# Patient Record
Sex: FEMALE | Race: BLACK OR AFRICAN AMERICAN | NOT HISPANIC OR LATINO | Employment: UNEMPLOYED | URBAN - METROPOLITAN AREA
[De-identification: names, ages, dates, MRNs, and addresses within clinical notes are randomized per-mention and may not be internally consistent; named-entity substitution may affect disease eponyms.]

---

## 2024-01-01 ENCOUNTER — APPOINTMENT (OUTPATIENT)
Dept: CARDIOLOGY | Facility: HOSPITAL | Age: 64
End: 2024-01-01
Payer: COMMERCIAL

## 2024-01-01 ENCOUNTER — ANESTHESIA (OUTPATIENT)
Dept: OPERATING ROOM | Facility: HOSPITAL | Age: 64
End: 2024-01-01
Payer: COMMERCIAL

## 2024-01-01 ENCOUNTER — APPOINTMENT (OUTPATIENT)
Dept: RADIOLOGY | Facility: HOSPITAL | Age: 64
End: 2024-01-01
Payer: COMMERCIAL

## 2024-01-01 ENCOUNTER — APPOINTMENT (OUTPATIENT)
Dept: VASCULAR MEDICINE | Facility: HOSPITAL | Age: 64
End: 2024-01-01
Payer: COMMERCIAL

## 2024-01-01 ENCOUNTER — CLINICAL SUPPORT (OUTPATIENT)
Dept: EMERGENCY MEDICINE | Facility: HOSPITAL | Age: 64
End: 2024-01-01
Payer: COMMERCIAL

## 2024-01-01 ENCOUNTER — ANESTHESIA EVENT (OUTPATIENT)
Dept: OPERATING ROOM | Facility: HOSPITAL | Age: 64
End: 2024-01-01
Payer: COMMERCIAL

## 2024-01-01 ENCOUNTER — DOCUMENTATION (OUTPATIENT)
Dept: INTERNAL MEDICINE | Facility: HOSPITAL | Age: 64
End: 2024-01-01

## 2024-01-01 LAB
ACT BLD: 102 SEC (ref 83–199)
ACT BLD: 179 SEC (ref 83–199)

## 2024-01-01 PROCEDURE — 74018 RADEX ABDOMEN 1 VIEW: CPT

## 2024-01-01 PROCEDURE — 93005 ELECTROCARDIOGRAM TRACING: CPT

## 2024-01-01 PROCEDURE — 85347 COAGULATION TIME ACTIVATED: CPT

## 2024-01-01 PROCEDURE — 71045 X-RAY EXAM CHEST 1 VIEW: CPT

## 2024-01-01 PROCEDURE — 93306 TTE W/DOPPLER COMPLETE: CPT | Performed by: INTERNAL MEDICINE

## 2024-01-01 PROCEDURE — 93925 LOWER EXTREMITY STUDY: CPT | Performed by: INTERNAL MEDICINE

## 2024-01-01 PROCEDURE — 93925 LOWER EXTREMITY STUDY: CPT

## 2024-01-01 PROCEDURE — 93970 EXTREMITY STUDY: CPT

## 2024-01-01 PROCEDURE — 2500000005 HC RX 250 GENERAL PHARMACY W/O HCPCS: Performed by: STUDENT IN AN ORGANIZED HEALTH CARE EDUCATION/TRAINING PROGRAM

## 2024-01-01 PROCEDURE — A27882 PR AMPUTATION LOW LEG,CIRCULAR: Performed by: STUDENT IN AN ORGANIZED HEALTH CARE EDUCATION/TRAINING PROGRAM

## 2024-01-01 PROCEDURE — 93970 EXTREMITY STUDY: CPT | Performed by: INTERNAL MEDICINE

## 2024-01-01 PROCEDURE — 74177 CT ABD & PELVIS W/CONTRAST: CPT

## 2024-01-01 PROCEDURE — 93306 TTE W/DOPPLER COMPLETE: CPT

## 2024-01-01 PROCEDURE — 93971 EXTREMITY STUDY: CPT | Performed by: INTERNAL MEDICINE

## 2024-01-01 PROCEDURE — 2500000004 HC RX 250 GENERAL PHARMACY W/ HCPCS (ALT 636 FOR OP/ED): Performed by: STUDENT IN AN ORGANIZED HEALTH CARE EDUCATION/TRAINING PROGRAM

## 2024-01-01 PROCEDURE — 71046 X-RAY EXAM CHEST 2 VIEWS: CPT

## 2024-01-01 RX ORDER — PHENYLEPHRINE HYDROCHLORIDE 10 MG/ML
INJECTION INTRAVENOUS AS NEEDED
Status: DISCONTINUED | OUTPATIENT
Start: 2024-01-01 | End: 2024-01-01

## 2024-01-01 RX ORDER — FENTANYL CITRATE 50 UG/ML
INJECTION, SOLUTION INTRAMUSCULAR; INTRAVENOUS AS NEEDED
Status: DISCONTINUED | OUTPATIENT
Start: 2024-01-01 | End: 2024-01-01

## 2024-01-01 RX ORDER — PROPOFOL 10 MG/ML
INJECTION, EMULSION INTRAVENOUS AS NEEDED
Status: DISCONTINUED | OUTPATIENT
Start: 2024-01-01 | End: 2024-01-01

## 2024-01-01 RX ORDER — CEFAZOLIN 1 G/1
INJECTION, POWDER, FOR SOLUTION INTRAVENOUS AS NEEDED
Status: DISCONTINUED | OUTPATIENT
Start: 2024-01-01 | End: 2024-01-01

## 2024-01-01 RX ORDER — LIDOCAINE HYDROCHLORIDE 10 MG/ML
INJECTION INFILTRATION; PERINEURAL AS NEEDED
Status: DISCONTINUED | OUTPATIENT
Start: 2024-01-01 | End: 2024-01-01

## 2024-01-01 RX ORDER — ONDANSETRON HYDROCHLORIDE 2 MG/ML
INJECTION, SOLUTION INTRAVENOUS AS NEEDED
Status: DISCONTINUED | OUTPATIENT
Start: 2024-01-01 | End: 2024-01-01

## 2024-01-01 SDOH — HEALTH STABILITY: MENTAL HEALTH: CURRENT SMOKER: 0

## 2024-01-01 ASSESSMENT — PAIN SCALES - GENERAL: PAIN_LEVEL: 3

## 2024-05-05 ENCOUNTER — HOSPITAL ENCOUNTER (INPATIENT)
Facility: HOSPITAL | Age: 64
LOS: 5 days | Discharge: HOME | End: 2024-05-10
Attending: GENERAL PRACTICE | Admitting: INTERNAL MEDICINE
Payer: COMMERCIAL

## 2024-05-05 ENCOUNTER — APPOINTMENT (OUTPATIENT)
Dept: RADIOLOGY | Facility: HOSPITAL | Age: 64
End: 2024-05-05
Payer: COMMERCIAL

## 2024-05-05 DIAGNOSIS — J44.1 COPD EXACERBATION (MULTI): Primary | ICD-10-CM

## 2024-05-05 DIAGNOSIS — G43.809 OTHER MIGRAINE WITHOUT STATUS MIGRAINOSUS, NOT INTRACTABLE: ICD-10-CM

## 2024-05-05 LAB
ALBUMIN SERPL BCP-MCNC: 4.7 G/DL (ref 3.4–5)
ALP SERPL-CCNC: 73 U/L (ref 33–136)
ALT SERPL W P-5'-P-CCNC: 20 U/L (ref 7–45)
ANION GAP BLDV CALCULATED.4IONS-SCNC: 12 MMOL/L (ref 10–25)
ANION GAP BLDV CALCULATED.4IONS-SCNC: 9 MMOL/L (ref 10–25)
ANION GAP SERPL CALC-SCNC: 17 MMOL/L (ref 10–20)
AST SERPL W P-5'-P-CCNC: 14 U/L (ref 9–39)
BASE EXCESS BLDV CALC-SCNC: 1.2 MMOL/L (ref -2–3)
BASE EXCESS BLDV CALC-SCNC: 2.2 MMOL/L (ref -2–3)
BASOPHILS # BLD AUTO: 0.02 X10*3/UL (ref 0–0.1)
BASOPHILS NFR BLD AUTO: 0.1 %
BILIRUB SERPL-MCNC: 0.3 MG/DL (ref 0–1.2)
BNP SERPL-MCNC: 73 PG/ML (ref 0–99)
BODY TEMPERATURE: 37 DEGREES CELSIUS
BODY TEMPERATURE: 37 DEGREES CELSIUS
BUN SERPL-MCNC: 18 MG/DL (ref 6–23)
CA-I BLDV-SCNC: 1.16 MMOL/L (ref 1.1–1.33)
CA-I BLDV-SCNC: 1.26 MMOL/L (ref 1.1–1.33)
CALCIUM SERPL-MCNC: 9.4 MG/DL (ref 8.6–10.3)
CARDIAC TROPONIN I PNL SERPL HS: 7 NG/L (ref 0–13)
CHLORIDE BLDV-SCNC: 97 MMOL/L (ref 98–107)
CHLORIDE BLDV-SCNC: 99 MMOL/L (ref 98–107)
CHLORIDE SERPL-SCNC: 97 MMOL/L (ref 98–107)
CO2 SERPL-SCNC: 27 MMOL/L (ref 21–32)
CREAT SERPL-MCNC: 0.86 MG/DL (ref 0.5–1.05)
EGFRCR SERPLBLD CKD-EPI 2021: 76 ML/MIN/1.73M*2
EOSINOPHIL # BLD AUTO: 0 X10*3/UL (ref 0–0.7)
EOSINOPHIL NFR BLD AUTO: 0 %
ERYTHROCYTE [DISTWIDTH] IN BLOOD BY AUTOMATED COUNT: 19.1 % (ref 11.5–14.5)
GLUCOSE BLDV-MCNC: 149 MG/DL (ref 74–99)
GLUCOSE BLDV-MCNC: 153 MG/DL (ref 74–99)
GLUCOSE SERPL-MCNC: 143 MG/DL (ref 74–99)
HCO3 BLDV-SCNC: 27.8 MMOL/L (ref 22–26)
HCO3 BLDV-SCNC: 29.1 MMOL/L (ref 22–26)
HCT VFR BLD AUTO: 33.9 % (ref 36–46)
HCT VFR BLD EST: 30 % (ref 36–46)
HCT VFR BLD EST: 35 % (ref 36–46)
HGB BLD-MCNC: 10.2 G/DL (ref 12–16)
HGB BLDV-MCNC: 10.1 G/DL (ref 12–16)
HGB BLDV-MCNC: 11.6 G/DL (ref 12–16)
IMM GRANULOCYTES # BLD AUTO: 0.32 X10*3/UL (ref 0–0.7)
IMM GRANULOCYTES NFR BLD AUTO: 2.3 % (ref 0–0.9)
INHALED O2 CONCENTRATION: 21 %
INHALED O2 CONCENTRATION: 21 %
LACTATE BLDV-SCNC: 3.4 MMOL/L (ref 0.4–2)
LACTATE BLDV-SCNC: 3.4 MMOL/L (ref 0.4–2)
LACTATE BLDV-SCNC: 3.6 MMOL/L (ref 0.4–2)
LACTATE BLDV-SCNC: 4 MMOL/L (ref 0.4–2)
LYMPHOCYTES # BLD AUTO: 1.23 X10*3/UL (ref 1.2–4.8)
LYMPHOCYTES NFR BLD AUTO: 9 %
MAGNESIUM SERPL-MCNC: 2.1 MG/DL (ref 1.6–2.4)
MCH RBC QN AUTO: 23.9 PG (ref 26–34)
MCHC RBC AUTO-ENTMCNC: 30.1 G/DL (ref 32–36)
MCV RBC AUTO: 80 FL (ref 80–100)
MONOCYTES # BLD AUTO: 0.6 X10*3/UL (ref 0.1–1)
MONOCYTES NFR BLD AUTO: 4.4 %
NEUTROPHILS # BLD AUTO: 11.55 X10*3/UL (ref 1.2–7.7)
NEUTROPHILS NFR BLD AUTO: 84.2 %
NRBC BLD-RTO: 0.7 /100 WBCS (ref 0–0)
OXYHGB MFR BLDV: 46.5 % (ref 45–75)
OXYHGB MFR BLDV: 76.7 % (ref 45–75)
PCO2 BLDV: 47 MM HG (ref 41–51)
PCO2 BLDV: 62 MM HG (ref 41–51)
PH BLDV: 7.28 PH (ref 7.33–7.43)
PH BLDV: 7.38 PH (ref 7.33–7.43)
PLATELET # BLD AUTO: 596 X10*3/UL (ref 150–450)
PO2 BLDV: 38 MM HG (ref 35–45)
PO2 BLDV: 53 MM HG (ref 35–45)
POTASSIUM BLDV-SCNC: 4 MMOL/L (ref 3.5–5.3)
POTASSIUM BLDV-SCNC: 4.2 MMOL/L (ref 3.5–5.3)
POTASSIUM SERPL-SCNC: 3.7 MMOL/L (ref 3.5–5.3)
PROT SERPL-MCNC: 7.8 G/DL (ref 6.4–8.2)
RBC # BLD AUTO: 4.26 X10*6/UL (ref 4–5.2)
SAO2 % BLDV: 48 % (ref 45–75)
SAO2 % BLDV: 78 % (ref 45–75)
SODIUM BLDV-SCNC: 132 MMOL/L (ref 136–145)
SODIUM BLDV-SCNC: 134 MMOL/L (ref 136–145)
SODIUM SERPL-SCNC: 137 MMOL/L (ref 136–145)
WBC # BLD AUTO: 13.7 X10*3/UL (ref 4.4–11.3)

## 2024-05-05 PROCEDURE — 84132 ASSAY OF SERUM POTASSIUM: CPT | Performed by: GENERAL PRACTICE

## 2024-05-05 PROCEDURE — 96367 TX/PROPH/DG ADDL SEQ IV INF: CPT

## 2024-05-05 PROCEDURE — 82728 ASSAY OF FERRITIN: CPT | Mod: AHULAB | Performed by: INTERNAL MEDICINE

## 2024-05-05 PROCEDURE — 36415 COLL VENOUS BLD VENIPUNCTURE: CPT | Performed by: GENERAL PRACTICE

## 2024-05-05 PROCEDURE — 2500000004 HC RX 250 GENERAL PHARMACY W/ HCPCS (ALT 636 FOR OP/ED): Performed by: GENERAL PRACTICE

## 2024-05-05 PROCEDURE — 2500000001 HC RX 250 WO HCPCS SELF ADMINISTERED DRUGS (ALT 637 FOR MEDICARE OP): Performed by: INTERNAL MEDICINE

## 2024-05-05 PROCEDURE — 84484 ASSAY OF TROPONIN QUANT: CPT | Performed by: GENERAL PRACTICE

## 2024-05-05 PROCEDURE — 1100000001 HC PRIVATE ROOM DAILY

## 2024-05-05 PROCEDURE — 94660 CPAP INITIATION&MGMT: CPT

## 2024-05-05 PROCEDURE — 94640 AIRWAY INHALATION TREATMENT: CPT

## 2024-05-05 PROCEDURE — 85025 COMPLETE CBC W/AUTO DIFF WBC: CPT | Performed by: GENERAL PRACTICE

## 2024-05-05 PROCEDURE — 99285 EMERGENCY DEPT VISIT HI MDM: CPT | Mod: 25

## 2024-05-05 PROCEDURE — 96366 THER/PROPH/DIAG IV INF ADDON: CPT

## 2024-05-05 PROCEDURE — 99222 1ST HOSP IP/OBS MODERATE 55: CPT | Performed by: INTERNAL MEDICINE

## 2024-05-05 PROCEDURE — 2500000002 HC RX 250 W HCPCS SELF ADMINISTERED DRUGS (ALT 637 FOR MEDICARE OP, ALT 636 FOR OP/ED): Performed by: INTERNAL MEDICINE

## 2024-05-05 PROCEDURE — 71045 X-RAY EXAM CHEST 1 VIEW: CPT

## 2024-05-05 PROCEDURE — 2500000004 HC RX 250 GENERAL PHARMACY W/ HCPCS (ALT 636 FOR OP/ED): Performed by: INTERNAL MEDICINE

## 2024-05-05 PROCEDURE — 83540 ASSAY OF IRON: CPT | Performed by: INTERNAL MEDICINE

## 2024-05-05 PROCEDURE — 2500000001 HC RX 250 WO HCPCS SELF ADMINISTERED DRUGS (ALT 637 FOR MEDICARE OP): Performed by: GENERAL PRACTICE

## 2024-05-05 PROCEDURE — 83735 ASSAY OF MAGNESIUM: CPT | Performed by: GENERAL PRACTICE

## 2024-05-05 PROCEDURE — 96365 THER/PROPH/DIAG IV INF INIT: CPT

## 2024-05-05 PROCEDURE — 2500000002 HC RX 250 W HCPCS SELF ADMINISTERED DRUGS (ALT 637 FOR MEDICARE OP, ALT 636 FOR OP/ED): Performed by: GENERAL PRACTICE

## 2024-05-05 PROCEDURE — 71045 X-RAY EXAM CHEST 1 VIEW: CPT | Performed by: RADIOLOGY

## 2024-05-05 PROCEDURE — 83605 ASSAY OF LACTIC ACID: CPT | Performed by: INTERNAL MEDICINE

## 2024-05-05 PROCEDURE — 83605 ASSAY OF LACTIC ACID: CPT | Performed by: GENERAL PRACTICE

## 2024-05-05 PROCEDURE — 99223 1ST HOSP IP/OBS HIGH 75: CPT | Performed by: INTERNAL MEDICINE

## 2024-05-05 PROCEDURE — 99291 CRITICAL CARE FIRST HOUR: CPT | Performed by: GENERAL PRACTICE

## 2024-05-05 PROCEDURE — 96375 TX/PRO/DX INJ NEW DRUG ADDON: CPT

## 2024-05-05 PROCEDURE — 2500000005 HC RX 250 GENERAL PHARMACY W/O HCPCS: Performed by: INTERNAL MEDICINE

## 2024-05-05 PROCEDURE — 83880 ASSAY OF NATRIURETIC PEPTIDE: CPT | Performed by: GENERAL PRACTICE

## 2024-05-05 RX ORDER — ACETAMINOPHEN 650 MG/1
650 SUPPOSITORY RECTAL EVERY 4 HOURS PRN
Status: DISCONTINUED | OUTPATIENT
Start: 2024-05-05 | End: 2024-05-10 | Stop reason: HOSPADM

## 2024-05-05 RX ORDER — IPRATROPIUM BROMIDE AND ALBUTEROL SULFATE 2.5; .5 MG/3ML; MG/3ML
9 SOLUTION RESPIRATORY (INHALATION) ONCE
Status: COMPLETED | OUTPATIENT
Start: 2024-05-05 | End: 2024-05-05

## 2024-05-05 RX ORDER — ONDANSETRON HYDROCHLORIDE 2 MG/ML
4 INJECTION, SOLUTION INTRAVENOUS EVERY 8 HOURS PRN
Status: DISCONTINUED | OUTPATIENT
Start: 2024-05-05 | End: 2024-05-10 | Stop reason: HOSPADM

## 2024-05-05 RX ORDER — IPRATROPIUM BROMIDE AND ALBUTEROL SULFATE 2.5; .5 MG/3ML; MG/3ML
3 SOLUTION RESPIRATORY (INHALATION) EVERY 4 HOURS PRN
Status: DISCONTINUED | OUTPATIENT
Start: 2024-05-05 | End: 2024-05-05 | Stop reason: SDUPTHER

## 2024-05-05 RX ORDER — ACETAMINOPHEN 160 MG/5ML
650 SOLUTION ORAL EVERY 4 HOURS PRN
Status: DISCONTINUED | OUTPATIENT
Start: 2024-05-05 | End: 2024-05-10 | Stop reason: HOSPADM

## 2024-05-05 RX ORDER — MAGNESIUM SULFATE HEPTAHYDRATE 40 MG/ML
2 INJECTION, SOLUTION INTRAVENOUS ONCE
Status: COMPLETED | OUTPATIENT
Start: 2024-05-05 | End: 2024-05-05

## 2024-05-05 RX ORDER — PANTOPRAZOLE SODIUM 40 MG/1
40 TABLET, DELAYED RELEASE ORAL
Status: DISCONTINUED | OUTPATIENT
Start: 2024-05-05 | End: 2024-05-10 | Stop reason: HOSPADM

## 2024-05-05 RX ORDER — LORAZEPAM 2 MG/ML
1 INJECTION INTRAMUSCULAR ONCE
Status: COMPLETED | OUTPATIENT
Start: 2024-05-05 | End: 2024-05-05

## 2024-05-05 RX ORDER — ENOXAPARIN SODIUM 100 MG/ML
40 INJECTION SUBCUTANEOUS EVERY 24 HOURS
Status: DISCONTINUED | OUTPATIENT
Start: 2024-05-05 | End: 2024-05-10 | Stop reason: HOSPADM

## 2024-05-05 RX ORDER — LORAZEPAM 1 MG/1
1 TABLET ORAL ONCE
Status: COMPLETED | OUTPATIENT
Start: 2024-05-05 | End: 2024-05-05

## 2024-05-05 RX ORDER — IPRATROPIUM BROMIDE AND ALBUTEROL SULFATE 2.5; .5 MG/3ML; MG/3ML
3 SOLUTION RESPIRATORY (INHALATION) EVERY 2 HOUR PRN
Status: DISCONTINUED | OUTPATIENT
Start: 2024-05-05 | End: 2024-05-10 | Stop reason: HOSPADM

## 2024-05-05 RX ORDER — ONDANSETRON 4 MG/1
4 TABLET, FILM COATED ORAL EVERY 8 HOURS PRN
Status: DISCONTINUED | OUTPATIENT
Start: 2024-05-05 | End: 2024-05-10 | Stop reason: HOSPADM

## 2024-05-05 RX ORDER — IPRATROPIUM BROMIDE AND ALBUTEROL SULFATE 2.5; .5 MG/3ML; MG/3ML
3 SOLUTION RESPIRATORY (INHALATION) ONCE
Status: COMPLETED | OUTPATIENT
Start: 2024-05-05 | End: 2024-05-05

## 2024-05-05 RX ORDER — SODIUM CHLORIDE 9 MG/ML
100 INJECTION, SOLUTION INTRAVENOUS CONTINUOUS
Status: DISCONTINUED | OUTPATIENT
Start: 2024-05-05 | End: 2024-05-07

## 2024-05-05 RX ORDER — IPRATROPIUM BROMIDE AND ALBUTEROL SULFATE 2.5; .5 MG/3ML; MG/3ML
3 SOLUTION RESPIRATORY (INHALATION)
Status: DISCONTINUED | OUTPATIENT
Start: 2024-05-05 | End: 2024-05-10 | Stop reason: HOSPADM

## 2024-05-05 RX ORDER — ALUMINUM HYDROXIDE, MAGNESIUM HYDROXIDE, AND SIMETHICONE 1200; 120; 1200 MG/30ML; MG/30ML; MG/30ML
30 SUSPENSION ORAL 4 TIMES DAILY PRN
Status: DISCONTINUED | OUTPATIENT
Start: 2024-05-05 | End: 2024-05-10 | Stop reason: HOSPADM

## 2024-05-05 RX ORDER — MAGNESIUM SULFATE HEPTAHYDRATE 40 MG/ML
2 INJECTION, SOLUTION INTRAVENOUS ONCE
Status: DISCONTINUED | OUTPATIENT
Start: 2024-05-05 | End: 2024-05-05

## 2024-05-05 RX ORDER — PANTOPRAZOLE SODIUM 40 MG/10ML
40 INJECTION, POWDER, LYOPHILIZED, FOR SOLUTION INTRAVENOUS
Status: DISCONTINUED | OUTPATIENT
Start: 2024-05-05 | End: 2024-05-10 | Stop reason: HOSPADM

## 2024-05-05 RX ORDER — ACETAMINOPHEN 325 MG/1
650 TABLET ORAL EVERY 4 HOURS PRN
Status: DISCONTINUED | OUTPATIENT
Start: 2024-05-05 | End: 2024-05-10 | Stop reason: HOSPADM

## 2024-05-05 RX ADMIN — LORAZEPAM 1 MG: 1 TABLET ORAL at 06:14

## 2024-05-05 RX ADMIN — Medication 3 L/MIN: at 21:15

## 2024-05-05 RX ADMIN — METHYLPREDNISOLONE SODIUM SUCCINATE 40 MG: 40 INJECTION, POWDER, FOR SOLUTION INTRAMUSCULAR; INTRAVENOUS at 10:19

## 2024-05-05 RX ADMIN — ACETAMINOPHEN 650 MG: 325 TABLET ORAL at 17:58

## 2024-05-05 RX ADMIN — IPRATROPIUM BROMIDE AND ALBUTEROL SULFATE 9 ML: 2.5; .5 SOLUTION RESPIRATORY (INHALATION) at 00:37

## 2024-05-05 RX ADMIN — IPRATROPIUM BROMIDE AND ALBUTEROL SULFATE 3 ML: 2.5; .5 SOLUTION RESPIRATORY (INHALATION) at 20:52

## 2024-05-05 RX ADMIN — ACETAMINOPHEN 650 MG: 325 TABLET ORAL at 11:00

## 2024-05-05 RX ADMIN — IPRATROPIUM BROMIDE AND ALBUTEROL SULFATE 3 ML: 2.5; .5 SOLUTION RESPIRATORY (INHALATION) at 07:41

## 2024-05-05 RX ADMIN — IPRATROPIUM BROMIDE AND ALBUTEROL SULFATE 3 ML: 2.5; .5 SOLUTION RESPIRATORY (INHALATION) at 06:11

## 2024-05-05 RX ADMIN — PANTOPRAZOLE SODIUM 40 MG: 40 TABLET, DELAYED RELEASE ORAL at 07:00

## 2024-05-05 RX ADMIN — LORAZEPAM 1 MG: 2 INJECTION, SOLUTION INTRAMUSCULAR; INTRAVENOUS at 00:17

## 2024-05-05 RX ADMIN — ALUMINUM HYDROXIDE, MAGNESIUM HYDROXIDE, AND DIMETHICONE 30 ML: 200; 20; 200 SUSPENSION ORAL at 05:13

## 2024-05-05 RX ADMIN — ENOXAPARIN SODIUM 40 MG: 40 INJECTION SUBCUTANEOUS at 05:52

## 2024-05-05 RX ADMIN — IPRATROPIUM BROMIDE AND ALBUTEROL SULFATE 3 ML: 2.5; .5 SOLUTION RESPIRATORY (INHALATION) at 13:05

## 2024-05-05 RX ADMIN — LORAZEPAM 1 MG: 1 TABLET ORAL at 12:51

## 2024-05-05 RX ADMIN — MAGNESIUM SULFATE HEPTAHYDRATE 2 G: 40 INJECTION, SOLUTION INTRAVENOUS at 00:35

## 2024-05-05 RX ADMIN — METHYLPREDNISOLONE SODIUM SUCCINATE 40 MG: 40 INJECTION, POWDER, FOR SOLUTION INTRAMUSCULAR; INTRAVENOUS at 17:48

## 2024-05-05 RX ADMIN — METHYLPREDNISOLONE SODIUM SUCCINATE 125 MG: 125 INJECTION, POWDER, FOR SOLUTION INTRAMUSCULAR; INTRAVENOUS at 00:31

## 2024-05-05 RX ADMIN — SODIUM CHLORIDE 100 ML/HR: 9 INJECTION, SOLUTION INTRAVENOUS at 05:52

## 2024-05-05 RX ADMIN — AZITHROMYCIN MONOHYDRATE 500 MG: 500 INJECTION, POWDER, LYOPHILIZED, FOR SOLUTION INTRAVENOUS at 02:05

## 2024-05-05 SDOH — SOCIAL STABILITY: SOCIAL INSECURITY: HAVE YOU HAD ANY THOUGHTS OF HARMING ANYONE ELSE?: NO

## 2024-05-05 SDOH — SOCIAL STABILITY: SOCIAL INSECURITY: DOES ANYONE TRY TO KEEP YOU FROM HAVING/CONTACTING OTHER FRIENDS OR DOING THINGS OUTSIDE YOUR HOME?: NO

## 2024-05-05 SDOH — SOCIAL STABILITY: SOCIAL INSECURITY: HAVE YOU HAD THOUGHTS OF HARMING ANYONE ELSE?: NO

## 2024-05-05 SDOH — SOCIAL STABILITY: SOCIAL INSECURITY: ABUSE: ADULT

## 2024-05-05 SDOH — SOCIAL STABILITY: SOCIAL INSECURITY: DO YOU FEEL ANYONE HAS EXPLOITED OR TAKEN ADVANTAGE OF YOU FINANCIALLY OR OF YOUR PERSONAL PROPERTY?: NO

## 2024-05-05 SDOH — SOCIAL STABILITY: SOCIAL INSECURITY: ARE YOU OR HAVE YOU BEEN THREATENED OR ABUSED PHYSICALLY, EMOTIONALLY, OR SEXUALLY BY ANYONE?: NO

## 2024-05-05 SDOH — SOCIAL STABILITY: SOCIAL INSECURITY: HAS ANYONE EVER THREATENED TO HURT YOUR FAMILY OR YOUR PETS?: NO

## 2024-05-05 SDOH — SOCIAL STABILITY: SOCIAL INSECURITY: ARE THERE ANY APPARENT SIGNS OF INJURIES/BEHAVIORS THAT COULD BE RELATED TO ABUSE/NEGLECT?: NO

## 2024-05-05 SDOH — SOCIAL STABILITY: SOCIAL INSECURITY: DO YOU FEEL UNSAFE GOING BACK TO THE PLACE WHERE YOU ARE LIVING?: NO

## 2024-05-05 ASSESSMENT — COGNITIVE AND FUNCTIONAL STATUS - GENERAL
STANDING UP FROM CHAIR USING ARMS: A LITTLE
WALKING IN HOSPITAL ROOM: A LOT
DRESSING REGULAR LOWER BODY CLOTHING: A LITTLE
MOBILITY SCORE: 19
TOILETING: A LITTLE
MOBILITY SCORE: 16
MOVING TO AND FROM BED TO CHAIR: A LITTLE
STANDING UP FROM CHAIR USING ARMS: A LITTLE
TOILETING: A LITTLE
DRESSING REGULAR UPPER BODY CLOTHING: A LITTLE
DRESSING REGULAR UPPER BODY CLOTHING: A LITTLE
CLIMB 3 TO 5 STEPS WITH RAILING: TOTAL
PATIENT BASELINE BEDBOUND: NO
HELP NEEDED FOR BATHING: A LITTLE
CLIMB 3 TO 5 STEPS WITH RAILING: A LOT
DAILY ACTIVITIY SCORE: 20
DAILY ACTIVITIY SCORE: 20
WALKING IN HOSPITAL ROOM: A LITTLE
HELP NEEDED FOR BATHING: A LITTLE
DRESSING REGULAR LOWER BODY CLOTHING: A LITTLE
MOVING TO AND FROM BED TO CHAIR: A LITTLE
TURNING FROM BACK TO SIDE WHILE IN FLAT BAD: A LITTLE

## 2024-05-05 ASSESSMENT — COLUMBIA-SUICIDE SEVERITY RATING SCALE - C-SSRS
2. HAVE YOU ACTUALLY HAD ANY THOUGHTS OF KILLING YOURSELF?: NO
6. HAVE YOU EVER DONE ANYTHING, STARTED TO DO ANYTHING, OR PREPARED TO DO ANYTHING TO END YOUR LIFE?: NO
1. IN THE PAST MONTH, HAVE YOU WISHED YOU WERE DEAD OR WISHED YOU COULD GO TO SLEEP AND NOT WAKE UP?: NO

## 2024-05-05 ASSESSMENT — ENCOUNTER SYMPTOMS
EYE ITCHING: 0
SORE THROAT: 1
UNEXPECTED WEIGHT CHANGE: 0
EYES NEGATIVE: 1
NECK PAIN: 1
FATIGUE: 1
HEMATURIA: 0
NAUSEA: 1
BACK PAIN: 1
DIARRHEA: 1
SEIZURES: 0
RHINORRHEA: 0
WOUND: 0
GASTROINTESTINAL NEGATIVE: 1
ABDOMINAL PAIN: 1
DYSPHORIC MOOD: 1
CARDIOVASCULAR NEGATIVE: 1
FREQUENCY: 0
LIGHT-HEADEDNESS: 1
ACTIVITY CHANGE: 1
VOMITING: 1
ENDOCRINE NEGATIVE: 1
NEUROLOGICAL NEGATIVE: 1
ARTHRALGIAS: 1
MUSCULOSKELETAL NEGATIVE: 1
DIZZINESS: 1
NERVOUS/ANXIOUS: 1
PALPITATIONS: 1
HEMATOLOGIC/LYMPHATIC NEGATIVE: 1
PSYCHIATRIC NEGATIVE: 1
SINUS PRESSURE: 0
DYSURIA: 0
CHILLS: 0
CHEST TIGHTNESS: 1
WHEEZING: 1
APPETITE CHANGE: 0
ALLERGIC/IMMUNOLOGIC NEGATIVE: 1
CONFUSION: 1
HEADACHES: 1
EYE PAIN: 1
COUGH: 1
SHORTNESS OF BREATH: 1
FEVER: 0
CONSTIPATION: 1
EYE REDNESS: 0
MYALGIAS: 1
SINUS PAIN: 0

## 2024-05-05 ASSESSMENT — PATIENT HEALTH QUESTIONNAIRE - PHQ9
SUM OF ALL RESPONSES TO PHQ9 QUESTIONS 1 & 2: 0
1. LITTLE INTEREST OR PLEASURE IN DOING THINGS: NOT AT ALL
2. FEELING DOWN, DEPRESSED OR HOPELESS: NOT AT ALL

## 2024-05-05 ASSESSMENT — PAIN SCALES - GENERAL
PAINLEVEL_OUTOF10: 0 - NO PAIN
PAINLEVEL_OUTOF10: 7
PAINLEVEL_OUTOF10: 3
PAINLEVEL_OUTOF10: 3

## 2024-05-05 ASSESSMENT — ACTIVITIES OF DAILY LIVING (ADL)
ADEQUATE_TO_COMPLETE_ADL: YES
PATIENT'S MEMORY ADEQUATE TO SAFELY COMPLETE DAILY ACTIVITIES?: YES
DRESSING YOURSELF: NEEDS ASSISTANCE
FEEDING YOURSELF: INDEPENDENT
HEARING - LEFT EAR: FUNCTIONAL
WALKS IN HOME: NEEDS ASSISTANCE
GROOMING: NEEDS ASSISTANCE
HEARING - RIGHT EAR: FUNCTIONAL
BATHING: NEEDS ASSISTANCE
ASSISTIVE_DEVICE: WALKER
JUDGMENT_ADEQUATE_SAFELY_COMPLETE_DAILY_ACTIVITIES: YES
TOILETING: NEEDS ASSISTANCE
LACK_OF_TRANSPORTATION: NO

## 2024-05-05 ASSESSMENT — LIFESTYLE VARIABLES
HOW MANY STANDARD DRINKS CONTAINING ALCOHOL DO YOU HAVE ON A TYPICAL DAY: 1 OR 2
HOW OFTEN DO YOU HAVE 6 OR MORE DRINKS ON ONE OCCASION: WEEKLY
HOW OFTEN DO YOU HAVE A DRINK CONTAINING ALCOHOL: 2-4 TIMES A MONTH
AUDIT-C TOTAL SCORE: 5
SKIP TO QUESTIONS 9-10: 0
AUDIT-C TOTAL SCORE: 5

## 2024-05-05 ASSESSMENT — PAIN DESCRIPTION - LOCATION: LOCATION: HEAD

## 2024-05-05 ASSESSMENT — PAIN - FUNCTIONAL ASSESSMENT: PAIN_FUNCTIONAL_ASSESSMENT: 0-10

## 2024-05-05 NOTE — CONSULTS
Inpatient consult to Pulmonology  Consult performed by: Kera Santoro MD  Consult ordered by: Domenic Hernández DO      Reason For Consult  COPD exacerbation.   History Of Present Illness  Fernando Cuevas is a 63 y.o. female  with HTN, GERD, COPD not on home O2, RLL lung cancer s/p XRT in 9/2022, who p/w increased SOB associated with increased use of her nebulizer treatments without improvement that prompted her to come to the ED. In the ED very anxious and significantly SOB. Received Solu-Medrol, Duo-Neb and Zithromax and admitted to [facility] for further management. Pulmonary is consulted for COPD management.   States she developed a sudden onset, progressive SOB 2 days ago. Used her nebulizer without improvement. SOB both at rest and with activity. Currently SALEH with minimal activity. At baseline SALEH after walking 50 feet or climbing 3 steps. +ve orthopnea, PND and LE edema. SOB associated with HA, CP and wheezing. Also worsening of her chronic cough. Cough wet, but cannot bring up her phlegm. Denies h/o hemoptysis.   Full ROS as below.   For her COPD she is following a pulmonologist at [facility]. Last COPD exacerbation around a year ago.  Past Medical History  HTN, GERD, COPD not on home O2, RLL lung cancer s/p XRT in 9/2022   Surgical History  C section   Social History  active smoker, currently 3 cigarettes/day. 1/3 PPD x 46 years h/o smoking. 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 No known exposures.   Family History  +ve for breast cancer, DM, SLE   No current outpatient medications   Allergies  Patient has no allergy information on record.  Review of Systems   Constitutional:  Positive for fatigue. Negative for appetite change, chills, fever and unexpected weight change.   HENT:  Positive for sore throat. Negative for congestion, postnasal drip, rhinorrhea, sinus pressure and sinus pain.    Eyes:  Positive for pain. Negative for redness, itching and visual disturbance.   Respiratory:  Positive for cough, chest  tightness, shortness of breath and wheezing.    Cardiovascular:  Positive for chest pain, palpitations and leg swelling.   Gastrointestinal:  Positive for abdominal pain, constipation, diarrhea, nausea and vomiting.   Genitourinary:  Negative for dysuria, frequency and hematuria.   Musculoskeletal:  Positive for arthralgias, back pain, myalgias and neck pain.   Skin:  Negative for pallor, rash and wound.   Neurological:  Positive for dizziness, light-headedness and headaches. Negative for seizures and syncope.   Psychiatric/Behavioral:  Positive for confusion and dysphoric mood. The patient is nervous/anxious.    Scheduled medications  [START ON 5/6/2024] azithromycin, 500 mg, intravenous, Daily  enoxaparin, 40 mg, subcutaneous, q24h  ipratropium-albuteroL, 3 mL, nebulization, TID  methylPREDNISolone sodium succinate (PF), 40 mg, intravenous, q8h  pantoprazole, 40 mg, oral, Daily before breakfast   Or  pantoprazole, 40 mg, intravenous, Daily before breakfast    Continuous medications  sodium chloride 0.9%, 100 mL/hr, Last Rate: 100 mL/hr (05/05/24 0552)    PRN medications  PRN medications: acetaminophen **OR** acetaminophen **OR** acetaminophen, alum-mag hydroxide-simeth, ipratropium-albuteroL, ondansetron **OR** ondansetron   Physical Exam  Constitutional:       General: She is not in acute distress.     Appearance: Normal appearance. She is normal weight. She is not ill-appearing or toxic-appearing.   HENT:      Head: Normocephalic and atraumatic.      Nose:      Comments: On 4L via NC     Mouth/Throat:      Mouth: Mucous membranes are moist.      Comments: Mallampati 2-3  Eyes:      General: No scleral icterus.     Extraocular Movements: Extraocular movements intact.      Pupils: Pupils are equal, round, and reactive to light.      Comments: Discolored sclera.    Cardiovascular:      Rate and Rhythm: Regular rhythm. Tachycardia present.      Heart sounds: No murmur heard.     No friction rub. No gallop.  "  Pulmonary:      Effort: Pulmonary effort is normal. No respiratory distress.      Breath sounds: Wheezing (diffuse expiratory) present. No rales.      Comments: Prolonged expiration, fair air entry.   Abdominal:      General: There is no distension.      Palpations: Abdomen is soft.      Tenderness: There is no abdominal tenderness.   Musculoskeletal:         General: No tenderness. Normal range of motion.      Cervical back: Neck supple. No rigidity or tenderness.      Right lower leg: No edema.      Left lower leg: No edema.   Lymphadenopathy:      Cervical: No cervical adenopathy.   Skin:     General: Skin is warm and dry.      Coloration: Skin is not jaundiced.   Neurological:      General: No focal deficit present.      Mental Status: She is alert and oriented to person, place, and time.      Cranial Nerves: No cranial nerve deficit.      Motor: No weakness.   Psychiatric:         Mood and Affect: Mood normal.         Behavior: Behavior normal.     Vital Signs  Blood pressure 138/77, pulse 109, temperature 36.3 °C (97.4 °F), temperature source Temporal, resp. rate 19, height 1.549 m (5' 0.98\"), weight 64.6 kg (142 lb 8 oz), SpO2 99%.  Oxygen Therapy  SpO2: 99 %  Medical Gas Therapy: Supplemental oxygen  O2 Delivery Method: Nasal cannula  FiO2 (%): 28 %    Relevant Results  XR chest 1 view 05/05/2024    Narrative  Interpreted By:  Abhishek Noble,  STUDY:  XR CHEST 1 VIEW;  5/5/2024 2:34 am    INDICATION:  Signs/Symptoms:COPD exacerbation, SOB.    COMPARISON:  Chest x-ray 07/23/2023    ACCESSION NUMBER(S):  [id number]    ORDERING CLINICIAN:  DONOVAN ALBA    FINDINGS:      CARDIOMEDIASTINAL SILHOUETTE:  Cardiomediastinal silhouette is normal in size and configuration.    LUNGS:  No consolidation, pleural effusion or pneumothorax.    ABDOMEN:  No remarkable upper abdominal findings.    BONES:  No acute osseous abnormality.    Impression  No acute cardiopulmonary process.    MACRO:  None    Signed by: Abhishek " Aide 5/5/2024 4:09 AM  Dictation workstation:   [id number]    Results for orders placed or performed during the hospital encounter of 05/05/24 (from the past 24 hour(s))   CBC and Auto Differential   Result Value Ref Range    WBC 13.7 (H) 4.4 - 11.3 x10*3/uL    nRBC 0.7 (H) 0.0 - 0.0 /100 WBCs    RBC 4.26 4.00 - 5.20 x10*6/uL    Hemoglobin 10.2 (L) 12.0 - 16.0 g/dL    Hematocrit 33.9 (L) 36.0 - 46.0 %    MCV 80 80 - 100 fL    MCH 23.9 (L) 26.0 - 34.0 pg    MCHC 30.1 (L) 32.0 - 36.0 g/dL    RDW 19.1 (H) 11.5 - 14.5 %    Platelets 596 (H) 150 - 450 x10*3/uL    Neutrophils % 84.2 40.0 - 80.0 %    Immature Granulocytes %, Automated 2.3 (H) 0.0 - 0.9 %    Lymphocytes % 9.0 13.0 - 44.0 %    Monocytes % 4.4 2.0 - 10.0 %    Eosinophils % 0.0 0.0 - 6.0 %    Basophils % 0.1 0.0 - 2.0 %    Neutrophils Absolute 11.55 (H) 1.20 - 7.70 x10*3/uL    Immature Granulocytes Absolute, Automated 0.32 0.00 - 0.70 x10*3/uL    Lymphocytes Absolute 1.23 1.20 - 4.80 x10*3/uL    Monocytes Absolute 0.60 0.10 - 1.00 x10*3/uL    Eosinophils Absolute 0.00 0.00 - 0.70 x10*3/uL    Basophils Absolute 0.02 0.00 - 0.10 x10*3/uL   Comprehensive metabolic panel   Result Value Ref Range    Glucose 143 (H) 74 - 99 mg/dL    Sodium 137 136 - 145 mmol/L    Potassium 3.7 3.5 - 5.3 mmol/L    Chloride 97 (L) 98 - 107 mmol/L    Bicarbonate 27 21 - 32 mmol/L    Anion Gap 17 10 - 20 mmol/L    Urea Nitrogen 18 6 - 23 mg/dL    Creatinine 0.86 0.50 - 1.05 mg/dL    eGFR 76 >60 mL/min/1.73m*2    Calcium 9.4 8.6 - 10.3 mg/dL    Albumin 4.7 3.4 - 5.0 g/dL    Alkaline Phosphatase 73 33 - 136 U/L    Total Protein 7.8 6.4 - 8.2 g/dL    AST 14 9 - 39 U/L    Bilirubin, Total 0.3 0.0 - 1.2 mg/dL    ALT 20 7 - 45 U/L   Magnesium   Result Value Ref Range    Magnesium 2.10 1.60 - 2.40 mg/dL   Troponin I, High Sensitivity   Result Value Ref Range    Troponin I, High Sensitivity 7 0 - 13 ng/L   B-Type Natriuretic Peptide   Result Value Ref Range    BNP 73 0 - 99 pg/mL   BLOOD  GAS VENOUS FULL PANEL   Result Value Ref Range    POCT pH, Venous 7.28 (L) 7.33 - 7.43 pH    POCT pCO2, Venous 62 (H) 41 - 51 mm Hg    POCT pO2, Venous 38 35 - 45 mm Hg    POCT SO2, Venous 48 45 - 75 %    POCT Oxy Hemoglobin, Venous 46.5 45.0 - 75.0 %    POCT Hematocrit Calculated, Venous 35.0 (L) 36.0 - 46.0 %    POCT Sodium, Venous 134 (L) 136 - 145 mmol/L    POCT Potassium, Venous 4.0 3.5 - 5.3 mmol/L    POCT Chloride, Venous 97 (L) 98 - 107 mmol/L    POCT Ionized Calicum, Venous 1.26 1.10 - 1.33 mmol/L    POCT Glucose, Venous 149 (H) 74 - 99 mg/dL    POCT Lactate, Venous 3.6 (H) 0.4 - 2.0 mmol/L    POCT Base Excess, Venous 1.2 -2.0 - 3.0 mmol/L    POCT HCO3 Calculated, Venous 29.1 (H) 22.0 - 26.0 mmol/L    POCT Hemoglobin, Venous 11.6 (L) 12.0 - 16.0 g/dL    POCT Anion Gap, Venous 12.0 10.0 - 25.0 mmol/L    Patient Temperature 37.0 degrees Celsius    FiO2 21 %   Blood Gas Lactic Acid, Venous   Result Value Ref Range    POCT Lactate, Venous 3.4 (H) 0.4 - 2.0 mmol/L   BLOOD GAS VENOUS FULL PANEL   Result Value Ref Range    POCT pH, Venous 7.38 7.33 - 7.43 pH    POCT pCO2, Venous 47 41 - 51 mm Hg    POCT pO2, Venous 53 (H) 35 - 45 mm Hg    POCT SO2, Venous 78 (H) 45 - 75 %    POCT Oxy Hemoglobin, Venous 76.7 (H) 45.0 - 75.0 %    POCT Hematocrit Calculated, Venous 30.0 (L) 36.0 - 46.0 %    POCT Sodium, Venous 132 (L) 136 - 145 mmol/L    POCT Potassium, Venous 4.2 3.5 - 5.3 mmol/L    POCT Chloride, Venous 99 98 - 107 mmol/L    POCT Ionized Calicum, Venous 1.16 1.10 - 1.33 mmol/L    POCT Glucose, Venous 153 (H) 74 - 99 mg/dL    POCT Lactate, Venous 3.4 (H) 0.4 - 2.0 mmol/L    POCT Base Excess, Venous 2.2 -2.0 - 3.0 mmol/L    POCT HCO3 Calculated, Venous 27.8 (H) 22.0 - 26.0 mmol/L    POCT Hemoglobin, Venous 10.1 (L) 12.0 - 16.0 g/dL    POCT Anion Gap, Venous 9.0 (L) 10.0 - 25.0 mmol/L    Patient Temperature 37.0 degrees Celsius    FiO2 21 %   Blood Gas Lactic Acid, Venous   Result Value Ref Range    POCT Lactate,  Venous 4.0 (HH) 0.4 - 2.0 mmol/L   Assessment/Plan   63 YOF with HTN, GERD, COPD not on home O2, RLL lung cancer s/p XRT in 9/2022, who p/w increased SOB associated with increased use of her nebulizer treatments without improvement that prompted her to come to the ED. In the ED very anxious and significantly SOB. Received Solu-Medrol, Duo-Neb and Zithromax and admitted to [facility] for further management. Pulmonary is consulted for COPD management.       1. COPD: ? GOLD stage, receives her care on [facility]. But significant symptoms  at baseline. Also frequent exacerbations. Overall category D. Previously at home on Spiriva and Albuterol. Also on Azithromycin and Montelukast. Now with acute exacerbation     continue Solu-Medrol 40 mg TID for 48 hours, after that change to Prednisone 40 mg daily, for a total of 7 days     continue DuoNeb     will DC on Triple therapy given group D and also concern for asthma (on Montelukast at home)     would try to obtain report of the last PFT     continue azithromycin     f/u with pulmonary after DC        rehab referral after DC     Resume home medications on DC     2. GERD:      continue Pantoprazole     3. H/o lung cancer:      management as per outpatient oncology     4. Smoking:      smoking cessation counseling      Nicotine patch as needed.      5. Hypoxia: due to COPD exacerbation      continue supplemental O2, wean off as tolerates      home O2 evaluation before DC     DVT prophylaxis with Lovenox     Thank you for the consult.  Pulmonary will f/u while in house.   Kera Santoro MD

## 2024-05-05 NOTE — ED TRIAGE NOTES
Pt came in via EMS for SOB going on all day. Pt was speaking in 3-5 word sentences and was getting albuterol Tx en route. Pt refused CPAP. Pt has a Hx of COPD.

## 2024-05-05 NOTE — SIGNIFICANT EVENT
Patient seen and examined.  COPD exacerbation.  Continue azithromycin, Solu-Medrol and DuoNebs as needed.  Monitor.

## 2024-05-05 NOTE — ED PROVIDER NOTES
HPI   Chief Complaint   Patient presents with    Shortness of Breath       HPI: 63-year-old female with a history of COPD presents for shortness of breath.  She states that she has felt short of breath all day and has been using her home nebulizer with little no relief.  She denies chest pain, fever, chills, sick contacts or recent travel.  She was given 1 nebulizer treatment by EMS.  On presentation she is very anxious and complaining of shortness of breath.      Limitations to history: None  Independent Historians: Patient, EMS  External Records Reviewed: HIE, outpatient notes, inpatient notes  ------------------------------------------------------------------------------------------------------------------------------------------  ROS: a ten point review of systems was performed and was negative except as per HPI.  ------------------------------------------------------------------------------------------------------------------------------------------  PMH / PSH: as per HPI, otherwise reviewed in EMR  MEDS: as per HPI, otherwise reviewed in EMR  ALLERGIES: as per HPI, otherwise reviewed in EMR  SocH:  as per HPI, otherwise reviewed in EMR  FH:  as per HPI, otherwise reviewed in EMR  ------------------------------------------------------------------------------------------------------------------------------------------  Physical Exam:  VS: As documented in the triage note and EMR flowsheet from this visit was reviewed  General: Anxious appearing female in respiratory distress   Eyes:  Extraocular movements grossly intact. No scleral icterus. No discharge  HEENT:  Normocephalic.  Atraumatic  Neck: Moves neck freely. No gross masses  CV: Regular rhythm. No murmurs, rubs or gallops   Resp: Diffuse wheezing bilaterally.  Patient is exhibiting conversational dyspnea and accessory muscle use  GI: Soft, no masses, nontender. No rebound tenderness or guarding  MSK: Symmetric muscle bulk. No deformities. No lower  extremity edema.    Skin: Warm, dry, intact.   Neuro: No focal deficits.  A&O x3.   Psych: Appropriate for situation  ------------------------------------------------------------------------------------------------------------------------------------------  Hospital Course / Medical Decision Making:  Independent Interpretations: CXR  EKG as interpreted by me: Sinus tachycardia 104 bpm with no signs of acute ischemia    MDM: 63-year-old female with a history of COPD presents for shortness of breath.  She is in respiratory distress on arrival and was promptly given multiple nebulizer treatments, magnesium and azithromycin.  She was placed on BiPAP by respiratory therapy.  She reported significant improvement in her shortness of breath with BiPAP.  She spent approximately 2 hours on BiPAP and was weaned onto high flow nasal cannula by respiratory therapy.  She did require an additional nebulizer treatment after being weaned down to high flow nasal cannula.  She was admitted to the medicine service for further management of a COPD exacerbation.    Discussion of Management with Other Providers:   I discussed the patient/results with: Emergency medicine team    Final diagnosis and disposition as below.    Labs Reviewed  CBC WITH AUTO DIFFERENTIAL - Abnormal     WBC                           13.7 (*)               nRBC                          0.7 (*)                RBC                           4.26                   Hemoglobin                    10.2 (*)               Hematocrit                    33.9 (*)               MCV                           80                     MCH                           23.9 (*)               MCHC                          30.1 (*)               RDW                           19.1 (*)               Platelets                     596 (*)                Neutrophils %                 84.2                   Immature Granulocytes %, Automated   2.3 (*)                Lymphocytes %                  9.0                    Monocytes %                   4.4                    Eosinophils %                 0.0                    Basophils %                   0.1                    Neutrophils Absolute          11.55 (*)               Immature Granulocytes Absolute, Au*   0.32                   Lymphocytes Absolute          1.23                   Monocytes Absolute            0.60                   Eosinophils Absolute          0.00                   Basophils Absolute            0.02                COMPREHENSIVE METABOLIC PANEL - Abnormal     Glucose                       143 (*)                Sodium                        137                    Potassium                     3.7                    Chloride                      97 (*)                 Bicarbonate                   27                     Anion Gap                     17                     Urea Nitrogen                 18                     Creatinine                    0.86                   eGFR                          76                     Calcium                       9.4                    Albumin                       4.7                    Alkaline Phosphatase          73                     Total Protein                 7.8                    AST                           14                     Bilirubin, Total              0.3                    ALT                           20                  BLOOD GAS VENOUS FULL PANEL - Abnormal     POCT pH, Venous               7.28 (*)               POCT pCO2, Venous             62 (*)                 POCT pO2, Venous              38                     POCT SO2, Venous              48                     POCT Oxy Hemoglobin, Venous   46.5                   POCT Hematocrit Calculated, Venous   35.0 (*)               POCT Sodium, Venous           134 (*)                POCT Potassium, Venous        4.0                    POCT Chloride, Venous         97 (*)                 POCT Ionized Calicum,  Venous   1.26                   POCT Glucose, Venous          149 (*)                POCT Lactate, Venous          3.6 (*)                POCT Base Excess, Venous      1.2                    POCT HCO3 Calculated, Venous   29.1 (*)               POCT Hemoglobin, Venous       11.6 (*)               POCT Anion Gap, Venous        12.0                   Patient Temperature           37.0                   FiO2                          21                  BLOOD GAS LACTIC ACID, VENOUS - Abnormal     POCT Lactate, Venous          3.4 (*)             BLOOD GAS VENOUS FULL PANEL - Abnormal     POCT pH, Venous               7.38                   POCT pCO2, Venous             47                     POCT pO2, Venous              53 (*)                 POCT SO2, Venous              78 (*)                 POCT Oxy Hemoglobin, Venous   76.7 (*)               POCT Hematocrit Calculated, Venous   30.0 (*)               POCT Sodium, Venous           132 (*)                POCT Potassium, Venous        4.2                    POCT Chloride, Venous         99                     POCT Ionized Calicum, Venous   1.16                   POCT Glucose, Venous          153 (*)                POCT Lactate, Venous          3.4 (*)                POCT Base Excess, Venous      2.2                    POCT HCO3 Calculated, Venous   27.8 (*)               POCT Hemoglobin, Venous       10.1 (*)               POCT Anion Gap, Venous        9.0 (*)                Patient Temperature           37.0                   FiO2                          21                  BLOOD GAS LACTIC ACID, VENOUS - Abnormal     POCT Lactate, Venous          4.0 (*)             CBC - Abnormal     WBC                           17.3 (*)               nRBC                          0.3 (*)                RBC                           4.07                   Hemoglobin                    9.5 (*)                Hematocrit                    33.7 (*)               MCV                            83                     MCH                           23.3 (*)               MCHC                          28.2 (*)               RDW                           19.5 (*)               Platelets                     545 (*)             BASIC METABOLIC PANEL - Abnormal     Glucose                       102 (*)                Sodium                        137                    Potassium                     3.9                    Chloride                      103                    Bicarbonate                   24                     Anion Gap                     14                     Urea Nitrogen                 16                     Creatinine                    0.71                   eGFR                          >90                    Calcium                       8.0 (*)             IRON AND TIBC - Abnormal     Iron                          10 (*)                 UIBC                          >450 (*)               TIBC                                                 % Saturation                                      CBC WITH AUTO DIFFERENTIAL - Abnormal     WBC                           15.3 (*)               nRBC                          0.6 (*)                RBC                           4.50                   Hemoglobin                    10.5 (*)               Hematocrit                    37.0                   MCV                           82                     MCH                           23.3 (*)               MCHC                          28.4 (*)               RDW                           19.5 (*)               Platelets                     550 (*)                Neutrophils %                 90.2                   Immature Granulocytes %, Automated   1.7 (*)                Lymphocytes %                 4.5                    Monocytes %                   3.4                    Eosinophils %                 0.0                    Basophils %                   0.2                     Neutrophils Absolute          13.81 (*)               Immature Granulocytes Absolute, Au*   0.26                   Lymphocytes Absolute          0.69 (*)               Monocytes Absolute            0.52                   Eosinophils Absolute          0.00                   Basophils Absolute            0.03                BASIC METABOLIC PANEL - Abnormal     Glucose                       132 (*)                Sodium                        136                    Potassium                     4.2                    Chloride                      101                    Bicarbonate                   26                     Anion Gap                     13                     Urea Nitrogen                 19                     Creatinine                    0.78                   eGFR                          85                     Calcium                       8.5 (*)             CBC WITH AUTO DIFFERENTIAL - Abnormal     WBC                           19.3 (*)               nRBC                          0.4 (*)                RBC                           4.60                   Hemoglobin                    10.7 (*)               Hematocrit                    35.5 (*)               MCV                           77 (*)                 MCH                           23.3 (*)               MCHC                          30.1 (*)               RDW                           19.0 (*)               Platelets                     440                    Neutrophils %                 90.1                   Immature Granulocytes %, Automated   1.7 (*)                Lymphocytes %                 4.2                    Monocytes %                   3.8                    Eosinophils %                 0.0                    Basophils %                   0.2                    Neutrophils Absolute          17.36 (*)               Immature Granulocytes Absolute, Au*   0.32                   Lymphocytes Absolute          0.81 (*)                Monocytes Absolute            0.74                   Eosinophils Absolute          0.00                   Basophils Absolute            0.04                BASIC METABOLIC PANEL - Abnormal     Glucose                       117 (*)                Sodium                        136                    Potassium                     4.9                    Chloride                      102                    Bicarbonate                   25                     Anion Gap                     14                     Urea Nitrogen                 26 (*)                 Creatinine                    0.80                   eGFR                          83                     Calcium                       8.7                 CBC WITH AUTO DIFFERENTIAL - Abnormal     WBC                           19.4 (*)               nRBC                          0.4 (*)                RBC                           4.14                   Hemoglobin                    10.0 (*)               Hematocrit                    33.5 (*)               MCV                           81                     MCH                           24.2 (*)               MCHC                          29.9 (*)               RDW                           19.0 (*)               Platelets                     363                    Neutrophils %                 84.2                   Immature Granulocytes %, Automated   2.1 (*)                Lymphocytes %                 7.0                    Monocytes %                   6.3                    Eosinophils %                 0.0                    Basophils %                   0.4                    Neutrophils Absolute          16.37 (*)               Immature Granulocytes Absolute, Au*   0.41                   Lymphocytes Absolute          1.37                   Monocytes Absolute            1.22 (*)               Eosinophils Absolute          0.00                   Basophils Absolute            0.07                 BASIC METABOLIC PANEL - Abnormal     Glucose                       102 (*)                Sodium                        134 (*)                Potassium                     4.8                    Chloride                      99                     Bicarbonate                   25                     Anion Gap                     15                     Urea Nitrogen                 21                     Creatinine                    0.62                   eGFR                          >90                    Calcium                       8.0 (*)             CBC WITH AUTO DIFFERENTIAL - Abnormal     WBC                           20.0 (*)               nRBC                          0.5 (*)                RBC                           4.15                   Hemoglobin                    9.7 (*)                Hematocrit                    33.3 (*)               MCV                           80                     MCH                           23.4 (*)               MCHC                          29.1 (*)               RDW                           18.9 (*)               Platelets                     529 (*)                Neutrophils %                 81.5                   Immature Granulocytes %, Automated   2.9 (*)                Lymphocytes %                 8.0                    Monocytes %                   7.4                    Eosinophils %                 0.0                    Basophils %                   0.2                    Neutrophils Absolute          16.31 (*)               Immature Granulocytes Absolute, Au*   0.58                   Lymphocytes Absolute          1.60                   Monocytes Absolute            1.48 (*)               Eosinophils Absolute          0.00                   Basophils Absolute            0.04                MAGNESIUM - Normal     Magnesium                     2.10                TROPONIN I, HIGH SENSITIVITY - Normal     Troponin I, High Sensitivity   7                           Narrative: Less than 99th percentile of normal range cutoff-                  Female and children under 18 years old <14 ng/L; Male <21 ng/L: Negative                  Repeat testing should be performed if clinically indicated.                                     Female and children under 18 years old 14-50 ng/L; Male 21-50 ng/L:                  Consistent with possible cardiac damage and possible increased clinical                   risk. Serial measurements may help to assess extent of myocardial damage.                                     >50 ng/L: Consistent with cardiac damage, increased clinical risk and                  myocardial infarction. Serial measurements may help assess extent of                   myocardial damage.                                      NOTE: Children less than 1 year old may have higher baseline troponin                   levels and results should be interpreted in conjunction with the overall                   clinical context.                                     NOTE: Troponin I testing is performed using a different                   testing methodology at [facility] than at other                   system hospitals. Direct result comparisons should only                   be made within the same method.  B-TYPE NATRIURETIC PEPTIDE - Normal     BNP                           73                         Narrative:    <100 pg/mL - Heart failure unlikely                  100-299 pg/mL - Intermediate probability of acute heart                                  failure exacerbation. Correlate with clinical                                  context and patient history.                    >=300 pg/mL - Heart Failure likely. Correlate with clinical                                  context and patient history.                                    BNP testing is performed using different testing methodology at [facility] than at other system hospitals. Direct  result comparisons should only be made within the same method.                          XR chest 1 view   Final Result    No acute cardiopulmonary process.          MACRO:    None          Signed by: Abhishek Noble 5/5/2024 4:09 AM    Dictation workstation:   [id number]                                 No data recorded                   Patient History   Past Medical History:   Diagnosis Date    Essential (primary) hypertension 04/20/2016    Benign hypertension    Personal history of other diseases of the respiratory system     H/O emphysema    Personal history of other diseases of the respiratory system     History of chronic obstructive lung disease    Personal history of other mental and behavioral disorders     History of depression     Past Surgical History:   Procedure Laterality Date    CT ABDOMEN PELVIS ANGIOGRAM W AND/OR WO IV CONTRAST  3/22/2016    CT ABDOMEN PELVIS ANGIOGRAM W AND/OR WO IV CONTRAST 3/22/2016 [facility] [place] [facility]    CT ANGIO CORONARY ART WITH HEARTFLOW IF SCORE >30%  1/5/2023    CT ANGIO CORONARY ART WITH HEARTFLOW IF SCORE >30% 1/5/2023    MR NECK ANGIO W IV CONTRAST  4/19/2021    MR NECK ANGIO W IV CONTRAST 4/19/2021     No family history on file.  Social History     Tobacco Use    Smoking status: Not on file    Smokeless tobacco: Not on file   Substance Use Topics    Alcohol use: Not on file    Drug use: Not on file       Physical Exam   ED Triage Vitals   Temp Heart Rate Respirations BP   -- 05/05/24 0006 05/05/24 0007 05/05/24 0006    (!) 105 (!) 22 (!) 150/108      Pulse Ox Temp Source Heart Rate Source Patient Position   05/05/24 0006 05/05/24 0006 05/05/24 0006 05/05/24 0006   100 % Tympanic Monitor Sitting      BP Location FiO2 (%)     05/05/24 0006 --     Right arm        Physical Exam    ED Course & MDM   Diagnoses as of 05/10/24 1125   COPD exacerbation (Multi)       Medical Decision Making      Procedure  Critical Care    Performed by: Sheldon Champagne DO  Authorized by: Sheldon PULLIAM  DO Mahi    Critical care provider statement:     Critical care time (minutes):  45    Critical care time was exclusive of:  Separately billable procedures and treating other patients    Critical care was necessary to treat or prevent imminent or life-threatening deterioration of the following conditions:  Respiratory failure    Critical care was time spent personally by me on the following activities:  Development of treatment plan with patient or surrogate, evaluation of patient's response to treatment, examination of patient, ordering and review of laboratory studies, ordering and review of radiographic studies, pulse oximetry and re-evaluation of patient's condition    Care discussed with: admitting provider         Sheldon Champagne DO  05/10/24 1133

## 2024-05-05 NOTE — CARE PLAN
The patient's goals for the shift include Assist patient with easier breathing    The clinical goals for the shift include Maintain oxygen saturation > 92%    Problem: Respiratory  Goal: Minimize anxiety/maximize coping throughout shift  Outcome: Progressing

## 2024-05-05 NOTE — H&P
History Of Present Illness  Fernando Cuevas is a 63 y.o. female with a past medical history of COPD with emphysema, adenocarcinoma of the right lower lobe, status post radiation therapy, GERD, hypertension who presented to [facility] complaining of increased shortness of breath stating that she has been using her home nebulizer with little relief.  She denies any chest pain fever chills or sick contacts.  She was given 1 nebulizer treatment by EMS on the way to the hospital.  Initially she was very anxious and short of breath.     Past Medical History  Past Medical History:   Diagnosis Date    Essential (primary) hypertension 04/20/2016    Benign hypertension    Personal history of other diseases of the respiratory system     H/O emphysema    Personal history of other diseases of the respiratory system     History of chronic obstructive lung disease    Personal history of other mental and behavioral disorders     History of depression        Surgical History  Past Surgical History:   Procedure Laterality Date    CT ABDOMEN PELVIS ANGIOGRAM W AND/OR WO IV CONTRAST  3/22/2016    CT ABDOMEN PELVIS ANGIOGRAM W AND/OR WO IV CONTRAST 3/22/2016 [facility]    CT ANGIO CORONARY ART WITH HEARTFLOW IF SCORE >30%  1/5/2023    CT ANGIO CORONARY ART WITH HEARTFLOW IF SCORE >30% 1/5/2023    MR NECK ANGIO W IV CONTRAST  4/19/2021    MR NECK ANGIO W IV CONTRAST 4/19/2021         Social History  She has no history on file for tobacco use, alcohol use, and drug use.    Family History  No family history on file.     Allergies  Patient has no allergy information on record.    Review of Systems   Constitutional:  Positive for activity change and fatigue.   HENT: Negative.     Eyes: Negative.    Respiratory:  Positive for cough, shortness of breath and wheezing.    Cardiovascular: Negative.    Gastrointestinal: Negative.    Endocrine: Negative.    Genitourinary: Negative.    Musculoskeletal: Negative.    Skin: Negative.  "   Allergic/Immunologic: Negative.    Neurological: Negative.    Hematological: Negative.    Psychiatric/Behavioral: Negative.     All other systems reviewed and are negative.       Physical Exam  Vitals and nursing note reviewed.   Constitutional:       Appearance: Normal appearance. She is ill-appearing.   HENT:      Head: Normocephalic.      Right Ear: External ear normal.      Left Ear: External ear normal.      Nose: Nose normal.      Mouth/Throat:      Mouth: Mucous membranes are dry.      Pharynx: Oropharynx is clear.   Eyes:      Extraocular Movements: Extraocular movements intact.      Conjunctiva/sclera: Conjunctivae normal.      Pupils: Pupils are equal, round, and reactive to light.   Cardiovascular:      Rate and Rhythm: Normal rate and regular rhythm.   Pulmonary:      Effort: Respiratory distress present.      Breath sounds: Wheezing, rhonchi and rales present.   Abdominal:      General: Abdomen is flat. Bowel sounds are normal.      Palpations: Abdomen is soft.   Musculoskeletal:         General: Normal range of motion.   Skin:     General: Skin is warm and dry.   Neurological:      General: No focal deficit present.      Mental Status: She is alert. Mental status is at baseline.   Psychiatric:         Mood and Affect: Mood normal.         Behavior: Behavior normal.          Last Recorded Vitals  Blood pressure 160/89, pulse 100, temperature 36.8 °C (98.2 °F), temperature source Tympanic, resp. rate 20, height 1.549 m (5' 1\"), weight 61.7 kg (136 lb), SpO2 99%.    Relevant Results  Meds:  Scheduled medications  azithromycin, 500 mg, intravenous, Daily  ipratropium-albuteroL, 3 mL, nebulization, TID  methylPREDNISolone sodium succinate (PF), 40 mg, intravenous, q8h      Continuous medications     PRN medications  PRN medications: alum-mag hydroxide-simeth, ipratropium-albuteroL, ipratropium-albuteroL   No current outpatient medications     Labs:  Results for orders placed or performed during the " hospital encounter of 05/05/24 (from the past 24 hour(s))   CBC and Auto Differential   Result Value Ref Range    WBC 13.7 (H) 4.4 - 11.3 x10*3/uL    nRBC 0.7 (H) 0.0 - 0.0 /100 WBCs    RBC 4.26 4.00 - 5.20 x10*6/uL    Hemoglobin 10.2 (L) 12.0 - 16.0 g/dL    Hematocrit 33.9 (L) 36.0 - 46.0 %    MCV 80 80 - 100 fL    MCH 23.9 (L) 26.0 - 34.0 pg    MCHC 30.1 (L) 32.0 - 36.0 g/dL    RDW 19.1 (H) 11.5 - 14.5 %    Platelets 596 (H) 150 - 450 x10*3/uL    Neutrophils % 84.2 40.0 - 80.0 %    Immature Granulocytes %, Automated 2.3 (H) 0.0 - 0.9 %    Lymphocytes % 9.0 13.0 - 44.0 %    Monocytes % 4.4 2.0 - 10.0 %    Eosinophils % 0.0 0.0 - 6.0 %    Basophils % 0.1 0.0 - 2.0 %    Neutrophils Absolute 11.55 (H) 1.20 - 7.70 x10*3/uL    Immature Granulocytes Absolute, Automated 0.32 0.00 - 0.70 x10*3/uL    Lymphocytes Absolute 1.23 1.20 - 4.80 x10*3/uL    Monocytes Absolute 0.60 0.10 - 1.00 x10*3/uL    Eosinophils Absolute 0.00 0.00 - 0.70 x10*3/uL    Basophils Absolute 0.02 0.00 - 0.10 x10*3/uL   Comprehensive metabolic panel   Result Value Ref Range    Glucose 143 (H) 74 - 99 mg/dL    Sodium 137 136 - 145 mmol/L    Potassium 3.7 3.5 - 5.3 mmol/L    Chloride 97 (L) 98 - 107 mmol/L    Bicarbonate 27 21 - 32 mmol/L    Anion Gap 17 10 - 20 mmol/L    Urea Nitrogen 18 6 - 23 mg/dL    Creatinine 0.86 0.50 - 1.05 mg/dL    eGFR 76 >60 mL/min/1.73m*2    Calcium 9.4 8.6 - 10.3 mg/dL    Albumin 4.7 3.4 - 5.0 g/dL    Alkaline Phosphatase 73 33 - 136 U/L    Total Protein 7.8 6.4 - 8.2 g/dL    AST 14 9 - 39 U/L    Bilirubin, Total 0.3 0.0 - 1.2 mg/dL    ALT 20 7 - 45 U/L   Magnesium   Result Value Ref Range    Magnesium 2.10 1.60 - 2.40 mg/dL   Troponin I, High Sensitivity   Result Value Ref Range    Troponin I, High Sensitivity 7 0 - 13 ng/L   B-Type Natriuretic Peptide   Result Value Ref Range    BNP 73 0 - 99 pg/mL   BLOOD GAS VENOUS FULL PANEL   Result Value Ref Range    POCT pH, Venous 7.28 (L) 7.33 - 7.43 pH    POCT pCO2, Venous 62 (H)  41 - 51 mm Hg    POCT pO2, Venous 38 35 - 45 mm Hg    POCT SO2, Venous 48 45 - 75 %    POCT Oxy Hemoglobin, Venous 46.5 45.0 - 75.0 %    POCT Hematocrit Calculated, Venous 35.0 (L) 36.0 - 46.0 %    POCT Sodium, Venous 134 (L) 136 - 145 mmol/L    POCT Potassium, Venous 4.0 3.5 - 5.3 mmol/L    POCT Chloride, Venous 97 (L) 98 - 107 mmol/L    POCT Ionized Calicum, Venous 1.26 1.10 - 1.33 mmol/L    POCT Glucose, Venous 149 (H) 74 - 99 mg/dL    POCT Lactate, Venous 3.6 (H) 0.4 - 2.0 mmol/L    POCT Base Excess, Venous 1.2 -2.0 - 3.0 mmol/L    POCT HCO3 Calculated, Venous 29.1 (H) 22.0 - 26.0 mmol/L    POCT Hemoglobin, Venous 11.6 (L) 12.0 - 16.0 g/dL    POCT Anion Gap, Venous 12.0 10.0 - 25.0 mmol/L    Patient Temperature 37.0 degrees Celsius    FiO2 21 %   Blood Gas Lactic Acid, Venous   Result Value Ref Range    POCT Lactate, Venous 3.4 (H) 0.4 - 2.0 mmol/L   BLOOD GAS VENOUS FULL PANEL   Result Value Ref Range    POCT pH, Venous 7.38 7.33 - 7.43 pH    POCT pCO2, Venous 47 41 - 51 mm Hg    POCT pO2, Venous 53 (H) 35 - 45 mm Hg    POCT SO2, Venous 78 (H) 45 - 75 %    POCT Oxy Hemoglobin, Venous 76.7 (H) 45.0 - 75.0 %    POCT Hematocrit Calculated, Venous 30.0 (L) 36.0 - 46.0 %    POCT Sodium, Venous 132 (L) 136 - 145 mmol/L    POCT Potassium, Venous 4.2 3.5 - 5.3 mmol/L    POCT Chloride, Venous 99 98 - 107 mmol/L    POCT Ionized Calicum, Venous 1.16 1.10 - 1.33 mmol/L    POCT Glucose, Venous 153 (H) 74 - 99 mg/dL    POCT Lactate, Venous 3.4 (H) 0.4 - 2.0 mmol/L    POCT Base Excess, Venous 2.2 -2.0 - 3.0 mmol/L    POCT HCO3 Calculated, Venous 27.8 (H) 22.0 - 26.0 mmol/L    POCT Hemoglobin, Venous 10.1 (L) 12.0 - 16.0 g/dL    POCT Anion Gap, Venous 9.0 (L) 10.0 - 25.0 mmol/L    Patient Temperature 37.0 degrees Celsius    FiO2 21 %      Imaging:  XR chest 1 view    Result Date: 5/5/2024  Interpreted By:  Abhishek Noble, STUDY: XR CHEST 1 VIEW;  5/5/2024 2:34 am   INDICATION: Signs/Symptoms:COPD exacerbation, SOB.    COMPARISON: Chest x-ray 07/23/2023   ACCESSION NUMBER(S): [id number]   ORDERING CLINICIAN: DONOVAN ALBA   FINDINGS:     CARDIOMEDIASTINAL SILHOUETTE: Cardiomediastinal silhouette is normal in size and configuration.   LUNGS: No consolidation, pleural effusion or pneumothorax.   ABDOMEN: No remarkable upper abdominal findings.   BONES: No acute osseous abnormality.       No acute cardiopulmonary process.   MACRO: None   Signed by: Abhishek Noble 5/5/2024 4:09 AM Dictation workstation:   [id number]      Assessment/Plan   Exacerbation of COPD with emphysema  Plan:  DuoNeb aerosols as needed  Solu-Medrol 40 mg IV every 8 hourly  Zithromax 500 mg IV daily  Supplemental oxygen as needed    Adenocarcinoma of the right lower lobe  Plan:  Has had radiation therapy  Patient to follow-up with her primary pulmonologist    GERD  Plan:  Protonix 40 mg orally daily    DVT prophylaxis  Lovenox 40 mg subcutaneously daily  SCDs      I spent 60 minutes in the professional and overall care of this patient.      Domenic Hernández DO

## 2024-05-06 ENCOUNTER — APPOINTMENT (OUTPATIENT)
Dept: CARDIOLOGY | Facility: HOSPITAL | Age: 64
End: 2024-05-06
Payer: COMMERCIAL

## 2024-05-06 LAB
ANION GAP SERPL CALC-SCNC: 14 MMOL/L (ref 10–20)
BUN SERPL-MCNC: 16 MG/DL (ref 6–23)
CALCIUM SERPL-MCNC: 8 MG/DL (ref 8.6–10.3)
CHLORIDE SERPL-SCNC: 103 MMOL/L (ref 98–107)
CO2 SERPL-SCNC: 24 MMOL/L (ref 21–32)
CREAT SERPL-MCNC: 0.71 MG/DL (ref 0.5–1.05)
EGFRCR SERPLBLD CKD-EPI 2021: >90 ML/MIN/1.73M*2
ERYTHROCYTE [DISTWIDTH] IN BLOOD BY AUTOMATED COUNT: 19.5 % (ref 11.5–14.5)
GLUCOSE SERPL-MCNC: 102 MG/DL (ref 74–99)
HCT VFR BLD AUTO: 33.7 % (ref 36–46)
HGB BLD-MCNC: 9.5 G/DL (ref 12–16)
IRON SATN MFR SERPL: ABNORMAL %
IRON SERPL-MCNC: 10 UG/DL (ref 35–150)
MCH RBC QN AUTO: 23.3 PG (ref 26–34)
MCHC RBC AUTO-ENTMCNC: 28.2 G/DL (ref 32–36)
MCV RBC AUTO: 83 FL (ref 80–100)
NRBC BLD-RTO: 0.3 /100 WBCS (ref 0–0)
PLATELET # BLD AUTO: 545 X10*3/UL (ref 150–450)
POTASSIUM SERPL-SCNC: 3.9 MMOL/L (ref 3.5–5.3)
RBC # BLD AUTO: 4.07 X10*6/UL (ref 4–5.2)
SODIUM SERPL-SCNC: 137 MMOL/L (ref 136–145)
TIBC SERPL-MCNC: ABNORMAL UG/DL
UIBC SERPL-MCNC: >450 UG/DL (ref 110–370)
WBC # BLD AUTO: 17.3 X10*3/UL (ref 4.4–11.3)

## 2024-05-06 PROCEDURE — 2500000004 HC RX 250 GENERAL PHARMACY W/ HCPCS (ALT 636 FOR OP/ED): Performed by: INTERNAL MEDICINE

## 2024-05-06 PROCEDURE — 94640 AIRWAY INHALATION TREATMENT: CPT

## 2024-05-06 PROCEDURE — 93005 ELECTROCARDIOGRAM TRACING: CPT

## 2024-05-06 PROCEDURE — 2500000005 HC RX 250 GENERAL PHARMACY W/O HCPCS: Performed by: INTERNAL MEDICINE

## 2024-05-06 PROCEDURE — 80048 BASIC METABOLIC PNL TOTAL CA: CPT | Performed by: INTERNAL MEDICINE

## 2024-05-06 PROCEDURE — 99233 SBSQ HOSP IP/OBS HIGH 50: CPT | Performed by: INTERNAL MEDICINE

## 2024-05-06 PROCEDURE — 2500000001 HC RX 250 WO HCPCS SELF ADMINISTERED DRUGS (ALT 637 FOR MEDICARE OP): Performed by: INTERNAL MEDICINE

## 2024-05-06 PROCEDURE — 85027 COMPLETE CBC AUTOMATED: CPT | Performed by: INTERNAL MEDICINE

## 2024-05-06 PROCEDURE — 1100000001 HC PRIVATE ROOM DAILY

## 2024-05-06 PROCEDURE — 2500000002 HC RX 250 W HCPCS SELF ADMINISTERED DRUGS (ALT 637 FOR MEDICARE OP, ALT 636 FOR OP/ED): Performed by: INTERNAL MEDICINE

## 2024-05-06 PROCEDURE — 36415 COLL VENOUS BLD VENIPUNCTURE: CPT | Performed by: INTERNAL MEDICINE

## 2024-05-06 RX ORDER — FORMOTEROL FUMARATE DIHYDRATE 20 UG/2ML
20 SOLUTION RESPIRATORY (INHALATION)
Status: DISCONTINUED | OUTPATIENT
Start: 2024-05-06 | End: 2024-05-10 | Stop reason: HOSPADM

## 2024-05-06 RX ORDER — KETOROLAC TROMETHAMINE 10 MG/1
10 TABLET, FILM COATED ORAL EVERY 6 HOURS PRN
Status: DISPENSED | OUTPATIENT
Start: 2024-05-06 | End: 2024-05-08

## 2024-05-06 RX ORDER — ALPRAZOLAM 0.25 MG/1
0.25 TABLET ORAL 2 TIMES DAILY PRN
Status: DISCONTINUED | OUTPATIENT
Start: 2024-05-06 | End: 2024-05-10 | Stop reason: HOSPADM

## 2024-05-06 RX ADMIN — IPRATROPIUM BROMIDE AND ALBUTEROL SULFATE 3 ML: 2.5; .5 SOLUTION RESPIRATORY (INHALATION) at 14:20

## 2024-05-06 RX ADMIN — METHYLPREDNISOLONE SODIUM SUCCINATE 40 MG: 40 INJECTION, POWDER, FOR SOLUTION INTRAMUSCULAR; INTRAVENOUS at 17:32

## 2024-05-06 RX ADMIN — FORMOTEROL FUMARATE DIHYDRATE 20 MCG: 20 SOLUTION RESPIRATORY (INHALATION) at 20:28

## 2024-05-06 RX ADMIN — IPRATROPIUM BROMIDE AND ALBUTEROL SULFATE 3 ML: 2.5; .5 SOLUTION RESPIRATORY (INHALATION) at 07:54

## 2024-05-06 RX ADMIN — KETOROLAC TROMETHAMINE 10 MG: 10 TABLET, FILM COATED ORAL at 23:42

## 2024-05-06 RX ADMIN — METHYLPREDNISOLONE SODIUM SUCCINATE 40 MG: 40 INJECTION, POWDER, FOR SOLUTION INTRAMUSCULAR; INTRAVENOUS at 09:09

## 2024-05-06 RX ADMIN — Medication 3 L/MIN: at 08:00

## 2024-05-06 RX ADMIN — ALPRAZOLAM 0.25 MG: 0.25 TABLET ORAL at 13:21

## 2024-05-06 RX ADMIN — AZITHROMYCIN MONOHYDRATE 500 MG: 500 INJECTION, POWDER, LYOPHILIZED, FOR SOLUTION INTRAVENOUS at 09:10

## 2024-05-06 RX ADMIN — ACETAMINOPHEN 650 MG: 325 TABLET ORAL at 17:32

## 2024-05-06 RX ADMIN — ALUMINUM HYDROXIDE, MAGNESIUM HYDROXIDE, AND DIMETHICONE 30 ML: 200; 20; 200 SUSPENSION ORAL at 21:25

## 2024-05-06 RX ADMIN — ENOXAPARIN SODIUM 40 MG: 40 INJECTION SUBCUTANEOUS at 05:55

## 2024-05-06 RX ADMIN — SODIUM CHLORIDE 100 ML/HR: 9 INJECTION, SOLUTION INTRAVENOUS at 03:47

## 2024-05-06 RX ADMIN — METHYLPREDNISOLONE SODIUM SUCCINATE 40 MG: 40 INJECTION, POWDER, FOR SOLUTION INTRAMUSCULAR; INTRAVENOUS at 23:44

## 2024-05-06 RX ADMIN — IPRATROPIUM BROMIDE AND ALBUTEROL SULFATE 3 ML: 2.5; .5 SOLUTION RESPIRATORY (INHALATION) at 10:46

## 2024-05-06 RX ADMIN — IPRATROPIUM BROMIDE AND ALBUTEROL SULFATE 3 ML: 2.5; .5 SOLUTION RESPIRATORY (INHALATION) at 20:28

## 2024-05-06 RX ADMIN — PANTOPRAZOLE SODIUM 40 MG: 40 TABLET, DELAYED RELEASE ORAL at 06:27

## 2024-05-06 RX ADMIN — KETOROLAC TROMETHAMINE 10 MG: 10 TABLET, FILM COATED ORAL at 17:35

## 2024-05-06 RX ADMIN — METHYLPREDNISOLONE SODIUM SUCCINATE 40 MG: 40 INJECTION, POWDER, FOR SOLUTION INTRAMUSCULAR; INTRAVENOUS at 00:57

## 2024-05-06 RX ADMIN — ALPRAZOLAM 0.25 MG: 0.25 TABLET ORAL at 21:25

## 2024-05-06 ASSESSMENT — PAIN SCALES - GENERAL
PAINLEVEL_OUTOF10: 8
PAINLEVEL_OUTOF10: 2

## 2024-05-06 ASSESSMENT — COGNITIVE AND FUNCTIONAL STATUS - GENERAL
DRESSING REGULAR LOWER BODY CLOTHING: A LITTLE
CLIMB 3 TO 5 STEPS WITH RAILING: TOTAL
MOVING TO AND FROM BED TO CHAIR: A LITTLE
MOBILITY SCORE: 16
DRESSING REGULAR UPPER BODY CLOTHING: A LITTLE
WALKING IN HOSPITAL ROOM: A LOT
DAILY ACTIVITIY SCORE: 20
HELP NEEDED FOR BATHING: A LITTLE
STANDING UP FROM CHAIR USING ARMS: A LITTLE
TOILETING: A LITTLE
TURNING FROM BACK TO SIDE WHILE IN FLAT BAD: A LITTLE

## 2024-05-06 ASSESSMENT — ACTIVITIES OF DAILY LIVING (ADL): LACK_OF_TRANSPORTATION: NO

## 2024-05-06 NOTE — PROGRESS NOTES
2024 12;11 PM I met with patient. She said her son lives with her and she has home care from [facility]. She said her daughter's father  and she is requesting information on low cost cremation information. I provided her with a resource list for low cost cremation. Elsy ESCOBAR

## 2024-05-06 NOTE — PROGRESS NOTES
PALLIATIVE CARE SOCIAL WORK NOTE     Notified by [facility] that pt was active with hospice prior to admission. 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 She gets most of her medical care at [facility]. Old charts reviewed, a referral was made to [facility] program (community palliative care) in September/October of 2023. Call placed to R. Review of their records shows that she continues to be active with Navigator.    I met with pt at bedside this morning. She confirmed that she is active with Navigator only. She said that there was some discussion about transitioning from Navigator to full fledged hospice, but that she opted not to do it because she was afraid that she would have to give up her [employer] insurance. She said that she is currently working with her [facility]  to arrange for more help for her at home. When she discussed full fledged hospice services,  she was under the impression that doing so would require her to switch to traditional Medicaid and go on Passport, something she is not interested in doing. She feels strongly about keeping her [employer] insurance and sticking with the Navigator program who sends a nurse out to see her once/month.     [facility] Myrtle updated that she is not on hospice.     PRAVEEN Lamb

## 2024-05-06 NOTE — PROGRESS NOTES
"Fernando Cuevas is a 63 y.o. female on day 1 of admission presenting with COPD exacerbation (Multi).    Subjective   Feels \"not good\".  Admits to shortness of breath and to leg pain, but denies cough.  Complains of a migraine headache and is asking for pain medication.  On 3 to 4 L nasal cannula oxygen.     Objective   Physical Exam  Vitals  Blood pressure 148/74, pulse (!) 118, temperature 35.9 °C (96.7 °F), resp. rate 16, height 1.549 m (5' 0.98\"), weight 64.6 kg (142 lb 8 oz), SpO2 98%.  Intake/Output last 3 Shifts:  I/O last 3 completed shifts:  In: 1613.3 (25 mL/kg) [I.V.:1363.3 (21.1 mL/kg); IV Piggyback:250]  Out: 1051 (16.3 mL/kg) [Urine:1051 (0.5 mL/kg/hr)]  Weight: 64.6 kg     General-awake, alert and in mild respiratory distress.  Lungs-bilateral wheezes without rales or rhonchi.  Heart-tachycardic with a regular rhythm.  Abdomen-positive bowel sounds.    Extremities-no edema.  Skin-no rashes.    Scheduled medications  azithromycin, 500 mg, intravenous, Daily  enoxaparin, 40 mg, subcutaneous, q24h  ipratropium-albuteroL, 3 mL, nebulization, TID  methylPREDNISolone sodium succinate (PF), 40 mg, intravenous, q8h  oxygen, , inhalation, Continuous - Inhalation  pantoprazole, 40 mg, oral, Daily before breakfast   Or  pantoprazole, 40 mg, intravenous, Daily before breakfast      Continuous medications  sodium chloride 0.9%, 100 mL/hr, Last Rate: 100 mL/hr (05/06/24 1048)      PRN medications  PRN medications: acetaminophen **OR** acetaminophen **OR** acetaminophen, alum-mag hydroxide-simeth, ipratropium-albuteroL, ondansetron **OR** ondansetron     Results for orders placed or performed during the hospital encounter of 05/05/24 (from the past 24 hour(s))   CBC   Result Value Ref Range    WBC 17.3 (H) 4.4 - 11.3 x10*3/uL    nRBC 0.3 (H) 0.0 - 0.0 /100 WBCs    RBC 4.07 4.00 - 5.20 x10*6/uL    Hemoglobin 9.5 (L) 12.0 - 16.0 g/dL    Hematocrit 33.7 (L) 36.0 - 46.0 %    MCV 83 80 - 100 fL    MCH 23.3 (L) 26.0 - 34.0 " pg    MCHC 28.2 (L) 32.0 - 36.0 g/dL    RDW 19.5 (H) 11.5 - 14.5 %    Platelets 545 (H) 150 - 450 x10*3/uL   Basic metabolic panel   Result Value Ref Range    Glucose 102 (H) 74 - 99 mg/dL    Sodium 137 136 - 145 mmol/L    Potassium 3.9 3.5 - 5.3 mmol/L    Chloride 103 98 - 107 mmol/L    Bicarbonate 24 21 - 32 mmol/L    Anion Gap 14 10 - 20 mmol/L    Urea Nitrogen 16 6 - 23 mg/dL    Creatinine 0.71 0.50 - 1.05 mg/dL    eGFR >90 >60 mL/min/1.73m*2    Calcium 8.0 (L) 8.6 - 10.3 mg/dL      Assessment:  63-year-old woman with a history of COPD, right lower lobe adenocarcinoma of the lung-status post radiation treatment, GERD, hypertension and depression, admitted with shortness of breath and acute-on-chronic hypoxic and hypercapnic respiratory failure due to a COPD exacerbation who appears to be in mild respiratory distress from persistent bronchospasm.    Recommend:  1.  Continue DuoNeb, Solu-Medrol, Zithromax and Lovenox.  2.  Add formoterol aerosols twice a day.  3.  Wean FiO2 to keep oxygen saturation approximately 92 to 95%; home oxygen evaluation prior to discharge.  4.  Incentive spirometry.  5.  Consider changing her outpatient inhalers to Trelegy Ellipta 1 puff once a day.  6.  Follow-up with her Pulmonary medicine and Oncology physicians at [facility], after discharge.    Reid Dinh MD

## 2024-05-06 NOTE — PROGRESS NOTES
Fernando Cuevas is a 63 y.o. female on day 1 of admission presenting with COPD exacerbation (Multi).      Subjective   Seen and examined, breathing is improving, no new complaints.  No overnight events.  The plan discussed with the patient and RN.       Objective     Last Recorded Vitals  /74   Pulse (!) 118   Temp 35.9 °C (96.7 °F)   Resp 16   Wt 64.6 kg (142 lb 8 oz)   SpO2 98%   Intake/Output last 3 Shifts:    Intake/Output Summary (Last 24 hours) at 5/6/2024 1122  Last data filed at 5/6/2024 1048  Gross per 24 hour   Intake 2001 ml   Output 851 ml   Net 1150 ml       Admission Weight  Weight: 64.4 kg (142 lb) (05/05/24 0006)    Daily Weight  05/05/24 : 64.6 kg (142 lb 8 oz)    Image Results  XR chest 1 view  Narrative: Interpreted By:  Abhishek Noble,   STUDY:  XR CHEST 1 VIEW;  5/5/2024 2:34 am      INDICATION:  Signs/Symptoms:COPD exacerbation, SOB.      COMPARISON:  Chest x-ray 07/23/2023      ACCESSION NUMBER(S):  [id number]      ORDERING CLINICIAN:  DONOVAN ALBA      FINDINGS:          CARDIOMEDIASTINAL SILHOUETTE:  Cardiomediastinal silhouette is normal in size and configuration.      LUNGS:  No consolidation, pleural effusion or pneumothorax.      ABDOMEN:  No remarkable upper abdominal findings.      BONES:  No acute osseous abnormality.      Impression: No acute cardiopulmonary process.      MACRO:  None      Signed by: Abhishek Noble 5/5/2024 4:09 AM  Dictation workstation:   [id number]      Physical Exam  Constitutional:       Appearance: Normal appearance. She is ill-appearing.   HENT:      Head: Normocephalic.      Right Ear: External ear normal.      Left Ear: External ear normal.      Nose: Nose normal.      Mouth/Throat:      Mouth: Mucous membranes are dry.      Pharynx: Oropharynx is clear.   Eyes:      Extraocular Movements: Extraocular movements intact.      Conjunctiva/sclera: Conjunctivae normal.      Pupils: Pupils are equal, round, and reactive to light.   Cardiovascular:       Rate and Rhythm: Normal rate and regular rhythm.   Pulmonary:      Effort: Respiratory distress present.      Breath sounds: Wheezing, rhonchi and rales present.   Abdominal:      General: Abdomen is flat. Bowel sounds are normal.      Palpations: Abdomen is soft.   Musculoskeletal:         General: Normal range of motion.   Skin:     General: Skin is warm and dry.   Neurological:      General: No focal deficit present.      Mental Status: She is alert. Mental status is at baseline.   Psychiatric:         Mood and Affect: Mood normal.         Behavior: Behavior normal.           Relevant Results  Results for orders placed or performed during the hospital encounter of 05/05/24 (from the past 24 hour(s))   CBC   Result Value Ref Range    WBC 17.3 (H) 4.4 - 11.3 x10*3/uL    nRBC 0.3 (H) 0.0 - 0.0 /100 WBCs    RBC 4.07 4.00 - 5.20 x10*6/uL    Hemoglobin 9.5 (L) 12.0 - 16.0 g/dL    Hematocrit 33.7 (L) 36.0 - 46.0 %    MCV 83 80 - 100 fL    MCH 23.3 (L) 26.0 - 34.0 pg    MCHC 28.2 (L) 32.0 - 36.0 g/dL    RDW 19.5 (H) 11.5 - 14.5 %    Platelets 545 (H) 150 - 450 x10*3/uL   Basic metabolic panel   Result Value Ref Range    Glucose 102 (H) 74 - 99 mg/dL    Sodium 137 136 - 145 mmol/L    Potassium 3.9 3.5 - 5.3 mmol/L    Chloride 103 98 - 107 mmol/L    Bicarbonate 24 21 - 32 mmol/L    Anion Gap 14 10 - 20 mmol/L    Urea Nitrogen 16 6 - 23 mg/dL    Creatinine 0.71 0.50 - 1.05 mg/dL    eGFR >90 >60 mL/min/1.73m*2    Calcium 8.0 (L) 8.6 - 10.3 mg/dL        No current facility-administered medications on file prior to encounter.     No current outpatient medications on file prior to encounter.            Assessment/Plan        Principal Problem:    COPD exacerbation (Multi)    Fernando Cuevas is a 63 y.o. female with a past medical history of COPD with emphysema, adenocarcinoma of the right lower lobe, status post radiation therapy, GERD, hypertension who presented to [facility] complaining of increased shortness of  breath.      Exacerbation of COPD with emphysema  Plan:  DuoNeb aerosols as needed  Solu-Medrol 40 mg IV every 8 hourly  Zithromax 500 mg IV daily  Supplemental oxygen as needed  Xanax as needed for anxiety     Adenocarcinoma of the right lower lobe  Plan:  Has had radiation therapy  Patient to follow-up with her primary pulmonologist     GERD  Plan:  Protonix 40 mg orally daily     DVT prophylaxis  Lovenox 40 mg subcutaneously daily  SCDs                 Josesito Viramontes MD

## 2024-05-06 NOTE — PROGRESS NOTES
"Fernando Cuevas is a 63 y.o. female on day 1 of admission presenting with COPD exacerbation (Multi).    Subjective   ***       Objective     Physical Exam    Last Recorded Vitals  Blood pressure 148/74, pulse (!) 118, temperature 35.9 °C (96.7 °F), resp. rate 16, height 1.549 m (5' 0.98\"), weight 64.6 kg (142 lb 8 oz), SpO2 98%.  Intake/Output last 3 Shifts:  I/O last 3 completed shifts:  In: 1613.3 (25 mL/kg) [I.V.:1363.3 (21.1 mL/kg); IV Piggyback:250]  Out: 1051 (16.3 mL/kg) [Urine:1051 (0.5 mL/kg/hr)]  Weight: 64.6 kg     Relevant Results  {If you would like to pull in Medications, type .meds     If you would like to pull in Lab results for the last 24 hours, type .[id number]    If you would like to pull in Imaging results, type .imgrslt :99}    {Link to Stroke Scoring tools - Link :99}                       Assessment/Plan   Principal Problem:    COPD exacerbation (Multi)    ***     {This patient does not have an ACP note on file for this encounter, please fill one out - Advance Care Planning Activity :99}      SHIMON Lamb      "

## 2024-05-06 NOTE — PROGRESS NOTES
Fernando Cuevas is a 63 y.o. female on day 1 of admission presenting with COPD exacerbation (Multi).     05/06/24 1118   Discharge Planning   Living Arrangements Children   Support Systems Children   Assistance Needed walker   Type of Residence Private residence   Number of Stairs to Enter Residence 3   Number of Stairs Within Residence 0   Home or Post Acute Services In home services   Type of Home Care Services Hospice   Patient expects to be discharged to: Home, was active with hospice [facility] prior to admission to hospital   Does the patient need discharge transport arranged? Yes   RoundTrip coordination needed? Yes   Has discharge transport been arranged? No   Financial Resource Strain   How hard is it for you to pay for the very basics like food, housing, medical care, and heating? Not hard   Housing Stability   In the last 12 months, was there a time when you were not able to pay the mortgage or rent on time? N   In the last 12 months, was there a time when you did not have a steady place to sleep or slept in a shelter (including now)? N   Transportation Needs   In the past 12 months, has lack of transportation kept you from medical appointments or from getting medications? no   In the past 12 months, has lack of transportation kept you from meetings, work, or from getting things needed for daily living? No     Spoke with patient to discuss her preferences for care. Discussed how patient manages health at home. Lives home with son Nick. Independent in all ADLS using her walker.  Stated that her walker just broke, and will be working on getting a new one.No home O2, dialysis, or assistive devices. Patient denies any problems getting to appointments or obtaining/affording medications.  No additional resources or needs identified.    Plan: admitted from home for COPD related to emphysema, does not normally have O2, currently on 3L. States she was active with hospice [facility] prior to admission  from hospital. Updated Hospice SW. Pulmonary following and patient receiving IV solumedrol and IV abx.  Status: inpatient  Payor: [employer]  Disposition: Home HWR?  Barrier: SOB, wean o2, home o2 eval, iv abx, iv solumedrol  ADOD:   2-3 days      Myrtle Mejia RN

## 2024-05-06 NOTE — SIGNIFICANT EVENT
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

## 2024-05-07 ENCOUNTER — APPOINTMENT (OUTPATIENT)
Dept: RADIOLOGY | Facility: HOSPITAL | Age: 64
End: 2024-05-07
Payer: COMMERCIAL

## 2024-05-07 LAB
ANION GAP SERPL CALC-SCNC: 13 MMOL/L (ref 10–20)
BASOPHILS # BLD AUTO: 0.03 X10*3/UL (ref 0–0.1)
BASOPHILS NFR BLD AUTO: 0.2 %
BUN SERPL-MCNC: 19 MG/DL (ref 6–23)
CALCIUM SERPL-MCNC: 8.5 MG/DL (ref 8.6–10.3)
CHLORIDE SERPL-SCNC: 101 MMOL/L (ref 98–107)
CO2 SERPL-SCNC: 26 MMOL/L (ref 21–32)
CREAT SERPL-MCNC: 0.78 MG/DL (ref 0.5–1.05)
EGFRCR SERPLBLD CKD-EPI 2021: 85 ML/MIN/1.73M*2
EOSINOPHIL # BLD AUTO: 0 X10*3/UL (ref 0–0.7)
EOSINOPHIL NFR BLD AUTO: 0 %
ERYTHROCYTE [DISTWIDTH] IN BLOOD BY AUTOMATED COUNT: 19.5 % (ref 11.5–14.5)
FERRITIN SERPL-MCNC: 19 NG/ML (ref 8–150)
GLUCOSE SERPL-MCNC: 132 MG/DL (ref 74–99)
HCT VFR BLD AUTO: 37 % (ref 36–46)
HGB BLD-MCNC: 10.5 G/DL (ref 12–16)
IMM GRANULOCYTES # BLD AUTO: 0.26 X10*3/UL (ref 0–0.7)
IMM GRANULOCYTES NFR BLD AUTO: 1.7 % (ref 0–0.9)
LYMPHOCYTES # BLD AUTO: 0.69 X10*3/UL (ref 1.2–4.8)
LYMPHOCYTES NFR BLD AUTO: 4.5 %
MCH RBC QN AUTO: 23.3 PG (ref 26–34)
MCHC RBC AUTO-ENTMCNC: 28.4 G/DL (ref 32–36)
MCV RBC AUTO: 82 FL (ref 80–100)
MONOCYTES # BLD AUTO: 0.52 X10*3/UL (ref 0.1–1)
MONOCYTES NFR BLD AUTO: 3.4 %
NEUTROPHILS # BLD AUTO: 13.81 X10*3/UL (ref 1.2–7.7)
NEUTROPHILS NFR BLD AUTO: 90.2 %
NRBC BLD-RTO: 0.6 /100 WBCS (ref 0–0)
PLATELET # BLD AUTO: 550 X10*3/UL (ref 150–450)
POTASSIUM SERPL-SCNC: 4.2 MMOL/L (ref 3.5–5.3)
PROCALCITONIN SERPL-MCNC: 0.02 NG/ML
RBC # BLD AUTO: 4.5 X10*6/UL (ref 4–5.2)
SODIUM SERPL-SCNC: 136 MMOL/L (ref 136–145)
WBC # BLD AUTO: 15.3 X10*3/UL (ref 4.4–11.3)

## 2024-05-07 PROCEDURE — 94660 CPAP INITIATION&MGMT: CPT

## 2024-05-07 PROCEDURE — 36415 COLL VENOUS BLD VENIPUNCTURE: CPT | Performed by: INTERNAL MEDICINE

## 2024-05-07 PROCEDURE — 2500000002 HC RX 250 W HCPCS SELF ADMINISTERED DRUGS (ALT 637 FOR MEDICARE OP, ALT 636 FOR OP/ED): Performed by: INTERNAL MEDICINE

## 2024-05-07 PROCEDURE — 1100000001 HC PRIVATE ROOM DAILY

## 2024-05-07 PROCEDURE — 99233 SBSQ HOSP IP/OBS HIGH 50: CPT | Performed by: INTERNAL MEDICINE

## 2024-05-07 PROCEDURE — 2500000005 HC RX 250 GENERAL PHARMACY W/O HCPCS: Performed by: INTERNAL MEDICINE

## 2024-05-07 PROCEDURE — 2500000005 HC RX 250 GENERAL PHARMACY W/O HCPCS: Performed by: HOSPITALIST

## 2024-05-07 PROCEDURE — 94640 AIRWAY INHALATION TREATMENT: CPT

## 2024-05-07 PROCEDURE — 84145 PROCALCITONIN (PCT): CPT | Mod: AHULAB | Performed by: INTERNAL MEDICINE

## 2024-05-07 PROCEDURE — 82374 ASSAY BLOOD CARBON DIOXIDE: CPT | Performed by: INTERNAL MEDICINE

## 2024-05-07 PROCEDURE — 85025 COMPLETE CBC W/AUTO DIFF WBC: CPT | Performed by: INTERNAL MEDICINE

## 2024-05-07 PROCEDURE — 2500000001 HC RX 250 WO HCPCS SELF ADMINISTERED DRUGS (ALT 637 FOR MEDICARE OP): Performed by: INTERNAL MEDICINE

## 2024-05-07 PROCEDURE — 2500000004 HC RX 250 GENERAL PHARMACY W/ HCPCS (ALT 636 FOR OP/ED): Performed by: HOSPITALIST

## 2024-05-07 PROCEDURE — 71045 X-RAY EXAM CHEST 1 VIEW: CPT | Performed by: RADIOLOGY

## 2024-05-07 PROCEDURE — 71045 X-RAY EXAM CHEST 1 VIEW: CPT

## 2024-05-07 PROCEDURE — 2500000004 HC RX 250 GENERAL PHARMACY W/ HCPCS (ALT 636 FOR OP/ED): Performed by: INTERNAL MEDICINE

## 2024-05-07 PROCEDURE — 94664 DEMO&/EVAL PT USE INHALER: CPT

## 2024-05-07 RX ORDER — MORPHINE SULFATE 2 MG/ML
1 INJECTION, SOLUTION INTRAMUSCULAR; INTRAVENOUS ONCE
Status: COMPLETED | OUTPATIENT
Start: 2024-05-07 | End: 2024-05-07

## 2024-05-07 RX ORDER — ACETAMINOPHEN AND CODEINE PHOSPHATE 300; 30 MG/1; MG/1
1 TABLET ORAL EVERY 4 HOURS PRN
Status: DISCONTINUED | OUTPATIENT
Start: 2024-05-07 | End: 2024-05-10 | Stop reason: HOSPADM

## 2024-05-07 RX ADMIN — IPRATROPIUM BROMIDE AND ALBUTEROL SULFATE 3 ML: 2.5; .5 SOLUTION RESPIRATORY (INHALATION) at 07:16

## 2024-05-07 RX ADMIN — Medication 50 L/MIN: at 20:00

## 2024-05-07 RX ADMIN — METHYLPREDNISOLONE SODIUM SUCCINATE 40 MG: 40 INJECTION, POWDER, FOR SOLUTION INTRAMUSCULAR; INTRAVENOUS at 23:46

## 2024-05-07 RX ADMIN — AZITHROMYCIN MONOHYDRATE 500 MG: 500 INJECTION, POWDER, LYOPHILIZED, FOR SOLUTION INTRAVENOUS at 09:02

## 2024-05-07 RX ADMIN — IPRATROPIUM BROMIDE AND ALBUTEROL SULFATE 3 ML: 2.5; .5 SOLUTION RESPIRATORY (INHALATION) at 05:49

## 2024-05-07 RX ADMIN — Medication 3 L/MIN: at 14:08

## 2024-05-07 RX ADMIN — FORMOTEROL FUMARATE DIHYDRATE 20 MCG: 20 SOLUTION RESPIRATORY (INHALATION) at 19:32

## 2024-05-07 RX ADMIN — ALPRAZOLAM 0.25 MG: 0.25 TABLET ORAL at 06:08

## 2024-05-07 RX ADMIN — Medication 50 L/MIN: at 08:00

## 2024-05-07 RX ADMIN — IPRATROPIUM BROMIDE AND ALBUTEROL SULFATE 3 ML: 2.5; .5 SOLUTION RESPIRATORY (INHALATION) at 14:08

## 2024-05-07 RX ADMIN — MORPHINE SULFATE 1 MG: 2 INJECTION, SOLUTION INTRAMUSCULAR; INTRAVENOUS at 06:52

## 2024-05-07 RX ADMIN — ACETAMINOPHEN AND CODEINE PHOSPHATE 1 TABLET: 300; 30 TABLET ORAL at 22:23

## 2024-05-07 RX ADMIN — KETOROLAC TROMETHAMINE 10 MG: 10 TABLET, FILM COATED ORAL at 05:57

## 2024-05-07 RX ADMIN — ALPRAZOLAM 0.25 MG: 0.25 TABLET ORAL at 18:31

## 2024-05-07 RX ADMIN — ACETAMINOPHEN AND CODEINE PHOSPHATE 1 TABLET: 300; 30 TABLET ORAL at 18:31

## 2024-05-07 RX ADMIN — Medication 50 L/MIN: at 06:15

## 2024-05-07 RX ADMIN — ACETAMINOPHEN AND CODEINE PHOSPHATE 1 TABLET: 300; 30 TABLET ORAL at 10:11

## 2024-05-07 RX ADMIN — IPRATROPIUM BROMIDE AND ALBUTEROL SULFATE 3 ML: 2.5; .5 SOLUTION RESPIRATORY (INHALATION) at 19:32

## 2024-05-07 RX ADMIN — FORMOTEROL FUMARATE DIHYDRATE 20 MCG: 20 SOLUTION RESPIRATORY (INHALATION) at 07:16

## 2024-05-07 RX ADMIN — ACETAMINOPHEN AND CODEINE PHOSPHATE 1 TABLET: 300; 30 TABLET ORAL at 14:09

## 2024-05-07 RX ADMIN — PANTOPRAZOLE SODIUM 40 MG: 40 TABLET, DELAYED RELEASE ORAL at 05:58

## 2024-05-07 RX ADMIN — SODIUM CHLORIDE 100 ML/HR: 9 INJECTION, SOLUTION INTRAVENOUS at 03:07

## 2024-05-07 RX ADMIN — ENOXAPARIN SODIUM 40 MG: 40 INJECTION SUBCUTANEOUS at 05:58

## 2024-05-07 RX ADMIN — ALUMINUM HYDROXIDE, MAGNESIUM HYDROXIDE, AND DIMETHICONE 30 ML: 200; 20; 200 SUSPENSION ORAL at 13:08

## 2024-05-07 RX ADMIN — METHYLPREDNISOLONE SODIUM SUCCINATE 40 MG: 40 INJECTION, POWDER, FOR SOLUTION INTRAMUSCULAR; INTRAVENOUS at 09:02

## 2024-05-07 RX ADMIN — METHYLPREDNISOLONE SODIUM SUCCINATE 40 MG: 40 INJECTION, POWDER, FOR SOLUTION INTRAMUSCULAR; INTRAVENOUS at 16:23

## 2024-05-07 ASSESSMENT — COGNITIVE AND FUNCTIONAL STATUS - GENERAL
DRESSING REGULAR UPPER BODY CLOTHING: A LITTLE
DAILY ACTIVITIY SCORE: 17
HELP NEEDED FOR BATHING: A LOT
DRESSING REGULAR LOWER BODY CLOTHING: A LITTLE
MOVING TO AND FROM BED TO CHAIR: A LITTLE
TURNING FROM BACK TO SIDE WHILE IN FLAT BAD: A LITTLE
PERSONAL GROOMING: A LITTLE
STANDING UP FROM CHAIR USING ARMS: A LITTLE
CLIMB 3 TO 5 STEPS WITH RAILING: TOTAL
MOBILITY SCORE: 16
TOILETING: A LOT
WALKING IN HOSPITAL ROOM: A LOT

## 2024-05-07 ASSESSMENT — PAIN - FUNCTIONAL ASSESSMENT
PAIN_FUNCTIONAL_ASSESSMENT: 0-10

## 2024-05-07 ASSESSMENT — PAIN SCALES - GENERAL
PAINLEVEL_OUTOF10: 9
PAINLEVEL_OUTOF10: 6
PAINLEVEL_OUTOF10: 0 - NO PAIN
PAINLEVEL_OUTOF10: 2
PAINLEVEL_OUTOF10: 7
PAINLEVEL_OUTOF10: 7
PAINLEVEL_OUTOF10: 5 - MODERATE PAIN
PAINLEVEL_OUTOF10: 0 - NO PAIN
PAINLEVEL_OUTOF10: 9
PAINLEVEL_OUTOF10: 9

## 2024-05-07 ASSESSMENT — PAIN DESCRIPTION - LOCATION
LOCATION: GENERALIZED

## 2024-05-07 NOTE — SIGNIFICANT EVENT
Pt became more dyspneic, increased WOB after the breathing tx, spo2 remained @ 100% on 1L. Pt was wheezy pre and post the breathing tx. Pt complained she can't breatheand that she  can't get enough air. O2 increased to 4L. Pt placed on Airvo 30% 50L. MD made aware.

## 2024-05-07 NOTE — PROGRESS NOTES
Fernando Cuevas is a 63 y.o. female on day 2 of admission presenting with COPD exacerbation (Multi).     05/07/24 1416   Discharge Planning   Patient expects to be discharged to: Home with son/ working on [employer] services     Continues treatment for COPD, pulm following, weaning O2, and remains on IV abx and IV solumedrol. Active with [facility] and does not want to lose her [employer] insurance due to working on getting Passport aide services.     Disposition: return home with son  ADOD: 1-2 days      Myrtle Mejia RN

## 2024-05-07 NOTE — PROGRESS NOTES
"Fernando Cuevas is a 63 y.o. female on day 2 of admission presenting with COPD exacerbation (Multi).    Subjective   The patient had an episode of shortness of breath, wheezing and difficulty breathing during the night.  Oxygen saturation was reported to be 100% on 1 L nasal cannula.  IV fluids were discontinued.  A chest x-ray showed no infiltrates or effusions.  She was changed from nasal cannula oxygen to Airvo with some improvement.  Perforomist was added yesterday.  Presently, the patient states that she feels \"not good\".  She admits to shortness of breath and to a headache, but denies cough.  On Airvo (50 L/min and 32% FiO2) with an oxygen saturation of 100%.     Objective   Physical Exam  Vitals  Blood pressure 151/81, pulse (!) 117, temperature 36.2 °C (97.2 °F), resp. rate 15, height 1.549 m (5' 0.98\"), weight 64.6 kg (142 lb 8 oz), SpO2 100%.  Intake/Output last 3 Shifts:  I/O last 3 completed shifts:  In: 3000 (46.4 mL/kg) [I.V.:3000 (46.4 mL/kg)]  Out: 1450 (22.4 mL/kg) [Urine:1450 (0.6 mL/kg/hr)]  Weight: 64.6 kg     General-awake, alert and in moderate distress from dyspnea and headache.  Lungs-bilateral wheezing without rales or rhonchi.  Heart-tachycardic with a regular rhythm.  Abdomen-positive bowel sounds.  Extremities-no edema.    Scheduled medications  azithromycin, 500 mg, intravenous, Daily  enoxaparin, 40 mg, subcutaneous, q24h  formoterol, 20 mcg, nebulization, BID  ipratropium-albuteroL, 3 mL, nebulization, TID  methylPREDNISolone sodium succinate (PF), 40 mg, intravenous, q8h  oxygen, , inhalation, Continuous - Inhalation  oxygen, , inhalation, Continuous - Inhalation  pantoprazole, 40 mg, oral, Daily before breakfast   Or  pantoprazole, 40 mg, intravenous, Daily before breakfast      Continuous medications     PRN medications  PRN medications: acetaminophen **OR** acetaminophen **OR** acetaminophen, ALPRAZolam, alum-mag hydroxide-simeth, ipratropium-albuteroL, ketorolac, ondansetron **OR** " ondansetron     Results for orders placed or performed during the hospital encounter of 05/05/24 (from the past 24 hour(s))   CBC and Auto Differential   Result Value Ref Range    WBC 15.3 (H) 4.4 - 11.3 x10*3/uL    nRBC 0.6 (H) 0.0 - 0.0 /100 WBCs    RBC 4.50 4.00 - 5.20 x10*6/uL    Hemoglobin 10.5 (L) 12.0 - 16.0 g/dL    Hematocrit 37.0 36.0 - 46.0 %    MCV 82 80 - 100 fL    MCH 23.3 (L) 26.0 - 34.0 pg    MCHC 28.4 (L) 32.0 - 36.0 g/dL    RDW 19.5 (H) 11.5 - 14.5 %    Platelets 550 (H) 150 - 450 x10*3/uL    Neutrophils % 90.2 40.0 - 80.0 %    Immature Granulocytes %, Automated 1.7 (H) 0.0 - 0.9 %    Lymphocytes % 4.5 13.0 - 44.0 %    Monocytes % 3.4 2.0 - 10.0 %    Eosinophils % 0.0 0.0 - 6.0 %    Basophils % 0.2 0.0 - 2.0 %    Neutrophils Absolute 13.81 (H) 1.20 - 7.70 x10*3/uL    Immature Granulocytes Absolute, Automated 0.26 0.00 - 0.70 x10*3/uL    Lymphocytes Absolute 0.69 (L) 1.20 - 4.80 x10*3/uL    Monocytes Absolute 0.52 0.10 - 1.00 x10*3/uL    Eosinophils Absolute 0.00 0.00 - 0.70 x10*3/uL    Basophils Absolute 0.03 0.00 - 0.10 x10*3/uL   Basic metabolic panel   Result Value Ref Range    Glucose 132 (H) 74 - 99 mg/dL    Sodium 136 136 - 145 mmol/L    Potassium 4.2 3.5 - 5.3 mmol/L    Chloride 101 98 - 107 mmol/L    Bicarbonate 26 21 - 32 mmol/L    Anion Gap 13 10 - 20 mmol/L    Urea Nitrogen 19 6 - 23 mg/dL    Creatinine 0.78 0.50 - 1.05 mg/dL    eGFR 85 >60 mL/min/1.73m*2    Calcium 8.5 (L) 8.6 - 10.3 mg/dL      Assessment:  Shortness of breath and acute-on-chronic hypoxic and hypercapnic respiratory failure, due to a COPD exacerbation with persistent bronchospasm.  There is no evidence of pneumonia, pulmonary edema, pleural effusions, recurrent malignancy or pneumothorax on chest x-ray from today.  The patient complains of a severe headache and is requesting Tylenol #3.    Recommend:  1.  Continue DuoNeb, formoterol, Solu-Medrol, Zithromax and Lovenox.  2.  Decrease FiO2 to keep oxygen saturation  approximately 92 to 94%; home oxygen evaluation prior to discharge.  3.  Incentive spirometry.  4.  Begin Tylenol 3 every 4-6 hours as needed for headache pain.  5.  Check procalcitonin level--pending.  6.  Follow-up with her Pulmonary and Oncology physicians at [facility] after discharge.    Reid Dinh MD

## 2024-05-07 NOTE — NURSING NOTE
Patient c/o shortness of breath, noted breathing labored although SPO2 - 100% on 4 L/M via NC, continued to note expiratory wheezes which has been patient's baseline, started to note fine crackles to posterior bases. Secure chat sent to Dr. Nixon to notify her of the above. Stopped IVF's and requested for a chest x-ray. Respiratory therapy at bedside, applied Airvo at 30% to assist in helping patient's breathing. Noted patient's breathing less labored, patient reports breathing starting to improve. Waiting on Dr. Nixon to respond to this nurse. Notified Charge, RN of the above. Will continue to monitor.

## 2024-05-07 NOTE — PROGRESS NOTES
Fernando Cuevas is a 63 y.o. female on day 2 of admission presenting with COPD exacerbation (Multi).      Subjective   Seen and examined, breathing is improving, no new complaints.  No overnight events.  The plan discussed with the patient and RN.       Objective     Last Recorded Vitals  /81   Pulse (!) 117   Temp 36.2 °C (97.2 °F)   Resp 15   Wt 64.6 kg (142 lb 8 oz)   SpO2 100%   Intake/Output last 3 Shifts:    Intake/Output Summary (Last 24 hours) at 5/7/2024 1534  Last data filed at 5/7/2024 0902  Gross per 24 hour   Intake 1499 ml   Output 600 ml   Net 899 ml       Admission Weight  Weight: 64.4 kg (142 lb) (05/05/24 0006)    Daily Weight  05/05/24 : 64.6 kg (142 lb 8 oz)    Image Results  XR chest 1 view  Narrative: Interpreted By:  Stan Pal,   STUDY:  XR CHEST 1 VIEW;  5/7/2024 6:38 am      INDICATION:  Signs/Symptoms:SOB.      COMPARISON:  05/05/2024 07/23/2023      ACCESSION NUMBER(S):  [id number]      ORDERING CLINICIAN:  RICA BARNES      FINDINGS:          No consolidation, pleural effusion, or pneumothorax. Apical  predominant emphysema. Streaky left basilar opacities, likely  subsegmental atelectasis or scarring, similar to 07/23/2023. Lobular  density at the inferolateral left mediastinum corresponding to focal  region of fat and small Bochdalek hernia as seen on the 05/07/2023  chest CT. Heart size is normal. No acute osseous abnormality. Upper  abdomen and superficial soft tissues are unremarkable.      Impression: 1. No consolidation, pleural effusion, or pneumothorax.  2. The spiculated possibly malignant pulmonary nodule described on  the 05/07/2023 examination is not well evaluated radiographically.  Chest CT is recommended.      Signed by: Stan Pal 5/7/2024 6:47 AM  Dictation workstation:   [id number]      Physical Exam  Constitutional:       Appearance: Normal appearance. She is ill-appearing.   HENT:      Head: Normocephalic.      Right Ear: External ear  normal.      Left Ear: External ear normal.      Nose: Nose normal.      Mouth/Throat:      Mouth: Mucous membranes are dry.      Pharynx: Oropharynx is clear.   Eyes:      Extraocular Movements: Extraocular movements intact.      Conjunctiva/sclera: Conjunctivae normal.      Pupils: Pupils are equal, round, and reactive to light.   Cardiovascular:      Rate and Rhythm: Normal rate and regular rhythm.   Pulmonary:      Effort: Respiratory distress present.      Breath sounds: Wheezing, rhonchi and rales present.   Abdominal:      General: Abdomen is flat. Bowel sounds are normal.      Palpations: Abdomen is soft.   Musculoskeletal:         General: Normal range of motion.   Skin:     General: Skin is warm and dry.   Neurological:      General: No focal deficit present.      Mental Status: She is alert. Mental status is at baseline.   Psychiatric:         Mood and Affect: Mood normal.         Behavior: Behavior normal.           Relevant Results  Results for orders placed or performed during the hospital encounter of 05/05/24 (from the past 24 hour(s))   CBC and Auto Differential   Result Value Ref Range    WBC 15.3 (H) 4.4 - 11.3 x10*3/uL    nRBC 0.6 (H) 0.0 - 0.0 /100 WBCs    RBC 4.50 4.00 - 5.20 x10*6/uL    Hemoglobin 10.5 (L) 12.0 - 16.0 g/dL    Hematocrit 37.0 36.0 - 46.0 %    MCV 82 80 - 100 fL    MCH 23.3 (L) 26.0 - 34.0 pg    MCHC 28.4 (L) 32.0 - 36.0 g/dL    RDW 19.5 (H) 11.5 - 14.5 %    Platelets 550 (H) 150 - 450 x10*3/uL    Neutrophils % 90.2 40.0 - 80.0 %    Immature Granulocytes %, Automated 1.7 (H) 0.0 - 0.9 %    Lymphocytes % 4.5 13.0 - 44.0 %    Monocytes % 3.4 2.0 - 10.0 %    Eosinophils % 0.0 0.0 - 6.0 %    Basophils % 0.2 0.0 - 2.0 %    Neutrophils Absolute 13.81 (H) 1.20 - 7.70 x10*3/uL    Immature Granulocytes Absolute, Automated 0.26 0.00 - 0.70 x10*3/uL    Lymphocytes Absolute 0.69 (L) 1.20 - 4.80 x10*3/uL    Monocytes Absolute 0.52 0.10 - 1.00 x10*3/uL    Eosinophils Absolute 0.00 0.00 - 0.70  x10*3/uL    Basophils Absolute 0.03 0.00 - 0.10 x10*3/uL   Basic metabolic panel   Result Value Ref Range    Glucose 132 (H) 74 - 99 mg/dL    Sodium 136 136 - 145 mmol/L    Potassium 4.2 3.5 - 5.3 mmol/L    Chloride 101 98 - 107 mmol/L    Bicarbonate 26 21 - 32 mmol/L    Anion Gap 13 10 - 20 mmol/L    Urea Nitrogen 19 6 - 23 mg/dL    Creatinine 0.78 0.50 - 1.05 mg/dL    eGFR 85 >60 mL/min/1.73m*2    Calcium 8.5 (L) 8.6 - 10.3 mg/dL        No current facility-administered medications on file prior to encounter.     No current outpatient medications on file prior to encounter.            Assessment/Plan        Principal Problem:    COPD exacerbation (Multi)    Fernando Cuevas is a 63 y.o. female with a past medical history of COPD with emphysema, adenocarcinoma of the right lower lobe, status post radiation therapy, GERD, hypertension who presented [facility] complaining of increased shortness of breath.      Exacerbation of COPD with emphysema  Plan:  DuoNeb aerosols as needed  Solu-Medrol 40 mg IV every 8 hourly  Zithromax 500 mg IV daily  Supplemental oxygen as needed  Xanax as needed for anxiety  Procal pending     Adenocarcinoma of the right lower lobe  Plan:  Has had radiation therapy  Patient to follow-up with her primary pulmonologist     GERD  Plan:  Protonix 40 mg orally daily     DVT prophylaxis  Lovenox 40 mg subcutaneously daily  Mert Khoury MD

## 2024-05-08 ENCOUNTER — APPOINTMENT (OUTPATIENT)
Dept: RADIOLOGY | Facility: HOSPITAL | Age: 64
End: 2024-05-08
Payer: COMMERCIAL

## 2024-05-08 LAB
ANION GAP SERPL CALC-SCNC: 14 MMOL/L (ref 10–20)
BASOPHILS # BLD AUTO: 0.04 X10*3/UL (ref 0–0.1)
BASOPHILS NFR BLD AUTO: 0.2 %
BUN SERPL-MCNC: 26 MG/DL (ref 6–23)
CALCIUM SERPL-MCNC: 8.7 MG/DL (ref 8.6–10.3)
CHLORIDE SERPL-SCNC: 102 MMOL/L (ref 98–107)
CO2 SERPL-SCNC: 25 MMOL/L (ref 21–32)
CREAT SERPL-MCNC: 0.8 MG/DL (ref 0.5–1.05)
EGFRCR SERPLBLD CKD-EPI 2021: 83 ML/MIN/1.73M*2
EOSINOPHIL # BLD AUTO: 0 X10*3/UL (ref 0–0.7)
EOSINOPHIL NFR BLD AUTO: 0 %
ERYTHROCYTE [DISTWIDTH] IN BLOOD BY AUTOMATED COUNT: 19 % (ref 11.5–14.5)
GLUCOSE SERPL-MCNC: 117 MG/DL (ref 74–99)
HCT VFR BLD AUTO: 35.5 % (ref 36–46)
HGB BLD-MCNC: 10.7 G/DL (ref 12–16)
IMM GRANULOCYTES # BLD AUTO: 0.32 X10*3/UL (ref 0–0.7)
IMM GRANULOCYTES NFR BLD AUTO: 1.7 % (ref 0–0.9)
LYMPHOCYTES # BLD AUTO: 0.81 X10*3/UL (ref 1.2–4.8)
LYMPHOCYTES NFR BLD AUTO: 4.2 %
MCH RBC QN AUTO: 23.3 PG (ref 26–34)
MCHC RBC AUTO-ENTMCNC: 30.1 G/DL (ref 32–36)
MCV RBC AUTO: 77 FL (ref 80–100)
MONOCYTES # BLD AUTO: 0.74 X10*3/UL (ref 0.1–1)
MONOCYTES NFR BLD AUTO: 3.8 %
NEUTROPHILS # BLD AUTO: 17.36 X10*3/UL (ref 1.2–7.7)
NEUTROPHILS NFR BLD AUTO: 90.1 %
NRBC BLD-RTO: 0.4 /100 WBCS (ref 0–0)
PLATELET # BLD AUTO: 440 X10*3/UL (ref 150–450)
POTASSIUM SERPL-SCNC: 4.9 MMOL/L (ref 3.5–5.3)
RBC # BLD AUTO: 4.6 X10*6/UL (ref 4–5.2)
SODIUM SERPL-SCNC: 136 MMOL/L (ref 136–145)
WBC # BLD AUTO: 19.3 X10*3/UL (ref 4.4–11.3)

## 2024-05-08 PROCEDURE — 2550000001 HC RX 255 CONTRASTS: Performed by: INTERNAL MEDICINE

## 2024-05-08 PROCEDURE — 2500000001 HC RX 250 WO HCPCS SELF ADMINISTERED DRUGS (ALT 637 FOR MEDICARE OP): Performed by: INTERNAL MEDICINE

## 2024-05-08 PROCEDURE — 2500000001 HC RX 250 WO HCPCS SELF ADMINISTERED DRUGS (ALT 637 FOR MEDICARE OP): Performed by: HOSPITALIST

## 2024-05-08 PROCEDURE — 80048 BASIC METABOLIC PNL TOTAL CA: CPT | Performed by: INTERNAL MEDICINE

## 2024-05-08 PROCEDURE — 99233 SBSQ HOSP IP/OBS HIGH 50: CPT | Performed by: INTERNAL MEDICINE

## 2024-05-08 PROCEDURE — 1100000001 HC PRIVATE ROOM DAILY

## 2024-05-08 PROCEDURE — 85025 COMPLETE CBC W/AUTO DIFF WBC: CPT | Performed by: INTERNAL MEDICINE

## 2024-05-08 PROCEDURE — 94664 DEMO&/EVAL PT USE INHALER: CPT

## 2024-05-08 PROCEDURE — 2500000002 HC RX 250 W HCPCS SELF ADMINISTERED DRUGS (ALT 637 FOR MEDICARE OP, ALT 636 FOR OP/ED): Performed by: INTERNAL MEDICINE

## 2024-05-08 PROCEDURE — 2500000005 HC RX 250 GENERAL PHARMACY W/O HCPCS: Performed by: HOSPITALIST

## 2024-05-08 PROCEDURE — 36415 COLL VENOUS BLD VENIPUNCTURE: CPT | Performed by: INTERNAL MEDICINE

## 2024-05-08 PROCEDURE — 2500000004 HC RX 250 GENERAL PHARMACY W/ HCPCS (ALT 636 FOR OP/ED): Performed by: INTERNAL MEDICINE

## 2024-05-08 PROCEDURE — 94640 AIRWAY INHALATION TREATMENT: CPT

## 2024-05-08 PROCEDURE — 71260 CT THORAX DX C+: CPT

## 2024-05-08 PROCEDURE — 71260 CT THORAX DX C+: CPT | Performed by: RADIOLOGY

## 2024-05-08 RX ORDER — LIDOCAINE 560 MG/1
1 PATCH PERCUTANEOUS; TOPICAL; TRANSDERMAL DAILY
Status: DISCONTINUED | OUTPATIENT
Start: 2024-05-09 | End: 2024-05-10 | Stop reason: HOSPADM

## 2024-05-08 RX ORDER — NYSTATIN 100000 [USP'U]/ML
5 SUSPENSION ORAL 4 TIMES DAILY
Status: DISCONTINUED | OUTPATIENT
Start: 2024-05-08 | End: 2024-05-10 | Stop reason: HOSPADM

## 2024-05-08 RX ORDER — NYSTATIN 100000 [USP'U]/ML
4 SUSPENSION ORAL 4 TIMES DAILY
Status: DISCONTINUED | OUTPATIENT
Start: 2024-05-08 | End: 2024-05-08 | Stop reason: SDUPTHER

## 2024-05-08 RX ORDER — AZITHROMYCIN 500 MG/1
500 TABLET, FILM COATED ORAL EVERY 24 HOURS
Status: DISCONTINUED | OUTPATIENT
Start: 2024-05-09 | End: 2024-05-10 | Stop reason: HOSPADM

## 2024-05-08 RX ADMIN — METHYLPREDNISOLONE SODIUM SUCCINATE 40 MG: 40 INJECTION, POWDER, FOR SOLUTION INTRAMUSCULAR; INTRAVENOUS at 08:40

## 2024-05-08 RX ADMIN — NYSTATIN 500000 UNITS: 100000 SUSPENSION ORAL at 20:12

## 2024-05-08 RX ADMIN — AZITHROMYCIN MONOHYDRATE 500 MG: 500 INJECTION, POWDER, LYOPHILIZED, FOR SOLUTION INTRAVENOUS at 09:03

## 2024-05-08 RX ADMIN — IPRATROPIUM BROMIDE AND ALBUTEROL SULFATE 3 ML: 2.5; .5 SOLUTION RESPIRATORY (INHALATION) at 07:23

## 2024-05-08 RX ADMIN — NYSTATIN 500000 UNITS: 100000 SUSPENSION ORAL at 12:37

## 2024-05-08 RX ADMIN — ALUMINUM HYDROXIDE, MAGNESIUM HYDROXIDE, AND DIMETHICONE 30 ML: 200; 20; 200 SUSPENSION ORAL at 11:26

## 2024-05-08 RX ADMIN — NYSTATIN 500000 UNITS: 100000 SUSPENSION ORAL at 06:18

## 2024-05-08 RX ADMIN — ACETAMINOPHEN AND CODEINE PHOSPHATE 1 TABLET: 300; 30 TABLET ORAL at 20:12

## 2024-05-08 RX ADMIN — NYSTATIN 500000 UNITS: 100000 SUSPENSION ORAL at 17:09

## 2024-05-08 RX ADMIN — PANTOPRAZOLE SODIUM 40 MG: 40 TABLET, DELAYED RELEASE ORAL at 06:18

## 2024-05-08 RX ADMIN — FORMOTEROL FUMARATE DIHYDRATE 20 MCG: 20 SOLUTION RESPIRATORY (INHALATION) at 07:23

## 2024-05-08 RX ADMIN — NYSTATIN 500000 UNITS: 100000 SUSPENSION ORAL at 00:24

## 2024-05-08 RX ADMIN — IPRATROPIUM BROMIDE AND ALBUTEROL SULFATE 3 ML: 2.5; .5 SOLUTION RESPIRATORY (INHALATION) at 19:49

## 2024-05-08 RX ADMIN — ALPRAZOLAM 0.25 MG: 0.25 TABLET ORAL at 06:18

## 2024-05-08 RX ADMIN — IPRATROPIUM BROMIDE AND ALBUTEROL SULFATE 3 ML: 2.5; .5 SOLUTION RESPIRATORY (INHALATION) at 14:25

## 2024-05-08 RX ADMIN — ACETAMINOPHEN AND CODEINE PHOSPHATE 1 TABLET: 300; 30 TABLET ORAL at 02:23

## 2024-05-08 RX ADMIN — METHYLPREDNISOLONE SODIUM SUCCINATE 40 MG: 40 INJECTION, POWDER, FOR SOLUTION INTRAMUSCULAR; INTRAVENOUS at 16:43

## 2024-05-08 RX ADMIN — METHYLPREDNISOLONE SODIUM SUCCINATE 40 MG: 40 INJECTION, POWDER, FOR SOLUTION INTRAMUSCULAR; INTRAVENOUS at 23:09

## 2024-05-08 RX ADMIN — ACETAMINOPHEN AND CODEINE PHOSPHATE 1 TABLET: 300; 30 TABLET ORAL at 14:36

## 2024-05-08 RX ADMIN — IPRATROPIUM BROMIDE AND ALBUTEROL SULFATE 3 ML: 2.5; .5 SOLUTION RESPIRATORY (INHALATION) at 02:32

## 2024-05-08 RX ADMIN — ACETAMINOPHEN AND CODEINE PHOSPHATE 1 TABLET: 300; 30 TABLET ORAL at 06:23

## 2024-05-08 RX ADMIN — FORMOTEROL FUMARATE DIHYDRATE 20 MCG: 20 SOLUTION RESPIRATORY (INHALATION) at 19:49

## 2024-05-08 RX ADMIN — Medication 3 L/MIN: at 07:23

## 2024-05-08 RX ADMIN — ENOXAPARIN SODIUM 40 MG: 40 INJECTION SUBCUTANEOUS at 05:08

## 2024-05-08 RX ADMIN — IOHEXOL 75 ML: 350 INJECTION, SOLUTION INTRAVENOUS at 13:33

## 2024-05-08 RX ADMIN — ACETAMINOPHEN AND CODEINE PHOSPHATE 1 TABLET: 300; 30 TABLET ORAL at 10:14

## 2024-05-08 RX ADMIN — ALPRAZOLAM 0.25 MG: 0.25 TABLET ORAL at 14:33

## 2024-05-08 ASSESSMENT — PAIN - FUNCTIONAL ASSESSMENT
PAIN_FUNCTIONAL_ASSESSMENT: 0-10

## 2024-05-08 ASSESSMENT — COGNITIVE AND FUNCTIONAL STATUS - GENERAL
STANDING UP FROM CHAIR USING ARMS: A LITTLE
DRESSING REGULAR UPPER BODY CLOTHING: A LITTLE
HELP NEEDED FOR BATHING: A LOT
TURNING FROM BACK TO SIDE WHILE IN FLAT BAD: A LITTLE
DRESSING REGULAR LOWER BODY CLOTHING: A LITTLE
MOVING TO AND FROM BED TO CHAIR: A LITTLE
WALKING IN HOSPITAL ROOM: A LOT
MOVING TO AND FROM BED TO CHAIR: A LITTLE
CLIMB 3 TO 5 STEPS WITH RAILING: TOTAL
STANDING UP FROM CHAIR USING ARMS: A LITTLE
DRESSING REGULAR LOWER BODY CLOTHING: A LITTLE
PERSONAL GROOMING: A LITTLE
HELP NEEDED FOR BATHING: A LOT
DAILY ACTIVITIY SCORE: 17
DAILY ACTIVITIY SCORE: 17
TOILETING: A LOT
TURNING FROM BACK TO SIDE WHILE IN FLAT BAD: A LITTLE
DRESSING REGULAR UPPER BODY CLOTHING: A LITTLE
CLIMB 3 TO 5 STEPS WITH RAILING: TOTAL
PERSONAL GROOMING: A LITTLE
TOILETING: A LOT
MOBILITY SCORE: 16

## 2024-05-08 ASSESSMENT — PAIN SCALES - GENERAL
PAINLEVEL_OUTOF10: 4
PAINLEVEL_OUTOF10: 7
PAINLEVEL_OUTOF10: 8
PAINLEVEL_OUTOF10: 7
PAINLEVEL_OUTOF10: 9
PAINLEVEL_OUTOF10: 7

## 2024-05-08 ASSESSMENT — PAIN DESCRIPTION - LOCATION: LOCATION: GENERALIZED

## 2024-05-08 NOTE — PROGRESS NOTES
"Fernando Cuevas is a 63 y.o. female on day 3 of admission presenting with COPD exacerbation (Multi).    Subjective   Still feels poorly.  Admits to shortness of breath, wheezing and migraine headache, but denies cough.  States that Tylenol 3 helped her headache somewhat.  She also states that she is scheduled for a follow-up CT scan of the chest next week for lung cancer surveillance.  On 3 L nasal cannula oxygen.     Objective   Physical Exam  Vitals  Blood pressure 157/82, pulse (!) 116, temperature 36.3 °C (97.3 °F), temperature source Temporal, resp. rate 19, height 1.549 m (5' 0.98\"), weight 64.6 kg (142 lb 8 oz), SpO2 100%.  Intake/Output last 3 Shifts:  I/O last 3 completed shifts:  In: 1619 (25 mL/kg) [P.O.:120; I.V.:999 (15.5 mL/kg); IV Piggyback:500]  Out: 700 (10.8 mL/kg) [Urine:700 (0.3 mL/kg/hr)]  Weight: 64.6 kg     General-awake, alert and in mild respiratory distress and discomfort from headache pain.  Lungs-bilateral wheezes without rales or rhonchi.  Heart-tachycardic with a regular rhythm.  Abdomen-positive bowel sounds.  Extremities-no edema.  Skin-no rashes.    Scheduled medications  azithromycin, 500 mg, intravenous, Daily  enoxaparin, 40 mg, subcutaneous, q24h  formoterol, 20 mcg, nebulization, BID  ipratropium-albuteroL, 3 mL, nebulization, TID  methylPREDNISolone sodium succinate (PF), 40 mg, intravenous, q8h  nystatin, 5 mL, Swish & Swallow, 4x daily  oxygen, , inhalation, Continuous - Inhalation  pantoprazole, 40 mg, oral, Daily before breakfast   Or  pantoprazole, 40 mg, intravenous, Daily before breakfast      Continuous medications     PRN medications  PRN medications: acetaminophen **OR** acetaminophen **OR** acetaminophen, acetaminophen-codeine, ALPRAZolam, alum-mag hydroxide-simeth, ipratropium-albuteroL, ketorolac, ondansetron **OR** ondansetron     Results for orders placed or performed during the hospital encounter of 05/05/24 (from the past 24 hour(s))   CBC and Auto Differential "   Result Value Ref Range    WBC 19.3 (H) 4.4 - 11.3 x10*3/uL    nRBC 0.4 (H) 0.0 - 0.0 /100 WBCs    RBC 4.60 4.00 - 5.20 x10*6/uL    Hemoglobin 10.7 (L) 12.0 - 16.0 g/dL    Hematocrit 35.5 (L) 36.0 - 46.0 %    MCV 77 (L) 80 - 100 fL    MCH 23.3 (L) 26.0 - 34.0 pg    MCHC 30.1 (L) 32.0 - 36.0 g/dL    RDW 19.0 (H) 11.5 - 14.5 %    Platelets 440 150 - 450 x10*3/uL    Neutrophils % 90.1 40.0 - 80.0 %    Immature Granulocytes %, Automated 1.7 (H) 0.0 - 0.9 %    Lymphocytes % 4.2 13.0 - 44.0 %    Monocytes % 3.8 2.0 - 10.0 %    Eosinophils % 0.0 0.0 - 6.0 %    Basophils % 0.2 0.0 - 2.0 %    Neutrophils Absolute 17.36 (H) 1.20 - 7.70 x10*3/uL    Immature Granulocytes Absolute, Automated 0.32 0.00 - 0.70 x10*3/uL    Lymphocytes Absolute 0.81 (L) 1.20 - 4.80 x10*3/uL    Monocytes Absolute 0.74 0.10 - 1.00 x10*3/uL    Eosinophils Absolute 0.00 0.00 - 0.70 x10*3/uL    Basophils Absolute 0.04 0.00 - 0.10 x10*3/uL   Basic Metabolic Panel   Result Value Ref Range    Glucose 117 (H) 74 - 99 mg/dL    Sodium 136 136 - 145 mmol/L    Potassium 4.9 3.5 - 5.3 mmol/L    Chloride 102 98 - 107 mmol/L    Bicarbonate 25 21 - 32 mmol/L    Anion Gap 14 10 - 20 mmol/L    Urea Nitrogen 26 (H) 6 - 23 mg/dL    Creatinine 0.80 0.50 - 1.05 mg/dL    eGFR 83 >60 mL/min/1.73m*2    Calcium 8.7 8.6 - 10.3 mg/dL      Assessment:  Patient with persistent shortness of breath and acute-on-chronic hypoxic and hypercapnic respiratory failure, due to a COPD exacerbation.  She has persistent bronchospasm.  There is no evidence of pneumonia, pulmonary edema, pleural effusions or pneumothorax.  Right upper lobe pulmonary nodule and right lower lobe lung cancer.    Recommend:  1.  Continue DuoNeb, formoterol, Solu-Medrol, Zithromax and Lovenox.  2.  Decrease FiO2 to keep oxygen saturation approximately 92 to 95%; home oxygen evaluation prior to discharge.  3.  Incentive spirometry.  4.  CT scan of the chest with contrast.  5.  Follow-up with your Pulmonary and  Oncology physicians at [facility] after discharge.    Reid Dinh MD

## 2024-05-08 NOTE — PROGRESS NOTES
Fernando Cuevas is a 63 y.o. female on day 3 of admission presenting with COPD exacerbation (Multi).      Subjective   Seen and examined, breathing is improving, no new complaints.  No overnight events.       Objective     Last Recorded Vitals  /82 (BP Location: Left arm, Patient Position: Lying)   Pulse (!) 116   Temp 36.3 °C (97.3 °F) (Temporal)   Resp 19   Wt 64.6 kg (142 lb 8 oz)   SpO2 100%   Intake/Output last 3 Shifts:    Intake/Output Summary (Last 24 hours) at 5/8/2024 1306  Last data filed at 5/8/2024 0900  Gross per 24 hour   Intake 360 ml   Output 350 ml   Net 10 ml       Admission Weight  Weight: 64.4 kg (142 lb) (05/05/24 0006)    Daily Weight  05/05/24 : 64.6 kg (142 lb 8 oz)    Image Results  XR chest 1 view  Narrative: Interpreted By:  Stan Pal,   STUDY:  XR CHEST 1 VIEW;  5/7/2024 6:38 am      INDICATION:  Signs/Symptoms:SOB.      COMPARISON:  05/05/2024 07/23/2023      ACCESSION NUMBER(S):  [id number]      ORDERING CLINICIAN:  RICA BARNES      FINDINGS:          No consolidation, pleural effusion, or pneumothorax. Apical  predominant emphysema. Streaky left basilar opacities, likely  subsegmental atelectasis or scarring, similar to 07/23/2023. Lobular  density at the inferolateral left mediastinum corresponding to focal  region of fat and small Bochdalek hernia as seen on the 05/07/2023  chest CT. Heart size is normal. No acute osseous abnormality. Upper  abdomen and superficial soft tissues are unremarkable.      Impression: 1. No consolidation, pleural effusion, or pneumothorax.  2. The spiculated possibly malignant pulmonary nodule described on  the 05/07/2023 examination is not well evaluated radiographically.  Chest CT is recommended.      Signed by: Stan Pal 5/7/2024 6:47 AM  Dictation workstation:   [id number]      Physical Exam  Constitutional:       Appearance: Normal appearance. She is ill-appearing.   HENT:      Head: Normocephalic.      Right Ear:  External ear normal.      Left Ear: External ear normal.      Nose: Nose normal.      Mouth/Throat:      Mouth: Mucous membranes are dry.      Pharynx: Oropharynx is clear.   Eyes:      Extraocular Movements: Extraocular movements intact.      Conjunctiva/sclera: Conjunctivae normal.      Pupils: Pupils are equal, round, and reactive to light.   Cardiovascular:      Rate and Rhythm: Normal rate and regular rhythm.   Pulmonary:      Effort: Respiratory distress present.      Breath sounds: Wheezing, rhonchi and rales present.   Abdominal:      General: Abdomen is flat. Bowel sounds are normal.      Palpations: Abdomen is soft.   Musculoskeletal:         General: Normal range of motion.   Skin:     General: Skin is warm and dry.   Neurological:      General: No focal deficit present.      Mental Status: She is alert. Mental status is at baseline.   Psychiatric:         Mood and Affect: Mood normal.         Behavior: Behavior normal.           Relevant Results  Results for orders placed or performed during the hospital encounter of 05/05/24 (from the past 24 hour(s))   CBC and Auto Differential   Result Value Ref Range    WBC 19.3 (H) 4.4 - 11.3 x10*3/uL    nRBC 0.4 (H) 0.0 - 0.0 /100 WBCs    RBC 4.60 4.00 - 5.20 x10*6/uL    Hemoglobin 10.7 (L) 12.0 - 16.0 g/dL    Hematocrit 35.5 (L) 36.0 - 46.0 %    MCV 77 (L) 80 - 100 fL    MCH 23.3 (L) 26.0 - 34.0 pg    MCHC 30.1 (L) 32.0 - 36.0 g/dL    RDW 19.0 (H) 11.5 - 14.5 %    Platelets 440 150 - 450 x10*3/uL    Neutrophils % 90.1 40.0 - 80.0 %    Immature Granulocytes %, Automated 1.7 (H) 0.0 - 0.9 %    Lymphocytes % 4.2 13.0 - 44.0 %    Monocytes % 3.8 2.0 - 10.0 %    Eosinophils % 0.0 0.0 - 6.0 %    Basophils % 0.2 0.0 - 2.0 %    Neutrophils Absolute 17.36 (H) 1.20 - 7.70 x10*3/uL    Immature Granulocytes Absolute, Automated 0.32 0.00 - 0.70 x10*3/uL    Lymphocytes Absolute 0.81 (L) 1.20 - 4.80 x10*3/uL    Monocytes Absolute 0.74 0.10 - 1.00 x10*3/uL    Eosinophils Absolute  0.00 0.00 - 0.70 x10*3/uL    Basophils Absolute 0.04 0.00 - 0.10 x10*3/uL   Basic Metabolic Panel   Result Value Ref Range    Glucose 117 (H) 74 - 99 mg/dL    Sodium 136 136 - 145 mmol/L    Potassium 4.9 3.5 - 5.3 mmol/L    Chloride 102 98 - 107 mmol/L    Bicarbonate 25 21 - 32 mmol/L    Anion Gap 14 10 - 20 mmol/L    Urea Nitrogen 26 (H) 6 - 23 mg/dL    Creatinine 0.80 0.50 - 1.05 mg/dL    eGFR 83 >60 mL/min/1.73m*2    Calcium 8.7 8.6 - 10.3 mg/dL        No current facility-administered medications on file prior to encounter.     No current outpatient medications on file prior to encounter.            Assessment/Plan        Principal Problem:    COPD exacerbation (Multi)    Fernando Cuevas is a 63 y.o. female with a past medical history of COPD with emphysema, adenocarcinoma of the right lower lobe, status post radiation therapy, GERD, hypertension who presented to [facility] complaining of increased shortness of breath.      Exacerbation of COPD with emphysema  Plan:  DuoNeb aerosols as needed  Solu-Medrol 40 mg IV every 8 hourly  Zithromax 500 mg IV daily  Supplemental oxygen as needed  Xanax as needed for anxiety     Adenocarcinoma of the right lower lobe  Plan:  Has had radiation therapy  Patient to follow-up with her primary pulmonologist  CT chest with contrast ordered     GERD  Plan:  Protonix 40 mg orally daily     DVT prophylaxis  Lovenox 40 mg subcutaneously daily  DALTONs                 Rupa Khoury MD

## 2024-05-08 NOTE — CARE PLAN
The patient's goals for the shift include Assist patient with easier breathing    The clinical goals for the shift include maintain safety

## 2024-05-09 ENCOUNTER — APPOINTMENT (OUTPATIENT)
Dept: RADIOLOGY | Facility: HOSPITAL | Age: 64
End: 2024-05-09
Payer: COMMERCIAL

## 2024-05-09 LAB
ANION GAP SERPL CALC-SCNC: 15 MMOL/L (ref 10–20)
BASOPHILS # BLD AUTO: 0.07 X10*3/UL (ref 0–0.1)
BASOPHILS NFR BLD AUTO: 0.4 %
BUN SERPL-MCNC: 21 MG/DL (ref 6–23)
CALCIUM SERPL-MCNC: 8 MG/DL (ref 8.6–10.3)
CHLORIDE SERPL-SCNC: 99 MMOL/L (ref 98–107)
CO2 SERPL-SCNC: 25 MMOL/L (ref 21–32)
CREAT SERPL-MCNC: 0.62 MG/DL (ref 0.5–1.05)
EGFRCR SERPLBLD CKD-EPI 2021: >90 ML/MIN/1.73M*2
EOSINOPHIL # BLD AUTO: 0 X10*3/UL (ref 0–0.7)
EOSINOPHIL NFR BLD AUTO: 0 %
ERYTHROCYTE [DISTWIDTH] IN BLOOD BY AUTOMATED COUNT: 19 % (ref 11.5–14.5)
GLUCOSE SERPL-MCNC: 102 MG/DL (ref 74–99)
HCT VFR BLD AUTO: 33.5 % (ref 36–46)
HGB BLD-MCNC: 10 G/DL (ref 12–16)
IMM GRANULOCYTES # BLD AUTO: 0.41 X10*3/UL (ref 0–0.7)
IMM GRANULOCYTES NFR BLD AUTO: 2.1 % (ref 0–0.9)
LYMPHOCYTES # BLD AUTO: 1.37 X10*3/UL (ref 1.2–4.8)
LYMPHOCYTES NFR BLD AUTO: 7 %
MCH RBC QN AUTO: 24.2 PG (ref 26–34)
MCHC RBC AUTO-ENTMCNC: 29.9 G/DL (ref 32–36)
MCV RBC AUTO: 81 FL (ref 80–100)
MONOCYTES # BLD AUTO: 1.22 X10*3/UL (ref 0.1–1)
MONOCYTES NFR BLD AUTO: 6.3 %
NEUTROPHILS # BLD AUTO: 16.37 X10*3/UL (ref 1.2–7.7)
NEUTROPHILS NFR BLD AUTO: 84.2 %
NRBC BLD-RTO: 0.4 /100 WBCS (ref 0–0)
PLATELET # BLD AUTO: 363 X10*3/UL (ref 150–450)
POTASSIUM SERPL-SCNC: 4.8 MMOL/L (ref 3.5–5.3)
RBC # BLD AUTO: 4.14 X10*6/UL (ref 4–5.2)
SODIUM SERPL-SCNC: 134 MMOL/L (ref 136–145)
WBC # BLD AUTO: 19.4 X10*3/UL (ref 4.4–11.3)

## 2024-05-09 PROCEDURE — 2500000001 HC RX 250 WO HCPCS SELF ADMINISTERED DRUGS (ALT 637 FOR MEDICARE OP): Performed by: INTERNAL MEDICINE

## 2024-05-09 PROCEDURE — 74018 RADEX ABDOMEN 1 VIEW: CPT

## 2024-05-09 PROCEDURE — 2500000001 HC RX 250 WO HCPCS SELF ADMINISTERED DRUGS (ALT 637 FOR MEDICARE OP): Performed by: PHARMACIST

## 2024-05-09 PROCEDURE — 2500000002 HC RX 250 W HCPCS SELF ADMINISTERED DRUGS (ALT 637 FOR MEDICARE OP, ALT 636 FOR OP/ED): Performed by: INTERNAL MEDICINE

## 2024-05-09 PROCEDURE — 2500000004 HC RX 250 GENERAL PHARMACY W/ HCPCS (ALT 636 FOR OP/ED): Performed by: INTERNAL MEDICINE

## 2024-05-09 PROCEDURE — 94640 AIRWAY INHALATION TREATMENT: CPT

## 2024-05-09 PROCEDURE — 94664 DEMO&/EVAL PT USE INHALER: CPT

## 2024-05-09 PROCEDURE — 1100000001 HC PRIVATE ROOM DAILY

## 2024-05-09 PROCEDURE — 36415 COLL VENOUS BLD VENIPUNCTURE: CPT | Performed by: INTERNAL MEDICINE

## 2024-05-09 PROCEDURE — 94761 N-INVAS EAR/PLS OXIMETRY MLT: CPT

## 2024-05-09 PROCEDURE — 2500000001 HC RX 250 WO HCPCS SELF ADMINISTERED DRUGS (ALT 637 FOR MEDICARE OP): Performed by: HOSPITALIST

## 2024-05-09 PROCEDURE — 2500000005 HC RX 250 GENERAL PHARMACY W/O HCPCS: Performed by: HOSPITALIST

## 2024-05-09 PROCEDURE — 74018 RADEX ABDOMEN 1 VIEW: CPT | Performed by: RADIOLOGY

## 2024-05-09 PROCEDURE — 80048 BASIC METABOLIC PNL TOTAL CA: CPT | Performed by: INTERNAL MEDICINE

## 2024-05-09 PROCEDURE — 99231 SBSQ HOSP IP/OBS SF/LOW 25: CPT | Performed by: INTERNAL MEDICINE

## 2024-05-09 PROCEDURE — 85025 COMPLETE CBC W/AUTO DIFF WBC: CPT | Performed by: INTERNAL MEDICINE

## 2024-05-09 RX ORDER — FLUTICASONE PROPIONATE AND SALMETEROL 100; 50 UG/1; UG/1
1 POWDER RESPIRATORY (INHALATION)
Qty: 60 EACH | Refills: 0 | Status: SHIPPED | OUTPATIENT
Start: 2024-05-09 | End: 2024-06-08

## 2024-05-09 RX ORDER — AZITHROMYCIN 500 MG/1
500 TABLET, FILM COATED ORAL EVERY 24 HOURS
Qty: 2 TABLET | Refills: 0 | Status: SHIPPED | OUTPATIENT
Start: 2024-05-10 | End: 2024-05-14 | Stop reason: HOSPADM

## 2024-05-09 RX ORDER — POLYETHYLENE GLYCOL 3350 17 G/17G
17 POWDER, FOR SOLUTION ORAL DAILY
Status: DISCONTINUED | OUTPATIENT
Start: 2024-05-09 | End: 2024-05-10 | Stop reason: HOSPADM

## 2024-05-09 RX ORDER — PREDNISONE 5 MG/1
5 TABLET ORAL DAILY
Qty: 3 TABLET | Refills: 0 | Status: DISCONTINUED | OUTPATIENT
Start: 2024-05-18 | End: 2024-05-10 | Stop reason: HOSPADM

## 2024-05-09 RX ORDER — PREDNISONE 20 MG/1
20 TABLET ORAL DAILY
Qty: 3 TABLET | Refills: 0 | Status: DISCONTINUED | OUTPATIENT
Start: 2024-05-12 | End: 2024-05-10 | Stop reason: HOSPADM

## 2024-05-09 RX ORDER — PREDNISONE 5 MG/1
TABLET ORAL DAILY
Qty: 39 TABLET | Refills: 0 | Status: SHIPPED | OUTPATIENT
Start: 2024-05-09 | End: 2024-05-21

## 2024-05-09 RX ORDER — IPRATROPIUM BROMIDE AND ALBUTEROL SULFATE 2.5; .5 MG/3ML; MG/3ML
3 SOLUTION RESPIRATORY (INHALATION)
Qty: 270 ML | Refills: 0 | Status: SHIPPED | OUTPATIENT
Start: 2024-05-09 | End: 2024-06-08

## 2024-05-09 RX ORDER — TIOTROPIUM BROMIDE INHALATION SPRAY 3.12 UG/1
2 SPRAY, METERED RESPIRATORY (INHALATION) DAILY
Qty: 8 G | Refills: 11 | Status: SHIPPED | OUTPATIENT
Start: 2024-05-09

## 2024-05-09 RX ORDER — PREDNISONE 10 MG/1
10 TABLET ORAL DAILY
Qty: 3 TABLET | Refills: 0 | Status: DISCONTINUED | OUTPATIENT
Start: 2024-05-15 | End: 2024-05-10 | Stop reason: HOSPADM

## 2024-05-09 RX ADMIN — PANTOPRAZOLE SODIUM 40 MG: 40 TABLET, DELAYED RELEASE ORAL at 06:17

## 2024-05-09 RX ADMIN — NYSTATIN 500000 UNITS: 100000 SUSPENSION ORAL at 06:17

## 2024-05-09 RX ADMIN — FORMOTEROL FUMARATE DIHYDRATE 20 MCG: 20 SOLUTION RESPIRATORY (INHALATION) at 07:00

## 2024-05-09 RX ADMIN — FORMOTEROL FUMARATE DIHYDRATE 20 MCG: 20 SOLUTION RESPIRATORY (INHALATION) at 20:04

## 2024-05-09 RX ADMIN — ALPRAZOLAM 0.25 MG: 0.25 TABLET ORAL at 11:53

## 2024-05-09 RX ADMIN — NYSTATIN 500000 UNITS: 100000 SUSPENSION ORAL at 16:30

## 2024-05-09 RX ADMIN — IPRATROPIUM BROMIDE AND ALBUTEROL SULFATE 3 ML: 2.5; .5 SOLUTION RESPIRATORY (INHALATION) at 20:04

## 2024-05-09 RX ADMIN — ACETAMINOPHEN AND CODEINE PHOSPHATE 1 TABLET: 300; 30 TABLET ORAL at 06:32

## 2024-05-09 RX ADMIN — Medication 2 L/MIN: at 20:00

## 2024-05-09 RX ADMIN — ENOXAPARIN SODIUM 40 MG: 40 INJECTION SUBCUTANEOUS at 05:35

## 2024-05-09 RX ADMIN — NYSTATIN 500000 UNITS: 100000 SUSPENSION ORAL at 21:33

## 2024-05-09 RX ADMIN — ACETAMINOPHEN AND CODEINE PHOSPHATE 1 TABLET: 300; 30 TABLET ORAL at 02:13

## 2024-05-09 RX ADMIN — Medication 1 L/MIN: at 08:00

## 2024-05-09 RX ADMIN — ACETAMINOPHEN AND CODEINE PHOSPHATE 1 TABLET: 300; 30 TABLET ORAL at 17:41

## 2024-05-09 RX ADMIN — METHYLPREDNISOLONE SODIUM SUCCINATE 40 MG: 40 INJECTION, POWDER, FOR SOLUTION INTRAMUSCULAR; INTRAVENOUS at 09:12

## 2024-05-09 RX ADMIN — IPRATROPIUM BROMIDE AND ALBUTEROL SULFATE 3 ML: 2.5; .5 SOLUTION RESPIRATORY (INHALATION) at 12:39

## 2024-05-09 RX ADMIN — NYSTATIN 500000 UNITS: 100000 SUSPENSION ORAL at 12:21

## 2024-05-09 RX ADMIN — ACETAMINOPHEN AND CODEINE PHOSPHATE 1 TABLET: 300; 30 TABLET ORAL at 11:53

## 2024-05-09 RX ADMIN — POLYETHYLENE GLYCOL 3350 17 G: 17 POWDER, FOR SOLUTION ORAL at 16:29

## 2024-05-09 RX ADMIN — LIDOCAINE 4% 1 PATCH: 40 PATCH TOPICAL at 09:20

## 2024-05-09 RX ADMIN — IPRATROPIUM BROMIDE AND ALBUTEROL SULFATE 3 ML: 2.5; .5 SOLUTION RESPIRATORY (INHALATION) at 07:00

## 2024-05-09 RX ADMIN — AZITHROMYCIN DIHYDRATE 500 MG: 500 TABLET ORAL at 09:19

## 2024-05-09 RX ADMIN — PREDNISONE 30 MG: 20 TABLET ORAL at 16:29

## 2024-05-09 ASSESSMENT — COGNITIVE AND FUNCTIONAL STATUS - GENERAL
TOILETING: A LOT
PERSONAL GROOMING: A LITTLE
TURNING FROM BACK TO SIDE WHILE IN FLAT BAD: A LITTLE
MOVING TO AND FROM BED TO CHAIR: A LITTLE
TURNING FROM BACK TO SIDE WHILE IN FLAT BAD: A LITTLE
HELP NEEDED FOR BATHING: A LOT
TOILETING: A LOT
WALKING IN HOSPITAL ROOM: A LOT
CLIMB 3 TO 5 STEPS WITH RAILING: TOTAL
DRESSING REGULAR UPPER BODY CLOTHING: A LITTLE
STANDING UP FROM CHAIR USING ARMS: A LITTLE
DAILY ACTIVITIY SCORE: 17
HELP NEEDED FOR BATHING: A LOT
MOVING TO AND FROM BED TO CHAIR: A LITTLE
PERSONAL GROOMING: A LITTLE
DRESSING REGULAR LOWER BODY CLOTHING: A LITTLE
WALKING IN HOSPITAL ROOM: A LOT
DRESSING REGULAR UPPER BODY CLOTHING: A LITTLE
DRESSING REGULAR LOWER BODY CLOTHING: A LITTLE
STANDING UP FROM CHAIR USING ARMS: A LITTLE
CLIMB 3 TO 5 STEPS WITH RAILING: TOTAL
MOBILITY SCORE: 16

## 2024-05-09 ASSESSMENT — PAIN SCALES - GENERAL
PAINLEVEL_OUTOF10: 10 - WORST POSSIBLE PAIN
PAINLEVEL_OUTOF10: 0 - NO PAIN
PAINLEVEL_OUTOF10: 7
PAINLEVEL_OUTOF10: 10 - WORST POSSIBLE PAIN

## 2024-05-09 ASSESSMENT — PAIN DESCRIPTION - ORIENTATION
ORIENTATION: MID
ORIENTATION: MID

## 2024-05-09 ASSESSMENT — PAIN - FUNCTIONAL ASSESSMENT
PAIN_FUNCTIONAL_ASSESSMENT: 0-10

## 2024-05-09 ASSESSMENT — PAIN DESCRIPTION - LOCATION
LOCATION: BACK
LOCATION: BACK

## 2024-05-09 NOTE — PROGRESS NOTES
"Fernando Cuevas is a 63 y.o. female on day 4 of admission presenting with COPD exacerbation (Multi).    Subjective   Feels \"better\".  Denies shortness of breath, but does admit to a nonproductive cough, to a headache and to bilateral thigh and back pain.  On 1 L nasal cannula oxygen.  CT scan of the chest yesterday showed COPD changes without adenopathy, nodules, masses or infiltrates, but did show mild bibasilar atelectasis.     Objective   Physical Exam  Vitals  Blood pressure 148/77, pulse (!) 113, temperature 36.2 °C (97.2 °F), temperature source Temporal, resp. rate 14, height 1.549 m (5' 0.98\"), weight 64.6 kg (142 lb 8 oz), SpO2 99%.  Intake/Output last 3 Shifts:  I/O last 3 completed shifts:  In: 360 (5.6 mL/kg) [P.O.:360]  Out: 1050 (16.2 mL/kg) [Urine:1050 (0.5 mL/kg/hr)]  Weight: 64.6 kg     General-awake, alert and in no acute distress.  Lungs-clear, without wheezes, rales or rhonchi.  Heart-tachycardic with a regular rhythm.  Abdomen-positive bowel sounds.    Extremities-no edema.  Skin-no rashes.    Scheduled medications  azithromycin, 500 mg, oral, q24h  enoxaparin, 40 mg, subcutaneous, q24h  formoterol, 20 mcg, nebulization, BID  ipratropium-albuteroL, 3 mL, nebulization, TID  lidocaine, 1 patch, transdermal, Daily  methylPREDNISolone sodium succinate (PF), 40 mg, intravenous, q8h  nystatin, 5 mL, Swish & Swallow, 4x daily  oxygen, , inhalation, Continuous - Inhalation  pantoprazole, 40 mg, oral, Daily before breakfast   Or  pantoprazole, 40 mg, intravenous, Daily before breakfast      Continuous medications     PRN medications  PRN medications: acetaminophen **OR** acetaminophen **OR** acetaminophen, acetaminophen-codeine, ALPRAZolam, alum-mag hydroxide-simeth, ipratropium-albuteroL, ondansetron **OR** ondansetron     Results for orders placed or performed during the hospital encounter of 05/05/24 (from the past 24 hour(s))   CBC and Auto Differential   Result Value Ref Range    WBC 19.4 (H) 4.4 - " "11.3 x10*3/uL    nRBC 0.4 (H) 0.0 - 0.0 /100 WBCs    RBC 4.14 4.00 - 5.20 x10*6/uL    Hemoglobin 10.0 (L) 12.0 - 16.0 g/dL    Hematocrit 33.5 (L) 36.0 - 46.0 %    MCV 81 80 - 100 fL    MCH 24.2 (L) 26.0 - 34.0 pg    MCHC 29.9 (L) 32.0 - 36.0 g/dL    RDW 19.0 (H) 11.5 - 14.5 %    Platelets 363 150 - 450 x10*3/uL    Neutrophils % 84.2 40.0 - 80.0 %    Immature Granulocytes %, Automated 2.1 (H) 0.0 - 0.9 %    Lymphocytes % 7.0 13.0 - 44.0 %    Monocytes % 6.3 2.0 - 10.0 %    Eosinophils % 0.0 0.0 - 6.0 %    Basophils % 0.4 0.0 - 2.0 %    Neutrophils Absolute 16.37 (H) 1.20 - 7.70 x10*3/uL    Immature Granulocytes Absolute, Automated 0.41 0.00 - 0.70 x10*3/uL    Lymphocytes Absolute 1.37 1.20 - 4.80 x10*3/uL    Monocytes Absolute 1.22 (H) 0.10 - 1.00 x10*3/uL    Eosinophils Absolute 0.00 0.00 - 0.70 x10*3/uL    Basophils Absolute 0.07 0.00 - 0.10 x10*3/uL   Basic Metabolic Panel   Result Value Ref Range    Glucose 102 (H) 74 - 99 mg/dL    Sodium 134 (L) 136 - 145 mmol/L    Potassium 4.8 3.5 - 5.3 mmol/L    Chloride 99 98 - 107 mmol/L    Bicarbonate 25 21 - 32 mmol/L    Anion Gap 15 10 - 20 mmol/L    Urea Nitrogen 21 6 - 23 mg/dL    Creatinine 0.62 0.50 - 1.05 mg/dL    eGFR >90 >60 mL/min/1.73m*2    Calcium 8.0 (L) 8.6 - 10.3 mg/dL      Assessment:  COPD exacerbation, leading to shortness of breath and acute-on-chronic hypoxic and hypercapnic respiratory failure, now clinically improved.  The patient has mild persistent bronchospasm.  There is no evidence of malignancy or pneumonia by CT scan of the chest from yesterday.    Recommend:  1.  Continue DuoNeb, formoterol, Zithromax and Lovenox.  2.  Change Solu-Medrol to prednisone 30 mg daily to \"off\" over approximately 12 days.  3.  Would discharge on triple therapy, such as Advair plus Spiriva, or Trelegy Ellipta.  4.  Keep oxygen saturation approximately 92 to 95%; home oxygen evaluation prior to discharge.  5.  Follow-up with her outpatient pulmonologist and oncologist " at [facility] after discharge.  6.  Overall, the patient appears to be clinically stable for discharge from a pulmonary standpoint.    Discussed with Dr. Khoury.    Reid Dinh MD

## 2024-05-09 NOTE — DISCHARGE SUMMARY
Discharge Diagnosis  COPD exacerbation (Multi)    Issues Requiring Follow-Up  PCP follow up in 1-2 weeks    Discharge Meds     Your medication list        START taking these medications        Instructions Last Dose Given Next Dose Due   azithromycin 500 mg tablet  Commonly known as: Zithromax  Start taking on: May 10, 2024      Take 1 tablet (500 mg) by mouth once every 24 hours for 2 days.       fluticasone propion-salmeteroL 100-50 mcg/dose diskus inhaler  Commonly known as: Advair Diskus      Inhale 1 puff 2 times a day. Rinse mouth with water after use to reduce aftertaste and incidence of candidiasis. Do not swallow.       ipratropium-albuteroL 0.5-2.5 mg/3 mL nebulizer solution  Commonly known as: Duo-Neb      Take 3 mL by nebulization 3 times a day.       predniSONE 5 mg tablet  Commonly known as: Deltasone  Start taking on: May 9, 2024      Take 6 tablets (30 mg) by mouth once daily for 3 days, THEN 4 tablets (20 mg) once daily for 3 days, THEN 2 tablets (10 mg) once daily for 3 days, THEN 1 tablet (5 mg) once daily for 3 days.       Spiriva Respimat 2.5 mcg/actuation inhaler  Generic drug: tiotropium      Inhale 2 puffs once daily.                 Where to Get Your Medications        These medications were sent to [facility]/pharmacy #[id number] [facility], [address] AT [address] [address]      Phone: [phone]   azithromycin 500 mg tablet  fluticasone propion-salmeteroL 100-50 mcg/dose diskus inhaler  ipratropium-albuteroL 0.5-2.5 mg/3 mL nebulizer solution  predniSONE 5 mg tablet  Spiriva Respimat 2.5 mcg/actuation inhaler         Test Results Pending At Discharge  Pending Labs       No current pending labs.            Hospital Course     Fernando Cuevas is a 63 y.o. female with a past medical history of COPD with emphysema, adenocarcinoma of the right lower lobe, status post radiation therapy, GERD, hypertension who presented to [facility]  complaining of increased shortness of breath.      Exacerbation of COPD with emphysema  Plan:  DuoNeb aerosols as needed  Solu-Medrol 40 mg IV every 8 hourly  Zithromax 500 mg IV daily  Supplemental oxygen as needed  Xanax as needed for anxiety     Adenocarcinoma of the right lower lobe  Plan:  Has had radiation therapy  Patient to follow-up with her primary pulmonologist  CT chest with contrast noted    Patient is doing much better today.  She will be discharged in stable condition.  She will be discharged on oral azithromycin, prednisone taper, Advair and Spiriva.  Advised her to follow with her PCP and pulmonology as outpatient.      Discharge time spent more than 30 minutes.       Pertinent Physical Exam At Time of Discharge  Physical Exam    Constitutional: No acute distress, awake, alert  Head/Neck: Neck supple  Respiratory/Thorax: Lungs are Clear to auscultation  Cardiovascular: Regular, rate and rhythm,  2+ equal pulses of the extremities, normal S 1and S 2  Gastrointestinal: Nondistended, soft, non-tender, no rebound tenderness or guarding  Extremities: No edema. No calf tenderness.  Neurological: Awake and alert. No focal neurological deficits  Psychological: Appropriate mood and behavior    Outpatient Follow-Up  No future appointments.      Rupa Khoury MD

## 2024-05-09 NOTE — SIGNIFICANT EVENT
Home oxygen evaluation complete.  No oxygen required at rest or with exertion. Dr. Khoury notified

## 2024-05-09 NOTE — CARE PLAN
The patient's goals for the shift include Assist patient with easier breathing    The clinical goals for the shift include PatientPatient will be safe throughout shift.        Problem: Respiratory  Goal: Clear secretions with interventions this shift  Outcome: Progressing  Goal: Minimize anxiety/maximize coping throughout shift  Outcome: Progressing  Goal: Minimal/no exertional discomfort or dyspnea this shift  Outcome: Progressing  Goal: No signs of respiratory distress (eg. Use of accessory muscles. Peds grunting)  Outcome: Progressing  Goal: Patent airway maintained this shift  Outcome: Progressing  Goal: Tolerate mechanical ventilation evidenced by VS/agitation level this shift  Outcome: Progressing  Goal: Tolerate pulmonary toileting this shift  Outcome: Progressing  Goal: Verbalize decreased shortness of breath this shift  Outcome: Progressing  Goal: Wean oxygen to maintain O2 saturation per order/standard this shift  Outcome: Progressing  Goal: Increase self care and/or family involvement in next 24 hours  Outcome: Progressing

## 2024-05-10 ENCOUNTER — HOSPITAL ENCOUNTER (INPATIENT)
Facility: HOSPITAL | Age: 64
LOS: 3 days | Discharge: HOME | End: 2024-05-14
Attending: EMERGENCY MEDICINE | Admitting: HOSPITALIST
Payer: COMMERCIAL

## 2024-05-10 ENCOUNTER — APPOINTMENT (OUTPATIENT)
Dept: RADIOLOGY | Facility: HOSPITAL | Age: 64
End: 2024-05-10
Payer: COMMERCIAL

## 2024-05-10 VITALS
HEIGHT: 61 IN | RESPIRATION RATE: 16 BRPM | DIASTOLIC BLOOD PRESSURE: 51 MMHG | BODY MASS INDEX: 26.91 KG/M2 | WEIGHT: 142.5 LBS | TEMPERATURE: 97.6 F | OXYGEN SATURATION: 95 % | SYSTOLIC BLOOD PRESSURE: 147 MMHG | HEART RATE: 115 BPM

## 2024-05-10 DIAGNOSIS — J44.1 COPD EXACERBATION (MULTI): Primary | ICD-10-CM

## 2024-05-10 DIAGNOSIS — I10 PRIMARY HYPERTENSION: ICD-10-CM

## 2024-05-10 LAB
ALBUMIN SERPL BCP-MCNC: 4.2 G/DL (ref 3.4–5)
ALP SERPL-CCNC: 57 U/L (ref 33–136)
ALT SERPL W P-5'-P-CCNC: 72 U/L (ref 7–45)
ANION GAP BLDV CALCULATED.4IONS-SCNC: 10 MMOL/L (ref 10–25)
ANION GAP SERPL CALC-SCNC: 16 MMOL/L (ref 10–20)
AST SERPL W P-5'-P-CCNC: 55 U/L (ref 9–39)
ATRIAL RATE: 116 BPM
BASE EXCESS BLDV CALC-SCNC: 3.5 MMOL/L (ref -2–3)
BASOPHILS # BLD AUTO: 0.04 X10*3/UL (ref 0–0.1)
BASOPHILS # BLD AUTO: 0.09 X10*3/UL (ref 0–0.1)
BASOPHILS NFR BLD AUTO: 0.2 %
BASOPHILS NFR BLD AUTO: 0.4 %
BILIRUB SERPL-MCNC: 0.2 MG/DL (ref 0–1.2)
BODY TEMPERATURE: 37 DEGREES CELSIUS
BUN SERPL-MCNC: 22 MG/DL (ref 6–23)
CA-I BLDV-SCNC: 1.21 MMOL/L (ref 1.1–1.33)
CALCIUM SERPL-MCNC: 8.7 MG/DL (ref 8.6–10.3)
CHLORIDE BLDV-SCNC: 96 MMOL/L (ref 98–107)
CHLORIDE SERPL-SCNC: 95 MMOL/L (ref 98–107)
CO2 SERPL-SCNC: 26 MMOL/L (ref 21–32)
CREAT SERPL-MCNC: 0.83 MG/DL (ref 0.5–1.05)
EGFRCR SERPLBLD CKD-EPI 2021: 79 ML/MIN/1.73M*2
EOSINOPHIL # BLD AUTO: 0 X10*3/UL (ref 0–0.7)
EOSINOPHIL # BLD AUTO: 0 X10*3/UL (ref 0–0.7)
EOSINOPHIL NFR BLD AUTO: 0 %
EOSINOPHIL NFR BLD AUTO: 0 %
ERYTHROCYTE [DISTWIDTH] IN BLOOD BY AUTOMATED COUNT: 18.9 % (ref 11.5–14.5)
ERYTHROCYTE [DISTWIDTH] IN BLOOD BY AUTOMATED COUNT: 19.7 % (ref 11.5–14.5)
GLUCOSE BLDV-MCNC: 113 MG/DL (ref 74–99)
GLUCOSE SERPL-MCNC: 103 MG/DL (ref 74–99)
HCO3 BLDV-SCNC: 29.9 MMOL/L (ref 22–26)
HCT VFR BLD AUTO: 33.3 % (ref 36–46)
HCT VFR BLD AUTO: 38.2 % (ref 36–46)
HCT VFR BLD EST: 34 % (ref 36–46)
HGB BLD-MCNC: 11.1 G/DL (ref 12–16)
HGB BLD-MCNC: 9.7 G/DL (ref 12–16)
HGB BLDV-MCNC: 11.2 G/DL (ref 12–16)
HOLD SPECIMEN: NORMAL
IMM GRANULOCYTES # BLD AUTO: 0.58 X10*3/UL (ref 0–0.7)
IMM GRANULOCYTES # BLD AUTO: 0.62 X10*3/UL (ref 0–0.7)
IMM GRANULOCYTES NFR BLD AUTO: 2.7 % (ref 0–0.9)
IMM GRANULOCYTES NFR BLD AUTO: 2.9 % (ref 0–0.9)
INHALED O2 CONCENTRATION: 40 %
LACTATE BLDV-SCNC: 2.6 MMOL/L (ref 0.4–2)
LACTATE SERPL-SCNC: 2.4 MMOL/L (ref 0.4–2)
LYMPHOCYTES # BLD AUTO: 1.6 X10*3/UL (ref 1.2–4.8)
LYMPHOCYTES # BLD AUTO: 2.22 X10*3/UL (ref 1.2–4.8)
LYMPHOCYTES NFR BLD AUTO: 8 %
LYMPHOCYTES NFR BLD AUTO: 9.7 %
MCH RBC QN AUTO: 23.4 PG (ref 26–34)
MCH RBC QN AUTO: 23.7 PG (ref 26–34)
MCHC RBC AUTO-ENTMCNC: 29.1 G/DL (ref 32–36)
MCHC RBC AUTO-ENTMCNC: 29.1 G/DL (ref 32–36)
MCV RBC AUTO: 80 FL (ref 80–100)
MCV RBC AUTO: 82 FL (ref 80–100)
MONOCYTES # BLD AUTO: 1.47 X10*3/UL (ref 0.1–1)
MONOCYTES # BLD AUTO: 1.48 X10*3/UL (ref 0.1–1)
MONOCYTES NFR BLD AUTO: 6.4 %
MONOCYTES NFR BLD AUTO: 7.4 %
NEUTROPHILS # BLD AUTO: 16.31 X10*3/UL (ref 1.2–7.7)
NEUTROPHILS # BLD AUTO: 18.53 X10*3/UL (ref 1.2–7.7)
NEUTROPHILS NFR BLD AUTO: 80.8 %
NEUTROPHILS NFR BLD AUTO: 81.5 %
NRBC BLD-RTO: 0.5 /100 WBCS (ref 0–0)
NRBC BLD-RTO: 0.6 /100 WBCS (ref 0–0)
OXYHGB MFR BLDV: 88.4 % (ref 45–75)
P AXIS: 78 DEGREES
P OFFSET: 208 MS
P ONSET: 161 MS
PCO2 BLDV: 53 MM HG (ref 41–51)
PH BLDV: 7.36 PH (ref 7.33–7.43)
PLATELET # BLD AUTO: 529 X10*3/UL (ref 150–450)
PLATELET # BLD AUTO: 565 X10*3/UL (ref 150–450)
PO2 BLDV: 67 MM HG (ref 35–45)
POTASSIUM BLDV-SCNC: 4.9 MMOL/L (ref 3.5–5.3)
POTASSIUM SERPL-SCNC: 4.7 MMOL/L (ref 3.5–5.3)
POTASSIUM SERPL-SCNC: 6.8 MMOL/L (ref 3.5–5.3)
PR INTERVAL: 128 MS
PROT SERPL-MCNC: 6.5 G/DL (ref 6.4–8.2)
Q ONSET: 225 MS
QRS COUNT: 19 BEATS
QRS DURATION: 74 MS
QT INTERVAL: 324 MS
QTC CALCULATION(BAZETT): 450 MS
QTC FREDERICIA: 403 MS
R AXIS: 68 DEGREES
RBC # BLD AUTO: 4.15 X10*6/UL (ref 4–5.2)
RBC # BLD AUTO: 4.68 X10*6/UL (ref 4–5.2)
SAO2 % BLDV: 90 % (ref 45–75)
SODIUM BLDV-SCNC: 131 MMOL/L (ref 136–145)
SODIUM SERPL-SCNC: 130 MMOL/L (ref 136–145)
T AXIS: 74 DEGREES
T OFFSET: 387 MS
VENTRICULAR RATE: 116 BPM
WBC # BLD AUTO: 20 X10*3/UL (ref 4.4–11.3)
WBC # BLD AUTO: 22.9 X10*3/UL (ref 4.4–11.3)

## 2024-05-10 PROCEDURE — 71045 X-RAY EXAM CHEST 1 VIEW: CPT

## 2024-05-10 PROCEDURE — 85025 COMPLETE CBC W/AUTO DIFF WBC: CPT | Performed by: EMERGENCY MEDICINE

## 2024-05-10 PROCEDURE — 5A09357 ASSISTANCE WITH RESPIRATORY VENTILATION, LESS THAN 24 CONSECUTIVE HOURS, CONTINUOUS POSITIVE AIRWAY PRESSURE: ICD-10-PCS

## 2024-05-10 PROCEDURE — 84132 ASSAY OF SERUM POTASSIUM: CPT | Performed by: EMERGENCY MEDICINE

## 2024-05-10 PROCEDURE — 2500000004 HC RX 250 GENERAL PHARMACY W/ HCPCS (ALT 636 FOR OP/ED): Performed by: INTERNAL MEDICINE

## 2024-05-10 PROCEDURE — 2500000001 HC RX 250 WO HCPCS SELF ADMINISTERED DRUGS (ALT 637 FOR MEDICARE OP): Performed by: INTERNAL MEDICINE

## 2024-05-10 PROCEDURE — 2500000002 HC RX 250 W HCPCS SELF ADMINISTERED DRUGS (ALT 637 FOR MEDICARE OP, ALT 636 FOR OP/ED): Performed by: INTERNAL MEDICINE

## 2024-05-10 PROCEDURE — 99239 HOSP IP/OBS DSCHRG MGMT >30: CPT | Performed by: INTERNAL MEDICINE

## 2024-05-10 PROCEDURE — 71045 X-RAY EXAM CHEST 1 VIEW: CPT | Mod: FOREIGN READ | Performed by: RADIOLOGY

## 2024-05-10 PROCEDURE — 99285 EMERGENCY DEPT VISIT HI MDM: CPT | Mod: 25

## 2024-05-10 PROCEDURE — 2500000004 HC RX 250 GENERAL PHARMACY W/ HCPCS (ALT 636 FOR OP/ED)

## 2024-05-10 PROCEDURE — 36415 COLL VENOUS BLD VENIPUNCTURE: CPT | Performed by: EMERGENCY MEDICINE

## 2024-05-10 PROCEDURE — 94664 DEMO&/EVAL PT USE INHALER: CPT

## 2024-05-10 PROCEDURE — 2500000002 HC RX 250 W HCPCS SELF ADMINISTERED DRUGS (ALT 637 FOR MEDICARE OP, ALT 636 FOR OP/ED)

## 2024-05-10 PROCEDURE — 2500000001 HC RX 250 WO HCPCS SELF ADMINISTERED DRUGS (ALT 637 FOR MEDICARE OP): Performed by: PHARMACIST

## 2024-05-10 PROCEDURE — 96365 THER/PROPH/DIAG IV INF INIT: CPT

## 2024-05-10 PROCEDURE — 94660 CPAP INITIATION&MGMT: CPT

## 2024-05-10 PROCEDURE — 83605 ASSAY OF LACTIC ACID: CPT | Performed by: EMERGENCY MEDICINE

## 2024-05-10 PROCEDURE — 96375 TX/PRO/DX INJ NEW DRUG ADDON: CPT

## 2024-05-10 PROCEDURE — 85025 COMPLETE CBC W/AUTO DIFF WBC: CPT | Performed by: INTERNAL MEDICINE

## 2024-05-10 PROCEDURE — 84132 ASSAY OF SERUM POTASSIUM: CPT

## 2024-05-10 PROCEDURE — 2500000005 HC RX 250 GENERAL PHARMACY W/O HCPCS: Performed by: HOSPITALIST

## 2024-05-10 PROCEDURE — 36415 COLL VENOUS BLD VENIPUNCTURE: CPT | Performed by: INTERNAL MEDICINE

## 2024-05-10 PROCEDURE — 94640 AIRWAY INHALATION TREATMENT: CPT

## 2024-05-10 PROCEDURE — 2500000001 HC RX 250 WO HCPCS SELF ADMINISTERED DRUGS (ALT 637 FOR MEDICARE OP): Performed by: HOSPITALIST

## 2024-05-10 PROCEDURE — 2500000005 HC RX 250 GENERAL PHARMACY W/O HCPCS

## 2024-05-10 RX ORDER — MIDAZOLAM HYDROCHLORIDE 1 MG/ML
1 INJECTION INTRAMUSCULAR; INTRAVENOUS ONCE
Status: COMPLETED | OUTPATIENT
Start: 2024-05-10 | End: 2024-05-10

## 2024-05-10 RX ORDER — BISACODYL 10 MG/1
10 SUPPOSITORY RECTAL ONCE
Status: DISCONTINUED | OUTPATIENT
Start: 2024-05-10 | End: 2024-05-10 | Stop reason: HOSPADM

## 2024-05-10 RX ORDER — MAGNESIUM SULFATE HEPTAHYDRATE 40 MG/ML
2 INJECTION, SOLUTION INTRAVENOUS ONCE
Status: COMPLETED | OUTPATIENT
Start: 2024-05-10 | End: 2024-05-10

## 2024-05-10 RX ORDER — ACETAMINOPHEN AND CODEINE PHOSPHATE 300; 30 MG/1; MG/1
1 TABLET ORAL EVERY 6 HOURS PRN
Qty: 10 TABLET | Refills: 0 | Status: SHIPPED | OUTPATIENT
Start: 2024-05-10

## 2024-05-10 RX ORDER — MIDAZOLAM HYDROCHLORIDE 1 MG/ML
INJECTION INTRAMUSCULAR; INTRAVENOUS
Status: COMPLETED
Start: 2024-05-10 | End: 2024-05-10

## 2024-05-10 RX ORDER — ALBUTEROL SULFATE 0.83 MG/ML
15 SOLUTION RESPIRATORY (INHALATION) ONCE
Status: COMPLETED | OUTPATIENT
Start: 2024-05-10 | End: 2024-05-10

## 2024-05-10 RX ADMIN — POLYETHYLENE GLYCOL 3350 17 G: 17 POWDER, FOR SOLUTION ORAL at 09:28

## 2024-05-10 RX ADMIN — IPRATROPIUM BROMIDE AND ALBUTEROL SULFATE 3 ML: 2.5; .5 SOLUTION RESPIRATORY (INHALATION) at 14:35

## 2024-05-10 RX ADMIN — ALUMINUM HYDROXIDE, MAGNESIUM HYDROXIDE, AND DIMETHICONE 30 ML: 200; 20; 200 SUSPENSION ORAL at 09:31

## 2024-05-10 RX ADMIN — MIDAZOLAM HYDROCHLORIDE 1 MG: 1 INJECTION, SOLUTION INTRAMUSCULAR; INTRAVENOUS at 22:38

## 2024-05-10 RX ADMIN — PREDNISONE 30 MG: 20 TABLET ORAL at 09:28

## 2024-05-10 RX ADMIN — LIDOCAINE 4% 1 PATCH: 40 PATCH TOPICAL at 09:28

## 2024-05-10 RX ADMIN — FORMOTEROL FUMARATE DIHYDRATE 20 MCG: 20 SOLUTION RESPIRATORY (INHALATION) at 08:32

## 2024-05-10 RX ADMIN — AZITHROMYCIN DIHYDRATE 500 MG: 500 TABLET ORAL at 09:28

## 2024-05-10 RX ADMIN — ACETAMINOPHEN 650 MG: 325 TABLET ORAL at 06:03

## 2024-05-10 RX ADMIN — Medication 60 L/MIN: at 22:51

## 2024-05-10 RX ADMIN — ACETAMINOPHEN 650 MG: 325 TABLET ORAL at 00:01

## 2024-05-10 RX ADMIN — ACETAMINOPHEN AND CODEINE PHOSPHATE 1 TABLET: 300; 30 TABLET ORAL at 15:45

## 2024-05-10 RX ADMIN — ALBUTEROL SULFATE 15 MG: 2.5 SOLUTION RESPIRATORY (INHALATION) at 22:14

## 2024-05-10 RX ADMIN — MAGNESIUM SULFATE HEPTAHYDRATE 2 G: 40 INJECTION, SOLUTION INTRAVENOUS at 22:21

## 2024-05-10 RX ADMIN — IPRATROPIUM BROMIDE AND ALBUTEROL SULFATE 3 ML: 2.5; .5 SOLUTION RESPIRATORY (INHALATION) at 08:31

## 2024-05-10 RX ADMIN — ENOXAPARIN SODIUM 40 MG: 40 INJECTION SUBCUTANEOUS at 06:03

## 2024-05-10 RX ADMIN — METHYLPREDNISOLONE SODIUM SUCCINATE 125 MG: 125 INJECTION, POWDER, FOR SOLUTION INTRAMUSCULAR; INTRAVENOUS at 22:20

## 2024-05-10 RX ADMIN — ALPRAZOLAM 0.25 MG: 0.25 TABLET ORAL at 00:04

## 2024-05-10 RX ADMIN — MIDAZOLAM HYDROCHLORIDE 1 MG: 1 INJECTION INTRAMUSCULAR; INTRAVENOUS at 22:38

## 2024-05-10 RX ADMIN — PANTOPRAZOLE SODIUM 40 MG: 40 TABLET, DELAYED RELEASE ORAL at 06:03

## 2024-05-10 RX ADMIN — NYSTATIN 500000 UNITS: 100000 SUSPENSION ORAL at 06:01

## 2024-05-10 ASSESSMENT — COGNITIVE AND FUNCTIONAL STATUS - GENERAL
HELP NEEDED FOR BATHING: A LITTLE
TOILETING: A LITTLE
STANDING UP FROM CHAIR USING ARMS: A LITTLE
CLIMB 3 TO 5 STEPS WITH RAILING: A LOT
MOBILITY SCORE: 18
DRESSING REGULAR UPPER BODY CLOTHING: A LITTLE
WALKING IN HOSPITAL ROOM: A LOT
DAILY ACTIVITIY SCORE: 21
MOVING TO AND FROM BED TO CHAIR: A LITTLE

## 2024-05-10 ASSESSMENT — PAIN DESCRIPTION - ORIENTATION: ORIENTATION: MID

## 2024-05-10 ASSESSMENT — COLUMBIA-SUICIDE SEVERITY RATING SCALE - C-SSRS
1. IN THE PAST MONTH, HAVE YOU WISHED YOU WERE DEAD OR WISHED YOU COULD GO TO SLEEP AND NOT WAKE UP?: NO
6. HAVE YOU EVER DONE ANYTHING, STARTED TO DO ANYTHING, OR PREPARED TO DO ANYTHING TO END YOUR LIFE?: NO
2. HAVE YOU ACTUALLY HAD ANY THOUGHTS OF KILLING YOURSELF?: NO

## 2024-05-10 ASSESSMENT — PAIN DESCRIPTION - DESCRIPTORS: DESCRIPTORS: ACHING

## 2024-05-10 ASSESSMENT — PAIN SCALES - GENERAL
PAINLEVEL_OUTOF10: 0 - NO PAIN
PAINLEVEL_OUTOF10: 0 - NO PAIN
PAINLEVEL_OUTOF10: 7
PAINLEVEL_OUTOF10: 0 - NO PAIN

## 2024-05-10 ASSESSMENT — PAIN - FUNCTIONAL ASSESSMENT
PAIN_FUNCTIONAL_ASSESSMENT: 0-10

## 2024-05-10 ASSESSMENT — PAIN DESCRIPTION - LOCATION
LOCATION: HEAD
LOCATION: BACK

## 2024-05-10 NOTE — CARE PLAN
The patient's goals for the shift include Pt. will have a safe, restful and uneventful evening    The clinical goals for the shift include Pt. will remain safe and free of injury by end of shift    Over the shift, the patient did not make progress toward the following goals. Barriers to progression include   Problem: Respiratory  Goal: Clear secretions with interventions this shift  Outcome: Progressing  Goal: Minimize anxiety/maximize coping throughout shift  Outcome: Progressing  Goal: Minimal/no exertional discomfort or dyspnea this shift  Outcome: Progressing  Goal: No signs of respiratory distress (eg. Use of accessory muscles. Peds grunting)  Outcome: Progressing  Goal: Patent airway maintained this shift  Outcome: Progressing  Goal: Tolerate mechanical ventilation evidenced by VS/agitation level this shift  Outcome: Progressing  Goal: Tolerate pulmonary toileting this shift  Outcome: Progressing  Goal: Verbalize decreased shortness of breath this shift  Outcome: Progressing  Goal: Wean oxygen to maintain O2 saturation per order/standard this shift  Outcome: Progressing  Goal: Increase self care and/or family involvement in next 24 hours  Outcome: Progressing   . Recommendations to address these barriers include .

## 2024-05-10 NOTE — PROGRESS NOTES
05/10/24 1114   Current Planned Discharge Disposition   Current Planned Discharge Disposition Home     MD to check on patient's abdomen and if swelling has decreased, possible dc today.  DISP: home  ADOD: today or tomorrow  Lizette Blank RN

## 2024-05-10 NOTE — PROGRESS NOTES
"Fernando Cuevas is a 63 y.o. female on day 5 of admission presenting with COPD exacerbation (Multi).    Subjective   Complains about someone having changed her Tylenol 3 order, although the order still appears to be active.  Denies shortness of breath, but does admit to wheezing, and nonproductive cough, headache and some abdominal discomfort.  On 2 L nasal cannula oxygen.  KUB yesterday showed stool retention and gastric gaseous distention, without evidence of bowel obstruction.     Objective   Physical Exam  Vitals  Blood pressure 136/88, pulse 102, temperature 35.6 °C (96 °F), resp. rate 17, height 1.549 m (5' 0.98\"), weight 64.6 kg (142 lb 8 oz), SpO2 100%.  Intake/Output last 3 Shifts:  I/O last 3 completed shifts:  In: - (0 mL/kg)   Out: 1750 (27.1 mL/kg) [Urine:1750 (0.8 mL/kg/hr)]  Weight: 64.6 kg     General-awake, alert and in no acute distress.  Lungs-faint wheezes bilaterally without rales or rhonchi.  Heart-regular rate and rhythm.  Abdomen-somewhat distended and tender to palpation; positive bowel sounds.  Extremities-no edema.  Skin-no rashes.    Scheduled medications  azithromycin, 500 mg, oral, q24h  bisacodyl, 10 mg, rectal, Once  enoxaparin, 40 mg, subcutaneous, q24h  formoterol, 20 mcg, nebulization, BID  ipratropium-albuteroL, 3 mL, nebulization, TID  lidocaine, 1 patch, transdermal, Daily  nystatin, 5 mL, Swish & Swallow, 4x daily  oxygen, , inhalation, Continuous - Inhalation  pantoprazole, 40 mg, oral, Daily before breakfast   Or  pantoprazole, 40 mg, intravenous, Daily before breakfast  polyethylene glycol, 17 g, oral, Daily  predniSONE, 30 mg, oral, Daily   Followed by  [START ON 5/12/2024] predniSONE, 20 mg, oral, Daily   Followed by  [START ON 5/15/2024] predniSONE, 10 mg, oral, Daily   Followed by  [START ON 5/18/2024] predniSONE, 5 mg, oral, Daily      Continuous medications     PRN medications  PRN medications: acetaminophen **OR** acetaminophen **OR** acetaminophen, " acetaminophen-codeine, ALPRAZolam, alum-mag hydroxide-simeth, ipratropium-albuteroL, ondansetron **OR** ondansetron     Results for orders placed or performed during the hospital encounter of 05/05/24 (from the past 24 hour(s))   CBC and Auto Differential   Result Value Ref Range    WBC 20.0 (H) 4.4 - 11.3 x10*3/uL    nRBC 0.5 (H) 0.0 - 0.0 /100 WBCs    RBC 4.15 4.00 - 5.20 x10*6/uL    Hemoglobin 9.7 (L) 12.0 - 16.0 g/dL    Hematocrit 33.3 (L) 36.0 - 46.0 %    MCV 80 80 - 100 fL    MCH 23.4 (L) 26.0 - 34.0 pg    MCHC 29.1 (L) 32.0 - 36.0 g/dL    RDW 18.9 (H) 11.5 - 14.5 %    Platelets 529 (H) 150 - 450 x10*3/uL    Neutrophils % 81.5 40.0 - 80.0 %    Immature Granulocytes %, Automated 2.9 (H) 0.0 - 0.9 %    Lymphocytes % 8.0 13.0 - 44.0 %    Monocytes % 7.4 2.0 - 10.0 %    Eosinophils % 0.0 0.0 - 6.0 %    Basophils % 0.2 0.0 - 2.0 %    Neutrophils Absolute 16.31 (H) 1.20 - 7.70 x10*3/uL    Immature Granulocytes Absolute, Automated 0.58 0.00 - 0.70 x10*3/uL    Lymphocytes Absolute 1.60 1.20 - 4.80 x10*3/uL    Monocytes Absolute 1.48 (H) 0.10 - 1.00 x10*3/uL    Eosinophils Absolute 0.00 0.00 - 0.70 x10*3/uL    Basophils Absolute 0.04 0.00 - 0.10 x10*3/uL   SST TOP   Result Value Ref Range    Extra Tube Hold for add-ons.       Assessment:  Improved COPD exacerbation, in association with shortness of breath and acute-on-chronic hypoxic and hypercapnic respiratory failure.  The patient does have mild persistent bronchospasm.  KUB shows colonic stool without evidence of bowel obstruction.  There is no evidence of pneumonia or malignancy by CT scan of the chest.    Recommend:   Continue DuoNeb, formoterol, prednisone, Zithromax and Lovenox.  Keep oxygen saturation approximately 92 to 95%; the patient does not qualify for home oxygen.  Incentive spirometry.  4.  Follow-up with her outpatient Pulmonologist and Oncologist at [facility] after discharge.    Reid Dinh MD

## 2024-05-10 NOTE — DISCHARGE SUMMARY
Discharge Diagnosis  COPD exacerbation (Multi)    Issues Requiring Follow-Up  PCP follow up in 1-2 weeks    Discharge Meds     Your medication list        START taking these medications        Instructions Last Dose Given Next Dose Due   azithromycin 500 mg tablet  Commonly known as: Zithromax      Take 1 tablet (500 mg) by mouth once every 24 hours for 2 days.       fluticasone propion-salmeteroL 100-50 mcg/dose diskus inhaler  Commonly known as: Advair Diskus      Inhale 1 puff 2 times a day. Rinse mouth with water after use to reduce aftertaste and incidence of candidiasis. Do not swallow.       ipratropium-albuteroL 0.5-2.5 mg/3 mL nebulizer solution  Commonly known as: Duo-Neb      Take 3 mL by nebulization 3 times a day.       predniSONE 5 mg tablet  Commonly known as: Deltasone  Start taking on: May 9, 2024      Take 6 tablets (30 mg) by mouth once daily for 3 days, THEN 4 tablets (20 mg) once daily for 3 days, THEN 2 tablets (10 mg) once daily for 3 days, THEN 1 tablet (5 mg) once daily for 3 days.       Spiriva Respimat 2.5 mcg/actuation inhaler  Generic drug: tiotropium      Inhale 2 puffs once daily.                 Where to Get Your Medications        These medications were sent to [facility]/pharmacy #[id number] [address] AT [place] [id number]      Phone: [phone]   azithromycin 500 mg tablet  fluticasone propion-salmeteroL 100-50 mcg/dose diskus inhaler  ipratropium-albuteroL 0.5-2.5 mg/3 mL nebulizer solution  predniSONE 5 mg tablet  Spiriva Respimat 2.5 mcg/actuation inhaler         Test Results Pending At Discharge  Pending Labs       No current pending labs.            Hospital Course     Fernando Cuevas is a 63 y.o. female with a past medical history of COPD with emphysema, adenocarcinoma of the right lower lobe, status post radiation therapy, GERD, hypertension who presented to [facility] complaining of increased  shortness of breath.      Exacerbation of COPD with emphysema  Plan:  DuoNeb aerosols as needed  Solu-Medrol 40 mg IV every 8 hourly  Zithromax 500 mg IV daily  Supplemental oxygen as needed  Xanax as needed for anxiety     Adenocarcinoma of the right lower lobe  Plan:  Has had radiation therapy  Patient to follow-up with her primary pulmonologist  CT chest with contrast noted    Patient is doing much better today.  She will be discharged in stable condition.  She will be discharged on oral azithromycin, prednisone taper, Advair and Spiriva.  Advised her to follow with her PCP and pulmonology as outpatient.      Discharge time spent more than 30 minutes.       Pertinent Physical Exam At Time of Discharge  Physical Exam    Constitutional: No acute distress, awake, alert  Head/Neck: Neck supple  Respiratory/Thorax: Lungs are Clear to auscultation  Cardiovascular: Regular, rate and rhythm,  2+ equal pulses of the extremities, normal S 1and S 2  Gastrointestinal: Nondistended, soft, non-tender, no rebound tenderness or guarding  Extremities: No edema. No calf tenderness.  Neurological: Awake and alert. No focal neurological deficits  Psychological: Appropriate mood and behavior    Outpatient Follow-Up  No future appointments.      Rupa Khoury MD

## 2024-05-10 NOTE — CARE PLAN
The patient's goals for the shift include Pt. will have a safe, restful and uneventful evening    The clinical goals for the shift include Pt. will remain safe and free of injury by end of shift      Problem: Respiratory  Goal: Clear secretions with interventions this shift  Outcome: Progressing  Goal: Minimize anxiety/maximize coping throughout shift  Outcome: Progressing  Goal: Minimal/no exertional discomfort or dyspnea this shift  Outcome: Progressing  Goal: No signs of respiratory distress (eg. Use of accessory muscles. Peds grunting)  Outcome: Progressing  Goal: Patent airway maintained this shift  Outcome: Progressing  Goal: Tolerate mechanical ventilation evidenced by VS/agitation level this shift  Outcome: Progressing  Goal: Tolerate pulmonary toileting this shift  Outcome: Progressing  Goal: Verbalize decreased shortness of breath this shift  Outcome: Progressing  Goal: Wean oxygen to maintain O2 saturation per order/standard this shift  Outcome: Progressing  Goal: Increase self care and/or family involvement in next 24 hours  Outcome: Progressing

## 2024-05-11 ENCOUNTER — APPOINTMENT (OUTPATIENT)
Dept: CARDIOLOGY | Facility: HOSPITAL | Age: 64
End: 2024-05-11
Payer: COMMERCIAL

## 2024-05-11 ENCOUNTER — APPOINTMENT (OUTPATIENT)
Dept: RADIOLOGY | Facility: HOSPITAL | Age: 64
End: 2024-05-11
Payer: COMMERCIAL

## 2024-05-11 LAB
ANION GAP BLDV CALCULATED.4IONS-SCNC: 8 MMOL/L (ref 10–25)
ANION GAP SERPL CALC-SCNC: 16 MMOL/L (ref 10–20)
APPEARANCE UR: CLEAR
BASE EXCESS BLDV CALC-SCNC: 2.4 MMOL/L (ref -2–3)
BILIRUB UR STRIP.AUTO-MCNC: NEGATIVE MG/DL
BODY TEMPERATURE: 37 DEGREES CELSIUS
BUN SERPL-MCNC: 27 MG/DL (ref 6–23)
CA-I BLDV-SCNC: 1.14 MMOL/L (ref 1.1–1.33)
CALCIUM SERPL-MCNC: 8.2 MG/DL (ref 8.6–10.3)
CHLORIDE BLDV-SCNC: 97 MMOL/L (ref 98–107)
CHLORIDE SERPL-SCNC: 94 MMOL/L (ref 98–107)
CO2 SERPL-SCNC: 26 MMOL/L (ref 21–32)
COLOR UR: NORMAL
CREAT SERPL-MCNC: 0.85 MG/DL (ref 0.5–1.05)
EGFRCR SERPLBLD CKD-EPI 2021: 77 ML/MIN/1.73M*2
ERYTHROCYTE [DISTWIDTH] IN BLOOD BY AUTOMATED COUNT: 19.1 % (ref 11.5–14.5)
GLUCOSE BLDV-MCNC: 111 MG/DL (ref 74–99)
GLUCOSE SERPL-MCNC: 122 MG/DL (ref 74–99)
GLUCOSE UR STRIP.AUTO-MCNC: NORMAL MG/DL
HCO3 BLDV-SCNC: 28.7 MMOL/L (ref 22–26)
HCT VFR BLD AUTO: 34 % (ref 36–46)
HCT VFR BLD EST: 30 % (ref 36–46)
HGB BLD-MCNC: 10.2 G/DL (ref 12–16)
HGB BLDV-MCNC: 10 G/DL (ref 12–16)
INHALED O2 CONCENTRATION: 21 %
KETONES UR STRIP.AUTO-MCNC: NEGATIVE MG/DL
LACTATE BLDV-SCNC: 2.7 MMOL/L (ref 0.4–2)
LACTATE BLDV-SCNC: 2.8 MMOL/L (ref 0.4–2)
LACTATE BLDV-SCNC: 3.7 MMOL/L (ref 0.4–2)
LACTATE SERPL-SCNC: 3.7 MMOL/L (ref 0.4–2)
LEUKOCYTE ESTERASE UR QL STRIP.AUTO: NEGATIVE
MCH RBC QN AUTO: 23.8 PG (ref 26–34)
MCHC RBC AUTO-ENTMCNC: 30 G/DL (ref 32–36)
MCV RBC AUTO: 79 FL (ref 80–100)
MUCOUS THREADS #/AREA URNS AUTO: NORMAL /LPF
NITRITE UR QL STRIP.AUTO: NEGATIVE
NRBC BLD-RTO: 0.4 /100 WBCS (ref 0–0)
OXYHGB MFR BLDV: 79.2 % (ref 45–75)
PCO2 BLDV: 52 MM HG (ref 41–51)
PH BLDV: 7.35 PH (ref 7.33–7.43)
PH UR STRIP.AUTO: 6 [PH]
PLATELET # BLD AUTO: 421 X10*3/UL (ref 150–450)
PO2 BLDV: 55 MM HG (ref 35–45)
POTASSIUM BLDV-SCNC: 4.9 MMOL/L (ref 3.5–5.3)
POTASSIUM SERPL-SCNC: 5.1 MMOL/L (ref 3.5–5.3)
PROT UR STRIP.AUTO-MCNC: NORMAL MG/DL
RBC # BLD AUTO: 4.29 X10*6/UL (ref 4–5.2)
RBC # UR STRIP.AUTO: NEGATIVE /UL
RBC #/AREA URNS AUTO: NORMAL /HPF
SAO2 % BLDV: 81 % (ref 45–75)
SODIUM BLDV-SCNC: 129 MMOL/L (ref 136–145)
SODIUM SERPL-SCNC: 131 MMOL/L (ref 136–145)
SP GR UR STRIP.AUTO: 1.02
SQUAMOUS #/AREA URNS AUTO: NORMAL /HPF
UROBILINOGEN UR STRIP.AUTO-MCNC: NORMAL MG/DL
WBC # BLD AUTO: 24 X10*3/UL (ref 4.4–11.3)
WBC #/AREA URNS AUTO: NORMAL /HPF

## 2024-05-11 PROCEDURE — 2500000001 HC RX 250 WO HCPCS SELF ADMINISTERED DRUGS (ALT 637 FOR MEDICARE OP): Performed by: HOSPITALIST

## 2024-05-11 PROCEDURE — 2500000001 HC RX 250 WO HCPCS SELF ADMINISTERED DRUGS (ALT 637 FOR MEDICARE OP): Performed by: INTERNAL MEDICINE

## 2024-05-11 PROCEDURE — 99223 1ST HOSP IP/OBS HIGH 75: CPT | Performed by: HOSPITALIST

## 2024-05-11 PROCEDURE — 83605 ASSAY OF LACTIC ACID: CPT

## 2024-05-11 PROCEDURE — 2500000004 HC RX 250 GENERAL PHARMACY W/ HCPCS (ALT 636 FOR OP/ED)

## 2024-05-11 PROCEDURE — 2500000002 HC RX 250 W HCPCS SELF ADMINISTERED DRUGS (ALT 637 FOR MEDICARE OP, ALT 636 FOR OP/ED): Performed by: HOSPITALIST

## 2024-05-11 PROCEDURE — 74176 CT ABD & PELVIS W/O CONTRAST: CPT | Performed by: RADIOLOGY

## 2024-05-11 PROCEDURE — 94640 AIRWAY INHALATION TREATMENT: CPT

## 2024-05-11 PROCEDURE — 83605 ASSAY OF LACTIC ACID: CPT | Performed by: INTERNAL MEDICINE

## 2024-05-11 PROCEDURE — 96367 TX/PROPH/DG ADDL SEQ IV INF: CPT

## 2024-05-11 PROCEDURE — 1100000001 HC PRIVATE ROOM DAILY

## 2024-05-11 PROCEDURE — 94660 CPAP INITIATION&MGMT: CPT

## 2024-05-11 PROCEDURE — 36415 COLL VENOUS BLD VENIPUNCTURE: CPT

## 2024-05-11 PROCEDURE — 81001 URINALYSIS AUTO W/SCOPE: CPT | Performed by: EMERGENCY MEDICINE

## 2024-05-11 PROCEDURE — 74176 CT ABD & PELVIS W/O CONTRAST: CPT

## 2024-05-11 PROCEDURE — 2500000004 HC RX 250 GENERAL PHARMACY W/ HCPCS (ALT 636 FOR OP/ED): Performed by: HOSPITALIST

## 2024-05-11 PROCEDURE — 2500000005 HC RX 250 GENERAL PHARMACY W/O HCPCS

## 2024-05-11 PROCEDURE — 2500000004 HC RX 250 GENERAL PHARMACY W/ HCPCS (ALT 636 FOR OP/ED): Performed by: INTERNAL MEDICINE

## 2024-05-11 PROCEDURE — 87040 BLOOD CULTURE FOR BACTERIA: CPT | Mod: AHULAB

## 2024-05-11 PROCEDURE — 84132 ASSAY OF SERUM POTASSIUM: CPT

## 2024-05-11 PROCEDURE — 93005 ELECTROCARDIOGRAM TRACING: CPT

## 2024-05-11 PROCEDURE — 84132 ASSAY OF SERUM POTASSIUM: CPT | Performed by: HOSPITALIST

## 2024-05-11 PROCEDURE — 2500000002 HC RX 250 W HCPCS SELF ADMINISTERED DRUGS (ALT 637 FOR MEDICARE OP, ALT 636 FOR OP/ED): Performed by: PHARMACIST

## 2024-05-11 PROCEDURE — 85027 COMPLETE CBC AUTOMATED: CPT | Performed by: HOSPITALIST

## 2024-05-11 RX ORDER — POLYETHYLENE GLYCOL 3350 17 G/17G
17 POWDER, FOR SOLUTION ORAL DAILY
Status: DISCONTINUED | OUTPATIENT
Start: 2024-05-11 | End: 2024-05-14 | Stop reason: HOSPADM

## 2024-05-11 RX ORDER — IPRATROPIUM BROMIDE AND ALBUTEROL SULFATE 2.5; .5 MG/3ML; MG/3ML
3 SOLUTION RESPIRATORY (INHALATION) EVERY 2 HOUR PRN
Status: DISCONTINUED | OUTPATIENT
Start: 2024-05-11 | End: 2024-05-14 | Stop reason: HOSPADM

## 2024-05-11 RX ORDER — ONDANSETRON HYDROCHLORIDE 2 MG/ML
4 INJECTION, SOLUTION INTRAVENOUS EVERY 8 HOURS PRN
Status: DISCONTINUED | OUTPATIENT
Start: 2024-05-11 | End: 2024-05-14 | Stop reason: HOSPADM

## 2024-05-11 RX ORDER — ONDANSETRON 4 MG/1
4 TABLET, ORALLY DISINTEGRATING ORAL EVERY 8 HOURS PRN
Status: DISCONTINUED | OUTPATIENT
Start: 2024-05-11 | End: 2024-05-14 | Stop reason: HOSPADM

## 2024-05-11 RX ORDER — ACETAMINOPHEN 325 MG/1
650 TABLET ORAL EVERY 4 HOURS PRN
Status: DISCONTINUED | OUTPATIENT
Start: 2024-05-11 | End: 2024-05-11

## 2024-05-11 RX ORDER — ENOXAPARIN SODIUM 100 MG/ML
40 INJECTION SUBCUTANEOUS EVERY 24 HOURS
Status: DISCONTINUED | OUTPATIENT
Start: 2024-05-11 | End: 2024-05-14 | Stop reason: HOSPADM

## 2024-05-11 RX ORDER — IBUPROFEN 600 MG/1
600 TABLET ORAL ONCE
Status: DISCONTINUED | OUTPATIENT
Start: 2024-05-11 | End: 2024-05-14 | Stop reason: HOSPADM

## 2024-05-11 RX ORDER — SENNOSIDES 8.6 MG/1
1 TABLET ORAL 2 TIMES DAILY
Status: DISCONTINUED | OUTPATIENT
Start: 2024-05-11 | End: 2024-05-14 | Stop reason: HOSPADM

## 2024-05-11 RX ORDER — IPRATROPIUM BROMIDE AND ALBUTEROL SULFATE 2.5; .5 MG/3ML; MG/3ML
3 SOLUTION RESPIRATORY (INHALATION)
Status: DISCONTINUED | OUTPATIENT
Start: 2024-05-11 | End: 2024-05-12

## 2024-05-11 RX ORDER — NYSTATIN 100000 [USP'U]/ML
5 SUSPENSION ORAL 4 TIMES DAILY
Status: DISCONTINUED | OUTPATIENT
Start: 2024-05-11 | End: 2024-05-14 | Stop reason: HOSPADM

## 2024-05-11 RX ORDER — DILTIAZEM HYDROCHLORIDE 240 MG/1
240 CAPSULE, EXTENDED RELEASE ORAL DAILY
COMMUNITY

## 2024-05-11 RX ORDER — BUDESONIDE 0.5 MG/2ML
0.5 INHALANT ORAL
Status: DISCONTINUED | OUTPATIENT
Start: 2024-05-11 | End: 2024-05-14 | Stop reason: HOSPADM

## 2024-05-11 RX ORDER — ACETAMINOPHEN 325 MG/1
975 TABLET ORAL ONCE
Status: COMPLETED | OUTPATIENT
Start: 2024-05-11 | End: 2024-05-11

## 2024-05-11 RX ORDER — GUAIFENESIN 100 MG/5ML
200 SOLUTION ORAL EVERY 4 HOURS PRN
Status: DISCONTINUED | OUTPATIENT
Start: 2024-05-11 | End: 2024-05-14 | Stop reason: HOSPADM

## 2024-05-11 RX ORDER — OXYBUTYNIN CHLORIDE 5 MG/1
5 TABLET, EXTENDED RELEASE ORAL DAILY
COMMUNITY

## 2024-05-11 RX ORDER — FORMOTEROL FUMARATE DIHYDRATE 20 UG/2ML
20 SOLUTION RESPIRATORY (INHALATION)
Status: DISCONTINUED | OUTPATIENT
Start: 2024-05-11 | End: 2024-05-14 | Stop reason: HOSPADM

## 2024-05-11 RX ORDER — DOCUSATE SODIUM 100 MG/1
100 CAPSULE, LIQUID FILLED ORAL 2 TIMES DAILY
Status: DISCONTINUED | OUTPATIENT
Start: 2024-05-11 | End: 2024-05-14 | Stop reason: HOSPADM

## 2024-05-11 RX ORDER — BISACODYL 5 MG
10 TABLET, DELAYED RELEASE (ENTERIC COATED) ORAL DAILY PRN
Status: DISCONTINUED | OUTPATIENT
Start: 2024-05-11 | End: 2024-05-14 | Stop reason: HOSPADM

## 2024-05-11 RX ORDER — TALC
3 POWDER (GRAM) TOPICAL NIGHTLY PRN
Status: DISCONTINUED | OUTPATIENT
Start: 2024-05-11 | End: 2024-05-14 | Stop reason: HOSPADM

## 2024-05-11 RX ORDER — AZITHROMYCIN 500 MG/1
500 TABLET, FILM COATED ORAL EVERY 24 HOURS
Status: DISCONTINUED | OUTPATIENT
Start: 2024-05-11 | End: 2024-05-13

## 2024-05-11 RX ORDER — FLUTICASONE FUROATE AND VILANTEROL 100; 25 UG/1; UG/1
1 POWDER RESPIRATORY (INHALATION)
Status: DISCONTINUED | OUTPATIENT
Start: 2024-05-11 | End: 2024-05-11

## 2024-05-11 RX ORDER — DILTIAZEM HYDROCHLORIDE 120 MG/1
240 CAPSULE, COATED, EXTENDED RELEASE ORAL DAILY
Status: DISCONTINUED | OUTPATIENT
Start: 2024-05-11 | End: 2024-05-14 | Stop reason: HOSPADM

## 2024-05-11 RX ORDER — PANTOPRAZOLE SODIUM 40 MG/10ML
40 INJECTION, POWDER, LYOPHILIZED, FOR SOLUTION INTRAVENOUS
Status: DISCONTINUED | OUTPATIENT
Start: 2024-05-11 | End: 2024-05-14 | Stop reason: HOSPADM

## 2024-05-11 RX ORDER — BENZONATATE 100 MG/1
100 CAPSULE ORAL 3 TIMES DAILY PRN
Status: DISCONTINUED | OUTPATIENT
Start: 2024-05-11 | End: 2024-05-14 | Stop reason: HOSPADM

## 2024-05-11 RX ORDER — PANTOPRAZOLE SODIUM 40 MG/1
40 TABLET, DELAYED RELEASE ORAL
Status: DISCONTINUED | OUTPATIENT
Start: 2024-05-11 | End: 2024-05-14 | Stop reason: HOSPADM

## 2024-05-11 RX ADMIN — ACETAMINOPHEN 975 MG: 325 TABLET ORAL at 00:14

## 2024-05-11 RX ADMIN — IPRATROPIUM BROMIDE AND ALBUTEROL SULFATE 3 ML: 2.5; .5 SOLUTION RESPIRATORY (INHALATION) at 23:37

## 2024-05-11 RX ADMIN — Medication 1 L/MIN: at 10:57

## 2024-05-11 RX ADMIN — BUDESONIDE INHALATION 0.5 MG: 0.5 SUSPENSION RESPIRATORY (INHALATION) at 19:42

## 2024-05-11 RX ADMIN — ACETAMINOPHEN 650 MG: 325 TABLET ORAL at 09:08

## 2024-05-11 RX ADMIN — ENOXAPARIN SODIUM 40 MG: 40 INJECTION SUBCUTANEOUS at 09:08

## 2024-05-11 RX ADMIN — AZITHROMYCIN DIHYDRATE 500 MG: 500 TABLET ORAL at 09:08

## 2024-05-11 RX ADMIN — FORMOTEROL FUMARATE DIHYDRATE 20 MCG: 20 SOLUTION RESPIRATORY (INHALATION) at 19:42

## 2024-05-11 RX ADMIN — IPRATROPIUM BROMIDE AND ALBUTEROL SULFATE 3 ML: 2.5; .5 SOLUTION RESPIRATORY (INHALATION) at 04:45

## 2024-05-11 RX ADMIN — DILTIAZEM HYDROCHLORIDE 240 MG: 120 CAPSULE, EXTENDED RELEASE ORAL at 12:21

## 2024-05-11 RX ADMIN — METHYLPREDNISOLONE SODIUM SUCCINATE 40 MG: 40 INJECTION, POWDER, FOR SOLUTION INTRAMUSCULAR; INTRAVENOUS at 15:03

## 2024-05-11 RX ADMIN — ACETAMINOPHEN 650 MG: 325 TABLET ORAL at 15:03

## 2024-05-11 RX ADMIN — IPRATROPIUM BROMIDE AND ALBUTEROL SULFATE 3 ML: 2.5; .5 SOLUTION RESPIRATORY (INHALATION) at 07:22

## 2024-05-11 RX ADMIN — DOCUSATE SODIUM 100 MG: 100 CAPSULE, LIQUID FILLED ORAL at 09:08

## 2024-05-11 RX ADMIN — IPRATROPIUM BROMIDE AND ALBUTEROL SULFATE 3 ML: 2.5; .5 SOLUTION RESPIRATORY (INHALATION) at 10:56

## 2024-05-11 RX ADMIN — PANTOPRAZOLE SODIUM 40 MG: 40 TABLET, DELAYED RELEASE ORAL at 06:16

## 2024-05-11 RX ADMIN — NYSTATIN 500000 UNITS: 100000 SUSPENSION ORAL at 22:04

## 2024-05-11 RX ADMIN — ACETAMINOPHEN 650 MG: 325 TABLET ORAL at 22:09

## 2024-05-11 RX ADMIN — Medication 40 PERCENT: at 00:15

## 2024-05-11 RX ADMIN — METHYLPREDNISOLONE SODIUM SUCCINATE 40 MG: 40 INJECTION, POWDER, FOR SOLUTION INTRAMUSCULAR; INTRAVENOUS at 22:04

## 2024-05-11 RX ADMIN — SODIUM CHLORIDE 1000 ML: 9 INJECTION, SOLUTION INTRAVENOUS at 07:21

## 2024-05-11 RX ADMIN — IPRATROPIUM BROMIDE AND ALBUTEROL SULFATE 3 ML: 2.5; .5 SOLUTION RESPIRATORY (INHALATION) at 19:42

## 2024-05-11 RX ADMIN — METHYLPREDNISOLONE SODIUM SUCCINATE 40 MG: 40 INJECTION, POWDER, FOR SOLUTION INTRAMUSCULAR; INTRAVENOUS at 05:39

## 2024-05-11 RX ADMIN — DOXYCYCLINE 100 MG: 100 INJECTION, POWDER, LYOPHILIZED, FOR SOLUTION INTRAVENOUS at 01:41

## 2024-05-11 SDOH — HEALTH STABILITY: PHYSICAL HEALTH: ON AVERAGE, HOW MANY MINUTES DO YOU ENGAGE IN EXERCISE AT THIS LEVEL?: PATIENT DECLINED

## 2024-05-11 SDOH — SOCIAL STABILITY: SOCIAL INSECURITY: HAVE YOU HAD ANY THOUGHTS OF HARMING ANYONE ELSE?: NO

## 2024-05-11 SDOH — ECONOMIC STABILITY: FOOD INSECURITY: WITHIN THE PAST 12 MONTHS, YOU WORRIED THAT YOUR FOOD WOULD RUN OUT BEFORE YOU GOT MONEY TO BUY MORE.: PATIENT DECLINED

## 2024-05-11 SDOH — SOCIAL STABILITY: SOCIAL INSECURITY: HAS ANYONE EVER THREATENED TO HURT YOUR FAMILY OR YOUR PETS?: NO

## 2024-05-11 SDOH — ECONOMIC STABILITY: TRANSPORTATION INSECURITY
IN THE PAST 12 MONTHS, HAS THE LACK OF TRANSPORTATION KEPT YOU FROM MEDICAL APPOINTMENTS OR FROM GETTING MEDICATIONS?: PATIENT DECLINED

## 2024-05-11 SDOH — SOCIAL STABILITY: SOCIAL INSECURITY: ARE THERE ANY APPARENT SIGNS OF INJURIES/BEHAVIORS THAT COULD BE RELATED TO ABUSE/NEGLECT?: NO

## 2024-05-11 SDOH — ECONOMIC STABILITY: TRANSPORTATION INSECURITY
IN THE PAST 12 MONTHS, HAS LACK OF TRANSPORTATION KEPT YOU FROM MEETINGS, WORK, OR FROM GETTING THINGS NEEDED FOR DAILY LIVING?: PATIENT DECLINED

## 2024-05-11 SDOH — HEALTH STABILITY: MENTAL HEALTH
HOW OFTEN DO YOU NEED TO HAVE SOMEONE HELP YOU WHEN YOU READ INSTRUCTIONS, PAMPHLETS, OR OTHER WRITTEN MATERIAL FROM YOUR DOCTOR OR PHARMACY?: NEVER

## 2024-05-11 SDOH — ECONOMIC STABILITY: HOUSING INSECURITY
IN THE LAST 12 MONTHS, WAS THERE A TIME WHEN YOU DID NOT HAVE A STEADY PLACE TO SLEEP OR SLEPT IN A SHELTER (INCLUDING NOW)?: PATIENT DECLINED

## 2024-05-11 SDOH — ECONOMIC STABILITY: INCOME INSECURITY: IN THE LAST 12 MONTHS, WAS THERE A TIME WHEN YOU WERE NOT ABLE TO PAY THE MORTGAGE OR RENT ON TIME?: PATIENT DECLINED

## 2024-05-11 SDOH — SOCIAL STABILITY: SOCIAL INSECURITY: HAVE YOU HAD THOUGHTS OF HARMING ANYONE ELSE?: YES

## 2024-05-11 SDOH — SOCIAL STABILITY: SOCIAL INSECURITY: ABUSE: ADULT

## 2024-05-11 SDOH — SOCIAL STABILITY: SOCIAL INSECURITY
WITHIN THE LAST YEAR, HAVE YOU BEEN HUMILIATED OR EMOTIONALLY ABUSED IN OTHER WAYS BY YOUR PARTNER OR EX-PARTNER?: PATIENT DECLINED

## 2024-05-11 SDOH — SOCIAL STABILITY: SOCIAL NETWORK: IN A TYPICAL WEEK, HOW MANY TIMES DO YOU TALK ON THE PHONE WITH FAMILY, FRIENDS, OR NEIGHBORS?: PATIENT DECLINED

## 2024-05-11 SDOH — SOCIAL STABILITY: SOCIAL INSECURITY
WITHIN THE LAST YEAR, HAVE TO BEEN RAPED OR FORCED TO HAVE ANY KIND OF SEXUAL ACTIVITY BY YOUR PARTNER OR EX-PARTNER?: PATIENT DECLINED

## 2024-05-11 SDOH — SOCIAL STABILITY: SOCIAL NETWORK: HOW OFTEN DO YOU ATTEND CHURCH OR RELIGIOUS SERVICES?: PATIENT DECLINED

## 2024-05-11 SDOH — SOCIAL STABILITY: SOCIAL INSECURITY: DOES ANYONE TRY TO KEEP YOU FROM HAVING/CONTACTING OTHER FRIENDS OR DOING THINGS OUTSIDE YOUR HOME?: NO

## 2024-05-11 SDOH — SOCIAL STABILITY: SOCIAL INSECURITY
WITHIN THE LAST YEAR, HAVE YOU BEEN KICKED, HIT, SLAPPED, OR OTHERWISE PHYSICALLY HURT BY YOUR PARTNER OR EX-PARTNER?: PATIENT DECLINED

## 2024-05-11 SDOH — SOCIAL STABILITY: SOCIAL INSECURITY: ARE YOU OR HAVE YOU BEEN THREATENED OR ABUSED PHYSICALLY, EMOTIONALLY, OR SEXUALLY BY ANYONE?: NO

## 2024-05-11 SDOH — SOCIAL STABILITY: SOCIAL NETWORK: HOW OFTEN DO YOU GET TOGETHER WITH FRIENDS OR RELATIVES?: PATIENT DECLINED

## 2024-05-11 SDOH — ECONOMIC STABILITY: FOOD INSECURITY: WITHIN THE PAST 12 MONTHS, THE FOOD YOU BOUGHT JUST DIDN'T LAST AND YOU DIDN'T HAVE MONEY TO GET MORE.: PATIENT DECLINED

## 2024-05-11 SDOH — ECONOMIC STABILITY: INCOME INSECURITY
IN THE PAST 12 MONTHS, HAS THE ELECTRIC, GAS, OIL, OR WATER COMPANY THREATENED TO SHUT OFF SERVICE IN YOUR HOME?: PATIENT DECLINED

## 2024-05-11 SDOH — SOCIAL STABILITY: SOCIAL NETWORK: ARE YOU MARRIED, WIDOWED, DIVORCED, SEPARATED, NEVER MARRIED, OR LIVING WITH A PARTNER?: PATIENT DECLINED

## 2024-05-11 SDOH — SOCIAL STABILITY: SOCIAL NETWORK: HOW OFTEN DO YOU ATTENT MEETINGS OF THE CLUB OR ORGANIZATION YOU BELONG TO?: PATIENT DECLINED

## 2024-05-11 SDOH — SOCIAL STABILITY: SOCIAL NETWORK
DO YOU BELONG TO ANY CLUBS OR ORGANIZATIONS SUCH AS CHURCH GROUPS UNIONS, FRATERNAL OR ATHLETIC GROUPS, OR SCHOOL GROUPS?: PATIENT DECLINED

## 2024-05-11 SDOH — SOCIAL STABILITY: SOCIAL INSECURITY: DO YOU FEEL ANYONE HAS EXPLOITED OR TAKEN ADVANTAGE OF YOU FINANCIALLY OR OF YOUR PERSONAL PROPERTY?: NO

## 2024-05-11 SDOH — SOCIAL STABILITY: SOCIAL INSECURITY: DO YOU FEEL UNSAFE GOING BACK TO THE PLACE WHERE YOU ARE LIVING?: NO

## 2024-05-11 SDOH — HEALTH STABILITY: PHYSICAL HEALTH
ON AVERAGE, HOW MANY DAYS PER WEEK DO YOU ENGAGE IN MODERATE TO STRENUOUS EXERCISE (LIKE A BRISK WALK)?: PATIENT DECLINED

## 2024-05-11 SDOH — ECONOMIC STABILITY: INCOME INSECURITY: HOW HARD IS IT FOR YOU TO PAY FOR THE VERY BASICS LIKE FOOD, HOUSING, MEDICAL CARE, AND HEATING?: PATIENT DECLINED

## 2024-05-11 ASSESSMENT — LIFESTYLE VARIABLES
HOW MANY STANDARD DRINKS CONTAINING ALCOHOL DO YOU HAVE ON A TYPICAL DAY: 3 OR 4
SKIP TO QUESTIONS 9-10: 0
HOW OFTEN DO YOU HAVE A DRINK CONTAINING ALCOHOL: 2-3 TIMES A WEEK
SUBSTANCE_ABUSE_PAST_12_MONTHS: NO
PRESCIPTION_ABUSE_PAST_12_MONTHS: NO
HOW OFTEN DO YOU HAVE 6 OR MORE DRINKS ON ONE OCCASION: PATIENT DECLINED
AUDIT-C TOTAL SCORE: -1
AUDIT-C TOTAL SCORE: -1

## 2024-05-11 ASSESSMENT — ENCOUNTER SYMPTOMS
PSYCHIATRIC NEGATIVE: 1
ABDOMINAL DISTENTION: 1
CHEST TIGHTNESS: 1
CARDIOVASCULAR NEGATIVE: 1
NEUROLOGICAL NEGATIVE: 1
FATIGUE: 1
ALLERGIC/IMMUNOLOGIC NEGATIVE: 1
MUSCULOSKELETAL NEGATIVE: 1
VOICE CHANGE: 1
VOMITING: 0
EYES NEGATIVE: 1
APPETITE CHANGE: 1
COUGH: 1
NAUSEA: 1
HEMATOLOGIC/LYMPHATIC NEGATIVE: 1
SHORTNESS OF BREATH: 1

## 2024-05-11 ASSESSMENT — ACTIVITIES OF DAILY LIVING (ADL)
TOILETING: INDEPENDENT
DRESSING YOURSELF: INDEPENDENT
WALKS IN HOME: INDEPENDENT
PATIENT'S MEMORY ADEQUATE TO SAFELY COMPLETE DAILY ACTIVITIES?: YES
HEARING - RIGHT EAR: FUNCTIONAL
ADEQUATE_TO_COMPLETE_ADL: YES
LACK_OF_TRANSPORTATION: NO
HEARING - LEFT EAR: FUNCTIONAL
BATHING: INDEPENDENT
GROOMING: INDEPENDENT
JUDGMENT_ADEQUATE_SAFELY_COMPLETE_DAILY_ACTIVITIES: YES
FEEDING YOURSELF: INDEPENDENT

## 2024-05-11 ASSESSMENT — PATIENT HEALTH QUESTIONNAIRE - PHQ9
SUM OF ALL RESPONSES TO PHQ9 QUESTIONS 1 & 2: 0
2. FEELING DOWN, DEPRESSED OR HOPELESS: NOT AT ALL
1. LITTLE INTEREST OR PLEASURE IN DOING THINGS: NOT AT ALL

## 2024-05-11 ASSESSMENT — PAIN DESCRIPTION - LOCATION
LOCATION: HEAD

## 2024-05-11 ASSESSMENT — PAIN - FUNCTIONAL ASSESSMENT
PAIN_FUNCTIONAL_ASSESSMENT: 0-10

## 2024-05-11 ASSESSMENT — COGNITIVE AND FUNCTIONAL STATUS - GENERAL
MOBILITY SCORE: 24
DAILY ACTIVITIY SCORE: 24

## 2024-05-11 ASSESSMENT — PAIN SCALES - GENERAL
PAINLEVEL_OUTOF10: 8
PAINLEVEL_OUTOF10: 10 - WORST POSSIBLE PAIN
PAINLEVEL_OUTOF10: 10 - WORST POSSIBLE PAIN
PAINLEVEL_OUTOF10: 8

## 2024-05-11 ASSESSMENT — PAIN DESCRIPTION - ORIENTATION: ORIENTATION: POSTERIOR

## 2024-05-11 ASSESSMENT — PAIN DESCRIPTION - PAIN TYPE: TYPE: ACUTE PAIN

## 2024-05-11 ASSESSMENT — PAIN SCALES - PAIN ASSESSMENT IN ADVANCED DEMENTIA (PAINAD): TOTALSCORE: MEDICATION (SEE MAR)

## 2024-05-11 NOTE — ED NOTES
Pt presented to the ED by ems from home due to c/o sob. Pt has a hx of COPD and is a current smoker. Pt had a cigarette and felt sob of breath afters. Pt stated she was recently discharged from the hospital due to sob. Pt is a&ox4 and reports no pain. Pt did not want to answer some questions upon arrival, including her name. Pt appeared in respiratory distress upon arrival. Respiratory and resident at the bedside.      Daryn Crowder RN  05/10/24 0604

## 2024-05-11 NOTE — SIGNIFICANT EVENT
Patient seen and examined.  Admitted for COPD exacerbation.  Patient was discharged yesterday after   She was treated for COPD Exacerbation.  Patient went home and smoke cigarette and was feeling short of breath so came back.  Continue azithromycin, Solu-Medrol and DuoNebs.  Pulmonology consulted.  Monitor.

## 2024-05-11 NOTE — ED TRIAGE NOTES
Fernando Cuevas is a 63 y.o. female with complaint of shortness of breath. Patient recently hospitalized with COPD exacerbation. Patient report smoking at home and becoming short of breath, EMS called and she was placed on a NC @ 6 lpm and given a duoneb in route to the hospital.

## 2024-05-11 NOTE — ED PROVIDER NOTES
History of Present Illness   CC: Shortness of Breath     History provided by: Patient and Family Member  Limitations to History: None    HPI:  Fernando Cuevas is a 63 y.o. female History of COPD presenting to the emergency department with shortness of breath.  Patient was discharged from the hospital earlier today.  Son at bedside provides majority of history.  States he was concerned that she was discharged as she did not look very well and noted to have shortness of breath when she came home.  Also endorsed that patient then smoked several cigarettes which made her work of breathing worse.  She started using her home nebulizer without relief, called EMS who noted that she was hypoxic at home and placed her on 6 L nasal cannula, patient got 1 DuoNeb treatment and route.  Upon arrival, patient does appear to be in respiratory distress, has very poor air movement with significant work of breathing.    External Records Reviewed: Previous discharge summary    Physical Exam   - CONSTITUTION: Acutely ill elderly female who appears older than stated age, unable to speak in full sentences.  Appears to be in acute distress.  Face flushed and uncomfortable  - NEUROLOGIC: Awake, alert and follows commands. EOMI, PERRL.  No other focal deficits appreciated.   - CARDIOVASCULAR: RRR, S1S2, no murmurs, no chest wall tenderness  - RESPIRATORY: No air movement auscultated bilaterally, trace wheezes heard in the upper lobes posteriorly.  Use of accessory muscles with significant tachypnea.  - GI: Abdomen soft, nontender, nondistended.  - EXTREMITIES: Warm and dry.  NV exam intact x 4.  - SKIN: warm and dry  - PSYCHIATRIC: Calm, appropriate    ED Course & Medical Decision Making   ED Course:  ED Course as of 05/11/24 0142   Sat May 11, 2024   0134 EKG performed at 1:10 AM, sinus tachycardia with rate of 110.  No T wave abnormalities, no ST elevation or depression.  Questionable T wave inversion in V2 but when compared to previous EKG  on May 6, EKG is unchanged. [PW]      ED Course User Index  [PW] Bambi ElyDO galo         Diagnoses as of 05/11/24 0142   COPD exacerbation (Multi)       Differential diagnoses considered include but are not limited to: COPD exacerbation, asthma, PE, pneumonia, viral respiratory infection    Social Determinants Limiting Care: None identified    MDM:  63 y.o. female presenting to the emergency department with concern for COPD exacerbation.  Patient is well-known to our department.  Was discharged earlier this morning, went home and smoked which led her to develop a COPD exacerbation.  On initial presentation, patient did appear to be in acute respiratory distress however I do think there was a component of anxiety.  After receiving a milligram of Versed multiple breathing treatments, steroids and trial of BiPAP, patient returned to her baseline and is now comfortable breathing normally on 5 L nasal cannula.  Does have a leukocytosis of 22, and I suspect some of this is reactionary to steroids however when compared to previous blood work this is quite high so we will cover with antibiotics broadly as she has risk factors for infection.  VBG is reassuring, normal pH without severe retention of pCO2, potassium noted to be 6.8 on CMP but repeat potassium level is 4.7.  Does have a mildly elevated lactate and received fluids, repeat VBG shows resolution of lactic acid.  Given her risk factors, likelihood for decompensation at the time of discharge, recommending admission for further management, feel patient would benefit from discussion about goals of care as she is nearing end-stage COPD and would also benefit from discussion about requiring oxygen at home.  Is not open to tobacco cessation at this time.  Patient signed out to overnight attending in stable condition pending admission to hospital.    Disposition   Admission    Procedures   Procedures    Patient seen and discussed with ED attending physician.    Bambi  DO [place]  Emergency Medicine PGY-2         Bambi Graves DO  Resident  05/11/24 0142

## 2024-05-11 NOTE — H&P
History Of Present Illness  Fernando Cuevas is a 63 y.o. female with a PMH of COPD (does not require oxygen), RLL adenocarcinoma, HTN, GERD, presenting with SOB.    Ms Cuevas was just admitted for COPD exacerbation 5/5 - 5/10. On discharge she states she did not feel well. Was feeling weak, she was not out of bed the entire hospitalization. She smoked one cigarette and became SOB, returned to the ED.     She initially was placed on BiPAP, now is requiring 5 LPM oxygen in the ED. She c/o cough that is chronic, hoarseness the past month and abdominal distention and gas. Cannot eat much - feels full after a few bites.     Work up in the ED shows increasing WBC ct of 22.9, Elevated LFTs, lactate 2.4.   CXR unremarkable.     Pt is admitted for COPD exacerbation.          Past Medical History  Past Medical History:   Diagnosis Date    Essential (primary) hypertension 04/20/2016    Benign hypertension    Personal history of other diseases of the respiratory system     H/O emphysema    Personal history of other diseases of the respiratory system     History of chronic obstructive lung disease    Personal history of other mental and behavioral disorders     History of depression       Surgical History  Past Surgical History:   Procedure Laterality Date    CT ABDOMEN PELVIS ANGIOGRAM W AND/OR WO IV CONTRAST  3/22/2016    CT ABDOMEN PELVIS ANGIOGRAM W AND/OR WO IV CONTRAST [place]22/[place] [facility] [facility]    CT ANGIO CORONARY ART WITH HEARTFLOW IF SCORE >30%  1/5/2023    CT ANGIO CORONARY ART WITH HEARTFLOW IF SCORE >30% 1/5/2023    MR NECK ANGIO W IV CONTRAST  4/19/2021    MR NECK ANGIO W IV CONTRAST 4/19/2021        Social History  She reports that she has been smoking cigarettes. She has been exposed to tobacco smoke. She has never used smokeless tobacco. No history on file for alcohol use and drug use.    Family History  No family history on file.     Allergies  Iodinated contrast media    Review of Systems   Constitutional:   Positive for appetite change and fatigue.   HENT:  Positive for voice change.         Hoarseness x 1 month   Eyes: Negative.    Respiratory:  Positive for cough, chest tightness and shortness of breath.         Chronic cough   Cardiovascular: Negative.    Gastrointestinal:  Positive for abdominal distention and nausea. Negative for vomiting.   Genitourinary: Negative.    Musculoskeletal: Negative.    Skin: Negative.    Allergic/Immunologic: Negative.    Neurological: Negative.    Hematological: Negative.    Psychiatric/Behavioral: Negative.          Physical Exam  Vitals reviewed.   Constitutional:       General: She is in acute distress.      Comments: Pt SOB with just conversation. Appears uncomfortable.  Is alert, oriented and able to answer questions.    HENT:      Head: Normocephalic and atraumatic.      Mouth/Throat:      Mouth: Mucous membranes are moist.   Eyes:      Extraocular Movements: Extraocular movements intact.      Conjunctiva/sclera: Conjunctivae normal.      Pupils: Pupils are equal, round, and reactive to light.   Cardiovascular:      Rate and Rhythm: Normal rate and regular rhythm.      Pulses: Normal pulses.      Heart sounds: Normal heart sounds.   Pulmonary:      Effort: Pulmonary effort is normal.      Comments: Airflow dec, very faint expiratory breath sounds. End expiratory wheezes in RUL area only. No crackles.   Abdominal:      General: Bowel sounds are normal. There is distension.      Palpations: Abdomen is soft.   Musculoskeletal:         General: Normal range of motion.      Right lower leg: No edema.      Left lower leg: No edema.   Skin:     General: Skin is warm and dry.   Neurological:      General: No focal deficit present.      Mental Status: She is alert and oriented to person, place, and time.   Psychiatric:         Mood and Affect: Mood normal.         Behavior: Behavior normal.         Thought Content: Thought content normal.         Judgment: Judgment normal.          Last  "Recorded Vitals  Blood pressure (!) 171/98, pulse (!) 106, temperature 36.3 °C (97.4 °F), temperature source Temporal, resp. rate 20, height 1.549 m (5' 0.98\"), weight 64.6 kg (142 lb 8 oz), SpO2 99%.    Relevant Results        Results for orders placed or performed during the hospital encounter of 05/10/24 (from the past 24 hour(s))   CBC with Differential   Result Value Ref Range    WBC 22.9 (H) 4.4 - 11.3 x10*3/uL    nRBC 0.6 (H) 0.0 - 0.0 /100 WBCs    RBC 4.68 4.00 - 5.20 x10*6/uL    Hemoglobin 11.1 (L) 12.0 - 16.0 g/dL    Hematocrit 38.2 36.0 - 46.0 %    MCV 82 80 - 100 fL    MCH 23.7 (L) 26.0 - 34.0 pg    MCHC 29.1 (L) 32.0 - 36.0 g/dL    RDW 19.7 (H) 11.5 - 14.5 %    Platelets 565 (H) 150 - 450 x10*3/uL    Neutrophils % 80.8 40.0 - 80.0 %    Immature Granulocytes %, Automated 2.7 (H) 0.0 - 0.9 %    Lymphocytes % 9.7 13.0 - 44.0 %    Monocytes % 6.4 2.0 - 10.0 %    Eosinophils % 0.0 0.0 - 6.0 %    Basophils % 0.4 0.0 - 2.0 %    Neutrophils Absolute 18.53 (H) 1.20 - 7.70 x10*3/uL    Immature Granulocytes Absolute, Automated 0.62 0.00 - 0.70 x10*3/uL    Lymphocytes Absolute 2.22 1.20 - 4.80 x10*3/uL    Monocytes Absolute 1.47 (H) 0.10 - 1.00 x10*3/uL    Eosinophils Absolute 0.00 0.00 - 0.70 x10*3/uL    Basophils Absolute 0.09 0.00 - 0.10 x10*3/uL   Comprehensive Metabolic Panel   Result Value Ref Range    Glucose 103 (H) 74 - 99 mg/dL    Sodium 130 (L) 136 - 145 mmol/L    Potassium 6.8 (HH) 3.5 - 5.3 mmol/L    Chloride 95 (L) 98 - 107 mmol/L    Bicarbonate 26 21 - 32 mmol/L    Anion Gap 16 10 - 20 mmol/L    Urea Nitrogen 22 6 - 23 mg/dL    Creatinine 0.83 0.50 - 1.05 mg/dL    eGFR 79 >60 mL/min/1.73m*2    Calcium 8.7 8.6 - 10.3 mg/dL    Albumin 4.2 3.4 - 5.0 g/dL    Alkaline Phosphatase 57 33 - 136 U/L    Total Protein 6.5 6.4 - 8.2 g/dL    AST 55 (H) 9 - 39 U/L    Bilirubin, Total 0.2 0.0 - 1.2 mg/dL    ALT 72 (H) 7 - 45 U/L   Blood Gas, Venous Full Panel   Result Value Ref Range    POCT pH, Venous 7.36 7.33 - " 7.43 pH    POCT pCO2, Venous 53 (H) 41 - 51 mm Hg    POCT pO2, Venous 67 (H) 35 - 45 mm Hg    POCT SO2, Venous 90 (H) 45 - 75 %    POCT Oxy Hemoglobin, Venous 88.4 (H) 45.0 - 75.0 %    POCT Hematocrit Calculated, Venous 34.0 (L) 36.0 - 46.0 %    POCT Sodium, Venous 131 (L) 136 - 145 mmol/L    POCT Potassium, Venous 4.9 3.5 - 5.3 mmol/L    POCT Chloride, Venous 96 (L) 98 - 107 mmol/L    POCT Ionized Calicum, Venous 1.21 1.10 - 1.33 mmol/L    POCT Glucose, Venous 113 (H) 74 - 99 mg/dL    POCT Lactate, Venous 2.6 (H) 0.4 - 2.0 mmol/L    POCT Base Excess, Venous 3.5 (H) -2.0 - 3.0 mmol/L    POCT HCO3 Calculated, Venous 29.9 (H) 22.0 - 26.0 mmol/L    POCT Hemoglobin, Venous 11.2 (L) 12.0 - 16.0 g/dL    POCT Anion Gap, Venous 10.0 10.0 - 25.0 mmol/L    Patient Temperature 37.0 degrees Celsius    FiO2 40 %   Lactate   Result Value Ref Range    Lactate 2.4 (H) 0.4 - 2.0 mmol/L   Potassium   Result Value Ref Range    Potassium 4.7 3.5 - 5.3 mmol/L   Urinalysis with Reflex Microscopic   Result Value Ref Range    Color, Urine Light-Yellow Light-Yellow, Yellow, Dark-Yellow    Appearance, Urine Clear Clear    Specific Gravity, Urine 1.025 1.005 - 1.035    pH, Urine 6.0 5.0, 5.5, 6.0, 6.5, 7.0, 7.5, 8.0    Protein, Urine 10 (TRACE) NEGATIVE, 10 (TRACE), 20 (TRACE) mg/dL    Glucose, Urine Normal Normal mg/dL    Blood, Urine NEGATIVE NEGATIVE    Ketones, Urine NEGATIVE NEGATIVE mg/dL    Bilirubin, Urine NEGATIVE NEGATIVE    Urobilinogen, Urine Normal Normal mg/dL    Nitrite, Urine NEGATIVE NEGATIVE    Leukocyte Esterase, Urine NEGATIVE NEGATIVE   BLOOD GAS VENOUS FULL PANEL   Result Value Ref Range    POCT pH, Venous 7.35 7.33 - 7.43 pH    POCT pCO2, Venous 52 (H) 41 - 51 mm Hg    POCT pO2, Venous 55 (H) 35 - 45 mm Hg    POCT SO2, Venous 81 (H) 45 - 75 %    POCT Oxy Hemoglobin, Venous 79.2 (H) 45.0 - 75.0 %    POCT Hematocrit Calculated, Venous 30.0 (L) 36.0 - 46.0 %    POCT Sodium, Venous 129 (L) 136 - 145 mmol/L    POCT  Potassium, Venous 4.9 3.5 - 5.3 mmol/L    POCT Chloride, Venous 97 (L) 98 - 107 mmol/L    POCT Ionized Calicum, Venous 1.14 1.10 - 1.33 mmol/L    POCT Glucose, Venous 111 (H) 74 - 99 mg/dL    POCT Lactate, Venous 2.8 (H) 0.4 - 2.0 mmol/L    POCT Base Excess, Venous 2.4 -2.0 - 3.0 mmol/L    POCT HCO3 Calculated, Venous 28.7 (H) 22.0 - 26.0 mmol/L    POCT Hemoglobin, Venous 10.0 (L) 12.0 - 16.0 g/dL    POCT Anion Gap, Venous 8.0 (L) 10.0 - 25.0 mmol/L    Patient Temperature 37.0 degrees Celsius    FiO2 21 %   Microscopic Only, Urine   Result Value Ref Range    WBC, Urine NONE 1-5, NONE /HPF    RBC, Urine NONE NONE, 1-2, 3-5 /HPF    Squamous Epithelial Cells, Urine 1-9 (SPARSE) Reference range not established. /HPF    Mucus, Urine FEW Reference range not established. /LPF   Blood Gas Lactic Acid, Venous   Result Value Ref Range    POCT Lactate, Venous 2.7 (H) 0.4 - 2.0 mmol/L   Blood Gas Lactic Acid, Venous   Result Value Ref Range    POCT Lactate, Venous 3.7 (H) 0.4 - 2.0 mmol/L   CBC   Result Value Ref Range    WBC 24.0 (H) 4.4 - 11.3 x10*3/uL    nRBC 0.4 (H) 0.0 - 0.0 /100 WBCs    RBC 4.29 4.00 - 5.20 x10*6/uL    Hemoglobin 10.2 (L) 12.0 - 16.0 g/dL    Hematocrit 34.0 (L) 36.0 - 46.0 %    MCV 79 (L) 80 - 100 fL    MCH 23.8 (L) 26.0 - 34.0 pg    MCHC 30.0 (L) 32.0 - 36.0 g/dL    RDW 19.1 (H) 11.5 - 14.5 %    Platelets 421 150 - 450 x10*3/uL   Basic metabolic panel   Result Value Ref Range    Glucose 122 (H) 74 - 99 mg/dL    Sodium 131 (L) 136 - 145 mmol/L    Potassium 5.1 3.5 - 5.3 mmol/L    Chloride 94 (L) 98 - 107 mmol/L    Bicarbonate 26 21 - 32 mmol/L    Anion Gap 16 10 - 20 mmol/L    Urea Nitrogen 27 (H) 6 - 23 mg/dL    Creatinine 0.85 0.50 - 1.05 mg/dL    eGFR 77 >60 mL/min/1.73m*2    Calcium 8.2 (L) 8.6 - 10.3 mg/dL     XR chest 1 view    Result Date: 5/10/2024  STUDY: Chest Radiograph;  5/10/2024 10:32 PM INDICATION: Chronic obstructive pulmonary disease exacerbation and shortness of breath. COMPARISON:  5/7/2024 XR Chest one view ACCESSION NUMBER(S): [id number] ORDERING CLINICIAN: VANITA NUNN TECHNIQUE:  Frontal chest was obtained at 22:32 hours. FINDINGS: CARDIOMEDIASTINAL SILHOUETTE: Cardiomediastinal silhouette is normal in size and configuration.  LUNGS: Lungs are clear.  Nodular density medial left lung base compatible with diaphragmatic hernia. Spiculated lesion on recent CT exam not appreciated on current study.  ABDOMEN: No remarkable upper abdominal findings.  BONES: No acute osseous changes.    1. No acute pulmonary abnormality. 2. Medial left diaphragmatic hernia. Signed by Berhane Brooks MD    Electrocardiogram, 12-lead PRN ACS symptoms    Result Date: 5/10/2024  Sinus tachycardia Possible Left atrial enlargement Borderline ECG No previous ECGs available Confirmed by Jose Lugo ([id number]) on 5/10/2024 11:17:41 AM       Assessment/Plan   Principal Problem:    COPD exacerbation (Multi)  - continue po azithromycin she was discharged on  - scheduled duonebs  - Solumedrol 40mg Q8hr  - Pulmonary consult  - oxygen, currently at 5 LPM    Abdominal distention  - pt states it is getting worse  - no recent CT  - Moderate distention of the stomach as well as prominent diffuse retained stool  - CT A/P to evaluate for etiology  - may be just secondary to large amt of stool  - will be distended at baseline due to chronic steroid use, however, pt states this is the worst it has been  - aggressive laxatives    HTN  - hx of this, however, no medication on med rec for this  RLL adenocarcinoma  - s/p RT         I spent 60 minutes in the professional and overall care of this patient.      Madeleine Nixon MD

## 2024-05-11 NOTE — PROGRESS NOTES
"Fernando Cuevas is a 63 y.o. female on day 0 of admission presenting with COPD exacerbation (Multi).    Subjective   Patient was discharged from [facility] yesterday, but then was readmitted to [facility] for shortness of breath and weakness, after smoking several cigarettes.  Her shortness of breath did not improve with use of her nebulizer.  She was initially placed on BiPAP in the emergency room, but then changed to 5 L nasal cannula oxygen.  She was noted to have an elevated white blood count with a left shift, low sodium and chloride, elevated potassium, AST, ALT and lactate, a venous blood gas showing a pH of 7.36, pCO2 53 and a bicarbonate of 30 and an admission chest x-ray showing a left diaphragmatic hernia, without infiltrates or effusions, and a CT scan of the abdomen showing COPD changes and a left Bochdalek hernia.  Presently, she denies shortness of breath, but does admit to dyspnea on exertion, a nonproductive cough, migraine headache and GE reflux symptoms.     Objective   Physical Exam  Vitals  Blood pressure (!) 175/97, pulse (!) 105, temperature 36.3 °C (97.4 °F), temperature source Temporal, resp. rate 19, height 1.549 m (5' 0.98\"), weight 64.6 kg (142 lb 8 oz), SpO2 100%.  Intake/Output last 3 Shifts:  I/O last 3 completed shifts:  In: 150 (2.3 mL/kg) [I.V.:50 (0.8 mL/kg); IV Piggyback:100]  Out: - (0 mL/kg)   Weight: 64.6 kg     General-awake, alert and in no acute distress, although somewhat anxious.  Presently on 2 L nasal cannula oxygen with an oxygen saturation of 100%.  Lungs-clear, without wheezes, rales or rhonchi.  Heart-tachycardic with a regular rhythm, without S3, S4 or murmurs.  Abdomen-positive bowel sounds.  Extremities-no clubbing or edema.  Skin-no rashes.    Scheduled medications  azithromycin, 500 mg, oral, q24h  dilTIAZem ER, 240 mg, oral, Daily  docusate sodium, 100 mg, oral, BID  enoxaparin, 40 mg, subcutaneous, q24h  fluticasone furoate-vilanteroL, 1 puff, inhalation, " Daily  ipratropium-albuteroL, 3 mL, nebulization, q4h  methylPREDNISolone sodium succinate (PF), 40 mg, intravenous, q8h  pantoprazole, 40 mg, oral, Daily before breakfast   Or  pantoprazole, 40 mg, intravenous, Daily before breakfast  polyethylene glycol, 17 g, oral, Daily  sennosides, 1 tablet, oral, BID      Continuous medications     PRN medications  PRN medications: acetaminophen, acetaminophen, benzonatate, bisacodyl, guaiFENesin, ipratropium-albuteroL, melatonin, ondansetron ODT **OR** ondansetron, oxygen     Results for orders placed or performed during the hospital encounter of 05/10/24 (from the past 24 hour(s))   CBC with Differential   Result Value Ref Range    WBC 22.9 (H) 4.4 - 11.3 x10*3/uL    nRBC 0.6 (H) 0.0 - 0.0 /100 WBCs    RBC 4.68 4.00 - 5.20 x10*6/uL    Hemoglobin 11.1 (L) 12.0 - 16.0 g/dL    Hematocrit 38.2 36.0 - 46.0 %    MCV 82 80 - 100 fL    MCH 23.7 (L) 26.0 - 34.0 pg    MCHC 29.1 (L) 32.0 - 36.0 g/dL    RDW 19.7 (H) 11.5 - 14.5 %    Platelets 565 (H) 150 - 450 x10*3/uL    Neutrophils % 80.8 40.0 - 80.0 %    Immature Granulocytes %, Automated 2.7 (H) 0.0 - 0.9 %    Lymphocytes % 9.7 13.0 - 44.0 %    Monocytes % 6.4 2.0 - 10.0 %    Eosinophils % 0.0 0.0 - 6.0 %    Basophils % 0.4 0.0 - 2.0 %    Neutrophils Absolute 18.53 (H) 1.20 - 7.70 x10*3/uL    Immature Granulocytes Absolute, Automated 0.62 0.00 - 0.70 x10*3/uL    Lymphocytes Absolute 2.22 1.20 - 4.80 x10*3/uL    Monocytes Absolute 1.47 (H) 0.10 - 1.00 x10*3/uL    Eosinophils Absolute 0.00 0.00 - 0.70 x10*3/uL    Basophils Absolute 0.09 0.00 - 0.10 x10*3/uL   Comprehensive Metabolic Panel   Result Value Ref Range    Glucose 103 (H) 74 - 99 mg/dL    Sodium 130 (L) 136 - 145 mmol/L    Potassium 6.8 (HH) 3.5 - 5.3 mmol/L    Chloride 95 (L) 98 - 107 mmol/L    Bicarbonate 26 21 - 32 mmol/L    Anion Gap 16 10 - 20 mmol/L    Urea Nitrogen 22 6 - 23 mg/dL    Creatinine 0.83 0.50 - 1.05 mg/dL    eGFR 79 >60 mL/min/1.73m*2    Calcium 8.7 8.6 -  10.3 mg/dL    Albumin 4.2 3.4 - 5.0 g/dL    Alkaline Phosphatase 57 33 - 136 U/L    Total Protein 6.5 6.4 - 8.2 g/dL    AST 55 (H) 9 - 39 U/L    Bilirubin, Total 0.2 0.0 - 1.2 mg/dL    ALT 72 (H) 7 - 45 U/L   Blood Gas, Venous Full Panel   Result Value Ref Range    POCT pH, Venous 7.36 7.33 - 7.43 pH    POCT pCO2, Venous 53 (H) 41 - 51 mm Hg    POCT pO2, Venous 67 (H) 35 - 45 mm Hg    POCT SO2, Venous 90 (H) 45 - 75 %    POCT Oxy Hemoglobin, Venous 88.4 (H) 45.0 - 75.0 %    POCT Hematocrit Calculated, Venous 34.0 (L) 36.0 - 46.0 %    POCT Sodium, Venous 131 (L) 136 - 145 mmol/L    POCT Potassium, Venous 4.9 3.5 - 5.3 mmol/L    POCT Chloride, Venous 96 (L) 98 - 107 mmol/L    POCT Ionized Calicum, Venous 1.21 1.10 - 1.33 mmol/L    POCT Glucose, Venous 113 (H) 74 - 99 mg/dL    POCT Lactate, Venous 2.6 (H) 0.4 - 2.0 mmol/L    POCT Base Excess, Venous 3.5 (H) -2.0 - 3.0 mmol/L    POCT HCO3 Calculated, Venous 29.9 (H) 22.0 - 26.0 mmol/L    POCT Hemoglobin, Venous 11.2 (L) 12.0 - 16.0 g/dL    POCT Anion Gap, Venous 10.0 10.0 - 25.0 mmol/L    Patient Temperature 37.0 degrees Celsius    FiO2 40 %   Lactate   Result Value Ref Range    Lactate 2.4 (H) 0.4 - 2.0 mmol/L   Potassium   Result Value Ref Range    Potassium 4.7 3.5 - 5.3 mmol/L   Urinalysis with Reflex Microscopic   Result Value Ref Range    Color, Urine Light-Yellow Light-Yellow, Yellow, Dark-Yellow    Appearance, Urine Clear Clear    Specific Gravity, Urine 1.025 1.005 - 1.035    pH, Urine 6.0 5.0, 5.5, 6.0, 6.5, 7.0, 7.5, 8.0    Protein, Urine 10 (TRACE) NEGATIVE, 10 (TRACE), 20 (TRACE) mg/dL    Glucose, Urine Normal Normal mg/dL    Blood, Urine NEGATIVE NEGATIVE    Ketones, Urine NEGATIVE NEGATIVE mg/dL    Bilirubin, Urine NEGATIVE NEGATIVE    Urobilinogen, Urine Normal Normal mg/dL    Nitrite, Urine NEGATIVE NEGATIVE    Leukocyte Esterase, Urine NEGATIVE NEGATIVE   BLOOD GAS VENOUS FULL PANEL   Result Value Ref Range    POCT pH, Venous 7.35 7.33 - 7.43 pH     POCT pCO2, Venous 52 (H) 41 - 51 mm Hg    POCT pO2, Venous 55 (H) 35 - 45 mm Hg    POCT SO2, Venous 81 (H) 45 - 75 %    POCT Oxy Hemoglobin, Venous 79.2 (H) 45.0 - 75.0 %    POCT Hematocrit Calculated, Venous 30.0 (L) 36.0 - 46.0 %    POCT Sodium, Venous 129 (L) 136 - 145 mmol/L    POCT Potassium, Venous 4.9 3.5 - 5.3 mmol/L    POCT Chloride, Venous 97 (L) 98 - 107 mmol/L    POCT Ionized Calicum, Venous 1.14 1.10 - 1.33 mmol/L    POCT Glucose, Venous 111 (H) 74 - 99 mg/dL    POCT Lactate, Venous 2.8 (H) 0.4 - 2.0 mmol/L    POCT Base Excess, Venous 2.4 -2.0 - 3.0 mmol/L    POCT HCO3 Calculated, Venous 28.7 (H) 22.0 - 26.0 mmol/L    POCT Hemoglobin, Venous 10.0 (L) 12.0 - 16.0 g/dL    POCT Anion Gap, Venous 8.0 (L) 10.0 - 25.0 mmol/L    Patient Temperature 37.0 degrees Celsius    FiO2 21 %   Microscopic Only, Urine   Result Value Ref Range    WBC, Urine NONE 1-5, NONE /HPF    RBC, Urine NONE NONE, 1-2, 3-5 /HPF    Squamous Epithelial Cells, Urine 1-9 (SPARSE) Reference range not established. /HPF    Mucus, Urine FEW Reference range not established. /LPF   Blood Gas Lactic Acid, Venous   Result Value Ref Range    POCT Lactate, Venous 2.7 (H) 0.4 - 2.0 mmol/L   Blood Gas Lactic Acid, Venous   Result Value Ref Range    POCT Lactate, Venous 3.7 (H) 0.4 - 2.0 mmol/L   CBC   Result Value Ref Range    WBC 24.0 (H) 4.4 - 11.3 x10*3/uL    nRBC 0.4 (H) 0.0 - 0.0 /100 WBCs    RBC 4.29 4.00 - 5.20 x10*6/uL    Hemoglobin 10.2 (L) 12.0 - 16.0 g/dL    Hematocrit 34.0 (L) 36.0 - 46.0 %    MCV 79 (L) 80 - 100 fL    MCH 23.8 (L) 26.0 - 34.0 pg    MCHC 30.0 (L) 32.0 - 36.0 g/dL    RDW 19.1 (H) 11.5 - 14.5 %    Platelets 421 150 - 450 x10*3/uL   Basic metabolic panel   Result Value Ref Range    Glucose 122 (H) 74 - 99 mg/dL    Sodium 131 (L) 136 - 145 mmol/L    Potassium 5.1 3.5 - 5.3 mmol/L    Chloride 94 (L) 98 - 107 mmol/L    Bicarbonate 26 21 - 32 mmol/L    Anion Gap 16 10 - 20 mmol/L    Urea Nitrogen 27 (H) 6 - 23 mg/dL     Creatinine 0.85 0.50 - 1.05 mg/dL    eGFR 77 >60 mL/min/1.73m*2    Calcium 8.2 (L) 8.6 - 10.3 mg/dL   Lactate   Result Value Ref Range    Lactate 3.7 (H) 0.4 - 2.0 mmol/L      Assessment:  63-year-old woman with a history of COPD, adenocarcinoma of the right lower lobe-status post radiation treatment, GERD, hypertension, depression and chronic hypercapnic respiratory failure, discharged from [facility] yesterday, then readmitted after getting home, due to recurrent shortness of breath and weakness, and found to have acute-on-chronic hypercapnic respiratory failure, in association with an admission chest x-ray showing no infiltrates or effusions, and severe COPD.  There is no bronchospasm or evidence of pneumonia at the present time.  Pulmonary embolism cannot be excluded but is less likely.    Recommend:  1.  Continue DuoNeb, fluticasone-vilanterol, Solu-Medrol, Lovenox and Zithromax.  2.  The patient is requesting Tylenol 3 for her migraine headache.  3.  Keep oxygen saturation approximately 92 to 95%; repeat home oxygen evaluation prior to discharge.  4.  Incentive spirometry.  5.  Follow-up with her outpatient pulmonologist and oncologist at [facility] after discharge.    Reid Dinh MD

## 2024-05-12 LAB
ANION GAP SERPL CALC-SCNC: 15 MMOL/L (ref 10–20)
BUN SERPL-MCNC: 19 MG/DL (ref 6–23)
CALCIUM SERPL-MCNC: 8.6 MG/DL (ref 8.6–10.3)
CHLORIDE SERPL-SCNC: 94 MMOL/L (ref 98–107)
CO2 SERPL-SCNC: 26 MMOL/L (ref 21–32)
CREAT SERPL-MCNC: 0.63 MG/DL (ref 0.5–1.05)
EGFRCR SERPLBLD CKD-EPI 2021: >90 ML/MIN/1.73M*2
ERYTHROCYTE [DISTWIDTH] IN BLOOD BY AUTOMATED COUNT: 19.3 % (ref 11.5–14.5)
GLUCOSE SERPL-MCNC: 129 MG/DL (ref 74–99)
HCT VFR BLD AUTO: 32.4 % (ref 36–46)
HGB BLD-MCNC: 9.6 G/DL (ref 12–16)
LACTATE SERPL-SCNC: 2.8 MMOL/L (ref 0.4–2)
MCH RBC QN AUTO: 24 PG (ref 26–34)
MCHC RBC AUTO-ENTMCNC: 29.6 G/DL (ref 32–36)
MCV RBC AUTO: 81 FL (ref 80–100)
NRBC BLD-RTO: 0.4 /100 WBCS (ref 0–0)
PLATELET # BLD AUTO: 344 X10*3/UL (ref 150–450)
POTASSIUM SERPL-SCNC: 4.9 MMOL/L (ref 3.5–5.3)
RBC # BLD AUTO: 4 X10*6/UL (ref 4–5.2)
SODIUM SERPL-SCNC: 130 MMOL/L (ref 136–145)
WBC # BLD AUTO: 16 X10*3/UL (ref 4.4–11.3)

## 2024-05-12 PROCEDURE — 2500000004 HC RX 250 GENERAL PHARMACY W/ HCPCS (ALT 636 FOR OP/ED): Performed by: HOSPITALIST

## 2024-05-12 PROCEDURE — 99233 SBSQ HOSP IP/OBS HIGH 50: CPT | Performed by: INTERNAL MEDICINE

## 2024-05-12 PROCEDURE — 36415 COLL VENOUS BLD VENIPUNCTURE: CPT | Performed by: INTERNAL MEDICINE

## 2024-05-12 PROCEDURE — 2500000002 HC RX 250 W HCPCS SELF ADMINISTERED DRUGS (ALT 637 FOR MEDICARE OP, ALT 636 FOR OP/ED): Performed by: INTERNAL MEDICINE

## 2024-05-12 PROCEDURE — 2500000002 HC RX 250 W HCPCS SELF ADMINISTERED DRUGS (ALT 637 FOR MEDICARE OP, ALT 636 FOR OP/ED): Performed by: PHARMACIST

## 2024-05-12 PROCEDURE — 2500000002 HC RX 250 W HCPCS SELF ADMINISTERED DRUGS (ALT 637 FOR MEDICARE OP, ALT 636 FOR OP/ED): Performed by: HOSPITALIST

## 2024-05-12 PROCEDURE — 85027 COMPLETE CBC AUTOMATED: CPT | Performed by: INTERNAL MEDICINE

## 2024-05-12 PROCEDURE — 2500000004 HC RX 250 GENERAL PHARMACY W/ HCPCS (ALT 636 FOR OP/ED): Performed by: INTERNAL MEDICINE

## 2024-05-12 PROCEDURE — 2500000001 HC RX 250 WO HCPCS SELF ADMINISTERED DRUGS (ALT 637 FOR MEDICARE OP): Performed by: HOSPITALIST

## 2024-05-12 PROCEDURE — 80048 BASIC METABOLIC PNL TOTAL CA: CPT | Performed by: INTERNAL MEDICINE

## 2024-05-12 PROCEDURE — 1100000001 HC PRIVATE ROOM DAILY

## 2024-05-12 PROCEDURE — 94640 AIRWAY INHALATION TREATMENT: CPT

## 2024-05-12 PROCEDURE — 2500000005 HC RX 250 GENERAL PHARMACY W/O HCPCS: Performed by: INTERNAL MEDICINE

## 2024-05-12 PROCEDURE — 94664 DEMO&/EVAL PT USE INHALER: CPT

## 2024-05-12 PROCEDURE — 2500000001 HC RX 250 WO HCPCS SELF ADMINISTERED DRUGS (ALT 637 FOR MEDICARE OP): Performed by: INTERNAL MEDICINE

## 2024-05-12 PROCEDURE — 83605 ASSAY OF LACTIC ACID: CPT | Performed by: INTERNAL MEDICINE

## 2024-05-12 RX ORDER — ACETAMINOPHEN AND CODEINE PHOSPHATE 300; 30 MG/1; MG/1
1 TABLET ORAL EVERY 6 HOURS PRN
Status: DISCONTINUED | OUTPATIENT
Start: 2024-05-12 | End: 2024-05-14 | Stop reason: HOSPADM

## 2024-05-12 RX ORDER — LIDOCAINE 560 MG/1
1 PATCH PERCUTANEOUS; TOPICAL; TRANSDERMAL DAILY
Status: DISCONTINUED | OUTPATIENT
Start: 2024-05-12 | End: 2024-05-14 | Stop reason: HOSPADM

## 2024-05-12 RX ORDER — IPRATROPIUM BROMIDE AND ALBUTEROL SULFATE 2.5; .5 MG/3ML; MG/3ML
3 SOLUTION RESPIRATORY (INHALATION)
Status: DISCONTINUED | OUTPATIENT
Start: 2024-05-12 | End: 2024-05-14 | Stop reason: HOSPADM

## 2024-05-12 RX ADMIN — IPRATROPIUM BROMIDE AND ALBUTEROL SULFATE 3 ML: 2.5; .5 SOLUTION RESPIRATORY (INHALATION) at 03:20

## 2024-05-12 RX ADMIN — IPRATROPIUM BROMIDE AND ALBUTEROL SULFATE 3 ML: 2.5; .5 SOLUTION RESPIRATORY (INHALATION) at 19:19

## 2024-05-12 RX ADMIN — DILTIAZEM HYDROCHLORIDE 240 MG: 120 CAPSULE, EXTENDED RELEASE ORAL at 10:01

## 2024-05-12 RX ADMIN — METHYLPREDNISOLONE SODIUM SUCCINATE 40 MG: 40 INJECTION, POWDER, FOR SOLUTION INTRAMUSCULAR; INTRAVENOUS at 14:40

## 2024-05-12 RX ADMIN — BUDESONIDE INHALATION 0.5 MG: 0.5 SUSPENSION RESPIRATORY (INHALATION) at 19:19

## 2024-05-12 RX ADMIN — ACETAMINOPHEN AND CODEINE PHOSPHATE 1 TABLET: 300; 30 TABLET ORAL at 21:02

## 2024-05-12 RX ADMIN — NYSTATIN 500000 UNITS: 100000 SUSPENSION ORAL at 21:02

## 2024-05-12 RX ADMIN — METHYLPREDNISOLONE SODIUM SUCCINATE 40 MG: 40 INJECTION, POWDER, FOR SOLUTION INTRAMUSCULAR; INTRAVENOUS at 21:02

## 2024-05-12 RX ADMIN — FORMOTEROL FUMARATE DIHYDRATE 20 MCG: 20 SOLUTION RESPIRATORY (INHALATION) at 07:16

## 2024-05-12 RX ADMIN — FORMOTEROL FUMARATE DIHYDRATE 20 MCG: 20 SOLUTION RESPIRATORY (INHALATION) at 19:19

## 2024-05-12 RX ADMIN — AZITHROMYCIN DIHYDRATE 500 MG: 500 TABLET ORAL at 10:01

## 2024-05-12 RX ADMIN — IPRATROPIUM BROMIDE AND ALBUTEROL SULFATE 3 ML: 2.5; .5 SOLUTION RESPIRATORY (INHALATION) at 11:09

## 2024-05-12 RX ADMIN — DOCUSATE SODIUM 100 MG: 100 CAPSULE, LIQUID FILLED ORAL at 10:00

## 2024-05-12 RX ADMIN — NYSTATIN 500000 UNITS: 100000 SUSPENSION ORAL at 12:51

## 2024-05-12 RX ADMIN — NYSTATIN 500000 UNITS: 100000 SUSPENSION ORAL at 06:13

## 2024-05-12 RX ADMIN — METHYLPREDNISOLONE SODIUM SUCCINATE 40 MG: 40 INJECTION, POWDER, FOR SOLUTION INTRAMUSCULAR; INTRAVENOUS at 06:13

## 2024-05-12 RX ADMIN — PANTOPRAZOLE SODIUM 40 MG: 40 TABLET, DELAYED RELEASE ORAL at 06:13

## 2024-05-12 RX ADMIN — NYSTATIN 500000 UNITS: 100000 SUSPENSION ORAL at 17:15

## 2024-05-12 RX ADMIN — LIDOCAINE 4% 1 PATCH: 40 PATCH TOPICAL at 12:51

## 2024-05-12 RX ADMIN — IPRATROPIUM BROMIDE AND ALBUTEROL SULFATE 3 ML: 2.5; .5 SOLUTION RESPIRATORY (INHALATION) at 07:16

## 2024-05-12 RX ADMIN — POLYETHYLENE GLYCOL 3350 17 G: 17 POWDER, FOR SOLUTION ORAL at 10:01

## 2024-05-12 RX ADMIN — ACETAMINOPHEN AND CODEINE PHOSPHATE 1 TABLET: 300; 30 TABLET ORAL at 10:00

## 2024-05-12 RX ADMIN — BUDESONIDE INHALATION 0.5 MG: 0.5 SUSPENSION RESPIRATORY (INHALATION) at 07:16

## 2024-05-12 RX ADMIN — SODIUM CHLORIDE 500 ML: 9 INJECTION, SOLUTION INTRAVENOUS at 10:02

## 2024-05-12 RX ADMIN — ENOXAPARIN SODIUM 40 MG: 40 INJECTION SUBCUTANEOUS at 10:00

## 2024-05-12 SDOH — ECONOMIC STABILITY: HOUSING INSECURITY: IN THE LAST 12 MONTHS, HOW MANY PLACES HAVE YOU LIVED?: 2

## 2024-05-12 SDOH — SOCIAL STABILITY: SOCIAL NETWORK: IN A TYPICAL WEEK, HOW MANY TIMES DO YOU TALK ON THE PHONE WITH FAMILY, FRIENDS, OR NEIGHBORS?: PATIENT DECLINED

## 2024-05-12 SDOH — SOCIAL STABILITY: SOCIAL NETWORK: HOW OFTEN DO YOU ATTEND CHURCH OR RELIGIOUS SERVICES?: PATIENT DECLINED

## 2024-05-12 SDOH — HEALTH STABILITY: MENTAL HEALTH: HOW OFTEN DO YOU HAVE A DRINK CONTAINING ALCOHOL?: 2-3 TIMES A WEEK

## 2024-05-12 SDOH — HEALTH STABILITY: MENTAL HEALTH: HOW MANY STANDARD DRINKS CONTAINING ALCOHOL DO YOU HAVE ON A TYPICAL DAY?: 3 OR 4

## 2024-05-12 SDOH — HEALTH STABILITY: PHYSICAL HEALTH: ON AVERAGE, HOW MANY MINUTES DO YOU ENGAGE IN EXERCISE AT THIS LEVEL?: PATIENT DECLINED

## 2024-05-12 SDOH — SOCIAL STABILITY: SOCIAL INSECURITY: WITHIN THE LAST YEAR, HAVE YOU BEEN AFRAID OF YOUR PARTNER OR EX-PARTNER?: PATIENT DECLINED

## 2024-05-12 SDOH — SOCIAL STABILITY: SOCIAL NETWORK: HOW OFTEN DO YOU GET TOGETHER WITH FRIENDS OR RELATIVES?: PATIENT DECLINED

## 2024-05-12 SDOH — ECONOMIC STABILITY: FOOD INSECURITY: WITHIN THE PAST 12 MONTHS, THE FOOD YOU BOUGHT JUST DIDN'T LAST AND YOU DIDN'T HAVE MONEY TO GET MORE.: PATIENT DECLINED

## 2024-05-12 SDOH — ECONOMIC STABILITY: INCOME INSECURITY: IN THE LAST 12 MONTHS, WAS THERE A TIME WHEN YOU WERE NOT ABLE TO PAY THE MORTGAGE OR RENT ON TIME?: PATIENT DECLINED

## 2024-05-12 SDOH — HEALTH STABILITY: MENTAL HEALTH: HOW OFTEN DO YOU HAVE 6 OR MORE DRINKS ON ONE OCCASION?: PATIENT DECLINED

## 2024-05-12 SDOH — ECONOMIC STABILITY: INCOME INSECURITY: HOW HARD IS IT FOR YOU TO PAY FOR THE VERY BASICS LIKE FOOD, HOUSING, MEDICAL CARE, AND HEATING?: PATIENT DECLINED

## 2024-05-12 SDOH — SOCIAL STABILITY: SOCIAL NETWORK: ARE YOU MARRIED, WIDOWED, DIVORCED, SEPARATED, NEVER MARRIED, OR LIVING WITH A PARTNER?: PATIENT DECLINED

## 2024-05-12 SDOH — SOCIAL STABILITY: SOCIAL NETWORK: HOW OFTEN DO YOU ATTENT MEETINGS OF THE CLUB OR ORGANIZATION YOU BELONG TO?: PATIENT DECLINED

## 2024-05-12 SDOH — ECONOMIC STABILITY: FOOD INSECURITY: WITHIN THE PAST 12 MONTHS, YOU WORRIED THAT YOUR FOOD WOULD RUN OUT BEFORE YOU GOT MONEY TO BUY MORE.: PATIENT DECLINED

## 2024-05-12 ASSESSMENT — PAIN SCALES - GENERAL
PAINLEVEL_OUTOF10: 7
PAINLEVEL_OUTOF10: 8

## 2024-05-12 ASSESSMENT — LIFESTYLE VARIABLES
AUDIT-C TOTAL SCORE: -1
SKIP TO QUESTIONS 9-10: 0

## 2024-05-12 ASSESSMENT — PAIN DESCRIPTION - LOCATION: LOCATION: HEAD

## 2024-05-12 ASSESSMENT — COGNITIVE AND FUNCTIONAL STATUS - GENERAL
MOBILITY SCORE: 24
MOBILITY SCORE: 24
PATIENT BASELINE BEDBOUND: NO
DAILY ACTIVITIY SCORE: 24
DAILY ACTIVITIY SCORE: 24

## 2024-05-12 ASSESSMENT — PAIN - FUNCTIONAL ASSESSMENT: PAIN_FUNCTIONAL_ASSESSMENT: 0-10

## 2024-05-12 NOTE — PROGRESS NOTES
Fernando Cuevas is a 63 y.o. female on day 1 of admission presenting with COPD exacerbation (Multi).      Subjective   Pt seen and examined.        Objective     Last Recorded Vitals  /84 (BP Location: Right arm, Patient Position: Lying)   Pulse 99   Temp 36.6 °C (97.8 °F) (Oral)   Resp 18   Wt 64.6 kg (142 lb 8 oz)   SpO2 97%   Intake/Output last 3 Shifts:    Intake/Output Summary (Last 24 hours) at 5/12/2024 1048  Last data filed at 5/12/2024 0616  Gross per 24 hour   Intake --   Output 900 ml   Net -900 ml       Admission Weight  Weight: 64.6 kg (142 lb 8 oz) (05/10/24 2148)    Daily Weight  05/10/24 : 64.6 kg (142 lb 8 oz)      Physical Exam  Constitutional: No acute distress, awake, alert  Head/Neck: Neck supple  Respiratory/Thorax: Diminished breath sounds  Cardiovascular: Regular, rate and rhythm,  2+ equal pulses of the extremities, normal S 1and S 2  Gastrointestinal: Nondistended, soft, non-tender, no rebound tenderness or guarding  Extremities: No edema. No calf tenderness.  Neurological: Awake and alert. No focal neurological deficits  Psychological: Appropriate mood and behavior    Relevant Results  Results for orders placed or performed during the hospital encounter of 05/10/24 (from the past 24 hour(s))   Lactate   Result Value Ref Range    Lactate 2.8 (H) 0.4 - 2.0 mmol/L   CBC   Result Value Ref Range    WBC 16.0 (H) 4.4 - 11.3 x10*3/uL    nRBC 0.4 (H) 0.0 - 0.0 /100 WBCs    RBC 4.00 4.00 - 5.20 x10*6/uL    Hemoglobin 9.6 (L) 12.0 - 16.0 g/dL    Hematocrit 32.4 (L) 36.0 - 46.0 %    MCV 81 80 - 100 fL    MCH 24.0 (L) 26.0 - 34.0 pg    MCHC 29.6 (L) 32.0 - 36.0 g/dL    RDW 19.3 (H) 11.5 - 14.5 %    Platelets 344 150 - 450 x10*3/uL   Basic Metabolic Panel   Result Value Ref Range    Glucose 129 (H) 74 - 99 mg/dL    Sodium 130 (L) 136 - 145 mmol/L    Potassium 4.9 3.5 - 5.3 mmol/L    Chloride 94 (L) 98 - 107 mmol/L    Bicarbonate 26 21 - 32 mmol/L    Anion Gap 15 10 - 20 mmol/L    Urea  Nitrogen 19 6 - 23 mg/dL    Creatinine 0.63 0.50 - 1.05 mg/dL    eGFR >90 >60 mL/min/1.73m*2    Calcium 8.6 8.6 - 10.3 mg/dL        CT abdomen pelvis wo IV contrast   Final Result   1. No acute abdominal or pelvic process.   2. Moderate colonic stool volume.   3. Inferior lower abdominal wall skin thickening and subcutaneous fat   stranding (image [id number]). The findings may reflect chronic change,   though focal acute soft tissue infection/inflammation could have a   similar appearance. Please correlate with physical exam.   4. Mild body wall edema.   5. Moderate to severe centrilobular and paraseptal emphysema.        Signed by: Stan Pal 5/11/2024 6:25 AM   Dictation workstation:   [id number]      XR chest 1 view   Final Result   1. No acute pulmonary abnormality.   2. Medial left diaphragmatic hernia.   Signed by Berhane Brooks MD          Scheduled medications  azithromycin, 500 mg, oral, q24h  budesonide, 0.5 mg, nebulization, BID  dilTIAZem ER, 240 mg, oral, Daily  docusate sodium, 100 mg, oral, BID  enoxaparin, 40 mg, subcutaneous, q24h  formoterol, 20 mcg, nebulization, BID  ibuprofen, 600 mg, oral, Once  ipratropium-albuteroL, 3 mL, nebulization, q4h  lidocaine, 1 patch, transdermal, Daily  methylPREDNISolone sodium succinate (PF), 40 mg, intravenous, q8h  nystatin, 5 mL, Swish & Swallow, 4x daily  pantoprazole, 40 mg, oral, Daily before breakfast   Or  pantoprazole, 40 mg, intravenous, Daily before breakfast  polyethylene glycol, 17 g, oral, Daily  sennosides, 1 tablet, oral, BID  sodium chloride, 500 mL, intravenous, Once      Continuous medications     PRN medications  PRN medications: acetaminophen-codeine, benzonatate, bisacodyl, guaiFENesin, ipratropium-albuteroL, melatonin, ondansetron ODT **OR** ondansetron         Assessment/Plan                  Principal Problem:    COPD exacerbation (Multi)    # COPD exac  -continueDuoNeb, budesonide, formoterol, Solu-Medrol, Zithromax and Lovenox.    -pulm following  -incentive spirometry  -monitor    # Elevayed lactic acid  -chronic elevation?  -IV fluids  -check in am    # Adenocarcinoma of the right lower lobe  Has had radiation therapy  Patient to follow-up with her primary pulmonologist    # HTN  -continue home meds    # DVT ppx                Rupa Khoury MD

## 2024-05-12 NOTE — CARE PLAN
The patient's goals for the shift include Pt will get adequate rest    The clinical goals for the shift include Pt will not c/o SOB

## 2024-05-12 NOTE — PROGRESS NOTES
"Fernando Cuevas is a 63 y.o. female on day 1 of admission presenting with COPD exacerbation (Multi).    Subjective   Feels \"okay\".  Admits to mild shortness of breath, to knee and back pain and to a nonproductive cough.  On room air with an oxygen saturation of 100%.     Objective   Physical Exam  Vitals  Blood pressure 137/84, pulse 99, temperature 36.6 °C (97.8 °F), temperature source Oral, resp. rate 18, height 1.549 m (5' 0.98\"), weight 64.6 kg (142 lb 8 oz), SpO2 97%.  Intake/Output last 3 Shifts:  I/O last 3 completed shifts:  In: 150 (2.3 mL/kg) [I.V.:50 (0.8 mL/kg); IV Piggyback:100]  Out: 900 (13.9 mL/kg) [Urine:900 (0.4 mL/kg/hr)]  Weight: 64.6 kg     General-awake, alert and in no acute distress.  Lungs-faint wheezes without rales or rhonchi.  Heart-regular rate and rhythm.  Abdomen-positive bowel sounds.  Extremities-no edema.  Skin-no rashes.    Scheduled medications  azithromycin, 500 mg, oral, q24h  budesonide, 0.5 mg, nebulization, BID  dilTIAZem ER, 240 mg, oral, Daily  docusate sodium, 100 mg, oral, BID  enoxaparin, 40 mg, subcutaneous, q24h  formoterol, 20 mcg, nebulization, BID  ibuprofen, 600 mg, oral, Once  ipratropium-albuteroL, 3 mL, nebulization, q4h  methylPREDNISolone sodium succinate (PF), 40 mg, intravenous, q8h  nystatin, 5 mL, Swish & Swallow, 4x daily  pantoprazole, 40 mg, oral, Daily before breakfast   Or  pantoprazole, 40 mg, intravenous, Daily before breakfast  polyethylene glycol, 17 g, oral, Daily  sennosides, 1 tablet, oral, BID  sodium chloride, 500 mL, intravenous, Once      Continuous medications     PRN medications  PRN medications: acetaminophen-codeine, benzonatate, bisacodyl, guaiFENesin, ipratropium-albuteroL, melatonin, ondansetron ODT **OR** ondansetron     Results for orders placed or performed during the hospital encounter of 05/10/24 (from the past 24 hour(s))   Lactate   Result Value Ref Range    Lactate 3.7 (H) 0.4 - 2.0 mmol/L   Lactate   Result Value Ref Range    " Lactate 2.8 (H) 0.4 - 2.0 mmol/L   CBC   Result Value Ref Range    WBC 16.0 (H) 4.4 - 11.3 x10*3/uL    nRBC 0.4 (H) 0.0 - 0.0 /100 WBCs    RBC 4.00 4.00 - 5.20 x10*6/uL    Hemoglobin 9.6 (L) 12.0 - 16.0 g/dL    Hematocrit 32.4 (L) 36.0 - 46.0 %    MCV 81 80 - 100 fL    MCH 24.0 (L) 26.0 - 34.0 pg    MCHC 29.6 (L) 32.0 - 36.0 g/dL    RDW 19.3 (H) 11.5 - 14.5 %    Platelets 344 150 - 450 x10*3/uL   Basic Metabolic Panel   Result Value Ref Range    Glucose 129 (H) 74 - 99 mg/dL    Sodium 130 (L) 136 - 145 mmol/L    Potassium 4.9 3.5 - 5.3 mmol/L    Chloride 94 (L) 98 - 107 mmol/L    Bicarbonate 26 21 - 32 mmol/L    Anion Gap 15 10 - 20 mmol/L    Urea Nitrogen 19 6 - 23 mg/dL    Creatinine 0.63 0.50 - 1.05 mg/dL    eGFR >90 >60 mL/min/1.73m*2    Calcium 8.6 8.6 - 10.3 mg/dL      Assessment:  63-year-old woman with a history of COPD, adenocarcinoma of the right lower lobe-status post radiation treatment, GERD, hypertension, depression and chronic hypercapnic respiratory failure, readmitted to [facility] for recurrent shortness of breath and weakness, and found to have acute-on-chronic hypercapnic respiratory failure, in association with an admission chest x-ray showing no infiltrates or effusions, but with severe COPD changes.  She has very mild bronchospasm, presently.  There is no evidence of pneumonia.  Pulmonary embolism is unlikely.    Recommend:  1.  Continue DuoNeb, budesonide, formoterol, Solu-Medrol, Zithromax and Lovenox.  2.  Keep oxygen saturation approximately 92 to 95%; repeat home oxygen evaluation prior to discharge.  3.  Incentive spirometry.  4.  Resume home medications and a prednisone taper when discharged.  5.  Follow-up with her outpatient Pulmonologist and Oncologist at [facility] after discharge.    Reid Dinh MD

## 2024-05-12 NOTE — CARE PLAN
The patient's goals for the shift include Pt will get adequate rest    The clinical goals for the shift include Pt will not c/o SOB      Problem: Pain - Adult  Goal: Verbalizes/displays adequate comfort level or baseline comfort level  Outcome: Progressing     Problem: Safety - Adult  Goal: Free from fall injury  Outcome: Progressing     Problem: Fall/Injury  Goal: Not fall by end of shift  Outcome: Progressing  Goal: Be free from injury by end of the shift  Outcome: Progressing  Goal: Verbalize understanding of personal risk factors for fall in the hospital  Outcome: Progressing  Goal: Verbalize understanding of risk factor reduction measures to prevent injury from fall in the home  Outcome: Progressing  Goal: Use assistive devices by end of the shift  Outcome: Progressing  Goal: Pace activities to prevent fatigue by end of the shift  Outcome: Progressing

## 2024-05-13 LAB
ANION GAP SERPL CALC-SCNC: 16 MMOL/L (ref 10–20)
BUN SERPL-MCNC: 22 MG/DL (ref 6–23)
CALCIUM SERPL-MCNC: 9 MG/DL (ref 8.6–10.3)
CHLORIDE SERPL-SCNC: 94 MMOL/L (ref 98–107)
CO2 SERPL-SCNC: 27 MMOL/L (ref 21–32)
CREAT SERPL-MCNC: 0.73 MG/DL (ref 0.5–1.05)
EGFRCR SERPLBLD CKD-EPI 2021: >90 ML/MIN/1.73M*2
ERYTHROCYTE [DISTWIDTH] IN BLOOD BY AUTOMATED COUNT: 19.5 % (ref 11.5–14.5)
GLUCOSE SERPL-MCNC: 121 MG/DL (ref 74–99)
HCT VFR BLD AUTO: 36.3 % (ref 36–46)
HGB BLD-MCNC: 10.8 G/DL (ref 12–16)
LACTATE SERPL-SCNC: 2.7 MMOL/L (ref 0.4–2)
MCH RBC QN AUTO: 24.1 PG (ref 26–34)
MCHC RBC AUTO-ENTMCNC: 29.8 G/DL (ref 32–36)
MCV RBC AUTO: 81 FL (ref 80–100)
NRBC BLD-RTO: 0.4 /100 WBCS (ref 0–0)
PLATELET # BLD AUTO: 468 X10*3/UL (ref 150–450)
POTASSIUM SERPL-SCNC: 4.5 MMOL/L (ref 3.5–5.3)
RBC # BLD AUTO: 4.48 X10*6/UL (ref 4–5.2)
SODIUM SERPL-SCNC: 132 MMOL/L (ref 136–145)
WBC # BLD AUTO: 19.8 X10*3/UL (ref 4.4–11.3)

## 2024-05-13 PROCEDURE — 2500000002 HC RX 250 W HCPCS SELF ADMINISTERED DRUGS (ALT 637 FOR MEDICARE OP, ALT 636 FOR OP/ED): Performed by: INTERNAL MEDICINE

## 2024-05-13 PROCEDURE — 2500000001 HC RX 250 WO HCPCS SELF ADMINISTERED DRUGS (ALT 637 FOR MEDICARE OP): Performed by: HOSPITALIST

## 2024-05-13 PROCEDURE — 94640 AIRWAY INHALATION TREATMENT: CPT

## 2024-05-13 PROCEDURE — 2500000002 HC RX 250 W HCPCS SELF ADMINISTERED DRUGS (ALT 637 FOR MEDICARE OP, ALT 636 FOR OP/ED): Performed by: PHARMACIST

## 2024-05-13 PROCEDURE — 99232 SBSQ HOSP IP/OBS MODERATE 35: CPT | Performed by: CLINICAL NURSE SPECIALIST

## 2024-05-13 PROCEDURE — 99233 SBSQ HOSP IP/OBS HIGH 50: CPT | Performed by: INTERNAL MEDICINE

## 2024-05-13 PROCEDURE — 94668 MNPJ CHEST WALL SBSQ: CPT

## 2024-05-13 PROCEDURE — 97161 PT EVAL LOW COMPLEX 20 MIN: CPT | Mod: GP

## 2024-05-13 PROCEDURE — 2500000001 HC RX 250 WO HCPCS SELF ADMINISTERED DRUGS (ALT 637 FOR MEDICARE OP): Performed by: INTERNAL MEDICINE

## 2024-05-13 PROCEDURE — 83605 ASSAY OF LACTIC ACID: CPT | Performed by: INTERNAL MEDICINE

## 2024-05-13 PROCEDURE — 2500000004 HC RX 250 GENERAL PHARMACY W/ HCPCS (ALT 636 FOR OP/ED): Performed by: HOSPITALIST

## 2024-05-13 PROCEDURE — 80048 BASIC METABOLIC PNL TOTAL CA: CPT | Performed by: INTERNAL MEDICINE

## 2024-05-13 PROCEDURE — 85027 COMPLETE CBC AUTOMATED: CPT | Performed by: INTERNAL MEDICINE

## 2024-05-13 PROCEDURE — 94664 DEMO&/EVAL PT USE INHALER: CPT

## 2024-05-13 PROCEDURE — 1100000001 HC PRIVATE ROOM DAILY

## 2024-05-13 PROCEDURE — 36415 COLL VENOUS BLD VENIPUNCTURE: CPT | Performed by: INTERNAL MEDICINE

## 2024-05-13 PROCEDURE — 2500000005 HC RX 250 GENERAL PHARMACY W/O HCPCS: Performed by: INTERNAL MEDICINE

## 2024-05-13 RX ORDER — LISINOPRIL 10 MG/1
10 TABLET ORAL DAILY
Status: DISCONTINUED | OUTPATIENT
Start: 2024-05-13 | End: 2024-05-14 | Stop reason: HOSPADM

## 2024-05-13 RX ADMIN — NYSTATIN 500000 UNITS: 100000 SUSPENSION ORAL at 06:16

## 2024-05-13 RX ADMIN — ACETAMINOPHEN AND CODEINE PHOSPHATE 1 TABLET: 300; 30 TABLET ORAL at 19:56

## 2024-05-13 RX ADMIN — LISINOPRIL 10 MG: 10 TABLET ORAL at 14:54

## 2024-05-13 RX ADMIN — Medication 3 MG: at 22:14

## 2024-05-13 RX ADMIN — FORMOTEROL FUMARATE DIHYDRATE 20 MCG: 20 SOLUTION RESPIRATORY (INHALATION) at 06:02

## 2024-05-13 RX ADMIN — PANTOPRAZOLE SODIUM 40 MG: 40 TABLET, DELAYED RELEASE ORAL at 06:16

## 2024-05-13 RX ADMIN — GUAIFENESIN 200 MG: 200 SOLUTION ORAL at 19:06

## 2024-05-13 RX ADMIN — POLYETHYLENE GLYCOL 3350 17 G: 17 POWDER, FOR SOLUTION ORAL at 10:29

## 2024-05-13 RX ADMIN — NYSTATIN 500000 UNITS: 100000 SUSPENSION ORAL at 11:56

## 2024-05-13 RX ADMIN — IPRATROPIUM BROMIDE AND ALBUTEROL SULFATE 3 ML: 2.5; .5 SOLUTION RESPIRATORY (INHALATION) at 19:55

## 2024-05-13 RX ADMIN — FORMOTEROL FUMARATE DIHYDRATE 20 MCG: 20 SOLUTION RESPIRATORY (INHALATION) at 19:55

## 2024-05-13 RX ADMIN — DILTIAZEM HYDROCHLORIDE 240 MG: 120 CAPSULE, EXTENDED RELEASE ORAL at 10:31

## 2024-05-13 RX ADMIN — AZITHROMYCIN DIHYDRATE 500 MG: 500 TABLET ORAL at 10:31

## 2024-05-13 RX ADMIN — ENOXAPARIN SODIUM 40 MG: 40 INJECTION SUBCUTANEOUS at 10:32

## 2024-05-13 RX ADMIN — IPRATROPIUM BROMIDE AND ALBUTEROL SULFATE 3 ML: 2.5; .5 SOLUTION RESPIRATORY (INHALATION) at 14:28

## 2024-05-13 RX ADMIN — METHYLPREDNISOLONE SODIUM SUCCINATE 40 MG: 40 INJECTION, POWDER, FOR SOLUTION INTRAMUSCULAR; INTRAVENOUS at 06:00

## 2024-05-13 RX ADMIN — BUDESONIDE INHALATION 0.5 MG: 0.5 SUSPENSION RESPIRATORY (INHALATION) at 06:02

## 2024-05-13 RX ADMIN — BISACODYL 10 MG: 5 TABLET, COATED ORAL at 22:20

## 2024-05-13 RX ADMIN — DOCUSATE SODIUM 100 MG: 100 CAPSULE, LIQUID FILLED ORAL at 22:14

## 2024-05-13 RX ADMIN — IPRATROPIUM BROMIDE AND ALBUTEROL SULFATE 3 ML: 2.5; .5 SOLUTION RESPIRATORY (INHALATION) at 06:02

## 2024-05-13 RX ADMIN — LIDOCAINE 4% 1 PATCH: 40 PATCH TOPICAL at 10:28

## 2024-05-13 RX ADMIN — NYSTATIN 500000 UNITS: 100000 SUSPENSION ORAL at 16:17

## 2024-05-13 RX ADMIN — ACETAMINOPHEN AND CODEINE PHOSPHATE 1 TABLET: 300; 30 TABLET ORAL at 05:55

## 2024-05-13 RX ADMIN — METHYLPREDNISOLONE SODIUM SUCCINATE 40 MG: 40 INJECTION, POWDER, FOR SOLUTION INTRAMUSCULAR; INTRAVENOUS at 14:54

## 2024-05-13 RX ADMIN — ACETAMINOPHEN AND CODEINE PHOSPHATE 1 TABLET: 300; 30 TABLET ORAL at 13:34

## 2024-05-13 RX ADMIN — BUDESONIDE INHALATION 0.5 MG: 0.5 SUSPENSION RESPIRATORY (INHALATION) at 19:55

## 2024-05-13 RX ADMIN — NYSTATIN 500000 UNITS: 100000 SUSPENSION ORAL at 22:15

## 2024-05-13 RX ADMIN — SENNOSIDES 8.6 MG: 8.6 TABLET, FILM COATED ORAL at 22:14

## 2024-05-13 ASSESSMENT — COGNITIVE AND FUNCTIONAL STATUS - GENERAL
CLIMB 3 TO 5 STEPS WITH RAILING: A LITTLE
TURNING FROM BACK TO SIDE WHILE IN FLAT BAD: A LITTLE
MOVING TO AND FROM BED TO CHAIR: A LITTLE
STANDING UP FROM CHAIR USING ARMS: A LITTLE
WALKING IN HOSPITAL ROOM: A LITTLE
MOBILITY SCORE: 19

## 2024-05-13 ASSESSMENT — PAIN - FUNCTIONAL ASSESSMENT
PAIN_FUNCTIONAL_ASSESSMENT: 0-10

## 2024-05-13 ASSESSMENT — PAIN DESCRIPTION - LOCATION
LOCATION: BACK
LOCATION: KNEE
LOCATION: BACK

## 2024-05-13 ASSESSMENT — PAIN SCALES - GENERAL
PAINLEVEL_OUTOF10: 8
PAINLEVEL_OUTOF10: 10 - WORST POSSIBLE PAIN
PAINLEVEL_OUTOF10: 9
PAINLEVEL_OUTOF10: 10 - WORST POSSIBLE PAIN

## 2024-05-13 ASSESSMENT — ACTIVITIES OF DAILY LIVING (ADL)
LACK_OF_TRANSPORTATION: NO
ADL_ASSISTANCE: INDEPENDENT

## 2024-05-13 ASSESSMENT — PAIN SCALES - PAIN ASSESSMENT IN ADVANCED DEMENTIA (PAINAD)
TOTALSCORE: MEDICATION (SEE MAR)
TOTALSCORE: MEDICATION (SEE MAR)

## 2024-05-13 ASSESSMENT — PAIN DESCRIPTION - ORIENTATION
ORIENTATION: RIGHT;LEFT
ORIENTATION: RIGHT;LEFT

## 2024-05-13 ASSESSMENT — PAIN DESCRIPTION - DESCRIPTORS: DESCRIPTORS: ACHING

## 2024-05-13 NOTE — PROGRESS NOTES
Physical Therapy    Physical Therapy Evaluation    Patient Name: Fernando Cuevas  MRN: [id number]  Today's Date: 5/13/2024   Time Calculation  Start Time: 1111  Stop Time: 1124  Time Calculation (min): 13 min    Assessment/Plan   PT Assessment  PT Assessment Results: Decreased strength, Decreased range of motion, Decreased endurance, Impaired balance, Decreased mobility, Pain  Rehab Prognosis: Good  End of Session Communication: Bedside nurse  Assessment Comment: PT Evaluation Completed. The patient presented with decreased balance, endurance and increased pain and fatigue. These impairments are negatively impacting her ability to perform at baseline functional level, in home environment. The patient is at risk of falls & injury. This patient would benefit from skilled therapy intervention to address limitations and progress towards the PT goals.  Anticipate low frequency PT needs at discharge  End of Session Patient Position: Bed, 3 rail up, Alarm on  IP OR SWING BED PT PLAN  Inpatient or Swing Bed: Inpatient  PT Plan  Treatment/Interventions: Bed mobility, Transfer training, Stair training, Gait training, Balance training, Strengthening, Endurance training, Therapeutic exercise, Range of motion, Therapeutic activity, Home exercise program  PT Plan: Skilled PT  PT Frequency: 3 times per week  PT Discharge Recommendations: Low intensity level of continued care  PT Recommended Transfer Status: Assist x1  PT - OK to Discharge: Yes (PT POC Established.)      Subjective   General Visit Information:  General  Reason for Referral: COPD exacerbation  Referred By:   Past Medical History:   Diagnosis Date    Essential (primary) hypertension 04/20/2016    Benign hypertension    Personal history of other diseases of the respiratory system     H/O emphysema    Personal history of other diseases of the respiratory system     History of chronic obstructive lung disease    Personal history of other mental and behavioral disorders      History of depression       Prior to Session Communication: Bedside nurse  Patient Position Received: Bed, 3 rail up, Alarm off, not on at start of session  General Comment: Pt pleasant and agreeable to eval.  Home Living:  Home Living  Type of Home: House  Lives With:  (son)  Home Adaptive Equipment: Walker rolling or standard (BSC)  Home Layout: One level  Home Access: Stairs to enter with rails  Entrance Stairs-Number of Steps: 3  Prior Level of Function:  Prior Function Per Pt/Caregiver Report  Level of [place]: Independent with ADLs and functional transfers, Needs assistance with homemaking  Receives Help From:  (Son)  ADL Assistance: Independent (recently has needed assist but IND at baseline.)  Homemaking Assistance: Needs assistance  Ambulatory Assistance: Independent (using RW)  Prior Function Comments: Drives. Denies any recent falls.  Precautions:  Precautions  Medical Precautions: Fall precautions  Vital Signs:       Objective   Pain:  Pain Assessment  Pain Assessment: 0-10  Pain Score: 10 - Worst possible pain  Pain Location:  (migraine)  Cognition:  Cognition  Overall Cognitive Status: Within Functional Limits  Orientation Level: Oriented X4    General Assessments:  Activity Tolerance  Endurance: Tolerates less than 10 min exercise, no significant change in vital signs  Activity Tolerance Comments: Becomes SOB quickly wiht mobility. Frequent rest breaks given.    Sensation  Light Touch: No apparent deficits    Postural Control  Postural Control: Within Functional Limits    Static Sitting Balance  Static Sitting-Balance Support: Feet supported, Bilateral upper extremity supported  Static Sitting-Level of Assistance: Close supervision  Dynamic Sitting Balance  Dynamic Sitting-Balance Support: Feet supported, Bilateral upper extremity supported  Dynamic Sitting-Comments: Close supervision    Static Standing Balance  Static Standing-Balance Support: Bilateral upper extremity supported  Static  Standing-Level of Assistance: Contact guard  Dynamic Standing Balance  Dynamic Standing-Balance Support: Bilateral upper extremity supported  Dynamic Standing-Comments: Contact guard  Functional Assessments:  Bed Mobility  Bed Mobility: Yes  Bed Mobility 1  Bed Mobility 1: Supine to sitting  Level of Assistance 1: Close supervision    Transfers  Transfer: Yes  Transfer 1  Technique 1: Sit to stand, Stand to sit  Transfer Device 1: Walker  Transfer Level of Assistance 1: Close supervision  Trials/Comments 1: Cues for hand placement. Decreased eccentric control when sitting.    Ambulation/Gait Training  Ambulation/Gait Training Performed: Yes  Ambulation/Gait Training 1  Surface 1: Level tile  Device 1: Rolling walker  Assistance 1: Contact guard  Comments/Distance (ft) 1: 100 ft. Decreased geroniom and step length. Overall demonstrating good stability. Moderate SOB.  Extremity/Trunk Assessments:  RUE   RUE : Within Functional Limits  LUE   LUE: Within Functional Limits  RLE   RLE :  (Strength >3/5 based on observation of functional mobility.ROM WFL.)  LLE   LLE :  (Strength >3/5 based on observation of functional mobility.ROM WFL.)  Outcome Measures:  [facility] Basic Mobility  Turning from your back to your side while in a flat bed without using bedrails: None  Moving from lying on your back to sitting on the side of a flat bed without using bedrails: A little  Moving to and from bed to chair (including a wheelchair): A little  Standing up from a chair using your arms (e.g. wheelchair or bedside chair): A little  To walk in hospital room: A little  Climbing 3-5 steps with railing: A little  Basic Mobility - Total Score: 19    Encounter Problems       Encounter Problems (Active)       Balance       complete all mobility with normal balance while dual tasking, negotiating in a dynamic environment, carrying items, etc., with proactive and reactive static and dynamic standing and sitting tasks, with mod I and RW, >15 minutes.        Start:  05/13/24    Expected End:  05/27/24               Mobility       STG - Patient will ambulate 150 ft mod I with a RW.        Start:  05/13/24    Expected End:  05/27/24            STG - Patient will ascend and descend 3 stairs using unilateral HR and LRAD.        Start:  05/13/24    Expected End:  05/27/24               PT Transfers       STG - Patient will perform bed mobility independently.        Start:  05/13/24    Expected End:  05/27/24            STG - Patient will transfer sit to and from stand mod I with a RW.        Start:  05/13/24    Expected End:  05/27/24               Pain - Adult              Education Documentation  Body Mechanics, taught by Katharina Segal, PT at 5/13/2024 11:44 AM.  Learner: Patient  Readiness: Acceptance  Method: Explanation  Response: Verbalizes Understanding    Mobility Training, taught by Katharina Segal, PT at 5/13/2024 11:44 AM.  Learner: Patient  Readiness: Acceptance  Method: Explanation  Response: Verbalizes Understanding    Education Comments  No comments found.

## 2024-05-13 NOTE — PROGRESS NOTES
"Fernando Cuevas is a 63 y.o. female on day 2 of admission presenting with COPD exacerbation (Multi).    Subjective     Patient seen and examined sitting up in bed eating dinner.  Patient reports that she is feeling better.  Weaned to room air yesterday, pulse ox 96 to 97%.  Reports intermittent shortness of breath at rest with dyspnea on exertion.  Patient reports intermittent headaches (suffers from migraines).  Denies chest pain, fever, chills and nausea.  Patient does report continued issues with increased belching after eating as well as fullness in her abdomen.  Reports continued use of both incentive spirometry and Acapella.       Objective   Physical Exam    Constitutional:   Chronically ill-appearing, NAD, cooperative  HENT: Atraumatic, moist mucous membranes with multiple oral sores on tongue  Eyes: PERRL  Neck: Supple, no JVD  Cardiovascular: S1, S2 stinks, tacky in the 100s, no murmur appreciated  Pulmonary: Fair air entry with clear anterior fields, posterior minimal expiratory wheezes; no rhonchi or crackles noted; mild conversational dyspnea after several minutes  Abdominal: Obese, semi-firm, no grimace with palpation, + BS  Musculoskeletal: Fair active range of motion with fair strength  Extremities:   Trace BLE edema, L>R  Lymphadenopathy: No nuchal LAP  Skin: Warm and dry  Neurological: Alert and oriented x 3, speech clear and appropriate; no focal deficits noted  Psychiatric: Appropriate mood and behavior       Vitals  Blood pressure 160/63, pulse 107, temperature 36.9 °C (98.4 °F), temperature source Oral, resp. rate 19, height 1.549 m (5' 0.98\"), weight 64.6 kg (142 lb 8 oz), SpO2 99%.  Intake/Output last 3 Shifts:  I/O last 3 completed shifts:  In: 1335 (20.7 mL/kg) [P.O.:835; IV Piggyback:500]  Out: 1700 (26.3 mL/kg) [Urine:1700 (0.7 mL/kg/hr)]  Weight: 64.6 kg     Scheduled medications  budesonide, 0.5 mg, nebulization, BID  dilTIAZem ER, 240 mg, oral, Daily  docusate sodium, 100 mg, oral, " BID  enoxaparin, 40 mg, subcutaneous, q24h  formoterol, 20 mcg, nebulization, BID  ibuprofen, 600 mg, oral, Once  ipratropium-albuteroL, 3 mL, nebulization, TID  lidocaine, 1 patch, transdermal, Daily  lisinopril, 10 mg, oral, Daily  methylPREDNISolone sodium succinate (PF), 40 mg, intravenous, q8h  nystatin, 5 mL, Swish & Swallow, 4x daily  pantoprazole, 40 mg, oral, Daily before breakfast   Or  pantoprazole, 40 mg, intravenous, Daily before breakfast  polyethylene glycol, 17 g, oral, Daily  sennosides, 1 tablet, oral, BID      Continuous medications     PRN medications  PRN medications: acetaminophen-codeine, benzonatate, bisacodyl, guaiFENesin, ipratropium-albuteroL, melatonin, ondansetron ODT **OR** ondansetron     Results for orders placed or performed during the hospital encounter of 05/10/24 (from the past 24 hour(s))   CBC   Result Value Ref Range    WBC 19.8 (H) 4.4 - 11.3 x10*3/uL    nRBC 0.4 (H) 0.0 - 0.0 /100 WBCs    RBC 4.48 4.00 - 5.20 x10*6/uL    Hemoglobin 10.8 (L) 12.0 - 16.0 g/dL    Hematocrit 36.3 36.0 - 46.0 %    MCV 81 80 - 100 fL    MCH 24.1 (L) 26.0 - 34.0 pg    MCHC 29.8 (L) 32.0 - 36.0 g/dL    RDW 19.5 (H) 11.5 - 14.5 %    Platelets 468 (H) 150 - 450 x10*3/uL   Basic Metabolic Panel   Result Value Ref Range    Glucose 121 (H) 74 - 99 mg/dL    Sodium 132 (L) 136 - 145 mmol/L    Potassium 4.5 3.5 - 5.3 mmol/L    Chloride 94 (L) 98 - 107 mmol/L    Bicarbonate 27 21 - 32 mmol/L    Anion Gap 16 10 - 20 mmol/L    Urea Nitrogen 22 6 - 23 mg/dL    Creatinine 0.73 0.50 - 1.05 mg/dL    eGFR >90 >60 mL/min/1.73m*2    Calcium 9.0 8.6 - 10.3 mg/dL   Lactate   Result Value Ref Range    Lactate 2.7 (H) 0.4 - 2.0 mmol/L      Assessment:  63-year-old woman with a history of COPD, adenocarcinoma of the right lower lobe-status post radiation treatment, GERD, hypertension, depression and chronic hypercapnic respiratory failure, readmitted to [facility] for recurrent shortness of breath and weakness, and  found to have acute-on-chronic hypercapnic respiratory failure, in association with an admission chest x-ray showing no infiltrates or effusions, but with severe COPD changes.  She has very mild bronchospasm, presently.  There is no evidence of pneumonia.  Pulmonary embolism is unlikely.    5/13: Patient reports ambulating around the unit with significant dyspnea yesterday.  Has not ambulated today.  Patient admits to feeling increasingly anxious, impacting her dyspnea.  Minimal posterior expiratory wheezing today.  Remains stable on room air.     Recommend:  Continue DuoNeb, budesonide, formoterol, Zithromax and Lovenox.  Transition Solu-Medrol to once daily   Keep oxygen saturation approximately 92 to 95%; repeat home oxygen evaluation prior to discharge.  Incentive spirometry.  Resume home medications and a prednisone taper when discharged.  Follow-up with her outpatient Pulmonologist and Oncologist at [facility] after discharge.    Dorene Whittington, LISETH-CNS

## 2024-05-13 NOTE — PROGRESS NOTES
05/13/24 0948   Discharge Planning   Living Arrangements Children  (son)   Assistance Needed walker   Number of Stairs to Enter Residence 2   Home or Post Acute Services In home services   Patient expects to be discharged to: home with Navigator   Does the patient need discharge transport arranged? Yes   RoundTrip coordination needed? Yes   Has discharge transport been arranged? No   Financial Resource Strain   How hard is it for you to pay for the very basics like food, housing, medical care, and heating? Somewhat   Housing Stability   In the last 12 months, was there a time when you were not able to pay the mortgage or rent on time? N   In the last 12 months, was there a time when you did not have a steady place to sleep or slept in a shelter (including now)? N   Transportation Needs   In the past 12 months, has lack of transportation kept you from medical appointments or from getting medications? no   In the past 12 months, has lack of transportation kept you from meetings, work, or from getting things needed for daily living? No   Patient Choice   Patient / Family choosing to utilize agency / facility established prior to hospitalization Yes     Plan: IV Solu  Disposition: home with [facility]  Barrier: pulm following  ADOD: 1- 2 days    Met with pt at bedside. Pt is on room air. Pt stated she lives with her. Pt is active with [facility] and will cont services when dc.

## 2024-05-13 NOTE — CARE PLAN
The patient's goals for the shift include Pt will get adequate rest    The clinical goals for the shift include pt will remain safe throughout the shift    Over the shift, the patient did make progress toward the following goals.

## 2024-05-13 NOTE — PROGRESS NOTES
Fernando Cuevas is a 63 y.o. female on day 2 of admission presenting with COPD exacerbation (Multi).      Subjective   Pt seen and examined.        Objective     Last Recorded Vitals  /70 (BP Location: Right arm, Patient Position: Lying)   Pulse 77   Temp 35.5 °C (95.9 °F) (Temporal)   Resp 18   Wt 64.6 kg (142 lb 8 oz)   SpO2 94%   Intake/Output last 3 Shifts:    Intake/Output Summary (Last 24 hours) at 5/13/2024 1044  Last data filed at 5/13/2024 0900  Gross per 24 hour   Intake 1335 ml   Output 800 ml   Net 535 ml       Admission Weight  Weight: 64.6 kg (142 lb 8 oz) (05/10/24 2148)    Daily Weight  05/10/24 : 64.6 kg (142 lb 8 oz)      Physical Exam  Constitutional: No acute distress, awake, alert  Head/Neck: Neck supple  Respiratory/Thorax: Diminished breath sounds  Cardiovascular: Regular, rate and rhythm,  2+ equal pulses of the extremities, normal S 1and S 2  Gastrointestinal: Nondistended, soft, non-tender, no rebound tenderness or guarding  Extremities: No edema. No calf tenderness.  Neurological: Awake and alert. No focal neurological deficits  Psychological: Appropriate mood and behavior    Relevant Results  Results for orders placed or performed during the hospital encounter of 05/10/24 (from the past 24 hour(s))   CBC   Result Value Ref Range    WBC 19.8 (H) 4.4 - 11.3 x10*3/uL    nRBC 0.4 (H) 0.0 - 0.0 /100 WBCs    RBC 4.48 4.00 - 5.20 x10*6/uL    Hemoglobin 10.8 (L) 12.0 - 16.0 g/dL    Hematocrit 36.3 36.0 - 46.0 %    MCV 81 80 - 100 fL    MCH 24.1 (L) 26.0 - 34.0 pg    MCHC 29.8 (L) 32.0 - 36.0 g/dL    RDW 19.5 (H) 11.5 - 14.5 %    Platelets 468 (H) 150 - 450 x10*3/uL   Basic Metabolic Panel   Result Value Ref Range    Glucose 121 (H) 74 - 99 mg/dL    Sodium 132 (L) 136 - 145 mmol/L    Potassium 4.5 3.5 - 5.3 mmol/L    Chloride 94 (L) 98 - 107 mmol/L    Bicarbonate 27 21 - 32 mmol/L    Anion Gap 16 10 - 20 mmol/L    Urea Nitrogen 22 6 - 23 mg/dL    Creatinine 0.73 0.50 - 1.05 mg/dL    eGFR  >90 >60 mL/min/1.73m*2    Calcium 9.0 8.6 - 10.3 mg/dL   Lactate   Result Value Ref Range    Lactate 2.7 (H) 0.4 - 2.0 mmol/L        CT abdomen pelvis wo IV contrast   Final Result   1. No acute abdominal or pelvic process.   2. Moderate colonic stool volume.   3. Inferior lower abdominal wall skin thickening and subcutaneous fat   stranding (image [id number]). The findings may reflect chronic change,   though focal acute soft tissue infection/inflammation could have a   similar appearance. Please correlate with physical exam.   4. Mild body wall edema.   5. Moderate to severe centrilobular and paraseptal emphysema.        Signed by: Stan Pal 5/11/2024 6:25 AM   Dictation workstation:   [id number]      XR chest 1 view   Final Result   1. No acute pulmonary abnormality.   2. Medial left diaphragmatic hernia.   Signed by Berhane Brooks MD          Scheduled medications  budesonide, 0.5 mg, nebulization, BID  dilTIAZem ER, 240 mg, oral, Daily  docusate sodium, 100 mg, oral, BID  enoxaparin, 40 mg, subcutaneous, q24h  formoterol, 20 mcg, nebulization, BID  ibuprofen, 600 mg, oral, Once  ipratropium-albuteroL, 3 mL, nebulization, TID  lidocaine, 1 patch, transdermal, Daily  methylPREDNISolone sodium succinate (PF), 40 mg, intravenous, q8h  nystatin, 5 mL, Swish & Swallow, 4x daily  pantoprazole, 40 mg, oral, Daily before breakfast   Or  pantoprazole, 40 mg, intravenous, Daily before breakfast  polyethylene glycol, 17 g, oral, Daily  sennosides, 1 tablet, oral, BID      Continuous medications     PRN medications  PRN medications: acetaminophen-codeine, benzonatate, bisacodyl, guaiFENesin, ipratropium-albuteroL, melatonin, ondansetron ODT **OR** ondansetron         Assessment/Plan                  Principal Problem:    COPD exacerbation (Multi)    # COPD exac  -continue DuoNeb, budesonide, formoterol, Solu-Medrol, and Lovenox.   -pulm following  -incentive spirometry  -dc azithromycin  -monitor    # Elevayed  lactic acid  -chronic elevation  -no signs of infection    # Adenocarcinoma of the right lower lobe  Has had radiation therapy  Patient to follow-up with her primary pulmonologist    # HTN  -continue home meds    # DVT ppx                Rupa Khoury MD

## 2024-05-14 VITALS
OXYGEN SATURATION: 100 % | DIASTOLIC BLOOD PRESSURE: 94 MMHG | BODY MASS INDEX: 26.91 KG/M2 | TEMPERATURE: 98.2 F | WEIGHT: 142.5 LBS | HEART RATE: 96 BPM | HEIGHT: 61 IN | SYSTOLIC BLOOD PRESSURE: 145 MMHG | RESPIRATION RATE: 16 BRPM

## 2024-05-14 LAB
ANION GAP SERPL CALC-SCNC: 15 MMOL/L (ref 10–20)
ATRIAL RATE: 110 BPM
BUN SERPL-MCNC: 25 MG/DL (ref 6–23)
CALCIUM SERPL-MCNC: 8.4 MG/DL (ref 8.6–10.3)
CHLORIDE SERPL-SCNC: 97 MMOL/L (ref 98–107)
CO2 SERPL-SCNC: 27 MMOL/L (ref 21–32)
CREAT SERPL-MCNC: 0.81 MG/DL (ref 0.5–1.05)
EGFRCR SERPLBLD CKD-EPI 2021: 82 ML/MIN/1.73M*2
ERYTHROCYTE [DISTWIDTH] IN BLOOD BY AUTOMATED COUNT: 19.6 % (ref 11.5–14.5)
GLUCOSE SERPL-MCNC: 81 MG/DL (ref 74–99)
HCT VFR BLD AUTO: 33 % (ref 36–46)
HGB BLD-MCNC: 9.6 G/DL (ref 12–16)
LACTATE SERPL-SCNC: 3.3 MMOL/L (ref 0.4–2)
MCH RBC QN AUTO: 23.9 PG (ref 26–34)
MCHC RBC AUTO-ENTMCNC: 29.1 G/DL (ref 32–36)
MCV RBC AUTO: 82 FL (ref 80–100)
NRBC BLD-RTO: 0.3 /100 WBCS (ref 0–0)
P AXIS: 82 DEGREES
P OFFSET: 207 MS
P ONSET: 168 MS
PLATELET # BLD AUTO: 515 X10*3/UL (ref 150–450)
POTASSIUM SERPL-SCNC: 4.5 MMOL/L (ref 3.5–5.3)
PR INTERVAL: 116 MS
Q ONSET: 226 MS
QRS COUNT: 18 BEATS
QRS DURATION: 60 MS
QT INTERVAL: 314 MS
QTC CALCULATION(BAZETT): 424 MS
QTC FREDERICIA: 384 MS
R AXIS: 60 DEGREES
RBC # BLD AUTO: 4.01 X10*6/UL (ref 4–5.2)
SODIUM SERPL-SCNC: 134 MMOL/L (ref 136–145)
T AXIS: 79 DEGREES
T OFFSET: 383 MS
VENTRICULAR RATE: 110 BPM
WBC # BLD AUTO: 18.8 X10*3/UL (ref 4.4–11.3)

## 2024-05-14 PROCEDURE — 99239 HOSP IP/OBS DSCHRG MGMT >30: CPT | Performed by: INTERNAL MEDICINE

## 2024-05-14 PROCEDURE — 2500000004 HC RX 250 GENERAL PHARMACY W/ HCPCS (ALT 636 FOR OP/ED): Performed by: HOSPITALIST

## 2024-05-14 PROCEDURE — 36415 COLL VENOUS BLD VENIPUNCTURE: CPT | Performed by: INTERNAL MEDICINE

## 2024-05-14 PROCEDURE — 2500000005 HC RX 250 GENERAL PHARMACY W/O HCPCS: Performed by: INTERNAL MEDICINE

## 2024-05-14 PROCEDURE — 83605 ASSAY OF LACTIC ACID: CPT | Performed by: INTERNAL MEDICINE

## 2024-05-14 PROCEDURE — 2500000002 HC RX 250 W HCPCS SELF ADMINISTERED DRUGS (ALT 637 FOR MEDICARE OP, ALT 636 FOR OP/ED): Performed by: PHARMACIST

## 2024-05-14 PROCEDURE — 94640 AIRWAY INHALATION TREATMENT: CPT

## 2024-05-14 PROCEDURE — 99232 SBSQ HOSP IP/OBS MODERATE 35: CPT | Performed by: CLINICAL NURSE SPECIALIST

## 2024-05-14 PROCEDURE — 2500000004 HC RX 250 GENERAL PHARMACY W/ HCPCS (ALT 636 FOR OP/ED): Performed by: CLINICAL NURSE SPECIALIST

## 2024-05-14 PROCEDURE — 2500000001 HC RX 250 WO HCPCS SELF ADMINISTERED DRUGS (ALT 637 FOR MEDICARE OP): Performed by: HOSPITALIST

## 2024-05-14 PROCEDURE — 2500000001 HC RX 250 WO HCPCS SELF ADMINISTERED DRUGS (ALT 637 FOR MEDICARE OP): Performed by: INTERNAL MEDICINE

## 2024-05-14 PROCEDURE — 2500000002 HC RX 250 W HCPCS SELF ADMINISTERED DRUGS (ALT 637 FOR MEDICARE OP, ALT 636 FOR OP/ED): Performed by: INTERNAL MEDICINE

## 2024-05-14 PROCEDURE — 80048 BASIC METABOLIC PNL TOTAL CA: CPT | Performed by: INTERNAL MEDICINE

## 2024-05-14 PROCEDURE — 85027 COMPLETE CBC AUTOMATED: CPT | Performed by: INTERNAL MEDICINE

## 2024-05-14 RX ORDER — BENZONATATE 100 MG/1
100 CAPSULE ORAL 3 TIMES DAILY PRN
Qty: 20 CAPSULE | Refills: 0 | Status: SHIPPED | OUTPATIENT
Start: 2024-05-14

## 2024-05-14 RX ORDER — LISINOPRIL 10 MG/1
10 TABLET ORAL DAILY
Qty: 30 TABLET | Refills: 0 | Status: SHIPPED | OUTPATIENT
Start: 2024-05-15 | End: 2024-06-14

## 2024-05-14 RX ADMIN — BISACODYL 10 MG: 5 TABLET, COATED ORAL at 16:01

## 2024-05-14 RX ADMIN — DILTIAZEM HYDROCHLORIDE 240 MG: 120 CAPSULE, EXTENDED RELEASE ORAL at 08:45

## 2024-05-14 RX ADMIN — SENNOSIDES 8.6 MG: 8.6 TABLET, FILM COATED ORAL at 08:45

## 2024-05-14 RX ADMIN — GUAIFENESIN 200 MG: 200 SOLUTION ORAL at 16:01

## 2024-05-14 RX ADMIN — FORMOTEROL FUMARATE DIHYDRATE 20 MCG: 20 SOLUTION RESPIRATORY (INHALATION) at 07:41

## 2024-05-14 RX ADMIN — POLYETHYLENE GLYCOL 3350 17 G: 17 POWDER, FOR SOLUTION ORAL at 08:44

## 2024-05-14 RX ADMIN — METHYLPREDNISOLONE SODIUM SUCCINATE 40 MG: 40 INJECTION, POWDER, FOR SOLUTION INTRAMUSCULAR; INTRAVENOUS at 13:49

## 2024-05-14 RX ADMIN — BUDESONIDE INHALATION 0.5 MG: 0.5 SUSPENSION RESPIRATORY (INHALATION) at 07:40

## 2024-05-14 RX ADMIN — DOCUSATE SODIUM 100 MG: 100 CAPSULE, LIQUID FILLED ORAL at 08:45

## 2024-05-14 RX ADMIN — NYSTATIN 500000 UNITS: 100000 SUSPENSION ORAL at 08:45

## 2024-05-14 RX ADMIN — LIDOCAINE 4% 1 PATCH: 40 PATCH TOPICAL at 08:43

## 2024-05-14 RX ADMIN — IPRATROPIUM BROMIDE AND ALBUTEROL SULFATE 3 ML: 2.5; .5 SOLUTION RESPIRATORY (INHALATION) at 07:41

## 2024-05-14 RX ADMIN — LISINOPRIL 10 MG: 10 TABLET ORAL at 08:45

## 2024-05-14 RX ADMIN — IPRATROPIUM BROMIDE AND ALBUTEROL SULFATE 3 ML: 2.5; .5 SOLUTION RESPIRATORY (INHALATION) at 13:46

## 2024-05-14 RX ADMIN — ACETAMINOPHEN AND CODEINE PHOSPHATE 1 TABLET: 300; 30 TABLET ORAL at 14:49

## 2024-05-14 RX ADMIN — PANTOPRAZOLE SODIUM 40 MG: 40 TABLET, DELAYED RELEASE ORAL at 08:45

## 2024-05-14 RX ADMIN — ACETAMINOPHEN AND CODEINE PHOSPHATE 1 TABLET: 300; 30 TABLET ORAL at 08:45

## 2024-05-14 RX ADMIN — NYSTATIN 500000 UNITS: 100000 SUSPENSION ORAL at 13:50

## 2024-05-14 ASSESSMENT — PAIN SCALES - GENERAL
PAINLEVEL_OUTOF10: 7
PAINLEVEL_OUTOF10: 7
PAINLEVEL_OUTOF10: 5 - MODERATE PAIN

## 2024-05-14 ASSESSMENT — COGNITIVE AND FUNCTIONAL STATUS - GENERAL
TOILETING: A LITTLE
MOBILITY SCORE: 22
DRESSING REGULAR LOWER BODY CLOTHING: A LITTLE
CLIMB 3 TO 5 STEPS WITH RAILING: A LITTLE
WALKING IN HOSPITAL ROOM: A LITTLE
DAILY ACTIVITIY SCORE: 24
DAILY ACTIVITIY SCORE: 22

## 2024-05-14 ASSESSMENT — PAIN DESCRIPTION - LOCATION
LOCATION: HEAD
LOCATION: KNEE

## 2024-05-14 ASSESSMENT — PAIN DESCRIPTION - ORIENTATION
ORIENTATION: RIGHT;LEFT
ORIENTATION: RIGHT

## 2024-05-14 ASSESSMENT — PAIN - FUNCTIONAL ASSESSMENT
PAIN_FUNCTIONAL_ASSESSMENT: 0-10

## 2024-05-14 NOTE — NURSING NOTE
1725-Discharge instructions reviewed with patient and family both patient and family verbalizes understanding of all discharge instructions and follow up appointments. Patient informed that meds were sent to local  pharmacy for  awaiting transport for discharge.

## 2024-05-14 NOTE — CARE PLAN
The patient's goals for the shift include rest & pain management    The clinical goals for the shift include pt. safety & pain management    Over the shift, the patient did  make progress toward the following goals.

## 2024-05-14 NOTE — CARE PLAN
The patient's goals for the shift include resting and pain management.    The clinical goals for the shift include pt. safety/free from falls & pain management.    Pt. is making progress toward the following goals.

## 2024-05-14 NOTE — CARE PLAN
Problem: Safety - Adult  Goal: Free from fall injury  Outcome: Progressing   The patient's goals for the shift include Pt will get adequate rest    The clinical goals for the shift include pt safety will be maintained in duration of the shift    Over the shift, the patient did make progress toward the following goals.

## 2024-05-14 NOTE — DISCHARGE SUMMARY
Discharge Diagnosis  COPD exacerbation (Multi)    Issues Requiring Follow-Up  PCP, pulmonologist and oncologist at [facility]    Discharge Meds     Your medication list        START taking these medications        Instructions Last Dose Given Next Dose Due   benzonatate 100 mg capsule  Commonly known as: Tessalon      Take 1 capsule (100 mg) by mouth 3 times a day as needed for cough. Do not crush or chew.       lisinopril 10 mg tablet  Start taking on: May 15, 2024      Take 1 tablet (10 mg) by mouth once daily.              CONTINUE taking these medications        Instructions Last Dose Given Next Dose Due   acetaminophen-codeine 300-30 mg tablet  Commonly known as: Tylenol w/ Codeine #3      Take 1 tablet by mouth every 6 hours if needed for severe pain (7 - 10).       dilTIAZem  mg 24 hr capsule  Commonly known as: Tiazac           fluticasone propion-salmeteroL 100-50 mcg/dose diskus inhaler  Commonly known as: Advair Diskus      Inhale 1 puff 2 times a day. Rinse mouth with water after use to reduce aftertaste and incidence of candidiasis. Do not swallow.       ipratropium-albuteroL 0.5-2.5 mg/3 mL nebulizer solution  Commonly known as: Duo-Neb      Take 3 mL by nebulization 3 times a day.       oxybutynin XL 5 mg 24 hr tablet  Commonly known as: Ditropan-XL           predniSONE 5 mg tablet  Commonly known as: Deltasone  Start taking on: May 9, 2024      Take 6 tablets (30 mg) by mouth once daily for 3 days, THEN 4 tablets (20 mg) once daily for 3 days, THEN 2 tablets (10 mg) once daily for 3 days, THEN 1 tablet (5 mg) once daily for 3 days.       Spiriva Respimat 2.5 mcg/actuation inhaler  Generic drug: tiotropium      Inhale 2 puffs once daily.              STOP taking these medications      azithromycin 500 mg tablet  Commonly known as: Zithromax                  Where to Get Your Medications        These medications were sent to [facility]/pharmacy #[id number], [place] - [address] [facility] AT  [facility]  [id number] [place] [id number]      Phone: [phone]   benzonatate 100 mg capsule  lisinopril 10 mg tablet         Test Results Pending At Discharge  Pending Labs       Order Current Status    Blood Culture Preliminary result    Blood Culture Preliminary result            Hospital Course   Fernando Cuevas is a 63 y.o. female with a PMH of COPD (does not require oxygen), RLL adenocarcinoma, HTN, GERD, presenting with SOB.     Ms Cuevas was just admitted for COPD exacerbation 5/5 - 5/10. On discharge she states she did not feel well. Was feeling weak, she was not out of bed the entire hospitalization. She smoked one cigarette and became SOB, returned to the ED.      She initially was placed on BiPAP, now is requiring 5 LPM oxygen in the ED. She c/o cough that is chronic, hoarseness the past month and abdominal distention and gas. Cannot eat much - feels full after a few bites.      Work up in the ED shows increasing WBC ct of 22.9, Elevated LFTs, lactate 2.4.   CXR unremarkable.      Pt was admitted for COPD exacerbation, and pulmonary consulted.  She was put on DuoNebs nebulizer as needed, bronchodilators corticosteroid, and she gradually improved and was off oxygen on hospital day 3.   Hospital course was notable for elevated BP, and lisinopril 10 mg daily was introduced, helping to maintain her on acceptable blood pressure range.  A prescription of lisinopril 10 mg daily x 30 days was sent to her pharmacy.  Benzonatate 100 mg 3 times daily as needed cough also prescribed.  On hospital day 4 patient was stable for discharge home with follow-up PCP and pulmonologist at [facility].  She will be on a steroid taper for a few days.    Greater than 30 days were dedicated to this discharge that included patient education and coordinating care.    Pertinent Physical Exam At Time of Discharge  Physical Exam  Constitutional: Pleasant elderly female, alert active, cooperative not in acute  distress  Eyes: PERRLA, clear sclera  ENMT: Moist mucosal membranes, no exudate  Head / Neck: Atraumatic, normocephalic, supple neck, JVP not visualized  Lungs: Patent airways, CTABL  Heart: RRR, S1S2, no murmurs appreciated, palpable pulses in all extremities  GI: Soft, NT, ND, bowel sounds present in all quadrants  MSK: Moves all extremities freely, no restriction  of ROM, no joint edema  Extremities: No significant bilateral lower extremities peripheral edema  : No Snell catheter inserted  Breast: Deferred  Neurological: AAO x 3 to person, place and date, facial muscles symmetrical, sensation intact, strength 4/4, no acute focal neurological deficits appreciated  Psychological: Appropriate mood and behavior    Outpatient Follow-Up  No future appointments.      Tonio Galvan DO

## 2024-05-14 NOTE — PROGRESS NOTES
"Fernando Cuevas is a 63 y.o. female on day 3 of admission presenting with COPD exacerbation (Multi).    Subjective     Patient seen and examined sitting up in bed.  Patient remained stable on room air with improved shortness of breath.  Patient up ambulating in the sanchez today reports that she feels ready to go home.  Continuing to use I-S and Acapella with moist cough and minimal sputum.  Patient reports \"I feel and I just cannot get it up\".  Reminded her that she can have guaifenesin as needed.  Denies headache, chest pain, fever, chills and nausea.  Continues to report her abdomen feels \"full and uncomfortable\".  Discussed with patient the importance of continuing to try to completely cease smoking as she reports she is still at 2 to 3 cigarettes a day.  Also discussed continuing follow-up with all of her providers at [facility].         Objective   Physical Exam    Constitutional:   Chronically ill-appearing, NAD, cooperative  HENT: Atraumatic, moist mucous membranes with multiple oral sores on tongue  Eyes: PERRL  Neck: Supple, no JVD  Cardiovascular: S1, S2 stinks, tacky in the 100s, no murmur appreciated  Pulmonary: Fair air entry with clear anterior fields, minimal expiratory wheezes; no rhonchi or crackles noted; minimal conversational dyspnea after several minutes  Abdominal: Obese, semi-firm, no grimace with palpation, + BS  Musculoskeletal: Fair active range of motion with fair strength  Extremities:   Trace BLE edema, L>R  Lymphadenopathy: No nuchal LAP  Skin: Warm and dry  Neurological: Alert and oriented x 3, speech clear and appropriate; no focal deficits noted  Psychiatric: Appropriate mood and behavior       Vitals  Blood pressure (!) 145/94, pulse 96, temperature 36.8 °C (98.2 °F), temperature source Oral, resp. rate 16, height 1.549 m (5' 0.98\"), weight 64.6 kg (142 lb 8 oz), SpO2 100%.  Intake/Output last 3 Shifts:  I/O last 3 completed shifts:  In: 240 (3.7 mL/kg) [P.O.:240]  Out: 850 (13.2 mL/kg) " [Urine:850 (0.4 mL/kg/hr)]  Weight: 64.6 kg     Scheduled medications  budesonide, 0.5 mg, nebulization, BID  dilTIAZem ER, 240 mg, oral, Daily  docusate sodium, 100 mg, oral, BID  enoxaparin, 40 mg, subcutaneous, q24h  formoterol, 20 mcg, nebulization, BID  ibuprofen, 600 mg, oral, Once  ipratropium-albuteroL, 3 mL, nebulization, TID  lidocaine, 1 patch, transdermal, Daily  lisinopril, 10 mg, oral, Daily  methylPREDNISolone sodium succinate (PF), 40 mg, intravenous, q24h  nystatin, 5 mL, Swish & Swallow, 4x daily  pantoprazole, 40 mg, oral, Daily before breakfast   Or  pantoprazole, 40 mg, intravenous, Daily before breakfast  polyethylene glycol, 17 g, oral, Daily  sennosides, 1 tablet, oral, BID      Continuous medications     PRN medications  PRN medications: acetaminophen-codeine, benzonatate, bisacodyl, guaiFENesin, ipratropium-albuteroL, melatonin, ondansetron ODT **OR** ondansetron     Results for orders placed or performed during the hospital encounter of 05/10/24 (from the past 24 hour(s))   Lactate   Result Value Ref Range    Lactate 3.3 (H) 0.4 - 2.0 mmol/L   CBC   Result Value Ref Range    WBC 18.8 (H) 4.4 - 11.3 x10*3/uL    nRBC 0.3 (H) 0.0 - 0.0 /100 WBCs    RBC 4.01 4.00 - 5.20 x10*6/uL    Hemoglobin 9.6 (L) 12.0 - 16.0 g/dL    Hematocrit 33.0 (L) 36.0 - 46.0 %    MCV 82 80 - 100 fL    MCH 23.9 (L) 26.0 - 34.0 pg    MCHC 29.1 (L) 32.0 - 36.0 g/dL    RDW 19.6 (H) 11.5 - 14.5 %    Platelets 515 (H) 150 - 450 x10*3/uL   Basic Metabolic Panel   Result Value Ref Range    Glucose 81 74 - 99 mg/dL    Sodium 134 (L) 136 - 145 mmol/L    Potassium 4.5 3.5 - 5.3 mmol/L    Chloride 97 (L) 98 - 107 mmol/L    Bicarbonate 27 21 - 32 mmol/L    Anion Gap 15 10 - 20 mmol/L    Urea Nitrogen 25 (H) 6 - 23 mg/dL    Creatinine 0.81 0.50 - 1.05 mg/dL    eGFR 82 >60 mL/min/1.73m*2    Calcium 8.4 (L) 8.6 - 10.3 mg/dL      Assessment:  63-year-old woman with a history of COPD, adenocarcinoma of the right lower lobe-status post  radiation treatment, GERD, hypertension, depression and chronic hypercapnic respiratory failure, readmitted to [facility] for recurrent shortness of breath and weakness, and found to have acute-on-chronic hypercapnic respiratory failure, in association with an admission chest x-ray showing no infiltrates or effusions, but with severe COPD changes.  She has very mild bronchospasm, presently.  There is no evidence of pneumonia.  Pulmonary embolism is unlikely.    5/13: Patient reports ambulating around the unit with significant dyspnea yesterday.  Has not ambulated today.  Patient admits to feeling increasingly anxious, impacting her dyspnea.  Minimal posterior expiratory wheezing today.  Remains stable on room air.     5/14: Smoking cessation counseling greater than 5 minutes completed.  Patient reports that she is taking Chantix to help her quit and she is currently down to 2 to 3 cigarettes a day.  We discussed how even 2 or 3 cigarettes a day will negatively affect her health. Additionally we discussed multiple ways to help manage the anxiety which is her primary reason for smoking including music, aromatherapy, breathing exercises and using a fan and to practice her anxiety reducing techniques when she is not feeling anxious or short of breath so she is prepared when those moments arise.    Recommend:  Continue DuoNeb, budesonide, formoterol, Zithromax and Lovenox.  Transition Solu-Medrol to once daily   Keep oxygen saturation approximately 92 to 95%; repeat home oxygen evaluation prior to discharge.  Incentive spirometry.  Resume home medications and a prednisone taper over ~ 1 week when discharged.  Follow-up with her outpatient Pulmonologist and Oncologist at [facility] after discharge.    No barriers to discharge from pulmonary perspective  Dorene Whittington, APRN-CNS

## 2024-05-15 LAB
BACTERIA BLD CULT: NORMAL
BACTERIA BLD CULT: NORMAL

## 2024-06-11 ENCOUNTER — APPOINTMENT (OUTPATIENT)
Dept: CARDIOLOGY | Facility: HOSPITAL | Age: 64
End: 2024-06-11
Payer: COMMERCIAL

## 2024-06-11 ENCOUNTER — APPOINTMENT (OUTPATIENT)
Dept: RADIOLOGY | Facility: HOSPITAL | Age: 64
End: 2024-06-11
Payer: COMMERCIAL

## 2024-06-11 ENCOUNTER — HOSPITAL ENCOUNTER (INPATIENT)
Facility: HOSPITAL | Age: 64
End: 2024-06-11
Attending: EMERGENCY MEDICINE | Admitting: INTERNAL MEDICINE
Payer: COMMERCIAL

## 2024-06-11 DIAGNOSIS — J44.1 COPD EXACERBATION (MULTI): Primary | ICD-10-CM

## 2024-06-11 PROBLEM — D64.9 ANEMIA: Status: ACTIVE | Noted: 2024-06-11

## 2024-06-11 PROBLEM — R00.0 SINUS TACHYCARDIA BY ELECTROCARDIOGRAM: Status: ACTIVE | Noted: 2024-06-11

## 2024-06-11 PROBLEM — R06.02 SHORTNESS OF BREATH: Status: ACTIVE | Noted: 2024-06-11

## 2024-06-11 PROBLEM — R79.89 ELEVATED TROPONIN: Status: ACTIVE | Noted: 2024-06-11

## 2024-06-11 PROBLEM — R79.89 LFTS ABNORMAL: Status: ACTIVE | Noted: 2023-05-20

## 2024-06-11 PROBLEM — E78.2 MIXED HYPERLIPIDEMIA: Status: ACTIVE | Noted: 2017-03-10

## 2024-06-11 PROBLEM — C34.31 MALIGNANT NEOPLASM OF LOWER LOBE OF RIGHT LUNG (MULTI): Status: ACTIVE | Noted: 2022-08-17

## 2024-06-11 PROBLEM — G43.909 MIGRAINE HEADACHE: Status: ACTIVE | Noted: 2024-06-11

## 2024-06-11 PROBLEM — J44.9 COPD (CHRONIC OBSTRUCTIVE PULMONARY DISEASE) (MULTI): Status: ACTIVE | Noted: 2024-06-11

## 2024-06-11 PROBLEM — K20.90 ESOPHAGITIS: Status: ACTIVE | Noted: 2024-06-11

## 2024-06-11 PROBLEM — F14.10 COCAINE ABUSE (MULTI): Status: ACTIVE | Noted: 2024-06-11
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
## 2024-06-11 PROBLEM — C34.90 MALIGNANT NEOPLASM OF LUNG (MULTI): Status: ACTIVE | Noted: 2024-06-11

## 2024-06-11 PROBLEM — S09.90XA HEAD INJURY: Status: ACTIVE | Noted: 2024-06-11

## 2024-06-11 PROBLEM — F41.9 ANXIETY: Status: ACTIVE | Noted: 2024-06-11

## 2024-06-11 PROBLEM — F41.0 PANIC DISORDER: Chronic | Status: ACTIVE | Noted: 2023-05-19

## 2024-06-11 PROBLEM — Z86.59 HISTORY OF DEPRESSION: Status: ACTIVE | Noted: 2024-06-11

## 2024-06-11 PROBLEM — N39.41 URGE INCONTINENCE OF URINE: Status: ACTIVE | Noted: 2022-07-27

## 2024-06-11 PROBLEM — N32.81 OVERACTIVE BLADDER: Status: ACTIVE | Noted: 2022-07-27

## 2024-06-11 PROBLEM — N28.89 RENAL MASS: Status: ACTIVE | Noted: 2024-06-11

## 2024-06-11 PROBLEM — F17.200 NICOTINE DEPENDENCE: Status: ACTIVE | Noted: 2024-06-11

## 2024-06-11 PROBLEM — R51.9 HEADACHE: Status: ACTIVE | Noted: 2024-06-11

## 2024-06-11 PROBLEM — F14.91 HISTORY OF COCAINE USE: Status: ACTIVE | Noted: 2018-05-21

## 2024-06-11 PROBLEM — R06.00 DYSPNEA: Status: ACTIVE | Noted: 2024-06-11

## 2024-06-11 PROBLEM — R91.1 NODULE OF UPPER LOBE OF RIGHT LUNG: Status: ACTIVE | Noted: 2024-06-11

## 2024-06-11 PROBLEM — C34.90 LUNG CANCER (MULTI): Status: ACTIVE | Noted: 2024-06-11

## 2024-06-11 LAB
ABO GROUP (TYPE) IN BLOOD: NORMAL
ALBUMIN SERPL BCP-MCNC: 3.4 G/DL (ref 3.4–5)
ALP SERPL-CCNC: 90 U/L (ref 33–136)
ALT SERPL W P-5'-P-CCNC: 39 U/L (ref 7–45)
AMPHETAMINES UR QL SCN: ABNORMAL
ANION GAP SERPL CALC-SCNC: 13 MMOL/L (ref 10–20)
ANTIBODY SCREEN: NORMAL
APTT PPP: 23 SECONDS (ref 27–38)
AST SERPL W P-5'-P-CCNC: 17 U/L (ref 9–39)
BARBITURATES UR QL SCN: ABNORMAL
BASOPHILS # BLD MANUAL: 0 X10*3/UL (ref 0–0.1)
BASOPHILS NFR BLD MANUAL: 0 %
BENZODIAZ UR QL SCN: ABNORMAL
BILIRUB SERPL-MCNC: 0.2 MG/DL (ref 0–1.2)
BNP SERPL-MCNC: 65 PG/ML (ref 0–99)
BUN SERPL-MCNC: 16 MG/DL (ref 6–23)
BZE UR QL SCN: ABNORMAL
CALCIUM SERPL-MCNC: 8.2 MG/DL (ref 8.6–10.3)
CANNABINOIDS UR QL SCN: ABNORMAL
CARDIAC TROPONIN I PNL SERPL HS: 10 NG/L (ref 0–13)
CARDIAC TROPONIN I PNL SERPL HS: 13 NG/L (ref 0–13)
CARDIAC TROPONIN I PNL SERPL HS: 15 NG/L (ref 0–13)
CHLORIDE SERPL-SCNC: 100 MMOL/L (ref 98–107)
CO2 SERPL-SCNC: 29 MMOL/L (ref 21–32)
CREAT SERPL-MCNC: 0.91 MG/DL (ref 0.5–1.05)
DACRYOCYTES BLD QL SMEAR: ABNORMAL
EGFRCR SERPLBLD CKD-EPI 2021: 71 ML/MIN/1.73M*2
EOSINOPHIL # BLD MANUAL: 0 X10*3/UL (ref 0–0.7)
EOSINOPHIL NFR BLD MANUAL: 0 %
ERYTHROCYTE [DISTWIDTH] IN BLOOD BY AUTOMATED COUNT: 20.3 % (ref 11.5–14.5)
FENTANYL+NORFENTANYL UR QL SCN: ABNORMAL
GLUCOSE SERPL-MCNC: 103 MG/DL (ref 74–99)
HCT VFR BLD AUTO: 29.8 % (ref 36–46)
HGB BLD-MCNC: 8.5 G/DL (ref 12–16)
IMM GRANULOCYTES # BLD AUTO: 0.68 X10*3/UL (ref 0–0.7)
IMM GRANULOCYTES NFR BLD AUTO: 4.2 % (ref 0–0.9)
INR PPP: 1 (ref 0.9–1.1)
LYMPHOCYTES # BLD MANUAL: 1.94 X10*3/UL (ref 1.2–4.8)
LYMPHOCYTES NFR BLD MANUAL: 12 %
MCH RBC QN AUTO: 22.8 PG (ref 26–34)
MCHC RBC AUTO-ENTMCNC: 28.5 G/DL (ref 32–36)
MCV RBC AUTO: 80 FL (ref 80–100)
METHADONE UR QL SCN: ABNORMAL
MONOCYTES # BLD MANUAL: 0.97 X10*3/UL (ref 0.1–1)
MONOCYTES NFR BLD MANUAL: 6 %
MYELOCYTES # BLD MANUAL: 0.16 X10*3/UL
MYELOCYTES NFR BLD MANUAL: 1 %
NEUTS SEG # BLD MANUAL: 13.12 X10*3/UL (ref 1.2–7)
NEUTS SEG NFR BLD MANUAL: 81 %
NRBC BLD-RTO: 4.4 /100 WBCS (ref 0–0)
OPIATES UR QL SCN: ABNORMAL
OXYCODONE+OXYMORPHONE UR QL SCN: ABNORMAL
PCP UR QL SCN: ABNORMAL
PLATELET # BLD AUTO: 591 X10*3/UL (ref 150–450)
POLYCHROMASIA BLD QL SMEAR: ABNORMAL
POTASSIUM SERPL-SCNC: 3.7 MMOL/L (ref 3.5–5.3)
PROT SERPL-MCNC: 6.1 G/DL (ref 6.4–8.2)
PROTHROMBIN TIME: 11.3 SECONDS (ref 9.8–12.8)
RBC # BLD AUTO: 3.73 X10*6/UL (ref 4–5.2)
RBC MORPH BLD: ABNORMAL
RH FACTOR (ANTIGEN D): NORMAL
SARS-COV-2 RNA RESP QL NAA+PROBE: NOT DETECTED
SODIUM SERPL-SCNC: 138 MMOL/L (ref 136–145)
TOTAL CELLS COUNTED BLD: 100
WBC # BLD AUTO: 16.2 X10*3/UL (ref 4.4–11.3)

## 2024-06-11 PROCEDURE — 2500000002 HC RX 250 W HCPCS SELF ADMINISTERED DRUGS (ALT 637 FOR MEDICARE OP, ALT 636 FOR OP/ED): Performed by: PHYSICIAN ASSISTANT

## 2024-06-11 PROCEDURE — 94640 AIRWAY INHALATION TREATMENT: CPT | Mod: 59

## 2024-06-11 PROCEDURE — 85027 COMPLETE CBC AUTOMATED: CPT | Performed by: EMERGENCY MEDICINE

## 2024-06-11 PROCEDURE — 85610 PROTHROMBIN TIME: CPT | Performed by: EMERGENCY MEDICINE

## 2024-06-11 PROCEDURE — 5A09357 ASSISTANCE WITH RESPIRATORY VENTILATION, LESS THAN 24 CONSECUTIVE HOURS, CONTINUOUS POSITIVE AIRWAY PRESSURE: ICD-10-PCS | Performed by: INTERNAL MEDICINE

## 2024-06-11 PROCEDURE — 87086 URINE CULTURE/COLONY COUNT: CPT | Mod: AHULAB | Performed by: PHYSICIAN ASSISTANT

## 2024-06-11 PROCEDURE — 94640 AIRWAY INHALATION TREATMENT: CPT

## 2024-06-11 PROCEDURE — 71045 X-RAY EXAM CHEST 1 VIEW: CPT | Performed by: RADIOLOGY

## 2024-06-11 PROCEDURE — 70450 CT HEAD/BRAIN W/O DYE: CPT

## 2024-06-11 PROCEDURE — 85730 THROMBOPLASTIN TIME PARTIAL: CPT | Performed by: EMERGENCY MEDICINE

## 2024-06-11 PROCEDURE — 93005 ELECTROCARDIOGRAM TRACING: CPT

## 2024-06-11 PROCEDURE — 99285 EMERGENCY DEPT VISIT HI MDM: CPT | Mod: 25

## 2024-06-11 PROCEDURE — 36415 COLL VENOUS BLD VENIPUNCTURE: CPT | Performed by: EMERGENCY MEDICINE

## 2024-06-11 PROCEDURE — 2500000004 HC RX 250 GENERAL PHARMACY W/ HCPCS (ALT 636 FOR OP/ED): Performed by: PHYSICIAN ASSISTANT

## 2024-06-11 PROCEDURE — 2060000001 HC INTERMEDIATE ICU ROOM DAILY

## 2024-06-11 PROCEDURE — 2500000004 HC RX 250 GENERAL PHARMACY W/ HCPCS (ALT 636 FOR OP/ED): Performed by: EMERGENCY MEDICINE

## 2024-06-11 PROCEDURE — 2500000005 HC RX 250 GENERAL PHARMACY W/O HCPCS: Performed by: EMERGENCY MEDICINE

## 2024-06-11 PROCEDURE — 2500000002 HC RX 250 W HCPCS SELF ADMINISTERED DRUGS (ALT 637 FOR MEDICARE OP, ALT 636 FOR OP/ED): Performed by: EMERGENCY MEDICINE

## 2024-06-11 PROCEDURE — 71045 X-RAY EXAM CHEST 1 VIEW: CPT

## 2024-06-11 PROCEDURE — 84484 ASSAY OF TROPONIN QUANT: CPT | Performed by: PHYSICIAN ASSISTANT

## 2024-06-11 PROCEDURE — 81001 URINALYSIS AUTO W/SCOPE: CPT | Performed by: PHYSICIAN ASSISTANT

## 2024-06-11 PROCEDURE — 84075 ASSAY ALKALINE PHOSPHATASE: CPT | Performed by: EMERGENCY MEDICINE

## 2024-06-11 PROCEDURE — 84484 ASSAY OF TROPONIN QUANT: CPT | Performed by: EMERGENCY MEDICINE

## 2024-06-11 PROCEDURE — 83880 ASSAY OF NATRIURETIC PEPTIDE: CPT | Performed by: EMERGENCY MEDICINE

## 2024-06-11 PROCEDURE — 80307 DRUG TEST PRSMV CHEM ANLYZR: CPT | Performed by: PHYSICIAN ASSISTANT

## 2024-06-11 PROCEDURE — 85007 BL SMEAR W/DIFF WBC COUNT: CPT | Performed by: EMERGENCY MEDICINE

## 2024-06-11 PROCEDURE — 86901 BLOOD TYPING SEROLOGIC RH(D): CPT | Performed by: PHYSICIAN ASSISTANT

## 2024-06-11 PROCEDURE — 96374 THER/PROPH/DIAG INJ IV PUSH: CPT

## 2024-06-11 PROCEDURE — 99223 1ST HOSP IP/OBS HIGH 75: CPT | Performed by: PHYSICIAN ASSISTANT

## 2024-06-11 PROCEDURE — 87635 SARS-COV-2 COVID-19 AMP PRB: CPT | Performed by: EMERGENCY MEDICINE

## 2024-06-11 PROCEDURE — 96375 TX/PRO/DX INJ NEW DRUG ADDON: CPT

## 2024-06-11 RX ORDER — NAPROXEN SODIUM 220 MG/1
325 TABLET, FILM COATED ORAL DAILY
Status: DISCONTINUED | OUTPATIENT
Start: 2024-06-12 | End: 2024-06-12

## 2024-06-11 RX ORDER — MORPHINE SULFATE 2 MG/ML
2 INJECTION, SOLUTION INTRAMUSCULAR; INTRAVENOUS EVERY 4 HOURS PRN
Status: DISCONTINUED | OUTPATIENT
Start: 2024-06-11 | End: 2024-06-11

## 2024-06-11 RX ORDER — ACETAMINOPHEN AND CODEINE PHOSPHATE 300; 30 MG/1; MG/1
1 TABLET ORAL EVERY 6 HOURS PRN
Status: CANCELLED | OUTPATIENT
Start: 2024-06-11

## 2024-06-11 RX ORDER — MONTELUKAST SODIUM 10 MG/1
10 TABLET ORAL NIGHTLY
Status: DISCONTINUED | OUTPATIENT
Start: 2024-06-11 | End: 2024-06-19 | Stop reason: HOSPADM

## 2024-06-11 RX ORDER — PREDNISONE 20 MG/1
40 TABLET ORAL DAILY
Status: DISCONTINUED | OUTPATIENT
Start: 2024-06-12 | End: 2024-06-19 | Stop reason: HOSPADM

## 2024-06-11 RX ORDER — LORAZEPAM 2 MG/ML
1 INJECTION INTRAMUSCULAR ONCE
Status: COMPLETED | OUTPATIENT
Start: 2024-06-11 | End: 2024-06-11

## 2024-06-11 RX ORDER — IPRATROPIUM BROMIDE AND ALBUTEROL SULFATE 2.5; .5 MG/3ML; MG/3ML
3 SOLUTION RESPIRATORY (INHALATION)
Status: DISCONTINUED | OUTPATIENT
Start: 2024-06-12 | End: 2024-06-12

## 2024-06-11 RX ORDER — IPRATROPIUM BROMIDE AND ALBUTEROL SULFATE 2.5; .5 MG/3ML; MG/3ML
3 SOLUTION RESPIRATORY (INHALATION) ONCE
Status: COMPLETED | OUTPATIENT
Start: 2024-06-11 | End: 2024-06-11

## 2024-06-11 RX ORDER — ALBUTEROL SULFATE 0.83 MG/ML
2.5 SOLUTION RESPIRATORY (INHALATION) EVERY 20 MIN
Status: COMPLETED | OUTPATIENT
Start: 2024-06-11 | End: 2024-06-11

## 2024-06-11 RX ORDER — LORAZEPAM 1 MG/1
1 TABLET ORAL EVERY 8 HOURS PRN
Status: DISCONTINUED | OUTPATIENT
Start: 2024-06-11 | End: 2024-06-19 | Stop reason: HOSPADM

## 2024-06-11 RX ORDER — BUSPIRONE HYDROCHLORIDE 5 MG/1
5 TABLET ORAL 2 TIMES DAILY
Status: DISCONTINUED | OUTPATIENT
Start: 2024-06-11 | End: 2024-06-19 | Stop reason: HOSPADM

## 2024-06-11 RX ORDER — MORPHINE SULFATE 2 MG/ML
2 INJECTION, SOLUTION INTRAMUSCULAR; INTRAVENOUS ONCE
Status: COMPLETED | OUTPATIENT
Start: 2024-06-11 | End: 2024-06-11

## 2024-06-11 RX ORDER — ALBUTEROL SULFATE 0.83 MG/ML
2.5 SOLUTION RESPIRATORY (INHALATION) EVERY 4 HOURS PRN
Status: DISCONTINUED | OUTPATIENT
Start: 2024-06-11 | End: 2024-06-12

## 2024-06-11 RX ADMIN — Medication 3 L/MIN: at 15:32

## 2024-06-11 RX ADMIN — IPRATROPIUM BROMIDE AND ALBUTEROL SULFATE 3 ML: 2.5; .5 SOLUTION RESPIRATORY (INHALATION) at 22:48

## 2024-06-11 RX ADMIN — ALBUTEROL SULFATE 2.5 MG: 2.5 SOLUTION RESPIRATORY (INHALATION) at 15:11

## 2024-06-11 RX ADMIN — LORAZEPAM 1 MG: 2 INJECTION INTRAMUSCULAR; INTRAVENOUS at 16:57

## 2024-06-11 RX ADMIN — ALBUTEROL SULFATE 2.5 MG: 2.5 SOLUTION RESPIRATORY (INHALATION) at 15:32

## 2024-06-11 RX ADMIN — METHYLPREDNISOLONE SODIUM SUCCINATE 125 MG: 125 INJECTION, POWDER, FOR SOLUTION INTRAMUSCULAR; INTRAVENOUS at 15:40

## 2024-06-11 RX ADMIN — Medication 50 PERCENT: at 20:40

## 2024-06-11 RX ADMIN — MORPHINE SULFATE 2 MG: 2 INJECTION, SOLUTION INTRAMUSCULAR; INTRAVENOUS at 22:38

## 2024-06-11 RX ADMIN — Medication 3 L/MIN: at 15:22

## 2024-06-11 RX ADMIN — ALBUTEROL SULFATE 2.5 MG: 2.5 SOLUTION RESPIRATORY (INHALATION) at 15:22

## 2024-06-11 ASSESSMENT — COLUMBIA-SUICIDE SEVERITY RATING SCALE - C-SSRS
1. IN THE PAST MONTH, HAVE YOU WISHED YOU WERE DEAD OR WISHED YOU COULD GO TO SLEEP AND NOT WAKE UP?: NO
2. HAVE YOU ACTUALLY HAD ANY THOUGHTS OF KILLING YOURSELF?: NO
6. HAVE YOU EVER DONE ANYTHING, STARTED TO DO ANYTHING, OR PREPARED TO DO ANYTHING TO END YOUR LIFE?: NO

## 2024-06-11 ASSESSMENT — PAIN - FUNCTIONAL ASSESSMENT
PAIN_FUNCTIONAL_ASSESSMENT: 0-10
PAIN_FUNCTIONAL_ASSESSMENT: 0-10

## 2024-06-11 ASSESSMENT — PAIN SCALES - GENERAL
PAINLEVEL_OUTOF10: 10 - WORST POSSIBLE PAIN
PAINLEVEL_OUTOF10: 10 - WORST POSSIBLE PAIN

## 2024-06-11 ASSESSMENT — PAIN DESCRIPTION - LOCATION: LOCATION: HEAD

## 2024-06-11 NOTE — ED TRIAGE NOTES
PT is A/Ox4 coming in with shortness of breath. PT has a history of COPD an lung cancer. PT has been coughing and getting short of breath more so  on exertion  since Friday. No other complaints, denies chest pain, back pain, nausea, vomiting. PT also stated she fell this morning and hit her head, no loc, no thinners, is complaining of a headache.

## 2024-06-12 LAB
ANION GAP SERPL CALC-SCNC: 14 MMOL/L (ref 10–20)
APPEARANCE UR: ABNORMAL
BACTERIA #/AREA URNS AUTO: ABNORMAL /HPF
BILIRUB UR STRIP.AUTO-MCNC: NEGATIVE MG/DL
BUN SERPL-MCNC: 19 MG/DL (ref 6–23)
CALCIUM SERPL-MCNC: 8.6 MG/DL (ref 8.6–10.3)
CHLORIDE SERPL-SCNC: 100 MMOL/L (ref 98–107)
CO2 SERPL-SCNC: 26 MMOL/L (ref 21–32)
COLOR UR: YELLOW
CREAT SERPL-MCNC: 0.79 MG/DL (ref 0.5–1.05)
EGFRCR SERPLBLD CKD-EPI 2021: 84 ML/MIN/1.73M*2
ERYTHROCYTE [DISTWIDTH] IN BLOOD BY AUTOMATED COUNT: 20.6 % (ref 11.5–14.5)
GLUCOSE SERPL-MCNC: 168 MG/DL (ref 74–99)
GLUCOSE UR STRIP.AUTO-MCNC: NORMAL MG/DL
HCT VFR BLD AUTO: 29.3 % (ref 36–46)
HGB BLD-MCNC: 8.4 G/DL (ref 12–16)
KETONES UR STRIP.AUTO-MCNC: NEGATIVE MG/DL
LEUKOCYTE ESTERASE UR QL STRIP.AUTO: ABNORMAL
MCH RBC QN AUTO: 23 PG (ref 26–34)
MCHC RBC AUTO-ENTMCNC: 28.7 G/DL (ref 32–36)
MCV RBC AUTO: 80 FL (ref 80–100)
MUCOUS THREADS #/AREA URNS AUTO: ABNORMAL /LPF
NITRITE UR QL STRIP.AUTO: NEGATIVE
NRBC BLD-RTO: 1.3 /100 WBCS (ref 0–0)
PH UR STRIP.AUTO: 5.5 [PH]
PLATELET # BLD AUTO: 572 X10*3/UL (ref 150–450)
POTASSIUM SERPL-SCNC: 4.5 MMOL/L (ref 3.5–5.3)
PROCALCITONIN SERPL-MCNC: 1.63 NG/ML
PROT UR STRIP.AUTO-MCNC: ABNORMAL MG/DL
RBC # BLD AUTO: 3.66 X10*6/UL (ref 4–5.2)
RBC # UR STRIP.AUTO: NEGATIVE /UL
RBC #/AREA URNS AUTO: ABNORMAL /HPF
SODIUM SERPL-SCNC: 135 MMOL/L (ref 136–145)
SP GR UR STRIP.AUTO: 1.03
SQUAMOUS #/AREA URNS AUTO: ABNORMAL /HPF
UROBILINOGEN UR STRIP.AUTO-MCNC: NORMAL MG/DL
WBC # BLD AUTO: 35.4 X10*3/UL (ref 4.4–11.3)
WBC #/AREA URNS AUTO: ABNORMAL /HPF

## 2024-06-12 PROCEDURE — 80048 BASIC METABOLIC PNL TOTAL CA: CPT | Performed by: PHYSICIAN ASSISTANT

## 2024-06-12 PROCEDURE — 2500000004 HC RX 250 GENERAL PHARMACY W/ HCPCS (ALT 636 FOR OP/ED): Performed by: PHYSICIAN ASSISTANT

## 2024-06-12 PROCEDURE — 99233 SBSQ HOSP IP/OBS HIGH 50: CPT | Performed by: INTERNAL MEDICINE

## 2024-06-12 PROCEDURE — 2500000001 HC RX 250 WO HCPCS SELF ADMINISTERED DRUGS (ALT 637 FOR MEDICARE OP): Performed by: PHYSICIAN ASSISTANT

## 2024-06-12 PROCEDURE — 1100000001 HC PRIVATE ROOM DAILY

## 2024-06-12 PROCEDURE — 2500000002 HC RX 250 W HCPCS SELF ADMINISTERED DRUGS (ALT 637 FOR MEDICARE OP, ALT 636 FOR OP/ED)

## 2024-06-12 PROCEDURE — 85027 COMPLETE CBC AUTOMATED: CPT | Performed by: PHYSICIAN ASSISTANT

## 2024-06-12 PROCEDURE — 36415 COLL VENOUS BLD VENIPUNCTURE: CPT | Performed by: PHYSICIAN ASSISTANT

## 2024-06-12 PROCEDURE — 94640 AIRWAY INHALATION TREATMENT: CPT

## 2024-06-12 PROCEDURE — 2500000002 HC RX 250 W HCPCS SELF ADMINISTERED DRUGS (ALT 637 FOR MEDICARE OP, ALT 636 FOR OP/ED): Performed by: EMERGENCY MEDICINE

## 2024-06-12 PROCEDURE — 70450 CT HEAD/BRAIN W/O DYE: CPT | Performed by: STUDENT IN AN ORGANIZED HEALTH CARE EDUCATION/TRAINING PROGRAM

## 2024-06-12 PROCEDURE — 2500000004 HC RX 250 GENERAL PHARMACY W/ HCPCS (ALT 636 FOR OP/ED): Performed by: INTERNAL MEDICINE

## 2024-06-12 PROCEDURE — 84145 PROCALCITONIN (PCT): CPT | Mod: AHULAB | Performed by: INTERNAL MEDICINE

## 2024-06-12 PROCEDURE — 2500000001 HC RX 250 WO HCPCS SELF ADMINISTERED DRUGS (ALT 637 FOR MEDICARE OP): Performed by: INTERNAL MEDICINE

## 2024-06-12 PROCEDURE — 36415 COLL VENOUS BLD VENIPUNCTURE: CPT | Performed by: INTERNAL MEDICINE

## 2024-06-12 RX ORDER — PANTOPRAZOLE SODIUM 40 MG/1
40 TABLET, DELAYED RELEASE ORAL
Status: DISCONTINUED | OUTPATIENT
Start: 2024-06-12 | End: 2024-06-19 | Stop reason: HOSPADM

## 2024-06-12 RX ORDER — LIDOCAINE 50 MG/G
1 PATCH TOPICAL EVERY 24 HOURS
Status: ON HOLD | COMMUNITY
Start: 2024-01-29

## 2024-06-12 RX ORDER — DILTIAZEM HYDROCHLORIDE 120 MG/1
240 CAPSULE, COATED, EXTENDED RELEASE ORAL DAILY
Status: DISCONTINUED | OUTPATIENT
Start: 2024-06-12 | End: 2024-06-19 | Stop reason: HOSPADM

## 2024-06-12 RX ORDER — FLUTICASONE FUROATE AND VILANTEROL 100; 25 UG/1; UG/1
1 POWDER RESPIRATORY (INHALATION)
Status: DISCONTINUED | OUTPATIENT
Start: 2024-06-12 | End: 2024-06-12

## 2024-06-12 RX ORDER — LANOLIN ALCOHOL/MO/W.PET/CERES
100 CREAM (GRAM) TOPICAL DAILY
Status: ON HOLD | COMMUNITY

## 2024-06-12 RX ORDER — ONDANSETRON 4 MG/1
4 TABLET, ORALLY DISINTEGRATING ORAL EVERY 8 HOURS PRN
Status: DISCONTINUED | OUTPATIENT
Start: 2024-06-12 | End: 2024-06-19 | Stop reason: HOSPADM

## 2024-06-12 RX ORDER — GUAIFENESIN 600 MG/1
600 TABLET, EXTENDED RELEASE ORAL
Status: ON HOLD | COMMUNITY
Start: 2024-01-29

## 2024-06-12 RX ORDER — ENOXAPARIN SODIUM 100 MG/ML
40 INJECTION SUBCUTANEOUS EVERY 24 HOURS
Status: DISCONTINUED | OUTPATIENT
Start: 2024-06-12 | End: 2024-06-19 | Stop reason: HOSPADM

## 2024-06-12 RX ORDER — BUSPIRONE HYDROCHLORIDE 5 MG/1
5 TABLET ORAL 2 TIMES DAILY
Status: ON HOLD | COMMUNITY
Start: 2023-10-11

## 2024-06-12 RX ORDER — CLOTRIMAZOLE 1 %
CREAM (GRAM) TOPICAL 2 TIMES DAILY
Status: ON HOLD | COMMUNITY
Start: 2024-05-12

## 2024-06-12 RX ORDER — FLUOCINONIDE 0.5 MG/G
CREAM TOPICAL
Status: ON HOLD | COMMUNITY

## 2024-06-12 RX ORDER — VARENICLINE TARTRATE 1 MG/1
1 TABLET, FILM COATED ORAL 2 TIMES DAILY
Status: ON HOLD | COMMUNITY
Start: 2024-03-10

## 2024-06-12 RX ORDER — CALCIUM CARBONATE 600 MG
1200 TABLET ORAL DAILY
Status: ON HOLD | COMMUNITY
Start: 2022-10-26

## 2024-06-12 RX ORDER — ACETAMINOPHEN 650 MG/1
650 SUPPOSITORY RECTAL EVERY 4 HOURS PRN
Status: DISCONTINUED | OUTPATIENT
Start: 2024-06-12 | End: 2024-06-19 | Stop reason: HOSPADM

## 2024-06-12 RX ORDER — ALBUTEROL SULFATE 90 UG/1
2 AEROSOL, METERED RESPIRATORY (INHALATION) EVERY 4 HOURS PRN
Status: ON HOLD | COMMUNITY
Start: 2024-01-29

## 2024-06-12 RX ORDER — ACETAMINOPHEN 160 MG/5ML
650 SOLUTION ORAL EVERY 4 HOURS PRN
Status: DISCONTINUED | OUTPATIENT
Start: 2024-06-12 | End: 2024-06-19 | Stop reason: HOSPADM

## 2024-06-12 RX ORDER — CEFTRIAXONE 1 G/50ML
1 INJECTION, SOLUTION INTRAVENOUS EVERY 24 HOURS
Status: DISPENSED | OUTPATIENT
Start: 2024-06-12 | End: 2024-06-18

## 2024-06-12 RX ORDER — BENZONATATE 100 MG/1
100 CAPSULE ORAL 3 TIMES DAILY PRN
Status: DISCONTINUED | OUTPATIENT
Start: 2024-06-12 | End: 2024-06-19 | Stop reason: HOSPADM

## 2024-06-12 RX ORDER — AZITHROMYCIN 250 MG/1
250 TABLET, FILM COATED ORAL 3 TIMES WEEKLY
Status: ON HOLD | COMMUNITY
Start: 2024-02-09 | End: 2024-06-24 | Stop reason: SINTOL

## 2024-06-12 RX ORDER — ACETAMINOPHEN 325 MG/1
650 TABLET ORAL EVERY 4 HOURS PRN
Status: DISCONTINUED | OUTPATIENT
Start: 2024-06-12 | End: 2024-06-19 | Stop reason: HOSPADM

## 2024-06-12 RX ORDER — SENNOSIDES 8.6 MG/1
2 TABLET ORAL 2 TIMES DAILY
Status: DISCONTINUED | OUTPATIENT
Start: 2024-06-12 | End: 2024-06-19 | Stop reason: HOSPADM

## 2024-06-12 RX ORDER — MONTELUKAST SODIUM 10 MG/1
10 TABLET ORAL DAILY
Status: ON HOLD | COMMUNITY

## 2024-06-12 RX ORDER — PREDNISONE 10 MG/1
TABLET ORAL
COMMUNITY
Start: 2024-06-07 | End: 2024-06-19 | Stop reason: HOSPADM

## 2024-06-12 RX ORDER — LISINOPRIL 10 MG/1
40 TABLET ORAL DAILY
Status: DISCONTINUED | OUTPATIENT
Start: 2024-06-12 | End: 2024-06-12

## 2024-06-12 RX ORDER — NYSTATIN 100000 [USP'U]/ML
5 SUSPENSION ORAL 3 TIMES DAILY
Status: DISCONTINUED | OUTPATIENT
Start: 2024-06-12 | End: 2024-06-19 | Stop reason: HOSPADM

## 2024-06-12 RX ORDER — PANTOPRAZOLE SODIUM 40 MG/1
40 TABLET, DELAYED RELEASE ORAL 2 TIMES DAILY
Status: ON HOLD | COMMUNITY
Start: 2024-01-29

## 2024-06-12 RX ORDER — LISINOPRIL 10 MG/1
10 TABLET ORAL DAILY
Status: DISCONTINUED | OUTPATIENT
Start: 2024-06-12 | End: 2024-06-19 | Stop reason: HOSPADM

## 2024-06-12 RX ORDER — NITROGLYCERIN 0.4 MG/1
0.4 TABLET SUBLINGUAL
Status: ON HOLD | COMMUNITY

## 2024-06-12 RX ORDER — ONDANSETRON HYDROCHLORIDE 2 MG/ML
4 INJECTION, SOLUTION INTRAVENOUS EVERY 8 HOURS PRN
Status: DISCONTINUED | OUTPATIENT
Start: 2024-06-12 | End: 2024-06-19 | Stop reason: HOSPADM

## 2024-06-12 RX ORDER — AMMONIUM LACTATE 12 G/100G
LOTION TOPICAL 2 TIMES DAILY
Status: ON HOLD | COMMUNITY
Start: 2024-01-17

## 2024-06-12 RX ORDER — IPRATROPIUM BROMIDE AND ALBUTEROL SULFATE 2.5; .5 MG/3ML; MG/3ML
3 SOLUTION RESPIRATORY (INHALATION)
Status: DISCONTINUED | OUTPATIENT
Start: 2024-06-12 | End: 2024-06-19 | Stop reason: HOSPADM

## 2024-06-12 RX ORDER — METRONIDAZOLE 500 MG/1
TABLET ORAL
Status: ON HOLD | COMMUNITY
Start: 2024-06-03

## 2024-06-12 RX ORDER — CALCIUM CARBONATE 200(500)MG
500 TABLET,CHEWABLE ORAL 4 TIMES DAILY PRN
Status: DISCONTINUED | OUTPATIENT
Start: 2024-06-12 | End: 2024-06-19 | Stop reason: HOSPADM

## 2024-06-12 RX ORDER — NORTRIPTYLINE HYDROCHLORIDE 50 MG/1
1 CAPSULE ORAL NIGHTLY
Status: ON HOLD | COMMUNITY
Start: 2024-03-29

## 2024-06-12 RX ORDER — FORMOTEROL FUMARATE DIHYDRATE 20 UG/2ML
20 SOLUTION RESPIRATORY (INHALATION)
Status: DISCONTINUED | OUTPATIENT
Start: 2024-06-12 | End: 2024-06-19 | Stop reason: HOSPADM

## 2024-06-12 RX ORDER — DICLOFENAC SODIUM 10 MG/G
4 GEL TOPICAL 4 TIMES DAILY PRN
Status: ON HOLD | COMMUNITY
Start: 2024-01-03

## 2024-06-12 RX ORDER — BUDESONIDE 0.5 MG/2ML
0.5 INHALANT ORAL
Status: DISCONTINUED | OUTPATIENT
Start: 2024-06-12 | End: 2024-06-19 | Stop reason: HOSPADM

## 2024-06-12 RX ORDER — PANTOPRAZOLE SODIUM 40 MG/10ML
40 INJECTION, POWDER, LYOPHILIZED, FOR SOLUTION INTRAVENOUS
Status: DISCONTINUED | OUTPATIENT
Start: 2024-06-12 | End: 2024-06-19 | Stop reason: HOSPADM

## 2024-06-12 RX ORDER — TALC
3 POWDER (GRAM) TOPICAL NIGHTLY PRN
Status: DISCONTINUED | OUTPATIENT
Start: 2024-06-12 | End: 2024-06-19 | Stop reason: HOSPADM

## 2024-06-12 RX ORDER — ALUMINUM HYDROXIDE, MAGNESIUM HYDROXIDE, AND SIMETHICONE 1200; 120; 1200 MG/30ML; MG/30ML; MG/30ML
SUSPENSION ORAL
Status: ON HOLD | COMMUNITY
Start: 2024-03-26

## 2024-06-12 RX ORDER — NAPROXEN SODIUM 220 MG/1
81 TABLET, FILM COATED ORAL DAILY
Status: ON HOLD | COMMUNITY
Start: 2023-03-22

## 2024-06-12 RX ORDER — ALBUTEROL SULFATE 0.83 MG/ML
2.5 SOLUTION RESPIRATORY (INHALATION) EVERY 2 HOUR PRN
Status: DISCONTINUED | OUTPATIENT
Start: 2024-06-12 | End: 2024-06-13

## 2024-06-12 RX ADMIN — IPRATROPIUM BROMIDE AND ALBUTEROL SULFATE 3 ML: 2.5; .5 SOLUTION RESPIRATORY (INHALATION) at 20:10

## 2024-06-12 RX ADMIN — FORMOTEROL FUMARATE DIHYDRATE 20 MCG: 20 SOLUTION RESPIRATORY (INHALATION) at 09:42

## 2024-06-12 RX ADMIN — BUSPIRONE HYDROCHLORIDE 5 MG: 5 TABLET ORAL at 22:02

## 2024-06-12 RX ADMIN — ASPIRIN 81 MG 325 MG: 81 TABLET ORAL at 08:41

## 2024-06-12 RX ADMIN — BUSPIRONE HYDROCHLORIDE 5 MG: 5 TABLET ORAL at 08:41

## 2024-06-12 RX ADMIN — DILTIAZEM HYDROCHLORIDE 240 MG: 120 CAPSULE, EXTENDED RELEASE ORAL at 08:41

## 2024-06-12 RX ADMIN — LISINOPRIL 10 MG: 10 TABLET ORAL at 08:41

## 2024-06-12 RX ADMIN — LORAZEPAM 1 MG: 1 TABLET ORAL at 23:27

## 2024-06-12 RX ADMIN — FORMOTEROL FUMARATE DIHYDRATE 20 MCG: 20 SOLUTION RESPIRATORY (INHALATION) at 20:11

## 2024-06-12 RX ADMIN — MONTELUKAST 10 MG: 10 TABLET, FILM COATED ORAL at 22:03

## 2024-06-12 RX ADMIN — CALCIUM CARBONATE (ANTACID) CHEW TAB 500 MG 500 MG: 500 CHEW TAB at 15:34

## 2024-06-12 RX ADMIN — NYSTATIN 500000 UNITS: 100000 SUSPENSION ORAL at 22:02

## 2024-06-12 RX ADMIN — BUSPIRONE HYDROCHLORIDE 5 MG: 5 TABLET ORAL at 02:40

## 2024-06-12 RX ADMIN — NYSTATIN 500000 UNITS: 100000 SUSPENSION ORAL at 12:49

## 2024-06-12 RX ADMIN — ACETAMINOPHEN 650 MG: 325 TABLET ORAL at 14:57

## 2024-06-12 RX ADMIN — BUDESONIDE 0.5 MG: 0.5 INHALANT ORAL at 20:11

## 2024-06-12 RX ADMIN — ENOXAPARIN SODIUM 40 MG: 40 INJECTION SUBCUTANEOUS at 08:44

## 2024-06-12 RX ADMIN — IPRATROPIUM BROMIDE AND ALBUTEROL SULFATE 3 ML: 2.5; .5 SOLUTION RESPIRATORY (INHALATION) at 09:41

## 2024-06-12 RX ADMIN — PREDNISONE 40 MG: 20 TABLET ORAL at 08:41

## 2024-06-12 RX ADMIN — NYSTATIN 500000 UNITS: 100000 SUSPENSION ORAL at 15:00

## 2024-06-12 RX ADMIN — LORAZEPAM 1 MG: 1 TABLET ORAL at 15:00

## 2024-06-12 RX ADMIN — ACETAMINOPHEN 650 MG: 325 TABLET ORAL at 08:42

## 2024-06-12 RX ADMIN — MONTELUKAST 10 MG: 10 TABLET, FILM COATED ORAL at 02:40

## 2024-06-12 RX ADMIN — PANTOPRAZOLE SODIUM 40 MG: 40 TABLET, DELAYED RELEASE ORAL at 08:41

## 2024-06-12 RX ADMIN — ONDANSETRON 4 MG: 2 INJECTION INTRAMUSCULAR; INTRAVENOUS at 21:18

## 2024-06-12 RX ADMIN — CEFTRIAXONE SODIUM 1 G: 1 INJECTION, SOLUTION INTRAVENOUS at 09:52

## 2024-06-12 RX ADMIN — BUDESONIDE 0.5 MG: 0.5 INHALANT ORAL at 09:42

## 2024-06-12 RX ADMIN — IPRATROPIUM BROMIDE AND ALBUTEROL SULFATE 3 ML: 2.5; .5 SOLUTION RESPIRATORY (INHALATION) at 12:49

## 2024-06-12 SDOH — SOCIAL STABILITY: SOCIAL INSECURITY: ARE THERE ANY APPARENT SIGNS OF INJURIES/BEHAVIORS THAT COULD BE RELATED TO ABUSE/NEGLECT?: NO

## 2024-06-12 SDOH — SOCIAL STABILITY: SOCIAL INSECURITY: WERE YOU ABLE TO COMPLETE ALL THE BEHAVIORAL HEALTH SCREENINGS?: YES

## 2024-06-12 SDOH — SOCIAL STABILITY: SOCIAL INSECURITY: ARE YOU OR HAVE YOU BEEN THREATENED OR ABUSED PHYSICALLY, EMOTIONALLY, OR SEXUALLY BY ANYONE?: NO

## 2024-06-12 SDOH — SOCIAL STABILITY: SOCIAL INSECURITY: HAVE YOU HAD THOUGHTS OF HARMING ANYONE ELSE?: YES

## 2024-06-12 SDOH — SOCIAL STABILITY: SOCIAL INSECURITY: DOES ANYONE TRY TO KEEP YOU FROM HAVING/CONTACTING OTHER FRIENDS OR DOING THINGS OUTSIDE YOUR HOME?: NO

## 2024-06-12 SDOH — SOCIAL STABILITY: SOCIAL INSECURITY: HAVE YOU HAD ANY THOUGHTS OF HARMING ANYONE ELSE?: NO

## 2024-06-12 SDOH — SOCIAL STABILITY: SOCIAL INSECURITY: HAS ANYONE EVER THREATENED TO HURT YOUR FAMILY OR YOUR PETS?: NO

## 2024-06-12 SDOH — SOCIAL STABILITY: SOCIAL INSECURITY: DO YOU FEEL ANYONE HAS EXPLOITED OR TAKEN ADVANTAGE OF YOU FINANCIALLY OR OF YOUR PERSONAL PROPERTY?: NO

## 2024-06-12 SDOH — SOCIAL STABILITY: SOCIAL INSECURITY: DO YOU FEEL UNSAFE GOING BACK TO THE PLACE WHERE YOU ARE LIVING?: NO

## 2024-06-12 SDOH — SOCIAL STABILITY: SOCIAL INSECURITY: ABUSE: ADULT

## 2024-06-12 ASSESSMENT — COGNITIVE AND FUNCTIONAL STATUS - GENERAL
MOBILITY SCORE: 15
WALKING IN HOSPITAL ROOM: A LOT
TOILETING: A LITTLE
STANDING UP FROM CHAIR USING ARMS: A LITTLE
PERSONAL GROOMING: A LITTLE
PATIENT BASELINE BEDBOUND: NO
STANDING UP FROM CHAIR USING ARMS: A LITTLE
MOVING TO AND FROM BED TO CHAIR: A LITTLE
HELP NEEDED FOR BATHING: A LITTLE
CLIMB 3 TO 5 STEPS WITH RAILING: TOTAL
MOVING TO AND FROM BED TO CHAIR: A LITTLE
TOILETING: A LITTLE
MOVING FROM LYING ON BACK TO SITTING ON SIDE OF FLAT BED WITH BEDRAILS: A LITTLE
DRESSING REGULAR UPPER BODY CLOTHING: A LITTLE
TURNING FROM BACK TO SIDE WHILE IN FLAT BAD: A LITTLE
MOVING FROM LYING ON BACK TO SITTING ON SIDE OF FLAT BED WITH BEDRAILS: A LITTLE
TURNING FROM BACK TO SIDE WHILE IN FLAT BAD: A LITTLE
DRESSING REGULAR LOWER BODY CLOTHING: A LITTLE
DRESSING REGULAR UPPER BODY CLOTHING: A LITTLE
CLIMB 3 TO 5 STEPS WITH RAILING: TOTAL
MOBILITY SCORE: 15
DRESSING REGULAR LOWER BODY CLOTHING: A LITTLE
DAILY ACTIVITIY SCORE: 19
PERSONAL GROOMING: A LITTLE
WALKING IN HOSPITAL ROOM: A LOT
DAILY ACTIVITIY SCORE: 19
HELP NEEDED FOR BATHING: A LITTLE

## 2024-06-12 ASSESSMENT — ACTIVITIES OF DAILY LIVING (ADL)
JUDGMENT_ADEQUATE_SAFELY_COMPLETE_DAILY_ACTIVITIES: YES
HEARING - RIGHT EAR: FUNCTIONAL
ASSISTIVE_DEVICE: WALKER
DRESSING YOURSELF: NEEDS ASSISTANCE
TOILETING: NEEDS ASSISTANCE
FEEDING YOURSELF: INDEPENDENT
ADEQUATE_TO_COMPLETE_ADL: YES
WALKS IN HOME: NEEDS ASSISTANCE
LACK_OF_TRANSPORTATION: NO
PATIENT'S MEMORY ADEQUATE TO SAFELY COMPLETE DAILY ACTIVITIES?: YES
HEARING - LEFT EAR: FUNCTIONAL
BATHING: NEEDS ASSISTANCE
GROOMING: NEEDS ASSISTANCE

## 2024-06-12 ASSESSMENT — PAIN SCALES - GENERAL
PAINLEVEL_OUTOF10: 5 - MODERATE PAIN
PAINLEVEL_OUTOF10: 0 - NO PAIN
PAINLEVEL_OUTOF10: 5 - MODERATE PAIN
PAINLEVEL_OUTOF10: 3
PAINLEVEL_OUTOF10: 6
PAINLEVEL_OUTOF10: 3

## 2024-06-12 ASSESSMENT — LIFESTYLE VARIABLES
AUDIT-C TOTAL SCORE: 0
AUDIT-C TOTAL SCORE: 0
HOW OFTEN DO YOU HAVE 6 OR MORE DRINKS ON ONE OCCASION: NEVER
HOW OFTEN DO YOU HAVE A DRINK CONTAINING ALCOHOL: NEVER
HOW MANY STANDARD DRINKS CONTAINING ALCOHOL DO YOU HAVE ON A TYPICAL DAY: PATIENT DOES NOT DRINK
SKIP TO QUESTIONS 9-10: 1

## 2024-06-12 ASSESSMENT — PAIN DESCRIPTION - ORIENTATION: ORIENTATION: LOWER

## 2024-06-12 ASSESSMENT — PAIN - FUNCTIONAL ASSESSMENT
PAIN_FUNCTIONAL_ASSESSMENT: 0-10

## 2024-06-12 ASSESSMENT — PAIN DESCRIPTION - DESCRIPTORS: DESCRIPTORS: ACHING

## 2024-06-12 ASSESSMENT — PAIN DESCRIPTION - LOCATION: LOCATION: BACK

## 2024-06-12 ASSESSMENT — PAIN SCALES - WONG BAKER: WONGBAKER_NUMERICALRESPONSE: HURTS LITTLE MORE

## 2024-06-12 NOTE — H&P
History Of Present Illness  Fernando Cuevas is a 63 y.o. female presenting with acute COPD exacerbation, shortness of breath, anxiety, anemia, leukocytosis, cocaine abuse.  Patient with a history of COPD, alcohol abuse, history of cocaine use remotely and not for months per the patient but was positive for cocaine today.  History of, depression anxiety, lung cancer, etc.  Presents to the emergency department complaining of shortness of breath that started 4 days ago.  No relief with her nebulizer at home.  Today she has chest pain under the right breast that started after she came into the ED.  She rates her pain a 10 out of 10.  Patient states she vomited twice today earlier.  She also states she fell at home when she lost her balance and fell and hit her head on the railing.  She denies loss of consciousness.  She has daily chronic headaches with migraines and does complain of a current headache.  She denies cough.  Patient was just admitted May 5 through May 10 for COPD exacerbation, she went home smoked a cigarette got short of breath again and came back and was admitted from May 10 through May 14.  Patient was to follow-up with her primary care physician at [facility] and has an appointment for tomorrow.  She has a sleep study set up for June 28.    Past medical history: COPD, hypertension, leukocytosis, alcohol abuse, dyspnea, esophagitis, generalized anxiety disorder, history of cocaine abuse remotely per the patient but was positive for cocaine abuse today, depression, lung cancer, elevated troponins, hyperlipidemia, nicotine dependence, overactive bladder, spontaneous pneumothorax remotely, migraines, panic disorder, daily chronic headaches    Medications: Buspirone 5 mg twice daily, albuterol, Tessalon, lisinopril 40 mg, Tylenol 3, diltiazem, Advair Diskus, DuoNebs, oxybutynin, Spiriva, Mucinex, montelukast, pantoprazole, Chantix    Allergies: Iodinated contrast media    Social history: She smokes 3 cigarettes a  day and drinks alcohol on the weekends    ED course:.  Patient's comprehensive metabolic panel is within normal limits other than a glucose of 103, calcium 8.2, total protein 6.1.  BNP was normal at 65, troponin #1 was 15, #2 was 13, #3 was 10  INR 1.0  CBC showed a white count of 16.2 with a left shift, hemoglobin 8.5 and hematocrit 29.8, platelets are elevated at 591,000.  Patient does have a history of leukocytosis and thrombocytosis, and on 5/14 her white count was 18.8, hemoglobin was 9.6, hematocrit was 33.0, platelets were 515,000  Urine toxicology screen was positive for cocaine although when asked directly during the H&P the patient states she had not used cocaine for months.  Chest x-ray showed no acute process  EKG showed sinus tachycardia with no signs of ischemia  Patient was treated with 3 albuterol treatments, Solu-Medrol IV, BiPAP, Ativan 1 mg IV.  Patient's SpO2 was 100% on BiPAP     Past Medical History:   Diagnosis Date    Essential (primary) hypertension 04/20/2016    Benign hypertension    Personal history of other diseases of the respiratory system     H/O emphysema    Personal history of other diseases of the respiratory system     History of chronic obstructive lung disease    Personal history of other mental and behavioral disorders     History of depression     Past Surgical History:   Procedure Laterality Date    CT ABDOMEN PELVIS ANGIOGRAM W AND/OR WO IV CONTRAST  3/22/2016    CT ABDOMEN PELVIS ANGIOGRAM W AND/OR WO IV CONTRAST 3/22/2016 [facility] [place] EMERGENCY LEGACY    CT ANGIO CORONARY ART WITH HEARTFLOW IF SCORE >30%  1/5/2023    CT ANGIO CORONARY ART WITH HEARTFLOW IF SCORE >30% 1/5/2023    MR NECK ANGIO W IV CONTRAST  4/19/2021    MR NECK ANGIO W IV CONTRAST 4/19/2021     Social History     Tobacco Use    Smoking status: Every Day     Types: Cigarettes     Passive exposure: Current (3 cigarettes a day)    Smokeless tobacco: Never        Family History  No family history on file.      Allergies  Iodinated contrast media    Review of Systems  Patient denies  fever, chills, diarrhea, constipation,  vision changes, rashes, dysuria, paresthesias, vertigo,, cough or cold symptoms, or any other complaints at this time. A complete review of systems was done, and is as stated in the history of present illness, is otherwise negative or not pertinent to the complaint.    Physical Exam  Physical exam: Vital signs and nurses notes were reviewed.  SpO2 100% on BiPAP    General: Moderate distress. Alert and oriented  x 3.     Head: atraumatic and normocephalic    Eyes: Pupils equal round reactive to light, EOMs are intact, conjunctivae is not injected.    Oropharynx: No erythema or exudate noted, no trismus or drooling, buccal mucosa is moist.    Ears:  normal external exam, no swelling or erythema,     Nasal: normal external exam,     Neck: Supple, full range of motion,     Cardiac: Tachycardic rate, regular rhythm.  No murmurs noted.     Pulmonary: Patient had expiratory wheeze bilaterally, no rhonchi or rales.  No accessory muscle use no retraction noted.    Abdomen: Soft,  Nontender. No guarding, rigidity, or distention. Normoactive bowel sounds. No pulsatile masses, no bruits.     Extremities:  Full range of motion.  No edema    Skin: No rash seen. Skin is warm and dry     Neuro: Patient is alert and oriented x3. Speech is clear. There is no asymmetry with facial grimaces, and no tongue deviation. Patient moves all extremities independently. Sensation is intact. No obvious neuro deficits are noted.     Last Recorded Vitals  /85   Pulse 95   Temp 36 °C (96.8 °F)   Resp 18   Wt 64 kg (141 lb)   SpO2 100%     Relevant Results  Scheduled medications  [START ON 6/12/2024] aspirin, 325 mg, oral, Daily  busPIRone, 5 mg, oral, BID  [START ON 6/12/2024] ipratropium-albuteroL, 3 mL, nebulization, q6h  montelukast, 10 mg, oral, Nightly  oxygen, , inhalation, Continuous - Inhalation  [START ON 6/12/2024]  predniSONE, 40 mg, oral, Daily      Continuous medications     PRN medications  PRN medications: albuterol, LORazepam, oxygen    Results for orders placed or performed during the hospital encounter of 06/11/24 (from the past 24 hour(s))   CBC and Auto Differential   Result Value Ref Range    WBC 16.2 (H) 4.4 - 11.3 x10*3/uL    nRBC 4.4 (H) 0.0 - 0.0 /100 WBCs    RBC 3.73 (L) 4.00 - 5.20 x10*6/uL    Hemoglobin 8.5 (L) 12.0 - 16.0 g/dL    Hematocrit 29.8 (L) 36.0 - 46.0 %    MCV 80 80 - 100 fL    MCH 22.8 (L) 26.0 - 34.0 pg    MCHC 28.5 (L) 32.0 - 36.0 g/dL    RDW 20.3 (H) 11.5 - 14.5 %    Platelets 591 (H) 150 - 450 x10*3/uL    Immature Granulocytes %, Automated 4.2 (H) 0.0 - 0.9 %    Immature Granulocytes Absolute, Automated 0.68 0.00 - 0.70 x10*3/uL   Comprehensive metabolic panel   Result Value Ref Range    Glucose 103 (H) 74 - 99 mg/dL    Sodium 138 136 - 145 mmol/L    Potassium 3.7 3.5 - 5.3 mmol/L    Chloride 100 98 - 107 mmol/L    Bicarbonate 29 21 - 32 mmol/L    Anion Gap 13 10 - 20 mmol/L    Urea Nitrogen 16 6 - 23 mg/dL    Creatinine 0.91 0.50 - 1.05 mg/dL    eGFR 71 >60 mL/min/1.73m*2    Calcium 8.2 (L) 8.6 - 10.3 mg/dL    Albumin 3.4 3.4 - 5.0 g/dL    Alkaline Phosphatase 90 33 - 136 U/L    Total Protein 6.1 (L) 6.4 - 8.2 g/dL    AST 17 9 - 39 U/L    Bilirubin, Total 0.2 0.0 - 1.2 mg/dL    ALT 39 7 - 45 U/L   Protime-INR   Result Value Ref Range    Protime 11.3 9.8 - 12.8 seconds    INR 1.0 0.9 - 1.1   aPTT   Result Value Ref Range    aPTT 23 (L) 27 - 38 seconds   B-Type Natriuretic Peptide   Result Value Ref Range    BNP 65 0 - 99 pg/mL   Sars-CoV-2 PCR   Result Value Ref Range    Coronavirus 2019, PCR Not Detected Not Detected   Troponin I, High Sensitivity, Initial   Result Value Ref Range    Troponin I, High Sensitivity 15 (H) 0 - 13 ng/L   Manual Differential   Result Value Ref Range    Neutrophils %, Manual 81.0 40.0 - 80.0 %    Lymphocytes %, Manual 12.0 13.0 - 44.0 %    Monocytes %, Manual 6.0 2.0  - 10.0 %    Eosinophils %, Manual 0.0 0.0 - 6.0 %    Basophils %, Manual 0.0 0.0 - 2.0 %    Myelocytes %, Manual 1.0 0.0 - 0.0 %    Seg Neutrophils Absolute, Manual 13.12 (H) 1.20 - 7.00 x10*3/uL    Lymphocytes Absolute, Manual 1.94 1.20 - 4.80 x10*3/uL    Monocytes Absolute, Manual 0.97 0.10 - 1.00 x10*3/uL    Eosinophils Absolute, Manual 0.00 0.00 - 0.70 x10*3/uL    Basophils Absolute, Manual 0.00 0.00 - 0.10 x10*3/uL    Myelocytes Absolute, Manual 0.16 0.00 - 0.00 x10*3/uL    Total Cells Counted 100     RBC Morphology See Below     Polychromasia Mild     Teardrop Cells Few    Troponin, High Sensitivity, 1 Hour   Result Value Ref Range    Troponin I, High Sensitivity 13 0 - 13 ng/L   Troponin I, High Sensitivity   Result Value Ref Range    Troponin I, High Sensitivity 10 0 - 13 ng/L   Type and screen   Result Value Ref Range    ABO TYPE O     Rh TYPE POS     ANTIBODY SCREEN NEG    Drug Screen, Urine   Result Value Ref Range    Amphetamine Screen, Urine Presumptive Negative Presumptive Negative    Barbiturate Screen, Urine Presumptive Negative Presumptive Negative    Benzodiazepines Screen, Urine Presumptive Negative Presumptive Negative    Cannabinoid Screen, Urine Presumptive Negative Presumptive Negative    Cocaine Metabolite Screen, Urine Presumptive Positive (A) Presumptive Negative    Fentanyl Screen, Urine Presumptive Negative Presumptive Negative    Opiate Screen, Urine Presumptive Negative Presumptive Negative    Oxycodone Screen, Urine Presumptive Negative Presumptive Negative    PCP Screen, Urine Presumptive Negative Presumptive Negative    Methadone Screen, Urine Presumptive Negative Presumptive Negative     XR chest 1 view   Final Result   1.  No active cardiopulmonary process.             Signed by: Zack Sevilla 6/11/2024 3:48 PM   Dictation workstation:   [id number]      CT head wo IV contrast    (Results Pending)          Assessment/Plan   Principal Problem:    Acute exacerbation of chronic  obstructive pulmonary disease (Multi)  Active Problems:    COPD exacerbation (Multi)    Anemia    Anxiety    Chest pain    Shortness of breath    Fall    Head injury    Headache    Cocaine abuse (Multi)      Plan: Admit to stepdown  Wean off BiPAP as tolerated  Oxygen  Telemetry  DuoNeb aerosols and albuterol aerosols  Head CT pending result will need to be reviewed  Urinalysis pending,  Daily CBC and BMP  Prednisone daily, Tessalon Perles, diltiazem, Breo, Spiriva, lisinopril, buspirone ordered  Ativan every 8 hours p.o. as needed  Lovenox prophylactically  Pantoprazole,  Tylenol and Zofran as needed, Senokot  Findings, orders, plan reviewed with Davis Rubalcava PA-C

## 2024-06-12 NOTE — PROGRESS NOTES
Fernando Cuevas is a 63 y.o. female on day 1 of admission presenting with Acute exacerbation of chronic obstructive pulmonary disease (Multi).      Subjective   Patient now on 2 L of nausea, oxygen feeling much better procalcitonin is pending.  White count up to 35,000 added ceftriaxone checking a procalcitonin may just be Demarginalization from steroids       Objective     Last Recorded Vitals  /74   Pulse (!) 109   Temp 36 °C (96.8 °F)   Resp 17   Wt 64 kg (141 lb)   SpO2 98%   Intake/Output last 3 Shifts:  No intake or output data in the 24 hours ending 06/12/24 1356    Admission Weight  Weight: 64 kg (141 lb) (06/11/24 1422)    Daily Weight  06/11/24 : 64 kg (141 lb)    Image Results  Electrocardiogram, 12-lead PRN ACS symptoms  Sinus tachycardia  Right atrial enlargement  Anterior infarct (cited on or before 11-JUN-2024)  Abnormal ECG  When compared with ECG of 11-JUN-2024 17:03, (unconfirmed)  No significant change was found  ECG 12 Lead  Normal sinus rhythm  Right atrial enlargement  Anterior infarct (cited on or before 11-JUN-2024)  Abnormal ECG  When compared with ECG of 11-JUN-2024 14:28, (unconfirmed)  No significant change was found  CT head wo IV contrast  Narrative: Interpreted By:  Sheri Lakhani,   STUDY:  CT HEAD WO IV CONTRAST;  6/12/2024 12:53 am      INDICATION:  Signs/Symptoms:head injury earlier today, headache.      COMPARISON:  CT head dated 07/24/2023.      ACCESSION NUMBER(S):  [id number]      ORDERING CLINICIAN:  SARA RUTLEDGE      TECHNIQUE:  Noncontrast axial CT scan of head was performed. Angled reformats in  brain and bone windows were generated. The images were reviewed in  bone, brain, blood and soft tissue windows.      FINDINGS:  No hyperdense intracranial hemorrhage is identified. There is no mass  effect or midline shift.      Gray-white differentiation is intact, without evidence of CT apparent  transcortical infarct. Subtle attenuation changes are present in  the  periventricular and subcortical white matter of bilateral cerebral  hemispheres, nonspecific findings favored to represent sequela of  microvascular disease.      No ventricular dilatation is present. Basal cisterns are patent. No  extra-axial fluid collections are identified.      Scalp soft tissues do not demonstrate any acute abnormality.  Calvarium is unremarkable in appearance. Mastoid air cells and middle  ear cavities are clear.      Visualized paranasal sinuses are unremarkable in appearance.      Impression: 1. No evidence of hemorrhage, skull fracture, or other acute  intracranial trauma/abnormality.  2. Patchy attenuation changes are present in the periventricular and  subcortical white matter of bilateral cerebral hemispheres,  nonspecific findings favored to represent sequela of microvascular  disease.      MACRO:  None      Signed by: Sheri Lakhani 6/12/2024 1:07 AM  Dictation workstation:   [id number]      Physical Exam  Vitals reviewed.   HENT:      Head: Normocephalic.      Nose: Nose normal.      Mouth/Throat:      Mouth: Mucous membranes are moist.   Cardiovascular:      Rate and Rhythm: Normal rate.   Pulmonary:      Comments: Diminished bs bilaterally  Abdominal:      General: Abdomen is flat. Bowel sounds are normal.      Palpations: Abdomen is soft.   Skin:     General: Skin is warm.      Capillary Refill: Capillary refill takes less than 2 seconds.   Neurological:      General: No focal deficit present.      Mental Status: She is alert.         Relevant Results  aspirin, 325 mg, oral, Daily  budesonide, 0.5 mg, nebulization, BID   And  formoterol, 20 mcg, nebulization, BID  busPIRone, 5 mg, oral, BID  cefTRIAXone, 1 g, intravenous, q24h  dilTIAZem ER, 240 mg, oral, Daily  enoxaparin, 40 mg, subcutaneous, q24h  ipratropium-albuteroL, 3 mL, nebulization, TID  lisinopril, 10 mg, oral, Daily  montelukast, 10 mg, oral, Nightly  nystatin, 5 mL, Swish & Swallow, TID  pantoprazole, 40  mg, oral, Daily before breakfast   Or  pantoprazole, 40 mg, intravenous, Daily before breakfast  predniSONE, 40 mg, oral, Daily  sennosides, 2 tablet, oral, BID      Results for orders placed or performed during the hospital encounter of 06/11/24 (from the past 24 hour(s))   CBC and Auto Differential   Result Value Ref Range    WBC 16.2 (H) 4.4 - 11.3 x10*3/uL    nRBC 4.4 (H) 0.0 - 0.0 /100 WBCs    RBC 3.73 (L) 4.00 - 5.20 x10*6/uL    Hemoglobin 8.5 (L) 12.0 - 16.0 g/dL    Hematocrit 29.8 (L) 36.0 - 46.0 %    MCV 80 80 - 100 fL    MCH 22.8 (L) 26.0 - 34.0 pg    MCHC 28.5 (L) 32.0 - 36.0 g/dL    RDW 20.3 (H) 11.5 - 14.5 %    Platelets 591 (H) 150 - 450 x10*3/uL    Immature Granulocytes %, Automated 4.2 (H) 0.0 - 0.9 %    Immature Granulocytes Absolute, Automated 0.68 0.00 - 0.70 x10*3/uL   Comprehensive metabolic panel   Result Value Ref Range    Glucose 103 (H) 74 - 99 mg/dL    Sodium 138 136 - 145 mmol/L    Potassium 3.7 3.5 - 5.3 mmol/L    Chloride 100 98 - 107 mmol/L    Bicarbonate 29 21 - 32 mmol/L    Anion Gap 13 10 - 20 mmol/L    Urea Nitrogen 16 6 - 23 mg/dL    Creatinine 0.91 0.50 - 1.05 mg/dL    eGFR 71 >60 mL/min/1.73m*2    Calcium 8.2 (L) 8.6 - 10.3 mg/dL    Albumin 3.4 3.4 - 5.0 g/dL    Alkaline Phosphatase 90 33 - 136 U/L    Total Protein 6.1 (L) 6.4 - 8.2 g/dL    AST 17 9 - 39 U/L    Bilirubin, Total 0.2 0.0 - 1.2 mg/dL    ALT 39 7 - 45 U/L   Protime-INR   Result Value Ref Range    Protime 11.3 9.8 - 12.8 seconds    INR 1.0 0.9 - 1.1   aPTT   Result Value Ref Range    aPTT 23 (L) 27 - 38 seconds   B-Type Natriuretic Peptide   Result Value Ref Range    BNP 65 0 - 99 pg/mL   Sars-CoV-2 PCR   Result Value Ref Range    Coronavirus 2019, PCR Not Detected Not Detected   Troponin I, High Sensitivity, Initial   Result Value Ref Range    Troponin I, High Sensitivity 15 (H) 0 - 13 ng/L   Manual Differential   Result Value Ref Range    Neutrophils %, Manual 81.0 40.0 - 80.0 %    Lymphocytes %, Manual 12.0 13.0 -  44.0 %    Monocytes %, Manual 6.0 2.0 - 10.0 %    Eosinophils %, Manual 0.0 0.0 - 6.0 %    Basophils %, Manual 0.0 0.0 - 2.0 %    Myelocytes %, Manual 1.0 0.0 - 0.0 %    Seg Neutrophils Absolute, Manual 13.12 (H) 1.20 - 7.00 x10*3/uL    Lymphocytes Absolute, Manual 1.94 1.20 - 4.80 x10*3/uL    Monocytes Absolute, Manual 0.97 0.10 - 1.00 x10*3/uL    Eosinophils Absolute, Manual 0.00 0.00 - 0.70 x10*3/uL    Basophils Absolute, Manual 0.00 0.00 - 0.10 x10*3/uL    Myelocytes Absolute, Manual 0.16 0.00 - 0.00 x10*3/uL    Total Cells Counted 100     RBC Morphology See Below     Polychromasia Mild     Teardrop Cells Few    Troponin, High Sensitivity, 1 Hour   Result Value Ref Range    Troponin I, High Sensitivity 13 0 - 13 ng/L   Troponin I, High Sensitivity   Result Value Ref Range    Troponin I, High Sensitivity 10 0 - 13 ng/L   Type and screen   Result Value Ref Range    ABO TYPE O     Rh TYPE POS     ANTIBODY SCREEN NEG    Drug Screen, Urine   Result Value Ref Range    Amphetamine Screen, Urine Presumptive Negative Presumptive Negative    Barbiturate Screen, Urine Presumptive Negative Presumptive Negative    Benzodiazepines Screen, Urine Presumptive Negative Presumptive Negative    Cannabinoid Screen, Urine Presumptive Negative Presumptive Negative    Cocaine Metabolite Screen, Urine Presumptive Positive (A) Presumptive Negative    Fentanyl Screen, Urine Presumptive Negative Presumptive Negative    Opiate Screen, Urine Presumptive Negative Presumptive Negative    Oxycodone Screen, Urine Presumptive Negative Presumptive Negative    PCP Screen, Urine Presumptive Negative Presumptive Negative    Methadone Screen, Urine Presumptive Negative Presumptive Negative   Electrocardiogram, 12-lead PRN ACS symptoms   Result Value Ref Range    Ventricular Rate 101 BPM    Atrial Rate 101 BPM    FL Interval 134 ms    QRS Duration 72 ms    QT Interval 340 ms    QTC Calculation(Bazett) 440 ms    P Axis 78 degrees    R Axis 69 degrees     T Axis 65 degrees    QRS Count 16 beats    Q Onset 226 ms    P Onset 159 ms    P Offset 199 ms    T Offset 396 ms    QTC Fredericia 404 ms   Urinalysis with Reflex Culture and Microscopic   Result Value Ref Range    Color, Urine Yellow Light-Yellow, Yellow, Dark-Yellow    Appearance, Urine Turbid (N) Clear    Specific Gravity, Urine 1.027 1.005 - 1.035    pH, Urine 5.5 5.0, 5.5, 6.0, 6.5, 7.0, 7.5, 8.0    Protein, Urine 20 (TRACE) NEGATIVE, 10 (TRACE), 20 (TRACE) mg/dL    Glucose, Urine Normal Normal mg/dL    Blood, Urine NEGATIVE NEGATIVE    Ketones, Urine NEGATIVE NEGATIVE mg/dL    Bilirubin, Urine NEGATIVE NEGATIVE    Urobilinogen, Urine Normal Normal mg/dL    Nitrite, Urine NEGATIVE NEGATIVE    Leukocyte Esterase, Urine 500 Ivonne/µL (A) NEGATIVE   Microscopic Only, Urine   Result Value Ref Range    WBC, Urine 11-20 (A) 1-5, NONE /HPF    RBC, Urine NONE NONE, 1-2, 3-5 /HPF    Squamous Epithelial Cells, Urine 1-9 (SPARSE) Reference range not established. /HPF    Bacteria, Urine 4+ (A) NONE SEEN /HPF    Mucus, Urine FEW Reference range not established. /LPF   ECG 12 Lead   Result Value Ref Range    Ventricular Rate 98 BPM    Atrial Rate 98 BPM    MT Interval 132 ms    QRS Duration 54 ms    QT Interval 340 ms    QTC Calculation(Bazett) 434 ms    P Axis 77 degrees    R Axis 64 degrees    T Axis 65 degrees    QRS Count 16 beats    Q Onset 227 ms    P Onset 161 ms    P Offset 200 ms    T Offset 397 ms    QTC Fredericia 400 ms   CBC   Result Value Ref Range    WBC 35.4 (H) 4.4 - 11.3 x10*3/uL    nRBC 1.3 (H) 0.0 - 0.0 /100 WBCs    RBC 3.66 (L) 4.00 - 5.20 x10*6/uL    Hemoglobin 8.4 (L) 12.0 - 16.0 g/dL    Hematocrit 29.3 (L) 36.0 - 46.0 %    MCV 80 80 - 100 fL    MCH 23.0 (L) 26.0 - 34.0 pg    MCHC 28.7 (L) 32.0 - 36.0 g/dL    RDW 20.6 (H) 11.5 - 14.5 %    Platelets 572 (H) 150 - 450 x10*3/uL   Basic metabolic panel   Result Value Ref Range    Glucose 168 (H) 74 - 99 mg/dL    Sodium 135 (L) 136 - 145 mmol/L    Potassium  4.5 3.5 - 5.3 mmol/L    Chloride 100 98 - 107 mmol/L    Bicarbonate 26 21 - 32 mmol/L    Anion Gap 14 10 - 20 mmol/L    Urea Nitrogen 19 6 - 23 mg/dL    Creatinine 0.79 0.50 - 1.05 mg/dL    eGFR 84 >60 mL/min/1.73m*2    Calcium 8.6 8.6 - 10.3 mg/dL     Imaging:  Ct head:  IMPRESSION:  1. No evidence of hemorrhage, skull fracture, or other acute  intracranial trauma/abnormality.  2. Patchy attenuation changes are present in the periventricular and  subcortical white matter of bilateral cerebral hemispheres,  nonspecific findings favored to represent sequela of microvascular  disease.    Cxr:  IMPRESSION:  1. No evidence of hemorrhage, skull fracture, or other acute  intracranial trauma/abnormality.  2. Patchy attenuation changes are present in the periventricular and  subcortical white matter of bilateral cerebral hemispheres,  nonspecific findings favored to represent sequela of microvascular  disease.           Assessment/Plan      AECOPD with acute hypoxic respiratory failure  -on 2l/nc oxygen  -off bipap    2. Leukocytosis  -? Secondary to steriods  -pending procalcitonin  -empirically on iv steriods            Morena Landon MD

## 2024-06-12 NOTE — SIGNIFICANT EVENT
06/12/24 0018   Patient Evaluation Program   Pulmonary Status 3   Surgical Status 0   Chest X-Ray 0   Respiratory Pattern 0   Mental Status 0   Breath Sounds 4   Cough 0   Level of Activity 1   Oxygen Required for Sp02 Greater Than or Equal to 92% 1   Respiratory Acuity Score 9   Triage Level Triage - 4, Score of 4-10   Frequency TID and Q2 PRN (intermittant wheezing,  TRIAGE - 3&4)

## 2024-06-12 NOTE — ED PROVIDER NOTES
HPI   Chief Complaint   Patient presents with    Shortness of Breath       This is a 63-year-old woman with past medical history of hypertension, COPD, ongoing smoking and is present with complaint of COPD exacerbation x 1 day.  Positive increased work of breathing or short of breath.  No fever reported.  No chills.  No nausea or vomiting.  No chest or abdominal pain.  No dysuria, hematuria or flank pain                        Albert Coma Scale Score: 15                     Patient History   Past Medical History:   Diagnosis Date    Essential (primary) hypertension 04/20/2016    Benign hypertension    Personal history of other diseases of the respiratory system     H/O emphysema    Personal history of other diseases of the respiratory system     History of chronic obstructive lung disease    Personal history of other mental and behavioral disorders     History of depression     Past Surgical History:   Procedure Laterality Date    CT ABDOMEN PELVIS ANGIOGRAM W AND/OR WO IV CONTRAST  3/22/2016    CT ABDOMEN PELVIS ANGIOGRAM W AND/OR WO IV CONTRAST 3/22/2016 [facility]GENCY LEGAC[facility]    CT ANGIO CORONARY ART WITH HEARTFLOW IF SCORE >30%  1/5/2023    CT ANGIO CORONARY ART WITH HEARTFLOW IF SCORE >30% 1/5/2023    MR NECK ANGIO W IV CONTRAST  4/19/2021    MR NECK ANGIO W IV CONTRAST 4/19/2021     No family history on file.  Social History     Tobacco Use    Smoking status: Every Day     Types: Cigarettes     Passive exposure: Current (3 cigarettes a day)    Smokeless tobacco: Never   Substance Use Topics    Alcohol use: Not on file    Drug use: Not on file       Physical Exam   ED Triage Vitals   Temperature Heart Rate Respirations BP   06/11/24 1422 06/11/24 1422 06/11/24 1422 06/11/24 1422   36 °C (96.8 °F) 95 18 116/72      Pulse Ox Temp src Heart Rate Source Patient Position   06/11/24 1422 -- -- --   100 %         BP Location FiO2 (%)     -- 06/11/24 2040      50 %       Physical Exam  Vitals and nursing note reviewed.    Constitutional:       Appearance: She is well-developed and normal weight.   HENT:      Head: Normocephalic and atraumatic.      Mouth/Throat:      Mouth: Mucous membranes are moist.      Pharynx: Oropharynx is clear.   Eyes:      Extraocular Movements: Extraocular movements intact.      Pupils: Pupils are equal, round, and reactive to light.   Cardiovascular:      Rate and Rhythm: Normal rate and regular rhythm.   Pulmonary:      Breath sounds: Wheezing present.      Comments: Positive wheezing bilaterally  Abdominal:      General: Bowel sounds are normal.      Palpations: Abdomen is soft.   Musculoskeletal:         General: Normal range of motion.      Cervical back: Normal range of motion and neck supple.   Skin:     General: Skin is warm and dry.      Capillary Refill: Capillary refill takes less than 2 seconds.   Neurological:      General: No focal deficit present.      Mental Status: She is alert and oriented to person, place, and time.   Psychiatric:         Mood and Affect: Mood normal.         Behavior: Behavior normal.         ED Course & MDM   Diagnoses as of 06/11/24 2127   COPD exacerbation (Multi)       Medical Decision Making  Amount and/or Complexity of Data Reviewed  Labs: ordered. Decision-making details documented in ED Course.  Radiology: ordered. Decision-making details documented in ED Course.  ECG/medicine tests: ordered. Decision-making details documented in ED Course.        Procedure  Procedures     Connor Wilkinson MD  06/11/24 2133

## 2024-06-12 NOTE — PROGRESS NOTES
Transitional Care Coordination Progress Note:  Plan per Medical/Surgical team: treatment of COPD with duoneb, inhalers, IV solumedrol, ativan, morphine, CPAP  Status: Inpatient   Payor source: caresource   Discharge disposition: Home with adult son  [facility] NAVIGATOR program  Working on Passport aide   Potential Barriers: cocaine positive, smoker, ?need oxygen @ home   ADOD: 6/14/2024   ИВАН Cardenas RN, BSN Transitional Care Coordinator ED# [phone]      06/12/24 0723   Discharge Planning   Living Arrangements Children   Support Systems Children   Assistance Needed cocaine abuse recovery resources, smoking cessation education, ?qualifies for home oxygen   Type of Residence Private residence   Number of Stairs to Enter Residence 3   Number of Stairs Within Residence 0   Do you have animals or pets at home? No   Home or Post Acute Services In home services   Type of Home Care Services Hospice   Patient expects to be discharged to: Home with adult son  [facility] NAVIGATOR program   Does the patient need discharge transport arranged? No   Financial Resource Strain   How hard is it for you to pay for the very basics like food, housing, medical care, and heating? Not hard   Housing Stability   In the last 12 months, was there a time when you were not able to pay the mortgage or rent on time? N   In the last 12 months, how many places have you lived? 1   In the last 12 months, was there a time when you did not have a steady place to sleep or slept in a shelter (including now)? N   Transportation Needs   In the past 12 months, has lack of transportation kept you from medical appointments or from getting medications? no   In the past 12 months, has lack of transportation kept you from meetings, work, or from getting things needed for daily living? No

## 2024-06-12 NOTE — PROGRESS NOTES
06/12/24 0723   [facility] Disability Status   Are you deaf or do you have serious difficulty hearing? N   Are you blind or do you have serious difficulty seeing, even when wearing glasses? N   Because of a physical, mental, or emotional condition, do you have serious difficulty concentrating, remembering, or making decisions? (5 years old or older) N   Do you have serious difficulty walking or climbing stairs? Y   Do you have serious difficulty dressing or bathing? N   Because of a physical, mental, or emotional condition, do you have serious difficulty doing errands alone such as visiting the doctor? Y

## 2024-06-12 NOTE — PROGRESS NOTES
Pharmacy Medication History Review    Fernando Cuevas is a 63 y.o. female admitted for Acute exacerbation of chronic obstructive pulmonary disease (Multi). Pharmacy reviewed the patient's [id number] medications and allergies for accuracy.    The list below reflectives the updated PTA list. Please review each medication in order reconciliation for additional clarification and justification.  Prior to Admission Medications   Prescriptions Last Dose Informant   acetaminophen-codeine (Tylenol w/ Codeine #3) 300-30 mg tablet Unknown Self   Sig: Take 1 tablet by mouth every 6 hours if needed for severe pain (7 - 10).   albuterol 90 mcg/actuation inhaler Unknown Self   Sig: Inhale 2 puffs every 4 hours if needed for wheezing or shortness of breath.   alum-mag hydroxide-simeth (Mylanta) 200-200-20 mg/5 mL oral suspension Unknown Self   Sig: TAKE 10 ML BY MOUTH EVERY 6 HOURS AS NEEDED   ammonium lactate (Lac-Hydrin) 12 % lotion Unknown Self   Sig: Apply topically twice a day.   aspirin 81 mg chewable tablet Unknown Self   Sig: Chew 1 tablet (81 mg) once daily.   azithromycin (Zithromax) 250 mg tablet Unknown Self   Sig: Take 1 tablet (250 mg) by mouth 3 times a week. M,W,F   benzonatate (Tessalon) 100 mg capsule Unknown Self   Sig: Take 1 capsule (100 mg) by mouth 3 times a day as needed for cough. Do not crush or chew.   busPIRone (Buspar) 5 mg tablet Unknown Self   Sig: Take 1 tablet (5 mg) by mouth twice a day.   calcium carbonate 600 mg calcium (1,500 mg) tablet Unknown Self   Sig: Take 1,200 mg by mouth once daily.   clotrimazole (Lotrimin) 1 % cream Unknown Self   Sig: Apply topically 2 times a day.   diclofenac sodium (Voltaren) 1 % gel Unknown Self   Sig: Apply 4.5 inches (4 g) topically 4 times a day as needed.   dilTIAZem ER (Tiazac) 240 mg 24 hr capsule Unknown Self   Sig: Take 1 capsule (240 mg) by mouth once daily.   fluocinonide 0.05 % cream Unknown Self   Sig: APPLY THIN LAYER TO AFFECTED AREA TWICE A  DAY AS NEEDED   fluticasone propion-salmeteroL (Advair Diskus) 100-50 mcg/dose diskus inhaler     Sig: Inhale 1 puff 2 times a day. Rinse mouth with water after use to reduce aftertaste and incidence of candidiasis. Do not swallow.   guaiFENesin (Mucinex) 600 mg 12 hr tablet Unknown Self   Sig: Take 1 tablet (600 mg) by mouth.   ipratropium-albuteroL (Duo-Neb) 0.5-2.5 mg/3 mL nebulizer solution     Sig: Take 3 mL by nebulization 3 times a day.   lidocaine (Lidoderm) 5 % patch Unknown Self   Sig: Place 1 patch on the skin once every 24 hours.   lisinopril 10 mg tablet Unknown Self   Sig: Take 1 tablet (10 mg) by mouth once daily.   metroNIDAZOLE (Flagyl) 500 mg tablet Unknown Self   mometasone-formoterol (Dulera 100) 100-5 mcg/actuation inhaler Unknown Self   Sig: Inhale 200 mcg twice a day.   montelukast (Singulair) 10 mg tablet Unknown Self   Sig: Take 1 tablet (10 mg) by mouth once daily.   nitroglycerin (Nitrostat) 0.4 mg SL tablet Unknown Self   Sig: Place 1 tablet (0.4 mg) under the tongue.   nortriptyline (Pamelor) 50 mg capsule Unknown Self   Sig: Take 1 capsule (50 mg) by mouth once daily at bedtime.   oxybutynin XL (Ditropan-XL) 5 mg 24 hr tablet Unknown Self   Sig: Take 1 tablet (5 mg) by mouth once daily. Do not crush, chew, or split.   pantoprazole (ProtoNix) 40 mg EC tablet Unknown Self   Sig: Take 1 tablet (40 mg) by mouth twice a day.   predniSONE (Deltasone) 10 mg tablet Unknown Self   Sig: Take 4 tablets (40mg) daily for 3 days, then take 3 tablets (30mg) daily for 3 days, then take 2 tablets (20mg) daily for 3 days, then take 1 tablet (10mg) daily as directed   thiamine 100 mg tablet Unknown Self   Sig: Take 1 tablet (100 mg) by mouth once daily.   tiotropium (Spiriva Respimat) 2.5 mcg/actuation inhaler Unknown Self   Sig: Inhale 2 puffs once daily.   varenicline (Chantix) 1 mg tablet Unknown Self   Sig: Take 1 tablet (1 mg) by mouth twice a day.      Facility-Administered Medications: None          The list below reflectives the updated allergy list. Please review each documented allergy for additional clarification and justification.  Allergies  Reviewed by Mora Rubalcava PA-C on 6/11/2024        Severity Reactions Comments    Iodinated Contrast Media Medium Hives Hives to faces and neck with itching. Resolved with 50 mg benadryl, 20 mg pepcid & 60 mg prednisone. Hives to faces and neck with itching. Resolved with 50 mg benadryl, 20 mg pepcid & 60 mg prednisone.   Hives to faces and neck with itching. Resolved with 50 mg benadryl, 20 mg pepcid & 60 mg prednisone.            Below are additional concerns with the patient's PTA list.  Spoke to patient, med list from [facility]

## 2024-06-13 ENCOUNTER — APPOINTMENT (OUTPATIENT)
Dept: RADIOLOGY | Facility: HOSPITAL | Age: 64
End: 2024-06-13
Payer: COMMERCIAL

## 2024-06-13 LAB
ANION GAP SERPL CALC-SCNC: 13 MMOL/L (ref 10–20)
BUN SERPL-MCNC: 17 MG/DL (ref 6–23)
CALCIUM SERPL-MCNC: 8.4 MG/DL (ref 8.6–10.3)
CHLORIDE SERPL-SCNC: 102 MMOL/L (ref 98–107)
CO2 SERPL-SCNC: 27 MMOL/L (ref 21–32)
CREAT SERPL-MCNC: 0.62 MG/DL (ref 0.5–1.05)
EGFRCR SERPLBLD CKD-EPI 2021: >90 ML/MIN/1.73M*2
ERYTHROCYTE [DISTWIDTH] IN BLOOD BY AUTOMATED COUNT: 20.4 % (ref 11.5–14.5)
GLUCOSE SERPL-MCNC: 82 MG/DL (ref 74–99)
HCT VFR BLD AUTO: 27.1 % (ref 36–46)
HGB BLD-MCNC: 7.6 G/DL (ref 12–16)
MCH RBC QN AUTO: 22.5 PG (ref 26–34)
MCHC RBC AUTO-ENTMCNC: 28 G/DL (ref 32–36)
MCV RBC AUTO: 80 FL (ref 80–100)
NRBC BLD-RTO: 1.2 /100 WBCS (ref 0–0)
PLATELET # BLD AUTO: 649 X10*3/UL (ref 150–450)
POTASSIUM SERPL-SCNC: 4.3 MMOL/L (ref 3.5–5.3)
RBC # BLD AUTO: 3.38 X10*6/UL (ref 4–5.2)
SODIUM SERPL-SCNC: 138 MMOL/L (ref 136–145)
WBC # BLD AUTO: 26.9 X10*3/UL (ref 4.4–11.3)

## 2024-06-13 PROCEDURE — 2500000002 HC RX 250 W HCPCS SELF ADMINISTERED DRUGS (ALT 637 FOR MEDICARE OP, ALT 636 FOR OP/ED)

## 2024-06-13 PROCEDURE — 85027 COMPLETE CBC AUTOMATED: CPT | Performed by: PHYSICIAN ASSISTANT

## 2024-06-13 PROCEDURE — 2500000004 HC RX 250 GENERAL PHARMACY W/ HCPCS (ALT 636 FOR OP/ED): Performed by: INTERNAL MEDICINE

## 2024-06-13 PROCEDURE — 2500000002 HC RX 250 W HCPCS SELF ADMINISTERED DRUGS (ALT 637 FOR MEDICARE OP, ALT 636 FOR OP/ED): Performed by: INTERNAL MEDICINE

## 2024-06-13 PROCEDURE — 2500000004 HC RX 250 GENERAL PHARMACY W/ HCPCS (ALT 636 FOR OP/ED): Performed by: PHYSICIAN ASSISTANT

## 2024-06-13 PROCEDURE — 71250 CT THORAX DX C-: CPT

## 2024-06-13 PROCEDURE — 1100000001 HC PRIVATE ROOM DAILY

## 2024-06-13 PROCEDURE — 2500000001 HC RX 250 WO HCPCS SELF ADMINISTERED DRUGS (ALT 637 FOR MEDICARE OP): Performed by: INTERNAL MEDICINE

## 2024-06-13 PROCEDURE — 94668 MNPJ CHEST WALL SBSQ: CPT

## 2024-06-13 PROCEDURE — 2500000002 HC RX 250 W HCPCS SELF ADMINISTERED DRUGS (ALT 637 FOR MEDICARE OP, ALT 636 FOR OP/ED): Performed by: EMERGENCY MEDICINE

## 2024-06-13 PROCEDURE — 82374 ASSAY BLOOD CARBON DIOXIDE: CPT | Performed by: PHYSICIAN ASSISTANT

## 2024-06-13 PROCEDURE — 36415 COLL VENOUS BLD VENIPUNCTURE: CPT | Performed by: PHYSICIAN ASSISTANT

## 2024-06-13 PROCEDURE — 2500000001 HC RX 250 WO HCPCS SELF ADMINISTERED DRUGS (ALT 637 FOR MEDICARE OP): Performed by: PHYSICIAN ASSISTANT

## 2024-06-13 PROCEDURE — 36415 COLL VENOUS BLD VENIPUNCTURE: CPT | Performed by: INTERNAL MEDICINE

## 2024-06-13 PROCEDURE — 94667 MNPJ CHEST WALL 1ST: CPT

## 2024-06-13 PROCEDURE — 94640 AIRWAY INHALATION TREATMENT: CPT

## 2024-06-13 PROCEDURE — 99233 SBSQ HOSP IP/OBS HIGH 50: CPT | Performed by: INTERNAL MEDICINE

## 2024-06-13 PROCEDURE — 71250 CT THORAX DX C-: CPT | Performed by: RADIOLOGY

## 2024-06-13 PROCEDURE — 87040 BLOOD CULTURE FOR BACTERIA: CPT | Mod: AHULAB | Performed by: INTERNAL MEDICINE

## 2024-06-13 PROCEDURE — 2500000005 HC RX 250 GENERAL PHARMACY W/O HCPCS: Performed by: INTERNAL MEDICINE

## 2024-06-13 RX ORDER — OXYCODONE HYDROCHLORIDE 5 MG/1
5 TABLET ORAL EVERY 6 HOURS PRN
Status: DISCONTINUED | OUTPATIENT
Start: 2024-06-13 | End: 2024-06-19 | Stop reason: HOSPADM

## 2024-06-13 RX ORDER — NAPROXEN SODIUM 220 MG/1
81 TABLET, FILM COATED ORAL DAILY
Status: DISCONTINUED | OUTPATIENT
Start: 2024-06-13 | End: 2024-06-19 | Stop reason: HOSPADM

## 2024-06-13 RX ORDER — AZITHROMYCIN 500 MG/1
500 TABLET, FILM COATED ORAL DAILY
Status: DISCONTINUED | OUTPATIENT
Start: 2024-06-13 | End: 2024-06-18

## 2024-06-13 RX ORDER — LANOLIN ALCOHOL/MO/W.PET/CERES
100 CREAM (GRAM) TOPICAL DAILY
Status: DISCONTINUED | OUTPATIENT
Start: 2024-06-13 | End: 2024-06-19 | Stop reason: HOSPADM

## 2024-06-13 RX ORDER — NORTRIPTYLINE HYDROCHLORIDE 25 MG/1
50 CAPSULE ORAL NIGHTLY
Status: DISCONTINUED | OUTPATIENT
Start: 2024-06-13 | End: 2024-06-19 | Stop reason: HOSPADM

## 2024-06-13 RX ORDER — AZITHROMYCIN 250 MG/1
250 TABLET, FILM COATED ORAL 3 TIMES WEEKLY
Status: DISCONTINUED | OUTPATIENT
Start: 2024-06-13 | End: 2024-06-13

## 2024-06-13 RX ORDER — IPRATROPIUM BROMIDE AND ALBUTEROL SULFATE 2.5; .5 MG/3ML; MG/3ML
3 SOLUTION RESPIRATORY (INHALATION) EVERY 2 HOUR PRN
Status: DISCONTINUED | OUTPATIENT
Start: 2024-06-13 | End: 2024-06-19 | Stop reason: HOSPADM

## 2024-06-13 RX ORDER — LIDOCAINE 560 MG/1
3 PATCH PERCUTANEOUS; TOPICAL; TRANSDERMAL DAILY
Status: DISCONTINUED | OUTPATIENT
Start: 2024-06-13 | End: 2024-06-19 | Stop reason: HOSPADM

## 2024-06-13 RX ORDER — AZITHROMYCIN 250 MG/1
250 TABLET, FILM COATED ORAL 3 TIMES WEEKLY
Status: DISCONTINUED | OUTPATIENT
Start: 2024-06-14 | End: 2024-06-13

## 2024-06-13 RX ADMIN — IPRATROPIUM BROMIDE AND ALBUTEROL SULFATE 3 ML: 2.5; .5 SOLUTION RESPIRATORY (INHALATION) at 08:58

## 2024-06-13 RX ADMIN — BUDESONIDE 0.5 MG: 0.5 INHALANT ORAL at 08:58

## 2024-06-13 RX ADMIN — Medication 100 MG: at 09:06

## 2024-06-13 RX ADMIN — NORTRIPTYLINE HYDROCHLORIDE 50 MG: 25 CAPSULE ORAL at 21:49

## 2024-06-13 RX ADMIN — DILTIAZEM HYDROCHLORIDE 240 MG: 120 CAPSULE, EXTENDED RELEASE ORAL at 09:06

## 2024-06-13 RX ADMIN — CEFTRIAXONE SODIUM 1 G: 1 INJECTION, SOLUTION INTRAVENOUS at 10:19

## 2024-06-13 RX ADMIN — IPRATROPIUM BROMIDE AND ALBUTEROL SULFATE 3 ML: 2.5; .5 SOLUTION RESPIRATORY (INHALATION) at 19:05

## 2024-06-13 RX ADMIN — NYSTATIN 500000 UNITS: 100000 SUSPENSION ORAL at 21:49

## 2024-06-13 RX ADMIN — AZITHROMYCIN DIHYDRATE 500 MG: 500 TABLET ORAL at 11:56

## 2024-06-13 RX ADMIN — ENOXAPARIN SODIUM 40 MG: 40 INJECTION SUBCUTANEOUS at 09:04

## 2024-06-13 RX ADMIN — SENNOSIDES 17.2 MG: 8.6 TABLET, FILM COATED ORAL at 21:49

## 2024-06-13 RX ADMIN — FORMOTEROL FUMARATE DIHYDRATE 20 MCG: 20 SOLUTION RESPIRATORY (INHALATION) at 19:05

## 2024-06-13 RX ADMIN — ASPIRIN 81 MG CHEWABLE TABLET 81 MG: 81 TABLET CHEWABLE at 09:05

## 2024-06-13 RX ADMIN — LISINOPRIL 10 MG: 10 TABLET ORAL at 09:06

## 2024-06-13 RX ADMIN — BUDESONIDE 0.5 MG: 0.5 INHALANT ORAL at 19:05

## 2024-06-13 RX ADMIN — BUSPIRONE HYDROCHLORIDE 5 MG: 5 TABLET ORAL at 09:06

## 2024-06-13 RX ADMIN — SENNOSIDES 17.2 MG: 8.6 TABLET, FILM COATED ORAL at 09:06

## 2024-06-13 RX ADMIN — IPRATROPIUM BROMIDE AND ALBUTEROL SULFATE 3 ML: 2.5; .5 SOLUTION RESPIRATORY (INHALATION) at 12:30

## 2024-06-13 RX ADMIN — IPRATROPIUM BROMIDE AND ALBUTEROL SULFATE 3 ML: 2.5; .5 SOLUTION RESPIRATORY (INHALATION) at 03:57

## 2024-06-13 RX ADMIN — BUSPIRONE HYDROCHLORIDE 5 MG: 5 TABLET ORAL at 21:50

## 2024-06-13 RX ADMIN — LIDOCAINE 4% 3 PATCH: 40 PATCH TOPICAL at 09:05

## 2024-06-13 RX ADMIN — NYSTATIN 500000 UNITS: 100000 SUSPENSION ORAL at 15:16

## 2024-06-13 RX ADMIN — OXYCODONE HYDROCHLORIDE 5 MG: 5 TABLET ORAL at 22:27

## 2024-06-13 RX ADMIN — ONDANSETRON 4 MG: 2 INJECTION INTRAMUSCULAR; INTRAVENOUS at 15:42

## 2024-06-13 RX ADMIN — PANTOPRAZOLE SODIUM 40 MG: 40 TABLET, DELAYED RELEASE ORAL at 09:06

## 2024-06-13 RX ADMIN — PREDNISONE 40 MG: 20 TABLET ORAL at 09:07

## 2024-06-13 RX ADMIN — FORMOTEROL FUMARATE DIHYDRATE 20 MCG: 20 SOLUTION RESPIRATORY (INHALATION) at 08:58

## 2024-06-13 RX ADMIN — SODIUM CHLORIDE 500 ML: 9 INJECTION, SOLUTION INTRAVENOUS at 05:37

## 2024-06-13 RX ADMIN — OXYCODONE HYDROCHLORIDE 5 MG: 5 TABLET ORAL at 14:32

## 2024-06-13 RX ADMIN — MONTELUKAST 10 MG: 10 TABLET, FILM COATED ORAL at 21:49

## 2024-06-13 RX ADMIN — NYSTATIN 500000 UNITS: 100000 SUSPENSION ORAL at 09:10

## 2024-06-13 ASSESSMENT — PAIN SCALES - GENERAL
PAINLEVEL_OUTOF10: 10 - WORST POSSIBLE PAIN
PAINLEVEL_OUTOF10: 5 - MODERATE PAIN
PAINLEVEL_OUTOF10: 10 - WORST POSSIBLE PAIN
PAINLEVEL_OUTOF10: 10 - WORST POSSIBLE PAIN

## 2024-06-13 ASSESSMENT — PAIN DESCRIPTION - ORIENTATION: ORIENTATION: LOWER

## 2024-06-13 ASSESSMENT — PAIN - FUNCTIONAL ASSESSMENT
PAIN_FUNCTIONAL_ASSESSMENT: 0-10

## 2024-06-13 ASSESSMENT — PAIN DESCRIPTION - LOCATION
LOCATION: HEAD
LOCATION: HEAD

## 2024-06-13 ASSESSMENT — PAIN SCALES - PAIN ASSESSMENT IN ADVANCED DEMENTIA (PAINAD): TOTALSCORE: MEDICATION (SEE MAR)

## 2024-06-13 NOTE — CARE PLAN
The patient's goals for the shift include      The clinical goals for the shift include Pt be able to breathe better and free from injury during shift    Problem: Pain  Goal: Takes deep breaths with improved pain control throughout the shift  Outcome: Progressing  Goal: Turns in bed with improved pain control throughout the shift  Outcome: Progressing  Goal: Walks with improved pain control throughout the shift  Outcome: Progressing  Goal: Performs ADL's with improved pain control throughout shift  Outcome: Progressing  Goal: Participates in PT with improved pain control throughout the shift  Outcome: Progressing  Goal: Free from opioid side effects throughout the shift  Outcome: Progressing  Goal: Free from acute confusion related to pain meds throughout the shift  Outcome: Progressing     Problem: Pain - Adult  Goal: Verbalizes/displays adequate comfort level or baseline comfort level  Outcome: Progressing     Problem: Safety - Adult  Goal: Free from fall injury  Outcome: Progressing     Problem: Discharge Planning  Goal: Discharge to home or other facility with appropriate resources  Outcome: Progressing     Problem: Chronic Conditions and Co-morbidities  Goal: Patient's chronic conditions and co-morbidity symptoms are monitored and maintained or improved  Outcome: Progressing     Problem: Skin  Goal: Decreased wound size/increased tissue granulation at next dressing change  Outcome: Progressing  Goal: Participates in plan/prevention/treatment measures  Outcome: Progressing  Goal: Prevent/manage excess moisture  Outcome: Progressing  Goal: Prevent/minimize sheer/friction injuries  Outcome: Progressing  Goal: Promote/optimize nutrition  Outcome: Progressing  Goal: Promote skin healing  Outcome: Progressing     Problem: Fall/Injury  Goal: Not fall by end of shift  Outcome: Progressing  Goal: Be free from injury by end of the shift  Outcome: Progressing  Goal: Verbalize understanding of personal risk factors for  fall in the hospital  Outcome: Progressing  Goal: Verbalize understanding of risk factor reduction measures to prevent injury from fall in the home  Outcome: Progressing  Goal: Use assistive devices by end of the shift  Outcome: Progressing  Goal: Pace activities to prevent fatigue by end of the shift  Outcome: Progressing

## 2024-06-13 NOTE — PROGRESS NOTES
Fernando Cuevas is a 63 y.o. female on day 2 of admission presenting with Acute exacerbation of chronic obstructive pulmonary disease (Multi).      Subjective   On 2 L procalcitonin was high over 1, white count has improved to 26,000 added azithromycin will get a CT chest to rule out pneumonia, blood cultures have been checked this morning patient requesting Percocet for her migraine which we have added.       Objective     Last Recorded Vitals  /75 (BP Location: Right arm, Patient Position: Lying)   Pulse (!) 114   Temp 36.8 °C (98.2 °F)   Resp 18   Wt 64 kg (141 lb)   SpO2 99%   Intake/Output last 3 Shifts:    Intake/Output Summary (Last 24 hours) at 6/13/2024 1157  Last data filed at 6/13/2024 1100  Gross per 24 hour   Intake --   Output 450 ml   Net -450 ml       Admission Weight  Weight: 64 kg (141 lb) (06/11/24 1422)    Daily Weight  06/11/24 : 64 kg (141 lb)    Image Results  Electrocardiogram, 12-lead PRN ACS symptoms  Sinus tachycardia  Right atrial enlargement  Anterior infarct (cited on or before 11-JUN-2024)  Abnormal ECG  When compared with ECG of 11-JUN-2024 17:03, (unconfirmed)  No significant change was found  ECG 12 Lead  Normal sinus rhythm  Right atrial enlargement  Anterior infarct (cited on or before 11-JUN-2024)  Abnormal ECG  When compared with ECG of 11-JUN-2024 14:28, (unconfirmed)  No significant change was found  CT head wo IV contrast  Narrative: Interpreted By:  Sheri Lakhani,   STUDY:  CT HEAD WO IV CONTRAST;  6/12/2024 12:53 am      INDICATION:  Signs/Symptoms:head injury earlier today, headache.      COMPARISON:  CT head dated 07/24/2023.      ACCESSION NUMBER(S):  [id number]      ORDERING CLINICIAN:  SARA RUTLEDGE      TECHNIQUE:  Noncontrast axial CT scan of head was performed. Angled reformats in  brain and bone windows were generated. The images were reviewed in  bone, brain, blood and soft tissue windows.      FINDINGS:  No hyperdense intracranial hemorrhage  is identified. There is no mass  effect or midline shift.      Gray-white differentiation is intact, without evidence of CT apparent  transcortical infarct. Subtle attenuation changes are present in the  periventricular and subcortical white matter of bilateral cerebral  hemispheres, nonspecific findings favored to represent sequela of  microvascular disease.      No ventricular dilatation is present. Basal cisterns are patent. No  extra-axial fluid collections are identified.      Scalp soft tissues do not demonstrate any acute abnormality.  Calvarium is unremarkable in appearance. Mastoid air cells and middle  ear cavities are clear.      Visualized paranasal sinuses are unremarkable in appearance.      Impression: 1. No evidence of hemorrhage, skull fracture, or other acute  intracranial trauma/abnormality.  2. Patchy attenuation changes are present in the periventricular and  subcortical white matter of bilateral cerebral hemispheres,  nonspecific findings favored to represent sequela of microvascular  disease.      MACRO:  None      Signed by: Sheri Lakhani 6/12/2024 1:07 AM  Dictation workstation:   [id number]      Physical Exam  Vitals reviewed.   HENT:      Head: Normocephalic.      Nose: Nose normal.      Mouth/Throat:      Mouth: Mucous membranes are moist.   Cardiovascular:      Rate and Rhythm: Normal rate.   Pulmonary:      Comments: Diminished bs bilaterally  Abdominal:      General: Abdomen is flat. Bowel sounds are normal.      Palpations: Abdomen is soft.   Skin:     General: Skin is warm.      Capillary Refill: Capillary refill takes less than 2 seconds.   Neurological:      General: No focal deficit present.      Mental Status: She is alert.         Relevant Results  aspirin, 81 mg, oral, Daily  azithromycin, 500 mg, oral, Daily  budesonide, 0.5 mg, nebulization, BID   And  formoterol, 20 mcg, nebulization, BID  busPIRone, 5 mg, oral, BID  cefTRIAXone, 1 g, intravenous, q24h  dilTIAZem  ER, 240 mg, oral, Daily  enoxaparin, 40 mg, subcutaneous, q24h  ipratropium-albuteroL, 3 mL, nebulization, TID  lidocaine, 3 patch, transdermal, Daily  lisinopril, 10 mg, oral, Daily  montelukast, 10 mg, oral, Nightly  nortriptyline, 50 mg, oral, Nightly  nystatin, 5 mL, Swish & Swallow, TID  pantoprazole, 40 mg, oral, Daily before breakfast   Or  pantoprazole, 40 mg, intravenous, Daily before breakfast  predniSONE, 40 mg, oral, Daily  sennosides, 2 tablet, oral, BID  thiamine, 100 mg, oral, Daily      Results for orders placed or performed during the hospital encounter of 06/11/24 (from the past 24 hour(s))   CBC   Result Value Ref Range    WBC 26.9 (H) 4.4 - 11.3 x10*3/uL    nRBC 1.2 (H) 0.0 - 0.0 /100 WBCs    RBC 3.38 (L) 4.00 - 5.20 x10*6/uL    Hemoglobin 7.6 (L) 12.0 - 16.0 g/dL    Hematocrit 27.1 (L) 36.0 - 46.0 %    MCV 80 80 - 100 fL    MCH 22.5 (L) 26.0 - 34.0 pg    MCHC 28.0 (L) 32.0 - 36.0 g/dL    RDW 20.4 (H) 11.5 - 14.5 %    Platelets 649 (H) 150 - 450 x10*3/uL   Basic metabolic panel   Result Value Ref Range    Glucose 82 74 - 99 mg/dL    Sodium 138 136 - 145 mmol/L    Potassium 4.3 3.5 - 5.3 mmol/L    Chloride 102 98 - 107 mmol/L    Bicarbonate 27 21 - 32 mmol/L    Anion Gap 13 10 - 20 mmol/L    Urea Nitrogen 17 6 - 23 mg/dL    Creatinine 0.62 0.50 - 1.05 mg/dL    eGFR >90 >60 mL/min/1.73m*2    Calcium 8.4 (L) 8.6 - 10.3 mg/dL     Imaging:  Ct head:  IMPRESSION:  1. No evidence of hemorrhage, skull fracture, or other acute  intracranial trauma/abnormality.  2. Patchy attenuation changes are present in the periventricular and  subcortical white matter of bilateral cerebral hemispheres,  nonspecific findings favored to represent sequela of microvascular  disease.    Cxr:  IMPRESSION:  1. No evidence of hemorrhage, skull fracture, or other acute  intracranial trauma/abnormality.  2. Patchy attenuation changes are present in the periventricular and  subcortical white matter of bilateral cerebral  hemispheres,  nonspecific findings favored to represent sequela of microvascular  disease.           Assessment/Plan      AECOPD with acute hypoxic respiratory failure  -on 2l/nc oxygen  -on po prednisone  -? Underlying pna check chest ct without contrast    2. Leukocytosis  -procalcitonin elevated  -continue iv ceftriaxone and added azithromycin  -? Underlying PNA    3. Drug use  -cocaine positive in urine toxic    4. Adenocarcinoma of right lung  -being seen by oncology            Morena Landon MD

## 2024-06-13 NOTE — PROGRESS NOTES
06/13/24 1123   Discharge Planning   Patient expects to be discharged to: Home vs SNF     I met with this patient at her bedside to discuss discharge planning, she stated she does live at home with her son, and does use a walker, she currently is on oxygen but does not use oxygen at home, she stated that on discharge she does not feel like she will be ready to go home, she said most of the time she is by herself, she is looking to possibly get into a SNF for a little bit till she can get stronger, will reach out to SW for help with placement.    11:27 printed out a choice list for patient will have SW follow up

## 2024-06-13 NOTE — PROGRESS NOTES
Physical Therapy                 Therapy Communication Note    Patient Name: Fernando Cuevas  MRN: [id number]  Today's Date: 6/13/2024     Discipline: Physical Therapy    Missed Visit Reason: Missed Visit Reason:  (PT order received, chart reviewed.  Pt has a chest CT pending.  Will hold for now and attempt again later pending CT results.)    Missed Time: Attempt

## 2024-06-14 LAB
ANION GAP SERPL CALC-SCNC: 13 MMOL/L (ref 10–20)
BACTERIA UR CULT: ABNORMAL
BUN SERPL-MCNC: 16 MG/DL (ref 6–23)
CALCIUM SERPL-MCNC: 8.2 MG/DL (ref 8.6–10.3)
CHLORIDE SERPL-SCNC: 100 MMOL/L (ref 98–107)
CO2 SERPL-SCNC: 27 MMOL/L (ref 21–32)
CREAT SERPL-MCNC: 0.64 MG/DL (ref 0.5–1.05)
EGFRCR SERPLBLD CKD-EPI 2021: >90 ML/MIN/1.73M*2
ERYTHROCYTE [DISTWIDTH] IN BLOOD BY AUTOMATED COUNT: 20.7 % (ref 11.5–14.5)
GLUCOSE SERPL-MCNC: 125 MG/DL (ref 74–99)
HCT VFR BLD AUTO: 28.2 % (ref 36–46)
HGB BLD-MCNC: 7.9 G/DL (ref 12–16)
MCH RBC QN AUTO: 22.4 PG (ref 26–34)
MCHC RBC AUTO-ENTMCNC: 28 G/DL (ref 32–36)
MCV RBC AUTO: 80 FL (ref 80–100)
NRBC BLD-RTO: 1.2 /100 WBCS (ref 0–0)
PLATELET # BLD AUTO: 726 X10*3/UL (ref 150–450)
POTASSIUM SERPL-SCNC: 4.5 MMOL/L (ref 3.5–5.3)
RBC # BLD AUTO: 3.52 X10*6/UL (ref 4–5.2)
SODIUM SERPL-SCNC: 135 MMOL/L (ref 136–145)
WBC # BLD AUTO: 25.2 X10*3/UL (ref 4.4–11.3)

## 2024-06-14 PROCEDURE — 94640 AIRWAY INHALATION TREATMENT: CPT

## 2024-06-14 PROCEDURE — 80048 BASIC METABOLIC PNL TOTAL CA: CPT | Performed by: INTERNAL MEDICINE

## 2024-06-14 PROCEDURE — 36415 COLL VENOUS BLD VENIPUNCTURE: CPT | Performed by: INTERNAL MEDICINE

## 2024-06-14 PROCEDURE — 2500000005 HC RX 250 GENERAL PHARMACY W/O HCPCS: Performed by: INTERNAL MEDICINE

## 2024-06-14 PROCEDURE — 97165 OT EVAL LOW COMPLEX 30 MIN: CPT | Mod: GO

## 2024-06-14 PROCEDURE — 94668 MNPJ CHEST WALL SBSQ: CPT

## 2024-06-14 PROCEDURE — 1100000001 HC PRIVATE ROOM DAILY

## 2024-06-14 PROCEDURE — 85027 COMPLETE CBC AUTOMATED: CPT | Performed by: INTERNAL MEDICINE

## 2024-06-14 PROCEDURE — 2500000004 HC RX 250 GENERAL PHARMACY W/ HCPCS (ALT 636 FOR OP/ED): Performed by: PHYSICIAN ASSISTANT

## 2024-06-14 PROCEDURE — 2500000001 HC RX 250 WO HCPCS SELF ADMINISTERED DRUGS (ALT 637 FOR MEDICARE OP): Performed by: INTERNAL MEDICINE

## 2024-06-14 PROCEDURE — 2500000002 HC RX 250 W HCPCS SELF ADMINISTERED DRUGS (ALT 637 FOR MEDICARE OP, ALT 636 FOR OP/ED)

## 2024-06-14 PROCEDURE — 99232 SBSQ HOSP IP/OBS MODERATE 35: CPT | Performed by: STUDENT IN AN ORGANIZED HEALTH CARE EDUCATION/TRAINING PROGRAM

## 2024-06-14 PROCEDURE — 97161 PT EVAL LOW COMPLEX 20 MIN: CPT | Mod: GP

## 2024-06-14 PROCEDURE — 2500000001 HC RX 250 WO HCPCS SELF ADMINISTERED DRUGS (ALT 637 FOR MEDICARE OP): Performed by: PHYSICIAN ASSISTANT

## 2024-06-14 PROCEDURE — 2500000002 HC RX 250 W HCPCS SELF ADMINISTERED DRUGS (ALT 637 FOR MEDICARE OP, ALT 636 FOR OP/ED): Performed by: INTERNAL MEDICINE

## 2024-06-14 PROCEDURE — 2500000002 HC RX 250 W HCPCS SELF ADMINISTERED DRUGS (ALT 637 FOR MEDICARE OP, ALT 636 FOR OP/ED): Performed by: EMERGENCY MEDICINE

## 2024-06-14 PROCEDURE — 2500000004 HC RX 250 GENERAL PHARMACY W/ HCPCS (ALT 636 FOR OP/ED): Performed by: INTERNAL MEDICINE

## 2024-06-14 RX ADMIN — BUDESONIDE 0.5 MG: 0.5 INHALANT ORAL at 08:30

## 2024-06-14 RX ADMIN — OXYCODONE HYDROCHLORIDE 5 MG: 5 TABLET ORAL at 20:49

## 2024-06-14 RX ADMIN — FORMOTEROL FUMARATE DIHYDRATE 20 MCG: 20 SOLUTION RESPIRATORY (INHALATION) at 07:30

## 2024-06-14 RX ADMIN — NYSTATIN 500000 UNITS: 100000 SUSPENSION ORAL at 20:55

## 2024-06-14 RX ADMIN — IPRATROPIUM BROMIDE AND ALBUTEROL SULFATE 3 ML: 2.5; .5 SOLUTION RESPIRATORY (INHALATION) at 07:31

## 2024-06-14 RX ADMIN — OXYCODONE HYDROCHLORIDE 5 MG: 5 TABLET ORAL at 05:17

## 2024-06-14 RX ADMIN — MONTELUKAST 10 MG: 10 TABLET, FILM COATED ORAL at 20:49

## 2024-06-14 RX ADMIN — IPRATROPIUM BROMIDE AND ALBUTEROL SULFATE 3 ML: 2.5; .5 SOLUTION RESPIRATORY (INHALATION) at 12:39

## 2024-06-14 RX ADMIN — IPRATROPIUM BROMIDE AND ALBUTEROL SULFATE 3 ML: 2.5; .5 SOLUTION RESPIRATORY (INHALATION) at 19:55

## 2024-06-14 RX ADMIN — OXYCODONE HYDROCHLORIDE 5 MG: 5 TABLET ORAL at 14:08

## 2024-06-14 RX ADMIN — AZITHROMYCIN DIHYDRATE 500 MG: 500 TABLET ORAL at 09:35

## 2024-06-14 RX ADMIN — NYSTATIN 500000 UNITS: 100000 SUSPENSION ORAL at 09:35

## 2024-06-14 RX ADMIN — BUSPIRONE HYDROCHLORIDE 5 MG: 5 TABLET ORAL at 09:35

## 2024-06-14 RX ADMIN — LORAZEPAM 1 MG: 1 TABLET ORAL at 09:40

## 2024-06-14 RX ADMIN — ASPIRIN 81 MG CHEWABLE TABLET 81 MG: 81 TABLET CHEWABLE at 09:35

## 2024-06-14 RX ADMIN — CEFTRIAXONE SODIUM 1 G: 1 INJECTION, SOLUTION INTRAVENOUS at 09:35

## 2024-06-14 RX ADMIN — SENNOSIDES 17.2 MG: 8.6 TABLET, FILM COATED ORAL at 09:35

## 2024-06-14 RX ADMIN — LISINOPRIL 10 MG: 10 TABLET ORAL at 09:35

## 2024-06-14 RX ADMIN — CALCIUM CARBONATE (ANTACID) CHEW TAB 500 MG 500 MG: 500 CHEW TAB at 14:08

## 2024-06-14 RX ADMIN — ACETAMINOPHEN 650 MG: 325 TABLET ORAL at 02:48

## 2024-06-14 RX ADMIN — IPRATROPIUM BROMIDE AND ALBUTEROL SULFATE 3 ML: 2.5; .5 SOLUTION RESPIRATORY (INHALATION) at 02:50

## 2024-06-14 RX ADMIN — PREDNISONE 40 MG: 20 TABLET ORAL at 09:35

## 2024-06-14 RX ADMIN — BUDESONIDE 0.5 MG: 0.5 INHALANT ORAL at 19:55

## 2024-06-14 RX ADMIN — NYSTATIN 500000 UNITS: 100000 SUSPENSION ORAL at 14:03

## 2024-06-14 RX ADMIN — CALCIUM CARBONATE (ANTACID) CHEW TAB 500 MG 500 MG: 500 CHEW TAB at 20:49

## 2024-06-14 RX ADMIN — LIDOCAINE 4% 3 PATCH: 40 PATCH TOPICAL at 09:36

## 2024-06-14 RX ADMIN — BUSPIRONE HYDROCHLORIDE 5 MG: 5 TABLET ORAL at 20:49

## 2024-06-14 RX ADMIN — ENOXAPARIN SODIUM 40 MG: 40 INJECTION SUBCUTANEOUS at 09:36

## 2024-06-14 RX ADMIN — PANTOPRAZOLE SODIUM 40 MG: 40 TABLET, DELAYED RELEASE ORAL at 09:35

## 2024-06-14 RX ADMIN — FORMOTEROL FUMARATE DIHYDRATE 20 MCG: 20 SOLUTION RESPIRATORY (INHALATION) at 19:55

## 2024-06-14 RX ADMIN — Medication 100 MG: at 09:35

## 2024-06-14 RX ADMIN — NORTRIPTYLINE HYDROCHLORIDE 50 MG: 25 CAPSULE ORAL at 20:49

## 2024-06-14 RX ADMIN — DILTIAZEM HYDROCHLORIDE 240 MG: 120 CAPSULE, EXTENDED RELEASE ORAL at 09:35

## 2024-06-14 RX ADMIN — SENNOSIDES 17.2 MG: 8.6 TABLET, FILM COATED ORAL at 20:49

## 2024-06-14 ASSESSMENT — COGNITIVE AND FUNCTIONAL STATUS - GENERAL
EATING MEALS: A LITTLE
TOILETING: A LITTLE
PERSONAL GROOMING: A LITTLE
MOVING FROM LYING ON BACK TO SITTING ON SIDE OF FLAT BED WITH BEDRAILS: A LITTLE
DRESSING REGULAR LOWER BODY CLOTHING: A LITTLE
DAILY ACTIVITIY SCORE: 18
MOVING FROM LYING ON BACK TO SITTING ON SIDE OF FLAT BED WITH BEDRAILS: A LITTLE
MOBILITY SCORE: 16
PERSONAL GROOMING: A LITTLE
HELP NEEDED FOR BATHING: A LITTLE
CLIMB 3 TO 5 STEPS WITH RAILING: TOTAL
DRESSING REGULAR UPPER BODY CLOTHING: A LITTLE
TURNING FROM BACK TO SIDE WHILE IN FLAT BAD: A LITTLE
STANDING UP FROM CHAIR USING ARMS: A LITTLE
TURNING FROM BACK TO SIDE WHILE IN FLAT BAD: A LITTLE
STANDING UP FROM CHAIR USING ARMS: A LITTLE
CLIMB 3 TO 5 STEPS WITH RAILING: TOTAL
MOBILITY SCORE: 15
WALKING IN HOSPITAL ROOM: A LITTLE
HELP NEEDED FOR BATHING: A LITTLE
MOVING TO AND FROM BED TO CHAIR: A LITTLE
MOVING TO AND FROM BED TO CHAIR: A LITTLE
TOILETING: A LITTLE
WALKING IN HOSPITAL ROOM: A LOT
DRESSING REGULAR LOWER BODY CLOTHING: A LITTLE
DAILY ACTIVITIY SCORE: 19
DRESSING REGULAR UPPER BODY CLOTHING: A LITTLE

## 2024-06-14 ASSESSMENT — PAIN SCALES - GENERAL
PAINLEVEL_OUTOF10: 0 - NO PAIN
PAINLEVEL_OUTOF10: 10 - WORST POSSIBLE PAIN
PAINLEVEL_OUTOF10: 0 - NO PAIN
PAINLEVEL_OUTOF10: 10 - WORST POSSIBLE PAIN
PAINLEVEL_OUTOF10: 6
PAINLEVEL_OUTOF10: 8
PAINLEVEL_OUTOF10: 8

## 2024-06-14 ASSESSMENT — PAIN - FUNCTIONAL ASSESSMENT
PAIN_FUNCTIONAL_ASSESSMENT: 0-10

## 2024-06-14 ASSESSMENT — PAIN DESCRIPTION - LOCATION
LOCATION: HEAD

## 2024-06-14 ASSESSMENT — ACTIVITIES OF DAILY LIVING (ADL)
ADL_ASSISTANCE: INDEPENDENT
ADL_ASSISTANCE: INDEPENDENT

## 2024-06-14 ASSESSMENT — PAIN DESCRIPTION - ORIENTATION
ORIENTATION: LOWER
ORIENTATION: LOWER

## 2024-06-14 NOTE — PROGRESS NOTES
Occupational Therapy    Evaluation    Patient Name: Fernando Cuevas  MRN: [id number]  Today's Date: 6/14/2024  Time Calculation  Start Time: 1018  Stop Time: 1036  Time Calculation (min): 18 min        Assessment:  OT Assessment: Pt presents with decrease strength, endurance and balance as well as an increase in SOB and fatigue, impeding ADL performance and functional mobility. Pt would benefit from skilled OT services to address these deficits and facilitate highest level of function.  Barriers to Discharge: Decreased caregiver support (Son not available/around during the day)  Evaluation/Treatment Tolerance: Patient limited by fatigue (SOB)  Medical Staff Made Aware: Yes  End of Session Communication: Bedside nurse  End of Session Patient Position: Up in chair, Alarm on  OT Assessment Results: Decreased ADL status, Decreased upper extremity range of motion, Decreased upper extremity strength, Decreased endurance, Decreased functional mobility, Decreased IADLs  Barriers to Discharge: Decreased caregiver support (Son not available/around during the day)  Evaluation/Treatment Tolerance: Patient limited by fatigue (SOB)  Medical Staff Made Aware: Yes  Strengths: Ability to acquire knowledge, Premorbid level of function, Support of extended family/friends  Barriers to Participation: Comorbidities  Plan:  Treatment Interventions: ADL retraining, Functional transfer training, UE strengthening/ROM, Endurance training, Equipment evaluation/education, Compensatory technique education  OT Frequency: 3 times per week  OT Discharge Recommendations: Moderate intensity level of continued care  OT Recommended Transfer Status: Minimal assist (x1-x2)  OT - OK to Discharge: Yes (Per POC)  Treatment Interventions: ADL retraining, Functional transfer training, UE strengthening/ROM, Endurance training, Equipment evaluation/education, Compensatory technique education    Subjective        General:  General  Reason for Referral: 63 y.o.  female presenting with COPD exacerbation and SOB.  Referred By: Morena Landon MD  Past Medical History Relevant to Rehab: ast medical history: COPD, hypertension, leukocytosis, alcohol abuse, dyspnea, esophagitis, generalized anxiety disorder, history of cocaine abuse remotely per the patient but was positive for cocaine abuse today, depression, lung cancer, elevated troponins, hyperlipidemia, nicotine dependence, overactive bladder, spontaneous pneumothorax remotely, migraines, panic disorder, daily chronic headaches  Family/Caregiver Present: No  Co-Treatment: PT  Co-Treatment Reason: To maximize pt safety, mobility and performance.  Prior to Session Communication: Bedside nurse  Patient Position Received: Bed, 3 rail up  Preferred Learning Style: auditory, kinesthetic, verbal, visual  General Comment: Pt agreeable and cooperative to OT/PT eval. Cleared to participate in eval from RN.  Precautions:  Medical Precautions: Fall precautions, Oxygen therapy device and L/min  Vital Signs:  SpO2: 95 % (95-96)  Pain:  Pain Assessment  Pain Assessment: 0-10  Pain Score: 0 - No pain    Objective   Cognition:  Orientation Level: Oriented X4        Home Living:  Type of Home: House  Lives With:  (Son)  Home Adaptive Equipment: Walker rolling or standard (Rollator)  Home Layout: One level  Home Access: Stairs to enter with rails  Entrance Stairs-Rails: Right  Entrance Stairs-Number of Steps: 3  Bathroom Shower/Tub: Tub/shower unit  Bathroom Toilet: Standard  Bathroom Equipment: None  Prior Function:  Level of [place]: Independent with ADLs and functional transfers, Needs assistance with homemaking  Receives Help From:  (Son)  ADL Assistance: Independent (Pt reports indpeendent in ADLs. Recently has been low in energy and fatigued, impeding ADL performance.)  Homemaking Assistance: Needs assistance (Pt reports son completes iADLs)  Ambulatory Assistance: Independent (using RW)  Prior Function Comments: + drives.      ADL:  LE Dressing Assistance: Stand by  LE Dressing Deficit: Setup  Activity Tolerance:  Endurance: Tolerates less than 10 min exercise, no significant change in vital signs  Bed Mobility/Transfers: Bed Mobility  Bed Mobility: Yes  Bed Mobility 1  Bed Mobility 1: Supine to sitting  Level of Assistance 1: Minimum assistance  Bed Mobility Comments 1: HOB significantly elevated.  Bed Mobility 2  Bed Mobility  2: Scooting  Level of Assistance 2: Contact guard  Bed Mobility Comments 2: Scoot toward EOB and HOB, CGA.    Transfers  Transfer: Yes  Transfer 1  Transfer From 1: Sit to  Transfer to 1: Stand  Technique 1: Sit to stand, Stand to sit  Transfer Device 1: Walker  Transfer Level of Assistance 1: Minimum assistance, +2  Trials/Comments 1: Cues for safe hand placement and body positioning. Tremors noted upon standing.  Transfers 2  Technique 2: Stand pivot  Transfer Device 2: Walker  Transfer Level of Assistance 2: Minimum assistance, +1 to manage equipment  Trials/Comments 2: Cues for hand placement and body positioning for safe transfer.     Sitting Balance:  Static Sitting Balance  Static Sitting-Balance Support: Feet supported, Right upper extremity supported, Left upper extremity supported  Static Sitting-Level of Assistance: Close supervision  Static Sitting-Comment/Number of Minutes: EOB  Dynamic Sitting Balance  Dynamic Sitting-Balance Support: Feet supported, Right upper extremity supported, Left upper extremity supported  Dynamic Sitting-Comments: SBA, EOB  Standing Balance:  Static Standing Balance  Static Standing-Balance Support: Bilateral upper extremity supported  Static Standing-Level of Assistance: Contact guard, Minimum assistance  Dynamic Standing Balance  Dynamic Standing-Balance Support: Bilateral upper extremity supported  Dynamic Standing-Balance: Turning  Dynamic Standing-Comments: Min x1-x2      Vision:Vision - Basic Assessment  Current Vision: No visual deficits  Sensation:  Light Touch: No  apparent deficits  Sensation Comment: Pt denies any numbness/.tingling  Strength:  Strength Comments: Limited shoulder flexion  Perception:  Inattention/Neglect: Appears intact      Hand Function:  Gross Grasp: Functional  Extremities: RUE   RUE : Exceptions to WFL (Greater than or equal to 3/5 distally) and LUE   LUE: Exceptions to WFL (Greater than or equal to 3/5 distally)      Outcome Measures:[facility] Daily Activity  Putting on and taking off regular lower body clothing: A little  Bathing (including washing, rinsing, drying): A little  Putting on and taking off regular upper body clothing: A little  Toileting, which includes using toilet, bedpan or urinal: A little  Taking care of personal grooming such as brushing teeth: A little  Eating Meals: A little  Daily Activity - Total Score: 18        Education Documentation  Body Mechanics, taught by Caitie Saxena OT at 6/14/2024 11:11 AM.  Learner: Patient  Readiness: Acceptance  Method: Explanation, Demonstration  Response: Verbalizes Understanding    ADL Training, taught by Caitie Saxena OT at 6/14/2024 11:11 AM.  Learner: Patient  Readiness: Acceptance  Method: Explanation, Demonstration  Response: Verbalizes Understanding    Education Comments  No comments found.        OP EDUCATION:       Goals:  Encounter Problems       Encounter Problems (Active)       ADLs       Patient will perform UB and LB bathing with stand by assist level of assistance and grab bars, shower chair, and long-handled sponge.       Start:  06/14/24    Expected End:  06/28/24            Patient with complete lower body dressing with supervision level of assistance donning and doffing all LE clothes  with PRN adaptive equipment while edge of bed        Start:  06/14/24    Expected End:  06/28/24            Patient will complete daily grooming tasks with stand by assist level of assistance and PRN adaptive equipment while standing.       Start:  06/14/24    Expected End:  06/28/24             Patient will complete toileting including hygiene clothing management/hygiene with stand by assist level of assistance and raised toilet seat and grab bars.       Start:  06/14/24    Expected End:  06/28/24               BALANCE       Pt will maintain dynamic standing balance during ADL task with supervision level of assistance in order to demonstrate decreased risk of falling and improved postural control.       Start:  06/14/24    Expected End:  06/28/24               MOBILITY       Patient will perform Functional mobility x Household distances/Community Distances with contact guard assist level of assistance and least restrictive device in order to improve safety and functional mobility.       Start:  06/14/24    Expected End:  06/28/24               TRANSFERS       Patient will complete sit to stand transfer with contact guard assist level of assistance and least restrictive device in order to improve safety and prepare for out of bed mobility.       Start:  06/14/24    Expected End:  06/28/24

## 2024-06-14 NOTE — PROGRESS NOTES
Physical Therapy    Physical Therapy Evaluation    Patient Name: Fernando Cuevas  MRN: [id number]  Today's Date: 6/14/2024   Time Calculation  Start Time: 1019  Stop Time: 1036  Time Calculation (min): 17 min    Assessment/Plan   PT Assessment  PT Assessment Results: Decreased strength, Decreased endurance, Impaired balance, Decreased mobility  Rehab Prognosis: Good  Barriers to Discharge: Decreased endurance  Evaluation/Treatment Tolerance: Patient limited by fatigue  Medical Staff Made Aware: Yes  Strengths: Ability to acquire knowledge, Access to adaptive/assistive products  Barriers to Participation: Support and attitude of living partners (Pt reports her son is not always home to assist pt.)  End of Session Communication: Bedside nurse  Assessment Comment: Pt presents today with decreased BLE strength, balance, and endurance. Pt is currently, below the reported PLOF requiring min assist with transfers and ambulation. Pt would benefit from continued PT to address the above factors and reduce fall risk  End of Session Patient Position: Up in chair, Alarm on  IP OR SWING BED PT PLAN  Inpatient or Swing Bed: Inpatient  PT Plan  Treatment/Interventions: Bed mobility, Transfer training, Gait training, Balance training, Strengthening, Endurance training, Therapeutic exercise, Therapeutic activity, Home exercise program, Postural re-education  PT Plan: Ongoing PT  PT Frequency: 3 times per week  PT Discharge Recommendations: Moderate intensity level of continued care  Equipment Recommended upon Discharge:  (Pt owns a FWW)  PT Recommended Transfer Status: Assist x2, Assistive device  PT - OK to Discharge: Yes (Per PT POC)    Subjective   General Visit Information:  General  Reason for Referral: 62 y/o F presenting with acute COPD exacerbation  Referred By: RADHA Landon  Past Medical History Relevant to Rehab:   Past Medical History:   Diagnosis Date    Essential (primary) hypertension 04/20/2016    Benign hypertension     Personal history of other diseases of the respiratory system     H/O emphysema    Personal history of other diseases of the respiratory system     History of chronic obstructive lung disease    Personal history of other mental and behavioral disorders     History of depression     Family/Caregiver Present: No  Co-Treatment: OT  Co-Treatment Reason: Co-evaluation with OT to maximize pt safety, transfer ability, and participation.  Prior to Session Communication: Bedside nurse  Patient Position Received: Bed, 3 rail up, Alarm off, not on at start of session  Preferred Learning Style: auditory, kinesthetic, visual  General Comment: Pt supine in bed upon PT arrival. Cleared to participate with bedside nurse, and agreeable to co-evaluation with PT/OT.  Home Living:  Home Living  Type of Home: House  Lives With: Adult children (Son. Pt reports son is not always home to assist)  Home Adaptive Equipment:  (Rollator)  Home Layout: One level  Home Access: Stairs to enter with rails  Entrance Stairs-Rails: Right  Entrance Stairs-Number of Steps: 3  Bathroom Shower/Tub: Tub/shower unit  Bathroom Toilet: Standard  Bathroom Equipment: None  Prior Level of Function:  Prior Function Per Pt/Caregiver Report  Level of [place]: Independent with ADLs and functional transfers, Needs assistance with homemaking  Receives Help From: Family  ADL Assistance: Independent  Homemaking Assistance:  (Son completes IADLs)  Ambulatory Assistance: Independent (Mod IND with rollator)  Prior Function Comments: +Driving  Precautions:  Precautions  Medical Precautions: Fall precautions, Oxygen therapy device and L/min  Vital Signs:  Vital Signs  SpO2:  ((5-96% during activity and rest)    Objective   Pain:  Pain Assessment  Pain Assessment: 0-10  Pain Score: 0 - No pain  Cognition:  Cognition  Orientation Level: Oriented X4    General Assessments:  Activity Tolerance  Endurance: Tolerates 10 - 20 min exercise with multiple rests    Sensation  Light  Touch: No apparent deficits    Coordination  Movements are Fluid and Coordinated: Yes    Postural Control  Posture Comment: Forward head with rounded shoulders in sitting    Static Sitting Balance  Static Sitting-Balance Support: Bilateral upper extremity supported, Feet supported  Static Sitting-Level of Assistance: Close supervision  Dynamic Sitting Balance  Dynamic Sitting-Balance Support: Bilateral upper extremity supported, Feet supported  Dynamic Sitting-Comments: During bed mobility    Static Standing Balance  Static Standing-Balance Support: Bilateral upper extremity supported  Static Standing-Level of Assistance: Minimum assistance  Static Standing-Comment/Number of Minutes: With FWW support  Dynamic Standing Balance  Dynamic Standing-Balance Support: Bilateral upper extremity supported  Dynamic Standing-Comments: Min assist with FWW support  Functional Assessments:  Bed Mobility  Bed Mobility: Yes  Bed Mobility 1  Bed Mobility 1: Supine to sitting  Level of Assistance 1: Minimum assistance  Bed Mobility Comments 1: HOB significantly elevated. Pt used UE grab on bed rail to assist trunk to the EOB. Pt able to progress BLE off the EOB with increased time  Bed Mobility 2  Bed Mobility  2: Scooting  Level of Assistance 2: Contact guard  Bed Mobility Comments 2: Pt able to shift weight and progress hips closer to the EOB for foot placement on the floor and increased balance.    Transfers  Transfer: Yes  Transfer 1  Transfer From 1: Bed to  Transfer to 1: Stand  Technique 1: Sit to stand  Transfer Device 1: Walker  Transfer Level of Assistance 1: Minimum assistance, +2, Minimal verbal cues  Trials/Comments 1: x2 Trials. VC for foot placement on floor. VC for BUE push from bed instead of pulling from the walker. Pt denies dizziness in standing but reports shortness of breath. SpO2 monitored.  Transfers 2  Transfer From 2: Stand to  Transfer to 2: Bed  Technique 2: Stand to sit  Transfer Device 2: Walker  Transfer  Level of Assistance 2: Minimum assistance, +2, Minimal verbal cues  Trials/Comments 2: VC for UE reach for bed when sitting. Pt unable to release walker for UE reach for bed/improved eccentric control. Assist provided for control on descent to the bed.  Transfers 3  Transfer From 3: Stand to  Transfer to 3: Chair with arms  Technique 3: Stand to sit  Transfer Device 3: Walker  Transfer Level of Assistance 3: Minimum assistance, Minimal verbal cues  Trials/Comments 3: VC and assist provided for BLE positioning against chair before attempting to sit. VC for BUE reach for armrests of the chair when sitting. Pt unable to release walker for UE reach. Assist provided with eccentric control on descent to the chair.    Ambulation/Gait Training  Ambulation/Gait Training Performed: Yes  Ambulation/Gait Training 1  Surface 1: Level tile  Device 1: Rolling walker  Assistance 1: Minimum assistance  Comments/Distance (ft) 1: About 5 steps from bed to chair. Decrased geronimo and step length. Pt flexed at hips and trunk with gaze directed at the floor. Pt reports SOB after ambulation trial. TC to posterior right hip to maintain standing posture/balance.    Stairs  Stairs: No  Extremity/Trunk Assessments:  RLE   RLE : Exceptions to WFL  Strength RLE  R Hip Flexion: 3-/5  R Knee Extension: 3+/5  R Ankle Dorsiflexion: 3+/5  R Ankle Plantar Flexion: 3+/5  LLE   LLE : Exceptions to WFL  Strength LLE  L Hip Flexion: 3+/5  L Knee Extension: 3+/5  L Ankle Dorsiflexion: 3+/5  L Ankle Plantar Flexion: 3+/5 (Shaking noted during MMT)  Outcome Measures:  [facility] Basic Mobility  Turning from your back to your side while in a flat bed without using bedrails: A little  Moving from lying on your back to sitting on the side of a flat bed without using bedrails: A little  Moving to and from bed to chair (including a wheelchair): A little  Standing up from a chair using your arms (e.g. wheelchair or bedside chair): A little  To walk in hospital room: A  little  Climbing 3-5 steps with railing: Total  Basic Mobility - Total Score: 16    Encounter Problems       Encounter Problems (Active)       Balance       Goal 1 (Progressing)       Start:  06/14/24    Expected End:  06/28/24       Pt performs all sitting balance with supervision and standing balance with CGA using LRAD            Mobility       STG - Patient will ambulate (Progressing)       Start:  06/14/24    Expected End:  06/28/24       25 ft with CGA using LRAD            PT Problem       PT Goal 1 (Not Progressing)       Start:  06/14/24    Expected End:  06/28/24       Pt demonstrates IND in performing 2 sets of 12 reps of prescribed BLE HEP            PT Transfers       STG - Patient to transfer to and from sit to supine (Progressing)       Start:  06/14/24    Expected End:  06/28/24       With CGA and HOB lowered         STG - Patient will transfer sit to and from stand (Progressing)       Start:  06/14/24    Expected End:  06/28/24       With CGA using LRAD            Education Documentation  Body Mechanics, taught by Jalen Frazier PT at 6/14/2024  1:33 PM.  Learner: Patient  Readiness: Acceptance  Method: Explanation, Demonstration  Response: Needs Reinforcement    Mobility Training, taught by Jalen Frazier PT at 6/14/2024  1:33 PM.  Learner: Patient  Readiness: Acceptance  Method: Explanation, Demonstration  Response: Needs Reinforcement    Body Mechanics, taught by Jalen Frazier PT at 6/14/2024  1:33 PM.  Learner: Patient  Readiness: Acceptance  Method: Explanation, Demonstration  Response: Demonstrated Understanding    Mobility Training, taught by Jalen Frazier PT at 6/14/2024  1:33 PM.  Learner: Patient  Readiness: Acceptance  Method: Explanation, Demonstration  Response: Demonstrated Understanding    Education Comments  No comments found.

## 2024-06-14 NOTE — PROGRESS NOTES
06/14/24 1053   Discharge Planning   Patient expects to be discharged to: SNF reviewing     Per notes patient when med ready would like to go to a SNF , she did give choices 1. [facility], [facility]. [facility] and 3. [facility] patient on IV ceftraixone and IV Azithromycin I will continue to monitor for discharge planning.

## 2024-06-14 NOTE — PROGRESS NOTES
Fernando Cuevas is a 63 y.o. female on day 3 of admission presenting with Acute exacerbation of chronic obstructive pulmonary disease (Multi).      Subjective   Patient seen and examined at the bedside this morning. No acute overnight events. No other questions or concerns.         Objective     Last Recorded Vitals  /63 (BP Location: Left arm, Patient Position: Lying)   Pulse 110   Temp 36.7 °C (98.1 °F) (Oral)   Resp 18   Wt 64 kg (141 lb)   SpO2 99%   Intake/Output last 3 Shifts:    Intake/Output Summary (Last 24 hours) at 6/14/2024 0916  Last data filed at 6/14/2024 0250  Gross per 24 hour   Intake 120 ml   Output 850 ml   Net -730 ml       Admission Weight  Weight: 64 kg (141 lb) (06/11/24 1422)    Daily Weight  06/11/24 : 64 kg (141 lb)    Image Results  CT chest wo IV contrast  Narrative: Interpreted By:  Gabe Gage,   STUDY:  CT CHEST WO IV CONTRAST;  6/13/2024 2:51 pm      INDICATION:  62 y/o   F with  Signs/Symptoms:pna.      LIMITATIONS:  Imaging of the mediastinum and karen is limited without the use of IV  contrast..      ACCESSION NUMBER(S):  [id number]      ORDERING CLINICIAN:  MARCOS REECE      TECHNIQUE:  Noncontrast Thin-section images were obtained  from the thoracic  inlet down through the diaphragm. Sagittal and coronal reconstruction  images were generated. Mediastinal, lung, bone, and liver windows  were reviewed.      COMPARISON:  Most recent prior is from 05/08/2024.      FINDINGS:  CHEST WALL/BASE OF THE NECK:  The chest wall was grossly intact.  No thyromegaly or thyroid mass.      LUNGS/ PLEURA/ AND TRACHEA:  Stable linear scar across the posteromedial right lung base. There  are mild patchy infiltrative densities in the mid and lower right  upper lobe and in the perihilar/infrahilar right middle lobe, not  present previously. No infiltrative density in the left lung. There  are mild-to-moderate emphysematous changes in the lungs. No focal  masslike density in either lung.  No mass or pneumonia in either lung.  No pleural effusion. No pneumothorax.  The trachea was grossly intact.      MEDIASTINUM/ALESSANDRO:  Small stable fat containing posteromedial left diaphragmatic hernia.  No suspicious mediastinal, hilar, or axillary mass or adenopathy in  this unenhanced exam. No cardiomegaly.  No pericardial effusion.  No thoracic aortic aneurysm.      BONES:  No destructive lytic or blastic bone lesion.      UPPER ABDOMEN:  The imaged upper abdomen was grossly intact.      Impression: Underlying emphysema as described.      Stable linear scar cross the posteromedial right lung base.      Mild new pneumonia in the right upper lobe and right middle lobe as  described.      MACRO:  None      Signed by: Gabe Gage 6/13/2024 3:24 PM  Dictation workstation:   [id number]      Physical Exam  Constitutional:       General: She is not in acute distress.  HENT:      Head: Normocephalic and atraumatic.      Mouth/Throat:      Mouth: Mucous membranes are moist.      Pharynx: Oropharynx is clear.   Eyes:      Conjunctiva/sclera: Conjunctivae normal.      Pupils: Pupils are equal, round, and reactive to light.   Cardiovascular:      Rate and Rhythm: Normal rate and regular rhythm.   Pulmonary:      Effort: Pulmonary effort is normal. No respiratory distress.   Abdominal:      General: There is no distension.      Palpations: Abdomen is soft.   Skin:     General: Skin is warm and dry.   Neurological:      Mental Status: She is alert and oriented to person, place, and time. Mental status is at baseline.   Psychiatric:         Mood and Affect: Mood normal.         Behavior: Behavior normal.         Relevant Results               Assessment/Plan      Principal Problem:    Acute exacerbation of chronic obstructive pulmonary disease (Multi)  Active Problems:    COPD exacerbation (Multi)    Anemia    Anxiety    Chest pain    Shortness of breath    Fall    Head injury    Headache    Cocaine abuse (Multi)    AECOPD  with acute hypoxic respiratory failure:  -on 2l/nc oxygen  -on po prednisone  -Will attempt 02 wean     Right Upper/Middle Lobe Pneumonia:   Leukocytosis, improving:   -procalcitonin elevated  -CT chest with new PNA 6/13/24  -continue iv ceftriaxone and added azithromycin     Drug Abuse:  -cocaine positive in urine toxic     Adenocarcinoma of right lung:  -being seen by oncology      Discharge: 24-48 hours, pending O2 wean and transition from IV abx to po    Gavin Houser MD

## 2024-06-14 NOTE — PROGRESS NOTES
06/14/24 1335   Discharge Planning   Patient expects to be discharged to: SNF     Met with patient at bedside. Patient was provided with list yesterday and identified 1. [facility] 2. [facility] 3. [facility]. Referrals sent. SW will follow for responses.    1637: confirmed that patient is open to being at smoke free facility. [facility] is F[facility].

## 2024-06-14 NOTE — CONSULTS
Nutrition Assessment Note    Reason for Assessment  Reason for Assessment: Admission nursing screening    Pt admitted for:  COPD exacerbation (Multi) [J44.1]    MST triggered for stage 1 on left labia. Not an indicator for nutritional risk.  Chart reviewed.  No weight loss in weight history.  Good intake.    No nutritional concerns at this time. Full nutritional assessment unwarranted at this time. Please re consult nutrition services should concerns present.     Past Medical History:   Diagnosis Date    Essential (primary) hypertension 04/20/2016    Benign hypertension    Personal history of other diseases of the respiratory system     H/O emphysema    Personal history of other diseases of the respiratory system     History of chronic obstructive lung disease    Personal history of other mental and behavioral disorders     History of depression     Results for orders placed or performed during the hospital encounter of 06/11/24 (from the past 24 hour(s))   Basic metabolic panel   Result Value Ref Range    Glucose 125 (H) 74 - 99 mg/dL    Sodium 135 (L) 136 - 145 mmol/L    Potassium 4.5 3.5 - 5.3 mmol/L    Chloride 100 98 - 107 mmol/L    Bicarbonate 27 21 - 32 mmol/L    Anion Gap 13 10 - 20 mmol/L    Urea Nitrogen 16 6 - 23 mg/dL    Creatinine 0.64 0.50 - 1.05 mg/dL    eGFR >90 >60 mL/min/1.73m*2    Calcium 8.2 (L) 8.6 - 10.3 mg/dL   CBC   Result Value Ref Range    WBC 25.2 (H) 4.4 - 11.3 x10*3/uL    nRBC 1.2 (H) 0.0 - 0.0 /100 WBCs    RBC 3.52 (L) 4.00 - 5.20 x10*6/uL    Hemoglobin 7.9 (L) 12.0 - 16.0 g/dL    Hematocrit 28.2 (L) 36.0 - 46.0 %    MCV 80 80 - 100 fL    MCH 22.4 (L) 26.0 - 34.0 pg    MCHC 28.0 (L) 32.0 - 36.0 g/dL    RDW 20.7 (H) 11.5 - 14.5 %    Platelets 726 (H) 150 - 450 x10*3/uL     Scheduled medications  aspirin, 81 mg, oral, Daily  azithromycin, 500 mg, oral, Daily  budesonide, 0.5 mg, nebulization, BID   And  formoterol, 20 mcg, nebulization, BID  busPIRone, 5 mg, oral, BID  cefTRIAXone, 1 g,  "intravenous, q24h  dilTIAZem ER, 240 mg, oral, Daily  enoxaparin, 40 mg, subcutaneous, q24h  ipratropium-albuteroL, 3 mL, nebulization, TID  lidocaine, 3 patch, transdermal, Daily  lisinopril, 10 mg, oral, Daily  montelukast, 10 mg, oral, Nightly  nortriptyline, 50 mg, oral, Nightly  nystatin, 5 mL, Swish & Swallow, TID  pantoprazole, 40 mg, oral, Daily before breakfast   Or  pantoprazole, 40 mg, intravenous, Daily before breakfast  predniSONE, 40 mg, oral, Daily  sennosides, 2 tablet, oral, BID  thiamine, 100 mg, oral, Daily      Continuous medications     PRN medications  PRN medications: acetaminophen **OR** acetaminophen **OR** acetaminophen, benzonatate, calcium carbonate, ipratropium-albuteroL, LORazepam, melatonin, ondansetron ODT **OR** ondansetron, oxyCODONE  Dietary Orders (From admission, onward)       Start     Ordered    06/12/24 0324  Adult diet Cardiac; 70 gm fat; 2 - 3 grams Sodium  Diet effective now        Question Answer Comment   Diet type Cardiac    Fat restriction: 70 gm fat    Sodium restriction: 2 - 3 grams Sodium        06/12/24 0323                  History:  Food and Nutrient History  Energy Intake: Good > 75 %    Anthropometrics:  Height: 157.5 cm (5' 2\")  Weight: 64 kg (141 lb)  BMI (Calculated): 25.78    Wt Readings from Last 3 Encounters:   06/11/24 64 kg (141 lb)   05/10/24 64.6 kg (142 lb 8 oz)   05/05/24 64.6 kg (142 lb 8 oz)     Weight Change  Weight History / % Weight Change: 61.2kg 1/9/24, 59.9kg 6/21/23  Significant Weight Loss: No    Nutrition Focused Physical Findings:  Edema  Edema: none    Physical Findings (Nutrition Deficiency/Toxicity)  Skin: Positive (stage 1 on left labia)          Follow Up  Time Spent (min): 45 minutes  Last Date of Nutrition Visit: 06/14/24  Nutrition Follow-Up Needed?: Dietitian to reassess per policy  Follow up Comment: [facility]  "

## 2024-06-14 NOTE — CARE PLAN
The patient's goals for the shift include      The clinical goals for the shift include pt will maintain adequte oxygenation this shift    Problem: Pain  Goal: Takes deep breaths with improved pain control throughout the shift  Outcome: Progressing  Goal: Turns in bed with improved pain control throughout the shift  Outcome: Progressing  Goal: Walks with improved pain control throughout the shift  Outcome: Progressing  Goal: Performs ADL's with improved pain control throughout shift  Outcome: Progressing  Goal: Participates in PT with improved pain control throughout the shift  Outcome: Progressing  Goal: Free from opioid side effects throughout the shift  Outcome: Progressing  Goal: Free from acute confusion related to pain meds throughout the shift  Outcome: Progressing     Problem: Pain - Adult  Goal: Verbalizes/displays adequate comfort level or baseline comfort level  Outcome: Progressing     Problem: Safety - Adult  Goal: Free from fall injury  Outcome: Progressing     Problem: Discharge Planning  Goal: Discharge to home or other facility with appropriate resources  Outcome: Progressing     Problem: Chronic Conditions and Co-morbidities  Goal: Patient's chronic conditions and co-morbidity symptoms are monitored and maintained or improved  Outcome: Progressing     Problem: Skin  Goal: Decreased wound size/increased tissue granulation at next dressing change  Outcome: Progressing  Goal: Participates in plan/prevention/treatment measures  Outcome: Progressing  Goal: Prevent/manage excess moisture  Outcome: Progressing  Goal: Prevent/minimize sheer/friction injuries  Outcome: Progressing  Goal: Promote/optimize nutrition  Outcome: Progressing  Goal: Promote skin healing  Outcome: Progressing     Problem: Fall/Injury  Goal: Not fall by end of shift  Outcome: Progressing  Goal: Be free from injury by end of the shift  Outcome: Progressing  Goal: Verbalize understanding of personal risk factors for fall in the  hospital  Outcome: Progressing  Goal: Verbalize understanding of risk factor reduction measures to prevent injury from fall in the home  Outcome: Progressing  Goal: Use assistive devices by end of the shift  Outcome: Progressing  Goal: Pace activities to prevent fatigue by end of the shift  Outcome: Progressing

## 2024-06-15 LAB
ANION GAP SERPL CALC-SCNC: 11 MMOL/L (ref 10–20)
ATRIAL RATE: 101 BPM
ATRIAL RATE: 98 BPM
BUN SERPL-MCNC: 16 MG/DL (ref 6–23)
CALCIUM SERPL-MCNC: 8.1 MG/DL (ref 8.6–10.3)
CHLORIDE SERPL-SCNC: 99 MMOL/L (ref 98–107)
CO2 SERPL-SCNC: 28 MMOL/L (ref 21–32)
CREAT SERPL-MCNC: 0.6 MG/DL (ref 0.5–1.05)
EGFRCR SERPLBLD CKD-EPI 2021: >90 ML/MIN/1.73M*2
ERYTHROCYTE [DISTWIDTH] IN BLOOD BY AUTOMATED COUNT: 20.1 % (ref 11.5–14.5)
GLUCOSE SERPL-MCNC: 98 MG/DL (ref 74–99)
HCT VFR BLD AUTO: 25.2 % (ref 36–46)
HGB BLD-MCNC: 7.4 G/DL (ref 12–16)
HOLD SPECIMEN: NORMAL
MCH RBC QN AUTO: 23.1 PG (ref 26–34)
MCHC RBC AUTO-ENTMCNC: 29.4 G/DL (ref 32–36)
MCV RBC AUTO: 79 FL (ref 80–100)
NRBC BLD-RTO: 1.6 /100 WBCS (ref 0–0)
P AXIS: 77 DEGREES
P AXIS: 78 DEGREES
P OFFSET: 199 MS
P OFFSET: 200 MS
P ONSET: 159 MS
P ONSET: 161 MS
PLATELET # BLD AUTO: 770 X10*3/UL (ref 150–450)
POTASSIUM SERPL-SCNC: 4 MMOL/L (ref 3.5–5.3)
PR INTERVAL: 132 MS
PR INTERVAL: 134 MS
Q ONSET: 226 MS
Q ONSET: 227 MS
QRS COUNT: 16 BEATS
QRS COUNT: 16 BEATS
QRS DURATION: 54 MS
QRS DURATION: 72 MS
QT INTERVAL: 340 MS
QT INTERVAL: 340 MS
QTC CALCULATION(BAZETT): 434 MS
QTC CALCULATION(BAZETT): 440 MS
QTC FREDERICIA: 400 MS
QTC FREDERICIA: 404 MS
R AXIS: 64 DEGREES
R AXIS: 69 DEGREES
RBC # BLD AUTO: 3.21 X10*6/UL (ref 4–5.2)
SODIUM SERPL-SCNC: 134 MMOL/L (ref 136–145)
T AXIS: 65 DEGREES
T AXIS: 65 DEGREES
T OFFSET: 396 MS
T OFFSET: 397 MS
VENTRICULAR RATE: 101 BPM
VENTRICULAR RATE: 98 BPM
WBC # BLD AUTO: 26.3 X10*3/UL (ref 4.4–11.3)

## 2024-06-15 PROCEDURE — 2500000002 HC RX 250 W HCPCS SELF ADMINISTERED DRUGS (ALT 637 FOR MEDICARE OP, ALT 636 FOR OP/ED): Performed by: INTERNAL MEDICINE

## 2024-06-15 PROCEDURE — 2500000004 HC RX 250 GENERAL PHARMACY W/ HCPCS (ALT 636 FOR OP/ED): Performed by: INTERNAL MEDICINE

## 2024-06-15 PROCEDURE — 2500000002 HC RX 250 W HCPCS SELF ADMINISTERED DRUGS (ALT 637 FOR MEDICARE OP, ALT 636 FOR OP/ED)

## 2024-06-15 PROCEDURE — 2500000005 HC RX 250 GENERAL PHARMACY W/O HCPCS: Performed by: PHYSICIAN ASSISTANT

## 2024-06-15 PROCEDURE — 94668 MNPJ CHEST WALL SBSQ: CPT

## 2024-06-15 PROCEDURE — 1100000001 HC PRIVATE ROOM DAILY

## 2024-06-15 PROCEDURE — 82374 ASSAY BLOOD CARBON DIOXIDE: CPT | Performed by: INTERNAL MEDICINE

## 2024-06-15 PROCEDURE — 2500000002 HC RX 250 W HCPCS SELF ADMINISTERED DRUGS (ALT 637 FOR MEDICARE OP, ALT 636 FOR OP/ED): Performed by: EMERGENCY MEDICINE

## 2024-06-15 PROCEDURE — 2500000005 HC RX 250 GENERAL PHARMACY W/O HCPCS: Performed by: INTERNAL MEDICINE

## 2024-06-15 PROCEDURE — 36415 COLL VENOUS BLD VENIPUNCTURE: CPT | Performed by: INTERNAL MEDICINE

## 2024-06-15 PROCEDURE — 99232 SBSQ HOSP IP/OBS MODERATE 35: CPT | Performed by: STUDENT IN AN ORGANIZED HEALTH CARE EDUCATION/TRAINING PROGRAM

## 2024-06-15 PROCEDURE — 85027 COMPLETE CBC AUTOMATED: CPT | Performed by: INTERNAL MEDICINE

## 2024-06-15 PROCEDURE — 2500000001 HC RX 250 WO HCPCS SELF ADMINISTERED DRUGS (ALT 637 FOR MEDICARE OP): Performed by: INTERNAL MEDICINE

## 2024-06-15 PROCEDURE — 94640 AIRWAY INHALATION TREATMENT: CPT

## 2024-06-15 PROCEDURE — 2500000004 HC RX 250 GENERAL PHARMACY W/ HCPCS (ALT 636 FOR OP/ED): Performed by: PHYSICIAN ASSISTANT

## 2024-06-15 PROCEDURE — 2500000001 HC RX 250 WO HCPCS SELF ADMINISTERED DRUGS (ALT 637 FOR MEDICARE OP): Performed by: PHYSICIAN ASSISTANT

## 2024-06-15 RX ADMIN — LISINOPRIL 10 MG: 10 TABLET ORAL at 09:00

## 2024-06-15 RX ADMIN — Medication 100 MG: at 09:41

## 2024-06-15 RX ADMIN — IPRATROPIUM BROMIDE AND ALBUTEROL SULFATE 3 ML: 2.5; .5 SOLUTION RESPIRATORY (INHALATION) at 20:48

## 2024-06-15 RX ADMIN — NYSTATIN 500000 UNITS: 100000 SUSPENSION ORAL at 09:41

## 2024-06-15 RX ADMIN — SENNOSIDES 17.2 MG: 8.6 TABLET, FILM COATED ORAL at 21:12

## 2024-06-15 RX ADMIN — IPRATROPIUM BROMIDE AND ALBUTEROL SULFATE 3 ML: 2.5; .5 SOLUTION RESPIRATORY (INHALATION) at 07:27

## 2024-06-15 RX ADMIN — PREDNISONE 40 MG: 20 TABLET ORAL at 09:41

## 2024-06-15 RX ADMIN — FORMOTEROL FUMARATE DIHYDRATE 20 MCG: 20 SOLUTION RESPIRATORY (INHALATION) at 07:27

## 2024-06-15 RX ADMIN — ENOXAPARIN SODIUM 40 MG: 40 INJECTION SUBCUTANEOUS at 09:42

## 2024-06-15 RX ADMIN — DILTIAZEM HYDROCHLORIDE 240 MG: 120 CAPSULE, EXTENDED RELEASE ORAL at 09:42

## 2024-06-15 RX ADMIN — BUDESONIDE 0.5 MG: 0.5 INHALANT ORAL at 07:27

## 2024-06-15 RX ADMIN — OXYCODONE HYDROCHLORIDE 5 MG: 5 TABLET ORAL at 02:57

## 2024-06-15 RX ADMIN — ASPIRIN 81 MG CHEWABLE TABLET 81 MG: 81 TABLET CHEWABLE at 09:41

## 2024-06-15 RX ADMIN — AZITHROMYCIN DIHYDRATE 500 MG: 500 TABLET ORAL at 09:41

## 2024-06-15 RX ADMIN — OXYCODONE HYDROCHLORIDE 5 MG: 5 TABLET ORAL at 23:06

## 2024-06-15 RX ADMIN — OXYCODONE HYDROCHLORIDE 5 MG: 5 TABLET ORAL at 10:14

## 2024-06-15 RX ADMIN — OXYCODONE HYDROCHLORIDE 5 MG: 5 TABLET ORAL at 15:44

## 2024-06-15 RX ADMIN — NORTRIPTYLINE HYDROCHLORIDE 50 MG: 25 CAPSULE ORAL at 21:12

## 2024-06-15 RX ADMIN — CALCIUM CARBONATE (ANTACID) CHEW TAB 500 MG 500 MG: 500 CHEW TAB at 21:12

## 2024-06-15 RX ADMIN — MONTELUKAST 10 MG: 10 TABLET, FILM COATED ORAL at 21:12

## 2024-06-15 RX ADMIN — NYSTATIN 500000 UNITS: 100000 SUSPENSION ORAL at 15:44

## 2024-06-15 RX ADMIN — NYSTATIN 500000 UNITS: 100000 SUSPENSION ORAL at 21:12

## 2024-06-15 RX ADMIN — PANTOPRAZOLE SODIUM 40 MG: 40 TABLET, DELAYED RELEASE ORAL at 09:41

## 2024-06-15 RX ADMIN — BUDESONIDE 0.5 MG: 0.5 INHALANT ORAL at 20:48

## 2024-06-15 RX ADMIN — BUSPIRONE HYDROCHLORIDE 5 MG: 5 TABLET ORAL at 09:41

## 2024-06-15 RX ADMIN — IPRATROPIUM BROMIDE AND ALBUTEROL SULFATE 3 ML: 2.5; .5 SOLUTION RESPIRATORY (INHALATION) at 02:21

## 2024-06-15 RX ADMIN — CALCIUM CARBONATE (ANTACID) CHEW TAB 500 MG 500 MG: 500 CHEW TAB at 02:11

## 2024-06-15 RX ADMIN — LIDOCAINE 4% 3 PATCH: 40 PATCH TOPICAL at 09:42

## 2024-06-15 RX ADMIN — SENNOSIDES 17.2 MG: 8.6 TABLET, FILM COATED ORAL at 09:41

## 2024-06-15 RX ADMIN — IPRATROPIUM BROMIDE AND ALBUTEROL SULFATE 3 ML: 2.5; .5 SOLUTION RESPIRATORY (INHALATION) at 14:39

## 2024-06-15 RX ADMIN — CEFTRIAXONE SODIUM 1 G: 1 INJECTION, SOLUTION INTRAVENOUS at 09:42

## 2024-06-15 RX ADMIN — ONDANSETRON 4 MG: 4 TABLET, ORALLY DISINTEGRATING ORAL at 13:15

## 2024-06-15 RX ADMIN — FORMOTEROL FUMARATE DIHYDRATE 20 MCG: 20 SOLUTION RESPIRATORY (INHALATION) at 20:48

## 2024-06-15 RX ADMIN — BUSPIRONE HYDROCHLORIDE 5 MG: 5 TABLET ORAL at 21:12

## 2024-06-15 ASSESSMENT — COGNITIVE AND FUNCTIONAL STATUS - GENERAL
STANDING UP FROM CHAIR USING ARMS: A LITTLE
TOILETING: A LITTLE
MOVING TO AND FROM BED TO CHAIR: A LITTLE
CLIMB 3 TO 5 STEPS WITH RAILING: A LITTLE
STANDING UP FROM CHAIR USING ARMS: A LITTLE
DAILY ACTIVITIY SCORE: 22
CLIMB 3 TO 5 STEPS WITH RAILING: A LITTLE
DRESSING REGULAR LOWER BODY CLOTHING: A LITTLE
TOILETING: A LITTLE
MOBILITY SCORE: 20
MOVING TO AND FROM BED TO CHAIR: A LITTLE
WALKING IN HOSPITAL ROOM: A LITTLE
DRESSING REGULAR LOWER BODY CLOTHING: A LITTLE
WALKING IN HOSPITAL ROOM: A LITTLE
MOBILITY SCORE: 20
DAILY ACTIVITIY SCORE: 22

## 2024-06-15 ASSESSMENT — PAIN - FUNCTIONAL ASSESSMENT
PAIN_FUNCTIONAL_ASSESSMENT: 0-10

## 2024-06-15 ASSESSMENT — PAIN SCALES - GENERAL
PAINLEVEL_OUTOF10: 10 - WORST POSSIBLE PAIN
PAINLEVEL_OUTOF10: 9
PAINLEVEL_OUTOF10: 5 - MODERATE PAIN
PAINLEVEL_OUTOF10: 8
PAINLEVEL_OUTOF10: 4
PAINLEVEL_OUTOF10: 7
PAINLEVEL_OUTOF10: 2

## 2024-06-15 ASSESSMENT — PAIN DESCRIPTION - LOCATION
LOCATION: BACK
LOCATION: LEG
LOCATION: HEAD

## 2024-06-15 ASSESSMENT — PAIN SCALES - PAIN ASSESSMENT IN ADVANCED DEMENTIA (PAINAD): TOTALSCORE: MEDICATION (SEE MAR)

## 2024-06-15 NOTE — CARE PLAN
The patient's goals for the shift include      The clinical goals for the shift include remain safe and free from resp.distress

## 2024-06-15 NOTE — CARE PLAN
The patient's goals for the shift include      The clinical goals for the shift include pt will remain free of falls this shift    Over the shift, the patient did not make progress toward the following goals. Barriers to progression include . Recommendations to address these barriers include .

## 2024-06-15 NOTE — PROGRESS NOTES
06/15/24 0826   Discharge Planning   Assistance Needed per chart review IM-Discharge: 24-48 hours, pending O2 wean and transition from IV abx to po   Home or Post Acute Services Post acute facilities (Rehab/SNF/etc)   Type of Post Acute Facility Services Skilled nursing   Patient expects to be discharged to: [facility] ;FOC -requesting on site visit for Monday-TCC requested if liaison can visit sooner, awaitng reply     Updated by [facility] liaison onsite complete, willing to accept, will need updated therapy notes/requested

## 2024-06-15 NOTE — PROGRESS NOTES
Fernando Cuevas is a 63 y.o. female on day 4 of admission presenting with Acute exacerbation of chronic obstructive pulmonary disease (Multi).      Subjective   Patient seen and examined at the bedside this morning. No acute overnight events. No other questions or concerns.         Objective     Last Recorded Vitals  /70 (BP Location: Left arm, Patient Position: Lying)   Pulse 100   Temp 36.1 °C (97 °F)   Resp 20   Wt 64 kg (141 lb)   SpO2 98%   Intake/Output last 3 Shifts:    Intake/Output Summary (Last 24 hours) at 6/15/2024 0716  Last data filed at 6/15/2024 0611  Gross per 24 hour   Intake 420 ml   Output 1300 ml   Net -880 ml       Admission Weight  Weight: 64 kg (141 lb) (06/11/24 1422)    Daily Weight  06/11/24 : 64 kg (141 lb)    Image Results  CT chest wo IV contrast  Narrative: Interpreted By:  Gabe Gage,   STUDY:  CT CHEST WO IV CONTRAST;  6/13/2024 2:51 pm      INDICATION:  62 y/o   F with  Signs/Symptoms:pna.      LIMITATIONS:  Imaging of the mediastinum and karen is limited without the use of IV  contrast..      ACCESSION NUMBER(S):  [id number]      ORDERING CLINICIAN:  MARCOS REECE      TECHNIQUE:  Noncontrast Thin-section images were obtained  from the thoracic  inlet down through the diaphragm. Sagittal and coronal reconstruction  images were generated. Mediastinal, lung, bone, and liver windows  were reviewed.      COMPARISON:  Most recent prior is from 05/08/2024.      FINDINGS:  CHEST WALL/BASE OF THE NECK:  The chest wall was grossly intact.  No thyromegaly or thyroid mass.      LUNGS/ PLEURA/ AND TRACHEA:  Stable linear scar across the posteromedial right lung base. There  are mild patchy infiltrative densities in the mid and lower right  upper lobe and in the perihilar/infrahilar right middle lobe, not  present previously. No infiltrative density in the left lung. There  are mild-to-moderate emphysematous changes in the lungs. No focal  masslike density in either lung. No mass  or pneumonia in either lung.  No pleural effusion. No pneumothorax.  The trachea was grossly intact.      MEDIASTINUM/ALESSANRDO:  Small stable fat containing posteromedial left diaphragmatic hernia.  No suspicious mediastinal, hilar, or axillary mass or adenopathy in  this unenhanced exam. No cardiomegaly.  No pericardial effusion.  No thoracic aortic aneurysm.      BONES:  No destructive lytic or blastic bone lesion.      UPPER ABDOMEN:  The imaged upper abdomen was grossly intact.      Impression: Underlying emphysema as described.      Stable linear scar cross the posteromedial right lung base.      Mild new pneumonia in the right upper lobe and right middle lobe as  described.      MACRO:  None      Signed by: Gabe Gage 6/13/2024 3:24 PM  Dictation workstation:   [id number]      Physical Exam  Constitutional:       General: She is not in acute distress.  HENT:      Head: Normocephalic and atraumatic.      Mouth/Throat:      Mouth: Mucous membranes are moist.      Pharynx: Oropharynx is clear.   Eyes:      Conjunctiva/sclera: Conjunctivae normal.      Pupils: Pupils are equal, round, and reactive to light.   Cardiovascular:      Rate and Rhythm: Normal rate and regular rhythm.   Pulmonary:      Effort: Pulmonary effort is normal. No respiratory distress.   Abdominal:      General: There is no distension.      Palpations: Abdomen is soft.   Skin:     General: Skin is warm and dry.   Neurological:      Mental Status: She is alert and oriented to person, place, and time. Mental status is at baseline.   Psychiatric:         Mood and Affect: Mood normal.         Behavior: Behavior normal.         Relevant Results             No current facility-administered medications on file prior to encounter.     Current Outpatient Medications on File Prior to Encounter   Medication Sig Dispense Refill    albuterol 90 mcg/actuation inhaler Inhale 2 puffs every 4 hours if needed for wheezing or shortness of breath.      alum-mag  hydroxide-simeth (Mylanta) 200-200-20 mg/5 mL oral suspension TAKE 10 ML BY MOUTH EVERY 6 HOURS AS NEEDED      ammonium lactate (Lac-Hydrin) 12 % lotion Apply topically twice a day.      aspirin 81 mg chewable tablet Chew 1 tablet (81 mg) once daily.      azithromycin (Zithromax) 250 mg tablet Take 1 tablet (250 mg) by mouth 3 times a week. M,W,F      busPIRone (Buspar) 5 mg tablet Take 1 tablet (5 mg) by mouth twice a day.      calcium carbonate 600 mg calcium (1,500 mg) tablet Take 1,200 mg by mouth once daily.      clotrimazole (Lotrimin) 1 % cream Apply topically 2 times a day.      diclofenac sodium (Voltaren) 1 % gel Apply 4.5 inches (4 g) topically 4 times a day as needed.      guaiFENesin (Mucinex) 600 mg 12 hr tablet Take 1 tablet (600 mg) by mouth.      lidocaine (Lidoderm) 5 % patch Place 1 patch on the skin once every 24 hours.      metroNIDAZOLE (Flagyl) 500 mg tablet       mometasone-formoterol (Dulera 100) 100-5 mcg/actuation inhaler Inhale 200 mcg twice a day.      nortriptyline (Pamelor) 50 mg capsule Take 1 capsule (50 mg) by mouth once daily at bedtime.      pantoprazole (ProtoNix) 40 mg EC tablet Take 1 tablet (40 mg) by mouth twice a day.      predniSONE (Deltasone) 10 mg tablet Take 4 tablets (40mg) daily for 3 days, then take 3 tablets (30mg) daily for 3 days, then take 2 tablets (20mg) daily for 3 days, then take 1 tablet (10mg) daily as directed      varenicline (Chantix) 1 mg tablet Take 1 tablet (1 mg) by mouth twice a day.      acetaminophen-codeine (Tylenol w/ Codeine #3) 300-30 mg tablet Take 1 tablet by mouth every 6 hours if needed for severe pain (7 - 10). 10 tablet 0    benzonatate (Tessalon) 100 mg capsule Take 1 capsule (100 mg) by mouth 3 times a day as needed for cough. Do not crush or chew. 20 capsule 0    dilTIAZem ER (Tiazac) 240 mg 24 hr capsule Take 1 capsule (240 mg) by mouth once daily.      fluocinonide 0.05 % cream APPLY THIN LAYER TO AFFECTED AREA TWICE A DAY AS NEEDED       fluticasone propion-salmeteroL (Advair Diskus) 100-50 mcg/dose diskus inhaler Inhale 1 puff 2 times a day. Rinse mouth with water after use to reduce aftertaste and incidence of candidiasis. Do not swallow. 60 each 0    ipratropium-albuteroL (Duo-Neb) 0.5-2.5 mg/3 mL nebulizer solution Take 3 mL by nebulization 3 times a day. 270 mL 0    lisinopril 10 mg tablet Take 1 tablet (10 mg) by mouth once daily. 30 tablet 0    montelukast (Singulair) 10 mg tablet Take 1 tablet (10 mg) by mouth once daily.      nitroglycerin (Nitrostat) 0.4 mg SL tablet Place 1 tablet (0.4 mg) under the tongue.      oxybutynin XL (Ditropan-XL) 5 mg 24 hr tablet Take 1 tablet (5 mg) by mouth once daily. Do not crush, chew, or split.      thiamine 100 mg tablet Take 1 tablet (100 mg) by mouth once daily.      tiotropium (Spiriva Respimat) 2.5 mcg/actuation inhaler Inhale 2 puffs once daily. 8 g 11       Assessment/Plan      Principal Problem:    Acute exacerbation of chronic obstructive pulmonary disease (Multi)  Active Problems:    COPD exacerbation (Multi)    Anemia    Anxiety    Chest pain    Shortness of breath    Fall    Head injury    Headache    Cocaine abuse (Multi)    AECOPD:  Acute hypoxic respiratory failure, resolved:  -weaned to room air on 6/14/24  -on po prednisone     Right Upper/Middle Lobe Pneumonia:   Leukocytosis, improving:   -procalcitonin elevated  -CT chest with new PNA 6/13/24  -continue iv ceftriaxone and added azithromycin     Drug Abuse:  -cocaine positive in urine toxic     Adenocarcinoma of right lung:  -being seen by oncology      Discharge: Pending SNF approval  DVT ppx: Lovenox 40 mg subq    Gavin Houser MD

## 2024-06-16 VITALS
SYSTOLIC BLOOD PRESSURE: 110 MMHG | TEMPERATURE: 97.2 F | DIASTOLIC BLOOD PRESSURE: 72 MMHG | BODY MASS INDEX: 25.95 KG/M2 | HEIGHT: 62 IN | HEART RATE: 108 BPM | RESPIRATION RATE: 18 BRPM | WEIGHT: 141 LBS | OXYGEN SATURATION: 97 %

## 2024-06-16 LAB
ANION GAP SERPL CALC-SCNC: 11 MMOL/L (ref 10–20)
BACTERIA BLD CULT: NORMAL
BUN SERPL-MCNC: 17 MG/DL (ref 6–23)
CALCIUM SERPL-MCNC: 8.2 MG/DL (ref 8.6–10.3)
CHLORIDE SERPL-SCNC: 98 MMOL/L (ref 98–107)
CO2 SERPL-SCNC: 29 MMOL/L (ref 21–32)
CREAT SERPL-MCNC: 0.62 MG/DL (ref 0.5–1.05)
CRP SERPL-MCNC: 0.57 MG/DL
EGFRCR SERPLBLD CKD-EPI 2021: >90 ML/MIN/1.73M*2
ERYTHROCYTE [DISTWIDTH] IN BLOOD BY AUTOMATED COUNT: 20.4 % (ref 11.5–14.5)
ERYTHROCYTE [SEDIMENTATION RATE] IN BLOOD BY WESTERGREN METHOD: 35 MM/H (ref 0–30)
FOLATE SERPL-MCNC: 10 NG/ML
GLUCOSE SERPL-MCNC: 77 MG/DL (ref 74–99)
HCT VFR BLD AUTO: 25.4 % (ref 36–46)
HGB BLD-MCNC: 7.5 G/DL (ref 12–16)
IRON SATN MFR SERPL: 3 % (ref 25–45)
IRON SERPL-MCNC: 11 UG/DL (ref 35–150)
MCH RBC QN AUTO: 23.1 PG (ref 26–34)
MCHC RBC AUTO-ENTMCNC: 29.5 G/DL (ref 32–36)
MCV RBC AUTO: 78 FL (ref 80–100)
NRBC BLD-RTO: 4.7 /100 WBCS (ref 0–0)
PLATELET # BLD AUTO: 806 X10*3/UL (ref 150–450)
POTASSIUM SERPL-SCNC: 4.3 MMOL/L (ref 3.5–5.3)
RBC # BLD AUTO: 3.25 X10*6/UL (ref 4–5.2)
SODIUM SERPL-SCNC: 134 MMOL/L (ref 136–145)
TIBC SERPL-MCNC: 428 UG/DL (ref 240–445)
UIBC SERPL-MCNC: 417 UG/DL (ref 110–370)
VIT B12 SERPL-MCNC: 1680 PG/ML (ref 211–911)
WBC # BLD AUTO: 24.7 X10*3/UL (ref 4.4–11.3)

## 2024-06-16 PROCEDURE — 82746 ASSAY OF FOLIC ACID SERUM: CPT | Mod: AHULAB | Performed by: INTERNAL MEDICINE

## 2024-06-16 PROCEDURE — 82607 VITAMIN B-12: CPT | Mod: AHULAB | Performed by: INTERNAL MEDICINE

## 2024-06-16 PROCEDURE — 85027 COMPLETE CBC AUTOMATED: CPT | Performed by: INTERNAL MEDICINE

## 2024-06-16 PROCEDURE — 2500000002 HC RX 250 W HCPCS SELF ADMINISTERED DRUGS (ALT 637 FOR MEDICARE OP, ALT 636 FOR OP/ED): Performed by: EMERGENCY MEDICINE

## 2024-06-16 PROCEDURE — 86140 C-REACTIVE PROTEIN: CPT | Performed by: INTERNAL MEDICINE

## 2024-06-16 PROCEDURE — 2500000005 HC RX 250 GENERAL PHARMACY W/O HCPCS: Performed by: PHYSICIAN ASSISTANT

## 2024-06-16 PROCEDURE — 36415 COLL VENOUS BLD VENIPUNCTURE: CPT | Performed by: INTERNAL MEDICINE

## 2024-06-16 PROCEDURE — 2500000001 HC RX 250 WO HCPCS SELF ADMINISTERED DRUGS (ALT 637 FOR MEDICARE OP): Performed by: PHYSICIAN ASSISTANT

## 2024-06-16 PROCEDURE — 2500000004 HC RX 250 GENERAL PHARMACY W/ HCPCS (ALT 636 FOR OP/ED): Performed by: INTERNAL MEDICINE

## 2024-06-16 PROCEDURE — 97530 THERAPEUTIC ACTIVITIES: CPT | Mod: GP,CQ

## 2024-06-16 PROCEDURE — 83540 ASSAY OF IRON: CPT | Performed by: INTERNAL MEDICINE

## 2024-06-16 PROCEDURE — 2500000004 HC RX 250 GENERAL PHARMACY W/ HCPCS (ALT 636 FOR OP/ED): Performed by: PHYSICIAN ASSISTANT

## 2024-06-16 PROCEDURE — 85652 RBC SED RATE AUTOMATED: CPT | Performed by: INTERNAL MEDICINE

## 2024-06-16 PROCEDURE — 80048 BASIC METABOLIC PNL TOTAL CA: CPT | Performed by: INTERNAL MEDICINE

## 2024-06-16 PROCEDURE — 2500000001 HC RX 250 WO HCPCS SELF ADMINISTERED DRUGS (ALT 637 FOR MEDICARE OP): Performed by: INTERNAL MEDICINE

## 2024-06-16 PROCEDURE — 99223 1ST HOSP IP/OBS HIGH 75: CPT | Performed by: PSYCHIATRY & NEUROLOGY

## 2024-06-16 PROCEDURE — 94640 AIRWAY INHALATION TREATMENT: CPT

## 2024-06-16 PROCEDURE — 97116 GAIT TRAINING THERAPY: CPT | Mod: GP,CQ

## 2024-06-16 PROCEDURE — 1100000001 HC PRIVATE ROOM DAILY

## 2024-06-16 PROCEDURE — 2500000002 HC RX 250 W HCPCS SELF ADMINISTERED DRUGS (ALT 637 FOR MEDICARE OP, ALT 636 FOR OP/ED)

## 2024-06-16 RX ADMIN — CALCIUM CARBONATE (ANTACID) CHEW TAB 500 MG 500 MG: 500 CHEW TAB at 14:15

## 2024-06-16 RX ADMIN — ONDANSETRON 4 MG: 4 TABLET, ORALLY DISINTEGRATING ORAL at 19:26

## 2024-06-16 RX ADMIN — PANTOPRAZOLE SODIUM 40 MG: 40 TABLET, DELAYED RELEASE ORAL at 08:30

## 2024-06-16 RX ADMIN — MONTELUKAST 10 MG: 10 TABLET, FILM COATED ORAL at 20:50

## 2024-06-16 RX ADMIN — FORMOTEROL FUMARATE DIHYDRATE 20 MCG: 20 SOLUTION RESPIRATORY (INHALATION) at 08:45

## 2024-06-16 RX ADMIN — AZITHROMYCIN DIHYDRATE 500 MG: 500 TABLET ORAL at 08:30

## 2024-06-16 RX ADMIN — OXYCODONE HYDROCHLORIDE 5 MG: 5 TABLET ORAL at 19:26

## 2024-06-16 RX ADMIN — Medication 100 MG: at 08:30

## 2024-06-16 RX ADMIN — SENNOSIDES 17.2 MG: 8.6 TABLET, FILM COATED ORAL at 08:30

## 2024-06-16 RX ADMIN — LORAZEPAM 1 MG: 1 TABLET ORAL at 08:30

## 2024-06-16 RX ADMIN — NORTRIPTYLINE HYDROCHLORIDE 50 MG: 25 CAPSULE ORAL at 20:50

## 2024-06-16 RX ADMIN — SENNOSIDES 17.2 MG: 8.6 TABLET, FILM COATED ORAL at 20:50

## 2024-06-16 RX ADMIN — ONDANSETRON 4 MG: 4 TABLET, ORALLY DISINTEGRATING ORAL at 09:11

## 2024-06-16 RX ADMIN — ASPIRIN 81 MG CHEWABLE TABLET 81 MG: 81 TABLET CHEWABLE at 08:31

## 2024-06-16 RX ADMIN — LISINOPRIL 10 MG: 10 TABLET ORAL at 08:37

## 2024-06-16 RX ADMIN — NYSTATIN 500000 UNITS: 100000 SUSPENSION ORAL at 16:11

## 2024-06-16 RX ADMIN — PREDNISONE 40 MG: 20 TABLET ORAL at 08:30

## 2024-06-16 RX ADMIN — FORMOTEROL FUMARATE DIHYDRATE 20 MCG: 20 SOLUTION RESPIRATORY (INHALATION) at 19:07

## 2024-06-16 RX ADMIN — NYSTATIN 500000 UNITS: 100000 SUSPENSION ORAL at 20:50

## 2024-06-16 RX ADMIN — BUSPIRONE HYDROCHLORIDE 5 MG: 5 TABLET ORAL at 20:50

## 2024-06-16 RX ADMIN — OXYCODONE HYDROCHLORIDE 5 MG: 5 TABLET ORAL at 11:37

## 2024-06-16 RX ADMIN — NYSTATIN 500000 UNITS: 100000 SUSPENSION ORAL at 08:31

## 2024-06-16 RX ADMIN — CEFTRIAXONE SODIUM 1 G: 1 INJECTION, SOLUTION INTRAVENOUS at 09:09

## 2024-06-16 RX ADMIN — BUSPIRONE HYDROCHLORIDE 5 MG: 5 TABLET ORAL at 08:42

## 2024-06-16 RX ADMIN — IPRATROPIUM BROMIDE AND ALBUTEROL SULFATE 3 ML: 2.5; .5 SOLUTION RESPIRATORY (INHALATION) at 08:45

## 2024-06-16 RX ADMIN — OXYCODONE HYDROCHLORIDE 5 MG: 5 TABLET ORAL at 05:21

## 2024-06-16 RX ADMIN — BUDESONIDE 0.5 MG: 0.5 INHALANT ORAL at 08:45

## 2024-06-16 RX ADMIN — ENOXAPARIN SODIUM 40 MG: 40 INJECTION SUBCUTANEOUS at 08:31

## 2024-06-16 RX ADMIN — DILTIAZEM HYDROCHLORIDE 240 MG: 120 CAPSULE, EXTENDED RELEASE ORAL at 08:31

## 2024-06-16 RX ADMIN — BUDESONIDE 0.5 MG: 0.5 INHALANT ORAL at 19:07

## 2024-06-16 RX ADMIN — IPRATROPIUM BROMIDE AND ALBUTEROL SULFATE 3 ML: 2.5; .5 SOLUTION RESPIRATORY (INHALATION) at 19:07

## 2024-06-16 ASSESSMENT — PAIN SCALES - GENERAL
PAINLEVEL_OUTOF10: 10 - WORST POSSIBLE PAIN
PAINLEVEL_OUTOF10: 5 - MODERATE PAIN
PAINLEVEL_OUTOF10: 8
PAINLEVEL_OUTOF10: 5 - MODERATE PAIN
PAINLEVEL_OUTOF10: 3
PAINLEVEL_OUTOF10: 3
PAINLEVEL_OUTOF10: 10 - WORST POSSIBLE PAIN

## 2024-06-16 ASSESSMENT — COGNITIVE AND FUNCTIONAL STATUS - GENERAL
DRESSING REGULAR LOWER BODY CLOTHING: A LITTLE
WALKING IN HOSPITAL ROOM: A LITTLE
STANDING UP FROM CHAIR USING ARMS: A LITTLE
CLIMB 3 TO 5 STEPS WITH RAILING: A LITTLE
TOILETING: A LITTLE
WALKING IN HOSPITAL ROOM: A LOT
CLIMB 3 TO 5 STEPS WITH RAILING: TOTAL
STANDING UP FROM CHAIR USING ARMS: A LITTLE
TOILETING: A LITTLE
MOVING FROM LYING ON BACK TO SITTING ON SIDE OF FLAT BED WITH BEDRAILS: A LITTLE
TURNING FROM BACK TO SIDE WHILE IN FLAT BAD: A LITTLE
MOBILITY SCORE: 19
MOVING TO AND FROM BED TO CHAIR: A LITTLE
CLIMB 3 TO 5 STEPS WITH RAILING: A LOT
MOVING TO AND FROM BED TO CHAIR: A LITTLE
MOBILITY SCORE: 20
STANDING UP FROM CHAIR USING ARMS: A LITTLE
DAILY ACTIVITIY SCORE: 22
DAILY ACTIVITIY SCORE: 22
MOVING TO AND FROM BED TO CHAIR: A LITTLE
MOBILITY SCORE: 15
DRESSING REGULAR LOWER BODY CLOTHING: A LITTLE
WALKING IN HOSPITAL ROOM: A LITTLE

## 2024-06-16 ASSESSMENT — PAIN - FUNCTIONAL ASSESSMENT
PAIN_FUNCTIONAL_ASSESSMENT: 0-10

## 2024-06-16 ASSESSMENT — PAIN SCALES - PAIN ASSESSMENT IN ADVANCED DEMENTIA (PAINAD)
TOTALSCORE: MEDICATION (SEE MAR);REPOSITIONED
TOTALSCORE: MEDICATION (SEE MAR)

## 2024-06-16 ASSESSMENT — PAIN DESCRIPTION - LOCATION
LOCATION: HEAD
LOCATION: ABDOMEN

## 2024-06-16 ASSESSMENT — PAIN DESCRIPTION - ORIENTATION: ORIENTATION: LOWER

## 2024-06-16 NOTE — CARE PLAN
The patient's goals for the shift include      The clinical goals for the shift include patient will remain safe and free from falls/injury this shift    Problem: Pain  Goal: Takes deep breaths with improved pain control throughout the shift  Outcome: Progressing     Problem: Pain  Goal: Turns in bed with improved pain control throughout the shift  Outcome: Progressing     Problem: Pain  Goal: Performs ADL's with improved pain control throughout shift  Outcome: Progressing    Over the shift, the patient did make progress toward the following goals.

## 2024-06-16 NOTE — PROGRESS NOTES
Occupational Therapy                 Therapy Communication Note    Patient Name: Fernando Cuevas  MRN: [id number]  Today's Date: 6/16/2024     Discipline: Occupational Therapy    Missed Visit Reason: Missed Visit Reason: Patient refused (Pt not feeling well, reporting an upset stomach. She said she just got back in bed and would like to rest.)    Missed Time: Attempt    Comment:

## 2024-06-16 NOTE — CARE PLAN
The patient's goals for the shift include      The clinical goals for the shift include Pt to remain safe    Over the shift, the patient did make progress toward the following goals.   Problem: Pain  Goal: Participates in PT with improved pain control throughout the shift  Outcome: Progressing     Problem: Safety - Adult  Goal: Free from fall injury  Outcome: Progressing     Problem: Chronic Conditions and Co-morbidities  Goal: Patient's chronic conditions and co-morbidity symptoms are monitored and maintained or improved  Outcome: Progressing     Problem: Skin  Goal: Prevent/minimize sheer/friction injuries  Outcome: Progressing

## 2024-06-16 NOTE — CONSULTS
INITIAL NEUROLOGY CONSULT NOTE    IMPRESSION:  Focal right jaw pain.  While she didn't fall on that side, I cannot rule out trauma to the jaw.  This presentation is not consistent with her prior history of migraine, or with the presence of trigeminal neuralgia.  She has separate cervical strain.    RECOMMENDATIONS:  Would image the jaw with xray or CT to rule out obvious fracture.  Would benefit from anti-inflammatory management of this pain.  Muscle relaxant would be helpful for the cervical discomfort.  No neurological intervention required at this time.  I am happy to see her again as needed or as requested.    Ananth Fitzgerald Jr., M.D., FAAN       History Of Present Illness  Fernando Cuevas is a 63 y.o. female presenting with headache.  History comes from the patient and from EMR review.    The patient was admitted on 6/11 for dyspnea diagnosed as an acute COPD exacerbation.    The headaches are shooting pain of the right jaw, radiating to the ear and sometimes to the neck, and which started yesterday, and which are worse with opening or closing the jaw to speak or chew.  She has a previous history of migraine with bifrontal pounding and tightness of the neck, with photophobia, phonophobia, but no nausea, and emesis.  The current headache is different.  She fell and struck the left side of her face a few days ago.  She has separate cervical tightness primarily on the right side.  She denies other focal neurological symptoms including dysarthria, dysphagia, diplopia, focal weakness, focal sensory change, ataxia, vertigo, or bowel/bladder incontinence, among others.        Past Medical History  Past Medical History:   Diagnosis Date    Essential (primary) hypertension 04/20/2016    Benign hypertension    Personal history of other diseases of the respiratory system     H/O emphysema    Personal history of other diseases of the respiratory system     History of chronic obstructive lung disease    Personal  history of other mental and behavioral disorders     History of depression     Surgical History  Past Surgical History:   Procedure Laterality Date    CT ABDOMEN PELVIS ANGIOGRAM W AND/OR WO IV CONTRAST  3/22/2016    CT ABDOMEN PELVIS ANGIOGRAM W AND/OR WO IV CONTRAST 3/22/2016 [facility]    CT ANGIO CORONARY ART WITH HEARTFLOW IF SCORE >30%  1/5/2023    CT ANGIO CORONARY ART WITH HEARTFLOW IF SCORE >30% 1/5/2023    MR NECK ANGIO W IV CONTRAST  4/19/2021    MR NECK ANGIO W IV CONTRAST 4/19/2021     Social History  Social History     Tobacco Use    Smoking status: Every Day     Types: Cigarettes     Passive exposure: Current (3 cigarettes a day)    Smokeless tobacco: Never       Scheduled medications  aspirin, 81 mg, oral, Daily  azithromycin, 500 mg, oral, Daily  budesonide, 0.5 mg, nebulization, BID   And  formoterol, 20 mcg, nebulization, BID  busPIRone, 5 mg, oral, BID  cefTRIAXone, 1 g, intravenous, q24h  dilTIAZem ER, 240 mg, oral, Daily  enoxaparin, 40 mg, subcutaneous, q24h  ipratropium-albuteroL, 3 mL, nebulization, TID  lidocaine, 3 patch, transdermal, Daily  lisinopril, 10 mg, oral, Daily  montelukast, 10 mg, oral, Nightly  nortriptyline, 50 mg, oral, Nightly  nystatin, 5 mL, Swish & Swallow, TID  pantoprazole, 40 mg, oral, Daily before breakfast   Or  pantoprazole, 40 mg, intravenous, Daily before breakfast  predniSONE, 40 mg, oral, Daily  sennosides, 2 tablet, oral, BID  thiamine, 100 mg, oral, Daily      Continuous medications     PRN medications  PRN medications: acetaminophen **OR** acetaminophen **OR** acetaminophen, benzonatate, calcium carbonate, ipratropium-albuteroL, LORazepam, melatonin, ondansetron ODT **OR** ondansetron, oxyCODONE      No family history on file.  Allergies  Iodinated contrast media  Medications Prior to Admission   Medication Sig Dispense Refill Last Dose    albuterol 90 mcg/actuation inhaler Inhale 2 puffs every 4 hours if needed for wheezing or shortness of breath.    Unknown    alum-mag hydroxide-simeth (Mylanta) 200-200-20 mg/5 mL oral suspension TAKE 10 ML BY MOUTH EVERY 6 HOURS AS NEEDED   Unknown    ammonium lactate (Lac-Hydrin) 12 % lotion Apply topically twice a day.   Unknown    aspirin 81 mg chewable tablet Chew 1 tablet (81 mg) once daily.   Unknown    azithromycin (Zithromax) 250 mg tablet Take 1 tablet (250 mg) by mouth 3 times a week. M,W,F   Unknown    busPIRone (Buspar) 5 mg tablet Take 1 tablet (5 mg) by mouth twice a day.   Unknown    calcium carbonate 600 mg calcium (1,500 mg) tablet Take 1,200 mg by mouth once daily.   Unknown    clotrimazole (Lotrimin) 1 % cream Apply topically 2 times a day.   Unknown    diclofenac sodium (Voltaren) 1 % gel Apply 4.5 inches (4 g) topically 4 times a day as needed.   Unknown    guaiFENesin (Mucinex) 600 mg 12 hr tablet Take 1 tablet (600 mg) by mouth.   Unknown    lidocaine (Lidoderm) 5 % patch Place 1 patch on the skin once every 24 hours.   Unknown    metroNIDAZOLE (Flagyl) 500 mg tablet    Unknown    mometasone-formoterol (Dulera 100) 100-5 mcg/actuation inhaler Inhale 200 mcg twice a day.   Unknown    nortriptyline (Pamelor) 50 mg capsule Take 1 capsule (50 mg) by mouth once daily at bedtime.   Unknown    pantoprazole (ProtoNix) 40 mg EC tablet Take 1 tablet (40 mg) by mouth twice a day.   Unknown    predniSONE (Deltasone) 10 mg tablet Take 4 tablets (40mg) daily for 3 days, then take 3 tablets (30mg) daily for 3 days, then take 2 tablets (20mg) daily for 3 days, then take 1 tablet (10mg) daily as directed   Unknown    varenicline (Chantix) 1 mg tablet Take 1 tablet (1 mg) by mouth twice a day.   Unknown    acetaminophen-codeine (Tylenol w/ Codeine #3) 300-30 mg tablet Take 1 tablet by mouth every 6 hours if needed for severe pain (7 - 10). 10 tablet 0 Unknown    benzonatate (Tessalon) 100 mg capsule Take 1 capsule (100 mg) by mouth 3 times a day as needed for cough. Do not crush or chew. 20 capsule 0 Unknown     dilTIAZem ER (Tiazac) 240 mg 24 hr capsule Take 1 capsule (240 mg) by mouth once daily.   Unknown    fluocinonide 0.05 % cream APPLY THIN LAYER TO AFFECTED AREA TWICE A DAY AS NEEDED   Unknown    fluticasone propion-salmeteroL (Advair Diskus) 100-50 mcg/dose diskus inhaler Inhale 1 puff 2 times a day. Rinse mouth with water after use to reduce aftertaste and incidence of candidiasis. Do not swallow. 60 each 0     ipratropium-albuteroL (Duo-Neb) 0.5-2.5 mg/3 mL nebulizer solution Take 3 mL by nebulization 3 times a day. 270 mL 0     lisinopril 10 mg tablet Take 1 tablet (10 mg) by mouth once daily. 30 tablet 0 Unknown    montelukast (Singulair) 10 mg tablet Take 1 tablet (10 mg) by mouth once daily.   Unknown    nitroglycerin (Nitrostat) 0.4 mg SL tablet Place 1 tablet (0.4 mg) under the tongue.   Unknown    oxybutynin XL (Ditropan-XL) 5 mg 24 hr tablet Take 1 tablet (5 mg) by mouth once daily. Do not crush, chew, or split.   Unknown    thiamine 100 mg tablet Take 1 tablet (100 mg) by mouth once daily.   Unknown    tiotropium (Spiriva Respimat) 2.5 mcg/actuation inhaler Inhale 2 puffs once daily. 8 g 11 Unknown       Scheduled medications  aspirin, 81 mg, oral, Daily  azithromycin, 500 mg, oral, Daily  budesonide, 0.5 mg, nebulization, BID   And  formoterol, 20 mcg, nebulization, BID  busPIRone, 5 mg, oral, BID  cefTRIAXone, 1 g, intravenous, q24h  dilTIAZem ER, 240 mg, oral, Daily  enoxaparin, 40 mg, subcutaneous, q24h  ipratropium-albuteroL, 3 mL, nebulization, TID  lidocaine, 3 patch, transdermal, Daily  lisinopril, 10 mg, oral, Daily  montelukast, 10 mg, oral, Nightly  nortriptyline, 50 mg, oral, Nightly  nystatin, 5 mL, Swish & Swallow, TID  pantoprazole, 40 mg, oral, Daily before breakfast   Or  pantoprazole, 40 mg, intravenous, Daily before breakfast  predniSONE, 40 mg, oral, Daily  sennosides, 2 tablet, oral, BID  thiamine, 100 mg, oral, Daily      Continuous medications     PRN medications  PRN medications:  "acetaminophen **OR** acetaminophen **OR** acetaminophen, benzonatate, calcium carbonate, ipratropium-albuteroL, LORazepam, melatonin, ondansetron ODT **OR** ondansetron, oxyCODONE    Review of Systems   All other systems reviewed and are negative.      Last Recorded Vitals  Blood pressure 112/68, pulse 103, temperature 36.4 °C (97.5 °F), temperature source Oral, resp. rate 18, height 1.575 m (5' 2\"), weight 64 kg (141 lb), SpO2 100%.    CONSTITUTIONAL:  No acute distress    HEENT:  Focal tenderness of the right TMJ joint and of the right mandible.    SPINE:  Right cervical paraspinal muscle tightness.    CARDIOVASCULAR:  Normal pulses in the distal legs, no edema of either arm or either leg.  No carotid bruits.    MENTAL STATUS:  Awake, alert, fully oriented to self, place, and time, with present short-term memory, good awareness of recent events, normal attention span, concentration, and fund of knowledge.    SPEECH AND LANGUAGE:  Can name and repeat, follows all commands, has no dysarthria    FUNDOSCOPIC:  No papilledema    CRANIAL NERVES:  II-Vision present, visual fields full to confrontational testing    III/IV/VI--EOMs are present in all directions.  Pupils are symmetrically reactive in dim light.  No ptosis.    V--Normal facial sensation.    VII--No facial asymmetry.    VIII--Hearing present to voice bilaterally.    IX/X--Symmetric soft palate rise.    XI--Normal trapezius power bilaterally.    XII--Tongue protrudes without deviation.    MOTOR:  Normal power, tone, and bulk in both arms and both legs.    SENSORY:  Normal pin sensation in both arms and both legs without distal-proximal gradient, asymmetry, or spinal sensory level.    COORDINATION:  Normal finger-to-nose and heel-to-shin testing in both arms and both legs.    REFLEXES are normal and symmetric at the biceps, triceps, brachioradialis, patella, and ankle.  The plantar responses are flexor.    GAIT deferred    Relevant Results  Results for orders " placed or performed during the hospital encounter of 06/11/24 (from the past 24 hour(s))   Basic metabolic panel   Result Value Ref Range    Glucose 77 74 - 99 mg/dL    Sodium 134 (L) 136 - 145 mmol/L    Potassium 4.3 3.5 - 5.3 mmol/L    Chloride 98 98 - 107 mmol/L    Bicarbonate 29 21 - 32 mmol/L    Anion Gap 11 10 - 20 mmol/L    Urea Nitrogen 17 6 - 23 mg/dL    Creatinine 0.62 0.50 - 1.05 mg/dL    eGFR >90 >60 mL/min/1.73m*2    Calcium 8.2 (L) 8.6 - 10.3 mg/dL   CBC   Result Value Ref Range    WBC 24.7 (H) 4.4 - 11.3 x10*3/uL    nRBC 4.7 (H) 0.0 - 0.0 /100 WBCs    RBC 3.25 (L) 4.00 - 5.20 x10*6/uL    Hemoglobin 7.5 (L) 12.0 - 16.0 g/dL    Hematocrit 25.4 (L) 36.0 - 46.0 %    MCV 78 (L) 80 - 100 fL    MCH 23.1 (L) 26.0 - 34.0 pg    MCHC 29.5 (L) 32.0 - 36.0 g/dL    RDW 20.4 (H) 11.5 - 14.5 %    Platelets 806 (H) 150 - 450 x10*3/uL   Iron and TIBC   Result Value Ref Range    Iron 11 (L) 35 - 150 ug/dL    UIBC 417 (H) 110 - 370 ug/dL    TIBC 428 240 - 445 ug/dL    % Saturation 3 (L) 25 - 45 %   Sedimentation rate, automated   Result Value Ref Range    Sedimentation Rate 35 (H) 0 - 30 mm/h   C-Reactive Protein   Result Value Ref Range    C-Reactive Protein 0.57 <1.00 mg/dL         I have personally reviewed the following imaging results:  CT head wo IV contrast    Result Date: 6/12/2024  Interpreted By:  Sheri Lakhani, STUDY: CT HEAD WO IV CONTRAST;  6/12/2024 12:53 am   INDICATION: Signs/Symptoms:head injury earlier today, headache.   COMPARISON: CT head dated 07/24/2023.   ACCESSION NUMBER(S): [id number]   ORDERING CLINICIAN: SARA RUTLEDGE   TECHNIQUE: Noncontrast axial CT scan of head was performed. Angled reformats in brain and bone windows were generated. The images were reviewed in bone, brain, blood and soft tissue windows.   FINDINGS: No hyperdense intracranial hemorrhage is identified. There is no mass effect or midline shift.   Gray-white differentiation is intact, without evidence of CT apparent  transcortical infarct. Subtle attenuation changes are present in the periventricular and subcortical white matter of bilateral cerebral hemispheres, nonspecific findings favored to represent sequela of microvascular disease.   No ventricular dilatation is present. Basal cisterns are patent. No extra-axial fluid collections are identified.   Scalp soft tissues do not demonstrate any acute abnormality. Calvarium is unremarkable in appearance. Mastoid air cells and middle ear cavities are clear.   Visualized paranasal sinuses are unremarkable in appearance.       1. No evidence of hemorrhage, skull fracture, or other acute intracranial trauma/abnormality. 2. Patchy attenuation changes are present in the periventricular and subcortical white matter of bilateral cerebral hemispheres, nonspecific findings favored to represent sequela of microvascular disease.   MACRO: None   Signed by: Sheri Lakhani 6/12/2024 1:07 AM Dictation workstation:   [id number]             Ananth Fitzgerald Jr., M.D., [facility]

## 2024-06-16 NOTE — PROGRESS NOTES
Physical Therapy    Physical Therapy Treatment    Patient Name: Fernando Cuevas  MRN: [id number]  Today's Date: 6/16/2024  Time Calculation  Start Time: 1105  Stop Time: 1132  Time Calculation (min): 27 min    Assessment/Plan   PT Assessment  Rehab Prognosis: Good  Barriers to Discharge: Decreased endurance  End of Session Communication: Bedside nurse  Assessment Comment: Pt fatigues very easily and requires sitting rest breaks between ambulation distances. SPO2 desaturation noted, requiring max VC for PLB and rest breaks  End of Session Patient Position: Up in chair, Alarm on  PT Plan  Inpatient/Swing Bed or Outpatient: Inpatient  PT Plan  Treatment/Interventions: Bed mobility, Transfer training, Gait training  PT Plan: Ongoing PT  PT Frequency: 3 times per week  PT Discharge Recommendations: Moderate intensity level of continued care  Equipment Recommended upon Discharge:  (Pt owns a FWW)  PT Recommended Transfer Status: Assist x1  PT - OK to Discharge: Yes (per POC)      General Visit Information:   PT  Visit  PT Received On: 06/16/24  General  Reason for Referral: 62 y/o F presenting with acute COPD exacerbation  Referred By: RADHA Landon  Past Medical History Relevant to Rehab:   Past Medical History:   Diagnosis Date    Essential (primary) hypertension 04/20/2016    Benign hypertension    Personal history of other diseases of the respiratory system     H/O emphysema    Personal history of other diseases of the respiratory system     History of chronic obstructive lung disease    Personal history of other mental and behavioral disorders     History of depression       Prior to Session Communication: Bedside nurse  Patient Position Received: Bed, 3 rail up, Alarm off, not on at start of session  General Comment: Pt supine in bed, NC not in place. SPO2 upon arrival 84%. Pt able to saturate to 97% with donned NC. Pt reports dizziness upon sitting at EOB, abolished after placing NC and increasing SPO2  saturation    Subjective   Precautions:  Precautions  Medical Precautions: Fall precautions, Oxygen therapy device and L/min  Vital Signs:  Vital Signs  Heart Rate: (!) 115  Heart Rate Source: Monitor  SpO2: (!) 84 % (on RA, increase to 97% with 2L NC)  BP: 114/87  BP Location: Right arm  BP Method: Automatic  Patient Position: Sitting    Objective   Pain:  Pain Assessment  Pain Assessment: 0-10  Pain Score: 8 (RN notified)  Pain Type: Acute pain  Pain Location: Head  Cognition:  Cognition  Orientation Level: Oriented X4  Coordination:     Postural Control:  Static Sitting Balance  Static Sitting-Balance Support: Bilateral upper extremity supported, Feet supported  Static Sitting-Level of Assistance: Distant supervision  Static Standing Balance  Static Standing-Balance Support: Bilateral upper extremity supported  Static Standing-Level of Assistance: Contact guard  Static Standing-Comment/Number of Minutes: Mod VC for upright posture  Dynamic Standing Balance  Dynamic Standing-Balance Support: Bilateral upper extremity supported  Dynamic Standing-Balance: Lateral lean, Forward lean, Reaching across midline, Reaching for objects  Dynamic Standing-Comments: Contact guard for safety, no overt LOB noted  Extremity/Trunk Assessments:    Activity Tolerance:     Treatments:        Bed Mobility  Bed Mobility: Yes  Bed Mobility 1  Bed Mobility 1: Supine to sitting  Level of Assistance 1: Close supervision  Bed Mobility Comments 1: HOB elevated. Pt able to use bed rail to elevate trunk    Ambulation/Gait Training  Ambulation/Gait Training Performed: Yes  Ambulation/Gait Training 1  Surface 1: Level tile  Device 1: Rolling walker  Assistance 1: Minimum assistance (WW management required)  Quality of Gait 1: Narrow base of support, Diminished heel strike, Decreased step length, Shuffling gait  Comments/Distance (ft) 1: 10 x 2 (Small, shuffling steps. Pt fatigues very easily and requires seated rest break in between ambulation  distances. Pt reports dizziness at end of distance, vitals assessed, pt desats to 86%. Max VC for PLB, pt able to saturate to 97% and abolish dizziness.)  Transfers  Transfer: Yes  Transfer 1  Technique 1: Sit to stand, Stand to sit  Transfer Device 1: Walker  Transfer Level of Assistance 1: Minimum assistance  Trials/Comments 1: Ricci for trunk elevation. Poor eccentric control noted stand>sit. Max VC for correct hand placement for safety and control    Outcome Measures:  [facility] Basic Mobility  Turning from your back to your side while in a flat bed without using bedrails: A little  Moving from lying on your back to sitting on the side of a flat bed without using bedrails: A little  Moving to and from bed to chair (including a wheelchair): A little  Standing up from a chair using your arms (e.g. wheelchair or bedside chair): A little  To walk in hospital room: A lot  Climbing 3-5 steps with railing: Total  Basic Mobility - Total Score: 15    Education Documentation  Body Mechanics, taught by Brittany Loaiza PTA at 6/16/2024 12:14 PM.  Learner: Patient  Readiness: Acceptance  Method: Explanation  Response: Verbalizes Understanding    Mobility Training, taught by Brittany Loaiza PTA at 6/16/2024 12:14 PM.  Learner: Patient  Readiness: Acceptance  Method: Explanation  Response: Verbalizes Understanding    Education Comments  No comments found.        OP EDUCATION:       Encounter Problems       Encounter Problems (Active)       Balance       Goal 1 (Progressing)       Start:  06/14/24    Expected End:  06/28/24       Pt performs all sitting balance with supervision and standing balance with CGA using LRAD            Mobility       STG - Patient will ambulate (Not Progressing)       Start:  06/14/24    Expected End:  06/28/24       25 ft with CGA using LRAD            PT Problem       PT Goal 1 (Not Progressing)       Start:  06/14/24    Expected End:  06/28/24       Pt demonstrates IND in performing 2 sets of 12 reps of  prescribed BLE HEP            PT Transfers       STG - Patient to transfer to and from sit to supine (Progressing)       Start:  06/14/24    Expected End:  06/28/24       With CGA and HOB lowered         STG - Patient will transfer sit to and from stand (Not Progressing)       Start:  06/14/24    Expected End:  06/28/24       With CGA using LRAD            Pain - Adult

## 2024-06-17 ENCOUNTER — APPOINTMENT (OUTPATIENT)
Dept: RADIOLOGY | Facility: HOSPITAL | Age: 64
End: 2024-06-17
Payer: COMMERCIAL

## 2024-06-17 LAB
ANION GAP SERPL CALC-SCNC: 13 MMOL/L (ref 10–20)
BACTERIA BLD CULT: NORMAL
BASOPHILS # BLD MANUAL: 0 X10*3/UL (ref 0–0.1)
BASOPHILS NFR BLD MANUAL: 0 %
BITE CELLS BLD QL SMEAR: PRESENT
BUN SERPL-MCNC: 19 MG/DL (ref 6–23)
CALCIUM SERPL-MCNC: 8.7 MG/DL (ref 8.6–10.3)
CHLORIDE SERPL-SCNC: 95 MMOL/L (ref 98–107)
CO2 SERPL-SCNC: 29 MMOL/L (ref 21–32)
CREAT SERPL-MCNC: 0.77 MG/DL (ref 0.5–1.05)
DACRYOCYTES BLD QL SMEAR: ABNORMAL
EGFRCR SERPLBLD CKD-EPI 2021: 87 ML/MIN/1.73M*2
EOSINOPHIL # BLD MANUAL: 0 X10*3/UL (ref 0–0.7)
EOSINOPHIL NFR BLD MANUAL: 0 %
ERYTHROCYTE [DISTWIDTH] IN BLOOD BY AUTOMATED COUNT: 20.3 % (ref 11.5–14.5)
GLUCOSE SERPL-MCNC: 102 MG/DL (ref 74–99)
HCT VFR BLD AUTO: 26.3 % (ref 36–46)
HGB BLD-MCNC: 7.6 G/DL (ref 12–16)
IMM GRANULOCYTES # BLD AUTO: 1.54 X10*3/UL (ref 0–0.7)
IMM GRANULOCYTES NFR BLD AUTO: 7.1 % (ref 0–0.9)
LYMPHOCYTES # BLD MANUAL: 1.52 X10*3/UL (ref 1.2–4.8)
LYMPHOCYTES NFR BLD MANUAL: 7 %
MCH RBC QN AUTO: 22.3 PG (ref 26–34)
MCHC RBC AUTO-ENTMCNC: 28.9 G/DL (ref 32–36)
MCV RBC AUTO: 77 FL (ref 80–100)
METAMYELOCYTES # BLD MANUAL: 0.65 X10*3/UL
METAMYELOCYTES NFR BLD MANUAL: 3 %
MONOCYTES # BLD MANUAL: 0 X10*3/UL (ref 0.1–1)
MONOCYTES NFR BLD MANUAL: 0 %
NEUTROPHILS # BLD MANUAL: 18.88 X10*3/UL (ref 1.2–7.7)
NEUTS BAND # BLD MANUAL: 0.43 X10*3/UL (ref 0–0.7)
NEUTS BAND NFR BLD MANUAL: 2 %
NEUTS SEG # BLD MANUAL: 18.45 X10*3/UL (ref 1.2–7)
NEUTS SEG NFR BLD MANUAL: 85 %
NRBC BLD-RTO: 5.9 /100 WBCS (ref 0–0)
PLATELET # BLD AUTO: 799 X10*3/UL (ref 150–450)
POLYCHROMASIA BLD QL SMEAR: ABNORMAL
POTASSIUM SERPL-SCNC: 4.5 MMOL/L (ref 3.5–5.3)
RBC # BLD AUTO: 3.41 X10*6/UL (ref 4–5.2)
RBC MORPH BLD: ABNORMAL
SCHISTOCYTES BLD QL SMEAR: ABNORMAL
SODIUM SERPL-SCNC: 132 MMOL/L (ref 136–145)
TARGETS BLD QL SMEAR: ABNORMAL
TOTAL CELLS COUNTED BLD: 100
VARIANT LYMPHS # BLD MANUAL: 0.65 X10*3/UL (ref 0–0.5)
VARIANT LYMPHS NFR BLD: 3 %
WBC # BLD AUTO: 21.7 X10*3/UL (ref 4.4–11.3)

## 2024-06-17 PROCEDURE — 2500000001 HC RX 250 WO HCPCS SELF ADMINISTERED DRUGS (ALT 637 FOR MEDICARE OP): Performed by: INTERNAL MEDICINE

## 2024-06-17 PROCEDURE — 97116 GAIT TRAINING THERAPY: CPT | Mod: GP,CQ

## 2024-06-17 PROCEDURE — 94668 MNPJ CHEST WALL SBSQ: CPT

## 2024-06-17 PROCEDURE — 1100000001 HC PRIVATE ROOM DAILY

## 2024-06-17 PROCEDURE — 2500000005 HC RX 250 GENERAL PHARMACY W/O HCPCS: Performed by: PHYSICIAN ASSISTANT

## 2024-06-17 PROCEDURE — 36415 COLL VENOUS BLD VENIPUNCTURE: CPT | Performed by: INTERNAL MEDICINE

## 2024-06-17 PROCEDURE — 2500000002 HC RX 250 W HCPCS SELF ADMINISTERED DRUGS (ALT 637 FOR MEDICARE OP, ALT 636 FOR OP/ED): Performed by: EMERGENCY MEDICINE

## 2024-06-17 PROCEDURE — 97530 THERAPEUTIC ACTIVITIES: CPT | Mod: GO,CO

## 2024-06-17 PROCEDURE — 97110 THERAPEUTIC EXERCISES: CPT | Mod: GO,CO

## 2024-06-17 PROCEDURE — 2500000001 HC RX 250 WO HCPCS SELF ADMINISTERED DRUGS (ALT 637 FOR MEDICARE OP): Performed by: PHYSICIAN ASSISTANT

## 2024-06-17 PROCEDURE — 94640 AIRWAY INHALATION TREATMENT: CPT

## 2024-06-17 PROCEDURE — 2500000002 HC RX 250 W HCPCS SELF ADMINISTERED DRUGS (ALT 637 FOR MEDICARE OP, ALT 636 FOR OP/ED)

## 2024-06-17 PROCEDURE — 2500000004 HC RX 250 GENERAL PHARMACY W/ HCPCS (ALT 636 FOR OP/ED): Performed by: INTERNAL MEDICINE

## 2024-06-17 PROCEDURE — 85027 COMPLETE CBC AUTOMATED: CPT | Performed by: INTERNAL MEDICINE

## 2024-06-17 PROCEDURE — 82374 ASSAY BLOOD CARBON DIOXIDE: CPT | Performed by: INTERNAL MEDICINE

## 2024-06-17 PROCEDURE — 85007 BL SMEAR W/DIFF WBC COUNT: CPT | Performed by: INTERNAL MEDICINE

## 2024-06-17 PROCEDURE — 97530 THERAPEUTIC ACTIVITIES: CPT | Mod: GP,CQ

## 2024-06-17 PROCEDURE — 2500000004 HC RX 250 GENERAL PHARMACY W/ HCPCS (ALT 636 FOR OP/ED): Performed by: PHYSICIAN ASSISTANT

## 2024-06-17 PROCEDURE — 2500000002 HC RX 250 W HCPCS SELF ADMINISTERED DRUGS (ALT 637 FOR MEDICARE OP, ALT 636 FOR OP/ED): Performed by: INTERNAL MEDICINE

## 2024-06-17 PROCEDURE — 70486 CT MAXILLOFACIAL W/O DYE: CPT

## 2024-06-17 PROCEDURE — 99233 SBSQ HOSP IP/OBS HIGH 50: CPT | Performed by: INTERNAL MEDICINE

## 2024-06-17 RX ORDER — CYCLOBENZAPRINE HCL 5 MG
5 TABLET ORAL 3 TIMES DAILY
Status: DISCONTINUED | OUTPATIENT
Start: 2024-06-17 | End: 2024-06-19 | Stop reason: HOSPADM

## 2024-06-17 RX ORDER — KETOROLAC TROMETHAMINE 10 MG/1
10 TABLET, FILM COATED ORAL EVERY 6 HOURS PRN
Status: DISPENSED | OUTPATIENT
Start: 2024-06-17 | End: 2024-06-19

## 2024-06-17 RX ORDER — ALUMINUM HYDROXIDE, MAGNESIUM HYDROXIDE, AND SIMETHICONE 1200; 120; 1200 MG/30ML; MG/30ML; MG/30ML
20 SUSPENSION ORAL 2 TIMES DAILY
Status: DISCONTINUED | OUTPATIENT
Start: 2024-06-17 | End: 2024-06-19 | Stop reason: HOSPADM

## 2024-06-17 ASSESSMENT — COGNITIVE AND FUNCTIONAL STATUS - GENERAL
MOVING TO AND FROM BED TO CHAIR: A LITTLE
TOILETING: A LOT
TURNING FROM BACK TO SIDE WHILE IN FLAT BAD: A LITTLE
MOBILITY SCORE: 15
WALKING IN HOSPITAL ROOM: A LOT
HELP NEEDED FOR BATHING: A LITTLE
DRESSING REGULAR UPPER BODY CLOTHING: A LITTLE
CLIMB 3 TO 5 STEPS WITH RAILING: TOTAL
MOVING FROM LYING ON BACK TO SITTING ON SIDE OF FLAT BED WITH BEDRAILS: A LITTLE
STANDING UP FROM CHAIR USING ARMS: A LITTLE
DRESSING REGULAR LOWER BODY CLOTHING: A LITTLE
DAILY ACTIVITIY SCORE: 19

## 2024-06-17 ASSESSMENT — PAIN SCALES - GENERAL
PAINLEVEL_OUTOF10: 10 - WORST POSSIBLE PAIN
PAINLEVEL_OUTOF10: 5 - MODERATE PAIN

## 2024-06-17 ASSESSMENT — PAIN DESCRIPTION - LOCATION
LOCATION: GENERALIZED
LOCATION: BACK
LOCATION: HEAD

## 2024-06-17 ASSESSMENT — PAIN DESCRIPTION - ORIENTATION: ORIENTATION: RIGHT

## 2024-06-17 ASSESSMENT — PAIN - FUNCTIONAL ASSESSMENT
PAIN_FUNCTIONAL_ASSESSMENT: 0-10

## 2024-06-17 NOTE — PROGRESS NOTES
06/17/24 0930   Discharge Planning   Patient expects to be discharged to: [facility]     Once patient is med ready plan is to discharge to [facility], PT is recommending MOD score of 15 reached out to OT still need eval to begin auth, I will continue to monitor for discharge planning.    11:21 OT recommending MOD score of 19, sent to facility through [facility], I did ask them to start auth, I will continue to monitor for discharge planning.

## 2024-06-17 NOTE — PROGRESS NOTES
Occupational Therapy    Occupational Therapy Treatment    Name: Fernando Cuevas  MRN: [id number]  : 1960  Date: 24  Time Calculation  Start Time: 1040  Stop Time: 1103  Time Calculation (min): 23 min    Assessment:  Medical Staff Made Aware: Yes  End of Session Communication: Bedside nurse  End of Session Patient Position: Bed, 3 rail up, Alarm on  Plan:  Treatment Interventions: ADL retraining, Functional transfer training, UE strengthening/ROM, Endurance training, Equipment evaluation/education, Compensatory technique education  OT Frequency: 3 times per week  OT Discharge Recommendations: Moderate intensity level of continued care  OT Recommended Transfer Status: Assist of 1  OT - OK to Discharge: Yes (per POC)    Subjective   Previous Visit Info:  OT Last Visit  OT Received On: 24  General:  General  Reason for Referral: 62 y/o F presenting with acute COPD exacerbation  Prior to Session Communication: Bedside nurse  Patient Position Received: Bed, 3 rail up, Alarm on  Preferred Learning Style: visual, verbal, kinesthetic  General Comment: Pt cooperative and compliant with session, reports dizziness. Deconditions easily. Continuous VC provided pursed lip breathing techniques throughout session d/t SOB.  Precautions:  Medical Precautions: Fall precautions  Vitals:  Vital Signs  Heart Rate: (!) 117 (HR ranged from 117-125 BPM)  Heart Rate Source: Monitor  SpO2: 96 %  Pain Assessment:  Pain Assessment  Pain Assessment: 0-10  Pain Score: 10 - Worst possible pain  Pain Type: Acute pain  Pain Location: Face  Pain Orientation: Right     Objective   Cognition:  Orientation Level: Oriented X4  Activities of Daily Living: Grooming  Grooming Comments: pt declined    UE Bathing  UE Bathing Comments: pt declined    UE Dressing  UE Dressing Comments: pt declined    LE Dressing  LE Dressing: Yes  Sock Level of Assistance: Close supervision, Setup  LE Dressing Where Assessed: Edge of bed  LE Dressing Comments:  pt donned/doffed socks       Bed Mobility/Transfers: Bed Mobility  Bed Mobility: Yes  Bed Mobility 1  Bed Mobility 1: Supine to sitting, Sitting to supine  Level of Assistance 1: Close supervision  Bed Mobility Comments 1: HOB elevated with increased time and effort to complete    Transfers  Transfer: Yes  Transfer 1  Transfer From 1: Bed to  Transfer to 1: Stand  Technique 1: Sit to stand, Stand to sit  Transfer Device 1: Walker  Transfer Level of Assistance 1: Close supervision  Trials/Comments 1: Pt completed 6 sit to stands from EOB>FWW at close S to increase overall muscle strength for increased independence with transfers and functioanl activity tolerance. (VC provided for hand placement)    Therapy/Activity: Therapeutic Exercise  Therapeutic Exercise Performed: Yes  Therapeutic Exercise Activity 1: Pt completed AROM HEP 10x1x6 exercises to increase ROM and muscle strength for increased independence with ADLs/IADLs. (Pt required frequent rest breaks secondary to fatigue and SOB.)      Outcome Measures:  [facility] Daily Activity  Putting on and taking off regular lower body clothing: A little  Bathing (including washing, rinsing, drying): A little  Putting on and taking off regular upper body clothing: A little  Toileting, which includes using toilet, bedpan or urinal: A lot  Taking care of personal grooming such as brushing teeth: None  Eating Meals: None  Daily Activity - Total Score: 19        Education Documentation  Body Mechanics, taught by SIN Alexis at 6/17/2024 11:09 AM.  Learner: Patient  Readiness: Acceptance  Method: Demonstration, Explanation  Response: Verbalizes Understanding, Needs Reinforcement, Demonstrated Understanding    ADL Training, taught by SIN Alexis at 6/17/2024 11:09 AM.  Learner: Patient  Readiness: Acceptance  Method: Demonstration, Explanation  Response: Verbalizes Understanding, Needs Reinforcement, Demonstrated Understanding    Education Comments  No comments  found.      Goals:  Encounter Problems       Encounter Problems (Active)       ADLs       Patient will perform UB and LB bathing with stand by assist level of assistance and grab bars, shower chair, and long-handled sponge. (Not Progressing)       Start:  06/14/24    Expected End:  06/28/24            Patient with complete lower body dressing with supervision level of assistance donning and doffing all LE clothes  with PRN adaptive equipment while edge of bed  (Progressing)       Start:  06/14/24    Expected End:  06/28/24            Patient will complete daily grooming tasks with stand by assist level of assistance and PRN adaptive equipment while standing. (Not Progressing)       Start:  06/14/24    Expected End:  06/28/24            Patient will complete toileting including hygiene clothing management/hygiene with stand by assist level of assistance and raised toilet seat and grab bars. (Not Progressing)       Start:  06/14/24    Expected End:  06/28/24               BALANCE       Pt will maintain dynamic standing balance during ADL task with supervision level of assistance in order to demonstrate decreased risk of falling and improved postural control. (Not Progressing)       Start:  06/14/24    Expected End:  06/28/24               MOBILITY       Patient will perform Functional mobility x Household distances/Community Distances with contact guard assist level of assistance and least restrictive device in order to improve safety and functional mobility. (Not Progressing)       Start:  06/14/24    Expected End:  06/28/24               TRANSFERS       Patient will complete sit to stand transfer with contact guard assist level of assistance and least restrictive device in order to improve safety and prepare for out of bed mobility. (Progressing)       Start:  06/14/24    Expected End:  06/28/24

## 2024-06-17 NOTE — PROGRESS NOTES
Fernando Cuevas is a 63 y.o. female on day 6 of admission presenting with Acute exacerbation of chronic obstructive pulmonary disease (Multi).      Subjective   Seen and examined, headache is slightly better, no new complaints.  No overnight events.  The plan discussed with the patient and RN.       Objective     Last Recorded Vitals  /60 (BP Location: Right arm, Patient Position: Lying)   Pulse 94   Temp 36.7 °C (98.1 °F) (Axillary)   Resp 18   Wt 64 kg (141 lb)   SpO2 94%   Intake/Output last 3 Shifts:  No intake or output data in the 24 hours ending 06/17/24 1033      Admission Weight  Weight: 64 kg (141 lb) (06/11/24 1422)    Daily Weight  06/11/24 : 64 kg (141 lb)    Image Results  CT maxillofacial bones wo IV contrast  Narrative: Interpreted By:  Gabe Gage,   STUDY:  CT FACIAL BONES WO IV CONTRAST;  6/17/2024 9:09 am      INDICATION:  Signs/Symptoms:Rt jaw pain.      COMPARISON:  None.      ACCESSION NUMBER(S):  [id number]      ORDERING CLINICIAN:  ANGELICA QUACH      TECHNIQUE:  Thin section axial images were obtained through the facial bones.  Coronal and sagittal reconstruction images were generated. Also, true  3-D rotational views were generated on an outside work station. Soft  tissue and bone windows were reviewed.      FINDINGS:  PARANASAL SINUSES:  The paranasal sinuses were clear.  There was no deviation of the nasal septum.  The infundibulum of each ostiomeatal complex was patent.  No fluid level in the paranasal sinuses.      FACIAL BONES:  Orbital rims and orbital contents were intact.  The mandible was intact.  There was no destructive lytic or blastic bone lesion.  There was no facial bone fracture.      NECK AND SKULL BASE:  No suspicious cervical adenopathy.  There was no mass posteriorly in the region of the nasopharynx.  Mastoid air cells were clear.      BRAIN:  Mild prominence of the imaged ventricles and sulci. No acute  intracranial bleed or midline shift in the imaged  brain.  Skullbase  arterial calcifications in the carotid siphons  .      Impression: Mild volume loss in the imaged brain. Mild bilateral carotid siphon  skull base arterial calcifications.      Remainder of the exam was negative.      MACRO:  None      Signed by: Gabe Gage 6/17/2024 9:37 AM  Dictation workstation:   [id number]      Physical Exam  Constitutional:       General: She is not in acute distress.  HENT:      Head: Normocephalic and atraumatic.      Mouth/Throat:      Mouth: Mucous membranes are moist.      Pharynx: Oropharynx is clear.   Eyes:      Conjunctiva/sclera: Conjunctivae normal.      Pupils: Pupils are equal, round, and reactive to light.   Cardiovascular:      Rate and Rhythm: Normal rate and regular rhythm.   Pulmonary:      Effort: Pulmonary effort is normal. No respiratory distress.   Abdominal:      General: There is no distension.      Palpations: Abdomen is soft.   Skin:     General: Skin is warm and dry.   Neurological:      Mental Status: She is alert and oriented to person, place, and time. Mental status is at baseline.   Psychiatric:         Mood and Affect: Mood normal.         Behavior: Behavior normal.         Relevant Results         No current facility-administered medications on file prior to encounter.     Current Outpatient Medications on File Prior to Encounter   Medication Sig Dispense Refill    albuterol 90 mcg/actuation inhaler Inhale 2 puffs every 4 hours if needed for wheezing or shortness of breath.      alum-mag hydroxide-simeth (Mylanta) 200-200-20 mg/5 mL oral suspension TAKE 10 ML BY MOUTH EVERY 6 HOURS AS NEEDED      ammonium lactate (Lac-Hydrin) 12 % lotion Apply topically twice a day.      aspirin 81 mg chewable tablet Chew 1 tablet (81 mg) once daily.      azithromycin (Zithromax) 250 mg tablet Take 1 tablet (250 mg) by mouth 3 times a week. M,W,F      busPIRone (Buspar) 5 mg tablet Take 1 tablet (5 mg) by mouth twice a day.      calcium carbonate 600 mg  calcium (1,500 mg) tablet Take 1,200 mg by mouth once daily.      clotrimazole (Lotrimin) 1 % cream Apply topically 2 times a day.      diclofenac sodium (Voltaren) 1 % gel Apply 4.5 inches (4 g) topically 4 times a day as needed.      guaiFENesin (Mucinex) 600 mg 12 hr tablet Take 1 tablet (600 mg) by mouth.      lidocaine (Lidoderm) 5 % patch Place 1 patch on the skin once every 24 hours.      metroNIDAZOLE (Flagyl) 500 mg tablet       mometasone-formoterol (Dulera 100) 100-5 mcg/actuation inhaler Inhale 200 mcg twice a day.      nortriptyline (Pamelor) 50 mg capsule Take 1 capsule (50 mg) by mouth once daily at bedtime.      pantoprazole (ProtoNix) 40 mg EC tablet Take 1 tablet (40 mg) by mouth twice a day.      predniSONE (Deltasone) 10 mg tablet Take 4 tablets (40mg) daily for 3 days, then take 3 tablets (30mg) daily for 3 days, then take 2 tablets (20mg) daily for 3 days, then take 1 tablet (10mg) daily as directed      varenicline (Chantix) 1 mg tablet Take 1 tablet (1 mg) by mouth twice a day.      acetaminophen-codeine (Tylenol w/ Codeine #3) 300-30 mg tablet Take 1 tablet by mouth every 6 hours if needed for severe pain (7 - 10). 10 tablet 0    benzonatate (Tessalon) 100 mg capsule Take 1 capsule (100 mg) by mouth 3 times a day as needed for cough. Do not crush or chew. 20 capsule 0    dilTIAZem ER (Tiazac) 240 mg 24 hr capsule Take 1 capsule (240 mg) by mouth once daily.      fluocinonide 0.05 % cream APPLY THIN LAYER TO AFFECTED AREA TWICE A DAY AS NEEDED      fluticasone propion-salmeteroL (Advair Diskus) 100-50 mcg/dose diskus inhaler Inhale 1 puff 2 times a day. Rinse mouth with water after use to reduce aftertaste and incidence of candidiasis. Do not swallow. 60 each 0    ipratropium-albuteroL (Duo-Neb) 0.5-2.5 mg/3 mL nebulizer solution Take 3 mL by nebulization 3 times a day. 270 mL 0    lisinopril 10 mg tablet Take 1 tablet (10 mg) by mouth once daily. 30 tablet 0    montelukast (Singulair) 10 mg  tablet Take 1 tablet (10 mg) by mouth once daily.      nitroglycerin (Nitrostat) 0.4 mg SL tablet Place 1 tablet (0.4 mg) under the tongue.      oxybutynin XL (Ditropan-XL) 5 mg 24 hr tablet Take 1 tablet (5 mg) by mouth once daily. Do not crush, chew, or split.      thiamine 100 mg tablet Take 1 tablet (100 mg) by mouth once daily.      tiotropium (Spiriva Respimat) 2.5 mcg/actuation inhaler Inhale 2 puffs once daily. 8 g 11       Assessment/Plan      Principal Problem:    Acute exacerbation of chronic obstructive pulmonary disease (Multi)  Active Problems:    COPD exacerbation (Multi)    Anemia    Anxiety    Chest pain    Shortness of breath    Fall    Head injury    Headache    Cocaine abuse (Multi)    AECOPD:  Acute hypoxic respiratory failure, resolved:  -weaned to room air on 6/14/24  -on po prednisone     Right Upper/Middle Lobe Pneumonia:   Leukocytosis, improving:   -procalcitonin elevated  -CT chest with new PNA 6/13/24  -continue iv ceftriaxone and added azithromycin     Drug Abuse:  -cocaine positive in urine toxic     Adenocarcinoma of right lung:  -being seen by oncology    Anemia  -Workup ordered with    -HA  Neurology consulted, CT face ordered, patient started on ketorolac p.o. as needed      Discharge: Pending SNF approval and neurology eval  DVT ppx: Lovenox 40 mg subq    Josesito Viramontes MD

## 2024-06-17 NOTE — CARE PLAN
Problem: Pain  Goal: Takes deep breaths with improved pain control throughout the shift  Outcome: Progressing     Problem: Pain  Goal: Turns in bed with improved pain control throughout the shift  Outcome: Progressing   The patient's goals for the shift include      The clinical goals for the shift include patient will report decrease in pain overnight    Over the shift, the patient did make progress toward the following goals.

## 2024-06-17 NOTE — PROGRESS NOTES
Physical Therapy    Physical Therapy Treatment    Patient Name: Fernando Cuevas  MRN: [id number]  Today's Date: 6/17/2024  Time Calculation  Start Time: 1436  Stop Time: 1500  Time Calculation (min): 24 min    Assessment/Plan   PT Assessment  Rehab Prognosis: Good  Barriers to Discharge: Decreased endurance  End of Session Communication: Bedside nurse, PCT/NA/CTA, Care Coordinator  Assessment Comment: Pt fatigues very easily and requires sitting rest breaks between ambulation distances. SPO2 desaturation noted, requiring max VC for PLB and rest breaks  End of Session Patient Position: Up in chair, Alarm on  PT Plan  Inpatient/Swing Bed or Outpatient: Inpatient  PT Plan  Treatment/Interventions: Bed mobility, Transfer training, Gait training  PT Plan: Ongoing PT  PT Frequency: 3 times per week  PT Discharge Recommendations: Moderate intensity level of continued care  Equipment Recommended upon Discharge:  (Pt owns a FWW)  PT Recommended Transfer Status: Assist x1  PT - OK to Discharge: Yes (per POC)      General Visit Information:   PT  Visit  PT Received On: 06/17/24  General  Reason for Referral: 62 y/o F presenting with acute COPD exacerbation  Referred By: RADHA Landon  Past Medical History Relevant to Rehab:   Past Medical History:   Diagnosis Date    Essential (primary) hypertension 04/20/2016    Benign hypertension    Personal history of other diseases of the respiratory system     H/O emphysema    Personal history of other diseases of the respiratory system     History of chronic obstructive lung disease    Personal history of other mental and behavioral disorders     History of depression       Prior to Session Communication: Bedside nurse  Patient Position Received: Bed, 3 rail up, Alarm off, not on at start of session  General Comment: Pt severely deconditioned, pt requires seated rest breaks in between all ambulation distances. Max VC for PLB required. Pt reports nausea after ambulation, RN notified    Subjective    Precautions:  Precautions  Medical Precautions: Fall precautions  Vital Signs:  Vital Signs  Heart Rate: (!) 124 (with ambulation)  Heart Rate Source: Monitor  SpO2: (!) 89 % (89-96% throughout tx session)  Patient Position: Standing    Objective   Pain:  Pain Assessment  Pain Assessment: 0-10  Pain Score: 10 - Worst possible pain  Pain Type: Acute pain  Pain Location: Head  Cognition:     Coordination:     Postural Control:  Static Sitting Balance  Static Sitting-Balance Support: Bilateral upper extremity supported, Feet supported  Static Sitting-Level of Assistance: Distant supervision  Static Standing Balance  Static Standing-Balance Support: Bilateral upper extremity supported  Static Standing-Level of Assistance: Close supervision      Bed Mobility  Bed Mobility: Yes  Bed Mobility 1  Bed Mobility 1: Supine to sitting  Level of Assistance 1: Close supervision  Bed Mobility Comments 1: HOB elevated. Pt able to use bed rail for trunk elevation    Ambulation/Gait Training  Ambulation/Gait Training Performed: Yes  Ambulation/Gait Training 1  Surface 1: Level tile  Device 1: Rolling walker  Assistance 1: Minimum assistance  Quality of Gait 1: Narrow base of support, Diminished heel strike, Decreased step length, Shuffling gait  Comments/Distance (ft) 1: 15 x 4 (WW management required. Pt fatigues easily and requires standing rest breaks during ambulation. Max VC for PLB, max fatigue noted with ambulation and trembulous 2/2 weakness and fatigue.)  Transfers  Transfer: Yes  Transfer 1  Technique 1: Sit to stand, Stand to sit  Transfer Device 1: Walker  Transfer Level of Assistance 1: Close supervision  Trials/Comments 1: x3 trials. Effortful for pt to perform, no physical assist required    Outcome Measures:  [facility] Basic Mobility  Turning from your back to your side while in a flat bed without using bedrails: A little  Moving from lying on your back to sitting on the side of a flat bed without using bedrails: A  little  Moving to and from bed to chair (including a wheelchair): A little  Standing up from a chair using your arms (e.g. wheelchair or bedside chair): A little  To walk in hospital room: A little  Climbing 3-5 steps with railing: Total  Basic Mobility - Total Score: 16    Education Documentation  Body Mechanics, taught by Brittany Loaiza PTA at 6/17/2024  3:38 PM.  Learner: Patient  Readiness: Acceptance  Method: Explanation  Response: Verbalizes Understanding    Mobility Training, taught by Brittany Loaiza PTA at 6/17/2024  3:38 PM.  Learner: Patient  Readiness: Acceptance  Method: Explanation  Response: Verbalizes Understanding    Education Comments  No comments found.        OP EDUCATION:       Encounter Problems       Encounter Problems (Active)       Balance       Goal 1 (Not Progressing)       Start:  06/14/24    Expected End:  06/28/24       Pt performs all sitting balance with supervision and standing balance with CGA using LRAD            Mobility       STG - Patient will ambulate (Not Progressing)       Start:  06/14/24    Expected End:  06/28/24       25 ft with CGA using LRAD            PT Problem       PT Goal 1 (Not Progressing)       Start:  06/14/24    Expected End:  06/28/24       Pt demonstrates IND in performing 2 sets of 12 reps of prescribed BLE HEP            PT Transfers       STG - Patient to transfer to and from sit to supine (Not Progressing)       Start:  06/14/24    Expected End:  06/28/24       With CGA and HOB lowered         STG - Patient will transfer sit to and from stand (Not Progressing)       Start:  06/14/24    Expected End:  06/28/24       With CGA using LRAD            Pain - Adult

## 2024-06-18 ENCOUNTER — APPOINTMENT (OUTPATIENT)
Dept: RADIOLOGY | Facility: HOSPITAL | Age: 64
End: 2024-06-18
Payer: COMMERCIAL

## 2024-06-18 LAB
ANION GAP SERPL CALC-SCNC: 16 MMOL/L (ref 10–20)
BASOPHILS # BLD MANUAL: 0 X10*3/UL (ref 0–0.1)
BASOPHILS NFR BLD MANUAL: 0 %
BITE CELLS BLD QL SMEAR: PRESENT
BUN SERPL-MCNC: 21 MG/DL (ref 6–23)
CALCIUM SERPL-MCNC: 8.9 MG/DL (ref 8.6–10.3)
CHLORIDE SERPL-SCNC: 94 MMOL/L (ref 98–107)
CO2 SERPL-SCNC: 26 MMOL/L (ref 21–32)
CREAT SERPL-MCNC: 1.17 MG/DL (ref 0.5–1.05)
DACRYOCYTES BLD QL SMEAR: ABNORMAL
EGFRCR SERPLBLD CKD-EPI 2021: 53 ML/MIN/1.73M*2
EOSINOPHIL # BLD MANUAL: 0 X10*3/UL (ref 0–0.7)
EOSINOPHIL NFR BLD MANUAL: 0 %
ERYTHROCYTE [DISTWIDTH] IN BLOOD BY AUTOMATED COUNT: 20.9 % (ref 11.5–14.5)
GLUCOSE SERPL-MCNC: 98 MG/DL (ref 74–99)
HCT VFR BLD AUTO: 28.7 % (ref 36–46)
HGB BLD-MCNC: 7.8 G/DL (ref 12–16)
HYPOCHROMIA BLD QL SMEAR: ABNORMAL
IMM GRANULOCYTES # BLD AUTO: 0.76 X10*3/UL (ref 0–0.7)
IMM GRANULOCYTES NFR BLD AUTO: 3.4 % (ref 0–0.9)
LYMPHOCYTES # BLD MANUAL: 1.77 X10*3/UL (ref 1.2–4.8)
LYMPHOCYTES NFR BLD MANUAL: 8 %
MCH RBC QN AUTO: 22.7 PG (ref 26–34)
MCHC RBC AUTO-ENTMCNC: 27.2 G/DL (ref 32–36)
MCV RBC AUTO: 83 FL (ref 80–100)
METAMYELOCYTES # BLD MANUAL: 0.22 X10*3/UL
METAMYELOCYTES NFR BLD MANUAL: 1 %
MONOCYTES # BLD MANUAL: 0.22 X10*3/UL (ref 0.1–1)
MONOCYTES NFR BLD MANUAL: 1 %
MYELOCYTES # BLD MANUAL: 0.66 X10*3/UL
MYELOCYTES NFR BLD MANUAL: 3 %
NEUTS SEG # BLD MANUAL: 19.23 X10*3/UL (ref 1.2–7)
NEUTS SEG NFR BLD MANUAL: 87 %
NRBC BLD-RTO: 3.7 /100 WBCS (ref 0–0)
PLATELET # BLD AUTO: 655 X10*3/UL (ref 150–450)
POLYCHROMASIA BLD QL SMEAR: ABNORMAL
POTASSIUM SERPL-SCNC: 4.6 MMOL/L (ref 3.5–5.3)
RBC # BLD AUTO: 3.44 X10*6/UL (ref 4–5.2)
RBC MORPH BLD: ABNORMAL
SCHISTOCYTES BLD QL SMEAR: ABNORMAL
SODIUM SERPL-SCNC: 131 MMOL/L (ref 136–145)
TARGETS BLD QL SMEAR: ABNORMAL
TOTAL CELLS COUNTED BLD: 100
WBC # BLD AUTO: 22.1 X10*3/UL (ref 4.4–11.3)

## 2024-06-18 PROCEDURE — 71045 X-RAY EXAM CHEST 1 VIEW: CPT | Performed by: RADIOLOGY

## 2024-06-18 PROCEDURE — 2500000001 HC RX 250 WO HCPCS SELF ADMINISTERED DRUGS (ALT 637 FOR MEDICARE OP): Performed by: PHYSICIAN ASSISTANT

## 2024-06-18 PROCEDURE — 99233 SBSQ HOSP IP/OBS HIGH 50: CPT | Performed by: INTERNAL MEDICINE

## 2024-06-18 PROCEDURE — 36415 COLL VENOUS BLD VENIPUNCTURE: CPT | Performed by: INTERNAL MEDICINE

## 2024-06-18 PROCEDURE — 85027 COMPLETE CBC AUTOMATED: CPT | Performed by: INTERNAL MEDICINE

## 2024-06-18 PROCEDURE — 80048 BASIC METABOLIC PNL TOTAL CA: CPT | Performed by: INTERNAL MEDICINE

## 2024-06-18 PROCEDURE — 2500000001 HC RX 250 WO HCPCS SELF ADMINISTERED DRUGS (ALT 637 FOR MEDICARE OP): Performed by: INTERNAL MEDICINE

## 2024-06-18 PROCEDURE — 2500000002 HC RX 250 W HCPCS SELF ADMINISTERED DRUGS (ALT 637 FOR MEDICARE OP, ALT 636 FOR OP/ED)

## 2024-06-18 PROCEDURE — 2500000002 HC RX 250 W HCPCS SELF ADMINISTERED DRUGS (ALT 637 FOR MEDICARE OP, ALT 636 FOR OP/ED): Performed by: INTERNAL MEDICINE

## 2024-06-18 PROCEDURE — 1100000001 HC PRIVATE ROOM DAILY

## 2024-06-18 PROCEDURE — 2500000004 HC RX 250 GENERAL PHARMACY W/ HCPCS (ALT 636 FOR OP/ED): Performed by: PHYSICIAN ASSISTANT

## 2024-06-18 PROCEDURE — 2500000002 HC RX 250 W HCPCS SELF ADMINISTERED DRUGS (ALT 637 FOR MEDICARE OP, ALT 636 FOR OP/ED): Performed by: EMERGENCY MEDICINE

## 2024-06-18 PROCEDURE — 71045 X-RAY EXAM CHEST 1 VIEW: CPT

## 2024-06-18 PROCEDURE — 85007 BL SMEAR W/DIFF WBC COUNT: CPT | Performed by: INTERNAL MEDICINE

## 2024-06-18 PROCEDURE — 94640 AIRWAY INHALATION TREATMENT: CPT

## 2024-06-18 PROCEDURE — 2500000005 HC RX 250 GENERAL PHARMACY W/O HCPCS: Performed by: PHYSICIAN ASSISTANT

## 2024-06-18 PROCEDURE — 84145 PROCALCITONIN (PCT): CPT | Mod: AHULAB | Performed by: INTERNAL MEDICINE

## 2024-06-18 RX ORDER — LIDOCAINE 560 MG/1
1 PATCH PERCUTANEOUS; TOPICAL; TRANSDERMAL DAILY
Status: DISCONTINUED | OUTPATIENT
Start: 2024-06-18 | End: 2024-06-19 | Stop reason: HOSPADM

## 2024-06-18 RX ORDER — AZITHROMYCIN 250 MG/1
250 TABLET, FILM COATED ORAL 3 TIMES WEEKLY
Status: DISCONTINUED | OUTPATIENT
Start: 2024-06-19 | End: 2024-06-19 | Stop reason: HOSPADM

## 2024-06-18 ASSESSMENT — PAIN SCALES - GENERAL
PAINLEVEL_OUTOF10: 10 - WORST POSSIBLE PAIN
PAINLEVEL_OUTOF10: 8
PAINLEVEL_OUTOF10: 7
PAINLEVEL_OUTOF10: 10 - WORST POSSIBLE PAIN
PAINLEVEL_OUTOF10: 4

## 2024-06-18 ASSESSMENT — COGNITIVE AND FUNCTIONAL STATUS - GENERAL
WALKING IN HOSPITAL ROOM: A LITTLE
MOBILITY SCORE: 19
MOVING TO AND FROM BED TO CHAIR: A LITTLE
CLIMB 3 TO 5 STEPS WITH RAILING: A LOT
DAILY ACTIVITIY SCORE: 24
STANDING UP FROM CHAIR USING ARMS: A LITTLE

## 2024-06-18 ASSESSMENT — PAIN DESCRIPTION - LOCATION
LOCATION: OTHER (COMMENT)
LOCATION: HEAD
LOCATION: BACK

## 2024-06-18 ASSESSMENT — PAIN - FUNCTIONAL ASSESSMENT
PAIN_FUNCTIONAL_ASSESSMENT: 0-10
PAIN_FUNCTIONAL_ASSESSMENT: 0-10

## 2024-06-18 NOTE — CARE PLAN
Problem: Pain  Goal: Takes deep breaths with improved pain control throughout the shift  Outcome: Progressing  Goal: Turns in bed with improved pain control throughout the shift  Outcome: Progressing  Goal: Walks with improved pain control throughout the shift  Outcome: Progressing  Goal: Performs ADL's with improved pain control throughout shift  Outcome: Progressing  Goal: Participates in PT with improved pain control throughout the shift  Outcome: Progressing  Goal: Free from opioid side effects throughout the shift  Outcome: Progressing  Goal: Free from acute confusion related to pain meds throughout the shift  Outcome: Progressing     Problem: Pain - Adult  Goal: Verbalizes/displays adequate comfort level or baseline comfort level  Outcome: Progressing     Problem: Safety - Adult  Goal: Free from fall injury  Outcome: Progressing     Problem: Discharge Planning  Goal: Discharge to home or other facility with appropriate resources  Outcome: Progressing     Problem: Chronic Conditions and Co-morbidities  Goal: Patient's chronic conditions and co-morbidity symptoms are monitored and maintained or improved  Outcome: Progressing     Problem: Skin  Goal: Decreased wound size/increased tissue granulation at next dressing change  Outcome: Progressing  Flowsheets (Taken 6/17/2024 2000)  Decreased wound size/increased tissue granulation at next dressing change: Protective dressings over bony prominences  Goal: Participates in plan/prevention/treatment measures  Outcome: Progressing  Flowsheets (Taken 6/17/2024 2000)  Participates in plan/prevention/treatment measures: Elevate heels  Goal: Prevent/manage excess moisture  Outcome: Progressing  Flowsheets (Taken 6/17/2024 2000)  Prevent/manage excess moisture: Moisturize dry skin  Goal: Prevent/minimize sheer/friction injuries  Outcome: Progressing  Flowsheets (Taken 6/17/2024 2000)  Prevent/minimize sheer/friction injuries: Increase activity/out of bed for meals  Goal:  Promote/optimize nutrition  Outcome: Progressing  Flowsheets (Taken 6/17/2024 2000)  Promote/optimize nutrition: Consume > 50% meals/supplements  Goal: Promote skin healing  Outcome: Progressing     Problem: Fall/Injury  Goal: Not fall by end of shift  Outcome: Progressing  Goal: Be free from injury by end of the shift  Outcome: Progressing  Goal: Verbalize understanding of personal risk factors for fall in the hospital  Outcome: Progressing  Goal: Verbalize understanding of risk factor reduction measures to prevent injury from fall in the home  Outcome: Progressing  Goal: Use assistive devices by end of the shift  Outcome: Progressing  Goal: Pace activities to prevent fatigue by end of the shift  Outcome: Progressing   The patient's goals for the shift include  pain management     The clinical goals for the shift include pt will report decrease pain through out the shift

## 2024-06-18 NOTE — CARE PLAN
The patient's goals for the shift include      The clinical goals for the shift include pt to remain safe free from injury

## 2024-06-18 NOTE — PROGRESS NOTES
Fernando Cuevas is a 63 y.o. female on day 7 of admission presenting with Acute exacerbation of chronic obstructive pulmonary disease (Multi).      Subjective   Seen and examined, headache is slightly better, no new complaints.  No overnight events.  The plan discussed with the patient and RN.       Objective     Last Recorded Vitals  /72 (BP Location: Right arm, Patient Position: Sitting)   Pulse (!) 112   Temp 36.7 °C (98 °F) (Oral)   Resp 16   Wt 64 kg (141 lb)   SpO2 94%   Intake/Output last 3 Shifts:    Intake/Output Summary (Last 24 hours) at 6/18/2024 1007  Last data filed at 6/18/2024 0900  Gross per 24 hour   Intake 120 ml   Output --   Net 120 ml         Admission Weight  Weight: 64 kg (141 lb) (06/11/24 1422)    Daily Weight  06/11/24 : 64 kg (141 lb)    Image Results  CT maxillofacial bones wo IV contrast  Narrative: Interpreted By:  Gabe Gage,   STUDY:  CT FACIAL BONES WO IV CONTRAST;  6/17/2024 9:09 am      INDICATION:  Signs/Symptoms:Rt jaw pain.      COMPARISON:  None.      ACCESSION NUMBER(S):  [id number]      ORDERING CLINICIAN:  ANGELICA QUACH      TECHNIQUE:  Thin section axial images were obtained through the facial bones.  Coronal and sagittal reconstruction images were generated. Also, true  3-D rotational views were generated on an outside work station. Soft  tissue and bone windows were reviewed.      FINDINGS:  PARANASAL SINUSES:  The paranasal sinuses were clear.  There was no deviation of the nasal septum.  The infundibulum of each ostiomeatal complex was patent.  No fluid level in the paranasal sinuses.      FACIAL BONES:  Orbital rims and orbital contents were intact.  The mandible was intact.  There was no destructive lytic or blastic bone lesion.  There was no facial bone fracture.      NECK AND SKULL BASE:  No suspicious cervical adenopathy.  There was no mass posteriorly in the region of the nasopharynx.  Mastoid air cells were clear.      BRAIN:  Mild prominence  of the imaged ventricles and sulci. No acute  intracranial bleed or midline shift in the imaged brain.  Skullbase  arterial calcifications in the carotid siphons  .      Impression: Mild volume loss in the imaged brain. Mild bilateral carotid siphon  skull base arterial calcifications.      Remainder of the exam was negative.      MACRO:  None      Signed by: Gabe Gage 6/17/2024 9:37 AM  Dictation workstation:   [id number]      Physical Exam  Constitutional:       General: She is not in acute distress.  HENT:      Head: Normocephalic and atraumatic.      Mouth/Throat:      Mouth: Mucous membranes are moist.      Pharynx: Oropharynx is clear.   Eyes:      Conjunctiva/sclera: Conjunctivae normal.      Pupils: Pupils are equal, round, and reactive to light.   Cardiovascular:      Rate and Rhythm: Normal rate and regular rhythm.   Pulmonary:      Effort: Pulmonary effort is normal. No respiratory distress.   Abdominal:      General: There is no distension.      Palpations: Abdomen is soft.   Skin:     General: Skin is warm and dry.   Neurological:      Mental Status: She is alert and oriented to person, place, and time. Mental status is at baseline.   Psychiatric:         Mood and Affect: Mood normal.         Behavior: Behavior normal.         Relevant Results         No current facility-administered medications on file prior to encounter.     Current Outpatient Medications on File Prior to Encounter   Medication Sig Dispense Refill    albuterol 90 mcg/actuation inhaler Inhale 2 puffs every 4 hours if needed for wheezing or shortness of breath.      alum-mag hydroxide-simeth (Mylanta) 200-200-20 mg/5 mL oral suspension TAKE 10 ML BY MOUTH EVERY 6 HOURS AS NEEDED      ammonium lactate (Lac-Hydrin) 12 % lotion Apply topically twice a day.      aspirin 81 mg chewable tablet Chew 1 tablet (81 mg) once daily.      azithromycin (Zithromax) 250 mg tablet Take 1 tablet (250 mg) by mouth 3 times a week. M,W,F      busPIRone  (Buspar) 5 mg tablet Take 1 tablet (5 mg) by mouth twice a day.      calcium carbonate 600 mg calcium (1,500 mg) tablet Take 1,200 mg by mouth once daily.      clotrimazole (Lotrimin) 1 % cream Apply topically 2 times a day.      diclofenac sodium (Voltaren) 1 % gel Apply 4.5 inches (4 g) topically 4 times a day as needed.      guaiFENesin (Mucinex) 600 mg 12 hr tablet Take 1 tablet (600 mg) by mouth.      lidocaine (Lidoderm) 5 % patch Place 1 patch on the skin once every 24 hours.      metroNIDAZOLE (Flagyl) 500 mg tablet       mometasone-formoterol (Dulera 100) 100-5 mcg/actuation inhaler Inhale 200 mcg twice a day.      nortriptyline (Pamelor) 50 mg capsule Take 1 capsule (50 mg) by mouth once daily at bedtime.      pantoprazole (ProtoNix) 40 mg EC tablet Take 1 tablet (40 mg) by mouth twice a day.      predniSONE (Deltasone) 10 mg tablet Take 4 tablets (40mg) daily for 3 days, then take 3 tablets (30mg) daily for 3 days, then take 2 tablets (20mg) daily for 3 days, then take 1 tablet (10mg) daily as directed      varenicline (Chantix) 1 mg tablet Take 1 tablet (1 mg) by mouth twice a day.      acetaminophen-codeine (Tylenol w/ Codeine #3) 300-30 mg tablet Take 1 tablet by mouth every 6 hours if needed for severe pain (7 - 10). 10 tablet 0    benzonatate (Tessalon) 100 mg capsule Take 1 capsule (100 mg) by mouth 3 times a day as needed for cough. Do not crush or chew. 20 capsule 0    dilTIAZem ER (Tiazac) 240 mg 24 hr capsule Take 1 capsule (240 mg) by mouth once daily.      fluocinonide 0.05 % cream APPLY THIN LAYER TO AFFECTED AREA TWICE A DAY AS NEEDED      fluticasone propion-salmeteroL (Advair Diskus) 100-50 mcg/dose diskus inhaler Inhale 1 puff 2 times a day. Rinse mouth with water after use to reduce aftertaste and incidence of candidiasis. Do not swallow. 60 each 0    ipratropium-albuteroL (Duo-Neb) 0.5-2.5 mg/3 mL nebulizer solution Take 3 mL by nebulization 3 times a day. 270 mL 0    lisinopril 10 mg  tablet Take 1 tablet (10 mg) by mouth once daily. 30 tablet 0    montelukast (Singulair) 10 mg tablet Take 1 tablet (10 mg) by mouth once daily.      nitroglycerin (Nitrostat) 0.4 mg SL tablet Place 1 tablet (0.4 mg) under the tongue.      oxybutynin XL (Ditropan-XL) 5 mg 24 hr tablet Take 1 tablet (5 mg) by mouth once daily. Do not crush, chew, or split.      thiamine 100 mg tablet Take 1 tablet (100 mg) by mouth once daily.      tiotropium (Spiriva Respimat) 2.5 mcg/actuation inhaler Inhale 2 puffs once daily. 8 g 11       Assessment/Plan      Principal Problem:    Acute exacerbation of chronic obstructive pulmonary disease (Multi)  Active Problems:    COPD exacerbation (Multi)    Anemia    Anxiety    Chest pain    Shortness of breath    Fall    Head injury    Headache    Cocaine abuse (Multi)    AECOPD:  Acute hypoxic respiratory failure, resolved:  -weaned to room air on 6/14/24  -on po prednisone     Right Upper/Middle Lobe Pneumonia:   Leukocytosis, improving:   -procalcitonin elevated  -CT chest with new PNA 6/13/24  -Completed a course iv ceftriaxone and added azithromycin  -Given persistent cough and increased sputum production, repeat chest x-ray and procalcitonin ordered     Drug Abuse:  -cocaine positive in urine toxic     Adenocarcinoma of right lung:  -being seen by oncology    Anemia  -Workup ordered with    -HA  Neurology consulted, CT face ordered, patient started on ketorolac p.o. as needed and Flexeril.        Discharge:   -Repeat chest x-ray ordered  -Pending [facility] approval     DVT ppx: Lovenox 40 mg subq    Josesito Viramontes MD

## 2024-06-19 VITALS
TEMPERATURE: 97.3 F | OXYGEN SATURATION: 100 % | RESPIRATION RATE: 18 BRPM | HEART RATE: 111 BPM | SYSTOLIC BLOOD PRESSURE: 118 MMHG | HEIGHT: 62 IN | BODY MASS INDEX: 25.95 KG/M2 | WEIGHT: 141 LBS | DIASTOLIC BLOOD PRESSURE: 77 MMHG

## 2024-06-19 LAB
ANION GAP SERPL CALC-SCNC: 15 MMOL/L (ref 10–20)
BASOPHILS # BLD AUTO: 0.04 X10*3/UL (ref 0–0.1)
BASOPHILS NFR BLD AUTO: 0.2 %
BUN SERPL-MCNC: 21 MG/DL (ref 6–23)
CALCIUM SERPL-MCNC: 8.2 MG/DL (ref 8.6–10.3)
CHLORIDE SERPL-SCNC: 93 MMOL/L (ref 98–107)
CO2 SERPL-SCNC: 26 MMOL/L (ref 21–32)
CREAT SERPL-MCNC: 0.92 MG/DL (ref 0.5–1.05)
DACRYOCYTES BLD QL SMEAR: NORMAL
EGFRCR SERPLBLD CKD-EPI 2021: 70 ML/MIN/1.73M*2
EOSINOPHIL # BLD AUTO: 0 X10*3/UL (ref 0–0.7)
EOSINOPHIL NFR BLD AUTO: 0 %
ERYTHROCYTE [DISTWIDTH] IN BLOOD BY AUTOMATED COUNT: 20.8 % (ref 11.5–14.5)
GLUCOSE SERPL-MCNC: 86 MG/DL (ref 74–99)
HCT VFR BLD AUTO: 27.1 % (ref 36–46)
HGB BLD-MCNC: 7.4 G/DL (ref 12–16)
HYPOCHROMIA BLD QL SMEAR: NORMAL
IMM GRANULOCYTES # BLD AUTO: 0.4 X10*3/UL (ref 0–0.7)
IMM GRANULOCYTES NFR BLD AUTO: 2.3 % (ref 0–0.9)
LYMPHOCYTES # BLD AUTO: 1.43 X10*3/UL (ref 1.2–4.8)
LYMPHOCYTES NFR BLD AUTO: 8.1 %
MCH RBC QN AUTO: 22.5 PG (ref 26–34)
MCHC RBC AUTO-ENTMCNC: 27.3 G/DL (ref 32–36)
MCV RBC AUTO: 82 FL (ref 80–100)
MONOCYTES # BLD AUTO: 1.38 X10*3/UL (ref 0.1–1)
MONOCYTES NFR BLD AUTO: 7.8 %
NEUTROPHILS # BLD AUTO: 14.49 X10*3/UL (ref 1.2–7.7)
NEUTROPHILS NFR BLD AUTO: 81.6 %
NRBC BLD-RTO: 2.2 /100 WBCS (ref 0–0)
OVALOCYTES BLD QL SMEAR: NORMAL
PLATELET # BLD AUTO: 683 X10*3/UL (ref 150–450)
POLYCHROMASIA BLD QL SMEAR: NORMAL
POTASSIUM SERPL-SCNC: 4.8 MMOL/L (ref 3.5–5.3)
PROCALCITONIN SERPL-MCNC: 0.24 NG/ML
RBC # BLD AUTO: 3.29 X10*6/UL (ref 4–5.2)
RBC MORPH BLD: NORMAL
SCHISTOCYTES BLD QL SMEAR: NORMAL
SODIUM SERPL-SCNC: 129 MMOL/L (ref 136–145)
TARGETS BLD QL SMEAR: NORMAL
WBC # BLD AUTO: 17.7 X10*3/UL (ref 4.4–11.3)

## 2024-06-19 PROCEDURE — 2500000001 HC RX 250 WO HCPCS SELF ADMINISTERED DRUGS (ALT 637 FOR MEDICARE OP): Performed by: PHYSICIAN ASSISTANT

## 2024-06-19 PROCEDURE — 94668 MNPJ CHEST WALL SBSQ: CPT

## 2024-06-19 PROCEDURE — 2500000001 HC RX 250 WO HCPCS SELF ADMINISTERED DRUGS (ALT 637 FOR MEDICARE OP): Performed by: INTERNAL MEDICINE

## 2024-06-19 PROCEDURE — 36415 COLL VENOUS BLD VENIPUNCTURE: CPT | Performed by: INTERNAL MEDICINE

## 2024-06-19 PROCEDURE — 94640 AIRWAY INHALATION TREATMENT: CPT

## 2024-06-19 PROCEDURE — 85025 COMPLETE CBC W/AUTO DIFF WBC: CPT | Performed by: INTERNAL MEDICINE

## 2024-06-19 PROCEDURE — 2500000002 HC RX 250 W HCPCS SELF ADMINISTERED DRUGS (ALT 637 FOR MEDICARE OP, ALT 636 FOR OP/ED): Performed by: EMERGENCY MEDICINE

## 2024-06-19 PROCEDURE — 2500000004 HC RX 250 GENERAL PHARMACY W/ HCPCS (ALT 636 FOR OP/ED): Performed by: PHYSICIAN ASSISTANT

## 2024-06-19 PROCEDURE — 2500000005 HC RX 250 GENERAL PHARMACY W/O HCPCS: Performed by: INTERNAL MEDICINE

## 2024-06-19 PROCEDURE — 80048 BASIC METABOLIC PNL TOTAL CA: CPT | Performed by: INTERNAL MEDICINE

## 2024-06-19 PROCEDURE — 2500000002 HC RX 250 W HCPCS SELF ADMINISTERED DRUGS (ALT 637 FOR MEDICARE OP, ALT 636 FOR OP/ED): Performed by: INTERNAL MEDICINE

## 2024-06-19 PROCEDURE — 99239 HOSP IP/OBS DSCHRG MGMT >30: CPT | Performed by: INTERNAL MEDICINE

## 2024-06-19 PROCEDURE — 2500000002 HC RX 250 W HCPCS SELF ADMINISTERED DRUGS (ALT 637 FOR MEDICARE OP, ALT 636 FOR OP/ED)

## 2024-06-19 RX ORDER — PREDNISONE 20 MG/1
TABLET ORAL DAILY
Qty: 15 TABLET | Refills: 0 | Status: ON HOLD | OUTPATIENT
Start: 2024-06-20 | End: 2024-07-02

## 2024-06-19 ASSESSMENT — COGNITIVE AND FUNCTIONAL STATUS - GENERAL
DAILY ACTIVITIY SCORE: 24
MOVING TO AND FROM BED TO CHAIR: A LITTLE
MOBILITY SCORE: 19
WALKING IN HOSPITAL ROOM: A LITTLE
CLIMB 3 TO 5 STEPS WITH RAILING: A LOT
STANDING UP FROM CHAIR USING ARMS: A LITTLE

## 2024-06-19 ASSESSMENT — PAIN SCALES - GENERAL
PAINLEVEL_OUTOF10: 7
PAINLEVEL_OUTOF10: 3
PAINLEVEL_OUTOF10: 8

## 2024-06-19 ASSESSMENT — PAIN DESCRIPTION - LOCATION
LOCATION: BACK
LOCATION: HEAD

## 2024-06-19 NOTE — NURSING NOTE
1215: Per [facility] no report number provided by facility. Main facility number called. Spent about 10 minutes with phone ringing. No answer. Will call back.    1249: [facility] at bedside. Per [facility] no report number provided by facility. Main facility number called. Spent 8 minutes with phone ringing. No answer.

## 2024-06-19 NOTE — PROGRESS NOTES
"   06/19/24 0834   Discharge Planning   Patient expects to be discharged to: [facility]     Auth received 6/18 per notes, facility stated they can accept today if patient is med ready, I will continue to monitor for discharge planning.    09:38 patient is med ready for discharge, discharging to [facility], working on transport and report #, bedside nurse advised of information.    09:42 I met with this patient at her bedside to provide discharge planning education and to update her on the discharge information, she stated she is looking forward to \"getting the help that she needs\" she also stated she was very pleased with her overall care that she received at [facility], she stated that everyone was very helpful and nice, the patient stated she will call her son and let him know that she is being dc today.  "

## 2024-06-19 NOTE — DISCHARGE SUMMARY
Discharge Diagnosis  Acute exacerbation of COPD secondary to right upper and middle lobe pneumonia  Leukocytosis, improving  Adenocarcinoma of the right lung    Issues Requiring Follow-Up  pcp    Discharge Meds     Your medication list        CHANGE how you take these medications        Instructions Last Dose Given Next Dose Due   predniSONE 20 mg tablet  Commonly known as: Deltasone  Start taking on: June 20, 2024  What changed:   medication strength  See the new instructions.      Take 2 tablets (40 mg) by mouth once daily for 3 days, THEN 1.5 tablets (30 mg) once daily for 3 days, THEN 1 tablet (20 mg) once daily for 3 days, THEN 0.5 tablets (10 mg) once daily for 3 days.              CONTINUE taking these medications        Instructions Last Dose Given Next Dose Due   acetaminophen-codeine 300-30 mg tablet  Commonly known as: Tylenol w/ Codeine #3      Take 1 tablet by mouth every 6 hours if needed for severe pain (7 - 10).       albuterol 90 mcg/actuation inhaler           alum-mag hydroxide-simeth 200-200-20 mg/5 mL oral suspension  Commonly known as: Mylanta           ammonium lactate 12 % lotion  Commonly known as: Lac-Hydrin           aspirin 81 mg chewable tablet           azithromycin 250 mg tablet  Commonly known as: Zithromax           benzonatate 100 mg capsule  Commonly known as: Tessalon      Take 1 capsule (100 mg) by mouth 3 times a day as needed for cough. Do not crush or chew.       busPIRone 5 mg tablet  Commonly known as: Buspar           calcium carbonate 600 mg calcium (1,500 mg) tablet           clotrimazole 1 % cream  Commonly known as: Lotrimin           diclofenac sodium 1 % gel  Commonly known as: Voltaren           dilTIAZem  mg 24 hr capsule  Commonly known as: Tiazac           fluocinonide 0.05 % cream  Commonly known as: Lidex           fluticasone propion-salmeteroL 100-50 mcg/dose diskus inhaler  Commonly known as: Advair Diskus      Inhale 1 puff 2 times a day. Rinse mouth  with water after use to reduce aftertaste and incidence of candidiasis. Do not swallow.       guaiFENesin 600 mg 12 hr tablet  Commonly known as: Mucinex           ipratropium-albuteroL 0.5-2.5 mg/3 mL nebulizer solution  Commonly known as: Duo-Neb      Take 3 mL by nebulization 3 times a day.       lidocaine 5 % patch  Commonly known as: Lidoderm           lisinopril 10 mg tablet      Take 1 tablet (10 mg) by mouth once daily.       metroNIDAZOLE 500 mg tablet  Commonly known as: Flagyl           mometasone-formoterol 100-5 mcg/actuation inhaler  Commonly known as: Dulera 100           montelukast 10 mg tablet  Commonly known as: Singulair           nitroglycerin 0.4 mg SL tablet  Commonly known as: Nitrostat           nortriptyline 50 mg capsule  Commonly known as: Pamelor           oxybutynin XL 5 mg 24 hr tablet  Commonly known as: Ditropan-XL           pantoprazole 40 mg EC tablet  Commonly known as: ProtoNix           Spiriva Respimat 2.5 mcg/actuation inhaler  Generic drug: tiotropium      Inhale 2 puffs once daily.       thiamine 100 mg tablet  Commonly known as: Vitamin B-1           varenicline 1 mg tablet  Commonly known as: Chantix                     Where to Get Your Medications        These medications were sent to [facility]/pharmacy #[id number] [address] AT [place] [address]      Phone: [phone]   predniSONE 20 mg tablet         Test Results Pending At Discharge  Pending Labs       No current pending labs.            Hospital Course   Patient is a very pleasant 63-year-old female presented to the hospital with complaints of shortness of breath.  Patient was found to have right upper and middle lobe pneumonia she was treated with IV antibiotics completed a total 5-day course.  She has been on IV steroids then was switched to oral prednisone.  Patient is being discharged on a prednisone taper.  She is overall doing much better.  Has  been weaned down to room air at the time of discharge in regards to patient's lung adenocarcinoma can follow-up with oncology as an outpatient.    Time spent more than 30 minutes on discharge      Pertinent Physical Exam At Time of Discharge  Physical Exam  Vitals reviewed.   Constitutional:       Appearance: Normal appearance.   HENT:      Head: Normocephalic.      Right Ear: Tympanic membrane normal.      Left Ear: Tympanic membrane normal.      Nose: Nose normal.      Mouth/Throat:      Mouth: Mucous membranes are dry.   Cardiovascular:      Rate and Rhythm: Normal rate and regular rhythm.   Pulmonary:      Effort: Pulmonary effort is normal.   Abdominal:      General: Abdomen is flat. Bowel sounds are normal.      Palpations: Abdomen is soft.   Skin:     General: Skin is warm.      Capillary Refill: Capillary refill takes less than 2 seconds.   Neurological:      General: No focal deficit present.      Mental Status: She is alert.         Outpatient Follow-Up  No future appointments.      Morena Landon MD

## 2024-06-19 NOTE — PROGRESS NOTES
Occupational Therapy                 Therapy Communication Note    Patient Name: Fernando Cuevas  MRN: [id number]  Today's Date: 6/19/2024     Discipline: Occupational Therapy    Missed Visit Reason: Missed Visit Reason: Other (Comment) (Attempted OT tx, per [facility], pt to d/c shortly with no acute OT needs.)    Missed Time: Attempt

## 2024-06-20 ENCOUNTER — APPOINTMENT (OUTPATIENT)
Dept: CARDIOLOGY | Facility: HOSPITAL | Age: 64
End: 2024-06-20
Payer: COMMERCIAL

## 2024-06-20 ENCOUNTER — APPOINTMENT (OUTPATIENT)
Dept: RADIOLOGY | Facility: HOSPITAL | Age: 64
End: 2024-06-20
Payer: COMMERCIAL

## 2024-06-20 ENCOUNTER — HOSPITAL ENCOUNTER (INPATIENT)
Facility: HOSPITAL | Age: 64
End: 2024-06-20
Attending: GENERAL PRACTICE | Admitting: HOSPITALIST
Payer: COMMERCIAL

## 2024-06-20 DIAGNOSIS — M79.89 OTHER SPECIFIED SOFT TISSUE DISORDERS: ICD-10-CM

## 2024-06-20 DIAGNOSIS — R19.8 PERFORATED ABDOMINAL VISCUS: ICD-10-CM

## 2024-06-20 DIAGNOSIS — I82.629 DEEP VEIN THROMBOSIS (DVT) OF UPPER EXTREMITY, UNSPECIFIED CHRONICITY, UNSPECIFIED LATERALITY, UNSPECIFIED VEIN (MULTI): ICD-10-CM

## 2024-06-20 DIAGNOSIS — M79.89 ARM SWELLING: ICD-10-CM

## 2024-06-20 DIAGNOSIS — R19.8 PERFORATED VISCUS: Primary | ICD-10-CM

## 2024-06-20 LAB
ABO GROUP (TYPE) IN BLOOD: NORMAL
ALBUMIN SERPL BCP-MCNC: 3.1 G/DL (ref 3.4–5)
ALP SERPL-CCNC: 70 U/L (ref 33–136)
ALT SERPL W P-5'-P-CCNC: 24 U/L (ref 7–45)
ANION GAP SERPL CALC-SCNC: 13 MMOL/L (ref 10–20)
ANTIBODY SCREEN: NORMAL
AST SERPL W P-5'-P-CCNC: 14 U/L (ref 9–39)
BASOPHILS # BLD MANUAL: 0 X10*3/UL (ref 0–0.1)
BASOPHILS NFR BLD MANUAL: 0 %
BILIRUB SERPL-MCNC: 0.2 MG/DL (ref 0–1.2)
BUN SERPL-MCNC: 13 MG/DL (ref 6–23)
CALCIUM SERPL-MCNC: 8.3 MG/DL (ref 8.6–10.3)
CHLORIDE SERPL-SCNC: 96 MMOL/L (ref 98–107)
CO2 SERPL-SCNC: 30 MMOL/L (ref 21–32)
CREAT SERPL-MCNC: 0.69 MG/DL (ref 0.5–1.05)
DACRYOCYTES BLD QL SMEAR: ABNORMAL
EGFRCR SERPLBLD CKD-EPI 2021: >90 ML/MIN/1.73M*2
EOSINOPHIL # BLD MANUAL: 0 X10*3/UL (ref 0–0.7)
EOSINOPHIL NFR BLD MANUAL: 0 %
ERYTHROCYTE [DISTWIDTH] IN BLOOD BY AUTOMATED COUNT: 20.3 % (ref 11.5–14.5)
GLUCOSE SERPL-MCNC: 99 MG/DL (ref 74–99)
HCT VFR BLD AUTO: 24.5 % (ref 36–46)
HGB BLD-MCNC: 6.9 G/DL (ref 12–16)
HOLD SPECIMEN: NORMAL
HYPOCHROMIA BLD QL SMEAR: ABNORMAL
IMM GRANULOCYTES # BLD AUTO: 0.82 X10*3/UL (ref 0–0.7)
IMM GRANULOCYTES NFR BLD AUTO: 4.3 % (ref 0–0.9)
INR PPP: 1 (ref 0.9–1.1)
LACTATE SERPL-SCNC: 2.4 MMOL/L (ref 0.4–2)
LIPASE SERPL-CCNC: 37 U/L (ref 9–82)
LYMPHOCYTES # BLD MANUAL: 0.57 X10*3/UL (ref 1.2–4.8)
LYMPHOCYTES NFR BLD MANUAL: 3 %
MAGNESIUM SERPL-MCNC: 1.9 MG/DL (ref 1.6–2.4)
MCH RBC QN AUTO: 22 PG (ref 26–34)
MCHC RBC AUTO-ENTMCNC: 28.2 G/DL (ref 32–36)
MCV RBC AUTO: 78 FL (ref 80–100)
METAMYELOCYTES # BLD MANUAL: 0.19 X10*3/UL
METAMYELOCYTES NFR BLD MANUAL: 1 %
MONOCYTES # BLD MANUAL: 0 X10*3/UL (ref 0.1–1)
MONOCYTES NFR BLD MANUAL: 0 %
MYELOCYTES # BLD MANUAL: 0.19 X10*3/UL
MYELOCYTES NFR BLD MANUAL: 1 %
NEUTROPHILS # BLD MANUAL: 17.86 X10*3/UL (ref 1.2–7.7)
NEUTS BAND # BLD MANUAL: 0.57 X10*3/UL (ref 0–0.7)
NEUTS BAND NFR BLD MANUAL: 3 %
NEUTS SEG # BLD MANUAL: 17.29 X10*3/UL (ref 1.2–7)
NEUTS SEG NFR BLD MANUAL: 91 %
NRBC BLD-RTO: 1.5 /100 WBCS (ref 0–0)
OVALOCYTES BLD QL SMEAR: ABNORMAL
PLATELET # BLD AUTO: 824 X10*3/UL (ref 150–450)
POLYCHROMASIA BLD QL SMEAR: ABNORMAL
POTASSIUM SERPL-SCNC: 4.9 MMOL/L (ref 3.5–5.3)
PROT SERPL-MCNC: 5.2 G/DL (ref 6.4–8.2)
PROTHROMBIN TIME: 11.8 SECONDS (ref 9.8–12.8)
RBC # BLD AUTO: 3.13 X10*6/UL (ref 4–5.2)
RBC MORPH BLD: ABNORMAL
RH FACTOR (ANTIGEN D): NORMAL
SODIUM SERPL-SCNC: 134 MMOL/L (ref 136–145)
TOTAL CELLS COUNTED BLD: 100
VARIANT LYMPHS # BLD MANUAL: 0.19 X10*3/UL (ref 0–0.5)
VARIANT LYMPHS NFR BLD: 1 %
WBC # BLD AUTO: 19 X10*3/UL (ref 4.4–11.3)

## 2024-06-20 PROCEDURE — 96375 TX/PRO/DX INJ NEW DRUG ADDON: CPT

## 2024-06-20 PROCEDURE — 82077 ASSAY SPEC XCP UR&BREATH IA: CPT | Performed by: HOSPITALIST

## 2024-06-20 PROCEDURE — 85610 PROTHROMBIN TIME: CPT | Performed by: GENERAL PRACTICE

## 2024-06-20 PROCEDURE — 83735 ASSAY OF MAGNESIUM: CPT | Performed by: GENERAL PRACTICE

## 2024-06-20 PROCEDURE — 96365 THER/PROPH/DIAG IV INF INIT: CPT

## 2024-06-20 PROCEDURE — 86920 COMPATIBILITY TEST SPIN: CPT

## 2024-06-20 PROCEDURE — 36415 COLL VENOUS BLD VENIPUNCTURE: CPT | Performed by: GENERAL PRACTICE

## 2024-06-20 PROCEDURE — 96376 TX/PRO/DX INJ SAME DRUG ADON: CPT

## 2024-06-20 PROCEDURE — 99221 1ST HOSP IP/OBS SF/LOW 40: CPT | Performed by: SURGERY

## 2024-06-20 PROCEDURE — 99291 CRITICAL CARE FIRST HOUR: CPT | Performed by: GENERAL PRACTICE

## 2024-06-20 PROCEDURE — 99285 EMERGENCY DEPT VISIT HI MDM: CPT

## 2024-06-20 PROCEDURE — 2500000004 HC RX 250 GENERAL PHARMACY W/ HCPCS (ALT 636 FOR OP/ED): Performed by: GENERAL PRACTICE

## 2024-06-20 PROCEDURE — 83690 ASSAY OF LIPASE: CPT | Performed by: GENERAL PRACTICE

## 2024-06-20 PROCEDURE — 74176 CT ABD & PELVIS W/O CONTRAST: CPT

## 2024-06-20 PROCEDURE — 80053 COMPREHEN METABOLIC PANEL: CPT | Performed by: GENERAL PRACTICE

## 2024-06-20 PROCEDURE — 86901 BLOOD TYPING SEROLOGIC RH(D): CPT | Performed by: GENERAL PRACTICE

## 2024-06-20 PROCEDURE — 93005 ELECTROCARDIOGRAM TRACING: CPT

## 2024-06-20 PROCEDURE — 83605 ASSAY OF LACTIC ACID: CPT | Performed by: GENERAL PRACTICE

## 2024-06-20 PROCEDURE — 85007 BL SMEAR W/DIFF WBC COUNT: CPT | Performed by: GENERAL PRACTICE

## 2024-06-20 PROCEDURE — 86850 RBC ANTIBODY SCREEN: CPT | Performed by: GENERAL PRACTICE

## 2024-06-20 PROCEDURE — 85027 COMPLETE CBC AUTOMATED: CPT | Performed by: GENERAL PRACTICE

## 2024-06-20 PROCEDURE — 2500000004 HC RX 250 GENERAL PHARMACY W/ HCPCS (ALT 636 FOR OP/ED)

## 2024-06-20 RX ORDER — HYDROMORPHONE HYDROCHLORIDE 1 MG/ML
1 INJECTION, SOLUTION INTRAMUSCULAR; INTRAVENOUS; SUBCUTANEOUS ONCE
Status: COMPLETED | OUTPATIENT
Start: 2024-06-20 | End: 2024-06-20

## 2024-06-20 RX ORDER — ONDANSETRON HYDROCHLORIDE 2 MG/ML
4 INJECTION, SOLUTION INTRAVENOUS ONCE
Status: COMPLETED | OUTPATIENT
Start: 2024-06-20 | End: 2024-06-20

## 2024-06-20 ASSESSMENT — COLUMBIA-SUICIDE SEVERITY RATING SCALE - C-SSRS
6. HAVE YOU EVER DONE ANYTHING, STARTED TO DO ANYTHING, OR PREPARED TO DO ANYTHING TO END YOUR LIFE?: NO
2. HAVE YOU ACTUALLY HAD ANY THOUGHTS OF KILLING YOURSELF?: NO
1. IN THE PAST MONTH, HAVE YOU WISHED YOU WERE DEAD OR WISHED YOU COULD GO TO SLEEP AND NOT WAKE UP?: NO

## 2024-06-20 ASSESSMENT — PAIN SCALES - GENERAL
PAINLEVEL_OUTOF10: 10 - WORST POSSIBLE PAIN
PAINLEVEL_OUTOF10: 0 - NO PAIN
PAINLEVEL_OUTOF10: 10 - WORST POSSIBLE PAIN

## 2024-06-20 ASSESSMENT — PAIN DESCRIPTION - LOCATION
LOCATION: ABDOMEN
LOCATION: ABDOMEN

## 2024-06-20 ASSESSMENT — PAIN DESCRIPTION - ORIENTATION: ORIENTATION: RIGHT;LOWER;MID

## 2024-06-20 ASSESSMENT — PAIN - FUNCTIONAL ASSESSMENT
PAIN_FUNCTIONAL_ASSESSMENT: 0-10
PAIN_FUNCTIONAL_ASSESSMENT: 0-10

## 2024-06-21 ENCOUNTER — ANESTHESIA (OUTPATIENT)
Dept: OPERATING ROOM | Facility: HOSPITAL | Age: 64
End: 2024-06-21
Payer: COMMERCIAL

## 2024-06-21 ENCOUNTER — ANESTHESIA EVENT (OUTPATIENT)
Dept: OPERATING ROOM | Facility: HOSPITAL | Age: 64
End: 2024-06-21
Payer: COMMERCIAL

## 2024-06-21 ENCOUNTER — APPOINTMENT (OUTPATIENT)
Dept: RADIOLOGY | Facility: HOSPITAL | Age: 64
End: 2024-06-21
Payer: COMMERCIAL

## 2024-06-21 PROBLEM — D75.839 THROMBOCYTOSIS: Status: ACTIVE | Noted: 2024-06-21

## 2024-06-21 LAB
ALBUMIN SERPL BCP-MCNC: 2.9 G/DL (ref 3.4–5)
ALP SERPL-CCNC: 65 U/L (ref 33–136)
ALT SERPL W P-5'-P-CCNC: 23 U/L (ref 7–45)
ANION GAP SERPL CALC-SCNC: 11 MMOL/L (ref 10–20)
ANION GAP SERPL CALC-SCNC: 13 MMOL/L (ref 10–20)
AST SERPL W P-5'-P-CCNC: 14 U/L (ref 9–39)
ATRIAL RATE: 108 BPM
BASO STIPL BLD QL SMEAR: PRESENT
BASOPHILS # BLD AUTO: 0.09 X10*3/UL (ref 0–0.1)
BASOPHILS # BLD MANUAL: 0 X10*3/UL (ref 0–0.1)
BASOPHILS NFR BLD AUTO: 0.2 %
BASOPHILS NFR BLD MANUAL: 0 %
BILIRUB SERPL-MCNC: 0.4 MG/DL (ref 0–1.2)
BUN SERPL-MCNC: 11 MG/DL (ref 6–23)
BUN SERPL-MCNC: 11 MG/DL (ref 6–23)
CALCIUM SERPL-MCNC: 8.5 MG/DL (ref 8.6–10.3)
CALCIUM SERPL-MCNC: 9 MG/DL (ref 8.6–10.3)
CHLORIDE SERPL-SCNC: 92 MMOL/L (ref 98–107)
CHLORIDE SERPL-SCNC: 94 MMOL/L (ref 98–107)
CO2 SERPL-SCNC: 27 MMOL/L (ref 21–32)
CO2 SERPL-SCNC: 29 MMOL/L (ref 21–32)
CREAT SERPL-MCNC: 0.73 MG/DL (ref 0.5–1.05)
CREAT SERPL-MCNC: 0.82 MG/DL (ref 0.5–1.05)
EGFRCR SERPLBLD CKD-EPI 2021: 80 ML/MIN/1.73M*2
EGFRCR SERPLBLD CKD-EPI 2021: >90 ML/MIN/1.73M*2
EOSINOPHIL # BLD AUTO: 0 X10*3/UL (ref 0–0.7)
EOSINOPHIL # BLD MANUAL: 0 X10*3/UL (ref 0–0.7)
EOSINOPHIL NFR BLD AUTO: 0 %
EOSINOPHIL NFR BLD MANUAL: 0 %
ERYTHROCYTE [DISTWIDTH] IN BLOOD BY AUTOMATED COUNT: 19.1 % (ref 11.5–14.5)
ERYTHROCYTE [DISTWIDTH] IN BLOOD BY AUTOMATED COUNT: 20.1 % (ref 11.5–14.5)
ETHANOL SERPL-MCNC: <10 MG/DL
GLUCOSE SERPL-MCNC: 111 MG/DL (ref 74–99)
GLUCOSE SERPL-MCNC: 129 MG/DL (ref 74–99)
HCT VFR BLD AUTO: 28.7 % (ref 36–46)
HCT VFR BLD AUTO: 29.4 % (ref 36–46)
HGB BLD-MCNC: 8.5 G/DL (ref 12–16)
HGB BLD-MCNC: 8.8 G/DL (ref 12–16)
HYPOCHROMIA BLD QL SMEAR: ABNORMAL
IMM GRANULOCYTES # BLD AUTO: 0.6 X10*3/UL (ref 0–0.7)
IMM GRANULOCYTES # BLD AUTO: 0.65 X10*3/UL (ref 0–0.7)
IMM GRANULOCYTES NFR BLD AUTO: 1.6 % (ref 0–0.9)
IMM GRANULOCYTES NFR BLD AUTO: 1.6 % (ref 0–0.9)
LACTATE SERPL-SCNC: 2.2 MMOL/L (ref 0.4–2)
LYMPHOCYTES # BLD AUTO: 1.23 X10*3/UL (ref 1.2–4.8)
LYMPHOCYTES # BLD MANUAL: 1.21 X10*3/UL (ref 1.2–4.8)
LYMPHOCYTES NFR BLD AUTO: 3.2 %
LYMPHOCYTES NFR BLD MANUAL: 3 %
MAGNESIUM SERPL-MCNC: 1.8 MG/DL (ref 1.6–2.4)
MCH RBC QN AUTO: 23.7 PG (ref 26–34)
MCH RBC QN AUTO: 23.7 PG (ref 26–34)
MCHC RBC AUTO-ENTMCNC: 29.6 G/DL (ref 32–36)
MCHC RBC AUTO-ENTMCNC: 29.9 G/DL (ref 32–36)
MCV RBC AUTO: 79 FL (ref 80–100)
MCV RBC AUTO: 80 FL (ref 80–100)
MONOCYTES # BLD AUTO: 1.33 X10*3/UL (ref 0.1–1)
MONOCYTES # BLD MANUAL: 1.21 X10*3/UL (ref 0.1–1)
MONOCYTES NFR BLD AUTO: 3.5 %
MONOCYTES NFR BLD MANUAL: 3 %
MYELOCYTES # BLD MANUAL: 0.4 X10*3/UL
MYELOCYTES NFR BLD MANUAL: 1 %
NEUTROPHILS # BLD AUTO: 35.05 X10*3/UL (ref 1.2–7.7)
NEUTROPHILS NFR BLD AUTO: 91.5 %
NEUTS SEG # BLD MANUAL: 37.48 X10*3/UL (ref 1.2–7)
NEUTS SEG NFR BLD MANUAL: 93 %
NRBC BLD-RTO: 0.4 /100 WBCS (ref 0–0)
NRBC BLD-RTO: 0.5 /100 WBCS (ref 0–0)
OVALOCYTES BLD QL SMEAR: ABNORMAL
P AXIS: 84 DEGREES
P OFFSET: 201 MS
P ONSET: 161 MS
PLATELET # BLD AUTO: 728 X10*3/UL (ref 150–450)
PLATELET # BLD AUTO: 730 X10*3/UL (ref 150–450)
POLYCHROMASIA BLD QL SMEAR: ABNORMAL
POTASSIUM SERPL-SCNC: 5.1 MMOL/L (ref 3.5–5.3)
POTASSIUM SERPL-SCNC: 5.3 MMOL/L (ref 3.5–5.3)
PR INTERVAL: 132 MS
PROT SERPL-MCNC: 5.4 G/DL (ref 6.4–8.2)
Q ONSET: 227 MS
QRS COUNT: 18 BEATS
QRS DURATION: 62 MS
QT INTERVAL: 310 MS
QTC CALCULATION(BAZETT): 415 MS
QTC FREDERICIA: 377 MS
R AXIS: 75 DEGREES
RBC # BLD AUTO: 3.59 X10*6/UL (ref 4–5.2)
RBC # BLD AUTO: 3.71 X10*6/UL (ref 4–5.2)
RBC MORPH BLD: ABNORMAL
SODIUM SERPL-SCNC: 125 MMOL/L (ref 136–145)
SODIUM SERPL-SCNC: 131 MMOL/L (ref 136–145)
SPHEROCYTES BLD QL SMEAR: ABNORMAL
T AXIS: 73 DEGREES
T OFFSET: 382 MS
TARGETS BLD QL SMEAR: ABNORMAL
TOTAL CELLS COUNTED BLD: 100
VENTRICULAR RATE: 108 BPM
WBC # BLD AUTO: 38.3 X10*3/UL (ref 4.4–11.3)
WBC # BLD AUTO: 40.3 X10*3/UL (ref 4.4–11.3)

## 2024-06-21 PROCEDURE — 2500000004 HC RX 250 GENERAL PHARMACY W/ HCPCS (ALT 636 FOR OP/ED): Performed by: ANESTHESIOLOGIST ASSISTANT

## 2024-06-21 PROCEDURE — P9040 RBC LEUKOREDUCED IRRADIATED: HCPCS

## 2024-06-21 PROCEDURE — 88307 TISSUE EXAM BY PATHOLOGIST: CPT | Performed by: STUDENT IN AN ORGANIZED HEALTH CARE EDUCATION/TRAINING PROGRAM

## 2024-06-21 PROCEDURE — 85027 COMPLETE CBC AUTOMATED: CPT | Performed by: HOSPITALIST

## 2024-06-21 PROCEDURE — 94640 AIRWAY INHALATION TREATMENT: CPT

## 2024-06-21 PROCEDURE — 2500000001 HC RX 250 WO HCPCS SELF ADMINISTERED DRUGS (ALT 637 FOR MEDICARE OP): Performed by: INTERNAL MEDICINE

## 2024-06-21 PROCEDURE — 87102 FUNGUS ISOLATION CULTURE: CPT | Mod: AHULAB

## 2024-06-21 PROCEDURE — 2500000004 HC RX 250 GENERAL PHARMACY W/ HCPCS (ALT 636 FOR OP/ED)

## 2024-06-21 PROCEDURE — 3700000001 HC GENERAL ANESTHESIA TIME - INITIAL BASE CHARGE: Performed by: SURGERY

## 2024-06-21 PROCEDURE — 87075 CULTR BACTERIA EXCEPT BLOOD: CPT | Mod: AHULAB

## 2024-06-21 PROCEDURE — 82374 ASSAY BLOOD CARBON DIOXIDE: CPT | Performed by: INTERNAL MEDICINE

## 2024-06-21 PROCEDURE — 80053 COMPREHEN METABOLIC PANEL: CPT | Performed by: HOSPITALIST

## 2024-06-21 PROCEDURE — 83735 ASSAY OF MAGNESIUM: CPT | Performed by: HOSPITALIST

## 2024-06-21 PROCEDURE — 2500000004 HC RX 250 GENERAL PHARMACY W/ HCPCS (ALT 636 FOR OP/ED): Performed by: ANESTHESIOLOGY

## 2024-06-21 PROCEDURE — 2720000007 HC OR 272 NO HCPCS: Performed by: SURGERY

## 2024-06-21 PROCEDURE — 74018 RADEX ABDOMEN 1 VIEW: CPT

## 2024-06-21 PROCEDURE — 2500000004 HC RX 250 GENERAL PHARMACY W/ HCPCS (ALT 636 FOR OP/ED): Performed by: PHARMACIST

## 2024-06-21 PROCEDURE — 3600000008 HC OR TIME - EACH INCREMENTAL 1 MINUTE - PROCEDURE LEVEL THREE: Performed by: SURGERY

## 2024-06-21 PROCEDURE — 0D9670Z DRAINAGE OF STOMACH WITH DRAINAGE DEVICE, VIA NATURAL OR ARTIFICIAL OPENING: ICD-10-PCS | Performed by: INTERNAL MEDICINE

## 2024-06-21 PROCEDURE — 2500000004 HC RX 250 GENERAL PHARMACY W/ HCPCS (ALT 636 FOR OP/ED): Performed by: INTERNAL MEDICINE

## 2024-06-21 PROCEDURE — 7100000001 HC RECOVERY ROOM TIME - INITIAL BASE CHARGE: Performed by: SURGERY

## 2024-06-21 PROCEDURE — 1200000002 HC GENERAL ROOM WITH TELEMETRY DAILY

## 2024-06-21 PROCEDURE — 36430 TRANSFUSION BLD/BLD COMPNT: CPT

## 2024-06-21 PROCEDURE — 2500000004 HC RX 250 GENERAL PHARMACY W/ HCPCS (ALT 636 FOR OP/ED): Performed by: HOSPITALIST

## 2024-06-21 PROCEDURE — 99223 1ST HOSP IP/OBS HIGH 75: CPT | Performed by: INTERNAL MEDICINE

## 2024-06-21 PROCEDURE — 2500000004 HC RX 250 GENERAL PHARMACY W/ HCPCS (ALT 636 FOR OP/ED): Performed by: SURGERY

## 2024-06-21 PROCEDURE — A4217 STERILE WATER/SALINE, 500 ML: HCPCS | Performed by: SURGERY

## 2024-06-21 PROCEDURE — 88307 TISSUE EXAM BY PATHOLOGIST: CPT | Mod: TC,AHULAB

## 2024-06-21 PROCEDURE — 0DB80ZZ EXCISION OF SMALL INTESTINE, OPEN APPROACH: ICD-10-PCS | Performed by: SURGERY

## 2024-06-21 PROCEDURE — 2500000005 HC RX 250 GENERAL PHARMACY W/O HCPCS: Performed by: ANESTHESIOLOGIST ASSISTANT

## 2024-06-21 PROCEDURE — 36415 COLL VENOUS BLD VENIPUNCTURE: CPT | Performed by: GENERAL PRACTICE

## 2024-06-21 PROCEDURE — 44120 REMOVAL OF SMALL INTESTINE: CPT | Performed by: SURGERY

## 2024-06-21 PROCEDURE — 3600000003 HC OR TIME - INITIAL BASE CHARGE - PROCEDURE LEVEL THREE: Performed by: SURGERY

## 2024-06-21 PROCEDURE — 36415 COLL VENOUS BLD VENIPUNCTURE: CPT | Performed by: INTERNAL MEDICINE

## 2024-06-21 PROCEDURE — 3700000002 HC GENERAL ANESTHESIA TIME - EACH INCREMENTAL 1 MINUTE: Performed by: SURGERY

## 2024-06-21 PROCEDURE — 2500000002 HC RX 250 W HCPCS SELF ADMINISTERED DRUGS (ALT 637 FOR MEDICARE OP, ALT 636 FOR OP/ED): Performed by: INTERNAL MEDICINE

## 2024-06-21 PROCEDURE — 85007 BL SMEAR W/DIFF WBC COUNT: CPT | Performed by: HOSPITALIST

## 2024-06-21 PROCEDURE — C9113 INJ PANTOPRAZOLE SODIUM, VIA: HCPCS | Performed by: HOSPITALIST

## 2024-06-21 PROCEDURE — 83605 ASSAY OF LACTIC ACID: CPT | Performed by: GENERAL PRACTICE

## 2024-06-21 PROCEDURE — 87116 MYCOBACTERIA CULTURE: CPT | Mod: AHULAB

## 2024-06-21 PROCEDURE — 7100000002 HC RECOVERY ROOM TIME - EACH INCREMENTAL 1 MINUTE: Performed by: SURGERY

## 2024-06-21 PROCEDURE — 85025 COMPLETE CBC W/AUTO DIFF WBC: CPT | Performed by: INTERNAL MEDICINE

## 2024-06-21 PROCEDURE — 74018 RADEX ABDOMEN 1 VIEW: CPT | Performed by: STUDENT IN AN ORGANIZED HEALTH CARE EDUCATION/TRAINING PROGRAM

## 2024-06-21 PROCEDURE — 87070 CULTURE OTHR SPECIMN AEROBIC: CPT | Mod: AHULAB

## 2024-06-21 PROCEDURE — 87015 SPECIMEN INFECT AGNT CONCNTJ: CPT | Mod: AHULAB

## 2024-06-21 RX ORDER — ACETAMINOPHEN 325 MG/1
650 TABLET ORAL EVERY 4 HOURS PRN
Status: DISCONTINUED | OUTPATIENT
Start: 2024-06-21 | End: 2024-06-21 | Stop reason: HOSPADM

## 2024-06-21 RX ORDER — LIDOCAINE HYDROCHLORIDE 20 MG/ML
INJECTION, SOLUTION INFILTRATION; PERINEURAL AS NEEDED
Status: DISCONTINUED | OUTPATIENT
Start: 2024-06-21 | End: 2024-06-21

## 2024-06-21 RX ORDER — PREDNISONE 20 MG/1
40 TABLET ORAL DAILY
Status: COMPLETED | OUTPATIENT
Start: 2024-06-21 | End: 2024-06-22

## 2024-06-21 RX ORDER — CALCIUM CARBONATE 200(500)MG
1500 TABLET,CHEWABLE ORAL DAILY
Status: DISCONTINUED | OUTPATIENT
Start: 2024-06-21 | End: 2024-07-16 | Stop reason: HOSPADM

## 2024-06-21 RX ORDER — IPRATROPIUM BROMIDE AND ALBUTEROL SULFATE 2.5; .5 MG/3ML; MG/3ML
3 SOLUTION RESPIRATORY (INHALATION) EVERY 6 HOURS PRN
Status: DISCONTINUED | OUTPATIENT
Start: 2024-06-21 | End: 2024-06-21

## 2024-06-21 RX ORDER — LIDOCAINE HYDROCHLORIDE 10 MG/ML
0.1 INJECTION, SOLUTION EPIDURAL; INFILTRATION; INTRACAUDAL; PERINEURAL ONCE
Status: DISCONTINUED | OUTPATIENT
Start: 2024-06-21 | End: 2024-06-21 | Stop reason: HOSPADM

## 2024-06-21 RX ORDER — MORPHINE SULFATE 2 MG/ML
2 INJECTION, SOLUTION INTRAMUSCULAR; INTRAVENOUS EVERY 4 HOURS PRN
Status: DISCONTINUED | OUTPATIENT
Start: 2024-06-21 | End: 2024-07-09

## 2024-06-21 RX ORDER — NAPROXEN SODIUM 220 MG/1
81 TABLET, FILM COATED ORAL DAILY
Status: DISCONTINUED | OUTPATIENT
Start: 2024-06-21 | End: 2024-07-16 | Stop reason: HOSPADM

## 2024-06-21 RX ORDER — ALBUTEROL SULFATE 0.83 MG/ML
2.5 SOLUTION RESPIRATORY (INHALATION) ONCE AS NEEDED
Status: DISCONTINUED | OUTPATIENT
Start: 2024-06-21 | End: 2024-06-21 | Stop reason: HOSPADM

## 2024-06-21 RX ORDER — GUAIFENESIN 600 MG/1
600 TABLET, EXTENDED RELEASE ORAL 2 TIMES DAILY PRN
Status: DISCONTINUED | OUTPATIENT
Start: 2024-06-21 | End: 2024-07-16 | Stop reason: HOSPADM

## 2024-06-21 RX ORDER — ONDANSETRON HYDROCHLORIDE 2 MG/ML
4 INJECTION, SOLUTION INTRAVENOUS EVERY 8 HOURS PRN
Status: DISCONTINUED | OUTPATIENT
Start: 2024-06-21 | End: 2024-06-21 | Stop reason: SDUPTHER

## 2024-06-21 RX ORDER — SODIUM CHLORIDE, SODIUM LACTATE, POTASSIUM CHLORIDE, CALCIUM CHLORIDE 600; 310; 30; 20 MG/100ML; MG/100ML; MG/100ML; MG/100ML
100 INJECTION, SOLUTION INTRAVENOUS CONTINUOUS
Status: DISCONTINUED | OUTPATIENT
Start: 2024-06-21 | End: 2024-06-21 | Stop reason: HOSPADM

## 2024-06-21 RX ORDER — OXYBUTYNIN CHLORIDE 5 MG/1
5 TABLET ORAL DAILY
Status: DISCONTINUED | OUTPATIENT
Start: 2024-06-21 | End: 2024-07-16 | Stop reason: HOSPADM

## 2024-06-21 RX ORDER — LISINOPRIL 10 MG/1
10 TABLET ORAL DAILY
Status: DISCONTINUED | OUTPATIENT
Start: 2024-06-21 | End: 2024-07-16 | Stop reason: HOSPADM

## 2024-06-21 RX ORDER — SUCCINYLCHOLINE CHLORIDE 100 MG/5ML
SYRINGE (ML) INTRAVENOUS AS NEEDED
Status: DISCONTINUED | OUTPATIENT
Start: 2024-06-21 | End: 2024-06-21

## 2024-06-21 RX ORDER — FORMOTEROL FUMARATE DIHYDRATE 20 UG/2ML
20 SOLUTION RESPIRATORY (INHALATION)
Status: DISCONTINUED | OUTPATIENT
Start: 2024-06-21 | End: 2024-07-16 | Stop reason: HOSPADM

## 2024-06-21 RX ORDER — DROPERIDOL 2.5 MG/ML
0.62 INJECTION, SOLUTION INTRAMUSCULAR; INTRAVENOUS ONCE AS NEEDED
Status: DISCONTINUED | OUTPATIENT
Start: 2024-06-21 | End: 2024-06-21 | Stop reason: HOSPADM

## 2024-06-21 RX ORDER — ONDANSETRON HYDROCHLORIDE 2 MG/ML
4 INJECTION, SOLUTION INTRAVENOUS EVERY 6 HOURS PRN
Status: DISCONTINUED | OUTPATIENT
Start: 2024-06-21 | End: 2024-07-16 | Stop reason: HOSPADM

## 2024-06-21 RX ORDER — DILTIAZEM HYDROCHLORIDE 120 MG/1
240 CAPSULE, COATED, EXTENDED RELEASE ORAL DAILY
Status: DISCONTINUED | OUTPATIENT
Start: 2024-06-21 | End: 2024-07-16 | Stop reason: HOSPADM

## 2024-06-21 RX ORDER — PANTOPRAZOLE SODIUM 40 MG/10ML
40 INJECTION, POWDER, LYOPHILIZED, FOR SOLUTION INTRAVENOUS
Status: DISCONTINUED | OUTPATIENT
Start: 2024-06-21 | End: 2024-07-16 | Stop reason: HOSPADM

## 2024-06-21 RX ORDER — MONTELUKAST SODIUM 10 MG/1
10 TABLET ORAL DAILY
Status: DISCONTINUED | OUTPATIENT
Start: 2024-06-21 | End: 2024-07-16 | Stop reason: HOSPADM

## 2024-06-21 RX ORDER — ONDANSETRON HYDROCHLORIDE 2 MG/ML
4 INJECTION, SOLUTION INTRAVENOUS ONCE AS NEEDED
Status: DISCONTINUED | OUTPATIENT
Start: 2024-06-21 | End: 2024-06-21 | Stop reason: HOSPADM

## 2024-06-21 RX ORDER — AZITHROMYCIN 500 MG/1
250 TABLET, FILM COATED ORAL 3 TIMES WEEKLY
Status: DISCONTINUED | OUTPATIENT
Start: 2024-06-21 | End: 2024-06-24

## 2024-06-21 RX ORDER — LANOLIN ALCOHOL/MO/W.PET/CERES
100 CREAM (GRAM) TOPICAL DAILY
Status: DISCONTINUED | OUTPATIENT
Start: 2024-06-21 | End: 2024-07-16 | Stop reason: HOSPADM

## 2024-06-21 RX ORDER — FENTANYL CITRATE 50 UG/ML
INJECTION, SOLUTION INTRAMUSCULAR; INTRAVENOUS AS NEEDED
Status: DISCONTINUED | OUTPATIENT
Start: 2024-06-21 | End: 2024-06-21

## 2024-06-21 RX ORDER — NORTRIPTYLINE HYDROCHLORIDE 10 MG/1
50 CAPSULE ORAL NIGHTLY
Status: DISCONTINUED | OUTPATIENT
Start: 2024-06-21 | End: 2024-07-16 | Stop reason: HOSPADM

## 2024-06-21 RX ORDER — HYDROMORPHONE HYDROCHLORIDE 0.2 MG/ML
0.2 INJECTION INTRAMUSCULAR; INTRAVENOUS; SUBCUTANEOUS EVERY 4 HOURS PRN
Status: DISCONTINUED | OUTPATIENT
Start: 2024-06-21 | End: 2024-07-09

## 2024-06-21 RX ORDER — IPRATROPIUM BROMIDE AND ALBUTEROL SULFATE 2.5; .5 MG/3ML; MG/3ML
3 SOLUTION RESPIRATORY (INHALATION) EVERY 2 HOUR PRN
Status: DISCONTINUED | OUTPATIENT
Start: 2024-06-21 | End: 2024-07-16 | Stop reason: HOSPADM

## 2024-06-21 RX ORDER — ALBUTEROL SULFATE 0.83 MG/ML
2.5 SOLUTION RESPIRATORY (INHALATION) EVERY 6 HOURS PRN
Status: DISCONTINUED | OUTPATIENT
Start: 2024-06-21 | End: 2024-06-21

## 2024-06-21 RX ORDER — BUSPIRONE HYDROCHLORIDE 5 MG/1
5 TABLET ORAL 2 TIMES DAILY
Status: DISCONTINUED | OUTPATIENT
Start: 2024-06-21 | End: 2024-07-16 | Stop reason: HOSPADM

## 2024-06-21 RX ORDER — PREDNISONE 20 MG/1
20 TABLET ORAL DAILY
Status: COMPLETED | OUTPATIENT
Start: 2024-06-26 | End: 2024-06-28

## 2024-06-21 RX ORDER — SODIUM CHLORIDE 0.9 G/100ML
IRRIGANT IRRIGATION AS NEEDED
Status: DISCONTINUED | OUTPATIENT
Start: 2024-06-21 | End: 2024-06-21 | Stop reason: HOSPADM

## 2024-06-21 RX ORDER — SODIUM CHLORIDE, SODIUM LACTATE, POTASSIUM CHLORIDE, CALCIUM CHLORIDE 600; 310; 30; 20 MG/100ML; MG/100ML; MG/100ML; MG/100ML
INJECTION, SOLUTION INTRAVENOUS CONTINUOUS PRN
Status: DISCONTINUED | OUTPATIENT
Start: 2024-06-21 | End: 2024-06-21

## 2024-06-21 RX ORDER — BUDESONIDE 0.25 MG/2ML
0.25 INHALANT ORAL
Status: DISCONTINUED | OUTPATIENT
Start: 2024-06-21 | End: 2024-07-16 | Stop reason: HOSPADM

## 2024-06-21 RX ORDER — IPRATROPIUM BROMIDE 0.5 MG/2.5ML
500 SOLUTION RESPIRATORY (INHALATION) ONCE
Status: DISCONTINUED | OUTPATIENT
Start: 2024-06-21 | End: 2024-06-21 | Stop reason: HOSPADM

## 2024-06-21 RX ORDER — MIDAZOLAM HYDROCHLORIDE 1 MG/ML
INJECTION, SOLUTION INTRAMUSCULAR; INTRAVENOUS AS NEEDED
Status: DISCONTINUED | OUTPATIENT
Start: 2024-06-21 | End: 2024-06-21

## 2024-06-21 RX ORDER — PREDNISONE 10 MG/1
10 TABLET ORAL DAILY
Status: COMPLETED | OUTPATIENT
Start: 2024-06-29 | End: 2024-07-01

## 2024-06-21 RX ORDER — MORPHINE SULFATE 2 MG/ML
1 INJECTION, SOLUTION INTRAMUSCULAR; INTRAVENOUS EVERY 4 HOURS PRN
Status: DISCONTINUED | OUTPATIENT
Start: 2024-06-21 | End: 2024-07-09

## 2024-06-21 RX ORDER — PROPOFOL 10 MG/ML
INJECTION, EMULSION INTRAVENOUS AS NEEDED
Status: DISCONTINUED | OUTPATIENT
Start: 2024-06-21 | End: 2024-06-21

## 2024-06-21 RX ORDER — VARENICLINE TARTRATE 0.5 MG/1
1 TABLET, FILM COATED ORAL 2 TIMES DAILY
Status: DISCONTINUED | OUTPATIENT
Start: 2024-06-21 | End: 2024-07-16 | Stop reason: HOSPADM

## 2024-06-21 RX ORDER — IPRATROPIUM BROMIDE AND ALBUTEROL SULFATE 2.5; .5 MG/3ML; MG/3ML
3 SOLUTION RESPIRATORY (INHALATION)
Status: DISCONTINUED | OUTPATIENT
Start: 2024-06-21 | End: 2024-07-16 | Stop reason: HOSPADM

## 2024-06-21 RX ORDER — PHENYLEPHRINE HCL IN 0.9% NACL 0.4MG/10ML
SYRINGE (ML) INTRAVENOUS AS NEEDED
Status: DISCONTINUED | OUTPATIENT
Start: 2024-06-21 | End: 2024-06-21

## 2024-06-21 RX ORDER — BENZONATATE 100 MG/1
100 CAPSULE ORAL 3 TIMES DAILY PRN
Status: DISCONTINUED | OUTPATIENT
Start: 2024-06-21 | End: 2024-07-16 | Stop reason: HOSPADM

## 2024-06-21 RX ORDER — BUPIVACAINE HYDROCHLORIDE 5 MG/ML
INJECTION, SOLUTION EPIDURAL; INTRACAUDAL AS NEEDED
Status: DISCONTINUED | OUTPATIENT
Start: 2024-06-21 | End: 2024-06-21 | Stop reason: HOSPADM

## 2024-06-21 RX ORDER — IPRATROPIUM BROMIDE 0.5 MG/2.5ML
0.5 SOLUTION RESPIRATORY (INHALATION)
Status: DISCONTINUED | OUTPATIENT
Start: 2024-06-21 | End: 2024-06-21

## 2024-06-21 RX ORDER — SODIUM CHLORIDE, SODIUM LACTATE, POTASSIUM CHLORIDE, CALCIUM CHLORIDE 600; 310; 30; 20 MG/100ML; MG/100ML; MG/100ML; MG/100ML
125 INJECTION, SOLUTION INTRAVENOUS CONTINUOUS
Status: DISCONTINUED | OUTPATIENT
Start: 2024-06-21 | End: 2024-06-24

## 2024-06-21 RX ORDER — ROCURONIUM BROMIDE 10 MG/ML
INJECTION, SOLUTION INTRAVENOUS AS NEEDED
Status: DISCONTINUED | OUTPATIENT
Start: 2024-06-21 | End: 2024-06-21

## 2024-06-21 RX ORDER — NITROGLYCERIN 0.4 MG/1
0.4 TABLET SUBLINGUAL EVERY 5 MIN PRN
Status: DISCONTINUED | OUTPATIENT
Start: 2024-06-21 | End: 2024-07-16 | Stop reason: HOSPADM

## 2024-06-21 RX ORDER — OXYCODONE HYDROCHLORIDE 5 MG/1
5 TABLET ORAL EVERY 4 HOURS PRN
Status: DISCONTINUED | OUTPATIENT
Start: 2024-06-21 | End: 2024-06-21 | Stop reason: HOSPADM

## 2024-06-21 RX ORDER — HYDROMORPHONE HYDROCHLORIDE 1 MG/ML
1 INJECTION, SOLUTION INTRAMUSCULAR; INTRAVENOUS; SUBCUTANEOUS ONCE
Status: COMPLETED | OUTPATIENT
Start: 2024-06-21 | End: 2024-06-21

## 2024-06-21 SDOH — HEALTH STABILITY: MENTAL HEALTH: HOW OFTEN DO YOU HAVE 6 OR MORE DRINKS ON ONE OCCASION?: PATIENT UNABLE TO ANSWER

## 2024-06-21 SDOH — SOCIAL STABILITY: SOCIAL INSECURITY: HAVE YOU HAD THOUGHTS OF HARMING ANYONE ELSE?: NO

## 2024-06-21 SDOH — ECONOMIC STABILITY: TRANSPORTATION INSECURITY
IN THE PAST 12 MONTHS, HAS LACK OF TRANSPORTATION KEPT YOU FROM MEETINGS, WORK, OR FROM GETTING THINGS NEEDED FOR DAILY LIVING?: NO

## 2024-06-21 SDOH — ECONOMIC STABILITY: FOOD INSECURITY: WITHIN THE PAST 12 MONTHS, YOU WORRIED THAT YOUR FOOD WOULD RUN OUT BEFORE YOU GOT MONEY TO BUY MORE.: NEVER TRUE

## 2024-06-21 SDOH — ECONOMIC STABILITY: HOUSING INSECURITY: IN THE LAST 12 MONTHS, HOW MANY PLACES HAVE YOU LIVED?: 1

## 2024-06-21 SDOH — HEALTH STABILITY: MENTAL HEALTH: HOW OFTEN DO YOU HAVE A DRINK CONTAINING ALCOHOL?: NEVER

## 2024-06-21 SDOH — ECONOMIC STABILITY: INCOME INSECURITY: IN THE LAST 12 MONTHS, WAS THERE A TIME WHEN YOU WERE NOT ABLE TO PAY THE MORTGAGE OR RENT ON TIME?: NO

## 2024-06-21 SDOH — SOCIAL STABILITY: SOCIAL INSECURITY: ARE THERE ANY APPARENT SIGNS OF INJURIES/BEHAVIORS THAT COULD BE RELATED TO ABUSE/NEGLECT?: NO

## 2024-06-21 SDOH — ECONOMIC STABILITY: FOOD INSECURITY: WITHIN THE PAST 12 MONTHS, THE FOOD YOU BOUGHT JUST DIDN'T LAST AND YOU DIDN'T HAVE MONEY TO GET MORE.: NEVER TRUE

## 2024-06-21 SDOH — HEALTH STABILITY: MENTAL HEALTH: HOW OFTEN DO YOU HAVE 6 OR MORE DRINKS ON ONE OCCASION?: NEVER

## 2024-06-21 SDOH — SOCIAL STABILITY: SOCIAL INSECURITY: DO YOU FEEL UNSAFE GOING BACK TO THE PLACE WHERE YOU ARE LIVING?: NO

## 2024-06-21 SDOH — HEALTH STABILITY: MENTAL HEALTH: HOW OFTEN DO YOU HAVE A DRINK CONTAINING ALCOHOL?: PATIENT UNABLE TO ANSWER

## 2024-06-21 SDOH — ECONOMIC STABILITY: INCOME INSECURITY: HOW HARD IS IT FOR YOU TO PAY FOR THE VERY BASICS LIKE FOOD, HOUSING, MEDICAL CARE, AND HEATING?: NOT HARD AT ALL

## 2024-06-21 SDOH — ECONOMIC STABILITY: HOUSING INSECURITY
IN THE LAST 12 MONTHS, WAS THERE A TIME WHEN YOU DID NOT HAVE A STEADY PLACE TO SLEEP OR SLEPT IN A SHELTER (INCLUDING NOW)?: NO

## 2024-06-21 SDOH — ECONOMIC STABILITY: TRANSPORTATION INSECURITY
IN THE PAST 12 MONTHS, HAS THE LACK OF TRANSPORTATION KEPT YOU FROM MEDICAL APPOINTMENTS OR FROM GETTING MEDICATIONS?: NO

## 2024-06-21 SDOH — ECONOMIC STABILITY: INCOME INSECURITY: IN THE PAST 12 MONTHS, HAS THE ELECTRIC, GAS, OIL, OR WATER COMPANY THREATENED TO SHUT OFF SERVICE IN YOUR HOME?: NO

## 2024-06-21 SDOH — HEALTH STABILITY: MENTAL HEALTH: HOW MANY STANDARD DRINKS CONTAINING ALCOHOL DO YOU HAVE ON A TYPICAL DAY?: PATIENT DOES NOT DRINK

## 2024-06-21 SDOH — SOCIAL STABILITY: SOCIAL INSECURITY: ARE YOU OR HAVE YOU BEEN THREATENED OR ABUSED PHYSICALLY, EMOTIONALLY, OR SEXUALLY BY ANYONE?: NO

## 2024-06-21 SDOH — SOCIAL STABILITY: SOCIAL INSECURITY: DO YOU FEEL ANYONE HAS EXPLOITED OR TAKEN ADVANTAGE OF YOU FINANCIALLY OR OF YOUR PERSONAL PROPERTY?: NO

## 2024-06-21 SDOH — SOCIAL STABILITY: SOCIAL INSECURITY: HAVE YOU HAD ANY THOUGHTS OF HARMING ANYONE ELSE?: NO

## 2024-06-21 SDOH — SOCIAL STABILITY: SOCIAL INSECURITY: ABUSE: ADULT

## 2024-06-21 SDOH — SOCIAL STABILITY: SOCIAL INSECURITY: DOES ANYONE TRY TO KEEP YOU FROM HAVING/CONTACTING OTHER FRIENDS OR DOING THINGS OUTSIDE YOUR HOME?: NO

## 2024-06-21 SDOH — HEALTH STABILITY: MENTAL HEALTH: HOW MANY STANDARD DRINKS CONTAINING ALCOHOL DO YOU HAVE ON A TYPICAL DAY?: PATIENT UNABLE TO ANSWER

## 2024-06-21 SDOH — SOCIAL STABILITY: SOCIAL INSECURITY: HAS ANYONE EVER THREATENED TO HURT YOUR FAMILY OR YOUR PETS?: NO

## 2024-06-21 ASSESSMENT — COGNITIVE AND FUNCTIONAL STATUS - GENERAL
MOBILITY SCORE: 18
DRESSING REGULAR LOWER BODY CLOTHING: A LITTLE
MOVING TO AND FROM BED TO CHAIR: A LITTLE
HELP NEEDED FOR BATHING: A LITTLE
CLIMB 3 TO 5 STEPS WITH RAILING: A LITTLE
HELP NEEDED FOR BATHING: A LITTLE
PERSONAL GROOMING: A LITTLE
CLIMB 3 TO 5 STEPS WITH RAILING: A LITTLE
TURNING FROM BACK TO SIDE WHILE IN FLAT BAD: A LITTLE
DAILY ACTIVITIY SCORE: 20
WALKING IN HOSPITAL ROOM: A LITTLE
STANDING UP FROM CHAIR USING ARMS: A LITTLE
MOBILITY SCORE: 18
TURNING FROM BACK TO SIDE WHILE IN FLAT BAD: A LITTLE
EATING MEALS: A LITTLE
PATIENT BASELINE BEDBOUND: NO
HELP NEEDED FOR BATHING: A LITTLE
DAILY ACTIVITIY SCORE: 18
DAILY ACTIVITIY SCORE: 20
WALKING IN HOSPITAL ROOM: A LITTLE
TURNING FROM BACK TO SIDE WHILE IN FLAT BAD: A LITTLE
HELP NEEDED FOR BATHING: A LITTLE
STANDING UP FROM CHAIR USING ARMS: A LITTLE
CLIMB 3 TO 5 STEPS WITH RAILING: A LITTLE
TURNING FROM BACK TO SIDE WHILE IN FLAT BAD: A LITTLE
DRESSING REGULAR UPPER BODY CLOTHING: A LITTLE
DRESSING REGULAR UPPER BODY CLOTHING: A LITTLE
WALKING IN HOSPITAL ROOM: A LITTLE
STANDING UP FROM CHAIR USING ARMS: A LITTLE
MOVING FROM LYING ON BACK TO SITTING ON SIDE OF FLAT BED WITH BEDRAILS: A LITTLE
TOILETING: A LITTLE
DRESSING REGULAR UPPER BODY CLOTHING: A LITTLE
DRESSING REGULAR LOWER BODY CLOTHING: A LITTLE
MOVING TO AND FROM BED TO CHAIR: A LITTLE
CLIMB 3 TO 5 STEPS WITH RAILING: A LITTLE
DRESSING REGULAR LOWER BODY CLOTHING: A LITTLE
TOILETING: A LITTLE
MOVING FROM LYING ON BACK TO SITTING ON SIDE OF FLAT BED WITH BEDRAILS: A LITTLE
MOBILITY SCORE: 18
DRESSING REGULAR UPPER BODY CLOTHING: A LITTLE
DRESSING REGULAR LOWER BODY CLOTHING: A LITTLE
MOVING FROM LYING ON BACK TO SITTING ON SIDE OF FLAT BED WITH BEDRAILS: A LITTLE
MOVING FROM LYING ON BACK TO SITTING ON SIDE OF FLAT BED WITH BEDRAILS: A LITTLE
WALKING IN HOSPITAL ROOM: A LITTLE
DAILY ACTIVITIY SCORE: 20
MOBILITY SCORE: 18
STANDING UP FROM CHAIR USING ARMS: A LITTLE
TOILETING: A LITTLE
MOVING TO AND FROM BED TO CHAIR: A LITTLE
MOVING TO AND FROM BED TO CHAIR: A LITTLE
TOILETING: A LITTLE

## 2024-06-21 ASSESSMENT — ACTIVITIES OF DAILY LIVING (ADL)
GROOMING: INDEPENDENT
HEARING - RIGHT EAR: FUNCTIONAL
JUDGMENT_ADEQUATE_SAFELY_COMPLETE_DAILY_ACTIVITIES: YES
PATIENT'S MEMORY ADEQUATE TO SAFELY COMPLETE DAILY ACTIVITIES?: YES
DRESSING YOURSELF: INDEPENDENT
LACK_OF_TRANSPORTATION: NO
BATHING: INDEPENDENT
HEARING - LEFT EAR: FUNCTIONAL
ADEQUATE_TO_COMPLETE_ADL: YES
WALKS IN HOME: INDEPENDENT
FEEDING YOURSELF: INDEPENDENT
TOILETING: INDEPENDENT

## 2024-06-21 ASSESSMENT — PAIN DESCRIPTION - LOCATION
LOCATION: ABDOMEN

## 2024-06-21 ASSESSMENT — PAIN SCALES - GENERAL
PAINLEVEL_OUTOF10: 10 - WORST POSSIBLE PAIN
PAINLEVEL_OUTOF10: 10 - WORST POSSIBLE PAIN
PAINLEVEL_OUTOF10: 7
PAINLEVEL_OUTOF10: 10 - WORST POSSIBLE PAIN
PAINLEVEL_OUTOF10: 1
PAINLEVEL_OUTOF10: 7
PAINLEVEL_OUTOF10: 5 - MODERATE PAIN
PAINLEVEL_OUTOF10: 0 - NO PAIN
PAINLEVEL_OUTOF10: 7
PAINLEVEL_OUTOF10: 10 - WORST POSSIBLE PAIN
PAINLEVEL_OUTOF10: 3
PAINLEVEL_OUTOF10: 10 - WORST POSSIBLE PAIN

## 2024-06-21 ASSESSMENT — LIFESTYLE VARIABLES
SKIP TO QUESTIONS 9-10: 0
SKIP TO QUESTIONS 9-10: 1
AUDIT-C TOTAL SCORE: -1
AUDIT-C TOTAL SCORE: 0

## 2024-06-21 ASSESSMENT — PAIN - FUNCTIONAL ASSESSMENT
PAIN_FUNCTIONAL_ASSESSMENT: 0-10
PAIN_FUNCTIONAL_ASSESSMENT: WONG-BAKER FACES
PAIN_FUNCTIONAL_ASSESSMENT: 0-10
PAIN_FUNCTIONAL_ASSESSMENT: 0-10

## 2024-06-21 ASSESSMENT — PAIN SCALES - WONG BAKER: WONGBAKER_NUMERICALRESPONSE: HURTS WHOLE LOT

## 2024-06-21 ASSESSMENT — PAIN DESCRIPTION - ORIENTATION
ORIENTATION: MID

## 2024-06-21 NOTE — PROGRESS NOTES
06/21/24 1356   Current Planned Discharge Disposition   Current Planned Discharge Disposition SNF     Sw met with  pt to  discuss SNF  Pt does not  want to  return to  [facility]  Pt adryand  [address] at [facility] asked to send referrals.    Fatuma Anthony, MSW, LSW

## 2024-06-21 NOTE — PROGRESS NOTES
"Fernando Cuevas is a 63 y.o. female on day 0 of admission presenting with Perforated viscus.    Assessment/Plan   Status post small bowel resection and washout procedure yesterday.  Can have some liquids.  Intermittent NG clamping.  Continue IV fluids, up in chair.  DC Snell    Subjective   Did not get much sleep last night.  Pain control adequate.       Objective     Physical Exam  NAD  A&Ox3  Non icteric  CTA  RR  Abdomen soft mildly distended.  Appropriately tender.  Dressing is clean.  Extremities warm, well perfused     Last Recorded Vitals  Blood pressure 151/85, pulse 110, temperature 36.6 °C (97.8 °F), resp. rate 19, height 1.575 m (5' 2.01\"), weight 64 kg (141 lb 1.5 oz), SpO2 100%.  Intake/Output last 3 Shifts:  I/O last 3 completed shifts:  In: 700 (10.9 mL/kg) [I.V.:700 (10.9 mL/kg)]  Out: 605 (9.5 mL/kg) [Urine:600 (0.3 mL/kg/hr); Blood:5]  Weight: 64 kg     Relevant Results    Scheduled medications  pantoprazole, 40 mg, intravenous, Daily before breakfast  piperacillin-tazobactam, 3.375 g, intravenous, q6h      Continuous medications  lactated Ringer's, 100 mL/hr, Last Rate: 100 mL/hr (06/21/24 0515)      PRN medications  PRN medications: HYDROmorphone, HYDROmorphone, morphine, morphine, ondansetron    Results for orders placed or performed during the hospital encounter of 06/20/24 (from the past 24 hour(s))   ECG 12 lead   Result Value Ref Range    Ventricular Rate 108 BPM    Atrial Rate 108 BPM    OR Interval 132 ms    QRS Duration 62 ms    QT Interval 310 ms    QTC Calculation(Bazett) 415 ms    P Axis 84 degrees    R Axis 75 degrees    T Axis 73 degrees    QRS Count 18 beats    Q Onset 227 ms    P Onset 161 ms    P Offset 201 ms    T Offset 382 ms    QTC Fredericia 377 ms   CBC and Auto Differential   Result Value Ref Range    WBC 19.0 (H) 4.4 - 11.3 x10*3/uL    nRBC 1.5 (H) 0.0 - 0.0 /100 WBCs    RBC 3.13 (L) 4.00 - 5.20 x10*6/uL    Hemoglobin 6.9 (L) 12.0 - 16.0 g/dL    Hematocrit 24.5 (L) 36.0 - " 46.0 %    MCV 78 (L) 80 - 100 fL    MCH 22.0 (L) 26.0 - 34.0 pg    MCHC 28.2 (L) 32.0 - 36.0 g/dL    RDW 20.3 (H) 11.5 - 14.5 %    Platelets 824 (H) 150 - 450 x10*3/uL    Immature Granulocytes %, Automated 4.3 (H) 0.0 - 0.9 %    Immature Granulocytes Absolute, Automated 0.82 (H) 0.00 - 0.70 x10*3/uL   Comprehensive metabolic panel   Result Value Ref Range    Glucose 99 74 - 99 mg/dL    Sodium 134 (L) 136 - 145 mmol/L    Potassium 4.9 3.5 - 5.3 mmol/L    Chloride 96 (L) 98 - 107 mmol/L    Bicarbonate 30 21 - 32 mmol/L    Anion Gap 13 10 - 20 mmol/L    Urea Nitrogen 13 6 - 23 mg/dL    Creatinine 0.69 0.50 - 1.05 mg/dL    eGFR >90 >60 mL/min/1.73m*2    Calcium 8.3 (L) 8.6 - 10.3 mg/dL    Albumin 3.1 (L) 3.4 - 5.0 g/dL    Alkaline Phosphatase 70 33 - 136 U/L    Total Protein 5.2 (L) 6.4 - 8.2 g/dL    AST 14 9 - 39 U/L    Bilirubin, Total 0.2 0.0 - 1.2 mg/dL    ALT 24 7 - 45 U/L   Magnesium   Result Value Ref Range    Magnesium 1.90 1.60 - 2.40 mg/dL   Lipase   Result Value Ref Range    Lipase 37 9 - 82 U/L   Protime-INR   Result Value Ref Range    Protime 11.8 9.8 - 12.8 seconds    INR 1.0 0.9 - 1.1   Type and Screen   Result Value Ref Range    ABO TYPE O     Rh TYPE POS     ANTIBODY SCREEN NEG    Manual Differential   Result Value Ref Range    Neutrophils %, Manual 91.0 40.0 - 80.0 %    Bands %, Manual 3.0 0.0 - 5.0 %    Lymphocytes %, Manual 3.0 13.0 - 44.0 %    Monocytes %, Manual 0.0 2.0 - 10.0 %    Eosinophils %, Manual 0.0 0.0 - 6.0 %    Basophils %, Manual 0.0 0.0 - 2.0 %    Atypical Lymphocytes %, Manual 1.0 0.0 - 2.0 %    Metamyelocytes %, Manual 1.0 0.0 - 0.0 %    Myelocytes %, Manual 1.0 0.0 - 0.0 %    Seg Neutrophils Absolute, Manual 17.29 (H) 1.20 - 7.00 x10*3/uL    Bands Absolute, Manual 0.57 0.00 - 0.70 x10*3/uL    Lymphocytes Absolute, Manual 0.57 (L) 1.20 - 4.80 x10*3/uL    Monocytes Absolute, Manual 0.00 (L) 0.10 - 1.00 x10*3/uL    Eosinophils Absolute, Manual 0.00 0.00 - 0.70 x10*3/uL    Basophils  Absolute, Manual 0.00 0.00 - 0.10 x10*3/uL    Atypical Lymphs Absolute, Manual 0.19 0.00 - 0.50 x10*3/uL    Metamyelocytes Absolute, Manual 0.19 0.00 - 0.00 x10*3/uL    Myelocytes Absolute, Manual 0.19 0.00 - 0.00 x10*3/uL    Total Cells Counted 100     Neutrophils Absolute, Manual 17.86 (H) 1.20 - 7.70 x10*3/uL    RBC Morphology See Below     Polychromasia Mild     Hypochromia Mild     Ovalocytes Few     Teardrop Cells Few    Ethanol   Result Value Ref Range    Alcohol <10 <=10 mg/dL   Lactate   Result Value Ref Range    Lactate 2.4 (H) 0.4 - 2.0 mmol/L   Gray Top   Result Value Ref Range    Extra Tube Hold for add-ons.    Prepare RBC: 1 Units   Result Value Ref Range    PRODUCT CODE X6114H12     Unit Number [id number]     Unit ABO O     Unit RH NEG     XM INTEP COMP     Dispense Status IS     Blood Expiration Date June 21, 2024 23:59 EDT     PRODUCT BLOOD TYPE 9500     UNIT VOLUME 350    Lactate   Result Value Ref Range    Lactate 2.2 (H) 0.4 - 2.0 mmol/L   Comprehensive metabolic panel   Result Value Ref Range    Glucose 129 (H) 74 - 99 mg/dL    Sodium 125 (L) 136 - 145 mmol/L    Potassium 5.1 3.5 - 5.3 mmol/L    Chloride 92 (L) 98 - 107 mmol/L    Bicarbonate 27 21 - 32 mmol/L    Anion Gap 11 10 - 20 mmol/L    Urea Nitrogen 11 6 - 23 mg/dL    Creatinine 0.73 0.50 - 1.05 mg/dL    eGFR >90 >60 mL/min/1.73m*2    Calcium 8.5 (L) 8.6 - 10.3 mg/dL    Albumin 2.9 (L) 3.4 - 5.0 g/dL    Alkaline Phosphatase 65 33 - 136 U/L    Total Protein 5.4 (L) 6.4 - 8.2 g/dL    AST 14 9 - 39 U/L    Bilirubin, Total 0.4 0.0 - 1.2 mg/dL    ALT 23 7 - 45 U/L   Magnesium   Result Value Ref Range    Magnesium 1.80 1.60 - 2.40 mg/dL   CBC and Auto Differential   Result Value Ref Range    WBC 40.3 (H) 4.4 - 11.3 x10*3/uL    nRBC 0.4 (H) 0.0 - 0.0 /100 WBCs    RBC 3.59 (L) 4.00 - 5.20 x10*6/uL    Hemoglobin 8.5 (L) 12.0 - 16.0 g/dL    Hematocrit 28.7 (L) 36.0 - 46.0 %    MCV 80 80 - 100 fL    MCH 23.7 (L) 26.0 - 34.0 pg    MCHC 29.6 (L)  32.0 - 36.0 g/dL    RDW 20.1 (H) 11.5 - 14.5 %    Platelets 728 (H) 150 - 450 x10*3/uL    Immature Granulocytes %, Automated 1.6 (H) 0.0 - 0.9 %    Immature Granulocytes Absolute, Automated 0.65 0.00 - 0.70 x10*3/uL   Manual Differential   Result Value Ref Range    Neutrophils %, Manual 93.0 40.0 - 80.0 %    Lymphocytes %, Manual 3.0 13.0 - 44.0 %    Monocytes %, Manual 3.0 2.0 - 10.0 %    Eosinophils %, Manual 0.0 0.0 - 6.0 %    Basophils %, Manual 0.0 0.0 - 2.0 %    Myelocytes %, Manual 1.0 0.0 - 0.0 %    Seg Neutrophils Absolute, Manual 37.48 (H) 1.20 - 7.00 x10*3/uL    Lymphocytes Absolute, Manual 1.21 1.20 - 4.80 x10*3/uL    Monocytes Absolute, Manual 1.21 (H) 0.10 - 1.00 x10*3/uL    Eosinophils Absolute, Manual 0.00 0.00 - 0.70 x10*3/uL    Basophils Absolute, Manual 0.00 0.00 - 0.10 x10*3/uL    Myelocytes Absolute, Manual 0.40 0.00 - 0.00 x10*3/uL    Total Cells Counted 100     RBC Morphology See Below     Polychromasia Mild     Hypochromia Mild     Spherocytes Few     Target Cells Few     Ovalocytes Few     Basophilic Stippling Present            I spent 25 minutes in the professional and overall care of this patient.      Reid Bingham MD

## 2024-06-21 NOTE — ANESTHESIA PROCEDURE NOTES
Airway  Date/Time: 6/21/2024 1:04 AM  Urgency: elective    Airway not difficult    Staffing  Performed: KIRAN   Authorized by: Tenzin Nelson MD    Performed by: KIRAN Evans  Patient location during procedure: OR    Indications and Patient Condition  Indications for airway management: anesthesia  Spontaneous Ventilation: absent  Sedation level: deep  Preoxygenated: yes  Patient position: sniffing  Mask difficulty assessment: 1 - vent by mask    Final Airway Details  Final airway type: endotracheal airway      Successful airway: ETT  Cuffed: yes   Successful intubation technique: direct laryngoscopy  Endotracheal tube insertion site: oral  Blade: Juan J  Blade size: #3  ETT size (mm): 7.0  Cormack-Lehane Classification: grade I - full view of glottis  Placement verified by: chest auscultation and capnometry   Measured from: lips  ETT to lips (cm): 21

## 2024-06-21 NOTE — POST-PROCEDURE NOTE
Ms. Cuevas is a 63 year old female who is POD #0 from a exploration laparotomy and partial small bowel resection (Intraoperative Findings: Segment of perforated small bowel). She does endorse abdominal discomfort post-operatively. She endorses nausea without vomiting. She does have an NG and easton in place at the time of my assessment.     PE:  Constitutional: Calm and cooperative, NAD.  Eyes: PERRL, clear sclera.  ENMT: Moist mucous membranes.  Head/Neck: Neck supple.  Cardiovascular: Tachycardic,   Respiratory/Thorax: Unlabored breathing.   Gastrointestinal: Abdomen slightly distended, soft, appropriately tender to palpation, no peritoneal signs. Lower midline island dressing in place with a mild serosanguinous shadowing on dressing. NG in place without output in canister.  Genitourinary: Easton catheter in place draining yellow urine.   Musculoskeletal: ROM intact.  Skin: Warm and dry.    Radiology and labs reviewed. VSS, afebrile.    Plan:   - Diet: NPO  - Continue current pain regimen   - PRN antiemetic   - DVT Proph: SCDs/ ambulate. Holding chemoppx due to low Hgb. Consider transfusion if Hgb < 7 this morning.   - NG to LIWS. Monitor and record output.  - Monitor and record easton catheter output.   - Monitor VS every 4 hours   - Labs ordered for AM   - IS every hour while awake   - Encourage ambulation / OOB as tolerated   - Monitor surgical site for drainage, erythema, excessive bruising   - Continue supportive care  - F/u with Dr. Liang outpatient in 10-14 days once d/c from hospital     Dispo: Pain and nausea control as needed. OOB when able. Monitor and record NG and easton outputs.

## 2024-06-21 NOTE — CONSULTS
Reason For Consult  Perforated viscus    History Of Present Illness  Fernando Cuevas is a 63 y.o. female presenting with an acute onset of diffuse abdominal pain today. Of note, she was recently admitted to [facility] from 6/11-20 due to pneumonia. She was treated with IV antibiotics and completed a total 5-day course. She was discharged with a prednisone taper. She does have a history of COPD, states she does not wear oxygen at baseline, and was discharged on room air. She also states she has a history of lung cancer (she states she received 1 dose of chemotherapy last year and has not received any further doses).    She is unsure of what time her abdominal pain started. She describes it as diffuse. She states she did feel distended when the pain started, but does not feel distended anymore. She endorses nausea without vomiting. Her last bowel movement was yesterday, which she states was normal for her. Her only past abdominal surgical history is 2 c-sections. She does not take any blood thinners (she was on Lovenox as DVT ppx while admitted). In the ED, she is afebrile and tachycardic to 111 BPM. Blood pressure is stable. WBC 19, H/H 6.9/24.5, lactate 2.4. CT A/P demonstrated localizing free air in the anterior mid abdomen compatible with perforated viscus. Site of perforation potentially emanates from a small bowel loop in the mid abdomen. Due to this, general surgery was consulted.      Past Medical History  She has a past medical history of Essential (primary) hypertension (04/20/2016), Personal history of other diseases of the respiratory system, Personal history of other diseases of the respiratory system, and Personal history of other mental and behavioral disorders.    Surgical History  She has a past surgical history that includes CT angio abdomen pelvis w and or wo IV IV contrast (3/22/2016); MR angio neck w IV contrast (4/19/2021); and CT angio coronary art with heartflow if score >30% (1/5/2023).     Social  "History  She reports that she has been smoking cigarettes. She has been exposed to tobacco smoke. She has never used smokeless tobacco. No history on file for alcohol use and drug use.    Family History  No family history on file.     Allergies  Iodinated contrast media    Review of Systems  ABD: + pain, nausea. Denies vomiting, diarrhea or constipation.      Physical Exam  Constitutional: Awake/alert/oriented x3, appears uncomfortable.   Skin: Warm and dry.  Eyes: EOMI, clear sclera.  ENMT: Mucus membranes moist, no apparent injury.  Head/Neck: Neck supple, no apparent injury.  Respiratory: Unlabored breathing via NC.   Cardiovascular: Tachycardic at 111 BPM.   GI: Moderately distended, mildly firm, severely diffusely tender to palpation. Peritonitic.    Musculoskeletal: ROM intact.  Extremities: PRATT.  Neurological: Alert and oriented x3.  Psychological: Tearful.      Last Recorded Vitals  Blood pressure 143/68, pulse (!) 103, temperature 36.7 °C (98.1 °F), temperature source Oral, resp. rate 20, height 1.575 m (5' 2\"), weight 64 kg (141 lb 1.5 oz), SpO2 97%.    Relevant Results  Results for orders placed or performed during the hospital encounter of 06/20/24 (from the past 24 hour(s))   CBC and Auto Differential   Result Value Ref Range    WBC 19.0 (H) 4.4 - 11.3 x10*3/uL    nRBC 1.5 (H) 0.0 - 0.0 /100 WBCs    RBC 3.13 (L) 4.00 - 5.20 x10*6/uL    Hemoglobin 6.9 (L) 12.0 - 16.0 g/dL    Hematocrit 24.5 (L) 36.0 - 46.0 %    MCV 78 (L) 80 - 100 fL    MCH 22.0 (L) 26.0 - 34.0 pg    MCHC 28.2 (L) 32.0 - 36.0 g/dL    RDW 20.3 (H) 11.5 - 14.5 %    Platelets 824 (H) 150 - 450 x10*3/uL    Immature Granulocytes %, Automated 4.3 (H) 0.0 - 0.9 %    Immature Granulocytes Absolute, Automated 0.82 (H) 0.00 - 0.70 x10*3/uL   Comprehensive metabolic panel   Result Value Ref Range    Glucose 99 74 - 99 mg/dL    Sodium 134 (L) 136 - 145 mmol/L    Potassium 4.9 3.5 - 5.3 mmol/L    Chloride 96 (L) 98 - 107 mmol/L    Bicarbonate 30 21 - " 32 mmol/L    Anion Gap 13 10 - 20 mmol/L    Urea Nitrogen 13 6 - 23 mg/dL    Creatinine 0.69 0.50 - 1.05 mg/dL    eGFR >90 >60 mL/min/1.73m*2    Calcium 8.3 (L) 8.6 - 10.3 mg/dL    Albumin 3.1 (L) 3.4 - 5.0 g/dL    Alkaline Phosphatase 70 33 - 136 U/L    Total Protein 5.2 (L) 6.4 - 8.2 g/dL    AST 14 9 - 39 U/L    Bilirubin, Total 0.2 0.0 - 1.2 mg/dL    ALT 24 7 - 45 U/L   Magnesium   Result Value Ref Range    Magnesium 1.90 1.60 - 2.40 mg/dL   Lipase   Result Value Ref Range    Lipase 37 9 - 82 U/L   Protime-INR   Result Value Ref Range    Protime 11.8 9.8 - 12.8 seconds    INR 1.0 0.9 - 1.1   Type and Screen   Result Value Ref Range    ABO TYPE O     Rh TYPE POS     ANTIBODY SCREEN NEG    Manual Differential   Result Value Ref Range    Neutrophils %, Manual 91.0 40.0 - 80.0 %    Bands %, Manual 3.0 0.0 - 5.0 %    Lymphocytes %, Manual 3.0 13.0 - 44.0 %    Monocytes %, Manual 0.0 2.0 - 10.0 %    Eosinophils %, Manual 0.0 0.0 - 6.0 %    Basophils %, Manual 0.0 0.0 - 2.0 %    Atypical Lymphocytes %, Manual 1.0 0.0 - 2.0 %    Metamyelocytes %, Manual 1.0 0.0 - 0.0 %    Myelocytes %, Manual 1.0 0.0 - 0.0 %    Seg Neutrophils Absolute, Manual 17.29 (H) 1.20 - 7.00 x10*3/uL    Bands Absolute, Manual 0.57 0.00 - 0.70 x10*3/uL    Lymphocytes Absolute, Manual 0.57 (L) 1.20 - 4.80 x10*3/uL    Monocytes Absolute, Manual 0.00 (L) 0.10 - 1.00 x10*3/uL    Eosinophils Absolute, Manual 0.00 0.00 - 0.70 x10*3/uL    Basophils Absolute, Manual 0.00 0.00 - 0.10 x10*3/uL    Atypical Lymphs Absolute, Manual 0.19 0.00 - 0.50 x10*3/uL    Metamyelocytes Absolute, Manual 0.19 0.00 - 0.00 x10*3/uL    Myelocytes Absolute, Manual 0.19 0.00 - 0.00 x10*3/uL    Total Cells Counted 100     Neutrophils Absolute, Manual 17.86 (H) 1.20 - 7.70 x10*3/uL    RBC Morphology See Below     Polychromasia Mild     Hypochromia Mild     Ovalocytes Few     Teardrop Cells Few    Lactate   Result Value Ref Range    Lactate 2.4 (H) 0.4 - 2.0 mmol/L   Gray Top    Result Value Ref Range    Extra Tube Hold for add-ons.       CT abdomen pelvis wo IV contrast    Result Date: 6/20/2024  STUDY: CT Abdomen and Pelvis without IV Contrast; 6/20/2024 10:06 PM. INDICATION: Abdominal distension and severe pain.  Evaluate for small bowel obstruction versus perforation. COMPARISON: CT AP 5/11/2024. ACCESSION NUMBER(S): [id number] ORDERING CLINICIAN: DONOVAN ALBA TECHNIQUE: CT of the abdomen and pelvis was performed.  Contiguous axial images were obtained at 3 mm slice thickness through the abdomen and pelvis. Coronal and sagittal reconstructions at 3 mm slice thickness were performed. No intravenous contrast was administered.  Automated mA/kV exposure control was utilized and patient examination was performed in strict accordance with principles of ALARA. FINDINGS: Please note that the evaluation of vessels, lymph nodes and organs is limited without intravenous contrast.  ABDOMEN: There is a localizing free air in the anterior mid abdomen, compatible with perforated viscus. This potentially arises from adjacent an adjacent small bowel loop in the mid abdomen, where there is minimal localizing extraluminal air (series 604, slice 78/159). The stomach is markedly distended with air and fluid. There is no localizing gastric thickening. There is minimal distention of proximal small bowel without appreciable thickening. Appendix is normal. Moderate stool is seen throughout the colon. There is minimal diverticulosis in the sigmoid colon. There is no colitis or diverticulitis. No significant abnormalities are detectable in the liver, spleen, pancreas, adrenal glands, kidneys, or biliary system. No calculi are seen. There is no hydronephrosis or biliary duct dilatation. There is no localizing lymphadenopathy or ascites. Abdominal aorta is normal caliber. PELVIS: Bladder is moderately distended. There is no free fluid. Inguinal rings are minimally patulous containing fat. BONES: Bony  structures are intact. Lumbar spine is unremarkable. LOWER CHEST: There is lower lung emphysema and peribronchial thickening. There are no pleural effusions. There is a small rent at the medial left hemidiaphragm containing herniated fat.    1. Localizing free air in the anterior mid abdomen compatible with perforated viscus. advise surgical consult. 2. Site of perforation potentially emanates from a small bowel loop in the mid abdomen. 3. No associated free fluid. 4. Marked gastric distention with air and fluid. 5. Remainder as above. 6. Urgent finding and recommendation given to and acknowledged by Sheldon RIVAS at 10:27 PM Bishop on 6/20/2024. Signed by Yash Hood MD    XR chest 1 view    Result Date: 6/18/2024  Interpreted By:  Amanda Porras, STUDY: XR CHEST 1 VIEW;  6/18/2024 4:18 pm   INDICATION: Signs/Symptoms:Productive cough.   COMPARISON: 06/11/2024   ACCESSION NUMBER(S): [id number]   ORDERING CLINICIAN: ANGELICA QUACH   FINDINGS: The heart is normal in size.   There is no obvious consolidation or pleural fluid.   The mediastinum is unremarkable. The bones are not well seen.   COMPARISON OF FINDING: The chest is similar.       No acute cardiopulmonary disease.   MACRO: none   Signed by: Amanda Porras 6/18/2024 4:20 PM Dictation workstation:   [id number]    CT maxillofacial bones wo IV contrast    Result Date: 6/17/2024  Interpreted By:  Gabe Gage, STUDY: CT FACIAL BONES WO IV CONTRAST;  6/17/2024 9:09 am   INDICATION: Signs/Symptoms:Rt jaw pain.   COMPARISON: None.   ACCESSION NUMBER(S): [id number]   ORDERING CLINICIAN: ANGELICA QUACH   TECHNIQUE: Thin section axial images were obtained through the facial bones. Coronal and sagittal reconstruction images were generated. Also, true 3-D rotational views were generated on an outside work station. Soft tissue and bone windows were reviewed.   FINDINGS: PARANASAL SINUSES: The paranasal sinuses were clear. There was no deviation of the nasal  septum. The infundibulum of each ostiomeatal complex was patent. No fluid level in the paranasal sinuses.   FACIAL BONES: Orbital rims and orbital contents were intact. The mandible was intact. There was no destructive lytic or blastic bone lesion. There was no facial bone fracture.   NECK AND SKULL BASE: No suspicious cervical adenopathy. There was no mass posteriorly in the region of the nasopharynx. Mastoid air cells were clear.   BRAIN: Mild prominence of the imaged ventricles and sulci. No acute intracranial bleed or midline shift in the imaged brain.  Skullbase arterial calcifications in the carotid siphons  .       Mild volume loss in the imaged brain. Mild bilateral carotid siphon skull base arterial calcifications.   Remainder of the exam was negative.   MACRO: None   Signed by: Gabe Gage 6/17/2024 9:37 AM Dictation workstation:   [id number]    Electrocardiogram, 12-lead PRN ACS symptoms    Result Date: 6/15/2024  Sinus tachycardia Right atrial enlargement Anterior infarct (cited on or before 11-JUN-2024) Abnormal ECG When compared with ECG of 11-JUN-2024 17:03, (unconfirmed) No significant change was found See ED provider note for full interpretation and clinical correlation Confirmed by Shirley Durbin ([id number]) on 6/15/2024 2:40:47 PM    ECG 12 Lead    Result Date: 6/15/2024  Normal sinus rhythm Right atrial enlargement Anterior infarct (cited on or before 11-JUN-2024) Abnormal ECG When compared with ECG of 11-JUN-2024 14:28, (unconfirmed) No significant change was found See ED provider note for full interpretation and clinical correlation Confirmed by Shirley Durbin ([id number]) on 6/15/2024 2:37:50 PM    CT chest wo IV contrast    Result Date: 6/13/2024  Interpreted By:  Gabe Gage, STUDY: CT CHEST WO IV CONTRAST;  6/13/2024 2:51 pm   INDICATION: 62 y/o   F with  Signs/Symptoms:pna.   LIMITATIONS: Imaging of the mediastinum and karen is limited without the use of IV contrast..   ACCESSION NUMBER(S):  [id number]   ORDERING CLINICIAN: MARCOS REECE   TECHNIQUE: Noncontrast Thin-section images were obtained  from the thoracic inlet down through the diaphragm. Sagittal and coronal reconstruction images were generated. Mediastinal, lung, bone, and liver windows were reviewed.   COMPARISON: Most recent prior is from 05/08/2024.   FINDINGS: CHEST WALL/BASE OF THE NECK: The chest wall was grossly intact. No thyromegaly or thyroid mass.   LUNGS/ PLEURA/ AND TRACHEA: Stable linear scar across the posteromedial right lung base. There are mild patchy infiltrative densities in the mid and lower right upper lobe and in the perihilar/infrahilar right middle lobe, not present previously. No infiltrative density in the left lung. There are mild-to-moderate emphysematous changes in the lungs. No focal masslike density in either lung. No mass or pneumonia in either lung. No pleural effusion. No pneumothorax. The trachea was grossly intact.   MEDIASTINUM/ALESSANDRO: Small stable fat containing posteromedial left diaphragmatic hernia. No suspicious mediastinal, hilar, or axillary mass or adenopathy in this unenhanced exam. No cardiomegaly. No pericardial effusion. No thoracic aortic aneurysm.   BONES: No destructive lytic or blastic bone lesion.   UPPER ABDOMEN: The imaged upper abdomen was grossly intact.       Underlying emphysema as described.   Stable linear scar cross the posteromedial right lung base.   Mild new pneumonia in the right upper lobe and right middle lobe as described.   MACRO: None   Signed by: Gabe Gage 6/13/2024 3:24 PM Dictation workstation:   [id number]    CT head wo IV contrast    Result Date: 6/12/2024  Interpreted By:  Sheri Lakhani, STUDY: CT HEAD WO IV CONTRAST;  6/12/2024 12:53 am   INDICATION: Signs/Symptoms:head injury earlier today, headache.   COMPARISON: CT head dated 07/24/2023.   ACCESSION NUMBER(S): [id number]   ORDERING CLINICIAN: SARA RUTLEDGE   TECHNIQUE: Noncontrast axial CT scan  of head was performed. Angled reformats in brain and bone windows were generated. The images were reviewed in bone, brain, blood and soft tissue windows.   FINDINGS: No hyperdense intracranial hemorrhage is identified. There is no mass effect or midline shift.   Gray-white differentiation is intact, without evidence of CT apparent transcortical infarct. Subtle attenuation changes are present in the periventricular and subcortical white matter of bilateral cerebral hemispheres, nonspecific findings favored to represent sequela of microvascular disease.   No ventricular dilatation is present. Basal cisterns are patent. No extra-axial fluid collections are identified.   Scalp soft tissues do not demonstrate any acute abnormality. Calvarium is unremarkable in appearance. Mastoid air cells and middle ear cavities are clear.   Visualized paranasal sinuses are unremarkable in appearance.       1. No evidence of hemorrhage, skull fracture, or other acute intracranial trauma/abnormality. 2. Patchy attenuation changes are present in the periventricular and subcortical white matter of bilateral cerebral hemispheres, nonspecific findings favored to represent sequela of microvascular disease.   MACRO: None   Signed by: Sheri Lakhani 6/12/2024 1:07 AM Dictation workstation:   [id number]    XR chest 1 view    Result Date: 6/11/2024  Interpreted By:  Zack Sevilla, STUDY: XR CHEST 1 VIEW;  6/11/2024 3:26 pm   INDICATION: Signs/Symptoms:chest pain.   COMPARISON: 05/10/2024.   ACCESSION NUMBER(S): [id number]   ORDERING CLINICIAN: MARYBETH BRANHAM   FINDINGS:   The cardiac silhouette is unremarkable. Costophrenic angles are sharp. Lungs are clear. The trachea is midline. There is no pneumothorax. No acute osseous abnormality is seen.       1.  No active cardiopulmonary process.     Signed by: Zack Sevilla 6/11/2024 3:48 PM Dictation workstation:   [id number]       Assessment/Plan  Perforated viscus  - Abdomen  moderately distended, mildly firm, severely diffusely tender to palpation. Peritonitic. Endorses nausea without vomiting.   - Pain control as needed  - PRN antiemetic  - NPO  - Zosyn  - OR tonight for exploration laparotomy with Dr. Liang    Dispo: OR tonight for exploration laparotomy with Dr. Liang. Discussed with Dr. Liang.     I spent 45 minutes in the professional and overall care of this patient.      Beverly Villeda, APRN-CNP

## 2024-06-21 NOTE — H&P
History Of Present Illness  Fernando Cuevas is a 63 y.o. female presenting with abdominal pain.  Patient was just recently discharged from the hospital on  after having a over weeklong stay for acute exacerbation of COPD secondary to pneumonia.  Patient has past medical history significant for the OPD, anxiety, anemia, drug use and adenocarcinoma of the right lung.  Patient states yesterday afternoon she had her lunch and then started developing severe abdominal pain that would not go away at worst it was 9 out of 10 therefore she came into the ED was found to have a perforated bowel therefore she was taken into the OR emergently.     Past Medical History  She has a past medical history of Essential (primary) hypertension (2016), Personal history of other diseases of the respiratory system, Personal history of other diseases of the respiratory system, and Personal history of other mental and behavioral disorders.    Surgical History  She has a past surgical history that includes CT angio abdomen pelvis w and or wo IV IV contrast (2016); MR angio neck w IV contrast (2021); CT angio coronary art with heartflow if score >30% (2023); and  section, low transverse.     Social History  She reports that she has been smoking cigarettes. She has been exposed to tobacco smoke. She has never used smokeless tobacco. She reports that she does not currently use alcohol. She reports that she does not currently use drugs.    Family History  No family history on file.     Allergies  Iodinated contrast media    Review of Systems   All other systems reviewed and are negative.       Physical Exam  Vitals reviewed.   Constitutional:       Appearance: Normal appearance.   HENT:      Head: Normocephalic.      Nose: Nose normal.      Mouth/Throat:      Mouth: Mucous membranes are moist.   Cardiovascular:      Rate and Rhythm: Normal rate and regular rhythm.      Pulses: Normal pulses.   Pulmonary:       Effort: Pulmonary effort is normal.      Breath sounds: Normal breath sounds.   Abdominal:      General: Abdomen is flat.      Palpations: Abdomen is soft.      Comments: Hypoactive bs   Musculoskeletal:         General: Normal range of motion.   Skin:     General: Skin is warm.      Capillary Refill: Capillary refill takes less than 2 seconds.   Neurological:      Mental Status: She is alert.          Last Recorded Vitals  /85   Pulse 110   Temp 36.6 °C (97.8 °F)   Resp 19   Wt 64 kg (141 lb 1.5 oz)   SpO2 99%     Relevant Results  aspirin, 81 mg, oral, Daily  azithromycin, 250 mg, oral, Once per day on Monday Wednesday Friday  budesonide, 0.25 mg, nebulization, Daily   And  formoterol, 20 mcg, nebulization, BID  busPIRone, 5 mg, oral, BID  calcium carbonate, 1,500 mg, oral, Daily  dilTIAZem ER, 240 mg, oral, Daily  ipratropium-albuteroL, 3 mL, nebulization, TID  lisinopril, 10 mg, oral, Daily  montelukast, 10 mg, oral, Daily  nortriptyline, 50 mg, oral, Nightly  oxybutynin, 5 mg, oral, Daily  pantoprazole, 40 mg, intravenous, Daily before breakfast  piperacillin-tazobactam, 3.375 g, intravenous, q6h  predniSONE, 40 mg, oral, Daily   Followed by  [START ON 6/23/2024] predniSONE, 30 mg, oral, Daily   Followed by  [START ON 6/26/2024] predniSONE, 20 mg, oral, Daily   Followed by  [START ON 6/29/2024] predniSONE, 10 mg, oral, Daily  thiamine, 100 mg, oral, Daily  varenicline, 1 mg, oral, BID      Results for orders placed or performed during the hospital encounter of 06/20/24 (from the past 24 hour(s))   ECG 12 lead   Result Value Ref Range    Ventricular Rate 108 BPM    Atrial Rate 108 BPM    LA Interval 132 ms    QRS Duration 62 ms    QT Interval 310 ms    QTC Calculation(Bazett) 415 ms    P Axis 84 degrees    R Axis 75 degrees    T Axis 73 degrees    QRS Count 18 beats    Q Onset 227 ms    P Onset 161 ms    P Offset 201 ms    T Offset 382 ms    QTC Fredericia 377 ms   CBC and Auto Differential   Result  Value Ref Range    WBC 19.0 (H) 4.4 - 11.3 x10*3/uL    nRBC 1.5 (H) 0.0 - 0.0 /100 WBCs    RBC 3.13 (L) 4.00 - 5.20 x10*6/uL    Hemoglobin 6.9 (L) 12.0 - 16.0 g/dL    Hematocrit 24.5 (L) 36.0 - 46.0 %    MCV 78 (L) 80 - 100 fL    MCH 22.0 (L) 26.0 - 34.0 pg    MCHC 28.2 (L) 32.0 - 36.0 g/dL    RDW 20.3 (H) 11.5 - 14.5 %    Platelets 824 (H) 150 - 450 x10*3/uL    Immature Granulocytes %, Automated 4.3 (H) 0.0 - 0.9 %    Immature Granulocytes Absolute, Automated 0.82 (H) 0.00 - 0.70 x10*3/uL   Comprehensive metabolic panel   Result Value Ref Range    Glucose 99 74 - 99 mg/dL    Sodium 134 (L) 136 - 145 mmol/L    Potassium 4.9 3.5 - 5.3 mmol/L    Chloride 96 (L) 98 - 107 mmol/L    Bicarbonate 30 21 - 32 mmol/L    Anion Gap 13 10 - 20 mmol/L    Urea Nitrogen 13 6 - 23 mg/dL    Creatinine 0.69 0.50 - 1.05 mg/dL    eGFR >90 >60 mL/min/1.73m*2    Calcium 8.3 (L) 8.6 - 10.3 mg/dL    Albumin 3.1 (L) 3.4 - 5.0 g/dL    Alkaline Phosphatase 70 33 - 136 U/L    Total Protein 5.2 (L) 6.4 - 8.2 g/dL    AST 14 9 - 39 U/L    Bilirubin, Total 0.2 0.0 - 1.2 mg/dL    ALT 24 7 - 45 U/L   Magnesium   Result Value Ref Range    Magnesium 1.90 1.60 - 2.40 mg/dL   Lipase   Result Value Ref Range    Lipase 37 9 - 82 U/L   Protime-INR   Result Value Ref Range    Protime 11.8 9.8 - 12.8 seconds    INR 1.0 0.9 - 1.1   Type and Screen   Result Value Ref Range    ABO TYPE O     Rh TYPE POS     ANTIBODY SCREEN NEG    Manual Differential   Result Value Ref Range    Neutrophils %, Manual 91.0 40.0 - 80.0 %    Bands %, Manual 3.0 0.0 - 5.0 %    Lymphocytes %, Manual 3.0 13.0 - 44.0 %    Monocytes %, Manual 0.0 2.0 - 10.0 %    Eosinophils %, Manual 0.0 0.0 - 6.0 %    Basophils %, Manual 0.0 0.0 - 2.0 %    Atypical Lymphocytes %, Manual 1.0 0.0 - 2.0 %    Metamyelocytes %, Manual 1.0 0.0 - 0.0 %    Myelocytes %, Manual 1.0 0.0 - 0.0 %    Seg Neutrophils Absolute, Manual 17.29 (H) 1.20 - 7.00 x10*3/uL    Bands Absolute, Manual 0.57 0.00 - 0.70 x10*3/uL     Lymphocytes Absolute, Manual 0.57 (L) 1.20 - 4.80 x10*3/uL    Monocytes Absolute, Manual 0.00 (L) 0.10 - 1.00 x10*3/uL    Eosinophils Absolute, Manual 0.00 0.00 - 0.70 x10*3/uL    Basophils Absolute, Manual 0.00 0.00 - 0.10 x10*3/uL    Atypical Lymphs Absolute, Manual 0.19 0.00 - 0.50 x10*3/uL    Metamyelocytes Absolute, Manual 0.19 0.00 - 0.00 x10*3/uL    Myelocytes Absolute, Manual 0.19 0.00 - 0.00 x10*3/uL    Total Cells Counted 100     Neutrophils Absolute, Manual 17.86 (H) 1.20 - 7.70 x10*3/uL    RBC Morphology See Below     Polychromasia Mild     Hypochromia Mild     Ovalocytes Few     Teardrop Cells Few    Ethanol   Result Value Ref Range    Alcohol <10 <=10 mg/dL   Lactate   Result Value Ref Range    Lactate 2.4 (H) 0.4 - 2.0 mmol/L   Gray Top   Result Value Ref Range    Extra Tube Hold for add-ons.    Prepare RBC: 1 Units   Result Value Ref Range    PRODUCT CODE X6401G36     Unit Number [id number]     Unit ABO O     Unit RH NEG     XM INTEP COMP     Dispense Status IS     Blood Expiration Date June 21, 2024 23:59 EDT     PRODUCT BLOOD TYPE 9500     UNIT VOLUME 350    Sterile Fluid Culture/Smear    Specimen: PERITONEAL FLUID RIGHT UPPER QUADRANT   Result Value Ref Range    Gram Stain (4+) Abundant Polymorphonuclear leukocytes     Gram Stain No organisms seen    Lactate   Result Value Ref Range    Lactate 2.2 (H) 0.4 - 2.0 mmol/L   Comprehensive metabolic panel   Result Value Ref Range    Glucose 129 (H) 74 - 99 mg/dL    Sodium 125 (L) 136 - 145 mmol/L    Potassium 5.1 3.5 - 5.3 mmol/L    Chloride 92 (L) 98 - 107 mmol/L    Bicarbonate 27 21 - 32 mmol/L    Anion Gap 11 10 - 20 mmol/L    Urea Nitrogen 11 6 - 23 mg/dL    Creatinine 0.73 0.50 - 1.05 mg/dL    eGFR >90 >60 mL/min/1.73m*2    Calcium 8.5 (L) 8.6 - 10.3 mg/dL    Albumin 2.9 (L) 3.4 - 5.0 g/dL    Alkaline Phosphatase 65 33 - 136 U/L    Total Protein 5.4 (L) 6.4 - 8.2 g/dL    AST 14 9 - 39 U/L    Bilirubin, Total 0.4 0.0 - 1.2 mg/dL    ALT 23 7 -  45 U/L   Magnesium   Result Value Ref Range    Magnesium 1.80 1.60 - 2.40 mg/dL   CBC and Auto Differential   Result Value Ref Range    WBC 40.3 (H) 4.4 - 11.3 x10*3/uL    nRBC 0.4 (H) 0.0 - 0.0 /100 WBCs    RBC 3.59 (L) 4.00 - 5.20 x10*6/uL    Hemoglobin 8.5 (L) 12.0 - 16.0 g/dL    Hematocrit 28.7 (L) 36.0 - 46.0 %    MCV 80 80 - 100 fL    MCH 23.7 (L) 26.0 - 34.0 pg    MCHC 29.6 (L) 32.0 - 36.0 g/dL    RDW 20.1 (H) 11.5 - 14.5 %    Platelets 728 (H) 150 - 450 x10*3/uL    Immature Granulocytes %, Automated 1.6 (H) 0.0 - 0.9 %    Immature Granulocytes Absolute, Automated 0.65 0.00 - 0.70 x10*3/uL   Manual Differential   Result Value Ref Range    Neutrophils %, Manual 93.0 40.0 - 80.0 %    Lymphocytes %, Manual 3.0 13.0 - 44.0 %    Monocytes %, Manual 3.0 2.0 - 10.0 %    Eosinophils %, Manual 0.0 0.0 - 6.0 %    Basophils %, Manual 0.0 0.0 - 2.0 %    Myelocytes %, Manual 1.0 0.0 - 0.0 %    Seg Neutrophils Absolute, Manual 37.48 (H) 1.20 - 7.00 x10*3/uL    Lymphocytes Absolute, Manual 1.21 1.20 - 4.80 x10*3/uL    Monocytes Absolute, Manual 1.21 (H) 0.10 - 1.00 x10*3/uL    Eosinophils Absolute, Manual 0.00 0.00 - 0.70 x10*3/uL    Basophils Absolute, Manual 0.00 0.00 - 0.10 x10*3/uL    Myelocytes Absolute, Manual 0.40 0.00 - 0.00 x10*3/uL    Total Cells Counted 100     RBC Morphology See Below     Polychromasia Mild     Hypochromia Mild     Spherocytes Few     Target Cells Few     Ovalocytes Few     Basophilic Stippling Present    CBC and Auto Differential   Result Value Ref Range    WBC 38.3 (H) 4.4 - 11.3 x10*3/uL    nRBC 0.5 (H) 0.0 - 0.0 /100 WBCs    RBC 3.71 (L) 4.00 - 5.20 x10*6/uL    Hemoglobin 8.8 (L) 12.0 - 16.0 g/dL    Hematocrit 29.4 (L) 36.0 - 46.0 %    MCV 79 (L) 80 - 100 fL    MCH 23.7 (L) 26.0 - 34.0 pg    MCHC 29.9 (L) 32.0 - 36.0 g/dL    RDW 19.1 (H) 11.5 - 14.5 %    Platelets 730 (H) 150 - 450 x10*3/uL    Neutrophils % 91.5 40.0 - 80.0 %    Immature Granulocytes %, Automated 1.6 (H) 0.0 - 0.9 %     Lymphocytes % 3.2 13.0 - 44.0 %    Monocytes % 3.5 2.0 - 10.0 %    Eosinophils % 0.0 0.0 - 6.0 %    Basophils % 0.2 0.0 - 2.0 %    Neutrophils Absolute 35.05 (H) 1.20 - 7.70 x10*3/uL    Immature Granulocytes Absolute, Automated 0.60 0.00 - 0.70 x10*3/uL    Lymphocytes Absolute 1.23 1.20 - 4.80 x10*3/uL    Monocytes Absolute 1.33 (H) 0.10 - 1.00 x10*3/uL    Eosinophils Absolute 0.00 0.00 - 0.70 x10*3/uL    Basophils Absolute 0.09 0.00 - 0.10 x10*3/uL   Basic metabolic panel   Result Value Ref Range    Glucose 111 (H) 74 - 99 mg/dL    Sodium 131 (L) 136 - 145 mmol/L    Potassium 5.3 3.5 - 5.3 mmol/L    Chloride 94 (L) 98 - 107 mmol/L    Bicarbonate 29 21 - 32 mmol/L    Anion Gap 13 10 - 20 mmol/L    Urea Nitrogen 11 6 - 23 mg/dL    Creatinine 0.82 0.50 - 1.05 mg/dL    eGFR 80 >60 mL/min/1.73m*2    Calcium 9.0 8.6 - 10.3 mg/dL     Imaging:  Ct abd pelvis:  IMPRESSION:  1. Localizing free air in the anterior mid abdomen compatible with  perforated viscus. advise surgical consult.  2. Site of perforation potentially emanates from a small bowel loop in  the mid abdomen.  3. No associated free fluid.  4. Marked gastric distention with air and fluid.  5. Remainder as above.  6. Urgent finding and recommendation given to and acknowledged by     Assessment/Plan   1.  Perforated viscus status post small bowel resection and washout procedure performed on June 21st  -Patient is doing very well has an NG tube in place has been placed on clear liquid diet    2.  Leukocytosis suspect secondary to 1 with peritonitis  -Continue IV Zosyn, white count is trending down currently at 38,000  -bcx pending    3.  COPD  -She was recently discharged on a steroid taper will resume at this time    4.  Depression and anxiety  -continue home medications    5.  Hyponatremia  -improving with IV hydration lowest was 125 currently 131, consulted nephrology    Morena Landon MD

## 2024-06-21 NOTE — CARE PLAN
The patient's goals for the shift include      The clinical goals for the shift include  pain management

## 2024-06-21 NOTE — ANESTHESIA POSTPROCEDURE EVALUATION
Patient: Fernando Cuevas    Procedure Summary       Date: 06/21/24 Room / Location: [facility] / [facility]    Anesthesia Start: 0049 Anesthesia Stop: 0206    Procedure: Exploration Laparotomy, PARTIAL SMALL BOWEL RESECTION Diagnosis:       Perforated viscus      (Perforated viscus [R19.8])    Surgeons: Wilson Liang MD Responsible Provider: Tenzin Nelson MD    Anesthesia Type: general ASA Status: 3 - Emergent            Anesthesia Type: general    Vitals Value Taken Time   BP  06/21/24 0219   Temp  06/21/24 0219   Pulse  06/21/24 0219   Resp  06/21/24 0219   SpO2  06/21/24 0219       Anesthesia Post Evaluation    Patient location during evaluation: bedside  Patient participation: waiting for patient participation  Level of consciousness: awake  Pain management: adequate  Airway patency: patent  Cardiovascular status: acceptable  Respiratory status: acceptable  Hydration status: acceptable  Postoperative Nausea and Vomiting: none        No notable events documented.

## 2024-06-21 NOTE — CARE PLAN
Problem: Pain - Adult  Goal: Verbalizes/displays adequate comfort level or baseline comfort level  Outcome: Progressing     Problem: Safety - Adult  Goal: Free from fall injury  Outcome: Progressing     Problem: Discharge Planning  Goal: Discharge to home or other facility with appropriate resources  Outcome: Progressing     Problem: Chronic Conditions and Co-morbidities  Goal: Patient's chronic conditions and co-morbidity symptoms are monitored and maintained or improved  Outcome: Progressing   The patient's goals for the shift include      The clinical goals for the shift include pain managment

## 2024-06-21 NOTE — ANESTHESIA PREPROCEDURE EVALUATION
Patient: Fernando Cuevas    Procedure Information       Date/Time: 06/21/24 0015    Procedure: Exploration Laparotomy    Location: [facility] / [facility]    Surgeons: Wilson Liang MD            Relevant Problems   Cardiac   (+) Essential hypertension   (+) Mixed hyperlipidemia      Pulmonary   (+) Acute exacerbation of chronic obstructive pulmonary disease (Multi)   (+) COPD (chronic obstructive pulmonary disease) (Multi)   (+) COPD exacerbation (Multi)   (+) Lung cancer (Multi)   (+) Malignant neoplasm of lower lobe of right lung (Multi)   (+) Malignant neoplasm of lung (Multi)   (+) Panlobular emphysema (Multi)      Neuro   (+) Anxiety   (+) Dysthymic disorder   (+) JASMINE (generalized anxiety disorder)   (+) Panic disorder      GI   (+) Gastroesophageal reflux disease without esophagitis      Hematology   (+) Anemia      Digestive   (+) Esophagitis      Hematologic   (+) Thrombocytosis      Tobacco   (+) Nicotine dependence      Cardiac and Vasculature   (+) Sinus tachycardia by electrocardiogram      Gastrointestinal and Abdominal   (+) Perforated viscus      Mental Health   (+) Habitual alcohol use   (+) History of cocaine use   (+) History of depression       Clinical information reviewed:   Tobacco  Allergies  Meds   Med Hx  Surg Hx  OB Status  Fam Hx  Soc   Hx        NPO Detail:  No data recorded     Physical Exam    Airway  Mallampati: II     Cardiovascular   Rhythm: regular  Rate: normal     Dental    Pulmonary    Abdominal            Anesthesia Plan    History of general anesthesia?: yes  History of complications of general anesthesia?: no    ASA 3 - emergent     general     intravenous induction   Anesthetic plan and risks discussed with patient.    Plan discussed with CAA.

## 2024-06-21 NOTE — DISCHARGE INSTRUCTIONS
Instructions After Surgery    Wound Care:  - Ice packs to wounds every hour the first day  - No baths or swimming until follow up visit  - Keep wounds clean and dry  - Do not apply topical creams or ointments  - Call if you notice redness around wound, foul-smelling drainage, or increasing pain     Diet:   - Soft meals    Activity   - Take it easy for the first 48 hours   - Stairs and walks are fine   - Resume activities gradually over the first week   - Ask Dr. Liang before resuming strenuous physical exertions   - No driving while on pain medication    Medications   - Pain medicine prescription attached for severe pain, if needed   - You can also take Motrin/Ibuprofen 400mg every 6 hours for mild pain   - Resume your home medications unless otherwise directed   - To avoid constipation please take Colace 100mg twice a day (over the counter) or Miralax once or twice per day, if needed    Other Instructions   - Call to make appointment within 1-2 days: [phone]   - Call the doctor for the following:  Severe unrelieved pain  Fevers > 101F  Nausea/vomiting  Wound issues  Insurance/return to work forms  Shortness of breath  Chest Pain

## 2024-06-21 NOTE — ED PROVIDER NOTES
HPI   Chief Complaint   Patient presents with    Abdominal Pain       HPI: 63-year-old female with a history of lung CA, COPD presents with abdominal distention and severe abdominal pain.  She was discharged yesterday from [facility] after being treated for community-acquired pneumonia.  She states that at 5 PM tonight she had the acute onset of abdominal pain and distention.  She reports diffuse abdominal pain and nausea.  She is tearful and very uncomfortable appearing      Limitations to history: None  Independent Historians: Patient  External Records Reviewed: HIE, outpatient notes, inpatient notes  ------------------------------------------------------------------------------------------------------------------------------------------  ROS: a ten point review of systems was performed and was negative except as per HPI.  ------------------------------------------------------------------------------------------------------------------------------------------  PMH / PSH: as per HPI, otherwise reviewed in EMR  MEDS: as per HPI, otherwise reviewed in EMR  ALLERGIES: as per HPI, otherwise reviewed in EMR  SocH:  as per HPI, otherwise reviewed in EMR  FH:  as per HPI, otherwise reviewed in EMR  ------------------------------------------------------------------------------------------------------------------------------------------  Physical Exam:  VS: As documented in the triage note and EMR flowsheet from this visit was reviewed  General: Very uncomfortable appearing   eyes:  Extraocular movements grossly intact. No scleral icterus. No discharge  HEENT:  Normocephalic.  Atraumatic  Neck: Moves neck freely. No gross masses  CV: Regular rhythm. No murmurs, rubs or gallops   Resp: Clear to auscultation bilaterally. No respiratory distress.    GI: Soft and diffusely tender.  Abdomen is distended  MSK: Symmetric muscle bulk. No deformities. No lower extremity edema.    Skin: Warm, dry, intact.   Neuro: No focal  deficits.  A&O x3.   Psych: Appropriate for situation  ------------------------------------------------------------------------------------------------------------------------------------------  Hospital Course / Medical Decision Making:  Independent Interpretations: CT abd / pelvis  EKG as interpreted by me: Sinus tachycardia at 108 bpm with no signs of acute ischemia.  There is some J-point elevation in the precordial leads    MDM: 63-year-old female with a history of lung cancer, COPD presents with abdominal distention and severe abdominal pain.  She was given analgesics.  She appears very uncomfortable.  Her abdomen is distended and diffusely tender.  CT does show free air compatible with perforated viscus.  She was evaluated by the surgical MARGIE who informed me that Dr. Liang was going to emergently take the patient for surgery.  Patient was brought to the OR from the ED.  She was given Zosyn prior to transfer to the OR    Discussion of Management with Other Providers:   I discussed the patient/results with: Emergency medicine team    Final diagnosis and disposition as below.         Final    1. Localizing free air in the anterior mid abdomen compatible with    perforated viscus. advise surgical consult.    2. Site of perforation potentially emanates from a small bowel loop in    the mid abdomen.    3. No associated free fluid.    4. Marked gastric distention with air and fluid.    5. Remainder as above.    6. Urgent finding and recommendation given to and acknowledged by    Sheldon RIVAS at 10:27 PM [place] on 6/20/2024.    Signed by Yash Hood MD                                 [place] Coma Scale Score: 15                     Patient History   Past Medical History:   Diagnosis Date    Essential (primary) hypertension 04/20/2016    Benign hypertension    Personal history of other diseases of the respiratory system     H/O emphysema    Personal history of other diseases of the respiratory system     History  of chronic obstructive lung disease    Personal history of other mental and behavioral disorders     History of depression     Past Surgical History:   Procedure Laterality Date    CT ABDOMEN PELVIS ANGIOGRAM W AND/OR WO IV CONTRAST  3/22/2016    CT ABDOMEN PELVIS ANGIOGRAM W AND/OR WO IV CONTRAST 3/22/2016 [facility] EM[facility]GENCY LEGAC[facility]    CT ANGIO CORONARY ART WITH HEARTFLOW IF SCORE >30%  1/5/2023    CT ANGIO CORONARY ART WITH HEARTFLOW IF SCORE >30% 1/5/2023    MR NECK ANGIO W IV CONTRAST  4/19/2021    MR NECK ANGIO W IV CONTRAST 4/19/2021     No family history on file.  Social History     Tobacco Use    Smoking status: Every Day     Types: Cigarettes     Passive exposure: Current (3 cigarettes a day)    Smokeless tobacco: Never   Substance Use Topics    Alcohol use: Not on file    Drug use: Not on file       Physical Exam   ED Triage Vitals [06/20/24 2114]   Temperature Heart Rate Respirations BP   36.7 °C (98.1 °F) (!) 109 19 118/74      Pulse Ox Temp Source Heart Rate Source Patient Position   97 % Oral Monitor Sitting      BP Location FiO2 (%)     Left arm --       Physical Exam    ED Course & MDM   Diagnoses as of 06/25/24 1150   Perforated viscus   Perforated abdominal viscus       Medical Decision Making      Procedure  Critical Care    Performed by: Sheldon Champagne DO  Authorized by: Sheldon Champagne DO    Critical care provider statement:     Critical care time (minutes):  35    Critical care time was exclusive of:  Separately billable procedures and treating other patients    Critical care was necessary to treat or prevent imminent or life-threatening deterioration of the following conditions: perforated small bowel.    Critical care was time spent personally by me on the following activities:  Development of treatment plan with patient or surrogate, discussions with consultants, examination of patient, ordering and review of laboratory studies and ordering and review of radiographic studies       Sheldon Champagne  DO  06/25/24 1153

## 2024-06-21 NOTE — OP NOTE
Exploration Laparotomy, PARTIAL SMALL BOWEL RESECTION Operative Note     Date: 2024 - 2024  OR Location: [facility] A OR    Name: Fernando Cuevas, : 1960, Age: 63 y.o., MRN: [id number], Sex: female    Diagnosis  Pre-op Diagnosis     * Perforated viscus [R19.8] Post-op Diagnosis     * Perforated viscus [R19.8]     Procedures  Exploration Laparotomy, PARTIAL SMALL BOWEL RESECTION  07504 - [place] EXPLORATORY LAPAROTOMY CELIOTOMY W/WO BIOPSY SPX      Surgeons      * Wilson Liang - Primary    Resident/Fellow/Other Assistant:  Surgeons and Role:  * No surgeons found with a matching role *    Procedure Summary  Anesthesia: General  ASA: III  Anesthesia Staff: Anesthesiologist: Tenzin Nelson MD  C-AA: KIRAN Evans  Estimated Blood Loss: 10 mL  Intra-op Medications:   Administrations occurring from 0015 to 0135 on 24:   Medication Name Total Dose   sodium chloride 0.9 % irrigation solution 1,000 mL              Anesthesia Record               Intraprocedure I/O Totals          Intake    LR infusion 700.00 mL    Total Intake 700 mL       Output    Urine 600 mL    Est. Blood Loss 5 mL    Total Output 605 mL       Net    Net Volume 95 mL          Specimen:   ID Type Source Tests Collected by Time   1 : SEGMENT OF SMALL INTESTINE Tissue SMALL BOWEL /INTESTINE SEGMENTAL RESECTION SURGICAL PATHOLOGY EXAM Wilson Liang MD 2024 0134   A : PERITONEAL FLUID Fluid PERITONEAL FLUID RIGHT UPPER QUADRANT AFB CULTURE/SMEAR, FUNGAL CULTURE/SMEAR, STERILE FLUID CULTURE/SMEAR Wilson Liang MD 2024 013        Staff:   Darrellulator: Gladys Palaciosub Person: Nita  Scrub Person: Ronda         Drains and/or Catheters:   Urethral Catheter Double-lumen 16 Fr. (Active)       Findings: Segment of perforated small bowel    Indications: Fernando Cuevas is an 63 y.o. female who is having surgery for Perforated viscus [R19.8].     The patient was seen in the preoperative area. The risks, benefits, complications, treatment  options, non-operative alternatives, expected recovery and outcomes were discussed with the patient. The possibilities of reaction to medication, pulmonary aspiration, injury to surrounding structures, bleeding, recurrent infection, the need for additional procedures, failure to diagnose a condition, and creating a complication requiring transfusion or operation were discussed with the patient. The patient concurred with the proposed plan, giving informed consent.  The site of surgery was properly noted/marked if necessary per policy. The patient has been actively warmed in preoperative area. Preoperative antibiotics have been ordered and given within 1 hours of incision. Venous thrombosis prophylaxis have been ordered including bilateral sequential compression devices     Procedure Details:   63-year-old female with multiple medical comorbid conditions to include COPD, lung cancer.  She was recently admitted to the hospital for treatment of pneumonia and some hypoxia.     She comes back to the hospital now with sudden onset of severe and diffuse abdominal pain that began today around 5 PM after eating a meal.   Workup in the ER includes a CT scan that shows pneumoperitoneum suggesting perforated  viscus.  The site of the perforation appears to be a loop of small intestine located in the mid abdomen at the level of the umbilicus.    Decision was made to proceed with exploratory laparotomy.  Risk, benefits and alternatives were discussed preoperatively and she agrees to the operation.    Description of procedure:  Patient taken operating room, placed supine on the operating table.  Timeout was established, general anesthesia was induced.  She did receive antibiotics.  A Snell catheter was placed.  The abdomen was prepped and draped in a sterile fashion.  We made a periumbilical incision and entered the abdomen without difficulty.  There was some turbid ascites fluid and some of this was sent for culture.  We ran the  small bowel and we readily noted perforation involving the mid small bowel.  There was no obvious mass lesion.  There were no signs of ischemia.  There was some serosal peritonitis.  We elected to resect this segment of intestine.  We identified healthy bowel both proximal and distal to the site of perforation and divided these loops using a BRITTANY stapler.  We divided the mesentery using a LigaSure device.  The specimen was handed off to field labeled segment of small intestine and it measured 20 cm.  We then performed a side-to-side, functional end-to-end small bowel anastomosis using a BRITTANY stapler.  We then gathered the common opening and closed this using a TA 60 stapler.  We oversewed this staple lines using 3-0 Vicryl suture in a running fashion.  We placed a on zippering stitch at the crotch of the anastomosis using a 3-0 silk stitch.  We dropped the anastomosis back into the abdominal cavity.  We irrigated with copious amount of warm antibiotic solution.  The fascia was reapproximated using #1 PDS suture.  We irrigated the wound and infiltrated with  quarter percent Marcaine.  Skin was reapproximated using staples.  A dry sterile dressing was applied.  Patient tolerated the procedure without difficulty and was returned to the recovery room in stable condition           Complications:  None; patient tolerated the procedure well.    Disposition: PACU - hemodynamically stable.  Condition: stable           Attending Attestation: I performed the procedure.    Wilson Liang  Phone Number: [phone]

## 2024-06-21 NOTE — SIGNIFICANT EVENT
Pt new on floor post op.  New respiratory orders written.  RT re evaluated pt. Though eval not yet due. Pt to remain TID

## 2024-06-21 NOTE — PROGRESS NOTES
06/21/24 1237   Discharge Planning   Living Arrangements Children   Support Systems Children   Assistance Needed Home   Type of Residence Private residence   Number of Stairs to Enter Residence 3   Number of Stairs Within Residence 0   Do you have animals or pets at home? No   Who is requesting discharge planning? Provider   Home or Post Acute Services Post acute facilities (Rehab/SNF/etc)   Type of Post Acute Facility Services Skilled nursing   Patient expects to be discharged to: SNF   Does the patient need discharge transport arranged? Yes   RoundTrip coordination needed? Yes   Financial Resource Strain   How hard is it for you to pay for the very basics like food, housing, medical care, and heating? Not hard   Housing Stability   In the last 12 months, was there a time when you were not able to pay the mortgage or rent on time? N   In the last 12 months, how many places have you lived? 1   In the last 12 months, was there a time when you did not have a steady place to sleep or slept in a shelter (including now)? N   Transportation Needs   In the past 12 months, has lack of transportation kept you from medical appointments or from getting medications? no   In the past 12 months, has lack of transportation kept you from meetings, work, or from getting things needed for daily living? No   Patient Choice   Provider Choice list and CMS website (https://medicare.gov/care-compare#search) for post-acute Quality and Resource Measure Data were provided and reviewed with: Patient   Patient / Family choosing to utilize agency / facility established prior to hospitalization No     6/21/24 1235  Met with patient at bedside to discuss discharge planning.  Patient lives at home with her son in a house.  She is independent with ADLs and drives at baseline.  She does not use any devices for ambulation.  She was hospitalized from 6/11-6/19.  She was discharged to [facility].  She returned to hospital on 6/20 for  abdominal pain.  She does not want to return to [facility].  She did not like it there.  Provided patient with a Care[employer] list of SNFs within 10 miles of her zip code, rated 3-5 stars by medicare, and in network with her insurance.  She will review and provide 3-5 choices for referrals to be sent to.  Brittany Manjarrez RN [facility]

## 2024-06-21 NOTE — SIGNIFICANT EVENT
"63-year-old female with multiple medical comorbid conditions to include COPD, lung cancer.  She was recently admitted to the hospital for treatment of pneumonia and some hypoxia.    She comes back to the hospital now with sudden onset of severe and diffuse abdominal pain anAd that began today around 5 PM after eating a meal.  She has had nausea but no vomiting.  Workup in the ER includes a CT scan that shows perforated  viscus.    Physical exam very uncomfortable.  /68   Pulse (!) 115   Temp 36.3 °C (97.3 °F)   Resp 12   Ht 1.575 m (5' 2.01\")   Wt 64 kg (141 lb 1.5 oz)   SpO2 100%   BMI 25.80 kg/m²    Abdomen distended, tympanic, peritoneal signs are present.    Lab Results   Component Value Date    WBC 19.0 (H) 06/20/2024    HGB 6.9 (L) 06/20/2024    HCT 24.5 (L) 06/20/2024    MCV 78 (L) 06/20/2024     (H) 06/20/2024     === 06/20/24 ===    CT ABDOMEN PELVIS WO IV CONTRAST    - Impression -  1. Localizing free air in the anterior mid abdomen compatible with  perforated viscus. advise surgical consult.  2. Site of perforation potentially emanates from a small bowel loop in  the mid abdomen.  3. No associated free fluid.  4. Marked gastric distention with air and fluid.        Impression: 63-year-old female with multiple medical comorbid conditions comes in with severe abdominal pain of sudden onset.  CT scan confirms pneumoperitoneum, suspicion of perforated small bowel.    Plan to take to surgery for exploratory laparotomy, possible bowel resection, possible repair of perforated ulcer, possible ostomy placement.  Risk benefits and alternatives were discussed preoperatively and she agrees to the operation  "

## 2024-06-22 ENCOUNTER — APPOINTMENT (OUTPATIENT)
Dept: RADIOLOGY | Facility: HOSPITAL | Age: 64
End: 2024-06-22
Payer: COMMERCIAL

## 2024-06-22 LAB
ACID FAST STN SPEC: NORMAL
ANION GAP SERPL CALC-SCNC: 15 MMOL/L (ref 10–20)
BASOPHILS # BLD AUTO: 0.07 X10*3/UL (ref 0–0.1)
BASOPHILS NFR BLD AUTO: 0.2 %
BUN SERPL-MCNC: 9 MG/DL (ref 6–23)
CALCIUM SERPL-MCNC: 9.4 MG/DL (ref 8.6–10.3)
CHLORIDE SERPL-SCNC: 92 MMOL/L (ref 98–107)
CHLORIDE UR-SCNC: 125 MMOL/L
CHLORIDE/CREATININE (MMOL/G) IN URINE: 177 MMOL/G CREAT (ref 38–318)
CO2 SERPL-SCNC: 31 MMOL/L (ref 21–32)
CREAT SERPL-MCNC: 0.93 MG/DL (ref 0.5–1.05)
CREAT UR-MCNC: 70.5 MG/DL (ref 20–320)
EGFRCR SERPLBLD CKD-EPI 2021: 69 ML/MIN/1.73M*2
EOSINOPHIL # BLD AUTO: 0 X10*3/UL (ref 0–0.7)
EOSINOPHIL NFR BLD AUTO: 0 %
ERYTHROCYTE [DISTWIDTH] IN BLOOD BY AUTOMATED COUNT: 19.5 % (ref 11.5–14.5)
GLUCOSE SERPL-MCNC: 94 MG/DL (ref 74–99)
HCT VFR BLD AUTO: 32.6 % (ref 36–46)
HGB BLD-MCNC: 9.7 G/DL (ref 12–16)
IMM GRANULOCYTES # BLD AUTO: 0.45 X10*3/UL (ref 0–0.7)
IMM GRANULOCYTES NFR BLD AUTO: 1.3 % (ref 0–0.9)
LYMPHOCYTES # BLD AUTO: 1.11 X10*3/UL (ref 1.2–4.8)
LYMPHOCYTES NFR BLD AUTO: 3.3 %
MCH RBC QN AUTO: 23.5 PG (ref 26–34)
MCHC RBC AUTO-ENTMCNC: 29.8 G/DL (ref 32–36)
MCV RBC AUTO: 79 FL (ref 80–100)
MONOCYTES # BLD AUTO: 0.66 X10*3/UL (ref 0.1–1)
MONOCYTES NFR BLD AUTO: 2 %
MYCOBACTERIUM SPEC CULT: NORMAL
NEUTROPHILS # BLD AUTO: 31.37 X10*3/UL (ref 1.2–7.7)
NEUTROPHILS NFR BLD AUTO: 93.2 %
NRBC BLD-RTO: 0.3 /100 WBCS (ref 0–0)
PLATELET # BLD AUTO: 786 X10*3/UL (ref 150–450)
POTASSIUM SERPL-SCNC: 4.7 MMOL/L (ref 3.5–5.3)
POTASSIUM UR-SCNC: 37 MMOL/L
POTASSIUM/CREAT UR-RTO: 52 MMOL/G CREAT
RBC # BLD AUTO: 4.13 X10*6/UL (ref 4–5.2)
SODIUM SERPL-SCNC: 133 MMOL/L (ref 136–145)
SODIUM UR-SCNC: 163 MMOL/L
SODIUM/CREAT UR-RTO: 231 MMOL/G CREAT
WBC # BLD AUTO: 33.7 X10*3/UL (ref 4.4–11.3)

## 2024-06-22 PROCEDURE — 36415 COLL VENOUS BLD VENIPUNCTURE: CPT | Performed by: INTERNAL MEDICINE

## 2024-06-22 PROCEDURE — 2500000004 HC RX 250 GENERAL PHARMACY W/ HCPCS (ALT 636 FOR OP/ED)

## 2024-06-22 PROCEDURE — C9113 INJ PANTOPRAZOLE SODIUM, VIA: HCPCS | Performed by: HOSPITALIST

## 2024-06-22 PROCEDURE — 83935 ASSAY OF URINE OSMOLALITY: CPT | Mod: AHULAB | Performed by: INTERNAL MEDICINE

## 2024-06-22 PROCEDURE — 74018 RADEX ABDOMEN 1 VIEW: CPT

## 2024-06-22 PROCEDURE — 99233 SBSQ HOSP IP/OBS HIGH 50: CPT | Performed by: INTERNAL MEDICINE

## 2024-06-22 PROCEDURE — 2500000001 HC RX 250 WO HCPCS SELF ADMINISTERED DRUGS (ALT 637 FOR MEDICARE OP): Performed by: INTERNAL MEDICINE

## 2024-06-22 PROCEDURE — 85025 COMPLETE CBC W/AUTO DIFF WBC: CPT | Performed by: INTERNAL MEDICINE

## 2024-06-22 PROCEDURE — 83930 ASSAY OF BLOOD OSMOLALITY: CPT | Mod: AHULAB | Performed by: INTERNAL MEDICINE

## 2024-06-22 PROCEDURE — 80048 BASIC METABOLIC PNL TOTAL CA: CPT | Performed by: INTERNAL MEDICINE

## 2024-06-22 PROCEDURE — 2500000004 HC RX 250 GENERAL PHARMACY W/ HCPCS (ALT 636 FOR OP/ED): Performed by: INTERNAL MEDICINE

## 2024-06-22 PROCEDURE — 2500000001 HC RX 250 WO HCPCS SELF ADMINISTERED DRUGS (ALT 637 FOR MEDICARE OP): Performed by: HOSPITALIST

## 2024-06-22 PROCEDURE — 1200000002 HC GENERAL ROOM WITH TELEMETRY DAILY

## 2024-06-22 PROCEDURE — 2500000004 HC RX 250 GENERAL PHARMACY W/ HCPCS (ALT 636 FOR OP/ED): Performed by: PHARMACIST

## 2024-06-22 PROCEDURE — 74018 RADEX ABDOMEN 1 VIEW: CPT | Performed by: RADIOLOGY

## 2024-06-22 PROCEDURE — 9420000001 HC RT PATIENT EDUCATION 5 MIN

## 2024-06-22 PROCEDURE — 2500000004 HC RX 250 GENERAL PHARMACY W/ HCPCS (ALT 636 FOR OP/ED): Performed by: HOSPITALIST

## 2024-06-22 PROCEDURE — 94667 MNPJ CHEST WALL 1ST: CPT

## 2024-06-22 PROCEDURE — 82436 ASSAY OF URINE CHLORIDE: CPT | Performed by: INTERNAL MEDICINE

## 2024-06-22 PROCEDURE — 2500000002 HC RX 250 W HCPCS SELF ADMINISTERED DRUGS (ALT 637 FOR MEDICARE OP, ALT 636 FOR OP/ED): Performed by: INTERNAL MEDICINE

## 2024-06-22 PROCEDURE — 94640 AIRWAY INHALATION TREATMENT: CPT

## 2024-06-22 PROCEDURE — 2500000004 HC RX 250 GENERAL PHARMACY W/ HCPCS (ALT 636 FOR OP/ED): Performed by: NURSE PRACTITIONER

## 2024-06-22 RX ORDER — LORAZEPAM 1 MG/1
1 TABLET ORAL EVERY 2 HOUR PRN
Status: DISCONTINUED | OUTPATIENT
Start: 2024-06-22 | End: 2024-06-24

## 2024-06-22 RX ORDER — LORAZEPAM 1 MG/1
2 TABLET ORAL ONCE
Status: COMPLETED | OUTPATIENT
Start: 2024-06-22 | End: 2024-06-22

## 2024-06-22 RX ORDER — THIAMINE HYDROCHLORIDE 100 MG/ML
100 INJECTION, SOLUTION INTRAMUSCULAR; INTRAVENOUS DAILY
Status: DISPENSED | OUTPATIENT
Start: 2024-06-23 | End: 2024-06-26

## 2024-06-22 RX ORDER — FOLIC ACID 1 MG/1
1 TABLET ORAL DAILY
Status: DISCONTINUED | OUTPATIENT
Start: 2024-06-23 | End: 2024-07-16 | Stop reason: HOSPADM

## 2024-06-22 RX ORDER — LORAZEPAM 0.5 MG/1
0.5 TABLET ORAL EVERY 2 HOUR PRN
Status: DISCONTINUED | OUTPATIENT
Start: 2024-06-22 | End: 2024-06-24

## 2024-06-22 RX ORDER — LANOLIN ALCOHOL/MO/W.PET/CERES
100 CREAM (GRAM) TOPICAL DAILY
Status: DISCONTINUED | OUTPATIENT
Start: 2024-06-25 | End: 2024-07-15

## 2024-06-22 RX ORDER — LORAZEPAM 1 MG/1
2 TABLET ORAL EVERY 2 HOUR PRN
Status: DISCONTINUED | OUTPATIENT
Start: 2024-06-22 | End: 2024-06-24

## 2024-06-22 RX ORDER — ENOXAPARIN SODIUM 100 MG/ML
40 INJECTION SUBCUTANEOUS EVERY 24 HOURS
Status: DISCONTINUED | OUTPATIENT
Start: 2024-06-22 | End: 2024-06-27

## 2024-06-22 RX ORDER — MULTIVIT-MIN/IRON FUM/FOLIC AC 7.5 MG-4
1 TABLET ORAL DAILY
Status: DISCONTINUED | OUTPATIENT
Start: 2024-06-23 | End: 2024-07-16 | Stop reason: HOSPADM

## 2024-06-22 ASSESSMENT — COGNITIVE AND FUNCTIONAL STATUS - GENERAL
MOVING FROM LYING ON BACK TO SITTING ON SIDE OF FLAT BED WITH BEDRAILS: A LITTLE
MOVING TO AND FROM BED TO CHAIR: A LITTLE
STANDING UP FROM CHAIR USING ARMS: A LITTLE
TOILETING: A LOT
STANDING UP FROM CHAIR USING ARMS: A LITTLE
WALKING IN HOSPITAL ROOM: A LITTLE
HELP NEEDED FOR BATHING: A LOT
DRESSING REGULAR LOWER BODY CLOTHING: A LITTLE
TURNING FROM BACK TO SIDE WHILE IN FLAT BAD: A LITTLE
DRESSING REGULAR UPPER BODY CLOTHING: A LITTLE
DRESSING REGULAR LOWER BODY CLOTHING: A LOT
MOVING TO AND FROM BED TO CHAIR: A LITTLE
CLIMB 3 TO 5 STEPS WITH RAILING: A LITTLE
MOVING FROM LYING ON BACK TO SITTING ON SIDE OF FLAT BED WITH BEDRAILS: A LITTLE
CLIMB 3 TO 5 STEPS WITH RAILING: TOTAL
TOILETING: A LITTLE
TURNING FROM BACK TO SIDE WHILE IN FLAT BAD: A LITTLE
WALKING IN HOSPITAL ROOM: A LOT
DRESSING REGULAR UPPER BODY CLOTHING: A LITTLE
DAILY ACTIVITIY SCORE: 20
MOBILITY SCORE: 15
DAILY ACTIVITIY SCORE: 17
HELP NEEDED FOR BATHING: A LITTLE
MOBILITY SCORE: 18

## 2024-06-22 ASSESSMENT — PAIN SCALES - GENERAL
PAINLEVEL_OUTOF10: 10 - WORST POSSIBLE PAIN
PAINLEVEL_OUTOF10: 10 - WORST POSSIBLE PAIN
PAINLEVEL_OUTOF10: 9
PAINLEVEL_OUTOF10: 7
PAINLEVEL_OUTOF10: 9
PAINLEVEL_OUTOF10: 10 - WORST POSSIBLE PAIN
PAINLEVEL_OUTOF10: 7
PAINLEVEL_OUTOF10: 6
PAINLEVEL_OUTOF10: 8

## 2024-06-22 ASSESSMENT — PAIN - FUNCTIONAL ASSESSMENT
PAIN_FUNCTIONAL_ASSESSMENT: 0-10

## 2024-06-22 ASSESSMENT — PAIN DESCRIPTION - LOCATION
LOCATION: ABDOMEN
LOCATION: ABDOMEN

## 2024-06-22 ASSESSMENT — PAIN SCALES - WONG BAKER
WONGBAKER_NUMERICALRESPONSE: HURTS WHOLE LOT
WONGBAKER_NUMERICALRESPONSE: HURTS WHOLE LOT

## 2024-06-22 NOTE — PROGRESS NOTES
"Fernando Cuevas is a 63 y.o. female on day 1 of admission presenting with Perforated viscus.    Assessment/Plan   Checking KUB this morning.  Keep n.p.o. except for sips of water.  Continue broad-spectrum antibiotics.  Trend white count.  DVT prophylaxis    Subjective   Ms. Cuevas feeling quite bloated today.  There may have been an issue with her NG tube last night.       Objective     Physical Exam  NAD  A&Ox3  Non icteric  CTA  RR  Abdomen soft quite distended with diffuse tympany tenderness.  Extremities warm, well perfused     Last Recorded Vitals  Blood pressure 132/85, pulse (!) 117, temperature 36.7 °C (98.1 °F), resp. rate 20, height 1.575 m (5' 2.01\"), weight 64 kg (141 lb 1.5 oz), SpO2 100%.  Intake/Output last 3 Shifts:  I/O last 3 completed shifts:  In: 3596.7 (56.2 mL/kg) [I.V.:3496.7 (54.6 mL/kg); IV Piggyback:100]  Out: 2505 (39.1 mL/kg) [Urine:2500 (1.1 mL/kg/hr); Blood:5]  Weight: 64 kg     Relevant Results    Scheduled medications  aspirin, 81 mg, oral, Daily  azithromycin, 250 mg, oral, Once per day on Monday Wednesday Friday  budesonide, 0.25 mg, nebulization, Daily   And  formoterol, 20 mcg, nebulization, BID  busPIRone, 5 mg, oral, BID  calcium carbonate, 1,500 mg, oral, Daily  dilTIAZem ER, 240 mg, oral, Daily  ipratropium-albuteroL, 3 mL, nebulization, TID  lisinopril, 10 mg, oral, Daily  montelukast, 10 mg, oral, Daily  nortriptyline, 50 mg, oral, Nightly  oxybutynin, 5 mg, oral, Daily  pantoprazole, 40 mg, intravenous, Daily before breakfast  piperacillin-tazobactam, 3.375 g, intravenous, q6h  [START ON 6/23/2024] predniSONE, 30 mg, oral, Daily   Followed by  [START ON 6/26/2024] predniSONE, 20 mg, oral, Daily   Followed by  [START ON 6/29/2024] predniSONE, 10 mg, oral, Daily  thiamine, 100 mg, oral, Daily  varenicline, 1 mg, oral, BID      Continuous medications  lactated Ringer's, 125 mL/hr, Last Rate: 125 mL/hr (06/22/24 0450)      PRN medications  PRN medications: benzonatate, " guaiFENesin, HYDROmorphone, HYDROmorphone, ipratropium-albuteroL, morphine, morphine, nitroglycerin, ondansetron    Results for orders placed or performed during the hospital encounter of 06/20/24 (from the past 24 hour(s))   CBC and Auto Differential   Result Value Ref Range    WBC 38.3 (H) 4.4 - 11.3 x10*3/uL    nRBC 0.5 (H) 0.0 - 0.0 /100 WBCs    RBC 3.71 (L) 4.00 - 5.20 x10*6/uL    Hemoglobin 8.8 (L) 12.0 - 16.0 g/dL    Hematocrit 29.4 (L) 36.0 - 46.0 %    MCV 79 (L) 80 - 100 fL    MCH 23.7 (L) 26.0 - 34.0 pg    MCHC 29.9 (L) 32.0 - 36.0 g/dL    RDW 19.1 (H) 11.5 - 14.5 %    Platelets 730 (H) 150 - 450 x10*3/uL    Neutrophils % 91.5 40.0 - 80.0 %    Immature Granulocytes %, Automated 1.6 (H) 0.0 - 0.9 %    Lymphocytes % 3.2 13.0 - 44.0 %    Monocytes % 3.5 2.0 - 10.0 %    Eosinophils % 0.0 0.0 - 6.0 %    Basophils % 0.2 0.0 - 2.0 %    Neutrophils Absolute 35.05 (H) 1.20 - 7.70 x10*3/uL    Immature Granulocytes Absolute, Automated 0.60 0.00 - 0.70 x10*3/uL    Lymphocytes Absolute 1.23 1.20 - 4.80 x10*3/uL    Monocytes Absolute 1.33 (H) 0.10 - 1.00 x10*3/uL    Eosinophils Absolute 0.00 0.00 - 0.70 x10*3/uL    Basophils Absolute 0.09 0.00 - 0.10 x10*3/uL   Basic metabolic panel   Result Value Ref Range    Glucose 111 (H) 74 - 99 mg/dL    Sodium 131 (L) 136 - 145 mmol/L    Potassium 5.3 3.5 - 5.3 mmol/L    Chloride 94 (L) 98 - 107 mmol/L    Bicarbonate 29 21 - 32 mmol/L    Anion Gap 13 10 - 20 mmol/L    Urea Nitrogen 11 6 - 23 mg/dL    Creatinine 0.82 0.50 - 1.05 mg/dL    eGFR 80 >60 mL/min/1.73m*2    Calcium 9.0 8.6 - 10.3 mg/dL   CBC and Auto Differential   Result Value Ref Range    WBC 33.7 (H) 4.4 - 11.3 x10*3/uL    nRBC 0.3 (H) 0.0 - 0.0 /100 WBCs    RBC 4.13 4.00 - 5.20 x10*6/uL    Hemoglobin 9.7 (L) 12.0 - 16.0 g/dL    Hematocrit 32.6 (L) 36.0 - 46.0 %    MCV 79 (L) 80 - 100 fL    MCH 23.5 (L) 26.0 - 34.0 pg    MCHC 29.8 (L) 32.0 - 36.0 g/dL    RDW 19.5 (H) 11.5 - 14.5 %    Platelets 786 (H) 150 - 450  x10*3/uL    Neutrophils % 93.2 40.0 - 80.0 %    Immature Granulocytes %, Automated 1.3 (H) 0.0 - 0.9 %    Lymphocytes % 3.3 13.0 - 44.0 %    Monocytes % 2.0 2.0 - 10.0 %    Eosinophils % 0.0 0.0 - 6.0 %    Basophils % 0.2 0.0 - 2.0 %    Neutrophils Absolute 31.37 (H) 1.20 - 7.70 x10*3/uL    Immature Granulocytes Absolute, Automated 0.45 0.00 - 0.70 x10*3/uL    Lymphocytes Absolute 1.11 (L) 1.20 - 4.80 x10*3/uL    Monocytes Absolute 0.66 0.10 - 1.00 x10*3/uL    Eosinophils Absolute 0.00 0.00 - 0.70 x10*3/uL    Basophils Absolute 0.07 0.00 - 0.10 x10*3/uL   Basic metabolic panel   Result Value Ref Range    Glucose 94 74 - 99 mg/dL    Sodium 133 (L) 136 - 145 mmol/L    Potassium 4.7 3.5 - 5.3 mmol/L    Chloride 92 (L) 98 - 107 mmol/L    Bicarbonate 31 21 - 32 mmol/L    Anion Gap 15 10 - 20 mmol/L    Urea Nitrogen 9 6 - 23 mg/dL    Creatinine 0.93 0.50 - 1.05 mg/dL    eGFR 69 >60 mL/min/1.73m*2    Calcium 9.4 8.6 - 10.3 mg/dL           I spent 25 minutes in the professional and overall care of this patient.      Reid Bingham MD

## 2024-06-22 NOTE — PROGRESS NOTES
"Fernando Cuevas is a 63 y.o. female on day 1 of admission presenting with Perforated viscus.    Subjective   Pt reports feeling very full and abdominal distention. NGT clamped currently. Denies fever, chills, CP and SOB. Tachycardic 110s, afebrile,        Objective     Physical Exam    Last Recorded Vitals  Blood pressure 131/80, pulse (!) 117, temperature 36.4 °C (97.6 °F), temperature source Temporal, resp. rate 20, height 1.575 m (5' 2.01\"), weight 64 kg (141 lb 1.5 oz), SpO2 99%.  Intake/Output last 3 Shifts:  I/O last 3 completed shifts:  In: 3596.7 (56.2 mL/kg) [I.V.:3496.7 (54.6 mL/kg); IV Piggyback:100]  Out: 2505 (39.1 mL/kg) [Urine:2500 (1.1 mL/kg/hr); Blood:5]  Weight: 64 kg     Medications:   Scheduled medications  aspirin, 81 mg, oral, Daily  azithromycin, 250 mg, oral, Once per day on Monday Wednesday Friday  budesonide, 0.25 mg, nebulization, Daily   And  formoterol, 20 mcg, nebulization, BID  busPIRone, 5 mg, oral, BID  calcium carbonate, 1,500 mg, oral, Daily  dilTIAZem ER, 240 mg, oral, Daily  enoxaparin, 40 mg, subcutaneous, q24h  ipratropium-albuteroL, 3 mL, nebulization, TID  lisinopril, 10 mg, oral, Daily  montelukast, 10 mg, oral, Daily  nortriptyline, 50 mg, oral, Nightly  oxybutynin, 5 mg, oral, Daily  pantoprazole, 40 mg, intravenous, Daily before breakfast  piperacillin-tazobactam, 3.375 g, intravenous, q6h  [START ON 6/23/2024] predniSONE, 30 mg, oral, Daily   Followed by  [START ON 6/26/2024] predniSONE, 20 mg, oral, Daily   Followed by  [START ON 6/29/2024] predniSONE, 10 mg, oral, Daily  thiamine, 100 mg, oral, Daily  varenicline, 1 mg, oral, BID      Continuous medications  lactated Ringer's, 125 mL/hr, Last Rate: 125 mL/hr (06/22/24 1503)      PRN medications  PRN medications: benzonatate, guaiFENesin, HYDROmorphone, HYDROmorphone, ipratropium-albuteroL, morphine, morphine, nitroglycerin, ondansetron    Relevant Results  Results for orders placed or performed during the hospital " encounter of 06/20/24 (from the past 24 hour(s))   CBC and Auto Differential   Result Value Ref Range    WBC 33.7 (H) 4.4 - 11.3 x10*3/uL    nRBC 0.3 (H) 0.0 - 0.0 /100 WBCs    RBC 4.13 4.00 - 5.20 x10*6/uL    Hemoglobin 9.7 (L) 12.0 - 16.0 g/dL    Hematocrit 32.6 (L) 36.0 - 46.0 %    MCV 79 (L) 80 - 100 fL    MCH 23.5 (L) 26.0 - 34.0 pg    MCHC 29.8 (L) 32.0 - 36.0 g/dL    RDW 19.5 (H) 11.5 - 14.5 %    Platelets 786 (H) 150 - 450 x10*3/uL    Neutrophils % 93.2 40.0 - 80.0 %    Immature Granulocytes %, Automated 1.3 (H) 0.0 - 0.9 %    Lymphocytes % 3.3 13.0 - 44.0 %    Monocytes % 2.0 2.0 - 10.0 %    Eosinophils % 0.0 0.0 - 6.0 %    Basophils % 0.2 0.0 - 2.0 %    Neutrophils Absolute 31.37 (H) 1.20 - 7.70 x10*3/uL    Immature Granulocytes Absolute, Automated 0.45 0.00 - 0.70 x10*3/uL    Lymphocytes Absolute 1.11 (L) 1.20 - 4.80 x10*3/uL    Monocytes Absolute 0.66 0.10 - 1.00 x10*3/uL    Eosinophils Absolute 0.00 0.00 - 0.70 x10*3/uL    Basophils Absolute 0.07 0.00 - 0.10 x10*3/uL   Basic metabolic panel   Result Value Ref Range    Glucose 94 74 - 99 mg/dL    Sodium 133 (L) 136 - 145 mmol/L    Potassium 4.7 3.5 - 5.3 mmol/L    Chloride 92 (L) 98 - 107 mmol/L    Bicarbonate 31 21 - 32 mmol/L    Anion Gap 15 10 - 20 mmol/L    Urea Nitrogen 9 6 - 23 mg/dL    Creatinine 0.93 0.50 - 1.05 mg/dL    eGFR 69 >60 mL/min/1.73m*2    Calcium 9.4 8.6 - 10.3 mg/dL     ECG 12 lead    Result Date: 6/21/2024  Sinus tachycardia Early repolarization Otherwise normal ECG When compared with ECG of 11-JUN-2024 22:42, No significant change was found See ED provider note for full interpretation and clinical correlation Confirmed by Suzette De Luna ([id number]) on 6/21/2024 3:00:07 PM    XR abdomen 1 view    Result Date: 6/21/2024  Interpreted By:  Keyshawn Contreras, STUDY: XR ABDOMEN 1 VIEW;  6/21/2024 8:16 am   INDICATION: Signs/Symptoms:ng placement.   COMPARISON: None.   ACCESSION NUMBER(S): [id number]   ORDERING CLINICIAN: MARCOS REECE    FINDINGS: Slightly gas distended stomach. Mild-to-moderate colonic stool burden. Limited evaluation of pneumoperitoneum on supine imaging, however no gross evidence of free air is noted. Nasogastric tube with distal end terminating in the proximal stomach and proximal side hole in the gastric cardia.   Visualized lungs are clear.   Osseous structures demonstrate no acute bony changes.       1. Nasogastric tube with distal end terminating in the proximal stomach and proximal side hole in the gastric cardia. Advise advancement 3-5 cm. 2. Slightly gas distended stomach. Moderate colonic stool burden.   MACRO: None   Signed by: Keyshawn Contreras 6/21/2024 8:47 AM Dictation workstation:   [id number]    CT abdomen pelvis wo IV contrast    Addendum Date: 6/21/2024    Images reviewed with Garth Kazt at 12:52 AM on 6/21/2024. Signed by Armando Mcgarry DO    Result Date: 6/21/2024  STUDY: CT Abdomen and Pelvis without IV Contrast; 6/20/2024 10:06 PM. INDICATION: Abdominal distension and severe pain.  Evaluate for small bowel obstruction versus perforation. COMPARISON: CT AP 5/11/2024. ACCESSION NUMBER(S): [id number] ORDERING CLINICIAN: DONOVAN ALBA TECHNIQUE: CT of the abdomen and pelvis was performed.  Contiguous axial images were obtained at 3 mm slice thickness through the abdomen and pelvis. Coronal and sagittal reconstructions at 3 mm slice thickness were performed. No intravenous contrast was administered.  Automated mA/kV exposure control was utilized and patient examination was performed in strict accordance with principles of ALARA. FINDINGS: Please note that the evaluation of vessels, lymph nodes and organs is limited without intravenous contrast.  ABDOMEN: There is a localizing free air in the anterior mid abdomen, compatible with perforated viscus. This potentially arises from adjacent an adjacent small bowel loop in the mid abdomen, where there is minimal localizing extraluminal air (series 604, slice  78/159). The stomach is markedly distended with air and fluid. There is no localizing gastric thickening. There is minimal distention of proximal small bowel without appreciable thickening. Appendix is normal. Moderate stool is seen throughout the colon. There is minimal diverticulosis in the sigmoid colon. There is no colitis or diverticulitis. No significant abnormalities are detectable in the liver, spleen, pancreas, adrenal glands, kidneys, or biliary system. No calculi are seen. There is no hydronephrosis or biliary duct dilatation. There is no localizing lymphadenopathy or ascites. Abdominal aorta is normal caliber. PELVIS: Bladder is moderately distended. There is no free fluid. Inguinal rings are minimally patulous containing fat. BONES: Bony structures are intact. Lumbar spine is unremarkable. LOWER CHEST: There is lower lung emphysema and peribronchial thickening. There are no pleural effusions. There is a small rent at the medial left hemidiaphragm containing herniated fat.    1. Localizing free air in the anterior mid abdomen compatible with perforated viscus. advise surgical consult. 2. Site of perforation potentially emanates from a small bowel loop in the mid abdomen. 3. No associated free fluid. 4. Marked gastric distention with air and fluid. 5. Remainder as above. 6. Urgent finding and recommendation given to and acknowledged by Sheldon RIVAS at 10:27 PM Lulu on 6/20/2024. Signed by Yash Hood MD    XR chest 1 view    Result Date: 6/18/2024  Interpreted By:  Amanda Porras, STUDY: XR CHEST 1 VIEW;  6/18/2024 4:18 pm   INDICATION: Signs/Symptoms:Productive cough.   COMPARISON: 06/11/2024   ACCESSION NUMBER(S): [id number]   ORDERING CLINICIAN: ANGELICA QUACH   FINDINGS: The heart is normal in size.   There is no obvious consolidation or pleural fluid.   The mediastinum is unremarkable. The bones are not well seen.   COMPARISON OF FINDING: The chest is similar.       No acute cardiopulmonary  disease.   MACRO: none   Signed by: Amanda Porras 6/18/2024 4:20 PM Dictation workstation:   [id number]    CT maxillofacial bones wo IV contrast    Result Date: 6/17/2024  Interpreted By:  Gabe Gage, STUDY: CT FACIAL BONES WO IV CONTRAST;  6/17/2024 9:09 am   INDICATION: Signs/Symptoms:Rt jaw pain.   COMPARISON: None.   ACCESSION NUMBER(S): [id number]   ORDERING CLINICIAN: ANGELICA QUACH   TECHNIQUE: Thin section axial images were obtained through the facial bones. Coronal and sagittal reconstruction images were generated. Also, true 3-D rotational views were generated on an outside work station. Soft tissue and bone windows were reviewed.   FINDINGS: PARANASAL SINUSES: The paranasal sinuses were clear. There was no deviation of the nasal septum. The infundibulum of each ostiomeatal complex was patent. No fluid level in the paranasal sinuses.   FACIAL BONES: Orbital rims and orbital contents were intact. The mandible was intact. There was no destructive lytic or blastic bone lesion. There was no facial bone fracture.   NECK AND SKULL BASE: No suspicious cervical adenopathy. There was no mass posteriorly in the region of the nasopharynx. Mastoid air cells were clear.   BRAIN: Mild prominence of the imaged ventricles and sulci. No acute intracranial bleed or midline shift in the imaged brain.  Skullbase arterial calcifications in the carotid siphons  .       Mild volume loss in the imaged brain. Mild bilateral carotid siphon skull base arterial calcifications.   Remainder of the exam was negative.   MACRO: None   Signed by: Gabe Gage 6/17/2024 9:37 AM Dictation workstation:   [id number]    Electrocardiogram, 12-lead PRN ACS symptoms    Result Date: 6/15/2024  Sinus tachycardia Right atrial enlargement Anterior infarct (cited on or before 11-JUN-2024) Abnormal ECG When compared with ECG of 11-JUN-2024 17:03, (unconfirmed) No significant change was found See ED provider note for full interpretation and  clinical correlation Confirmed by Shirley Durbin ([id number]) on 6/15/2024 2:40:47 PM    ECG 12 Lead    Result Date: 6/15/2024  Normal sinus rhythm Right atrial enlargement Anterior infarct (cited on or before 11-JUN-2024) Abnormal ECG When compared with ECG of 11-JUN-2024 14:28, (unconfirmed) No significant change was found See ED provider note for full interpretation and clinical correlation Confirmed by Shirley Durbin ([id number]) on 6/15/2024 2:37:50 PM    CT chest wo IV contrast    Result Date: 6/13/2024  Interpreted By:  Gabe Gage, STUDY: CT CHEST WO IV CONTRAST;  6/13/2024 2:51 pm   INDICATION: 62 y/o   F with  Signs/Symptoms:pna.   LIMITATIONS: Imaging of the mediastinum and alessandro is limited without the use of IV contrast..   ACCESSION NUMBER(S): [id number]   ORDERING CLINICIAN: MARCOS REECE   TECHNIQUE: Noncontrast Thin-section images were obtained  from the thoracic inlet down through the diaphragm. Sagittal and coronal reconstruction images were generated. Mediastinal, lung, bone, and liver windows were reviewed.   COMPARISON: Most recent prior is from 05/08/2024.   FINDINGS: CHEST WALL/BASE OF THE NECK: The chest wall was grossly intact. No thyromegaly or thyroid mass.   LUNGS/ PLEURA/ AND TRACHEA: Stable linear scar across the posteromedial right lung base. There are mild patchy infiltrative densities in the mid and lower right upper lobe and in the perihilar/infrahilar right middle lobe, not present previously. No infiltrative density in the left lung. There are mild-to-moderate emphysematous changes in the lungs. No focal masslike density in either lung. No mass or pneumonia in either lung. No pleural effusion. No pneumothorax. The trachea was grossly intact.   MEDIASTINUM/ALESSANDRO: Small stable fat containing posteromedial left diaphragmatic hernia. No suspicious mediastinal, hilar, or axillary mass or adenopathy in this unenhanced exam. No cardiomegaly. No pericardial effusion. No thoracic aortic aneurysm.    BONES: No destructive lytic or blastic bone lesion.   UPPER ABDOMEN: The imaged upper abdomen was grossly intact.       Underlying emphysema as described.   Stable linear scar cross the posteromedial right lung base.   Mild new pneumonia in the right upper lobe and right middle lobe as described.   MACRO: None   Signed by: Gabe Gage 6/13/2024 3:24 PM Dictation workstation:   [id number]    CT head wo IV contrast    Result Date: 6/12/2024  Interpreted By:  Sheri Lakhani, STUDY: CT HEAD WO IV CONTRAST;  6/12/2024 12:53 am   INDICATION: Signs/Symptoms:head injury earlier today, headache.   COMPARISON: CT head dated 07/24/2023.   ACCESSION NUMBER(S): [id number]   ORDERING CLINICIAN: SARA RUTLEDGE   TECHNIQUE: Noncontrast axial CT scan of head was performed. Angled reformats in brain and bone windows were generated. The images were reviewed in bone, brain, blood and soft tissue windows.   FINDINGS: No hyperdense intracranial hemorrhage is identified. There is no mass effect or midline shift.   Gray-white differentiation is intact, without evidence of CT apparent transcortical infarct. Subtle attenuation changes are present in the periventricular and subcortical white matter of bilateral cerebral hemispheres, nonspecific findings favored to represent sequela of microvascular disease.   No ventricular dilatation is present. Basal cisterns are patent. No extra-axial fluid collections are identified.   Scalp soft tissues do not demonstrate any acute abnormality. Calvarium is unremarkable in appearance. Mastoid air cells and middle ear cavities are clear.   Visualized paranasal sinuses are unremarkable in appearance.       1. No evidence of hemorrhage, skull fracture, or other acute intracranial trauma/abnormality. 2. Patchy attenuation changes are present in the periventricular and subcortical white matter of bilateral cerebral hemispheres, nonspecific findings favored to represent sequela of microvascular  "disease.   MACRO: None   Signed by: Sheri Lakhani 6/12/2024 1:07 AM Dictation workstation:   [id number]    XR chest 1 view    Result Date: 6/11/2024  Interpreted By:  Zack Sevilla, STUDY: XR CHEST 1 VIEW;  6/11/2024 3:26 pm   INDICATION: Signs/Symptoms:chest pain.   COMPARISON: 05/10/2024.   ACCESSION NUMBER(S): [id number]   ORDERING CLINICIAN: MARYBETH BRANHAM   FINDINGS:   The cardiac silhouette is unremarkable. Costophrenic angles are sharp. Lungs are clear. The trachea is midline. There is no pneumothorax. No acute osseous abnormality is seen.       1.  No active cardiopulmonary process.     Signed by: Zack Sevilla 6/11/2024 3:48 PM Dictation workstation:   [id number]         Assessment/Plan   Principal Problem:    Perforated viscus  Active Problems:    Thrombocytosis    Fernando Cuevas is a 62 yo female who is admitted with abdominal pain. She was found to have perforated viscus and was taken to the OR emergently by Dr Liang for ex-lap and partial small bowel resection. Intra-operative findings showed \"segment of perforated small bowel.\" She was also recently hospitalized for pneumonia and is currently being treated for COPD exacerbation. On exam, her abdomen is very distended, soft, appropriately tender, post-op dressing is c/d/I. NGT is currently clamped.     Plan:   GI:   POD #1 s/p ex-lap, partial SBR  - NPO with sips of clears for comfort  - KUB appears as tho NG is in the tip of the stomach, RN to advance NG to 60cm  - NG to LIWS   - DVT ppx: SCDs and lovenox  - oob and walking as much as possible       ID:   Leukocytosis- WBC 33.7 from 38.3  - continue zosyn for bowel perforation   - continue to trend CBC    Dispo: awaiting RBF, continue NG. Surgery will continue to follow.     I spent 35 minutes in the professional and overall care of this patient.      Earline Stahl, APRN-CNP      "

## 2024-06-22 NOTE — PROGRESS NOTES
"Fernando Cuevas is a 63 y.o. female on day 1 of admission presenting with Perforated viscus.    Subjective   Tolerating clear diet, no n/v/abd pain       Objective     Physical Exam  Vitals reviewed.   Constitutional:       Appearance: Normal appearance.   HENT:      Head: Normocephalic.      Nose: Nose normal.      Mouth/Throat:      Mouth: Mucous membranes are dry.      Pharynx: Oropharynx is clear.   Cardiovascular:      Rate and Rhythm: Normal rate and regular rhythm.   Pulmonary:      Effort: Pulmonary effort is normal.   Abdominal:      General: Abdomen is flat. Bowel sounds are normal.      Palpations: Abdomen is soft.   Skin:     Capillary Refill: Capillary refill takes less than 2 seconds.   Neurological:      General: No focal deficit present.      Mental Status: She is alert.         Last Recorded Vitals  Blood pressure 132/85, pulse (!) 117, temperature 36.7 °C (98.1 °F), resp. rate 20, height 1.575 m (5' 2.01\"), weight 64 kg (141 lb 1.5 oz), SpO2 100%.  Intake/Output last 3 Shifts:  I/O last 3 completed shifts:  In: 3596.7 (56.2 mL/kg) [I.V.:3496.7 (54.6 mL/kg); IV Piggyback:100]  Out: 2505 (39.1 mL/kg) [Urine:2500 (1.1 mL/kg/hr); Blood:5]  Weight: 64 kg     Relevant Results  Results for orders placed or performed during the hospital encounter of 06/20/24 (from the past 24 hour(s))   CBC and Auto Differential   Result Value Ref Range    WBC 38.3 (H) 4.4 - 11.3 x10*3/uL    nRBC 0.5 (H) 0.0 - 0.0 /100 WBCs    RBC 3.71 (L) 4.00 - 5.20 x10*6/uL    Hemoglobin 8.8 (L) 12.0 - 16.0 g/dL    Hematocrit 29.4 (L) 36.0 - 46.0 %    MCV 79 (L) 80 - 100 fL    MCH 23.7 (L) 26.0 - 34.0 pg    MCHC 29.9 (L) 32.0 - 36.0 g/dL    RDW 19.1 (H) 11.5 - 14.5 %    Platelets 730 (H) 150 - 450 x10*3/uL    Neutrophils % 91.5 40.0 - 80.0 %    Immature Granulocytes %, Automated 1.6 (H) 0.0 - 0.9 %    Lymphocytes % 3.2 13.0 - 44.0 %    Monocytes % 3.5 2.0 - 10.0 %    Eosinophils % 0.0 0.0 - 6.0 %    Basophils % 0.2 0.0 - 2.0 %    " Neutrophils Absolute 35.05 (H) 1.20 - 7.70 x10*3/uL    Immature Granulocytes Absolute, Automated 0.60 0.00 - 0.70 x10*3/uL    Lymphocytes Absolute 1.23 1.20 - 4.80 x10*3/uL    Monocytes Absolute 1.33 (H) 0.10 - 1.00 x10*3/uL    Eosinophils Absolute 0.00 0.00 - 0.70 x10*3/uL    Basophils Absolute 0.09 0.00 - 0.10 x10*3/uL   Basic metabolic panel   Result Value Ref Range    Glucose 111 (H) 74 - 99 mg/dL    Sodium 131 (L) 136 - 145 mmol/L    Potassium 5.3 3.5 - 5.3 mmol/L    Chloride 94 (L) 98 - 107 mmol/L    Bicarbonate 29 21 - 32 mmol/L    Anion Gap 13 10 - 20 mmol/L    Urea Nitrogen 11 6 - 23 mg/dL    Creatinine 0.82 0.50 - 1.05 mg/dL    eGFR 80 >60 mL/min/1.73m*2    Calcium 9.0 8.6 - 10.3 mg/dL   CBC and Auto Differential   Result Value Ref Range    WBC 33.7 (H) 4.4 - 11.3 x10*3/uL    nRBC 0.3 (H) 0.0 - 0.0 /100 WBCs    RBC 4.13 4.00 - 5.20 x10*6/uL    Hemoglobin 9.7 (L) 12.0 - 16.0 g/dL    Hematocrit 32.6 (L) 36.0 - 46.0 %    MCV 79 (L) 80 - 100 fL    MCH 23.5 (L) 26.0 - 34.0 pg    MCHC 29.8 (L) 32.0 - 36.0 g/dL    RDW 19.5 (H) 11.5 - 14.5 %    Platelets 786 (H) 150 - 450 x10*3/uL    Neutrophils % 93.2 40.0 - 80.0 %    Immature Granulocytes %, Automated 1.3 (H) 0.0 - 0.9 %    Lymphocytes % 3.3 13.0 - 44.0 %    Monocytes % 2.0 2.0 - 10.0 %    Eosinophils % 0.0 0.0 - 6.0 %    Basophils % 0.2 0.0 - 2.0 %    Neutrophils Absolute 31.37 (H) 1.20 - 7.70 x10*3/uL    Immature Granulocytes Absolute, Automated 0.45 0.00 - 0.70 x10*3/uL    Lymphocytes Absolute 1.11 (L) 1.20 - 4.80 x10*3/uL    Monocytes Absolute 0.66 0.10 - 1.00 x10*3/uL    Eosinophils Absolute 0.00 0.00 - 0.70 x10*3/uL    Basophils Absolute 0.07 0.00 - 0.10 x10*3/uL   Basic metabolic panel   Result Value Ref Range    Glucose 94 74 - 99 mg/dL    Sodium 133 (L) 136 - 145 mmol/L    Potassium 4.7 3.5 - 5.3 mmol/L    Chloride 92 (L) 98 - 107 mmol/L    Bicarbonate 31 21 - 32 mmol/L    Anion Gap 15 10 - 20 mmol/L    Urea Nitrogen 9 6 - 23 mg/dL    Creatinine 0.93  0.50 - 1.05 mg/dL    eGFR 69 >60 mL/min/1.73m*2    Calcium 9.4 8.6 - 10.3 mg/dL       Imaging Results  Ct abd pelvis:  IMPRESSION:  1. Localizing free air in the anterior mid abdomen compatible with  perforated viscus. advise surgical consult.  2. Site of perforation potentially emanates from a small bowel loop in  the mid abdomen.  3. No associated free fluid.  4. Marked gastric distention with air and fluid.  5. Remainder as above.  6. Urgent finding and recommendation given to and acknowledged by  Medications:  aspirin, 81 mg, oral, Daily  azithromycin, 250 mg, oral, Once per day on Monday Wednesday Friday  budesonide, 0.25 mg, nebulization, Daily   And  formoterol, 20 mcg, nebulization, BID  busPIRone, 5 mg, oral, BID  calcium carbonate, 1,500 mg, oral, Daily  dilTIAZem ER, 240 mg, oral, Daily  ipratropium-albuteroL, 3 mL, nebulization, TID  lisinopril, 10 mg, oral, Daily  montelukast, 10 mg, oral, Daily  nortriptyline, 50 mg, oral, Nightly  oxybutynin, 5 mg, oral, Daily  pantoprazole, 40 mg, intravenous, Daily before breakfast  piperacillin-tazobactam, 3.375 g, intravenous, q6h  [START ON 6/23/2024] predniSONE, 30 mg, oral, Daily   Followed by  [START ON 6/26/2024] predniSONE, 20 mg, oral, Daily   Followed by  [START ON 6/29/2024] predniSONE, 10 mg, oral, Daily  thiamine, 100 mg, oral, Daily  varenicline, 1 mg, oral, BID       PRN medications: benzonatate, guaiFENesin, HYDROmorphone, HYDROmorphone, ipratropium-albuteroL, morphine, morphine, nitroglycerin, ondansetron     Assessment/Plan   1. Perforated viscus status post small bowel resection and washout procedure performed on June 21st  -Patient is doing very well has an NG tube in place has been placed on clear liquid diet     2.  Leukocytosis suspect secondary to 1 with peritonitis  -Continue IV Zosyn, white count is trending down currently at 33,000  -bcx pending     3.  COPD  -She was recently discharged on a steroid taper will resume at this time     4.   Depression and anxiety  -continue home medications     5.  Hyponatremia  -improving with IV hydration lowest was 125 currently 133, consulted nephrology      DVT Prophylaxis:  CELESTE Landon MD  [facility]

## 2024-06-22 NOTE — CARE PLAN
The patient's goals for the shift include  ambulate    The clinical goals for the shift include keep patient comfortable, pain managment    Over the shift, the patient did not make progress toward the following goals. Barriers to progression include . Recommendations to address these barriers include .

## 2024-06-22 NOTE — CARE PLAN
The patient's goals for the shift include      The clinical goals for the shift include keep patient comfortable, pain managment

## 2024-06-22 NOTE — CONSULTS
Reason For Consult  Hyponatremia    History Of Present Illness  Fernando Cuevas is a 63 y.o. female presenting with severe abdominal pain, she was recent discharge from the hospital for acute exacerbation of COPD secondary to pneumonia.  Past medical history positive for COPD, anxiety, anemia , patient's history of ethanol abuse and past cocaine to.  Adenocarcinoma of the right lung, 1 day prior to presentation.  Patient developed severe abdominal pain and cannot stay away respiratory 9-10 in intensity patient was evaluated emergency department was found to have perforated bowel and was taken emergently to the operating room..  Postop labs revealed sodium level being hyponatremia, nephrology was consulted  Past Medical History  She has a past medical history of Essential (primary) hypertension (2016), Personal history of other diseases of the respiratory system, Personal history of other diseases of the respiratory system, and Personal history of other mental and behavioral disorders.    Surgical History  She has a past surgical history that includes CT angio abdomen pelvis w and or wo IV IV contrast (2016); MR angio neck w IV contrast (2021); CT angio coronary art with heartflow if score >30% (2023); and  section, low transverse.     Social History  She reports that she has been smoking cigarettes. She has been exposed to tobacco smoke. She has never used smokeless tobacco. She reports that she does not currently use alcohol. She reports that she does not currently use drugs.    Family History  No family history on file.     Allergies  Iodinated contrast media    Review of Systems  All systems reviewed     Physical Exam  GEN appearance; awake alert in no distress  Head and ENT: NG tube noted,  Lungs: CTA  Heart; RRR  Abdomen; postsurgery surgical dressings noted  Extremities: No edema  Neurologic; physiologic         I&O 24HR    Intake/Output Summary (Last 24 hours) at 2024  1545  Last data filed at 6/22/2024 1245  Gross per 24 hour   Intake 2856.25 ml   Output 1450 ml   Net 1406.25 ml       Vitals 24HR  Heart Rate:  [117-123]   Temp:  [36.3 °C (97.4 °F)-36.7 °C (98.1 °F)]   Resp:  [18-22]   BP: (131-168)/()   SpO2:  [97 %-100 %]         Relevant Results  Results from last 7 days   Lab Units 06/22/24  0516 06/21/24  1200 06/21/24  0704   SODIUM mmol/L 133* 131* 125*   POTASSIUM mmol/L 4.7 5.3 5.1   CHLORIDE mmol/L 92* 94* 92*   CO2 mmol/L 31 29 27   BUN mg/dL 9 11 11   CREATININE mg/dL 0.93 0.82 0.73   GLUCOSE mg/dL 94 111* 129*   CALCIUM mg/dL 9.4 9.0 8.5*      Results from last 7 days   Lab Units 06/22/24  0516 06/21/24  1200 06/21/24  0704   WBC AUTO x10*3/uL 33.7* 38.3* 40.3*   HEMOGLOBIN g/dL 9.7* 8.8* 8.5*   HEMATOCRIT % 32.6* 29.4* 28.7*   PLATELETS AUTO x10*3/uL 786* 730* 728*    ECG 12 lead    Result Date: 6/21/2024  Sinus tachycardia Early repolarization Otherwise normal ECG When compared with ECG of 11-JUN-2024 22:42, No significant change was found See ED provider note for full interpretation and clinical correlation Confirmed by Suzette De Luna ([id number]) on 6/21/2024 3:00:07 PM    XR abdomen 1 view    Result Date: 6/21/2024  Interpreted By:  Keyshawn Contreras, STUDY: XR ABDOMEN 1 VIEW;  6/21/2024 8:16 am   INDICATION: Signs/Symptoms:ng placement.   COMPARISON: None.   ACCESSION NUMBER(S): [id number]   ORDERING CLINICIAN: MARCOS REECE   FINDINGS: Slightly gas distended stomach. Mild-to-moderate colonic stool burden. Limited evaluation of pneumoperitoneum on supine imaging, however no gross evidence of free air is noted. Nasogastric tube with distal end terminating in the proximal stomach and proximal side hole in the gastric cardia.   Visualized lungs are clear.   Osseous structures demonstrate no acute bony changes.       1. Nasogastric tube with distal end terminating in the proximal stomach and proximal side hole in the gastric cardia. Advise advancement 3-5 cm.  2. Slightly gas distended stomach. Moderate colonic stool burden.   MACRO: None   Signed by: Keyshawn Contreras 6/21/2024 8:47 AM Dictation workstation:   [id number]    CT abdomen pelvis wo IV contrast    Addendum Date: 6/21/2024    Images reviewed with Garth Katz at 12:52 AM on 6/21/2024. Signed by Armando Mcgarry DO    Result Date: 6/21/2024  STUDY: CT Abdomen and Pelvis without IV Contrast; 6/20/2024 10:06 PM. INDICATION: Abdominal distension and severe pain.  Evaluate for small bowel obstruction versus perforation. COMPARISON: CT AP 5/11/2024. ACCESSION NUMBER(S): [id number] ORDERING CLINICIAN: DONOVAN ALBA TECHNIQUE: CT of the abdomen and pelvis was performed.  Contiguous axial images were obtained at 3 mm slice thickness through the abdomen and pelvis. Coronal and sagittal reconstructions at 3 mm slice thickness were performed. No intravenous contrast was administered.  Automated mA/kV exposure control was utilized and patient examination was performed in strict accordance with principles of ALARA. FINDINGS: Please note that the evaluation of vessels, lymph nodes and organs is limited without intravenous contrast.  ABDOMEN: There is a localizing free air in the anterior mid abdomen, compatible with perforated viscus. This potentially arises from adjacent an adjacent small bowel loop in the mid abdomen, where there is minimal localizing extraluminal air (series 604, slice 78/159). The stomach is markedly distended with air and fluid. There is no localizing gastric thickening. There is minimal distention of proximal small bowel without appreciable thickening. Appendix is normal. Moderate stool is seen throughout the colon. There is minimal diverticulosis in the sigmoid colon. There is no colitis or diverticulitis. No significant abnormalities are detectable in the liver, spleen, pancreas, adrenal glands, kidneys, or biliary system. No calculi are seen. There is no hydronephrosis or biliary duct  dilatation. There is no localizing lymphadenopathy or ascites. Abdominal aorta is normal caliber. PELVIS: Bladder is moderately distended. There is no free fluid. Inguinal rings are minimally patulous containing fat. BONES: Bony structures are intact. Lumbar spine is unremarkable. LOWER CHEST: There is lower lung emphysema and peribronchial thickening. There are no pleural effusions. There is a small rent at the medial left hemidiaphragm containing herniated fat.    1. Localizing free air in the anterior mid abdomen compatible with perforated viscus. advise surgical consult. 2. Site of perforation potentially emanates from a small bowel loop in the mid abdomen. 3. No associated free fluid. 4. Marked gastric distention with air and fluid. 5. Remainder as above. 6. Urgent finding and recommendation given to and acknowledged by Sheldon RIVAS at 10:27 PM [place] on 6/20/2024. Signed by Yash Hood MD    XR chest 1 view    Result Date: 6/18/2024  Interpreted By:  Amanda Porras, STUDY: XR CHEST 1 VIEW;  6/18/2024 4:18 pm   INDICATION: Signs/Symptoms:Productive cough.   COMPARISON: 06/11/2024   ACCESSION NUMBER(S): [id number]   ORDERING CLINICIAN: ANGELICA QUACH   FINDINGS: The heart is normal in size.   There is no obvious consolidation or pleural fluid.   The mediastinum is unremarkable. The bones are not well seen.   COMPARISON OF FINDING: The chest is similar.       No acute cardiopulmonary disease.   MACRO: none   Signed by: Amanda Porras 6/18/2024 4:20 PM Dictation workstation:   [id number]    CT maxillofacial bones wo IV contrast    Result Date: 6/17/2024  Interpreted By:  Gabe Gage, STUDY: CT FACIAL BONES WO IV CONTRAST;  6/17/2024 9:09 am   INDICATION: Signs/Symptoms:Rt jaw pain.   COMPARISON: None.   ACCESSION NUMBER(S): [id number]   ORDERING CLINICIAN: ANGELICA QUACH   TECHNIQUE: Thin section axial images were obtained through the facial bones. Coronal and sagittal reconstruction images were  generated. Also, true 3-D rotational views were generated on an outside work station. Soft tissue and bone windows were reviewed.   FINDINGS: PARANASAL SINUSES: The paranasal sinuses were clear. There was no deviation of the nasal septum. The infundibulum of each ostiomeatal complex was patent. No fluid level in the paranasal sinuses.   FACIAL BONES: Orbital rims and orbital contents were intact. The mandible was intact. There was no destructive lytic or blastic bone lesion. There was no facial bone fracture.   NECK AND SKULL BASE: No suspicious cervical adenopathy. There was no mass posteriorly in the region of the nasopharynx. Mastoid air cells were clear.   BRAIN: Mild prominence of the imaged ventricles and sulci. No acute intracranial bleed or midline shift in the imaged brain.  Skullbase arterial calcifications in the carotid siphons  .       Mild volume loss in the imaged brain. Mild bilateral carotid siphon skull base arterial calcifications.   Remainder of the exam was negative.   MACRO: None   Signed by: Gabe Gage 6/17/2024 9:37 AM Dictation workstation:   [id number]    Electrocardiogram, 12-lead PRN ACS symptoms    Result Date: 6/15/2024  Sinus tachycardia Right atrial enlargement Anterior infarct (cited on or before 11-JUN-2024) Abnormal ECG When compared with ECG of 11-JUN-2024 17:03, (unconfirmed) No significant change was found See ED provider note for full interpretation and clinical correlation Confirmed by Shirley Durbin ([id number]) on 6/15/2024 2:40:47 PM    ECG 12 Lead    Result Date: 6/15/2024  Normal sinus rhythm Right atrial enlargement Anterior infarct (cited on or before 11-JUN-2024) Abnormal ECG When compared with ECG of 11-JUN-2024 14:28, (unconfirmed) No significant change was found See ED provider note for full interpretation and clinical correlation Confirmed by Shirley Durbin ([id number]) on 6/15/2024 2:37:50 PM    CT chest wo IV contrast    Result Date: 6/13/2024  Interpreted By:  Too  Gabe, STUDY: CT CHEST WO IV CONTRAST;  6/13/2024 2:51 pm   INDICATION: 62 y/o   F with  Signs/Symptoms:pna.   LIMITATIONS: Imaging of the mediastinum and karen is limited without the use of IV contrast..   ACCESSION NUMBER(S): [id number]   ORDERING CLINICIAN: MARCOS REECE   TECHNIQUE: Noncontrast Thin-section images were obtained  from the thoracic inlet down through the diaphragm. Sagittal and coronal reconstruction images were generated. Mediastinal, lung, bone, and liver windows were reviewed.   COMPARISON: Most recent prior is from 05/08/2024.   FINDINGS: CHEST WALL/BASE OF THE NECK: The chest wall was grossly intact. No thyromegaly or thyroid mass.   LUNGS/ PLEURA/ AND TRACHEA: Stable linear scar across the posteromedial right lung base. There are mild patchy infiltrative densities in the mid and lower right upper lobe and in the perihilar/infrahilar right middle lobe, not present previously. No infiltrative density in the left lung. There are mild-to-moderate emphysematous changes in the lungs. No focal masslike density in either lung. No mass or pneumonia in either lung. No pleural effusion. No pneumothorax. The trachea was grossly intact.   MEDIASTINUM/KAREN: Small stable fat containing posteromedial left diaphragmatic hernia. No suspicious mediastinal, hilar, or axillary mass or adenopathy in this unenhanced exam. No cardiomegaly. No pericardial effusion. No thoracic aortic aneurysm.   BONES: No destructive lytic or blastic bone lesion.   UPPER ABDOMEN: The imaged upper abdomen was grossly intact.       Underlying emphysema as described.   Stable linear scar cross the posteromedial right lung base.   Mild new pneumonia in the right upper lobe and right middle lobe as described.   MACRO: None   Signed by: Gabe Gage 6/13/2024 3:24 PM Dictation workstation:   [id number]    CT head wo IV contrast    Result Date: 6/12/2024  Interpreted By:  Sheri Lakhani, STUDY: CT HEAD WO IV CONTRAST;   6/12/2024 12:53 am   INDICATION: Signs/Symptoms:head injury earlier today, headache.   COMPARISON: CT head dated 07/24/2023.   ACCESSION NUMBER(S): [id number]   ORDERING CLINICIAN: SARA RUTLEDGE   TECHNIQUE: Noncontrast axial CT scan of head was performed. Angled reformats in brain and bone windows were generated. The images were reviewed in bone, brain, blood and soft tissue windows.   FINDINGS: No hyperdense intracranial hemorrhage is identified. There is no mass effect or midline shift.   Gray-white differentiation is intact, without evidence of CT apparent transcortical infarct. Subtle attenuation changes are present in the periventricular and subcortical white matter of bilateral cerebral hemispheres, nonspecific findings favored to represent sequela of microvascular disease.   No ventricular dilatation is present. Basal cisterns are patent. No extra-axial fluid collections are identified.   Scalp soft tissues do not demonstrate any acute abnormality. Calvarium is unremarkable in appearance. Mastoid air cells and middle ear cavities are clear.   Visualized paranasal sinuses are unremarkable in appearance.       1. No evidence of hemorrhage, skull fracture, or other acute intracranial trauma/abnormality. 2. Patchy attenuation changes are present in the periventricular and subcortical white matter of bilateral cerebral hemispheres, nonspecific findings favored to represent sequela of microvascular disease.   MACRO: None   Signed by: Sheri Lakhani 6/12/2024 1:07 AM Dictation workstation:   [id number]    XR chest 1 view    Result Date: 6/11/2024  Interpreted By:  Zack Sevilla, STUDY: XR CHEST 1 VIEW;  6/11/2024 3:26 pm   INDICATION: Signs/Symptoms:chest pain.   COMPARISON: 05/10/2024.   ACCESSION NUMBER(S): [id number]   ORDERING CLINICIAN: MARYBETH BRANHAM   FINDINGS:   The cardiac silhouette is unremarkable. Costophrenic angles are sharp. Lungs are clear. The trachea is midline. There is no  pneumothorax. No acute osseous abnormality is seen.       1.  No active cardiopulmonary process.     Signed by: Zack Sevilla 6/11/2024 3:48 PM Dictation workstation:   [id number]       Assessment/Plan   1.  History of ethanol abuse, with postop hyponatremia noted  This resolved on its own with normal renal function tests.  Check urine lites and osmolality  Avoid hydrochlorothiazide/SSRI/nonsteroidal from other medications/contrast dye  2.  Bowel perforation, surgically corrected  3.  Thrombocytosis with severe leukocytosis, may need hematology evaluation.        Principal Problem:    Perforated viscus  Active Problems:    Thrombocytosis      I spent 54 minutes in the professional and overall care of this patient.      Adrianne Segura MD

## 2024-06-22 NOTE — PROGRESS NOTES
06/22/24 0822   Discharge Planning   Patient expects to be discharged to: SNF     Per notes in [facility] not [facility], [facility] is still reviewing they were questing if patient will be LTC, per notes from  patient would be skilled, patient will need PT/OT eval before dc.

## 2024-06-23 VITALS
TEMPERATURE: 98 F | WEIGHT: 141.09 LBS | BODY MASS INDEX: 25.96 KG/M2 | RESPIRATION RATE: 18 BRPM | HEIGHT: 62 IN | HEART RATE: 100 BPM | SYSTOLIC BLOOD PRESSURE: 103 MMHG | OXYGEN SATURATION: 93 % | DIASTOLIC BLOOD PRESSURE: 68 MMHG

## 2024-06-23 LAB
ANION GAP SERPL CALC-SCNC: 13 MMOL/L (ref 10–20)
BACTERIA FLD CULT: ABNORMAL
BASOPHILS # BLD AUTO: 0.03 X10*3/UL (ref 0–0.1)
BASOPHILS NFR BLD AUTO: 0.1 %
BLOOD EXPIRATION DATE: NORMAL
BUN SERPL-MCNC: 10 MG/DL (ref 6–23)
CALCIUM SERPL-MCNC: 8.9 MG/DL (ref 8.6–10.3)
CHLORIDE SERPL-SCNC: 92 MMOL/L (ref 98–107)
CO2 SERPL-SCNC: 31 MMOL/L (ref 21–32)
CREAT SERPL-MCNC: 1.01 MG/DL (ref 0.5–1.05)
DISPENSE STATUS: NORMAL
EGFRCR SERPLBLD CKD-EPI 2021: 63 ML/MIN/1.73M*2
EOSINOPHIL # BLD AUTO: 0.01 X10*3/UL (ref 0–0.7)
EOSINOPHIL NFR BLD AUTO: 0 %
ERYTHROCYTE [DISTWIDTH] IN BLOOD BY AUTOMATED COUNT: 19.9 % (ref 11.5–14.5)
GLUCOSE SERPL-MCNC: 68 MG/DL (ref 74–99)
GRAM STN SPEC: ABNORMAL
GRAM STN SPEC: ABNORMAL
HCT VFR BLD AUTO: 31.5 % (ref 36–46)
HGB BLD-MCNC: 9.4 G/DL (ref 12–16)
IMM GRANULOCYTES # BLD AUTO: 0.25 X10*3/UL (ref 0–0.7)
IMM GRANULOCYTES NFR BLD AUTO: 1.2 % (ref 0–0.9)
LYMPHOCYTES # BLD AUTO: 1.13 X10*3/UL (ref 1.2–4.8)
LYMPHOCYTES NFR BLD AUTO: 5.4 %
MCH RBC QN AUTO: 23.8 PG (ref 26–34)
MCHC RBC AUTO-ENTMCNC: 29.8 G/DL (ref 32–36)
MCV RBC AUTO: 80 FL (ref 80–100)
MONOCYTES # BLD AUTO: 1.41 X10*3/UL (ref 0.1–1)
MONOCYTES NFR BLD AUTO: 6.7 %
NEUTROPHILS # BLD AUTO: 18.13 X10*3/UL (ref 1.2–7.7)
NEUTROPHILS NFR BLD AUTO: 86.6 %
NRBC BLD-RTO: 0.2 /100 WBCS (ref 0–0)
OSMOLALITY SERPL: 276 MOSM/KG (ref 280–300)
OSMOLALITY UR: 515 MOSM/KG (ref 200–1200)
PLATELET # BLD AUTO: 653 X10*3/UL (ref 150–450)
POTASSIUM SERPL-SCNC: 4.3 MMOL/L (ref 3.5–5.3)
PRODUCT BLOOD TYPE: 9500
PRODUCT CODE: NORMAL
RBC # BLD AUTO: 3.95 X10*6/UL (ref 4–5.2)
SODIUM SERPL-SCNC: 132 MMOL/L (ref 136–145)
UNIT ABO: NORMAL
UNIT NUMBER: NORMAL
UNIT RH: NORMAL
UNIT VOLUME: 350
WBC # BLD AUTO: 21 X10*3/UL (ref 4.4–11.3)
XM INTEP: NORMAL

## 2024-06-23 PROCEDURE — 2500000005 HC RX 250 GENERAL PHARMACY W/O HCPCS: Performed by: INTERNAL MEDICINE

## 2024-06-23 PROCEDURE — 2500000001 HC RX 250 WO HCPCS SELF ADMINISTERED DRUGS (ALT 637 FOR MEDICARE OP): Performed by: HOSPITALIST

## 2024-06-23 PROCEDURE — C9113 INJ PANTOPRAZOLE SODIUM, VIA: HCPCS | Performed by: HOSPITALIST

## 2024-06-23 PROCEDURE — 85025 COMPLETE CBC W/AUTO DIFF WBC: CPT | Performed by: INTERNAL MEDICINE

## 2024-06-23 PROCEDURE — 97161 PT EVAL LOW COMPLEX 20 MIN: CPT | Mod: GP

## 2024-06-23 PROCEDURE — 2500000002 HC RX 250 W HCPCS SELF ADMINISTERED DRUGS (ALT 637 FOR MEDICARE OP, ALT 636 FOR OP/ED): Performed by: INTERNAL MEDICINE

## 2024-06-23 PROCEDURE — 2500000004 HC RX 250 GENERAL PHARMACY W/ HCPCS (ALT 636 FOR OP/ED): Performed by: HOSPITALIST

## 2024-06-23 PROCEDURE — 2500000004 HC RX 250 GENERAL PHARMACY W/ HCPCS (ALT 636 FOR OP/ED): Performed by: PHARMACIST

## 2024-06-23 PROCEDURE — 80048 BASIC METABOLIC PNL TOTAL CA: CPT | Performed by: INTERNAL MEDICINE

## 2024-06-23 PROCEDURE — 94668 MNPJ CHEST WALL SBSQ: CPT

## 2024-06-23 PROCEDURE — 2500000001 HC RX 250 WO HCPCS SELF ADMINISTERED DRUGS (ALT 637 FOR MEDICARE OP): Performed by: INTERNAL MEDICINE

## 2024-06-23 PROCEDURE — 1200000002 HC GENERAL ROOM WITH TELEMETRY DAILY

## 2024-06-23 PROCEDURE — 2500000004 HC RX 250 GENERAL PHARMACY W/ HCPCS (ALT 636 FOR OP/ED): Performed by: INTERNAL MEDICINE

## 2024-06-23 PROCEDURE — 2500000004 HC RX 250 GENERAL PHARMACY W/ HCPCS (ALT 636 FOR OP/ED): Performed by: NURSE PRACTITIONER

## 2024-06-23 PROCEDURE — 99233 SBSQ HOSP IP/OBS HIGH 50: CPT | Performed by: INTERNAL MEDICINE

## 2024-06-23 PROCEDURE — 2500000004 HC RX 250 GENERAL PHARMACY W/ HCPCS (ALT 636 FOR OP/ED)

## 2024-06-23 PROCEDURE — 36415 COLL VENOUS BLD VENIPUNCTURE: CPT | Performed by: INTERNAL MEDICINE

## 2024-06-23 PROCEDURE — 94640 AIRWAY INHALATION TREATMENT: CPT

## 2024-06-23 ASSESSMENT — PAIN - FUNCTIONAL ASSESSMENT
PAIN_FUNCTIONAL_ASSESSMENT: 0-10

## 2024-06-23 ASSESSMENT — LIFESTYLE VARIABLES
TOTAL SCORE: 0
NAUSEA AND VOMITING: NO NAUSEA AND NO VOMITING
ANXIETY: MODERATELY ANXIOUS, OR GUARDED, SO ANXIETY IS INFERRED
ANXIETY: 3
TOTAL SCORE: 2
PAROXYSMAL SWEATS: NO SWEAT VISIBLE
TREMOR: NO TREMOR
PAROXYSMAL SWEATS: NO SWEAT VISIBLE
PAROXYSMAL SWEATS: NO SWEAT VISIBLE
HEADACHE, FULLNESS IN HEAD: NOT PRESENT
VISUAL DISTURBANCES: NOT PRESENT
AUDITORY DISTURBANCES: NOT PRESENT
AGITATION: NORMAL ACTIVITY
HEADACHE, FULLNESS IN HEAD: NOT PRESENT
TOTAL SCORE: 5
VISUAL DISTURBANCES: NOT PRESENT
ANXIETY: 2
VISUAL DISTURBANCES: NOT PRESENT
AGITATION: NORMAL ACTIVITY
AUDITORY DISTURBANCES: NOT PRESENT
TREMOR: NO TREMOR
AUDITORY DISTURBANCES: NOT PRESENT
AGITATION: NORMAL ACTIVITY
ANXIETY: 2
HEADACHE, FULLNESS IN HEAD: NOT PRESENT
HEADACHE, FULLNESS IN HEAD: NOT PRESENT
AUDITORY DISTURBANCES: NOT PRESENT
ORIENTATION AND CLOUDING OF SENSORIUM: ORIENTED AND CAN DO SERIAL ADDITIONS
ORIENTATION AND CLOUDING OF SENSORIUM: ORIENTED AND CAN DO SERIAL ADDITIONS
NAUSEA AND VOMITING: NO NAUSEA AND NO VOMITING
AUDITORY DISTURBANCES: NOT PRESENT
ORIENTATION AND CLOUDING OF SENSORIUM: ORIENTED AND CAN DO SERIAL ADDITIONS
TOTAL SCORE: 2
VISUAL DISTURBANCES: NOT PRESENT
ORIENTATION AND CLOUDING OF SENSORIUM: ORIENTED AND CAN DO SERIAL ADDITIONS
ORIENTATION AND CLOUDING OF SENSORIUM: ORIENTED AND CAN DO SERIAL ADDITIONS
AGITATION: NORMAL ACTIVITY
NAUSEA AND VOMITING: NO NAUSEA AND NO VOMITING
ANXIETY: NO ANXIETY, AT EASE
TREMOR: NO TREMOR
AUDITORY DISTURBANCES: NOT PRESENT
ANXIETY: 2
TOTAL SCORE: 4
ANXIETY: NO ANXIETY, AT EASE
VISUAL DISTURBANCES: NOT PRESENT
AGITATION: NORMAL ACTIVITY
TOTAL SCORE: 6
HEADACHE, FULLNESS IN HEAD: NOT PRESENT
TREMOR: NO TREMOR
TOTAL SCORE: 0
ANXIETY: MODERATELY ANXIOUS, OR GUARDED, SO ANXIETY IS INFERRED
AGITATION: NORMAL ACTIVITY
HEADACHE, FULLNESS IN HEAD: NOT PRESENT
ORIENTATION AND CLOUDING OF SENSORIUM: ORIENTED AND CAN DO SERIAL ADDITIONS
AUDITORY DISTURBANCES: NOT PRESENT
NAUSEA AND VOMITING: NO NAUSEA AND NO VOMITING
HEADACHE, FULLNESS IN HEAD: VERY MILD
ORIENTATION AND CLOUDING OF SENSORIUM: ORIENTED AND CAN DO SERIAL ADDITIONS
PAROXYSMAL SWEATS: NO SWEAT VISIBLE
PAROXYSMAL SWEATS: NO SWEAT VISIBLE
NAUSEA AND VOMITING: NO NAUSEA AND NO VOMITING
NAUSEA AND VOMITING: NO NAUSEA AND NO VOMITING
PAROXYSMAL SWEATS: NO SWEAT VISIBLE
AUDITORY DISTURBANCES: NOT PRESENT
HEADACHE, FULLNESS IN HEAD: NOT PRESENT
VISUAL DISTURBANCES: NOT PRESENT
PAROXYSMAL SWEATS: NO SWEAT VISIBLE
ORIENTATION AND CLOUDING OF SENSORIUM: ORIENTED AND CAN DO SERIAL ADDITIONS
TREMOR: NO TREMOR
TREMOR: NO TREMOR
VISUAL DISTURBANCES: NOT PRESENT
AGITATION: NORMAL ACTIVITY
ANXIETY: 2
NAUSEA AND VOMITING: NO NAUSEA AND NO VOMITING
TOTAL SCORE: 2
AGITATION: NORMAL ACTIVITY
TREMOR: NO TREMOR
PAROXYSMAL SWEATS: NO SWEAT VISIBLE
ORIENTATION AND CLOUDING OF SENSORIUM: ORIENTED AND CAN DO SERIAL ADDITIONS
TOTAL SCORE: 4
HEADACHE, FULLNESS IN HEAD: MODERATE
VISUAL DISTURBANCES: NOT PRESENT
NAUSEA AND VOMITING: NO NAUSEA AND NO VOMITING
AGITATION: NORMAL ACTIVITY
TREMOR: NO TREMOR
VISUAL DISTURBANCES: NOT PRESENT
HEADACHE, FULLNESS IN HEAD: NOT PRESENT
AGITATION: NORMAL ACTIVITY
AUDITORY DISTURBANCES: NOT PRESENT
ORIENTATION AND CLOUDING OF SENSORIUM: ORIENTED AND CAN DO SERIAL ADDITIONS
ANXIETY: MODERATELY ANXIOUS, OR GUARDED, SO ANXIETY IS INFERRED
PAROXYSMAL SWEATS: NO SWEAT VISIBLE
TOTAL SCORE: 2
TREMOR: NO TREMOR
VISUAL DISTURBANCES: NOT PRESENT
NAUSEA AND VOMITING: NO NAUSEA AND NO VOMITING
NAUSEA AND VOMITING: NO NAUSEA AND NO VOMITING
AUDITORY DISTURBANCES: NOT PRESENT
PAROXYSMAL SWEATS: NO SWEAT VISIBLE
TREMOR: NO TREMOR

## 2024-06-23 ASSESSMENT — ACTIVITIES OF DAILY LIVING (ADL): ADL_ASSISTANCE: INDEPENDENT

## 2024-06-23 ASSESSMENT — COGNITIVE AND FUNCTIONAL STATUS - GENERAL
TURNING FROM BACK TO SIDE WHILE IN FLAT BAD: A LOT
WALKING IN HOSPITAL ROOM: A LITTLE
MOBILITY SCORE: 15
MOVING TO AND FROM BED TO CHAIR: A LITTLE
STANDING UP FROM CHAIR USING ARMS: A LITTLE
CLIMB 3 TO 5 STEPS WITH RAILING: TOTAL
MOVING FROM LYING ON BACK TO SITTING ON SIDE OF FLAT BED WITH BEDRAILS: A LITTLE

## 2024-06-23 ASSESSMENT — PAIN SCALES - GENERAL
PAINLEVEL_OUTOF10: 10 - WORST POSSIBLE PAIN
PAINLEVEL_OUTOF10: 8
PAINLEVEL_OUTOF10: 7

## 2024-06-23 ASSESSMENT — PAIN DESCRIPTION - DESCRIPTORS: DESCRIPTORS: ACHING

## 2024-06-23 NOTE — PROGRESS NOTES
"Fernando Cuevas is a 63 y.o. female on day 2 of admission presenting with Perforated viscus.    Assessment/Plan   Status post emergency surgery for bowel obstruction.  Continue NG tube for now.  Ambulate to chair.  Sips of liquids okay    Subjective   Overall feeling a little bit better.  Apparently she had some confusion last night and they started her on the CIWA protocol        Objective     Physical Exam  NAD  A&Ox3  Non icteric  CTA  RR  Abdomen soft min tender. Wounds clean, intact.  A little less distended than yesterday  Extremities warm, well perfused     Last Recorded Vitals  Blood pressure 94/74, pulse 62, temperature 37.2 °C (98.9 °F), temperature source Temporal, resp. rate 18, height 1.575 m (5' 2.01\"), weight 64 kg (141 lb 1.5 oz), SpO2 96%.  Intake/Output last 3 Shifts:  I/O last 3 completed shifts:  In: 2293.8 (35.8 mL/kg) [I.V.:2293.8 (35.8 mL/kg)]  Out: 2850 (44.5 mL/kg) [Urine:1750 (0.8 mL/kg/hr); Emesis/NG output:1100]  Weight: 64 kg     Relevant Results    Scheduled medications  aspirin, 81 mg, oral, Daily  azithromycin, 250 mg, oral, Once per day on Monday Wednesday Friday  budesonide, 0.25 mg, nebulization, Daily   And  formoterol, 20 mcg, nebulization, BID  busPIRone, 5 mg, oral, BID  calcium carbonate, 1,500 mg, oral, Daily  dilTIAZem ER, 240 mg, oral, Daily  enoxaparin, 40 mg, subcutaneous, q24h  folic acid, 1 mg, oral, Daily  ipratropium-albuteroL, 3 mL, nebulization, TID  lisinopril, 10 mg, oral, Daily  montelukast, 10 mg, oral, Daily  multivitamin with minerals, 1 tablet, oral, Daily  nortriptyline, 50 mg, oral, Nightly  oxybutynin, 5 mg, oral, Daily  oxygen, , inhalation, Continuous - Inhalation  pantoprazole, 40 mg, intravenous, Daily before breakfast  piperacillin-tazobactam, 3.375 g, intravenous, q6h  predniSONE, 30 mg, oral, Daily   Followed by  [START ON 6/26/2024] predniSONE, 20 mg, oral, Daily   Followed by  [START ON 6/29/2024] predniSONE, 10 mg, oral, Daily  thiamine, 100 mg, " oral, Daily  [START ON 6/25/2024] thiamine, 100 mg, oral, Daily  thiamine, 100 mg, intravenous, Daily  varenicline, 1 mg, oral, BID      Continuous medications  lactated Ringer's, 125 mL/hr, Last Rate: 125 mL/hr (06/22/24 1503)      PRN medications  PRN medications: benzonatate, guaiFENesin, HYDROmorphone, HYDROmorphone, ipratropium-albuteroL, LORazepam **OR** LORazepam **OR** LORazepam, morphine, morphine, nitroglycerin, ondansetron    Results for orders placed or performed during the hospital encounter of 06/20/24 (from the past 24 hour(s))   Osmolality   Result Value Ref Range    Osmolality, Serum 276 (L) 280 - 300 mOsm/kg   Urine electrolytes   Result Value Ref Range    Sodium, Urine Random 163 mmol/L    Sodium/Creatinine Ratio 231 Not established. mmol/g Creat    Potassium, Urine Random 37 mmol/L    Potassium/Creatinine Ratio 52 Not established mmol/g Creat    Chloride, Urine Random 125 mmol/L    Chloride/Creatinine Ratio 177 38 - 318 mmol/g creat    Creatinine, Urine Random 70.5 20.0 - 320.0 mg/dL   Osmolality, urine   Result Value Ref Range    Osmolality, Urine Random 515 200 - 1,200 mOsm/kg   Basic metabolic panel   Result Value Ref Range    Glucose 68 (L) 74 - 99 mg/dL    Sodium 132 (L) 136 - 145 mmol/L    Potassium 4.3 3.5 - 5.3 mmol/L    Chloride 92 (L) 98 - 107 mmol/L    Bicarbonate 31 21 - 32 mmol/L    Anion Gap 13 10 - 20 mmol/L    Urea Nitrogen 10 6 - 23 mg/dL    Creatinine 1.01 0.50 - 1.05 mg/dL    eGFR 63 >60 mL/min/1.73m*2    Calcium 8.9 8.6 - 10.3 mg/dL   CBC and Auto Differential   Result Value Ref Range    WBC 21.0 (H) 4.4 - 11.3 x10*3/uL    nRBC 0.2 (H) 0.0 - 0.0 /100 WBCs    RBC 3.95 (L) 4.00 - 5.20 x10*6/uL    Hemoglobin 9.4 (L) 12.0 - 16.0 g/dL    Hematocrit 31.5 (L) 36.0 - 46.0 %    MCV 80 80 - 100 fL    MCH 23.8 (L) 26.0 - 34.0 pg    MCHC 29.8 (L) 32.0 - 36.0 g/dL    RDW 19.9 (H) 11.5 - 14.5 %    Platelets 653 (H) 150 - 450 x10*3/uL    Neutrophils % 86.6 40.0 - 80.0 %    Immature  Granulocytes %, Automated 1.2 (H) 0.0 - 0.9 %    Lymphocytes % 5.4 13.0 - 44.0 %    Monocytes % 6.7 2.0 - 10.0 %    Eosinophils % 0.0 0.0 - 6.0 %    Basophils % 0.1 0.0 - 2.0 %    Neutrophils Absolute 18.13 (H) 1.20 - 7.70 x10*3/uL    Immature Granulocytes Absolute, Automated 0.25 0.00 - 0.70 x10*3/uL    Lymphocytes Absolute 1.13 (L) 1.20 - 4.80 x10*3/uL    Monocytes Absolute 1.41 (H) 0.10 - 1.00 x10*3/uL    Eosinophils Absolute 0.01 0.00 - 0.70 x10*3/uL    Basophils Absolute 0.03 0.00 - 0.10 x10*3/uL           I spent 25 minutes in the professional and overall care of this patient.      Reid Bingham MD

## 2024-06-23 NOTE — PROGRESS NOTES
"Fernando Cuevas is a 63 y.o. female on day 2 of admission presenting with Perforated viscus.    Subjective   Since abdomen seems more distended going down for an abdominal x-ray white count down to 21,000.  Patient states she is not nauseous or vomiting positive flatus no bowel movement yet       Objective     Physical Exam  Vitals reviewed.   Constitutional:       Appearance: Normal appearance.   HENT:      Head: Normocephalic.      Nose: Nose normal.      Mouth/Throat:      Mouth: Mucous membranes are dry.      Pharynx: Oropharynx is clear.   Cardiovascular:      Rate and Rhythm: Normal rate and regular rhythm.   Pulmonary:      Effort: Pulmonary effort is normal.   Abdominal:      Comments: distended no bowel sounds   Skin:     Capillary Refill: Capillary refill takes less than 2 seconds.   Neurological:      General: No focal deficit present.      Mental Status: She is alert.         Last Recorded Vitals  Blood pressure 94/74, pulse 62, temperature 37.2 °C (98.9 °F), temperature source Temporal, resp. rate 18, height 1.575 m (5' 2.01\"), weight 64 kg (141 lb 1.5 oz), SpO2 96%.  Intake/Output last 3 Shifts:  I/O last 3 completed shifts:  In: 2293.8 (35.8 mL/kg) [I.V.:2293.8 (35.8 mL/kg)]  Out: 2850 (44.5 mL/kg) [Urine:1750 (0.8 mL/kg/hr); Emesis/NG output:1100]  Weight: 64 kg     Relevant Results  Results for orders placed or performed during the hospital encounter of 06/20/24 (from the past 24 hour(s))   Osmolality   Result Value Ref Range    Osmolality, Serum 276 (L) 280 - 300 mOsm/kg   Urine electrolytes   Result Value Ref Range    Sodium, Urine Random 163 mmol/L    Sodium/Creatinine Ratio 231 Not established. mmol/g Creat    Potassium, Urine Random 37 mmol/L    Potassium/Creatinine Ratio 52 Not established mmol/g Creat    Chloride, Urine Random 125 mmol/L    Chloride/Creatinine Ratio 177 38 - 318 mmol/g creat    Creatinine, Urine Random 70.5 20.0 - 320.0 mg/dL   Osmolality, urine   Result Value Ref Range    " Osmolality, Urine Random 515 200 - 1,200 mOsm/kg   Basic metabolic panel   Result Value Ref Range    Glucose 68 (L) 74 - 99 mg/dL    Sodium 132 (L) 136 - 145 mmol/L    Potassium 4.3 3.5 - 5.3 mmol/L    Chloride 92 (L) 98 - 107 mmol/L    Bicarbonate 31 21 - 32 mmol/L    Anion Gap 13 10 - 20 mmol/L    Urea Nitrogen 10 6 - 23 mg/dL    Creatinine 1.01 0.50 - 1.05 mg/dL    eGFR 63 >60 mL/min/1.73m*2    Calcium 8.9 8.6 - 10.3 mg/dL   CBC and Auto Differential   Result Value Ref Range    WBC 21.0 (H) 4.4 - 11.3 x10*3/uL    nRBC 0.2 (H) 0.0 - 0.0 /100 WBCs    RBC 3.95 (L) 4.00 - 5.20 x10*6/uL    Hemoglobin 9.4 (L) 12.0 - 16.0 g/dL    Hematocrit 31.5 (L) 36.0 - 46.0 %    MCV 80 80 - 100 fL    MCH 23.8 (L) 26.0 - 34.0 pg    MCHC 29.8 (L) 32.0 - 36.0 g/dL    RDW 19.9 (H) 11.5 - 14.5 %    Platelets 653 (H) 150 - 450 x10*3/uL    Neutrophils % 86.6 40.0 - 80.0 %    Immature Granulocytes %, Automated 1.2 (H) 0.0 - 0.9 %    Lymphocytes % 5.4 13.0 - 44.0 %    Monocytes % 6.7 2.0 - 10.0 %    Eosinophils % 0.0 0.0 - 6.0 %    Basophils % 0.1 0.0 - 2.0 %    Neutrophils Absolute 18.13 (H) 1.20 - 7.70 x10*3/uL    Immature Granulocytes Absolute, Automated 0.25 0.00 - 0.70 x10*3/uL    Lymphocytes Absolute 1.13 (L) 1.20 - 4.80 x10*3/uL    Monocytes Absolute 1.41 (H) 0.10 - 1.00 x10*3/uL    Eosinophils Absolute 0.01 0.00 - 0.70 x10*3/uL    Basophils Absolute 0.03 0.00 - 0.10 x10*3/uL       Imaging Results  Ct abd pelvis:  IMPRESSION:  1. Localizing free air in the anterior mid abdomen compatible with  perforated viscus. advise surgical consult.  2. Site of perforation potentially emanates from a small bowel loop in  the mid abdomen.  3. No associated free fluid.  4. Marked gastric distention with air and fluid.  5. Remainder as above.  6. Urgent finding and recommendation given to and acknowledged by  Medications:  aspirin, 81 mg, oral, Daily  azithromycin, 250 mg, oral, Once per day on Monday Wednesday Friday  budesonide, 0.25 mg,  nebulization, Daily   And  formoterol, 20 mcg, nebulization, BID  busPIRone, 5 mg, oral, BID  calcium carbonate, 1,500 mg, oral, Daily  dilTIAZem ER, 240 mg, oral, Daily  enoxaparin, 40 mg, subcutaneous, q24h  folic acid, 1 mg, oral, Daily  ipratropium-albuteroL, 3 mL, nebulization, TID  lisinopril, 10 mg, oral, Daily  montelukast, 10 mg, oral, Daily  multivitamin with minerals, 1 tablet, oral, Daily  nortriptyline, 50 mg, oral, Nightly  oxybutynin, 5 mg, oral, Daily  oxygen, , inhalation, Continuous - Inhalation  pantoprazole, 40 mg, intravenous, Daily before breakfast  piperacillin-tazobactam, 3.375 g, intravenous, q6h  predniSONE, 30 mg, oral, Daily   Followed by  [START ON 6/26/2024] predniSONE, 20 mg, oral, Daily   Followed by  [START ON 6/29/2024] predniSONE, 10 mg, oral, Daily  thiamine, 100 mg, oral, Daily  [START ON 6/25/2024] thiamine, 100 mg, oral, Daily  thiamine, 100 mg, intravenous, Daily  varenicline, 1 mg, oral, BID       PRN medications: benzonatate, guaiFENesin, HYDROmorphone, HYDROmorphone, ipratropium-albuteroL, LORazepam **OR** LORazepam **OR** LORazepam, morphine, morphine, nitroglycerin, ondansetron     Assessment/Plan   1. Perforated viscus status post small bowel resection and washout procedure performed on June 21st  -Patient pending abdominal x-rays abdomen seems more distended today  -NG-tube in place     2.  Leukocytosis suspect secondary to 1 with peritonitis  -Continue IV Zosyn, white count is trending down currently at 21k  -bcx pending     3.  COPD  -She was recently discharged on a steroid taper will resumed at this time     4.  Depression and anxiety  -continue home medications     5.  Hyponatremia  -resolving      DVT Prophylaxis:  CELESTE Landon MD  [facility]

## 2024-06-23 NOTE — PROGRESS NOTES
Physical Therapy    Physical Therapy Evaluation    Patient Name: Fernando Cuevas  MRN: [id number]  Today's Date: 6/23/2024   Time Calculation  Start Time: 1231  Stop Time: 1256  Time Calculation (min): 25 min    Assessment/Plan   PT Assessment  PT Assessment Results: Decreased strength, Decreased range of motion, Decreased endurance, Impaired balance, Decreased mobility, Impaired judgement, Pain  Rehab Prognosis: Good  End of Session Communication: Bedside nurse  Assessment Comment: PT Evaluation Completed. The patient presented with decreased mobility, balance, endurance, safety awareness, and increased pain, lethargy and fatigue. These impairments are negatively impacting her ability to perform at baseline functional level, in home environment. The patient is at risk of falls & injury. This patient would benefit from skilled therapy intervention to address limitations and progress towards the PT goals.  Anticipate moderate frequency PT needs at discharge  End of Session Patient Position: Up in chair, Alarm on  IP OR SWING BED PT PLAN  Inpatient or Swing Bed: Inpatient  PT Plan  Treatment/Interventions: Bed mobility, Transfer training, Gait training, Balance training, Strengthening, Endurance training, Therapeutic exercise, Range of motion, Therapeutic activity, Home exercise program  PT Plan: Ongoing PT  PT Frequency: 4 times per week  PT Discharge Recommendations: Moderate intensity level of continued care  PT Recommended Transfer Status: Assist x1  PT - OK to Discharge: Yes (PT POC established.)      Subjective   General Visit Information:  General  Reason for Referral: Pt presents to the ED with abdominal pain. Pt found to have a perforated bowel and underwent  exlap and partial small bowel resection on 6/21. Pt recently admitted with COPD excaerbation and discharged to rehab 1 day prior to this admission.  Referred By: Morena Landon MD  Past Medical History Relevant to Rehab:   Past Medical History:   Diagnosis  Date    Essential (primary) hypertension 04/20/2016    Benign hypertension    Personal history of other diseases of the respiratory system     H/O emphysema    Personal history of other diseases of the respiratory system     History of chronic obstructive lung disease    Personal history of other mental and behavioral disorders     History of depression       Prior to Session Communication: Bedside nurse  Patient Position Received:  (sitting EOB)  General Comment: Pt agreeable to PT eval. Moderately self limiting throughout, max encouragement and education on importance of participation with therapy provided.  Home Living:  Home Living  Type of Home: House  Lives With:  (Son)  Home Adaptive Equipment: Walker rolling or standard (rollator)  Home Layout: One level  Home Access: Stairs to enter with rails  Entrance Stairs-Rails: Right  Entrance Stairs-Number of Steps: 3  Bathroom Shower/Tub: Tub/shower unit  Bathroom Toilet: Standard  Bathroom Equipment: None  Prior Level of Function:  Prior Function Per Pt/Caregiver Report  Level of [place]: Independent with ADLs and functional transfers, Needs assistance with homemaking  Receives Help From: Family (son not home during the day)  ADL Assistance: Independent  Homemaking Assistance: Needs assistance (son completes)  Ambulatory Assistance: Independent (using rollator)  Prior Function Comments: Reports 1 fall prior to last admission (+) drives.  Precautions:  Precautions  Medical Precautions: Fall precautions  Post-Surgical Precautions: Abdominal surgery precautions  Vital Signs:  Vital Signs  Heart Rate:  (HR ranging in the 120s throughout session)  SpO2: 98 % (Pt continually reporting SOB, SpO2 96-99% throughout session)    Objective   Pain:  Pain Assessment  Pain Assessment: 0-10  0-10 (Numeric) Pain Score: 7  Pain Location: Head (Abdomen)  Cognition:  Cognition  Overall Cognitive Status: Within Functional Limits  Orientation Level: Oriented X4  Insight:  Mild    General Assessments:  Activity Tolerance  Endurance: Tolerates 10 - 20 min exercise with multiple rests    Sensation  Light Touch:  (Endorses numbness/tingling in hands/feet)    Postural Control  Postural Control: Within Functional Limits    Static Sitting Balance  Static Sitting-Balance Support: Feet supported, Bilateral upper extremity supported  Static Sitting-Level of Assistance: Close supervision  Dynamic Sitting Balance  Dynamic Sitting-Balance Support: Feet supported, Bilateral upper extremity supported  Dynamic Sitting-Comments: Close supervision    Static Standing Balance  Static Standing-Balance Support: Bilateral upper extremity supported  Static Standing-Level of Assistance: Contact guard  Dynamic Standing Balance  Dynamic Standing-Balance Support: Bilateral upper extremity supported  Dynamic Standing-Comments: Min assist  Functional Assessments:       Transfers  Transfer: Yes  Transfer 1  Technique 1: Sit to stand, Stand to sit  Transfer Device 1: Walker  Transfer Level of Assistance 1: Contact guard  Trials/Comments 1: Cues for hand placement. Slow to rise to standing. Tremors noted in standing. Decreased safety awareness with pt attempting to sit prior to backing up to chair.    Ambulation/Gait Training  Ambulation/Gait Training Performed: Yes  Ambulation/Gait Training 1  Surface 1: Level tile  Device 1: Rolling walker  Assistance 1: Minimum assistance  Comments/Distance (ft) 1: 12 ft. Pt requires max encouragement to complete. Cues for sequencing and walker placement. Pt takes standing rest break in the middle of ambulation, resting forearms on front of walker.  Extremity/Trunk Assessments:  RUE   RUE : Within Functional Limits  LUE   LUE: Within Functional Limits  RLE   RLE :  (Strength grossly 3+/5. ROM WFL.)  LLE   LLE :  (Strength grossly 3+/5. ROM WFL.)  Outcome Measures:  [facility] Basic Mobility  Turning from your back to your side while in a flat bed without using bedrails: CARIDAD  little  Moving from lying on your back to sitting on the side of a flat bed without using bedrails: A lot  Moving to and from bed to chair (including a wheelchair): A little  Standing up from a chair using your arms (e.g. wheelchair or bedside chair): A little  To walk in hospital room: A little  Climbing 3-5 steps with railing: Total  Basic Mobility - Total Score: 15    Encounter Problems       Encounter Problems (Active)       Balance       Pt will tolerate 10+ mins dynamic standing balance activities with CGA or less and no LOB using a RW.        Start:  06/23/24    Expected End:  07/07/24               Mobility       STG - Patient will ambulate 50 ft with CGA and a Rw with minimal SOB or fatigue.        Start:  06/23/24    Expected End:  07/07/24               PT Transfers       STG - Patient will perform bed mobility with SBA or less using log roll technique.        Start:  06/23/24    Expected End:  07/07/24            STG - Patient will transfer sit to and from stand mod I with a RW.        Start:  06/23/24    Expected End:  07/07/24               Pain - Adult                      Education Documentation  Precautions, taught by Katharina Segal, PT at 6/23/2024  1:39 PM.  Learner: Patient  Readiness: Acceptance  Method: Explanation  Response: Needs Reinforcement    Body Mechanics, taught by Katharina Segal, PT at 6/23/2024  1:39 PM.  Learner: Patient  Readiness: Acceptance  Method: Explanation  Response: Needs Reinforcement    Mobility Training, taught by Katharina Segal, PT at 6/23/2024  1:39 PM.  Learner: Patient  Readiness: Acceptance  Method: Explanation  Response: Needs Reinforcement    Education Comments  No comments found.

## 2024-06-23 NOTE — PROGRESS NOTES
06/23/24 1548   Current Planned Discharge Disposition   Current Planned Discharge Disposition [facility] - auth can be started by facility once  OT note is  in     Patient   sleeping, given note  explaining that [facility] accepted, insurance auth pending.   White board  updated.     Fatuma Anthony, MSW, LSW

## 2024-06-23 NOTE — PROGRESS NOTES
Fernando Cuevas is a 63 y.o. female on day 2 of admission presenting with Perforated viscus.      Subjective   Overall doing well no new complaints noted with NG tube       Objective        Vitals 24HR  Heart Rate:  [62]   Temp:  [36.3 °C (97.4 °F)-37.2 °C (98.9 °F)]   Resp:  [18]   BP: ()/(68-74)   SpO2:  [92 %-100 %]     Intake/Output last 3 Shifts:    Intake/Output Summary (Last 24 hours) at 6/23/2024 1543  Last data filed at 6/23/2024 1226  Gross per 24 hour   Intake 2775 ml   Output 1950 ml   Net 825 ml       Physical Exam        GEN appearance; awake alert in no distress  Head and ENT: NG tube noted,  Lungs: CTA  Heart; RRR  Abdomen; postsurgery surgical dressings noted  Extremities: No edema  Neurologic; physiologic                      Relevant Results     Results from last 7 days   Lab Units 06/23/24  0535 06/22/24  0516 06/21/24  1200   WBC AUTO x10*3/uL 21.0* 33.7* 38.3*   HEMOGLOBIN g/dL 9.4* 9.7* 8.8*   HEMATOCRIT % 31.5* 32.6* 29.4*   PLATELETS AUTO x10*3/uL 653* 786* 730*      Results from last 7 days   Lab Units 06/23/24  0535 06/22/24  0516 06/21/24  1200   SODIUM mmol/L 132* 133* 131*   POTASSIUM mmol/L 4.3 4.7 5.3   CHLORIDE mmol/L 92* 92* 94*   CO2 mmol/L 31 31 29   BUN mg/dL 10 9 11   CREATININE mg/dL 1.01 0.93 0.82   GLUCOSE mg/dL 68* 94 111*   CALCIUM mg/dL 8.9 9.4 9.0        Current Facility-Administered Medications:     aspirin chewable tablet 81 mg, 81 mg, oral, Daily, Morena Landon MD, 81 mg at 06/23/24 0909    azithromycin (Zithromax) tablet 250 mg, 250 mg, oral, Once per day on Monday Wednesday Friday, Morena Landon MD    benzonatate (Tessalon) capsule 100 mg, 100 mg, oral, TID PRN, Morena Landon MD    budesonide (Pulmicort) 0.25 mg/2 mL nebulizer solution 0.25 mg, 0.25 mg, nebulization, Daily, 0.25 mg at 06/23/24 0712 **AND** formoterol (Perforomist) 20 mcg/2 mL nebulizer solution 20 mcg, 20 mcg, nebulization, BID, Morena Landon MD, 20 mcg at 06/23/24 0712    busPIRone  (Buspar) tablet 5 mg, 5 mg, oral, BID, Morena Landon MD, 5 mg at 06/23/24 0909    calcium carbonate (Tums) chewable tablet 1,500 mg, 1,500 mg, oral, Daily, Morena Landon MD, 1,500 mg at 06/23/24 0910    dilTIAZem CD (Cardizem CD) 24 hr capsule 240 mg, 240 mg, oral, Daily, Morena Landno MD, 240 mg at 06/23/24 0909    enoxaparin (Lovenox) syringe 40 mg, 40 mg, subcutaneous, q24h, LISETH Laws-CNP, 40 mg at 06/23/24 1454    folic acid (Folvite) tablet 1 mg, 1 mg, oral, Daily, Madeleine Nixon MD, 1 mg at 06/23/24 0910    guaiFENesin (Mucinex) 12 hr tablet 600 mg, 600 mg, oral, BID PRN, Morena Landon MD    HYDROmorphone (Dilaudid) injection 0.5 mg, 0.5 mg, intravenous, q3h PRN, FELIPE Miller, 0.5 mg at 06/23/24 0904    HYDROmorphone PF (Dilaudid) injection 0.2 mg, 0.2 mg, intravenous, q4h PRN, LISETH Miller-CNP    ipratropium-albuteroL (Duo-Neb) 0.5-2.5 mg/3 mL nebulizer solution 3 mL, 3 mL, nebulization, q2h PRN, Morena Landon MD, 3 mL at 06/21/24 2356    ipratropium-albuteroL (Duo-Neb) 0.5-2.5 mg/3 mL nebulizer solution 3 mL, 3 mL, nebulization, TID, Morena Landon MD, 3 mL at 06/23/24 1354    lactated Ringer's infusion, 125 mL/hr, intravenous, Continuous, Morena Landon MD, Last Rate: 125 mL/hr at 06/22/24 1503, 125 mL/hr at 06/22/24 1503    lisinopril tablet 10 mg, 10 mg, oral, Daily, Morena Landon MD, 10 mg at 06/22/24 0850    LORazepam (Ativan) tablet 0.5 mg, 0.5 mg, oral, q2h PRN, 0.5 mg at 06/23/24 0650 **OR** LORazepam (Ativan) tablet 1 mg, 1 mg, oral, q2h PRN, 1 mg at 06/23/24 1045 **OR** LORazepam (Ativan) tablet 2 mg, 2 mg, oral, q2h PRN, Madeleine Nixon MD    montelukast (Singulair) tablet 10 mg, 10 mg, oral, Daily, Morena Landon MD, 10 mg at 06/22/24 2042    morphine injection 1 mg, 1 mg, intravenous, q4h PRN, Madeleine Nixon MD    morphine injection 2 mg, 2 mg, intravenous, q4h PRN, Madeleine Nixon MD, 2 mg at 06/23/24 0336    multivitamin with minerals 1 tablet, 1  tablet, oral, Daily, Madeleine Nixon MD, 1 tablet at 06/23/24 1044    nitroglycerin (Nitrostat) SL tablet 0.4 mg, 0.4 mg, sublingual, q5 min PRN, Morena Landon MD    nortriptyline (Pamelor) capsule 50 mg, 50 mg, oral, Nightly, Morena Landon MD, 50 mg at 06/22/24 2042    ondansetron (Zofran) injection 4 mg, 4 mg, intravenous, q6h PRN, Beverly Villeda, APRN-CNP, 4 mg at 06/22/24 2323    oxybutynin (Ditropan) tablet 5 mg, 5 mg, oral, Daily, Morena Landon MD, 5 mg at 06/23/24 1044    oxygen (O2) therapy, , inhalation, Continuous - Inhalation, Morena Landon MD, 2 L/min at 06/23/24 0800    pantoprazole (ProtoNix) injection 40 mg, 40 mg, intravenous, Daily before breakfast, Madeleine Nixon MD, 40 mg at 06/23/24 0646    piperacillin-tazobactam-dextrose (Zosyn) IV 3.375 g, 3.375 g, intravenous, q6h, Wendie Cardenas, PharmD, Stopped at 06/23/24 1524    [COMPLETED] predniSONE (Deltasone) tablet 40 mg, 40 mg, oral, Daily, 40 mg at 06/22/24 0849 **FOLLOWED BY** predniSONE (Deltasone) tablet 30 mg, 30 mg, oral, Daily, 30 mg at 06/23/24 1044 **FOLLOWED BY** [START ON 6/26/2024] predniSONE (Deltasone) tablet 20 mg, 20 mg, oral, Daily **FOLLOWED BY** [START ON 6/29/2024] predniSONE (Deltasone) tablet 10 mg, 10 mg, oral, Daily, Morena Landon MD    thiamine (Vitamin B-1) tablet 100 mg, 100 mg, oral, Daily, Morena Landon MD, 100 mg at 06/23/24 1044    [START ON 6/25/2024] thiamine (Vitamin B-1) tablet 100 mg, 100 mg, oral, Daily, Madeleine Nixon MD    thiamine (Vitamin B1) injection 100 mg, 100 mg, intravenous, Daily, Madeleine Nixon MD    varenicline (Chantix) tablet 1 mg, 1 mg, oral, BID, Morena Landon MD, 1 mg at 06/23/24 1043           Assessment/Plan          1.  History of ethanol abuse, with postop hyponatremia noted  This resolved on its own with normal renal function tests.  Check urine lites and osmolality  Avoid hydrochlorothiazide/SSRI/nonsteroidal from other medications/contrast dye  2.  Bowel perforation,  surgically corrected  3.  Thrombocytosis with severe leukocytosis, may need hematology evaluation.       Will continue daily rounds, with exams reviewing all the labs and all other consultants input      Principal Problem:    Perforated viscus  Active Problems:    Thrombocytosis              I spent 35 minutes in the professional and overall care of this patient.      Adrianne Segura MD

## 2024-06-24 ENCOUNTER — APPOINTMENT (OUTPATIENT)
Dept: RADIOLOGY | Facility: HOSPITAL | Age: 64
End: 2024-06-24
Payer: COMMERCIAL

## 2024-06-24 LAB
ANION GAP SERPL CALC-SCNC: 17 MMOL/L (ref 10–20)
BACTERIA FLD CULT: ABNORMAL
BACTERIA FLD CULT: ABNORMAL
BASOPHILS # BLD AUTO: 0.03 X10*3/UL (ref 0–0.1)
BASOPHILS NFR BLD AUTO: 0.2 %
BUN SERPL-MCNC: 9 MG/DL (ref 6–23)
CALCIUM SERPL-MCNC: 8.6 MG/DL (ref 8.6–10.3)
CHLORIDE SERPL-SCNC: 93 MMOL/L (ref 98–107)
CO2 SERPL-SCNC: 26 MMOL/L (ref 21–32)
CREAT SERPL-MCNC: 0.92 MG/DL (ref 0.5–1.05)
EGFRCR SERPLBLD CKD-EPI 2021: 70 ML/MIN/1.73M*2
EOSINOPHIL # BLD AUTO: 0.01 X10*3/UL (ref 0–0.7)
EOSINOPHIL NFR BLD AUTO: 0.1 %
ERYTHROCYTE [DISTWIDTH] IN BLOOD BY AUTOMATED COUNT: 20.6 % (ref 11.5–14.5)
GLUCOSE SERPL-MCNC: 81 MG/DL (ref 74–99)
GRAM STN SPEC: ABNORMAL
GRAM STN SPEC: ABNORMAL
HCT VFR BLD AUTO: 33 % (ref 36–46)
HGB BLD-MCNC: 9.3 G/DL (ref 12–16)
IMM GRANULOCYTES # BLD AUTO: 0.51 X10*3/UL (ref 0–0.7)
IMM GRANULOCYTES NFR BLD AUTO: 3.1 % (ref 0–0.9)
LYMPHOCYTES # BLD AUTO: 1.07 X10*3/UL (ref 1.2–4.8)
LYMPHOCYTES NFR BLD AUTO: 6.4 %
MCH RBC QN AUTO: 24.2 PG (ref 26–34)
MCHC RBC AUTO-ENTMCNC: 28.2 G/DL (ref 32–36)
MCV RBC AUTO: 86 FL (ref 80–100)
MONOCYTES # BLD AUTO: 1.05 X10*3/UL (ref 0.1–1)
MONOCYTES NFR BLD AUTO: 6.3 %
NEUTROPHILS # BLD AUTO: 13.94 X10*3/UL (ref 1.2–7.7)
NEUTROPHILS NFR BLD AUTO: 83.9 %
NRBC BLD-RTO: 0.2 /100 WBCS (ref 0–0)
PLATELET # BLD AUTO: 510 X10*3/UL (ref 150–450)
POTASSIUM SERPL-SCNC: 4.5 MMOL/L (ref 3.5–5.3)
RBC # BLD AUTO: 3.85 X10*6/UL (ref 4–5.2)
RBC MORPH BLD: NORMAL
SODIUM SERPL-SCNC: 131 MMOL/L (ref 136–145)
WBC # BLD AUTO: 16.6 X10*3/UL (ref 4.4–11.3)

## 2024-06-24 PROCEDURE — 36415 COLL VENOUS BLD VENIPUNCTURE: CPT | Performed by: INTERNAL MEDICINE

## 2024-06-24 PROCEDURE — 2500000001 HC RX 250 WO HCPCS SELF ADMINISTERED DRUGS (ALT 637 FOR MEDICARE OP): Performed by: INTERNAL MEDICINE

## 2024-06-24 PROCEDURE — 97165 OT EVAL LOW COMPLEX 30 MIN: CPT | Mod: GO

## 2024-06-24 PROCEDURE — 2500000005 HC RX 250 GENERAL PHARMACY W/O HCPCS: Performed by: INTERNAL MEDICINE

## 2024-06-24 PROCEDURE — 1200000002 HC GENERAL ROOM WITH TELEMETRY DAILY

## 2024-06-24 PROCEDURE — 85025 COMPLETE CBC W/AUTO DIFF WBC: CPT | Performed by: INTERNAL MEDICINE

## 2024-06-24 PROCEDURE — 99233 SBSQ HOSP IP/OBS HIGH 50: CPT | Performed by: INTERNAL MEDICINE

## 2024-06-24 PROCEDURE — 80048 BASIC METABOLIC PNL TOTAL CA: CPT | Performed by: INTERNAL MEDICINE

## 2024-06-24 PROCEDURE — 94668 MNPJ CHEST WALL SBSQ: CPT

## 2024-06-24 PROCEDURE — 97110 THERAPEUTIC EXERCISES: CPT | Mod: GP,CQ

## 2024-06-24 PROCEDURE — C9113 INJ PANTOPRAZOLE SODIUM, VIA: HCPCS | Performed by: HOSPITALIST

## 2024-06-24 PROCEDURE — 2500000004 HC RX 250 GENERAL PHARMACY W/ HCPCS (ALT 636 FOR OP/ED): Performed by: HOSPITALIST

## 2024-06-24 PROCEDURE — 2500000001 HC RX 250 WO HCPCS SELF ADMINISTERED DRUGS (ALT 637 FOR MEDICARE OP): Performed by: HOSPITALIST

## 2024-06-24 PROCEDURE — 2500000002 HC RX 250 W HCPCS SELF ADMINISTERED DRUGS (ALT 637 FOR MEDICARE OP, ALT 636 FOR OP/ED): Performed by: INTERNAL MEDICINE

## 2024-06-24 PROCEDURE — 74018 RADEX ABDOMEN 1 VIEW: CPT

## 2024-06-24 PROCEDURE — 74018 RADEX ABDOMEN 1 VIEW: CPT | Performed by: RADIOLOGY

## 2024-06-24 PROCEDURE — 97116 GAIT TRAINING THERAPY: CPT | Mod: GP,CQ

## 2024-06-24 PROCEDURE — 2500000004 HC RX 250 GENERAL PHARMACY W/ HCPCS (ALT 636 FOR OP/ED): Performed by: INTERNAL MEDICINE

## 2024-06-24 PROCEDURE — 84155 ASSAY OF PROTEIN SERUM: CPT | Mod: AHULAB | Performed by: INTERNAL MEDICINE

## 2024-06-24 PROCEDURE — 2500000004 HC RX 250 GENERAL PHARMACY W/ HCPCS (ALT 636 FOR OP/ED)

## 2024-06-24 PROCEDURE — 2500000004 HC RX 250 GENERAL PHARMACY W/ HCPCS (ALT 636 FOR OP/ED): Performed by: NURSE PRACTITIONER

## 2024-06-24 PROCEDURE — 94640 AIRWAY INHALATION TREATMENT: CPT

## 2024-06-24 PROCEDURE — 2500000004 HC RX 250 GENERAL PHARMACY W/ HCPCS (ALT 636 FOR OP/ED): Performed by: PHARMACIST

## 2024-06-24 RX ORDER — FLUCONAZOLE 100 MG/1
200 TABLET ORAL DAILY
Status: COMPLETED | OUTPATIENT
Start: 2024-06-24 | End: 2024-07-03

## 2024-06-24 RX ORDER — DEXTROSE, SODIUM CHLORIDE, SODIUM LACTATE, POTASSIUM CHLORIDE, AND CALCIUM CHLORIDE 5; .6; .31; .03; .02 G/100ML; G/100ML; G/100ML; G/100ML; G/100ML
50 INJECTION, SOLUTION INTRAVENOUS CONTINUOUS
Status: DISCONTINUED | OUTPATIENT
Start: 2024-06-24 | End: 2024-06-28

## 2024-06-24 ASSESSMENT — COGNITIVE AND FUNCTIONAL STATUS - GENERAL
STANDING UP FROM CHAIR USING ARMS: A LITTLE
DAILY ACTIVITIY SCORE: 14
MOBILITY SCORE: 17
MOVING TO AND FROM BED TO CHAIR: A LITTLE
MOVING FROM LYING ON BACK TO SITTING ON SIDE OF FLAT BED WITH BEDRAILS: A LITTLE
TURNING FROM BACK TO SIDE WHILE IN FLAT BAD: A LITTLE
PERSONAL GROOMING: A LITTLE
MOBILITY SCORE: 16
DRESSING REGULAR LOWER BODY CLOTHING: TOTAL
CLIMB 3 TO 5 STEPS WITH RAILING: A LOT
DRESSING REGULAR UPPER BODY CLOTHING: A LITTLE
HELP NEEDED FOR BATHING: A LOT
CLIMB 3 TO 5 STEPS WITH RAILING: TOTAL
STANDING UP FROM CHAIR USING ARMS: A LITTLE
HELP NEEDED FOR BATHING: A LOT
TOILETING: A LOT
DRESSING REGULAR LOWER BODY CLOTHING: A LOT
DAILY ACTIVITIY SCORE: 18
WALKING IN HOSPITAL ROOM: A LITTLE
MOVING FROM LYING ON BACK TO SITTING ON SIDE OF FLAT BED WITH BEDRAILS: A LITTLE
TURNING FROM BACK TO SIDE WHILE IN FLAT BAD: A LITTLE
WALKING IN HOSPITAL ROOM: A LITTLE
MOVING TO AND FROM BED TO CHAIR: A LITTLE
DRESSING REGULAR UPPER BODY CLOTHING: A LOT
TOILETING: A LITTLE

## 2024-06-24 ASSESSMENT — LIFESTYLE VARIABLES
PAROXYSMAL SWEATS: BARELY PERCEPTIBLE SWEATING, PALMS MOIST
PAROXYSMAL SWEATS: NO SWEAT VISIBLE
ANXIETY: MILDLY ANXIOUS
TOTAL SCORE: 3
AGITATION: NORMAL ACTIVITY
ORIENTATION AND CLOUDING OF SENSORIUM: ORIENTED AND CAN DO SERIAL ADDITIONS
ANXIETY: MODERATELY ANXIOUS, OR GUARDED, SO ANXIETY IS INFERRED
PAROXYSMAL SWEATS: NO SWEAT VISIBLE
HEADACHE, FULLNESS IN HEAD: NOT PRESENT
TREMOR: NO TREMOR
AUDITORY DISTURBANCES: NOT PRESENT
TREMOR: NO TREMOR
AUDITORY DISTURBANCES: NOT PRESENT
ORIENTATION AND CLOUDING OF SENSORIUM: ORIENTED AND CAN DO SERIAL ADDITIONS
AUDITORY DISTURBANCES: NOT PRESENT
HEADACHE, FULLNESS IN HEAD: MODERATE
TOTAL SCORE: 4
NAUSEA AND VOMITING: MILD NAUSEA WITH NO VOMITING
ANXIETY: MILDLY ANXIOUS
VISUAL DISTURBANCES: VERY MILD SENSITIVITY
AGITATION: NORMAL ACTIVITY
VISUAL DISTURBANCES: NOT PRESENT
TREMOR: NO TREMOR
VISUAL DISTURBANCES: NOT PRESENT
AGITATION: SOMEWHAT MORE THAN NORMAL ACTIVITY
HEADACHE, FULLNESS IN HEAD: NOT PRESENT
PAROXYSMAL SWEATS: NO SWEAT VISIBLE
ORIENTATION AND CLOUDING OF SENSORIUM: ORIENTED AND CAN DO SERIAL ADDITIONS
NAUSEA AND VOMITING: NO NAUSEA AND NO VOMITING
HEADACHE, FULLNESS IN HEAD: MILD
NAUSEA AND VOMITING: NO NAUSEA AND NO VOMITING
VISUAL DISTURBANCES: NOT PRESENT
ORIENTATION AND CLOUDING OF SENSORIUM: ORIENTED AND CAN DO SERIAL ADDITIONS
AUDITORY DISTURBANCES: NOT PRESENT
TOTAL SCORE: 4
AGITATION: NORMAL ACTIVITY
ANXIETY: 3
TREMOR: NO TREMOR
NAUSEA AND VOMITING: NO NAUSEA AND NO VOMITING
TOTAL SCORE: 7

## 2024-06-24 ASSESSMENT — PAIN DESCRIPTION - DESCRIPTORS
DESCRIPTORS: ACHING
DESCRIPTORS: THROBBING

## 2024-06-24 ASSESSMENT — PAIN SCALES - GENERAL
PAINLEVEL_OUTOF10: 10 - WORST POSSIBLE PAIN
PAINLEVEL_OUTOF10: 8

## 2024-06-24 ASSESSMENT — PAIN - FUNCTIONAL ASSESSMENT
PAIN_FUNCTIONAL_ASSESSMENT: 0-10

## 2024-06-24 ASSESSMENT — ACTIVITIES OF DAILY LIVING (ADL)
ADL_ASSISTANCE: INDEPENDENT
BATHING_ASSISTANCE: MAXIMAL

## 2024-06-24 ASSESSMENT — PAIN DESCRIPTION - LOCATION: LOCATION: ABDOMEN

## 2024-06-24 NOTE — PROGRESS NOTES
Fernando Cuevas is a 63 y.o. female on day 3 of admission presenting with Perforated viscus.      Subjective   Seen and examined.   NG in place. C/o feeling swollen.   Post op ileus. 500 mLs out from the NG today.        Objective          Vitals 24HR  Heart Rate:  []   Temp:  [36.4 °C (97.6 °F)-37.2 °C (98.9 °F)]   Resp:  [17-19]   BP: (103-132)/(67-74)   SpO2:  [91 %-100 %]     Intake/Output last 3 Shifts:    Intake/Output Summary (Last 24 hours) at 6/24/2024 1844  Last data filed at 6/24/2024 1637  Gross per 24 hour   Intake 2957.5 ml   Output 1850 ml   Net 1107.5 ml       Physical Exam  Constitutional: A&Ox3, NAD, NG in place  Cardiovascular: Normal rate and regular rhythm.  Respiratory/Thorax: CTAB. No rhonchi.  Gastrointestinal: Distended, firm, mildly tender, post surgical changes noted.   Musculoskeletal: No joint swelling  Extremities: Trace peripheral edema  Neurological: A&Ox3, No focal deficits  Psychological: Appropriate mood and behavior  Skin: Warm and dry      Scheduled Medications  aspirin, 81 mg, oral, Daily  budesonide, 0.25 mg, nebulization, Daily   And  formoterol, 20 mcg, nebulization, BID  busPIRone, 5 mg, oral, BID  calcium carbonate, 1,500 mg, oral, Daily  dilTIAZem ER, 240 mg, oral, Daily  enoxaparin, 40 mg, subcutaneous, q24h  fluconazole, 200 mg, oral, Daily  folic acid, 1 mg, oral, Daily  ipratropium-albuteroL, 3 mL, nebulization, TID  lisinopril, 10 mg, oral, Daily  montelukast, 10 mg, oral, Daily  multivitamin with minerals, 1 tablet, oral, Daily  nortriptyline, 50 mg, oral, Nightly  oxybutynin, 5 mg, oral, Daily  oxygen, , inhalation, Continuous - Inhalation  pantoprazole, 40 mg, intravenous, Daily before breakfast  piperacillin-tazobactam, 3.375 g, intravenous, q6h  predniSONE, 30 mg, oral, Daily   Followed by  [START ON 6/26/2024] predniSONE, 20 mg, oral, Daily   Followed by  [START ON 6/29/2024] predniSONE, 10 mg, oral, Daily  thiamine, 100 mg, oral, Daily  [START ON  6/25/2024] thiamine, 100 mg, oral, Daily  thiamine, 100 mg, intravenous, Daily  varenicline, 1 mg, oral, BID      Continuous medications  dextrose 5 % and lactated Ringer's, 65 mL/hr        PRN medications: benzonatate, guaiFENesin, HYDROmorphone, HYDROmorphone, ipratropium-albuteroL, morphine, morphine, nitroglycerin, ondansetron     Relevant Results  Results from last 7 days   Lab Units 06/24/24  1451 06/23/24  0535 06/22/24  0516   WBC AUTO x10*3/uL 16.6* 21.0* 33.7*   HEMOGLOBIN g/dL 9.3* 9.4* 9.7*   HEMATOCRIT % 33.0* 31.5* 32.6*   PLATELETS AUTO x10*3/uL 510* 653* 786*   NEUTROS PCT AUTO % 83.9 86.6 93.2   LYMPHS PCT AUTO % 6.4 5.4 3.3   MONOS PCT AUTO % 6.3 6.7 2.0   EOS PCT AUTO % 0.1 0.0 0.0     Results from last 7 days   Lab Units 06/24/24  1451 06/23/24  0535 06/22/24  0516   SODIUM mmol/L 131* 132* 133*   POTASSIUM mmol/L 4.5 4.3 4.7   CHLORIDE mmol/L 93* 92* 92*   CO2 mmol/L 26 31 31   BUN mg/dL 9 10 9   CREATININE mg/dL 0.92 1.01 0.93   GLUCOSE mg/dL 81 68* 94   CALCIUM mg/dL 8.6 8.9 9.4       XR abdomen 1 view   Final Result   Stable vertically oriented line of skin staples to the right of   midline in the lower abdomen and upper pelvis.        Stable NG tube in place.        Findings that could represent either ongoing persistent partial   distal small bowel obstruction or postoperative ileus. No significant   change from 06/22/2024.        MACRO:   None        Signed by: Gabe Gage 6/24/2024 2:03 PM   Dictation workstation:   [id number]      XR abdomen 1 view   Final Result   1.  As above.        Signed by: Paul Agudelo 6/22/2024 4:25 PM   Dictation workstation:   [id number]      XR abdomen 1 view   Final Result   1. Nasogastric tube with distal end terminating in the proximal   stomach and proximal side hole in the gastric cardia. Advise   advancement 3-5 cm.   2. Slightly gas distended stomach. Moderate colonic stool burden.        MACRO:   None        Signed by: Keyshawn Contreras 6/21/2024 8:47  AM   Dictation workstation:   [id number]      CT abdomen pelvis wo IV contrast   Final Result   Addendum (preliminary) 1 of 1   Images reviewed with Garth Katz at 12:52 AM on 6/21/2024.   Signed by Armando Mcgarry DO      Final   1. Localizing free air in the anterior mid abdomen compatible with   perforated viscus. advise surgical consult.   2. Site of perforation potentially emanates from a small bowel loop in   the mid abdomen.   3. No associated free fluid.   4. Marked gastric distention with air and fluid.   5. Remainder as above.   6. Urgent finding and recommendation given to and acknowledged by   Sheldon RIVAS at 10:27 PM Eastern on 6/20/2024.   Signed by Yash Hood MD               Assessment/Plan      Fernando Cuevas is a 63 y.o. female with a past medical history of hypertension, COPD, anxiety, anemia, cocaine and alcohol use, hyperlipidemia, depression, lung CA, history of spontaneous pneumothorax, overactive bladder who presented on 6/21-day after being discharged for right upper and middle lobe pneumonia treated with IV antibiotics and prednisone with a taper.  She came back in with abdominal pain.  CT showed perforated viscus.  She underwent exploratory laparotomy with partial small bowel resection on 6/21.  Her clinical course was complicated by postoperative ileus.  NG tube remains in place.  Worsening hyponatremia was noted.  Nephrology was consulted for renal care.  Ms. Cuevas is suffering hyponatremia in the setting of a perforated viscus and postoperative ileus.  During her recent hospitalization she had low-grade hyponatremia albeit not to this extent.  Since admission she has been nothing by mouth.  She was placed on IV isotonic fluid with acceptable improvement in her hyponatremia.  She is developing mild hypervolemia.  I will adjust her fluids to include 5% dextrose and LR to run at 65 mL/hr. this will adequately replace her NG loss.  Blood pressures are not low.  Her sodium is  holding steady.  Trend RFP.  Will follow.        Principal Problem:    Perforated viscus  Active Problems:    Thrombocytosis      I spent 40 minutes in the professional and overall care of this patient.      Tim Wayne, DO

## 2024-06-24 NOTE — CARE PLAN
The patient's goals for the shift include      The clinical goals for the shift include Will ambulate to bathroom for all toileting needs    Goal met. Ambulated to bathroom with walker for all toileting needs. Tolerated fairly well.

## 2024-06-24 NOTE — PROGRESS NOTES
Occupational Therapy    Evaluation    Patient Name: Fernando Cuevas  MRN: [id number]  Today's Date: 6/24/2024       Assessment  IP OT Assessment  OT Assessment: Pt. 63 y.o. F presenting with perforated viscus s/p exploration laparascopy s/p partial small bowel resection 6/21. Pt. with decreased endurance, standing tolerance, increased SOB with activity, decreased ADLs, transfers, and mobility. Pt. requires increased time for task completion and verbal cues for safe hand placement during transfers. Pt. would benefit from MOD intensity therapy to increase safe functional participation in ADLs, transfers, and mobility.  Prognosis: Good  Barriers to Discharge: Decreased caregiver support  Evaluation/Treatment Tolerance: Patient limited by fatigue, Patient limited by pain  End of Session Communication: Bedside nurse  End of Session Patient Position: Bed, 3 rail up, Alarm on  Plan:  Treatment Interventions: ADL retraining, Functional transfer training, UE strengthening/ROM, Endurance training, Patient/family training, Equipment evaluation/education, Neuromuscular reeducation  OT Frequency: 3 times per week  OT Discharge Recommendations: Moderate intensity level of continued care  Equipment Recommended upon Discharge:  (hip kit)  OT - OK to Discharge: Yes (Per OT POC)    Subjective   Current Problem:  1. Perforated viscus  Case Request Operating Room: Exploration Laparotomy    Case Request Operating Room: Exploration Laparotomy    AFB Culture/Smear    AFB Culture/Smear    Fungal Culture/Smear    Fungal Culture/Smear    Sterile Fluid Culture/Smear    Sterile Fluid Culture/Smear    Surgical Pathology Exam    Surgical Pathology Exam      2. Perforated abdominal viscus          General:  General  Reason for Referral: Pt. 63 y.o. F presenting wit perforated viscus s/p exploration laparascopy and partial small bowel resection 6/21. 6/19 was discharged from prior admission d/t pneumonia.  Referred By: MD Barbi  Past Medical History  Relevant to Rehab:   Past Medical History:   Diagnosis Date    Essential (primary) hypertension 04/20/2016    Benign hypertension    Personal history of other diseases of the respiratory system     H/O emphysema    Personal history of other diseases of the respiratory system     History of chronic obstructive lung disease    Personal history of other mental and behavioral disorders     History of depression      Prior to Session Communication: Bedside nurse  Patient Position Received: Bed, 3 rail up, Alarm on  General Comment: Pt. agreeable to OT eval, intially requiring max encouragement for participation. Pt. requires increased time for task completion d/t decreased endurance and SOB.  Precautions:  Medical Precautions: Fall precautions (high)  Post-Surgical Precautions: Abdominal surgery precautions    Pain:   Pt. Expressed abdominal pain during activity but did not specify a number    Objective   Cognition:  Overall Cognitive Status: Within Functional Limits  Orientation Level: Oriented X4     Home Living:  Type of Home: House (1 level house, 2 DILIA R hand rail, tub shower, standard height toilet, no bathroom equipment)  Lives With:  (Son)  Home Adaptive Equipment: Walker rolling or standard (rollator)     Prior Function:  Level of [place]: Independent with ADLs and functional transfers, Needs assistance with homemaking  Receives Help From: Family (Son is not present during the day)  ADL Assistance: Independent  Homemaking Assistance: Needs assistance (Son performs all)  Ambulatory Assistance: Independent (use of rollator)    IADL History:  Homemaking Responsibilities: No (Son performs all)    ADL:  Eating Assistance:  (On NG tube)  Grooming Assistance: Minimal  Bathing Assistance: Maximal  UE Dressing Assistance: Moderate  LE Dressing Assistance: Total  Toileting Assistance with Device: Maximal  Functional Assistance: Maximal    Activity Tolerance:  Endurance: Tolerates 10 - 20 min exercise with multiple  rests    Bed Mobility/Transfers: Bed Mobility  Bed Mobility: Yes  Bed Mobility 1  Bed Mobility 1: Supine to sitting  Level of Assistance 1: Moderate assistance  Bed Mobility Comments 1: HOB  elevated, required modA to manage upper trunk into sitting  Bed Mobility 2  Bed Mobility  2: Sitting to supine  Level of Assistance 2: Moderate assistance  Bed Mobility Comments 2: HOB elevated, required modA to manage B LEs    Transfers  Transfer: Yes  Transfer 1  Transfer From 1: Sit to  Transfer to 1: Stand  Technique 1: Sit to stand  Transfer Device 1: Walker  Transfer Level of Assistance 1: Minimum assistance  Trials/Comments 1: Verbal cues required for safe hand placement  Transfers 2  Transfer From 2: Stand to  Transfer to 2: Sit  Technique 2: Stand to sit  Transfer Device 2: Walker  Transfer Level of Assistance 2: Contact guard  Trials/Comments 2: Verbal cues required for safe hand placement    Sitting Balance:  Static Sitting Balance  Static Sitting-Balance Support: Feet supported  Static Sitting-Level of Assistance: Close supervision  Dynamic Sitting Balance  Dynamic Sitting-Balance Support: Feet supported  Dynamic Sitting-Balance: Forward lean  Dynamic Sitting-Comments: CGA dynamic sitting in attempt to don/doff socks    Standing Balance:  Static Standing Balance  Static Standing-Balance Support: Bilateral upper extremity supported (walker)  Static Standing-Level of Assistance: Contact guard    IADL's:   Homemaking Responsibilities: No (Son performs all)    Strength:  Strength Comments: BUE 3-/5 shoulder flexion, 4/5 distally from elbows to digits    Coordination:  Movements are Fluid and Coordinated: Yes     Hand Function:  Hand Function  Gross Grasp: Functional  Coordination: Functional    Extremities:   RUE :  (Shoulder flexion to 90 degrees, WFL distally)   LUE:  (Shoulder flexion to 90 degrees, WFL distally)    Outcome Measures: [facility] Daily Activity  Putting on and taking off regular lower body clothing:  Total  Bathing (including washing, rinsing, drying): A lot  Putting on and taking off regular upper body clothing: A lot  Toileting, which includes using toilet, bedpan or urinal: A lot  Taking care of personal grooming such as brushing teeth: A little  Eating Meals: None  Daily Activity - Total Score: 14      Education Documentation  Precautions, taught by DARLENE Vazquez at 6/24/2024 12:32 PM.  Learner: Patient  Readiness: Acceptance  Method: Explanation, Demonstration  Response: Verbalizes Understanding, Demonstrated Understanding, Needs Reinforcement    ADL Training, taught by DARLENE Vazquez at 6/24/2024 12:32 PM.  Learner: Patient  Readiness: Acceptance  Method: Explanation, Demonstration  Response: Verbalizes Understanding, Demonstrated Understanding, Needs Reinforcement    Education Comments  No comments found.    Note written by Maria G COLON, under supervision of CRISTAL Ledesma   Goals:   Encounter Problems       Encounter Problems (Active)       ADLs       Patient will perform UB and LB bathing with independent assist level of assistance and grab bars, shower chair, and long-handled sponge.       Start:  06/24/24    Expected End:  07/08/24            Patient with complete upper body dressing with independent level of assistance donning and doffing all UE clothes with PRN adaptive equipment while edge of bed        Start:  06/24/24    Expected End:  07/08/24            Patient with complete lower body dressing with independent  level of assistance donning and doffing all LE clothes  with reacher, shoe horn, sock-aid, and dressing stick  while edge of bed        Start:  06/24/24    Expected End:  07/08/24            Patient will complete daily grooming tasks brushing teeth and washing face/hair with independent level of assistance while edge of bed .       Start:  06/24/24    Expected End:  07/08/24            Patient will complete toileting including hygiene clothing management/hygiene with independent  assist level of assistance and raised toilet seat and grab bars.       Start:  06/24/24    Expected End:  07/08/24               BALANCE       Pt will maintain dynamic standing balance during ADL task with supervision level of assistance in order to demonstrate decreased risk of falling and improved postural control.       Start:  06/24/24    Expected End:  07/08/24            Patientt will maintain static standing balance during ADL task with independent level of assistance drop down in order to demonstrate decreased risk of falling and improved postural control.       Start:  06/24/24    Expected End:  07/08/24            Patient will tolerate standing for 5 minutes to Supervision  level of assistance with front wheeled walker in order to improve functional activity tolerance for ADL tasks.       Start:  06/24/24    Expected End:  07/08/24               TRANSFERS       Patient will perform bed mobility independent  level of assistance and bed rails in order to improve safety and independence with mobility       Start:  06/24/24    Expected End:  07/08/24            Patient will complete functional transfer to all surfaces with front wheeled walker with supervision level of assistance.       Start:  06/24/24    Expected End:  07/08/24            Patient will complete sit to stand transfer with independent level of assistance and front wheeled walker in order to improve safety and prepare for out of bed mobility.       Start:  06/24/24    Expected End:  07/08/24

## 2024-06-24 NOTE — PROGRESS NOTES
"Fernando Cuevas is a 63 y.o. female on day 3 of admission presenting with Perforated viscus.    Assessment/Plan   Status post surgery for bowel perforation.  Currently has a significant ileus.  May need to consider TPN if she does not open up soon.  White count is trending down    Subjective   Feels okay.  Pretty distended.  No bowel movement yet.       Objective     Physical Exam  NAD  A&Ox3  Non icteric  CTA  RR  Abdomen soft quite distended with tympany.  Diffuse moderate tenderness without peritoneal signs  Extremities warm, well perfused     Last Recorded Vitals  Blood pressure 116/67, pulse 94, temperature 36.4 °C (97.6 °F), resp. rate 18, height 1.575 m (5' 2.01\"), weight 64 kg (141 lb 1.5 oz), SpO2 100%.  Intake/Output last 3 Shifts:  I/O last 3 completed shifts:  In: 3781.3 (59.1 mL/kg) [I.V.:3381.3 (52.8 mL/kg); IV Piggyback:400]  Out: 3000 (46.9 mL/kg) [Urine:1900 (0.8 mL/kg/hr); Emesis/NG output:1100]  Weight: 64 kg     Relevant Results    Scheduled medications  aspirin, 81 mg, oral, Daily  budesonide, 0.25 mg, nebulization, Daily   And  formoterol, 20 mcg, nebulization, BID  busPIRone, 5 mg, oral, BID  calcium carbonate, 1,500 mg, oral, Daily  dilTIAZem ER, 240 mg, oral, Daily  enoxaparin, 40 mg, subcutaneous, q24h  fluconazole, 200 mg, oral, Daily  folic acid, 1 mg, oral, Daily  ipratropium-albuteroL, 3 mL, nebulization, TID  lisinopril, 10 mg, oral, Daily  montelukast, 10 mg, oral, Daily  multivitamin with minerals, 1 tablet, oral, Daily  nortriptyline, 50 mg, oral, Nightly  oxybutynin, 5 mg, oral, Daily  oxygen, , inhalation, Continuous - Inhalation  pantoprazole, 40 mg, intravenous, Daily before breakfast  piperacillin-tazobactam, 3.375 g, intravenous, q6h  predniSONE, 30 mg, oral, Daily   Followed by  [START ON 6/26/2024] predniSONE, 20 mg, oral, Daily   Followed by  [START ON 6/29/2024] predniSONE, 10 mg, oral, Daily  thiamine, 100 mg, oral, Daily  [START ON 6/25/2024] thiamine, 100 mg, oral, " Daily  thiamine, 100 mg, intravenous, Daily  varenicline, 1 mg, oral, BID      Continuous medications  lactated Ringer's, 125 mL/hr, Last Rate: 125 mL/hr (06/24/24 1406)      PRN medications  PRN medications: benzonatate, guaiFENesin, HYDROmorphone, HYDROmorphone, ipratropium-albuteroL, morphine, morphine, nitroglycerin, ondansetron    Results for orders placed or performed during the hospital encounter of 06/20/24 (from the past 24 hour(s))   Basic metabolic panel   Result Value Ref Range    Glucose 81 74 - 99 mg/dL    Sodium 131 (L) 136 - 145 mmol/L    Potassium 4.5 3.5 - 5.3 mmol/L    Chloride 93 (L) 98 - 107 mmol/L    Bicarbonate 26 21 - 32 mmol/L    Anion Gap 17 10 - 20 mmol/L    Urea Nitrogen 9 6 - 23 mg/dL    Creatinine 0.92 0.50 - 1.05 mg/dL    eGFR 70 >60 mL/min/1.73m*2    Calcium 8.6 8.6 - 10.3 mg/dL   CBC and Auto Differential   Result Value Ref Range    WBC 16.6 (H) 4.4 - 11.3 x10*3/uL    nRBC 0.2 (H) 0.0 - 0.0 /100 WBCs    RBC 3.85 (L) 4.00 - 5.20 x10*6/uL    Hemoglobin 9.3 (L) 12.0 - 16.0 g/dL    Hematocrit 33.0 (L) 36.0 - 46.0 %    MCV 86 80 - 100 fL    MCH 24.2 (L) 26.0 - 34.0 pg    MCHC 28.2 (L) 32.0 - 36.0 g/dL    RDW 20.6 (H) 11.5 - 14.5 %    Platelets 510 (H) 150 - 450 x10*3/uL    Neutrophils % 83.9 40.0 - 80.0 %    Immature Granulocytes %, Automated 3.1 (H) 0.0 - 0.9 %    Lymphocytes % 6.4 13.0 - 44.0 %    Monocytes % 6.3 2.0 - 10.0 %    Eosinophils % 0.1 0.0 - 6.0 %    Basophils % 0.2 0.0 - 2.0 %    Neutrophils Absolute 13.94 (H) 1.20 - 7.70 x10*3/uL    Immature Granulocytes Absolute, Automated 0.51 0.00 - 0.70 x10*3/uL    Lymphocytes Absolute 1.07 (L) 1.20 - 4.80 x10*3/uL    Monocytes Absolute 1.05 (H) 0.10 - 1.00 x10*3/uL    Eosinophils Absolute 0.01 0.00 - 0.70 x10*3/uL    Basophils Absolute 0.03 0.00 - 0.10 x10*3/uL   Morphology   Result Value Ref Range    RBC Morphology See Below            I spent 25 minutes in the professional and overall care of this patient.      Reid Bingham,  MD

## 2024-06-24 NOTE — PROGRESS NOTES
06/24/24 1453   Discharge Planning   Who is requesting discharge planning? Provider   Home or Post Acute Services Post acute facilities (Rehab/SNF/etc)   Type of Post Acute Facility Services Skilled nursing   Patient expects to be discharged to: [facility]   Does the patient need discharge transport arranged? Yes   RoundTrip coordination needed? Yes     6/24/24 1453  Patient not medically ready for discharge.  NG-LIS.  NPO.  Waiting on RBF. Will go to [facility] at discharge.  Will need auth.  Brittany Manjarrez RN [facility]

## 2024-06-24 NOTE — PROGRESS NOTES
Physical Therapy    Physical Therapy Treatment    Patient Name: Fernando Cuevas  MRN: [id number]  Today's Date: 6/24/2024  Time Calculation  Start Time: 0923  Stop Time: 0949  Time Calculation (min): 26 min    Assessment/Plan   PT Assessment  End of Session Communication: Bedside nurse (Discussed pt's progress and current mobility as described in mobility section. B SCDs in place, however do not appear to be inflating, RN notified.)  Assessment Comment: Pt demo's limited participation in PT session d/t increased abdominal pain with OOB activity, but resolves when returning to supine. Pt politely declines getting up to chair at this time, citing fatigue and would like to rest in bed. Pt will continue to benefit from skilled PT for improved B LE strength and stability for safe return to PLOF.  End of Session Patient Position: Bed, 3 rail up, Alarm on (call light and needs in reach, B SCDs in place)  PT Plan  Inpatient/Swing Bed or Outpatient: Inpatient  PT Plan  Treatment/Interventions: Bed mobility, Gait training, Therapeutic exercise  PT Plan: Ongoing PT  PT Frequency: 4 times per week  PT Discharge Recommendations: Moderate intensity level of continued care  PT Recommended Transfer Status: Assist x1  PT - OK to Discharge: Yes (per POC)      General Visit Information:   PT  Visit  PT Received On: 06/24/24  General  Reason for Referral: Pt presents to the ED with abdominal pain. Pt found to have a perforated bowel and underwent  exlap and partial small bowel resection on 6/21. Pt recently admitted with COPD excaerbation and discharged to rehab 1 day prior to this admission.  Referred By: Morena Landon MD  Past Medical History Relevant to Rehab:   Past Medical History:   Diagnosis Date    Essential (primary) hypertension 04/20/2016    Benign hypertension    Personal history of other diseases of the respiratory system     H/O emphysema    Personal history of other diseases of the respiratory system     History of  chronic obstructive lung disease    Personal history of other mental and behavioral disorders     History of depression       Family/Caregiver Present: No  Prior to Session Communication: Bedside nurse (RN cleared pt. for PT Tx. All charts reviewed. Per handoff with nursing prior to therapy visit, patient’s pain and vitals are stable. Additional concern for this patient related to therapy session: none.)  Patient Position Received: Bed, 3 rail up, Alarm on  Preferred Learning Style: visual, verbal, kinesthetic  General Comment: Pt received semi-supine in bed on arrival, pleasant and agreeable for PT tx    Subjective   Precautions:  Precautions  Medical Precautions: Fall precautions, Oxygen therapy device and L/min (2L supplemental O2 via NC)  Post-Surgical Precautions: Abdominal surgery precautions    Objective   Pain:  Pain Assessment  0-10 (Numeric) Pain Score: 10 - Worst possible pain (with standing activity)  Pain Type: Acute pain, Surgical pain  Pain Location: Abdomen  Pain Descriptors: Throbbing  Cognition:  Cognition  Overall Cognitive Status: Within Functional Limits  Coordination:  Movements are Fluid and Coordinated: Yes  Postural Control:  Postural Control  Postural Control: Within Functional Limits  Static Sitting Balance  Static Sitting-Balance Support: Feet supported  Static Sitting-Level of Assistance: Modified independent  Static Sitting-Comment/Number of Minutes: x8 min unsupported sitting at EOB  \  Activity Tolerance:  Activity Tolerance  Endurance: Tolerates 10 - 20 min exercise with multiple rests  Treatments:  Therapeutic Exercise  Therapeutic Exercise Performed: Yes  Therapeutic Exercise Activity 1: Pt instructed in B LE ther-ex: seated PF/DF, LAQ, Hip flexion, Hip ABD x20 reps. Rest as needed to manage fatigue. Done to improve muscular strength and endurance to facilitate increased independence with balance, transfers, ambulation, and participation in ADLs. Verbal/nonverbal (demo/gestures)  cuing for execution of exercises and for proper form to obtain maximal benefits.    Therapeutic Activity  Therapeutic Activity Performed: Yes  Therapeutic Activity 1: Pt educated on breathing techniques to assist in pain management    Bed Mobility  Bed Mobility: Yes  Bed Mobility 1  Bed Mobility 1: Supine to sitting  Level of Assistance 1: Close supervision  Bed Mobility Comments 1: Verbal cuing for log roll sequencing  Bed Mobility 2  Bed Mobility  2: Sitting to supine  Level of Assistance 2: Minimum assistance  Bed Mobility Comments 2: Increased assist needed at at B LEs d/t significantly increased abdominal pain    Ambulation/Gait Training  Ambulation/Gait Training Performed: Yes  Ambulation/Gait Training 1  Surface 1: Level tile  Device 1: Rolling walker  Assistance 1: Contact guard  Quality of Gait 1:  (Pt instructed in L lateral stepping at EOB, decreased step height in swing phase and increased lateral sway with FWD flexed posture)  Comments/Distance (ft) 1: ~4 L lateral steps (Pt unable to tolerate increased distances at this time 2/2 increased abdominal pain)  Transfers  Transfer: Yes  Transfer 1  Transfer From 1:  (sit<>stand from EOB)  Transfer Device 1: Walker  Transfer Level of Assistance 1: Close supervision    Outcome Measures:  [facility] Basic Mobility  Turning from your back to your side while in a flat bed without using bedrails: A little  Moving from lying on your back to sitting on the side of a flat bed without using bedrails: A little  Moving to and from bed to chair (including a wheelchair): A little  Standing up from a chair using your arms (e.g. wheelchair or bedside chair): A little  To walk in hospital room: A little  Climbing 3-5 steps with railing: A lot  Basic Mobility - Total Score: 17      Encounter Problems       Encounter Problems (Active)       Balance       Pt will tolerate 10+ mins dynamic standing balance activities with CGA or less and no LOB using a RW.  (Progressing)       Start:   06/23/24    Expected End:  07/07/24               Mobility       STG - Patient will ambulate 50 ft with CGA and a Rw with minimal SOB or fatigue.  (Progressing)       Start:  06/23/24    Expected End:  07/07/24               PT Transfers       STG - Patient will perform bed mobility with SBA or less using log roll technique.  (Progressing)       Start:  06/23/24    Expected End:  07/07/24            STG - Patient will transfer sit to and from stand mod I with a RW.  (Progressing)       Start:  06/23/24    Expected End:  07/07/24               Pain - Adult          Safety       LTG - Patient will adhere to hip precautions during ADL's and transfers (Progressing)       Start:  06/21/24            LTG - Patient will demonstrate safety requirements appropriate to situation/environment (Progressing)       Start:  06/21/24            LTG - Patient will utilize safety techniques (Progressing)       Start:  06/21/24            STG - Patient locks brakes on wheelchair (Progressing)       Start:  06/21/24            STG - Patient uses call light consistently to request assistance with transfers (Progressing)       Start:  06/21/24            STG - Patient uses gait belt during all transfers (Progressing)       Start:  06/21/24            Goal 1 (Progressing)       Start:  06/21/24            Goal 2 (Progressing)       Start:  06/21/24            Goal 3 (Progressing)       Start:  06/21/24

## 2024-06-24 NOTE — PROGRESS NOTES
"Fernando Cuevas is a 63 y.o. female on day 3 of admission presenting with Perforated viscus.    Subjective   Patient doing alright today. Still a bit nauseous with liquid intake. Abdomen remains distended and taut. NG is to LIWS.     Objective   PE:  Constitutional: A&Ox3, calm and cooperative, NAD  Cardiovascular: Normal rate and regular rhythm.  Respiratory/Thorax: Good symmetric chest expansion. No labored breathing.  Gastrointestinal: Abdomen moderately-severe distended, taut, appropriately tender, no peritoneal signs, laparotomy sites c/d/i, well approximate with Dermabond, no erythema or drainage. Midline incision cdi.  Genitourinary: Voiding independently   Musculoskeletal: ROM intact  Extremities: No peripheral edema  Neurological: A&Ox3, No focal deficits  Psychological: Appropriate mood and behavior  Skin: Warm and dry     Last Recorded Vitals  Blood pressure 112/74, pulse (!) 115, temperature 36.6 °C (97.8 °F), resp. rate 19, height 1.575 m (5' 2.01\"), weight 64 kg (141 lb 1.5 oz), SpO2 98%.  Intake/Output last 3 Shifts:  I/O last 3 completed shifts:  In: 3781.3 (59.1 mL/kg) [I.V.:3381.3 (52.8 mL/kg); IV Piggyback:400]  Out: 3000 (46.9 mL/kg) [Urine:1900 (0.8 mL/kg/hr); Emesis/NG output:1100]  Weight: 64 kg     Relevant Results  Scheduled medications  aspirin, 81 mg, oral, Daily  azithromycin, 250 mg, oral, Once per day on Monday Wednesday Friday  budesonide, 0.25 mg, nebulization, Daily   And  formoterol, 20 mcg, nebulization, BID  busPIRone, 5 mg, oral, BID  calcium carbonate, 1,500 mg, oral, Daily  dilTIAZem ER, 240 mg, oral, Daily  enoxaparin, 40 mg, subcutaneous, q24h  folic acid, 1 mg, oral, Daily  ipratropium-albuteroL, 3 mL, nebulization, TID  lisinopril, 10 mg, oral, Daily  montelukast, 10 mg, oral, Daily  multivitamin with minerals, 1 tablet, oral, Daily  nortriptyline, 50 mg, oral, Nightly  oxybutynin, 5 mg, oral, Daily  oxygen, , inhalation, Continuous - Inhalation  pantoprazole, 40 mg, " intravenous, Daily before breakfast  piperacillin-tazobactam, 3.375 g, intravenous, q6h  predniSONE, 30 mg, oral, Daily   Followed by  [START ON 6/26/2024] predniSONE, 20 mg, oral, Daily   Followed by  [START ON 6/29/2024] predniSONE, 10 mg, oral, Daily  thiamine, 100 mg, oral, Daily  [START ON 6/25/2024] thiamine, 100 mg, oral, Daily  thiamine, 100 mg, intravenous, Daily  varenicline, 1 mg, oral, BID      Continuous medications  lactated Ringer's, 125 mL/hr, Last Rate: 125 mL/hr (06/23/24 2028)      PRN medications  PRN medications: benzonatate, guaiFENesin, HYDROmorphone, HYDROmorphone, ipratropium-albuteroL, LORazepam **OR** LORazepam **OR** LORazepam, morphine, morphine, nitroglycerin, ondansetron    No results found for this or any previous visit (from the past 24 hour(s)).      Assessment/Plan   Principal Problem:    Perforated viscus    Patient with perforated small bowel is now POD6 ex lap, pSBR with Dr Liang. Intra-op findings of segment of perforated small bowel. Labs reviewed- WBC 21.0 from 33.7, hgb 9.4 from 9.7, platelets 653 from 786.    Plan:  - KUB ordered  - Continue NG, clamp intermittently  - Can have small amounts of liquid  - Encouraged IS  - Encouraged OOB/ambulation  - PT/OT  - DVT proph: SCDs/ASA/lovenox  - PRN antiemtic  - Supportive care    Surgery to follow.     Discussed with Dr Bingham.    I spent 35 minutes in the professional and overall care of this patient.    Mahad Boateng PA-C

## 2024-06-24 NOTE — PROGRESS NOTES
"Fernando Cuevas is a 63 y.o. female on day 3 of admission presenting with Perforated viscus.    Subjective    glucose of 68, overall doing well, no nausea no vomiting belly still appears distended.       Objective     Physical Exam  Vitals reviewed.   Constitutional:       Appearance: Normal appearance.   HENT:      Head: Normocephalic.      Nose: Nose normal.      Mouth/Throat:      Mouth: Mucous membranes are dry.      Pharynx: Oropharynx is clear.   Cardiovascular:      Rate and Rhythm: Normal rate and regular rhythm.   Pulmonary:      Effort: Pulmonary effort is normal.   Abdominal:      Comments: distended no bowel sounds   Skin:     Capillary Refill: Capillary refill takes less than 2 seconds.   Neurological:      General: No focal deficit present.      Mental Status: She is alert.         Last Recorded Vitals  Blood pressure 116/67, pulse 94, temperature 36.4 °C (97.6 °F), resp. rate 18, height 1.575 m (5' 2.01\"), weight 64 kg (141 lb 1.5 oz), SpO2 100%.  Intake/Output last 3 Shifts:  I/O last 3 completed shifts:  In: 3781.3 (59.1 mL/kg) [I.V.:3381.3 (52.8 mL/kg); IV Piggyback:400]  Out: 3000 (46.9 mL/kg) [Urine:1900 (0.8 mL/kg/hr); Emesis/NG output:1100]  Weight: 64 kg     Relevant Results  No results found for this or any previous visit (from the past 24 hour(s)).      Imaging Results  Ct abd pelvis:  IMPRESSION:  1. Localizing free air in the anterior mid abdomen compatible with  perforated viscus. advise surgical consult.  2. Site of perforation potentially emanates from a small bowel loop in  the mid abdomen.  3. No associated free fluid.  4. Marked gastric distention with air and fluid.  5. Remainder as above.  6. Urgent finding and recommendation given to and acknowledged by  Medications:  aspirin, 81 mg, oral, Daily  azithromycin, 250 mg, oral, Once per day on Monday Wednesday Friday  budesonide, 0.25 mg, nebulization, Daily   And  formoterol, 20 mcg, nebulization, BID  busPIRone, 5 mg, oral, " BID  calcium carbonate, 1,500 mg, oral, Daily  dilTIAZem ER, 240 mg, oral, Daily  enoxaparin, 40 mg, subcutaneous, q24h  folic acid, 1 mg, oral, Daily  ipratropium-albuteroL, 3 mL, nebulization, TID  lisinopril, 10 mg, oral, Daily  montelukast, 10 mg, oral, Daily  multivitamin with minerals, 1 tablet, oral, Daily  nortriptyline, 50 mg, oral, Nightly  oxybutynin, 5 mg, oral, Daily  oxygen, , inhalation, Continuous - Inhalation  pantoprazole, 40 mg, intravenous, Daily before breakfast  piperacillin-tazobactam, 3.375 g, intravenous, q6h  predniSONE, 30 mg, oral, Daily   Followed by  [START ON 6/26/2024] predniSONE, 20 mg, oral, Daily   Followed by  [START ON 6/29/2024] predniSONE, 10 mg, oral, Daily  thiamine, 100 mg, oral, Daily  [START ON 6/25/2024] thiamine, 100 mg, oral, Daily  thiamine, 100 mg, intravenous, Daily  varenicline, 1 mg, oral, BID       PRN medications: benzonatate, guaiFENesin, HYDROmorphone, HYDROmorphone, ipratropium-albuteroL, LORazepam **OR** LORazepam **OR** LORazepam, morphine, morphine, nitroglycerin, ondansetron     Assessment/Plan   1. Perforated viscus status post small bowel resection and washout procedure performed on June 21st  -NG-tube in place     2.  Leukocytosis suspect secondary to 1 with peritonitis  -Continue IV Zosyn, white count is trending down currently at 21k  -abd culture- showing candida will discuss with ID     3.  COPD  -She was recently discharged on a steroid taper will resumed at this time     4.  Depression and anxiety  -continue home medications     5.  Hyponatremia  -resolving      DVT Prophylaxis:  CELESTE Landon MD  [facility]

## 2024-06-25 PROCEDURE — 2500000004 HC RX 250 GENERAL PHARMACY W/ HCPCS (ALT 636 FOR OP/ED): Performed by: NURSE PRACTITIONER

## 2024-06-25 PROCEDURE — 2500000004 HC RX 250 GENERAL PHARMACY W/ HCPCS (ALT 636 FOR OP/ED): Performed by: HOSPITALIST

## 2024-06-25 PROCEDURE — 97110 THERAPEUTIC EXERCISES: CPT | Mod: GP,CQ

## 2024-06-25 PROCEDURE — 1200000002 HC GENERAL ROOM WITH TELEMETRY DAILY

## 2024-06-25 PROCEDURE — 97116 GAIT TRAINING THERAPY: CPT | Mod: GP,CQ

## 2024-06-25 PROCEDURE — 94640 AIRWAY INHALATION TREATMENT: CPT

## 2024-06-25 PROCEDURE — 2500000005 HC RX 250 GENERAL PHARMACY W/O HCPCS: Performed by: INTERNAL MEDICINE

## 2024-06-25 PROCEDURE — 94664 DEMO&/EVAL PT USE INHALER: CPT

## 2024-06-25 PROCEDURE — 2500000001 HC RX 250 WO HCPCS SELF ADMINISTERED DRUGS (ALT 637 FOR MEDICARE OP): Performed by: INTERNAL MEDICINE

## 2024-06-25 PROCEDURE — C9113 INJ PANTOPRAZOLE SODIUM, VIA: HCPCS | Performed by: HOSPITALIST

## 2024-06-25 PROCEDURE — 97530 THERAPEUTIC ACTIVITIES: CPT | Mod: GO,CO

## 2024-06-25 PROCEDURE — 99233 SBSQ HOSP IP/OBS HIGH 50: CPT | Performed by: INTERNAL MEDICINE

## 2024-06-25 PROCEDURE — 94668 MNPJ CHEST WALL SBSQ: CPT

## 2024-06-25 PROCEDURE — 2500000004 HC RX 250 GENERAL PHARMACY W/ HCPCS (ALT 636 FOR OP/ED): Performed by: PHARMACIST

## 2024-06-25 PROCEDURE — 2500000004 HC RX 250 GENERAL PHARMACY W/ HCPCS (ALT 636 FOR OP/ED)

## 2024-06-25 PROCEDURE — 2500000004 HC RX 250 GENERAL PHARMACY W/ HCPCS (ALT 636 FOR OP/ED): Performed by: INTERNAL MEDICINE

## 2024-06-25 PROCEDURE — 2500000001 HC RX 250 WO HCPCS SELF ADMINISTERED DRUGS (ALT 637 FOR MEDICARE OP): Performed by: HOSPITALIST

## 2024-06-25 PROCEDURE — 2500000002 HC RX 250 W HCPCS SELF ADMINISTERED DRUGS (ALT 637 FOR MEDICARE OP, ALT 636 FOR OP/ED): Performed by: INTERNAL MEDICINE

## 2024-06-25 RX ORDER — ACETAMINOPHEN 650 MG/1
650 SUPPOSITORY RECTAL EVERY 4 HOURS PRN
Status: DISCONTINUED | OUTPATIENT
Start: 2024-06-25 | End: 2024-07-12

## 2024-06-25 RX ORDER — HYDROXYZINE HYDROCHLORIDE 25 MG/1
25 TABLET, FILM COATED ORAL EVERY 6 HOURS PRN
Status: DISCONTINUED | OUTPATIENT
Start: 2024-06-25 | End: 2024-07-16 | Stop reason: HOSPADM

## 2024-06-25 RX ORDER — ACETAMINOPHEN 325 MG/1
650 TABLET ORAL EVERY 4 HOURS PRN
Status: DISCONTINUED | OUTPATIENT
Start: 2024-06-25 | End: 2024-07-12

## 2024-06-25 RX ORDER — ACETAMINOPHEN 160 MG/5ML
650 SOLUTION ORAL EVERY 4 HOURS PRN
Status: DISCONTINUED | OUTPATIENT
Start: 2024-06-25 | End: 2024-07-12

## 2024-06-25 ASSESSMENT — COGNITIVE AND FUNCTIONAL STATUS - GENERAL
DRESSING REGULAR LOWER BODY CLOTHING: A LOT
TOILETING: A LITTLE
DRESSING REGULAR UPPER BODY CLOTHING: A LITTLE
PERSONAL GROOMING: A LITTLE
MOBILITY SCORE: 17
MOVING TO AND FROM BED TO CHAIR: A LITTLE
WALKING IN HOSPITAL ROOM: A LITTLE
CLIMB 3 TO 5 STEPS WITH RAILING: A LOT
STANDING UP FROM CHAIR USING ARMS: A LITTLE
HELP NEEDED FOR BATHING: A LITTLE
TOILETING: A LITTLE
WALKING IN HOSPITAL ROOM: A LITTLE
STANDING UP FROM CHAIR USING ARMS: A LITTLE
TOILETING: A LITTLE
TURNING FROM BACK TO SIDE WHILE IN FLAT BAD: A LITTLE
DRESSING REGULAR LOWER BODY CLOTHING: A LITTLE
MOVING TO AND FROM BED TO CHAIR: A LITTLE
DAILY ACTIVITIY SCORE: 20
MOVING FROM LYING ON BACK TO SITTING ON SIDE OF FLAT BED WITH BEDRAILS: A LITTLE
PERSONAL GROOMING: A LITTLE
CLIMB 3 TO 5 STEPS WITH RAILING: A LOT
TURNING FROM BACK TO SIDE WHILE IN FLAT BAD: A LITTLE
DAILY ACTIVITIY SCORE: 16
DAILY ACTIVITIY SCORE: 17
MOVING FROM LYING ON BACK TO SITTING ON SIDE OF FLAT BED WITH BEDRAILS: A LITTLE
MOBILITY SCORE: 17
DRESSING REGULAR UPPER BODY CLOTHING: A LITTLE
CLIMB 3 TO 5 STEPS WITH RAILING: A LOT
DRESSING REGULAR UPPER BODY CLOTHING: A LITTLE
MOBILITY SCORE: 17
MOVING TO AND FROM BED TO CHAIR: A LITTLE
WALKING IN HOSPITAL ROOM: A LITTLE
HELP NEEDED FOR BATHING: A LOT
DRESSING REGULAR LOWER BODY CLOTHING: TOTAL
HELP NEEDED FOR BATHING: A LOT
STANDING UP FROM CHAIR USING ARMS: A LITTLE
TURNING FROM BACK TO SIDE WHILE IN FLAT BAD: A LITTLE
MOVING FROM LYING ON BACK TO SITTING ON SIDE OF FLAT BED WITH BEDRAILS: A LITTLE

## 2024-06-25 ASSESSMENT — PAIN SCALES - GENERAL
PAINLEVEL_OUTOF10: 7
PAINLEVEL_OUTOF10: 8
PAINLEVEL_OUTOF10: 10 - WORST POSSIBLE PAIN
PAINLEVEL_OUTOF10: 5 - MODERATE PAIN
PAINLEVEL_OUTOF10: 8

## 2024-06-25 ASSESSMENT — ACTIVITIES OF DAILY LIVING (ADL): HOME_MANAGEMENT_TIME_ENTRY: 5

## 2024-06-25 ASSESSMENT — PAIN DESCRIPTION - ORIENTATION: ORIENTATION: RIGHT

## 2024-06-25 ASSESSMENT — PAIN DESCRIPTION - LOCATION
LOCATION: ABDOMEN

## 2024-06-25 ASSESSMENT — PAIN - FUNCTIONAL ASSESSMENT
PAIN_FUNCTIONAL_ASSESSMENT: 0-10

## 2024-06-25 NOTE — PROGRESS NOTES
"Occupational Therapy    Occupational Therapy Treatment    Name: Fernando Cuevas  MRN: [id number]  : 1960  Date: 24  Time Calculation  Start Time: 1051  Stop Time: 1117  Time Calculation (min): 26 min    Assessment:  Medical Staff Made Aware: Yes  End of Session Communication: Bedside nurse  End of Session Patient Position: Bed, 3 rail up, Alarm on  Plan:  Treatment Interventions: ADL retraining, Functional transfer training, UE strengthening/ROM, Endurance training, Patient/family training, Equipment evaluation/education, Neuromuscular reeducation  OT Frequency: 3 times per week  OT Discharge Recommendations: Moderate intensity level of continued care  Equipment Recommended upon Discharge:  (hip kit)  OT Recommended Transfer Status: Assist of 1  OT - OK to Discharge: Yes (per POC)    Subjective   Previous Visit Info:  OT Last Visit  OT Received On: 24  General:  General  Reason for Referral: Pt. 63 y.o. F presenting wit perforated viscus s/p exploration laparascopy and partial small bowel resection .  was discharged from prior admission d/t pneumonia.  Prior to Session Communication: Bedside nurse  Patient Position Received: Bed, 3 rail up, Alarm on  Preferred Learning Style: auditory, kinesthetic, verbal  General Comment: Pt emotional, verbalizing \"anxiety with medical status\"  Precautions:  Medical Precautions: Fall precautions  Post-Surgical Precautions: Abdominal surgery precautions  Vitals:  Vital Signs  Heart Rate: 101  Heart Rate Source: Monitor  Pain Assessment:  Pain Assessment  Pain Assessment: 0-10  0-10 (Numeric) Pain Score: 8  Pain Type: Surgical pain  Pain Location: Abdomen     Objective   Cognition:  Overall Cognitive Status: Within Functional Limits  Activities of Daily Living: Grooming  Grooming Comments: pt declined advising, \"I completed already\"    LE Dressing  Sock Level of Assistance: Dependent  LE Dressing Where Assessed: Bed level  LE Dressing Comments: educated pt on " reacher/sock aid    Functional Standing Tolerance:     Bed Mobility/Transfers: Bed Mobility  Bed Mobility: Yes  Bed Mobility 1  Bed Mobility 1: Supine to sitting, Sitting to supine  Level of Assistance 1: Minimum assistance  Bed Mobility Comments 1: HOB elevated, assist for LE advancement and trunk control with increased time and effort to complete    Transfers  Transfer: Yes  Transfer 1  Transfer From 1: Bed to  Transfer to 1: Stand  Technique 1: Sit to stand, Stand to sit  Transfer Device 1:  (rollator)  Transfer Level of Assistance 1: Close supervision  Trials/Comments 1: Pt. 63 y.o. F presenting wit perforated viscus s/p exploration laparascopy and partial small bowel resection 6/21. 6/19 was discharged from prior admission d/t pneumonia.    Functional Mobility:  Functional Mobility  Functional Mobility Performed: Yes  Functional Mobility 1  Surface 1: Level tile  Device 1: Rollator  Assistance 1: Close supervision  Comments 1: Pt completed functional mobility with rollator a household distance at [facility]. Pt required standing rest break d/t fatigue.     Therapy/Activity: Therapeutic Activity  Therapeutic Activity Performed: Yes  Therapeutic Activity 1: Provided therapeutic use of self listening to pt's concerns by offering emotional support. Pt extremely emotional this day. MT consulted for pt per her request.    Outcome Measures:  [facility] Daily Activity  Putting on and taking off regular lower body clothing: Total  Bathing (including washing, rinsing, drying): A lot  Putting on and taking off regular upper body clothing: A little  Toileting, which includes using toilet, bedpan or urinal: A little  Taking care of personal grooming such as brushing teeth: A little  Eating Meals: None  Daily Activity - Total Score: 16    Education Documentation  Precautions, taught by SIN Alexis at 6/25/2024 11:36 AM.  Learner: Patient  Readiness: Acceptance  Method: Explanation, Demonstration  Response: Verbalizes  Understanding, Needs Reinforcement    Body Mechanics, taught by SIN Alexis at 6/25/2024 11:36 AM.  Learner: Patient  Readiness: Acceptance  Method: Explanation, Demonstration  Response: Verbalizes Understanding, Needs Reinforcement    ADL Training, taught by SIN Alexis at 6/25/2024 11:36 AM.  Learner: Patient  Readiness: Acceptance  Method: Explanation, Demonstration  Response: Verbalizes Understanding, Needs Reinforcement    Education Comments  No comments found.      Goals:  Encounter Problems       Encounter Problems (Active)       ADLs       Patient will perform UB and LB bathing with contact guard assist level of assistance and grab bars, shower chair, and long-handled sponge. (Not Progressing)       Start:  06/24/24    Expected End:  07/08/24            Patient with complete upper body dressing with supervision level of assistance donning and doffing all UE clothes with PRN adaptive equipment while edge of bed  (Not Progressing)       Start:  06/24/24    Expected End:  07/08/24            Patient with complete lower body dressing with minimal assist  level of assistance donning and doffing all LE clothes  with reacher, shoe horn, sock-aid, and dressing stick  while edge of bed  (Not Progressing)       Start:  06/24/24    Expected End:  07/08/24            Patient will complete daily grooming tasks brushing teeth and washing face/hair with independent level of assistance while edge of bed . (Not Progressing)       Start:  06/24/24    Expected End:  07/08/24            Patient will complete toileting including hygiene clothing management/hygiene with contact guard assist level of assistance and raised toilet seat and grab bars. (Not Progressing)       Start:  06/24/24    Expected End:  07/08/24               BALANCE       Pt will maintain dynamic standing balance during ADL task with supervision level of assistance in order to demonstrate decreased risk of falling and improved postural  control. (Not Progressing)       Start:  06/24/24    Expected End:  07/08/24            Patientt will maintain static standing balance during ADL task with independent level of assistance drop down in order to demonstrate decreased risk of falling and improved postural control. (Not Progressing)       Start:  06/24/24    Expected End:  07/08/24            Patient will tolerate standing for 5 minutes to contact guard level of assistance with front wheeled walker in order to improve functional activity tolerance for ADL tasks. (Not Progressing)       Start:  06/24/24    Expected End:  07/08/24               TRANSFERS       Patient will perform bed mobility supervision level of assistance and bed rails in order to improve safety and independence with mobility (Progressing)       Start:  06/24/24    Expected End:  07/08/24            Patient will complete functional transfer to all surfaces with front wheeled walker with contact guard assist level of assistance. (Progressing)       Start:  06/24/24    Expected End:  07/08/24            Patient will complete sit to stand transfer with independent level of assistance and front wheeled walker in order to improve safety and prepare for out of bed mobility. (Progressing)       Start:  06/24/24    Expected End:  07/08/24

## 2024-06-25 NOTE — PROGRESS NOTES
Physical Therapy    Physical Therapy Treatment    Patient Name: Fernando Cuevas  MRN: [id number]  Today's Date: 6/25/2024  Time Calculation  Start Time: 0927  Stop Time: 0954  Time Calculation (min): 27 min    Assessment/Plan   PT Assessment  End of Session Communication: Bedside nurse (Discussed pt's progress and current mobility as described in mobility section)  Assessment Comment: Pt demo's limited participation in PT session d/t increased abdominal pain with OOB activity, but resolves when returning to supine. Pt politely declines getting up to chair at this time, citing fatigue and would like to rest in bed. Pt will continue to benefit from skilled PT for improved B LE strength and stability for safe return to PLOF.  End of Session Patient Position: Bed, 3 rail up, Alarm on (call light and needs in reach. B SCDs in place however are not working. Tubing and cuffs replaced, however are not inflating. RN notified)  PT Plan  Inpatient/Swing Bed or Outpatient: Inpatient  PT Plan  Treatment/Interventions: Bed mobility, Gait training, Therapeutic exercise  PT Plan: Ongoing PT  PT Frequency: 4 times per week  PT Discharge Recommendations: Moderate intensity level of continued care  PT Recommended Transfer Status: Assist x1  PT - OK to Discharge: Yes (per POC)      General Visit Information:   PT  Visit  PT Received On: 06/25/24  General  Reason for Referral: Pt. 63 y.o. F presenting wit perforated viscus s/p exploration laparascopy and partial small bowel resection 6/21. 6/19 was discharged from prior admission d/t pneumonia.  Referred By: MD Barbi  Past Medical History Relevant to Rehab:   Past Medical History:   Diagnosis Date    Essential (primary) hypertension 04/20/2016    Benign hypertension    Personal history of other diseases of the respiratory system     H/O emphysema    Personal history of other diseases of the respiratory system     History of chronic obstructive lung disease    Personal history of other  mental and behavioral disorders     History of depression       Family/Caregiver Present: No  Prior to Session Communication: Bedside nurse (RN cleared pt. for PT Tx. All charts reviewed. Per handoff with nursing prior to therapy visit, patient’s pain and vitals are stable. Additional concern for this patient related to therapy session: none.)  Patient Position Received: Bed, 3 rail up, Alarm off, not on at start of session  Preferred Learning Style: visual, verbal, kinesthetic  General Comment: Pt semi-supine in bed on arrival, pleasant and fully participatory in PT tx    Subjective   Precautions:  Precautions  Medical Precautions: Fall precautions  Post-Surgical Precautions: Abdominal surgery precautions    Objective   Pain:  Pain Assessment  Pain Assessment: 0-10  0-10 (Numeric) Pain Score:  (Pt c/o abdominal discomfort, no numerical value provided)  Cognition:  Cognition  Overall Cognitive Status: Within Functional Limits  Coordination:  Movements are Fluid and Coordinated: Yes  Postural Control:  Postural Control  Postural Control: Within Functional Limits  Static Sitting Balance  Static Sitting-Balance Support: Feet supported  Static Sitting-Level of Assistance: Modified independent  Static Sitting-Comment/Number of Minutes: x8 min unsupported sitting at EOB    Activity Tolerance:  Activity Tolerance  Endurance: Tolerates 10 - 20 min exercise with multiple rests  Treatments:  Therapeutic Exercise  Therapeutic Exercise Performed: Yes  Therapeutic Exercise Activity 1: Pt instructed in B LE ther-ex: seated PF/DF, LAQ, Hip flexion, Hip ABD x20 reps. Rest as needed to manage fatigue. Done to improve muscular strength and endurance to facilitate increased independence with balance, transfers, ambulation, and participation in ADLs. Verbal/nonverbal (demo/gestures) cuing for execution of exercises and for proper form to obtain maximal benefits.    Bed Mobility  Bed Mobility: Yes  Bed Mobility 1  Bed Mobility 1: Supine  to sitting, Sitting to supine  Level of Assistance 1: Contact guard  Bed Mobility Comments 1: HOB elevated and use of bed accessories    Ambulation/Gait Training  Ambulation/Gait Training Performed: Yes  Ambulation/Gait Training 1  Surface 1: Level tile  Device 1: Rollator  Assistance 1: Contact guard  Quality of Gait 1:  (Step-thru pattern with decreased step length and decreased foot clearance in swing with increased lateral sway. Steady throughout)  Comments/Distance (ft) 1: ~15' (Increased distances limited 2/2 wall suction)  Transfers  Transfer: Yes (Stabilization provided during all transfers to rollator d/t brakes not activating)  Transfer 1  Transfer From 1:  (sit<>stand from EOB)  Transfer Device 1:  (rollator)  Transfer Level of Assistance 1: Close supervision  Transfers 2  Transfer From 2:  (stand pivot from bedside commode to EOB)  Transfer Device 2:  (rollator)  Transfer Level of Assistance 2: Contact guard  Transfers 3  Transfer From 3:  (sit<>stand from bedside commode)  Transfer Device 3:  (rollator)  Transfer Level of Assistance 3: Contact guard    Outcome Measures:  [facility] Basic Mobility  Turning from your back to your side while in a flat bed without using bedrails: A little  Moving from lying on your back to sitting on the side of a flat bed without using bedrails: A little  Moving to and from bed to chair (including a wheelchair): A little  Standing up from a chair using your arms (e.g. wheelchair or bedside chair): A little  To walk in hospital room: A little  Climbing 3-5 steps with railing: A lot  Basic Mobility - Total Score: 17        Encounter Problems       Encounter Problems (Active)       Balance       Pt will tolerate 10+ mins dynamic standing balance activities with CGA or less and no LOB using a RW.  (Progressing)       Start:  06/23/24    Expected End:  07/07/24               Mobility       STG - Patient will ambulate 50 ft with CGA and a Rw with minimal SOB or fatigue.   (Progressing)       Start:  06/23/24    Expected End:  07/07/24               PT Transfers       STG - Patient will perform bed mobility with SBA or less using log roll technique.  (Progressing)       Start:  06/23/24    Expected End:  07/07/24            STG - Patient will transfer sit to and from stand mod I with a RW.  (Progressing)       Start:  06/23/24    Expected End:  07/07/24               Pain - Adult          Safety       LTG - Patient will adhere to hip precautions during ADL's and transfers (Progressing)       Start:  06/21/24            LTG - Patient will demonstrate safety requirements appropriate to situation/environment (Progressing)       Start:  06/21/24            LTG - Patient will utilize safety techniques (Progressing)       Start:  06/21/24            STG - Patient locks brakes on wheelchair (Progressing)       Start:  06/21/24            STG - Patient uses call light consistently to request assistance with transfers (Progressing)       Start:  06/21/24            STG - Patient uses gait belt during all transfers (Progressing)       Start:  06/21/24            Goal 1 (Progressing)       Start:  06/21/24            Goal 2 (Progressing)       Start:  06/21/24            Goal 3 (Progressing)       Start:  06/21/24

## 2024-06-25 NOTE — PROGRESS NOTES
"Fernando Cuevas is a 63 y.o. female on day 4 of admission presenting with Perforated viscus.    Subjective   Feels ok.  Passing flatus, no bm       Objective     Physical Exam  Slightly less distended  Non tender  Wound c/d/I     Last Recorded Vitals  Blood pressure 122/73, pulse 92, temperature 36.1 °C (96.9 °F), temperature source Temporal, resp. rate 18, height 1.575 m (5' 2.01\"), weight 64 kg (141 lb 1.5 oz), SpO2 99%.  Intake/Output last 3 Shifts:  I/O last 3 completed shifts:  In: 2957.5 (46.2 mL/kg) [I.V.:2787.5 (43.6 mL/kg); NG/GT:20; IV Piggyback:150]  Out: 2350 (36.7 mL/kg) [Urine:1350 (0.6 mL/kg/hr); Emesis/NG output:1000]  Weight: 64 kg     Lab Results   Component Value Date    WBC 16.6 (H) 06/24/2024    HGB 9.3 (L) 06/24/2024    HCT 33.0 (L) 06/24/2024    MCV 86 06/24/2024     (H) 06/24/2024     Principal Problem:    Perforated viscus  Active Problems:    Thrombocytosis    Impression:   Post Op ileus   ID expanded coverage for fungus   Up to chair, ambulate  Await return GI function           Wilson Liang MD      "

## 2024-06-25 NOTE — PROGRESS NOTES
Fernando Cuevas is a 63 y.o. female on day 4 of admission presenting with Perforated viscus.      Subjective   Seen and examined.   NG in place, clamped. C/o abdominal distention with discomfort.         Objective          Vitals 24HR  Heart Rate:  []   Temp:  [36.1 °C (96.9 °F)-36.8 °C (98.2 °F)]   Resp:  [18]   BP: (105-124)/(61-76)   SpO2:  [92 %-100 %]     Intake/Output last 3 Shifts:    Intake/Output Summary (Last 24 hours) at 6/25/2024 1720  Last data filed at 6/25/2024 0006  Gross per 24 hour   Intake --   Output 500 ml   Net -500 ml       Physical Exam  Constitutional: A&Ox3, NAD, NG in place  Cardiovascular: Normal rate and regular rhythm.  Respiratory/Thorax: CTAB. No rhonchi.  Gastrointestinal: Distended, firm, mildly tender, post surgical changes noted.   Musculoskeletal: No joint swelling  Extremities: Trace peripheral edema  Neurological: A&Ox3, No focal deficits  Psychological: Appropriate mood and behavior  Skin: Warm and dry      Scheduled Medications  aspirin, 81 mg, oral, Daily  budesonide, 0.25 mg, nebulization, Daily   And  formoterol, 20 mcg, nebulization, BID  busPIRone, 5 mg, oral, BID  calcium carbonate, 1,500 mg, oral, Daily  dilTIAZem ER, 240 mg, oral, Daily  enoxaparin, 40 mg, subcutaneous, q24h  fluconazole, 200 mg, oral, Daily  folic acid, 1 mg, oral, Daily  ipratropium-albuteroL, 3 mL, nebulization, TID  lisinopril, 10 mg, oral, Daily  montelukast, 10 mg, oral, Daily  multivitamin with minerals, 1 tablet, oral, Daily  nortriptyline, 50 mg, oral, Nightly  oxybutynin, 5 mg, oral, Daily  oxygen, , inhalation, Continuous - Inhalation  pantoprazole, 40 mg, intravenous, Daily before breakfast  piperacillin-tazobactam, 3.375 g, intravenous, q6h  [START ON 6/26/2024] predniSONE, 20 mg, oral, Daily   Followed by  [START ON 6/29/2024] predniSONE, 10 mg, oral, Daily  thiamine, 100 mg, oral, Daily  thiamine, 100 mg, oral, Daily  thiamine, 100 mg, intravenous, Daily  varenicline, 1 mg, oral,  BID      Continuous medications  dextrose 5 % and lactated Ringer's, 50 mL/hr, Last Rate: 65 mL/hr (06/24/24 1851)        PRN medications: acetaminophen **OR** acetaminophen **OR** acetaminophen, benzonatate, guaiFENesin, HYDROmorphone, HYDROmorphone, hydrOXYzine HCL, ipratropium-albuteroL, morphine, morphine, nitroglycerin, ondansetron     Relevant Results  Results from last 7 days   Lab Units 06/24/24  1451 06/23/24  0535 06/22/24  0516   WBC AUTO x10*3/uL 16.6* 21.0* 33.7*   HEMOGLOBIN g/dL 9.3* 9.4* 9.7*   HEMATOCRIT % 33.0* 31.5* 32.6*   PLATELETS AUTO x10*3/uL 510* 653* 786*   NEUTROS PCT AUTO % 83.9 86.6 93.2   LYMPHS PCT AUTO % 6.4 5.4 3.3   MONOS PCT AUTO % 6.3 6.7 2.0   EOS PCT AUTO % 0.1 0.0 0.0     Results from last 7 days   Lab Units 06/24/24  1451 06/23/24  0535 06/22/24  0516   SODIUM mmol/L 131* 132* 133*   POTASSIUM mmol/L 4.5 4.3 4.7   CHLORIDE mmol/L 93* 92* 92*   CO2 mmol/L 26 31 31   BUN mg/dL 9 10 9   CREATININE mg/dL 0.92 1.01 0.93   GLUCOSE mg/dL 81 68* 94   CALCIUM mg/dL 8.6 8.9 9.4       XR abdomen 1 view   Final Result   Stable vertically oriented line of skin staples to the right of   midline in the lower abdomen and upper pelvis.        Stable NG tube in place.        Findings that could represent either ongoing persistent partial   distal small bowel obstruction or postoperative ileus. No significant   change from 06/22/2024.        MACRO:   None        Signed by: Gabe Gage 6/24/2024 2:03 PM   Dictation workstation:   [id number]      XR abdomen 1 view   Final Result   1.  As above.        Signed by: Paul Agudelo 6/22/2024 4:25 PM   Dictation workstation:   [id number]      XR abdomen 1 view   Final Result   1. Nasogastric tube with distal end terminating in the proximal   stomach and proximal side hole in the gastric cardia. Advise   advancement 3-5 cm.   2. Slightly gas distended stomach. Moderate colonic stool burden.        MACRO:   None        Signed by: Keyshawn Contreras 6/21/2024  8:47 AM   Dictation workstation:   [id number]      CT abdomen pelvis wo IV contrast   Final Result   Addendum (preliminary) 1 of 1   Images reviewed with Garth Katz at 12:52 AM on 6/21/2024.   Signed by Armando Mgcarry DO      Final   1. Localizing free air in the anterior mid abdomen compatible with   perforated viscus. advise surgical consult.   2. Site of perforation potentially emanates from a small bowel loop in   the mid abdomen.   3. No associated free fluid.   4. Marked gastric distention with air and fluid.   5. Remainder as above.   6. Urgent finding and recommendation given to and acknowledged by   Sheldon RIVAS at 10:27 PM Eastern on 6/20/2024.   Signed by Yash Hood MD               Assessment/Plan      Fernando Cuevas is a 63 y.o. female with a past medical history of hypertension, COPD, anxiety, anemia, cocaine and alcohol use, hyperlipidemia, depression, lung CA, history of spontaneous pneumothorax, overactive bladder who presented on 6/21-day after being discharged for right upper and middle lobe pneumonia treated with IV antibiotics and prednisone with a taper.  She came back in with abdominal pain.  CT showed perforated viscus.  She underwent exploratory laparotomy with partial small bowel resection on 6/21.  Her clinical course was complicated by postoperative ileus.  NG tube remains in place.  Worsening hyponatremia was noted.  Nephrology was consulted for renal care.  Ms. Cuevas is suffering hyponatremia in the setting of a perforated viscus and postoperative ileus.  During her recent hospitalization she had low-grade hyponatremia albeit not to this extent. This admission she has been nothing by mouth.  She was placed on IV isotonic fluid with acceptable improvement in her hyponatremia.  She is developing mild hypervolemia.  I adjusted her fluids to include 5% dextrose and LR to run at 65 mL/hr. I will cut her fluids back further to 50 mL/an hour. Blood pressures are not low.  Her  sodium is holding steady.  Trend RFP.  Will follow.        Principal Problem:    Perforated viscus  Active Problems:    Thrombocytosis      I spent 40 minutes in the professional and overall care of this patient.      Tim Wayne, DO

## 2024-06-25 NOTE — CONSULTS
INFECTIOUS DISEASE INPATIENT INITIAL CONSULTATION    Referred By: Morena Landon    Reason For Consult: candida in peritoneal culture from OR had perf viscus     HPI:  This is a 64 yo female with PMH of COPD on home Azithro MWF for ppx, HTN, current smoker who presented with shortness of breath.    She has essentially been admitted since 6/11/24. Initially diagnosed with COPD exacerbation and RUL/RML PNA. Was on IV CTX/Azithro, also steroids. She improved and went home on 6/20 but came back the same day with diffuse abx pain. Imaging showed perforated viscus. She went to the OR on 6/21 for ex lap and partial small bowel resection. OR cultures gram stain with mixed GPB and grew Candida albicans. Has been on IV Zosyn from 6/21. I started her on PO Fluconazole yesterday and help Azithromycin for the time being due to potential for Qtc prolongation. Dealing with post-op ileus now. Has NG tube still and no BM but is passing gas.    Allergies:  Iodinated contrast media     Vitals (Last 24 Hours):  Heart Rate:  []   Temp:  [36.2 °C (97.2 °F)-36.8 °C (98.2 °F)]   Resp:  [17-19]   BP: (112-128)/(61-76)   SpO2:  [91 %-100 %]      PHYSICAL EXAM:  Gen - NAD  Heart - RRR, no murmurs  Lungs - clear bilaterally, no wheezing  Abd - very distended but no ttp, surgical incision looks fine  Ext - no LE edema  Skin - no rash    MEDS:    Current Facility-Administered Medications:     aspirin chewable tablet 81 mg, 81 mg, oral, Daily, Morena Landon MD, 81 mg at 06/24/24 0824    benzonatate (Tessalon) capsule 100 mg, 100 mg, oral, TID PRN, Morena Landon MD    budesonide (Pulmicort) 0.25 mg/2 mL nebulizer solution 0.25 mg, 0.25 mg, nebulization, Daily, 0.25 mg at 06/24/24 0716 **AND** formoterol (Perforomist) 20 mcg/2 mL nebulizer solution 20 mcg, 20 mcg, nebulization, BID, Morena Landon MD, 20 mcg at 06/24/24 1905    busPIRone (Buspar) tablet 5 mg, 5 mg, oral, BID, Morena Landon MD, 5 mg at 06/24/24 2055    calcium  carbonate (Tums) chewable tablet 1,500 mg, 1,500 mg, oral, Daily, Morena Landon MD, 1,500 mg at 06/24/24 0824    dextrose 5 % and lactated Ringer's infusion, 65 mL/hr, intravenous, Continuous, Tim Wayne DO, Last Rate: 65 mL/hr at 06/24/24 1851, 65 mL/hr at 06/24/24 1851    dilTIAZem CD (Cardizem CD) 24 hr capsule 240 mg, 240 mg, oral, Daily, Morena Landon MD, 240 mg at 06/24/24 0824    enoxaparin (Lovenox) syringe 40 mg, 40 mg, subcutaneous, q24h, FELIPE Laws, 40 mg at 06/24/24 1548    fluconazole (Diflucan) tablet 200 mg, 200 mg, oral, Daily, Tim Nelson MD, 200 mg at 06/24/24 1701    folic acid (Folvite) tablet 1 mg, 1 mg, oral, Daily, Madeleine Nixon MD, 1 mg at 06/24/24 0900    guaiFENesin (Mucinex) 12 hr tablet 600 mg, 600 mg, oral, BID PRN, Morena Landon MD, 600 mg at 06/24/24 0833    HYDROmorphone (Dilaudid) injection 0.5 mg, 0.5 mg, intravenous, q3h PRN, FELIPE Miller, 0.5 mg at 06/25/24 0601    HYDROmorphone PF (Dilaudid) injection 0.2 mg, 0.2 mg, intravenous, q4h PRN, FELIPE Miller    ipratropium-albuteroL (Duo-Neb) 0.5-2.5 mg/3 mL nebulizer solution 3 mL, 3 mL, nebulization, q2h PRN, Morena Landon MD, 3 mL at 06/21/24 2356    ipratropium-albuteroL (Duo-Neb) 0.5-2.5 mg/3 mL nebulizer solution 3 mL, 3 mL, nebulization, TID, Morena Landon MD, 3 mL at 06/24/24 1905    lisinopril tablet 10 mg, 10 mg, oral, Daily, Morena Landon MD, 10 mg at 06/24/24 0824    montelukast (Singulair) tablet 10 mg, 10 mg, oral, Daily, Morena Landon MD, 10 mg at 06/24/24 2055    morphine injection 1 mg, 1 mg, intravenous, q4h PRN, Madeleine Nixon MD    morphine injection 2 mg, 2 mg, intravenous, q4h PRN, Madeleine Nixon MD, 2 mg at 06/25/24 0203    multivitamin with minerals 1 tablet, 1 tablet, oral, Daily, Madeleine Nixon MD, 1 tablet at 06/24/24 0900    nitroglycerin (Nitrostat) SL tablet 0.4 mg, 0.4 mg, sublingual, q5 min PRN, Morena Landon MD    nortriptyline (Pamelor) capsule  50 mg, 50 mg, oral, Nightly, Morena Landon MD, 50 mg at 06/24/24 2055    ondansetron (Zofran) injection 4 mg, 4 mg, intravenous, q6h PRN, Beverly Villeda, APRN-CNP, 4 mg at 06/22/24 2323    oxybutynin (Ditropan) tablet 5 mg, 5 mg, oral, Daily, Morena Landon MD, 5 mg at 06/24/24 0824    oxygen (O2) therapy, , inhalation, Continuous - Inhalation, Morena Landon MD, 2 L/min at 06/24/24 1905    pantoprazole (ProtoNix) injection 40 mg, 40 mg, intravenous, Daily before breakfast, Madeleine Nixon MD, 40 mg at 06/24/24 0604    piperacillin-tazobactam-dextrose (Zosyn) IV 3.375 g, 3.375 g, intravenous, q6h, Wendie Cardenas, PharmD, Stopped at 06/25/24 0219    [COMPLETED] predniSONE (Deltasone) tablet 40 mg, 40 mg, oral, Daily, 40 mg at 06/22/24 0849 **FOLLOWED BY** predniSONE (Deltasone) tablet 30 mg, 30 mg, oral, Daily, 30 mg at 06/24/24 1352 **FOLLOWED BY** [START ON 6/26/2024] predniSONE (Deltasone) tablet 20 mg, 20 mg, oral, Daily **FOLLOWED BY** [START ON 6/29/2024] predniSONE (Deltasone) tablet 10 mg, 10 mg, oral, Daily, Morena Landon MD    thiamine (Vitamin B-1) tablet 100 mg, 100 mg, oral, Daily, Morena Landon MD, 100 mg at 06/23/24 1044    thiamine (Vitamin B-1) tablet 100 mg, 100 mg, oral, Daily, Madeleine Nixon MD    thiamine (Vitamin B1) injection 100 mg, 100 mg, intravenous, Daily, Madeleine Nixon MD, 100 mg at 06/24/24 0824    varenicline (Chantix) tablet 1 mg, 1 mg, oral, BID, Morena Landon MD, 1 mg at 06/24/24 2055     LABS:  Lab Results   Component Value Date    WBC 16.6 (H) 06/24/2024    HGB 9.3 (L) 06/24/2024    HCT 33.0 (L) 06/24/2024    MCV 86 06/24/2024     (H) 06/24/2024      Results from last 72 hours   Lab Units 06/24/24  1451   SODIUM mmol/L 131*   POTASSIUM mmol/L 4.5   CHLORIDE mmol/L 93*   CO2 mmol/L 26   BUN mg/dL 9   CREATININE mg/dL 0.92   GLUCOSE mg/dL 81   CALCIUM mg/dL 8.6   ANION GAP mmol/L 17   EGFR mL/min/1.73m*2 70         Estimated Creatinine Clearance: 55 mL/min (by C-G  formula based on SCr of 0.92 mg/dL).  C-Reactive Protein   Date/Time Value Ref Range Status   06/16/2024 06:36 AM 0.57 <1.00 mg/dL Final     Sedimentation Rate   Date/Time Value Ref Range Status   06/16/2024 06:36 AM 35 (H) 0 - 30 mm/h Final     IMAGING:  X-Ray Abd 6/24  Impression:     Stable vertically oriented line of skin staples to the right of  midline in the lower abdomen and upper pelvis.    Stable NG tube in place.    Findings that could represent either ongoing persistent partial  distal small bowel obstruction or postoperative ileus. No significant  change from 06/22/2024.     CT A/P 6/20  Impression:     1. Localizing free air in the anterior mid abdomen compatible with  perforated viscus. advise surgical consult.  2. Site of perforation potentially emanates from a small bowel loop in  the mid abdomen.  3. No associated free fluid.  4. Marked gastric distention with air and fluid.  5. Remainder as above.  6. Urgent finding and recommendation given to and acknowledged by  Sheldon RIVAS at 10:27 PM Eastern on 6/20/2024.       ASSESSMENT/PLAN:    Perforated Small Bowel with Peritonitis - s/p OR 6/21, culture with Candida albicans. Suspect there would also be a mix of gram negative and anaerobes involved in this type of infection.  COPD on home Azithro ppx MWF - holding Azithro while on Fluconazole    IV Zosyn. Continue on Fluconazole. Hold Azithro due to potential for QT prolongation on both Fluc/Azithro.    Monitoring for adverse effects of abx such as rash/itching/diarrhea.    Will follow. Thanks!    Tim Nelson MD  [facility]  Office #[phone]

## 2024-06-25 NOTE — PROGRESS NOTES
"Fernando Cuevas is a 63 y.o. female on day 4 of admission presenting with Perforated viscus.    Subjective   Patient doing alright today. Still a bit nauseous with liquid intake. Abdomen remains distended and taut. NG is to LIWS.     Objective   PE:  Constitutional: A&Ox3, calm and cooperative, NAD  Cardiovascular: Normal rate and regular rhythm.  Respiratory/Thorax: Good symmetric chest expansion. No labored breathing.  Gastrointestinal: Abdomen moderately distended, now soft, appropriately tender, no peritoneal signs, laparotomy sites c/d/i, well approximate with Dermabond, no erythema or drainage. Midline incision cdi.  Genitourinary: Voiding independently   Musculoskeletal: ROM intact  Extremities: No peripheral edema  Neurological: A&Ox3, No focal deficits  Psychological: Appropriate mood and behavior  Skin: Warm and dry     Last Recorded Vitals  Blood pressure 122/73, pulse 92, temperature 36.1 °C (96.9 °F), temperature source Temporal, resp. rate 18, height 1.575 m (5' 2.01\"), weight 64 kg (141 lb 1.5 oz), SpO2 99%.  Intake/Output last 3 Shifts:  I/O last 3 completed shifts:  In: 2957.5 (46.2 mL/kg) [I.V.:2787.5 (43.6 mL/kg); NG/GT:20; IV Piggyback:150]  Out: 2350 (36.7 mL/kg) [Urine:1350 (0.6 mL/kg/hr); Emesis/NG output:1000]  Weight: 64 kg     Relevant Results  Scheduled medications  aspirin, 81 mg, oral, Daily  budesonide, 0.25 mg, nebulization, Daily   And  formoterol, 20 mcg, nebulization, BID  busPIRone, 5 mg, oral, BID  calcium carbonate, 1,500 mg, oral, Daily  dilTIAZem ER, 240 mg, oral, Daily  enoxaparin, 40 mg, subcutaneous, q24h  fluconazole, 200 mg, oral, Daily  folic acid, 1 mg, oral, Daily  ipratropium-albuteroL, 3 mL, nebulization, TID  lisinopril, 10 mg, oral, Daily  montelukast, 10 mg, oral, Daily  multivitamin with minerals, 1 tablet, oral, Daily  nortriptyline, 50 mg, oral, Nightly  oxybutynin, 5 mg, oral, Daily  oxygen, , inhalation, Continuous - Inhalation  pantoprazole, 40 mg, " intravenous, Daily before breakfast  piperacillin-tazobactam, 3.375 g, intravenous, q6h  [START ON 6/26/2024] predniSONE, 20 mg, oral, Daily   Followed by  [START ON 6/29/2024] predniSONE, 10 mg, oral, Daily  thiamine, 100 mg, oral, Daily  thiamine, 100 mg, oral, Daily  thiamine, 100 mg, intravenous, Daily  varenicline, 1 mg, oral, BID      Continuous medications  dextrose 5 % and lactated Ringer's, 65 mL/hr, Last Rate: 65 mL/hr (06/24/24 1851)      PRN medications  PRN medications: benzonatate, guaiFENesin, HYDROmorphone, HYDROmorphone, ipratropium-albuteroL, morphine, morphine, nitroglycerin, ondansetron    Results for orders placed or performed during the hospital encounter of 06/20/24 (from the past 24 hour(s))   Basic metabolic panel   Result Value Ref Range    Glucose 81 74 - 99 mg/dL    Sodium 131 (L) 136 - 145 mmol/L    Potassium 4.5 3.5 - 5.3 mmol/L    Chloride 93 (L) 98 - 107 mmol/L    Bicarbonate 26 21 - 32 mmol/L    Anion Gap 17 10 - 20 mmol/L    Urea Nitrogen 9 6 - 23 mg/dL    Creatinine 0.92 0.50 - 1.05 mg/dL    eGFR 70 >60 mL/min/1.73m*2    Calcium 8.6 8.6 - 10.3 mg/dL   CBC and Auto Differential   Result Value Ref Range    WBC 16.6 (H) 4.4 - 11.3 x10*3/uL    nRBC 0.2 (H) 0.0 - 0.0 /100 WBCs    RBC 3.85 (L) 4.00 - 5.20 x10*6/uL    Hemoglobin 9.3 (L) 12.0 - 16.0 g/dL    Hematocrit 33.0 (L) 36.0 - 46.0 %    MCV 86 80 - 100 fL    MCH 24.2 (L) 26.0 - 34.0 pg    MCHC 28.2 (L) 32.0 - 36.0 g/dL    RDW 20.6 (H) 11.5 - 14.5 %    Platelets 510 (H) 150 - 450 x10*3/uL    Neutrophils % 83.9 40.0 - 80.0 %    Immature Granulocytes %, Automated 3.1 (H) 0.0 - 0.9 %    Lymphocytes % 6.4 13.0 - 44.0 %    Monocytes % 6.3 2.0 - 10.0 %    Eosinophils % 0.1 0.0 - 6.0 %    Basophils % 0.2 0.0 - 2.0 %    Neutrophils Absolute 13.94 (H) 1.20 - 7.70 x10*3/uL    Immature Granulocytes Absolute, Automated 0.51 0.00 - 0.70 x10*3/uL    Lymphocytes Absolute 1.07 (L) 1.20 - 4.80 x10*3/uL    Monocytes Absolute 1.05 (H) 0.10 - 1.00  x10*3/uL    Eosinophils Absolute 0.01 0.00 - 0.70 x10*3/uL    Basophils Absolute 0.03 0.00 - 0.10 x10*3/uL   Morphology   Result Value Ref Range    RBC Morphology See Below          Assessment/Plan   Principal Problem:  Perforated viscus    Patient with perforated small bowel is now POD4 ex lap, pSBR with Dr Liang. Intra-op findings of segment of perforated small bowel. Labs reviewed- WBC 16.6 from 21.0, hgb 9.3-stable, platelets 510 from 653. Clinically abdomen is improving, she feels a bit better. Passing some flatus. She does have her appetite which is reassuring.    Plan:  - NG intermittent clamping  - CLD for comfort  - Encouraged IS  - Encouraged OOB/ambulation  - PT/OT  - DVT proph: SCDs/ASA/lovenox  - PRN antiemetic  - Supportive care    Surgery to follow.     Discussed with Dr Bingham.    I spent 35 minutes in the professional and overall care of this patient.    Mahad Boateng PA-C

## 2024-06-25 NOTE — PROGRESS NOTES
"Fernando Cuevas is a 63 y.o. female on day 4 of admission presenting with Perforated viscus.    Subjective   White Count down to 16,000 abdomen still distended, patient states positive for flatus but no bowel movement, afebrile no fevers or chills.       Objective     Physical Exam  Vitals reviewed.   Constitutional:       Appearance: Normal appearance.   HENT:      Head: Normocephalic.      Nose: Nose normal.      Mouth/Throat:      Mouth: Mucous membranes are dry.      Pharynx: Oropharynx is clear.   Cardiovascular:      Rate and Rhythm: Normal rate and regular rhythm.   Pulmonary:      Effort: Pulmonary effort is normal.   Abdominal:      Comments: distended no bowel sounds   Skin:     Capillary Refill: Capillary refill takes less than 2 seconds.   Neurological:      General: No focal deficit present.      Mental Status: She is alert.         Last Recorded Vitals  Blood pressure 122/73, pulse 92, temperature 36.1 °C (96.9 °F), temperature source Temporal, resp. rate 18, height 1.575 m (5' 2.01\"), weight 64 kg (141 lb 1.5 oz), SpO2 99%.  Intake/Output last 3 Shifts:  I/O last 3 completed shifts:  In: 2957.5 (46.2 mL/kg) [I.V.:2787.5 (43.6 mL/kg); NG/GT:20; IV Piggyback:150]  Out: 2350 (36.7 mL/kg) [Urine:1350 (0.6 mL/kg/hr); Emesis/NG output:1000]  Weight: 64 kg     Relevant Results  Results for orders placed or performed during the hospital encounter of 06/20/24 (from the past 24 hour(s))   Basic metabolic panel   Result Value Ref Range    Glucose 81 74 - 99 mg/dL    Sodium 131 (L) 136 - 145 mmol/L    Potassium 4.5 3.5 - 5.3 mmol/L    Chloride 93 (L) 98 - 107 mmol/L    Bicarbonate 26 21 - 32 mmol/L    Anion Gap 17 10 - 20 mmol/L    Urea Nitrogen 9 6 - 23 mg/dL    Creatinine 0.92 0.50 - 1.05 mg/dL    eGFR 70 >60 mL/min/1.73m*2    Calcium 8.6 8.6 - 10.3 mg/dL   CBC and Auto Differential   Result Value Ref Range    WBC 16.6 (H) 4.4 - 11.3 x10*3/uL    nRBC 0.2 (H) 0.0 - 0.0 /100 WBCs    RBC 3.85 (L) 4.00 - 5.20 " x10*6/uL    Hemoglobin 9.3 (L) 12.0 - 16.0 g/dL    Hematocrit 33.0 (L) 36.0 - 46.0 %    MCV 86 80 - 100 fL    MCH 24.2 (L) 26.0 - 34.0 pg    MCHC 28.2 (L) 32.0 - 36.0 g/dL    RDW 20.6 (H) 11.5 - 14.5 %    Platelets 510 (H) 150 - 450 x10*3/uL    Neutrophils % 83.9 40.0 - 80.0 %    Immature Granulocytes %, Automated 3.1 (H) 0.0 - 0.9 %    Lymphocytes % 6.4 13.0 - 44.0 %    Monocytes % 6.3 2.0 - 10.0 %    Eosinophils % 0.1 0.0 - 6.0 %    Basophils % 0.2 0.0 - 2.0 %    Neutrophils Absolute 13.94 (H) 1.20 - 7.70 x10*3/uL    Immature Granulocytes Absolute, Automated 0.51 0.00 - 0.70 x10*3/uL    Lymphocytes Absolute 1.07 (L) 1.20 - 4.80 x10*3/uL    Monocytes Absolute 1.05 (H) 0.10 - 1.00 x10*3/uL    Eosinophils Absolute 0.01 0.00 - 0.70 x10*3/uL    Basophils Absolute 0.03 0.00 - 0.10 x10*3/uL   Morphology   Result Value Ref Range    RBC Morphology See Below          Imaging Results  Ct abd pelvis:  IMPRESSION:  1. Localizing free air in the anterior mid abdomen compatible with  perforated viscus. advise surgical consult.  2. Site of perforation potentially emanates from a small bowel loop in  the mid abdomen.  3. No associated free fluid.  4. Marked gastric distention with air and fluid.  5. Remainder as above.  6. Urgent finding and recommendation given to and acknowledged by  Medications:  aspirin, 81 mg, oral, Daily  budesonide, 0.25 mg, nebulization, Daily   And  formoterol, 20 mcg, nebulization, BID  busPIRone, 5 mg, oral, BID  calcium carbonate, 1,500 mg, oral, Daily  dilTIAZem ER, 240 mg, oral, Daily  enoxaparin, 40 mg, subcutaneous, q24h  fluconazole, 200 mg, oral, Daily  folic acid, 1 mg, oral, Daily  ipratropium-albuteroL, 3 mL, nebulization, TID  lisinopril, 10 mg, oral, Daily  montelukast, 10 mg, oral, Daily  multivitamin with minerals, 1 tablet, oral, Daily  nortriptyline, 50 mg, oral, Nightly  oxybutynin, 5 mg, oral, Daily  oxygen, , inhalation, Continuous - Inhalation  pantoprazole, 40 mg, intravenous, Daily  before breakfast  piperacillin-tazobactam, 3.375 g, intravenous, q6h  [START ON 6/26/2024] predniSONE, 20 mg, oral, Daily   Followed by  [START ON 6/29/2024] predniSONE, 10 mg, oral, Daily  thiamine, 100 mg, oral, Daily  thiamine, 100 mg, oral, Daily  thiamine, 100 mg, intravenous, Daily  varenicline, 1 mg, oral, BID       PRN medications: benzonatate, guaiFENesin, HYDROmorphone, HYDROmorphone, ipratropium-albuteroL, morphine, morphine, nitroglycerin, ondansetron     Assessment/Plan   1. Perforated viscus status post small bowel resection and washout procedure performed on June 21st  -NG-tube in place  -post op ileus     2.  Leukocytosis suspect secondary to 1 with peritonitis  -Continue IV Zosyn, white count is trending down currently at 16k  -abd culture- showing candida will discuss with ID added fluconazole for now stopped home dose azithromycin due to interaction      3.  COPD  -on steriod taper  -resume azithromycin when off fluconazole      4.  Depression and anxiety  -continue home medications     5.  Hyponatremia  -resolving    6. Nutrition  -may need tpn    DVT Prophylaxis:  CELESTE Landon MD  [facility]

## 2024-06-26 ENCOUNTER — APPOINTMENT (OUTPATIENT)
Dept: RADIOLOGY | Facility: HOSPITAL | Age: 64
End: 2024-06-26
Payer: COMMERCIAL

## 2024-06-26 LAB
ACID FAST STN SPEC: NORMAL
ANION GAP SERPL CALC-SCNC: 13 MMOL/L (ref 10–20)
BASOPHILS # BLD AUTO: 0.02 X10*3/UL (ref 0–0.1)
BASOPHILS NFR BLD AUTO: 0.1 %
BUN SERPL-MCNC: 5 MG/DL (ref 6–23)
CALCIUM SERPL-MCNC: 8.9 MG/DL (ref 8.6–10.3)
CHLORIDE SERPL-SCNC: 96 MMOL/L (ref 98–107)
CO2 SERPL-SCNC: 31 MMOL/L (ref 21–32)
CREAT SERPL-MCNC: 0.9 MG/DL (ref 0.5–1.05)
EGFRCR SERPLBLD CKD-EPI 2021: 72 ML/MIN/1.73M*2
EOSINOPHIL # BLD AUTO: 0.01 X10*3/UL (ref 0–0.7)
EOSINOPHIL NFR BLD AUTO: 0.1 %
ERYTHROCYTE [DISTWIDTH] IN BLOOD BY AUTOMATED COUNT: 20.1 % (ref 11.5–14.5)
FUNGUS SPEC CULT: ABNORMAL
FUNGUS SPEC FUNGUS STN: ABNORMAL
GLUCOSE SERPL-MCNC: 100 MG/DL (ref 74–99)
HCT VFR BLD AUTO: 28.3 % (ref 36–46)
HGB BLD-MCNC: 8.3 G/DL (ref 12–16)
IMM GRANULOCYTES # BLD AUTO: 0.24 X10*3/UL (ref 0–0.7)
IMM GRANULOCYTES NFR BLD AUTO: 1.3 % (ref 0–0.9)
LABORATORY COMMENT REPORT: NORMAL
LYMPHOCYTES # BLD AUTO: 1.26 X10*3/UL (ref 1.2–4.8)
LYMPHOCYTES NFR BLD AUTO: 7 %
MCH RBC QN AUTO: 23.7 PG (ref 26–34)
MCHC RBC AUTO-ENTMCNC: 29.3 G/DL (ref 32–36)
MCV RBC AUTO: 81 FL (ref 80–100)
MONOCYTES # BLD AUTO: 1.51 X10*3/UL (ref 0.1–1)
MONOCYTES NFR BLD AUTO: 8.4 %
MYCOBACTERIUM SPEC CULT: NORMAL
NEUTROPHILS # BLD AUTO: 14.89 X10*3/UL (ref 1.2–7.7)
NEUTROPHILS NFR BLD AUTO: 83.1 %
NRBC BLD-RTO: 0 /100 WBCS (ref 0–0)
PATH REPORT.FINAL DX SPEC: NORMAL
PATH REPORT.GROSS SPEC: NORMAL
PATH REPORT.RELEVANT HX SPEC: NORMAL
PATH REPORT.TOTAL CANCER: NORMAL
PLATELET # BLD AUTO: 679 X10*3/UL (ref 150–450)
POLYCHROMASIA BLD QL SMEAR: NORMAL
POTASSIUM SERPL-SCNC: 3.6 MMOL/L (ref 3.5–5.3)
PROT SERPL-MCNC: 5.9 G/DL (ref 6.4–8.2)
RBC # BLD AUTO: 3.5 X10*6/UL (ref 4–5.2)
RBC MORPH BLD: NORMAL
RESIDENT REVIEW: NORMAL
SODIUM SERPL-SCNC: 136 MMOL/L (ref 136–145)
TARGETS BLD QL SMEAR: NORMAL
WBC # BLD AUTO: 17.9 X10*3/UL (ref 4.4–11.3)

## 2024-06-26 PROCEDURE — 2500000004 HC RX 250 GENERAL PHARMACY W/ HCPCS (ALT 636 FOR OP/ED): Performed by: HOSPITALIST

## 2024-06-26 PROCEDURE — 85025 COMPLETE CBC W/AUTO DIFF WBC: CPT | Performed by: INTERNAL MEDICINE

## 2024-06-26 PROCEDURE — 74176 CT ABD & PELVIS W/O CONTRAST: CPT | Performed by: RADIOLOGY

## 2024-06-26 PROCEDURE — 74176 CT ABD & PELVIS W/O CONTRAST: CPT

## 2024-06-26 PROCEDURE — 2500000001 HC RX 250 WO HCPCS SELF ADMINISTERED DRUGS (ALT 637 FOR MEDICARE OP): Performed by: INTERNAL MEDICINE

## 2024-06-26 PROCEDURE — C9113 INJ PANTOPRAZOLE SODIUM, VIA: HCPCS | Performed by: HOSPITALIST

## 2024-06-26 PROCEDURE — 2500000004 HC RX 250 GENERAL PHARMACY W/ HCPCS (ALT 636 FOR OP/ED): Performed by: PHARMACIST

## 2024-06-26 PROCEDURE — 2500000005 HC RX 250 GENERAL PHARMACY W/O HCPCS: Performed by: INTERNAL MEDICINE

## 2024-06-26 PROCEDURE — 99233 SBSQ HOSP IP/OBS HIGH 50: CPT | Performed by: INTERNAL MEDICINE

## 2024-06-26 PROCEDURE — 80048 BASIC METABOLIC PNL TOTAL CA: CPT | Performed by: INTERNAL MEDICINE

## 2024-06-26 PROCEDURE — 94640 AIRWAY INHALATION TREATMENT: CPT

## 2024-06-26 PROCEDURE — 2500000002 HC RX 250 W HCPCS SELF ADMINISTERED DRUGS (ALT 637 FOR MEDICARE OP, ALT 636 FOR OP/ED): Performed by: INTERNAL MEDICINE

## 2024-06-26 PROCEDURE — 2500000004 HC RX 250 GENERAL PHARMACY W/ HCPCS (ALT 636 FOR OP/ED): Performed by: NURSE PRACTITIONER

## 2024-06-26 PROCEDURE — 2500000001 HC RX 250 WO HCPCS SELF ADMINISTERED DRUGS (ALT 637 FOR MEDICARE OP): Performed by: HOSPITALIST

## 2024-06-26 PROCEDURE — 2500000004 HC RX 250 GENERAL PHARMACY W/ HCPCS (ALT 636 FOR OP/ED)

## 2024-06-26 PROCEDURE — 94668 MNPJ CHEST WALL SBSQ: CPT

## 2024-06-26 PROCEDURE — 36415 COLL VENOUS BLD VENIPUNCTURE: CPT | Performed by: INTERNAL MEDICINE

## 2024-06-26 PROCEDURE — 1200000002 HC GENERAL ROOM WITH TELEMETRY DAILY

## 2024-06-26 PROCEDURE — 2500000004 HC RX 250 GENERAL PHARMACY W/ HCPCS (ALT 636 FOR OP/ED): Performed by: INTERNAL MEDICINE

## 2024-06-26 ASSESSMENT — COGNITIVE AND FUNCTIONAL STATUS - GENERAL
MOVING TO AND FROM BED TO CHAIR: A LITTLE
STANDING UP FROM CHAIR USING ARMS: A LITTLE
HELP NEEDED FOR BATHING: A LITTLE
MOVING FROM LYING ON BACK TO SITTING ON SIDE OF FLAT BED WITH BEDRAILS: A LITTLE
TOILETING: A LITTLE
WALKING IN HOSPITAL ROOM: A LITTLE
MOBILITY SCORE: 18
DRESSING REGULAR UPPER BODY CLOTHING: A LITTLE
TURNING FROM BACK TO SIDE WHILE IN FLAT BAD: A LITTLE
DRESSING REGULAR LOWER BODY CLOTHING: A LITTLE
CLIMB 3 TO 5 STEPS WITH RAILING: A LITTLE
DAILY ACTIVITIY SCORE: 20

## 2024-06-26 ASSESSMENT — PAIN SCALES - GENERAL
PAINLEVEL_OUTOF10: 9
PAINLEVEL_OUTOF10: 5 - MODERATE PAIN
PAINLEVEL_OUTOF10: 5 - MODERATE PAIN
PAINLEVEL_OUTOF10: 9
PAINLEVEL_OUTOF10: 9
PAINLEVEL_OUTOF10: 10 - WORST POSSIBLE PAIN
PAINLEVEL_OUTOF10: 3

## 2024-06-26 ASSESSMENT — PAIN SCALES - PAIN ASSESSMENT IN ADVANCED DEMENTIA (PAINAD)
TOTALSCORE: MEDICATION (SEE MAR)

## 2024-06-26 ASSESSMENT — PAIN - FUNCTIONAL ASSESSMENT
PAIN_FUNCTIONAL_ASSESSMENT: 0-10

## 2024-06-26 ASSESSMENT — PAIN DESCRIPTION - ORIENTATION: ORIENTATION: RIGHT

## 2024-06-26 ASSESSMENT — PAIN DESCRIPTION - LOCATION
LOCATION: ABDOMEN
LOCATION: ABDOMEN

## 2024-06-26 NOTE — CARE PLAN
The clinical goals for the shift include remain hemodynamically stable    Problem: Pain  Goal: Takes deep breaths with improved pain control throughout the shift  Outcome: Progressing  Goal: Turns in bed with improved pain control throughout the shift  Outcome: Progressing  Goal: Walks with improved pain control throughout the shift  Outcome: Progressing  Goal: Performs ADL's with improved pain control throughout shift  Outcome: Progressing     Problem: Fall/Injury  Goal: Not fall by end of shift  Outcome: Progressing  Goal: Be free from injury by end of the shift  Outcome: Progressing  Goal: Use assistive devices by end of the shift  Outcome: Progressing

## 2024-06-26 NOTE — PROGRESS NOTES
"Fernando Cuevas is a 63 y.o. female on day 5 of admission presenting with Perforated viscus.    Assessment/Plan   Patient now postop day 5.  White count still elevated.  Very distended abdomen.  Can reassess abdomen with CT imaging to look for abscess    Subjective   Still feeling bloated with diffuse abdominal discomfort.       Objective     Physical Exam  NAD  A&Ox3  Non icteric  CTA  RR  Abdomen quite distended with tympany.  Midline wound looks okay.  Diffuse tenderness  Extremities warm, well perfused     Last Recorded Vitals  Blood pressure 122/74, pulse 91, temperature 36.6 °C (97.9 °F), temperature source Temporal, resp. rate 18, height 1.575 m (5' 2.01\"), weight 64 kg (141 lb 1.5 oz), SpO2 100%.  Intake/Output last 3 Shifts:  I/O last 3 completed shifts:  In: 1980 (30.9 mL/kg) [P.O.:280; I.V.:1600 (25 mL/kg); IV Piggyback:100]  Out: 1500 (23.4 mL/kg) [Urine:1000 (0.4 mL/kg/hr); Emesis/NG output:500]  Weight: 64 kg     Relevant Results    Scheduled medications  aspirin, 81 mg, oral, Daily  budesonide, 0.25 mg, nebulization, Daily   And  formoterol, 20 mcg, nebulization, BID  busPIRone, 5 mg, oral, BID  calcium carbonate, 1,500 mg, oral, Daily  dilTIAZem ER, 240 mg, oral, Daily  enoxaparin, 40 mg, subcutaneous, q24h  fluconazole, 200 mg, oral, Daily  folic acid, 1 mg, oral, Daily  ipratropium-albuteroL, 3 mL, nebulization, TID  lisinopril, 10 mg, oral, Daily  montelukast, 10 mg, oral, Daily  multivitamin with minerals, 1 tablet, oral, Daily  nortriptyline, 50 mg, oral, Nightly  oxybutynin, 5 mg, oral, Daily  oxygen, , inhalation, Continuous - Inhalation  pantoprazole, 40 mg, intravenous, Daily before breakfast  piperacillin-tazobactam, 3.375 g, intravenous, q6h  predniSONE, 20 mg, oral, Daily   Followed by  [START ON 6/29/2024] predniSONE, 10 mg, oral, Daily  thiamine, 100 mg, oral, Daily  thiamine, 100 mg, oral, Daily  thiamine, 100 mg, intravenous, Daily  varenicline, 1 mg, oral, BID      Continuous " medications  dextrose 5 % and lactated Ringer's, 50 mL/hr, Last Rate: 50 mL/hr (06/25/24 1832)      PRN medications  PRN medications: acetaminophen **OR** acetaminophen **OR** acetaminophen, benzonatate, guaiFENesin, HYDROmorphone, HYDROmorphone, hydrOXYzine HCL, ipratropium-albuteroL, morphine, morphine, nitroglycerin, ondansetron    Results for orders placed or performed during the hospital encounter of 06/20/24 (from the past 24 hour(s))   CBC and Auto Differential   Result Value Ref Range    WBC 17.9 (H) 4.4 - 11.3 x10*3/uL    nRBC 0.0 0.0 - 0.0 /100 WBCs    RBC 3.50 (L) 4.00 - 5.20 x10*6/uL    Hemoglobin 8.3 (L) 12.0 - 16.0 g/dL    Hematocrit 28.3 (L) 36.0 - 46.0 %    MCV 81 80 - 100 fL    MCH 23.7 (L) 26.0 - 34.0 pg    MCHC 29.3 (L) 32.0 - 36.0 g/dL    RDW 20.1 (H) 11.5 - 14.5 %    Platelets 679 (H) 150 - 450 x10*3/uL    Neutrophils % 83.1 40.0 - 80.0 %    Immature Granulocytes %, Automated 1.3 (H) 0.0 - 0.9 %    Lymphocytes % 7.0 13.0 - 44.0 %    Monocytes % 8.4 2.0 - 10.0 %    Eosinophils % 0.1 0.0 - 6.0 %    Basophils % 0.1 0.0 - 2.0 %    Neutrophils Absolute 14.89 (H) 1.20 - 7.70 x10*3/uL    Immature Granulocytes Absolute, Automated 0.24 0.00 - 0.70 x10*3/uL    Lymphocytes Absolute 1.26 1.20 - 4.80 x10*3/uL    Monocytes Absolute 1.51 (H) 0.10 - 1.00 x10*3/uL    Eosinophils Absolute 0.01 0.00 - 0.70 x10*3/uL    Basophils Absolute 0.02 0.00 - 0.10 x10*3/uL   Basic metabolic panel   Result Value Ref Range    Glucose 100 (H) 74 - 99 mg/dL    Sodium 136 136 - 145 mmol/L    Potassium 3.6 3.5 - 5.3 mmol/L    Chloride 96 (L) 98 - 107 mmol/L    Bicarbonate 31 21 - 32 mmol/L    Anion Gap 13 10 - 20 mmol/L    Urea Nitrogen 5 (L) 6 - 23 mg/dL    Creatinine 0.90 0.50 - 1.05 mg/dL    eGFR 72 >60 mL/min/1.73m*2    Calcium 8.9 8.6 - 10.3 mg/dL   Morphology   Result Value Ref Range    RBC Morphology See Below     Polychromasia Mild     Target Cells Few            I spent 25 minutes in the professional and overall care of  this patient.      Reid Bingham MD

## 2024-06-26 NOTE — PROGRESS NOTES
06/26/24 1233   Discharge Planning   Who is requesting discharge planning? Provider   Home or Post Acute Services Post acute facilities (Rehab/SNF/etc)   Type of Post Acute Facility Services Skilled nursing   Patient expects to be discharged to: [facility]   Does the patient need discharge transport arranged? Yes   RoundTrip coordination needed? Yes   Has discharge transport been arranged? No     6/26/24 1233  Patient remains NPO with NG to LIS.  Waiting on RBF.  Plan to discharge to [facility] when medically ready.  Will need auth.  Brittany Manjarrez RN [facility]

## 2024-06-26 NOTE — PROGRESS NOTES
"Fernando Cuevas is a 63 y.o. female on day 5 of admission presenting with Perforated viscus.    Subjective   Not feeling well today, very bloated with some SOB. NGT clamped currently. Denies fever, chills, CP and SOB.        Objective     Physical Exam  Constitutional:       Appearance: She is ill-appearing.   Cardiovascular:      Rate and Rhythm: Normal rate.   Pulmonary:      Comments: On 2L O2, audible rhonchi.   Abdominal:      Comments: Very distended, NGT clamped, soft, appropriately tender.    Genitourinary:     Comments: Voiding without difficulty   Musculoskeletal:         General: Normal range of motion.   Skin:     General: Skin is warm and dry.   Neurological:      Mental Status: She is oriented to person, place, and time.   Psychiatric:         Mood and Affect: Mood normal.         Behavior: Behavior normal.         Last Recorded Vitals  Blood pressure 137/69, pulse 105, temperature 36.3 °C (97.4 °F), temperature source Oral, resp. rate 18, height 1.575 m (5' 2.01\"), weight 64 kg (141 lb 1.5 oz), SpO2 94%.  Intake/Output last 3 Shifts:  I/O last 3 completed shifts:  In: 1980 (30.9 mL/kg) [P.O.:280; I.V.:1600 (25 mL/kg); IV Piggyback:100]  Out: 1500 (23.4 mL/kg) [Urine:1000 (0.4 mL/kg/hr); Emesis/NG output:500]  Weight: 64 kg     Medications:   Scheduled medications  aspirin, 81 mg, oral, Daily  budesonide, 0.25 mg, nebulization, Daily   And  formoterol, 20 mcg, nebulization, BID  busPIRone, 5 mg, oral, BID  calcium carbonate, 1,500 mg, oral, Daily  dilTIAZem ER, 240 mg, oral, Daily  enoxaparin, 40 mg, subcutaneous, q24h  fluconazole, 200 mg, oral, Daily  folic acid, 1 mg, oral, Daily  ipratropium-albuteroL, 3 mL, nebulization, TID  lisinopril, 10 mg, oral, Daily  montelukast, 10 mg, oral, Daily  multivitamin with minerals, 1 tablet, oral, Daily  nortriptyline, 50 mg, oral, Nightly  oxybutynin, 5 mg, oral, Daily  oxygen, , inhalation, Continuous - Inhalation  pantoprazole, 40 mg, intravenous, Daily before " breakfast  piperacillin-tazobactam, 3.375 g, intravenous, q6h  predniSONE, 20 mg, oral, Daily   Followed by  [START ON 6/29/2024] predniSONE, 10 mg, oral, Daily  thiamine, 100 mg, oral, Daily  thiamine, 100 mg, oral, Daily  varenicline, 1 mg, oral, BID      Continuous medications  dextrose 5 % and lactated Ringer's, 50 mL/hr, Last Rate: 50 mL/hr (06/25/24 1832)      PRN medications  PRN medications: acetaminophen **OR** acetaminophen **OR** acetaminophen, benzonatate, guaiFENesin, HYDROmorphone, HYDROmorphone, hydrOXYzine HCL, ipratropium-albuteroL, morphine, morphine, nitroglycerin, ondansetron    Relevant Results  Results for orders placed or performed during the hospital encounter of 06/20/24 (from the past 24 hour(s))   CBC and Auto Differential   Result Value Ref Range    WBC 17.9 (H) 4.4 - 11.3 x10*3/uL    nRBC 0.0 0.0 - 0.0 /100 WBCs    RBC 3.50 (L) 4.00 - 5.20 x10*6/uL    Hemoglobin 8.3 (L) 12.0 - 16.0 g/dL    Hematocrit 28.3 (L) 36.0 - 46.0 %    MCV 81 80 - 100 fL    MCH 23.7 (L) 26.0 - 34.0 pg    MCHC 29.3 (L) 32.0 - 36.0 g/dL    RDW 20.1 (H) 11.5 - 14.5 %    Platelets 679 (H) 150 - 450 x10*3/uL    Neutrophils % 83.1 40.0 - 80.0 %    Immature Granulocytes %, Automated 1.3 (H) 0.0 - 0.9 %    Lymphocytes % 7.0 13.0 - 44.0 %    Monocytes % 8.4 2.0 - 10.0 %    Eosinophils % 0.1 0.0 - 6.0 %    Basophils % 0.1 0.0 - 2.0 %    Neutrophils Absolute 14.89 (H) 1.20 - 7.70 x10*3/uL    Immature Granulocytes Absolute, Automated 0.24 0.00 - 0.70 x10*3/uL    Lymphocytes Absolute 1.26 1.20 - 4.80 x10*3/uL    Monocytes Absolute 1.51 (H) 0.10 - 1.00 x10*3/uL    Eosinophils Absolute 0.01 0.00 - 0.70 x10*3/uL    Basophils Absolute 0.02 0.00 - 0.10 x10*3/uL   Basic metabolic panel   Result Value Ref Range    Glucose 100 (H) 74 - 99 mg/dL    Sodium 136 136 - 145 mmol/L    Potassium 3.6 3.5 - 5.3 mmol/L    Chloride 96 (L) 98 - 107 mmol/L    Bicarbonate 31 21 - 32 mmol/L    Anion Gap 13 10 - 20 mmol/L    Urea Nitrogen 5 (L) 6 - 23  mg/dL    Creatinine 0.90 0.50 - 1.05 mg/dL    eGFR 72 >60 mL/min/1.73m*2    Calcium 8.9 8.6 - 10.3 mg/dL   Morphology   Result Value Ref Range    RBC Morphology See Below     Polychromasia Mild     Target Cells Few      CT abdomen pelvis wo IV contrast    Result Date: 6/26/2024  Interpreted By:  Karina Ventura, STUDY: CT ABDOMEN PELVIS WO IV CONTRAST;  6/26/2024 8:51 am   INDICATION: Signs/Symptoms:POD 5 small bowel resection. Increased pain.   COMPARISON: 06/20/2024   ACCESSION NUMBER(S): [id number]   ORDERING CLINICIAN: ANITRA APARICIO   TECHNIQUE: CT of the abdomen and pelvis was performed. Sagittal and coronal reconstructions were generated.  No intravenous contrast given for the exam.   FINDINGS: Solid organ and vessel evaluation limited without IV contrast.   ABDOMINAL ORGANS:   LIVER: No focal lesion within limits of unenhanced exam.   GALL BLADDER AND BILIARY TREE: Subtle density layer near the gallbladder neck again seen. No gallbladder wall thickening or biliary dilatation within limits of unenhanced exam   SPLEEN: 1.3 cm subtle hypodense lesion in the inferior pole image 36/155.   PANCREAS: Probable 1.4 cm hypodense lesion in the tail image 44/155. Few punctate calcifications in the head.   ADRENALS: Uneven hypodense thickening of both adrenal glands similar to the prior exam.   KIDNEYS AND URETERS: Atrophic right kidney. Mild relatively symmetric perinephric fat stranding. No focal renal mass within limits of unenhanced exam. No hydronephrosis.   BOWEL: NG tube extends into the body of distended fluid and air-filled stomach. New right lower quadrant small bowel suture line. Multiple dilated air and fluid-filled small bowel loops with scattered air-fluid levels. Moderate colonic fecal residue. Distal colon diverticulosis.   PERITONEUM, RETROPERITONEUM, NODES: Minimal free fluid in the pelvis. Mild stranding in the right lower quadrant inferior to suture line. No free intraperitoneal air. No significant  retroperitoneal adenopathy within limits of unenhanced exam.   VESSELS:  Scattered atherosclerotic calcifications. Lack of IV contrast precludes vascular luminal assessment. No abdominal aortic aneurysm.   PELVIS: Urinary bladder is moderately distended and grossly normal in contour. Limited delineation of the uterus separate from adjacent bowel loops.   ABDOMINAL WALL: New midline skin clips. Few dots of air in the right lower quadrant and left mid abdominal wall. Mild fluid and stranding in both flanks, right-greater-than-left.   BONES: No focal concerning lytic or blastic osseous lesion.   LOWER CHEST: Moderate emphysematous changes. New coarse bandlike opacities in both lung bases with small pleural effusions.       New right lower quadrant suture line reportedly status post small bowel resection with multiple dilated air and fluid-filled small bowel loops that could reflect mild postop ileus or partial obstruction. Follow-up as clinically warranted.   Small hypodense lesions in the pancreatic tail and spleen, can not be further characterized on unenhanced exam. Attention at follow-up or further evaluation with MRI recommended.   Bandlike infiltrates or atelectasis in both lung bases with small pleural effusions.   Numerous additional findings as described above.   MACRO: None.   Signed by: Karina Ventura 6/26/2024 9:08 AM Dictation workstation:   [id number]    XR abdomen 1 view    Result Date: 6/24/2024  Interpreted By:  Gabe Gage, STUDY: XR ABDOMEN 1 VIEW;  6/24/2024 1:26 pm   INDICATION: Signs/Symptoms:Abdominal distension.   COMPARISON: Prior exam from 06/22/2024.   ACCESSION NUMBER(S): [id number]   ORDERING CLINICIAN: MINERVA AVILEZ   TECHNIQUE:     2 portable supine views of the abdomen and pelvis were obtained.   FINDINGS: Stable vertically oriented line of skin staples to the right of midline in the lower abdomen and upper pelvis. There is moderate stool in the right colon, transverse colon, and left  colon. There is no stool or gas in the lower sigmoid or rectum. There are multiple loops of gas-filled small bowel centrally in the abdomen and pelvis, similar to the previous exam. There is a stable NG tube in place. There is mild gas in the stomach. Cannot assess for pneumoperitoneum or air-fluid levels without a dependent view. No gross organomegaly. No destructive bone lesion. The lung bases were excluded.       Stable vertically oriented line of skin staples to the right of midline in the lower abdomen and upper pelvis.   Stable NG tube in place.   Findings that could represent either ongoing persistent partial distal small bowel obstruction or postoperative ileus. No significant change from 06/22/2024.   MACRO: None   Signed by: Gabe Gage 6/24/2024 2:03 PM Dictation workstation:   [id number]    XR abdomen 1 view    Result Date: 6/22/2024  Interpreted By:  Paul Agudelo, STUDY: XR ABDOMEN 1 VIEW;  6/22/2024 12:06 pm   INDICATION: Signs/Symptoms:NGT placement verification.   COMPARISON: None.   ACCESSION NUMBER(S): [id number]   ORDERING CLINICIAN: MARVA NAVA   FINDINGS: Bowel distention which may be due to a postoperative ileus, although obstruction is possible. The study is of limited evaluation of pneumoperitoneum on supine imaging, however no gross evidence of free air is noted.   The soft tissue shadows are unremarkable.   Visualized lungs bases are clear.   Osseous structures demonstrate no acute bony changes.   Other findings: An NG catheter has been placed with the tip extending to the gastric fundus.       1.  As above.   Signed by: Paul Agudelo 6/22/2024 4:25 PM Dictation workstation:   [id number]    ECG 12 lead    Result Date: 6/21/2024  Sinus tachycardia Early repolarization Otherwise normal ECG When compared with ECG of 11-JUN-2024 22:42, No significant change was found See ED provider note for full interpretation and clinical correlation Confirmed by Suzette De Luna ([id number]) on 6/21/2024  3:00:07 PM    XR abdomen 1 view    Result Date: 6/21/2024  Interpreted By:  Keyshawn Contreras, STUDY: XR ABDOMEN 1 VIEW;  6/21/2024 8:16 am   INDICATION: Signs/Symptoms:ng placement.   COMPARISON: None.   ACCESSION NUMBER(S): [id number]   ORDERING CLINICIAN: MARCOS REECE   FINDINGS: Slightly gas distended stomach. Mild-to-moderate colonic stool burden. Limited evaluation of pneumoperitoneum on supine imaging, however no gross evidence of free air is noted. Nasogastric tube with distal end terminating in the proximal stomach and proximal side hole in the gastric cardia.   Visualized lungs are clear.   Osseous structures demonstrate no acute bony changes.       1. Nasogastric tube with distal end terminating in the proximal stomach and proximal side hole in the gastric cardia. Advise advancement 3-5 cm. 2. Slightly gas distended stomach. Moderate colonic stool burden.   MACRO: None   Signed by: Keyshawn Contreras 6/21/2024 8:47 AM Dictation workstation:   [id number]    CT abdomen pelvis wo IV contrast    Addendum Date: 6/21/2024    Images reviewed with Garth Katz at 12:52 AM on 6/21/2024. Signed by Armando Mcgarry DO    Result Date: 6/21/2024  STUDY: CT Abdomen and Pelvis without IV Contrast; 6/20/2024 10:06 PM. INDICATION: Abdominal distension and severe pain.  Evaluate for small bowel obstruction versus perforation. COMPARISON: CT AP 5/11/2024. ACCESSION NUMBER(S): [id number] ORDERING CLINICIAN: DONOVAN ALBA TECHNIQUE: CT of the abdomen and pelvis was performed.  Contiguous axial images were obtained at 3 mm slice thickness through the abdomen and pelvis. Coronal and sagittal reconstructions at 3 mm slice thickness were performed. No intravenous contrast was administered.  Automated mA/kV exposure control was utilized and patient examination was performed in strict accordance with principles of ALARA. FINDINGS: Please note that the evaluation of vessels, lymph nodes and organs is limited without intravenous  contrast.  ABDOMEN: There is a localizing free air in the anterior mid abdomen, compatible with perforated viscus. This potentially arises from adjacent an adjacent small bowel loop in the mid abdomen, where there is minimal localizing extraluminal air (series 604, slice 78/159). The stomach is markedly distended with air and fluid. There is no localizing gastric thickening. There is minimal distention of proximal small bowel without appreciable thickening. Appendix is normal. Moderate stool is seen throughout the colon. There is minimal diverticulosis in the sigmoid colon. There is no colitis or diverticulitis. No significant abnormalities are detectable in the liver, spleen, pancreas, adrenal glands, kidneys, or biliary system. No calculi are seen. There is no hydronephrosis or biliary duct dilatation. There is no localizing lymphadenopathy or ascites. Abdominal aorta is normal caliber. PELVIS: Bladder is moderately distended. There is no free fluid. Inguinal rings are minimally patulous containing fat. BONES: Bony structures are intact. Lumbar spine is unremarkable. LOWER CHEST: There is lower lung emphysema and peribronchial thickening. There are no pleural effusions. There is a small rent at the medial left hemidiaphragm containing herniated fat.    1. Localizing free air in the anterior mid abdomen compatible with perforated viscus. advise surgical consult. 2. Site of perforation potentially emanates from a small bowel loop in the mid abdomen. 3. No associated free fluid. 4. Marked gastric distention with air and fluid. 5. Remainder as above. 6. Urgent finding and recommendation given to and acknowledged by Sheldon RIVAS at 10:27 PM [place] on 6/20/2024. Signed by Yash Hood MD      Assessment/Plan   Principal Problem:    Perforated viscus  Active Problems:    Thrombocytosis    Fernando Cuevas is a 64 yo female who is admitted with abdominal pain. She was found to have perforated viscus and was taken to the OR  "emergently by Dr Liang for ex-lap and partial small bowel resection. Intra-operative findings showed \"segment of perforated small bowel.\" She was also recently hospitalized for pneumonia and is currently being treated for COPD exacerbation. On exam, her abdomen is very distended, soft, appropriately tender, post-op dressing is c/d/I. NGT is currently clamped. Not passing gas, -BM yet    Plan:   GI:   POD #5 s/p ex-lap, partial SBR  - NPO with sips of clears for comfort  - CT from today showing multiple distended loops of bowel 2/2 ileus  - NG to LIWS   - DVT ppx: SCDs and lovenox  - oob and walking as much as possible   - H/H stable, no s/s of bleeding   - IS 10x/hr    ID:   Leukocytosis- WBC 17.9  - continue zosyn for bowel perforation   - continue to trend CBC    Dispo: awaiting RBF, continue NG. Discussed with Dr Bingham, Surgery will continue to follow.     I spent 35 minutes in the professional and overall care of this patient.      Earline Stahl, APRN-CNP      "

## 2024-06-26 NOTE — PROGRESS NOTES
"Fernando Cuevas is a 63 y.o. female on day 5 of admission presenting with Perforated viscus.    Subjective   Afebrile states is having abdominal pain today no nausea, repeat CT abdomen pelvis does not show evidence of abscess to show possible ileus versus partial obstruction.  White count slight elevation to 17,000.       Objective     Physical Exam  Vitals reviewed.   Constitutional:       Appearance: Normal appearance.   HENT:      Head: Normocephalic.      Nose: Nose normal.      Mouth/Throat:      Mouth: Mucous membranes are dry.      Pharynx: Oropharynx is clear.   Cardiovascular:      Rate and Rhythm: Normal rate and regular rhythm.   Pulmonary:      Effort: Pulmonary effort is normal.   Abdominal:      Comments: distended no bowel sounds   Skin:     Capillary Refill: Capillary refill takes less than 2 seconds.   Neurological:      General: No focal deficit present.      Mental Status: She is alert.         Last Recorded Vitals  Blood pressure 137/69, pulse 105, temperature 36.3 °C (97.4 °F), temperature source Oral, resp. rate 18, height 1.575 m (5' 2.01\"), weight 64 kg (141 lb 1.5 oz), SpO2 94%.  Intake/Output last 3 Shifts:  I/O last 3 completed shifts:  In: 1980 (30.9 mL/kg) [P.O.:280; I.V.:1600 (25 mL/kg); IV Piggyback:100]  Out: 1500 (23.4 mL/kg) [Urine:1000 (0.4 mL/kg/hr); Emesis/NG output:500]  Weight: 64 kg     Relevant Results  Results for orders placed or performed during the hospital encounter of 06/20/24 (from the past 24 hour(s))   CBC and Auto Differential   Result Value Ref Range    WBC 17.9 (H) 4.4 - 11.3 x10*3/uL    nRBC 0.0 0.0 - 0.0 /100 WBCs    RBC 3.50 (L) 4.00 - 5.20 x10*6/uL    Hemoglobin 8.3 (L) 12.0 - 16.0 g/dL    Hematocrit 28.3 (L) 36.0 - 46.0 %    MCV 81 80 - 100 fL    MCH 23.7 (L) 26.0 - 34.0 pg    MCHC 29.3 (L) 32.0 - 36.0 g/dL    RDW 20.1 (H) 11.5 - 14.5 %    Platelets 679 (H) 150 - 450 x10*3/uL    Neutrophils % 83.1 40.0 - 80.0 %    Immature Granulocytes %, Automated 1.3 (H) 0.0 " - 0.9 %    Lymphocytes % 7.0 13.0 - 44.0 %    Monocytes % 8.4 2.0 - 10.0 %    Eosinophils % 0.1 0.0 - 6.0 %    Basophils % 0.1 0.0 - 2.0 %    Neutrophils Absolute 14.89 (H) 1.20 - 7.70 x10*3/uL    Immature Granulocytes Absolute, Automated 0.24 0.00 - 0.70 x10*3/uL    Lymphocytes Absolute 1.26 1.20 - 4.80 x10*3/uL    Monocytes Absolute 1.51 (H) 0.10 - 1.00 x10*3/uL    Eosinophils Absolute 0.01 0.00 - 0.70 x10*3/uL    Basophils Absolute 0.02 0.00 - 0.10 x10*3/uL   Basic metabolic panel   Result Value Ref Range    Glucose 100 (H) 74 - 99 mg/dL    Sodium 136 136 - 145 mmol/L    Potassium 3.6 3.5 - 5.3 mmol/L    Chloride 96 (L) 98 - 107 mmol/L    Bicarbonate 31 21 - 32 mmol/L    Anion Gap 13 10 - 20 mmol/L    Urea Nitrogen 5 (L) 6 - 23 mg/dL    Creatinine 0.90 0.50 - 1.05 mg/dL    eGFR 72 >60 mL/min/1.73m*2    Calcium 8.9 8.6 - 10.3 mg/dL   Morphology   Result Value Ref Range    RBC Morphology See Below     Polychromasia Mild     Target Cells Few          Imaging Results  Ct abd pelvis:  IMPRESSION:  1. Localizing free air in the anterior mid abdomen compatible with  perforated viscus. advise surgical consult.  2. Site of perforation potentially emanates from a small bowel loop in  the mid abdomen.  3. No associated free fluid.  4. Marked gastric distention with air and fluid.  5. Remainder as above.  6. Urgent finding and recommendation given to and acknowledged by  Medications:  aspirin, 81 mg, oral, Daily  budesonide, 0.25 mg, nebulization, Daily   And  formoterol, 20 mcg, nebulization, BID  busPIRone, 5 mg, oral, BID  calcium carbonate, 1,500 mg, oral, Daily  dilTIAZem ER, 240 mg, oral, Daily  enoxaparin, 40 mg, subcutaneous, q24h  fluconazole, 200 mg, oral, Daily  folic acid, 1 mg, oral, Daily  ipratropium-albuteroL, 3 mL, nebulization, TID  lisinopril, 10 mg, oral, Daily  montelukast, 10 mg, oral, Daily  multivitamin with minerals, 1 tablet, oral, Daily  nortriptyline, 50 mg, oral, Nightly  oxybutynin, 5 mg, oral,  Daily  oxygen, , inhalation, Continuous - Inhalation  pantoprazole, 40 mg, intravenous, Daily before breakfast  piperacillin-tazobactam, 3.375 g, intravenous, q6h  predniSONE, 20 mg, oral, Daily   Followed by  [START ON 6/29/2024] predniSONE, 10 mg, oral, Daily  thiamine, 100 mg, oral, Daily  thiamine, 100 mg, oral, Daily  varenicline, 1 mg, oral, BID       PRN medications: acetaminophen **OR** acetaminophen **OR** acetaminophen, benzonatate, guaiFENesin, HYDROmorphone, HYDROmorphone, hydrOXYzine HCL, ipratropium-albuteroL, morphine, morphine, nitroglycerin, ondansetron     Assessment/Plan   1. Perforated viscus status post small bowel resection and washout procedure performed on June 21st  -NG-tube in place  -post op ileus vs partial obstruction on ct     2.  Leukocytosis suspect secondary to 1 with peritonitis  -Continue IV Zosyn and fluconazole, white count 17k  -abd culture- showing candida albicans     3.  COPD  -on steriod taper  -resume azithromycin when off fluconazole      4.  Depression and anxiety  -continue home medications     5.  Hyponatremia  -resolving    6. Nutrition  -may need tpn    DVT Prophylaxis:  CELESTE Landon MD  [facility]

## 2024-06-26 NOTE — PROGRESS NOTES
"  INFECTIOUS DISEASE DAILY PROGRESS NOTE    SUBJECTIVE:    Not feeling good this morning. More abd pain/distension. No BM yet. Afebrile. WBC is slight higher today 17.9.     OBJECTIVE:  VITALS (Last 24 Hours)  /74 (BP Location: Right arm, Patient Position: Lying)   Pulse 91   Temp 36.6 °C (97.9 °F) (Temporal)   Resp 18   Ht 1.575 m (5' 2.01\")   Wt 64 kg (141 lb 1.5 oz)   SpO2 100%   BMI 25.80 kg/m²     PHYSICAL EXAM:  Gen - looks uncomfortable, NG tube present  Abd - very distended and tender throughout, surgical incision looks fine  Skin - no rash    ABX: IV Zosyn and PO Fluconazole    LABS:  Lab Results   Component Value Date    WBC 17.9 (H) 06/26/2024    HGB 8.3 (L) 06/26/2024    HCT 28.3 (L) 06/26/2024    MCV 81 06/26/2024     (H) 06/26/2024     Lab Results   Component Value Date    GLUCOSE 100 (H) 06/26/2024    CALCIUM 8.9 06/26/2024     06/26/2024    K 3.6 06/26/2024    CO2 31 06/26/2024    CL 96 (L) 06/26/2024    BUN 5 (L) 06/26/2024    CREATININE 0.90 06/26/2024         Estimated Creatinine Clearance: 56.3 mL/min (by C-G formula based on SCr of 0.9 mg/dL).    ASSESSMENT/PLAN:     Perforated Small Bowel with Peritonitis - s/p OR 6/21, culture with Candida albicans. Suspect there would also be a mix of gram negative and anaerobes involved in this type of infection.  Post-Op Ileus - ongoing  COPD on home Azithro ppx MWF - holding Azithro while on Fluconazole for risk of QT prolongation     IV Zosyn. Fluconazole 200mg/day. Will follow up on repeat imaging today.     Monitoring for adverse effects of abx such as rash/itching/diarrhea - none.     Will follow. Thanks!    Tim Nelson MD  [facility]  Office #[phone]      "

## 2024-06-26 NOTE — CARE PLAN
The patient's goals for the shift include      The clinical goals for the shift include Pt safety will be maintained throughout shift and pain will be managedd      Problem: Pain  Goal: Turns in bed with improved pain control throughout the shift  Outcome: Progressing     Problem: Fall/Injury  Goal: Be free from injury by end of the shift  Outcome: Progressing

## 2024-06-27 ENCOUNTER — APPOINTMENT (OUTPATIENT)
Dept: VASCULAR MEDICINE | Facility: HOSPITAL | Age: 64
End: 2024-06-27
Payer: COMMERCIAL

## 2024-06-27 ENCOUNTER — APPOINTMENT (OUTPATIENT)
Dept: CARDIOLOGY | Facility: HOSPITAL | Age: 64
End: 2024-06-27
Payer: COMMERCIAL

## 2024-06-27 LAB
ANION GAP SERPL CALC-SCNC: 15 MMOL/L (ref 10–20)
BASOPHILS # BLD AUTO: 0.03 X10*3/UL (ref 0–0.1)
BASOPHILS NFR BLD AUTO: 0.2 %
BUN SERPL-MCNC: 6 MG/DL (ref 6–23)
CALCIUM SERPL-MCNC: 8.4 MG/DL (ref 8.6–10.3)
CHLORIDE SERPL-SCNC: 97 MMOL/L (ref 98–107)
CO2 SERPL-SCNC: 27 MMOL/L (ref 21–32)
CREAT SERPL-MCNC: 0.8 MG/DL (ref 0.5–1.05)
EGFRCR SERPLBLD CKD-EPI 2021: 83 ML/MIN/1.73M*2
EOSINOPHIL # BLD AUTO: 0.01 X10*3/UL (ref 0–0.7)
EOSINOPHIL NFR BLD AUTO: 0.1 %
ERYTHROCYTE [DISTWIDTH] IN BLOOD BY AUTOMATED COUNT: 20.4 % (ref 11.5–14.5)
GLUCOSE BLD MANUAL STRIP-MCNC: 149 MG/DL (ref 74–99)
GLUCOSE SERPL-MCNC: 94 MG/DL (ref 74–99)
HCT VFR BLD AUTO: 27.8 % (ref 36–46)
HGB BLD-MCNC: 8.1 G/DL (ref 12–16)
IMM GRANULOCYTES # BLD AUTO: 0.17 X10*3/UL (ref 0–0.7)
IMM GRANULOCYTES NFR BLD AUTO: 1 % (ref 0–0.9)
LYMPHOCYTES # BLD AUTO: 1.03 X10*3/UL (ref 1.2–4.8)
LYMPHOCYTES NFR BLD AUTO: 6.1 %
MCH RBC QN AUTO: 23.5 PG (ref 26–34)
MCHC RBC AUTO-ENTMCNC: 29.1 G/DL (ref 32–36)
MCV RBC AUTO: 81 FL (ref 80–100)
MONOCYTES # BLD AUTO: 1.05 X10*3/UL (ref 0.1–1)
MONOCYTES NFR BLD AUTO: 6.3 %
NEUTROPHILS # BLD AUTO: 14.46 X10*3/UL (ref 1.2–7.7)
NEUTROPHILS NFR BLD AUTO: 86.3 %
NRBC BLD-RTO: 0.1 /100 WBCS (ref 0–0)
PLATELET # BLD AUTO: 640 X10*3/UL (ref 150–450)
POTASSIUM SERPL-SCNC: 3.7 MMOL/L (ref 3.5–5.3)
RBC # BLD AUTO: 3.44 X10*6/UL (ref 4–5.2)
RBC MORPH BLD: NORMAL
SODIUM SERPL-SCNC: 135 MMOL/L (ref 136–145)
WBC # BLD AUTO: 16.8 X10*3/UL (ref 4.4–11.3)

## 2024-06-27 PROCEDURE — 2500000001 HC RX 250 WO HCPCS SELF ADMINISTERED DRUGS (ALT 637 FOR MEDICARE OP): Performed by: INTERNAL MEDICINE

## 2024-06-27 PROCEDURE — 2500000004 HC RX 250 GENERAL PHARMACY W/ HCPCS (ALT 636 FOR OP/ED): Performed by: PHARMACIST

## 2024-06-27 PROCEDURE — 2500000004 HC RX 250 GENERAL PHARMACY W/ HCPCS (ALT 636 FOR OP/ED): Performed by: INTERNAL MEDICINE

## 2024-06-27 PROCEDURE — 80048 BASIC METABOLIC PNL TOTAL CA: CPT | Performed by: INTERNAL MEDICINE

## 2024-06-27 PROCEDURE — 2500000005 HC RX 250 GENERAL PHARMACY W/O HCPCS: Performed by: INTERNAL MEDICINE

## 2024-06-27 PROCEDURE — 2500000004 HC RX 250 GENERAL PHARMACY W/ HCPCS (ALT 636 FOR OP/ED): Performed by: HOSPITALIST

## 2024-06-27 PROCEDURE — 99233 SBSQ HOSP IP/OBS HIGH 50: CPT | Performed by: INTERNAL MEDICINE

## 2024-06-27 PROCEDURE — 2500000004 HC RX 250 GENERAL PHARMACY W/ HCPCS (ALT 636 FOR OP/ED)

## 2024-06-27 PROCEDURE — 85025 COMPLETE CBC W/AUTO DIFF WBC: CPT | Performed by: INTERNAL MEDICINE

## 2024-06-27 PROCEDURE — 82947 ASSAY GLUCOSE BLOOD QUANT: CPT

## 2024-06-27 PROCEDURE — 2500000001 HC RX 250 WO HCPCS SELF ADMINISTERED DRUGS (ALT 637 FOR MEDICARE OP): Performed by: HOSPITALIST

## 2024-06-27 PROCEDURE — 93005 ELECTROCARDIOGRAM TRACING: CPT

## 2024-06-27 PROCEDURE — C9113 INJ PANTOPRAZOLE SODIUM, VIA: HCPCS | Performed by: HOSPITALIST

## 2024-06-27 PROCEDURE — 36415 COLL VENOUS BLD VENIPUNCTURE: CPT | Performed by: INTERNAL MEDICINE

## 2024-06-27 PROCEDURE — 1200000002 HC GENERAL ROOM WITH TELEMETRY DAILY

## 2024-06-27 PROCEDURE — 2500000005 HC RX 250 GENERAL PHARMACY W/O HCPCS: Performed by: SURGERY

## 2024-06-27 PROCEDURE — 2500000002 HC RX 250 W HCPCS SELF ADMINISTERED DRUGS (ALT 637 FOR MEDICARE OP, ALT 636 FOR OP/ED): Performed by: INTERNAL MEDICINE

## 2024-06-27 PROCEDURE — 93971 EXTREMITY STUDY: CPT | Performed by: SURGERY

## 2024-06-27 PROCEDURE — 2500000004 HC RX 250 GENERAL PHARMACY W/ HCPCS (ALT 636 FOR OP/ED): Performed by: NURSE PRACTITIONER

## 2024-06-27 PROCEDURE — 97530 THERAPEUTIC ACTIVITIES: CPT | Mod: GO,CO

## 2024-06-27 PROCEDURE — 94640 AIRWAY INHALATION TREATMENT: CPT

## 2024-06-27 PROCEDURE — 93971 EXTREMITY STUDY: CPT

## 2024-06-27 PROCEDURE — 94668 MNPJ CHEST WALL SBSQ: CPT

## 2024-06-27 RX ORDER — ENOXAPARIN SODIUM 100 MG/ML
20 INJECTION SUBCUTANEOUS ONCE
Status: COMPLETED | OUTPATIENT
Start: 2024-06-27 | End: 2024-06-27

## 2024-06-27 RX ORDER — ENOXAPARIN SODIUM 100 MG/ML
1 INJECTION SUBCUTANEOUS EVERY 12 HOURS
Status: DISCONTINUED | OUTPATIENT
Start: 2024-06-28 | End: 2024-07-11

## 2024-06-27 RX ORDER — DIPHENHYDRAMINE HYDROCHLORIDE 50 MG/ML
50 INJECTION INTRAMUSCULAR; INTRAVENOUS ONCE
Status: COMPLETED | OUTPATIENT
Start: 2024-06-28 | End: 2024-06-28

## 2024-06-27 ASSESSMENT — PAIN - FUNCTIONAL ASSESSMENT
PAIN_FUNCTIONAL_ASSESSMENT: 0-10

## 2024-06-27 ASSESSMENT — COGNITIVE AND FUNCTIONAL STATUS - GENERAL
DAILY ACTIVITIY SCORE: 19
CLIMB 3 TO 5 STEPS WITH RAILING: A LITTLE
HELP NEEDED FOR BATHING: A LOT
DRESSING REGULAR UPPER BODY CLOTHING: A LITTLE
PERSONAL GROOMING: A LITTLE
MOBILITY SCORE: 18
CLIMB 3 TO 5 STEPS WITH RAILING: A LOT
MOVING FROM LYING ON BACK TO SITTING ON SIDE OF FLAT BED WITH BEDRAILS: A LITTLE
DRESSING REGULAR UPPER BODY CLOTHING: A LITTLE
DRESSING REGULAR UPPER BODY CLOTHING: A LITTLE
TURNING FROM BACK TO SIDE WHILE IN FLAT BAD: A LITTLE
DRESSING REGULAR LOWER BODY CLOTHING: A LOT
PERSONAL GROOMING: A LITTLE
HELP NEEDED FOR BATHING: A LITTLE
DRESSING REGULAR LOWER BODY CLOTHING: TOTAL
STANDING UP FROM CHAIR USING ARMS: A LITTLE
HELP NEEDED FOR BATHING: A LOT
TOILETING: A LITTLE
MOBILITY SCORE: 17
STANDING UP FROM CHAIR USING ARMS: A LITTLE
TOILETING: A LOT
MOVING FROM LYING ON BACK TO SITTING ON SIDE OF FLAT BED WITH BEDRAILS: A LITTLE
PERSONAL GROOMING: A LITTLE
MOVING TO AND FROM BED TO CHAIR: A LITTLE
DAILY ACTIVITIY SCORE: 17
DRESSING REGULAR LOWER BODY CLOTHING: A LITTLE
WALKING IN HOSPITAL ROOM: A LITTLE
MOVING TO AND FROM BED TO CHAIR: A LITTLE
TOILETING: A LITTLE
TURNING FROM BACK TO SIDE WHILE IN FLAT BAD: A LITTLE
WALKING IN HOSPITAL ROOM: A LITTLE
DAILY ACTIVITIY SCORE: 15

## 2024-06-27 ASSESSMENT — PAIN DESCRIPTION - ORIENTATION
ORIENTATION: RIGHT
ORIENTATION: RIGHT

## 2024-06-27 ASSESSMENT — PAIN SCALES - GENERAL
PAINLEVEL_OUTOF10: 2
PAINLEVEL_OUTOF10: 2
PAINLEVEL_OUTOF10: 5 - MODERATE PAIN
PAINLEVEL_OUTOF10: 2
PAINLEVEL_OUTOF10: 2
PAINLEVEL_OUTOF10: 8
PAINLEVEL_OUTOF10: 7
PAINLEVEL_OUTOF10: 7
PAINLEVEL_OUTOF10: 9
PAINLEVEL_OUTOF10: 4
PAINLEVEL_OUTOF10: 7
PAINLEVEL_OUTOF10: 10 - WORST POSSIBLE PAIN

## 2024-06-27 ASSESSMENT — PAIN SCALES - WONG BAKER
WONGBAKER_NUMERICALRESPONSE: HURTS LITTLE BIT
WONGBAKER_NUMERICALRESPONSE: NO HURT
WONGBAKER_NUMERICALRESPONSE: HURTS LITTLE BIT

## 2024-06-27 ASSESSMENT — PAIN DESCRIPTION - LOCATION
LOCATION: ABDOMEN

## 2024-06-27 NOTE — PROGRESS NOTES
"Fernando Cuevas is a 63 y.o. female on day 6 of admission presenting with Perforated viscus.    Subjective   Count down to 16, has some left upper extremity swelling likely superficial thrombophlebitis secondary to IV infiltration at the site but will get a venous ultrasound to rule out DVT no nausea no vomiting abdomen is still distended stopped passing gas.       Objective     Physical Exam  Vitals reviewed.   Constitutional:       Appearance: Normal appearance.   HENT:      Head: Normocephalic.      Nose: Nose normal.      Mouth/Throat:      Mouth: Mucous membranes are dry.      Pharynx: Oropharynx is clear.   Cardiovascular:      Rate and Rhythm: Normal rate and regular rhythm.   Pulmonary:      Effort: Pulmonary effort is normal.   Abdominal:      Comments: distended no bowel sounds   Skin:     Capillary Refill: Capillary refill takes less than 2 seconds.   Neurological:      General: No focal deficit present.      Mental Status: She is alert.         Last Recorded Vitals  Blood pressure 133/72, pulse (!) 112, temperature 37 °C (98.6 °F), temperature source Oral, resp. rate 18, height 1.575 m (5' 2.01\"), weight 64 kg (141 lb 1.5 oz), SpO2 99%.  Intake/Output last 3 Shifts:  I/O last 3 completed shifts:  In: 1740 (27.2 mL/kg) [P.O.:340; I.V.:1100 (17.2 mL/kg); IV Piggyback:300]  Out: 1900 (29.7 mL/kg) [Urine:1000 (0.4 mL/kg/hr); Emesis/NG output:900]  Weight: 64 kg     Relevant Results  Results for orders placed or performed during the hospital encounter of 06/20/24 (from the past 24 hour(s))   Basic metabolic panel   Result Value Ref Range    Glucose 94 74 - 99 mg/dL    Sodium 135 (L) 136 - 145 mmol/L    Potassium 3.7 3.5 - 5.3 mmol/L    Chloride 97 (L) 98 - 107 mmol/L    Bicarbonate 27 21 - 32 mmol/L    Anion Gap 15 10 - 20 mmol/L    Urea Nitrogen 6 6 - 23 mg/dL    Creatinine 0.80 0.50 - 1.05 mg/dL    eGFR 83 >60 mL/min/1.73m*2    Calcium 8.4 (L) 8.6 - 10.3 mg/dL   CBC and Auto Differential   Result Value Ref " Range    WBC 16.8 (H) 4.4 - 11.3 x10*3/uL    nRBC 0.1 (H) 0.0 - 0.0 /100 WBCs    RBC 3.44 (L) 4.00 - 5.20 x10*6/uL    Hemoglobin 8.1 (L) 12.0 - 16.0 g/dL    Hematocrit 27.8 (L) 36.0 - 46.0 %    MCV 81 80 - 100 fL    MCH 23.5 (L) 26.0 - 34.0 pg    MCHC 29.1 (L) 32.0 - 36.0 g/dL    RDW 20.4 (H) 11.5 - 14.5 %    Platelets 640 (H) 150 - 450 x10*3/uL    Neutrophils % 86.3 40.0 - 80.0 %    Immature Granulocytes %, Automated 1.0 (H) 0.0 - 0.9 %    Lymphocytes % 6.1 13.0 - 44.0 %    Monocytes % 6.3 2.0 - 10.0 %    Eosinophils % 0.1 0.0 - 6.0 %    Basophils % 0.2 0.0 - 2.0 %    Neutrophils Absolute 14.46 (H) 1.20 - 7.70 x10*3/uL    Immature Granulocytes Absolute, Automated 0.17 0.00 - 0.70 x10*3/uL    Lymphocytes Absolute 1.03 (L) 1.20 - 4.80 x10*3/uL    Monocytes Absolute 1.05 (H) 0.10 - 1.00 x10*3/uL    Eosinophils Absolute 0.01 0.00 - 0.70 x10*3/uL    Basophils Absolute 0.03 0.00 - 0.10 x10*3/uL   Morphology   Result Value Ref Range    RBC Morphology See Below          Imaging Results  Ct abd pelvis:  IMPRESSION:  1. Localizing free air in the anterior mid abdomen compatible with  perforated viscus. advise surgical consult.  2. Site of perforation potentially emanates from a small bowel loop in  the mid abdomen.  3. No associated free fluid.  4. Marked gastric distention with air and fluid.  5. Remainder as above.  6. Urgent finding and recommendation given to and acknowledged by  Medications:  aspirin, 81 mg, oral, Daily  budesonide, 0.25 mg, nebulization, Daily   And  formoterol, 20 mcg, nebulization, BID  busPIRone, 5 mg, oral, BID  calcium carbonate, 1,500 mg, oral, Daily  dilTIAZem ER, 240 mg, oral, Daily  enoxaparin, 40 mg, subcutaneous, q24h  fluconazole, 200 mg, oral, Daily  folic acid, 1 mg, oral, Daily  ipratropium-albuteroL, 3 mL, nebulization, TID  lisinopril, 10 mg, oral, Daily  montelukast, 10 mg, oral, Daily  multivitamin with minerals, 1 tablet, oral, Daily  nortriptyline, 50 mg, oral, Nightly  oxybutynin,  5 mg, oral, Daily  oxygen, , inhalation, Continuous - Inhalation  pantoprazole, 40 mg, intravenous, Daily before breakfast  piperacillin-tazobactam, 3.375 g, intravenous, q6h  predniSONE, 20 mg, oral, Daily   Followed by  [START ON 6/29/2024] predniSONE, 10 mg, oral, Daily  thiamine, 100 mg, oral, Daily  thiamine, 100 mg, oral, Daily  varenicline, 1 mg, oral, BID       PRN medications: acetaminophen **OR** acetaminophen **OR** acetaminophen, benzonatate, guaiFENesin, HYDROmorphone, HYDROmorphone, hydrOXYzine HCL, ipratropium-albuteroL, morphine, morphine, nitroglycerin, ondansetron     Assessment/Plan   1. Perforated viscus status post small bowel resection and washout procedure performed on June 21st  -NG-tube in place  -post op ileus vs partial obstruction on ct     2.  Leukocytosis suspect secondary to 1 with peritonitis  -Continue IV Zosyn and fluconazole, white count 16k  -abd culture- showing candida albicans     3.  COPD  -on steriod taper  -resume azithromycin when off fluconazole      4.  Depression and anxiety  -continue home medications     5.  Hyponatremia  -resolving    6. Nutrition  -may need tpn    7.  Rule out superficial thrombophlebitis  -pending venous us    DVT Prophylaxis:  CELESTE Landon MD  [facility]

## 2024-06-27 NOTE — CARE PLAN
SW    informed that   pt  is active  with  [facility].   Email  sent to their  TCC  Juliann [email or web address]  that pt will dc to  .    Fatuma Anthony, MSW, LSW

## 2024-06-27 NOTE — PROGRESS NOTES
"  INFECTIOUS DISEASE DAILY PROGRESS NOTE    SUBJECTIVE:    Continues to have abd distension. Small amounts of water causing nausea. No fevers. WBC is stable, coming down slowly day to day.    OBJECTIVE:  VITALS (Last 24 Hours)  /71 (BP Location: Right arm, Patient Position: Lying)   Pulse 105   Temp 36.1 °C (97 °F) (Temporal)   Resp 16   Ht 1.575 m (5' 2.01\")   Wt 64 kg (141 lb 1.5 oz)   SpO2 99%   BMI 25.80 kg/m²     PHYSICAL EXAM:  Gen - uncomfortable, laying in bed, NG tube present  Abd - very distended, surgical incision looks fine, BS hypoactive  Skin - no rash    ABX: IV Zosyn and PO Fluconazole    LABS:  Lab Results   Component Value Date    WBC 16.8 (H) 06/27/2024    HGB 8.1 (L) 06/27/2024    HCT 27.8 (L) 06/27/2024    MCV 81 06/27/2024     (H) 06/27/2024     Lab Results   Component Value Date    GLUCOSE 94 06/27/2024    CALCIUM 8.4 (L) 06/27/2024     (L) 06/27/2024    K 3.7 06/27/2024    CO2 27 06/27/2024    CL 97 (L) 06/27/2024    BUN 6 06/27/2024    CREATININE 0.80 06/27/2024     Results from last 72 hours   Lab Units 06/24/24  1451   PROTEIN TOTAL g/dL 5.9*     Estimated Creatinine Clearance: 63.3 mL/min (by C-G formula based on SCr of 0.8 mg/dL).    IMAGING:  CT A/P 6/26  IMPRESSION:  New right lower quadrant suture line reportedly status post small  bowel resection with multiple dilated air and fluid-filled small  bowel loops that could reflect mild postop ileus or partial  obstruction. Follow-up as clinically warranted.    Small hypodense lesions in the pancreatic tail and spleen, can not be  further characterized on unenhanced exam. Attention at follow-up or  further evaluation with MRI recommended.    Bandlike infiltrates or atelectasis in both lung bases with small  pleural effusions.    Numerous additional findings as described above.      ASSESSMENT/PLAN:     Perforated Small Bowel with Peritonitis - s/p OR 6/21, culture with Candida albicans. Suspect there would also be " a mix of gram negative and anaerobes involved in this type of infection.  Post-Op Ileus - ongoing  COPD on home Azithro ppx MWF - holding Azithro while on Fluconazole for risk of QT prolongation     IV Zosyn. Fluconazole 200mg/day.    CT A/P 6/26 with ileus and no intra-abd abscess.     Monitoring for adverse effects of abx such as rash/itching/diarrhea - none apparent.     Will follow. Thanks!    Tim Nelson MD  [facility]  Office #[phone]

## 2024-06-27 NOTE — PROGRESS NOTES
Fernando Cuevas is a 63 y.o. female on day 6 of admission presenting with Perforated viscus.      Subjective   Seen and examined.   NG in place. Planned for PPN to start this evening.          Objective          Vitals 24HR  Heart Rate:  [105-115]   Temp:  [36.1 °C (97 °F)-37 °C (98.6 °F)]   Resp:  [16-18]   BP: (122-139)/(71-83)   SpO2:  [96 %-100 %]     Intake/Output last 3 Shifts:    Intake/Output Summary (Last 24 hours) at 6/27/2024 1443  Last data filed at 6/27/2024 1334  Gross per 24 hour   Intake 650 ml   Output 1300 ml   Net -650 ml       Physical Exam  Constitutional: A&Ox3, NAD, NG in place  Cardiovascular: Normal rate and regular rhythm.  Respiratory/Thorax: CTAB. No rhonchi.  Gastrointestinal: Distended, firm, mildly tender, post surgical changes noted.   Musculoskeletal: No joint swelling  Extremities: Trace peripheral edema  Neurological: A&Ox3, No focal deficits  Psychological: Appropriate mood and behavior  Skin: Warm and dry      Scheduled Medications  aspirin, 81 mg, oral, Daily  budesonide, 0.25 mg, nebulization, Daily   And  formoterol, 20 mcg, nebulization, BID  busPIRone, 5 mg, oral, BID  calcium carbonate, 1,500 mg, oral, Daily  dilTIAZem ER, 240 mg, oral, Daily  enoxaparin, 40 mg, subcutaneous, q24h  fluconazole, 200 mg, oral, Daily  folic acid, 1 mg, oral, Daily  ipratropium-albuteroL, 3 mL, nebulization, TID  lisinopril, 10 mg, oral, Daily  montelukast, 10 mg, oral, Daily  multivitamin with minerals, 1 tablet, oral, Daily  nortriptyline, 50 mg, oral, Nightly  oxybutynin, 5 mg, oral, Daily  oxygen, , inhalation, Continuous - Inhalation  pantoprazole, 40 mg, intravenous, Daily before breakfast  piperacillin-tazobactam, 3.375 g, intravenous, q6h  predniSONE, 20 mg, oral, Daily   Followed by  [START ON 6/29/2024] predniSONE, 10 mg, oral, Daily  thiamine, 100 mg, oral, Daily  thiamine, 100 mg, oral, Daily  varenicline, 1 mg, oral, BID      Continuous medications  Adult Clinimix Parenteral  Nutrition, 83 mL/hr  dextrose 5 % and lactated Ringer's, 50 mL/hr, Last Rate: 50 mL/hr (06/27/24 1010)        PRN medications: acetaminophen **OR** acetaminophen **OR** acetaminophen, benzonatate, guaiFENesin, HYDROmorphone, HYDROmorphone, hydrOXYzine HCL, ipratropium-albuteroL, morphine, morphine, nitroglycerin, ondansetron     Relevant Results  Results from last 7 days   Lab Units 06/27/24  0514 06/26/24  0626 06/24/24  1451   WBC AUTO x10*3/uL 16.8* 17.9* 16.6*   HEMOGLOBIN g/dL 8.1* 8.3* 9.3*   HEMATOCRIT % 27.8* 28.3* 33.0*   PLATELETS AUTO x10*3/uL 640* 679* 510*   NEUTROS PCT AUTO % 86.3 83.1 83.9   LYMPHS PCT AUTO % 6.1 7.0 6.4   MONOS PCT AUTO % 6.3 8.4 6.3   EOS PCT AUTO % 0.1 0.1 0.1     Results from last 7 days   Lab Units 06/27/24  0514 06/26/24  0626 06/24/24  1451   SODIUM mmol/L 135* 136 131*   POTASSIUM mmol/L 3.7 3.6 4.5   CHLORIDE mmol/L 97* 96* 93*   CO2 mmol/L 27 31 26   BUN mg/dL 6 5* 9   CREATININE mg/dL 0.80 0.90 0.92   GLUCOSE mg/dL 94 100* 81   CALCIUM mg/dL 8.4* 8.9 8.6       CT abdomen pelvis wo IV contrast   Final Result   New right lower quadrant suture line reportedly status post small   bowel resection with multiple dilated air and fluid-filled small   bowel loops that could reflect mild postop ileus or partial   obstruction. Follow-up as clinically warranted.        Small hypodense lesions in the pancreatic tail and spleen, can not be   further characterized on unenhanced exam. Attention at follow-up or   further evaluation with MRI recommended.        Bandlike infiltrates or atelectasis in both lung bases with small   pleural effusions.        Numerous additional findings as described above.        MACRO:   None.        Signed by: Karina Ventura 6/26/2024 9:08 AM   Dictation workstation:   [id number]      XR abdomen 1 view   Final Result   Stable vertically oriented line of skin staples to the right of   midline in the lower abdomen and upper pelvis.        Stable NG tube in place.         Findings that could represent either ongoing persistent partial   distal small bowel obstruction or postoperative ileus. No significant   change from 06/22/2024.        MACRO:   None        Signed by: Gabe Gage 6/24/2024 2:03 PM   Dictation workstation:   [id number]      XR abdomen 1 view   Final Result   1.  As above.        Signed by: Paul Agudelo 6/22/2024 4:25 PM   Dictation workstation:   [id number]      XR abdomen 1 view   Final Result   1. Nasogastric tube with distal end terminating in the proximal   stomach and proximal side hole in the gastric cardia. Advise   advancement 3-5 cm.   2. Slightly gas distended stomach. Moderate colonic stool burden.        MACRO:   None        Signed by: Keyshawn Contreras 6/21/2024 8:47 AM   Dictation workstation:   [id number]      CT abdomen pelvis wo IV contrast   Final Result   Addendum (preliminary) 1 of 1   Images reviewed with Garth Katz at 12:52 AM on 6/21/2024.   Signed by Armando Mcgarry DO      Final   1. Localizing free air in the anterior mid abdomen compatible with   perforated viscus. advise surgical consult.   2. Site of perforation potentially emanates from a small bowel loop in   the mid abdomen.   3. No associated free fluid.   4. Marked gastric distention with air and fluid.   5. Remainder as above.   6. Urgent finding and recommendation given to and acknowledged by   Sheldon RIVAS at 10:27 PM [place] on 6/20/2024.   Signed by Yash Hood MD      Vascular US upper extremity venous duplex left    (Results Pending)            Assessment/Plan      Fernando Cuevas is a 63 y.o. female with a past medical history of hypertension, COPD, anxiety, anemia, cocaine and alcohol use, hyperlipidemia, depression, lung CA, history of spontaneous pneumothorax, overactive bladder who presented on 6/21-day after being discharged for right upper and middle lobe pneumonia treated with IV antibiotics and prednisone with a taper.  She came back in with  abdominal pain.  CT showed perforated viscus.  She underwent exploratory laparotomy with partial small bowel resection on 6/21.  Her clinical course was complicated by postoperative ileus.  NG tube remains in place.  Worsening hyponatremia was noted.  Nephrology was consulted for renal care.  Ms. Cuevas is suffering hyponatremia in the setting of a perforated viscus and postoperative ileus.  During her recent hospitalization she had low-grade hyponatremia albeit not to this extent. This admission she has been nothing by mouth.  She was placed on IV isotonic fluid with acceptable improvement in her hyponatremia.  She is developing mild hypervolemia.  I adjusted her fluids to include 5% dextrose and LR to run at 50 mL/an hour. Her sodium has normalized. She will start PPN this evening. I will hold the dextrose infusion at that time.     Nephrology will sign off. Please call with questions.           Principal Problem:    Perforated viscus  Active Problems:    Thrombocytosis      I spent 35 minutes in the professional and overall care of this patient.      Tim Wayne, DO

## 2024-06-27 NOTE — PROGRESS NOTES
Physical Therapy                 Therapy Communication Note    Patient Name: Fernando Cuevas  MRN: [id number]  Today's Date: 6/27/2024     Discipline: Physical Therapy    Missed Visit Reason: Missed Visit Reason: Patient refused (Pt is very tearful on arrival, citing global pain and malaise. Pt stated she is unable to participate in PT tx at this time. RN notified.)    Missed Time: Attempt    Comment:

## 2024-06-27 NOTE — CARE PLAN
The patient's goals for the shift include      The clinical goals for the shift include patient to remain comfortable      Problem: Pain - Adult  Goal: Verbalizes/displays adequate comfort level or baseline comfort level  Outcome: Progressing     Problem: Safety - Adult  Goal: Free from fall injury  Outcome: Progressing     Problem: Pain  Goal: Participates in PT with improved pain control throughout the shift  Outcome: Progressing     Problem: Pain  Goal: Free from opioid side effects throughout the shift  Outcome: Progressing     Problem: Pain  Goal: Turns in bed with improved pain control throughout the shift  Outcome: Progressing

## 2024-06-27 NOTE — PROGRESS NOTES
"Remains distended, passing some flatus but no bowel movement    Having increased abdominal pain today.    On exam she is uncomfortable  /72 (BP Location: Right arm, Patient Position: Lying)   Pulse (!) 112   Temp 37 °C (98.6 °F) (Oral)   Resp 18   Ht 1.575 m (5' 2.01\")   Wt 64 kg (141 lb 1.5 oz)   SpO2 99%   BMI 25.80 kg/m²    Abdomen markedly distended, incision clean  There are some warmth to her skin    Lab Results   Component Value Date    WBC 16.8 (H) 06/27/2024    HGB 8.1 (L) 06/27/2024    HCT 27.8 (L) 06/27/2024    MCV 81 06/27/2024     (H) 06/27/2024   === 06/20/24 ===    CT ABDOMEN PELVIS WO IV CONTRAST    - Impression -  New right lower quadrant suture line reportedly status post small  bowel resection with multiple dilated air and fluid-filled small  bowel loops that could reflect mild postop ileus or partial  obstruction. Follow-up as clinically warranted.    Small hypodense lesions in the pancreatic tail and spleen, can not be  further characterized on unenhanced exam. Attention at follow-up or  further evaluation with MRI recommended.    Bandlike infiltrates or atelectasis in both lung bases with small  pleural effusions.    Numerous additional findings as described above.       Impression;  post op ileus following partial small bowel resection for perforation.  Having increased pain  CT yesterday with ileus vs partial sbo.  No obvious abscess or signs to suggest anastomotic leak    Continue NG decompression  Start on PPN  IV abx per ID      "

## 2024-06-27 NOTE — PROGRESS NOTES
Occupational Therapy    Occupational Therapy Treatment    Name: Fernando Cuevas  MRN: [id number]  : 1960  Date: 24  Time Calculation  Start Time: 1336  Stop Time: 1352  Time Calculation (min): 16 min    Assessment:  Medical Staff Made Aware: Yes  End of Session Communication: Bedside nurse  End of Session Patient Position: Bed, 3 rail up, Alarm off, not on at start of session  Plan:  Treatment Interventions: ADL retraining, Functional transfer training, UE strengthening/ROM, Endurance training, Patient/family training, Equipment evaluation/education, Neuromuscular reeducation  OT Frequency: 3 times per week  OT Discharge Recommendations: Moderate intensity level of continued care  Equipment Recommended upon Discharge:  (hip kit)  OT Recommended Transfer Status: Stand by assist  OT - OK to Discharge: Yes (per POC)    Subjective   Previous Visit Info:  OT Last Visit  OT Received On: 24  General:  General  Reason for Referral: 63 y.o. female presenting with COPD exacerbation and SOB.  Prior to Session Communication: Bedside nurse  Patient Position Received: Bed, 3 rail up, Alarm off, not on at start of session  Preferred Learning Style: auditory, kinesthetic, verbal  General Comment: Pt hesitant to participate, required max VC to participate this day.  Precautions:  Post-Surgical Precautions: Abdominal surgery precautions  Vitals:  Vital Signs  Heart Rate: (!) 115 (HR ranged from 115-121 BPM)  Heart Rate Source: Monitor  Pain Assessment:  Pain Assessment  Pain Assessment: 0-10  Pain Type: Surgical pain  Pain Location: Abdomen  Pain Interventions: Medication (See MAR)     Objective   Cognition:  Overall Cognitive Status: Within Functional Limits  Orientation Level: Oriented X4  Activities of Daily Living: LE Dressing  Sock Level of Assistance: Dependent  LE Dressing Where Assessed: Bed level  LE Dressing Comments: assist to doff    Functional Standing Tolerance:     Bed Mobility/Transfers: Bed  Mobility  Bed Mobility: Yes  Bed Mobility 1  Bed Mobility 1: Supine to sitting, Sitting to supine (HOB elevated)  Level of Assistance 1: Minimum assistance  Bed Mobility Comments 1: with LE advancement    Transfers  Transfer: Yes  Transfer 1  Transfer From 1: Bed to  Transfer to 1: Toilet  Technique 1: Sit to stand, Stand to sit  Transfer Device 1:  (rollator)  Transfer Level of Assistance 1: Close supervision  Trials/Comments 1: VC for safety      Functional Mobility:  Functional Mobility  Functional Mobility Performed: Yes  Functional Mobility 1  Surface 1: Level tile  Device 1: Rollator  Assistance 1: Close supervision  Comments 1: Pt completed functional mobility with rollator a short household distance at [facility].    Outcome Measures:  [facility] Daily Activity  Putting on and taking off regular lower body clothing: Total  Bathing (including washing, rinsing, drying): A lot  Putting on and taking off regular upper body clothing: A little  Toileting, which includes using toilet, bedpan or urinal: A lot  Taking care of personal grooming such as brushing teeth: A little  Eating Meals: None  Daily Activity - Total Score: 15        Education Documentation  Precautions, taught by SIN Alexis at 6/27/2024  2:20 PM.  Learner: Patient  Readiness: Nonacceptance  Method: Explanation, Demonstration  Response: Needs Reinforcement, Verbalizes Understanding    Body Mechanics, taught by SIN Alexis at 6/27/2024  2:20 PM.  Learner: Patient  Readiness: Nonacceptance  Method: Explanation, Demonstration  Response: Needs Reinforcement, Verbalizes Understanding    ADL Training, taught by SIN Alexis at 6/27/2024  2:20 PM.  Learner: Patient  Readiness: Nonacceptance  Method: Explanation, Demonstration  Response: Needs Reinforcement, Verbalizes Understanding    Education Comments  No comments found.      Goals:  Encounter Problems       Encounter Problems (Active)       ADLs       Patient will perform UB and LB  bathing with contact guard assist level of assistance and grab bars, shower chair, and long-handled sponge. (Not Progressing)       Start:  06/24/24    Expected End:  07/08/24            Patient with complete upper body dressing with supervision level of assistance donning and doffing all UE clothes with PRN adaptive equipment while edge of bed  (Not Progressing)       Start:  06/24/24    Expected End:  07/08/24            Patient with complete lower body dressing with minimal assist  level of assistance donning and doffing all LE clothes  with reacher, shoe horn, sock-aid, and dressing stick  while edge of bed  (Not Progressing)       Start:  06/24/24    Expected End:  07/08/24            Patient will complete daily grooming tasks brushing teeth and washing face/hair with independent level of assistance while edge of bed . (Not Progressing)       Start:  06/24/24    Expected End:  07/08/24            Patient will complete toileting including hygiene clothing management/hygiene with contact guard assist level of assistance and raised toilet seat and grab bars. (Not Progressing)       Start:  06/24/24    Expected End:  07/08/24               BALANCE       Pt will maintain dynamic standing balance during ADL task with supervision level of assistance in order to demonstrate decreased risk of falling and improved postural control. (Not Progressing)       Start:  06/24/24    Expected End:  07/08/24            Patientt will maintain static standing balance during ADL task with independent level of assistance drop down in order to demonstrate decreased risk of falling and improved postural control. (Not Progressing)       Start:  06/24/24    Expected End:  07/08/24            Patient will tolerate standing for 5 minutes to contact guard level of assistance with front wheeled walker in order to improve functional activity tolerance for ADL tasks. (Not Progressing)       Start:  06/24/24    Expected End:  07/08/24                TRANSFERS       Patient will perform bed mobility supervision level of assistance and bed rails in order to improve safety and independence with mobility (Progressing)       Start:  06/24/24    Expected End:  07/08/24            Patient will complete functional transfer to all surfaces with front wheeled walker with contact guard assist level of assistance. (Progressing)       Start:  06/24/24    Expected End:  07/08/24            Patient will complete sit to stand transfer with independent level of assistance and front wheeled walker in order to improve safety and prepare for out of bed mobility. (Progressing)       Start:  06/24/24    Expected End:  07/08/24

## 2024-06-27 NOTE — CARE PLAN
The patient's goals for the shift include      The clinical goals for the shift include Pt to remain hemodynamically  stable and maintain comfort throughout the shift    Problem: Pain  Goal: Takes deep breaths with improved pain control throughout the shift  Outcome: Progressing  Goal: Walks with improved pain control throughout the shift  Outcome: Progressing

## 2024-06-27 NOTE — CONSULTS
"Nutrition Assessment Note  Nutrition Assessment    RDN consulted by MD for parenteral assessment and recommendations.     Met with pt earlier today, NG remains to LIWS. Pt asking for a popsicle. Pt without complaints at this time.   Noted abd very distended.     Pt stated appetite/oral intake has been variable d/t recent admission, and now this admission.     Explained peripheral parenteral nutrition to pt and that it will start tonight @ 20:00. Pt without any questions at this time.     Pt presented with abdominal pain, found to have perforated viscus.   POD #6 from a exploration laparotomy and partial small bowel resection.     PMH includes pneumonia (recent admission 6/11-6/19), COPD, HTN, adenocarcinoma R lung, cocaine and alcohol use    Pt now with post op ileus, POD # 6, abd still very distended.   PPN indicated 2/2 post op ileus, severe malnutrition, oral intake less than 50% of estimated energy requirements for gter than five days.       History:  Food and Nutrient History  Food and Nutrient History: NPO         Food and Nutrient Administration History  Additional Food and Nutrient Administration History: regular cardiac diet previous admission 6/11-6/19/24       Dietary Orders (From admission, onward)       Start     Ordered    06/27/24 1551  NPO Diet Except: Sips with meds, Sips of clear liquids, Ice chips; Effective now  Diet effective now        Comments: Popsicles, small chips, sips okay   Question Answer Comment   Except: Sips with meds    Except: Sips of clear liquids    Except: Ice chips        06/27/24 1551                              Anthropometrics:  Height: 157.5 cm (5' 2.01\")  Weight: 64 kg (141 lb 1.5 oz)  BMI (Calculated): 25.8    Weight Change  Weight History / % Weight Change: 64.6 kg 5/10/24, 61.2kg 1/9/24, 59.9kg 6/21/23  Significant Weight Loss: No      IBW/kg (Dietitian Calculated): 50 kg  Percent of IBW: 128 %    Wt Readings from Last 20 Encounters:   06/27/24 64 kg (141 lb 1.5 oz) "   06/11/24 64 kg (141 lb)   05/10/24 64.6 kg (142 lb 8 oz)   05/05/24 64.6 kg (142 lb 8 oz)            Scheduled medications  aspirin, 81 mg, oral, Daily  budesonide, 0.25 mg, nebulization, Daily   And  formoterol, 20 mcg, nebulization, BID  busPIRone, 5 mg, oral, BID  calcium carbonate, 1,500 mg, oral, Daily  dilTIAZem ER, 240 mg, oral, Daily  [START ON 6/28/2024] diphenhydrAMINE, 50 mg, intravenous, Once  [START ON 6/28/2024] enoxaparin, 1 mg/kg, subcutaneous, q12h  fluconazole, 200 mg, oral, Daily  folic acid, 1 mg, oral, Daily  hydrocortisone sodium succinate, 200 mg, intravenous, q6h  ipratropium-albuteroL, 3 mL, nebulization, TID  lisinopril, 10 mg, oral, Daily  montelukast, 10 mg, oral, Daily  multivitamin with minerals, 1 tablet, oral, Daily  nortriptyline, 50 mg, oral, Nightly  oxybutynin, 5 mg, oral, Daily  oxygen, , inhalation, Continuous - Inhalation  pantoprazole, 40 mg, intravenous, Daily before breakfast  piperacillin-tazobactam, 3.375 g, intravenous, q6h  predniSONE, 20 mg, oral, Daily   Followed by  [START ON 6/29/2024] predniSONE, 10 mg, oral, Daily  thiamine, 100 mg, oral, Daily  thiamine, 100 mg, oral, Daily  varenicline, 1 mg, oral, BID      Continuous medications  Adult Clinimix Parenteral Nutrition, 83 mL/hr  dextrose 5 % and lactated Ringer's, 50 mL/hr, Last Rate: 50 mL/hr (06/27/24 1010)      PRN medications  PRN medications: acetaminophen **OR** acetaminophen **OR** acetaminophen, benzonatate, guaiFENesin, HYDROmorphone, HYDROmorphone, hydrOXYzine HCL, ipratropium-albuteroL, morphine, morphine, nitroglycerin, ondansetron     Latest Reference Range & Units Most Recent   GLUCOSE 74 - 99 mg/dL 94  6/27/24 05:14   SODIUM 136 - 145 mmol/L 135 (L)  6/27/24 05:14   POTASSIUM 3.5 - 5.3 mmol/L 3.7  6/27/24 05:14   CHLORIDE 98 - 107 mmol/L 97 (L)  6/27/24 05:14   Bicarbonate 21 - 32 mmol/L 27 6/27/24 05:14   Anion Gap 10 - 20 mmol/L 15  6/27/24 05:14   Blood Urea Nitrogen 6 - 23 mg/dL 6 6/27/24  "05:14   Creatinine 0.50 - 1.05 mg/dL 0.80  6/27/24 05:14   EGFR >60 mL/min/1.73m*2 83  6/27/24 05:14   Calcium 8.6 - 10.3 mg/dL 8.4 (L)  6/27/24 05:14   Albumin 3.4 - 5.0 g/dL 2.9 (L)  6/21/24 07:04   Alkaline Phosphatase 33 - 136 U/L 65  6/21/24 07:04   ALT 7 - 45 U/L 23  6/21/24 07:04   AST 9 - 39 U/L 14  6/21/24 07:04   Bilirubin Total 0.0 - 1.2 mg/dL 0.4  6/21/24 07:04   Procalcitonin <=0.07 ng/mL 0.24 (H)  6/18/24 17:18   FERRITIN 8 - 150 ng/mL 19  5/5/24 00:14   FOLATE >5.0 ng/mL 10.0  6/16/24 10:48   Total Protein 6.4 - 8.2 g/dL 5.9 (L)  6/24/24 14:51   IRON 35 - 150 ug/dL 11 (L)  6/16/24 06:36   Osmolality, Serum 280 - 300 mOsm/kg 276 (L)  6/22/24 18:22   MAGNESIUM 1.60 - 2.40 mg/dL 1.80  6/21/24 07:04   Lactate 0.4 - 2.0 mmol/L 2.2 (H)  6/21/24 07:04   C-Reactive Protein <1.00 mg/dL 0.57  6/16/24 06:36   BNP 0 - 99 pg/mL 65  6/11/24 15:39   LIPASE 9 - 82 U/L 37  6/20/24 21:44   (L): Data is abnormally low  (H): Data is abnormally high        Energy Needs:  Calculated Energy Needs Using Equations  Height: 157.5 cm (5' 2.01\")  Weight Used for Equation Calculations: 64 kg (141 lb 1.5 oz)  [facility] [facility] Equation (Overweight or Obese Patients): 1148  Equation Chosen to Use by RD: [facility]  Activity Factor: 1.2  Stress Factor: 1.2  Total Energy Needs: 1650  Temp: 36.6 °C (97.9 °F)    Estimated Energy Needs  Method for Estimating Needs: 9263-9483 kcal/day    Estimated Protein Needs  Total Protein Estimated Needs (g): 75 g  Total Protein Estimated Needs (g/kg): 1.5 g/kg (IBW)    Estimated Fluid Needs  Method for Estimating Needs: per MD  , d/w nephrology while at bedside, 2L of PPN is okay         Nutrition Focused Physical Findings:  Subcutaneous Fat Loss  Orbital Fat Pads: Mild-Moderate (slight dark circles and slight hollowing)  Buccal Fat Pads: Well nourished (full, rounded cheeks)    Muscle Wasting  Temporalis: Mild-Moderate (slight depression)  Pectoralis (Clavicular Region): Mild-Moderate (some " protrusion of clavicle)  Deltoid/Trapezius: Mild-Moderate (slight protrusion of acromion process)    Edema  Edema: +1 trace  Edema Location: generalized         Physical Findings (Nutrition Deficiency/Toxicity)  Skin: Positive (midline abd surgical incision , lap sites abd)       Nutrition Diagnosis   Malnutrition Diagnosis  Patient has Malnutrition Diagnosis: Yes  Diagnosis Status: New  Malnutrition Diagnosis: Severe malnutrition related to acute disease or injury  As Evidenced by: oral intake less than 50% of estimated energy requirements for greater than five days, generalized edema, visualized bradley of depelted adipose tissues and skeletal tissues wasting         Nutrition Interventions/Recommendations   Nutrition Prescription  Individualized Nutrition Prescription Provided for : PPN to start tonight via PIV  - NPO/NG to LIS as per surgery; diet per surgery   - RFP, Mg daily; replete prn   - music therapy consult   - strict I/O and daily standing scale weight   - POCT q six hours   - lipids MWF     Food and/or Nutrient Delivery Interventions        Parenteral Nutrition/IV Fluids: Parenteral nutrition site care     Parenteral Formula Recommendations  Formula: Standard peripheral formula AA 4.25%, dextrose 5%  Electrolytes: Standard  Elements: Multivitamin, Trace elements    Parenteral Instructions  Continuous Rate (mL/hr): 83 mL/hr  Total Volume (mL/day): 2000 mL/day  Total Parenteral Kcal (kcal/day): 680 kcal/day  Total Protein (g/day): 85 g/day    Lab/Monitoring Recommendations  Blood Glucose Frequency: Every 6 hours  Weight Frequency: Daily  Labs: Renal panel and magnesium daily, LFTs: now and each week, Repeat electrolytes as needed, Triglycerides:  now and each week    Fat Emulsion Recommendations  Fat Emulsion 20%: Intralipid (MWF only)  Fat Emulsion Rate (mL/hr): 21 mL/hr  Fat Emulsion Volume (mL/day): 250 mL/day (provides ~214 kcal/day)         Coordination of Nutrition Care by a Nutrition  Professional  Collaboration and Referral of Nutrition Care: Collaboration by nutrition professional with other providers    Education Documentation  No documentation found.           Nutrition Monitoring and Evaluation   Food and Nutrient Related History         Enteral and Parenteral Nutrition Intake: Parenteral nutrition intake, Parenteral nutrition formula/solution  Criteria: monitor         Anthropometrics: Body Composition/Growth/Weight History  Weight: Weight change  Criteria: monitor       Biochemical Data, Medical Tests and Procedures  Electrolyte and Renal Panel: Other (Comment), BUN, Sodium, Calcium, ionized, Calcium, serum, Chloride, Creatinine, Magnesium, Phosphorus, Potassium  Criteria: monitor daily    Gastrointestinal Profile: Other (Comment), Aspartate aminotransferase (AST), Alanine aminotransferase (ALT), Bilirubin, total, Lipase, Alkaline phosphatase  Criteria: as indicated    Glucose/Endocrine Profile: Other (Comment), Glucose, casual  Criteria: monitor daily    Nutritional Anemia Profile: Other (Comment), Hemoglobin, Iron, serum, Hematocrit  Criteria: as indicated    Vitamin Profile: Other (Comment), Vitamin D, 25 hydroxy  Criteria: as indicated    Nutrition Focused Physical Findings: monitor        Follow Up  Time Spent (min): 90 minutes  Last Date of Nutrition Visit: 06/27/24  Nutrition Follow-Up Needed?: Dietitian to reassess per policy  Follow up Comment: PPN, svr mal; JLM

## 2024-06-28 ENCOUNTER — APPOINTMENT (OUTPATIENT)
Dept: RADIOLOGY | Facility: HOSPITAL | Age: 64
End: 2024-06-28
Payer: COMMERCIAL

## 2024-06-28 LAB
ANION GAP SERPL CALC-SCNC: 15 MMOL/L (ref 10–20)
BASOPHILS # BLD AUTO: 0.01 X10*3/UL (ref 0–0.1)
BASOPHILS NFR BLD AUTO: 0.1 %
BUN SERPL-MCNC: 9 MG/DL (ref 6–23)
BURR CELLS BLD QL SMEAR: NORMAL
CALCIUM SERPL-MCNC: 8.3 MG/DL (ref 8.6–10.3)
CHLORIDE SERPL-SCNC: 94 MMOL/L (ref 98–107)
CO2 SERPL-SCNC: 27 MMOL/L (ref 21–32)
CREAT SERPL-MCNC: 0.64 MG/DL (ref 0.5–1.05)
DACRYOCYTES BLD QL SMEAR: NORMAL
EGFRCR SERPLBLD CKD-EPI 2021: >90 ML/MIN/1.73M*2
EOSINOPHIL # BLD AUTO: 0 X10*3/UL (ref 0–0.7)
EOSINOPHIL NFR BLD AUTO: 0 %
ERYTHROCYTE [DISTWIDTH] IN BLOOD BY AUTOMATED COUNT: 20.6 % (ref 11.5–14.5)
GLUCOSE BLD MANUAL STRIP-MCNC: 106 MG/DL (ref 74–99)
GLUCOSE BLD MANUAL STRIP-MCNC: 117 MG/DL (ref 74–99)
GLUCOSE BLD MANUAL STRIP-MCNC: 97 MG/DL (ref 74–99)
GLUCOSE SERPL-MCNC: 104 MG/DL (ref 74–99)
HCT VFR BLD AUTO: 29.9 % (ref 36–46)
HGB BLD-MCNC: 8.6 G/DL (ref 12–16)
IMM GRANULOCYTES # BLD AUTO: 0.17 X10*3/UL (ref 0–0.7)
IMM GRANULOCYTES NFR BLD AUTO: 1.2 % (ref 0–0.9)
LYMPHOCYTES # BLD AUTO: 0.96 X10*3/UL (ref 1.2–4.8)
LYMPHOCYTES NFR BLD AUTO: 6.8 %
MAGNESIUM SERPL-MCNC: 1.8 MG/DL (ref 1.6–2.4)
MCH RBC QN AUTO: 23.2 PG (ref 26–34)
MCHC RBC AUTO-ENTMCNC: 28.8 G/DL (ref 32–36)
MCV RBC AUTO: 81 FL (ref 80–100)
MONOCYTES # BLD AUTO: 0.18 X10*3/UL (ref 0.1–1)
MONOCYTES NFR BLD AUTO: 1.3 %
NEUTROPHILS # BLD AUTO: 12.83 X10*3/UL (ref 1.2–7.7)
NEUTROPHILS NFR BLD AUTO: 90.6 %
NRBC BLD-RTO: 0.1 /100 WBCS (ref 0–0)
PHOSPHATE SERPL-MCNC: 4.2 MG/DL (ref 2.5–4.9)
PLATELET # BLD AUTO: 722 X10*3/UL (ref 150–450)
POTASSIUM SERPL-SCNC: 4.2 MMOL/L (ref 3.5–5.3)
RBC # BLD AUTO: 3.7 X10*6/UL (ref 4–5.2)
RBC MORPH BLD: NORMAL
SODIUM SERPL-SCNC: 132 MMOL/L (ref 136–145)
SPHEROCYTES BLD QL SMEAR: NORMAL
TARGETS BLD QL SMEAR: NORMAL
WBC # BLD AUTO: 14.2 X10*3/UL (ref 4.4–11.3)

## 2024-06-28 PROCEDURE — 2500000001 HC RX 250 WO HCPCS SELF ADMINISTERED DRUGS (ALT 637 FOR MEDICARE OP): Performed by: INTERNAL MEDICINE

## 2024-06-28 PROCEDURE — 97535 SELF CARE MNGMENT TRAINING: CPT | Mod: GO

## 2024-06-28 PROCEDURE — 85025 COMPLETE CBC W/AUTO DIFF WBC: CPT

## 2024-06-28 PROCEDURE — 99232 SBSQ HOSP IP/OBS MODERATE 35: CPT | Performed by: STUDENT IN AN ORGANIZED HEALTH CARE EDUCATION/TRAINING PROGRAM

## 2024-06-28 PROCEDURE — 80048 BASIC METABOLIC PNL TOTAL CA: CPT

## 2024-06-28 PROCEDURE — 2500000001 HC RX 250 WO HCPCS SELF ADMINISTERED DRUGS (ALT 637 FOR MEDICARE OP): Performed by: HOSPITALIST

## 2024-06-28 PROCEDURE — 97116 GAIT TRAINING THERAPY: CPT | Mod: GP,CQ,59

## 2024-06-28 PROCEDURE — 84100 ASSAY OF PHOSPHORUS: CPT | Performed by: SURGERY

## 2024-06-28 PROCEDURE — 2500000005 HC RX 250 GENERAL PHARMACY W/O HCPCS: Performed by: INTERNAL MEDICINE

## 2024-06-28 PROCEDURE — 94640 AIRWAY INHALATION TREATMENT: CPT

## 2024-06-28 PROCEDURE — 2500000004 HC RX 250 GENERAL PHARMACY W/ HCPCS (ALT 636 FOR OP/ED)

## 2024-06-28 PROCEDURE — 2500000004 HC RX 250 GENERAL PHARMACY W/ HCPCS (ALT 636 FOR OP/ED): Performed by: HOSPITALIST

## 2024-06-28 PROCEDURE — 2500000004 HC RX 250 GENERAL PHARMACY W/ HCPCS (ALT 636 FOR OP/ED): Performed by: INTERNAL MEDICINE

## 2024-06-28 PROCEDURE — C9113 INJ PANTOPRAZOLE SODIUM, VIA: HCPCS | Performed by: HOSPITALIST

## 2024-06-28 PROCEDURE — 2550000001 HC RX 255 CONTRASTS: Performed by: STUDENT IN AN ORGANIZED HEALTH CARE EDUCATION/TRAINING PROGRAM

## 2024-06-28 PROCEDURE — 71275 CT ANGIOGRAPHY CHEST: CPT

## 2024-06-28 PROCEDURE — 2500000004 HC RX 250 GENERAL PHARMACY W/ HCPCS (ALT 636 FOR OP/ED): Performed by: PHARMACIST

## 2024-06-28 PROCEDURE — 71275 CT ANGIOGRAPHY CHEST: CPT | Performed by: RADIOLOGY

## 2024-06-28 PROCEDURE — 82947 ASSAY GLUCOSE BLOOD QUANT: CPT

## 2024-06-28 PROCEDURE — 97530 THERAPEUTIC ACTIVITIES: CPT | Mod: GP,CQ

## 2024-06-28 PROCEDURE — 83735 ASSAY OF MAGNESIUM: CPT | Performed by: SURGERY

## 2024-06-28 PROCEDURE — 2500000002 HC RX 250 W HCPCS SELF ADMINISTERED DRUGS (ALT 637 FOR MEDICARE OP, ALT 636 FOR OP/ED): Performed by: INTERNAL MEDICINE

## 2024-06-28 PROCEDURE — 2500000005 HC RX 250 GENERAL PHARMACY W/O HCPCS: Performed by: SURGERY

## 2024-06-28 PROCEDURE — 1200000002 HC GENERAL ROOM WITH TELEMETRY DAILY

## 2024-06-28 PROCEDURE — 36415 COLL VENOUS BLD VENIPUNCTURE: CPT

## 2024-06-28 ASSESSMENT — COGNITIVE AND FUNCTIONAL STATUS - GENERAL
TOILETING: A LITTLE
DRESSING REGULAR UPPER BODY CLOTHING: A LITTLE
PERSONAL GROOMING: A LITTLE
HELP NEEDED FOR BATHING: A LITTLE
CLIMB 3 TO 5 STEPS WITH RAILING: A LOT
STANDING UP FROM CHAIR USING ARMS: A LITTLE
DRESSING REGULAR UPPER BODY CLOTHING: A LITTLE
CLIMB 3 TO 5 STEPS WITH RAILING: A LOT
STANDING UP FROM CHAIR USING ARMS: A LITTLE
MOBILITY SCORE: 18
MOVING TO AND FROM BED TO CHAIR: A LITTLE
TOILETING: A LITTLE
TURNING FROM BACK TO SIDE WHILE IN FLAT BAD: A LITTLE
MOVING TO AND FROM BED TO CHAIR: A LITTLE
WALKING IN HOSPITAL ROOM: A LITTLE
DAILY ACTIVITIY SCORE: 19
MOBILITY SCORE: 18
TURNING FROM BACK TO SIDE WHILE IN FLAT BAD: A LITTLE
HELP NEEDED FOR BATHING: A LITTLE
DRESSING REGULAR LOWER BODY CLOTHING: A LITTLE
DRESSING REGULAR LOWER BODY CLOTHING: A LITTLE
WALKING IN HOSPITAL ROOM: A LITTLE
PERSONAL GROOMING: A LITTLE
DAILY ACTIVITIY SCORE: 19

## 2024-06-28 ASSESSMENT — PAIN SCALES - GENERAL
PAINLEVEL_OUTOF10: 3
PAINLEVEL_OUTOF10: 2
PAINLEVEL_OUTOF10: 10 - WORST POSSIBLE PAIN
PAINLEVEL_OUTOF10: 10 - WORST POSSIBLE PAIN
PAINLEVEL_OUTOF10: 8
PAINLEVEL_OUTOF10: 0 - NO PAIN
PAINLEVEL_OUTOF10: 9
PAINLEVEL_OUTOF10: 7

## 2024-06-28 ASSESSMENT — PAIN DESCRIPTION - LOCATION: LOCATION: ABDOMEN

## 2024-06-28 ASSESSMENT — PAIN DESCRIPTION - DESCRIPTORS: DESCRIPTORS: ACHING

## 2024-06-28 ASSESSMENT — PAIN - FUNCTIONAL ASSESSMENT
PAIN_FUNCTIONAL_ASSESSMENT: 0-10

## 2024-06-28 ASSESSMENT — ACTIVITIES OF DAILY LIVING (ADL): HOME_MANAGEMENT_TIME_ENTRY: 20

## 2024-06-28 NOTE — PROGRESS NOTES
Fernando Cuevas is a 63 y.o. female on day 7 of admission presenting with Perforated viscus.      Subjective   Patient seen and examined at the bedside this morning. No acute overnight events. No other questions or concerns.         Objective     Last Recorded Vitals  /72 (BP Location: Right arm, Patient Position: Sitting)   Pulse 103   Temp 36.7 °C (98 °F) (Temporal)   Resp 18   Wt 64 kg (141 lb 1.5 oz)   SpO2 100%   Intake/Output last 3 Shifts:    Intake/Output Summary (Last 24 hours) at 6/28/2024 0920  Last data filed at 6/28/2024 0426  Gross per 24 hour   Intake 0 ml   Output 1200 ml   Net -1200 ml       Admission Weight  Weight: 64 kg (141 lb 1.5 oz) (06/20/24 2132)    Daily Weight  06/27/24 : 64 kg (141 lb 1.5 oz)    Image Results  CT angio chest for pulmonary embolism  Narrative: Interpreted By:  Gabe Gage,   STUDY:  CT ANGIO CHEST FOR PULMONARY EMBOLISM;  6/28/2024 7:04 am      INDICATION:  62 y/o   F with  Signs/Symptoms:PE.      LIMITATIONS:  None.      ACCESSION NUMBER(S):  [id number]      ORDERING CLINICIAN:  DELPHINE VELÁZQUEZ      TECHNIQUE:  After the administration of intravenous nonionic contrast,  thin-section arterial phase images were obtained  from the thoracic  inlet down through the iliac crest. Sagittal and coronal  reconstruction images were generated. Axial and coronal MIP vascular  reconstruction images were also generated.   Mediastinal, lung, bone,  and liver windows were reviewed. 75 ML Omnipaque 350          COMPARISON:  Most recent prior from 06/13/2024. correlation with CT scans of the  abdomen and pelvis, most recent from 06/26/2024.      FINDINGS:  CHEST WALL/BASE OF THE NECK:  The chest wall was grossly intact.  No thyromegaly or thyroid mass.      MEDIASTINUM/ALESSANDRO:  No suspicious mediastinal, hilar, or axillary mass or adenopathy.  No cardiomegaly.  No pericardial effusion.  No thoracic aortic aneurysm or dissection.  No pulmonary artery filling defect.      LUNGS/  PLEURA/ AND TRACHEA:  Stable underlying fat containing posteromedial left-sided  diaphragmatic hernia. Band of atelectasis across the posterior right  lung base. Mild atelectasis at the posteromedial left lung base.  Underlying emphysema with upper lobe predominance. No mass or  pneumonia in either lung. Tiny bilateral pleural effusions. No  pneumothorax. The trachea was grossly intact.      BONES:  No destructive lytic or blastic bone lesion.      UPPER ABDOMEN:  There is an NG tube in place with distal tip in the distal gastric  body. Cholelithiasis. Contracted gallbladder. The imaged portions of  the liver   were unremarkable. There are subtle stable small  hypodensities in the spleen compared to 06/26/2024. Imaged kidneys  and adrenal glands were intact. No upper abdominal ascites. The  imaged stomach and large bowel. There is a bilobed hypodensity  adjacent the pancreatic tail the left abdomen measuring 24 mm AP x 17  mm transversely on image 287, measuring 24 Hounsfield units of CT  density. This is stable compared to the most recent prior CT scan  abdomen and pelvis. It is also grossly stable compared to 06/20/2024  and was not present on 03/22/2016.      Impression: No CT evidence of pulmonary embolism in the current exam.      Emphysema. Band of atelectasis at the right lung base. Confluent  atelectasis at the posteromedial left lung base. Tiny bilateral  pleural effusions.. No mass or pneumonia in either lung.      NG tube in place as described.      Cholelithiasis.      Stable bilobed hypodensity adjacent to the pancreatic tail.  Pancreatic pseudocyst versus cystic pancreatic neoplasms. In  addition, there are subtle stable nonspecific splenic hypodensities  which could be splenic hemangiomas. Recommend further evaluation with  MRI the pancreas and spleen.      MACRO:  None      Signed by: Gabe Gage 6/28/2024 7:55 AM  Dictation workstation:   [id number]      Physical Exam  Constitutional:        General: She is not in acute distress.  HENT:      Head: Normocephalic and atraumatic.   Eyes:      Conjunctiva/sclera: Conjunctivae normal.      Pupils: Pupils are equal, round, and reactive to light.   Cardiovascular:      Rate and Rhythm: Normal rate and regular rhythm.   Pulmonary:      Effort: Pulmonary effort is normal.      Breath sounds: Normal breath sounds.   Abdominal:      Palpations: Abdomen is soft.      Tenderness: There is no abdominal tenderness.   Skin:     General: Skin is warm and dry.   Neurological:      Mental Status: She is alert.   Psychiatric:         Mood and Affect: Mood normal.         Behavior: Behavior normal.         Relevant Results           Scheduled medications  aspirin, 81 mg, oral, Daily  budesonide, 0.25 mg, nebulization, Daily   And  formoterol, 20 mcg, nebulization, BID  busPIRone, 5 mg, oral, BID  calcium carbonate, 1,500 mg, oral, Daily  dilTIAZem ER, 240 mg, oral, Daily  enoxaparin, 1 mg/kg, subcutaneous, q12h  fat emulsion-plant based, 250 mL, intravenous, Daily Lipids  fluconazole, 200 mg, oral, Daily  folic acid, 1 mg, oral, Daily  ipratropium-albuteroL, 3 mL, nebulization, TID  lisinopril, 10 mg, oral, Daily  montelukast, 10 mg, oral, Daily  multivitamin with minerals, 1 tablet, oral, Daily  nortriptyline, 50 mg, oral, Nightly  oxybutynin, 5 mg, oral, Daily  oxygen, , inhalation, Continuous - Inhalation  pantoprazole, 40 mg, intravenous, Daily before breakfast  piperacillin-tazobactam, 3.375 g, intravenous, q6h  [START ON 6/29/2024] predniSONE, 10 mg, oral, Daily  thiamine, 100 mg, oral, Daily  thiamine, 100 mg, oral, Daily  varenicline, 1 mg, oral, BID      Continuous medications  Adult Clinimix Parenteral Nutrition, 83 mL/hr, Last Rate: 83 mL/hr (06/28/24 1112)  Adult Clinimix Parenteral Nutrition, 83 mL/hr  Adult Clinimix Parenteral Nutrition, 83 mL/hr      PRN medications  PRN medications: acetaminophen **OR** acetaminophen **OR** acetaminophen, benzonatate,  guaiFENesin, HYDROmorphone, HYDROmorphone, hydrOXYzine HCL, ipratropium-albuteroL, morphine, morphine, nitroglycerin, ondansetron    Assessment/Plan      Principal Problem:    Perforated viscus  Active Problems:    Thrombocytosis    Perforated small bowel's with peritonitis:  Concern for postop ileus versus partial obstruction:  Underwent surgical intervention 6/21  -Underwent small bowel resection, and washout procedure.  -Culture for Candida albicans, concern for mix of gram-negative and anaerobes.  -Infectious disease following, continued on IV Zosyn, fluconazole 200 mg/day.  Plan for antibiotics with stop date 7/3/24.,  This will be a total of 10 days from the initiation of fluconazole.  Infectious diseases recommended discharge home once medically ready on p.o. Augmentin 875 mg 2 times a day instead of Zosyn if needed.  -General surgery following for postop ileus versus partial SBO, appreciate recommendations.  -Will continue to trend CBC  -KUB in AM  -continue NPO    Acute DVT in the left distal brachial vein:  -Vascular upper extremity ultrasound with acute finding, non-occlusive.  -Patient initiated on full dose Lovenox therapeutic dose and 6/27.  -Patient with contrast allergy, CT angio chest requiring prep, no acute findings.      Acute on chronic hyponatremia, resolved:  In setting of poor oral intake  -Nephrology was managing, placed on IV isotonic fluid with improvement  .-Transitioned to 5% dextrose and  mL an hour, sodium levels have normalized.  Fluids will be discontinued.  -Nephrology signed off  -Will continue to trend sodium    Severe malnutrition:  -Nutrition following, appreciate recommendations.  -NG tube in place        Malnutrition Diagnosis Status: New  Malnutrition Diagnosis: Severe malnutrition related to acute disease or injury  As Evidenced by: oral intake less than 50% of estimated energy requirements for greater than five days, generalized edema, visualized bradley of depelted  adipose tissues and skeletal tissues wasting  I agree with the dietitian's malnutrition diagnosis.       LOS: Unknown  DVT ppx: Therapeutic Lovenox    aGvin Houser MD

## 2024-06-28 NOTE — PROGRESS NOTES
"Fernando Cuevas is a 63 y.o. female on day 7 of admission presenting with Perforated viscus.    Assessment/Plan   May need to consider PPN.  Anticipate prolonged ileus.  Try to ambulate to chair in sanchez.  Chewing gum    Subjective   Feels comfortable but no bowel function yet        Objective     Physical Exam  NAD  A&Ox3  Non icteric  CTA  RR  Abdomen soft min tender. Wounds clean, intact.  Quite distended with tympany.  Extremities warm, well perfused     Last Recorded Vitals  Blood pressure 146/79, pulse 99, temperature 36.5 °C (97.7 °F), temperature source Temporal, resp. rate 18, height 1.575 m (5' 2.01\"), weight 64 kg (141 lb 1.5 oz), SpO2 100%.  Intake/Output last 3 Shifts:  I/O last 3 completed shifts:  In: 600 (9.4 mL/kg) [I.V.:500 (7.8 mL/kg); IV Piggyback:100]  Out: 1600 (25 mL/kg) [Urine:800 (0.3 mL/kg/hr); Emesis/NG output:800]  Weight: 64 kg     Relevant Results    Scheduled medications  aspirin, 81 mg, oral, Daily  budesonide, 0.25 mg, nebulization, Daily   And  formoterol, 20 mcg, nebulization, BID  busPIRone, 5 mg, oral, BID  calcium carbonate, 1,500 mg, oral, Daily  dilTIAZem ER, 240 mg, oral, Daily  enoxaparin, 1 mg/kg, subcutaneous, q12h  fat emulsion-plant based, 250 mL, intravenous, Daily Lipids  fluconazole, 200 mg, oral, Daily  folic acid, 1 mg, oral, Daily  ipratropium-albuteroL, 3 mL, nebulization, TID  lisinopril, 10 mg, oral, Daily  montelukast, 10 mg, oral, Daily  multivitamin with minerals, 1 tablet, oral, Daily  nortriptyline, 50 mg, oral, Nightly  oxybutynin, 5 mg, oral, Daily  oxygen, , inhalation, Continuous - Inhalation  pantoprazole, 40 mg, intravenous, Daily before breakfast  piperacillin-tazobactam, 3.375 g, intravenous, q6h  [START ON 6/29/2024] predniSONE, 10 mg, oral, Daily  thiamine, 100 mg, oral, Daily  thiamine, 100 mg, oral, Daily  varenicline, 1 mg, oral, BID      Continuous medications  Adult Clinimix Parenteral Nutrition, 83 mL/hr, Last Rate: 83 mL/hr (06/28/24 " 1112)  Adult Clinimix Parenteral Nutrition, 83 mL/hr  Adult Clinimix Parenteral Nutrition, 83 mL/hr      PRN medications  PRN medications: acetaminophen **OR** acetaminophen **OR** acetaminophen, benzonatate, guaiFENesin, HYDROmorphone, HYDROmorphone, hydrOXYzine HCL, ipratropium-albuteroL, morphine, morphine, nitroglycerin, ondansetron    Results for orders placed or performed during the hospital encounter of 06/20/24 (from the past 24 hour(s))   POCT GLUCOSE   Result Value Ref Range    POCT Glucose 149 (H) 74 - 99 mg/dL   Basic metabolic panel   Result Value Ref Range    Glucose 104 (H) 74 - 99 mg/dL    Sodium 132 (L) 136 - 145 mmol/L    Potassium 4.2 3.5 - 5.3 mmol/L    Chloride 94 (L) 98 - 107 mmol/L    Bicarbonate 27 21 - 32 mmol/L    Anion Gap 15 10 - 20 mmol/L    Urea Nitrogen 9 6 - 23 mg/dL    Creatinine 0.64 0.50 - 1.05 mg/dL    eGFR >90 >60 mL/min/1.73m*2    Calcium 8.3 (L) 8.6 - 10.3 mg/dL   CBC and Auto Differential   Result Value Ref Range    WBC 14.2 (H) 4.4 - 11.3 x10*3/uL    nRBC 0.1 (H) 0.0 - 0.0 /100 WBCs    RBC 3.70 (L) 4.00 - 5.20 x10*6/uL    Hemoglobin 8.6 (L) 12.0 - 16.0 g/dL    Hematocrit 29.9 (L) 36.0 - 46.0 %    MCV 81 80 - 100 fL    MCH 23.2 (L) 26.0 - 34.0 pg    MCHC 28.8 (L) 32.0 - 36.0 g/dL    RDW 20.6 (H) 11.5 - 14.5 %    Platelets 722 (H) 150 - 450 x10*3/uL    Neutrophils % 90.6 40.0 - 80.0 %    Immature Granulocytes %, Automated 1.2 (H) 0.0 - 0.9 %    Lymphocytes % 6.8 13.0 - 44.0 %    Monocytes % 1.3 2.0 - 10.0 %    Eosinophils % 0.0 0.0 - 6.0 %    Basophils % 0.1 0.0 - 2.0 %    Neutrophils Absolute 12.83 (H) 1.20 - 7.70 x10*3/uL    Immature Granulocytes Absolute, Automated 0.17 0.00 - 0.70 x10*3/uL    Lymphocytes Absolute 0.96 (L) 1.20 - 4.80 x10*3/uL    Monocytes Absolute 0.18 0.10 - 1.00 x10*3/uL    Eosinophils Absolute 0.00 0.00 - 0.70 x10*3/uL    Basophils Absolute 0.01 0.00 - 0.10 x10*3/uL   Magnesium   Result Value Ref Range    Magnesium 1.80 1.60 - 2.40 mg/dL   Phosphorus    Result Value Ref Range    Phosphorus 4.2 2.5 - 4.9 mg/dL   Morphology   Result Value Ref Range    RBC Morphology See Below     Spherocytes Few     Target Cells Few     Teardrop Cells Few     Parish Cells Few    POCT GLUCOSE   Result Value Ref Range    POCT Glucose 117 (H) 74 - 99 mg/dL   POCT GLUCOSE   Result Value Ref Range    POCT Glucose 106 (H) 74 - 99 mg/dL           I spent 25 minutes in the professional and overall care of this patient.      Reid Bingham MD

## 2024-06-28 NOTE — PROGRESS NOTES
06/28/24 1507   Discharge Planning   Who is requesting discharge planning? Provider   Home or Post Acute Services Post acute facilities (Rehab/SNF/etc)   Type of Post Acute Facility Services Skilled nursing   Patient expects to be discharged to: [facility]   Does the patient need discharge transport arranged? Yes   RoundTrip coordination needed? Yes   Has discharge transport been arranged? No     6/28/24 1507  Patient not medically ready for discharge.   NG to LIS and NPO.  Started on PPN last night.  Left arm DVT.  Will go to [facility] at discharge and will need auth.  Brittany Manjarrez RN [facility]

## 2024-06-28 NOTE — PROGRESS NOTES
Nutrition Note    Met with pt earlier today, pt without complaints.     New peripheral IV access placed this afternoon for PPN.     Plan:   - PPN to continue tonight and thru weekend as d/w surgery CNP and surgeons, RN, PharmD. PPN @ 83 ml/hr continuous infusion x 24 hours.   - lipids tonight (MWF only)   - RFP, Mg daily; replete prn   - NG to LIWS/oral diet per surgery team   - strict I/O and daily wts     Weight         6/20/2024  2132 6/21/2024  0009 6/27/2024  1637       Weight: 64 kg (141 lb 1.5 oz) 64 kg (141 lb 1.5 oz) 64 kg (141 lb 1.5 oz)           Scheduled medications  aspirin, 81 mg, oral, Daily  budesonide, 0.25 mg, nebulization, Daily   And  formoterol, 20 mcg, nebulization, BID  busPIRone, 5 mg, oral, BID  calcium carbonate, 1,500 mg, oral, Daily  dilTIAZem ER, 240 mg, oral, Daily  enoxaparin, 1 mg/kg, subcutaneous, q12h  fat emulsion-plant based, 250 mL, intravenous, Daily Lipids  fluconazole, 200 mg, oral, Daily  folic acid, 1 mg, oral, Daily  ipratropium-albuteroL, 3 mL, nebulization, TID  lisinopril, 10 mg, oral, Daily  montelukast, 10 mg, oral, Daily  multivitamin with minerals, 1 tablet, oral, Daily  nortriptyline, 50 mg, oral, Nightly  oxybutynin, 5 mg, oral, Daily  oxygen, , inhalation, Continuous - Inhalation  pantoprazole, 40 mg, intravenous, Daily before breakfast  piperacillin-tazobactam, 3.375 g, intravenous, q6h  [START ON 6/29/2024] predniSONE, 10 mg, oral, Daily  thiamine, 100 mg, oral, Daily  thiamine, 100 mg, oral, Daily  varenicline, 1 mg, oral, BID      Continuous medications  Adult Clinimix Parenteral Nutrition, 83 mL/hr, Last Rate: 83 mL/hr (06/28/24 1112)  Adult Clinimix Parenteral Nutrition, 83 mL/hr      PRN medications  PRN medications: acetaminophen **OR** acetaminophen **OR** acetaminophen, benzonatate, guaiFENesin, HYDROmorphone, HYDROmorphone, hydrOXYzine HCL, ipratropium-albuteroL, morphine, morphine, nitroglycerin, ondansetron     Latest Reference Range & Units 06/28/24  05:24   GLUCOSE 74 - 99 mg/dL 104 (H)   SODIUM 136 - 145 mmol/L 132 (L)   POTASSIUM 3.5 - 5.3 mmol/L 4.2   CHLORIDE 98 - 107 mmol/L 94 (L)   Bicarbonate 21 - 32 mmol/L 27   Anion Gap 10 - 20 mmol/L 15   Blood Urea Nitrogen 6 - 23 mg/dL 9   Creatinine 0.50 - 1.05 mg/dL 0.64   EGFR >60 mL/min/1.73m*2 >90   Calcium 8.6 - 10.3 mg/dL 8.3 (L)   PHOSPHORUS 2.5 - 4.9 mg/dL 4.2   MAGNESIUM 1.60 - 2.40 mg/dL 1.80   (H): Data is abnormally high  (L): Data is abnormally low    I/O last 3 completed shifts:  In: 600 (9.4 mL/kg) [I.V.:500 (7.8 mL/kg); IV Piggyback:100]  Out: 1600 (25 mL/kg) [Urine:800 (0.3 mL/kg/hr); Emesis/NG output:800]  Weight: 64 kg   I/O this shift:  In: 1192.4   Out: 200 [Urine:200]    RDN to follow closely, available via secure chat over weekend

## 2024-06-28 NOTE — PROGRESS NOTES
Occupational Therapy    Occupational Therapy Treatment    Name: Fernando Cuevas  MRN: [id number]  : 1960  Date: 24       Assessment:  OT Assessment: Pt. 63 y.o. F presenting with perforated viscus s/p exploratory laparascopy . Pt. overall did well with ADLs, transfers, and mobility. Pt. required verbal cues for safe hand placement and increased time for task completion. Pt. would benefit from LOW intensity therapy to increase safe functional participation in ADLs, transfers, and mobility.  Prognosis: Good  Barriers to Discharge: Decreased caregiver support  Evaluation/Treatment Tolerance: Patient limited by fatigue, Patient limited by pain  Medical Staff Made Aware: Yes  End of Session Communication: Bedside nurse  End of Session Patient Position: Up in chair, Alarm on  Plan:  Treatment Interventions: ADL retraining, Functional transfer training, Equipment evaluation/education  OT Frequency: 3 times per week  OT Discharge Recommendations: Low intensity level of continued care  Equipment Recommended upon Discharge:  (hip kit; pt. educated on where to obtain)  OT Recommended Transfer Status: Stand by assist  OT - OK to Discharge: Yes (Per OT POC)    Subjective   Previous Visit Info:  OT Last Visit  OT Received On: 24  General:  General  Reason for Referral: 63 y.o. F s/p exploratory laparascopy   Referred By: MD Barbi  Past Medical History Relevant to Rehab:   Past Medical History:   Diagnosis Date    Essential (primary) hypertension 2016    Benign hypertension    Personal history of other diseases of the respiratory system     H/O emphysema    Personal history of other diseases of the respiratory system     History of chronic obstructive lung disease    Personal history of other mental and behavioral disorders     History of depression      Prior to Session Communication: Bedside nurse  Patient Position Received: Bed, 3 rail up, Alarm on  General Comment: Pt. cooperative and agreeable  to OT treatment given mod encouragement, pt. requiring increased time for task completion and verbal cues for hand placement    Precautions:  Medical Precautions: Fall precautions (High)  Post-Surgical Precautions: Abdominal surgery precautions    Pain Assessment:  Pain Assessment  Pain Assessment: 0-10  0-10 (Numeric) Pain Score: 8  Pain Location: Abdomen  Pain Orientation: Lower     Objective   Activities of Daily Living:   UE Dressing  UE Dressing Level of Assistance: Minimum assistance (to bring around back)  UE Dressing Where Assessed:  (standing EOB with walker)    LE Dressing  Pants Level of Assistance: Close supervision- reacher  Sock Level of Assistance: Close supervision- sock aide, dressing stick  LE Dressing Where Assessed: Edge of bed    Bed Mobility/Transfers:   Bed Mobility  Bed Mobility: Yes  Bed Mobility 1  Bed Mobility 1: Supine to sitting  Level of Assistance 1: Minimum assistance (for upper trunk)    Transfers  Transfer: Yes  Transfer 1  Transfer From 1: Bed to  Transfer to 1: Chair with arms  Technique 1: Sit to stand, Stand to sit  Transfer Device 1: Walker  Transfer Level of Assistance 1: Contact guard  Trials/Comments 1: verbal cues for hand placement  Transfers 2  Transfer From 2: Sit to  Transfer to 2: Stand  Technique 2: Sit to stand  Transfer Device 2: Walker  Transfer Level of Assistance 2: Close supervision    Sitting Balance:  Static Sitting Balance  Static Sitting-Balance Support: Feet supported  Static Sitting-Level of Assistance: Close supervision  Dynamic Sitting Balance  Dynamic Sitting-Balance Support: Feet supported  Dynamic Sitting-Balance: Forward lean  Dynamic Sitting-Comments: Close supervision dynamic sitting to don/doff LB clothing EOB  Standing Balance:  Static Standing Balance  Static Standing-Balance Support: Bilateral upper extremity supported (walker)  Static Standing-Level of Assistance: Close supervision  Dynamic Standing Balance  Dynamic Standing-Balance Support:  Bilateral upper extremity supported (walker)  Dynamic Standing-Balance: Forward lean  Dynamic Standing-Comments: Close supervision dynamic standing to don LB clothing over hips and don gown    Outcome Measures:  [facility] Daily Activity  Putting on and taking off regular lower body clothing: A little  Bathing (including washing, rinsing, drying): A little  Putting on and taking off regular upper body clothing: A little  Toileting, which includes using toilet, bedpan or urinal: A little  Taking care of personal grooming such as brushing teeth: A little  Eating Meals: None  Daily Activity - Total Score: 19    Education Documentation  Precautions, taught by DARLENE Vazquez at 6/28/2024  1:45 PM.  Learner: Patient  Readiness: Acceptance  Method: Explanation, Demonstration  Response: Verbalizes Understanding, Demonstrated Understanding    ADL Training, taught by DARLENE Vazquez at 6/28/2024  1:45 PM.  Learner: Patient  Readiness: Acceptance  Method: Explanation, Demonstration  Response: Verbalizes Understanding, Demonstrated Understanding    Education Comments  No comments found.    Note written by Maria G COLON, under supervision of CRISTAL Ledesma   Goals:  Encounter Problems       Encounter Problems (Active)       ADLs       Patient will perform UB and LB bathing with contact guard assist level of assistance and grab bars, shower chair, and long-handled sponge. (Progressing)       Start:  06/24/24    Expected End:  07/08/24            Patient with complete upper body dressing with supervision level of assistance donning and doffing all UE clothes with PRN adaptive equipment while edge of bed  (Progressing)       Start:  06/24/24    Expected End:  07/08/24            Patient with complete lower body dressing with minimal assist  level of assistance donning and doffing all LE clothes  with reacher, shoe horn, sock-aid, and dressing stick  while edge of bed  (Met)       Start:  06/24/24    Expected End:  07/08/24     Resolved:  06/28/24    Updated to: Patient with complete lower body dressing with modified assist  level of assistance donning and doffing all LE clothes  with reacher, shoe horn, sock-aid, and dressing stick  while edge of bed    Update reason: patient goal met         Patient will complete daily grooming tasks brushing teeth and washing face/hair with independent level of assistance while edge of bed . (Progressing)       Start:  06/24/24    Expected End:  07/08/24            Patient will complete toileting including hygiene clothing management/hygiene with contact guard assist level of assistance and raised toilet seat and grab bars. (Progressing)       Start:  06/24/24    Expected End:  07/08/24            Patient with complete lower body dressing with modified assist  level of assistance donning and doffing all LE clothes  with reacher, shoe horn, sock-aid, and dressing stick  while edge of bed       Start:  06/28/24    Expected End:  07/08/24                   BALANCE       Pt will maintain dynamic standing balance during ADL task with supervision level of assistance in order to demonstrate decreased risk of falling and improved postural control. (Progressing)       Start:  06/24/24    Expected End:  07/08/24            Patientt will maintain static standing balance during ADL task with independent level of assistance drop down in order to demonstrate decreased risk of falling and improved postural control. (Progressing)       Start:  06/24/24    Expected End:  07/08/24            Patient will tolerate standing for 5 minutes to contact guard level of assistance with front wheeled walker in order to improve functional activity tolerance for ADL tasks. (Progressing)       Start:  06/24/24    Expected End:  07/08/24               TRANSFERS       Patient will perform bed mobility supervision level of assistance and bed rails in order to improve safety and independence with mobility (Progressing)       Start:   06/24/24    Expected End:  07/08/24            Patient will complete functional transfer to all surfaces with front wheeled walker with contact guard assist level of assistance. (Progressing)       Start:  06/24/24    Expected End:  07/08/24            Patient will complete sit to stand transfer with independent level of assistance and front wheeled walker in order to improve safety and prepare for out of bed mobility. (Progressing)       Start:  06/24/24    Expected End:  07/08/24

## 2024-06-28 NOTE — PROGRESS NOTES
"  INFECTIOUS DISEASE DAILY PROGRESS NOTE    SUBJECTIVE:    Similar abd symptoms, no BM yet. No fevers.    OBJECTIVE:  VITALS (Last 24 Hours)  /83   Pulse 90   Temp 36.6 °C (97.8 °F)   Resp 14   Ht 1.575 m (5' 2.01\")   Wt 64 kg (141 lb 1.5 oz)   SpO2 94%   BMI 25.80 kg/m²     PHYSICAL EXAM:  Gen - looks a bit better today, up in chair  ENT - NG tube to suction with bilious output  Abd - distended, surgical incision looks fine, BS hypoactive  Skin - no rash    ABX: IV Zosyn and PO Fluconazole    LABS:  Lab Results   Component Value Date    WBC 14.2 (H) 06/28/2024    HGB 8.6 (L) 06/28/2024    HCT 29.9 (L) 06/28/2024    MCV 81 06/28/2024     (H) 06/28/2024     Lab Results   Component Value Date    GLUCOSE 104 (H) 06/28/2024    CALCIUM 8.3 (L) 06/28/2024     (L) 06/28/2024    K 4.2 06/28/2024    CO2 27 06/28/2024    CL 94 (L) 06/28/2024    BUN 9 06/28/2024    CREATININE 0.64 06/28/2024           Estimated Creatinine Clearance: 79.1 mL/min (by C-G formula based on SCr of 0.64 mg/dL).    IMAGING:  CT/PE 6/28  IMPRESSION:  No CT evidence of pulmonary embolism in the current exam.    Emphysema. Band of atelectasis at the right lung base. Confluent  atelectasis at the posteromedial left lung base. Tiny bilateral  pleural effusions.. No mass or pneumonia in either lung.    NG tube in place as described.    Cholelithiasis.    Stable bilobed hypodensity adjacent to the pancreatic tail.  Pancreatic pseudocyst versus cystic pancreatic neoplasms. In  addition, there are subtle stable nonspecific splenic hypodensities  which could be splenic hemangiomas. Recommend further evaluation with MRI the pancreas and spleen.      ASSESSMENT/PLAN:     Perforated Small Bowel with Peritonitis - s/p OR 6/21, culture with Candida albicans. Suspect there would also be a mix of gram negative and anaerobes involved in this type of infection.  Post-Op Ileus - ongoing  COPD on home Azithro ppx MWF - holding Tunde while on " Fluconazole for risk of QT prolongation     IV Zosyn. Fluconazole 200mg/day. Plan for abx through 7/3/24 and then stop. Will be 10D total from start of Fluconazole. If she is ready to go home prior to then can be on PO Augmentin 875mg BID instead of Zosyn.    Monitoring for adverse effects of abx such as rash/itching/diarrhea - none apparent.     Will sign off. Please call back with questions. Thanks!    Tim Nelson MD  [facility]  Office #[phone]

## 2024-06-28 NOTE — PROGRESS NOTES
Physical Therapy    Physical Therapy Treatment    Patient Name: Fernando Cuevas  MRN: [id number]  Today's Date: 6/28/2024  Time Calculation  Start Time: 1105  Stop Time: 1132  Time Calculation (min): 27 min    Assessment/Plan   PT Assessment  End of Session Communication: Bedside nurse (Discussed pt's progress and current mobility as described in mobility section)  Assessment Comment: Pt has improved participation in PT session as demo'd by increased ambulation distances, however abdominal discomfort continues to limit further distances. Pt will continue to benefit from skilled PT for improved B LE strength and stability for safe return to PLOF.  End of Session Patient Position: Bed, 3 rail up, Alarm on (call light and needs in reach)  PT Plan  Inpatient/Swing Bed or Outpatient: Inpatient  PT Plan  Treatment/Interventions: Bed mobility, Gait training  PT Plan: Ongoing PT  PT Frequency: 4 times per week  PT Discharge Recommendations: Moderate intensity level of continued care  PT Recommended Transfer Status: Assist x1  PT - OK to Discharge: Yes (per POC)      General Visit Information:   PT  Visit  PT Received On: 06/28/24  General  Reason for Referral: 63 y.o. female presenting with COPD exacerbation and SOB.  Referred By: MD Barbi  Past Medical History Relevant to Rehab:   Past Medical History:   Diagnosis Date    Essential (primary) hypertension 04/20/2016    Benign hypertension    Personal history of other diseases of the respiratory system     H/O emphysema    Personal history of other diseases of the respiratory system     History of chronic obstructive lung disease    Personal history of other mental and behavioral disorders     History of depression       Prior to Session Communication: Bedside nurse (RN cleared pt. for PT Tx. All charts reviewed. Per handoff with nursing prior to therapy visit, patient’s pain and vitals are stable. Additional concern for this patient related to therapy session: none.)  Patient  Position Received: Bed, 3 rail up, Alarm off, not on at start of session  Preferred Learning Style: auditory, kinesthetic, verbal  General Comment: Pt is semi-supine in bed on arrival, pleasant and fully participatory in PT tx    Subjective   Precautions:  Precautions  Medical Precautions: Fall precautions, Oxygen therapy device and L/min (2L supplemental O2 via NC)  Post-Surgical Precautions: Abdominal surgery precautions      Objective   Pain:  Pain Assessment  0-10 (Numeric) Pain Score: 0 - No pain  Cognition:  Cognition  Overall Cognitive Status: Within Functional Limits  Coordination:  Movements are Fluid and Coordinated: Yes  Postural Control:  Postural Control  Postural Control: Within Functional Limits  Static Sitting Balance  Static Sitting-Balance Support: Feet supported  Static Sitting-Level of Assistance: Independent  Static Sitting-Comment/Number of Minutes: unsupported sitting at EOB    Activity Tolerance:  Activity Tolerance  Endurance: Tolerates 10 - 20 min exercise with multiple rests  Treatments:  Therapeutic Exercise  Therapeutic Exercise Performed: No (Pt reporting she had been doing her ther-ex earlier in AM)    Bed Mobility  Bed Mobility: Yes  Bed Mobility 1  Bed Mobility 1: Supine to sitting  Level of Assistance 1: Modified independent  Bed Mobility Comments 1: HOB elevated  Bed Mobility 2  Bed Mobility  2: Sitting to supine  Level of Assistance 2: Minimum assistance  Bed Mobility Comments 2: bed flat, assist to L LE    Ambulation/Gait Training  Ambulation/Gait Training Performed: Yes  Ambulation/Gait Training 1  Surface 1: Level tile  Device 1: Rollator  Assistance 1: Close supervision  Quality of Gait 1:  (Step-thru pattern with decreased step length and decreased foot clearance in swing. Steady throughout with slowed pacing)  Comments/Distance (ft) 1: 100' x2 trials (prolonged seated rest between trials to manage SOB and fatigue)  Transfers  Transfer: Yes  Transfer 1  Transfer From 1:   (sit<>stand from EOB)  Transfer Device 1:  (rollator)  Transfer Level of Assistance 1: Distant supervision  Transfers 2  Transfer From 2:  (sit<>stand from bedside commode)  Transfer Device 2:  (rollator)  Transfer Level of Assistance 2: Close supervision  Transfers 3  Transfer From 3:  (sit<>stand to chair without arm rest)  Transfer Device 3:  (rollator)  Transfer Level of Assistance 3: Close supervision    Outcome Measures:  [facility] Basic Mobility  Turning from your back to your side while in a flat bed without using bedrails: None  Moving from lying on your back to sitting on the side of a flat bed without using bedrails: A little  Moving to and from bed to chair (including a wheelchair): A little  Standing up from a chair using your arms (e.g. wheelchair or bedside chair): A little  To walk in hospital room: A little  Climbing 3-5 steps with railing: A lot  Basic Mobility - Total Score: 18      Encounter Problems       Encounter Problems (Active)       Balance       Pt will tolerate 10+ mins dynamic standing balance activities with CGA or less and no LOB using a RW.  (Progressing)       Start:  06/23/24    Expected End:  07/07/24               PT Transfers       STG - Patient will perform bed mobility with SBA or less using log roll technique.  (Met)       Start:  06/23/24    Expected End:  07/07/24    Resolved:  06/28/24         STG - Patient will transfer sit to and from stand mod I with a RW.  (Progressing)       Start:  06/23/24    Expected End:  07/07/24               Pain - Adult               Encounter Problems (Resolved)       Mobility       STG - Patient will ambulate 50 ft with CGA and a Rw with minimal SOB or fatigue.  (Met)       Start:  06/23/24    Expected End:  07/07/24    Resolved:  06/28/24

## 2024-06-28 NOTE — PROGRESS NOTES
"Fernando Cuevas is a 63 y.o. female on day 7 of admission presenting with Perforated viscus.    Subjective   Patient about the same. Pain is improving a little. Abdomen remains quite distended however is soft. PPN started yesterday. Denies any new complaints.    Objective   PE:  Constitutional: A&Ox3, calm and cooperative, NAD  Cardiovascular: Normal rate and regular rhythm.  Respiratory/Thorax: Good symmetric chest expansion. No labored breathing.  Gastrointestinal: Abdomen moderately distended, soft, appropriately tender, no peritoneal signs, surgical incision sites cdi, NG 400cc/24hr, dark green  Genitourinary: Voiding independently   Musculoskeletal: ROM intact  Extremities: No peripheral edema  Neurological: A&Ox3, No focal deficits  Psychological: Appropriate mood and behavior  Skin: Warm and dry    Last Recorded Vitals  Blood pressure 133/72, pulse 103, temperature 36.7 °C (98 °F), temperature source Temporal, resp. rate 18, height 1.575 m (5' 2.01\"), weight 64 kg (141 lb 1.5 oz), SpO2 100%.  Intake/Output last 3 Shifts:  I/O last 3 completed shifts:  In: 600 (9.4 mL/kg) [I.V.:500 (7.8 mL/kg); IV Piggyback:100]  Out: 1600 (25 mL/kg) [Urine:800 (0.3 mL/kg/hr); Emesis/NG output:800]  Weight: 64 kg     Relevant Results  Scheduled medications  aspirin, 81 mg, oral, Daily  budesonide, 0.25 mg, nebulization, Daily   And  formoterol, 20 mcg, nebulization, BID  busPIRone, 5 mg, oral, BID  calcium carbonate, 1,500 mg, oral, Daily  dilTIAZem ER, 240 mg, oral, Daily  enoxaparin, 1 mg/kg, subcutaneous, q12h  fluconazole, 200 mg, oral, Daily  folic acid, 1 mg, oral, Daily  ipratropium-albuteroL, 3 mL, nebulization, TID  lisinopril, 10 mg, oral, Daily  montelukast, 10 mg, oral, Daily  multivitamin with minerals, 1 tablet, oral, Daily  nortriptyline, 50 mg, oral, Nightly  oxybutynin, 5 mg, oral, Daily  oxygen, , inhalation, Continuous - Inhalation  pantoprazole, 40 mg, intravenous, Daily before " breakfast  piperacillin-tazobactam, 3.375 g, intravenous, q6h  [START ON 6/29/2024] predniSONE, 10 mg, oral, Daily  thiamine, 100 mg, oral, Daily  thiamine, 100 mg, oral, Daily  varenicline, 1 mg, oral, BID      Continuous medications  Adult Clinimix Parenteral Nutrition, 83 mL/hr, Last Rate: 83 mL/hr (06/28/24 1112)  Adult Clinimix Parenteral Nutrition, 83 mL/hr      PRN medications  PRN medications: acetaminophen **OR** acetaminophen **OR** acetaminophen, benzonatate, guaiFENesin, HYDROmorphone, HYDROmorphone, hydrOXYzine HCL, ipratropium-albuteroL, morphine, morphine, nitroglycerin, ondansetron    Results for orders placed or performed during the hospital encounter of 06/20/24 (from the past 24 hour(s))   POCT GLUCOSE   Result Value Ref Range    POCT Glucose 149 (H) 74 - 99 mg/dL   Basic metabolic panel   Result Value Ref Range    Glucose 104 (H) 74 - 99 mg/dL    Sodium 132 (L) 136 - 145 mmol/L    Potassium 4.2 3.5 - 5.3 mmol/L    Chloride 94 (L) 98 - 107 mmol/L    Bicarbonate 27 21 - 32 mmol/L    Anion Gap 15 10 - 20 mmol/L    Urea Nitrogen 9 6 - 23 mg/dL    Creatinine 0.64 0.50 - 1.05 mg/dL    eGFR >90 >60 mL/min/1.73m*2    Calcium 8.3 (L) 8.6 - 10.3 mg/dL   CBC and Auto Differential   Result Value Ref Range    WBC 14.2 (H) 4.4 - 11.3 x10*3/uL    nRBC 0.1 (H) 0.0 - 0.0 /100 WBCs    RBC 3.70 (L) 4.00 - 5.20 x10*6/uL    Hemoglobin 8.6 (L) 12.0 - 16.0 g/dL    Hematocrit 29.9 (L) 36.0 - 46.0 %    MCV 81 80 - 100 fL    MCH 23.2 (L) 26.0 - 34.0 pg    MCHC 28.8 (L) 32.0 - 36.0 g/dL    RDW 20.6 (H) 11.5 - 14.5 %    Platelets 722 (H) 150 - 450 x10*3/uL    Neutrophils % 90.6 40.0 - 80.0 %    Immature Granulocytes %, Automated 1.2 (H) 0.0 - 0.9 %    Lymphocytes % 6.8 13.0 - 44.0 %    Monocytes % 1.3 2.0 - 10.0 %    Eosinophils % 0.0 0.0 - 6.0 %    Basophils % 0.1 0.0 - 2.0 %    Neutrophils Absolute 12.83 (H) 1.20 - 7.70 x10*3/uL    Immature Granulocytes Absolute, Automated 0.17 0.00 - 0.70 x10*3/uL    Lymphocytes Absolute  0.96 (L) 1.20 - 4.80 x10*3/uL    Monocytes Absolute 0.18 0.10 - 1.00 x10*3/uL    Eosinophils Absolute 0.00 0.00 - 0.70 x10*3/uL    Basophils Absolute 0.01 0.00 - 0.10 x10*3/uL   Magnesium   Result Value Ref Range    Magnesium 1.80 1.60 - 2.40 mg/dL   Phosphorus   Result Value Ref Range    Phosphorus 4.2 2.5 - 4.9 mg/dL   Morphology   Result Value Ref Range    RBC Morphology See Below     Spherocytes Few     Target Cells Few     Teardrop Cells Few     [place] Cells Few    POCT GLUCOSE   Result Value Ref Range    POCT Glucose 117 (H) 74 - 99 mg/dL       Assessment/Plan   Principal Problem:    Perforated viscus    Patient with perforated small bowel is now POD4 ex lap, pSBR with Dr Liang. Intra-op findings of segment of perforated small bowel. PPN begun. Abdominal exam relatively the same. Labs reviewed- WBC downtrending steadily, hgb stable at 8.6.    Plan:  - Continue NG LIWS  - NPO, sips and chips okay  - KUB in AM  - Continue PPN  - OOB/ambulation  - IS encouraged  - PRN antiemetic  - DVT proph with SCDs/Lovenox    Surgery to follow.    Discussed with Dr Bingham.    I spent 35 minutes in the professional and overall care of this patient.    Mahad Boateng PA-C

## 2024-06-29 ENCOUNTER — APPOINTMENT (OUTPATIENT)
Dept: RADIOLOGY | Facility: HOSPITAL | Age: 64
End: 2024-06-29
Payer: COMMERCIAL

## 2024-06-29 LAB
ANION GAP SERPL CALC-SCNC: 16 MMOL/L (ref 10–20)
BASOPHILS # BLD AUTO: 0.02 X10*3/UL (ref 0–0.1)
BASOPHILS NFR BLD AUTO: 0.1 %
BUN SERPL-MCNC: 12 MG/DL (ref 6–23)
BURR CELLS BLD QL SMEAR: NORMAL
CALCIUM SERPL-MCNC: 9.3 MG/DL (ref 8.6–10.3)
CHLORIDE SERPL-SCNC: 95 MMOL/L (ref 98–107)
CO2 SERPL-SCNC: 27 MMOL/L (ref 21–32)
CREAT SERPL-MCNC: 0.84 MG/DL (ref 0.5–1.05)
DACRYOCYTES BLD QL SMEAR: NORMAL
EGFRCR SERPLBLD CKD-EPI 2021: 78 ML/MIN/1.73M*2
EOSINOPHIL # BLD AUTO: 0.01 X10*3/UL (ref 0–0.7)
EOSINOPHIL NFR BLD AUTO: 0.1 %
ERYTHROCYTE [DISTWIDTH] IN BLOOD BY AUTOMATED COUNT: 20.4 % (ref 11.5–14.5)
GLUCOSE BLD MANUAL STRIP-MCNC: 118 MG/DL (ref 74–99)
GLUCOSE BLD MANUAL STRIP-MCNC: 130 MG/DL (ref 74–99)
GLUCOSE BLD MANUAL STRIP-MCNC: 139 MG/DL (ref 74–99)
GLUCOSE BLD MANUAL STRIP-MCNC: 86 MG/DL (ref 74–99)
GLUCOSE SERPL-MCNC: 64 MG/DL (ref 74–99)
HCT VFR BLD AUTO: 32.8 % (ref 36–46)
HGB BLD-MCNC: 9.5 G/DL (ref 12–16)
IMM GRANULOCYTES # BLD AUTO: 0.23 X10*3/UL (ref 0–0.7)
IMM GRANULOCYTES NFR BLD AUTO: 1.2 % (ref 0–0.9)
LYMPHOCYTES # BLD AUTO: 1.03 X10*3/UL (ref 1.2–4.8)
LYMPHOCYTES NFR BLD AUTO: 5.5 %
MAGNESIUM SERPL-MCNC: 2 MG/DL (ref 1.6–2.4)
MCH RBC QN AUTO: 23.5 PG (ref 26–34)
MCHC RBC AUTO-ENTMCNC: 29 G/DL (ref 32–36)
MCV RBC AUTO: 81 FL (ref 80–100)
MONOCYTES # BLD AUTO: 0.74 X10*3/UL (ref 0.1–1)
MONOCYTES NFR BLD AUTO: 4 %
NEUTROPHILS # BLD AUTO: 16.61 X10*3/UL (ref 1.2–7.7)
NEUTROPHILS NFR BLD AUTO: 89.1 %
NRBC BLD-RTO: 0.3 /100 WBCS (ref 0–0)
OVALOCYTES BLD QL SMEAR: NORMAL
PHOSPHATE SERPL-MCNC: 2.9 MG/DL (ref 2.5–4.9)
PLATELET # BLD AUTO: 837 X10*3/UL (ref 150–450)
PLATELET CLUMP BLD QL SMEAR: PRESENT
POLYCHROMASIA BLD QL SMEAR: NORMAL
POTASSIUM SERPL-SCNC: 3.4 MMOL/L (ref 3.5–5.3)
RBC # BLD AUTO: 4.04 X10*6/UL (ref 4–5.2)
RBC MORPH BLD: NORMAL
SODIUM SERPL-SCNC: 135 MMOL/L (ref 136–145)
TARGETS BLD QL SMEAR: NORMAL
WBC # BLD AUTO: 18.6 X10*3/UL (ref 4.4–11.3)

## 2024-06-29 PROCEDURE — 2500000001 HC RX 250 WO HCPCS SELF ADMINISTERED DRUGS (ALT 637 FOR MEDICARE OP): Performed by: INTERNAL MEDICINE

## 2024-06-29 PROCEDURE — 2500000005 HC RX 250 GENERAL PHARMACY W/O HCPCS: Performed by: STUDENT IN AN ORGANIZED HEALTH CARE EDUCATION/TRAINING PROGRAM

## 2024-06-29 PROCEDURE — 99232 SBSQ HOSP IP/OBS MODERATE 35: CPT | Performed by: STUDENT IN AN ORGANIZED HEALTH CARE EDUCATION/TRAINING PROGRAM

## 2024-06-29 PROCEDURE — 94640 AIRWAY INHALATION TREATMENT: CPT

## 2024-06-29 PROCEDURE — 2500000004 HC RX 250 GENERAL PHARMACY W/ HCPCS (ALT 636 FOR OP/ED)

## 2024-06-29 PROCEDURE — C9113 INJ PANTOPRAZOLE SODIUM, VIA: HCPCS | Performed by: HOSPITALIST

## 2024-06-29 PROCEDURE — 83735 ASSAY OF MAGNESIUM: CPT | Performed by: SURGERY

## 2024-06-29 PROCEDURE — 94664 DEMO&/EVAL PT USE INHALER: CPT

## 2024-06-29 PROCEDURE — 84100 ASSAY OF PHOSPHORUS: CPT | Performed by: SURGERY

## 2024-06-29 PROCEDURE — 80048 BASIC METABOLIC PNL TOTAL CA: CPT

## 2024-06-29 PROCEDURE — 2500000001 HC RX 250 WO HCPCS SELF ADMINISTERED DRUGS (ALT 637 FOR MEDICARE OP): Performed by: HOSPITALIST

## 2024-06-29 PROCEDURE — 74018 RADEX ABDOMEN 1 VIEW: CPT | Performed by: RADIOLOGY

## 2024-06-29 PROCEDURE — 2500000004 HC RX 250 GENERAL PHARMACY W/ HCPCS (ALT 636 FOR OP/ED): Performed by: HOSPITALIST

## 2024-06-29 PROCEDURE — 85025 COMPLETE CBC W/AUTO DIFF WBC: CPT

## 2024-06-29 PROCEDURE — 2500000004 HC RX 250 GENERAL PHARMACY W/ HCPCS (ALT 636 FOR OP/ED): Performed by: INTERNAL MEDICINE

## 2024-06-29 PROCEDURE — 74018 RADEX ABDOMEN 1 VIEW: CPT

## 2024-06-29 PROCEDURE — 36415 COLL VENOUS BLD VENIPUNCTURE: CPT

## 2024-06-29 PROCEDURE — 2500000002 HC RX 250 W HCPCS SELF ADMINISTERED DRUGS (ALT 637 FOR MEDICARE OP, ALT 636 FOR OP/ED): Performed by: INTERNAL MEDICINE

## 2024-06-29 PROCEDURE — 94668 MNPJ CHEST WALL SBSQ: CPT

## 2024-06-29 PROCEDURE — 2500000005 HC RX 250 GENERAL PHARMACY W/O HCPCS: Performed by: INTERNAL MEDICINE

## 2024-06-29 PROCEDURE — 1200000002 HC GENERAL ROOM WITH TELEMETRY DAILY

## 2024-06-29 PROCEDURE — 82947 ASSAY GLUCOSE BLOOD QUANT: CPT

## 2024-06-29 PROCEDURE — 2500000004 HC RX 250 GENERAL PHARMACY W/ HCPCS (ALT 636 FOR OP/ED): Performed by: PHARMACIST

## 2024-06-29 ASSESSMENT — COGNITIVE AND FUNCTIONAL STATUS - GENERAL
DRESSING REGULAR LOWER BODY CLOTHING: A LITTLE
HELP NEEDED FOR BATHING: A LOT
DAILY ACTIVITIY SCORE: 19
DRESSING REGULAR UPPER BODY CLOTHING: A LITTLE
STANDING UP FROM CHAIR USING ARMS: A LITTLE
MOBILITY SCORE: 17
CLIMB 3 TO 5 STEPS WITH RAILING: TOTAL
TOILETING: A LITTLE
TURNING FROM BACK TO SIDE WHILE IN FLAT BAD: A LITTLE
MOVING TO AND FROM BED TO CHAIR: A LITTLE
WALKING IN HOSPITAL ROOM: A LITTLE

## 2024-06-29 ASSESSMENT — PAIN DESCRIPTION - LOCATION
LOCATION: BACK
LOCATION: ABDOMEN
LOCATION: ABDOMEN

## 2024-06-29 ASSESSMENT — PAIN DESCRIPTION - DESCRIPTORS: DESCRIPTORS: ACHING

## 2024-06-29 ASSESSMENT — PAIN SCALES - GENERAL
PAINLEVEL_OUTOF10: 8
PAINLEVEL_OUTOF10: 10 - WORST POSSIBLE PAIN
PAINLEVEL_OUTOF10: 8
PAINLEVEL_OUTOF10: 9
PAINLEVEL_OUTOF10: 4
PAINLEVEL_OUTOF10: 4

## 2024-06-29 ASSESSMENT — PAIN - FUNCTIONAL ASSESSMENT
PAIN_FUNCTIONAL_ASSESSMENT: 0-10

## 2024-06-29 ASSESSMENT — PAIN DESCRIPTION - ORIENTATION
ORIENTATION: LOWER
ORIENTATION: LOWER

## 2024-06-29 NOTE — CARE PLAN
Problem: Pain - Adult  Goal: Verbalizes/displays adequate comfort level or baseline comfort level  Outcome: Progressing  Flowsheets (Taken 6/28/2024 2316)  Verbalizes/displays adequate comfort level or baseline comfort level:   Encourage patient to monitor pain and request assistance   Assess pain using appropriate pain scale   Administer analgesics based on type and severity of pain and evaluate response   Implement non-pharmacological measures as appropriate and evaluate response   Consider cultural and social influences on pain and pain management     Problem: Safety - Adult  Goal: Free from fall injury  Outcome: Progressing  Flowsheets (Taken 6/28/2024 2316)  Free from fall injury:   Instruct family/caregiver on patient safety   Based on caregiver fall risk screen, instruct family/caregiver to ask for assistance with transferring infant if caregiver noted to have fall risk factors     Problem: Discharge Planning  Goal: Discharge to home or other facility with appropriate resources  Outcome: Progressing  Flowsheets (Taken 6/28/2024 2316)  Discharge to home or other facility with appropriate resources:   Arrange for interpreters to assist at discharge as needed   Identify discharge learning needs (meds, wound care, etc)   Arrange for needed discharge resources and transportation as appropriate   Identify barriers to discharge with patient and caregiver   The patient's goals for the shift include  control pain, rest    The clinical goals for the shift include remain comfortable during shift

## 2024-06-29 NOTE — PROGRESS NOTES
Fernando Cuevas is a 63 y.o. female on day 8 of admission presenting with Perforated viscus.      Subjective   UNIQUE. Reports feeling less bloated, frustrated with progress.  +flatus, - BM.    N.4L in 24 hours- gastric contents     Objective     PE:  Constitutional: A&Ox3, calm and cooperative, NAD  Eyes: EOMI, clear sclera   Cardiovascular: Normal rate and regular rhythm. No murmurs  Respiratory/Thorax: CTAB, on RA  Gastrointestinal: abd moderately distended, tympanic, no bowel sounds auscultated, nontender to palpation. NG in place. Vertical incision CDI with staples, minimal surrounding erythema   Genitourinary: Voiding independently   Musculoskeletal: ROM intact, no joint swelling, normal strength  Extremities: No peripheral edema  Neurological: A&Ox3, No focal deficits   Psychological: Appropriate mood and behavior      Last Recorded Vitals  Vitals:    24 2344 24 0500 24 0716 24 0755   BP: 144/78 136/67  147/83   BP Location: Right arm Right arm     Patient Position: Lying      Pulse:    110   Resp:    18   Temp: 36.1 °C (97 °F) 36.7 °C (98 °F)  36.6 °C (97.8 °F)   TempSrc: Temporal Temporal     SpO2: 100% 99% 92% 100%   Weight:       Height:             Relevant Results    Imaging:     .=== 24 ===    XR ABDOMEN 1 VIEW    - Impression -  Stable vertically oriented line of skin staples to the right of  midline in the lower abdomen and upper pelvis.    Stable NG tube in place.    Findings that could represent either ongoing persistent partial  distal small bowel obstruction or postoperative ileus. No significant  change from 2024.    MACRO:  None    Signed by: Gabe Gage 2024 2:03 PM  Dictation workstation:   [id number]   .=== 24 ===    CT ANGIO CHEST FOR PULMONARY EMBOLISM    - Impression -  No CT evidence of pulmonary embolism in the current exam.    Emphysema. Band of atelectasis at the right lung base. Confluent  atelectasis at the posteromedial left lung  base. Tiny bilateral  pleural effusions.. No mass or pneumonia in either lung.    NG tube in place as described.    Cholelithiasis.    Stable bilobed hypodensity adjacent to the pancreatic tail.  Pancreatic pseudocyst versus cystic pancreatic neoplasms. In  addition, there are subtle stable nonspecific splenic hypodensities  which could be splenic hemangiomas. Recommend further evaluation with  MRI the pancreas and spleen.    MACRO:  None    Signed by: Gabe Gage 6/28/2024 7:55 AM  Dictation workstation:   [id number]         Lab Results:   Lab Results   Component Value Date    WBC 14.2 (H) 06/28/2024    HGB 8.6 (L) 06/28/2024    HCT 29.9 (L) 06/28/2024    MCV 81 06/28/2024     (H) 06/28/2024     Lab Results   Component Value Date    GLUCOSE 104 (H) 06/28/2024    CALCIUM 8.3 (L) 06/28/2024     (L) 06/28/2024    K 4.2 06/28/2024    CO2 27 06/28/2024    CL 94 (L) 06/28/2024    BUN 9 06/28/2024    CREATININE 0.64 06/28/2024         Estimated Creatinine Clearance: 79.1 mL/min (by C-G formula based on SCr of 0.64 mg/dL).  C-Reactive Protein   Date/Time Value Ref Range Status   06/16/2024 06:36 AM 0.57 <1.00 mg/dL Final         Assessment/Plan   Patient with perforated small bowel is now S/P ex lap, pSBR with Dr Liang on 6/21 complicated by ileus.    Intra-op findings of segment of perforated small bowel    A/P:   - abd moderately distended, tympanic, no bowel sounds auscultated, nontender to palpation. NG in place. Vertical incision CDI with staples, minimal surrounding erythema   - Imaging noted ongoing ileus- increase gaseous distention of SB, large stool burden in colon   - labs: wbc uptrending 18.6, Plts 837  - NGT to LIWS  - NPO, PPN, add chewing gum   - IVF  - add enema   - DVT prophx: SCDs/ ambulation/ lovenox  - PRN antiemetic  - pain control per primary   - on zosyn   - repeat XR in AM      Dispo: Discussed with Dr. Wilson. Continue conservative management of ileus with bowel rest, enema      I  spent 35 minutes in the professional and overall care of this patient.      Karina De La Torre PA-C

## 2024-06-29 NOTE — PROGRESS NOTES
"Nutrition Follow-up Note  Nutrition Assessment       POC is to continue with PPN support.  NG output noted as 1.4 lt over 24 hours, NGT @ LIWS.  Allowed sips of CLD and ice chips.  +flatus, -BM. Abdomen is distended.     Plan:   - PPN to continue tonight order reviewed with PharmD.   PPN @ 83 ml/hr continuous infusion x 24 hours.   - lipids (MWF only)   - RFP, Mg daily; replete prn   - NG to LIWS/oral diet per surgery team   - strict I/O and daily wts     Anthropometrics:  Height: 157.5 cm (5' 2.01\")  Weight: 64 kg (141 lb 1.5 oz)  BMI (Calculated): 25.8  Weight Change  Weight History / % Weight Change: 64.6 kg 5/10/24, 61.2kg 1/9/24, 59.9kg 6/21/23  Significant Weight Loss: No  IBW/kg (Dietitian Calculated): 50 kg  Percent of IBW: 128 %     Energy Needs:  Calculated Energy Needs Using Equations  Height: 157.5 cm (5' 2.01\")  Weight Used for Equation Calculations: 64 kg (141 lb 1.5 oz)  [facility] Equation (Overweight or Obese Patients): 1148  Equation Chosen to Use by RD: [facility]  Activity Factor: 1.2  Stress Factor: 1.2  Total Energy Needs: 1650  Temp: 35.3 °C (95.5 °F)    Estimated Energy Needs  Method for Estimating Needs: 0533-6417 kcal/day    Estimated Protein Needs  Total Protein Estimated Needs (g): 75 g  Total Protein Estimated Needs (g/kg): 1.5 g/kg (IBW)    Estimated Fluid Needs  Method for Estimating Needs: per MD  , d/w nephrology while at bedside, 2L of PPN is okay    Dietary Orders    NPO Diet Except: Sips with meds, Sips of clear liquids, Ice chips;   Comments: Popsicles, small chips, sips okay   06/27/24 4821     Results for  06/20/24 (from the past 24 hour(s)  POCT GLUCOSE  Result Value Ref Range   POCT Glucose 86 74 - 99 mg/dL  Magnesium  Result Value Ref Range   Magnesium 2.00 1.60 - 2.40 mg/dL  Phosphorus  Result Value Ref Range   Phosphorus 2.9 2.5 - 4.9 mg/dL  Basic metabolic panel  Result Value Ref Range   Glucose 64 (L) 74 - 99 mg/dL   Sodium 135 (L) 136 - 145 mmol/L   Potassium 3.4 " (L) 3.5 - 5.3 mmol/L   Chloride 95 (L) 98 - 107 mmol/L   Bicarbonate 27 21 - 32 mmol/L   Anion Gap 16 10 - 20 mmol/L   Urea Nitrogen 12 6 - 23 mg/dL   Creatinine 0.84 0.50 - 1.05 mg/dL   eGFR 78 >60 mL/min/1.73m*2   Calcium 9.3 8.6 - 10.3 mg/dL  CBC and Auto Differential  Result Value Ref Range   WBC 18.6 (H) 4.4 - 11.3 x10*3/uL   nRBC 0.3 (H) 0.0 - 0.0 /100 WBCs   RBC 4.04 4.00 - 5.20 x10*6/uL   Hemoglobin 9.5 (L) 12.0 - 16.0 g/dL   Hematocrit 32.8 (L) 36.0 - 46.0 %   MCV 81 80 - 100 fL   MCH 23.5 (L) 26.0 - 34.0 pg   MCHC 29.0 (L) 32.0 - 36.0 g/dL   RDW 20.4 (H) 11.5 - 14.5 %   Platelets 837 (H) 150 - 450 x10*3/uL   Neutrophils % 89.1 40.0 - 80.0 %   Immature Granulocytes %, Automated 1.2 (H) 0.0 - 0.9 %   Lymphocytes % 5.5 13.0 - 44.0 %   Monocytes % 4.0 2.0 - 10.0 %   Eosinophils % 0.1 0.0 - 6.0 %   Basophils % 0.1 0.0 - 2.0 %   Neutrophils Absolute 16.61 (H) 1.20 - 7.70 x10*3/uL   Immature Granulocytes Absolute, Automated 0.23 0.00 - 0.70 x10*3/uL   Lymphocytes Absolute 1.03 (L) 1.20 - 4.80 x10*3/uL   Monocytes Absolute 0.74 0.10 - 1.00 x10*3/uL   Eosinophils Absolute 0.01 0.00 - 0.70 x10*3/uL   Basophils Absolute 0.02 0.00 - 0.10 x10*3/uL  Morphology  Result Value Ref Range   RBC Morphology See Below    Polychromasia Marked    Target Cells Few    Ovalocytes Few    Teardrop Cells Few    Parish Cells Few    Clumped Platelets Present   POCT GLUCOSE  Result Value Ref Range   POCT Glucose 139 (H) 74 - 99 mg/dL  POCT GLUCOSE  Result Value Ref Range   POCT Glucose 130 (H) 74 - 99 mg/dL      Scheduled medications  aspirin, 81 mg, oral, Daily  budesonide, 0.25 mg, nebulization, Daily   And  formoterol, 20 mcg, nebulization, BID  busPIRone, 5 mg, oral, BID  calcium carbonate, 1,500 mg, oral, Daily  dilTIAZem ER, 240 mg, oral, Daily  enoxaparin, 1 mg/kg, subcutaneous, q12h  fluconazole, 200 mg, oral, Daily  folic acid, 1 mg, oral, Daily  ipratropium-albuteroL, 3 mL, nebulization, TID  lisinopril, 10 mg, oral,  Daily  montelukast, 10 mg, oral, Daily  multivitamin with minerals, 1 tablet, oral, Daily  nortriptyline, 50 mg, oral, Nightly  oxybutynin, 5 mg, oral, Daily  oxygen, , inhalation, Continuous - Inhalation  pantoprazole, 40 mg, intravenous, Daily before breakfast  piperacillin-tazobactam, 3.375 g, intravenous, q6h  predniSONE, 10 mg, oral, Daily  thiamine, 100 mg, oral, Daily  thiamine, 100 mg, oral, Daily  varenicline, 1 mg, oral, BID    Continuous medications  Adult Clinimix Parenteral Nutrition, 83 mL/hr, Last Rate: 83 mL/hr (06/28/24 3555)  Adult Clinimix Parenteral Nutrition, 83 mL/hr, Last Rate: 83 mL/hr (06/29/24 0562)    PRN medications  PRN medications: acetaminophen **OR** acetaminophen **OR** acetaminophen, benzonatate, guaiFENesin, HYDROmorphone, HYDROmorphone, hydrOXYzine HCL, ipratropium-albuteroL, morphine, morphine, nitroglycerin, ondansetron       Nutrition Diagnosis   Malnutrition Diagnosis  Patient has Malnutrition Diagnosis: Yes  Diagnosis Status: New  Malnutrition Diagnosis: Severe malnutrition related to acute disease or injury  As Evidenced by: oral intake less than 50% of estimated energy requirements for greater than five days, generalized edema, visualized bradley of depelted adipose tissues and skeletal tissues wasting       Nutrition Interventions/Recommendations   Nutrition Prescription  Individualized Nutrition Prescription Provided for : Continue PPN, lipids MWF until able to tolerate >50% of low fiber meals.  Interventions: Parenteral nutrition/ IV fluids              Parenteral Nutrition/IV Fluids: Parenteral nutrition site care  Parenteral Formula Recommendations  Electrolytes: Standard  Elements: Multivitamin, Trace elements    Fat Emulsion Recommendations  Fat Emulsion 20%: Intralipid (MWF only)  Fat Emulsion Rate (mL/hr): 21 mL/hr  Fat Emulsion Volume (mL/day): 250 mL/day (provides ~214 kcal/day)    Parenteral Instructions  Continuous Rate (mL/hr): 83 mL/hr  Total Volume (mL/day):  2000 mL/day  Total Parenteral Kcal (kcal/day): 680 kcal/day  Total Protein (g/day): 85 g/day    Lab/Monitoring Recommendations  Blood Glucose Frequency: Every 6 hours  Weight Frequency: Daily  Labs: Renal panel and magnesium daily, Triglycerides:  now and each week, Repeat electrolytes as needed, LFTs: now and each week       Nutrition Monitoring and Evaluation   Food and Nutrient Related History     Enteral and Parenteral Nutrition Intake: Parenteral nutrition formula/solution, Parenteral nutrition intake     Anthropometrics: Body Composition/Growth/Weight History  Weight: Measured weight  Criteria: weekly    Weight Change: Weight change percentage  Criteria: weekly    Biochemical Data, Medical Tests and Procedures  Electrolyte and Renal Panel: BUN, Calcium, ionized, Calcium, serum, Chloride, Creatinine, Magnesium, Phosphorus, Potassium, Sodium  Criteria: daily    Gastrointestinal Profile: Alanine aminotransferase (ALT), Aspartate aminotransferase (AST), Bilirubin, total, Lipase, Alkaline phosphatase, Gastroesophageal reflux monitoring  Criteria: as indicated    Glucose/Endocrine Profile: Glucose, casual  Criteria: daily    Nutritional Anemia Profile: Hematocrit, Hemoglobin, Iron, serum, Folate, serum  Criteria: as indicated    Vitamin Profile: Vitamin D, 25 hydroxy, Other (Comment), Vitamin C, plasma or serum  Criteria: as indicated    Nutrition Focused Physical Findings     Digestive System: Abdominal distension, Constipation, Diarrhea, Nausea, Vomiting  Criteria: daily       Follow Up  Last Date of Nutrition Visit: 06/29/24  Nutrition Follow-Up Needed?: Dietitian to reassess per policy  Follow up Comment: ANTONI

## 2024-06-29 NOTE — PROGRESS NOTES
Fernando Cuevas is a 63 y.o. female on day 8 of admission presenting with Perforated viscus.      Subjective   Patient seen and examined at the bedside this morning. No acute overnight events. No other questions or concerns.         Objective     Last Recorded Vitals  /83   Pulse 110   Temp 36.6 °C (97.8 °F)   Resp 18   Wt 64 kg (141 lb 1.5 oz)   SpO2 100%   Intake/Output last 3 Shifts:    Intake/Output Summary (Last 24 hours) at 6/29/2024 0913  Last data filed at 6/29/2024 0608  Gross per 24 hour   Intake 1192.43 ml   Output 2200 ml   Net -1007.57 ml       Admission Weight  Weight: 64 kg (141 lb 1.5 oz) (06/20/24 2132)    Daily Weight  06/27/24 : 64 kg (141 lb 1.5 oz)    Image Results  CT angio chest for pulmonary embolism  Narrative: Interpreted By:  Gabe Gage,   STUDY:  CT ANGIO CHEST FOR PULMONARY EMBOLISM;  6/28/2024 7:04 am      INDICATION:  62 y/o   F with  Signs/Symptoms:PE.      LIMITATIONS:  None.      ACCESSION NUMBER(S):  [id number]      ORDERING CLINICIAN:  DELPHINE VELÁZQUEZ      TECHNIQUE:  After the administration of intravenous nonionic contrast,  thin-section arterial phase images were obtained  from the thoracic  inlet down through the iliac crest. Sagittal and coronal  reconstruction images were generated. Axial and coronal MIP vascular  reconstruction images were also generated.   Mediastinal, lung, bone,  and liver windows were reviewed. 75 ML Omnipaque 350          COMPARISON:  Most recent prior from 06/13/2024. correlation with CT scans of the  abdomen and pelvis, most recent from 06/26/2024.      FINDINGS:  CHEST WALL/BASE OF THE NECK:  The chest wall was grossly intact.  No thyromegaly or thyroid mass.      MEDIASTINUM/ALESSANDRO:  No suspicious mediastinal, hilar, or axillary mass or adenopathy.  No cardiomegaly.  No pericardial effusion.  No thoracic aortic aneurysm or dissection.  No pulmonary artery filling defect.      LUNGS/ PLEURA/ AND TRACHEA:  Stable underlying fat  containing posteromedial left-sided  diaphragmatic hernia. Band of atelectasis across the posterior right  lung base. Mild atelectasis at the posteromedial left lung base.  Underlying emphysema with upper lobe predominance. No mass or  pneumonia in either lung. Tiny bilateral pleural effusions. No  pneumothorax. The trachea was grossly intact.      BONES:  No destructive lytic or blastic bone lesion.      UPPER ABDOMEN:  There is an NG tube in place with distal tip in the distal gastric  body. Cholelithiasis. Contracted gallbladder. The imaged portions of  the liver   were unremarkable. There are subtle stable small  hypodensities in the spleen compared to 06/26/2024. Imaged kidneys  and adrenal glands were intact. No upper abdominal ascites. The  imaged stomach and large bowel. There is a bilobed hypodensity  adjacent the pancreatic tail the left abdomen measuring 24 mm AP x 17  mm transversely on image 287, measuring 24 Hounsfield units of CT  density. This is stable compared to the most recent prior CT scan  abdomen and pelvis. It is also grossly stable compared to 06/20/2024  and was not present on 03/22/2016.      Impression: No CT evidence of pulmonary embolism in the current exam.      Emphysema. Band of atelectasis at the right lung base. Confluent  atelectasis at the posteromedial left lung base. Tiny bilateral  pleural effusions.. No mass or pneumonia in either lung.      NG tube in place as described.      Cholelithiasis.      Stable bilobed hypodensity adjacent to the pancreatic tail.  Pancreatic pseudocyst versus cystic pancreatic neoplasms. In  addition, there are subtle stable nonspecific splenic hypodensities  which could be splenic hemangiomas. Recommend further evaluation with  MRI the pancreas and spleen.      MACRO:  None      Signed by: Gabe Gage 6/28/2024 7:55 AM  Dictation workstation:   [id number]      Physical Exam  Constitutional:       General: She is not in acute distress.  HENT:       Head: Normocephalic and atraumatic.   Eyes:      Conjunctiva/sclera: Conjunctivae normal.      Pupils: Pupils are equal, round, and reactive to light.   Cardiovascular:      Rate and Rhythm: Normal rate and regular rhythm.   Pulmonary:      Effort: Pulmonary effort is normal.      Breath sounds: Normal breath sounds.   Abdominal:      Palpations: Abdomen is soft.      Tenderness: There is no abdominal tenderness.   Skin:     General: Skin is warm and dry.   Neurological:      Mental Status: She is alert.   Psychiatric:         Mood and Affect: Mood normal.         Behavior: Behavior normal.         Relevant Results           Scheduled medications  aspirin, 81 mg, oral, Daily  budesonide, 0.25 mg, nebulization, Daily   And  formoterol, 20 mcg, nebulization, BID  busPIRone, 5 mg, oral, BID  calcium carbonate, 1,500 mg, oral, Daily  dilTIAZem ER, 240 mg, oral, Daily  enoxaparin, 1 mg/kg, subcutaneous, q12h  fat emulsion-plant based, 250 mL, intravenous, Daily Lipids  fluconazole, 200 mg, oral, Daily  folic acid, 1 mg, oral, Daily  ipratropium-albuteroL, 3 mL, nebulization, TID  lisinopril, 10 mg, oral, Daily  montelukast, 10 mg, oral, Daily  multivitamin with minerals, 1 tablet, oral, Daily  nortriptyline, 50 mg, oral, Nightly  oxybutynin, 5 mg, oral, Daily  oxygen, , inhalation, Continuous - Inhalation  pantoprazole, 40 mg, intravenous, Daily before breakfast  piperacillin-tazobactam, 3.375 g, intravenous, q6h  predniSONE, 10 mg, oral, Daily  thiamine, 100 mg, oral, Daily  thiamine, 100 mg, oral, Daily  varenicline, 1 mg, oral, BID      Continuous medications  Adult Clinimix Parenteral Nutrition, 83 mL/hr, Last Rate: 83 mL/hr (06/28/24 4669)      PRN medications  PRN medications: acetaminophen **OR** acetaminophen **OR** acetaminophen, benzonatate, guaiFENesin, HYDROmorphone, HYDROmorphone, hydrOXYzine HCL, ipratropium-albuteroL, morphine, morphine, nitroglycerin, ondansetron    Assessment/Plan      Principal  Problem:    Perforated viscus  Active Problems:    Thrombocytosis    Perforated small bowel's with peritonitis:  Concern for postop ileus versus partial obstruction:  Underwent surgical intervention 6/21  -Underwent small bowel resection, and washout procedure.  -Culture for Candida albicans, concern for mix of gram-negative and anaerobes.  -Infectious disease following, continued on IV Zosyn, fluconazole 200 mg/day.  Plan for antibiotics with stop date 7/3/24.,  This will be a total of 10 days from the initiation of fluconazole.  Infectious diseases recommended discharge home once medically ready on p.o. Augmentin 875 mg 2 times a day instead of Zosyn if needed.  -General surgery following for postop ileus versus partial SBO, appreciate recommendations.   -Will continue to trend CBC  -Dietary following, appreciate recommendations.   -KUB this morning with increased gaseous distention. Enema and chewing gum added.   -continue NPO  -Repeat KUB in AM     Acute DVT in the left distal brachial vein:  -Vascular upper extremity ultrasound with acute finding, non-occlusive.  -Patient initiated on full dose Lovenox therapeutic dose and 6/27.  -Patient with contrast allergy, CT angio chest requiring prep, no acute findings.      Acute on chronic hyponatremia, resolved:  In setting of poor oral intake  -Nephrology was managing, placed on IV isotonic fluid with improvement  .-Transitioned to 5% dextrose and  mL an hour, sodium levels have normalized.  Fluids will be discontinued.  -Nephrology signed off  -Will continue to trend sodium    Severe malnutrition:  -Nutrition following, appreciate recommendations.  -NG tube in place, will need to continue PPN over the weekend.      LOS: Unknown  DVT ppx: Therapeutic Lovenox    Gavin Houser MD

## 2024-06-30 ENCOUNTER — APPOINTMENT (OUTPATIENT)
Dept: RADIOLOGY | Facility: HOSPITAL | Age: 64
End: 2024-06-30
Payer: COMMERCIAL

## 2024-06-30 VITALS
SYSTOLIC BLOOD PRESSURE: 111 MMHG | WEIGHT: 141.09 LBS | HEIGHT: 62 IN | OXYGEN SATURATION: 100 % | DIASTOLIC BLOOD PRESSURE: 74 MMHG | HEART RATE: 91 BPM | RESPIRATION RATE: 17 BRPM | TEMPERATURE: 98.1 F | BODY MASS INDEX: 25.96 KG/M2

## 2024-06-30 LAB
ANION GAP SERPL CALC-SCNC: 13 MMOL/L (ref 10–20)
BASOPHILS # BLD AUTO: 0.02 X10*3/UL (ref 0–0.1)
BASOPHILS NFR BLD AUTO: 0.1 %
BUN SERPL-MCNC: 13 MG/DL (ref 6–23)
CALCIUM SERPL-MCNC: 8.1 MG/DL (ref 8.6–10.3)
CHLORIDE SERPL-SCNC: 96 MMOL/L (ref 98–107)
CO2 SERPL-SCNC: 27 MMOL/L (ref 21–32)
CREAT SERPL-MCNC: 0.65 MG/DL (ref 0.5–1.05)
DACRYOCYTES BLD QL SMEAR: NORMAL
EGFRCR SERPLBLD CKD-EPI 2021: >90 ML/MIN/1.73M*2
EOSINOPHIL # BLD AUTO: 0.01 X10*3/UL (ref 0–0.7)
EOSINOPHIL NFR BLD AUTO: 0.1 %
ERYTHROCYTE [DISTWIDTH] IN BLOOD BY AUTOMATED COUNT: 20.4 % (ref 11.5–14.5)
GLUCOSE BLD MANUAL STRIP-MCNC: 108 MG/DL (ref 74–99)
GLUCOSE BLD MANUAL STRIP-MCNC: 109 MG/DL (ref 74–99)
GLUCOSE BLD MANUAL STRIP-MCNC: 119 MG/DL (ref 74–99)
GLUCOSE BLD MANUAL STRIP-MCNC: 131 MG/DL (ref 74–99)
GLUCOSE BLD MANUAL STRIP-MCNC: 133 MG/DL (ref 74–99)
GLUCOSE SERPL-MCNC: 110 MG/DL (ref 74–99)
HCT VFR BLD AUTO: 26.7 % (ref 36–46)
HGB BLD-MCNC: 7.8 G/DL (ref 12–16)
IMM GRANULOCYTES # BLD AUTO: 0.2 X10*3/UL (ref 0–0.7)
IMM GRANULOCYTES NFR BLD AUTO: 1.3 % (ref 0–0.9)
LYMPHOCYTES # BLD AUTO: 0.69 X10*3/UL (ref 1.2–4.8)
LYMPHOCYTES NFR BLD AUTO: 4.6 %
MAGNESIUM SERPL-MCNC: 1.9 MG/DL (ref 1.6–2.4)
MCH RBC QN AUTO: 23.4 PG (ref 26–34)
MCHC RBC AUTO-ENTMCNC: 29.2 G/DL (ref 32–36)
MCV RBC AUTO: 80 FL (ref 80–100)
MONOCYTES # BLD AUTO: 0.62 X10*3/UL (ref 0.1–1)
MONOCYTES NFR BLD AUTO: 4.1 %
NEUTROPHILS # BLD AUTO: 13.57 X10*3/UL (ref 1.2–7.7)
NEUTROPHILS NFR BLD AUTO: 89.8 %
NRBC BLD-RTO: 0.2 /100 WBCS (ref 0–0)
OVALOCYTES BLD QL SMEAR: NORMAL
PHOSPHATE SERPL-MCNC: 2.6 MG/DL (ref 2.5–4.9)
PLATELET # BLD AUTO: 739 X10*3/UL (ref 150–450)
PLATELET CLUMP BLD QL SMEAR: PRESENT
POTASSIUM SERPL-SCNC: 3.3 MMOL/L (ref 3.5–5.3)
RBC # BLD AUTO: 3.34 X10*6/UL (ref 4–5.2)
RBC MORPH BLD: NORMAL
SODIUM SERPL-SCNC: 133 MMOL/L (ref 136–145)
TARGETS BLD QL SMEAR: NORMAL
WBC # BLD AUTO: 15.1 X10*3/UL (ref 4.4–11.3)

## 2024-06-30 PROCEDURE — 2500000004 HC RX 250 GENERAL PHARMACY W/ HCPCS (ALT 636 FOR OP/ED): Performed by: STUDENT IN AN ORGANIZED HEALTH CARE EDUCATION/TRAINING PROGRAM

## 2024-06-30 PROCEDURE — 2500000001 HC RX 250 WO HCPCS SELF ADMINISTERED DRUGS (ALT 637 FOR MEDICARE OP): Performed by: INTERNAL MEDICINE

## 2024-06-30 PROCEDURE — 2500000004 HC RX 250 GENERAL PHARMACY W/ HCPCS (ALT 636 FOR OP/ED): Performed by: INTERNAL MEDICINE

## 2024-06-30 PROCEDURE — 2500000001 HC RX 250 WO HCPCS SELF ADMINISTERED DRUGS (ALT 637 FOR MEDICARE OP): Performed by: STUDENT IN AN ORGANIZED HEALTH CARE EDUCATION/TRAINING PROGRAM

## 2024-06-30 PROCEDURE — 80048 BASIC METABOLIC PNL TOTAL CA: CPT

## 2024-06-30 PROCEDURE — 82947 ASSAY GLUCOSE BLOOD QUANT: CPT

## 2024-06-30 PROCEDURE — 74018 RADEX ABDOMEN 1 VIEW: CPT

## 2024-06-30 PROCEDURE — 83735 ASSAY OF MAGNESIUM: CPT | Performed by: SURGERY

## 2024-06-30 PROCEDURE — 2500000005 HC RX 250 GENERAL PHARMACY W/O HCPCS: Performed by: STUDENT IN AN ORGANIZED HEALTH CARE EDUCATION/TRAINING PROGRAM

## 2024-06-30 PROCEDURE — 74018 RADEX ABDOMEN 1 VIEW: CPT | Performed by: RADIOLOGY

## 2024-06-30 PROCEDURE — 94640 AIRWAY INHALATION TREATMENT: CPT

## 2024-06-30 PROCEDURE — 2500000002 HC RX 250 W HCPCS SELF ADMINISTERED DRUGS (ALT 637 FOR MEDICARE OP, ALT 636 FOR OP/ED): Performed by: INTERNAL MEDICINE

## 2024-06-30 PROCEDURE — 85025 COMPLETE CBC W/AUTO DIFF WBC: CPT

## 2024-06-30 PROCEDURE — 2500000004 HC RX 250 GENERAL PHARMACY W/ HCPCS (ALT 636 FOR OP/ED)

## 2024-06-30 PROCEDURE — C9113 INJ PANTOPRAZOLE SODIUM, VIA: HCPCS | Performed by: HOSPITALIST

## 2024-06-30 PROCEDURE — 94668 MNPJ CHEST WALL SBSQ: CPT

## 2024-06-30 PROCEDURE — 94664 DEMO&/EVAL PT USE INHALER: CPT

## 2024-06-30 PROCEDURE — 2500000005 HC RX 250 GENERAL PHARMACY W/O HCPCS: Performed by: INTERNAL MEDICINE

## 2024-06-30 PROCEDURE — 2500000001 HC RX 250 WO HCPCS SELF ADMINISTERED DRUGS (ALT 637 FOR MEDICARE OP): Performed by: HOSPITALIST

## 2024-06-30 PROCEDURE — 36415 COLL VENOUS BLD VENIPUNCTURE: CPT

## 2024-06-30 PROCEDURE — 2500000004 HC RX 250 GENERAL PHARMACY W/ HCPCS (ALT 636 FOR OP/ED): Performed by: HOSPITALIST

## 2024-06-30 PROCEDURE — 1200000002 HC GENERAL ROOM WITH TELEMETRY DAILY

## 2024-06-30 PROCEDURE — 2500000004 HC RX 250 GENERAL PHARMACY W/ HCPCS (ALT 636 FOR OP/ED): Performed by: PHARMACIST

## 2024-06-30 PROCEDURE — 84100 ASSAY OF PHOSPHORUS: CPT | Performed by: SURGERY

## 2024-06-30 PROCEDURE — 99232 SBSQ HOSP IP/OBS MODERATE 35: CPT | Performed by: STUDENT IN AN ORGANIZED HEALTH CARE EDUCATION/TRAINING PROGRAM

## 2024-06-30 RX ORDER — BISACODYL 10 MG/1
10 SUPPOSITORY RECTAL ONCE
Status: COMPLETED | OUTPATIENT
Start: 2024-06-30 | End: 2024-06-30

## 2024-06-30 ASSESSMENT — PAIN DESCRIPTION - LOCATION
LOCATION: BACK

## 2024-06-30 ASSESSMENT — PAIN DESCRIPTION - ORIENTATION
ORIENTATION: LOWER

## 2024-06-30 ASSESSMENT — PAIN SCALES - GENERAL
PAINLEVEL_OUTOF10: 6
PAINLEVEL_OUTOF10: 8
PAINLEVEL_OUTOF10: 7
PAINLEVEL_OUTOF10: 8
PAINLEVEL_OUTOF10: 10 - WORST POSSIBLE PAIN

## 2024-06-30 ASSESSMENT — COGNITIVE AND FUNCTIONAL STATUS - GENERAL
CLIMB 3 TO 5 STEPS WITH RAILING: TOTAL
HELP NEEDED FOR BATHING: A LOT
TURNING FROM BACK TO SIDE WHILE IN FLAT BAD: A LITTLE
TOILETING: A LITTLE
DAILY ACTIVITIY SCORE: 19
STANDING UP FROM CHAIR USING ARMS: A LITTLE
DRESSING REGULAR UPPER BODY CLOTHING: A LITTLE
DRESSING REGULAR LOWER BODY CLOTHING: A LITTLE
MOVING TO AND FROM BED TO CHAIR: A LITTLE
WALKING IN HOSPITAL ROOM: A LITTLE
MOBILITY SCORE: 17

## 2024-06-30 ASSESSMENT — PAIN SCALES - PAIN ASSESSMENT IN ADVANCED DEMENTIA (PAINAD): TOTALSCORE: MEDICATION (SEE MAR)

## 2024-06-30 ASSESSMENT — PAIN - FUNCTIONAL ASSESSMENT
PAIN_FUNCTIONAL_ASSESSMENT: 0-10

## 2024-06-30 ASSESSMENT — PAIN DESCRIPTION - DESCRIPTORS
DESCRIPTORS: DULL
DESCRIPTORS: ACHING

## 2024-06-30 NOTE — PROGRESS NOTES
Transitional Care Coordination Progress Note:  Plan per Medical/Surgical team: treatment of perforated viscus, S/P 6/21 small bowel resection & left arm DVT with IV PPN, NGT to LIS, surgery following, IV ATB, loveonx  Status: inpatient day 9  Payor source: caresource- will need precert   Discharge disposition: [facility]  Potential Barriers: tolerate diet, nutrition plan & anticoagulation plan  ADOD: 7/3/2024   ИВАН Cardenas RN, BSN Transitional Care Coordinator ED# [phone]      06/30/24 0917   Discharge Planning   Assistance Needed nutrition plan

## 2024-06-30 NOTE — PROGRESS NOTES
[facility]     06/30/24 0918   Current Planned Discharge Disposition   Current Planned Discharge Disposition SNF

## 2024-06-30 NOTE — PROGRESS NOTES
"Nutrition Follow-up Note     Met with pt this afternoon, pt sitting on side of bed. Pt wihtout complaints, however still without BM.     RN mentioned she intercepted [employer] last evening that she ordered, stating pt ordered it so she would have it there for when she can eat. RN also mentioned pt may be having some food based on NG output. NPO status reiterated with pt.   Also, NG had to be repositioned, came out some this morning.     PPN infusing as ordered.     History:  Food and Nutrient History  Energy Intake: Poor < 50 %  Food and Nutrient History: NPO      Food and Nutrient Administration History  Additional Food and Nutrient Administration History: regular cardiac diet previous admission 6/11-6/19/24    Dietary Orders (From admission, onward)       Start     Ordered    06/27/24 1551  NPO Diet Except: Sips with meds, Sips of clear liquids, Ice chips; Effective now  Diet effective now        Comments: Popsicles, small chips, sips okay   Question Answer Comment   Except: Sips with meds    Except: Sips of clear liquids    Except: Ice chips        06/27/24 1551                      Anthropometrics:  Height: 157.5 cm (5' 2.01\")  Weight: 64 kg (141 lb 1.5 oz) (6/27)  BMI (Calculated): 25.8      Weight Change  Weight History / % Weight Change: 64.6 kg 5/10/24, 61.2kg 1/9/24, 59.9kg 6/21/23  Significant Weight Loss: No     IBW/kg (Dietitian Calculated): 50 kg  Percent of IBW: 128 %     Scheduled medications  aspirin, 81 mg, oral, Daily  bisacodyl, 10 mg, rectal, Once  budesonide, 0.25 mg, nebulization, Daily   And  formoterol, 20 mcg, nebulization, BID  busPIRone, 5 mg, oral, BID  calcium carbonate, 1,500 mg, oral, Daily  dilTIAZem ER, 240 mg, oral, Daily  enoxaparin, 1 mg/kg, subcutaneous, q12h  fluconazole, 200 mg, oral, Daily  folic acid, 1 mg, oral, Daily  ipratropium-albuteroL, 3 mL, nebulization, TID  lisinopril, 10 mg, oral, Daily  montelukast, 10 mg, oral, Daily  multivitamin with minerals, 1 tablet, oral, " "Daily  nortriptyline, 50 mg, oral, Nightly  oxybutynin, 5 mg, oral, Daily  oxygen, , inhalation, Continuous - Inhalation  pantoprazole, 40 mg, intravenous, Daily before breakfast  piperacillin-tazobactam, 3.375 g, intravenous, q6h  predniSONE, 10 mg, oral, Daily  thiamine, 100 mg, oral, Daily  thiamine, 100 mg, oral, Daily  varenicline, 1 mg, oral, BID      Continuous medications  Adult Clinimix Parenteral Nutrition, 83 mL/hr      PRN medications  PRN medications: acetaminophen **OR** acetaminophen **OR** acetaminophen, benzonatate, guaiFENesin, HYDROmorphone, HYDROmorphone, hydrOXYzine HCL, ipratropium-albuteroL, morphine, morphine, nitroglycerin, ondansetron     Latest Reference Range & Units 06/30/24 12:02   GLUCOSE 74 - 99 mg/dL 110 (H)   SODIUM 136 - 145 mmol/L 133 (L)   POTASSIUM 3.5 - 5.3 mmol/L 3.3 (L)   CHLORIDE 98 - 107 mmol/L 96 (L)   Bicarbonate 21 - 32 mmol/L 27   Anion Gap 10 - 20 mmol/L 13   Blood Urea Nitrogen 6 - 23 mg/dL 13   Creatinine 0.50 - 1.05 mg/dL 0.65   EGFR >60 mL/min/1.73m*2 >90   Calcium 8.6 - 10.3 mg/dL 8.1 (L)   PHOSPHORUS 2.5 - 4.9 mg/dL 2.6   MAGNESIUM 1.60 - 2.40 mg/dL 1.90   (H): Data is abnormally high  (L): Data is abnormally low      Energy Needs:  Calculated Energy Needs Using Equations  Height: 157.5 cm (5' 2.01\")  Weight Used for Equation Calculations: 64 kg (141 lb 1.5 oz)  [place]- [place] Equation (Overweight or Obese Patients): 1148  Equation Chosen to Use by RD: [facility]  Activity Factor: 1.2  Stress Factor: 1.2  Total Energy Needs: 1650  Temp: 36.6 °C (97.9 °F)    Estimated Energy Needs  Method for Estimating Needs: 8084-6465 kcal/day    Estimated Protein Needs  Total Protein Estimated Needs (g): 75 g  Total Protein Estimated Needs (g/kg): 1.5 g/kg (IBW)    Estimated Fluid Needs  Method for Estimating Needs: per MD  , d/w nephrology while at bedside a few days ago, 2L of PPN is okay         Nutrition Focused Physical Findings:  Subcutaneous Fat Loss  Orbital Fat " Pads: Mild-Moderate (slight dark circles and slight hollowing)  Buccal Fat Pads: Well nourished (full, rounded cheeks)    Muscle Wasting  Temporalis: Mild-Moderate (slight depression)  Pectoralis (Clavicular Region): Mild-Moderate (some protrusion of clavicle)  Deltoid/Trapezius: Mild-Moderate (slight protrusion of acromion process)  Interosseous: Mild-Moderate (slightly depressed area between thumb and forefinger)    Edema  Edema: +1 trace  Edema Location: generalized         Physical Findings (Nutrition Deficiency/Toxicity)  Skin: Positive       Nutrition Diagnosis   Malnutrition Diagnosis  Patient has Malnutrition Diagnosis: Yes  Diagnosis Status: Ongoing  Malnutrition Diagnosis: Severe malnutrition related to acute disease or injury  As Evidenced by: oral intake less than 50% of estimated energy requirements for greater than five days, generalized edema, visualized bradley of depelted adipose tissues and skeletal tissues wasting      Nutrition Interventions/Recommendations   Nutrition Prescription  Individualized Nutrition Prescription Provided for : continue PPN as ordered, lipids MWF, NG/diet per MD  - strict I/O and daily wts   - POCT   - RFP, Mg daily; replete prn (suggest Kphos today)   - encourage ambulation       Food and/or Nutrient Delivery Interventions  Interventions: Parenteral nutrition/ IV fluids         Parenteral Nutrition/IV Fluids: Parenteral nutrition site care  Parenteral Formula Recommendations  Formula: Standard peripheral formula AA 4.25%, dextrose 5%  Electrolytes: Standard  Elements: Multivitamin, Trace elements    Fat Emulsion Recommendations  Fat Emulsion 20%: Intralipid  Fat Emulsion Rate (mL/hr): 21 mL/hr  Fat Emulsion Volume (mL/day): 250 mL/day (provides ~ 214 kcal/day)    Parenteral Instructions  Continuous Rate (mL/hr): 83 mL/hr  Total Volume (mL/day): 2000 mL/day  Total Parenteral Kcal (kcal/day): 680 kcal/day  Total Protein (g/day): 85 g/day    Lab/Monitoring  Recommendations  Blood Glucose Frequency: Every 6 hours  Weight Frequency: Daily  Labs: Renal panel and magnesium daily, Repeat electrolytes as needed, Triglycerides:  now and each week, LFTs: now and each week        Coordination of Nutrition Care by a Nutrition Professional  Collaboration and Referral of Nutrition Care: Collaboration by nutrition professional with other providers    Education Documentation  No documentation found.           Nutrition Monitoring and Evaluation   Food and Nutrient Related History       Enteral and Parenteral Nutrition Intake: Parenteral nutrition formula/solution, Parenteral nutrition intake  Criteria: monitor        Anthropometrics: Body Composition/Growth/Weight History  Weight: Weight change  Criteria: daily with PPN infusion    Weight Change: Weight change percentage  Criteria: weekly       Biochemical Data, Medical Tests and Procedures  Electrolyte and Renal Panel: Other (Comment), BUN, Calcium, serum, Chloride, Creatinine, Magnesium, Phosphorus, Potassium, Sodium  Criteria: daily    Gastrointestinal Profile: Other (Comment), Alkaline phosphatase, Alanine aminotransferase (ALT), Aspartate aminotransferase (AST), Bilirubin, total  Criteria: as indicated    Glucose/Endocrine Profile: Other (Comment), Glucose, casual  Criteria: daily    Nutritional Anemia Profile: Other (Comment), Hematocrit, Hemoglobin, Iron, serum  Criteria: as indicated    Vitamin Profile: Other (Comment), Vitamin D, 25 hydroxy  Criteria: as indicated    Nutrition Focused Physical Findings       Digestive System: Vomiting, Abdominal distension, Ascites, Constipation, Decrease in appetite, Diarrhea, Early satiety, Increased appetite, Nausea, Anorexia  Criteria: daily        Follow Up  Time Spent (min): 60 minutes  Last Date of Nutrition Visit: 06/30/24  Nutrition Follow-Up Needed?: Dietitian to reassess per policy  Follow up Comment: PPN, svr maln; [place]

## 2024-06-30 NOTE — PROGRESS NOTES
Fernando Cuevas is a 63 y.o. female on day 9 of admission presenting with Perforated viscus.      Subjective   Patient seen and examined at the bedside this morning. No acute overnight events. No other questions or concerns.         Objective     Last Recorded Vitals  /68   Pulse 93   Temp 36.1 °C (97 °F) (Temporal)   Resp 16   Wt 64 kg (141 lb 1.5 oz)   SpO2 100%   Intake/Output last 3 Shifts:    Intake/Output Summary (Last 24 hours) at 6/30/2024 0857  Last data filed at 6/30/2024 0700  Gross per 24 hour   Intake 1973.65 ml   Output 1150 ml   Net 823.65 ml       Admission Weight  Weight: 64 kg (141 lb 1.5 oz) (06/20/24 2132)    Daily Weight  06/27/24 : 64 kg (141 lb 1.5 oz)    Image Results  XR abdomen 1 view  Narrative: Interpreted By:  Sejal Dykes,   STUDY:  XR ABDOMEN 1 VIEW;  6/30/2024 7:56 am. 2 views.      INDICATION:  Signs/Symptoms:Post-op ileus.      COMPARISON:  06/29/2024      ACCESSION NUMBER(S):  [id number]      ORDERING CLINICIAN:  ROBERT NOLASCO      FINDINGS:  There is a nasogastric tube with the tip in the proximal stomach and  side hole beyond the gastroesophageal junction. There is no gastric  distention. There is a vertical orientation of cutaneous staples from  recent abdominal surgery. There is moderate-to-marked distention of  central small bowel loops, unchanged. Findings could represent a  postoperative ileus but could represent a partial small bowel  obstruction. There is a large amount of stool in the right side of  the colon, unchanged.      There are no pathologic calcifications.      Visualized lungs are clear.      Osseous structures demonstrate no acute bony changes.          Impression: 1. Nasogastric tube with the tip in the proximal stomach; no gastric  distention.  2. Persistent moderate-to-marked central small-bowel dilatation which  could represent a postoperative ileus versus partial small bowel  obstruction. This is unchanged.  3. Large amount of stool right  side of the colon.      MACRO:  None      Signed by: Sejal Cuevasier 6/30/2024 8:11 AM  Dictation workstation:   [id number]      Physical Exam  Constitutional:       General: She is not in acute distress.  HENT:      Head: Normocephalic and atraumatic.   Eyes:      Conjunctiva/sclera: Conjunctivae normal.      Pupils: Pupils are equal, round, and reactive to light.   Cardiovascular:      Rate and Rhythm: Normal rate and regular rhythm.   Pulmonary:      Effort: Pulmonary effort is normal.      Breath sounds: Normal breath sounds.   Abdominal:      General: There is distension.      Palpations: Abdomen is soft.   Skin:     General: Skin is warm and dry.   Neurological:      Mental Status: She is alert.   Psychiatric:         Mood and Affect: Mood normal.         Behavior: Behavior normal.         Relevant Results           Scheduled medications  aspirin, 81 mg, oral, Daily  bisacodyl, 10 mg, rectal, Once  budesonide, 0.25 mg, nebulization, Daily   And  formoterol, 20 mcg, nebulization, BID  busPIRone, 5 mg, oral, BID  calcium carbonate, 1,500 mg, oral, Daily  dilTIAZem ER, 240 mg, oral, Daily  enoxaparin, 1 mg/kg, subcutaneous, q12h  fluconazole, 200 mg, oral, Daily  folic acid, 1 mg, oral, Daily  ipratropium-albuteroL, 3 mL, nebulization, TID  lisinopril, 10 mg, oral, Daily  montelukast, 10 mg, oral, Daily  multivitamin with minerals, 1 tablet, oral, Daily  nortriptyline, 50 mg, oral, Nightly  oxybutynin, 5 mg, oral, Daily  oxygen, , inhalation, Continuous - Inhalation  pantoprazole, 40 mg, intravenous, Daily before breakfast  piperacillin-tazobactam, 3.375 g, intravenous, q6h  predniSONE, 10 mg, oral, Daily  sodium phosphates, 1 enema, rectal, Once  thiamine, 100 mg, oral, Daily  thiamine, 100 mg, oral, Daily  varenicline, 1 mg, oral, BID      Continuous medications  Adult Clinimix Parenteral Nutrition, 83 mL/hr, Last Rate: 83 mL/hr (06/29/24 3808)      PRN medications  PRN medications: acetaminophen **OR**  acetaminophen **OR** acetaminophen, benzonatate, guaiFENesin, HYDROmorphone, HYDROmorphone, hydrOXYzine HCL, ipratropium-albuteroL, morphine, morphine, nitroglycerin, ondansetron    Assessment/Plan      Principal Problem:    Perforated viscus  Active Problems:    Thrombocytosis    Perforated small bowel's with peritonitis:  Concern for postop ileus versus partial obstruction:  Underwent surgical intervention 6/21  -Underwent small bowel resection, and washout procedure.  -Culture for Candida albicans, concern for mix of gram-negative and anaerobes.  -Infectious disease following, continued on IV Zosyn, fluconazole 200 mg/day.  Plan for antibiotics with stop date 7/3/24.,  This will be a total of 10 days from the initiation of fluconazole.  Infectious diseases recommended discharge home once medically ready on p.o. Augmentin 875 mg 2 times a day instead of Zosyn if needed.  -General surgery following for postop ileus versus partial SBO, appreciate recommendations.  Enema and chewing gum added.    -Will continue to trend CBC  -Dietary following, appreciate recommendations.   -continue NPO  -Repeat KUB mostly unchanged, large stool burden. Additional enema given.     Acute DVT in the left distal brachial vein:  -Vascular upper extremity ultrasound with acute finding, non-occlusive.  -Patient initiated on full dose Lovenox therapeutic dose and 6/27. Will need to be transitioned to oral regimen prior to discharge.   -Patient with contrast allergy, CT angio chest requiring prep, no acute findings.      Acute on chronic hyponatremia, resolved:  In setting of poor oral intake  -Nephrology was managing, placed on IV isotonic fluid with improvement  -Fluids will be discontinued.  -Nephrology signed off  -Will continue to trend sodium    Severe malnutrition:  -Nutrition following, appreciate recommendations.  -NG tube in place, will need to continue PPN over the weekend.      LOS: Unknown  DVT ppx: Therapeutic  Lesly Houser MD

## 2024-06-30 NOTE — PROGRESS NOTES
Fernando Cuevas is a 63 y.o. female on day 9 of admission presenting with Perforated viscus.      Subjective   UNIQUE. Received enema results without results. +flatus, - BM. Felt nauseous this morning.      N ml in 24 hours- gastric contents     Objective     PE:  Constitutional: A&Ox3, calm and cooperative, NAD  Eyes: EOMI, clear sclera   Cardiovascular: Normal rate and regular rhythm. No murmurs  Respiratory/Thorax: CTAB, on RA  Gastrointestinal: abd moderately distended albiet less in interval, tympanic, hypoactive bowel sounds, TTP over incision. NG in place. Vertical incision CDI with staples, minimal surrounding erythema   Genitourinary: Voiding independently   Musculoskeletal: ROM intact  Extremities: No peripheral edema  Neurological: A&Ox3, No focal deficits   Psychological: Appropriate mood and behavior      Last Recorded Vitals  Vitals:    24 0003 24 0452 24 0711 24 0755   BP: 134/80 143/82  120/68   BP Location: Left arm Left arm     Patient Position: Lying Lying     Pulse:  95  93   Resp: 16 16  16   Temp: 36.2 °C (97.2 °F) 36.5 °C (97.7 °F)  36.1 °C (97 °F)   TempSrc: Temporal Temporal  Temporal   SpO2: 100% 100% 100% 100%   Weight:       Height:             Relevant Results    Imaging:     .=== 24 ===    XR ABDOMEN 1 VIEW    - Impression -  Stable vertically oriented line of skin staples to the right of  midline in the lower abdomen and upper pelvis.    Stable NG tube in place.    Findings that could represent either ongoing persistent partial  distal small bowel obstruction or postoperative ileus. No significant  change from 2024.    MACRO:  None    Signed by: Gabe Gage 2024 2:03 PM  Dictation workstation:   [id number]   .=== 24 ===    CT ANGIO CHEST FOR PULMONARY EMBOLISM    - Impression -  No CT evidence of pulmonary embolism in the current exam.    Emphysema. Band of atelectasis at the right lung base. Confluent  atelectasis at the  posteromedial left lung base. Tiny bilateral  pleural effusions.. No mass or pneumonia in either lung.    NG tube in place as described.    Cholelithiasis.    Stable bilobed hypodensity adjacent to the pancreatic tail.  Pancreatic pseudocyst versus cystic pancreatic neoplasms. In  addition, there are subtle stable nonspecific splenic hypodensities  which could be splenic hemangiomas. Recommend further evaluation with  MRI the pancreas and spleen.    MACRO:  None    Signed by: Gabe Gage 6/28/2024 7:55 AM  Dictation workstation:   [id number]         Lab Results:   Lab Results   Component Value Date    WBC 18.6 (H) 06/29/2024    HGB 9.5 (L) 06/29/2024    HCT 32.8 (L) 06/29/2024    MCV 81 06/29/2024     (H) 06/29/2024     Lab Results   Component Value Date    GLUCOSE 64 (L) 06/29/2024    CALCIUM 9.3 06/29/2024     (L) 06/29/2024    K 3.4 (L) 06/29/2024    CO2 27 06/29/2024    CL 95 (L) 06/29/2024    BUN 12 06/29/2024    CREATININE 0.84 06/29/2024         Estimated Creatinine Clearance: 60.3 mL/min (by C-G formula based on SCr of 0.84 mg/dL).  C-Reactive Protein   Date/Time Value Ref Range Status   06/16/2024 06:36 AM 0.57 <1.00 mg/dL Final         Assessment/Plan   Patient with perforated small bowel is now S/P ex lap, pSBR with Dr Liang on 6/21 complicated by ileus.    Intra-op findings of segment of perforated small bowel    A/P:   - abd moderately distended albiet less in interval, tympanic, hypoactive bowel sounds, TTP over incision. NG in place. Vertical incision CDI with staples, minimal surrounding erythema   - Imaging noted persistent ileus with large stool burden in right side of colon   - labs pending   - NGT to LIWS  - NPO, PPN, cotninue chewing gum   - IVF  - another enema today plus suppository a few hours later   - DVT prophx: SCDs/ ambulation/ lovenox  - PRN antiemetic  - pain control per primary   - on zosyn   - repeat XR in AM      Dispo: Discussed with Dr. Wilson. Continue  conservative management of ileus with bowel rest, enema      I spent 35 minutes in the professional and overall care of this patient.      Karina De La Torre PA-C

## 2024-07-01 ENCOUNTER — APPOINTMENT (OUTPATIENT)
Dept: RADIOLOGY | Facility: HOSPITAL | Age: 64
End: 2024-07-01
Payer: COMMERCIAL

## 2024-07-01 ENCOUNTER — APPOINTMENT (OUTPATIENT)
Dept: CARDIOLOGY | Facility: HOSPITAL | Age: 64
End: 2024-07-01
Payer: COMMERCIAL

## 2024-07-01 LAB
ANION GAP SERPL CALC-SCNC: 13 MMOL/L (ref 10–20)
BASOPHILS # BLD AUTO: 0.02 X10*3/UL (ref 0–0.1)
BASOPHILS NFR BLD AUTO: 0.1 %
BITE CELLS BLD QL SMEAR: PRESENT
BUN SERPL-MCNC: 12 MG/DL (ref 6–23)
BURR CELLS BLD QL SMEAR: NORMAL
CALCIUM SERPL-MCNC: 7.9 MG/DL (ref 8.6–10.3)
CHLORIDE SERPL-SCNC: 97 MMOL/L (ref 98–107)
CO2 SERPL-SCNC: 26 MMOL/L (ref 21–32)
CREAT SERPL-MCNC: 0.6 MG/DL (ref 0.5–1.05)
DACRYOCYTES BLD QL SMEAR: NORMAL
EGFRCR SERPLBLD CKD-EPI 2021: >90 ML/MIN/1.73M*2
EOSINOPHIL # BLD AUTO: 0.03 X10*3/UL (ref 0–0.7)
EOSINOPHIL NFR BLD AUTO: 0.2 %
ERYTHROCYTE [DISTWIDTH] IN BLOOD BY AUTOMATED COUNT: 21 % (ref 11.5–14.5)
GLUCOSE BLD MANUAL STRIP-MCNC: 123 MG/DL (ref 74–99)
GLUCOSE SERPL-MCNC: 73 MG/DL (ref 74–99)
HCT VFR BLD AUTO: 29.1 % (ref 36–46)
HGB BLD-MCNC: 8.3 G/DL (ref 12–16)
HYPOCHROMIA BLD QL SMEAR: NORMAL
IMM GRANULOCYTES # BLD AUTO: 0.19 X10*3/UL (ref 0–0.7)
IMM GRANULOCYTES NFR BLD AUTO: 1.4 % (ref 0–0.9)
LYMPHOCYTES # BLD AUTO: 1.23 X10*3/UL (ref 1.2–4.8)
LYMPHOCYTES NFR BLD AUTO: 9.2 %
MAGNESIUM SERPL-MCNC: 2 MG/DL (ref 1.6–2.4)
MCH RBC QN AUTO: 23.9 PG (ref 26–34)
MCHC RBC AUTO-ENTMCNC: 28.5 G/DL (ref 32–36)
MCV RBC AUTO: 84 FL (ref 80–100)
MONOCYTES # BLD AUTO: 0.73 X10*3/UL (ref 0.1–1)
MONOCYTES NFR BLD AUTO: 5.4 %
NEUTROPHILS # BLD AUTO: 11.22 X10*3/UL (ref 1.2–7.7)
NEUTROPHILS NFR BLD AUTO: 83.7 %
NRBC BLD-RTO: 0.4 /100 WBCS (ref 0–0)
OVALOCYTES BLD QL SMEAR: NORMAL
PHOSPHATE SERPL-MCNC: 2.4 MG/DL (ref 2.5–4.9)
PLATELET # BLD AUTO: 678 X10*3/UL (ref 150–450)
POLYCHROMASIA BLD QL SMEAR: NORMAL
POTASSIUM SERPL-SCNC: 3.9 MMOL/L (ref 3.5–5.3)
RBC # BLD AUTO: 3.48 X10*6/UL (ref 4–5.2)
RBC MORPH BLD: NORMAL
SODIUM SERPL-SCNC: 132 MMOL/L (ref 136–145)
WBC # BLD AUTO: 13.4 X10*3/UL (ref 4.4–11.3)

## 2024-07-01 PROCEDURE — 97110 THERAPEUTIC EXERCISES: CPT | Mod: GP,CQ

## 2024-07-01 PROCEDURE — 2500000001 HC RX 250 WO HCPCS SELF ADMINISTERED DRUGS (ALT 637 FOR MEDICARE OP): Performed by: HOSPITALIST

## 2024-07-01 PROCEDURE — 83735 ASSAY OF MAGNESIUM: CPT | Performed by: SURGERY

## 2024-07-01 PROCEDURE — C9113 INJ PANTOPRAZOLE SODIUM, VIA: HCPCS | Performed by: HOSPITALIST

## 2024-07-01 PROCEDURE — 99233 SBSQ HOSP IP/OBS HIGH 50: CPT | Performed by: INTERNAL MEDICINE

## 2024-07-01 PROCEDURE — 1100000001 HC PRIVATE ROOM DAILY

## 2024-07-01 PROCEDURE — 2500000001 HC RX 250 WO HCPCS SELF ADMINISTERED DRUGS (ALT 637 FOR MEDICARE OP): Performed by: INTERNAL MEDICINE

## 2024-07-01 PROCEDURE — 84100 ASSAY OF PHOSPHORUS: CPT | Performed by: SURGERY

## 2024-07-01 PROCEDURE — 80048 BASIC METABOLIC PNL TOTAL CA: CPT

## 2024-07-01 PROCEDURE — 2500000005 HC RX 250 GENERAL PHARMACY W/O HCPCS: Performed by: INTERNAL MEDICINE

## 2024-07-01 PROCEDURE — 94640 AIRWAY INHALATION TREATMENT: CPT

## 2024-07-01 PROCEDURE — 2500000004 HC RX 250 GENERAL PHARMACY W/ HCPCS (ALT 636 FOR OP/ED)

## 2024-07-01 PROCEDURE — 97530 THERAPEUTIC ACTIVITIES: CPT | Mod: GO,CO

## 2024-07-01 PROCEDURE — 85025 COMPLETE CBC W/AUTO DIFF WBC: CPT

## 2024-07-01 PROCEDURE — 2500000004 HC RX 250 GENERAL PHARMACY W/ HCPCS (ALT 636 FOR OP/ED): Performed by: HOSPITALIST

## 2024-07-01 PROCEDURE — 36415 COLL VENOUS BLD VENIPUNCTURE: CPT

## 2024-07-01 PROCEDURE — 2500000004 HC RX 250 GENERAL PHARMACY W/ HCPCS (ALT 636 FOR OP/ED): Performed by: INTERNAL MEDICINE

## 2024-07-01 PROCEDURE — 2550000001 HC RX 255 CONTRASTS

## 2024-07-01 PROCEDURE — 2500000002 HC RX 250 W HCPCS SELF ADMINISTERED DRUGS (ALT 637 FOR MEDICARE OP, ALT 636 FOR OP/ED): Performed by: INTERNAL MEDICINE

## 2024-07-01 PROCEDURE — 99232 SBSQ HOSP IP/OBS MODERATE 35: CPT

## 2024-07-01 PROCEDURE — 2500000004 HC RX 250 GENERAL PHARMACY W/ HCPCS (ALT 636 FOR OP/ED): Performed by: PHARMACIST

## 2024-07-01 PROCEDURE — 82947 ASSAY GLUCOSE BLOOD QUANT: CPT

## 2024-07-01 PROCEDURE — 93005 ELECTROCARDIOGRAM TRACING: CPT

## 2024-07-01 PROCEDURE — 97116 GAIT TRAINING THERAPY: CPT | Mod: GP,CQ

## 2024-07-01 RX ORDER — DIPHENHYDRAMINE HYDROCHLORIDE 50 MG/ML
50 INJECTION INTRAMUSCULAR; INTRAVENOUS ONCE
Status: COMPLETED | OUTPATIENT
Start: 2024-07-01 | End: 2024-07-01

## 2024-07-01 ASSESSMENT — COGNITIVE AND FUNCTIONAL STATUS - GENERAL
WALKING IN HOSPITAL ROOM: A LITTLE
DRESSING REGULAR LOWER BODY CLOTHING: A LITTLE
TURNING FROM BACK TO SIDE WHILE IN FLAT BAD: A LITTLE
TOILETING: A LOT
TOILETING: A LITTLE
WALKING IN HOSPITAL ROOM: A LITTLE
CLIMB 3 TO 5 STEPS WITH RAILING: A LITTLE
MOBILITY SCORE: 18
DAILY ACTIVITIY SCORE: 20
PERSONAL GROOMING: A LITTLE
MOVING TO AND FROM BED TO CHAIR: A LITTLE
TOILETING: A LOT
MOVING TO AND FROM BED TO CHAIR: A LITTLE
MOBILITY SCORE: 18
DAILY ACTIVITIY SCORE: 17
MOVING FROM LYING ON BACK TO SITTING ON SIDE OF FLAT BED WITH BEDRAILS: A LITTLE
MOVING TO AND FROM BED TO CHAIR: A LITTLE
WALKING IN HOSPITAL ROOM: A LITTLE
CLIMB 3 TO 5 STEPS WITH RAILING: A LOT
HELP NEEDED FOR BATHING: A LITTLE
TURNING FROM BACK TO SIDE WHILE IN FLAT BAD: A LITTLE
STANDING UP FROM CHAIR USING ARMS: A LITTLE
CLIMB 3 TO 5 STEPS WITH RAILING: A LOT
PERSONAL GROOMING: A LITTLE
DRESSING REGULAR UPPER BODY CLOTHING: A LITTLE
STANDING UP FROM CHAIR USING ARMS: A LITTLE
HELP NEEDED FOR BATHING: A LITTLE
STANDING UP FROM CHAIR USING ARMS: A LITTLE
DRESSING REGULAR UPPER BODY CLOTHING: A LITTLE
DAILY ACTIVITIY SCORE: 17
HELP NEEDED FOR BATHING: A LITTLE
DRESSING REGULAR UPPER BODY CLOTHING: A LITTLE
DRESSING REGULAR LOWER BODY CLOTHING: A LOT
DRESSING REGULAR LOWER BODY CLOTHING: A LOT
MOBILITY SCORE: 18
TURNING FROM BACK TO SIDE WHILE IN FLAT BAD: A LITTLE

## 2024-07-01 ASSESSMENT — PAIN SCALES - GENERAL
PAINLEVEL_OUTOF10: 5 - MODERATE PAIN
PAINLEVEL_OUTOF10: 8
PAINLEVEL_OUTOF10: 3
PAINLEVEL_OUTOF10: 0 - NO PAIN
PAINLEVEL_OUTOF10: 8
PAINLEVEL_OUTOF10: 7
PAINLEVEL_OUTOF10: 0 - NO PAIN
PAINLEVEL_OUTOF10: 2
PAINLEVEL_OUTOF10: 7
PAINLEVEL_OUTOF10: 8

## 2024-07-01 ASSESSMENT — PAIN DESCRIPTION - LOCATION
LOCATION: ABDOMEN
LOCATION: BACK
LOCATION: ABDOMEN

## 2024-07-01 ASSESSMENT — PAIN - FUNCTIONAL ASSESSMENT
PAIN_FUNCTIONAL_ASSESSMENT: 0-10

## 2024-07-01 ASSESSMENT — PAIN DESCRIPTION - ORIENTATION
ORIENTATION: LOWER
ORIENTATION: LOWER

## 2024-07-01 ASSESSMENT — PAIN DESCRIPTION - DESCRIPTORS: DESCRIPTORS: ACHING;DULL

## 2024-07-01 ASSESSMENT — PAIN SCALES - WONG BAKER
WONGBAKER_NUMERICALRESPONSE: HURTS LITTLE BIT

## 2024-07-01 NOTE — PROGRESS NOTES
"Fernando Cuevas is a 63 y.o. female on day 10 of admission presenting with Perforated viscus.    Subjective   Patient with continued distension despite enemas and suppository. NG is LIWS. I see a stool occurrence in EMR but nursing unsure of this. No other complaints.    Objective   PE:  Constitutional: A&Ox3, calm and cooperative, NAD  Eyes: PERRL, clear sclera   Head/Neck: Neck supple  Cardiovascular: Normal rate and regular rhythm.  Respiratory/Thorax: Good symmetric chest expansion. No labored breathing.  Gastrointestinal: Abdomen slightly distended, soft, appropriately tender, no peritoneal signs, laparotomy sites c/d/i, well approximate with Dermabond, no erythema or drainage. NG LIWS, around 150cc bilious output in container, no output in EMR.  Genitourinary: Voiding independently  Extremities: No peripheral edema  Neurological: A&Ox3, No focal deficits  Psychological: Appropriate mood and behavior  Skin: Warm and dry    Last Recorded Vitals  Blood pressure 121/84, pulse 98, temperature 36.4 °C (97.5 °F), temperature source Temporal, resp. rate 16, height 1.575 m (5' 2.01\"), weight 64 kg (141 lb 1.5 oz), SpO2 100%.  Intake/Output last 3 Shifts:  I/O last 3 completed shifts:  In: 1904.3 (29.8 mL/kg) [IV Piggyback:200]  Out: 2300 (35.9 mL/kg) [Urine:1500 (0.7 mL/kg/hr); Emesis/NG output:800]  Weight: 64 kg     Relevant Results  Scheduled medications  aspirin, 81 mg, oral, Daily  budesonide, 0.25 mg, nebulization, Daily   And  formoterol, 20 mcg, nebulization, BID  busPIRone, 5 mg, oral, BID  calcium carbonate, 1,500 mg, oral, Daily  diatrizoate yariel-diatrizoat sod, 30 mL, nasogastric tube, Once  dilTIAZem ER, 240 mg, oral, Daily  diphenhydrAMINE, 50 mg, intravenous, Once  enoxaparin, 1 mg/kg, subcutaneous, q12h  fluconazole, 200 mg, oral, Daily  folic acid, 1 mg, oral, Daily  hydrocortisone sodium succinate, 200 mg, intravenous, q6h  ipratropium-albuteroL, 3 mL, nebulization, TID  lisinopril, 10 mg, oral, " Daily  montelukast, 10 mg, oral, Daily  multivitamin with minerals, 1 tablet, oral, Daily  nortriptyline, 50 mg, oral, Nightly  oxybutynin, 5 mg, oral, Daily  oxygen, , inhalation, Continuous - Inhalation  pantoprazole, 40 mg, intravenous, Daily before breakfast  piperacillin-tazobactam, 3.375 g, intravenous, q6h  thiamine, 100 mg, oral, Daily  thiamine, 100 mg, oral, Daily  varenicline, 1 mg, oral, BID      Continuous medications  Adult Clinimix Parenteral Nutrition, 83 mL/hr, Last Rate: 83 mL/hr (07/01/24 0045)      PRN medications  PRN medications: acetaminophen **OR** acetaminophen **OR** acetaminophen, benzonatate, guaiFENesin, HYDROmorphone, HYDROmorphone, hydrOXYzine HCL, ipratropium-albuteroL, morphine, morphine, nitroglycerin, ondansetron    Results for orders placed or performed during the hospital encounter of 06/20/24 (from the past 24 hour(s))   POCT GLUCOSE   Result Value Ref Range    POCT Glucose 133 (H) 74 - 99 mg/dL   Magnesium   Result Value Ref Range    Magnesium 1.90 1.60 - 2.40 mg/dL   Phosphorus   Result Value Ref Range    Phosphorus 2.6 2.5 - 4.9 mg/dL   Basic metabolic panel   Result Value Ref Range    Glucose 110 (H) 74 - 99 mg/dL    Sodium 133 (L) 136 - 145 mmol/L    Potassium 3.3 (L) 3.5 - 5.3 mmol/L    Chloride 96 (L) 98 - 107 mmol/L    Bicarbonate 27 21 - 32 mmol/L    Anion Gap 13 10 - 20 mmol/L    Urea Nitrogen 13 6 - 23 mg/dL    Creatinine 0.65 0.50 - 1.05 mg/dL    eGFR >90 >60 mL/min/1.73m*2    Calcium 8.1 (L) 8.6 - 10.3 mg/dL   CBC and Auto Differential   Result Value Ref Range    WBC 15.1 (H) 4.4 - 11.3 x10*3/uL    nRBC 0.2 (H) 0.0 - 0.0 /100 WBCs    RBC 3.34 (L) 4.00 - 5.20 x10*6/uL    Hemoglobin 7.8 (L) 12.0 - 16.0 g/dL    Hematocrit 26.7 (L) 36.0 - 46.0 %    MCV 80 80 - 100 fL    MCH 23.4 (L) 26.0 - 34.0 pg    MCHC 29.2 (L) 32.0 - 36.0 g/dL    RDW 20.4 (H) 11.5 - 14.5 %    Platelets 739 (H) 150 - 450 x10*3/uL    Neutrophils % 89.8 40.0 - 80.0 %    Immature Granulocytes %,  Automated 1.3 (H) 0.0 - 0.9 %    Lymphocytes % 4.6 13.0 - 44.0 %    Monocytes % 4.1 2.0 - 10.0 %    Eosinophils % 0.1 0.0 - 6.0 %    Basophils % 0.1 0.0 - 2.0 %    Neutrophils Absolute 13.57 (H) 1.20 - 7.70 x10*3/uL    Immature Granulocytes Absolute, Automated 0.20 0.00 - 0.70 x10*3/uL    Lymphocytes Absolute 0.69 (L) 1.20 - 4.80 x10*3/uL    Monocytes Absolute 0.62 0.10 - 1.00 x10*3/uL    Eosinophils Absolute 0.01 0.00 - 0.70 x10*3/uL    Basophils Absolute 0.02 0.00 - 0.10 x10*3/uL   Morphology   Result Value Ref Range    RBC Morphology See Below     Target Cells Few     Ovalocytes Few     Teardrop Cells Few     Clumped Platelets Present    POCT GLUCOSE   Result Value Ref Range    POCT Glucose 119 (H) 74 - 99 mg/dL   POCT GLUCOSE   Result Value Ref Range    POCT Glucose 108 (H) 74 - 99 mg/dL   Magnesium   Result Value Ref Range    Magnesium 2.00 1.60 - 2.40 mg/dL   Phosphorus   Result Value Ref Range    Phosphorus 2.4 (L) 2.5 - 4.9 mg/dL   Basic metabolic panel   Result Value Ref Range    Glucose 73 (L) 74 - 99 mg/dL    Sodium 132 (L) 136 - 145 mmol/L    Potassium 3.9 3.5 - 5.3 mmol/L    Chloride 97 (L) 98 - 107 mmol/L    Bicarbonate 26 21 - 32 mmol/L    Anion Gap 13 10 - 20 mmol/L    Urea Nitrogen 12 6 - 23 mg/dL    Creatinine 0.60 0.50 - 1.05 mg/dL    eGFR >90 >60 mL/min/1.73m*2    Calcium 7.9 (L) 8.6 - 10.3 mg/dL   CBC and Auto Differential   Result Value Ref Range    WBC 13.4 (H) 4.4 - 11.3 x10*3/uL    nRBC 0.4 (H) 0.0 - 0.0 /100 WBCs    RBC 3.48 (L) 4.00 - 5.20 x10*6/uL    Hemoglobin 8.3 (L) 12.0 - 16.0 g/dL    Hematocrit 29.1 (L) 36.0 - 46.0 %    MCV 84 80 - 100 fL    MCH 23.9 (L) 26.0 - 34.0 pg    MCHC 28.5 (L) 32.0 - 36.0 g/dL    RDW 21.0 (H) 11.5 - 14.5 %    Platelets 678 (H) 150 - 450 x10*3/uL    Neutrophils % 83.7 40.0 - 80.0 %    Immature Granulocytes %, Automated 1.4 (H) 0.0 - 0.9 %    Lymphocytes % 9.2 13.0 - 44.0 %    Monocytes % 5.4 2.0 - 10.0 %    Eosinophils % 0.2 0.0 - 6.0 %    Basophils % 0.1 0.0  - 2.0 %    Neutrophils Absolute 11.22 (H) 1.20 - 7.70 x10*3/uL    Immature Granulocytes Absolute, Automated 0.19 0.00 - 0.70 x10*3/uL    Lymphocytes Absolute 1.23 1.20 - 4.80 x10*3/uL    Monocytes Absolute 0.73 0.10 - 1.00 x10*3/uL    Eosinophils Absolute 0.03 0.00 - 0.70 x10*3/uL    Basophils Absolute 0.02 0.00 - 0.10 x10*3/uL   Morphology   Result Value Ref Range    RBC Morphology See Below     Polychromasia Mild     Hypochromia Mild     Ovalocytes Few     Teardrop Cells Few     Parish Cells Few     Bite Cells Present      Assessment/Plan   Principal Problem:    Perforated viscus    Patient with perforated small bowel is now POD10 ex lap, pSBR with Dr Liang. Intra-op findings of segment of perforated small bowel. Labs reviewed- WBC 13.4, hgb 8.3. Abdominal exam about the same as prior few days. She is passing minimal gas, no BM.     Plan:  - NG LIWS  - NPO, sips/chips/popsicle okay  - Gastrografin challenge today- requires pre-medication, ordered  - Continue PPN  - OOB/ambulation  - IS hourly  - Chewing gum  - DVT proph: SCDs/lovenox  - IV zosyn  - PRN antiemetic    Surgery to follow.    Discussed with Dr Bingham.    I spent 35 minutes in the professional and overall care of this patient.    Mahad Boateng PA-C

## 2024-07-01 NOTE — PROGRESS NOTES
Parenteral Nutrition Note    Chart reviewed, events noted. Nutrition plan of care d/w MD, PharmD,  CNP surgery:   - continue PPN @ 83 ml/hr continuous infusion   - NG to LIWS/clamping trial/oral diet as per surgery   - lipids tonight   - RFP, Mg daily; replete prn   - PPN to continue until return of bowel fx; gastrographin challenge today     RDN to follow     Dietary Orders (From admission, onward)       Start     Ordered    06/27/24 1551  NPO Diet Except: Sips with meds, Sips of clear liquids, Ice chips; Effective now  Diet effective now        Comments: Popsicles, small chips, sips okay   Question Answer Comment   Except: Sips with meds    Except: Sips of clear liquids    Except: Ice chips        06/27/24 1551                  Scheduled medications  aspirin, 81 mg, oral, Daily  budesonide, 0.25 mg, nebulization, Daily   And  formoterol, 20 mcg, nebulization, BID  busPIRone, 5 mg, oral, BID  calcium carbonate, 1,500 mg, oral, Daily  diatrizoate yariel-diatrizoat sod, 30 mL, nasogastric tube, Once  dilTIAZem ER, 240 mg, oral, Daily  diphenhydrAMINE, 50 mg, intravenous, Once  enoxaparin, 1 mg/kg, subcutaneous, q12h  fat emulsion-plant based, 250 mL, intravenous, Daily Lipids  fluconazole, 200 mg, oral, Daily  folic acid, 1 mg, oral, Daily  hydrocortisone sodium succinate, 200 mg, intravenous, q6h  ipratropium-albuteroL, 3 mL, nebulization, TID  lisinopril, 10 mg, oral, Daily  montelukast, 10 mg, oral, Daily  multivitamin with minerals, 1 tablet, oral, Daily  nortriptyline, 50 mg, oral, Nightly  oxybutynin, 5 mg, oral, Daily  oxygen, , inhalation, Continuous - Inhalation  pantoprazole, 40 mg, intravenous, Daily before breakfast  piperacillin-tazobactam, 3.375 g, intravenous, q6h  thiamine, 100 mg, oral, Daily  thiamine, 100 mg, oral, Daily  varenicline, 1 mg, oral, BID      Continuous medications  Adult Clinimix Parenteral Nutrition, 83 mL/hr, Last Rate: 83 mL/hr (07/01/24 0045)  Adult Clinimix Parenteral Nutrition, 83  "mL/hr      PRN medications  PRN medications: acetaminophen **OR** acetaminophen **OR** acetaminophen, benzonatate, guaiFENesin, HYDROmorphone, HYDROmorphone, hydrOXYzine HCL, ipratropium-albuteroL, morphine, morphine, nitroglycerin, ondansetron    Visit Vitals  /82   Pulse 104   Temp 36.5 °C (97.7 °F)   Resp 18   Ht 1.575 m (5' 2.01\")   Wt 64 kg (141 lb 1.5 oz) Comment: 6/27   SpO2 100%   BMI 25.80 kg/m²   OB Status Menopausal   Smoking Status Every Day   BSA 1.67 m²     I/O last 3 completed shifts:  In: 1804.3 (28.2 mL/kg) [IV Piggyback:100]  Out: 1850 (28.9 mL/kg) [Urine:1850 (0.8 mL/kg/hr)]  Weight: 64 kg   No intake/output data recorded.  - NG output for today not yet documented   - noted x 1 BM yesterday per documentation      Latest Reference Range & Units Most Recent   GLUCOSE 74 - 99 mg/dL 73 (L)  7/1/24 07:38   SODIUM 136 - 145 mmol/L 132 (L)  7/1/24 07:38   POTASSIUM 3.5 - 5.3 mmol/L 3.9  7/1/24 07:38   CHLORIDE 98 - 107 mmol/L 97 (L)  7/1/24 07:38   Bicarbonate 21 - 32 mmol/L 26  7/1/24 07:38   Anion Gap 10 - 20 mmol/L 13  7/1/24 07:38   Blood Urea Nitrogen 6 - 23 mg/dL 12  7/1/24 07:38   Creatinine 0.50 - 1.05 mg/dL 0.60  7/1/24 07:38   EGFR >60 mL/min/1.73m*2 >90  7/1/24 07:38   Calcium 8.6 - 10.3 mg/dL 7.9 (L)  7/1/24 07:38   PHOSPHORUS 2.5 - 4.9 mg/dL 2.4 (L)  7/1/24 07:38   Albumin 3.4 - 5.0 g/dL 2.9 (L)  6/21/24 07:04   Alkaline Phosphatase 33 - 136 U/L 65  6/21/24 07:04   ALT 7 - 45 U/L 23  6/21/24 07:04   AST 9 - 39 U/L 14  6/21/24 07:04   Bilirubin Total 0.0 - 1.2 mg/dL 0.4  6/21/24 07:04   Procalcitonin <=0.07 ng/mL 0.24 (H)  6/18/24 17:18   FERRITIN 8 - 150 ng/mL 19  5/5/24 00:14   FOLATE >5.0 ng/mL 10.0  6/16/24 10:48   Total Protein 6.4 - 8.2 g/dL 5.9 (L)  6/24/24 14:51   IRON 35 - 150 ug/dL 11 (L)  6/16/24 06:36   Osmolality, Serum 280 - 300 mOsm/kg 276 (L)  6/22/24 18:22   MAGNESIUM 1.60 - 2.40 mg/dL 2.00  7/1/24 07:38   (L): Data is abnormally low  (H): Data is abnormally high    "

## 2024-07-01 NOTE — PROGRESS NOTES
Fernando Cuevas is a 63 y.o. female on day 10 of admission presenting with Perforated viscus.      Subjective   Patient seen and examined at the bedside this morning. No acute overnight events. No other questions or concerns.         Objective     Last Recorded Vitals  /84   Pulse 98   Temp 36.4 °C (97.5 °F) (Temporal)   Resp 16   Wt 64 kg (141 lb 1.5 oz) Comment: 6/27  SpO2 100%   Intake/Output last 3 Shifts:    Intake/Output Summary (Last 24 hours) at 7/1/2024 1158  Last data filed at 7/1/2024 0926  Gross per 24 hour   Intake 380.62 ml   Output 1050 ml   Net -669.38 ml       Admission Weight  Weight: 64 kg (141 lb 1.5 oz) (06/20/24 2132)    Daily Weight  06/30/24 : 64 kg (141 lb 1.5 oz)    Image Results  XR abdomen 1 view  Narrative: Interpreted By:  Bethel Porras,   STUDY:  XR ABDOMEN 1 VIEW      INDICATION:  Signs/Symptoms:NG moved, reconfirm proper placement.      COMPARISON:  June 30th earlier the same day      ACCESSION NUMBER(S):  [id number]      ORDERING CLINICIAN:  ROBERT NOLASCO      FINDINGS:  Nasogastric tube reflected upon itself slightly within the stomach  over the gastric fundus.      Bowel-gas pattern incompletely assessed.      Impression: Nasogastric tube reflected upon itself slightly within the stomach  over the gastric fundus.      Signed by: Bethel Porras 6/30/2024 4:01 PM  Dictation workstation:   [id number]  XR abdomen 1 view  Narrative: Interpreted By:  Sejal Dykes,   STUDY:  XR ABDOMEN 1 VIEW;  6/30/2024 7:56 am. 2 views.      INDICATION:  Signs/Symptoms:Post-op ileus.      COMPARISON:  06/29/2024      ACCESSION NUMBER(S):  [id number]      ORDERING CLINICIAN:  ROBERT NOLASCO      FINDINGS:  There is a nasogastric tube with the tip in the proximal stomach and  side hole beyond the gastroesophageal junction. There is no gastric  distention. There is a vertical orientation of cutaneous staples from  recent abdominal surgery. There is moderate-to-marked distention of  central  small bowel loops, unchanged. Findings could represent a  postoperative ileus but could represent a partial small bowel  obstruction. There is a large amount of stool in the right side of  the colon, unchanged.      There are no pathologic calcifications.      Visualized lungs are clear.      Osseous structures demonstrate no acute bony changes.          Impression: 1. Nasogastric tube with the tip in the proximal stomach; no gastric  distention.  2. Persistent moderate-to-marked central small-bowel dilatation which  could represent a postoperative ileus versus partial small bowel  obstruction. This is unchanged.  3. Large amount of stool right side of the colon.      MACRO:  None      Signed by: Sejal Dykes 6/30/2024 8:11 AM  Dictation workstation:   [id number]      Physical Exam  Constitutional:       General: She is not in acute distress.  HENT:      Head: Normocephalic and atraumatic.   Eyes:      Conjunctiva/sclera: Conjunctivae normal.      Pupils: Pupils are equal, round, and reactive to light.   Cardiovascular:      Rate and Rhythm: Normal rate and regular rhythm.   Pulmonary:      Effort: Pulmonary effort is normal.      Breath sounds: Normal breath sounds.   Abdominal:      General: There is distension.      Palpations: Abdomen is soft.   Skin:     General: Skin is warm and dry.   Neurological:      Mental Status: She is alert.   Psychiatric:         Mood and Affect: Mood normal.         Behavior: Behavior normal.         Relevant Results           Scheduled medications  aspirin, 81 mg, oral, Daily  budesonide, 0.25 mg, nebulization, Daily   And  formoterol, 20 mcg, nebulization, BID  busPIRone, 5 mg, oral, BID  calcium carbonate, 1,500 mg, oral, Daily  diatrizoate yariel-diatrizoat sod, 30 mL, nasogastric tube, Once  dilTIAZem ER, 240 mg, oral, Daily  diphenhydrAMINE, 50 mg, intravenous, Once  enoxaparin, 1 mg/kg, subcutaneous, q12h  fluconazole, 200 mg, oral, Daily  folic acid, 1 mg, oral,  Daily  hydrocortisone sodium succinate, 200 mg, intravenous, q6h  ipratropium-albuteroL, 3 mL, nebulization, TID  lisinopril, 10 mg, oral, Daily  montelukast, 10 mg, oral, Daily  multivitamin with minerals, 1 tablet, oral, Daily  nortriptyline, 50 mg, oral, Nightly  oxybutynin, 5 mg, oral, Daily  oxygen, , inhalation, Continuous - Inhalation  pantoprazole, 40 mg, intravenous, Daily before breakfast  piperacillin-tazobactam, 3.375 g, intravenous, q6h  thiamine, 100 mg, oral, Daily  thiamine, 100 mg, oral, Daily  varenicline, 1 mg, oral, BID      Continuous medications  Adult Clinimix Parenteral Nutrition, 83 mL/hr, Last Rate: 83 mL/hr (07/01/24 0045)      PRN medications  PRN medications: acetaminophen **OR** acetaminophen **OR** acetaminophen, benzonatate, guaiFENesin, HYDROmorphone, HYDROmorphone, hydrOXYzine HCL, ipratropium-albuteroL, morphine, morphine, nitroglycerin, ondansetron    Assessment/Plan      Principal Problem:    Perforated viscus  Active Problems:    Thrombocytosis    Perforated small bowel's with peritonitis:  Concern for postop ileus versus partial obstruction:  Underwent surgical intervention 6/21  -Underwent small bowel resection, and washout procedure.  -Culture for Candida albicans, concern for mix of gram-negative and anaerobes.  -Infectious disease following, continued on IV Zosyn, fluconazole 200 mg/day.  Plan for antibiotics with stop date 7/3/24.,  This will be a total of 10 days from the initiation of fluconazole.  Infectious diseases recommended discharge home once medically ready on p.o. Augmentin 875 mg 2 times a day instead of Zosyn if needed.  -General surgery following for postop ileus versus partial SBO, appreciate recommendations.  Enema and chewing gum added.    -Will continue to trend CBC  -Dietary following, appreciate recommendations.   -continue NPO  -Gastrografin challenge today     Acute DVT in the left distal brachial vein:  -Vascular upper extremity ultrasound with acute  finding, non-occlusive.  -Patient initiated on full dose Lovenox therapeutic dose and 6/27. Will need to be transitioned to oral regimen prior to discharge.   -Patient with contrast allergy, CT angio chest requiring prep, no acute findings.      Acute on chronic hyponatremia, resolved:  In setting of poor oral intake  -Nephrology was managing, placed on IV isotonic fluid with improvement  -Fluids will be discontinued.  -Nephrology signed off  -Will continue to trend sodium    Severe malnutrition:  -Nutrition following, appreciate recommendations.  -NG tube in place, will need to continue PPN over the weekend.      LOS: Unknown  DVT ppx: Therapeutic Lovenox    Rupa Khoury MD

## 2024-07-01 NOTE — PROGRESS NOTES
Occupational Therapy    Occupational Therapy Treatment    Name: Fernando Cuevas  MRN: [id number]  : 1960  Date: 24  Time Calculation  Start Time: 1420  Stop Time: 1432  Time Calculation (min): 12 min    Assessment:  Medical Staff Made Aware: Yes  End of Session Communication: Bedside nurse  End of Session Patient Position: Alarm on, Up in chair  Plan:  Treatment Interventions: ADL retraining, Functional transfer training, Equipment evaluation/education  OT Frequency: 3 times per week  OT Discharge Recommendations: Moderate intensity level of continued care  Equipment Recommended upon Discharge:  (hip kit; pt. educated on where to obtain)  OT Recommended Transfer Status: Stand by assist  OT - OK to Discharge: Yes (per POC)    Subjective   Previous Visit Info:  OT Last Visit  OT Received On: 24  General:  General  Reason for Referral: 63 y.o. F s/p exploratory laparascopy date  Prior to Session Communication: Bedside nurse  Patient Position Received: Bed, 3 rail up, Alarm on  Preferred Learning Style: auditory, kinesthetic, verbal  General Comment: Pt cooperative and compliant with session.  Precautions:  Medical Precautions: Fall precautions  Post-Surgical Precautions: Abdominal surgery precautions  Precautions Comment: NG tube    Pain Assessment:  Pain Assessment  Pain Assessment: 0-10  0-10 (Numeric) Pain Score: 0 - No pain     Objective   Cognition:  Overall Cognitive Status: Within Functional Limits  Orientation Level: Oriented X4     Bed Mobility/Transfers: Bed Mobility  Bed Mobility: Yes  Bed Mobility 1  Bed Mobility 1: Supine to sitting  Level of Assistance 1: Distant supervision  Bed Mobility Comments 1: HOB elevated    Transfers  Transfer: Yes  Transfer 1  Transfer From 1: Bed to  Transfer to 1: Chair with drop arm  Technique 1: Sit to stand, Stand to sit  Transfer Device 1:  (rollator)  Transfer Level of Assistance 1: Close supervision    Functional Mobility:  Functional  Mobility  Functional Mobility Performed: Yes  Functional Mobility 1  Surface 1: Level tile  Device 1: Rollator  Assistance 1: Close supervision  Comments 1: Pt completed functional mobility with rollator a short household distance at [facility].    Outcome Measures:  [facility] Daily Activity  Putting on and taking off regular lower body clothing: A lot  Bathing (including washing, rinsing, drying): A little  Putting on and taking off regular upper body clothing: A little  Toileting, which includes using toilet, bedpan or urinal: A lot  Taking care of personal grooming such as brushing teeth: A little  Eating Meals: None  Daily Activity - Total Score: 17      Education Documentation  Precautions, taught by SIN Alexis at 7/1/2024  2:45 PM.  Learner: Patient  Readiness: Acceptance  Method: Explanation, Demonstration  Response: Verbalizes Understanding, Demonstrated Understanding, Needs Reinforcement    Body Mechanics, taught by SIN Alexis at 7/1/2024  2:45 PM.  Learner: Patient  Readiness: Acceptance  Method: Explanation, Demonstration  Response: Verbalizes Understanding, Demonstrated Understanding, Needs Reinforcement    ADL Training, taught by SIN Alexis at 7/1/2024  2:45 PM.  Learner: Patient  Readiness: Acceptance  Method: Explanation, Demonstration  Response: Verbalizes Understanding, Demonstrated Understanding, Needs Reinforcement    Education Comments  No comments found.      Goals:  Encounter Problems       Encounter Problems (Active)       ADLs       Patient will perform UB and LB bathing with contact guard assist level of assistance and grab bars, shower chair, and long-handled sponge. (Not Progressing)       Start:  06/24/24    Expected End:  07/08/24            Patient with complete upper body dressing with supervision level of assistance donning and doffing all UE clothes with PRN adaptive equipment while edge of bed  (Not Progressing)       Start:  06/24/24    Expected End:   07/08/24            Patient with complete lower body dressing with minimal assist  level of assistance donning and doffing all LE clothes  with reacher, shoe horn, sock-aid, and dressing stick  while edge of bed  (Met)       Start:  06/24/24    Expected End:  07/08/24    Resolved:  06/28/24    Updated to: Patient with complete lower body dressing with modified assist  level of assistance donning and doffing all LE clothes  with reacher, shoe horn, sock-aid, and dressing stick  while edge of bed    Update reason: patient goal met         Patient will complete daily grooming tasks brushing teeth and washing face/hair with independent level of assistance while edge of bed . (Not Progressing)       Start:  06/24/24    Expected End:  07/08/24            Patient will complete toileting including hygiene clothing management/hygiene with contact guard assist level of assistance and raised toilet seat and grab bars. (Not Progressing)       Start:  06/24/24    Expected End:  07/08/24            Patient with complete lower body dressing with modified assist  level of assistance donning and doffing all LE clothes  with reacher, shoe horn, sock-aid, and dressing stick  while edge of bed (Not Progressing)       Start:  06/28/24    Expected End:  07/08/24                   BALANCE       Pt will maintain dynamic standing balance during ADL task with supervision level of assistance in order to demonstrate decreased risk of falling and improved postural control. (Not Progressing)       Start:  06/24/24    Expected End:  07/08/24            Patientt will maintain static standing balance during ADL task with independent level of assistance drop down in order to demonstrate decreased risk of falling and improved postural control. (Not Progressing)       Start:  06/24/24    Expected End:  07/08/24            Patient will tolerate standing for 5 minutes to contact guard level of assistance with front wheeled walker in order to improve  functional activity tolerance for ADL tasks. (Not Progressing)       Start:  06/24/24    Expected End:  07/08/24               TRANSFERS       Patient will perform bed mobility supervision level of assistance and bed rails in order to improve safety and independence with mobility (Progressing)       Start:  06/24/24    Expected End:  07/08/24            Patient will complete functional transfer to all surfaces with front wheeled walker with contact guard assist level of assistance. (Progressing)       Start:  06/24/24    Expected End:  07/08/24            Patient will complete sit to stand transfer with independent level of assistance and front wheeled walker in order to improve safety and prepare for out of bed mobility. (Progressing)       Start:  06/24/24    Expected End:  07/08/24

## 2024-07-01 NOTE — PROGRESS NOTES
"Fernando Cuevas is a 63 y.o. female on day 10 of admission presenting with Perforated viscus.    Assessment/Plan   Checking abdominal series. PPN. Prolonged ileus    Subjective   No BM       Objective     Physical Exam  NAD  A&Ox3  Non icteric  CTA  RR  Abdomen soft min tender. Wounds clean, intact. Quite distended with tympany  Extremities warm, well perfused     Last Recorded Vitals  Blood pressure 121/84, pulse 98, temperature 36.4 °C (97.5 °F), temperature source Temporal, resp. rate 16, height 1.575 m (5' 2.01\"), weight 64 kg (141 lb 1.5 oz), SpO2 100%.  Intake/Output last 3 Shifts:  I/O last 3 completed shifts:  In: 1904.3 (29.8 mL/kg) [IV Piggyback:200]  Out: 2300 (35.9 mL/kg) [Urine:1500 (0.7 mL/kg/hr); Emesis/NG output:800]  Weight: 64 kg     Relevant Results    Scheduled medications  aspirin, 81 mg, oral, Daily  budesonide, 0.25 mg, nebulization, Daily   And  formoterol, 20 mcg, nebulization, BID  busPIRone, 5 mg, oral, BID  calcium carbonate, 1,500 mg, oral, Daily  diatrizoate yariel-diatrizoat sod, 30 mL, nasogastric tube, Once  dilTIAZem ER, 240 mg, oral, Daily  diphenhydrAMINE, 50 mg, intravenous, Once  enoxaparin, 1 mg/kg, subcutaneous, q12h  fluconazole, 200 mg, oral, Daily  folic acid, 1 mg, oral, Daily  hydrocortisone sodium succinate, 200 mg, intravenous, q6h  ipratropium-albuteroL, 3 mL, nebulization, TID  lisinopril, 10 mg, oral, Daily  montelukast, 10 mg, oral, Daily  multivitamin with minerals, 1 tablet, oral, Daily  nortriptyline, 50 mg, oral, Nightly  oxybutynin, 5 mg, oral, Daily  oxygen, , inhalation, Continuous - Inhalation  pantoprazole, 40 mg, intravenous, Daily before breakfast  piperacillin-tazobactam, 3.375 g, intravenous, q6h  predniSONE, 10 mg, oral, Daily  thiamine, 100 mg, oral, Daily  thiamine, 100 mg, oral, Daily  varenicline, 1 mg, oral, BID      Continuous medications  Adult Clinimix Parenteral Nutrition, 83 mL/hr, Last Rate: 83 mL/hr (07/01/24 0045)      PRN medications  PRN " medications: acetaminophen **OR** acetaminophen **OR** acetaminophen, benzonatate, guaiFENesin, HYDROmorphone, HYDROmorphone, hydrOXYzine HCL, ipratropium-albuteroL, morphine, morphine, nitroglycerin, ondansetron    Results for orders placed or performed during the hospital encounter of 06/20/24 (from the past 24 hour(s))   POCT GLUCOSE   Result Value Ref Range    POCT Glucose 133 (H) 74 - 99 mg/dL   Magnesium   Result Value Ref Range    Magnesium 1.90 1.60 - 2.40 mg/dL   Phosphorus   Result Value Ref Range    Phosphorus 2.6 2.5 - 4.9 mg/dL   Basic metabolic panel   Result Value Ref Range    Glucose 110 (H) 74 - 99 mg/dL    Sodium 133 (L) 136 - 145 mmol/L    Potassium 3.3 (L) 3.5 - 5.3 mmol/L    Chloride 96 (L) 98 - 107 mmol/L    Bicarbonate 27 21 - 32 mmol/L    Anion Gap 13 10 - 20 mmol/L    Urea Nitrogen 13 6 - 23 mg/dL    Creatinine 0.65 0.50 - 1.05 mg/dL    eGFR >90 >60 mL/min/1.73m*2    Calcium 8.1 (L) 8.6 - 10.3 mg/dL   CBC and Auto Differential   Result Value Ref Range    WBC 15.1 (H) 4.4 - 11.3 x10*3/uL    nRBC 0.2 (H) 0.0 - 0.0 /100 WBCs    RBC 3.34 (L) 4.00 - 5.20 x10*6/uL    Hemoglobin 7.8 (L) 12.0 - 16.0 g/dL    Hematocrit 26.7 (L) 36.0 - 46.0 %    MCV 80 80 - 100 fL    MCH 23.4 (L) 26.0 - 34.0 pg    MCHC 29.2 (L) 32.0 - 36.0 g/dL    RDW 20.4 (H) 11.5 - 14.5 %    Platelets 739 (H) 150 - 450 x10*3/uL    Neutrophils % 89.8 40.0 - 80.0 %    Immature Granulocytes %, Automated 1.3 (H) 0.0 - 0.9 %    Lymphocytes % 4.6 13.0 - 44.0 %    Monocytes % 4.1 2.0 - 10.0 %    Eosinophils % 0.1 0.0 - 6.0 %    Basophils % 0.1 0.0 - 2.0 %    Neutrophils Absolute 13.57 (H) 1.20 - 7.70 x10*3/uL    Immature Granulocytes Absolute, Automated 0.20 0.00 - 0.70 x10*3/uL    Lymphocytes Absolute 0.69 (L) 1.20 - 4.80 x10*3/uL    Monocytes Absolute 0.62 0.10 - 1.00 x10*3/uL    Eosinophils Absolute 0.01 0.00 - 0.70 x10*3/uL    Basophils Absolute 0.02 0.00 - 0.10 x10*3/uL   Morphology   Result Value Ref Range    RBC Morphology See Below      Target Cells Few     Ovalocytes Few     Teardrop Cells Few     Clumped Platelets Present    POCT GLUCOSE   Result Value Ref Range    POCT Glucose 119 (H) 74 - 99 mg/dL   POCT GLUCOSE   Result Value Ref Range    POCT Glucose 108 (H) 74 - 99 mg/dL   Magnesium   Result Value Ref Range    Magnesium 2.00 1.60 - 2.40 mg/dL   Phosphorus   Result Value Ref Range    Phosphorus 2.4 (L) 2.5 - 4.9 mg/dL   Basic metabolic panel   Result Value Ref Range    Glucose 73 (L) 74 - 99 mg/dL    Sodium 132 (L) 136 - 145 mmol/L    Potassium 3.9 3.5 - 5.3 mmol/L    Chloride 97 (L) 98 - 107 mmol/L    Bicarbonate 26 21 - 32 mmol/L    Anion Gap 13 10 - 20 mmol/L    Urea Nitrogen 12 6 - 23 mg/dL    Creatinine 0.60 0.50 - 1.05 mg/dL    eGFR >90 >60 mL/min/1.73m*2    Calcium 7.9 (L) 8.6 - 10.3 mg/dL   CBC and Auto Differential   Result Value Ref Range    WBC 13.4 (H) 4.4 - 11.3 x10*3/uL    nRBC 0.4 (H) 0.0 - 0.0 /100 WBCs    RBC 3.48 (L) 4.00 - 5.20 x10*6/uL    Hemoglobin 8.3 (L) 12.0 - 16.0 g/dL    Hematocrit 29.1 (L) 36.0 - 46.0 %    MCV 84 80 - 100 fL    MCH 23.9 (L) 26.0 - 34.0 pg    MCHC 28.5 (L) 32.0 - 36.0 g/dL    RDW 21.0 (H) 11.5 - 14.5 %    Platelets 678 (H) 150 - 450 x10*3/uL    Neutrophils % 83.7 40.0 - 80.0 %    Immature Granulocytes %, Automated 1.4 (H) 0.0 - 0.9 %    Lymphocytes % 9.2 13.0 - 44.0 %    Monocytes % 5.4 2.0 - 10.0 %    Eosinophils % 0.2 0.0 - 6.0 %    Basophils % 0.1 0.0 - 2.0 %    Neutrophils Absolute 11.22 (H) 1.20 - 7.70 x10*3/uL    Immature Granulocytes Absolute, Automated 0.19 0.00 - 0.70 x10*3/uL    Lymphocytes Absolute 1.23 1.20 - 4.80 x10*3/uL    Monocytes Absolute 0.73 0.10 - 1.00 x10*3/uL    Eosinophils Absolute 0.03 0.00 - 0.70 x10*3/uL    Basophils Absolute 0.02 0.00 - 0.10 x10*3/uL   Morphology   Result Value Ref Range    RBC Morphology See Below     Polychromasia Mild     Hypochromia Mild     Ovalocytes Few     Teardrop Cells Few     Parish Cells Few     Bite Cells Present            I spent 25  minutes in the professional and overall care of this patient.      Reid Bingham MD

## 2024-07-01 NOTE — CARE PLAN
The patient's goals for the shift include      The clinical goals for the shift include patient to remain comfortable      Problem: Pain - Adult  Goal: Verbalizes/displays adequate comfort level or baseline comfort level  Outcome: Progressing     Problem: Pain  Goal: Walks with improved pain control throughout the shift  Outcome: Progressing     Problem: Pain  Goal: Takes deep breaths with improved pain control throughout the shift  Outcome: Progressing     Problem: Fall/Injury  Goal: Not fall by end of shift  Outcome: Progressing        DISPLAY PLAN FREE TEXT

## 2024-07-01 NOTE — PROGRESS NOTES
Physical Therapy    Physical Therapy Treatment    Patient Name: Fernando Cuevas  MRN: [id number]  Today's Date: 7/1/2024  Time Calculation  Start Time: 1317  Stop Time: 1345  Time Calculation (min): 28 min    Assessment/Plan   PT Assessment  End of Session Communication: Bedside nurse (Discussed pt's progress and current mobility as described in mobility section)  Assessment Comment: Pt is motivated to participate however fatigues easily this date and required more rest breaks during ambulation training. Plan to initiate stair negotiation next session. Pt will continue to benefit from skilled PT for improved B LE strength and stability for safe return to PLOF.  End of Session Patient Position: Bed, 3 rail up, Alarm on (call light and needs in reach)  PT Plan  Inpatient/Swing Bed or Outpatient: Inpatient  PT Plan  Treatment/Interventions: Bed mobility, Gait training, Therapeutic exercise  PT Plan: Ongoing PT  PT Frequency: 4 times per week  PT Discharge Recommendations: Moderate intensity level of continued care  PT Recommended Transfer Status: Assist x1  PT - OK to Discharge: Yes (per POC)      General Visit Information:   PT  Visit  PT Received On: 07/01/24  General  Reason for Referral: 63 y.o. F s/p exploratory laparascopy date  Referred By: MD Barbi  Past Medical History Relevant to Rehab:   Past Medical History:   Diagnosis Date    Essential (primary) hypertension 04/20/2016    Benign hypertension    Personal history of other diseases of the respiratory system     H/O emphysema    Personal history of other diseases of the respiratory system     History of chronic obstructive lung disease    Personal history of other mental and behavioral disorders     History of depression       Prior to Session Communication: Bedside nurse (RN cleared pt. for PT Tx. All charts reviewed. Per handoff with nursing prior to therapy visit, patient’s pain and vitals are stable. Additional concern for this patient related to therapy  session: none.)  Patient Position Received: Alarm on (seated at EOB)  Preferred Learning Style: auditory, kinesthetic, verbal  General Comment: Pt is pleasant and agreeable for PT tx    Subjective   Precautions:  Precautions  Medical Precautions: Fall precautions, Oxygen therapy device and L/min (3L supplemental O2 via NC)  Post-Surgical Precautions: Abdominal surgery precautions  Precautions Comment: NG tube    Objective   Pain:  Pain Assessment  0-10 (Numeric) Pain Score: 0 - No pain  Cognition:  Cognition  Overall Cognitive Status: Within Functional Limits  Coordination:  Movements are Fluid and Coordinated: Yes  Postural Control:  Postural Control  Postural Control: Within Functional Limits  Static Sitting Balance  Static Sitting-Balance Support: Feet supported  Static Sitting-Level of Assistance: Independent  Static Sitting-Comment/Number of Minutes: unsupported sitting at EOB x8 min duration    Activity Tolerance:  Activity Tolerance  Endurance: Tolerates 10 - 20 min exercise with multiple rests  Treatments:  Therapeutic Exercise  Therapeutic Exercise Performed: Yes  Therapeutic Exercise Activity 1: Pt instructed in B LE ther-ex: seated PF/DF, LAQ, Hip flexion, Hip ABD x20 reps. Rest as needed to manage fatigue. Done to improve muscular strength and endurance to facilitate increased independence with balance, transfers, ambulation, and participation in ADLs. Verbal/nonverbal (demo/gestures) cuing for execution of exercises and for proper form to obtain maximal benefits.      Bed Mobility  Bed Mobility: Yes  Bed Mobility 1  Bed Mobility 1: Sitting to supine  Level of Assistance 1: Distant supervision  Bed Mobility Comments 1: HOB elevated    Ambulation/Gait Training  Ambulation/Gait Training Performed: Yes  Ambulation/Gait Training 1  Surface 1: Level tile  Device 1: Rollator  Assistance 1: Close supervision  Quality of Gait 1:  (Step-thru pattern with decreased step length, increased lateral sway and slowed  velocity. Steady throughout but pt. noted to need more rest breaks with ambulation trials today.)  Comments/Distance (ft) 1: 60' x3 trials (Seated rest needed between trials)  Transfers  Transfer: Yes  Transfer 1  Transfer From 1:  (sit<>stand to/from EOB and chair with arm rests)  Transfer Device 1:  (rollator)  Transfer Level of Assistance 1: Close supervision  Trials/Comments 1: occasional verbal cuing needed for strategic hand placement with stand>sit  Transfers 2  Transfer From 2:  (sit<>stand to/from rollator)  Transfer Device 2:  (rollator)  Transfer Level of Assistance 2: Contact guard  Trials/Comments 2: Increased assist needed for rollator stabilization, as breaks do not fully engage with activated. Pt educated on safety precautions with sitting in rollator. Pt instructed to push rollator against wall or fixed surface for increased safety when performing sit<>stand from rollator. PT acknowledges instruction.    Outcome Measures:  [facility] Basic Mobility  Turning from your back to your side while in a flat bed without using bedrails: None  Moving from lying on your back to sitting on the side of a flat bed without using bedrails: A little  Moving to and from bed to chair (including a wheelchair): A little  Standing up from a chair using your arms (e.g. wheelchair or bedside chair): A little  To walk in hospital room: A little  Climbing 3-5 steps with railing: A lot  Basic Mobility - Total Score: 18        Encounter Problems       Encounter Problems (Active)       Balance       Pt will tolerate 10+ mins dynamic standing balance activities with CGA or less and no LOB using a RW.  (Progressing)       Start:  06/23/24    Expected End:  07/07/24               PT Transfers       STG - Patient will perform bed mobility with SBA or less using log roll technique.  (Met)       Start:  06/23/24    Expected End:  07/07/24    Resolved:  06/28/24         STG - Patient will transfer sit to and from stand mod I with a RW.   (Progressing)       Start:  06/23/24    Expected End:  07/07/24                  Encounter Problems (Resolved)       Mobility       STG - Patient will ambulate 50 ft with CGA and a Rw with minimal SOB or fatigue.  (Met)       Start:  06/23/24    Expected End:  07/07/24    Resolved:  06/28/24

## 2024-07-02 ENCOUNTER — APPOINTMENT (OUTPATIENT)
Dept: RADIOLOGY | Facility: HOSPITAL | Age: 64
End: 2024-07-02
Payer: COMMERCIAL

## 2024-07-02 LAB
ANION GAP SERPL CALC-SCNC: 13 MMOL/L (ref 10–20)
BASOPHILS # BLD AUTO: 0.01 X10*3/UL (ref 0–0.1)
BASOPHILS NFR BLD AUTO: 0.1 %
BUN SERPL-MCNC: 12 MG/DL (ref 6–23)
BURR CELLS BLD QL SMEAR: NORMAL
CALCIUM SERPL-MCNC: 8.2 MG/DL (ref 8.6–10.3)
CHLORIDE SERPL-SCNC: 96 MMOL/L (ref 98–107)
CO2 SERPL-SCNC: 28 MMOL/L (ref 21–32)
CREAT SERPL-MCNC: 0.54 MG/DL (ref 0.5–1.05)
EGFRCR SERPLBLD CKD-EPI 2021: >90 ML/MIN/1.73M*2
EOSINOPHIL # BLD AUTO: 0 X10*3/UL (ref 0–0.7)
EOSINOPHIL NFR BLD AUTO: 0 %
ERYTHROCYTE [DISTWIDTH] IN BLOOD BY AUTOMATED COUNT: 20.3 % (ref 11.5–14.5)
GLUCOSE BLD MANUAL STRIP-MCNC: 100 MG/DL (ref 74–99)
GLUCOSE BLD MANUAL STRIP-MCNC: 98 MG/DL (ref 74–99)
GLUCOSE SERPL-MCNC: 124 MG/DL (ref 74–99)
HCT VFR BLD AUTO: 25.5 % (ref 36–46)
HGB BLD-MCNC: 7.4 G/DL (ref 12–16)
HYPOCHROMIA BLD QL SMEAR: NORMAL
IMM GRANULOCYTES # BLD AUTO: 0.27 X10*3/UL (ref 0–0.7)
IMM GRANULOCYTES NFR BLD AUTO: 2.3 % (ref 0–0.9)
LYMPHOCYTES # BLD AUTO: 1.03 X10*3/UL (ref 1.2–4.8)
LYMPHOCYTES NFR BLD AUTO: 8.7 %
MAGNESIUM SERPL-MCNC: 1.9 MG/DL (ref 1.6–2.4)
MCH RBC QN AUTO: 22.7 PG (ref 26–34)
MCHC RBC AUTO-ENTMCNC: 29 G/DL (ref 32–36)
MCV RBC AUTO: 78 FL (ref 80–100)
MONOCYTES # BLD AUTO: 0.28 X10*3/UL (ref 0.1–1)
MONOCYTES NFR BLD AUTO: 2.4 %
NEUTROPHILS # BLD AUTO: 10.26 X10*3/UL (ref 1.2–7.7)
NEUTROPHILS NFR BLD AUTO: 86.5 %
NRBC BLD-RTO: 0.6 /100 WBCS (ref 0–0)
PHOSPHATE SERPL-MCNC: 2.6 MG/DL (ref 2.5–4.9)
PLATELET # BLD AUTO: 769 X10*3/UL (ref 150–450)
POLYCHROMASIA BLD QL SMEAR: NORMAL
POTASSIUM SERPL-SCNC: 3.3 MMOL/L (ref 3.5–5.3)
RBC # BLD AUTO: 3.26 X10*6/UL (ref 4–5.2)
RBC MORPH BLD: NORMAL
SODIUM SERPL-SCNC: 134 MMOL/L (ref 136–145)
TARGETS BLD QL SMEAR: NORMAL
WBC # BLD AUTO: 11.9 X10*3/UL (ref 4.4–11.3)

## 2024-07-02 PROCEDURE — 74018 RADEX ABDOMEN 1 VIEW: CPT | Performed by: RADIOLOGY

## 2024-07-02 PROCEDURE — 2500000004 HC RX 250 GENERAL PHARMACY W/ HCPCS (ALT 636 FOR OP/ED): Performed by: PHARMACIST

## 2024-07-02 PROCEDURE — 2500000002 HC RX 250 W HCPCS SELF ADMINISTERED DRUGS (ALT 637 FOR MEDICARE OP, ALT 636 FOR OP/ED): Performed by: INTERNAL MEDICINE

## 2024-07-02 PROCEDURE — 99232 SBSQ HOSP IP/OBS MODERATE 35: CPT

## 2024-07-02 PROCEDURE — 1100000001 HC PRIVATE ROOM DAILY

## 2024-07-02 PROCEDURE — 2500000001 HC RX 250 WO HCPCS SELF ADMINISTERED DRUGS (ALT 637 FOR MEDICARE OP): Performed by: INTERNAL MEDICINE

## 2024-07-02 PROCEDURE — 2500000001 HC RX 250 WO HCPCS SELF ADMINISTERED DRUGS (ALT 637 FOR MEDICARE OP): Performed by: HOSPITALIST

## 2024-07-02 PROCEDURE — 74019 RADEX ABDOMEN 2 VIEWS: CPT | Performed by: RADIOLOGY

## 2024-07-02 PROCEDURE — 2500000004 HC RX 250 GENERAL PHARMACY W/ HCPCS (ALT 636 FOR OP/ED): Performed by: HOSPITALIST

## 2024-07-02 PROCEDURE — 85025 COMPLETE CBC W/AUTO DIFF WBC: CPT

## 2024-07-02 PROCEDURE — 2500000005 HC RX 250 GENERAL PHARMACY W/O HCPCS: Performed by: INTERNAL MEDICINE

## 2024-07-02 PROCEDURE — 2500000004 HC RX 250 GENERAL PHARMACY W/ HCPCS (ALT 636 FOR OP/ED)

## 2024-07-02 PROCEDURE — 74019 RADEX ABDOMEN 2 VIEWS: CPT

## 2024-07-02 PROCEDURE — 36415 COLL VENOUS BLD VENIPUNCTURE: CPT | Performed by: INTERNAL MEDICINE

## 2024-07-02 PROCEDURE — 99233 SBSQ HOSP IP/OBS HIGH 50: CPT | Performed by: INTERNAL MEDICINE

## 2024-07-02 PROCEDURE — 80048 BASIC METABOLIC PNL TOTAL CA: CPT | Performed by: INTERNAL MEDICINE

## 2024-07-02 PROCEDURE — 97110 THERAPEUTIC EXERCISES: CPT | Mod: GP,CQ

## 2024-07-02 PROCEDURE — 84100 ASSAY OF PHOSPHORUS: CPT | Performed by: SURGERY

## 2024-07-02 PROCEDURE — 97116 GAIT TRAINING THERAPY: CPT | Mod: GP,59,CQ

## 2024-07-02 PROCEDURE — 94640 AIRWAY INHALATION TREATMENT: CPT

## 2024-07-02 PROCEDURE — C9113 INJ PANTOPRAZOLE SODIUM, VIA: HCPCS | Performed by: HOSPITALIST

## 2024-07-02 PROCEDURE — 74018 RADEX ABDOMEN 1 VIEW: CPT

## 2024-07-02 PROCEDURE — 83735 ASSAY OF MAGNESIUM: CPT | Performed by: SURGERY

## 2024-07-02 PROCEDURE — 2500000004 HC RX 250 GENERAL PHARMACY W/ HCPCS (ALT 636 FOR OP/ED): Performed by: INTERNAL MEDICINE

## 2024-07-02 PROCEDURE — 82947 ASSAY GLUCOSE BLOOD QUANT: CPT

## 2024-07-02 PROCEDURE — 97530 THERAPEUTIC ACTIVITIES: CPT | Mod: GP,CQ

## 2024-07-02 ASSESSMENT — PAIN SCALES - GENERAL
PAINLEVEL_OUTOF10: 8
PAINLEVEL_OUTOF10: 10 - WORST POSSIBLE PAIN
PAINLEVEL_OUTOF10: 0 - NO PAIN
PAINLEVEL_OUTOF10: 8
PAINLEVEL_OUTOF10: 4
PAINLEVEL_OUTOF10: 0 - NO PAIN
PAINLEVEL_OUTOF10: 8

## 2024-07-02 ASSESSMENT — COGNITIVE AND FUNCTIONAL STATUS - GENERAL
CLIMB 3 TO 5 STEPS WITH RAILING: A LITTLE
STANDING UP FROM CHAIR USING ARMS: A LITTLE
MOVING TO AND FROM BED TO CHAIR: A LITTLE
MOBILITY SCORE: 19
WALKING IN HOSPITAL ROOM: A LITTLE
TURNING FROM BACK TO SIDE WHILE IN FLAT BAD: A LITTLE

## 2024-07-02 ASSESSMENT — PAIN - FUNCTIONAL ASSESSMENT
PAIN_FUNCTIONAL_ASSESSMENT: 0-10

## 2024-07-02 ASSESSMENT — PAIN DESCRIPTION - LOCATION
LOCATION: BACK
LOCATION: ABDOMEN
LOCATION: ABDOMEN

## 2024-07-02 ASSESSMENT — PAIN SCALES - WONG BAKER: WONGBAKER_NUMERICALRESPONSE: HURTS WHOLE LOT

## 2024-07-02 NOTE — PROGRESS NOTES
"Fernando Cuevas is a 63 y.o. female on day 11 of admission presenting with Perforated viscus.    Subjective   Patient with continued distension. She tolerated her gastrografin challenge yesterday that revealed likely prolonged ileus. Tried enemas and suppository. NG is LIWS. Little flatus. No other complaints.    Objective   PE:  Constitutional: A&Ox3, calm and cooperative, NAD  Eyes: PERRL, clear sclera   Head/Neck: Neck supple  Cardiovascular: Normal rate and regular rhythm.  Respiratory/Thorax: Good symmetric chest expansion. No labored breathing.  Gastrointestinal: Abdomen moderately distended, soft, appropriately tender, no peritoneal signs, laparotomy sites c/d/i, well approximate with Dermabond, no erythema or drainage. NG LIWS, around 350cc bilious output in container, no output in EMR.  Genitourinary: Voiding well  Extremities: No peripheral edema  Neurological: A&Ox3, No focal deficits  Psychological: Appropriate mood and behavior  Skin: Warm and dry    Last Recorded Vitals  Blood pressure 159/77, pulse 110, temperature 36.6 °C (97.9 °F), temperature source Temporal, resp. rate 17, height 1.575 m (5' 2.01\"), weight 64 kg (141 lb 1.5 oz), SpO2 100%.  Intake/Output last 3 Shifts:  I/O last 3 completed shifts:  In: 784.2 (12.3 mL/kg)   Out: 2300 (35.9 mL/kg) [Urine:2300 (1 mL/kg/hr)]  Weight: 64 kg     Relevant Results  Scheduled medications  aspirin, 81 mg, oral, Daily  budesonide, 0.25 mg, nebulization, Daily   And  formoterol, 20 mcg, nebulization, BID  busPIRone, 5 mg, oral, BID  calcium carbonate, 1,500 mg, oral, Daily  dilTIAZem ER, 240 mg, oral, Daily  enoxaparin, 1 mg/kg, subcutaneous, q12h  fat emulsion-plant based, 250 mL, intravenous, Daily Lipids  fluconazole, 200 mg, oral, Daily  folic acid, 1 mg, oral, Daily  hydrocortisone sodium succinate, 200 mg, intravenous, Once  ipratropium-albuteroL, 3 mL, nebulization, TID  lisinopril, 10 mg, oral, Daily  montelukast, 10 mg, oral, Daily  multivitamin with " minerals, 1 tablet, oral, Daily  nortriptyline, 50 mg, oral, Nightly  oxybutynin, 5 mg, oral, Daily  oxygen, , inhalation, Continuous - Inhalation  pantoprazole, 40 mg, intravenous, Daily before breakfast  piperacillin-tazobactam, 3.375 g, intravenous, q6h  thiamine, 100 mg, oral, Daily  thiamine, 100 mg, oral, Daily  varenicline, 1 mg, oral, BID      Continuous medications  Adult Clinimix Parenteral Nutrition, 83 mL/hr, Last Rate: 83 mL/hr (07/02/24 0305)      PRN medications  PRN medications: acetaminophen **OR** acetaminophen **OR** acetaminophen, benzonatate, guaiFENesin, HYDROmorphone, HYDROmorphone, hydrOXYzine HCL, ipratropium-albuteroL, morphine, morphine, nitroglycerin, ondansetron    Results for orders placed or performed during the hospital encounter of 06/20/24 (from the past 24 hour(s))   POCT GLUCOSE   Result Value Ref Range    POCT Glucose 123 (H) 74 - 99 mg/dL   Magnesium   Result Value Ref Range    Magnesium 1.90 1.60 - 2.40 mg/dL   Phosphorus   Result Value Ref Range    Phosphorus 2.6 2.5 - 4.9 mg/dL   Basic metabolic panel   Result Value Ref Range    Glucose 124 (H) 74 - 99 mg/dL    Sodium 134 (L) 136 - 145 mmol/L    Potassium 3.3 (L) 3.5 - 5.3 mmol/L    Chloride 96 (L) 98 - 107 mmol/L    Bicarbonate 28 21 - 32 mmol/L    Anion Gap 13 10 - 20 mmol/L    Urea Nitrogen 12 6 - 23 mg/dL    Creatinine 0.54 0.50 - 1.05 mg/dL    eGFR >90 >60 mL/min/1.73m*2    Calcium 8.2 (L) 8.6 - 10.3 mg/dL   CBC and Auto Differential   Result Value Ref Range    WBC 11.9 (H) 4.4 - 11.3 x10*3/uL    nRBC 0.6 (H) 0.0 - 0.0 /100 WBCs    RBC 3.26 (L) 4.00 - 5.20 x10*6/uL    Hemoglobin 7.4 (L) 12.0 - 16.0 g/dL    Hematocrit 25.5 (L) 36.0 - 46.0 %    MCV 78 (L) 80 - 100 fL    MCH 22.7 (L) 26.0 - 34.0 pg    MCHC 29.0 (L) 32.0 - 36.0 g/dL    RDW 20.3 (H) 11.5 - 14.5 %    Platelets 769 (H) 150 - 450 x10*3/uL    Neutrophils % 86.5 40.0 - 80.0 %    Immature Granulocytes %, Automated 2.3 (H) 0.0 - 0.9 %    Lymphocytes % 8.7 13.0 -  44.0 %    Monocytes % 2.4 2.0 - 10.0 %    Eosinophils % 0.0 0.0 - 6.0 %    Basophils % 0.1 0.0 - 2.0 %    Neutrophils Absolute 10.26 (H) 1.20 - 7.70 x10*3/uL    Immature Granulocytes Absolute, Automated 0.27 0.00 - 0.70 x10*3/uL    Lymphocytes Absolute 1.03 (L) 1.20 - 4.80 x10*3/uL    Monocytes Absolute 0.28 0.10 - 1.00 x10*3/uL    Eosinophils Absolute 0.00 0.00 - 0.70 x10*3/uL    Basophils Absolute 0.01 0.00 - 0.10 x10*3/uL   Morphology   Result Value Ref Range    RBC Morphology See Below     Polychromasia Mild     Hypochromia Mild     Target Cells Few     [place] Cells Few    POCT GLUCOSE   Result Value Ref Range    POCT Glucose 100 (H) 74 - 99 mg/dL     Assessment/Plan   Principal Problem:    Perforated viscus    Patient with perforated small bowel is now POD11 ex lap, pSBR with Dr Liang. Intra-op findings of segment of perforated small bowel. Labs reviewed- leukocytosis improving, hgb 7.4 from 8.3. Abdominal exam remains relatively unchanged. She is passing minimal gas, no BM.     Plan:  - NG LIWS  - NPO, sips/chips/popsicle okay  - KUB tomorrow AM  - Continue PPN  - OOB/ambulation  - IS hourly  - Chewing gum  - DVT proph: SCDs/lovenox  - IV zosyn  - PPI  - PRN antiemetic    Surgery to follow.    Discussed with Dr Bingham.    I spent 35 minutes in the professional and overall care of this patient.    Mahad Boateng PA-C

## 2024-07-02 NOTE — PROGRESS NOTES
Fernando Cuevas is a 63 y.o. female on day 11 of admission presenting with Perforated viscus.      Subjective   Patient seen and examined at the bedside this morning. No acute overnight events. No other questions or concerns.         Objective     Last Recorded Vitals  /77   Pulse 110   Temp 36.6 °C (97.9 °F) (Temporal)   Resp 17   Wt 64 kg (141 lb 1.5 oz) Comment: 6/27  SpO2 100%   Intake/Output last 3 Shifts:    Intake/Output Summary (Last 24 hours) at 7/2/2024 1231  Last data filed at 7/2/2024 1213  Gross per 24 hour   Intake 453.6 ml   Output 1600 ml   Net -1146.4 ml       Admission Weight  Weight: 64 kg (141 lb 1.5 oz) (06/20/24 2132)    Daily Weight  06/30/24 : 64 kg (141 lb 1.5 oz)    Image Results  XR abdomen 2 views supine and erect or decub, XR abdomen 2 views supine and erect or decub, XR abdomen 1 view  Narrative: Interpreted By:  Karina Ventura,   STUDY:  XR ABDOMEN 2 VIEWS SUPINE AND ERECT OR DECUB; XR ABDOMEN 1 VIEW;  7/2/2024 1:00 am; 7/2/2024 8:03 am; 7/2/2024 2:20 am      INDICATION:  Signs/Symptoms:Prolonged ilues; Signs/Symptoms:Gastrografin challenge      COMPARISON:  06/30/2024      ACCESSION NUMBER(S):  [id number]; [id number]; [id number]      ORDERING CLINICIAN:  MINERVA APARICIO      FINDINGS:  4 supine and erect views of the abdomen obtained at 12:42 a.m. and  1:59 a.m.. Additional single abdominal radiograph obtained at 7:57  a.m..      Midline skin clips. NG tube in place with tip overlying the mid  stomach. Multiple distended large and small bowel loops containing  air and fecal debris. No rectal gas on initial images.      Contrast material in the proximal stomach on images at 12:43 a.m.  administered by unknown person. Transit of contrast material into  distended central abdominal small bowel loops on images at 1:59 a.m.  and into a few additional right-sided distended small bowel loops on  image at 7:57 a.m..      Impression: Distended large and small bowel  loops with prolonged small bowel  contrast transit time consistent with ileus, less likely distal  obstruction. Follow-up or further evaluation with CT as clinically  warranted.      MACRO:  None.      Signed by: Karina Ventura 7/2/2024 8:26 AM  Dictation workstation:   [id number]      Physical Exam  Constitutional:       General: She is not in acute distress.  HENT:      Head: Normocephalic and atraumatic.   Eyes:      Conjunctiva/sclera: Conjunctivae normal.      Pupils: Pupils are equal, round, and reactive to light.   Cardiovascular:      Rate and Rhythm: Normal rate and regular rhythm.   Pulmonary:      Effort: Pulmonary effort is normal.      Breath sounds: Normal breath sounds.   Abdominal:      General: There is distension.      Palpations: Abdomen is soft.   Skin:     General: Skin is warm and dry.   Neurological:      Mental Status: She is alert.   Psychiatric:         Mood and Affect: Mood normal.         Behavior: Behavior normal.         Relevant Results           Scheduled medications  aspirin, 81 mg, oral, Daily  budesonide, 0.25 mg, nebulization, Daily   And  formoterol, 20 mcg, nebulization, BID  busPIRone, 5 mg, oral, BID  calcium carbonate, 1,500 mg, oral, Daily  dilTIAZem ER, 240 mg, oral, Daily  enoxaparin, 1 mg/kg, subcutaneous, q12h  fluconazole, 200 mg, oral, Daily  folic acid, 1 mg, oral, Daily  hydrocortisone sodium succinate, 200 mg, intravenous, Once  ipratropium-albuteroL, 3 mL, nebulization, TID  lisinopril, 10 mg, oral, Daily  montelukast, 10 mg, oral, Daily  multivitamin with minerals, 1 tablet, oral, Daily  nortriptyline, 50 mg, oral, Nightly  oxybutynin, 5 mg, oral, Daily  oxygen, , inhalation, Continuous - Inhalation  pantoprazole, 40 mg, intravenous, Daily before breakfast  piperacillin-tazobactam, 3.375 g, intravenous, q6h  thiamine, 100 mg, oral, Daily  thiamine, 100 mg, oral, Daily  varenicline, 1 mg, oral, BID      Continuous medications  Adult Clinimix Parenteral Nutrition,  83 mL/hr, Last Rate: 83 mL/hr (07/02/24 0305)      PRN medications  PRN medications: acetaminophen **OR** acetaminophen **OR** acetaminophen, benzonatate, guaiFENesin, HYDROmorphone, HYDROmorphone, hydrOXYzine HCL, ipratropium-albuteroL, morphine, morphine, nitroglycerin, ondansetron    Assessment/Plan      Principal Problem:    Perforated viscus  Active Problems:    Thrombocytosis    Perforated small bowel's with peritonitis:  Concern for postop ileus versus partial obstruction:  Underwent surgical intervention 6/21  -Underwent small bowel resection, and washout procedure.  -Culture for Candida albicans, concern for mix of gram-negative and anaerobes.  -Infectious disease following, continued on IV Zosyn, fluconazole 200 mg/day.  Plan for antibiotics with stop date 7/3/24.,  This will be a total of 10 days from the initiation of fluconazole.  Infectious diseases recommended discharge home once medically ready on p.o. Augmentin 875 mg 2 times a day instead of Zosyn if needed.  -General surgery following for postop ileus versus partial SBO, appreciate recommendations.  Enema and chewing gum added.    -Will continue to trend CBC  -Dietary following, appreciate recommendations.   -continue NPO  -still with ileus    Acute DVT in the left distal brachial vein:  -Vascular upper extremity ultrasound with acute finding, non-occlusive.  -Patient initiated on full dose Lovenox therapeutic dose and 6/27. Will need to be transitioned to oral regimen prior to discharge.   -Patient with contrast allergy, CT angio chest requiring prep, no acute findings.      Acute on chronic hyponatremia, resolved:  In setting of poor oral intake  -Nephrology was managing, placed on IV isotonic fluid with improvement  -Fluids will be discontinued.  -Nephrology signed off  -Will continue to trend sodium    Severe malnutrition:  -Nutrition following, appreciate recommendations.  -NG tube in place, will need to continue PPN     LOS: Unknown  DVT  ppx: Therapeutic Lovenox    Rupa Khoury MD

## 2024-07-02 NOTE — PROGRESS NOTES
"Fernando Cuevas is a 63 y.o. female on day 11 of admission presenting with Perforated viscus.    Assessment/Plan   Still with ileus pattern. Sipping liquids. PPN.     Subjective   About the same. No BM       Objective     Physical Exam  NAD  A&Ox3  Non icteric  CTA  RR  Abdomen soft min tender. Wounds clean, intact. Distended  Extremities warm, well perfused     Last Recorded Vitals  Blood pressure 159/77, pulse 110, temperature 36.6 °C (97.9 °F), temperature source Temporal, resp. rate 17, height 1.575 m (5' 2.01\"), weight 64 kg (141 lb 1.5 oz), SpO2 100%.  Intake/Output last 3 Shifts:  I/O last 3 completed shifts:  In: 784.2 (12.3 mL/kg)   Out: 2300 (35.9 mL/kg) [Urine:2300 (1 mL/kg/hr)]  Weight: 64 kg     Relevant Results    Scheduled medications  aspirin, 81 mg, oral, Daily  budesonide, 0.25 mg, nebulization, Daily   And  formoterol, 20 mcg, nebulization, BID  busPIRone, 5 mg, oral, BID  calcium carbonate, 1,500 mg, oral, Daily  dilTIAZem ER, 240 mg, oral, Daily  enoxaparin, 1 mg/kg, subcutaneous, q12h  fluconazole, 200 mg, oral, Daily  folic acid, 1 mg, oral, Daily  hydrocortisone sodium succinate, 200 mg, intravenous, Once  ipratropium-albuteroL, 3 mL, nebulization, TID  lisinopril, 10 mg, oral, Daily  montelukast, 10 mg, oral, Daily  multivitamin with minerals, 1 tablet, oral, Daily  nortriptyline, 50 mg, oral, Nightly  oxybutynin, 5 mg, oral, Daily  oxygen, , inhalation, Continuous - Inhalation  pantoprazole, 40 mg, intravenous, Daily before breakfast  piperacillin-tazobactam, 3.375 g, intravenous, q6h  thiamine, 100 mg, oral, Daily  thiamine, 100 mg, oral, Daily  varenicline, 1 mg, oral, BID      Continuous medications  Adult Clinimix Parenteral Nutrition, 83 mL/hr, Last Rate: 83 mL/hr (07/02/24 0305)      PRN medications  PRN medications: acetaminophen **OR** acetaminophen **OR** acetaminophen, benzonatate, guaiFENesin, HYDROmorphone, HYDROmorphone, hydrOXYzine HCL, ipratropium-albuteroL, morphine, " morphine, nitroglycerin, ondansetron    Results for orders placed or performed during the hospital encounter of 06/20/24 (from the past 24 hour(s))   POCT GLUCOSE   Result Value Ref Range    POCT Glucose 123 (H) 74 - 99 mg/dL   Magnesium   Result Value Ref Range    Magnesium 1.90 1.60 - 2.40 mg/dL   Phosphorus   Result Value Ref Range    Phosphorus 2.6 2.5 - 4.9 mg/dL   Basic metabolic panel   Result Value Ref Range    Glucose 124 (H) 74 - 99 mg/dL    Sodium 134 (L) 136 - 145 mmol/L    Potassium 3.3 (L) 3.5 - 5.3 mmol/L    Chloride 96 (L) 98 - 107 mmol/L    Bicarbonate 28 21 - 32 mmol/L    Anion Gap 13 10 - 20 mmol/L    Urea Nitrogen 12 6 - 23 mg/dL    Creatinine 0.54 0.50 - 1.05 mg/dL    eGFR >90 >60 mL/min/1.73m*2    Calcium 8.2 (L) 8.6 - 10.3 mg/dL   CBC and Auto Differential   Result Value Ref Range    WBC 11.9 (H) 4.4 - 11.3 x10*3/uL    nRBC 0.6 (H) 0.0 - 0.0 /100 WBCs    RBC 3.26 (L) 4.00 - 5.20 x10*6/uL    Hemoglobin 7.4 (L) 12.0 - 16.0 g/dL    Hematocrit 25.5 (L) 36.0 - 46.0 %    MCV 78 (L) 80 - 100 fL    MCH 22.7 (L) 26.0 - 34.0 pg    MCHC 29.0 (L) 32.0 - 36.0 g/dL    RDW 20.3 (H) 11.5 - 14.5 %    Platelets 769 (H) 150 - 450 x10*3/uL    Neutrophils % 86.5 40.0 - 80.0 %    Immature Granulocytes %, Automated 2.3 (H) 0.0 - 0.9 %    Lymphocytes % 8.7 13.0 - 44.0 %    Monocytes % 2.4 2.0 - 10.0 %    Eosinophils % 0.0 0.0 - 6.0 %    Basophils % 0.1 0.0 - 2.0 %    Neutrophils Absolute 10.26 (H) 1.20 - 7.70 x10*3/uL    Immature Granulocytes Absolute, Automated 0.27 0.00 - 0.70 x10*3/uL    Lymphocytes Absolute 1.03 (L) 1.20 - 4.80 x10*3/uL    Monocytes Absolute 0.28 0.10 - 1.00 x10*3/uL    Eosinophils Absolute 0.00 0.00 - 0.70 x10*3/uL    Basophils Absolute 0.01 0.00 - 0.10 x10*3/uL   Morphology   Result Value Ref Range    RBC Morphology See Below     Polychromasia Mild     Hypochromia Mild     Target Cells Few     [place] Cells Few    POCT GLUCOSE   Result Value Ref Range    POCT Glucose 100 (H) 74 - 99 mg/dL            I spent 25 minutes in the professional and overall care of this patient.      Reid Bingham MD

## 2024-07-02 NOTE — PROGRESS NOTES
Physical Therapy    Physical Therapy Treatment    Patient Name: Fernando Cuevas  MRN: [id number]  Today's Date: 7/2/2024  Time Calculation  Start Time: 0825  Stop Time: 0908  Time Calculation (min): 43 min    Assessment/Plan   PT Assessment  End of Session Communication: Bedside nurse (Discussed pt's progress and current mobility as described in mobility section)  Assessment Comment: Pt is motivated to participate however fatigues easily this date and required more rest breaks during ambulation training. Plan to initiate stair negotiation next session. Pt will continue to benefit from skilled PT for improved B LE strength and stability for safe return to PLOF.  End of Session Patient Position: Alarm on, Up in chair (B feet elevated on foot rest, call light and needs in reach)  PT Plan  Inpatient/Swing Bed or Outpatient: Inpatient  PT Plan  Treatment/Interventions: Bed mobility, Gait training, Stair training, Therapeutic exercise  PT Plan: Ongoing PT  PT Frequency: 4 times per week  PT Discharge Recommendations: Moderate intensity level of continued care  PT Recommended Transfer Status: Assist x1  PT - OK to Discharge: Yes (per POC)      General Visit Information:   PT  Visit  PT Received On: 07/02/24  General  Reason for Referral: 63 y.o. F s/p exploratory laparascopy date  Referred By: MD Barbi  Past Medical History Relevant to Rehab:   Past Medical History:   Diagnosis Date    Essential (primary) hypertension 04/20/2016    Benign hypertension    Personal history of other diseases of the respiratory system     H/O emphysema    Personal history of other diseases of the respiratory system     History of chronic obstructive lung disease    Personal history of other mental and behavioral disorders     History of depression       Family/Caregiver Present: No  Prior to Session Communication: Bedside nurse (RN cleared pt. for PT Tx. All charts reviewed. Per handoff with nursing prior to therapy visit, patient’s pain and  vitals are stable. Additional concern for this patient related to therapy session: none.)  Patient Position Received: Bed, 3 rail up, Alarm off, not on at start of session  Preferred Learning Style: auditory, kinesthetic, verbal  General Comment: Pt semi-supine in bed on arrival, pleasant and agreeable for PT tx    Subjective   Precautions:  Precautions  Medical Precautions: Fall precautions, Oxygen therapy device and L/min (3L supplemental O2 via NC)  Post-Surgical Precautions: Abdominal surgery precautions  Precautions Comment: NG tube    Objective   Pain:  Pain Assessment  0-10 (Numeric) Pain Score: 0 - No pain  Cognition:  Cognition  Overall Cognitive Status: Within Functional Limits  Coordination:  Movements are Fluid and Coordinated: Yes  Postural Control:  Postural Control  Postural Control: Within Functional Limits  Static Sitting Balance  Static Sitting-Balance Support: Feet supported  Static Sitting-Level of Assistance: Independent  Static Sitting-Comment/Number of Minutes: supported sitting at EOB x6 min duration    Activity Tolerance:  Activity Tolerance  Endurance: Tolerates 30 min exercise with multiple rests  Treatments:  Therapeutic Exercise  Therapeutic Exercise Performed: Yes  Therapeutic Exercise Activity 1: Pt instructed in B LE ther-ex: seated PF/DF, LAQ, Hip flexion, Hip ABD x20 reps. Rest as needed to manage fatigue. Done to improve muscular strength and endurance to facilitate increased independence with balance, transfers, ambulation, and participation in ADLs. Verbal/nonverbal (demo/gestures) cuing for execution of exercises and for proper form to obtain maximal benefits.      Bed Mobility  Bed Mobility: Yes  Bed Mobility 1  Bed Mobility 1: Supine to sitting  Level of Assistance 1: Modified independent  Bed Mobility Comments 1: Pt utilizes bed accessories    Ambulation/Gait Training  Ambulation/Gait Training Performed: Yes  Ambulation/Gait Training 1  Surface 1: Level tile  Device 1:  "Rollator  Assistance 1: Close supervision  Quality of Gait 1:  (Step-thru pattern with decreased step length, increased lateral sway and slowed velocity. Steady throughout but pt. noted to need more rest breaks with ambulation trials today.)  Comments/Distance (ft) 1: 50', 25', 60', 40' (seated rest between trials to manage fatigue)  Transfers  Transfer: Yes  Transfer 1  Transfer From 1:  (sit<>stand from variable surfaces)  Transfer Device 1:  (rollator)  Transfer Level of Assistance 1: Close supervision  Trials/Comments 1: Pt demo'd good carryover in regards to education with sit<>stand from rollator however continues to require verbal cuing strategic hand placement with stand>sit for safety.  Transfers 2  Transfer From 2:  (sit<>stand to/from rollator)  Transfer Device 2:  (rollator)  Transfer Level of Assistance 2: Contact guard  Trials/Comments 2: Increased assist needed for rollator stabilization, as breaks do not fully engage with activated. Pt educated on safety precautions with sitting in rollator. Pt instructed to push rollator against wall or fixed surface for increased safety when performing sit<>stand from rollator. PT acknowledges instruction.    Stairs  Stairs: Yes  Stairs  Rails 1: Bilateral  Curb Step 1: Yes  Device 1: Railing  Assistance 1: Close supervision  Comment/Number of Steps 1: Pt instructed in B toe taps on 7\" step x5 reps for each leg. Pt is then instructed to step up on 7\" step with retro step down, x5 trials. Pt reports SOB and unable to tolerate additional trials at this time 2/2 SALEH and fatigue.    Outcome Measures:  [facility] Basic Mobility  Turning from your back to your side while in a flat bed without using bedrails: None  Moving from lying on your back to sitting on the side of a flat bed without using bedrails: A little  Moving to and from bed to chair (including a wheelchair): A little  Standing up from a chair using your arms (e.g. wheelchair or bedside chair): A little  To walk " in hospital room: A little  Climbing 3-5 steps with railing: A little  Basic Mobility - Total Score: 19        Encounter Problems       Encounter Problems (Active)       Balance       Pt will tolerate 10+ mins dynamic standing balance activities with CGA or less and no LOB using a RW.  (Progressing)       Start:  06/23/24    Expected End:  07/07/24               PT Transfers       STG - Patient will perform bed mobility with SBA or less using log roll technique.  (Met)       Start:  06/23/24    Expected End:  07/07/24    Resolved:  06/28/24         STG - Patient will transfer sit to and from stand mod I with a RW.  (Progressing)       Start:  06/23/24    Expected End:  07/07/24                    Encounter Problems (Resolved)       Mobility       STG - Patient will ambulate 50 ft with CGA and a Rw with minimal SOB or fatigue.  (Met)       Start:  06/23/24    Expected End:  07/07/24    Resolved:  06/28/24

## 2024-07-02 NOTE — CARE PLAN
Problem: Pain - Adult  Goal: Verbalizes/displays adequate comfort level or baseline comfort level  Outcome: Progressing  Flowsheets (Taken 7/2/2024 0257)  Verbalizes/displays adequate comfort level or baseline comfort level:   Encourage patient to monitor pain and request assistance   Assess pain using appropriate pain scale   Administer analgesics based on type and severity of pain and evaluate response   Implement non-pharmacological measures as appropriate and evaluate response   Consider cultural and social influences on pain and pain management   Notify Licensed Independent Practitioner if interventions unsuccessful or patient reports new pain     Problem: Safety - Adult  Goal: Free from fall injury  Flowsheets (Taken 7/2/2024 0257)  Free from fall injury:   Based on caregiver fall risk screen, instruct family/caregiver to ask for assistance with transferring infant if caregiver noted to have fall risk factors   Instruct family/caregiver on patient safety     Problem: Discharge Planning  Goal: Discharge to home or other facility with appropriate resources  Outcome: Progressing  Flowsheets (Taken 7/2/2024 0257)  Discharge to home or other facility with appropriate resources:   Identify barriers to discharge with patient and caregiver   Arrange for needed discharge resources and transportation as appropriate   Identify discharge learning needs (meds, wound care, etc)   Refer to discharge planning if patient needs post-hospital services based on physician order or complex needs related to functional status, cognitive ability or social support system     Problem: Chronic Conditions and Co-morbidities  Goal: Patient's chronic conditions and co-morbidity symptoms are monitored and maintained or improved  Outcome: Progressing  Flowsheets (Taken 7/2/2024 0257)  Care Plan - Patient's Chronic Conditions and Co-Morbidity Symptoms are Monitored and Maintained or Improved:   Update acute care plan with appropriate goals  if chronic or comorbid symptoms are exacerbated and prevent overall improvement and discharge   Collaborate with multidisciplinary team to address chronic and comorbid conditions and prevent exacerbation or deterioration   Monitor and assess patient's chronic conditions and comorbid symptoms for stability, deterioration, or improvement   The patient's goals for the shift include  control pain       The clinical goals for the shift include patient to remain comfortable

## 2024-07-02 NOTE — PROGRESS NOTES
Nutrition Progress Note    Chart reviewed and pt visited.  PPN running at 83ml/hr as ordered.  Per notes tolerated Gastrogaffin challenge- revealed likely prolonged ileus.  NG continued to LIWS  KUB planned for tomorrow.    Collaboration by nutrition professional with other providers: PharmD, surgery PA-C & MD:  Plan to continue PPN as ordered at 83ml/hr.    Results for orders placed or performed during the hospital encounter of 06/20/24 (from the past 24 hour(s))   Magnesium   Result Value Ref Range    Magnesium 1.90 1.60 - 2.40 mg/dL   Phosphorus   Result Value Ref Range    Phosphorus 2.6 2.5 - 4.9 mg/dL   Basic metabolic panel   Result Value Ref Range    Glucose 124 (H) 74 - 99 mg/dL    Sodium 134 (L) 136 - 145 mmol/L    Potassium 3.3 (L) 3.5 - 5.3 mmol/L    Chloride 96 (L) 98 - 107 mmol/L    Bicarbonate 28 21 - 32 mmol/L    Anion Gap 13 10 - 20 mmol/L    Urea Nitrogen 12 6 - 23 mg/dL    Creatinine 0.54 0.50 - 1.05 mg/dL    eGFR >90 >60 mL/min/1.73m*2    Calcium 8.2 (L) 8.6 - 10.3 mg/dL   CBC and Auto Differential   Result Value Ref Range    WBC 11.9 (H) 4.4 - 11.3 x10*3/uL    nRBC 0.6 (H) 0.0 - 0.0 /100 WBCs    RBC 3.26 (L) 4.00 - 5.20 x10*6/uL    Hemoglobin 7.4 (L) 12.0 - 16.0 g/dL    Hematocrit 25.5 (L) 36.0 - 46.0 %    MCV 78 (L) 80 - 100 fL    MCH 22.7 (L) 26.0 - 34.0 pg    MCHC 29.0 (L) 32.0 - 36.0 g/dL    RDW 20.3 (H) 11.5 - 14.5 %    Platelets 769 (H) 150 - 450 x10*3/uL    Neutrophils % 86.5 40.0 - 80.0 %    Immature Granulocytes %, Automated 2.3 (H) 0.0 - 0.9 %    Lymphocytes % 8.7 13.0 - 44.0 %    Monocytes % 2.4 2.0 - 10.0 %    Eosinophils % 0.0 0.0 - 6.0 %    Basophils % 0.1 0.0 - 2.0 %    Neutrophils Absolute 10.26 (H) 1.20 - 7.70 x10*3/uL    Immature Granulocytes Absolute, Automated 0.27 0.00 - 0.70 x10*3/uL    Lymphocytes Absolute 1.03 (L) 1.20 - 4.80 x10*3/uL    Monocytes Absolute 0.28 0.10 - 1.00 x10*3/uL    Eosinophils Absolute 0.00 0.00 - 0.70 x10*3/uL    Basophils Absolute 0.01 0.00 - 0.10  x10*3/uL   Morphology   Result Value Ref Range    RBC Morphology See Below     Polychromasia Mild     Hypochromia Mild     Target Cells Few     Parish Cells Few    POCT GLUCOSE   Result Value Ref Range    POCT Glucose 100 (H) 74 - 99 mg/dL     Scheduled medications  aspirin, 81 mg, oral, Daily  budesonide, 0.25 mg, nebulization, Daily   And  formoterol, 20 mcg, nebulization, BID  busPIRone, 5 mg, oral, BID  calcium carbonate, 1,500 mg, oral, Daily  dilTIAZem ER, 240 mg, oral, Daily  enoxaparin, 1 mg/kg, subcutaneous, q12h  fluconazole, 200 mg, oral, Daily  folic acid, 1 mg, oral, Daily  hydrocortisone sodium succinate, 200 mg, intravenous, Once  ipratropium-albuteroL, 3 mL, nebulization, TID  lisinopril, 10 mg, oral, Daily  montelukast, 10 mg, oral, Daily  multivitamin with minerals, 1 tablet, oral, Daily  nortriptyline, 50 mg, oral, Nightly  oxybutynin, 5 mg, oral, Daily  oxygen, , inhalation, Continuous - Inhalation  pantoprazole, 40 mg, intravenous, Daily before breakfast  piperacillin-tazobactam, 3.375 g, intravenous, q6h  thiamine, 100 mg, oral, Daily  thiamine, 100 mg, oral, Daily  varenicline, 1 mg, oral, BID      Continuous medications  Adult Clinimix Parenteral Nutrition, 83 mL/hr, Last Rate: 83 mL/hr (07/02/24 0305)  Adult Clinimix Parenteral Nutrition, 83 mL/hr      PRN medications  PRN medications: acetaminophen **OR** acetaminophen **OR** acetaminophen, benzonatate, guaiFENesin, HYDROmorphone, HYDROmorphone, hydrOXYzine HCL, ipratropium-albuteroL, morphine, morphine, nitroglycerin, ondansetron  Dietary Orders (From admission, onward)       Start     Ordered    06/27/24 1551  NPO Diet Except: Sips with meds, Sips of clear liquids, Ice chips; Effective now  Diet effective now        Comments: Popsicles, small chips, sips okay   Question Answer Comment   Except: Sips with meds    Except: Sips of clear liquids    Except: Ice chips        06/27/24 1551                    Intake/Output Summary (Last 24 hours)  at 7/2/2024 1408  Last data filed at 7/2/2024 1213  Gross per 24 hour   Intake 453.6 ml   Output 1600 ml   Net -1146.4 ml

## 2024-07-03 ENCOUNTER — APPOINTMENT (OUTPATIENT)
Dept: RADIOLOGY | Facility: HOSPITAL | Age: 64
End: 2024-07-03
Payer: COMMERCIAL

## 2024-07-03 LAB
ACID FAST STN SPEC: NORMAL
ANION GAP SERPL CALC-SCNC: 12 MMOL/L (ref 10–20)
BASOPHILS # BLD AUTO: 0.01 X10*3/UL (ref 0–0.1)
BASOPHILS NFR BLD AUTO: 0.1 %
BUN SERPL-MCNC: 13 MG/DL (ref 6–23)
CALCIUM SERPL-MCNC: 7.8 MG/DL (ref 8.6–10.3)
CHLORIDE SERPL-SCNC: 99 MMOL/L (ref 98–107)
CO2 SERPL-SCNC: 27 MMOL/L (ref 21–32)
CREAT SERPL-MCNC: 0.64 MG/DL (ref 0.5–1.05)
EGFRCR SERPLBLD CKD-EPI 2021: >90 ML/MIN/1.73M*2
EOSINOPHIL # BLD AUTO: 0.01 X10*3/UL (ref 0–0.7)
EOSINOPHIL NFR BLD AUTO: 0.1 %
ERYTHROCYTE [DISTWIDTH] IN BLOOD BY AUTOMATED COUNT: 20.8 % (ref 11.5–14.5)
GLUCOSE BLD MANUAL STRIP-MCNC: 101 MG/DL (ref 74–99)
GLUCOSE BLD MANUAL STRIP-MCNC: 102 MG/DL (ref 74–99)
GLUCOSE BLD MANUAL STRIP-MCNC: 117 MG/DL (ref 74–99)
GLUCOSE BLD MANUAL STRIP-MCNC: 82 MG/DL (ref 74–99)
GLUCOSE SERPL-MCNC: 92 MG/DL (ref 74–99)
HCT VFR BLD AUTO: 26.1 % (ref 36–46)
HGB BLD-MCNC: 7.2 G/DL (ref 12–16)
HYPOCHROMIA BLD QL SMEAR: NORMAL
IMM GRANULOCYTES # BLD AUTO: 0.13 X10*3/UL (ref 0–0.7)
IMM GRANULOCYTES NFR BLD AUTO: 1.2 % (ref 0–0.9)
LYMPHOCYTES # BLD AUTO: 0.99 X10*3/UL (ref 1.2–4.8)
LYMPHOCYTES NFR BLD AUTO: 8.8 %
MAGNESIUM SERPL-MCNC: 1.8 MG/DL (ref 1.6–2.4)
MCH RBC QN AUTO: 22.7 PG (ref 26–34)
MCHC RBC AUTO-ENTMCNC: 27.6 G/DL (ref 32–36)
MCV RBC AUTO: 82 FL (ref 80–100)
MONOCYTES # BLD AUTO: 0.75 X10*3/UL (ref 0.1–1)
MONOCYTES NFR BLD AUTO: 6.7 %
MYCOBACTERIUM SPEC CULT: NORMAL
NEUTROPHILS # BLD AUTO: 9.36 X10*3/UL (ref 1.2–7.7)
NEUTROPHILS NFR BLD AUTO: 83.1 %
NRBC BLD-RTO: 0.9 /100 WBCS (ref 0–0)
PHOSPHATE SERPL-MCNC: 2.5 MG/DL (ref 2.5–4.9)
PLATELET # BLD AUTO: 650 X10*3/UL (ref 150–450)
PLATELET CLUMP BLD QL SMEAR: PRESENT
POLYCHROMASIA BLD QL SMEAR: NORMAL
POTASSIUM SERPL-SCNC: 3.3 MMOL/L (ref 3.5–5.3)
RBC # BLD AUTO: 3.17 X10*6/UL (ref 4–5.2)
RBC MORPH BLD: NORMAL
SODIUM SERPL-SCNC: 135 MMOL/L (ref 136–145)
TARGETS BLD QL SMEAR: NORMAL
WBC # BLD AUTO: 11.3 X10*3/UL (ref 4.4–11.3)

## 2024-07-03 PROCEDURE — 85025 COMPLETE CBC W/AUTO DIFF WBC: CPT

## 2024-07-03 PROCEDURE — C9113 INJ PANTOPRAZOLE SODIUM, VIA: HCPCS | Performed by: HOSPITALIST

## 2024-07-03 PROCEDURE — 2500000005 HC RX 250 GENERAL PHARMACY W/O HCPCS: Performed by: INTERNAL MEDICINE

## 2024-07-03 PROCEDURE — 82947 ASSAY GLUCOSE BLOOD QUANT: CPT

## 2024-07-03 PROCEDURE — 2500000001 HC RX 250 WO HCPCS SELF ADMINISTERED DRUGS (ALT 637 FOR MEDICARE OP): Performed by: INTERNAL MEDICINE

## 2024-07-03 PROCEDURE — 94640 AIRWAY INHALATION TREATMENT: CPT

## 2024-07-03 PROCEDURE — 2500000004 HC RX 250 GENERAL PHARMACY W/ HCPCS (ALT 636 FOR OP/ED): Performed by: PHARMACIST

## 2024-07-03 PROCEDURE — 2500000002 HC RX 250 W HCPCS SELF ADMINISTERED DRUGS (ALT 637 FOR MEDICARE OP, ALT 636 FOR OP/ED): Performed by: INTERNAL MEDICINE

## 2024-07-03 PROCEDURE — 2500000004 HC RX 250 GENERAL PHARMACY W/ HCPCS (ALT 636 FOR OP/ED)

## 2024-07-03 PROCEDURE — 74176 CT ABD & PELVIS W/O CONTRAST: CPT | Performed by: RADIOLOGY

## 2024-07-03 PROCEDURE — 36415 COLL VENOUS BLD VENIPUNCTURE: CPT | Performed by: INTERNAL MEDICINE

## 2024-07-03 PROCEDURE — 74176 CT ABD & PELVIS W/O CONTRAST: CPT

## 2024-07-03 PROCEDURE — 2500000004 HC RX 250 GENERAL PHARMACY W/ HCPCS (ALT 636 FOR OP/ED): Performed by: INTERNAL MEDICINE

## 2024-07-03 PROCEDURE — 74018 RADEX ABDOMEN 1 VIEW: CPT

## 2024-07-03 PROCEDURE — 84100 ASSAY OF PHOSPHORUS: CPT | Performed by: SURGERY

## 2024-07-03 PROCEDURE — 80048 BASIC METABOLIC PNL TOTAL CA: CPT | Performed by: INTERNAL MEDICINE

## 2024-07-03 PROCEDURE — 1100000001 HC PRIVATE ROOM DAILY

## 2024-07-03 PROCEDURE — 2500000001 HC RX 250 WO HCPCS SELF ADMINISTERED DRUGS (ALT 637 FOR MEDICARE OP): Performed by: HOSPITALIST

## 2024-07-03 PROCEDURE — 99233 SBSQ HOSP IP/OBS HIGH 50: CPT | Performed by: INTERNAL MEDICINE

## 2024-07-03 PROCEDURE — 2500000004 HC RX 250 GENERAL PHARMACY W/ HCPCS (ALT 636 FOR OP/ED): Performed by: HOSPITALIST

## 2024-07-03 PROCEDURE — 83735 ASSAY OF MAGNESIUM: CPT | Performed by: SURGERY

## 2024-07-03 PROCEDURE — 74018 RADEX ABDOMEN 1 VIEW: CPT | Performed by: RADIOLOGY

## 2024-07-03 PROCEDURE — 2500000005 HC RX 250 GENERAL PHARMACY W/O HCPCS: Performed by: NURSE PRACTITIONER

## 2024-07-03 PROCEDURE — 99231 SBSQ HOSP IP/OBS SF/LOW 25: CPT | Performed by: NURSE PRACTITIONER

## 2024-07-03 RX ORDER — POTASSIUM CHLORIDE 14.9 MG/ML
20 INJECTION INTRAVENOUS ONCE
Status: COMPLETED | OUTPATIENT
Start: 2024-07-03 | End: 2024-07-03

## 2024-07-03 ASSESSMENT — COGNITIVE AND FUNCTIONAL STATUS - GENERAL
DAILY ACTIVITIY SCORE: 17
TOILETING: A LOT
DRESSING REGULAR LOWER BODY CLOTHING: A LOT
MOBILITY SCORE: 19
DRESSING REGULAR LOWER BODY CLOTHING: A LOT
PERSONAL GROOMING: A LITTLE
MOBILITY SCORE: 19
TURNING FROM BACK TO SIDE WHILE IN FLAT BAD: A LITTLE
DAILY ACTIVITIY SCORE: 17
STANDING UP FROM CHAIR USING ARMS: A LITTLE
HELP NEEDED FOR BATHING: A LITTLE
MOVING TO AND FROM BED TO CHAIR: A LITTLE
PERSONAL GROOMING: A LITTLE
WALKING IN HOSPITAL ROOM: A LITTLE
HELP NEEDED FOR BATHING: A LITTLE
CLIMB 3 TO 5 STEPS WITH RAILING: A LITTLE
TOILETING: A LOT
DRESSING REGULAR UPPER BODY CLOTHING: A LITTLE
STANDING UP FROM CHAIR USING ARMS: A LITTLE
CLIMB 3 TO 5 STEPS WITH RAILING: A LITTLE
TURNING FROM BACK TO SIDE WHILE IN FLAT BAD: A LITTLE
WALKING IN HOSPITAL ROOM: A LITTLE
MOVING TO AND FROM BED TO CHAIR: A LITTLE
DRESSING REGULAR UPPER BODY CLOTHING: A LITTLE

## 2024-07-03 ASSESSMENT — PAIN - FUNCTIONAL ASSESSMENT
PAIN_FUNCTIONAL_ASSESSMENT: 0-10

## 2024-07-03 ASSESSMENT — PAIN SCALES - GENERAL
PAINLEVEL_OUTOF10: 10 - WORST POSSIBLE PAIN
PAINLEVEL_OUTOF10: 7
PAINLEVEL_OUTOF10: 6
PAINLEVEL_OUTOF10: 9

## 2024-07-03 ASSESSMENT — PAIN SCALES - PAIN ASSESSMENT IN ADVANCED DEMENTIA (PAINAD): TOTALSCORE: MEDICATION (SEE MAR)

## 2024-07-03 ASSESSMENT — PAIN DESCRIPTION - LOCATION
LOCATION: ABDOMEN
LOCATION: BACK

## 2024-07-03 ASSESSMENT — PAIN SCALES - WONG BAKER: WONGBAKER_NUMERICALRESPONSE: HURTS WHOLE LOT

## 2024-07-03 NOTE — CARE PLAN
The patient's goals for the shift include   have a bowel movement    The clinical goals for the shift include pain control    Over the shift, the patient did  make progress toward the following goals.

## 2024-07-03 NOTE — PROGRESS NOTES
"Occupational Therapy                 Therapy Communication Note    Patient Name: Fernando Cuevas  MRN: [id number]  Today's Date: 7/3/2024     Discipline: Occupational Therapy    Missed Visit Reason: Missed Visit Reason: Patient refused (Attempted OT tx, pt adamantly refusing therapy advising, \" I have a fever and I am in too much pain. I just got back from CT scan too.\" RN notified.)    Missed Time: Attempt      "

## 2024-07-03 NOTE — CARE PLAN
Problem: Pain - Adult  Goal: Verbalizes/displays adequate comfort level or baseline comfort level  Outcome: Progressing  Flowsheets (Taken 7/3/2024 0000)  Verbalizes/displays adequate comfort level or baseline comfort level:   Notify Licensed Independent Practitioner if interventions unsuccessful or patient reports new pain   Implement non-pharmacological measures as appropriate and evaluate response   Administer analgesics based on type and severity of pain and evaluate response   Assess pain using appropriate pain scale   Encourage patient to monitor pain and request assistance     Problem: Safety - Adult  Goal: Free from fall injury  Outcome: Progressing  Flowsheets (Taken 7/3/2024 0000)  Free from fall injury:   Based on caregiver fall risk screen, instruct family/caregiver to ask for assistance with transferring infant if caregiver noted to have fall risk factors   Instruct family/caregiver on patient safety     Problem: Discharge Planning  Goal: Discharge to home or other facility with appropriate resources  Outcome: Progressing  Flowsheets (Taken 7/3/2024 0000)  Discharge to home or other facility with appropriate resources:   Refer to discharge planning if patient needs post-hospital services based on physician order or complex needs related to functional status, cognitive ability or social support system   Identify discharge learning needs (meds, wound care, etc)   Arrange for needed discharge resources and transportation as appropriate   Identify barriers to discharge with patient and caregiver     Problem: Chronic Conditions and Co-morbidities  Goal: Patient's chronic conditions and co-morbidity symptoms are monitored and maintained or improved  Outcome: Progressing  Flowsheets (Taken 7/3/2024 0000)  Care Plan - Patient's Chronic Conditions and Co-Morbidity Symptoms are Monitored and Maintained or Improved:   Update acute care plan with appropriate goals if chronic or comorbid symptoms are exacerbated  and prevent overall improvement and discharge   Collaborate with multidisciplinary team to address chronic and comorbid conditions and prevent exacerbation or deterioration   Monitor and assess patient's chronic conditions and comorbid symptoms for stability, deterioration, or improvement   The patient's goals for the shift include      The clinical goals for the shift include pain control

## 2024-07-03 NOTE — PROGRESS NOTES
Fernando Cuevas is a 63 y.o. female on day 12 of admission presenting with Perforated viscus.      Subjective   Patient seen and examined at the bedside this morning. No acute overnight events. No other questions or concerns.         Objective     Last Recorded Vitals  /73   Pulse (!) 116   Temp 36.2 °C (97.2 °F) (Temporal)   Resp 18   Wt 64 kg (141 lb 1.5 oz) Comment: 6/27  SpO2 100%   Intake/Output last 3 Shifts:    Intake/Output Summary (Last 24 hours) at 7/3/2024 1103  Last data filed at 7/3/2024 0809  Gross per 24 hour   Intake 539.5 ml   Output 800 ml   Net -260.5 ml       Admission Weight  Weight: 64 kg (141 lb 1.5 oz) (06/20/24 2132)    Daily Weight  06/30/24 : 64 kg (141 lb 1.5 oz)    Image Results  XR abdomen 1 view  Narrative: Interpreted By:  Gabe Gage,   STUDY:  XR ABDOMEN 1 VIEW;  7/3/2024 7:44 am      INDICATION:  Signs/Symptoms:abdominal distension, s/p gastrographin study.      COMPARISON:  Most recent prior KUB is from 07/02/2024.      ACCESSION NUMBER(S):  [id number]      ORDERING CLINICIAN:  MINERVA AVILEZ      TECHNIQUE:  Single supine view of the abdomen and pelvis was obtained.          FINDINGS:  There was   a stable NG tube in place. There is Gastrografin contrast  material throughout the colon. There is mild persistent small-bowel  gas with minimal dilatation. Vertically oriented line skin staples to  the right of midline, unchanged.. There is mild-to-moderate stool  throughout the colon.      There was no gross organomegaly.  There were no abnormal calcifications.  No destructive bone lesion.      Impression: Stable NG tube in place. Stable vertically oriented line of skin  staples to the right of midline.      Previously  administered Gastrografin contrast material is now  throughout the colon extending into the sigmoid. There is also  mild-to-moderate stool throughout the colon. Consistent with delayed  bowel transit time.      Mild residual small bowel gas with minimal  loop dilatation, slightly  improved. Suspect underlying ileus.      MACRO:  None      Signed by: Gabe Gage 7/3/2024 8:23 AM  Dictation workstation:   [id number]      Physical Exam  Constitutional:       General: She is not in acute distress.  HENT:      Head: Normocephalic and atraumatic.   Eyes:      Conjunctiva/sclera: Conjunctivae normal.      Pupils: Pupils are equal, round, and reactive to light.   Cardiovascular:      Rate and Rhythm: Normal rate and regular rhythm.   Pulmonary:      Effort: Pulmonary effort is normal.      Breath sounds: Normal breath sounds.   Abdominal:      General: There is distension.      Palpations: Abdomen is soft.   Skin:     General: Skin is warm and dry.   Neurological:      Mental Status: She is alert.   Psychiatric:         Mood and Affect: Mood normal.         Behavior: Behavior normal.         Relevant Results           Scheduled medications  aspirin, 81 mg, oral, Daily  budesonide, 0.25 mg, nebulization, Daily   And  formoterol, 20 mcg, nebulization, BID  busPIRone, 5 mg, oral, BID  calcium carbonate, 1,500 mg, oral, Daily  dilTIAZem ER, 240 mg, oral, Daily  enoxaparin, 1 mg/kg, subcutaneous, q12h  fluconazole, 200 mg, oral, Daily  folic acid, 1 mg, oral, Daily  hydrocortisone sodium succinate, 200 mg, intravenous, Once  ipratropium-albuteroL, 3 mL, nebulization, TID  lisinopril, 10 mg, oral, Daily  montelukast, 10 mg, oral, Daily  multivitamin with minerals, 1 tablet, oral, Daily  nortriptyline, 50 mg, oral, Nightly  oxybutynin, 5 mg, oral, Daily  oxygen, , inhalation, Continuous - Inhalation  pantoprazole, 40 mg, intravenous, Daily before breakfast  piperacillin-tazobactam, 3.375 g, intravenous, q6h  potassium chloride, 20 mEq, intravenous, Once  thiamine, 100 mg, oral, Daily  thiamine, 100 mg, oral, Daily  varenicline, 1 mg, oral, BID      Continuous medications  Adult Clinimix Parenteral Nutrition, 83 mL/hr, Last Rate: 83 mL/hr (07/03/24 0305)      PRN  medications  PRN medications: acetaminophen **OR** acetaminophen **OR** acetaminophen, benzonatate, guaiFENesin, HYDROmorphone, HYDROmorphone, hydrOXYzine HCL, ipratropium-albuteroL, morphine, morphine, nitroglycerin, ondansetron, phenoL    Assessment/Plan      Principal Problem:    Perforated viscus  Active Problems:    Thrombocytosis    Perforated small bowel's with peritonitis:  Concern for postop ileus versus partial obstruction:  Underwent surgical intervention 6/21  -Underwent small bowel resection, and washout procedure.  -Culture for Candida albicans, concern for mix of gram-negative and anaerobes.  -Infectious disease following, continued on IV Zosyn, fluconazole 200 mg/day.  Plan for antibiotics with stop date 7/3/24.,  This will be a total of 10 days from the initiation of fluconazole.  Infectious diseases recommended discharge home once medically ready on p.o. Augmentin 875 mg 2 times a day instead of Zosyn if needed.  -General surgery following for postop ileus versus partial SBO, appreciate recommendations.  Enema and chewing gum added.    -Will continue to trend CBC  -Dietary following, appreciate recommendations.   -continue NPO  -still with ileus  -CT abd/pel pending    Acute DVT in the left distal brachial vein:  -Vascular upper extremity ultrasound with acute finding, non-occlusive.  -Patient initiated on full dose Lovenox therapeutic dose and 6/27. Will need to be transitioned to oral regimen prior to discharge.   -Patient with contrast allergy, CT angio chest requiring prep, no acute findings.      Acute on chronic hyponatremia, resolved:  In setting of poor oral intake  -Nephrology was managing, placed on IV isotonic fluid with improvement  -Fluids will be discontinued.  -Nephrology signed off  -Will continue to trend sodium    Severe malnutrition:  -Nutrition following, appreciate recommendations.  -NG tube in place, will need to continue PPN     LOS: Unknown  DVT ppx: Therapeutic  Lesly Khoury MD

## 2024-07-03 NOTE — PROGRESS NOTES
07/03/24 1232   Discharge Planning   Who is requesting discharge planning? Provider   Home or Post Acute Services Post acute facilities (Rehab/SNF/etc)   Type of Post Acute Facility Services Skilled nursing   [facility]   Does the patient need discharge transport arranged? Yes   RoundTrip coordination needed? Yes   Has discharge transport been arranged? No     7/3/24 1232  Patient remains NPO with NG to LIS.  On PPN and lipids.  Waiting on RBF.  [facility] cannot accept patient if she needs to discharge on TPN.  PT/OT AMPACs 19/17.  Patient may no longer meet SNF criteria at discharge.  [facility] to continue to follow for discharge needs.  Brittany Manjarrez RN [facility]

## 2024-07-03 NOTE — PROGRESS NOTES
Music Therapy Note        Therapy Session  Referral Type: New referral this admission  Visit Type: Follow-up visit  Session Start Time: 1327  Conflict of Service: Asleep               Treatment/Interventions            Narrative  Assessment Detail: At the time of assessment pt was asleep with no family present at bedside.  Follow-up: MT will follow up with pt as able.    Education Documentation  No documentation found.

## 2024-07-03 NOTE — PROGRESS NOTES
"Fernando Cuevas is a 63 y.o. female on day 12 of admission presenting with Perforated viscus.    Subjective   Feeling \"awful\" this morning, throat is very sore from NGT, still very bloated. NGT is to LIWS. Passing some gas, no BM.  Denies fever, chills, CP and SOB.        Objective     Physical Exam  Constitutional:       Appearance: She is ill-appearing.   Cardiovascular:      Rate and Rhythm: Normal rate.   Pulmonary:      Comments: On 2L O2, audible rhonchi.   Abdominal:      Comments: Very distended, NGT  to LIWS, soft, tender with palpation, not peritonitic     Genitourinary:     Comments: Voiding without difficulty   Musculoskeletal:         General: Normal range of motion.   Skin:     General: Skin is warm and dry.   Neurological:      Mental Status: She is oriented to person, place, and time.   Psychiatric:         Mood and Affect: Mood normal.         Behavior: Behavior normal.         Last Recorded Vitals  Blood pressure 127/73, pulse (!) 116, temperature 36.2 °C (97.2 °F), temperature source Temporal, resp. rate 18, height 1.575 m (5' 2.01\"), weight 64 kg (141 lb 1.5 oz), SpO2 100%.  Intake/Output last 3 Shifts:  I/O last 3 completed shifts:  In: 993.1 (15.5 mL/kg)   Out: 1200 (18.8 mL/kg) [Urine:1200 (0.5 mL/kg/hr)]  Weight: 64 kg     Medications:   Scheduled medications  aspirin, 81 mg, oral, Daily  budesonide, 0.25 mg, nebulization, Daily   And  formoterol, 20 mcg, nebulization, BID  busPIRone, 5 mg, oral, BID  calcium carbonate, 1,500 mg, oral, Daily  dilTIAZem ER, 240 mg, oral, Daily  enoxaparin, 1 mg/kg, subcutaneous, q12h  fluconazole, 200 mg, oral, Daily  folic acid, 1 mg, oral, Daily  hydrocortisone sodium succinate, 200 mg, intravenous, Once  ipratropium-albuteroL, 3 mL, nebulization, TID  lisinopril, 10 mg, oral, Daily  montelukast, 10 mg, oral, Daily  multivitamin with minerals, 1 tablet, oral, Daily  nortriptyline, 50 mg, oral, Nightly  oxybutynin, 5 mg, oral, Daily  oxygen, , inhalation, " Continuous - Inhalation  pantoprazole, 40 mg, intravenous, Daily before breakfast  piperacillin-tazobactam, 3.375 g, intravenous, q6h  potassium chloride, 20 mEq, intravenous, Once  thiamine, 100 mg, oral, Daily  thiamine, 100 mg, oral, Daily  varenicline, 1 mg, oral, BID      Continuous medications  Adult Clinimix Parenteral Nutrition, 83 mL/hr, Last Rate: 83 mL/hr (07/03/24 0305)      PRN medications  PRN medications: acetaminophen **OR** acetaminophen **OR** acetaminophen, benzonatate, guaiFENesin, HYDROmorphone, HYDROmorphone, hydrOXYzine HCL, ipratropium-albuteroL, morphine, morphine, nitroglycerin, ondansetron, phenoL    Relevant Results  Results for orders placed or performed during the hospital encounter of 06/20/24 (from the past 24 hour(s))   POCT GLUCOSE   Result Value Ref Range    POCT Glucose 98 74 - 99 mg/dL   POCT GLUCOSE   Result Value Ref Range    POCT Glucose 102 (H) 74 - 99 mg/dL   Magnesium   Result Value Ref Range    Magnesium 1.80 1.60 - 2.40 mg/dL   Phosphorus   Result Value Ref Range    Phosphorus 2.5 2.5 - 4.9 mg/dL   Basic metabolic panel   Result Value Ref Range    Glucose 92 74 - 99 mg/dL    Sodium 135 (L) 136 - 145 mmol/L    Potassium 3.3 (L) 3.5 - 5.3 mmol/L    Chloride 99 98 - 107 mmol/L    Bicarbonate 27 21 - 32 mmol/L    Anion Gap 12 10 - 20 mmol/L    Urea Nitrogen 13 6 - 23 mg/dL    Creatinine 0.64 0.50 - 1.05 mg/dL    eGFR >90 >60 mL/min/1.73m*2    Calcium 7.8 (L) 8.6 - 10.3 mg/dL   CBC and Auto Differential   Result Value Ref Range    WBC 11.3 4.4 - 11.3 x10*3/uL    nRBC 0.9 (H) 0.0 - 0.0 /100 WBCs    RBC 3.17 (L) 4.00 - 5.20 x10*6/uL    Hemoglobin 7.2 (L) 12.0 - 16.0 g/dL    Hematocrit 26.1 (L) 36.0 - 46.0 %    MCV 82 80 - 100 fL    MCH 22.7 (L) 26.0 - 34.0 pg    MCHC 27.6 (L) 32.0 - 36.0 g/dL    RDW 20.8 (H) 11.5 - 14.5 %    Platelets 650 (H) 150 - 450 x10*3/uL    Neutrophils % 83.1 40.0 - 80.0 %    Immature Granulocytes %, Automated 1.2 (H) 0.0 - 0.9 %    Lymphocytes % 8.8 13.0  - 44.0 %    Monocytes % 6.7 2.0 - 10.0 %    Eosinophils % 0.1 0.0 - 6.0 %    Basophils % 0.1 0.0 - 2.0 %    Neutrophils Absolute 9.36 (H) 1.20 - 7.70 x10*3/uL    Immature Granulocytes Absolute, Automated 0.13 0.00 - 0.70 x10*3/uL    Lymphocytes Absolute 0.99 (L) 1.20 - 4.80 x10*3/uL    Monocytes Absolute 0.75 0.10 - 1.00 x10*3/uL    Eosinophils Absolute 0.01 0.00 - 0.70 x10*3/uL    Basophils Absolute 0.01 0.00 - 0.10 x10*3/uL   Morphology   Result Value Ref Range    RBC Morphology See Below     Polychromasia Mild     Hypochromia Mild     Target Cells Few     Clumped Platelets Present    POCT GLUCOSE   Result Value Ref Range    POCT Glucose 101 (H) 74 - 99 mg/dL     CT abdomen pelvis wo IV contrast    Result Date: 7/3/2024  Interpreted By:  Gabe Gage, STUDY: CT ABDOMEN PELVIS WO IV CONTRAST;  7/3/2024 10:25 am   INDICATION: 62 y/o   F with  Signs/Symptoms:POD 11 ex lap and partial SBR, ileus, increased abdominal distention.   LIMITATIONS: Evaluation of the solid organs is limited due to non use of IV contrast.   ACCESSION NUMBER(S): [id number]   ORDERING CLINICIAN: MARVA NAVA   TECHNIQUE: Thin-section noncontrast spiral axial images were obtained from the xiphoid down through the symphysis pubis. Sagittal and coronal reconstruction images were generated. Bone, mediastinal, lung, and liver windows were reviewed.   COMPARISON: Most recent prior is from 06/26/2024. Correlation with KUB from earlier today..   FINDINGS: LUNG BASES: Tiny bilateral pleural effusions. Emphysematous changes at the lung bases. There is mild atelectasis at both lung bases.   LIVER: No hepatomegaly. Liver density was  within the limits of normal. No gross liver lesion in this unenhaced exam.   GALLBLADDER: Mild cholelithiasis. No gallbladder wall thickening, or adjacent edema.   BILE DUCTS: No intrahepatic biliary ductal dilatation. Common bile duct was within the limits of normal.   SPLEEN: No splenomegaly. No gross splenic mass..    PANCREAS: No pancreatic mass or inflammation, or ductal dilatation.   KIDNEYS/ADRENALS: No adrenal mass or enlargement. Mild global right renal atrophy. No calcified stone, hydronephrosis, mass, or perinephric edema in either kidney. No ureteral stone or dilatation.   BLADDER/PELVIS: Urinary bladder was grossly intact. No uterine enlargement or gross adnexal mass.   GREAT VESSELS/RETROPERITONEUM: Mild aortoiliac calcifications. Otherwise, abdominal aorta and IVC were intact. No suspicious retroperitoneal adenopathy. No suspicious mesenteric adenopathy. No suspicious pelvic or inguinal adenopathy.   PERITONEUM: See below.   BOWEL: The patient recently had a Gastrografin challenge. There is an NG tube in the stomach. The stomach otherwise unremarkable. There was a small amount of residual Gastrografin contrast material in multiple loops of mid to distal small bowel. Small bowel surgical reanastomosis in the anterior right pelvis on image 107 was unremarkable. There was no suspicious small bowel wall thickening or small bowel dilatation in this exam. There was Gastrografin contrast in the cecum extending all the way into the distal left colon. There was mild-to-moderate stool and gas throughout the colon down into the rectum. No colonic wall thickening or adjacent edema. There was a tiny amount mesenteric edema in the anterior inferior right pelvis in the region of the anastomosis, and there was trace edema in the mesentery of the deep posteroinferior pelvis. No focal fluid collection. No pneumoperitoneum.   BONES: No destructive lytic or blastic bone lesion.   ABDOMINAL WALL: Stable vertically oriented line skin staples in the midline the lower abdomen through the mid pelvis. There is a subtle localized area heterogeneous fullness in the mid medial aspect of the left rectus abdominus muscle at the level of the pelvic inlet measuring 24 x 17 mm on image 88, not evident previously..       Recent partial small bowel  resection with reanastomosis. The reanastomosis is unremarkable today, with no indication of bowel obstruction or perforation. There is very mild mesenteric edema in the region of the anastomosis that is consistent with recent surgery. There is no focal fluid collection within the peritoneal cavity worrisome for abscess or hematoma.   New heterogeneous fullness in the anteromedial left rectus abdominus muscle at the level of pelvic inlet measuring 24 x 17 mm on image 88. This could be a small hematoma or even a developing small abscess. No associated gas density. Clinical correlation needed.   There is Gastrografin contrast material in multiple loops of otherwise unremarkable distal small bowel and also throughout the colon down through the distal left. No indication of bowel obstruction in this exam.   Mild-to-moderate stool and gas throughout the colon and rectum.   NG tube in place as described.   Tiny bilateral pleural effusions with scant atelectasis at the lung bases.   Mild cholelithiasis.   Mild global atrophy of the right kidney.   MACRO: None   Signed by: Gabe Gage 7/3/2024 11:52 AM Dictation workstation:   [id number]    XR abdomen 1 view    Result Date: 7/3/2024  Interpreted By:  Gabe Gage, STUDY: XR ABDOMEN 1 VIEW;  7/3/2024 7:44 am   INDICATION: Signs/Symptoms:abdominal distension, s/p gastrographin study.   COMPARISON: Most recent prior KUB is from 07/02/2024.   ACCESSION NUMBER(S): [id number]   ORDERING CLINICIAN: MINERVA AVILEZ   TECHNIQUE: Single supine view of the abdomen and pelvis was obtained.     FINDINGS: There was   a stable NG tube in place. There is Gastrografin contrast material throughout the colon. There is mild persistent small-bowel gas with minimal dilatation. Vertically oriented line skin staples to the right of midline, unchanged.. There is mild-to-moderate stool throughout the colon.   There was no gross organomegaly. There were no abnormal calcifications. No destructive  bone lesion.       Stable NG tube in place. Stable vertically oriented line of skin staples to the right of midline.   Previously  administered Gastrografin contrast material is now throughout the colon extending into the sigmoid. There is also mild-to-moderate stool throughout the colon. Consistent with delayed bowel transit time.   Mild residual small bowel gas with minimal loop dilatation, slightly improved. Suspect underlying ileus.   MACRO: None   Signed by: Gabe Gage 7/3/2024 8:23 AM Dictation workstation:   [id number]    XR abdomen 2 views supine and erect or decub    Result Date: 7/2/2024  Interpreted By:  Karina Ventura, STUDY: XR ABDOMEN 2 VIEWS SUPINE AND ERECT OR DECUB; XR ABDOMEN 1 VIEW; 7/2/2024 1:00 am; 7/2/2024 8:03 am; 7/2/2024 2:20 am   INDICATION: Signs/Symptoms:Prolonged ilues; Signs/Symptoms:Gastrografin challenge   COMPARISON: 06/30/2024   ACCESSION NUMBER(S): [id number]; [id number]; [id number]   ORDERING CLINICIAN: MINERVA APARICIO   FINDINGS: 4 supine and erect views of the abdomen obtained at 12:42 a.m. and 1:59 a.m.. Additional single abdominal radiograph obtained at 7:57 a.m..   Midline skin clips. NG tube in place with tip overlying the mid stomach. Multiple distended large and small bowel loops containing air and fecal debris. No rectal gas on initial images.   Contrast material in the proximal stomach on images at 12:43 a.m. administered by unknown person. Transit of contrast material into distended central abdominal small bowel loops on images at 1:59 a.m. and into a few additional right-sided distended small bowel loops on image at 7:57 a.m..       Distended large and small bowel loops with prolonged small bowel contrast transit time consistent with ileus, less likely distal obstruction. Follow-up or further evaluation with CT as clinically warranted.   MACRO: None.   Signed by: Karina Ventura 7/2/2024 8:26 AM Dictation workstation:   [id number]    XR abdomen 2  views supine and erect or decub    Result Date: 7/2/2024  Interpreted By:  Karina Ventura, STUDY: XR ABDOMEN 2 VIEWS SUPINE AND ERECT OR DECUB; XR ABDOMEN 1 VIEW; 7/2/2024 1:00 am; 7/2/2024 8:03 am; 7/2/2024 2:20 am   INDICATION: Signs/Symptoms:Prolonged ilues; Signs/Symptoms:Gastrografin challenge   COMPARISON: 06/30/2024   ACCESSION NUMBER(S): [id number]; [id number]; [id number]   ORDERING CLINICIAN: MINERVA APARICIO   FINDINGS: 4 supine and erect views of the abdomen obtained at 12:42 a.m. and 1:59 a.m.. Additional single abdominal radiograph obtained at 7:57 a.m..   Midline skin clips. NG tube in place with tip overlying the mid stomach. Multiple distended large and small bowel loops containing air and fecal debris. No rectal gas on initial images.   Contrast material in the proximal stomach on images at 12:43 a.m. administered by unknown person. Transit of contrast material into distended central abdominal small bowel loops on images at 1:59 a.m. and into a few additional right-sided distended small bowel loops on image at 7:57 a.m..       Distended large and small bowel loops with prolonged small bowel contrast transit time consistent with ileus, less likely distal obstruction. Follow-up or further evaluation with CT as clinically warranted.   MACRO: None.   Signed by: Karina Ventura 7/2/2024 8:26 AM Dictation workstation:   [id number]    XR abdomen 1 view    Result Date: 7/2/2024  Interpreted By:  Karina Ventura, STUDY: XR ABDOMEN 2 VIEWS SUPINE AND ERECT OR DECUB; XR ABDOMEN 1 VIEW; 7/2/2024 1:00 am; 7/2/2024 8:03 am; 7/2/2024 2:20 am   INDICATION: Signs/Symptoms:Prolonged ilues; Signs/Symptoms:Gastrografin challenge   COMPARISON: 06/30/2024   ACCESSION NUMBER(S): [id number]; [id number]; [id number]   ORDERING CLINICIAN: MINERVA APARICIO   FINDINGS: 4 supine and erect views of the abdomen obtained at 12:42 a.m. and 1:59 a.m.. Additional single abdominal radiograph obtained at  7:57 a.m..   Midline skin clips. NG tube in place with tip overlying the mid stomach. Multiple distended large and small bowel loops containing air and fecal debris. No rectal gas on initial images.   Contrast material in the proximal stomach on images at 12:43 a.m. administered by unknown person. Transit of contrast material into distended central abdominal small bowel loops on images at 1:59 a.m. and into a few additional right-sided distended small bowel loops on image at 7:57 a.m..       Distended large and small bowel loops with prolonged small bowel contrast transit time consistent with ileus, less likely distal obstruction. Follow-up or further evaluation with CT as clinically warranted.   MACRO: None.   Signed by: Robert Ventura 7/2/2024 8:26 AM Dictation workstation:   [id number]    XR abdomen 1 view    Result Date: 6/30/2024  Interpreted By:  Bethel Porras, STUDY: XR ABDOMEN 1 VIEW   INDICATION: Signs/Symptoms:NG moved, reconfirm proper placement.   COMPARISON: June 30th earlier the same day   ACCESSION NUMBER(S): [id number]   ORDERING CLINICIAN: ROBERT NOLASCO   FINDINGS: Nasogastric tube reflected upon itself slightly within the stomach over the gastric fundus.   Bowel-gas pattern incompletely assessed.       Nasogastric tube reflected upon itself slightly within the stomach over the gastric fundus.   Signed by: Bethel Porras 6/30/2024 4:01 PM Dictation workstation:   [id number]    XR abdomen 1 view    Result Date: 6/30/2024  Interpreted By:  Sejal Dykes, STUDY: XR ABDOMEN 1 VIEW;  6/30/2024 7:56 am. 2 views.   INDICATION: Signs/Symptoms:Post-op ileus.   COMPARISON: 06/29/2024   ACCESSION NUMBER(S): [id number]   ORDERING CLINICIAN: ROBERT NOLASCO   FINDINGS: There is a nasogastric tube with the tip in the proximal stomach and side hole beyond the gastroesophageal junction. There is no gastric distention. There is a vertical orientation of cutaneous staples from recent abdominal surgery. There is  moderate-to-marked distention of central small bowel loops, unchanged. Findings could represent a postoperative ileus but could represent a partial small bowel obstruction. There is a large amount of stool in the right side of the colon, unchanged.   There are no pathologic calcifications.   Visualized lungs are clear.   Osseous structures demonstrate no acute bony changes.         1. Nasogastric tube with the tip in the proximal stomach; no gastric distention. 2. Persistent moderate-to-marked central small-bowel dilatation which could represent a postoperative ileus versus partial small bowel obstruction. This is unchanged. 3. Large amount of stool right side of the colon.   MACRO: None   Signed by: Sejal Dykes 6/30/2024 8:11 AM Dictation workstation:   [id number]    XR abdomen 1 view    Result Date: 6/29/2024  Interpreted By:  [place] Roberts, STUDY: XR ABDOMEN 1 VIEW;  6/29/2024 9:05 am   INDICATION: Signs/Symptoms:Abdominal distension.   COMPARISON: 06/24/2024   ACCESSION NUMBER(S): [id number]   ORDERING CLINICIAN: ROBERT NOLASCO   FINDINGS: There is prominent stool in the right colon. There is gaseous distention of multiple loops of small bowel. Nasogastric tube overlies the stomach. Skin staples are seen in the midline of the lower abdomen.       Slight increase in the gaseous distention of the small bowel since the previous exam possibly due to postoperative ileus although partial small bowel obstruction can also have this appearance. Large amount of formed stool in the right colon.   MACRO: None   Signed by: [place] Roberts 6/29/2024 10:22 AM Dictation workstation:   [id number]    CT angio chest for pulmonary embolism    Result Date: 6/28/2024  Interpreted By:  Gabe Gage, STUDY: CT ANGIO CHEST FOR PULMONARY EMBOLISM;  6/28/2024 7:04 am   INDICATION: 62 y/o   F with  Signs/Symptoms:PE.   LIMITATIONS: None.   ACCESSION NUMBER(S): [id number]   ORDERING CLINICIAN: DELPHINE VELÁZQUEZ   TECHNIQUE: After the  administration of intravenous nonionic contrast, thin-section arterial phase images were obtained  from the thoracic inlet down through the iliac crest. Sagittal and coronal reconstruction images were generated. Axial and coronal MIP vascular reconstruction images were also generated.   Mediastinal, lung, bone, and liver windows were reviewed. 75 ML Omnipaque 350     COMPARISON: Most recent prior from 06/13/2024. correlation with CT scans of the abdomen and pelvis, most recent from 06/26/2024.   FINDINGS: CHEST WALL/BASE OF THE NECK: The chest wall was grossly intact. No thyromegaly or thyroid mass.   MEDIASTINUM/ALESSANDRO: No suspicious mediastinal, hilar, or axillary mass or adenopathy. No cardiomegaly. No pericardial effusion. No thoracic aortic aneurysm or dissection. No pulmonary artery filling defect.   LUNGS/ PLEURA/ AND TRACHEA: Stable underlying fat containing posteromedial left-sided diaphragmatic hernia. Band of atelectasis across the posterior right lung base. Mild atelectasis at the posteromedial left lung base. Underlying emphysema with upper lobe predominance. No mass or pneumonia in either lung. Tiny bilateral pleural effusions. No pneumothorax. The trachea was grossly intact.   BONES: No destructive lytic or blastic bone lesion.   UPPER ABDOMEN: There is an NG tube in place with distal tip in the distal gastric body. Cholelithiasis. Contracted gallbladder. The imaged portions of the liver   were unremarkable. There are subtle stable small hypodensities in the spleen compared to 06/26/2024. Imaged kidneys and adrenal glands were intact. No upper abdominal ascites. The imaged stomach and large bowel. There is a bilobed hypodensity adjacent the pancreatic tail the left abdomen measuring 24 mm AP x 17 mm transversely on image 287, measuring 24 Hounsfield units of CT density. This is stable compared to the most recent prior CT scan abdomen and pelvis. It is also grossly stable compared to 06/20/2024 and was  not present on 03/22/2016.       No CT evidence of pulmonary embolism in the current exam.   Emphysema. Band of atelectasis at the right lung base. Confluent atelectasis at the posteromedial left lung base. Tiny bilateral pleural effusions.. No mass or pneumonia in either lung.   NG tube in place as described.   Cholelithiasis.   Stable bilobed hypodensity adjacent to the pancreatic tail. Pancreatic pseudocyst versus cystic pancreatic neoplasms. In addition, there are subtle stable nonspecific splenic hypodensities which could be splenic hemangiomas. Recommend further evaluation with MRI the pancreas and spleen.   MACRO: None   Signed by: Gabe Gage 6/28/2024 7:55 AM Dictation workstation:   [id number]    Vascular US upper extremity venous duplex left    Result Date: 6/27/2024             [place] [id number]   Tel [phone] and Fax [phone]  Vascular Lab Report VASC US UPPER EXTREMITY VENOUS DUPLEX LEFT  Patient Name:      DINA GREENE       Reading Physician:  [id number] Rusty Villalba DO Study Date:        6/27/2024            Ordering Physician: [id number] REECE MRN/PID:           [id number]             Technologist:       Caren Leigh T Accession#:        [id number]         Technologist 2: Date of Birth/Age: 1960 / 63 years Encounter#:         [id number] Gender:            F Admission Status:  Inpatient            Location Performed: [facility]  Diagnosis/ICD: Left arm swelling-M79.89 CPT Codes:     75640 Peripheral venous duplex scan for DVT Limited  **CRITICAL RESULT** Critical Result: Acute DVT in the left distal brachial vein Notification called to Katharina Pineda RN on 6/27/2024 at 3:12:42 PM.  CONCLUSIONS: Right Upper Venous: The subclavian vein demonstrates a pulsatile flow. Left Upper Venous: There is acute non-occlusive deep vein thrombosis visualized in the brachial vein. The remainder of the left arm is negative for deep vein  thrombosis.  Imaging & Doppler Findings:  Right        Flow Subclavian Pulsatile  Left                Compress    Thrombus            Flow Internal Jugular      Yes         None           Pulsatile Subclavian            Yes         None Subclavian Proximal   Yes         None           Pulsatile Subclavian Mid        Yes         None           Pulsatile Subclavian Distal     Yes         None           Pulsatile Axillary              Yes         None       Spontaneous/Phasic Brachial               No    Acute occlusive Cephalic              Yes         None Basilic               Yes         None  [id number] Rusty Villalba  Electronically signed by [id number] Rusty Villalba  on 6/27/2024 at 9:37:37 PM  ** Final **     CT abdomen pelvis wo IV contrast    Result Date: 6/26/2024  Interpreted By:  Karina Ventura, STUDY: CT ABDOMEN PELVIS WO IV CONTRAST;  6/26/2024 8:51 am   INDICATION: Signs/Symptoms:POD 5 small bowel resection. Increased pain.   COMPARISON: 06/20/2024   ACCESSION NUMBER(S): [id number]   ORDERING CLINICIAN: ANITRA APARICIO   TECHNIQUE: CT of the abdomen and pelvis was performed. Sagittal and coronal reconstructions were generated.  No intravenous contrast given for the exam.   FINDINGS: Solid organ and vessel evaluation limited without IV contrast.   ABDOMINAL ORGANS:   LIVER: No focal lesion within limits of unenhanced exam.   GALL BLADDER AND BILIARY TREE: Subtle density layer near the gallbladder neck again seen. No gallbladder wall thickening or biliary dilatation within limits of unenhanced exam   SPLEEN: 1.3 cm subtle hypodense lesion in the inferior pole image 36/155.   PANCREAS: Probable 1.4 cm hypodense lesion in the tail image 44/155. Few punctate calcifications in the head.   ADRENALS: Uneven hypodense thickening of both adrenal glands similar to the prior exam.   KIDNEYS AND URETERS: Atrophic right kidney. Mild relatively symmetric perinephric fat stranding. No focal renal mass within limits of unenhanced  exam. No hydronephrosis.   BOWEL: NG tube extends into the body of distended fluid and air-filled stomach. New right lower quadrant small bowel suture line. Multiple dilated air and fluid-filled small bowel loops with scattered air-fluid levels. Moderate colonic fecal residue. Distal colon diverticulosis.   PERITONEUM, RETROPERITONEUM, NODES: Minimal free fluid in the pelvis. Mild stranding in the right lower quadrant inferior to suture line. No free intraperitoneal air. No significant retroperitoneal adenopathy within limits of unenhanced exam.   VESSELS:  Scattered atherosclerotic calcifications. Lack of IV contrast precludes vascular luminal assessment. No abdominal aortic aneurysm.   PELVIS: Urinary bladder is moderately distended and grossly normal in contour. Limited delineation of the uterus separate from adjacent bowel loops.   ABDOMINAL WALL: New midline skin clips. Few dots of air in the right lower quadrant and left mid abdominal wall. Mild fluid and stranding in both flanks, right-greater-than-left.   BONES: No focal concerning lytic or blastic osseous lesion.   LOWER CHEST: Moderate emphysematous changes. New coarse bandlike opacities in both lung bases with small pleural effusions.       New right lower quadrant suture line reportedly status post small bowel resection with multiple dilated air and fluid-filled small bowel loops that could reflect mild postop ileus or partial obstruction. Follow-up as clinically warranted.   Small hypodense lesions in the pancreatic tail and spleen, can not be further characterized on unenhanced exam. Attention at follow-up or further evaluation with MRI recommended.   Bandlike infiltrates or atelectasis in both lung bases with small pleural effusions.   Numerous additional findings as described above.   MACRO: None.   Signed by: Karina Ventura 6/26/2024 9:08 AM Dictation workstation:   [id number]        Assessment/Plan   Principal Problem:    Perforated viscus  Active  "Problems:    Thrombocytosis    Fernando Cuevas is a 64 yo female who is admitted with abdominal pain. She was found to have perforated viscus and was taken to the OR emergently by Dr Liang for ex-lap and partial small bowel resection. Intra-operative findings showed \"segment of perforated small bowel.\" She was also recently hospitalized for pneumonia and is currently being treated for COPD exacerbation. On exam, her abdomen is very distended, soft, mildly tender, non peritonitic. NGT is to LIWS. + passing gas, -BM yet    Plan:   GI:   POD #11 s/p ex-lap, partial SBR  - NPO with sips of clears for comfort  - repeat CT   - NG to LIWS   - DVT ppx: SCDs and lovenox  - oob and walking as much as possible   - replace potassium   - IS 10x/hr    ID:   Leukocytosis- WBC 11.3  - continue zosyn for bowel perforation   - continue to trend CBC    Heme: H/H 7.2/26.1  - no active s/s of bleeding     Dispo: awaiting RBF, continue NG. Discussed with Dr Bingham, Surgery will continue to follow.     I spent 35 minutes in the professional and overall care of this patient.      Earline Stahl, APRN-CNP      "

## 2024-07-04 ENCOUNTER — APPOINTMENT (OUTPATIENT)
Dept: RADIOLOGY | Facility: HOSPITAL | Age: 64
End: 2024-07-04
Payer: COMMERCIAL

## 2024-07-04 LAB
ABO GROUP (TYPE) IN BLOOD: NORMAL
ANION GAP SERPL CALC-SCNC: 15 MMOL/L (ref 10–20)
ANTIBODY SCREEN: NORMAL
BASOPHILS # BLD AUTO: 0.01 X10*3/UL (ref 0–0.1)
BASOPHILS NFR BLD AUTO: 0.1 %
BITE CELLS BLD QL SMEAR: PRESENT
BLOOD EXPIRATION DATE: NORMAL
BUN SERPL-MCNC: 11 MG/DL (ref 6–23)
BURR CELLS BLD QL SMEAR: NORMAL
CALCIUM SERPL-MCNC: 8 MG/DL (ref 8.6–10.3)
CHLORIDE SERPL-SCNC: 97 MMOL/L (ref 98–107)
CO2 SERPL-SCNC: 25 MMOL/L (ref 21–32)
CREAT SERPL-MCNC: 0.68 MG/DL (ref 0.5–1.05)
DACRYOCYTES BLD QL SMEAR: NORMAL
DISPENSE STATUS: NORMAL
EGFRCR SERPLBLD CKD-EPI 2021: >90 ML/MIN/1.73M*2
EOSINOPHIL # BLD AUTO: 0.01 X10*3/UL (ref 0–0.7)
EOSINOPHIL NFR BLD AUTO: 0.1 %
ERYTHROCYTE [DISTWIDTH] IN BLOOD BY AUTOMATED COUNT: 18.8 % (ref 11.5–14.5)
ERYTHROCYTE [DISTWIDTH] IN BLOOD BY AUTOMATED COUNT: 20.7 % (ref 11.5–14.5)
ERYTHROCYTE [DISTWIDTH] IN BLOOD BY AUTOMATED COUNT: 20.7 % (ref 11.5–14.5)
GLUCOSE BLD MANUAL STRIP-MCNC: 100 MG/DL (ref 74–99)
GLUCOSE SERPL-MCNC: 107 MG/DL (ref 74–99)
HCT VFR BLD AUTO: 23.1 % (ref 36–46)
HCT VFR BLD AUTO: 24.8 % (ref 36–46)
HCT VFR BLD AUTO: 27.8 % (ref 36–46)
HGB BLD-MCNC: 7 G/DL (ref 12–16)
HGB BLD-MCNC: 7.4 G/DL (ref 12–16)
HGB BLD-MCNC: 9 G/DL (ref 12–16)
HOLD SPECIMEN: NORMAL
IMM GRANULOCYTES # BLD AUTO: 0.18 X10*3/UL (ref 0–0.7)
IMM GRANULOCYTES NFR BLD AUTO: 1.6 % (ref 0–0.9)
LYMPHOCYTES # BLD AUTO: 1.35 X10*3/UL (ref 1.2–4.8)
LYMPHOCYTES NFR BLD AUTO: 11.9 %
MAGNESIUM SERPL-MCNC: 1.9 MG/DL (ref 1.6–2.4)
MCH RBC QN AUTO: 23.5 PG (ref 26–34)
MCH RBC QN AUTO: 23.8 PG (ref 26–34)
MCH RBC QN AUTO: 26.9 PG (ref 26–34)
MCHC RBC AUTO-ENTMCNC: 29.8 G/DL (ref 32–36)
MCHC RBC AUTO-ENTMCNC: 30.3 G/DL (ref 32–36)
MCHC RBC AUTO-ENTMCNC: 32.4 G/DL (ref 32–36)
MCV RBC AUTO: 79 FL (ref 80–100)
MCV RBC AUTO: 79 FL (ref 80–100)
MCV RBC AUTO: 83 FL (ref 80–100)
MONOCYTES # BLD AUTO: 0.69 X10*3/UL (ref 0.1–1)
MONOCYTES NFR BLD AUTO: 6.1 %
NEUTROPHILS # BLD AUTO: 9.12 X10*3/UL (ref 1.2–7.7)
NEUTROPHILS NFR BLD AUTO: 80.2 %
NRBC BLD-RTO: 1.1 /100 WBCS (ref 0–0)
OVALOCYTES BLD QL SMEAR: NORMAL
PHOSPHATE SERPL-MCNC: 3.6 MG/DL (ref 2.5–4.9)
PLATELET # BLD AUTO: 457 X10*3/UL (ref 150–450)
PLATELET # BLD AUTO: 632 X10*3/UL (ref 150–450)
PLATELET # BLD AUTO: 689 X10*3/UL (ref 150–450)
POLYCHROMASIA BLD QL SMEAR: NORMAL
POTASSIUM SERPL-SCNC: 3.9 MMOL/L (ref 3.5–5.3)
PRODUCT BLOOD TYPE: 5100
PRODUCT CODE: NORMAL
RBC # BLD AUTO: 2.94 X10*6/UL (ref 4–5.2)
RBC # BLD AUTO: 3.15 X10*6/UL (ref 4–5.2)
RBC # BLD AUTO: 3.35 X10*6/UL (ref 4–5.2)
RBC MORPH BLD: NORMAL
RH FACTOR (ANTIGEN D): NORMAL
SCHISTOCYTES BLD QL SMEAR: NORMAL
SODIUM SERPL-SCNC: 133 MMOL/L (ref 136–145)
UNIT ABO: NORMAL
UNIT NUMBER: NORMAL
UNIT RH: NORMAL
UNIT VOLUME: 350
WBC # BLD AUTO: 11.4 X10*3/UL (ref 4.4–11.3)
WBC # BLD AUTO: 12.8 X10*3/UL (ref 4.4–11.3)
WBC # BLD AUTO: 13.1 X10*3/UL (ref 4.4–11.3)
XM INTEP: NORMAL

## 2024-07-04 PROCEDURE — 83735 ASSAY OF MAGNESIUM: CPT | Performed by: INTERNAL MEDICINE

## 2024-07-04 PROCEDURE — 36556 INSERT NON-TUNNEL CV CATH: CPT

## 2024-07-04 PROCEDURE — 99233 SBSQ HOSP IP/OBS HIGH 50: CPT | Performed by: INTERNAL MEDICINE

## 2024-07-04 PROCEDURE — 86920 COMPATIBILITY TEST SPIN: CPT

## 2024-07-04 PROCEDURE — 71045 X-RAY EXAM CHEST 1 VIEW: CPT

## 2024-07-04 PROCEDURE — 85027 COMPLETE CBC AUTOMATED: CPT | Performed by: HOSPITALIST

## 2024-07-04 PROCEDURE — 99231 SBSQ HOSP IP/OBS SF/LOW 25: CPT | Performed by: NURSE PRACTITIONER

## 2024-07-04 PROCEDURE — 2500000001 HC RX 250 WO HCPCS SELF ADMINISTERED DRUGS (ALT 637 FOR MEDICARE OP): Performed by: HOSPITALIST

## 2024-07-04 PROCEDURE — 76705 ECHO EXAM OF ABDOMEN: CPT

## 2024-07-04 PROCEDURE — 36430 TRANSFUSION BLD/BLD COMPNT: CPT

## 2024-07-04 PROCEDURE — 87081 CULTURE SCREEN ONLY: CPT | Mod: AHULAB

## 2024-07-04 PROCEDURE — 82947 ASSAY GLUCOSE BLOOD QUANT: CPT

## 2024-07-04 PROCEDURE — 85027 COMPLETE CBC AUTOMATED: CPT | Performed by: INTERNAL MEDICINE

## 2024-07-04 PROCEDURE — 2500000002 HC RX 250 W HCPCS SELF ADMINISTERED DRUGS (ALT 637 FOR MEDICARE OP, ALT 636 FOR OP/ED): Performed by: INTERNAL MEDICINE

## 2024-07-04 PROCEDURE — 84100 ASSAY OF PHOSPHORUS: CPT | Performed by: INTERNAL MEDICINE

## 2024-07-04 PROCEDURE — 2500000004 HC RX 250 GENERAL PHARMACY W/ HCPCS (ALT 636 FOR OP/ED)

## 2024-07-04 PROCEDURE — 02HV33Z INSERTION OF INFUSION DEVICE INTO SUPERIOR VENA CAVA, PERCUTANEOUS APPROACH: ICD-10-PCS

## 2024-07-04 PROCEDURE — 2500000001 HC RX 250 WO HCPCS SELF ADMINISTERED DRUGS (ALT 637 FOR MEDICARE OP): Performed by: INTERNAL MEDICINE

## 2024-07-04 PROCEDURE — 2500000005 HC RX 250 GENERAL PHARMACY W/O HCPCS: Performed by: INTERNAL MEDICINE

## 2024-07-04 PROCEDURE — P9017 PLASMA 1 DONOR FRZ W/IN 8 HR: HCPCS

## 2024-07-04 PROCEDURE — 36415 COLL VENOUS BLD VENIPUNCTURE: CPT

## 2024-07-04 PROCEDURE — 85025 COMPLETE CBC W/AUTO DIFF WBC: CPT

## 2024-07-04 PROCEDURE — 2060000001 HC INTERMEDIATE ICU ROOM DAILY

## 2024-07-04 PROCEDURE — 94640 AIRWAY INHALATION TREATMENT: CPT

## 2024-07-04 PROCEDURE — 76705 ECHO EXAM OF ABDOMEN: CPT | Performed by: RADIOLOGY

## 2024-07-04 PROCEDURE — 80048 BASIC METABOLIC PNL TOTAL CA: CPT | Performed by: INTERNAL MEDICINE

## 2024-07-04 PROCEDURE — P9016 RBC LEUKOCYTES REDUCED: HCPCS

## 2024-07-04 PROCEDURE — 86901 BLOOD TYPING SEROLOGIC RH(D): CPT | Performed by: HOSPITALIST

## 2024-07-04 PROCEDURE — 71045 X-RAY EXAM CHEST 1 VIEW: CPT | Performed by: RADIOLOGY

## 2024-07-04 PROCEDURE — 36415 COLL VENOUS BLD VENIPUNCTURE: CPT | Performed by: HOSPITALIST

## 2024-07-04 ASSESSMENT — PAIN SCALES - GENERAL
PAINLEVEL_OUTOF10: 7
PAINLEVEL_OUTOF10: 10 - WORST POSSIBLE PAIN
PAINLEVEL_OUTOF10: 2
PAINLEVEL_OUTOF10: 10 - WORST POSSIBLE PAIN
PAINLEVEL_OUTOF10: 7
PAINLEVEL_OUTOF10: 10 - WORST POSSIBLE PAIN
PAINLEVEL_OUTOF10: 3

## 2024-07-04 ASSESSMENT — COGNITIVE AND FUNCTIONAL STATUS - GENERAL
TURNING FROM BACK TO SIDE WHILE IN FLAT BAD: A LOT
PERSONAL GROOMING: A LITTLE
HELP NEEDED FOR BATHING: A LITTLE
DRESSING REGULAR LOWER BODY CLOTHING: A LITTLE
TOILETING: A LITTLE
WALKING IN HOSPITAL ROOM: A LOT
EATING MEALS: A LITTLE
MOBILITY SCORE: 12
CLIMB 3 TO 5 STEPS WITH RAILING: TOTAL
MOVING FROM LYING ON BACK TO SITTING ON SIDE OF FLAT BED WITH BEDRAILS: A LITTLE
DRESSING REGULAR UPPER BODY CLOTHING: A LITTLE
STANDING UP FROM CHAIR USING ARMS: A LOT
MOVING TO AND FROM BED TO CHAIR: A LOT
DAILY ACTIVITIY SCORE: 18

## 2024-07-04 ASSESSMENT — PAIN SCALES - WONG BAKER
WONGBAKER_NUMERICALRESPONSE: HURTS LITTLE BIT

## 2024-07-04 ASSESSMENT — PAIN DESCRIPTION - LOCATION
LOCATION: ABDOMEN
LOCATION: BACK
LOCATION: ABDOMEN
LOCATION: BACK
LOCATION: BACK

## 2024-07-04 ASSESSMENT — PAIN - FUNCTIONAL ASSESSMENT
PAIN_FUNCTIONAL_ASSESSMENT: 0-10

## 2024-07-04 ASSESSMENT — PAIN DESCRIPTION - ORIENTATION: ORIENTATION: LOWER

## 2024-07-04 NOTE — PROGRESS NOTES
Nutrition Follow-up Note    Late entry:    PPN infusing, pt sleeping.     Plan d/w MD, RN, PharmD, surgery CNP. Plan to continue PPN for now, recent CT shows contrast progressing so hopefully BM soon along with return of bowel function.     -PPN to continue again tonight, and most likely continue tomorrow as well   -Lipids tonight, MWF only   -RFP, Mg daily; replete prn   - NG/oral diet per MD     History:  Food and Nutrient History  Energy Intake: Poor < 50 %  Food and Nutrient History: NPO       Dietary Orders (From admission, onward)       Start     Ordered    06/27/24 1551  NPO Diet Except: Sips with meds, Sips of clear liquids, Ice chips; Effective now  Diet effective now        Comments: Popsicles, small chips, sips okay   Question Answer Comment   Except: Sips with meds    Except: Sips of clear liquids    Except: Ice chips        06/27/24 1551                      Food and Nutrient Administration History  Additional Food and Nutrient Administration History: regular cardiac diet previous admission 6/11-6/19/24    Scheduled medications  aspirin, 81 mg, oral, Daily  budesonide, 0.25 mg, nebulization, Daily   And  formoterol, 20 mcg, nebulization, BID  busPIRone, 5 mg, oral, BID  calcium carbonate, 1,500 mg, oral, Daily  dilTIAZem ER, 240 mg, oral, Daily  [Held by provider] enoxaparin, 1 mg/kg, subcutaneous, q12h  folic acid, 1 mg, oral, Daily  hydrocortisone sodium succinate, 200 mg, intravenous, Once  HYDROmorphone, 0.5 mg, intravenous, Once  ipratropium-albuteroL, 3 mL, nebulization, TID  lisinopril, 10 mg, oral, Daily  montelukast, 10 mg, oral, Daily  multivitamin with minerals, 1 tablet, oral, Daily  nortriptyline, 50 mg, oral, Nightly  oxybutynin, 5 mg, oral, Daily  oxygen, , inhalation, Continuous - Inhalation  pantoprazole, 40 mg, intravenous, Daily before breakfast  thiamine, 100 mg, oral, Daily  thiamine, 100 mg, oral, Daily  varenicline, 1 mg, oral, BID      Continuous medications  Adult Clinimix  "Parenteral Nutrition, 83 mL/hr, Last Rate: 83 mL/hr (07/03/24 2234)      PRN medications  PRN medications: acetaminophen **OR** acetaminophen **OR** acetaminophen, benzonatate, guaiFENesin, HYDROmorphone, HYDROmorphone, hydrOXYzine HCL, ipratropium-albuteroL, morphine, morphine, nitroglycerin, ondansetron, phenoL        Latest Reference Range & Units Most Recent   GLUCOSE 74 - 99 mg/dL 107 (H)  7/4/24 01:32   SODIUM 136 - 145 mmol/L 133 (L)  7/4/24 01:32   POTASSIUM 3.5 - 5.3 mmol/L 3.9  7/4/24 01:32   CHLORIDE 98 - 107 mmol/L 97 (L)  7/4/24 01:32   Bicarbonate 21 - 32 mmol/L 25  7/4/24 01:32   Anion Gap 10 - 20 mmol/L 15  7/4/24 01:32   Blood Urea Nitrogen 6 - 23 mg/dL 11  7/4/24 01:32   Creatinine 0.50 - 1.05 mg/dL 0.68  7/4/24 01:32   EGFR >60 mL/min/1.73m*2 >90  7/4/24 01:32   Calcium 8.6 - 10.3 mg/dL 8.0 (L)  7/4/24 01:32   PHOSPHORUS 2.5 - 4.9 mg/dL 3.6  7/4/24 01:32   Albumin 3.4 - 5.0 g/dL 2.9 (L)  6/21/24 07:04   Alkaline Phosphatase 33 - 136 U/L 65  6/21/24 07:04   ALT 7 - 45 U/L 23  6/21/24 07:04   AST 9 - 39 U/L 14  6/21/24 07:04   Bilirubin Total 0.0 - 1.2 mg/dL 0.4  6/21/24 07:04   Procalcitonin <=0.07 ng/mL 0.24 (H)  6/18/24 17:18   FERRITIN 8 - 150 ng/mL 19  5/5/24 00:14   FOLATE >5.0 ng/mL 10.0  6/16/24 10:48   Total Protein 6.4 - 8.2 g/dL 5.9 (L)  6/24/24 14:51   IRON 35 - 150 ug/dL 11 (L)  6/16/24 06:36   Osmolality, Serum 280 - 300 mOsm/kg 276 (L)  6/22/24 18:22   MAGNESIUM 1.60 - 2.40 mg/dL 1.90  7/4/24 01:32   (H): Data is abnormally high  (L): Data is abnormally low        Anthropometrics:  Height: 157.5 cm (5' 2.01\")  Weight: 64 kg (141 lb 1.5 oz)  BMI (Calculated): 25.8    Weight Change: 0    Weight Change  Weight History / % Weight Change: 64 kg 6/27/24, 64.6 kg 5/10/24, 61.2kg 1/9/24, 59.9kg 6/21/23  Significant Weight Loss: No         IBW/kg (Dietitian Calculated): 50 kg  Percent of IBW: 128 %       I/O last 3 completed shifts:  In: 2197 (34.3 mL/kg) [Blood:1007.5; IV Piggyback:650]  Out: " "2750 (43 mL/kg) [Urine:2250 (1 mL/kg/hr); Emesis/NG output:500]  Weight: 64 kg   I/O this shift:  In: 249.6   Out: 200 [Urine:200]      Energy Needs:  Calculated Energy Needs Using Equations  Height: 157.5 cm (5' 2.01\")  Weight Used for Equation Calculations: 64 kg (141 lb 1.5 oz)  [facility] Paganor Equation (Overweight or Obese Patients): 1148  Equation Chosen to Use by RD: [facility]  Activity Factor: 1.2  Stress Factor: 1.2  Total Energy Needs: 1650  Temp: 36.3 °C (97.3 °F)    Estimated Energy Needs  Method for Estimating Needs: 0144-6650 kcal/day (25-30)    Estimated Protein Needs  Total Protein Estimated Needs (g): 75 g  Total Protein Estimated Needs (g/kg): 1.5 g/kg    Estimated Fluid Needs  Method for Estimating Needs: per MD         Nutrition Focused Physical Findings:  Subcutaneous Fat Loss  Orbital Fat Pads: Mild-Moderate (slight dark circles and slight hollowing)  Buccal Fat Pads: Well nourished (full, rounded cheeks)    Muscle Wasting  Temporalis: Mild-Moderate (slight depression)  Pectoralis (Clavicular Region): Mild-Moderate (some protrusion of clavicle)  Deltoid/Trapezius: Mild-Moderate (slight protrusion of acromion process)  Interosseous: Mild-Moderate (slightly depressed area between thumb and forefinger)    Edema  Edema: +1 trace  Edema Location: generalized         Physical Findings (Nutrition Deficiency/Toxicity)  Skin: Positive       Nutrition Diagnosis   Malnutrition Diagnosis  Patient has Malnutrition Diagnosis: Yes  Diagnosis Status: Ongoing  Malnutrition Diagnosis: Severe malnutrition related to acute disease or injury  As Evidenced by: oral intake less than 50% of estimated energy requirements for greater than five days, generalized edema, visualized bradley of depelted adipose tissues and skeletal tissues wasting                                                             Nutrition Interventions/Recommendations   Nutrition Prescription  Individualized Nutrition Prescription Provided for : " continue PPN as ordered, lipids MWF, NG/diet per MD    Food and/or Nutrient Delivery Interventions  Interventions: Parenteral nutrition/ IV fluids              Parenteral Nutrition/IV Fluids: Parenteral nutrition site care  Parenteral Formula Recommendations  Formula: Standard peripheral formula AA 4.25%, dextrose 5%  Electrolytes: Standard  Elements: Multivitamin, Trace elements    Fat Emulsion Recommendations  Fat Emulsion 20%: Intralipid  Fat Emulsion Rate (mL/hr): 21 mL/hr  Fat Emulsion Volume (mL/day): 250 mL/day    Parenteral Instructions  Continuous Rate (mL/hr): 83 mL/hr  Total Volume (mL/day): 2000 mL/day  Total Parenteral Kcal (kcal/day): 680 kcal/day  Total Protein (g/day): 85 g/day    Lab/Monitoring Recommendations  Blood Glucose Frequency: Every 6 hours  Weight Frequency: Daily  Labs: Renal panel and magnesium daily, Repeat electrolytes as needed, Triglycerides:  now and each week, LFTs: now and each week                                  Additional Interventions: nutrition plan of care to parallel overall plan of care         Coordination of Nutrition Care by a Nutrition Professional  Collaboration and Referral of Nutrition Care: Collaboration by nutrition professional with other providers    Education Documentation  No documentation found.           Nutrition Monitoring and Evaluation   Food and Nutrient Related History                 Enteral and Parenteral Nutrition Intake: Parenteral nutrition formula/solution, Parenteral nutrition intake  Criteria: monitor                        Anthropometrics: Body Composition/Growth/Weight History  Weight: Weight change  Criteria: daily with PPN infusion                   Biochemical Data, Medical Tests and Procedures  Electrolyte and Renal Panel: Other (Comment), BUN, Calcium, serum, Chloride, Creatinine, Magnesium, Phosphorus, Potassium, Sodium  Criteria: daily    Gastrointestinal Profile: Other (Comment), Alkaline phosphatase, Alanine aminotransferase (ALT),  Aspartate aminotransferase (AST), Bilirubin, total  Criteria: as indicated    Glucose/Endocrine Profile: Other (Comment), Glucose, casual  Criteria: daily    Nutritional Anemia Profile: Other (Comment), Hematocrit, Hemoglobin, Iron, serum  Criteria: as indicated    Vitamin Profile: Other (Comment), Vitamin D, 25 hydroxy  Criteria: as indicated    Nutrition Focused Physical Findings            Digestive System: Vomiting, Abdominal distension, Ascites, Constipation, Decrease in appetite, Diarrhea, Early satiety, Increased appetite, Nausea, Anorexia  Criteria: daily                        Follow Up  Time Spent (min): 45 minutes  Last Date of Nutrition Visit: 07/03/24  Nutrition Follow-Up Needed?: Dietitian to reassess per policy  Follow up Comment: PPN ongoing, NG to LIWS/oral diet per MD

## 2024-07-04 NOTE — SIGNIFICANT EVENT
Rapid Response Note    Called to bedside for rapid response d/t hypotension (79/50, , in a patient is POD #13 s/p ex lap w/ small bowel resection and washout for small bowel perforation, c/b rectus sheath hematoma.  Of note per RR nurse, initial BP was taken with patient sitting up right at the bedside.     Patient is awake, alert, appears pale. Abdomen is soft wit AVD bandages noted. Cap refill is < 2 sec     PLR done with prompt improvement in BP to 105/50 then 127/72 after abdominal binder placement and transfusion of blood products including PRBCs and FFP. Plan of care discussed with Hospitalist, Dr. Nixon. Patient to remain on 7th floor.

## 2024-07-04 NOTE — CARE PLAN
Problem: Pain - Adult  Goal: Verbalizes/displays adequate comfort level or baseline comfort level  Outcome: Progressing  Flowsheets (Taken 7/3/2024 2011)  Verbalizes/displays adequate comfort level or baseline comfort level:   Notify Licensed Independent Practitioner if interventions unsuccessful or patient reports new pain   Implement non-pharmacological measures as appropriate and evaluate response   Administer analgesics based on type and severity of pain and evaluate response   Assess pain using appropriate pain scale   Encourage patient to monitor pain and request assistance     Problem: Safety - Adult  Goal: Free from fall injury  Outcome: Progressing  Flowsheets (Taken 7/3/2024 2011)  Free from fall injury:   Based on caregiver fall risk screen, instruct family/caregiver to ask for assistance with transferring infant if caregiver noted to have fall risk factors   Instruct family/caregiver on patient safety     Problem: Discharge Planning  Goal: Discharge to home or other facility with appropriate resources  Outcome: Progressing  Flowsheets (Taken 7/3/2024 2011)  Discharge to home or other facility with appropriate resources:   Refer to discharge planning if patient needs post-hospital services based on physician order or complex needs related to functional status, cognitive ability or social support system   Identify discharge learning needs (meds, wound care, etc)   Arrange for needed discharge resources and transportation as appropriate   Identify barriers to discharge with patient and caregiver     Problem: Chronic Conditions and Co-morbidities  Goal: Patient's chronic conditions and co-morbidity symptoms are monitored and maintained or improved  Outcome: Progressing  Flowsheets (Taken 7/3/2024 2011)  Care Plan - Patient's Chronic Conditions and Co-Morbidity Symptoms are Monitored and Maintained or Improved:   Update acute care plan with appropriate goals if chronic or comorbid symptoms are exacerbated  and prevent overall improvement and discharge   Collaborate with multidisciplinary team to address chronic and comorbid conditions and prevent exacerbation or deterioration   Monitor and assess patient's chronic conditions and comorbid symptoms for stability, deterioration, or improvement   The patient's goals for the shift include      The clinical goals for the shift include pain control

## 2024-07-04 NOTE — SIGNIFICANT EVENT
Called to bedside due to partial wound dehiscence. Patient had ambulated halls with nursing and upon returning to bed started having bleeding from incision. Per nurse bed became quite saturated requiring change.     During my assessment patient is sitting on commode with active bleeding from incision, staple line looks partially pulled apart but for the most part intact, the most distal staple is dehisced. Patient has hematoma forming at the proximal end of incision aprx 5cm x 5cm in size. Per report this was not present at the beginning of shift. Patient also endorsing dizziness and is tearful upon assessment.     CT obtained yesterday does note small hematoma in the anteromedial left rectus abdominus muscle at the level of the pelvic outlet but that does not correlate with what I'm currently seeing on my assessment. Patient has had a recent drop in hgb. On 7/1 was 8.3, on 7/2 dropped to 7.4 and on 7/3 it was 7.2. Given active bleeding and new hematoma formation will recommend US to evaluate the rectus sheath hematoma, holding therapeutic lovenox, and rechecking H/H. Monitor vitals Q2    Karina De La Torre PA-C  Department of Surgical Services  [facility]      Addendum:  Repeat H/H 7.4/24.8. US demonstrated 2.7 x 1 x 0.6 cm heterogeneous collection within the mid  abdominal wall soft tissues just above the site of surgical incision. The collection demonstrates no internal vascularity and is located approximately 0.4 cm from the skin surface.     Patient still with active significant bleeding, new compressive dressing applied arpx 2:30 due to saturation, patient was placed on Tele. Vitals currently 79/50(57) 111HR 96% 97.5, patient resting upon assessment, compressive dressing remains CDI. Abd soft, distended, mildly tender diffusely. Transfer initiated to step down, repeat CBC being obtained and 1 unit pRBC to be transfused.     Karina De La Torre PA-C  Department of Surgical  [facility]   7/4/24 at 3:56 AM.

## 2024-07-04 NOTE — PROGRESS NOTES
"Fernando Cuevas is a 63 y.o. female on day 13 of admission presenting with Perforated viscus.    Assessment/Plan   Wound care team to evaluate tomorrow for possible wound VAC.  Continue with intermittent NG tube clamping with clear liquids.  Monitor hemoglobin    Subjective   Patient with some bleeding from her midline incision. no bowel movement yet       Objective     Physical Exam  NAD  A&Ox3  Non icteric  CTA  RR  Abdomen soft little less distended.  I took a few staples out.  Some hematoma was extracted.  No active bleeding seen.  The wound was packed  Extremities warm, well perfused     Last Recorded Vitals  Blood pressure 138/76, pulse 110, temperature 36.3 °C (97.3 °F), temperature source Temporal, resp. rate 17, height 1.575 m (5' 2.01\"), weight 64 kg (141 lb 1.5 oz), SpO2 100%.  Intake/Output last 3 Shifts:  I/O last 3 completed shifts:  In: 2197 (34.3 mL/kg) [Blood:1007.5; IV Piggyback:650]  Out: 2750 (43 mL/kg) [Urine:2250 (1 mL/kg/hr); Emesis/NG output:500]  Weight: 64 kg     Relevant Results    Scheduled medications  aspirin, 81 mg, oral, Daily  budesonide, 0.25 mg, nebulization, Daily   And  formoterol, 20 mcg, nebulization, BID  busPIRone, 5 mg, oral, BID  calcium carbonate, 1,500 mg, oral, Daily  dilTIAZem ER, 240 mg, oral, Daily  [Held by provider] enoxaparin, 1 mg/kg, subcutaneous, q12h  folic acid, 1 mg, oral, Daily  hydrocortisone sodium succinate, 200 mg, intravenous, Once  ipratropium-albuteroL, 3 mL, nebulization, TID  lisinopril, 10 mg, oral, Daily  montelukast, 10 mg, oral, Daily  multivitamin with minerals, 1 tablet, oral, Daily  nortriptyline, 50 mg, oral, Nightly  oxybutynin, 5 mg, oral, Daily  oxygen, , inhalation, Continuous - Inhalation  pantoprazole, 40 mg, intravenous, Daily before breakfast  thiamine, 100 mg, oral, Daily  thiamine, 100 mg, oral, Daily  varenicline, 1 mg, oral, BID      Continuous medications  Adult Clinimix Parenteral Nutrition, 83 mL/hr, Last Rate: 83 mL/hr " (07/03/24 4314)      PRN medications  PRN medications: acetaminophen **OR** acetaminophen **OR** acetaminophen, benzonatate, guaiFENesin, HYDROmorphone, HYDROmorphone, hydrOXYzine HCL, ipratropium-albuteroL, morphine, morphine, nitroglycerin, ondansetron, phenoL    Results for orders placed or performed during the hospital encounter of 06/20/24 (from the past 24 hour(s))   POCT GLUCOSE   Result Value Ref Range    POCT Glucose 101 (H) 74 - 99 mg/dL   POCT GLUCOSE   Result Value Ref Range    POCT Glucose 82 74 - 99 mg/dL   POCT GLUCOSE   Result Value Ref Range    POCT Glucose 117 (H) 74 - 99 mg/dL   Basic metabolic panel   Result Value Ref Range    Glucose 107 (H) 74 - 99 mg/dL    Sodium 133 (L) 136 - 145 mmol/L    Potassium 3.9 3.5 - 5.3 mmol/L    Chloride 97 (L) 98 - 107 mmol/L    Bicarbonate 25 21 - 32 mmol/L    Anion Gap 15 10 - 20 mmol/L    Urea Nitrogen 11 6 - 23 mg/dL    Creatinine 0.68 0.50 - 1.05 mg/dL    eGFR >90 >60 mL/min/1.73m*2    Calcium 8.0 (L) 8.6 - 10.3 mg/dL   CBC and Auto Differential   Result Value Ref Range    WBC 11.4 (H) 4.4 - 11.3 x10*3/uL    nRBC 1.1 (H) 0.0 - 0.0 /100 WBCs    RBC 3.15 (L) 4.00 - 5.20 x10*6/uL    Hemoglobin 7.4 (L) 12.0 - 16.0 g/dL    Hematocrit 24.8 (L) 36.0 - 46.0 %    MCV 79 (L) 80 - 100 fL    MCH 23.5 (L) 26.0 - 34.0 pg    MCHC 29.8 (L) 32.0 - 36.0 g/dL    RDW 20.7 (H) 11.5 - 14.5 %    Platelets 689 (H) 150 - 450 x10*3/uL    Neutrophils % 80.2 40.0 - 80.0 %    Immature Granulocytes %, Automated 1.6 (H) 0.0 - 0.9 %    Lymphocytes % 11.9 13.0 - 44.0 %    Monocytes % 6.1 2.0 - 10.0 %    Eosinophils % 0.1 0.0 - 6.0 %    Basophils % 0.1 0.0 - 2.0 %    Neutrophils Absolute 9.12 (H) 1.20 - 7.70 x10*3/uL    Immature Granulocytes Absolute, Automated 0.18 0.00 - 0.70 x10*3/uL    Lymphocytes Absolute 1.35 1.20 - 4.80 x10*3/uL    Monocytes Absolute 0.69 0.10 - 1.00 x10*3/uL    Eosinophils Absolute 0.01 0.00 - 0.70 x10*3/uL    Basophils Absolute 0.01 0.00 - 0.10 x10*3/uL   Magnesium    Result Value Ref Range    Magnesium 1.90 1.60 - 2.40 mg/dL   Phosphorus   Result Value Ref Range    Phosphorus 3.6 2.5 - 4.9 mg/dL   Morphology   Result Value Ref Range    RBC Morphology See Below     Polychromasia Mild     RBC Fragments Few     Ovalocytes Few     Teardrop Cells Few     [place] Cells Few     Bite Cells Present    CBC   Result Value Ref Range    WBC 12.8 (H) 4.4 - 11.3 x10*3/uL    nRBC 1.1 (H) 0.0 - 0.0 /100 WBCs    RBC 2.94 (L) 4.00 - 5.20 x10*6/uL    Hemoglobin 7.0 (L) 12.0 - 16.0 g/dL    Hematocrit 23.1 (L) 36.0 - 46.0 %    MCV 79 (L) 80 - 100 fL    MCH 23.8 (L) 26.0 - 34.0 pg    MCHC 30.3 (L) 32.0 - 36.0 g/dL    RDW 20.7 (H) 11.5 - 14.5 %    Platelets 632 (H) 150 - 450 x10*3/uL   Type and screen   Result Value Ref Range    ABO TYPE O     Rh TYPE POS     ANTIBODY SCREEN NEG    Prepare RBC: 1 Units   Result Value Ref Range    PRODUCT CODE T4840Y36     Unit Number [id number]     Unit ABO O     Unit RH POS     XM INTEP COMP     Dispense Status TR     Blood Expiration Date July 18, 2024 23:59 EDT     PRODUCT BLOOD TYPE 5100     UNIT VOLUME 350    Prepare RBC   Result Value Ref Range    PRODUCT CODE J0061U55     Unit Number [id number]     Unit ABO O     Unit RH NEG     Dispense Status PI     Blood Expiration Date August 01, 2024 23:59 EDT     PRODUCT BLOOD TYPE 9500     UNIT VOLUME 350     XM INTEP COMP    Prepare Plasma   Result Value Ref Range    PRODUCT CODE N9291S57     Unit Number [id number]     Unit ABO AB     Unit RH POS     Dispense Status EI     Blood Expiration Date July 09, 2024 04:40 EDT     PRODUCT BLOOD TYPE 8400     UNIT VOLUME 215            I spent 25 minutes in the professional and overall care of this patient.      Reid Bingham MD

## 2024-07-04 NOTE — CARE PLAN
The patient's goals for the shift include      The clinical goals for the shift include hemodynamically stable    Over the shift, the patient did make some progress. Pain was well managed and patients vitals remained stable during her stay on 7. Patient was transferred to step down for further management.

## 2024-07-04 NOTE — NURSING NOTE
Midline central line to be placed per charge nurse. Provider will temporarily transfer pt to ICU for placement then return to SDU.

## 2024-07-04 NOTE — NURSING NOTE
Rapid Response Nurse Note:     Fernando Cuevas is a 63 y.o. female on day 13 of admission presenting with Perforated viscus.    Rounded on patient due to recent rapid response/transfer from ICU, reviewed patient chart and checked with bedside nurse for any questions or concerns none voiced at this time. Pt is to transfer to SDU and Dr. Bingham rounded this morning.    Patient current RADAR score is 2    /76   Pulse 110   Temp 36.3 °C (97.3 °F) (Temporal)   Resp 17   Wt 64 kg (141 lb 1.5 oz) Comment: 6/27  SpO2 100%     No signs/symptoms of distress at this time. Bedside nurse notified to escalate concerns if patient condition changes      Makayla David RN

## 2024-07-04 NOTE — NURSING NOTE
"Pt complains of itchiness in R arm. Also complaining of R arm swelling. Some upper and lower R arm swelling was noted upon arrival to SDU but pt believes it is worsening. Messaged Dr Khoury - \"Hi Dr Khoury - any chance pt can get an itching cream? her arm where the TPN is going through is getting itchy and she is scratching at it a lot. Also there is some swelling in her right arm - IVs both work OK, they flush and there is no bubble indicating infiltration I can see, TPN is not hurting when infusing but her arm is hard like its got a lot of fluid backed up in it. Both upper and lower arm hard/swollen. Anything you want to do for that? Do you think it needs a DVT check?\"    Per Dr Khoury - pt already has known DVT and was on lovenox. Per doc R arm swelling likely d/t DVT. No new orders from provider. Provider notified by RN that Lovenox is on hold d/t new severe bleeding from incision this AM. No new orders. OK to keep an eye on it - no further testing needed per Dr Khoury.    TPN infusing @ 83mL/hr, IV patent, infusing. No complaints of pain from IV site. Pt asleep.  "

## 2024-07-04 NOTE — PROGRESS NOTES
Fernando Cuevas is a 63 y.o. female on day 13 of admission presenting with Perforated viscus.      Subjective   Patient seen and examined at the bedside this morning. No acute overnight events. No other questions or concerns.         Objective     Last Recorded Vitals  BP (!) 151/99   Pulse (!) 113   Temp 36.7 °C (98.1 °F) (Temporal)   Resp 18   Wt 64 kg (141 lb 1.5 oz)   SpO2 100%   Intake/Output last 3 Shifts:    Intake/Output Summary (Last 24 hours) at 7/4/2024 1230  Last data filed at 7/4/2024 1200  Gross per 24 hour   Intake 1907.09 ml   Output 2750 ml   Net -842.91 ml       Admission Weight  Weight: 64 kg (141 lb 1.5 oz) (06/20/24 2132)    Daily Weight  07/04/24 : 64 kg (141 lb 1.5 oz)    Image Results  US abdomen limited  Narrative: Interpreted By:  Finkelstein, Evan,   STUDY:  US ABDOMEN LIMITED; ;  7/4/2024 2:44 am      INDICATION:  rectus sheath Hematoma Evaluation.      COMPARISON:  CT abdomen pelvis 07/03/2024      ACCESSION NUMBER(S):  [id number]      ORDERING CLINICIAN:  ROBERT NOLASCO      TECHNIQUE:  Targeted grayscale sonographic imaging in the mid abdomen with color  Doppler as needed.      FINDINGS:  There is a 2.7 x 1 x 0.6 cm heterogeneous collection within the mid  abdominal wall soft tissues just above the site of surgical incision.  The collection demonstrates no internal vascularity and is located  approximately 0.4 cm from the skin surface.      Impression: 2.7 x 1 x 0.6 cm heterogeneous collection within the anterior  abdominal wall soft tissues most compatible with hematoma  approximately 0.4 cm below the skin surface.      MACRO:  None.      Signed by: Evan Finkelstein 7/4/2024 3:44 AM  Dictation workstation:   [id number]      Physical Exam  Constitutional:       General: She is not in acute distress.  HENT:      Head: Normocephalic and atraumatic.   Eyes:      Conjunctiva/sclera: Conjunctivae normal.      Pupils: Pupils are equal, round, and reactive to light.   Cardiovascular:       Rate and Rhythm: Normal rate and regular rhythm.   Pulmonary:      Effort: Pulmonary effort is normal.      Breath sounds: Normal breath sounds.   Abdominal:      General: There is distension.      Palpations: Abdomen is soft.   Skin:     General: Skin is warm and dry.   Neurological:      Mental Status: She is alert.   Psychiatric:         Mood and Affect: Mood normal.         Behavior: Behavior normal.         Relevant Results           Scheduled medications  aspirin, 81 mg, oral, Daily  budesonide, 0.25 mg, nebulization, Daily   And  formoterol, 20 mcg, nebulization, BID  busPIRone, 5 mg, oral, BID  calcium carbonate, 1,500 mg, oral, Daily  dilTIAZem ER, 240 mg, oral, Daily  [Held by provider] enoxaparin, 1 mg/kg, subcutaneous, q12h  folic acid, 1 mg, oral, Daily  hydrocortisone sodium succinate, 200 mg, intravenous, Once  HYDROmorphone, 0.5 mg, intravenous, Once  ipratropium-albuteroL, 3 mL, nebulization, TID  lisinopril, 10 mg, oral, Daily  montelukast, 10 mg, oral, Daily  multivitamin with minerals, 1 tablet, oral, Daily  nortriptyline, 50 mg, oral, Nightly  oxybutynin, 5 mg, oral, Daily  oxygen, , inhalation, Continuous - Inhalation  pantoprazole, 40 mg, intravenous, Daily before breakfast  thiamine, 100 mg, oral, Daily  thiamine, 100 mg, oral, Daily  varenicline, 1 mg, oral, BID      Continuous medications  Adult Clinimix Parenteral Nutrition, 83 mL/hr, Last Rate: 83 mL/hr (07/03/24 3114)      PRN medications  PRN medications: acetaminophen **OR** acetaminophen **OR** acetaminophen, benzonatate, guaiFENesin, HYDROmorphone, HYDROmorphone, hydrOXYzine HCL, ipratropium-albuteroL, morphine, morphine, nitroglycerin, ondansetron, phenoL    Assessment/Plan      Principal Problem:    Perforated viscus  Active Problems:    Thrombocytosis    Perforated small bowel's with peritonitis:  Concern for postop ileus versus partial obstruction:  Underwent surgical intervention 6/21  -Underwent small bowel resection, and  washout procedure.  -Culture for Candida albicans, concern for mix of gram-negative and anaerobes.  -Infectious disease following, continued on IV Zosyn, fluconazole 200 mg/day.  Plan for antibiotics with stop date 7/3/24.,  This will be a total of 10 days from the initiation of fluconazole.  Infectious diseases recommended discharge home once medically ready on p.o. Augmentin 875 mg 2 times a day instead of Zosyn if needed.  -General surgery following for postop ileus versus partial SBO, appreciate recommendations.  Enema and chewing gum added.    -Will continue to trend CBC  -Dietary following, appreciate recommendations.   -continue NPO  -still with ileus  -Wound care team to evaluate tomorrow for possible wound VAC   -ontinue with intermittent NG tube clamping with clear liquids     # Anemia  # Abdominal wall hematoma  -blood loss  -s/p blood transfusion    Acute DVT in the left distal brachial vein:  -Vascular upper extremity ultrasound with acute finding, non-occlusive.  -Patient initiated on full dose Lovenox therapeutic dose and 6/27. Will need to be transitioned to oral regimen prior to discharge.   -Patient with contrast allergy, CT angio chest requiring prep, no acute findings.    -hold lovenox due to anemia and hematoma    Acute on chronic hyponatremia, resolved:  In setting of poor oral intake  -Nephrology was managing, placed on IV isotonic fluid with improvement  -Fluids will be discontinued.  -Nephrology signed off  -Will continue to trend sodium    Severe malnutrition:  -Nutrition following, appreciate recommendations.  -NG tube in place, will need to continue PPN     LOS: Unknown    Rupa Khoury MD

## 2024-07-04 NOTE — PROGRESS NOTES
"Fernando Cuevas is a 63 y.o. female on day 13 of admission presenting with Perforated viscus.    Subjective   Midline abdominal incision opened up last night and patient became hypotensive leading RR. She was given 2 units of blood last night. She is feeling better this morning. Still has not had a BM, but feels like she may soon. Tearful about acute events that happened over night. Passing some gas, no BM.  Denies fever, chills, CP and SOB.        Objective     Physical Exam  Constitutional:       Appearance: She is ill-appearing.   Cardiovascular:      Rate and Rhythm: Normal rate.   Pulmonary:      Comments: On 2L O2, audible rhonchi.   Abdominal:      Comments: Abdomen is softer this morning, remains, Very distended, NGT more bilious today  to LIWS, soft, tender with palpation, not peritonitic.  DAVID with blood drainage on the dressing.      Genitourinary:     Comments: Voiding without difficulty   Musculoskeletal:         General: Normal range of motion.   Skin:     General: Skin is warm and dry.   Neurological:      Mental Status: She is oriented to person, place, and time.   Psychiatric:         Mood and Affect: Mood normal.         Behavior: Behavior normal.       Last Recorded Vitals  Blood pressure 138/76, pulse 110, temperature 36.3 °C (97.3 °F), temperature source Temporal, resp. rate 17, height 1.575 m (5' 2.01\"), weight 64 kg (141 lb 1.5 oz), SpO2 100%.  Intake/Output last 3 Shifts:  I/O last 3 completed shifts:  In: 2197 (34.3 mL/kg) [Blood:1007.5; IV Piggyback:650]  Out: 2750 (43 mL/kg) [Urine:2250 (1 mL/kg/hr); Emesis/NG output:500]  Weight: 64 kg     Medications:   Scheduled medications  aspirin, 81 mg, oral, Daily  budesonide, 0.25 mg, nebulization, Daily   And  formoterol, 20 mcg, nebulization, BID  busPIRone, 5 mg, oral, BID  calcium carbonate, 1,500 mg, oral, Daily  dilTIAZem ER, 240 mg, oral, Daily  [Held by provider] enoxaparin, 1 mg/kg, subcutaneous, q12h  folic acid, 1 mg, oral, " Daily  hydrocortisone sodium succinate, 200 mg, intravenous, Once  HYDROmorphone, 0.5 mg, intravenous, Once  ipratropium-albuteroL, 3 mL, nebulization, TID  lisinopril, 10 mg, oral, Daily  montelukast, 10 mg, oral, Daily  multivitamin with minerals, 1 tablet, oral, Daily  nortriptyline, 50 mg, oral, Nightly  oxybutynin, 5 mg, oral, Daily  oxygen, , inhalation, Continuous - Inhalation  pantoprazole, 40 mg, intravenous, Daily before breakfast  thiamine, 100 mg, oral, Daily  thiamine, 100 mg, oral, Daily  varenicline, 1 mg, oral, BID      Continuous medications  Adult Clinimix Parenteral Nutrition, 83 mL/hr, Last Rate: 83 mL/hr (07/03/24 2234)      PRN medications  PRN medications: acetaminophen **OR** acetaminophen **OR** acetaminophen, benzonatate, guaiFENesin, HYDROmorphone, HYDROmorphone, hydrOXYzine HCL, ipratropium-albuteroL, morphine, morphine, nitroglycerin, ondansetron, phenoL    Relevant Results  Results for orders placed or performed during the hospital encounter of 06/20/24 (from the past 24 hour(s))   POCT GLUCOSE   Result Value Ref Range    POCT Glucose 82 74 - 99 mg/dL   POCT GLUCOSE   Result Value Ref Range    POCT Glucose 117 (H) 74 - 99 mg/dL   Basic metabolic panel   Result Value Ref Range    Glucose 107 (H) 74 - 99 mg/dL    Sodium 133 (L) 136 - 145 mmol/L    Potassium 3.9 3.5 - 5.3 mmol/L    Chloride 97 (L) 98 - 107 mmol/L    Bicarbonate 25 21 - 32 mmol/L    Anion Gap 15 10 - 20 mmol/L    Urea Nitrogen 11 6 - 23 mg/dL    Creatinine 0.68 0.50 - 1.05 mg/dL    eGFR >90 >60 mL/min/1.73m*2    Calcium 8.0 (L) 8.6 - 10.3 mg/dL   CBC and Auto Differential   Result Value Ref Range    WBC 11.4 (H) 4.4 - 11.3 x10*3/uL    nRBC 1.1 (H) 0.0 - 0.0 /100 WBCs    RBC 3.15 (L) 4.00 - 5.20 x10*6/uL    Hemoglobin 7.4 (L) 12.0 - 16.0 g/dL    Hematocrit 24.8 (L) 36.0 - 46.0 %    MCV 79 (L) 80 - 100 fL    MCH 23.5 (L) 26.0 - 34.0 pg    MCHC 29.8 (L) 32.0 - 36.0 g/dL    RDW 20.7 (H) 11.5 - 14.5 %    Platelets 689 (H) 150 -  450 x10*3/uL    Neutrophils % 80.2 40.0 - 80.0 %    Immature Granulocytes %, Automated 1.6 (H) 0.0 - 0.9 %    Lymphocytes % 11.9 13.0 - 44.0 %    Monocytes % 6.1 2.0 - 10.0 %    Eosinophils % 0.1 0.0 - 6.0 %    Basophils % 0.1 0.0 - 2.0 %    Neutrophils Absolute 9.12 (H) 1.20 - 7.70 x10*3/uL    Immature Granulocytes Absolute, Automated 0.18 0.00 - 0.70 x10*3/uL    Lymphocytes Absolute 1.35 1.20 - 4.80 x10*3/uL    Monocytes Absolute 0.69 0.10 - 1.00 x10*3/uL    Eosinophils Absolute 0.01 0.00 - 0.70 x10*3/uL    Basophils Absolute 0.01 0.00 - 0.10 x10*3/uL   Magnesium   Result Value Ref Range    Magnesium 1.90 1.60 - 2.40 mg/dL   Phosphorus   Result Value Ref Range    Phosphorus 3.6 2.5 - 4.9 mg/dL   Morphology   Result Value Ref Range    RBC Morphology See Below     Polychromasia Mild     RBC Fragments Few     Ovalocytes Few     Teardrop Cells Few     Parish Cells Few     Bite Cells Present    CBC   Result Value Ref Range    WBC 12.8 (H) 4.4 - 11.3 x10*3/uL    nRBC 1.1 (H) 0.0 - 0.0 /100 WBCs    RBC 2.94 (L) 4.00 - 5.20 x10*6/uL    Hemoglobin 7.0 (L) 12.0 - 16.0 g/dL    Hematocrit 23.1 (L) 36.0 - 46.0 %    MCV 79 (L) 80 - 100 fL    MCH 23.8 (L) 26.0 - 34.0 pg    MCHC 30.3 (L) 32.0 - 36.0 g/dL    RDW 20.7 (H) 11.5 - 14.5 %    Platelets 632 (H) 150 - 450 x10*3/uL   Type and screen   Result Value Ref Range    ABO TYPE O     Rh TYPE POS     ANTIBODY SCREEN NEG    Prepare RBC: 1 Units   Result Value Ref Range    PRODUCT CODE F7780D32     Unit Number [id number]     Unit ABO O     Unit RH POS     XM INTEP COMP     Dispense Status TR     Blood Expiration Date July 18, 2024 23:59 EDT     PRODUCT BLOOD TYPE 5100     UNIT VOLUME 350    Prepare RBC   Result Value Ref Range    PRODUCT CODE M3381M42     Unit Number [id number]     Unit ABO O     Unit RH NEG     Dispense Status PI     Blood Expiration Date August 01, 2024 23:59 EDT     PRODUCT BLOOD TYPE 9500     UNIT VOLUME 350     XM INTEP COMP    Prepare Plasma   Result  Value Ref Range    PRODUCT CODE G3355H91     Unit Number [id number]     Unit ABO AB     Unit RH POS     Dispense Status EI     Blood Expiration Date July 09, 2024 04:40 EDT     PRODUCT BLOOD TYPE 8400     UNIT VOLUME 215      CT abdomen pelvis wo IV contrast    Result Date: 7/3/2024  Interpreted By:  Gabe Gage, STUDY: CT ABDOMEN PELVIS WO IV CONTRAST;  7/3/2024 10:25 am   INDICATION: 64 y/o   F with  Signs/Symptoms:POD 11 ex lap and partial SBR, ileus, increased abdominal distention.   LIMITATIONS: Evaluation of the solid organs is limited due to non use of IV contrast.   ACCESSION NUMBER(S): [id number]   ORDERING CLINICIAN: MARVA NAVA   TECHNIQUE: Thin-section noncontrast spiral axial images were obtained from the xiphoid down through the symphysis pubis. Sagittal and coronal reconstruction images were generated. Bone, mediastinal, lung, and liver windows were reviewed.   COMPARISON: Most recent prior is from 06/26/2024. Correlation with KUB from earlier today..   FINDINGS: LUNG BASES: Tiny bilateral pleural effusions. Emphysematous changes at the lung bases. There is mild atelectasis at both lung bases.   LIVER: No hepatomegaly. Liver density was  within the limits of normal. No gross liver lesion in this unenhaced exam.   GALLBLADDER: Mild cholelithiasis. No gallbladder wall thickening, or adjacent edema.   BILE DUCTS: No intrahepatic biliary ductal dilatation. Common bile duct was within the limits of normal.   SPLEEN: No splenomegaly. No gross splenic mass..   PANCREAS: No pancreatic mass or inflammation, or ductal dilatation.   KIDNEYS/ADRENALS: No adrenal mass or enlargement. Mild global right renal atrophy. No calcified stone, hydronephrosis, mass, or perinephric edema in either kidney. No ureteral stone or dilatation.   BLADDER/PELVIS: Urinary bladder was grossly intact. No uterine enlargement or gross adnexal mass.   GREAT VESSELS/RETROPERITONEUM: Mild aortoiliac calcifications. Otherwise,  abdominal aorta and IVC were intact. No suspicious retroperitoneal adenopathy. No suspicious mesenteric adenopathy. No suspicious pelvic or inguinal adenopathy.   PERITONEUM: See below.   BOWEL: The patient recently had a Gastrografin challenge. There is an NG tube in the stomach. The stomach otherwise unremarkable. There was a small amount of residual Gastrografin contrast material in multiple loops of mid to distal small bowel. Small bowel surgical reanastomosis in the anterior right pelvis on image 107 was unremarkable. There was no suspicious small bowel wall thickening or small bowel dilatation in this exam. There was Gastrografin contrast in the cecum extending all the way into the distal left colon. There was mild-to-moderate stool and gas throughout the colon down into the rectum. No colonic wall thickening or adjacent edema. There was a tiny amount mesenteric edema in the anterior inferior right pelvis in the region of the anastomosis, and there was trace edema in the mesentery of the deep posteroinferior pelvis. No focal fluid collection. No pneumoperitoneum.   BONES: No destructive lytic or blastic bone lesion.   ABDOMINAL WALL: Stable vertically oriented line skin staples in the midline the lower abdomen through the mid pelvis. There is a subtle localized area heterogeneous fullness in the mid medial aspect of the left rectus abdominus muscle at the level of the pelvic inlet measuring 24 x 17 mm on image 88, not evident previously..       Recent partial small bowel resection with reanastomosis. The reanastomosis is unremarkable today, with no indication of bowel obstruction or perforation. There is very mild mesenteric edema in the region of the anastomosis that is consistent with recent surgery. There is no focal fluid collection within the peritoneal cavity worrisome for abscess or hematoma.   New heterogeneous fullness in the anteromedial left rectus abdominus muscle at the level of pelvic inlet  measuring 24 x 17 mm on image 88. This could be a small hematoma or even a developing small abscess. No associated gas density. Clinical correlation needed.   There is Gastrografin contrast material in multiple loops of otherwise unremarkable distal small bowel and also throughout the colon down through the distal left. No indication of bowel obstruction in this exam.   Mild-to-moderate stool and gas throughout the colon and rectum.   NG tube in place as described.   Tiny bilateral pleural effusions with scant atelectasis at the lung bases.   Mild cholelithiasis.   Mild global atrophy of the right kidney.   MACRO: None   Signed by: Gabe Gage 7/3/2024 11:52 AM Dictation workstation:   [id number]    XR abdomen 1 view    Result Date: 7/3/2024  Interpreted By:  Gabe Gage, STUDY: XR ABDOMEN 1 VIEW;  7/3/2024 7:44 am   INDICATION: Signs/Symptoms:abdominal distension, s/p gastrographin study.   COMPARISON: Most recent prior KUB is from 07/02/2024.   ACCESSION NUMBER(S): [id number]   ORDERING CLINICIAN: MINERVA AVILEZ   TECHNIQUE: Single supine view of the abdomen and pelvis was obtained.     FINDINGS: There was   a stable NG tube in place. There is Gastrografin contrast material throughout the colon. There is mild persistent small-bowel gas with minimal dilatation. Vertically oriented line skin staples to the right of midline, unchanged.. There is mild-to-moderate stool throughout the colon.   There was no gross organomegaly. There were no abnormal calcifications. No destructive bone lesion.       Stable NG tube in place. Stable vertically oriented line of skin staples to the right of midline.   Previously  administered Gastrografin contrast material is now throughout the colon extending into the sigmoid. There is also mild-to-moderate stool throughout the colon. Consistent with delayed bowel transit time.   Mild residual small bowel gas with minimal loop dilatation, slightly improved. Suspect underlying ileus.    MACRO: None   Signed by: Gabe Gage 7/3/2024 8:23 AM Dictation workstation:   [id number]    XR abdomen 2 views supine and erect or decub    Result Date: 7/2/2024  Interpreted By:  Karina Ventura, STUDY: XR ABDOMEN 2 VIEWS SUPINE AND ERECT OR DECUB; XR ABDOMEN 1 VIEW; 7/2/2024 1:00 am; 7/2/2024 8:03 am; 7/2/2024 2:20 am   INDICATION: Signs/Symptoms:Prolonged ilues; Signs/Symptoms:Gastrografin challenge   COMPARISON: 06/30/2024   ACCESSION NUMBER(S): [id number]; [id number]; [id number]   ORDERING CLINICIAN: MINERVA APARICIO   FINDINGS: 4 supine and erect views of the abdomen obtained at 12:42 a.m. and 1:59 a.m.. Additional single abdominal radiograph obtained at 7:57 a.m..   Midline skin clips. NG tube in place with tip overlying the mid stomach. Multiple distended large and small bowel loops containing air and fecal debris. No rectal gas on initial images.   Contrast material in the proximal stomach on images at 12:43 a.m. administered by unknown person. Transit of contrast material into distended central abdominal small bowel loops on images at 1:59 a.m. and into a few additional right-sided distended small bowel loops on image at 7:57 a.m..       Distended large and small bowel loops with prolonged small bowel contrast transit time consistent with ileus, less likely distal obstruction. Follow-up or further evaluation with CT as clinically warranted.   MACRO: None.   Signed by: Karina Ventura 7/2/2024 8:26 AM Dictation workstation:   [id number]    XR abdomen 2 views supine and erect or decub    Result Date: 7/2/2024  Interpreted By:  Karina Ventura, STUDY: XR ABDOMEN 2 VIEWS SUPINE AND ERECT OR DECUB; XR ABDOMEN 1 VIEW; 7/2/2024 1:00 am; 7/2/2024 8:03 am; 7/2/2024 2:20 am   INDICATION: Signs/Symptoms:Prolonged ilues; Signs/Symptoms:Gastrografin challenge   COMPARISON: 06/30/2024   ACCESSION NUMBER(S): [id number]; [id number]; [id number]   ORDERING CLINICIAN: MINERVA APARICIO    FINDINGS: 4 supine and erect views of the abdomen obtained at 12:42 a.m. and 1:59 a.m.. Additional single abdominal radiograph obtained at 7:57 a.m..   Midline skin clips. NG tube in place with tip overlying the mid stomach. Multiple distended large and small bowel loops containing air and fecal debris. No rectal gas on initial images.   Contrast material in the proximal stomach on images at 12:43 a.m. administered by unknown person. Transit of contrast material into distended central abdominal small bowel loops on images at 1:59 a.m. and into a few additional right-sided distended small bowel loops on image at 7:57 a.m..       Distended large and small bowel loops with prolonged small bowel contrast transit time consistent with ileus, less likely distal obstruction. Follow-up or further evaluation with CT as clinically warranted.   MACRO: None.   Signed by: Karina Ventura 7/2/2024 8:26 AM Dictation workstation:   [id number]    XR abdomen 1 view    Result Date: 7/2/2024  Interpreted By:  Karina Ventura, STUDY: XR ABDOMEN 2 VIEWS SUPINE AND ERECT OR DECUB; XR ABDOMEN 1 VIEW; 7/2/2024 1:00 am; 7/2/2024 8:03 am; 7/2/2024 2:20 am   INDICATION: Signs/Symptoms:Prolonged ilues; Signs/Symptoms:Gastrografin challenge   COMPARISON: 06/30/2024   ACCESSION NUMBER(S): [id number]; [id number]; [id number]   ORDERING CLINICIAN: MINERVA APARICIO   FINDINGS: 4 supine and erect views of the abdomen obtained at 12:42 a.m. and 1:59 a.m.. Additional single abdominal radiograph obtained at 7:57 a.m..   Midline skin clips. NG tube in place with tip overlying the mid stomach. Multiple distended large and small bowel loops containing air and fecal debris. No rectal gas on initial images.   Contrast material in the proximal stomach on images at 12:43 a.m. administered by unknown person. Transit of contrast material into distended central abdominal small bowel loops on images at 1:59 a.m. and into a few additional right-sided  distended small bowel loops on image at 7:57 a.m..       Distended large and small bowel loops with prolonged small bowel contrast transit time consistent with ileus, less likely distal obstruction. Follow-up or further evaluation with CT as clinically warranted.   MACRO: None.   Signed by: Robert Ventura 7/2/2024 8:26 AM Dictation workstation:   [id number]    XR abdomen 1 view    Result Date: 6/30/2024  Interpreted By:  Bethel Porras, STUDY: XR ABDOMEN 1 VIEW   INDICATION: Signs/Symptoms:NG moved, reconfirm proper placement.   COMPARISON: June 30th earlier the same day   ACCESSION NUMBER(S): [id number]   ORDERING CLINICIAN: ROBERT NOLASCO   FINDINGS: Nasogastric tube reflected upon itself slightly within the stomach over the gastric fundus.   Bowel-gas pattern incompletely assessed.       Nasogastric tube reflected upon itself slightly within the stomach over the gastric fundus.   Signed by: Bethel Porras 6/30/2024 4:01 PM Dictation workstation:   [id number]    XR abdomen 1 view    Result Date: 6/30/2024  Interpreted By:  Sejal Dykes, STUDY: XR ABDOMEN 1 VIEW;  6/30/2024 7:56 am. 2 views.   INDICATION: Signs/Symptoms:Post-op ileus.   COMPARISON: 06/29/2024   ACCESSION NUMBER(S): [id number]   ORDERING CLINICIAN: ROBERT NOLASCO   FINDINGS: There is a nasogastric tube with the tip in the proximal stomach and side hole beyond the gastroesophageal junction. There is no gastric distention. There is a vertical orientation of cutaneous staples from recent abdominal surgery. There is moderate-to-marked distention of central small bowel loops, unchanged. Findings could represent a postoperative ileus but could represent a partial small bowel obstruction. There is a large amount of stool in the right side of the colon, unchanged.   There are no pathologic calcifications.   Visualized lungs are clear.   Osseous structures demonstrate no acute bony changes.         1. Nasogastric tube with the tip in the proximal stomach; no  gastric distention. 2. Persistent moderate-to-marked central small-bowel dilatation which could represent a postoperative ileus versus partial small bowel obstruction. This is unchanged. 3. Large amount of stool right side of the colon.   MACRO: None   Signed by: Sejal Dykes 6/30/2024 8:11 AM Dictation workstation:   [id number]    XR abdomen 1 view    Result Date: 6/29/2024  Interpreted By:  [place] Roberts, STUDY: XR ABDOMEN 1 VIEW;  6/29/2024 9:05 am   INDICATION: Signs/Symptoms:Abdominal distension.   COMPARISON: 06/24/2024   ACCESSION NUMBER(S): [id number]   ORDERING CLINICIAN: ROBERT NOLASCO   FINDINGS: There is prominent stool in the right colon. There is gaseous distention of multiple loops of small bowel. Nasogastric tube overlies the stomach. Skin staples are seen in the midline of the lower abdomen.       Slight increase in the gaseous distention of the small bowel since the previous exam possibly due to postoperative ileus although partial small bowel obstruction can also have this appearance. Large amount of formed stool in the right colon.   MACRO: None   Signed by: [place] Roberts 6/29/2024 10:22 AM Dictation workstation:   [id number]    CT angio chest for pulmonary embolism    Result Date: 6/28/2024  Interpreted By:  Gabe Gage, STUDY: CT ANGIO CHEST FOR PULMONARY EMBOLISM;  6/28/2024 7:04 am   INDICATION: 64 y/o   F with  Signs/Symptoms:PE.   LIMITATIONS: None.   ACCESSION NUMBER(S): [id number]   ORDERING CLINICIAN: DELPHINE VELÁZQUEZ   TECHNIQUE: After the administration of intravenous nonionic contrast, thin-section arterial phase images were obtained  from the thoracic inlet down through the iliac crest. Sagittal and coronal reconstruction images were generated. Axial and coronal MIP vascular reconstruction images were also generated.   Mediastinal, lung, bone, and liver windows were reviewed. 75 ML Omnipaque 350     COMPARISON: Most recent prior from 06/13/2024. correlation with CT scans of  the abdomen and pelvis, most recent from 06/26/2024.   FINDINGS: CHEST WALL/BASE OF THE NECK: The chest wall was grossly intact. No thyromegaly or thyroid mass.   MEDIASTINUM/ALESSANDRO: No suspicious mediastinal, hilar, or axillary mass or adenopathy. No cardiomegaly. No pericardial effusion. No thoracic aortic aneurysm or dissection. No pulmonary artery filling defect.   LUNGS/ PLEURA/ AND TRACHEA: Stable underlying fat containing posteromedial left-sided diaphragmatic hernia. Band of atelectasis across the posterior right lung base. Mild atelectasis at the posteromedial left lung base. Underlying emphysema with upper lobe predominance. No mass or pneumonia in either lung. Tiny bilateral pleural effusions. No pneumothorax. The trachea was grossly intact.   BONES: No destructive lytic or blastic bone lesion.   UPPER ABDOMEN: There is an NG tube in place with distal tip in the distal gastric body. Cholelithiasis. Contracted gallbladder. The imaged portions of the liver   were unremarkable. There are subtle stable small hypodensities in the spleen compared to 06/26/2024. Imaged kidneys and adrenal glands were intact. No upper abdominal ascites. The imaged stomach and large bowel. There is a bilobed hypodensity adjacent the pancreatic tail the left abdomen measuring 24 mm AP x 17 mm transversely on image 287, measuring 24 Hounsfield units of CT density. This is stable compared to the most recent prior CT scan abdomen and pelvis. It is also grossly stable compared to 06/20/2024 and was not present on 03/22/2016.       No CT evidence of pulmonary embolism in the current exam.   Emphysema. Band of atelectasis at the right lung base. Confluent atelectasis at the posteromedial left lung base. Tiny bilateral pleural effusions.. No mass or pneumonia in either lung.   NG tube in place as described.   Cholelithiasis.   Stable bilobed hypodensity adjacent to the pancreatic tail. Pancreatic pseudocyst versus cystic pancreatic  neoplasms. In addition, there are subtle stable nonspecific splenic hypodensities which could be splenic hemangiomas. Recommend further evaluation with MRI the pancreas and spleen.   MACRO: None   Signed by: Gabe Gage 6/28/2024 7:55 AM Dictation workstation:   [id number]    Vascular US upper extremity venous duplex left    Result Date: 6/27/2024             [place] [address]   Tel [phone] and Fax [phone]  Vascular Lab Report VASC US UPPER EXTREMITY VENOUS DUPLEX LEFT  Patient Name:      DINA GREENE       Reading Physician:  [id number] Rusty Villalba DO Study Date:        6/27/2024            Ordering Physician: [id number] REECE MRN/PID:           [id number]             Technologist:       Caren Leigh RVT Accession#:        [id number]         Technologist 2: Date of Birth/Age: 1960 / 63 years Encounter#:         [id number] Gender:            F Admission Status:  Inpatient            Location Performed: [facility]  Diagnosis/ICD: Left arm swelling-M79.89 CPT Codes:     92688 Peripheral venous duplex scan for DVT Limited  **CRITICAL RESULT** Critical Result: Acute DVT in the left distal brachial vein Notification called to Katharina Pineda RN on 6/27/2024 at 3:12:42 PM.  CONCLUSIONS: Right Upper Venous: The subclavian vein demonstrates a pulsatile flow. Left Upper Venous: There is acute non-occlusive deep vein thrombosis visualized in the brachial vein. The remainder of the left arm is negative for deep vein thrombosis.  Imaging & Doppler Findings:  Right        Flow Subclavian Pulsatile  Left                Compress    Thrombus            Flow Internal Jugular      Yes         None           Pulsatile Subclavian            Yes         None Subclavian Proximal   Yes         None           Pulsatile Subclavian Mid        Yes         None           Pulsatile Subclavian Distal     Yes         None           Pulsatile Axillary              Yes          None       Spontaneous/Phasic Brachial               No    Acute occlusive Cephalic              Yes         None Basilic               Yes         None  [id number] Rusty Villalba DO Electronically signed by [id number] Rusty Villalba DO on 6/27/2024 at 9:37:37 PM  ** Final **     CT abdomen pelvis wo IV contrast    Result Date: 6/26/2024  Interpreted By:  Karina Ventura, STUDY: CT ABDOMEN PELVIS WO IV CONTRAST;  6/26/2024 8:51 am   INDICATION: Signs/Symptoms:POD 5 small bowel resection. Increased pain.   COMPARISON: 06/20/2024   ACCESSION NUMBER(S): [id number]   ORDERING CLINICIAN: ANITRA APARICIO   TECHNIQUE: CT of the abdomen and pelvis was performed. Sagittal and coronal reconstructions were generated.  No intravenous contrast given for the exam.   FINDINGS: Solid organ and vessel evaluation limited without IV contrast.   ABDOMINAL ORGANS:   LIVER: No focal lesion within limits of unenhanced exam.   GALL BLADDER AND BILIARY TREE: Subtle density layer near the gallbladder neck again seen. No gallbladder wall thickening or biliary dilatation within limits of unenhanced exam   SPLEEN: 1.3 cm subtle hypodense lesion in the inferior pole image 36/155.   PANCREAS: Probable 1.4 cm hypodense lesion in the tail image 44/155. Few punctate calcifications in the head.   ADRENALS: Uneven hypodense thickening of both adrenal glands similar to the prior exam.   KIDNEYS AND URETERS: Atrophic right kidney. Mild relatively symmetric perinephric fat stranding. No focal renal mass within limits of unenhanced exam. No hydronephrosis.   BOWEL: NG tube extends into the body of distended fluid and air-filled stomach. New right lower quadrant small bowel suture line. Multiple dilated air and fluid-filled small bowel loops with scattered air-fluid levels. Moderate colonic fecal residue. Distal colon diverticulosis.   PERITONEUM, RETROPERITONEUM, NODES: Minimal free fluid in the pelvis. Mild stranding in the right lower quadrant inferior to suture line.  "No free intraperitoneal air. No significant retroperitoneal adenopathy within limits of unenhanced exam.   VESSELS:  Scattered atherosclerotic calcifications. Lack of IV contrast precludes vascular luminal assessment. No abdominal aortic aneurysm.   PELVIS: Urinary bladder is moderately distended and grossly normal in contour. Limited delineation of the uterus separate from adjacent bowel loops.   ABDOMINAL WALL: New midline skin clips. Few dots of air in the right lower quadrant and left mid abdominal wall. Mild fluid and stranding in both flanks, right-greater-than-left.   BONES: No focal concerning lytic or blastic osseous lesion.   LOWER CHEST: Moderate emphysematous changes. New coarse bandlike opacities in both lung bases with small pleural effusions.       New right lower quadrant suture line reportedly status post small bowel resection with multiple dilated air and fluid-filled small bowel loops that could reflect mild postop ileus or partial obstruction. Follow-up as clinically warranted.   Small hypodense lesions in the pancreatic tail and spleen, can not be further characterized on unenhanced exam. Attention at follow-up or further evaluation with MRI recommended.   Bandlike infiltrates or atelectasis in both lung bases with small pleural effusions.   Numerous additional findings as described above.   MACRO: None.   Signed by: Karina Ventura 6/26/2024 9:08 AM Dictation workstation:   [id number]        Assessment/Plan   Principal Problem:    Perforated viscus  Active Problems:    Thrombocytosis    Fernando Cuevas is a 62 yo female who is admitted with abdominal pain. She was found to have perforated viscus and was taken to the OR emergently by Dr Liang for ex-lap and partial small bowel resection on 6/21. Intra-operative findings showed \"segment of perforated small bowel.\" She was also recently hospitalized for pneumonia and is currently being treated for COPD exacerbation. On exam, her abdomen is very " distended, softer today, mildly tender, non peritonitic. NGT is to LIWS, more bilious today. + passing gas, -BM.     Plan:   GI:   POD #13 s/p ex-lap, partial SBR  - advance to full liquid diet  - intermittently clamp NG  - Mid abdominal incision staples were removed today at bedside by Dr Bingham, small hematoma was removed from the wound bed and incision was packed, ABD placed over top.   - Wound care to eval tomorrow for VAC.   - DVT ppx: SCDs and loveno- on hold for now d/t bleeding last night   - oob and walking as much as possible   - replace potassium   - IS 10x/hr    ID:   Leukocytosis- WBC 13.1  - continue to trend CBC    Heme: H/H 7/23.1 now 9/27.8 this afternoon, incremented appropriately  - s/p 2 units of PRBC 7/4 AM   - no active s/s of bleeding     Dispo: Advanced to Fulls today with intermittent, Surgery will continue to follow.     I spent 35 minutes in the professional and overall care of this patient.      Earline Stahl, APRSKYLA-CNP

## 2024-07-04 NOTE — NURSING NOTE
"Upon last nursing round approx 6:40pm pt requested dilaudid. When administering nurse noticed all fluid flowing out of dressing and onto bed. IV catheter found completely dislodged. Upon attempting to use forearm IV pt c/o severe burning. Notified charge who came with US IV machine to look for possible new IV site. Pt very sensitive on R arm where last site was. Per charge there is no veins she sees and only fluid in the arm so she will not attempt US IV at this time. Cannot use L arm d/t DVT. Charge wants to keep forearm IV for now, will talk with night shift providers to see if they can assess pt for new ultrasound or possible midline. R arm raw where adhesive dressing was on upper arm.    Messaged Dr Khoury - \"Hi Dr Khoury just a quick update - both of her IV sites are not usable. Did my final round ~6:40pm and she asked for dilaudid. Did not administer correctly and just flowed out the side of the dressing (so she did not get it). I noticed catheter was dislodged completely. The upper arm ultrasound became dislodged and the forearm one burns when it is used so no go on that one but charge wants to keep it rather than pull it. TPN currently stopped. She is very sore on that right arm, I think she also had a reaction to the adhesive in that area because it is very raw. I will add it to her allergies/intolerances. Charge is aware, she is going to talk to the providers who are here tonight to see if they can get an ultrasound or midline as she did not see anything but fluid when she looked with the ultrasound. Didn't want to poke her and try d/t no veins and just fluid in her arm. Could not try L side bc of DVT. Right Arm is still swollen and hard.\"   As of 1945 - provider seen msg, no reply yet.  "

## 2024-07-05 ENCOUNTER — APPOINTMENT (OUTPATIENT)
Dept: RADIOLOGY | Facility: HOSPITAL | Age: 64
End: 2024-07-05
Payer: COMMERCIAL

## 2024-07-05 LAB
ANION GAP SERPL CALC-SCNC: 10 MMOL/L (ref 10–20)
BUN SERPL-MCNC: 16 MG/DL (ref 6–23)
CALCIUM SERPL-MCNC: 7.5 MG/DL (ref 8.6–10.3)
CHLORIDE SERPL-SCNC: 96 MMOL/L (ref 98–107)
CO2 SERPL-SCNC: 26 MMOL/L (ref 21–32)
CREAT SERPL-MCNC: 0.58 MG/DL (ref 0.5–1.05)
EGFRCR SERPLBLD CKD-EPI 2021: >90 ML/MIN/1.73M*2
ERYTHROCYTE [DISTWIDTH] IN BLOOD BY AUTOMATED COUNT: 19.3 % (ref 11.5–14.5)
GLUCOSE BLD MANUAL STRIP-MCNC: 110 MG/DL (ref 74–99)
GLUCOSE BLD MANUAL STRIP-MCNC: 117 MG/DL (ref 74–99)
GLUCOSE BLD MANUAL STRIP-MCNC: 121 MG/DL (ref 74–99)
GLUCOSE BLD MANUAL STRIP-MCNC: 126 MG/DL (ref 74–99)
GLUCOSE BLD MANUAL STRIP-MCNC: 136 MG/DL (ref 74–99)
GLUCOSE BLD MANUAL STRIP-MCNC: 137 MG/DL (ref 74–99)
GLUCOSE SERPL-MCNC: 130 MG/DL (ref 74–99)
HCT VFR BLD AUTO: 25.8 % (ref 36–46)
HGB BLD-MCNC: 8.3 G/DL (ref 12–16)
MAGNESIUM SERPL-MCNC: 1.8 MG/DL (ref 1.6–2.4)
MCH RBC QN AUTO: 26.6 PG (ref 26–34)
MCHC RBC AUTO-ENTMCNC: 32.2 G/DL (ref 32–36)
MCV RBC AUTO: 83 FL (ref 80–100)
NRBC BLD-RTO: 0.5 /100 WBCS (ref 0–0)
PHOSPHATE SERPL-MCNC: 2.7 MG/DL (ref 2.5–4.9)
PLATELET # BLD AUTO: 444 X10*3/UL (ref 150–450)
POTASSIUM SERPL-SCNC: 3.8 MMOL/L (ref 3.5–5.3)
RBC # BLD AUTO: 3.12 X10*6/UL (ref 4–5.2)
SODIUM SERPL-SCNC: 128 MMOL/L (ref 136–145)
WBC # BLD AUTO: 16.9 X10*3/UL (ref 4.4–11.3)

## 2024-07-05 PROCEDURE — 2500000002 HC RX 250 W HCPCS SELF ADMINISTERED DRUGS (ALT 637 FOR MEDICARE OP, ALT 636 FOR OP/ED): Performed by: INTERNAL MEDICINE

## 2024-07-05 PROCEDURE — 2500000001 HC RX 250 WO HCPCS SELF ADMINISTERED DRUGS (ALT 637 FOR MEDICARE OP): Performed by: HOSPITALIST

## 2024-07-05 PROCEDURE — 84100 ASSAY OF PHOSPHORUS: CPT | Performed by: INTERNAL MEDICINE

## 2024-07-05 PROCEDURE — 2060000001 HC INTERMEDIATE ICU ROOM DAILY

## 2024-07-05 PROCEDURE — 2500000004 HC RX 250 GENERAL PHARMACY W/ HCPCS (ALT 636 FOR OP/ED)

## 2024-07-05 PROCEDURE — C9113 INJ PANTOPRAZOLE SODIUM, VIA: HCPCS | Performed by: HOSPITALIST

## 2024-07-05 PROCEDURE — 80048 BASIC METABOLIC PNL TOTAL CA: CPT | Performed by: INTERNAL MEDICINE

## 2024-07-05 PROCEDURE — 74018 RADEX ABDOMEN 1 VIEW: CPT | Performed by: RADIOLOGY

## 2024-07-05 PROCEDURE — 85027 COMPLETE CBC AUTOMATED: CPT | Performed by: INTERNAL MEDICINE

## 2024-07-05 PROCEDURE — 82947 ASSAY GLUCOSE BLOOD QUANT: CPT

## 2024-07-05 PROCEDURE — 2500000005 HC RX 250 GENERAL PHARMACY W/O HCPCS: Performed by: INTERNAL MEDICINE

## 2024-07-05 PROCEDURE — 2500000004 HC RX 250 GENERAL PHARMACY W/ HCPCS (ALT 636 FOR OP/ED): Performed by: HOSPITALIST

## 2024-07-05 PROCEDURE — 83735 ASSAY OF MAGNESIUM: CPT | Performed by: INTERNAL MEDICINE

## 2024-07-05 PROCEDURE — 99233 SBSQ HOSP IP/OBS HIGH 50: CPT | Performed by: INTERNAL MEDICINE

## 2024-07-05 PROCEDURE — 2500000001 HC RX 250 WO HCPCS SELF ADMINISTERED DRUGS (ALT 637 FOR MEDICARE OP): Performed by: INTERNAL MEDICINE

## 2024-07-05 PROCEDURE — 2500000004 HC RX 250 GENERAL PHARMACY W/ HCPCS (ALT 636 FOR OP/ED): Performed by: INTERNAL MEDICINE

## 2024-07-05 PROCEDURE — 94640 AIRWAY INHALATION TREATMENT: CPT

## 2024-07-05 PROCEDURE — 74018 RADEX ABDOMEN 1 VIEW: CPT

## 2024-07-05 ASSESSMENT — PAIN - FUNCTIONAL ASSESSMENT
PAIN_FUNCTIONAL_ASSESSMENT: 0-10

## 2024-07-05 ASSESSMENT — PAIN SCALES - PAIN ASSESSMENT IN ADVANCED DEMENTIA (PAINAD): TOTALSCORE: MEDICATION (SEE MAR);HEAT APPLIED

## 2024-07-05 ASSESSMENT — PAIN SCALES - GENERAL
PAINLEVEL_OUTOF10: 10 - WORST POSSIBLE PAIN

## 2024-07-05 ASSESSMENT — PAIN SCALES - WONG BAKER
WONGBAKER_NUMERICALRESPONSE: HURTS LITTLE MORE
WONGBAKER_NUMERICALRESPONSE: HURTS LITTLE MORE

## 2024-07-05 ASSESSMENT — PAIN DESCRIPTION - LOCATION
LOCATION: BACK

## 2024-07-05 ASSESSMENT — PAIN DESCRIPTION - DESCRIPTORS
DESCRIPTORS: ACHING
DESCRIPTORS: ACHING

## 2024-07-05 NOTE — NURSING NOTE
Rapid Response Nurse Note:     Fernando Cuevas is a 63 y.o. female on day 14 of admission presenting with Perforated viscus.    Rounded on patient due to recent rapid response yesterday for hypotension and abdominal surgical site bleeding. Reviewed patient chart and checked with bedside nurse for any questions or concerns none voiced at this time.    Patient current RADAR score is 2    /75 (BP Location: Left arm, Patient Position: Lying)   Pulse 108   Temp 36.4 °C (97.6 °F) (Temporal)   Resp 18   Wt 63.5 kg (139 lb 14.4 oz)   SpO2 93%     No signs/symptoms of distress at this time. Bedside nurse notified to escalate concerns if patient condition changes

## 2024-07-05 NOTE — PROGRESS NOTES
"Fernando Cuevas is a 63 y.o. female on day 14 of admission presenting with Perforated viscus.    Assessment/Plan   Hemoglobin stable.  White count up a bit.  CT scan showed no real obstruction.  We can advance her to full liquids and try to get her NG tube out today.  Wound care team to consider placing a wound VAC    Subjective   Overall frustrated but feeling a little better.  Passing some gas.       Objective     Physical Exam  NAD  A&Ox3  Non icteric  CTA  RR  Abdomen soft min tender.  Open wound with clean dressings.  Extremities warm, well perfused     Last Recorded Vitals  Blood pressure 135/73, pulse (!) 112, temperature 36.3 °C (97.3 °F), temperature source Temporal, resp. rate 17, height 1.575 m (5' 2.01\"), weight 63.5 kg (139 lb 14.4 oz), SpO2 99%.  Intake/Output last 3 Shifts:  I/O last 3 completed shifts:  In: 2668.1 (42 mL/kg) [Blood:1007.5]  Out: 2120 (33.4 mL/kg) [Urine:1420 (0.6 mL/kg/hr); Emesis/NG output:700]  Weight: 63.5 kg     Relevant Results    Scheduled medications  aspirin, 81 mg, oral, Daily  budesonide, 0.25 mg, nebulization, Daily   And  formoterol, 20 mcg, nebulization, BID  busPIRone, 5 mg, oral, BID  calcium carbonate, 1,500 mg, oral, Daily  dilTIAZem ER, 240 mg, oral, Daily  [Held by provider] enoxaparin, 1 mg/kg, subcutaneous, q12h  folic acid, 1 mg, oral, Daily  hydrocortisone sodium succinate, 200 mg, intravenous, Once  HYDROmorphone, 0.5 mg, intravenous, Once  ipratropium-albuteroL, 3 mL, nebulization, TID  lisinopril, 10 mg, oral, Daily  montelukast, 10 mg, oral, Daily  multivitamin with minerals, 1 tablet, oral, Daily  nortriptyline, 50 mg, oral, Nightly  oxybutynin, 5 mg, oral, Daily  oxygen, , inhalation, Continuous - Inhalation  pantoprazole, 40 mg, intravenous, Daily before breakfast  thiamine, 100 mg, oral, Daily  thiamine, 100 mg, oral, Daily  varenicline, 1 mg, oral, BID      Continuous medications  Adult Clinimix Parenteral Nutrition, 83 mL/hr, Last Rate: 83 mL/hr " (07/04/24 8987)      PRN medications  PRN medications: acetaminophen **OR** acetaminophen **OR** acetaminophen, alteplase, benzonatate, guaiFENesin, HYDROmorphone, HYDROmorphone, hydrOXYzine HCL, ipratropium-albuteroL, morphine, morphine, nitroglycerin, ondansetron, phenoL    Results for orders placed or performed during the hospital encounter of 06/20/24 (from the past 24 hour(s))   SST TOP   Result Value Ref Range    Extra Tube Hold for add-ons.    CBC   Result Value Ref Range    WBC 13.1 (H) 4.4 - 11.3 x10*3/uL    nRBC 1.1 (H) 0.0 - 0.0 /100 WBCs    RBC 3.35 (L) 4.00 - 5.20 x10*6/uL    Hemoglobin 9.0 (L) 12.0 - 16.0 g/dL    Hematocrit 27.8 (L) 36.0 - 46.0 %    MCV 83 80 - 100 fL    MCH 26.9 26.0 - 34.0 pg    MCHC 32.4 32.0 - 36.0 g/dL    RDW 18.8 (H) 11.5 - 14.5 %    Platelets 457 (H) 150 - 450 x10*3/uL   POCT GLUCOSE   Result Value Ref Range    POCT Glucose 100 (H) 74 - 99 mg/dL   POCT GLUCOSE   Result Value Ref Range    POCT Glucose 137 (H) 74 - 99 mg/dL   Basic metabolic panel   Result Value Ref Range    Glucose 130 (H) 74 - 99 mg/dL    Sodium 128 (L) 136 - 145 mmol/L    Potassium 3.8 3.5 - 5.3 mmol/L    Chloride 96 (L) 98 - 107 mmol/L    Bicarbonate 26 21 - 32 mmol/L    Anion Gap 10 10 - 20 mmol/L    Urea Nitrogen 16 6 - 23 mg/dL    Creatinine 0.58 0.50 - 1.05 mg/dL    eGFR >90 >60 mL/min/1.73m*2    Calcium 7.5 (L) 8.6 - 10.3 mg/dL   Magnesium   Result Value Ref Range    Magnesium 1.80 1.60 - 2.40 mg/dL   Phosphorus   Result Value Ref Range    Phosphorus 2.7 2.5 - 4.9 mg/dL   CBC   Result Value Ref Range    WBC 16.9 (H) 4.4 - 11.3 x10*3/uL    nRBC 0.5 (H) 0.0 - 0.0 /100 WBCs    RBC 3.12 (L) 4.00 - 5.20 x10*6/uL    Hemoglobin 8.3 (L) 12.0 - 16.0 g/dL    Hematocrit 25.8 (L) 36.0 - 46.0 %    MCV 83 80 - 100 fL    MCH 26.6 26.0 - 34.0 pg    MCHC 32.2 32.0 - 36.0 g/dL    RDW 19.3 (H) 11.5 - 14.5 %    Platelets 444 150 - 450 x10*3/uL   POCT GLUCOSE   Result Value Ref Range    POCT Glucose 136 (H) 74 - 99 mg/dL    POCT GLUCOSE   Result Value Ref Range    POCT Glucose 110 (H) 74 - 99 mg/dL           I spent 25 minutes in the professional and overall care of this patient.      Reid Bingham MD

## 2024-07-05 NOTE — PROGRESS NOTES
Nutrition Progress Note     Chart reviewed and pt visited.  Per notes:  7/4: NG clamp with liquids.  7/5: No real obstruction per CT scan, NG removed today.  Wound team for potential wound Vac.    Plan to continue PPN as ordered at 83ml/hr through the weekend with lipids tonight.  RDN to re assess as indicated on Monday.    Results for orders placed or performed during the hospital encounter of 06/20/24 (from the past 24 hour(s))   POCT GLUCOSE   Result Value Ref Range    POCT Glucose 100 (H) 74 - 99 mg/dL   POCT GLUCOSE   Result Value Ref Range    POCT Glucose 137 (H) 74 - 99 mg/dL   Basic metabolic panel   Result Value Ref Range    Glucose 130 (H) 74 - 99 mg/dL    Sodium 128 (L) 136 - 145 mmol/L    Potassium 3.8 3.5 - 5.3 mmol/L    Chloride 96 (L) 98 - 107 mmol/L    Bicarbonate 26 21 - 32 mmol/L    Anion Gap 10 10 - 20 mmol/L    Urea Nitrogen 16 6 - 23 mg/dL    Creatinine 0.58 0.50 - 1.05 mg/dL    eGFR >90 >60 mL/min/1.73m*2    Calcium 7.5 (L) 8.6 - 10.3 mg/dL   Magnesium   Result Value Ref Range    Magnesium 1.80 1.60 - 2.40 mg/dL   Phosphorus   Result Value Ref Range    Phosphorus 2.7 2.5 - 4.9 mg/dL   CBC   Result Value Ref Range    WBC 16.9 (H) 4.4 - 11.3 x10*3/uL    nRBC 0.5 (H) 0.0 - 0.0 /100 WBCs    RBC 3.12 (L) 4.00 - 5.20 x10*6/uL    Hemoglobin 8.3 (L) 12.0 - 16.0 g/dL    Hematocrit 25.8 (L) 36.0 - 46.0 %    MCV 83 80 - 100 fL    MCH 26.6 26.0 - 34.0 pg    MCHC 32.2 32.0 - 36.0 g/dL    RDW 19.3 (H) 11.5 - 14.5 %    Platelets 444 150 - 450 x10*3/uL   POCT GLUCOSE   Result Value Ref Range    POCT Glucose 136 (H) 74 - 99 mg/dL   POCT GLUCOSE   Result Value Ref Range    POCT Glucose 110 (H) 74 - 99 mg/dL   POCT GLUCOSE   Result Value Ref Range    POCT Glucose 117 (H) 74 - 99 mg/dL     Scheduled medications  aspirin, 81 mg, oral, Daily  budesonide, 0.25 mg, nebulization, Daily   And  formoterol, 20 mcg, nebulization, BID  busPIRone, 5 mg, oral, BID  calcium carbonate, 1,500 mg, oral, Daily  dilTIAZem ER, 240  mg, oral, Daily  enoxaparin, 1 mg/kg, subcutaneous, q12h  fat emulsion-plant based, 250 mL, intravenous, Daily Lipids  folic acid, 1 mg, oral, Daily  hydrocortisone sodium succinate, 200 mg, intravenous, Once  HYDROmorphone, 0.5 mg, intravenous, Once  ipratropium-albuteroL, 3 mL, nebulization, TID  lisinopril, 10 mg, oral, Daily  montelukast, 10 mg, oral, Daily  multivitamin with minerals, 1 tablet, oral, Daily  nortriptyline, 50 mg, oral, Nightly  oxybutynin, 5 mg, oral, Daily  oxygen, , inhalation, Continuous - Inhalation  pantoprazole, 40 mg, intravenous, Daily before breakfast  piperacillin-tazobactam, 3.375 g, intravenous, q6h  thiamine, 100 mg, oral, Daily  thiamine, 100 mg, oral, Daily  varenicline, 1 mg, oral, BID      Continuous medications  Adult Clinimix Parenteral Nutrition, 83 mL/hr, Last Rate: 83 mL/hr (07/04/24 2116)  Adult Clinimix Parenteral Nutrition, 83 mL/hr      PRN medications  PRN medications: acetaminophen **OR** acetaminophen **OR** acetaminophen, alteplase, benzonatate, guaiFENesin, HYDROmorphone, HYDROmorphone, hydrOXYzine HCL, ipratropium-albuteroL, morphine, morphine, nitroglycerin, ondansetron, phenoL  Dietary Orders (From admission, onward)       Start     Ordered    07/05/24 1225  Adult diet Full Liquid  Diet effective now        Question:  Diet type  Answer:  Full Liquid    07/05/24 1224                     Follow Up  Time Spent (min): 60 minutes  Last Date of Nutrition Visit: 07/05/24  Nutrition Follow-Up Needed?: Dietitian to reassess per policy  Follow up Comment: TALIA CHAVIS

## 2024-07-05 NOTE — PROGRESS NOTES
Fernando Cuevas is a 63 y.o. female on day 14 of admission presenting with Perforated viscus.      Subjective   Patient seen and examined at the bedside this morning. No acute overnight events. No other questions or concerns.         Objective     Last Recorded Vitals  /75 (BP Location: Left arm, Patient Position: Lying)   Pulse 108   Temp 36.4 °C (97.6 °F) (Temporal)   Resp 18   Wt 63.5 kg (139 lb 14.4 oz)   SpO2 93%   Intake/Output last 3 Shifts:    Intake/Output Summary (Last 24 hours) at 7/5/2024 1114  Last data filed at 7/5/2024 0825  Gross per 24 hour   Intake 1411 ml   Output 845 ml   Net 566 ml       Admission Weight  Weight: 64 kg (141 lb 1.5 oz) (06/20/24 2132)    Daily Weight  07/05/24 : 63.5 kg (139 lb 14.4 oz)    Image Results  XR abdomen 1 view  Narrative: Interpreted By:  Magdalena Carrasco,   STUDY:  XR ABDOMEN 1 VIEW;  7/5/2024 9:19 am      INDICATION:  Signs/Symptoms:abdominal distension.      COMPARISON:  None.      ACCESSION NUMBER(S):  [id number]      ORDERING CLINICIAN:  MINERVA AVILEZ      FINDINGS:  Nonobstructive bowel gas pattern. Limited evaluation of  pneumoperitoneum on supine imaging. NG tube terminating in the left  upper quadrant likely within the gastric body. Excreted contrast  within a nondistended colon          Visualized lungs are clear.      Osseous structures demonstrate no acute bony changes.      Impression: 1. A nonspecific, nonobstructive bowel gas pattern.  2. NG tube      MACRO:  None      Signed by: Magdalena Carrasco 7/5/2024 9:35 AM  Dictation workstation:   [id number]      Physical Exam  Constitutional:       General: She is not in acute distress.  HENT:      Head: Normocephalic and atraumatic.   Eyes:      Conjunctiva/sclera: Conjunctivae normal.      Pupils: Pupils are equal, round, and reactive to light.   Cardiovascular:      Rate and Rhythm: Normal rate and regular rhythm.   Pulmonary:      Effort: Pulmonary effort is normal.      Breath sounds: Normal breath  sounds.   Abdominal:      General: There is distension.      Palpations: Abdomen is soft.   Skin:     General: Skin is warm and dry.   Neurological:      Mental Status: She is alert.   Psychiatric:         Mood and Affect: Mood normal.         Behavior: Behavior normal.         Relevant Results           Scheduled medications  aspirin, 81 mg, oral, Daily  budesonide, 0.25 mg, nebulization, Daily   And  formoterol, 20 mcg, nebulization, BID  busPIRone, 5 mg, oral, BID  calcium carbonate, 1,500 mg, oral, Daily  dilTIAZem ER, 240 mg, oral, Daily  [Held by provider] enoxaparin, 1 mg/kg, subcutaneous, q12h  folic acid, 1 mg, oral, Daily  hydrocortisone sodium succinate, 200 mg, intravenous, Once  HYDROmorphone, 0.5 mg, intravenous, Once  ipratropium-albuteroL, 3 mL, nebulization, TID  lisinopril, 10 mg, oral, Daily  montelukast, 10 mg, oral, Daily  multivitamin with minerals, 1 tablet, oral, Daily  nortriptyline, 50 mg, oral, Nightly  oxybutynin, 5 mg, oral, Daily  oxygen, , inhalation, Continuous - Inhalation  pantoprazole, 40 mg, intravenous, Daily before breakfast  piperacillin-tazobactam, 3.375 g, intravenous, q6h  thiamine, 100 mg, oral, Daily  thiamine, 100 mg, oral, Daily  varenicline, 1 mg, oral, BID      Continuous medications  Adult Clinimix Parenteral Nutrition, 83 mL/hr, Last Rate: 83 mL/hr (07/04/24 2116)      PRN medications  PRN medications: acetaminophen **OR** acetaminophen **OR** acetaminophen, alteplase, benzonatate, guaiFENesin, HYDROmorphone, HYDROmorphone, hydrOXYzine HCL, ipratropium-albuteroL, morphine, morphine, nitroglycerin, ondansetron, phenoL    Assessment/Plan      Principal Problem:    Perforated viscus  Active Problems:    Thrombocytosis    Perforated small bowel's with peritonitis:  Concern for postop ileus versus partial obstruction:  Underwent surgical intervention 6/21  -Underwent small bowel resection, and washout procedure.  -Culture for Candida albicans, concern for mix of  gram-negative and anaerobes.  -Infectious disease following, continued on IV Zosyn, fluconazole 200 mg/day.  Plan for antibiotics with stop date 7/3/24.,  This will be a total of 10 days from the initiation of fluconazole.  Infectious diseases recommended discharge home once medically ready on p.o. Augmentin 875 mg 2 times a day instead of Zosyn if needed.  -General surgery following for postop ileus versus partial SBO, appreciate recommendations.  Enema and chewing gum added.    -Will continue to trend CBC  -Dietary following, appreciate recommendations.   -continue NPO  -still with ileus  -Wound care team to evaluate tomorrow for possible wound VAC   -Per surgery team, CT scan showed no real obstruction. We can advance her to full liquids and try to get her NG tube out today.     # Anemia  # Abdominal wall hematoma  -blood loss  -s/p blood transfusion    Acute DVT in the left distal brachial vein:  -Vascular upper extremity ultrasound with acute finding, non-occlusive.  -Patient initiated on full dose Lovenox therapeutic dose and 6/27. Will need to be transitioned to oral regimen prior to discharge.   -Patient with contrast allergy, CT angio chest requiring prep, no acute findings.    -hold lovenox due to anemia and hematoma    Acute on chronic hyponatremia, resolved:  In setting of poor oral intake  -Nephrology was managing, placed on IV isotonic fluid with improvement  -Fluids will be discontinued.  -Nephrology signed off  -Will continue to trend sodium    Severe malnutrition:  -Nutrition following, appreciate recommendations.  -NG tube in place, will need to continue PPN     LOS: Unknown    Rupa Khoury MD

## 2024-07-05 NOTE — PROCEDURES
Central Line     Date/Time: 7/4/2024 2000     Performed by: FELIPE Love  Authorized by: FELIPE Love    Consent:     Consent obtained:  Written    Consent given by:  Patient    Risks, benefits, and alternatives were discussed: yes      Risks discussed:  Pneumothorax, infection and bleeding  Universal protocol:     Procedure explained and questions answered to patient or proxy's satisfaction: yes      Test results available: yes      Immediately prior to procedure, a time out was called: yes      Patient identity confirmed:  Arm band and hospital-assigned identification number  Pre-procedure details:     Indication(s): central venous access      Hand hygiene: Hand hygiene performed prior to insertion      Sterile barrier technique: All elements of maximal sterile technique followed      Skin preparation:  Chlorhexidine    Skin preparation agent: Skin preparation agent completely dried prior to procedure    Anesthesia:     Anesthesia method:  Local infiltration    Local anesthetic:  Lidocaine 1% w/o epi  Procedure details: A time out was performed. My hands were washed immediately prior to the procedure. I wore a surgical cap, mask with protective eyewear, full gown and sterile gloves throughout the procedure. The patient was placed in Trendelenburg position. The right neck was prepped using chlorhexidine scrub and draped in sterile fashion using a full body sheet drape. Skin preparation was allowed to dry prior to skin puncture. Anatomic landmarks were identified. Anesthesia was achieved over the vein using 1% lidocaine. Using real-time ultrasound, with sterile probe cover and sterile gel, the introducer needle was inserted into the vein under direct ultrasound visualization. Venous blood was withdrawn. The syringe was removed and a guidewire was advanced into the introducer needle. The guidewire was visualized in the appropriate vein by ultrasound. A small incision was made at the skin surface  with a scalpel and the introducer needle was exchanged for a dilator over the guidewire. After appropriate dilation was obtained, the dilator was exchanged over the wire for a central venous catheter. The wire was removed and the catheter was sutured in place at 16 cm. A [employer] CHG central line dressing was placed at the insertion site. The patient tolerated the procedure without any hemodynamic compromise. At time of procedure completion, all ports aspirated and flushed properly.    Location:  Right internal jugular    Patient position:  Reverse Trendelenburg    Procedural supplies:  Triple lumen CVC    Landmarks identified: yes      Ultrasound guidance: yes      Ultrasound guidance timing: prior to insertion and real time      Sterile ultrasound techniques: Sterile gel and sterile probe covers were used      Number of attempts:  1    Successful placement: yes    Post-procedure details:     Post-procedure:  Dressing applied and line sutured    Assessment:  Blood return through all ports, no pneumothorax on x-ray, free fluid flow, and placement verified by x-ray    Procedure completion:  Tolerated    Complications:  None

## 2024-07-05 NOTE — CONSULTS
Wound Care Consult     Visit Date: 7/5/2024      Patient Name: Fernando Cuevas         MRN: [id number]           YOB: 1960     Reason for Consult: Assess midline wound for NPWT dressing. Site is appropriate and dressing was applied           Pertinent Labs:   Albumin   Date Value Ref Range Status   06/21/2024 2.9 (L) 3.4 - 5.0 g/dL Final       Wound Assessment:      Wound 06/21/24 Incision Abdomen Medial;Upper (Active)   Site Assessment Unable to assess 07/04/24 2000   Sparkle-Wound Assessment Clean;Dry 07/04/24 2000   Shape vertical 06/30/24 2025   Margins Attached edges 06/30/24 2025   Closure Staples 07/04/24 0900   Sutures/Staple Line Approximated 07/01/24 0926   Drainage Description Red 07/04/24 0900   Drainage Amount Small 07/04/24 0900   Dressing ABD 07/04/24 1227   Dressing Changed New 07/04/24 0900   Dressing Status Clean;Dry 07/04/24 2000       Wound Team Summary Assessment: Midline wound has opened. Wound base is moist and red, with small amount of bleeding, moderate serosanguineous drainage.   Measures: 7.5 x 2.3 x 2.2 cm with tunneling under 3 staples at inferior wound to 2.5 cm.   Wound and periwound cleaned with wound cleanser.   Perwound protectect with Cavilon No Sting and barrier ring.   Adaptic placed to wound base and over bottom 3 staples.   Wound filled with black foam.   NPWT set at -125 mm Hg, low continuous. Seal assured.     Ms. Cuevas tolerated wound care well.     Wound Team Plan: NPWT dressing change due on Tuesday, 7/9.     Irma Mason, JONASN, RN, CWOCN   7/5/2024  3:41 PM

## 2024-07-05 NOTE — PROGRESS NOTES
07/05/24 1125   Discharge Planning   Patient expects to be discharged to: SNF         Patient had rapid response done yesterday for low blood pressure and abdominal incision opened up. Surgery will eval today and decide if patient needs wound vac. Patient still has NG, pending x-ray if NG will be discontinued today and start clear liquids. Patient is still on PPN for nutrition support. Patient is not med ready. She needs to be off PPN before [facility] can take back. Will continue to follow for discharge needs.

## 2024-07-06 ENCOUNTER — APPOINTMENT (OUTPATIENT)
Dept: RADIOLOGY | Facility: HOSPITAL | Age: 64
End: 2024-07-06
Payer: COMMERCIAL

## 2024-07-06 LAB
ANION GAP SERPL CALC-SCNC: 11 MMOL/L (ref 10–20)
BLOOD EXPIRATION DATE: NORMAL
BLOOD EXPIRATION DATE: NORMAL
BUN SERPL-MCNC: 14 MG/DL (ref 6–23)
CALCIUM SERPL-MCNC: 7.3 MG/DL (ref 8.6–10.3)
CHLORIDE SERPL-SCNC: 98 MMOL/L (ref 98–107)
CO2 SERPL-SCNC: 25 MMOL/L (ref 21–32)
CREAT SERPL-MCNC: 0.51 MG/DL (ref 0.5–1.05)
DISPENSE STATUS: NORMAL
DISPENSE STATUS: NORMAL
EGFRCR SERPLBLD CKD-EPI 2021: >90 ML/MIN/1.73M*2
ERYTHROCYTE [DISTWIDTH] IN BLOOD BY AUTOMATED COUNT: 19.5 % (ref 11.5–14.5)
GLUCOSE BLD MANUAL STRIP-MCNC: 122 MG/DL (ref 74–99)
GLUCOSE BLD MANUAL STRIP-MCNC: 124 MG/DL (ref 74–99)
GLUCOSE BLD MANUAL STRIP-MCNC: 124 MG/DL (ref 74–99)
GLUCOSE SERPL-MCNC: 110 MG/DL (ref 74–99)
HCT VFR BLD AUTO: 23.4 % (ref 36–46)
HGB BLD-MCNC: 7.5 G/DL (ref 12–16)
MAGNESIUM SERPL-MCNC: 1.7 MG/DL (ref 1.6–2.4)
MCH RBC QN AUTO: 27.1 PG (ref 26–34)
MCHC RBC AUTO-ENTMCNC: 32.1 G/DL (ref 32–36)
MCV RBC AUTO: 85 FL (ref 80–100)
NRBC BLD-RTO: 0.4 /100 WBCS (ref 0–0)
PHOSPHATE SERPL-MCNC: 3.2 MG/DL (ref 2.5–4.9)
PLATELET # BLD AUTO: 405 X10*3/UL (ref 150–450)
POTASSIUM SERPL-SCNC: 4.3 MMOL/L (ref 3.5–5.3)
PRODUCT BLOOD TYPE: 8400
PRODUCT BLOOD TYPE: 9500
PRODUCT CODE: NORMAL
PRODUCT CODE: NORMAL
RBC # BLD AUTO: 2.77 X10*6/UL (ref 4–5.2)
SODIUM SERPL-SCNC: 130 MMOL/L (ref 136–145)
STAPHYLOCOCCUS SPEC CULT: NORMAL
UNIT ABO: NORMAL
UNIT ABO: NORMAL
UNIT NUMBER: NORMAL
UNIT NUMBER: NORMAL
UNIT RH: NORMAL
UNIT RH: NORMAL
UNIT VOLUME: 215
UNIT VOLUME: 350
WBC # BLD AUTO: 11.2 X10*3/UL (ref 4.4–11.3)
XM INTEP: NORMAL

## 2024-07-06 PROCEDURE — 83735 ASSAY OF MAGNESIUM: CPT | Performed by: INTERNAL MEDICINE

## 2024-07-06 PROCEDURE — 82947 ASSAY GLUCOSE BLOOD QUANT: CPT

## 2024-07-06 PROCEDURE — 93971 EXTREMITY STUDY: CPT | Performed by: RADIOLOGY

## 2024-07-06 PROCEDURE — 2500000004 HC RX 250 GENERAL PHARMACY W/ HCPCS (ALT 636 FOR OP/ED): Performed by: INTERNAL MEDICINE

## 2024-07-06 PROCEDURE — 93971 EXTREMITY STUDY: CPT

## 2024-07-06 PROCEDURE — 99233 SBSQ HOSP IP/OBS HIGH 50: CPT | Performed by: INTERNAL MEDICINE

## 2024-07-06 PROCEDURE — 2500000005 HC RX 250 GENERAL PHARMACY W/O HCPCS: Performed by: INTERNAL MEDICINE

## 2024-07-06 PROCEDURE — 82374 ASSAY BLOOD CARBON DIOXIDE: CPT | Performed by: INTERNAL MEDICINE

## 2024-07-06 PROCEDURE — 1200000002 HC GENERAL ROOM WITH TELEMETRY DAILY

## 2024-07-06 PROCEDURE — C9113 INJ PANTOPRAZOLE SODIUM, VIA: HCPCS | Performed by: HOSPITALIST

## 2024-07-06 PROCEDURE — 2500000001 HC RX 250 WO HCPCS SELF ADMINISTERED DRUGS (ALT 637 FOR MEDICARE OP): Performed by: HOSPITALIST

## 2024-07-06 PROCEDURE — 94640 AIRWAY INHALATION TREATMENT: CPT

## 2024-07-06 PROCEDURE — 2500000002 HC RX 250 W HCPCS SELF ADMINISTERED DRUGS (ALT 637 FOR MEDICARE OP, ALT 636 FOR OP/ED): Performed by: INTERNAL MEDICINE

## 2024-07-06 PROCEDURE — 2500000004 HC RX 250 GENERAL PHARMACY W/ HCPCS (ALT 636 FOR OP/ED): Performed by: HOSPITALIST

## 2024-07-06 PROCEDURE — 85027 COMPLETE CBC AUTOMATED: CPT | Performed by: INTERNAL MEDICINE

## 2024-07-06 PROCEDURE — 2500000001 HC RX 250 WO HCPCS SELF ADMINISTERED DRUGS (ALT 637 FOR MEDICARE OP): Performed by: INTERNAL MEDICINE

## 2024-07-06 PROCEDURE — 2500000004 HC RX 250 GENERAL PHARMACY W/ HCPCS (ALT 636 FOR OP/ED)

## 2024-07-06 PROCEDURE — 84100 ASSAY OF PHOSPHORUS: CPT | Performed by: INTERNAL MEDICINE

## 2024-07-06 ASSESSMENT — COGNITIVE AND FUNCTIONAL STATUS - GENERAL
DAILY ACTIVITIY SCORE: 18
MOBILITY SCORE: 12
DAILY ACTIVITIY SCORE: 20
TOILETING: A LITTLE
WALKING IN HOSPITAL ROOM: A LOT
PERSONAL GROOMING: A LITTLE
EATING MEALS: A LITTLE
DRESSING REGULAR UPPER BODY CLOTHING: A LITTLE
TURNING FROM BACK TO SIDE WHILE IN FLAT BAD: A LOT
CLIMB 3 TO 5 STEPS WITH RAILING: TOTAL
WALKING IN HOSPITAL ROOM: A LOT
CLIMB 3 TO 5 STEPS WITH RAILING: TOTAL
MOBILITY SCORE: 12
HELP NEEDED FOR BATHING: A LITTLE
MOVING FROM LYING ON BACK TO SITTING ON SIDE OF FLAT BED WITH BEDRAILS: A LITTLE
DRESSING REGULAR LOWER BODY CLOTHING: A LITTLE
HELP NEEDED FOR BATHING: A LITTLE
MOVING TO AND FROM BED TO CHAIR: A LOT
MOVING FROM LYING ON BACK TO SITTING ON SIDE OF FLAT BED WITH BEDRAILS: A LITTLE
TURNING FROM BACK TO SIDE WHILE IN FLAT BAD: A LOT
MOVING TO AND FROM BED TO CHAIR: A LOT
STANDING UP FROM CHAIR USING ARMS: A LOT
STANDING UP FROM CHAIR USING ARMS: A LOT
DRESSING REGULAR LOWER BODY CLOTHING: A LITTLE
TOILETING: A LITTLE
DRESSING REGULAR UPPER BODY CLOTHING: A LITTLE

## 2024-07-06 ASSESSMENT — PAIN SCALES - GENERAL
PAINLEVEL_OUTOF10: 7
PAINLEVEL_OUTOF10: 10 - WORST POSSIBLE PAIN
PAINLEVEL_OUTOF10: 10 - WORST POSSIBLE PAIN
PAINLEVEL_OUTOF10: 6
PAINLEVEL_OUTOF10: 10 - WORST POSSIBLE PAIN
PAINLEVEL_OUTOF10: 7
PAINLEVEL_OUTOF10: 8
PAINLEVEL_OUTOF10: 7
PAINLEVEL_OUTOF10: 6
PAINLEVEL_OUTOF10: 7
PAINLEVEL_OUTOF10: 10 - WORST POSSIBLE PAIN

## 2024-07-06 ASSESSMENT — PAIN - FUNCTIONAL ASSESSMENT
PAIN_FUNCTIONAL_ASSESSMENT: 0-10

## 2024-07-06 ASSESSMENT — PAIN DESCRIPTION - DESCRIPTORS
DESCRIPTORS: ACHING

## 2024-07-06 ASSESSMENT — PAIN DESCRIPTION - ORIENTATION
ORIENTATION: MID;LOWER
ORIENTATION: RIGHT

## 2024-07-06 ASSESSMENT — LIFESTYLE VARIABLES
VISUAL DISTURBANCES: NOT PRESENT
HEADACHE, FULLNESS IN HEAD: NOT PRESENT
AUDITORY DISTURBANCES: NOT PRESENT
ANXIETY: NO ANXIETY, AT EASE
ORIENTATION AND CLOUDING OF SENSORIUM: ORIENTED AND CAN DO SERIAL ADDITIONS
AGITATION: NORMAL ACTIVITY
NAUSEA AND VOMITING: MILD NAUSEA WITH NO VOMITING
TACTILE DISTURBANCES: MILD ITCHING, PINS AND NEEDLES, BURNING OR NUMBNESS
TOTAL SCORE: 3
TREMOR: NO TREMOR
PAROXYSMAL SWEATS: NO SWEAT VISIBLE

## 2024-07-06 ASSESSMENT — PAIN DESCRIPTION - LOCATION
LOCATION: GENERALIZED
LOCATION: GENERALIZED

## 2024-07-06 NOTE — PROGRESS NOTES
"Fernando Cuevas is a 63 y.o. female on day 15 of admission presenting with Perforated viscus.    Assessment/Plan   Advancing diet.  Physical therapy.    Subjective   Feeling a lot better.  Moving her bowels.  NG tube is out       Objective     Physical Exam  NAD  A&Ox3  Non icteric  CTA  RR  Abdomen soft min tender. Wounds VAC functioning well  Extremities warm, well perfused     Last Recorded Vitals  Blood pressure 119/74, pulse (!) 112, temperature 36.5 °C (97.7 °F), temperature source Oral, resp. rate 16, height 1.575 m (5' 2.01\"), weight 63.5 kg (139 lb 14.4 oz), SpO2 99%.  Intake/Output last 3 Shifts:  I/O last 3 completed shifts:  In: 744.1 (11.7 mL/kg) [NG/GT:40]  Out: 2265 (35.7 mL/kg) [Urine:2045 (0.9 mL/kg/hr); Emesis/NG output:220]  Weight: 63.5 kg     Relevant Results    Scheduled medications  aspirin, 81 mg, oral, Daily  budesonide, 0.25 mg, nebulization, Daily   And  formoterol, 20 mcg, nebulization, BID  busPIRone, 5 mg, oral, BID  calcium carbonate, 1,500 mg, oral, Daily  dilTIAZem ER, 240 mg, oral, Daily  enoxaparin, 1 mg/kg, subcutaneous, q12h  folic acid, 1 mg, oral, Daily  hydrocortisone sodium succinate, 200 mg, intravenous, Once  HYDROmorphone, 0.5 mg, intravenous, Once  ipratropium-albuteroL, 3 mL, nebulization, TID  lisinopril, 10 mg, oral, Daily  montelukast, 10 mg, oral, Daily  multivitamin with minerals, 1 tablet, oral, Daily  nortriptyline, 50 mg, oral, Nightly  oxybutynin, 5 mg, oral, Daily  oxygen, , inhalation, Continuous - Inhalation  pantoprazole, 40 mg, intravenous, Daily before breakfast  piperacillin-tazobactam, 3.375 g, intravenous, q6h  thiamine, 100 mg, oral, Daily  thiamine, 100 mg, oral, Daily  varenicline, 1 mg, oral, BID      Continuous medications  Adult Clinimix Parenteral Nutrition, 83 mL/hr, Last Rate: 83 mL/hr (07/06/24 1159)      PRN medications  PRN medications: acetaminophen **OR** acetaminophen **OR** acetaminophen, alteplase, benzonatate, guaiFENesin, " HYDROmorphone, HYDROmorphone, hydrOXYzine HCL, ipratropium-albuteroL, morphine, morphine, nitroglycerin, ondansetron, phenoL    Results for orders placed or performed during the hospital encounter of 06/20/24 (from the past 24 hour(s))   POCT GLUCOSE   Result Value Ref Range    POCT Glucose 121 (H) 74 - 99 mg/dL   POCT GLUCOSE   Result Value Ref Range    POCT Glucose 126 (H) 74 - 99 mg/dL   POCT GLUCOSE   Result Value Ref Range    POCT Glucose 124 (H) 74 - 99 mg/dL   Basic metabolic panel   Result Value Ref Range    Glucose 110 (H) 74 - 99 mg/dL    Sodium 130 (L) 136 - 145 mmol/L    Potassium 4.3 3.5 - 5.3 mmol/L    Chloride 98 98 - 107 mmol/L    Bicarbonate 25 21 - 32 mmol/L    Anion Gap 11 10 - 20 mmol/L    Urea Nitrogen 14 6 - 23 mg/dL    Creatinine 0.51 0.50 - 1.05 mg/dL    eGFR >90 >60 mL/min/1.73m*2    Calcium 7.3 (L) 8.6 - 10.3 mg/dL   Magnesium   Result Value Ref Range    Magnesium 1.70 1.60 - 2.40 mg/dL   Phosphorus   Result Value Ref Range    Phosphorus 3.2 2.5 - 4.9 mg/dL   CBC   Result Value Ref Range    WBC 11.2 4.4 - 11.3 x10*3/uL    nRBC 0.4 (H) 0.0 - 0.0 /100 WBCs    RBC 2.77 (L) 4.00 - 5.20 x10*6/uL    Hemoglobin 7.5 (L) 12.0 - 16.0 g/dL    Hematocrit 23.4 (L) 36.0 - 46.0 %    MCV 85 80 - 100 fL    MCH 27.1 26.0 - 34.0 pg    MCHC 32.1 32.0 - 36.0 g/dL    RDW 19.5 (H) 11.5 - 14.5 %    Platelets 405 150 - 450 x10*3/uL   POCT GLUCOSE   Result Value Ref Range    POCT Glucose 122 (H) 74 - 99 mg/dL   POCT GLUCOSE   Result Value Ref Range    POCT Glucose 122 (H) 74 - 99 mg/dL           I spent 25 minutes in the professional and overall care of this patient.      Reid Bingham MD

## 2024-07-06 NOTE — CARE PLAN
The patient's goals for the shift include      The clinical goals for the shift include remain hemodynamically stable

## 2024-07-06 NOTE — PROGRESS NOTES
Fernando Cuevas is a 63 y.o. female on day 15 of admission presenting with Perforated viscus.      Subjective   Patient seen and examined at the bedside this morning. No acute overnight events. No other questions or concerns.         Objective     Last Recorded Vitals  /74   Pulse (!) 112   Temp 36.5 °C (97.7 °F) (Oral)   Resp 16   Wt 63.5 kg (139 lb 14.4 oz)   SpO2 99%   Intake/Output last 3 Shifts:    Intake/Output Summary (Last 24 hours) at 7/6/2024 1042  Last data filed at 7/6/2024 0600  Gross per 24 hour   Intake 744.09 ml   Output 1620 ml   Net -875.91 ml       Admission Weight  Weight: 64 kg (141 lb 1.5 oz) (06/20/24 2132)    Daily Weight  07/05/24 : 63.5 kg (139 lb 14.4 oz)    Image Results  XR abdomen 1 view  Narrative: Interpreted By:  Magdalena Carrasco,   STUDY:  XR ABDOMEN 1 VIEW;  7/5/2024 9:19 am      INDICATION:  Signs/Symptoms:abdominal distension.      COMPARISON:  None.      ACCESSION NUMBER(S):  [id number]      ORDERING CLINICIAN:  MINERVA AVILEZ      FINDINGS:  Nonobstructive bowel gas pattern. Limited evaluation of  pneumoperitoneum on supine imaging. NG tube terminating in the left  upper quadrant likely within the gastric body. Excreted contrast  within a nondistended colon          Visualized lungs are clear.      Osseous structures demonstrate no acute bony changes.      Impression: 1. A nonspecific, nonobstructive bowel gas pattern.  2. NG tube      MACRO:  None      Signed by: Magdalena Carrasco 7/5/2024 9:35 AM  Dictation workstation:   [id number]      Physical Exam  Constitutional:       General: She is not in acute distress.  HENT:      Head: Normocephalic and atraumatic.   Eyes:      Conjunctiva/sclera: Conjunctivae normal.      Pupils: Pupils are equal, round, and reactive to light.   Cardiovascular:      Rate and Rhythm: Normal rate and regular rhythm.   Pulmonary:      Effort: Pulmonary effort is normal.      Breath sounds: Normal breath sounds.   Abdominal:      General: There  is distension.      Palpations: Abdomen is soft.   Skin:     General: Skin is warm and dry.   Neurological:      Mental Status: She is alert.   Psychiatric:         Mood and Affect: Mood normal.         Behavior: Behavior normal.         Relevant Results           Scheduled medications  aspirin, 81 mg, oral, Daily  budesonide, 0.25 mg, nebulization, Daily   And  formoterol, 20 mcg, nebulization, BID  busPIRone, 5 mg, oral, BID  calcium carbonate, 1,500 mg, oral, Daily  dilTIAZem ER, 240 mg, oral, Daily  enoxaparin, 1 mg/kg, subcutaneous, q12h  folic acid, 1 mg, oral, Daily  hydrocortisone sodium succinate, 200 mg, intravenous, Once  HYDROmorphone, 0.5 mg, intravenous, Once  ipratropium-albuteroL, 3 mL, nebulization, TID  lisinopril, 10 mg, oral, Daily  montelukast, 10 mg, oral, Daily  multivitamin with minerals, 1 tablet, oral, Daily  nortriptyline, 50 mg, oral, Nightly  oxybutynin, 5 mg, oral, Daily  oxygen, , inhalation, Continuous - Inhalation  pantoprazole, 40 mg, intravenous, Daily before breakfast  piperacillin-tazobactam, 3.375 g, intravenous, q6h  thiamine, 100 mg, oral, Daily  thiamine, 100 mg, oral, Daily  varenicline, 1 mg, oral, BID      Continuous medications  Adult Clinimix Parenteral Nutrition, 83 mL/hr, Last Rate: 83 mL/hr (07/06/24 0252)      PRN medications  PRN medications: acetaminophen **OR** acetaminophen **OR** acetaminophen, alteplase, benzonatate, guaiFENesin, HYDROmorphone, HYDROmorphone, hydrOXYzine HCL, ipratropium-albuteroL, morphine, morphine, nitroglycerin, ondansetron, phenoL    Assessment/Plan      Principal Problem:    Perforated viscus  Active Problems:    Thrombocytosis    Perforated small bowel's with peritonitis:  Concern for postop ileus versus partial obstruction:  Underwent surgical intervention 6/21  -Underwent small bowel resection, and washout procedure.  -Culture for Candida albicans, concern for mix of gram-negative and anaerobes.  -Infectious disease following,  continued on IV Zosyn, fluconazole 200 mg/day.  Plan for antibiotics with stop date 7/3/24.,  This will be a total of 10 days from the initiation of fluconazole.  Infectious diseases recommended discharge home once medically ready on p.o. Augmentin 875 mg 2 times a day instead of Zosyn if needed.  -General surgery following for postop ileus versus partial SBO, appreciate recommendations.  Enema and chewing gum added.    -Will continue to trend CBC  -Dietary following, appreciate recommendations.   -continue NPO  -still with ileus  -Wound care team to evaluate tomorrow for possible wound VAC   -NG tube removed  -tolerating oral intake    # Anemia  # Abdominal wall hematoma  -blood loss  -s/p blood transfusion    Acute DVT in the left distal brachial vein:  -Vascular upper extremity ultrasound with acute finding, non-occlusive.  -Patient initiated on full dose Lovenox therapeutic dose and 6/27. Will need to be transitioned to oral regimen prior to discharge.   -Patient with contrast allergy, CT angio chest requiring prep, no acute findings.    -on lovenox    Acute on chronic hyponatremia, resolved:  In setting of poor oral intake  -Nephrology was managing, placed on IV isotonic fluid with improvement  -Fluids will be discontinued.  -Nephrology signed off  -Will continue to trend sodium    Severe malnutrition:  -Nutrition following, appreciate recommendations.     LOS: Unknown    Rupa Khoury MD

## 2024-07-07 VITALS
DIASTOLIC BLOOD PRESSURE: 84 MMHG | TEMPERATURE: 97.6 F | WEIGHT: 139.9 LBS | BODY MASS INDEX: 25.75 KG/M2 | SYSTOLIC BLOOD PRESSURE: 155 MMHG | HEIGHT: 62 IN | RESPIRATION RATE: 18 BRPM | HEART RATE: 120 BPM | OXYGEN SATURATION: 92 %

## 2024-07-07 LAB
ANION GAP SERPL CALC-SCNC: 11 MMOL/L (ref 10–20)
BUN SERPL-MCNC: 11 MG/DL (ref 6–23)
CALCIUM SERPL-MCNC: 8.2 MG/DL (ref 8.6–10.3)
CHLORIDE SERPL-SCNC: 96 MMOL/L (ref 98–107)
CO2 SERPL-SCNC: 27 MMOL/L (ref 21–32)
CREAT SERPL-MCNC: 0.56 MG/DL (ref 0.5–1.05)
EGFRCR SERPLBLD CKD-EPI 2021: >90 ML/MIN/1.73M*2
ERYTHROCYTE [DISTWIDTH] IN BLOOD BY AUTOMATED COUNT: 19.9 % (ref 11.5–14.5)
GLUCOSE BLD MANUAL STRIP-MCNC: 117 MG/DL (ref 74–99)
GLUCOSE BLD MANUAL STRIP-MCNC: 120 MG/DL (ref 74–99)
GLUCOSE BLD MANUAL STRIP-MCNC: 136 MG/DL (ref 74–99)
GLUCOSE SERPL-MCNC: 94 MG/DL (ref 74–99)
HCT VFR BLD AUTO: 23.5 % (ref 36–46)
HGB BLD-MCNC: 7.6 G/DL (ref 12–16)
MAGNESIUM SERPL-MCNC: 1.8 MG/DL (ref 1.6–2.4)
MCH RBC QN AUTO: 26.8 PG (ref 26–34)
MCHC RBC AUTO-ENTMCNC: 32.3 G/DL (ref 32–36)
MCV RBC AUTO: 83 FL (ref 80–100)
NRBC BLD-RTO: 0.5 /100 WBCS (ref 0–0)
PHOSPHATE SERPL-MCNC: 3.7 MG/DL (ref 2.5–4.9)
PLATELET # BLD AUTO: 465 X10*3/UL (ref 150–450)
POTASSIUM SERPL-SCNC: 4.5 MMOL/L (ref 3.5–5.3)
RBC # BLD AUTO: 2.84 X10*6/UL (ref 4–5.2)
SODIUM SERPL-SCNC: 129 MMOL/L (ref 136–145)
WBC # BLD AUTO: 12.1 X10*3/UL (ref 4.4–11.3)

## 2024-07-07 PROCEDURE — 2500000002 HC RX 250 W HCPCS SELF ADMINISTERED DRUGS (ALT 637 FOR MEDICARE OP, ALT 636 FOR OP/ED): Performed by: INTERNAL MEDICINE

## 2024-07-07 PROCEDURE — 2500000004 HC RX 250 GENERAL PHARMACY W/ HCPCS (ALT 636 FOR OP/ED)

## 2024-07-07 PROCEDURE — 94640 AIRWAY INHALATION TREATMENT: CPT

## 2024-07-07 PROCEDURE — 85027 COMPLETE CBC AUTOMATED: CPT | Performed by: INTERNAL MEDICINE

## 2024-07-07 PROCEDURE — 99233 SBSQ HOSP IP/OBS HIGH 50: CPT | Performed by: INTERNAL MEDICINE

## 2024-07-07 PROCEDURE — 2500000001 HC RX 250 WO HCPCS SELF ADMINISTERED DRUGS (ALT 637 FOR MEDICARE OP): Performed by: INTERNAL MEDICINE

## 2024-07-07 PROCEDURE — 83735 ASSAY OF MAGNESIUM: CPT | Performed by: INTERNAL MEDICINE

## 2024-07-07 PROCEDURE — 84100 ASSAY OF PHOSPHORUS: CPT | Performed by: INTERNAL MEDICINE

## 2024-07-07 PROCEDURE — 82947 ASSAY GLUCOSE BLOOD QUANT: CPT

## 2024-07-07 PROCEDURE — 2500000001 HC RX 250 WO HCPCS SELF ADMINISTERED DRUGS (ALT 637 FOR MEDICARE OP): Performed by: HOSPITALIST

## 2024-07-07 PROCEDURE — 80048 BASIC METABOLIC PNL TOTAL CA: CPT | Performed by: INTERNAL MEDICINE

## 2024-07-07 PROCEDURE — 2500000005 HC RX 250 GENERAL PHARMACY W/O HCPCS: Performed by: INTERNAL MEDICINE

## 2024-07-07 PROCEDURE — 1100000001 HC PRIVATE ROOM DAILY

## 2024-07-07 PROCEDURE — 2500000004 HC RX 250 GENERAL PHARMACY W/ HCPCS (ALT 636 FOR OP/ED): Performed by: INTERNAL MEDICINE

## 2024-07-07 PROCEDURE — C9113 INJ PANTOPRAZOLE SODIUM, VIA: HCPCS | Performed by: HOSPITALIST

## 2024-07-07 PROCEDURE — 2500000004 HC RX 250 GENERAL PHARMACY W/ HCPCS (ALT 636 FOR OP/ED): Performed by: HOSPITALIST

## 2024-07-07 ASSESSMENT — PAIN - FUNCTIONAL ASSESSMENT
PAIN_FUNCTIONAL_ASSESSMENT: 0-10

## 2024-07-07 ASSESSMENT — COGNITIVE AND FUNCTIONAL STATUS - GENERAL
MOBILITY SCORE: 14
WALKING IN HOSPITAL ROOM: A LITTLE
STANDING UP FROM CHAIR USING ARMS: A LITTLE
PERSONAL GROOMING: A LITTLE
DRESSING REGULAR UPPER BODY CLOTHING: A LITTLE
HELP NEEDED FOR BATHING: A LOT
MOVING TO AND FROM BED TO CHAIR: A LITTLE
WALKING IN HOSPITAL ROOM: A LOT
TOILETING: A LITTLE
TURNING FROM BACK TO SIDE WHILE IN FLAT BAD: A LITTLE
HELP NEEDED FOR BATHING: A LITTLE
CLIMB 3 TO 5 STEPS WITH RAILING: A LOT
DRESSING REGULAR LOWER BODY CLOTHING: A LOT
DAILY ACTIVITIY SCORE: 19
DAILY ACTIVITIY SCORE: 16
DRESSING REGULAR LOWER BODY CLOTHING: A LOT
STANDING UP FROM CHAIR USING ARMS: A LOT
TOILETING: A LOT
MOBILITY SCORE: 18
CLIMB 3 TO 5 STEPS WITH RAILING: A LOT
MOVING TO AND FROM BED TO CHAIR: A LOT
TURNING FROM BACK TO SIDE WHILE IN FLAT BAD: A LOT
PERSONAL GROOMING: A LITTLE

## 2024-07-07 ASSESSMENT — PAIN SCALES - GENERAL
PAINLEVEL_OUTOF10: 8
PAINLEVEL_OUTOF10: 10 - WORST POSSIBLE PAIN
PAINLEVEL_OUTOF10: 7
PAINLEVEL_OUTOF10: 8
PAINLEVEL_OUTOF10: 7
PAINLEVEL_OUTOF10: 7
PAINLEVEL_OUTOF10: 8
PAINLEVEL_OUTOF10: 8

## 2024-07-07 ASSESSMENT — PAIN DESCRIPTION - LOCATION
LOCATION: ABDOMEN

## 2024-07-07 ASSESSMENT — PAIN SCALES - WONG BAKER
WONGBAKER_NUMERICALRESPONSE: HURTS EVEN MORE
WONGBAKER_NUMERICALRESPONSE: HURTS WHOLE LOT
WONGBAKER_NUMERICALRESPONSE: HURTS WHOLE LOT

## 2024-07-07 ASSESSMENT — PAIN DESCRIPTION - DESCRIPTORS: DESCRIPTORS: ACHING

## 2024-07-07 NOTE — PROGRESS NOTES
Fernando Cuevas is a 63 y.o. female on day 16 of admission presenting with Perforated viscus.      Subjective   Patient seen and examined at the bedside this morning. No acute overnight events. No other questions or concerns.         Objective     Last Recorded Vitals  /81   Pulse (!) 117   Temp 36.9 °C (98.5 °F) (Temporal)   Resp 18   Wt 63.5 kg (139 lb 14.4 oz)   SpO2 98%   Intake/Output last 3 Shifts:    Intake/Output Summary (Last 24 hours) at 7/7/2024 1303  Last data filed at 7/7/2024 1213  Gross per 24 hour   Intake 4865.39 ml   Output 1550 ml   Net 3315.39 ml       Admission Weight  Weight: 64 kg (141 lb 1.5 oz) (06/20/24 2132)    Daily Weight  07/05/24 : 63.5 kg (139 lb 14.4 oz)    Image Results  Vascular US upper extremity venous duplex right  Narrative: Interpreted By:  Zack Sevilla,   STUDY:  [facility] US UPPER EXTREMITY VENOUS DUPLEX RIGHT  7/6/2024 2:48 pm      INDICATION:  62 y/o   F with  Signs/Symptoms:Swelling.      COMPARISON:  None.      ACCESSION NUMBER(S):  [id number]      ORDERING CLINICIAN:  DELPHINE VELÁZQUEZ      TECHNIQUE:  Routine ultrasound of the right upper extremity was performed with  duplex Doppler (color and spectral) evaluation.   Static images were  obtained for remote interpretation.      FINDINGS:  UPPER EXTREMITY VEINS:  Examination was performed with color and  duplex Doppler.      Nonocclusive deep vein thrombosis is noted in the right brachial  vein. All other deep veins in the right upper extremity are patent  and compressible with no thrombosis. There is a PICC line in the  internal jugular vein.      Impression: Nonocclusive thrombosis involving the right brachial vein      MACRO:  None      Signed by: Zack Sevilla 7/6/2024 3:03 PM  Dictation workstation:   [id number]      Physical Exam  Constitutional:       General: She is not in acute distress.  HENT:      Head: Normocephalic and atraumatic.   Eyes:      Conjunctiva/sclera: Conjunctivae normal.      Pupils:  Pupils are equal, round, and reactive to light.   Cardiovascular:      Rate and Rhythm: Normal rate and regular rhythm.   Pulmonary:      Effort: Pulmonary effort is normal.      Breath sounds: Normal breath sounds.   Abdominal:      General: There is distension.      Palpations: Abdomen is soft.   Skin:     General: Skin is warm and dry.   Neurological:      Mental Status: She is alert.   Psychiatric:         Mood and Affect: Mood normal.         Behavior: Behavior normal.         Relevant Results           Scheduled medications  aspirin, 81 mg, oral, Daily  budesonide, 0.25 mg, nebulization, Daily   And  formoterol, 20 mcg, nebulization, BID  busPIRone, 5 mg, oral, BID  calcium carbonate, 1,500 mg, oral, Daily  dilTIAZem CD, 240 mg, oral, Daily  enoxaparin, 1 mg/kg, subcutaneous, q12h  folic acid, 1 mg, oral, Daily  hydrocortisone sodium succinate, 200 mg, intravenous, Once  HYDROmorphone, 0.5 mg, intravenous, Once  ipratropium-albuteroL, 3 mL, nebulization, TID  lisinopril, 10 mg, oral, Daily  montelukast, 10 mg, oral, Daily  multivitamin with minerals, 1 tablet, oral, Daily  nortriptyline, 50 mg, oral, Nightly  oxybutynin, 5 mg, oral, Daily  oxygen, , inhalation, Continuous - Inhalation  pantoprazole, 40 mg, intravenous, Daily before breakfast  piperacillin-tazobactam, 3.375 g, intravenous, q6h  thiamine, 100 mg, oral, Daily  thiamine, 100 mg, oral, Daily  varenicline, 1 mg, oral, BID      Continuous medications  Adult Clinimix Parenteral Nutrition, 83 mL/hr, Last Rate: 83 mL/hr (07/07/24 0500)  Adult Clinimix Parenteral Nutrition, 83 mL/hr      PRN medications  PRN medications: acetaminophen **OR** acetaminophen **OR** acetaminophen, alteplase, benzonatate, guaiFENesin, HYDROmorphone, HYDROmorphone, hydrOXYzine HCL, ipratropium-albuteroL, morphine, morphine, nitroglycerin, ondansetron, phenoL    Assessment/Plan      Principal Problem:    Perforated viscus  Active Problems:    Thrombocytosis    Perforated small  bowel's with peritonitis:  Concern for postop ileus versus partial obstruction:  Underwent surgical intervention 6/21  -Underwent small bowel resection, and washout procedure.  -Culture for Candida albicans, concern for mix of gram-negative and anaerobes.  -Infectious disease following, continued on IV Zosyn, fluconazole 200 mg/day.  Plan for antibiotics with stop date 7/3/24.,  This will be a total of 10 days from the initiation of fluconazole.  Infectious diseases recommended discharge home once medically ready on p.o. Augmentin 875 mg 2 times a day instead of Zosyn if needed.  -General surgery following for postop ileus versus partial SBO, appreciate recommendations.  Enema and chewing gum added.    -Will continue to trend CBC  -Dietary following, appreciate recommendations.   -wound VAC   -NG tube removed  -tolerating oral intake    # Anemia  # Abdominal wall hematoma  -blood loss  -s/p blood transfusion    Acute DVT in the left distal brachial vein:  -Vascular upper extremity ultrasound with acute finding, non-occlusive.  -Patient initiated on full dose Lovenox therapeutic dose and 6/27. Will need to be transitioned to oral regimen prior to discharge.   -Patient with contrast allergy, CT angio chest requiring prep, no acute findings.    -on lovenox    Acute on chronic hyponatremia, resolved:  In setting of poor oral intake  -Nephrology was managing, placed on IV isotonic fluid with improvement  -Fluids will be discontinued.  -Nephrology signed off  -Will continue to trend sodium    Severe malnutrition:  -Nutrition following, appreciate recommendations.     LOS: Unknown    Rupa Khoury MD

## 2024-07-07 NOTE — CARE PLAN
The patient's goals for the shift include      The clinical goals for the shift include maintain comfort and safety througout shift    Problem: Pain - Adult  Goal: Verbalizes/displays adequate comfort level or baseline comfort level  Outcome: Progressing     Problem: Safety - Adult  Goal: Free from fall injury  Outcome: Progressing     Problem: Discharge Planning  Goal: Discharge to home or other facility with appropriate resources  Outcome: Progressing     Problem: Chronic Conditions and Co-morbidities  Goal: Patient's chronic conditions and co-morbidity symptoms are monitored and maintained or improved  Outcome: Progressing     Problem: Pain  Goal: Takes deep breaths with improved pain control throughout the shift  Outcome: Progressing  Goal: Turns in bed with improved pain control throughout the shift  Outcome: Progressing  Goal: Walks with improved pain control throughout the shift  Outcome: Progressing  Goal: Performs ADL's with improved pain control throughout shift  Outcome: Progressing  Goal: Participates in PT with improved pain control throughout the shift  Outcome: Progressing  Goal: Free from opioid side effects throughout the shift  Outcome: Progressing  Goal: Free from acute confusion related to pain meds throughout the shift  Outcome: Progressing     Problem: Fall/Injury  Goal: Not fall by end of shift  Outcome: Progressing  Goal: Be free from injury by end of the shift  Outcome: Progressing  Goal: Verbalize understanding of personal risk factors for fall in the hospital  Outcome: Progressing  Goal: Verbalize understanding of risk factor reduction measures to prevent injury from fall in the home  Outcome: Progressing  Goal: Use assistive devices by end of the shift  Outcome: Progressing  Goal: Pace activities to prevent fatigue by end of the shift  Outcome: Progressing     Problem: Skin  Goal: Decreased wound size/increased tissue granulation at next dressing change  Outcome: Progressing  Goal:  Participates in plan/prevention/treatment measures  Outcome: Progressing  Goal: Prevent/manage excess moisture  Outcome: Progressing  Goal: Prevent/minimize sheer/friction injuries  Outcome: Progressing  Goal: Promote/optimize nutrition  Outcome: Progressing  Goal: Promote skin healing  Outcome: Progressing

## 2024-07-07 NOTE — PROGRESS NOTES
"    Preventive Care at the 5 Year Visit  Growth Percentiles & Measurements   Weight: 46 lbs 0 oz / 20.2 kg (actual weight) / 70 %ile based on CDC 2-20 Years weight-for-age data using vitals from 4/13/2018.   Length: 3' 8.724\" / 0 cm 64 %ile based on CDC 2-20 Years stature-for-age data using vitals from 4/13/2018.   BMI: Body mass index is 16.17 kg/(m^2). 72 %ile based on CDC 2-20 Years BMI-for-age data using vitals from 4/13/2018.   Blood Pressure: Blood pressure percentiles are 54.8 % systolic and 75.5 % diastolic based on NHBPEP's 4th Report.     Your child s next Preventive Check-up will be at 6-7 years of age    Development      Your child is more coordinated and has better balance. He can usually get dressed alone (except for tying shoelaces).    Your child can brush his teeth alone. Make sure to check your child s molars. Your child should spit out the toothpaste.    Your child will push limits you set, but will feel secure within these limits.    Your child should have had  screening with your school district. Your health care provider can help you assess school readiness. Signs your child may be ready for  include:     plays well with other children     follows simple directions and rules and waits for his turn     can be away from home for half a day    Read to your child every day at least 15 minutes.    Limit the time your child watches TV to 1 to 2 hours or less each day. This includes video and computer games. Supervise the TV shows/videos your child watches.    Encourage writing and drawing. Children at this age can often write their own name and recognize most letters of the alphabet. Provide opportunities for your child to tell simple stories and sing children s songs.    Diet      Encourage good eating habits. Lead by example! Do not make  special  separate meals for him.    Offer your child nutritious snacks such as fruits, vegetables, yogurt, turkey, or cheese.  Remember, " Continues to do well.  Tolerating regular diet but not taking much in    Having loose stools    Abdomen remains distended, nontender    Advance diet    Impression: Postop ileus, appears to be improving    Up out of bed to chair, ambulate   snacks are not an essential part of the daily diet and do add to the total calories consumed each day.  Be careful. Do not over feed your child. Avoid foods high in sugar or fat. Cut up any food that could cause choking.    Let your child help plan and make simple meals. He can set and clean up the table, pour cereal or make sandwiches. Always supervise any kitchen activity.    Make mealtime a pleasant time.    Restrict pop to rare occasions. Limit juice to 4 to 6 ounces a day.    Sleep      Children thrive on routine. Continue a routine which includes may include bathing, teeth brushing and reading. Avoid active play least 30 minutes before settling down.    Make sure you have enough light for your child to find his way to the bathroom at night.     Your child needs about ten hours of sleep each night.    Exercise      The American Heart Association recommends children get 60 minutes of moderate to vigorous physical activity each day. This time can be divided into chunks: 30 minutes physical education in school, 10 minutes playing catch, and a 20-minute family walk.    In addition to helping build strong bones and muscles, regular exercise can reduce risks of certain diseases, reduce stress levels, increase self-esteem, help maintain a healthy weight, improve concentration, and help maintain good cholesterol levels.    Safety    Your child needs to be in a car seat or booster seat until he is 4 feet 9 inches (57 inches) tall.  Be sure all other adults and children are buckled as well.    Make sure your child wears a bicycle helmet any time he rides a bike.    Make sure your child wears a helmet and pads any time he uses in-line skates or roller-skates.    Practice bus and street safety.    Practice home fire drills and fire safety.    Supervise your child at playgrounds. Do not let your child play outside alone. Teach your child what to do if a stranger comes up to him. Warn your child never to go with a stranger  or accept anything from a stranger. Teach your child to say  NO  and tell an adult he trusts.    Enroll your child in swimming lessons, if appropriate. Teach your child water safety. Make sure your child is always supervised and wears a life jacket whenever around a lake or river.    Teach your child animal safety.    Have your child practice his or her name, address, phone number. Teach him how to dial 9-1-1.    Keep all guns out of your child s reach. Keep guns and ammunition locked up in different parts of the house.     Self-esteem    Provide support, attention and enthusiasm for your child s abilities and achievements.    Create a schedule of simple chores for your child -- cleaning his room, helping to set the table, helping to care for a pet, etc. Have a reward system and be flexible but consistent expectations. Do not use food as a reward.    Discipline    Time outs are still effective discipline. A time out is usually 1 minute for each year of age. If your child needs a time out, set a kitchen timer for 5 minutes. Place your child in a dull place (such as a hallway or corner of a room). Make sure the room is free of any potential dangers. Be sure to look for and praise good behavior shortly after the time out is over.    Always address the behavior. Do not praise or reprimand with general statements like  You are a good girl  or  You are a naughty boy.  Be specific in your description of the behavior.    Use logical consequences, whenever possible. Try to discuss which behaviors have consequences and talk to your child.    Choose your battles.    Use discipline to teach, not punish. Be fair and consistent with discipline.    Dental Care     Have your child brush his teeth every day, preferably before bedtime.    May start to lose baby teeth.  First tooth may become loose between ages 5 and 7.    Make regular dental appointments for cleanings and check-ups. (Your child may need fluoride tablets if you have  well water.)

## 2024-07-08 ENCOUNTER — APPOINTMENT (OUTPATIENT)
Dept: CARDIOLOGY | Facility: HOSPITAL | Age: 64
End: 2024-07-08
Payer: COMMERCIAL

## 2024-07-08 LAB
ANION GAP SERPL CALC-SCNC: 12 MMOL/L (ref 10–20)
ATRIAL RATE: 114 BPM
BUN SERPL-MCNC: 12 MG/DL (ref 6–23)
CALCIUM SERPL-MCNC: 8.4 MG/DL (ref 8.6–10.3)
CHLORIDE SERPL-SCNC: 94 MMOL/L (ref 98–107)
CO2 SERPL-SCNC: 28 MMOL/L (ref 21–32)
CREAT SERPL-MCNC: 0.71 MG/DL (ref 0.5–1.05)
EGFRCR SERPLBLD CKD-EPI 2021: >90 ML/MIN/1.73M*2
GLUCOSE BLD MANUAL STRIP-MCNC: 115 MG/DL (ref 74–99)
GLUCOSE BLD MANUAL STRIP-MCNC: 123 MG/DL (ref 74–99)
GLUCOSE BLD MANUAL STRIP-MCNC: 123 MG/DL (ref 74–99)
GLUCOSE BLD MANUAL STRIP-MCNC: 153 MG/DL (ref 74–99)
GLUCOSE SERPL-MCNC: 114 MG/DL (ref 74–99)
MAGNESIUM SERPL-MCNC: 1.8 MG/DL (ref 1.6–2.4)
P AXIS: 88 DEGREES
P OFFSET: 198 MS
P ONSET: 154 MS
PHOSPHATE SERPL-MCNC: 5 MG/DL (ref 2.5–4.9)
POTASSIUM SERPL-SCNC: 4.7 MMOL/L (ref 3.5–5.3)
PR INTERVAL: 140 MS
Q ONSET: 224 MS
QRS COUNT: 19 BEATS
QRS DURATION: 76 MS
QT INTERVAL: 306 MS
QTC CALCULATION(BAZETT): 421 MS
QTC FREDERICIA: 378 MS
R AXIS: 83 DEGREES
SODIUM SERPL-SCNC: 129 MMOL/L (ref 136–145)
T AXIS: 75 DEGREES
T OFFSET: 377 MS
VENTRICULAR RATE: 114 BPM

## 2024-07-08 PROCEDURE — 2500000004 HC RX 250 GENERAL PHARMACY W/ HCPCS (ALT 636 FOR OP/ED): Performed by: HOSPITALIST

## 2024-07-08 PROCEDURE — 93010 ELECTROCARDIOGRAM REPORT: CPT | Performed by: INTERNAL MEDICINE

## 2024-07-08 PROCEDURE — C9113 INJ PANTOPRAZOLE SODIUM, VIA: HCPCS | Performed by: HOSPITALIST

## 2024-07-08 PROCEDURE — 94668 MNPJ CHEST WALL SBSQ: CPT

## 2024-07-08 PROCEDURE — 1200000002 HC GENERAL ROOM WITH TELEMETRY DAILY

## 2024-07-08 PROCEDURE — 2500000002 HC RX 250 W HCPCS SELF ADMINISTERED DRUGS (ALT 637 FOR MEDICARE OP, ALT 636 FOR OP/ED): Performed by: INTERNAL MEDICINE

## 2024-07-08 PROCEDURE — 80048 BASIC METABOLIC PNL TOTAL CA: CPT | Performed by: INTERNAL MEDICINE

## 2024-07-08 PROCEDURE — 94760 N-INVAS EAR/PLS OXIMETRY 1: CPT

## 2024-07-08 PROCEDURE — 2500000004 HC RX 250 GENERAL PHARMACY W/ HCPCS (ALT 636 FOR OP/ED)

## 2024-07-08 PROCEDURE — 99232 SBSQ HOSP IP/OBS MODERATE 35: CPT

## 2024-07-08 PROCEDURE — 2500000001 HC RX 250 WO HCPCS SELF ADMINISTERED DRUGS (ALT 637 FOR MEDICARE OP): Performed by: INTERNAL MEDICINE

## 2024-07-08 PROCEDURE — 93005 ELECTROCARDIOGRAM TRACING: CPT

## 2024-07-08 PROCEDURE — 84100 ASSAY OF PHOSPHORUS: CPT | Performed by: INTERNAL MEDICINE

## 2024-07-08 PROCEDURE — 82947 ASSAY GLUCOSE BLOOD QUANT: CPT

## 2024-07-08 PROCEDURE — 2500000004 HC RX 250 GENERAL PHARMACY W/ HCPCS (ALT 636 FOR OP/ED): Performed by: INTERNAL MEDICINE

## 2024-07-08 PROCEDURE — 94640 AIRWAY INHALATION TREATMENT: CPT

## 2024-07-08 PROCEDURE — 2500000005 HC RX 250 GENERAL PHARMACY W/O HCPCS: Performed by: INTERNAL MEDICINE

## 2024-07-08 PROCEDURE — 83735 ASSAY OF MAGNESIUM: CPT | Performed by: INTERNAL MEDICINE

## 2024-07-08 PROCEDURE — 2500000001 HC RX 250 WO HCPCS SELF ADMINISTERED DRUGS (ALT 637 FOR MEDICARE OP): Performed by: HOSPITALIST

## 2024-07-08 PROCEDURE — 99233 SBSQ HOSP IP/OBS HIGH 50: CPT | Performed by: INTERNAL MEDICINE

## 2024-07-08 ASSESSMENT — PAIN DESCRIPTION - ORIENTATION
ORIENTATION: MID;LOWER

## 2024-07-08 ASSESSMENT — COGNITIVE AND FUNCTIONAL STATUS - GENERAL
DAILY ACTIVITIY SCORE: 19
STANDING UP FROM CHAIR USING ARMS: A LITTLE
TOILETING: A LITTLE
TURNING FROM BACK TO SIDE WHILE IN FLAT BAD: A LITTLE
DRESSING REGULAR LOWER BODY CLOTHING: A LOT
MOBILITY SCORE: 17
CLIMB 3 TO 5 STEPS WITH RAILING: TOTAL
HELP NEEDED FOR BATHING: A LITTLE
MOVING TO AND FROM BED TO CHAIR: A LITTLE
DRESSING REGULAR UPPER BODY CLOTHING: A LITTLE
WALKING IN HOSPITAL ROOM: A LITTLE

## 2024-07-08 ASSESSMENT — PAIN DESCRIPTION - LOCATION
LOCATION: BACK
LOCATION: BACK
LOCATION: ABDOMEN
LOCATION: BACK
LOCATION: BACK

## 2024-07-08 ASSESSMENT — PAIN - FUNCTIONAL ASSESSMENT
PAIN_FUNCTIONAL_ASSESSMENT: 0-10

## 2024-07-08 ASSESSMENT — PAIN SCALES - GENERAL
PAINLEVEL_OUTOF10: 7
PAINLEVEL_OUTOF10: 10 - WORST POSSIBLE PAIN
PAINLEVEL_OUTOF10: 0 - NO PAIN
PAINLEVEL_OUTOF10: 10 - WORST POSSIBLE PAIN
PAINLEVEL_OUTOF10: 7
PAINLEVEL_OUTOF10: 0 - NO PAIN
PAINLEVEL_OUTOF10: 8
PAINLEVEL_OUTOF10: 8
PAINLEVEL_OUTOF10: 6
PAINLEVEL_OUTOF10: 10 - WORST POSSIBLE PAIN
PAINLEVEL_OUTOF10: 10 - WORST POSSIBLE PAIN

## 2024-07-08 ASSESSMENT — PAIN DESCRIPTION - DESCRIPTORS: DESCRIPTORS: ACHING

## 2024-07-08 NOTE — PROGRESS NOTES
"Physical Therapy                 Therapy Communication Note    Patient Name: Fernando Cuevas  MRN: [id number]  Today's Date: 7/8/2024     Discipline: Physical Therapy    Missed Visit Reason: Missed Visit Reason: Patient refused (Pt stating she will only participate in therapy before 9:00 a.m.   After she receives Meds and fluids at 9:00  she \"is not able to particpate\" due to their side effects.  She made it clear she will not particpate at any other time of the day.)    Missed Time: Attempt      "

## 2024-07-08 NOTE — CARE PLAN
The patient's goals for the shift include      The clinical goals for the shift include Patient pain control to be taken for shift      Problem: Pain - Adult  Goal: Verbalizes/displays adequate comfort level or baseline comfort level  Outcome: Progressing     Problem: Safety - Adult  Goal: Free from fall injury  Outcome: Progressing     Problem: Discharge Planning  Goal: Discharge to home or other facility with appropriate resources  Outcome: Progressing     Problem: Chronic Conditions and Co-morbidities  Goal: Patient's chronic conditions and co-morbidity symptoms are monitored and maintained or improved  Outcome: Progressing     Problem: Pain  Goal: Takes deep breaths with improved pain control throughout the shift  Outcome: Progressing  Goal: Turns in bed with improved pain control throughout the shift  Outcome: Progressing  Goal: Walks with improved pain control throughout the shift  Outcome: Progressing  Goal: Performs ADL's with improved pain control throughout shift  Outcome: Progressing  Goal: Participates in PT with improved pain control throughout the shift  Outcome: Progressing  Goal: Free from opioid side effects throughout the shift  Outcome: Progressing  Goal: Free from acute confusion related to pain meds throughout the shift  Outcome: Progressing     Problem: Fall/Injury  Goal: Not fall by end of shift  Outcome: Progressing  Goal: Be free from injury by end of the shift  Outcome: Progressing  Goal: Verbalize understanding of personal risk factors for fall in the hospital  Outcome: Progressing  Goal: Verbalize understanding of risk factor reduction measures to prevent injury from fall in the home  Outcome: Progressing  Goal: Use assistive devices by end of the shift  Outcome: Progressing  Goal: Pace activities to prevent fatigue by end of the shift  Outcome: Progressing     Problem: Skin  Goal: Decreased wound size/increased tissue granulation at next dressing change  Outcome: Progressing  Goal:  Participates in plan/prevention/treatment measures  Outcome: Progressing  Goal: Prevent/manage excess moisture  Outcome: Progressing  Goal: Prevent/minimize sheer/friction injuries  Outcome: Progressing  Goal: Promote/optimize nutrition  Outcome: Progressing  Goal: Promote skin healing  Outcome: Progressing

## 2024-07-08 NOTE — PROGRESS NOTES
Occupational Therapy                 Therapy Communication Note    Patient Name: Fernando Cuevas  MRN: [id number]  Today's Date: 7/8/2024     Discipline: Occupational Therapy    Missed Visit Reason: Patient refused (Attempted to see patient for follow up treatment, however patient refused multiple times with max encouragement. Patient stated she did not feel well and cannot participate in therapy this date.)    Missed Time: Attempt    Comment:

## 2024-07-08 NOTE — PROGRESS NOTES
Does not feel well today.  She does not offer any 1 specific complaint    Tolerating a diet    Having bowel movements    Abdomen slightly distended, soft, nontender.  VAC in place    Suggest up out of bed to chair    ambulate

## 2024-07-08 NOTE — PROGRESS NOTES
Fernando Cuevas is a 63 y.o. female on day 17 of admission presenting with Perforated viscus.      Subjective   Patient seen and examined at the bedside this morning. No acute overnight events. No other questions or concerns.         Objective     Last Recorded Vitals  /71   Pulse (!) 112   Temp 36.3 °C (97.3 °F) (Temporal)   Resp 17   Wt 63.5 kg (139 lb 14.4 oz)   SpO2 93%   Intake/Output last 3 Shifts:    Intake/Output Summary (Last 24 hours) at 7/8/2024 1235  Last data filed at 7/8/2024 1156  Gross per 24 hour   Intake 2977.97 ml   Output 2000 ml   Net 977.97 ml       Admission Weight  Weight: 64 kg (141 lb 1.5 oz) (06/20/24 2132)    Daily Weight  07/05/24 : 63.5 kg (139 lb 14.4 oz)    Image Results  Electrocardiogram, 12-lead PRN ACS symptoms  Sinus tachycardia  Right atrial enlargement  Borderline ECG  When compared with ECG of 20-JUN-2024 21:19,  No significant change was found      Physical Exam  Constitutional:       General: She is not in acute distress.  HENT:      Head: Normocephalic and atraumatic.   Eyes:      Conjunctiva/sclera: Conjunctivae normal.      Pupils: Pupils are equal, round, and reactive to light.   Cardiovascular:      Rate and Rhythm: Normal rate and regular rhythm.   Pulmonary:      Effort: Pulmonary effort is normal.      Breath sounds: Normal breath sounds.   Abdominal:      General: There is distension.      Palpations: Abdomen is soft.   Skin:     General: Skin is warm and dry.   Neurological:      Mental Status: She is alert.   Psychiatric:         Mood and Affect: Mood normal.         Behavior: Behavior normal.         Relevant Results           Scheduled medications  aspirin, 81 mg, oral, Daily  budesonide, 0.25 mg, nebulization, Daily   And  formoterol, 20 mcg, nebulization, BID  busPIRone, 5 mg, oral, BID  calcium carbonate, 1,500 mg, oral, Daily  dilTIAZem CD, 240 mg, oral, Daily  enoxaparin, 1 mg/kg, subcutaneous, q12h  folic acid, 1 mg, oral, Daily  hydrocortisone  sodium succinate, 200 mg, intravenous, Once  HYDROmorphone, 0.5 mg, intravenous, Once  ipratropium-albuteroL, 3 mL, nebulization, TID  lisinopril, 10 mg, oral, Daily  montelukast, 10 mg, oral, Daily  multivitamin with minerals, 1 tablet, oral, Daily  nortriptyline, 50 mg, oral, Nightly  oxybutynin, 5 mg, oral, Daily  oxygen, , inhalation, Continuous - Inhalation  pantoprazole, 40 mg, intravenous, Daily before breakfast  piperacillin-tazobactam, 3.375 g, intravenous, q6h  thiamine, 100 mg, oral, Daily  thiamine, 100 mg, oral, Daily  varenicline, 1 mg, oral, BID      Continuous medications  Adult Clinimix Parenteral Nutrition, 83 mL/hr, Last Rate: 83 mL/hr (07/08/24 0500)      PRN medications  PRN medications: acetaminophen **OR** acetaminophen **OR** acetaminophen, alteplase, benzonatate, guaiFENesin, HYDROmorphone, HYDROmorphone, hydrOXYzine HCL, ipratropium-albuteroL, morphine, morphine, nitroglycerin, ondansetron, phenoL    Assessment/Plan      Principal Problem:    Perforated viscus  Active Problems:    Thrombocytosis    Perforated small bowel's with peritonitis:  Concern for postop ileus versus partial obstruction:  Underwent surgical intervention 6/21  -Underwent small bowel resection, and washout procedure.  -Culture for Candida albicans, concern for mix of gram-negative and anaerobes.  -Infectious disease following, continued on IV Zosyn, fluconazole 200 mg/day.  Plan for antibiotics with stop date 7/3/24.,  This will be a total of 10 days from the initiation of fluconazole.  Infectious diseases recommended discharge home once medically ready on p.o. Augmentin 875 mg 2 times a day instead of Zosyn if needed.  -General surgery following for postop ileus versus partial SBO, appreciate recommendations.  Enema and chewing gum added.    -Will continue to trend CBC  -Dietary following, appreciate recommendations.   -wound VAC   -NG tube removed  -tolerating oral intake    # Anemia  # Abdominal wall hematoma  -blood  loss  -s/p blood transfusion    Acute DVT in the left distal brachial vein:  -Vascular upper extremity ultrasound with acute finding, non-occlusive.  -Patient initiated on full dose Lovenox therapeutic dose and 6/27. Will need to be transitioned to oral regimen prior to discharge.   -Patient with contrast allergy, CT angio chest requiring prep, no acute findings.    -on lovenox  -switch to OAC on discharge    Acute on chronic hyponatremia, resolved:  In setting of poor oral intake  -Nephrology was managing, placed on IV isotonic fluid with improvement  -Fluids will be discontinued.  -Nephrology signed off  -Will continue to trend sodium    Severe malnutrition:  -Nutrition following, appreciate recommendations.     LOS: Unknown    Rupa Khoury MD

## 2024-07-08 NOTE — PROGRESS NOTES
Music Therapy Note    Fernando Cuevas was referred by [facility] She[facility]on    Therapy Session  Referral Type: New referral this admission  Visit Type: New visit  Session Start Time: 1318  Intervention Delivery: In-person  Conflict of Service: Working with other staff          Narrative  Follow-up: Will continue to attempt.    Education Documentation  No documentation found.

## 2024-07-08 NOTE — PROGRESS NOTES
07/08/24 1349   Discharge Planning   Home or Post Acute Services Post acute facilities (Rehab/SNF/etc)   Type of Post Acute Facility Services Skilled nursing   Type of Home Care Services Home nursing visits   Patient expects to be discharged to: SNF vs home: depends on PPN being discontinued     Transitioning to a soft diet and off the PPN.  If tolerating diet, will discharge home with no home care needs in a few days.  TCC to follow for discharge plan.  Lizette Blank RN

## 2024-07-08 NOTE — CARE PLAN
The patient's goals for the shift include      The clinical goals for the shift include pain management      Problem: Pain - Adult  Goal: Verbalizes/displays adequate comfort level or baseline comfort level  Outcome: Progressing       Problem: Discharge Planning  Goal: Discharge to home or other facility with appropriate resources  Outcome: Progressing     Problem: Chronic Conditions and Co-morbidities  Goal: Patient's chronic conditions and co-morbidity symptoms are monitored and maintained or improved  Outcome: Progressing     Problem: Pain  Goal: Takes deep breaths with improved pain control throughout the shift  Outcome: Progressing  Goal: Turns in bed with improved pain control throughout the shift  Outcome: Progressing  Goal: Walks with improved pain control throughout the shift  Outcome: Progressing  Goal: Performs ADL's with improved pain control throughout shift  Outcome: Progressing  Goal: Participates in PT with improved pain control throughout the shift  Outcome: Progressing  Goal: Free from opioid side effects throughout the shift  Outcome: Progressing  Goal: Free from acute confusion related to pain meds throughout the shift  Outcome: Progressing       Problem: Skin  Goal: Decreased wound size/increased tissue granulation at next dressing change  Outcome: Progressing  Goal: Participates in plan/prevention/treatment measures  Outcome: Progressing  Goal: Prevent/manage excess moisture  Outcome: Progressing  Goal: Prevent/minimize sheer/friction injuries  Outcome: Progressing  Goal: Promote/optimize nutrition  Outcome: Progressing  Goal: Promote skin healing  Outcome: Progressing

## 2024-07-08 NOTE — PROGRESS NOTES
Nutrition Follow-up Note     Chart reviewed.  Per notes tolerating soft diet however poor appetite, minimal intake.  7/5 pt on full liquid diet, PPN with lipids  7/6 advanced to low fiber diet. PPN continued as ordered over the weekend.    Trialed Ensure this am, pt did ok.    Plan to not renew PPN, added calorie count and Ensure to better determine oral intake since gut is now working.  Consider appetite stimulant.    Results for orders placed or performed during the hospital encounter of 06/20/24 (from the past 24 hour(s))   POCT GLUCOSE   Result Value Ref Range    POCT Glucose 120 (H) 74 - 99 mg/dL   POCT GLUCOSE   Result Value Ref Range    POCT Glucose 123 (H) 74 - 99 mg/dL   Electrocardiogram, 12-lead PRN ACS symptoms   Result Value Ref Range    Ventricular Rate 114 BPM    Atrial Rate 114 BPM    UT Interval 140 ms    QRS Duration 76 ms    QT Interval 306 ms    QTC Calculation(Bazett) 421 ms    P Axis 88 degrees    R Axis 83 degrees    T Axis 75 degrees    QRS Count 19 beats    Q Onset 224 ms    P Onset 154 ms    P Offset 198 ms    T Offset 377 ms    QTC Fredericia 378 ms   POCT GLUCOSE   Result Value Ref Range    POCT Glucose 123 (H) 74 - 99 mg/dL   Basic metabolic panel   Result Value Ref Range    Glucose 114 (H) 74 - 99 mg/dL    Sodium 129 (L) 136 - 145 mmol/L    Potassium 4.7 3.5 - 5.3 mmol/L    Chloride 94 (L) 98 - 107 mmol/L    Bicarbonate 28 21 - 32 mmol/L    Anion Gap 12 10 - 20 mmol/L    Urea Nitrogen 12 6 - 23 mg/dL    Creatinine 0.71 0.50 - 1.05 mg/dL    eGFR >90 >60 mL/min/1.73m*2    Calcium 8.4 (L) 8.6 - 10.3 mg/dL   Magnesium   Result Value Ref Range    Magnesium 1.80 1.60 - 2.40 mg/dL   Phosphorus   Result Value Ref Range    Phosphorus 5.0 (H) 2.5 - 4.9 mg/dL   POCT GLUCOSE   Result Value Ref Range    POCT Glucose 115 (H) 74 - 99 mg/dL     Scheduled medications  aspirin, 81 mg, oral, Daily  budesonide, 0.25 mg, nebulization, Daily   And  formoterol, 20 mcg, nebulization, BID  busPIRone, 5 mg,  "oral, BID  calcium carbonate, 1,500 mg, oral, Daily  dilTIAZem CD, 240 mg, oral, Daily  enoxaparin, 1 mg/kg, subcutaneous, q12h  folic acid, 1 mg, oral, Daily  hydrocortisone sodium succinate, 200 mg, intravenous, Once  HYDROmorphone, 0.5 mg, intravenous, Once  ipratropium-albuteroL, 3 mL, nebulization, TID  lisinopril, 10 mg, oral, Daily  montelukast, 10 mg, oral, Daily  multivitamin with minerals, 1 tablet, oral, Daily  nortriptyline, 50 mg, oral, Nightly  oxybutynin, 5 mg, oral, Daily  oxygen, , inhalation, Continuous - Inhalation  pantoprazole, 40 mg, intravenous, Daily before breakfast  piperacillin-tazobactam, 3.375 g, intravenous, q6h  thiamine, 100 mg, oral, Daily  thiamine, 100 mg, oral, Daily  varenicline, 1 mg, oral, BID      Continuous medications  Adult Clinimix Parenteral Nutrition, 83 mL/hr, Last Rate: 83 mL/hr (07/08/24 0500)      PRN medications  PRN medications: acetaminophen **OR** acetaminophen **OR** acetaminophen, alteplase, benzonatate, guaiFENesin, HYDROmorphone, HYDROmorphone, hydrOXYzine HCL, ipratropium-albuteroL, morphine, morphine, nitroglycerin, ondansetron, phenoL  Dietary Orders (From admission, onward)       Start     Ordered    07/08/24 1223  Calorie count  Once         07/08/24 1222    07/08/24 1222  Oral nutritional supplements  Until discontinued        Question Answer Comment   Deliver with All meals    Select supplement: Ensure Plus High Protein        07/08/24 1222    07/06/24 1230  Adult diet Low fiber  Diet effective now        Question:  Diet type  Answer:  Low fiber    07/06/24 1229                  History:  Food and Nutrient History  Energy Intake: Poor < 50 %    Food and Nutrient Administration History  Additional Food and Nutrient Administration History: regular cardiac diet previous admission 6/11-6/19/24    Anthropometrics:  Height: 157.5 cm (5' 2.01\")  Weight: 63.5 kg (139 lb 14.4 oz)  BMI (Calculated): 25.58    Weight Change: -0.85    Weight Change  Weight History " / % Weight Change: 64 kg 6/27/24, 64.6 kg 5/10/24, 61.2kg 1/9/24, 59.9kg 6/21/23  Significant Weight Loss: No    IBW/kg (Dietitian Calculated): 50 kg  Percent of IBW: 128 %    Estimated Energy Needs  Method for Estimating Needs: 8588-5850 kcal/day (25-30)    Estimated Protein Needs  Total Protein Estimated Needs (g): 75 g  Total Protein Estimated Needs (g/kg): 1.5 g/kg    Estimated Fluid Needs  Method for Estimating Needs: per MD    Nutrition Focused Physical Findings:  Subcutaneous Fat Loss  Orbital Fat Pads: Mild-Moderate (slight dark circles and slight hollowing)  Buccal Fat Pads: Well nourished (full, rounded cheeks)    Muscle Wasting  Temporalis: Mild-Moderate (slight depression)  Pectoralis (Clavicular Region): Mild-Moderate (some protrusion of clavicle)  Deltoid/Trapezius: Mild-Moderate (slight protrusion of acromion process)  Interosseous: Mild-Moderate (slightly depressed area between thumb and forefinger)    Edema  Edema: none    Physical Findings (Nutrition Deficiency/Toxicity)  Skin: Positive (wound Vac)     Nutrition Diagnosis   Malnutrition Diagnosis  Patient has Malnutrition Diagnosis: Yes  Diagnosis Status: Ongoing  Malnutrition Diagnosis: Severe malnutrition related to acute disease or injury  As Evidenced by: oral intake less than 50% of estimated energy requirements for greater than five days, generalized edema, visualized bradley of depelted adipose tissues and skeletal tissues wasting      Nutrition Interventions/Recommendations   Nutrition Prescription  Individualized Nutrition Prescription Provided for : Should pt intake remain sub optimal, consider alternate route for nutrition vs goals of care discussion    Food and/or Nutrient Delivery Interventions  Interventions: Meals and snacks, Medical food supplement    Meals and Snacks: Fiber-modified diet  Goal: > 75% of meals consumed    Medical Food Supplement: Commercial beverage  Goal: Ensure TID for encouraged intake    Additional Interventions:  Consider appetite stimulant    Coordination of Nutrition Care by a Nutrition Professional  Collaboration and Referral of Nutrition Care: Collaboration by nutrition professional with other providers    Education Documentation  No documentation found.      Nutrition Monitoring and Evaluation   Food and Nutrient Related History  Energy Intake: Estimated energy intake    Fluid Intake: Estimated fluid intake    Amount of Food: Estimated amout of food, Medical food intake    Mealtime Behavior: Limited number of accepted foods    Anthropometrics: Body Composition/Growth/Weight History  Weight Change: Weight gain, Weight loss    Biochemical Data, Medical Tests and Procedures  Electrolyte and Renal Panel: Other (Comment)  Criteria: as clinically indicated    Gastrointestinal Profile: Other (Comment)  Criteria: as clinically indicated    Glucose/Endocrine Profile: Other (Comment)  Criteria: as clinically indicated    Nutritional Anemia Profile: Other (Comment)  Criteria: as clinically indicated    Vitamin Profile: Other (Comment)  Criteria: as clinically indicated    Nutrition Focused Physical Findings  Adipose: Loss of subcutaneous fat    Digestive System: Other (Comment), Decrease in appetite  Criteria: as clinically indicated    Muscles: Muscle atrophy    Skin: Impaired wound healing    Other: Stool output, Urine volume, Overall appearance    Follow Up  Time Spent (min): 60 minutes  Last Date of Nutrition Visit: 07/08/24  Nutrition Follow-Up Needed?: Dietitian to reassess per policy  Follow up Comment: GS follow up, CC inititated

## 2024-07-08 NOTE — PROGRESS NOTES
"Fernando Cuevas is a 63 y.o. female on day 17 of admission presenting with Perforated viscus.    Subjective   Patient having bowel movements. She does report however persistent abdominal pain. Appears to be tolerating softs. Denies nausea, vomiting, increase distension.    Objective   PE:  Constitutional: A&Ox3, calm and cooperative, NAD  Cardiovascular: Normal rate and regular rhythm.  Respiratory/Thorax: Good symmetric chest expansion. No labored breathing.  Gastrointestinal: Abdomen slightly distended, soft, appropriately tender, wound VAC in place  Genitourinary: Voiding independently  Extremities: No peripheral edema  Neurological: A&Ox3, No focal deficits  Psychological: Appropriate mood and behavior  Skin: Warm and dry    Last Recorded Vitals  Blood pressure 111/67, pulse (!) 115, temperature 36.2 °C (97.2 °F), temperature source Temporal, resp. rate 18, height 1.575 m (5' 2.01\"), weight 63.5 kg (139 lb 14.4 oz), SpO2 93%.  Intake/Output last 3 Shifts:  I/O last 3 completed shifts:  In: 6913.4 (108.9 mL/kg) [P.O.:120; IV Piggyback:200]  Out: 2450 (38.6 mL/kg) [Urine:2450 (1.1 mL/kg/hr)]  Weight: 63.5 kg     Relevant Results  Scheduled medications  aspirin, 81 mg, oral, Daily  budesonide, 0.25 mg, nebulization, Daily   And  formoterol, 20 mcg, nebulization, BID  busPIRone, 5 mg, oral, BID  calcium carbonate, 1,500 mg, oral, Daily  dilTIAZem CD, 240 mg, oral, Daily  enoxaparin, 1 mg/kg, subcutaneous, q12h  folic acid, 1 mg, oral, Daily  hydrocortisone sodium succinate, 200 mg, intravenous, Once  HYDROmorphone, 0.5 mg, intravenous, Once  ipratropium-albuteroL, 3 mL, nebulization, TID  lisinopril, 10 mg, oral, Daily  montelukast, 10 mg, oral, Daily  multivitamin with minerals, 1 tablet, oral, Daily  nortriptyline, 50 mg, oral, Nightly  oxybutynin, 5 mg, oral, Daily  oxygen, , inhalation, Continuous - Inhalation  pantoprazole, 40 mg, intravenous, Daily before breakfast  piperacillin-tazobactam, 3.375 g, " intravenous, q6h  thiamine, 100 mg, oral, Daily  thiamine, 100 mg, oral, Daily  varenicline, 1 mg, oral, BID      Continuous medications  Adult Clinimix Parenteral Nutrition, 83 mL/hr, Last Rate: 83 mL/hr (07/08/24 0500)      PRN medications  PRN medications: acetaminophen **OR** acetaminophen **OR** acetaminophen, alteplase, benzonatate, guaiFENesin, HYDROmorphone, HYDROmorphone, hydrOXYzine HCL, ipratropium-albuteroL, morphine, morphine, nitroglycerin, ondansetron, phenoL    Results for orders placed or performed during the hospital encounter of 06/20/24 (from the past 24 hour(s))   POCT GLUCOSE   Result Value Ref Range    POCT Glucose 117 (H) 74 - 99 mg/dL   POCT GLUCOSE   Result Value Ref Range    POCT Glucose 120 (H) 74 - 99 mg/dL   POCT GLUCOSE   Result Value Ref Range    POCT Glucose 123 (H) 74 - 99 mg/dL   Electrocardiogram, 12-lead PRN ACS symptoms   Result Value Ref Range    Ventricular Rate 114 BPM    Atrial Rate 114 BPM    CA Interval 140 ms    QRS Duration 76 ms    QT Interval 306 ms    QTC Calculation(Bazett) 421 ms    P Axis 88 degrees    R Axis 83 degrees    T Axis 75 degrees    QRS Count 19 beats    Q Onset 224 ms    P Onset 154 ms    P Offset 198 ms    T Offset 377 ms    QTC Fredericia 378 ms   POCT GLUCOSE   Result Value Ref Range    POCT Glucose 123 (H) 74 - 99 mg/dL   Basic metabolic panel   Result Value Ref Range    Glucose 114 (H) 74 - 99 mg/dL    Sodium 129 (L) 136 - 145 mmol/L    Potassium 4.7 3.5 - 5.3 mmol/L    Chloride 94 (L) 98 - 107 mmol/L    Bicarbonate 28 21 - 32 mmol/L    Anion Gap 12 10 - 20 mmol/L    Urea Nitrogen 12 6 - 23 mg/dL    Creatinine 0.71 0.50 - 1.05 mg/dL    eGFR >90 >60 mL/min/1.73m*2    Calcium 8.4 (L) 8.6 - 10.3 mg/dL   Magnesium   Result Value Ref Range    Magnesium 1.80 1.60 - 2.40 mg/dL   Phosphorus   Result Value Ref Range    Phosphorus 5.0 (H) 2.5 - 4.9 mg/dL       Assessment/Plan   Principal Problem:    Perforated viscus    Patient with perforated small bowel is  now POD17 ex lap, pSBR with Dr Liang. Intra-op findings of segment of perforated small bowel. Advanced to soft diet over the weekend.     Plan:  - Soft diet  - Transition off PPN  - Strict calorie count  - OOB/ambulation  - IS hourly  - Chewing gum  - DVT proph: SCDs/lovenox  - IV zosyn  - PPI  - PRN antiemetic    Surgery to follow.     Discussed with Dr Liang.    I spent 35 minutes in the professional and overall care of this patient.    Mahad Boateng PA-C

## 2024-07-08 NOTE — NURSING NOTE
Patient sustaining tachycardia with regular rhythm with rate 120 maintained. Reached out to Hospitalist informing of Heart Rate and Obtained EKG. EKG showed Sinus Tachycardia with Right Atrial Enlargement. Hospitalist ordered Continuous Telemetry monitoring with 500mL Bolus of Normal Saline. Applied Telemetry and provided Bolus to patient which is set to end approximately at 08:30 this morning. Patient Tolerated well

## 2024-07-09 ENCOUNTER — APPOINTMENT (OUTPATIENT)
Dept: RADIOLOGY | Facility: HOSPITAL | Age: 64
End: 2024-07-09
Payer: COMMERCIAL

## 2024-07-09 LAB
ANION GAP SERPL CALC-SCNC: 16 MMOL/L (ref 10–20)
BUN SERPL-MCNC: 14 MG/DL (ref 6–23)
CALCIUM SERPL-MCNC: 8.4 MG/DL (ref 8.6–10.3)
CHLORIDE SERPL-SCNC: 95 MMOL/L (ref 98–107)
CO2 SERPL-SCNC: 22 MMOL/L (ref 21–32)
CREAT SERPL-MCNC: 0.83 MG/DL (ref 0.5–1.05)
EGFRCR SERPLBLD CKD-EPI 2021: 79 ML/MIN/1.73M*2
ERYTHROCYTE [DISTWIDTH] IN BLOOD BY AUTOMATED COUNT: 20.7 % (ref 11.5–14.5)
GLUCOSE BLD MANUAL STRIP-MCNC: 108 MG/DL (ref 74–99)
GLUCOSE BLD MANUAL STRIP-MCNC: 117 MG/DL (ref 74–99)
GLUCOSE BLD MANUAL STRIP-MCNC: 122 MG/DL (ref 74–99)
GLUCOSE BLD MANUAL STRIP-MCNC: 90 MG/DL (ref 74–99)
GLUCOSE SERPL-MCNC: 92 MG/DL (ref 74–99)
HCT VFR BLD AUTO: 27.3 % (ref 36–46)
HGB BLD-MCNC: 8 G/DL (ref 12–16)
MAGNESIUM SERPL-MCNC: 2 MG/DL (ref 1.6–2.4)
MCH RBC QN AUTO: 26.5 PG (ref 26–34)
MCHC RBC AUTO-ENTMCNC: 29.3 G/DL (ref 32–36)
MCV RBC AUTO: 90 FL (ref 80–100)
NRBC BLD-RTO: 0.8 /100 WBCS (ref 0–0)
PHOSPHATE SERPL-MCNC: 4.9 MG/DL (ref 2.5–4.9)
PLATELET # BLD AUTO: 513 X10*3/UL (ref 150–450)
POTASSIUM SERPL-SCNC: 4.6 MMOL/L (ref 3.5–5.3)
RBC # BLD AUTO: 3.02 X10*6/UL (ref 4–5.2)
SODIUM SERPL-SCNC: 128 MMOL/L (ref 136–145)
WBC # BLD AUTO: 13.2 X10*3/UL (ref 4.4–11.3)

## 2024-07-09 PROCEDURE — 2500000004 HC RX 250 GENERAL PHARMACY W/ HCPCS (ALT 636 FOR OP/ED)

## 2024-07-09 PROCEDURE — C9113 INJ PANTOPRAZOLE SODIUM, VIA: HCPCS | Performed by: HOSPITALIST

## 2024-07-09 PROCEDURE — 2500000001 HC RX 250 WO HCPCS SELF ADMINISTERED DRUGS (ALT 637 FOR MEDICARE OP): Performed by: HOSPITALIST

## 2024-07-09 PROCEDURE — 94640 AIRWAY INHALATION TREATMENT: CPT

## 2024-07-09 PROCEDURE — 2500000004 HC RX 250 GENERAL PHARMACY W/ HCPCS (ALT 636 FOR OP/ED): Performed by: INTERNAL MEDICINE

## 2024-07-09 PROCEDURE — 1200000002 HC GENERAL ROOM WITH TELEMETRY DAILY

## 2024-07-09 PROCEDURE — 2500000004 HC RX 250 GENERAL PHARMACY W/ HCPCS (ALT 636 FOR OP/ED): Performed by: HOSPITALIST

## 2024-07-09 PROCEDURE — 99232 SBSQ HOSP IP/OBS MODERATE 35: CPT

## 2024-07-09 PROCEDURE — 83735 ASSAY OF MAGNESIUM: CPT | Performed by: INTERNAL MEDICINE

## 2024-07-09 PROCEDURE — 84100 ASSAY OF PHOSPHORUS: CPT | Performed by: INTERNAL MEDICINE

## 2024-07-09 PROCEDURE — 99232 SBSQ HOSP IP/OBS MODERATE 35: CPT | Performed by: INTERNAL MEDICINE

## 2024-07-09 PROCEDURE — 80048 BASIC METABOLIC PNL TOTAL CA: CPT | Performed by: INTERNAL MEDICINE

## 2024-07-09 PROCEDURE — 85027 COMPLETE CBC AUTOMATED: CPT | Performed by: INTERNAL MEDICINE

## 2024-07-09 PROCEDURE — 82947 ASSAY GLUCOSE BLOOD QUANT: CPT

## 2024-07-09 PROCEDURE — 2500000005 HC RX 250 GENERAL PHARMACY W/O HCPCS: Performed by: INTERNAL MEDICINE

## 2024-07-09 PROCEDURE — 2500000001 HC RX 250 WO HCPCS SELF ADMINISTERED DRUGS (ALT 637 FOR MEDICARE OP): Performed by: INTERNAL MEDICINE

## 2024-07-09 PROCEDURE — 97535 SELF CARE MNGMENT TRAINING: CPT | Mod: GO,CO

## 2024-07-09 PROCEDURE — 71045 X-RAY EXAM CHEST 1 VIEW: CPT

## 2024-07-09 PROCEDURE — 2500000001 HC RX 250 WO HCPCS SELF ADMINISTERED DRUGS (ALT 637 FOR MEDICARE OP)

## 2024-07-09 PROCEDURE — 2500000002 HC RX 250 W HCPCS SELF ADMINISTERED DRUGS (ALT 637 FOR MEDICARE OP, ALT 636 FOR OP/ED): Performed by: INTERNAL MEDICINE

## 2024-07-09 PROCEDURE — 97530 THERAPEUTIC ACTIVITIES: CPT | Mod: GO,CO

## 2024-07-09 PROCEDURE — 71045 X-RAY EXAM CHEST 1 VIEW: CPT | Performed by: RADIOLOGY

## 2024-07-09 PROCEDURE — 97530 THERAPEUTIC ACTIVITIES: CPT | Mod: GP,CQ

## 2024-07-09 RX ORDER — HYDROMORPHONE HYDROCHLORIDE 0.2 MG/ML
0.2 INJECTION INTRAMUSCULAR; INTRAVENOUS; SUBCUTANEOUS
Status: DISCONTINUED | OUTPATIENT
Start: 2024-07-09 | End: 2024-07-12

## 2024-07-09 RX ORDER — OXYCODONE HYDROCHLORIDE 5 MG/1
5 TABLET ORAL EVERY 6 HOURS PRN
Status: DISCONTINUED | OUTPATIENT
Start: 2024-07-09 | End: 2024-07-12

## 2024-07-09 RX ORDER — OXYCODONE HYDROCHLORIDE 5 MG/1
10 TABLET ORAL EVERY 6 HOURS PRN
Status: DISCONTINUED | OUTPATIENT
Start: 2024-07-09 | End: 2024-07-12

## 2024-07-09 RX ORDER — MIRTAZAPINE 15 MG/1
15 TABLET, FILM COATED ORAL NIGHTLY
Status: DISCONTINUED | OUTPATIENT
Start: 2024-07-09 | End: 2024-07-16 | Stop reason: HOSPADM

## 2024-07-09 RX ORDER — DIPHENHYDRAMINE HCL 25 MG
25 CAPSULE ORAL ONCE
Status: COMPLETED | OUTPATIENT
Start: 2024-07-09 | End: 2024-07-09

## 2024-07-09 RX ORDER — AZITHROMYCIN 500 MG/1
500 TABLET, FILM COATED ORAL 3 TIMES WEEKLY
Status: DISCONTINUED | OUTPATIENT
Start: 2024-07-10 | End: 2024-07-16 | Stop reason: HOSPADM

## 2024-07-09 RX ORDER — MULTIVIT-MIN/IRON FUM/FOLIC AC 7.5 MG-4
1 TABLET ORAL DAILY
Status: DISCONTINUED | OUTPATIENT
Start: 2024-07-09 | End: 2024-07-09

## 2024-07-09 ASSESSMENT — PAIN DESCRIPTION - ORIENTATION
ORIENTATION: LOWER

## 2024-07-09 ASSESSMENT — COGNITIVE AND FUNCTIONAL STATUS - GENERAL
CLIMB 3 TO 5 STEPS WITH RAILING: A LOT
MOVING TO AND FROM BED TO CHAIR: A LITTLE
HELP NEEDED FOR BATHING: A LOT
WALKING IN HOSPITAL ROOM: A LITTLE
WALKING IN HOSPITAL ROOM: A LITTLE
DRESSING REGULAR LOWER BODY CLOTHING: A LOT
CLIMB 3 TO 5 STEPS WITH RAILING: A LOT
TURNING FROM BACK TO SIDE WHILE IN FLAT BAD: A LITTLE
DRESSING REGULAR LOWER BODY CLOTHING: A LOT
STANDING UP FROM CHAIR USING ARMS: A LITTLE
STANDING UP FROM CHAIR USING ARMS: A LITTLE
DAILY ACTIVITIY SCORE: 17
DRESSING REGULAR UPPER BODY CLOTHING: A LITTLE
PERSONAL GROOMING: A LITTLE
MOBILITY SCORE: 18
MOVING TO AND FROM BED TO CHAIR: A LITTLE
PERSONAL GROOMING: A LITTLE
TOILETING: A LITTLE
HELP NEEDED FOR BATHING: A LOT
DRESSING REGULAR UPPER BODY CLOTHING: A LITTLE
MOBILITY SCORE: 18
TOILETING: A LITTLE
DAILY ACTIVITIY SCORE: 17
TURNING FROM BACK TO SIDE WHILE IN FLAT BAD: A LITTLE

## 2024-07-09 ASSESSMENT — PAIN SCALES - WONG BAKER
WONGBAKER_NUMERICALRESPONSE: HURTS EVEN MORE
WONGBAKER_NUMERICALRESPONSE: HURTS WORST

## 2024-07-09 ASSESSMENT — PAIN - FUNCTIONAL ASSESSMENT
PAIN_FUNCTIONAL_ASSESSMENT: 0-10

## 2024-07-09 ASSESSMENT — PAIN SCALES - GENERAL
PAINLEVEL_OUTOF10: 7
PAINLEVEL_OUTOF10: 10 - WORST POSSIBLE PAIN
PAINLEVEL_OUTOF10: 0 - NO PAIN
PAINLEVEL_OUTOF10: 10 - WORST POSSIBLE PAIN
PAINLEVEL_OUTOF10: 5 - MODERATE PAIN
PAINLEVEL_OUTOF10: 6
PAINLEVEL_OUTOF10: 6
PAINLEVEL_OUTOF10: 10 - WORST POSSIBLE PAIN
PAINLEVEL_OUTOF10: 10 - WORST POSSIBLE PAIN

## 2024-07-09 ASSESSMENT — PAIN DESCRIPTION - LOCATION
LOCATION: BACK

## 2024-07-09 ASSESSMENT — PAIN SCALES - PAIN ASSESSMENT IN ADVANCED DEMENTIA (PAINAD)
TOTALSCORE: MEDICATION (SEE MAR)

## 2024-07-09 ASSESSMENT — PAIN DESCRIPTION - DESCRIPTORS
DESCRIPTORS: ACHING
DESCRIPTORS: ACHING

## 2024-07-09 ASSESSMENT — ACTIVITIES OF DAILY LIVING (ADL): HOME_MANAGEMENT_TIME_ENTRY: 5

## 2024-07-09 NOTE — PROGRESS NOTES
Music Therapy Note    Fernando Cuevas was referred by S[facility] Sheldon    Therapy Session  Referral Type: New referral this admission  Visit Type: New visit  Session Start Time: 1313  Intervention Delivery: In-person  Conflict of Service: Asleep       Narrative  Assessment Detail: At the time of this attempt pt was resting in bed with closed eyes, did not wake with a knock on the door.  Follow-up: [facility] staff will continue to attempt.    Education Documentation  No documentation found.

## 2024-07-09 NOTE — PROGRESS NOTES
"                                                                                                               '' Infectious Disease Progress Note''        Interval Events:  Patient is complaining of generalized itching and cough  No other new symptoms  Afebrile  WBC 13 K    Current antibiotic:  Zosyn    Physical Exam:  /75 (BP Location: Right arm, Patient Position: Lying)   Pulse (!) 115 Comment: HR ranged from 115-132 BPM  Temp 36.4 °C (97.5 °F) (Temporal)   Resp 17   Ht 1.575 m (5' 2.01\")   Wt 63.5 kg (139 lb 14.4 oz)   SpO2 99%   BMI 25.58 kg/m²   General: NAD, nontoxic appearing  Skin: no new rash  Resp: lungs CTA b/l  CV:  normal S1/S2, no murmur   Abd: soft distended, non-tender, + wound VAC  Ext: no edema      Lab Results:  Reviewed    Micro:-      Imaging: reviewed         Assessment:    -Leukocytosis  -Perforated small bowel with peritonitis s/p or 6/21, culture grew Candida albicans and mixed bacteria  -Postop ileus  -COPD on home azithromycin PPx MWF  -Pruritus    Plan/Recommendations:  -Stop Zosyn  -Resume azithromycin PPx MWF for COPD  -Trend WBC  -Chest x-ray      Please call ID with any concerns or questions.  Discussed with patient and primary team.    Sahra Morgan MD  [facility], [place]  #[phone]  '  "

## 2024-07-09 NOTE — CARE PLAN
The patient's goals for the shift include      The clinical goals for the shift include Patient will be free from pain throughout shift    Problem: Pain - Adult  Goal: Verbalizes/displays adequate comfort level or baseline comfort level  Outcome: Progressing     Problem: Safety - Adult  Goal: Free from fall injury  Outcome: Progressing     Problem: Discharge Planning  Goal: Discharge to home or other facility with appropriate resources  Outcome: Progressing     Problem: Skin  Goal: Decreased wound size/increased tissue granulation at next dressing change  Outcome: Progressing  Flowsheets (Taken 7/9/2024 1830)  Decreased wound size/increased tissue granulation at next dressing change: Promote sleep for wound healing  Goal: Participates in plan/prevention/treatment measures  Outcome: Progressing  Flowsheets (Taken 7/9/2024 0636 by Gale Enriquez, RN)  Participates in plan/prevention/treatment measures: Elevate heels  Goal: Prevent/manage excess moisture  Outcome: Progressing  Flowsheets (Taken 7/9/2024 0636 by Gale Enriquez, RN)  Prevent/manage excess moisture: Cleanse incontinence/protect with barrier cream  Goal: Prevent/minimize sheer/friction injuries  Outcome: Progressing  Flowsheets (Taken 7/6/2024 1147 by Shaina Montgomery RN)  Prevent/minimize sheer/friction injuries:   Complete micro-shifts as needed if patient unable. Adjust patient position to relieve pressure points, not a full turn   Increase activity/out of bed for meals   Use pull sheet   Utilize specialty bed per algorithm   Turn/reposition every 2 hours/use positioning/transfer devices   HOB 30 degrees or less  Goal: Promote/optimize nutrition  Outcome: Progressing  Flowsheets (Taken 7/9/2024 1830)  Promote/optimize nutrition: Consume > 50% meals/supplements  Goal: Promote skin healing  Outcome: Progressing  Flowsheets (Taken 7/9/2024 1830)  Promote skin healing: Assess skin/pad under line(s)/device(s)     Problem: Fall/Injury  Goal: Not fall by end of  shift  Outcome: Progressing  Goal: Be free from injury by end of the shift  Outcome: Progressing  Goal: Verbalize understanding of personal risk factors for fall in the hospital  Outcome: Progressing  Goal: Verbalize understanding of risk factor reduction measures to prevent injury from fall in the home  Outcome: Progressing  Goal: Use assistive devices by end of the shift  Outcome: Progressing  Goal: Pace activities to prevent fatigue by end of the shift  Outcome: Progressing

## 2024-07-09 NOTE — PROGRESS NOTES
07/09/24 1001   Current Planned Discharge Disposition   Current Planned Discharge Disposition Home H  (Patient plans to return home with [facility].)

## 2024-07-09 NOTE — CARE PLAN
Problem: Pain - Adult  Goal: Verbalizes/displays adequate comfort level or baseline comfort level  Outcome: Progressing     Problem: Safety - Adult  Goal: Free from fall injury  Outcome: Progressing     Problem: Skin  Goal: Decreased wound size/increased tissue granulation at next dressing change  Outcome: Progressing  Flowsheets (Taken 7/9/2024 0636)  Decreased wound size/increased tissue granulation at next dressing change: Promote sleep for wound healing  Goal: Participates in plan/prevention/treatment measures  Outcome: Progressing  Flowsheets (Taken 7/9/2024 0636)  Participates in plan/prevention/treatment measures: Elevate heels  Goal: Prevent/manage excess moisture  Outcome: Progressing  Flowsheets (Taken 7/9/2024 0636)  Prevent/manage excess moisture: Cleanse incontinence/protect with barrier cream

## 2024-07-09 NOTE — PROGRESS NOTES
"Fernando Cuevas is a 63 y.o. female on day 18 of admission presenting with Perforated viscus.    Subjective   Patient said she has been well. Tolerating minimal amounts of soft diet. Concern for poor PO in general. She is having bowel movements and gas.     Objective   PE:  Constitutional: A&Ox3, calm and cooperative, NAD  Cardiovascular: Normal rate and regular rhythm.  Respiratory/Thorax: Good symmetric chest expansion. No labored breathing.  Gastrointestinal: Abdomen mildly distended, soft, appropriately tender, VAC at [facility]  Genitourinary: Voiding independently   Extremities: No peripheral edema  Neurological: A&Ox3, No focal deficits  Psychological: Appropriate mood and behavior  Skin: Warm and dry    Last Recorded Vitals  Blood pressure 114/75, pulse (!) 115, temperature 36.4 °C (97.5 °F), temperature source Temporal, resp. rate 17, height 1.575 m (5' 2.01\"), weight 63.5 kg (139 lb 14.4 oz), SpO2 99%.  Intake/Output last 3 Shifts:  I/O last 3 completed shifts:  In: 4286.2 (67.5 mL/kg) [P.O.:230; IV Piggyback:750]  Out: 1700 (26.8 mL/kg) [Urine:1700 (0.7 mL/kg/hr)]  Weight: 63.5 kg     Relevant Results  Scheduled medications  aspirin, 81 mg, oral, Daily  budesonide, 0.25 mg, nebulization, Daily   And  formoterol, 20 mcg, nebulization, BID  busPIRone, 5 mg, oral, BID  calcium carbonate, 1,500 mg, oral, Daily  dilTIAZem CD, 240 mg, oral, Daily  enoxaparin, 1 mg/kg, subcutaneous, q12h  folic acid, 1 mg, oral, Daily  hydrocortisone sodium succinate, 200 mg, intravenous, Once  HYDROmorphone, 0.5 mg, intravenous, Once  ipratropium-albuteroL, 3 mL, nebulization, TID  lisinopril, 10 mg, oral, Daily  montelukast, 10 mg, oral, Daily  multivitamin with minerals, 1 tablet, oral, Daily  nortriptyline, 50 mg, oral, Nightly  oxybutynin, 5 mg, oral, Daily  oxygen, , inhalation, Continuous - Inhalation  pantoprazole, 40 mg, intravenous, Daily before breakfast  piperacillin-tazobactam, 3.375 g, intravenous, q6h  thiamine, 100 mg, " oral, Daily  thiamine, 100 mg, oral, Daily  varenicline, 1 mg, oral, BID      Continuous medications     PRN medications  PRN medications: acetaminophen **OR** acetaminophen **OR** acetaminophen, alteplase, benzonatate, guaiFENesin, HYDROmorphone, HYDROmorphone, hydrOXYzine HCL, ipratropium-albuteroL, morphine, morphine, nitroglycerin, ondansetron, phenoL    Results for orders placed or performed during the hospital encounter of 06/20/24 (from the past 24 hour(s))   POCT GLUCOSE   Result Value Ref Range    POCT Glucose 115 (H) 74 - 99 mg/dL   POCT GLUCOSE   Result Value Ref Range    POCT Glucose 153 (H) 74 - 99 mg/dL   POCT GLUCOSE   Result Value Ref Range    POCT Glucose 117 (H) 74 - 99 mg/dL   Magnesium   Result Value Ref Range    Magnesium 2.00 1.60 - 2.40 mg/dL   Phosphorus   Result Value Ref Range    Phosphorus 4.9 2.5 - 4.9 mg/dL   CBC   Result Value Ref Range    WBC 13.2 (H) 4.4 - 11.3 x10*3/uL    nRBC 0.8 (H) 0.0 - 0.0 /100 WBCs    RBC 3.02 (L) 4.00 - 5.20 x10*6/uL    Hemoglobin 8.0 (L) 12.0 - 16.0 g/dL    Hematocrit 27.3 (L) 36.0 - 46.0 %    MCV 90 80 - 100 fL    MCH 26.5 26.0 - 34.0 pg    MCHC 29.3 (L) 32.0 - 36.0 g/dL    RDW 20.7 (H) 11.5 - 14.5 %    Platelets 513 (H) 150 - 450 x10*3/uL   Basic Metabolic Panel   Result Value Ref Range    Glucose 92 74 - 99 mg/dL    Sodium 128 (L) 136 - 145 mmol/L    Potassium 4.6 3.5 - 5.3 mmol/L    Chloride 95 (L) 98 - 107 mmol/L    Bicarbonate 22 21 - 32 mmol/L    Anion Gap 16 10 - 20 mmol/L    Urea Nitrogen 14 6 - 23 mg/dL    Creatinine 0.83 0.50 - 1.05 mg/dL    eGFR 79 >60 mL/min/1.73m*2    Calcium 8.4 (L) 8.6 - 10.3 mg/dL   POCT GLUCOSE   Result Value Ref Range    POCT Glucose 122 (H) 74 - 99 mg/dL     Assessment/Plan   Principal Problem:    Perforated viscus    Patient with perforated small bowel is now POD17 ex lap, pSBR with Dr Liang. Intra-op findings of segment of perforated small bowel. PO remains poor.     Plan:  - Soft diet  - Strict calorie count  -  Appetite stimulant ordered  - Music therapy ordered  - OOB/ambulation  - IS hourly  - Chewing gum  - DVT proph: SCDs/lovenox  - IV zosyn  - PPI  - PRN antiemetic     Discussed with Dr Liang.    I spent 35 minutes in the professional and overall care of this patient.    Mahad Boateng PA-C

## 2024-07-09 NOTE — CONSULTS
Wound Care Consult     Visit Date: 7/9/2024      Patient Name: Fernando Cuevas         MRN: [id number]           YOB: 1960     Reason for Consult: NPWT dressing change. Home vac ordered through [employer]         Pertinent Labs:   Albumin   Date Value Ref Range Status   06/21/2024 2.9 (L) 3.4 - 5.0 g/dL Final       Wound Assessment:  Wound 06/21/24 Incision Abdomen Medial;Upper (Active)   Wound Image   07/09/24 1444   Site Assessment Unable to assess 07/09/24 0400   Sparkle-Wound Assessment Unable to assess 07/09/24 0400   Shape vertical 06/30/24 2025   Wound Length (cm) 9.5 cm 07/09/24 1444   Wound Width (cm) 2.1 cm 07/09/24 1444   Wound Surface Area (cm^2) 19.95 cm^2 07/09/24 1444   Wound Depth (cm) 1.8 cm 07/09/24 1444   Wound Volume (cm^3) 35.91 cm^3 07/09/24 1444   Tunneling 1 cm 07/09/24 1446   Tunneling Clock Position of Wound 12 07/09/24 1446   Margins Attached edges 06/30/24 2025   Closure Staples 07/06/24 0400   Sutures/Staple Line Approximated 07/01/24 0926   Drainage Description Red 07/04/24 0900   Drainage Amount Small 07/04/24 0900   Dressing Vacuum dressing 07/09/24 0400   Dressing Changed New 07/04/24 0900   Dressing Status Clean;Dry;Occlusive 07/09/24 0400       Wound Team Summary Assessment: Wound Vac applied to midline  (3 )  adaptic   (2)  black foam   (medium)Granufoam       Pressure; 125 mmHg    Plan:  Change wound Vac two times a week and as needed. Next dressing change schedule for Friday.     Consider medicating the patient 30 - 60 minutes prior to dressing change. If there is mamta blood in the tubing (active bleeding), stop the suction and call physician. If the suction is off for greater than 2 hours, remove foam dressing and replace with moist saline dressing. Change canister every week or when full. Remove foam dressing and replace with saline dressing for transfer or discharge.    If patient is discharged prior to next dressing change, please disconnect VAC machine, remove foam  dressing apply moist saline dressing. Then place machine in the front soiled utility room on the unit.       Wound Team Plan: WOC will follow for NPWT dressing changes     While in bed patient should only be on one fitted sheet, and one chux. Please, do not use brief while patient is resting in bed. Elevate heels off the bed surface at all times. Turn and reposition at least every 2 hours.        Brianna Hill RN BSN,[facility],CWOCN  [phone]   7/9/2024  3:13 PM

## 2024-07-09 NOTE — PROGRESS NOTES
Fernando Cuevas is a 63 y.o. female on day 18 of admission presenting with Perforated viscus.      Subjective   Patient seen and examined at the bedside this morning. No acute overnight events. No other questions or concerns.         Objective     Last Recorded Vitals  /64 (Patient Position: Lying)   Pulse (!) 115 Comment: HR ranged from 115-132 BPM  Temp 36.7 °C (98.1 °F) (Oral)   Resp 17   Wt 63.5 kg (139 lb 14.4 oz)   SpO2 98%   Intake/Output last 3 Shifts:    Intake/Output Summary (Last 24 hours) at 7/9/2024 1208  Last data filed at 7/9/2024 0920  Gross per 24 hour   Intake 1668.2 ml   Output 600 ml   Net 1068.2 ml       Admission Weight  Weight: 64 kg (141 lb 1.5 oz) (06/20/24 2132)    Daily Weight  07/05/24 : 63.5 kg (139 lb 14.4 oz)    Image Results  Electrocardiogram, 12-lead PRN ACS symptoms  Sinus tachycardia  Right atrial enlargement  Borderline ECG  When compared with ECG of 20-JUN-2024 21:19,  No significant change was found      Physical Exam  Constitutional:       General: She is not in acute distress.  HENT:      Head: Normocephalic and atraumatic.   Eyes:      Conjunctiva/sclera: Conjunctivae normal.      Pupils: Pupils are equal, round, and reactive to light.   Cardiovascular:      Rate and Rhythm: Normal rate and regular rhythm.   Pulmonary:      Effort: Pulmonary effort is normal.      Breath sounds: Normal breath sounds.   Abdominal:      General: There is distension.      Palpations: Abdomen is soft.   Skin:     General: Skin is warm and dry.   Neurological:      Mental Status: She is alert.   Psychiatric:         Mood and Affect: Mood normal.         Behavior: Behavior normal.         Relevant Results           Scheduled medications  aspirin, 81 mg, oral, Daily  [START ON 7/10/2024] azithromycin, 500 mg, oral, Once per day on Monday Wednesday Friday  budesonide, 0.25 mg, nebulization, Daily   And  formoterol, 20 mcg, nebulization, BID  busPIRone, 5 mg, oral, BID  calcium carbonate,  1,500 mg, oral, Daily  dilTIAZem CD, 240 mg, oral, Daily  enoxaparin, 1 mg/kg, subcutaneous, q12h  folic acid, 1 mg, oral, Daily  hydrocortisone sodium succinate, 200 mg, intravenous, Once  HYDROmorphone, 0.5 mg, intravenous, Once  ipratropium-albuteroL, 3 mL, nebulization, TID  lisinopril, 10 mg, oral, Daily  montelukast, 10 mg, oral, Daily  multivitamin with minerals, 1 tablet, oral, Daily  nortriptyline, 50 mg, oral, Nightly  oxybutynin, 5 mg, oral, Daily  oxygen, , inhalation, Continuous - Inhalation  pantoprazole, 40 mg, intravenous, Daily before breakfast  thiamine, 100 mg, oral, Daily  thiamine, 100 mg, oral, Daily  varenicline, 1 mg, oral, BID      Continuous medications       PRN medications  PRN medications: acetaminophen **OR** acetaminophen **OR** acetaminophen, alteplase, benzonatate, guaiFENesin, HYDROmorphone, HYDROmorphone, hydrOXYzine HCL, ipratropium-albuteroL, morphine, morphine, nitroglycerin, ondansetron, phenoL    Assessment/Plan      Principal Problem:    Perforated viscus  Active Problems:    Thrombocytosis    Perforated small bowel's with peritonitis:  Concern for postop ileus versus partial obstruction:  Underwent surgical intervention 6/21  -Underwent small bowel resection, and washout procedure.  -Culture for Candida albicans, concern for mix of gram-negative and anaerobes.  -Infectious disease following, continued on IV Zosyn, fluconazole 200 mg/day.  Plan for antibiotics with stop date 7/3/24.,  This will be a total of 10 days from the initiation of fluconazole.  Infectious diseases recommended discharge home once medically ready on p.o. Augmentin 875 mg 2 times a day instead of Zosyn if needed.  -General surgery following for postop ileus versus partial SBO, appreciate recommendations.  Enema and chewing gum added.    -Will continue to trend CBC  -Dietary following, appreciate recommendations.   -wound VAC in place  -NG tube removed  -tolerating oral intake    # Anemia  # Abdominal  wall hematoma  -blood loss  -s/p blood transfusion    Acute DVT in the left distal brachial vein:  -Vascular upper extremity ultrasound with acute finding, non-occlusive.  -Patient initiated on full dose Lovenox therapeutic dose and 6/27. Will need to be transitioned to oral regimen prior to discharge.   -Patient with contrast allergy, CT angio chest requiring prep, no acute findings.    -on lovenox  -switch to OAC on discharge    Acute on chronic hyponatremia, resolved:  In setting of poor oral intake  -Nephrology was managing, placed on IV isotonic fluid with improvement  -Fluids will be discontinued.  -Nephrology signed off  -Will continue to trend sodium    Severe malnutrition:  -Nutrition following, appreciate recommendations.     LOS: Unknown    Rupa Khoury MD

## 2024-07-09 NOTE — PROGRESS NOTES
Physical Therapy    Physical Therapy Treatment    Patient Name: Fernando Cuevas  MRN: [id number]  Today's Date: 7/9/2024  Time Calculation  Start Time: 0830  Stop Time: 0910  Time Calculation (min): 40 min    Assessment/Plan   PT Assessment  End of Session Communication: Bedside nurse (Discussed pt's progress and current mobility as described in mobility section)  Assessment Comment: Pt is motivated to participate however fatigues easily this date and required frequent seated rest breaks to manage fatigue between dynamic standing trials. Pt will continue to benefit from skilled PT for improved B LE strength and stability for safe return to PLOF.  End of Session Patient Position: Alarm on, Up in chair (call light and needs in reach)  PT Plan  Inpatient/Swing Bed or Outpatient: Inpatient  PT Plan  Treatment/Interventions: Bed mobility, Endurance training  PT Plan: Ongoing PT  PT Frequency: 4 times per week  PT Discharge Recommendations: Moderate intensity level of continued care  PT Recommended Transfer Status: Assist x1  PT - OK to Discharge: Yes (per POC)      General Visit Information:   PT  Visit  PT Received On: 07/09/24  General  Reason for Referral: 63 y.o. F s/p exploratory laparascopy date  Referred By: MD Barbi  Past Medical History Relevant to Rehab:   Past Medical History:   Diagnosis Date    Essential (primary) hypertension 04/20/2016    Benign hypertension    Personal history of other diseases of the respiratory system     H/O emphysema    Personal history of other diseases of the respiratory system     History of chronic obstructive lung disease    Personal history of other mental and behavioral disorders     History of depression       Prior to Session Communication: Bedside nurse (RN cleared pt. for PT Tx. All charts reviewed. Per handoff with nursing prior to therapy visit, patient’s pain and vitals are stable. Additional concern for this patient related to therapy session: none.)  Patient Position  Received: Bed, 3 rail up, Alarm off, not on at start of session  Preferred Learning Style: auditory, kinesthetic, verbal  General Comment: Pt semi-supine in bed on arrival, pleasant and agreeable for PT tx    Subjective   Precautions:  Precautions  Medical Precautions: Fall precautions, Oxygen therapy device and L/min, Abdominal precautions  Post-Surgical Precautions: Abdominal surgery precautions  Precautions Comment: wound vac, midline central line    Objective   Pain:  Pain Assessment  Pain Assessment: 0-10  0-10 (Numeric) Pain Score: 0 - No pain  Cognition:  Cognition  Overall Cognitive Status: Within Functional Limits  Coordination:  Movements are Fluid and Coordinated: Yes  Postural Control:  Postural Control  Postural Control: Within Functional Limits  Static Sitting Balance  Static Sitting-Balance Support: Feet supported  Static Sitting-Level of Assistance: Independent  Static Sitting-Comment/Number of Minutes: unsupported sitting at EOB x10 min duration    Activity Tolerance:  Activity Tolerance  Endurance: Tolerates 30 min exercise with multiple rests  Treatments:  Therapeutic Activity  Therapeutic Activity Performed: Yes  Therapeutic Activity 1: For increased sitting balance, core strength and functional endurance needed for increased independence with mobility and transfers, pt. performs unsupported sitting at EOB x5 min duration with modified independence x1 for balance, max safety and maintenance of midline orientation. Pt performs B UE reaching in all planes of direction and across midline for self imposed challenges to balance.  Therapeutic Activity 2: For increased balance and standing tolerance needed for increased independence with functional mobility, pt. performs static/dynamic standing x45-60s duration with supervision x1 for balance and max safety x5 trials with seated rest between each. During each trial, pt performs B UE reaching in all planes of direction and across midline for self imposed  challenges to balance.    Bed Mobility  Bed Mobility: Yes  Bed Mobility 1  Bed Mobility 1: Supine to sitting  Level of Assistance 1: Close supervision  Bed Mobility Comments 1: Verbal cuing for use of bed accessories to maintain abdominal precautions when exiting bed.    Transfers  Transfer: Yes  Transfer 1  Transfer From 1:  (sit<>stand from EOB)  Transfer Level of Assistance 1: Close supervision  Transfers 2  Transfer From 2:  (stand>sit to chair with arm rests)  Transfer Level of Assistance 2: Close supervision  Transfers 3  Transfer From 3:  (stand pivot from EOB>bedside chair)  Transfer Device 3:  (Pt utilizes bed accessories to complete stand pivot, no AD)  Transfer Level of Assistance 3: Close supervision    Outcome Measures:  [facility] Basic Mobility  Turning from your back to your side while in a flat bed without using bedrails: None  Moving from lying on your back to sitting on the side of a flat bed without using bedrails: A little  Moving to and from bed to chair (including a wheelchair): A little  Standing up from a chair using your arms (e.g. wheelchair or bedside chair): A little  To walk in hospital room: A little  Climbing 3-5 steps with railing: A lot  Basic Mobility - Total Score: 18      Encounter Problems       Encounter Problems (Active)       Balance       Pt will tolerate 10+ mins dynamic standing balance activities with CGA or less and no LOB using a RW.  (Progressing)       Start:  06/23/24    Expected End:  07/07/24               PT Transfers       STG - Patient will perform bed mobility with SBA or less using log roll technique.  (Met)       Start:  06/23/24    Expected End:  07/07/24    Resolved:  06/28/24         STG - Patient will transfer sit to and from stand mod I with a RW.  (Progressing)       Start:  06/23/24    Expected End:  07/07/24                    Encounter Problems (Resolved)       Mobility       STG - Patient will ambulate 50 ft with CGA and a Rw with minimal SOB or fatigue.   (Met)       Start:  06/23/24    Expected End:  07/07/24    Resolved:  06/28/24

## 2024-07-09 NOTE — PROGRESS NOTES
"Occupational Therapy    Occupational Therapy Treatment    Name: Fernando Cuevas  MRN: [id number]  : 1960  Date: 24  Time Calculation  Start Time: 920  Stop Time: 943  Time Calculation (min): 23 min    Assessment:  Medical Staff Made Aware: Yes  End of Session Communication: Bedside nurse  End of Session Patient Position: Up in chair, Alarm on  Plan:  Treatment Interventions: ADL retraining, Functional transfer training, Equipment evaluation/education  OT Frequency: 3 times per week  OT Discharge Recommendations: Moderate intensity level of continued care  Equipment Recommended upon Discharge:  (hip kit; pt. educated on where to obtain)  OT Recommended Transfer Status: Stand by assist  OT - OK to Discharge: Yes (per POC)    Subjective   Previous Visit Info:  OT Last Visit  OT Received On: 24  General:  General  Reason for Referral: 63 y.o. F s/p exploratory laparascopy   Prior to Session Communication: Bedside nurse  Patient Position Received: Up in chair, Alarm on  Preferred Learning Style: auditory, kinesthetic, verbal  General Comment: Pt seated in chair, in good spirits, deconditioned.  Precautions:  Medical Precautions: Fall precautions  Post-Surgical Precautions: Abdominal surgery precautions  Precautions Comment: wound vac, midline central line  Vitals:  Vital Signs  Heart Rate: (!) 115 (HR ranged from 115-132 BPM)  Heart Rate Source: Monitor  Pain Assessment:  Pain Assessment  Pain Assessment: 0-10  0-10 (Numeric) Pain Score: 10 - Worst possible pain  Pain Type: Acute pain  Pain Location: Abdomen     Objective   Cognition:  Overall Cognitive Status: Within Functional Limits  Orientation Level: Oriented X4  Activities of Daily Living: Grooming  Grooming Comments: pt declined, as \"she completed earlier AM\"    UE Dressing  UE Dressing Level of Assistance: Minimum assistance  UE Dressing Where Assessed: Other (Comment) (while standing at chair with rollator)  UE Dressing Comments: to bring " around back    LE Dressing  LE Dressing Comments: pt declined    Functional Standing Tolerance:     Bed Mobility/Transfers: Transfers  Transfer: Yes  Transfer 1  Transfer From 1: Stand to  Transfer to 1: Bed  Technique 1: Sit to stand, Stand to sit  Transfer Device 1:  (rollator)  Transfer Level of Assistance 1: Close supervision  Trials/Comments 1: Pt required VC for hand placement  Transfers 2  Transfer From 2: Chair with drop arm to  Transfer to 2: Stand, Sit  Technique 2: Stand to sit, Sit to stand  Transfer Device 2:  (rollator)  Transfer Level of Assistance 2: Close supervision  Trials/Comments 2: VC for encouragement and hand placement      Functional Mobility:  Functional Mobility  Functional Mobility Performed: Yes  Functional Mobility 1  Surface 1: Level tile  Device 1: Rollator  Assistance 1: Close supervision  Comments 1: Pt completd functional moiblity a short household distance x 2 with rollator at  close S. (Pt required seated rest break d/t fatigue and dizziness.)      Outcome Measures:  [facility] Daily Activity  Putting on and taking off regular lower body clothing: A lot  Bathing (including washing, rinsing, drying): A lot  Putting on and taking off regular upper body clothing: A little  Toileting, which includes using toilet, bedpan or urinal: A little  Taking care of personal grooming such as brushing teeth: A little  Eating Meals: None  Daily Activity - Total Score: 17        Education Documentation  Precautions, taught by SIN Alexis at 7/9/2024 10:03 AM.  Learner: Patient  Readiness: Acceptance  Method: Explanation, Demonstration  Response: Verbalizes Understanding, Needs Reinforcement, Demonstrated Understanding    Body Mechanics, taught by SIN Alexis at 7/9/2024 10:03 AM.  Learner: Patient  Readiness: Acceptance  Method: Explanation, Demonstration  Response: Verbalizes Understanding, Needs Reinforcement, Demonstrated Understanding    ADL Training, taught by Melva SERRATO  SIN Landrum at 7/9/2024 10:03 AM.  Learner: Patient  Readiness: Acceptance  Method: Explanation, Demonstration  Response: Verbalizes Understanding, Needs Reinforcement, Demonstrated Understanding    Education Comments  No comments found.      Goals:  Encounter Problems       Encounter Problems (Active)       ADLs       Patient will perform UB and LB bathing with contact guard assist level of assistance and grab bars, shower chair, and long-handled sponge. (Not Progressing)       Start:  06/24/24    Expected End:  07/08/24            Patient with complete upper body dressing with supervision level of assistance donning and doffing all UE clothes with PRN adaptive equipment while edge of bed  (Progressing)       Start:  06/24/24    Expected End:  07/08/24            Patient with complete lower body dressing with minimal assist  level of assistance donning and doffing all LE clothes  with reacher, shoe horn, sock-aid, and dressing stick  while edge of bed  (Met)       Start:  06/24/24    Expected End:  07/08/24    Resolved:  06/28/24    Updated to: Patient with complete lower body dressing with modified assist  level of assistance donning and doffing all LE clothes  with reacher, shoe horn, sock-aid, and dressing stick  while edge of bed    Update reason: patient goal met         Patient will complete daily grooming tasks brushing teeth and washing face/hair with independent level of assistance while edge of bed . (Not Progressing)       Start:  06/24/24    Expected End:  07/08/24            Patient will complete toileting including hygiene clothing management/hygiene with contact guard assist level of assistance and raised toilet seat and grab bars. (Not Progressing)       Start:  06/24/24    Expected End:  07/08/24            Patient with complete lower body dressing with modified assist  level of assistance donning and doffing all LE clothes  with reacher, shoe horn, sock-aid, and dressing stick  while edge of  bed (Not Progressing)       Start:  06/28/24    Expected End:  07/08/24                   BALANCE       Pt will maintain dynamic standing balance during ADL task with supervision level of assistance in order to demonstrate decreased risk of falling and improved postural control. (Not Progressing)       Start:  06/24/24    Expected End:  07/08/24            Patientt will maintain static standing balance during ADL task with independent level of assistance drop down in order to demonstrate decreased risk of falling and improved postural control. (Not Progressing)       Start:  06/24/24    Expected End:  07/08/24            Patient will tolerate standing for 5 minutes to contact guard level of assistance with front wheeled walker in order to improve functional activity tolerance for ADL tasks. (Not Progressing)       Start:  06/24/24    Expected End:  07/08/24               TRANSFERS       Patient will perform bed mobility supervision level of assistance and bed rails in order to improve safety and independence with mobility (Not Progressing)       Start:  06/24/24    Expected End:  07/08/24            Patient will complete functional transfer to all surfaces with front wheeled walker with contact guard assist level of assistance. (Progressing)       Start:  06/24/24    Expected End:  07/08/24            Patient will complete sit to stand transfer with independent level of assistance and front wheeled walker in order to improve safety and prepare for out of bed mobility. (Progressing)       Start:  06/24/24    Expected End:  07/08/24

## 2024-07-10 LAB
ACID FAST STN SPEC: NORMAL
ANION GAP SERPL CALC-SCNC: 14 MMOL/L (ref 10–20)
BUN SERPL-MCNC: 11 MG/DL (ref 6–23)
CALCIUM SERPL-MCNC: 9.2 MG/DL (ref 8.6–10.3)
CHLORIDE SERPL-SCNC: 95 MMOL/L (ref 98–107)
CO2 SERPL-SCNC: 27 MMOL/L (ref 21–32)
CREAT SERPL-MCNC: 0.86 MG/DL (ref 0.5–1.05)
EGFRCR SERPLBLD CKD-EPI 2021: 76 ML/MIN/1.73M*2
ERYTHROCYTE [DISTWIDTH] IN BLOOD BY AUTOMATED COUNT: 20.2 % (ref 11.5–14.5)
GLUCOSE BLD MANUAL STRIP-MCNC: 103 MG/DL (ref 74–99)
GLUCOSE BLD MANUAL STRIP-MCNC: 116 MG/DL (ref 74–99)
GLUCOSE SERPL-MCNC: 103 MG/DL (ref 74–99)
HCT VFR BLD AUTO: 26.6 % (ref 36–46)
HGB BLD-MCNC: 8.2 G/DL (ref 12–16)
MAGNESIUM SERPL-MCNC: 1.8 MG/DL (ref 1.6–2.4)
MCH RBC QN AUTO: 25.9 PG (ref 26–34)
MCHC RBC AUTO-ENTMCNC: 30.8 G/DL (ref 32–36)
MCV RBC AUTO: 84 FL (ref 80–100)
MYCOBACTERIUM SPEC CULT: NORMAL
NRBC BLD-RTO: 0.3 /100 WBCS (ref 0–0)
PHOSPHATE SERPL-MCNC: 4.7 MG/DL (ref 2.5–4.9)
PLATELET # BLD AUTO: 531 X10*3/UL (ref 150–450)
POTASSIUM SERPL-SCNC: 4.6 MMOL/L (ref 3.5–5.3)
RBC # BLD AUTO: 3.17 X10*6/UL (ref 4–5.2)
SODIUM SERPL-SCNC: 131 MMOL/L (ref 136–145)
WBC # BLD AUTO: 11.6 X10*3/UL (ref 4.4–11.3)

## 2024-07-10 PROCEDURE — 2500000001 HC RX 250 WO HCPCS SELF ADMINISTERED DRUGS (ALT 637 FOR MEDICARE OP): Performed by: INTERNAL MEDICINE

## 2024-07-10 PROCEDURE — 84100 ASSAY OF PHOSPHORUS: CPT

## 2024-07-10 PROCEDURE — 2500000004 HC RX 250 GENERAL PHARMACY W/ HCPCS (ALT 636 FOR OP/ED): Performed by: HOSPITALIST

## 2024-07-10 PROCEDURE — 94640 AIRWAY INHALATION TREATMENT: CPT

## 2024-07-10 PROCEDURE — 2500000004 HC RX 250 GENERAL PHARMACY W/ HCPCS (ALT 636 FOR OP/ED): Performed by: INTERNAL MEDICINE

## 2024-07-10 PROCEDURE — 99231 SBSQ HOSP IP/OBS SF/LOW 25: CPT | Performed by: NURSE PRACTITIONER

## 2024-07-10 PROCEDURE — 2500000004 HC RX 250 GENERAL PHARMACY W/ HCPCS (ALT 636 FOR OP/ED)

## 2024-07-10 PROCEDURE — 85027 COMPLETE CBC AUTOMATED: CPT | Performed by: INTERNAL MEDICINE

## 2024-07-10 PROCEDURE — C9113 INJ PANTOPRAZOLE SODIUM, VIA: HCPCS | Performed by: HOSPITALIST

## 2024-07-10 PROCEDURE — 2500000001 HC RX 250 WO HCPCS SELF ADMINISTERED DRUGS (ALT 637 FOR MEDICARE OP)

## 2024-07-10 PROCEDURE — 1200000002 HC GENERAL ROOM WITH TELEMETRY DAILY

## 2024-07-10 PROCEDURE — 2500000002 HC RX 250 W HCPCS SELF ADMINISTERED DRUGS (ALT 637 FOR MEDICARE OP, ALT 636 FOR OP/ED): Performed by: INTERNAL MEDICINE

## 2024-07-10 PROCEDURE — 99233 SBSQ HOSP IP/OBS HIGH 50: CPT | Performed by: INTERNAL MEDICINE

## 2024-07-10 PROCEDURE — 97110 THERAPEUTIC EXERCISES: CPT | Mod: GP,CQ

## 2024-07-10 PROCEDURE — 80048 BASIC METABOLIC PNL TOTAL CA: CPT | Performed by: INTERNAL MEDICINE

## 2024-07-10 PROCEDURE — 2500000001 HC RX 250 WO HCPCS SELF ADMINISTERED DRUGS (ALT 637 FOR MEDICARE OP): Performed by: HOSPITALIST

## 2024-07-10 PROCEDURE — 2500000005 HC RX 250 GENERAL PHARMACY W/O HCPCS: Performed by: INTERNAL MEDICINE

## 2024-07-10 PROCEDURE — 82947 ASSAY GLUCOSE BLOOD QUANT: CPT

## 2024-07-10 PROCEDURE — 83735 ASSAY OF MAGNESIUM: CPT

## 2024-07-10 PROCEDURE — 97530 THERAPEUTIC ACTIVITIES: CPT | Mod: GP,CQ

## 2024-07-10 ASSESSMENT — PAIN SCALES - WONG BAKER
WONGBAKER_NUMERICALRESPONSE: HURTS WORST
WONGBAKER_NUMERICALRESPONSE: HURTS WHOLE LOT
WONGBAKER_NUMERICALRESPONSE: HURTS LITTLE MORE
WONGBAKER_NUMERICALRESPONSE: HURTS LITTLE MORE

## 2024-07-10 ASSESSMENT — PAIN SCALES - GENERAL
PAINLEVEL_OUTOF10: 10 - WORST POSSIBLE PAIN
PAINLEVEL_OUTOF10: 6
PAINLEVEL_OUTOF10: 9
PAINLEVEL_OUTOF10: 9
PAINLEVEL_OUTOF10: 4
PAINLEVEL_OUTOF10: 10 - WORST POSSIBLE PAIN
PAINLEVEL_OUTOF10: 8
PAINLEVEL_OUTOF10: 4
PAINLEVEL_OUTOF10: 4

## 2024-07-10 ASSESSMENT — PAIN - FUNCTIONAL ASSESSMENT
PAIN_FUNCTIONAL_ASSESSMENT: 0-10

## 2024-07-10 ASSESSMENT — PAIN SCALES - PAIN ASSESSMENT IN ADVANCED DEMENTIA (PAINAD)
TOTALSCORE: MEDICATION (SEE MAR)
TOTALSCORE: MEDICATION (SEE MAR)

## 2024-07-10 ASSESSMENT — COGNITIVE AND FUNCTIONAL STATUS - GENERAL
MOBILITY SCORE: 18
TURNING FROM BACK TO SIDE WHILE IN FLAT BAD: A LITTLE
MOVING TO AND FROM BED TO CHAIR: A LITTLE
WALKING IN HOSPITAL ROOM: A LITTLE
STANDING UP FROM CHAIR USING ARMS: A LITTLE
CLIMB 3 TO 5 STEPS WITH RAILING: A LOT

## 2024-07-10 ASSESSMENT — PAIN DESCRIPTION - LOCATION
LOCATION: BACK
LOCATION: ABDOMEN
LOCATION: ABDOMEN
LOCATION: BACK
LOCATION: ABDOMEN

## 2024-07-10 ASSESSMENT — PAIN DESCRIPTION - ORIENTATION
ORIENTATION: LOWER
ORIENTATION: MID

## 2024-07-10 ASSESSMENT — PAIN DESCRIPTION - DESCRIPTORS: DESCRIPTORS: ACHING

## 2024-07-10 NOTE — PROGRESS NOTES
Fernando Cuevas is a 63 y.o. female on day 19 of admission presenting with Perforated viscus.      Subjective   Patient seen and examined at the bedside this morning. No acute overnight events. No other questions or concerns.         Objective     Last Recorded Vitals  /72 (BP Location: Right arm, Patient Position: Lying)   Pulse (!) 129 Comment: with ambulation x 13'.  122 in chair with LE exercises.  Temp 36.4 °C (97.5 °F) (Temporal)   Resp 18   Wt 63.5 kg (139 lb 14.4 oz)   SpO2 93%   Intake/Output last 3 Shifts:    Intake/Output Summary (Last 24 hours) at 7/10/2024 1229  Last data filed at 7/10/2024 1100  Gross per 24 hour   Intake 1200 ml   Output 1900 ml   Net -700 ml       Admission Weight  Weight: 64 kg (141 lb 1.5 oz) (06/20/24 2132)    Daily Weight  07/05/24 : 63.5 kg (139 lb 14.4 oz)    Image Results  XR chest 1 view  Narrative: Interpreted By:  Karina Ventura,   STUDY:  XR CHEST 1 VIEW;  7/9/2024 11:10 am      INDICATION:  Signs/Symptoms:sob.      COMPARISON:  07/04/2024      ACCESSION NUMBER(S):  [id number]      ORDERING CLINICIAN:  CORBY DAVE      FINDINGS:  Artifact from overlying monitoring leads noted. Right jugular central  line remains in place with tip overlying the distal SVC. Interval  removal of NG tube. hazy opacity in the left lung base. Pleural  angles are sharp. Subcentimeter nodular shadow in the right mid  chest. The cardiac silhouette is normal in size.      Impression: Subtle left basilar infiltrate or atelectasis.      Possible right chest nodule versus confluence of shadows. No definite  correlate on recent chest CT 06/20/2024. Attention at follow-up  suggested.      MACRO:  None.      Signed by: Karina Ventura 7/9/2024 12:58 PM  Dictation workstation:   [id number]      Physical Exam  Constitutional:       General: She is not in acute distress.  HENT:      Head: Normocephalic and atraumatic.   Eyes:      Conjunctiva/sclera: Conjunctivae normal.      Pupils: Pupils are  equal, round, and reactive to light.   Cardiovascular:      Rate and Rhythm: Normal rate and regular rhythm.   Pulmonary:      Effort: Pulmonary effort is normal.      Breath sounds: Normal breath sounds.   Abdominal:      General: There is distension.      Palpations: Abdomen is soft.   Skin:     General: Skin is warm and dry.   Neurological:      Mental Status: She is alert.   Psychiatric:         Mood and Affect: Mood normal.         Behavior: Behavior normal.         Relevant Results           Scheduled medications  aspirin, 81 mg, oral, Daily  azithromycin, 500 mg, oral, Once per day on Monday Wednesday Friday  budesonide, 0.25 mg, nebulization, Daily   And  formoterol, 20 mcg, nebulization, BID  busPIRone, 5 mg, oral, BID  calcium carbonate, 1,500 mg, oral, Daily  dilTIAZem CD, 240 mg, oral, Daily  enoxaparin, 1 mg/kg, subcutaneous, q12h  folic acid, 1 mg, oral, Daily  hydrocortisone sodium succinate, 200 mg, intravenous, Once  ipratropium-albuteroL, 3 mL, nebulization, TID  lisinopril, 10 mg, oral, Daily  mirtazapine, 15 mg, oral, Nightly  montelukast, 10 mg, oral, Daily  multivitamin with minerals, 1 tablet, oral, Daily  nortriptyline, 50 mg, oral, Nightly  oxybutynin, 5 mg, oral, Daily  oxygen, , inhalation, Continuous - Inhalation  pantoprazole, 40 mg, intravenous, Daily before breakfast  thiamine, 100 mg, oral, Daily  thiamine, 100 mg, oral, Daily  varenicline, 1 mg, oral, BID      Continuous medications       PRN medications  PRN medications: acetaminophen **OR** acetaminophen **OR** acetaminophen, alteplase, benzonatate, guaiFENesin, HYDROmorphone, hydrOXYzine HCL, ipratropium-albuteroL, nitroglycerin, ondansetron, oxyCODONE, oxyCODONE, phenoL    Assessment/Plan      Principal Problem:    Perforated viscus  Active Problems:    Thrombocytosis    Perforated small bowel's with peritonitis:  Concern for postop ileus versus partial obstruction:  Underwent surgical intervention 6/21  -Underwent small bowel  resection, and washout procedure.  -Culture for Candida albicans, concern for mix of gram-negative and anaerobes.  -Infectious disease following, continued on IV Zosyn, fluconazole 200 mg/day.  Plan for antibiotics with stop date 7/3/24.,  This will be a total of 10 days from the initiation of fluconazole.  Infectious diseases recommended discharge home once medically ready on p.o. Augmentin 875 mg 2 times a day instead of Zosyn if needed.  -General surgery following for postop ileus versus partial SBO, appreciate recommendations.  Enema and chewing gum added.    -Will continue to trend CBC  -Dietary following, appreciate recommendations.   -wound VAC in place  -NG tube removed  -tolerating oral intake    # Anemia  # Abdominal wall hematoma  -blood loss  -s/p blood transfusion    Acute DVT in the left distal brachial vein:  -Vascular upper extremity ultrasound with acute finding, non-occlusive.  -Patient initiated on full dose Lovenox therapeutic dose and 6/27. Will need to be transitioned to oral regimen prior to discharge.   -Patient with contrast allergy, CT angio chest requiring prep, no acute findings.    -on lovenox  -switch to OAC on discharge    Acute on chronic hyponatremia, resolved:  In setting of poor oral intake  -Nephrology was managing, placed on IV isotonic fluid with improvement  -Fluids will be discontinued.  -Nephrology signed off  -Will continue to trend sodium    Severe malnutrition:  -Nutrition following, appreciate recommendations.     Dispo: anticipate discharge in 1-2 days    Rupa Khoury MD

## 2024-07-10 NOTE — PROGRESS NOTES
Physical Therapy    Physical Therapy Treatment    Patient Name: Fernando Cuevas  MRN: [id number]  Today's Date: 7/10/2024  Time Calculation  Start Time: 0925  Stop Time: 0948  Time Calculation (min): 23 min    Assessment/Plan   PT Assessment  PT Assessment Results: Decreased strength, Decreased range of motion, Decreased endurance, Impaired balance, Decreased mobility, Impaired judgement, Pain  Rehab Prognosis: Good  Barriers to Discharge: Decreased endurance  Evaluation/Treatment Tolerance: Patient limited by fatigue  Medical Staff Made Aware: Yes  Strengths: Ability to acquire knowledge  Barriers to Participation: Comorbidities  End of Session Communication: Bedside nurse  Assessment Comment: Pt limited by fatigue and low back pain this session.  She will continue to benefit  from Ongoing PT to increase her fuctional activity tolerance and level.  End of Session Patient Position: Bed, 3 rail up, Alarm on  PT Plan  Inpatient/Swing Bed or Outpatient: Inpatient  PT Plan  Treatment/Interventions: Bed mobility, Transfer training, Gait training, Balance training, Strengthening, Endurance training, Therapeutic exercise, Therapeutic activity  PT Plan: Ongoing PT  PT Frequency: 4 times per week  PT Discharge Recommendations: Moderate intensity level of continued care  PT Recommended Transfer Status: Assist x1  PT - OK to Discharge: Yes (Per PT POC)      General Visit Information:   PT  Visit  PT Received On: 07/10/24  General  Reason for Referral: 63 y.o. F s/p exploratory laparascopy date  Referred By: MD Barbi  Family/Caregiver Present: No  Prior to Session Communication: Bedside nurse  Patient Position Received: Up in chair, Alarm on  Preferred Learning Style: auditory, kinesthetic, verbal  General Comment: RN present at start  - had given pt meds. RN  reporting Pt HR high but ok to work with her at this time.    Subjective   Precautions:  Precautions  Medical Precautions: Fall precautions  Post-Surgical Precautions:  Abdominal surgery precautions  Precautions Comment: wound vac, midline central line  Vital Signs:  Vital Signs  Heart Rate: (!) 129 (with ambulation x 13'.  122 in chair with LE exercises.)  Heart Rate Source: Monitor  SpO2: 100 %    Objective   Pain:  Pain Assessment  Pain Assessment: 0-10  0-10 (Numeric) Pain Score: 6  Pain Type: Chronic pain  Pain Location: Back  Pain Orientation: Lower  Pain Descriptors: Aching  Pain Frequency: Constant/continuous  Pain Onset:  (from  sitting in chair > one hour.)  Pain Interventions: Medication (See MAR)  Cognition:  Cognition  Overall Cognitive Status: Within Functional Limits  Orientation Level: Oriented X4  Insight: Mild  Coordination:  Movements are Fluid and Coordinated: Yes  Postural Control:  Postural Control  Postural Control: Within Functional Limits  Static Sitting Balance  Static Sitting-Balance Support: Feet supported  Static Sitting-Level of Assistance: Independent  Static Sitting-Comment/Number of Minutes: sitting EOB  Dynamic Sitting Balance  Dynamic Sitting-Balance Support: Feet supported, No upper extremity supported  Dynamic Sitting-Balance: Lateral lean, Forward lean, Reaching for objects, Reaching across midline, Reaching for weighted objects  Dynamic Sitting-Comments: SBA  Static Standing Balance  Static Standing-Balance Support: Left upper extremity supported  Static Standing-Level of Assistance: Close supervision  Static Standing-Comment/Number of Minutes: cues to improve upright posture.  Dynamic Standing Balance  Dynamic Standing-Balance Support: Left upper extremity supported  Dynamic Standing-Balance: Forward lean, Reaching for objects  Dynamic Standing-Comments: SBA    Activity Tolerance:  Activity Tolerance  Endurance: Tolerates 10 - 20 min exercise with multiple rests  Treatments:  Therapeutic Exercise  Therapeutic Exercise Performed: Yes  Therapeutic Exercise Activity 1: Pt instructed in seated ther ex to increase strength and endurance to achieve  funcitonal goals. HF, LAQ, HA, DF, PF, B LE 2 x 15 reps each. Pt able to complete each set with before needing to rest that  LE. Rest periods as needed to complete all sets correctlly. Minor cues needed to improve technique for max benefits.    Therapeutic Activity  Therapeutic Activity Performed: Yes  Therapeutic Activity 1: For increased standing balance and tolerence, pt stood at bed side table reaching for and placeing items at various angles and back again with no UE support, and Close Supervision.  Functional activity.  Therapeutic Activity 2: For increased standing balance: pt stood with 1 UE support to move items from bed/table and rearranging environment - functional activity. Close Supervison.         Bed Mobility  Bed Mobility: Yes  Bed Mobility 1  Bed Mobility 1: Supine to sitting, Sitting to supine  Level of Assistance 1: Close supervision, Minimal verbal cues  Bed Mobility Comments 1: Cues given to correct Log Roll technique to maintain Abd precautions.  HOB minimally elevated.    Ambulation/Gait Training  Ambulation/Gait Training Performed: Yes  Ambulation/Gait Training 1  Surface 1: Level tile  Device 1: Rollator  Assistance 1: Close supervision  Quality of Gait 1: Diminished heel strike, Decreased step length  Comments/Distance (ft) 1: 13'  Distance limited by high HR and increased fatigue.  Transfers  Transfer: Yes  Transfer 1  Technique 1: Sit to stand, Stand to sit  Transfer Device 1:  (rollator)  Transfer Level of Assistance 1: Close supervision, Minimal verbal cues  Trials/Comments 1: On chair, and EOB. Cues for hand placement - to push up from chair to stand and to place hand on sitting surface for safe descend to sitting.    Outcome Measures:  [facility] Basic Mobility  Turning from your back to your side while in a flat bed without using bedrails: None  Moving from lying on your back to sitting on the side of a flat bed without using bedrails: A little  Moving to and from bed to chair (including  a wheelchair): A little  Standing up from a chair using your arms (e.g. wheelchair or bedside chair): A little  To walk in hospital room: A little  Climbing 3-5 steps with railing: A lot  Basic Mobility - Total Score: 18    Education Documentation  Precautions, taught by Jeremy King PTA at 7/10/2024 10:50 AM.  Learner: Patient  Readiness: Acceptance  Method: Explanation, Demonstration  Response: Verbalizes Understanding, Demonstrated Understanding    Body Mechanics, taught by Jeremy King PTA at 7/10/2024 10:50 AM.  Learner: Patient  Readiness: Acceptance  Method: Explanation, Demonstration  Response: Verbalizes Understanding, Demonstrated Understanding    Mobility Training, taught by Jeremy King PTA at 7/10/2024 10:50 AM.  Learner: Patient  Readiness: Acceptance  Method: Explanation, Demonstration  Response: Verbalizes Understanding, Demonstrated Understanding    Education Comments  No comments found.        OP EDUCATION:       Encounter Problems       Encounter Problems (Active)       Balance       Pt will tolerate 10+ mins dynamic standing balance activities with CGA or less and no LOB using a RW.  (Progressing)       Start:  06/23/24    Expected End:  07/07/24               PT Transfers       STG - Patient will perform bed mobility with SBA or less using log roll technique.  (Met)       Start:  06/23/24    Expected End:  07/07/24    Resolved:  06/28/24         STG - Patient will transfer sit to and from stand mod I with a RW.  (Progressing)       Start:  06/23/24    Expected End:  07/07/24               Pain - Adult          Safety       LTG - Patient will adhere to hip precautions during ADL's and transfers (Progressing)       Start:  06/21/24            LTG - Patient will demonstrate safety requirements appropriate to situation/environment (Progressing)       Start:  06/21/24            LTG - Patient will utilize safety techniques (Progressing)       Start:  06/21/24            STG - Patient locks  brakes on wheelchair (Progressing)       Start:  06/21/24            STG - Patient uses call light consistently to request assistance with transfers (Progressing)       Start:  06/21/24            STG - Patient uses gait belt during all transfers (Progressing)       Start:  06/21/24            Goal 1 (Progressing)       Start:  06/21/24            Goal 2 (Progressing)       Start:  06/21/24            Goal 3 (Progressing)       Start:  06/21/24                  Encounter Problems (Resolved)       Mobility       STG - Patient will ambulate 50 ft with CGA and a Rw with minimal SOB or fatigue.  (Met)       Start:  06/23/24    Expected End:  07/07/24    Resolved:  06/28/24

## 2024-07-10 NOTE — CARE PLAN
The patient's goals for the shift include  adequate pain control    The clinical goals for the shift include patient will remain comfortable with acceptable pain score and safe

## 2024-07-10 NOTE — PROGRESS NOTES
07/10/24 0957   Discharge Planning   Home or Post Acute Services In home services   Type of Home Care Services Home nursing visits;Home OT;Home PT   Expected Discharge Disposition  Services     Plan is to discharge home in 1-2 days with wound vac.  Wound vac to be delivered today to patient's room.  Secure chat to attending for homecare referral for RN, PT, OT.  Patient currently on 2L O2 and was not on O2 at home.  Will need to be weaned off and/or a home O2 eval.  Patient would like to be discharged Friday due to family member being off then.  Lizette Blank RN

## 2024-07-10 NOTE — PROGRESS NOTES
"Fernando Cuevas is a 63 y.o. female on day 19 of admission presenting with Perforated viscus.    Subjective   Doing well this morning, pain is well controlled, does have a poor appetite but is tolerating diet without n/v.        Objective     Physical Exam  Constitutional:       Appearance: Normal appearance.   Cardiovascular:      Rate and Rhythm: Normal rate.   Pulmonary:      Effort: Pulmonary effort is normal.      Comments: On 2L O2, audible rhonchi.   Abdominal:      Comments: Mildly distended, soft, mid abdominal wound vac with maroon content in canister.      Genitourinary:     Comments: Voiding without difficulty   Musculoskeletal:         General: Normal range of motion.   Skin:     General: Skin is warm and dry.   Neurological:      Mental Status: She is oriented to person, place, and time.   Psychiatric:         Mood and Affect: Mood normal.         Behavior: Behavior normal.         Last Recorded Vitals  Blood pressure 116/72, pulse (!) 129, temperature 36.4 °C (97.5 °F), temperature source Temporal, resp. rate 18, height 1.575 m (5' 2.01\"), weight 63.5 kg (139 lb 14.4 oz), SpO2 90%.  Intake/Output last 3 Shifts:  I/O last 3 completed shifts:  In: 2578.2 (40.6 mL/kg) [P.O.:1250; IV Piggyback:50]  Out: 1600 (25.2 mL/kg) [Urine:1600 (0.7 mL/kg/hr)]  Weight: 63.5 kg     Medications:   Scheduled medications  aspirin, 81 mg, oral, Daily  azithromycin, 500 mg, oral, Once per day on Monday Wednesday Friday  budesonide, 0.25 mg, nebulization, Daily   And  formoterol, 20 mcg, nebulization, BID  busPIRone, 5 mg, oral, BID  calcium carbonate, 1,500 mg, oral, Daily  dilTIAZem CD, 240 mg, oral, Daily  enoxaparin, 1 mg/kg, subcutaneous, q12h  folic acid, 1 mg, oral, Daily  hydrocortisone sodium succinate, 200 mg, intravenous, Once  ipratropium-albuteroL, 3 mL, nebulization, TID  lisinopril, 10 mg, oral, Daily  mirtazapine, 15 mg, oral, Nightly  montelukast, 10 mg, oral, Daily  multivitamin with minerals, 1 tablet, " oral, Daily  nortriptyline, 50 mg, oral, Nightly  oxybutynin, 5 mg, oral, Daily  oxygen, , inhalation, Continuous - Inhalation  pantoprazole, 40 mg, intravenous, Daily before breakfast  thiamine, 100 mg, oral, Daily  thiamine, 100 mg, oral, Daily  varenicline, 1 mg, oral, BID      Continuous medications       PRN medications  PRN medications: acetaminophen **OR** acetaminophen **OR** acetaminophen, alteplase, benzonatate, guaiFENesin, HYDROmorphone, hydrOXYzine HCL, ipratropium-albuteroL, nitroglycerin, ondansetron, oxyCODONE, oxyCODONE, phenoL    Relevant Results  Results for orders placed or performed during the hospital encounter of 06/20/24 (from the past 24 hour(s))   POCT GLUCOSE   Result Value Ref Range    POCT Glucose 108 (H) 74 - 99 mg/dL   CBC   Result Value Ref Range    WBC 11.6 (H) 4.4 - 11.3 x10*3/uL    nRBC 0.3 (H) 0.0 - 0.0 /100 WBCs    RBC 3.17 (L) 4.00 - 5.20 x10*6/uL    Hemoglobin 8.2 (L) 12.0 - 16.0 g/dL    Hematocrit 26.6 (L) 36.0 - 46.0 %    MCV 84 80 - 100 fL    MCH 25.9 (L) 26.0 - 34.0 pg    MCHC 30.8 (L) 32.0 - 36.0 g/dL    RDW 20.2 (H) 11.5 - 14.5 %    Platelets 531 (H) 150 - 450 x10*3/uL   Basic Metabolic Panel   Result Value Ref Range    Glucose 103 (H) 74 - 99 mg/dL    Sodium 131 (L) 136 - 145 mmol/L    Potassium 4.6 3.5 - 5.3 mmol/L    Chloride 95 (L) 98 - 107 mmol/L    Bicarbonate 27 21 - 32 mmol/L    Anion Gap 14 10 - 20 mmol/L    Urea Nitrogen 11 6 - 23 mg/dL    Creatinine 0.86 0.50 - 1.05 mg/dL    eGFR 76 >60 mL/min/1.73m*2    Calcium 9.2 8.6 - 10.3 mg/dL   Magnesium   Result Value Ref Range    Magnesium 1.80 1.60 - 2.40 mg/dL   Phosphorus   Result Value Ref Range    Phosphorus 4.7 2.5 - 4.9 mg/dL   POCT GLUCOSE   Result Value Ref Range    POCT Glucose 103 (H) 74 - 99 mg/dL   POCT GLUCOSE   Result Value Ref Range    POCT Glucose 116 (H) 74 - 99 mg/dL     CT abdomen pelvis wo IV contrast    Result Date: 7/3/2024  Interpreted By:  Gabe Gage, STUDY: CT ABDOMEN PELVIS WO IV CONTRAST;   7/3/2024 10:25 am   INDICATION: 62 y/o   F with  Signs/Symptoms:POD 11 ex lap and partial SBR, ileus, increased abdominal distention.   LIMITATIONS: Evaluation of the solid organs is limited due to non use of IV contrast.   ACCESSION NUMBER(S): [id number]   ORDERING CLINICIAN: MARVA NAVA   TECHNIQUE: Thin-section noncontrast spiral axial images were obtained from the xiphoid down through the symphysis pubis. Sagittal and coronal reconstruction images were generated. Bone, mediastinal, lung, and liver windows were reviewed.   COMPARISON: Most recent prior is from 06/26/2024. Correlation with KUB from earlier today..   FINDINGS: LUNG BASES: Tiny bilateral pleural effusions. Emphysematous changes at the lung bases. There is mild atelectasis at both lung bases.   LIVER: No hepatomegaly. Liver density was  within the limits of normal. No gross liver lesion in this unenhaced exam.   GALLBLADDER: Mild cholelithiasis. No gallbladder wall thickening, or adjacent edema.   BILE DUCTS: No intrahepatic biliary ductal dilatation. Common bile duct was within the limits of normal.   SPLEEN: No splenomegaly. No gross splenic mass..   PANCREAS: No pancreatic mass or inflammation, or ductal dilatation.   KIDNEYS/ADRENALS: No adrenal mass or enlargement. Mild global right renal atrophy. No calcified stone, hydronephrosis, mass, or perinephric edema in either kidney. No ureteral stone or dilatation.   BLADDER/PELVIS: Urinary bladder was grossly intact. No uterine enlargement or gross adnexal mass.   GREAT VESSELS/RETROPERITONEUM: Mild aortoiliac calcifications. Otherwise, abdominal aorta and IVC were intact. No suspicious retroperitoneal adenopathy. No suspicious mesenteric adenopathy. No suspicious pelvic or inguinal adenopathy.   PERITONEUM: See below.   BOWEL: The patient recently had a Gastrografin challenge. There is an NG tube in the stomach. The stomach otherwise unremarkable. There was a small amount of residual  Gastrografin contrast material in multiple loops of mid to distal small bowel. Small bowel surgical reanastomosis in the anterior right pelvis on image 107 was unremarkable. There was no suspicious small bowel wall thickening or small bowel dilatation in this exam. There was Gastrografin contrast in the cecum extending all the way into the distal left colon. There was mild-to-moderate stool and gas throughout the colon down into the rectum. No colonic wall thickening or adjacent edema. There was a tiny amount mesenteric edema in the anterior inferior right pelvis in the region of the anastomosis, and there was trace edema in the mesentery of the deep posteroinferior pelvis. No focal fluid collection. No pneumoperitoneum.   BONES: No destructive lytic or blastic bone lesion.   ABDOMINAL WALL: Stable vertically oriented line skin staples in the midline the lower abdomen through the mid pelvis. There is a subtle localized area heterogeneous fullness in the mid medial aspect of the left rectus abdominus muscle at the level of the pelvic inlet measuring 24 x 17 mm on image 88, not evident previously..       Recent partial small bowel resection with reanastomosis. The reanastomosis is unremarkable today, with no indication of bowel obstruction or perforation. There is very mild mesenteric edema in the region of the anastomosis that is consistent with recent surgery. There is no focal fluid collection within the peritoneal cavity worrisome for abscess or hematoma.   New heterogeneous fullness in the anteromedial left rectus abdominus muscle at the level of pelvic inlet measuring 24 x 17 mm on image 88. This could be a small hematoma or even a developing small abscess. No associated gas density. Clinical correlation needed.   There is Gastrografin contrast material in multiple loops of otherwise unremarkable distal small bowel and also throughout the colon down through the distal left. No indication of bowel obstruction in  this exam.   Mild-to-moderate stool and gas throughout the colon and rectum.   NG tube in place as described.   Tiny bilateral pleural effusions with scant atelectasis at the lung bases.   Mild cholelithiasis.   Mild global atrophy of the right kidney.   MACRO: None   Signed by: Gabe Gage 7/3/2024 11:52 AM Dictation workstation:   [id number]    XR abdomen 1 view    Result Date: 7/3/2024  Interpreted By:  Gabe Gage, STUDY: XR ABDOMEN 1 VIEW;  7/3/2024 7:44 am   INDICATION: Signs/Symptoms:abdominal distension, s/p gastrographin study.   COMPARISON: Most recent prior KUB is from 07/02/2024.   ACCESSION NUMBER(S): [id number]   ORDERING CLINICIAN: MINERVA AVILEZ   TECHNIQUE: Single supine view of the abdomen and pelvis was obtained.     FINDINGS: There was   a stable NG tube in place. There is Gastrografin contrast material throughout the colon. There is mild persistent small-bowel gas with minimal dilatation. Vertically oriented line skin staples to the right of midline, unchanged.. There is mild-to-moderate stool throughout the colon.   There was no gross organomegaly. There were no abnormal calcifications. No destructive bone lesion.       Stable NG tube in place. Stable vertically oriented line of skin staples to the right of midline.   Previously  administered Gastrografin contrast material is now throughout the colon extending into the sigmoid. There is also mild-to-moderate stool throughout the colon. Consistent with delayed bowel transit time.   Mild residual small bowel gas with minimal loop dilatation, slightly improved. Suspect underlying ileus.   MACRO: None   Signed by: Gabe Gage 7/3/2024 8:23 AM Dictation workstation:   [id number]    XR abdomen 2 views supine and erect or decub    Result Date: 7/2/2024  Interpreted By:  Karina Ventura, STUDY: XR ABDOMEN 2 VIEWS SUPINE AND ERECT OR DECUB; XR ABDOMEN 1 VIEW; 7/2/2024 1:00 am; 7/2/2024 8:03 am; 7/2/2024 2:20 am   INDICATION:  Signs/Symptoms:Prolonged ilues; Signs/Symptoms:Gastrografin challenge   COMPARISON: 06/30/2024   ACCESSION NUMBER(S): [id number]; [id number]; [id number]   ORDERING CLINICIAN: MINERVA APARICIO   FINDINGS: 4 supine and erect views of the abdomen obtained at 12:42 a.m. and 1:59 a.m.. Additional single abdominal radiograph obtained at 7:57 a.m..   Midline skin clips. NG tube in place with tip overlying the mid stomach. Multiple distended large and small bowel loops containing air and fecal debris. No rectal gas on initial images.   Contrast material in the proximal stomach on images at 12:43 a.m. administered by unknown person. Transit of contrast material into distended central abdominal small bowel loops on images at 1:59 a.m. and into a few additional right-sided distended small bowel loops on image at 7:57 a.m..       Distended large and small bowel loops with prolonged small bowel contrast transit time consistent with ileus, less likely distal obstruction. Follow-up or further evaluation with CT as clinically warranted.   MACRO: None.   Signed by: Karina Ventura 7/2/2024 8:26 AM Dictation workstation:   [id number]    XR abdomen 2 views supine and erect or decub    Result Date: 7/2/2024  Interpreted By:  Karina Ventura, STUDY: XR ABDOMEN 2 VIEWS SUPINE AND ERECT OR DECUB; XR ABDOMEN 1 VIEW; 7/2/2024 1:00 am; 7/2/2024 8:03 am; 7/2/2024 2:20 am   INDICATION: Signs/Symptoms:Prolonged ilues; Signs/Symptoms:Gastrografin challenge   COMPARISON: 06/30/2024   ACCESSION NUMBER(S): [id number]; [id number]; [id number]   ORDERING CLINICIAN: MINERVA APARICIO   FINDINGS: 4 supine and erect views of the abdomen obtained at 12:42 a.m. and 1:59 a.m.. Additional single abdominal radiograph obtained at 7:57 a.m..   Midline skin clips. NG tube in place with tip overlying the mid stomach. Multiple distended large and small bowel loops containing air and fecal debris. No rectal gas on initial images.    Contrast material in the proximal stomach on images at 12:43 a.m. administered by unknown person. Transit of contrast material into distended central abdominal small bowel loops on images at 1:59 a.m. and into a few additional right-sided distended small bowel loops on image at 7:57 a.m..       Distended large and small bowel loops with prolonged small bowel contrast transit time consistent with ileus, less likely distal obstruction. Follow-up or further evaluation with CT as clinically warranted.   MACRO: None.   Signed by: Karina Ventura 7/2/2024 8:26 AM Dictation workstation:   [id number]    XR abdomen 1 view    Result Date: 7/2/2024  Interpreted By:  Karina Ventura, STUDY: XR ABDOMEN 2 VIEWS SUPINE AND ERECT OR DECUB; XR ABDOMEN 1 VIEW; 7/2/2024 1:00 am; 7/2/2024 8:03 am; 7/2/2024 2:20 am   INDICATION: Signs/Symptoms:Prolonged ilues; Signs/Symptoms:Gastrografin challenge   COMPARISON: 06/30/2024   ACCESSION NUMBER(S): [id number]; [id number]; [id number]   ORDERING CLINICIAN: MINERVA APARICIO   FINDINGS: 4 supine and erect views of the abdomen obtained at 12:42 a.m. and 1:59 a.m.. Additional single abdominal radiograph obtained at 7:57 a.m..   Midline skin clips. NG tube in place with tip overlying the mid stomach. Multiple distended large and small bowel loops containing air and fecal debris. No rectal gas on initial images.   Contrast material in the proximal stomach on images at 12:43 a.m. administered by unknown person. Transit of contrast material into distended central abdominal small bowel loops on images at 1:59 a.m. and into a few additional right-sided distended small bowel loops on image at 7:57 a.m..       Distended large and small bowel loops with prolonged small bowel contrast transit time consistent with ileus, less likely distal obstruction. Follow-up or further evaluation with CT as clinically warranted.   MACRO: None.   Signed by: Karina Ventura 7/2/2024 8:26 AM Dictation  workstation:   [id number]    XR abdomen 1 view    Result Date: 6/30/2024  Interpreted By:  Bethel Porras, STUDY: XR ABDOMEN 1 VIEW   INDICATION: Signs/Symptoms:NG moved, reconfirm proper placement.   COMPARISON: June 30th earlier the same day   ACCESSION NUMBER(S): [id number]   ORDERING CLINICIAN: ROBERT NOLASCO   FINDINGS: Nasogastric tube reflected upon itself slightly within the stomach over the gastric fundus.   Bowel-gas pattern incompletely assessed.       Nasogastric tube reflected upon itself slightly within the stomach over the gastric fundus.   Signed by: Bethel Porras 6/30/2024 4:01 PM Dictation workstation:   [id number]    XR abdomen 1 view    Result Date: 6/30/2024  Interpreted By:  Sejal Dykes, STUDY: XR ABDOMEN 1 VIEW;  6/30/2024 7:56 am. 2 views.   INDICATION: Signs/Symptoms:Post-op ileus.   COMPARISON: 06/29/2024   ACCESSION NUMBER(S): [id number]   ORDERING CLINICIAN: ROBERT NOLASCO   FINDINGS: There is a nasogastric tube with the tip in the proximal stomach and side hole beyond the gastroesophageal junction. There is no gastric distention. There is a vertical orientation of cutaneous staples from recent abdominal surgery. There is moderate-to-marked distention of central small bowel loops, unchanged. Findings could represent a postoperative ileus but could represent a partial small bowel obstruction. There is a large amount of stool in the right side of the colon, unchanged.   There are no pathologic calcifications.   Visualized lungs are clear.   Osseous structures demonstrate no acute bony changes.         1. Nasogastric tube with the tip in the proximal stomach; no gastric distention. 2. Persistent moderate-to-marked central small-bowel dilatation which could represent a postoperative ileus versus partial small bowel obstruction. This is unchanged. 3. Large amount of stool right side of the colon.   MACRO: None   Signed by: Sejal Dykes 6/30/2024 8:11 AM Dictation workstation:    [id number]    XR abdomen 1 view    Result Date: 6/29/2024  Interpreted By:  [place] Roberts, STUDY: XR ABDOMEN 1 VIEW;  6/29/2024 9:05 am   INDICATION: Signs/Symptoms:Abdominal distension.   COMPARISON: 06/24/2024   ACCESSION NUMBER(S): [id number]   ORDERING CLINICIAN: ROBERT NOLASCO   FINDINGS: There is prominent stool in the right colon. There is gaseous distention of multiple loops of small bowel. Nasogastric tube overlies the stomach. Skin staples are seen in the midline of the lower abdomen.       Slight increase in the gaseous distention of the small bowel since the previous exam possibly due to postoperative ileus although partial small bowel obstruction can also have this appearance. Large amount of formed stool in the right colon.   MACRO: None   Signed by: Florida Roberts 6/29/2024 10:22 AM Dictation workstation:   [id number]    CT angio chest for pulmonary embolism    Result Date: 6/28/2024  Interpreted By:  Gabe Gage, STUDY: CT ANGIO CHEST FOR PULMONARY EMBOLISM;  6/28/2024 7:04 am   INDICATION: 62 y/o   F with  Signs/Symptoms:PE.   LIMITATIONS: None.   ACCESSION NUMBER(S): [id number]   ORDERING CLINICIAN: DELPHINE VELÁZQUEZ   TECHNIQUE: After the administration of intravenous nonionic contrast, thin-section arterial phase images were obtained  from the thoracic inlet down through the iliac crest. Sagittal and coronal reconstruction images were generated. Axial and coronal MIP vascular reconstruction images were also generated.   Mediastinal, lung, bone, and liver windows were reviewed. 75 ML Omnipaque 350     COMPARISON: Most recent prior from 06/13/2024. correlation with CT scans of the abdomen and pelvis, most recent from 06/26/2024.   FINDINGS: CHEST WALL/BASE OF THE NECK: The chest wall was grossly intact. No thyromegaly or thyroid mass.   MEDIASTINUM/ALESSANDRO: No suspicious mediastinal, hilar, or axillary mass or adenopathy. No cardiomegaly. No pericardial effusion. No thoracic aortic aneurysm or  dissection. No pulmonary artery filling defect.   LUNGS/ PLEURA/ AND TRACHEA: Stable underlying fat containing posteromedial left-sided diaphragmatic hernia. Band of atelectasis across the posterior right lung base. Mild atelectasis at the posteromedial left lung base. Underlying emphysema with upper lobe predominance. No mass or pneumonia in either lung. Tiny bilateral pleural effusions. No pneumothorax. The trachea was grossly intact.   BONES: No destructive lytic or blastic bone lesion.   UPPER ABDOMEN: There is an NG tube in place with distal tip in the distal gastric body. Cholelithiasis. Contracted gallbladder. The imaged portions of the liver   were unremarkable. There are subtle stable small hypodensities in the spleen compared to 06/26/2024. Imaged kidneys and adrenal glands were intact. No upper abdominal ascites. The imaged stomach and large bowel. There is a bilobed hypodensity adjacent the pancreatic tail the left abdomen measuring 24 mm AP x 17 mm transversely on image 287, measuring 24 Hounsfield units of CT density. This is stable compared to the most recent prior CT scan abdomen and pelvis. It is also grossly stable compared to 06/20/2024 and was not present on 03/22/2016.       No CT evidence of pulmonary embolism in the current exam.   Emphysema. Band of atelectasis at the right lung base. Confluent atelectasis at the posteromedial left lung base. Tiny bilateral pleural effusions.. No mass or pneumonia in either lung.   NG tube in place as described.   Cholelithiasis.   Stable bilobed hypodensity adjacent to the pancreatic tail. Pancreatic pseudocyst versus cystic pancreatic neoplasms. In addition, there are subtle stable nonspecific splenic hypodensities which could be splenic hemangiomas. Recommend further evaluation with MRI the pancreas and spleen.   MACRO: None   Signed by: Gabe Gage 6/28/2024 7:55 AM Dictation workstation:   [id number]    Vascular US upper extremity venous duplex  left    Result Date: 6/27/2024             [place] [id number]   Tel [phone] and Fax [phone]  Vascular Lab Report [facility] US UPPER EXTREMITY VENOUS DUPLEX LEFT  Patient Name:      DINA GREENE       Reading Physician:  [id number] Villalba DO Study Date:        6/27/2024            Ordering Physician: [id number] REECE MRN/PID:           [id number]             Technologist:       Caren Leigh RVT Accession#:        [id number]         Technologist 2: Date of Birth/Age: 1960 / 63 years Encounter#:         [id number] Gender:            F Admission Status:  Inpatient            Location Performed: [facility]  Diagnosis/ICD: Left arm swelling-M79.89 CPT Codes:     28696 Peripheral venous duplex scan for DVT Limited  **CRITICAL RESULT** Critical Result: Acute DVT in the left distal brachial vein Notification called to Katharina Pineda RN on 6/27/2024 at 3:12:42 PM.  CONCLUSIONS: Right Upper Venous: The subclavian vein demonstrates a pulsatile flow. Left Upper Venous: There is acute non-occlusive deep vein thrombosis visualized in the brachial vein. The remainder of the left arm is negative for deep vein thrombosis.  Imaging & Doppler Findings:  Right        Flow Subclavian Pulsatile  Left                Compress    Thrombus            Flow Internal Jugular      Yes         None           Pulsatile Subclavian            Yes         None Subclavian Proximal   Yes         None           Pulsatile Subclavian Mid        Yes         None           Pulsatile Subclavian Distal     Yes         None           Pulsatile Axillary              Yes         None       Spontaneous/Phasic Brachial               No    Acute occlusive Cephalic              Yes         None Basilic               Yes         None  [id number] Rusty Villalba DO Electronically signed by Carlos Villalba DO on 6/27/2024 at 9:37:37 PM  ** Final **     CT abdomen pelvis wo IV contrast    Result Date:  6/26/2024  Interpreted By:  Karina Ventura, STUDY: CT ABDOMEN PELVIS WO IV CONTRAST;  6/26/2024 8:51 am   INDICATION: Signs/Symptoms:POD 5 small bowel resection. Increased pain.   COMPARISON: 06/20/2024   ACCESSION NUMBER(S): [id number]   ORDERING CLINICIAN: ANITRA APARICIO   TECHNIQUE: CT of the abdomen and pelvis was performed. Sagittal and coronal reconstructions were generated.  No intravenous contrast given for the exam.   FINDINGS: Solid organ and vessel evaluation limited without IV contrast.   ABDOMINAL ORGANS:   LIVER: No focal lesion within limits of unenhanced exam.   GALL BLADDER AND BILIARY TREE: Subtle density layer near the gallbladder neck again seen. No gallbladder wall thickening or biliary dilatation within limits of unenhanced exam   SPLEEN: 1.3 cm subtle hypodense lesion in the inferior pole image 36/155.   PANCREAS: Probable 1.4 cm hypodense lesion in the tail image 44/155. Few punctate calcifications in the head.   ADRENALS: Uneven hypodense thickening of both adrenal glands similar to the prior exam.   KIDNEYS AND URETERS: Atrophic right kidney. Mild relatively symmetric perinephric fat stranding. No focal renal mass within limits of unenhanced exam. No hydronephrosis.   BOWEL: NG tube extends into the body of distended fluid and air-filled stomach. New right lower quadrant small bowel suture line. Multiple dilated air and fluid-filled small bowel loops with scattered air-fluid levels. Moderate colonic fecal residue. Distal colon diverticulosis.   PERITONEUM, RETROPERITONEUM, NODES: Minimal free fluid in the pelvis. Mild stranding in the right lower quadrant inferior to suture line. No free intraperitoneal air. No significant retroperitoneal adenopathy within limits of unenhanced exam.   VESSELS:  Scattered atherosclerotic calcifications. Lack of IV contrast precludes vascular luminal assessment. No abdominal aortic aneurysm.   PELVIS: Urinary bladder is moderately distended and grossly normal  "in contour. Limited delineation of the uterus separate from adjacent bowel loops.   ABDOMINAL WALL: New midline skin clips. Few dots of air in the right lower quadrant and left mid abdominal wall. Mild fluid and stranding in both flanks, right-greater-than-left.   BONES: No focal concerning lytic or blastic osseous lesion.   LOWER CHEST: Moderate emphysematous changes. New coarse bandlike opacities in both lung bases with small pleural effusions.       New right lower quadrant suture line reportedly status post small bowel resection with multiple dilated air and fluid-filled small bowel loops that could reflect mild postop ileus or partial obstruction. Follow-up as clinically warranted.   Small hypodense lesions in the pancreatic tail and spleen, can not be further characterized on unenhanced exam. Attention at follow-up or further evaluation with MRI recommended.   Bandlike infiltrates or atelectasis in both lung bases with small pleural effusions.   Numerous additional findings as described above.   MACRO: None.   Signed by: Karina Ventura 6/26/2024 9:08 AM Dictation workstation:   [id number]        Assessment/Plan   Principal Problem:    Perforated viscus  Active Problems:    Thrombocytosis    Fernando Cuevas is a 64 yo female who is admitted with abdominal pain. She was found to have perforated viscus and was taken to the OR emergently by Dr Liang for ex-lap and partial small bowel resection. Intra-operative findings showed \"segment of perforated small bowel.\" She was also recently hospitalized for pneumonia and is currently being treated for COPD exacerbation. On exam, her abdomen is mildly distended, soft, mildly tender, non peritonitic. Wound vac functioning appropriately.  Having Bms.     Plan:   GI:   POD #18 s/p ex-lap, partial SBR  - continue soft diet  - DVT ppx: SCDs and lovenox  - oob and walking as much as possible   - IS 10x/hr    Dispo: No additional surgical indications at this time, ok to be " discharged when medically cleared, Pt to follow up with Dr Liang in 2 weeks.     I spent 35 minutes in the professional and overall care of this patient.      Earline Stahl, LISETH-CNP

## 2024-07-10 NOTE — PROGRESS NOTES
"                                                                                                               '' Infectious Disease Progress Note''        Interval Events:  No other new symptoms  Afebrile  WBC 11K    Current antibiotic:  Azithromycin     Physical Exam:  /72 (BP Location: Right arm, Patient Position: Lying)   Pulse (!) 129 Comment: with ambulation x 13'.  122 in chair with LE exercises.  Temp 36.4 °C (97.5 °F) (Temporal)   Resp 18   Ht 1.575 m (5' 2.01\")   Wt 63.5 kg (139 lb 14.4 oz)   SpO2 93%   BMI 25.58 kg/m²   General: NAD, nontoxic appearing  Skin: no new rash  Resp: lungs CTA b/l  CV:  normal S1/S2, no murmur   Abd: soft distended, non-tender, + wound VAC  Ext: no edema      Lab Results:  Reviewed    Micro:-      Imaging: reviewed         Assessment:    -Leukocytosis: resolving   -Perforated small bowel with peritonitis s/p or 6/21, culture grew Candida albicans and mixed bacteria  -Postop ileus  -COPD on home azithromycin PPx MWF  -Pruritus    Plan/Recommendations:  -c/w azithromycin PPx MWF for COPD  -Trend WBC      Please call ID with any concerns or questions.    Sahra Morgan MD  [facility]  #[phone]  '  "

## 2024-07-11 LAB
ANION GAP SERPL CALC-SCNC: 15 MMOL/L (ref 10–20)
BUN SERPL-MCNC: 12 MG/DL (ref 6–23)
CALCIUM SERPL-MCNC: 9.3 MG/DL (ref 8.6–10.3)
CHLORIDE SERPL-SCNC: 95 MMOL/L (ref 98–107)
CO2 SERPL-SCNC: 26 MMOL/L (ref 21–32)
CREAT SERPL-MCNC: 1.09 MG/DL (ref 0.5–1.05)
EGFRCR SERPLBLD CKD-EPI 2021: 57 ML/MIN/1.73M*2
ERYTHROCYTE [DISTWIDTH] IN BLOOD BY AUTOMATED COUNT: 20.3 % (ref 11.5–14.5)
GLUCOSE BLD MANUAL STRIP-MCNC: 111 MG/DL (ref 74–99)
GLUCOSE SERPL-MCNC: 104 MG/DL (ref 74–99)
HCT VFR BLD AUTO: 26 % (ref 36–46)
HGB BLD-MCNC: 8.1 G/DL (ref 12–16)
MAGNESIUM SERPL-MCNC: 1.7 MG/DL (ref 1.6–2.4)
MCH RBC QN AUTO: 26.4 PG (ref 26–34)
MCHC RBC AUTO-ENTMCNC: 31.2 G/DL (ref 32–36)
MCV RBC AUTO: 85 FL (ref 80–100)
NRBC BLD-RTO: 0.2 /100 WBCS (ref 0–0)
PHOSPHATE SERPL-MCNC: 5.3 MG/DL (ref 2.5–4.9)
PLATELET # BLD AUTO: 614 X10*3/UL (ref 150–450)
POTASSIUM SERPL-SCNC: 4.1 MMOL/L (ref 3.5–5.3)
RBC # BLD AUTO: 3.07 X10*6/UL (ref 4–5.2)
SODIUM SERPL-SCNC: 132 MMOL/L (ref 136–145)
WBC # BLD AUTO: 12.8 X10*3/UL (ref 4.4–11.3)

## 2024-07-11 PROCEDURE — 97535 SELF CARE MNGMENT TRAINING: CPT | Mod: GO,CO

## 2024-07-11 PROCEDURE — 2500000001 HC RX 250 WO HCPCS SELF ADMINISTERED DRUGS (ALT 637 FOR MEDICARE OP): Performed by: HOSPITALIST

## 2024-07-11 PROCEDURE — 2500000004 HC RX 250 GENERAL PHARMACY W/ HCPCS (ALT 636 FOR OP/ED): Performed by: HOSPITALIST

## 2024-07-11 PROCEDURE — 94640 AIRWAY INHALATION TREATMENT: CPT

## 2024-07-11 PROCEDURE — 2500000001 HC RX 250 WO HCPCS SELF ADMINISTERED DRUGS (ALT 637 FOR MEDICARE OP): Performed by: STUDENT IN AN ORGANIZED HEALTH CARE EDUCATION/TRAINING PROGRAM

## 2024-07-11 PROCEDURE — 2500000001 HC RX 250 WO HCPCS SELF ADMINISTERED DRUGS (ALT 637 FOR MEDICARE OP)

## 2024-07-11 PROCEDURE — 83735 ASSAY OF MAGNESIUM: CPT

## 2024-07-11 PROCEDURE — 2500000002 HC RX 250 W HCPCS SELF ADMINISTERED DRUGS (ALT 637 FOR MEDICARE OP, ALT 636 FOR OP/ED): Performed by: INTERNAL MEDICINE

## 2024-07-11 PROCEDURE — 97530 THERAPEUTIC ACTIVITIES: CPT | Mod: GO,CO

## 2024-07-11 PROCEDURE — 2500000004 HC RX 250 GENERAL PHARMACY W/ HCPCS (ALT 636 FOR OP/ED)

## 2024-07-11 PROCEDURE — 2500000004 HC RX 250 GENERAL PHARMACY W/ HCPCS (ALT 636 FOR OP/ED): Performed by: INTERNAL MEDICINE

## 2024-07-11 PROCEDURE — 85027 COMPLETE CBC AUTOMATED: CPT | Performed by: INTERNAL MEDICINE

## 2024-07-11 PROCEDURE — 99233 SBSQ HOSP IP/OBS HIGH 50: CPT | Performed by: STUDENT IN AN ORGANIZED HEALTH CARE EDUCATION/TRAINING PROGRAM

## 2024-07-11 PROCEDURE — C9113 INJ PANTOPRAZOLE SODIUM, VIA: HCPCS | Performed by: HOSPITALIST

## 2024-07-11 PROCEDURE — 84100 ASSAY OF PHOSPHORUS: CPT

## 2024-07-11 PROCEDURE — 82947 ASSAY GLUCOSE BLOOD QUANT: CPT

## 2024-07-11 PROCEDURE — 80048 BASIC METABOLIC PNL TOTAL CA: CPT | Performed by: INTERNAL MEDICINE

## 2024-07-11 PROCEDURE — 2500000001 HC RX 250 WO HCPCS SELF ADMINISTERED DRUGS (ALT 637 FOR MEDICARE OP): Performed by: INTERNAL MEDICINE

## 2024-07-11 PROCEDURE — 1200000002 HC GENERAL ROOM WITH TELEMETRY DAILY

## 2024-07-11 SDOH — ECONOMIC STABILITY: HOUSING INSECURITY: IN THE PAST 12 MONTHS, HOW MANY TIMES HAVE YOU MOVED WHERE YOU WERE LIVING?: 1

## 2024-07-11 SDOH — ECONOMIC STABILITY: INCOME INSECURITY: IN THE LAST 12 MONTHS, WAS THERE A TIME WHEN YOU WERE NOT ABLE TO PAY THE MORTGAGE OR RENT ON TIME?: NO

## 2024-07-11 SDOH — ECONOMIC STABILITY: HOUSING INSECURITY: AT ANY TIME IN THE PAST 12 MONTHS, WERE YOU HOMELESS OR LIVING IN A SHELTER (INCLUDING NOW)?: NO

## 2024-07-11 SDOH — ECONOMIC STABILITY: INCOME INSECURITY: HOW HARD IS IT FOR YOU TO PAY FOR THE VERY BASICS LIKE FOOD, HOUSING, MEDICAL CARE, AND HEATING?: NOT HARD AT ALL

## 2024-07-11 ASSESSMENT — COGNITIVE AND FUNCTIONAL STATUS - GENERAL
WALKING IN HOSPITAL ROOM: A LOT
EATING MEALS: A LITTLE
DRESSING REGULAR UPPER BODY CLOTHING: A LITTLE
TOILETING: A LOT
DRESSING REGULAR LOWER BODY CLOTHING: A LITTLE
DAILY ACTIVITIY SCORE: 18
DAILY ACTIVITIY SCORE: 18
MOVING TO AND FROM BED TO CHAIR: A LITTLE
HELP NEEDED FOR BATHING: A LITTLE
HELP NEEDED FOR BATHING: A LITTLE
WALKING IN HOSPITAL ROOM: A LOT
DRESSING REGULAR UPPER BODY CLOTHING: A LITTLE
TURNING FROM BACK TO SIDE WHILE IN FLAT BAD: A LITTLE
TURNING FROM BACK TO SIDE WHILE IN FLAT BAD: A LITTLE
CLIMB 3 TO 5 STEPS WITH RAILING: TOTAL
TOILETING: A LOT
STANDING UP FROM CHAIR USING ARMS: A LITTLE
MOVING FROM LYING ON BACK TO SITTING ON SIDE OF FLAT BED WITH BEDRAILS: A LITTLE
PERSONAL GROOMING: A LITTLE
MOBILITY SCORE: 15
PERSONAL GROOMING: A LITTLE
DRESSING REGULAR UPPER BODY CLOTHING: A LITTLE
MOBILITY SCORE: 15
DRESSING REGULAR LOWER BODY CLOTHING: A LITTLE
DAILY ACTIVITIY SCORE: 17
CLIMB 3 TO 5 STEPS WITH RAILING: TOTAL
DRESSING REGULAR LOWER BODY CLOTHING: A LITTLE
STANDING UP FROM CHAIR USING ARMS: A LITTLE
PERSONAL GROOMING: A LITTLE
HELP NEEDED FOR BATHING: A LITTLE
TOILETING: A LOT
MOVING TO AND FROM BED TO CHAIR: A LITTLE
MOVING FROM LYING ON BACK TO SITTING ON SIDE OF FLAT BED WITH BEDRAILS: A LITTLE

## 2024-07-11 ASSESSMENT — PAIN DESCRIPTION - LOCATION
LOCATION: ABDOMEN

## 2024-07-11 ASSESSMENT — PAIN SCALES - GENERAL
PAINLEVEL_OUTOF10: 7
PAINLEVEL_OUTOF10: 10 - WORST POSSIBLE PAIN
PAINLEVEL_OUTOF10: 4
PAINLEVEL_OUTOF10: 10 - WORST POSSIBLE PAIN
PAINLEVEL_OUTOF10: 5 - MODERATE PAIN
PAINLEVEL_OUTOF10: 4
PAINLEVEL_OUTOF10: 3
PAINLEVEL_OUTOF10: 10 - WORST POSSIBLE PAIN
PAINLEVEL_OUTOF10: 7
PAINLEVEL_OUTOF10: 7
PAINLEVEL_OUTOF10: 5 - MODERATE PAIN
PAINLEVEL_OUTOF10: 7
PAINLEVEL_OUTOF10: 2

## 2024-07-11 ASSESSMENT — PAIN - FUNCTIONAL ASSESSMENT
PAIN_FUNCTIONAL_ASSESSMENT: 0-10

## 2024-07-11 ASSESSMENT — PAIN DESCRIPTION - ORIENTATION
ORIENTATION: MID
ORIENTATION: MID

## 2024-07-11 ASSESSMENT — PAIN DESCRIPTION - DESCRIPTORS
DESCRIPTORS: ACHING
DESCRIPTORS: SORE
DESCRIPTORS: ACHING
DESCRIPTORS: ACHING
DESCRIPTORS: SORE

## 2024-07-11 ASSESSMENT — PAIN SCALES - WONG BAKER: WONGBAKER_NUMERICALRESPONSE: HURTS WHOLE LOT

## 2024-07-11 ASSESSMENT — ACTIVITIES OF DAILY LIVING (ADL): HOME_MANAGEMENT_TIME_ENTRY: 10

## 2024-07-11 NOTE — PROGRESS NOTES
Physical Therapy                 Therapy Communication Note    Patient Name: Fernando Cuevas  MRN: [id number]  Today's Date: 7/11/2024     Discipline: Physical Therapy    Missed Visit Reason: Missed Visit Reason: Patient refused (Pt refused all bed level and OOB mobility at this time due to fatigue and general malaise. Despite encouragement for participation and education of benefits of PT, pt politely declines. RN notified.)    Missed Time: Attempt    Comment:

## 2024-07-11 NOTE — PROGRESS NOTES
"Occupational Therapy    Occupational Therapy Treatment    Name: Fernando Cuevas  MRN: [id number]  : 1960  Date: 24  Time Calculation  Start Time: 0850  Stop Time: 917  Time Calculation (min): 27 min    Assessment:  Medical Staff Made Aware: Yes  End of Session Communication: Bedside nurse  End of Session Patient Position: Bed, 3 rail up, Alarm on  Plan:  Treatment Interventions: ADL retraining, Functional transfer training, Equipment evaluation/education  OT Frequency: 3 times per week  OT Discharge Recommendations: Moderate intensity level of continued care  Equipment Recommended upon Discharge:  (hip kit; pt. educated on where to obtain)  OT Recommended Transfer Status: Stand by assist  OT - OK to Discharge: Yes (per POC)    Subjective   Previous Visit Info:  OT Last Visit  OT Received On: 24  General:  General  Reason for Referral: 63 y.o. female presenting with COPD exacerbation and SOB.  Prior to Session Communication: Bedside nurse  Patient Position Received: Bed, 3 rail up, Alarm on  Preferred Learning Style: auditory, verbal, kinesthetic  General Comment: Pt deconditions easily, emotional, anxious and states \"I'm scared to go home like this.\" TCC & RN notified.  Precautions:  Medical Precautions: Fall precautions, Abdominal precautions  Precautions Comment: wound vac  Vitals:  Vital Signs  Heart Rate: (!) 120 (HR ranged from 120-135 BPM)  Heart Rate Source: Monitor  Pain Assessment:  Pain Assessment  Pain Assessment: 0-10  0-10 (Numeric) Pain Score: 10 - Worst possible pain  Pain Type: Acute pain  Pain Location: Abdomen     Objective   Cognition:  Overall Cognitive Status: Within Functional Limits  Orientation Level: Oriented X4  Activities of Daily Living: Grooming  Grooming Level of Assistance: Close supervision, Setup  Grooming Where Assessed: Standing sinkside  Grooming Comments: Pt completed tooth brushing while standing at sink with rollator    LE Dressing  LE Dressing: Yes  Sock " "Level of Assistance: Close supervision, Setup  LE Dressing Comments: pt donned/doffed socks with VC for figure four dressing technique, with increased time and effort to complete    Functional Standing Tolerance:  Functional Standing Tolerance  Time: ~2 min  Activity: self care tasks  Functional Standing Tolerance Comments: Pt tolerated standing at sink with rollator at A, requiring VC for optimal posture and encouragement (Pt required seated rest break d/t fatigue and dizziness.)  Bed Mobility/Transfers: Bed Mobility  Bed Mobility: Yes  Bed Mobility 1  Bed Mobility 1: Supine to sitting, Sitting to supine, Log roll  Level of Assistance 1: Minimum assistance  Bed Mobility Comments 1: HOB elevated, assist with trunk control and LE advancement    Transfers  Transfer: Yes  Transfer 1  Transfer From 1: Bed to  Transfer to 1: Stand  Technique 1: Sit to stand, Stand to sit  Transfer Device 1:  (rollator)  Transfer Level of Assistance 1: Contact guard  Trials/Comments 1: Pt required VC for safe hand placement. Tremors noted throughout.    Functional Mobility:  Functional Mobility  Functional Mobility Performed: Yes  Functional Mobility 1  Surface 1: Level tile  Device 1: Rollator  Assistance 1: Close supervision  Comments 1: Pt completed functional mobility with rollator a short household distance at [facility].     Therapy/Activity: Therapeutic Activity  Therapeutic Activity Performed: Yes  Therapeutic Activity 1: Pt advising, \"I am scared of going home, it's not safe. I will be alone and I am not where I should be. I am too weak.\" Extensive education provided on SNF vs homegoing, pt agreeable to SNF. TCC notified. Pt extremely emotional and crying in therapist's arms.    Outcome Measures:  [facility] Daily Activity  Putting on and taking off regular lower body clothing: A little  Bathing (including washing, rinsing, drying): A little  Putting on and taking off regular upper body clothing: A little  Toileting, which includes using " toilet, bedpan or urinal: A lot  Taking care of personal grooming such as brushing teeth: A little  Eating Meals: A little  Daily Activity - Total Score: 17      Education Documentation  Precautions, taught by SIN Alexis at 7/11/2024  9:33 AM.  Learner: Patient  Readiness: Acceptance  Method: Explanation, Demonstration  Response: Verbalizes Understanding, Needs Reinforcement    Body Mechanics, taught by SIN Alexis at 7/11/2024  9:33 AM.  Learner: Patient  Readiness: Acceptance  Method: Explanation, Demonstration  Response: Verbalizes Understanding, Needs Reinforcement    ADL Training, taught by SIN Alexis at 7/11/2024  9:33 AM.  Learner: Patient  Readiness: Acceptance  Method: Explanation, Demonstration  Response: Verbalizes Understanding, Needs Reinforcement    Education Comments  No comments found.      Goals:  Encounter Problems       Encounter Problems (Active)       ADLs       Patient will perform UB and LB bathing with contact guard assist level of assistance and grab bars, shower chair, and long-handled sponge. (Not Progressing)       Start:  06/24/24    Expected End:  07/08/24            Patient with complete upper body dressing with supervision level of assistance donning and doffing all UE clothes with PRN adaptive equipment while edge of bed  (Not Progressing)       Start:  06/24/24    Expected End:  07/08/24            Patient with complete lower body dressing with minimal assist  level of assistance donning and doffing all LE clothes  with reacher, shoe horn, sock-aid, and dressing stick  while edge of bed  (Met)       Start:  06/24/24    Expected End:  07/08/24    Resolved:  06/28/24    Updated to: Patient with complete lower body dressing with modified assist  level of assistance donning and doffing all LE clothes  with reacher, shoe horn, sock-aid, and dressing stick  while edge of bed    Update reason: patient goal met         Patient will complete daily grooming  tasks brushing teeth and washing face/hair with independent level of assistance while edge of bed . (Progressing)       Start:  06/24/24    Expected End:  07/08/24            Patient will complete toileting including hygiene clothing management/hygiene with contact guard assist level of assistance and raised toilet seat and grab bars. (Not Progressing)       Start:  06/24/24    Expected End:  07/08/24            Patient with complete lower body dressing with modified assist  level of assistance donning and doffing all LE clothes  with reacher, shoe horn, sock-aid, and dressing stick  while edge of bed (Progressing)       Start:  06/28/24    Expected End:  07/08/24                   BALANCE       Pt will maintain dynamic standing balance during ADL task with supervision level of assistance in order to demonstrate decreased risk of falling and improved postural control. (Progressing)       Start:  06/24/24    Expected End:  07/08/24            Patientt will maintain static standing balance during ADL task with independent level of assistance drop down in order to demonstrate decreased risk of falling and improved postural control. (Not Progressing)       Start:  06/24/24    Expected End:  07/08/24            Patient will tolerate standing for 5 minutes to contact guard level of assistance with front wheeled walker in order to improve functional activity tolerance for ADL tasks. (Progressing)       Start:  06/24/24    Expected End:  07/08/24               TRANSFERS       Patient will perform bed mobility supervision level of assistance and bed rails in order to improve safety and independence with mobility (Progressing)       Start:  06/24/24    Expected End:  07/08/24            Patient will complete functional transfer to all surfaces with front wheeled walker with contact guard assist level of assistance. (Progressing)       Start:  06/24/24    Expected End:  07/08/24            Patient will complete sit to stand  transfer with independent level of assistance and front wheeled walker in order to improve safety and prepare for out of bed mobility. (Progressing)       Start:  06/24/24    Expected End:  07/08/24

## 2024-07-11 NOTE — PROGRESS NOTES
"                                                                                                               '' Infectious Disease Progress Note''        Interval Events:  No other new symptoms  Afebrile  WBC 12K    Current antibiotic:  Azithromycin     Physical Exam:  /75 (BP Location: Right arm, Patient Position: Lying)   Pulse (!) 120 Comment: HR ranged from 120-135 BPM  Temp 36.4 °C (97.5 °F) (Temporal)   Resp 18   Ht 1.575 m (5' 2.01\")   Wt 63.5 kg (139 lb 14.4 oz)   SpO2 95%   BMI 25.58 kg/m²   General: NAD, nontoxic appearing  Skin: no new rash  Resp: lungs CTA b/l  CV:  normal S1/S2, no murmur   Abd: soft distended, non-tender, + wound VAC  Ext: no edema      Lab Results:  Reviewed    Micro:-      Imaging: reviewed         Assessment:    -Leukocytosis: resolving   -Perforated small bowel with peritonitis s/p or 6/21, culture grew Candida albicans and mixed bacteria  -Postop ileus  -COPD on home azithromycin PPx MWF  -Pruritus    Plan/Recommendations:  -c/w azithromycin PPx MWF for COPD  -ID will sign off. Please call ID with any concerns or questions.    Discussed with patient and primary team.    Sahra Morgan MD  [facility]  #[phone]  '  "

## 2024-07-11 NOTE — PROGRESS NOTES
07/11/24 1135   Current Planned Discharge Disposition   Current Planned Discharge Disposition SNF

## 2024-07-11 NOTE — PROGRESS NOTES
07/11/24 1136   Discharge Planning   Home or Post Acute Services Post acute facilities (Rehab/SNF/etc)   Type of Post Acute Facility Services Skilled nursing   Expected Discharge Disposition SNF   Does the patient need discharge transport arranged? Yes   RoundTrip coordination needed? Yes     Patient is concerned about going home as her son isn't going to always be home.  Requested DSC send referrals to:  1. [facility]  2. [facility]  3. [facility] [place]  4. [place] [facility]  Will need insurance approval.  Lizette Blank RN     7/11/24 @ 9285  Obtained additional SNF choices due to no accepting facility.  Requested DSC send referrals to:  1. [facility]  2. [facility]  3. [facility]  Lizette Blank RN     7/11/24 @0550  [facility] has accepted patient.  Requested they start insurance auth.  Patient aware of acceptance and discharge plan.  Lizette Blank RN

## 2024-07-11 NOTE — HOSPITAL COURSE
62 yo F PMH COPD, hypertension, leukocytosis, alcohol abuse, dyspnea, esophagitis, generalized anxiety disorder, history of cocaine abuse remotely per the patient but was positive for cocaine abuse today, depression, lung cancer, elevated troponins, hyperlipidemia, nicotine dependence, overactive bladder, spontaneous pneumothorax remotely, migraines, panic disorder, daily chronic headaches who presented for Perforated small bowel with peritonitis     Dispo: [facility]    Perforated small bowel's with peritonitis:  Concern for postop ileus versus partial obstruction:  6/21Underwent small bowel resection, and washout   -Culture for Candida albicans, concern for mix of gram-negative and anaerobes.  -Abx:  completed IV Zosyn, fluconazole 200 mg/day. For 10 days with stop date 7/3/24.,    -trend CBC  -Dietary following, appreciate recommendations.   -wound VAC in place  -NG tube removed  -tolerating oral intake  -discussed with ID staff 07/11/24  -discontinue IV opiates and restrict 5mg oxy to severe pain only     # Anemia  # Abdominal wall hematoma  -blood loss  -s/p blood transfusion     Acute DVT in the left distal brachial vein:  -Vascular upper extremity ultrasound with acute finding, non-occlusive.  -Patient initiated on full dose Lovenox therapeutic dose and 6/27.   -Patient with contrast allergy, CT angio chest requiring prep, no acute findings.    -started apixaban 07/11/24     Acute on chronic hyponatremia, resolved:  In setting of poor oral intake  -Nephrology was managing, placed on IV isotonic fluid with improvement  -Fluids will be discontinued.  -Nephrology signed off  -Will continue to trend sodium     Severe malnutrition:  -Nutrition following, appreciate recommendations.

## 2024-07-11 NOTE — PROGRESS NOTES
[facility]   HOSPITAL MEDICINE     PROGRESS NOTE       PATIENT NAME: Fernando Cuevas   MRN: [id number]   SERVICE DATE: 07/11/24   SERVICE TIME: 11:28 AM      Hospital Medicine/Primary Attending: Miladis Hamilton DO   LENGTH OF STAY: 20     CHIEF COMPLAINT: Perforated viscus   Principal Problem:    Perforated viscus  Active Problems:    Thrombocytosis        Assessment/Plan      I reviewed:    All new and relevant labs and imaging   New EKGs  Consultant notes   Vitals Signs   Nursing notes       64 yo F PMH COPD, hypertension, leukocytosis, alcohol abuse, dyspnea, esophagitis, generalized anxiety disorder, history of cocaine abuse remotely per the patient but was positive for cocaine abuse today, depression, lung cancer, elevated troponins, hyperlipidemia, nicotine dependence, overactive bladder, spontaneous pneumothorax remotely, migraines, panic disorder, daily chronic headaches who presented for Perforated small bowel with peritonitis     Dispo: Patient changed her mind saying she does not feel like she can go home safely and would like to go to a skilled nursing facility.  OT in the room.  They state that if patient is very weak and far from baseline could benefit from care at a facility    Perforated small bowel's with peritonitis:  Concern for postop ileus versus partial obstruction:  6/21Underwent small bowel resection, and washout   -Culture for Candida albicans, concern for mix of gram-negative and anaerobes.  -Abx:  completed IV Zosyn, fluconazole 200 mg/day. For 10 days with stop date 7/3/24.,    -trend CBC  -Dietary following, appreciate recommendations.   -wound VAC in place  -NG tube removed  -tolerating oral intake  -discussed with ID staff 07/11/24     # Anemia  # Abdominal wall hematoma  -blood loss  -s/p blood transfusion     Acute DVT in the left distal brachial vein:  -Vascular upper extremity ultrasound with acute finding, non-occlusive.  -Patient initiated on  full dose Lovenox therapeutic dose and 6/27.   -Patient with contrast allergy, CT angio chest requiring prep, no acute findings.    -start apixaban 07/11/24     Acute on chronic hyponatremia, resolved:  In setting of poor oral intake  -Nephrology was managing, placed on IV isotonic fluid with improvement  -Fluids will be discontinued.  -Nephrology signed off  -Will continue to trend sodium     Severe malnutrition:  -Nutrition following, appreciate recommendations.         Malnutrition Diagnosis Status: Ongoing  Malnutrition Diagnosis: Severe malnutrition related to acute disease or injury  As Evidenced by: oral intake less than 50% of estimated energy requirements for greater than five days, generalized edema, visualized bradley of depelted adipose tissues and skeletal tissues wasting  I agree with the dietitian's malnutrition diagnosis.    DISPOSITION:  Functional Status Prior to Admit:     Medical Necessity for Continued Hospitalization n     Plan of care discussed with:         Subjective   SUBJECTIVE:  Pt seen and examined.   y    Patient denies CP, SOB, fevers, chills, nausea, or emesis.         Objective      PHYSICAL EXAM: Patient Vitals for the past 24 hrs:   BP Temp Temp src Pulse Resp SpO2   07/11/24 0850 -- -- -- (!) 120 -- --   07/11/24 0736 115/75 36.4 °C (97.5 °F) Temporal -- 18 95 %   07/11/24 0702 -- -- -- -- -- 97 %   07/11/24 0300 105/67 36.6 °C (97.9 °F) Oral 103 19 99 %   07/10/24 2256 97/64 36.3 °C (97.3 °F) -- 100 18 98 %   07/10/24 2113 -- -- -- -- -- 96 %   07/10/24 2023 114/72 36.7 °C (98 °F) -- (!) 113 20 94 %   07/10/24 1535 116/72 36.8 °C (98.3 °F) Temporal -- 18 94 %   07/10/24 1216 -- -- -- -- -- 93 %   07/10/24 1142 116/72 36.4 °C (97.5 °F) Temporal -- 18 90 %      Physical Exam  Vitals and nursing note reviewed.   Constitutional:       General: She is not in acute distress.     Appearance: Normal appearance. She is not ill-appearing.   HENT:      Head: Normocephalic and atraumatic.  "  Pulmonary:      Effort: No respiratory distress.   Abdominal:      Tenderness: There is no guarding.   Skin:     Coloration: Skin is not jaundiced or pale.   Neurological:      Mental Status: She is alert and oriented to person, place, and time.   Psychiatric:         Mood and Affect: Mood normal.            MEDICATIONS:   Scheduled medications  apixaban, 5 mg, oral, BID  aspirin, 81 mg, oral, Daily  azithromycin, 500 mg, oral, Once per day on Monday Wednesday Friday  budesonide, 0.25 mg, nebulization, Daily   And  formoterol, 20 mcg, nebulization, BID  busPIRone, 5 mg, oral, BID  calcium carbonate, 1,500 mg, oral, Daily  dilTIAZem CD, 240 mg, oral, Daily  folic acid, 1 mg, oral, Daily  hydrocortisone sodium succinate, 200 mg, intravenous, Once  ipratropium-albuteroL, 3 mL, nebulization, TID  lisinopril, 10 mg, oral, Daily  mirtazapine, 15 mg, oral, Nightly  montelukast, 10 mg, oral, Daily  multivitamin with minerals, 1 tablet, oral, Daily  nortriptyline, 50 mg, oral, Nightly  oxybutynin, 5 mg, oral, Daily  pantoprazole, 40 mg, intravenous, Daily before breakfast  thiamine, 100 mg, oral, Daily  thiamine, 100 mg, oral, Daily  varenicline, 1 mg, oral, BID      Continuous medications     PRN medications  PRN medications: acetaminophen **OR** acetaminophen **OR** acetaminophen, alteplase, benzonatate, guaiFENesin, HYDROmorphone, hydrOXYzine HCL, ipratropium-albuteroL, nitroglycerin, ondansetron, oxyCODONE, oxyCODONE, phenoL       DATA:      Diagnostic tests reviewed for today's visit:     CBC:   Results from last 72 hours   Lab Units 07/11/24  0533   WBC AUTO x10*3/uL 12.8*   HEMATOCRIT % 26.0*   PLATELETS AUTO x10*3/uL 614*   MCV fL 85     Coags:     CMP:   Results from last 72 hours   Lab Units 07/11/24  0533   SODIUM mmol/L 132*   POTASSIUM mmol/L 4.1   CO2 mmol/L 26   BUN mg/dL 12   MAGNESIUM mg/dL 1.70      Cardiac Enzymes: No lab exists for component: \"TROP\" .lab  Liver Function, Amylase, Lipase:       No lab " "exists for component: \"TPROT\", \"ALB\", \"TBILI\"   MG/PHOS: [id number](mg,p)@   Renal Panel:    Results from last 72 hours   Lab Units 07/11/24  0533   BUN mg/dL 12   POTASSIUM mmol/L 4.1   CO2 mmol/L 26   SODIUM mmol/L 132*      Heme:        No lab exists for component: \"RETICP\", \"ABSRETIC\", \"LD\", \"ALEXY\", \"FE\", \"TRANSFERSAT\"   No components found for: \"UALBCR\"      Medication and Non-Pharmacologic VTE Prophylaxis/Anticoagulants    The patient has received anticoagulants recently:  Heparin is ordered or has been given within the last 24 hours OR  Lovenox has been given in the last 24 hours OR  An anticoagulant other than Heparin or Lovenox is on the home med list OR  An anticoagulant other than Heparin or Lovenox has been given within the last 5 days  Last Anticoag Admin            enoxaparin (Lovenox) syringe 60 mg    Given 60 mg at 0950    Frequency: Every 12 hours         There are additional administrations since 07/08/24 1128 that are not shown.    Orders not given:    apixaban (Eliquis) tablet 5 mg                   SIGNATURE: Miladis Hamilton [facility]    PAGER/CONTACT #: Epic Chat      Disclaimer: Portions of this note may have been generated using Dragon voice recognition software. Reasonable efforts were made to correct any dictation errors that resulted due to the programming of this software but some may still be present.     Portions of this note including HPI, ROS, impression/plan, and examination may have been copied forward from yesterday to July 11, 2024 as to provide important historical information essential in contributing to medical decision making. Documentation has been reviewed and edited as necessary to support clinical decision making for today's visit and to reflect my own independent evaluation of this patient.       The time of this note does not reflect the time I saw the patient but the time that this note was written   "

## 2024-07-12 LAB — GLUCOSE BLD MANUAL STRIP-MCNC: 101 MG/DL (ref 74–99)

## 2024-07-12 PROCEDURE — 2500000001 HC RX 250 WO HCPCS SELF ADMINISTERED DRUGS (ALT 637 FOR MEDICARE OP): Performed by: INTERNAL MEDICINE

## 2024-07-12 PROCEDURE — C9113 INJ PANTOPRAZOLE SODIUM, VIA: HCPCS | Performed by: HOSPITALIST

## 2024-07-12 PROCEDURE — 94640 AIRWAY INHALATION TREATMENT: CPT

## 2024-07-12 PROCEDURE — 1200000002 HC GENERAL ROOM WITH TELEMETRY DAILY

## 2024-07-12 PROCEDURE — 2500000002 HC RX 250 W HCPCS SELF ADMINISTERED DRUGS (ALT 637 FOR MEDICARE OP, ALT 636 FOR OP/ED): Performed by: INTERNAL MEDICINE

## 2024-07-12 PROCEDURE — 2500000001 HC RX 250 WO HCPCS SELF ADMINISTERED DRUGS (ALT 637 FOR MEDICARE OP)

## 2024-07-12 PROCEDURE — 2500000004 HC RX 250 GENERAL PHARMACY W/ HCPCS (ALT 636 FOR OP/ED): Performed by: STUDENT IN AN ORGANIZED HEALTH CARE EDUCATION/TRAINING PROGRAM

## 2024-07-12 PROCEDURE — 97116 GAIT TRAINING THERAPY: CPT | Mod: GP,CQ

## 2024-07-12 PROCEDURE — 2500000001 HC RX 250 WO HCPCS SELF ADMINISTERED DRUGS (ALT 637 FOR MEDICARE OP): Performed by: STUDENT IN AN ORGANIZED HEALTH CARE EDUCATION/TRAINING PROGRAM

## 2024-07-12 PROCEDURE — 97110 THERAPEUTIC EXERCISES: CPT | Mod: GP,CQ

## 2024-07-12 PROCEDURE — 2500000001 HC RX 250 WO HCPCS SELF ADMINISTERED DRUGS (ALT 637 FOR MEDICARE OP): Performed by: HOSPITALIST

## 2024-07-12 PROCEDURE — 99232 SBSQ HOSP IP/OBS MODERATE 35: CPT | Performed by: STUDENT IN AN ORGANIZED HEALTH CARE EDUCATION/TRAINING PROGRAM

## 2024-07-12 PROCEDURE — 2500000004 HC RX 250 GENERAL PHARMACY W/ HCPCS (ALT 636 FOR OP/ED): Performed by: HOSPITALIST

## 2024-07-12 PROCEDURE — 82947 ASSAY GLUCOSE BLOOD QUANT: CPT

## 2024-07-12 RX ORDER — OXYCODONE HYDROCHLORIDE 5 MG/1
5 TABLET ORAL EVERY 6 HOURS PRN
Status: DISCONTINUED | OUTPATIENT
Start: 2024-07-12 | End: 2024-07-16 | Stop reason: HOSPADM

## 2024-07-12 RX ORDER — ACETAMINOPHEN 325 MG/1
975 TABLET ORAL EVERY 6 HOURS
Status: DISCONTINUED | OUTPATIENT
Start: 2024-07-12 | End: 2024-07-16 | Stop reason: HOSPADM

## 2024-07-12 ASSESSMENT — PAIN DESCRIPTION - ORIENTATION
ORIENTATION: MID
ORIENTATION: MID

## 2024-07-12 ASSESSMENT — PAIN DESCRIPTION - LOCATION
LOCATION: ABDOMEN

## 2024-07-12 ASSESSMENT — PAIN DESCRIPTION - DESCRIPTORS
DESCRIPTORS: SORE
DESCRIPTORS: ACHING

## 2024-07-12 ASSESSMENT — COGNITIVE AND FUNCTIONAL STATUS - GENERAL
MOVING TO AND FROM BED TO CHAIR: A LITTLE
MOBILITY SCORE: 20
DAILY ACTIVITIY SCORE: 23
MOBILITY SCORE: 21
DRESSING REGULAR LOWER BODY CLOTHING: A LITTLE
MOVING TO AND FROM BED TO CHAIR: A LITTLE
WALKING IN HOSPITAL ROOM: A LITTLE
STANDING UP FROM CHAIR USING ARMS: A LITTLE
STANDING UP FROM CHAIR USING ARMS: A LITTLE
WALKING IN HOSPITAL ROOM: A LITTLE
CLIMB 3 TO 5 STEPS WITH RAILING: A LITTLE
PERSONAL GROOMING: A LITTLE
TURNING FROM BACK TO SIDE WHILE IN FLAT BAD: A LITTLE
WALKING IN HOSPITAL ROOM: A LITTLE
CLIMB 3 TO 5 STEPS WITH RAILING: A LITTLE
DRESSING REGULAR LOWER BODY CLOTHING: A LITTLE
TOILETING: A LITTLE
DAILY ACTIVITIY SCORE: 19
HELP NEEDED FOR BATHING: A LITTLE
DRESSING REGULAR UPPER BODY CLOTHING: A LITTLE
CLIMB 3 TO 5 STEPS WITH RAILING: A LOT
MOVING FROM LYING ON BACK TO SITTING ON SIDE OF FLAT BED WITH BEDRAILS: A LITTLE
MOBILITY SCORE: 17
MOVING TO AND FROM BED TO CHAIR: A LITTLE

## 2024-07-12 ASSESSMENT — PAIN - FUNCTIONAL ASSESSMENT
PAIN_FUNCTIONAL_ASSESSMENT: 0-10

## 2024-07-12 ASSESSMENT — PAIN SCALES - GENERAL
PAINLEVEL_OUTOF10: 10 - WORST POSSIBLE PAIN
PAINLEVEL_OUTOF10: 4
PAINLEVEL_OUTOF10: 6
PAINLEVEL_OUTOF10: 0 - NO PAIN
PAINLEVEL_OUTOF10: 4
PAINLEVEL_OUTOF10: 7
PAINLEVEL_OUTOF10: 0 - NO PAIN
PAINLEVEL_OUTOF10: 3
PAINLEVEL_OUTOF10: 5 - MODERATE PAIN
PAINLEVEL_OUTOF10: 3

## 2024-07-12 NOTE — CARE PLAN
The patient's goals for the shift include      The clinical goals for the shift include safety, pain control

## 2024-07-12 NOTE — PROGRESS NOTES
Music Therapy Note      Therapy Session  Referral Type: New referral this admission  Visit Type: Follow-up visit  Session Start Time: 1319  Conflict of Service: Asleep  Number of staff members present: 1               Treatment/Interventions            Narrative  Assessment Detail: At the time of assessment pt was asleep with no family present at bedside.  Follow-up: MT will follow up with pt as able.    Education Documentation  No documentation found.

## 2024-07-12 NOTE — PROGRESS NOTES
07/12/24 0945   Discharge Planning   Expected Discharge Disposition SNF  (Patient's son visited [facility] yesterday evening and does NOT want his mother going there.  They want a referral sent to [facility].  Requested [facility] pull the auth.  Waiting to hear from [facility].)   Does the patient need discharge transport arranged? Yes   RoundTrip coordination needed? Yes     Lizette Blank RN     7/12/2024 @ 1254  [facility] has accepted patient.  They will start auth today.  Patient is medically ready for discharge.  Lizette Blank RN     7/12/2024 @ 8940  Precert is pending for [facility]  Pending reference number [id number]   Lizette Blank RN

## 2024-07-12 NOTE — PROGRESS NOTES
Physical Therapy    Physical Therapy Treatment    Patient Name: Fernando Cuevas  MRN: [id number]  Today's Date: 7/12/2024  Time Calculation  Start Time: 0930  Stop Time: 0953  Time Calculation (min): 23 min    Assessment/Plan   PT Assessment  PT Assessment Results: Decreased strength, Decreased range of motion, Decreased endurance, Impaired balance, Decreased mobility, Impaired judgement, Pain  Rehab Prognosis: Good  Evaluation/Treatment Tolerance: Patient limited by fatigue (and tremors.)  Medical Staff Made Aware: Yes  Strengths: Ability to acquire knowledge  Barriers to Participation: Comorbidities  End of Session Communication: Bedside nurse  Assessment Comment: Pt limited by high leve fatigue from therapeutic activity this session. Tremors present and affecting pt's standing stability causing LOB with amb. Pt will continue to benefit from Ongoing PT to maximize her functional level of independence.  End of Session Patient Position: Bed, 3 rail up, Alarm on  PT Plan  Inpatient/Swing Bed or Outpatient: Inpatient  PT Plan  Treatment/Interventions: Bed mobility, Transfer training, Gait training, Balance training, Strengthening, Endurance training, Therapeutic exercise, Therapeutic activity  PT Plan: Ongoing PT  PT Frequency: 4 times per week  PT Discharge Recommendations: Moderate intensity level of continued care  PT Recommended Transfer Status: Assist x1  PT - OK to Discharge: Yes (per PT POC)      General Visit Information:   PT  Visit  PT Received On: 07/12/24  General  Reason for Referral: 63 y.o. female presenting with COPD exacerbation and SOB.  Referred By: MD Barbi  Family/Caregiver Present: No  Prior to Session Communication: Bedside nurse  Patient Position Received: Bed, 3 rail up, Alarm on  Preferred Learning Style: auditory, verbal, kinesthetic  General Comment: Pt agreeable to particpate.  Fatiguing throughout tx and requiring frequent rest breaks to  recover.    Subjective    Precautions:  Precautions  Medical Precautions: Abdominal precautions, Fall precautions  Post-Surgical Precautions: Abdominal surgery precautions  Precautions Comment: wound vac  Vital Signs:  Vital Signs  Heart Rate: (!) 130 (with ambulation.  121 at start of session.)  Heart Rate Source: Monitor  SpO2: 95 %    Objective   Pain:  Pain Assessment  Pain Assessment: 0-10  0-10 (Numeric) Pain Score: 3  Pain Type: Surgical pain  Pain Location: Abdomen  Pain Orientation: Mid  Pain Descriptors: Aching  Cognition:  Cognition  Overall Cognitive Status: Within Functional Limits  Orientation Level: Oriented X4  Insight: Mild  Coordination:  Movements are Fluid and Coordinated: Yes  Postural Control:  Postural Control  Postural Control: Within Functional Limits  Static Sitting Balance  Static Sitting-Balance Support: Feet supported, No upper extremity supported  Static Sitting-Level of Assistance: Independent  Dynamic Sitting Balance  Dynamic Sitting-Balance Support: Feet supported, Left upper extremity supported  Dynamic Sitting-Balance: Lateral lean, Forward lean, Reaching for objects, Reaching across midline, Reaching for weighted objects  Dynamic Sitting-Comments: SBA  Static Standing Balance  Static Standing-Balance Support: Bilateral upper extremity supported  Static Standing-Level of Assistance: Close supervision  Dynamic Standing Balance  Dynamic Standing-Balance Support: Bilateral upper extremity supported  Dynamic Standing-Balance: Forward lean, Reaching for objects  Dynamic Standing-Comments: Min A due to instabiity with fatigue.    Activity Tolerance:  Activity Tolerance  Endurance: Tolerates 10 - 20 min exercise with multiple rests  Treatments:  Therapeutic Exercise  Therapeutic Exercise Performed: Yes  Therapeutic Exercise Activity 1: Pt instructed in seated ther ex to increase strength and endurance to achieve funcitonal goals. HF, LAQ, HA, AP, all 3 x 5 reps each with rest needed between. Ther ex done after  gt training. Ambulating completely fatigued pt, reducing abliity to perform higher reps of ex. Cues needed to improve technique for max benefits.       Bed Mobility  Bed Mobility: Yes  Bed Mobility 1  Bed Mobility 1: Supine to sitting, Sitting to supine, Log roll  Level of Assistance 1: Minimum assistance, Minimal verbal cues  Bed Mobility Comments 1: Flat bed surface.    Ambulation/Gait Training  Ambulation/Gait Training Performed: Yes  Ambulation/Gait Training 1  Surface 1: Level tile  Device 1: Rollator  Assistance 1: Minimum assistance, Minimal verbal cues  Quality of Gait 1: Diminished heel strike, Decreased step length  Comments/Distance (ft) 1: 19'.  Pt fatiguing quickly.  LOB with MIn A to recover, pt's body began to tremor/shake and  Min A needed to guide her to sit EOB.  Extended rest period needed before performing ther ex.  Transfers  Transfer: Yes  Transfer 1  Transfer From 1: Bed to  Transfer to 1: Stand  Technique 1: Sit to stand  Transfer Device 1:  (rollator)  Transfer Level of Assistance 1: Close supervision, Contact guard  Trials/Comments 1: Cues needed for safe ad strategic hand placement.  Transfers 2  Transfer From 2: Stand to  Transfer to 2: Sit  Technique 2: Stand to sit  Transfer Device 2:  (rollator)  Transfer Level of Assistance 2: Minimum assistance, Moderate verbal cues  Trials/Comments 2: Tremors present  with high level fatigue.    Outcome Measures:  [facility] Basic Mobility  Turning from your back to your side while in a flat bed without using bedrails: A little  Moving from lying on your back to sitting on the side of a flat bed without using bedrails: A little  Moving to and from bed to chair (including a wheelchair): A little  Standing up from a chair using your arms (e.g. wheelchair or bedside chair): A little  To walk in hospital room: A little  Climbing 3-5 steps with railing: A lot  Basic Mobility - Total Score: 17    Education Documentation  Precautions, taught by Jeremy King,  PTA at 7/12/2024 10:41 AM.  Learner: Patient  Readiness: Acceptance  Method: Explanation, Demonstration  Response: Demonstrated Understanding, Needs Reinforcement    Body Mechanics, taught by Jeremy King PTA at 7/12/2024 10:41 AM.  Learner: Patient  Readiness: Acceptance  Method: Explanation, Demonstration  Response: Demonstrated Understanding, Needs Reinforcement    Mobility Training, taught by Jeremy King PTA at 7/12/2024 10:41 AM.  Learner: Patient  Readiness: Acceptance  Method: Explanation, Demonstration  Response: Demonstrated Understanding, Needs Reinforcement    Education Comments  No comments found.        OP EDUCATION:       Encounter Problems       Encounter Problems (Active)       Balance       Pt will tolerate 10+ mins dynamic standing balance activities with CGA or less and no LOB using a RW.  (Not Progressing)       Start:  06/23/24    Expected End:  07/13/24               PT Transfers       STG - Patient will perform bed mobility with SBA or less using log roll technique.  (Met)       Start:  06/23/24    Expected End:  07/07/24    Resolved:  06/28/24         STG - Patient will transfer sit to and from stand mod I with a RW.  (Progressing)       Start:  06/23/24    Expected End:  07/13/24               Pain - Adult          Safety       LTG - Patient will adhere to hip precautions during ADL's and transfers (Progressing)       Start:  06/21/24    Expected End:  07/13/24            LTG - Patient will demonstrate safety requirements appropriate to situation/environment (Progressing)       Start:  06/21/24    Expected End:  07/13/24            LTG - Patient will utilize safety techniques (Progressing)       Start:  06/21/24    Expected End:  07/13/24            STG - Patient locks brakes on wheelchair (Progressing)       Start:  06/21/24    Expected End:  07/13/24            STG - Patient uses call light consistently to request assistance with transfers (Progressing)       Start:  06/21/24     Expected End:  07/13/24            STG - Patient uses gait belt during all transfers (Progressing)       Start:  06/21/24    Expected End:  07/13/24            Goal 1 (Progressing)       Start:  06/21/24    Expected End:  07/13/24            Goal 2 (Progressing)       Start:  06/21/24    Expected End:  07/13/24            Goal 3 (Progressing)       Start:  06/21/24    Expected End:  07/13/24                  Encounter Problems (Resolved)       Mobility       STG - Patient will ambulate 50 ft with CGA and a Rw with minimal SOB or fatigue.  (Met)       Start:  06/23/24    Expected End:  07/07/24    Resolved:  06/28/24

## 2024-07-12 NOTE — PROGRESS NOTES
[facility]   HOSPITAL MEDICINE     PROGRESS NOTE       PATIENT NAME: Fernando Cuevas   MRN: [id number]   SERVICE DATE: 07/12/24   SERVICE TIME: 1:58 PM      Hospital Medicine/Primary Attending: Miladis Hamilton DO   LENGTH OF STAY: 21     CHIEF COMPLAINT: Perforated viscus   Principal Problem:    Perforated viscus  Active Problems:    Thrombocytosis        Assessment/Plan      I reviewed:    All new and relevant labs and imaging   New EKGs  Consultant notes   Vitals Signs   Nursing notes       62 yo F PMH COPD, hypertension, leukocytosis, alcohol abuse, dyspnea, esophagitis, generalized anxiety disorder, history of cocaine abuse remotely per the patient but was positive for cocaine abuse today, depression, lung cancer, elevated troponins, hyperlipidemia, nicotine dependence, overactive bladder, spontaneous pneumothorax remotely, migraines, panic disorder, daily chronic headaches who presented for Perforated small bowel with peritonitis     Dispo: [facility]    Perforated small bowel's with peritonitis:  Concern for postop ileus versus partial obstruction:  6/21Underwent small bowel resection, and washout   -Culture for Candida albicans, concern for mix of gram-negative and anaerobes.  -Abx:  completed IV Zosyn, fluconazole 200 mg/day. For 10 days with stop date 7/3/24.,    -trend CBC  -Dietary following, appreciate recommendations.   -wound VAC in place  -NG tube removed  -tolerating oral intake  -discussed with ID staff 07/11/24  -discontinue IV opiates and restrict 5mg oxy to severe pain only     # Anemia  # Abdominal wall hematoma  -blood loss  -s/p blood transfusion     Acute DVT in the left distal brachial vein:  -Vascular upper extremity ultrasound with acute finding, non-occlusive.  -Patient initiated on full dose Lovenox therapeutic dose and 6/27.   -Patient with contrast allergy, CT angio chest requiring prep, no acute findings.    -started apixaban 07/11/24     Acute on chronic  hyponatremia, resolved:  In setting of poor oral intake  -Nephrology was managing, placed on IV isotonic fluid with improvement  -Fluids will be discontinued.  -Nephrology signed off  -Will continue to trend sodium     Severe malnutrition:  -Nutrition following, appreciate recommendations.         Malnutrition Diagnosis Status: Ongoing  Malnutrition Diagnosis: Severe malnutrition related to acute disease or injury  As Evidenced by: oral intake less than 50% of estimated energy requirements for greater than five days, generalized edema, visualized bradley of depelted adipose tissues and skeletal tissues wasting  I agree with the dietitian's malnutrition diagnosis.    DISPOSITION:  Functional Status Prior to Admit:     Medical Necessity for Continued Hospitalization n     Plan of care discussed with:         Subjective   SUBJECTIVE:  Pt seen and examined.   y    Patient denies CP, SOB, fevers, chills, nausea, or emesis.         Objective      PHYSICAL EXAM: Patient Vitals for the past 24 hrs:   BP Temp Temp src Pulse Resp SpO2   07/12/24 1238 102/66 36.3 °C (97.4 °F) Temporal 103 18 96 %   07/12/24 0930 -- -- -- (!) 130 -- 95 %   07/12/24 0854 -- -- -- -- -- 95 %   07/12/24 0802 102/62 -- -- -- -- --   07/12/24 0743 82/58 36.6 °C (97.8 °F) Temporal (!) 114 18 94 %   07/12/24 0435 111/65 36.6 °C (97.8 °F) -- 110 18 --   07/12/24 0039 105/66 36.4 °C (97.5 °F) -- 104 18 --   07/11/24 2019 -- -- -- -- -- 93 %   07/11/24 2006 103/59 36.9 °C (98.5 °F) -- 110 18 --   07/11/24 1609 108/63 36.6 °C (97.9 °F) Temporal -- 18 93 %      Physical Exam  Vitals and nursing note reviewed.   Constitutional:       General: She is not in acute distress.     Appearance: Normal appearance. She is not ill-appearing.   HENT:      Head: Normocephalic and atraumatic.   Pulmonary:      Effort: No respiratory distress.   Abdominal:      Tenderness: There is no guarding.   Skin:     Coloration: Skin is not jaundiced or pale.   Neurological:       "Mental Status: She is alert and oriented to person, place, and time.   Psychiatric:         Mood and Affect: Mood normal.            MEDICATIONS:   Scheduled medications  acetaminophen, 975 mg, oral, q6h  apixaban, 5 mg, oral, BID  aspirin, 81 mg, oral, Daily  azithromycin, 500 mg, oral, Once per day on Monday Wednesday Friday  budesonide, 0.25 mg, nebulization, Daily   And  formoterol, 20 mcg, nebulization, BID  busPIRone, 5 mg, oral, BID  calcium carbonate, 1,500 mg, oral, Daily  dilTIAZem CD, 240 mg, oral, Daily  folic acid, 1 mg, oral, Daily  hydrocortisone sodium succinate, 200 mg, intravenous, Once  ipratropium-albuteroL, 3 mL, nebulization, TID  [Held by provider] lisinopril, 10 mg, oral, Daily  mirtazapine, 15 mg, oral, Nightly  montelukast, 10 mg, oral, Daily  multivitamin with minerals, 1 tablet, oral, Daily  nortriptyline, 50 mg, oral, Nightly  oxybutynin, 5 mg, oral, Daily  pantoprazole, 40 mg, intravenous, Daily before breakfast  thiamine, 100 mg, oral, Daily  thiamine, 100 mg, oral, Daily  varenicline, 1 mg, oral, BID      Continuous medications     PRN medications  PRN medications: alteplase, benzonatate, guaiFENesin, hydrOXYzine HCL, ipratropium-albuteroL, nitroglycerin, ondansetron, oxyCODONE, phenoL       DATA:      Diagnostic tests reviewed for today's visit:     CBC:   Results from last 72 hours   Lab Units 07/11/24  0533   WBC AUTO x10*3/uL 12.8*   HEMATOCRIT % 26.0*   PLATELETS AUTO x10*3/uL 614*   MCV fL 85     Coags:     CMP:   Results from last 72 hours   Lab Units 07/11/24  0533   SODIUM mmol/L 132*   POTASSIUM mmol/L 4.1   CO2 mmol/L 26   BUN mg/dL 12   MAGNESIUM mg/dL 1.70      Cardiac Enzymes: No lab exists for component: \"TROP\" .lab  Liver Function, Amylase, Lipase:       No lab exists for component: \"TPROT\", \"ALB\", \"TBILI\"   MG/PHOS: [id number](mg,p)@   Renal Panel:    Results from last 72 hours   Lab Units 07/11/24  0533   BUN mg/dL 12   POTASSIUM mmol/L 4.1   CO2 mmol/L 26 " "  SODIUM mmol/L 132*      Heme:        No lab exists for component: \"RETICP\", \"ABSRETIC\", \"LD\", \"ALEXY\", \"FE\", \"TRANSFERSAT\"   No components found for: \"UALBCR\"      Medication and Non-Pharmacologic VTE Prophylaxis/Anticoagulants    The patient has received anticoagulants recently:  Heparin is ordered or has been given within the last 24 hours OR  Lovenox has been given in the last 24 hours OR  An anticoagulant other than Heparin or Lovenox is on the home med list OR  An anticoagulant other than Heparin or Lovenox has been given within the last 5 days  Last Anticoag Admin            enoxaparin (Lovenox) syringe 60 mg    Given 60 mg at 0950    Frequency: Every 12 hours         There are additional administrations since 07/08/24 1128 that are not shown.    Orders not given:    apixaban (Eliquis) tablet 5 mg                   SIGNATURE: Miladis Hamilton [facility]    PAGER/CONTACT #: Epic Annelise      Disclaimer: Portions of this note may have been generated using Dragon voice recognition software. Reasonable efforts were made to correct any dictation errors that resulted due to the programming of this software but some may still be present.     Portions of this note including HPI, ROS, impression/plan, and examination may have been copied forward from yesterday to July 12, 2024 as to provide important historical information essential in contributing to medical decision making. Documentation has been reviewed and edited as necessary to support clinical decision making for today's visit and to reflect my own independent evaluation of this patient.       The time of this note does not reflect the time I saw the patient but the time that this note was written   "

## 2024-07-12 NOTE — PROGRESS NOTES
Spiritual Care Visit    Clinical Encounter Type  Visited With: Patient  Routine Visit: Introduction  Continue Visiting: Yes  Referral From: Verbal, Nurse  Referral To:     Upon arrival a visitor was leaving the room. The pt stated that she was waiting for the visitor to leave so that she could get some sleep. Therefore, the pt was told that the visit would be rescheduled. The pt stated that she was appreciative for the rescheduling and would welcome another visit.  There will be another visit scheduled.    Chaplain Marcy Sierra

## 2024-07-12 NOTE — CARE PLAN
The patient's goals for the shift include      The clinical goals for the shift include safety, pain control    Over the shift, the patient did not make progress toward the following goals. Barriers to progression include . Recommendations to address these barriers include   Problem: Pain - Adult  Goal: Verbalizes/displays adequate comfort level or baseline comfort level  Outcome: Progressing     Problem: Safety - Adult  Goal: Free from fall injury  Outcome: Progressing     Problem: Discharge Planning  Goal: Discharge to home or other facility with appropriate resources  Outcome: Progressing     Problem: Chronic Conditions and Co-morbidities  Goal: Patient's chronic conditions and co-morbidity symptoms are monitored and maintained or improved  Outcome: Progressing     Problem: Pain  Goal: Takes deep breaths with improved pain control throughout the shift  Outcome: Progressing  Goal: Turns in bed with improved pain control throughout the shift  Outcome: Progressing  Goal: Walks with improved pain control throughout the shift  Outcome: Progressing  Goal: Performs ADL's with improved pain control throughout shift  Outcome: Progressing  Goal: Participates in PT with improved pain control throughout the shift  Outcome: Progressing  Goal: Free from opioid side effects throughout the shift  Outcome: Progressing  Goal: Free from acute confusion related to pain meds throughout the shift  Outcome: Progressing     Problem: Fall/Injury  Goal: Not fall by end of shift  Outcome: Progressing  Goal: Be free from injury by end of the shift  Outcome: Progressing  Goal: Verbalize understanding of personal risk factors for fall in the hospital  Outcome: Progressing  Goal: Verbalize understanding of risk factor reduction measures to prevent injury from fall in the home  Outcome: Progressing  Goal: Use assistive devices by end of the shift  Outcome: Progressing  Goal: Pace activities to prevent fatigue by end of the shift  Outcome:  Progressing     Problem: Skin  Goal: Decreased wound size/increased tissue granulation at next dressing change  Outcome: Progressing  Goal: Participates in plan/prevention/treatment measures  Outcome: Progressing  Goal: Prevent/manage excess moisture  Outcome: Progressing  Goal: Prevent/minimize sheer/friction injuries  Outcome: Progressing  Goal: Promote/optimize nutrition  Outcome: Progressing  Goal: Promote skin healing  Outcome: Progressing   .

## 2024-07-13 LAB
GLUCOSE BLD MANUAL STRIP-MCNC: 107 MG/DL (ref 74–99)
GLUCOSE BLD MANUAL STRIP-MCNC: 98 MG/DL (ref 74–99)
MAGNESIUM SERPL-MCNC: 1.5 MG/DL (ref 1.6–2.4)
PHOSPHATE SERPL-MCNC: 4.6 MG/DL (ref 2.5–4.9)

## 2024-07-13 PROCEDURE — 2500000002 HC RX 250 W HCPCS SELF ADMINISTERED DRUGS (ALT 637 FOR MEDICARE OP, ALT 636 FOR OP/ED): Performed by: INTERNAL MEDICINE

## 2024-07-13 PROCEDURE — 94640 AIRWAY INHALATION TREATMENT: CPT

## 2024-07-13 PROCEDURE — C9113 INJ PANTOPRAZOLE SODIUM, VIA: HCPCS | Performed by: HOSPITALIST

## 2024-07-13 PROCEDURE — 2500000001 HC RX 250 WO HCPCS SELF ADMINISTERED DRUGS (ALT 637 FOR MEDICARE OP): Performed by: INTERNAL MEDICINE

## 2024-07-13 PROCEDURE — 84100 ASSAY OF PHOSPHORUS: CPT

## 2024-07-13 PROCEDURE — 2500000004 HC RX 250 GENERAL PHARMACY W/ HCPCS (ALT 636 FOR OP/ED): Performed by: HOSPITALIST

## 2024-07-13 PROCEDURE — 2500000004 HC RX 250 GENERAL PHARMACY W/ HCPCS (ALT 636 FOR OP/ED): Performed by: INTERNAL MEDICINE

## 2024-07-13 PROCEDURE — 94668 MNPJ CHEST WALL SBSQ: CPT

## 2024-07-13 PROCEDURE — 2500000001 HC RX 250 WO HCPCS SELF ADMINISTERED DRUGS (ALT 637 FOR MEDICARE OP): Performed by: STUDENT IN AN ORGANIZED HEALTH CARE EDUCATION/TRAINING PROGRAM

## 2024-07-13 PROCEDURE — 82947 ASSAY GLUCOSE BLOOD QUANT: CPT

## 2024-07-13 PROCEDURE — 2500000001 HC RX 250 WO HCPCS SELF ADMINISTERED DRUGS (ALT 637 FOR MEDICARE OP): Performed by: HOSPITALIST

## 2024-07-13 PROCEDURE — 83735 ASSAY OF MAGNESIUM: CPT

## 2024-07-13 PROCEDURE — 99232 SBSQ HOSP IP/OBS MODERATE 35: CPT | Performed by: STUDENT IN AN ORGANIZED HEALTH CARE EDUCATION/TRAINING PROGRAM

## 2024-07-13 PROCEDURE — 1200000002 HC GENERAL ROOM WITH TELEMETRY DAILY

## 2024-07-13 PROCEDURE — 2500000001 HC RX 250 WO HCPCS SELF ADMINISTERED DRUGS (ALT 637 FOR MEDICARE OP)

## 2024-07-13 RX ORDER — MIDODRINE HYDROCHLORIDE 5 MG/1
5 TABLET ORAL EVERY 8 HOURS
Status: COMPLETED | OUTPATIENT
Start: 2024-07-13 | End: 2024-07-14

## 2024-07-13 ASSESSMENT — COGNITIVE AND FUNCTIONAL STATUS - GENERAL
DAILY ACTIVITIY SCORE: 20
DAILY ACTIVITIY SCORE: 23
WALKING IN HOSPITAL ROOM: A LITTLE
DRESSING REGULAR LOWER BODY CLOTHING: A LITTLE
TOILETING: A LITTLE
CLIMB 3 TO 5 STEPS WITH RAILING: A LITTLE
MOBILITY SCORE: 21
DRESSING REGULAR UPPER BODY CLOTHING: A LITTLE
MOVING TO AND FROM BED TO CHAIR: A LITTLE
MOVING TO AND FROM BED TO CHAIR: A LITTLE
CLIMB 3 TO 5 STEPS WITH RAILING: A LITTLE
WALKING IN HOSPITAL ROOM: A LITTLE
MOBILITY SCORE: 21
DRESSING REGULAR LOWER BODY CLOTHING: A LITTLE
PERSONAL GROOMING: A LITTLE

## 2024-07-13 ASSESSMENT — PAIN SCALES - GENERAL
PAINLEVEL_OUTOF10: 7
PAINLEVEL_OUTOF10: 2
PAINLEVEL_OUTOF10: 3
PAINLEVEL_OUTOF10: 7

## 2024-07-13 ASSESSMENT — PAIN - FUNCTIONAL ASSESSMENT
PAIN_FUNCTIONAL_ASSESSMENT: 0-10
PAIN_FUNCTIONAL_ASSESSMENT: 0-10

## 2024-07-13 ASSESSMENT — PAIN DESCRIPTION - LOCATION: LOCATION: ABDOMEN

## 2024-07-13 NOTE — CARE PLAN
The patient's goals for the shift include      The clinical goals for the shift include Patient will feel comfortable throughout shift      Problem: Pain - Adult  Goal: Verbalizes/displays adequate comfort level or baseline comfort level  Outcome: Progressing     Problem: Safety - Adult  Goal: Free from fall injury  Outcome: Progressing     Problem: Discharge Planning  Goal: Discharge to home or other facility with appropriate resources  Outcome: Progressing     Problem: Chronic Conditions and Co-morbidities  Goal: Patient's chronic conditions and co-morbidity symptoms are monitored and maintained or improved  Outcome: Progressing     Problem: Pain  Goal: Takes deep breaths with improved pain control throughout the shift  Outcome: Progressing  Goal: Turns in bed with improved pain control throughout the shift  Outcome: Progressing  Goal: Walks with improved pain control throughout the shift  Outcome: Progressing  Goal: Performs ADL's with improved pain control throughout shift  Outcome: Progressing  Goal: Participates in PT with improved pain control throughout the shift  Outcome: Progressing  Goal: Free from opioid side effects throughout the shift  Outcome: Progressing  Goal: Free from acute confusion related to pain meds throughout the shift  Outcome: Progressing

## 2024-07-13 NOTE — PROGRESS NOTES
Fernando Cuevas is a 63 y.o. female on day 22 of admission presenting with Perforated viscus.      Subjective     Patient seen and assessed during rounds.  Patient has very distended abdomen complaints about discomfort from meds.  She reports having bowel movement roughly 2 days ago.  Denies any mamta pain.  Is passing flatus as well as previously mentioned in bowel movements.       Objective     Last Recorded Vitals  /67 (BP Location: Right arm, Patient Position: Lying)   Pulse 103   Temp 36.4 °C (97.6 °F) (Temporal)   Resp 18   Wt 63.5 kg (139 lb 14.4 oz)   SpO2 98%   Intake/Output last 3 Shifts:    Intake/Output Summary (Last 24 hours) at 7/13/2024 1524  Last data filed at 7/13/2024 1208  Gross per 24 hour   Intake --   Output 550 ml   Net -550 ml       Admission Weight  Weight: 64 kg (141 lb 1.5 oz) (06/20/24 2132)    Daily Weight  07/05/24 : 63.5 kg (139 lb 14.4 oz)    Image Results  XR chest 1 view  Narrative: Interpreted By:  Karina Ventura,   STUDY:  XR CHEST 1 VIEW;  7/9/2024 11:10 am      INDICATION:  Signs/Symptoms:sob.      COMPARISON:  07/04/2024      ACCESSION NUMBER(S):  [id number]      ORDERING CLINICIAN:  CORBY DAVE      FINDINGS:  Artifact from overlying monitoring leads noted. Right jugular central  line remains in place with tip overlying the distal SVC. Interval  removal of NG tube. hazy opacity in the left lung base. Pleural  angles are sharp. Subcentimeter nodular shadow in the right mid  chest. The cardiac silhouette is normal in size.      Impression: Subtle left basilar infiltrate or atelectasis.      Possible right chest nodule versus confluence of shadows. No definite  correlate on recent chest CT 06/20/2024. Attention at follow-up  suggested.      MACRO:  None.      Signed by: Karina Ventura 7/9/2024 12:58 PM  Dictation workstation:   [id number]      Physical Exam  Constitutional:       Appearance: Normal appearance.   Eyes:      Extraocular Movements: Extraocular movements  intact.      Pupils: Pupils are equal, round, and reactive to light.   Cardiovascular:      Rate and Rhythm: Normal rate and regular rhythm.      Pulses: Normal pulses.      Heart sounds: Normal heart sounds.   Pulmonary:      Effort: Pulmonary effort is normal.      Breath sounds: Normal breath sounds.   Abdominal:      General: There is distension.      Comments:    with wound VAC in place over midline incision   Musculoskeletal:      Cervical back: Normal range of motion.   Neurological:      General: No focal deficit present.      Mental Status: She is alert. Mental status is at baseline.         Relevant Results     all pertinent results reviewed           Assessment/Plan        This patient has a central line   Reason for the central line remaining today? Parenteral medication            Principal Problem:    Perforated viscus  Active Problems:    Thrombocytosis    I reviewed:    All new and relevant labs and imaging   New EKGs  Consultant notes   Vitals Signs   Nursing notes         62 yo F PMH COPD, hypertension, leukocytosis, alcohol abuse, dyspnea, esophagitis, generalized anxiety disorder, history of cocaine abuse remotely per the patient but was positive for cocaine abuse today, depression, lung cancer, elevated troponins, hyperlipidemia, nicotine dependence, overactive bladder, spontaneous pneumothorax remotely, migraines, panic disorder, daily chronic headaches who presented for Perforated small bowel with peritonitis      Dispo: [facility]     Perforated small bowel's with peritonitis:  Concern for postop ileus versus partial obstruction:  6/21Underwent small bowel resection, and washout   -Culture for Candida albicans, concern for mix of gram-negative and anaerobes.  -Abx:  completed IV Zosyn, fluconazole 200 mg/day. For 10 days with stop date 7/3/24.,    -trend CBC  -Dietary following, appreciate recommendations.   -wound VAC in place  -NG tube removed  -tolerating oral intake  -discussed with ID staff  07/11/24    Continuing on 5 mg oxycodone for severe pain.     # Anemia  # Abdominal wall hematoma  -blood loss  -s/p blood transfusion     Acute DVT in the left distal brachial vein:  -Vascular upper extremity ultrasound with acute finding, non-occlusive.  -Patient initiated on full dose Lovenox therapeutic dose and 6/27.   -Patient with contrast allergy, CT angio chest requiring prep, no acute findings.    -started apixaban 07/11/24.   Continue Eliquis.  Will need follow-up.     Acute on chronic hyponatremia, resolved:  In setting of poor oral intake  -Nephrology was managing, placed on IV isotonic fluid with improvement  -Fluids will be discontinued.  -Nephrology signed off  -Will continue to trend sodium     Severe malnutrition:  -Nutrition following, appreciate recommendations.      Malnutrition Diagnosis Status: Ongoing  Malnutrition Diagnosis: Severe malnutrition related to acute disease or injury  As Evidenced by: oral intake less than 50% of estimated energy requirements for greater than five days, generalized edema, visualized bradley of depelted adipose tissues and skeletal tissues wasting  I agree with the dietitian's malnutrition diagnosis.     DISPOSITION:  Functional Status Prior to Admit:     Medical Necessity for Continued Hospitalization n     Plan of care discussed with:             Jef Topete MD

## 2024-07-13 NOTE — CARE PLAN
The patient's goals for the shift include      The clinical goals for the shift include pt wound will not leak    Over the shift, the patient did not make progress toward the following goals. Barriers to progression include wound healing . Recommendations to address these barriers include wound management.

## 2024-07-14 VITALS
HEART RATE: 103 BPM | DIASTOLIC BLOOD PRESSURE: 59 MMHG | OXYGEN SATURATION: 96 % | HEIGHT: 62 IN | WEIGHT: 139.9 LBS | TEMPERATURE: 97.5 F | SYSTOLIC BLOOD PRESSURE: 123 MMHG | RESPIRATION RATE: 16 BRPM | BODY MASS INDEX: 25.75 KG/M2

## 2024-07-14 LAB
ABO GROUP (TYPE) IN BLOOD: NORMAL
ANION GAP SERPL CALC-SCNC: 16 MMOL/L (ref 10–20)
ANTIBODY SCREEN: NORMAL
BUN SERPL-MCNC: 12 MG/DL (ref 6–23)
CALCIUM SERPL-MCNC: 8.4 MG/DL (ref 8.6–10.3)
CHLORIDE SERPL-SCNC: 98 MMOL/L (ref 98–107)
CO2 SERPL-SCNC: 24 MMOL/L (ref 21–32)
CREAT SERPL-MCNC: 0.79 MG/DL (ref 0.5–1.05)
EGFRCR SERPLBLD CKD-EPI 2021: 84 ML/MIN/1.73M*2
ERYTHROCYTE [DISTWIDTH] IN BLOOD BY AUTOMATED COUNT: 20.5 % (ref 11.5–14.5)
GLUCOSE SERPL-MCNC: 82 MG/DL (ref 74–99)
HCT VFR BLD AUTO: 24.1 % (ref 36–46)
HGB BLD-MCNC: 7.4 G/DL (ref 12–16)
MAGNESIUM SERPL-MCNC: 1.5 MG/DL (ref 1.6–2.4)
MCH RBC QN AUTO: 26.1 PG (ref 26–34)
MCHC RBC AUTO-ENTMCNC: 30.7 G/DL (ref 32–36)
MCV RBC AUTO: 85 FL (ref 80–100)
NRBC BLD-RTO: 0.4 /100 WBCS (ref 0–0)
PHOSPHATE SERPL-MCNC: 4.6 MG/DL (ref 2.5–4.9)
PLATELET # BLD AUTO: 831 X10*3/UL (ref 150–450)
POTASSIUM SERPL-SCNC: 4.2 MMOL/L (ref 3.5–5.3)
RBC # BLD AUTO: 2.83 X10*6/UL (ref 4–5.2)
RH FACTOR (ANTIGEN D): NORMAL
SODIUM SERPL-SCNC: 134 MMOL/L (ref 136–145)
WBC # BLD AUTO: 12 X10*3/UL (ref 4.4–11.3)

## 2024-07-14 PROCEDURE — 85027 COMPLETE CBC AUTOMATED: CPT | Performed by: STUDENT IN AN ORGANIZED HEALTH CARE EDUCATION/TRAINING PROGRAM

## 2024-07-14 PROCEDURE — 2500000001 HC RX 250 WO HCPCS SELF ADMINISTERED DRUGS (ALT 637 FOR MEDICARE OP)

## 2024-07-14 PROCEDURE — 84100 ASSAY OF PHOSPHORUS: CPT

## 2024-07-14 PROCEDURE — 2500000002 HC RX 250 W HCPCS SELF ADMINISTERED DRUGS (ALT 637 FOR MEDICARE OP, ALT 636 FOR OP/ED): Performed by: INTERNAL MEDICINE

## 2024-07-14 PROCEDURE — C9113 INJ PANTOPRAZOLE SODIUM, VIA: HCPCS | Performed by: HOSPITALIST

## 2024-07-14 PROCEDURE — 83735 ASSAY OF MAGNESIUM: CPT

## 2024-07-14 PROCEDURE — 86900 BLOOD TYPING SEROLOGIC ABO: CPT | Performed by: STUDENT IN AN ORGANIZED HEALTH CARE EDUCATION/TRAINING PROGRAM

## 2024-07-14 PROCEDURE — 80048 BASIC METABOLIC PNL TOTAL CA: CPT | Performed by: STUDENT IN AN ORGANIZED HEALTH CARE EDUCATION/TRAINING PROGRAM

## 2024-07-14 PROCEDURE — 2500000001 HC RX 250 WO HCPCS SELF ADMINISTERED DRUGS (ALT 637 FOR MEDICARE OP): Performed by: INTERNAL MEDICINE

## 2024-07-14 PROCEDURE — 2500000001 HC RX 250 WO HCPCS SELF ADMINISTERED DRUGS (ALT 637 FOR MEDICARE OP): Performed by: HOSPITALIST

## 2024-07-14 PROCEDURE — 1200000002 HC GENERAL ROOM WITH TELEMETRY DAILY

## 2024-07-14 PROCEDURE — 99232 SBSQ HOSP IP/OBS MODERATE 35: CPT | Performed by: STUDENT IN AN ORGANIZED HEALTH CARE EDUCATION/TRAINING PROGRAM

## 2024-07-14 PROCEDURE — 94640 AIRWAY INHALATION TREATMENT: CPT

## 2024-07-14 PROCEDURE — 2500000004 HC RX 250 GENERAL PHARMACY W/ HCPCS (ALT 636 FOR OP/ED): Performed by: STUDENT IN AN ORGANIZED HEALTH CARE EDUCATION/TRAINING PROGRAM

## 2024-07-14 PROCEDURE — 2500000004 HC RX 250 GENERAL PHARMACY W/ HCPCS (ALT 636 FOR OP/ED): Performed by: HOSPITALIST

## 2024-07-14 PROCEDURE — 2500000001 HC RX 250 WO HCPCS SELF ADMINISTERED DRUGS (ALT 637 FOR MEDICARE OP): Performed by: STUDENT IN AN ORGANIZED HEALTH CARE EDUCATION/TRAINING PROGRAM

## 2024-07-14 RX ORDER — MAGNESIUM SULFATE HEPTAHYDRATE 40 MG/ML
2 INJECTION, SOLUTION INTRAVENOUS ONCE
Status: COMPLETED | OUTPATIENT
Start: 2024-07-14 | End: 2024-07-14

## 2024-07-14 RX ORDER — POLYETHYLENE GLYCOL 3350 17 G/17G
17 POWDER, FOR SOLUTION ORAL DAILY
Status: DISCONTINUED | OUTPATIENT
Start: 2024-07-14 | End: 2024-07-16 | Stop reason: HOSPADM

## 2024-07-14 ASSESSMENT — COGNITIVE AND FUNCTIONAL STATUS - GENERAL
MOVING TO AND FROM BED TO CHAIR: A LITTLE
WALKING IN HOSPITAL ROOM: A LITTLE
MOVING TO AND FROM BED TO CHAIR: A LITTLE
DRESSING REGULAR LOWER BODY CLOTHING: A LITTLE
WALKING IN HOSPITAL ROOM: A LITTLE
DAILY ACTIVITIY SCORE: 20
TOILETING: A LITTLE
DAILY ACTIVITIY SCORE: 20
DRESSING REGULAR UPPER BODY CLOTHING: A LITTLE
TOILETING: A LITTLE
MOBILITY SCORE: 21
DRESSING REGULAR UPPER BODY CLOTHING: A LITTLE
PERSONAL GROOMING: A LITTLE
HELP NEEDED FOR BATHING: A LITTLE
CLIMB 3 TO 5 STEPS WITH RAILING: A LITTLE
MOBILITY SCORE: 21
CLIMB 3 TO 5 STEPS WITH RAILING: A LITTLE
DRESSING REGULAR LOWER BODY CLOTHING: A LITTLE

## 2024-07-14 ASSESSMENT — PAIN SCALES - GENERAL
PAINLEVEL_OUTOF10: 7
PAINLEVEL_OUTOF10: 4
PAINLEVEL_OUTOF10: 7

## 2024-07-14 ASSESSMENT — PAIN - FUNCTIONAL ASSESSMENT: PAIN_FUNCTIONAL_ASSESSMENT: 0-10

## 2024-07-14 ASSESSMENT — PAIN DESCRIPTION - LOCATION
LOCATION: ABDOMEN
LOCATION: ABDOMEN

## 2024-07-14 ASSESSMENT — PAIN SCALES - PAIN ASSESSMENT IN ADVANCED DEMENTIA (PAINAD)
TOTALSCORE: MEDICATION (SEE MAR)
TOTALSCORE: MEDICATION (SEE MAR)

## 2024-07-14 NOTE — CARE PLAN
The patient's goals for the shift include      The clinical goals for the shift include Pt confort will be maintained throughout shift and safety will be maintained    Problem: Pain  Goal: Turns in bed with improved pain control throughout the shift  Outcome: Progressing     Problem: Fall/Injury  Goal: Be free from injury by end of the shift  Outcome: Progressing

## 2024-07-14 NOTE — PROGRESS NOTES
Fernando Cuevas is a 63 y.o. female on day 23 of admission presenting with Perforated viscus.      Subjective    seen assisting rounds. abdomen is distended per patient but improved compared to yesterday given that she has had a bowel movement.  Denies any current flank pain at baseline.       Objective     Last Recorded Vitals  /64 (BP Location: Right arm, Patient Position: Sitting)   Pulse (!) 111   Temp 36.3 °C (97.4 °F) (Rectal)   Resp 19   Wt 63.5 kg (139 lb 14.4 oz)   SpO2 98%   Intake/Output last 3 Shifts:    Intake/Output Summary (Last 24 hours) at 7/14/2024 1455  Last data filed at 7/14/2024 0836  Gross per 24 hour   Intake 220 ml   Output 1150 ml   Net -930 ml       Admission Weight  Weight: 64 kg (141 lb 1.5 oz) (06/20/24 2132)    Daily Weight  07/05/24 : 63.5 kg (139 lb 14.4 oz)    Image Results  XR chest 1 view  Narrative: Interpreted By:  Karina Ventura,   STUDY:  XR CHEST 1 VIEW;  7/9/2024 11:10 am      INDICATION:  Signs/Symptoms:sob.      COMPARISON:  07/04/2024      ACCESSION NUMBER(S):  [id number]      ORDERING CLINICIAN:  CORBY DAVE      FINDINGS:  Artifact from overlying monitoring leads noted. Right jugular central  line remains in place with tip overlying the distal SVC. Interval  removal of NG tube. hazy opacity in the left lung base. Pleural  angles are sharp. Subcentimeter nodular shadow in the right mid  chest. The cardiac silhouette is normal in size.      Impression: Subtle left basilar infiltrate or atelectasis.      Possible right chest nodule versus confluence of shadows. No definite  correlate on recent chest CT 06/20/2024. Attention at follow-up  suggested.      MACRO:  None.      Signed by: Karina Ventura 7/9/2024 12:58 PM  Dictation workstation:   [id number]      Physical Exam    Constitutional:       Appearance: Normal appearance.   Eyes:      Extraocular Movements: Extraocular movements intact.      Pupils: Pupils are equal, round, and reactive to light.    Cardiovascular:      Rate and Rhythm: Normal rate and regular rhythm.      Pulses: Normal pulses.      Heart sounds: Normal heart sounds.   Pulmonary:      Effort: Pulmonary effort is normal.      Breath sounds: Normal breath sounds.   Abdominal:      General: There is distension.  soft compared to yesterday.  Tenderness over midline incision     Comments:    with wound VAC in place over midline incision   Musculoskeletal:      Cervical back: Normal range of motion.   Neurological:      General: No focal deficit present.      Mental Status: She is alert. Mental status is at baseline.     Relevant Results               Assessment/Plan      Principal Problem:    Perforated viscus  Active Problems:    Thrombocytosis    I reviewed:    All new and relevant labs and imaging   New EKGs  Consultant notes   Vitals Signs   Nursing notes         64 yo F PMH COPD, hypertension, leukocytosis, alcohol abuse, dyspnea, esophagitis, generalized anxiety disorder, history of cocaine abuse remotely per the patient but was positive for cocaine abuse today, depression, lung cancer, elevated troponins, hyperlipidemia, nicotine dependence, overactive bladder, spontaneous pneumothorax remotely, migraines, panic disorder, daily chronic headaches who presented for Perforated small bowel with peritonitis      Dispo: SNF     Perforated small bowel's with peritonitis:  Concern for postop ileus versus partial obstruction:  6/21Underwent small bowel resection, and washout   -Culture for Candida albicans, concern for mix of gram-negative and anaerobes.  -Abx:  completed IV Zosyn, fluconazole 200 mg/day. For 10 days with stop date 7/3/24.,    -trend CBC  -Dietary following, appreciate recommendations.   -wound VAC in place  -NG tube removed  -tolerating oral intake  -discussed with ID staff 07/11/24    Continuing on 5 mg oxycodone for severe pain.     # Anemia  # Abdominal wall hematoma  -blood loss  -s/p blood transfusion  - hemoglobin at 7.4  today.  Will make sure that active type and screen is available.     Acute DVT in the left distal brachial vein:  -Vascular upper extremity ultrasound with acute finding, non-occlusive.  -Patient initiated on full dose Lovenox therapeutic dose and 6/27.   -Patient with contrast allergy, CT angio chest requiring prep, no acute findings.    -started apixaban 07/11/24.   Continue Eliquis.  Will need follow-up.     Acute on chronic hyponatremia, resolved:  In setting of poor oral intake  -Nephrology was managing, placed on IV isotonic fluid with improvement  -Fluids will be discontinued.  -Nephrology signed off  -Will continue to trend sodium      Hypomagnesemia  - magnesium at 1.5 today.  Will replete and follow-up tomorrow     Severe malnutrition:  -Nutrition following, appreciate recommendations.      Malnutrition Diagnosis Status: Ongoing  Malnutrition Diagnosis: Severe malnutrition related to acute disease or injury  As Evidenced by: oral intake less than 50% of estimated energy requirements for greater than five days, generalized edema, visualized bradley of depelted adipose tissues and skeletal tissues wasting  I agree with the dietitian's malnutrition diagnosis.     DISPOSITION:  Functional Status Prior to Admit:     Medical Necessity for Continued Hospitalization n     Plan of care discussed with: .  Patient to be discharged to SNF awaiting precertification              Jef Topete MD

## 2024-07-15 VITALS
WEIGHT: 139.9 LBS | SYSTOLIC BLOOD PRESSURE: 123 MMHG | RESPIRATION RATE: 18 BRPM | HEIGHT: 62 IN | BODY MASS INDEX: 25.75 KG/M2 | TEMPERATURE: 98.2 F | OXYGEN SATURATION: 97 % | DIASTOLIC BLOOD PRESSURE: 70 MMHG | HEART RATE: 119 BPM

## 2024-07-15 LAB
ANION GAP SERPL CALC-SCNC: 13 MMOL/L (ref 10–20)
BUN SERPL-MCNC: 11 MG/DL (ref 6–23)
CALCIUM SERPL-MCNC: 8.1 MG/DL (ref 8.6–10.3)
CHLORIDE SERPL-SCNC: 98 MMOL/L (ref 98–107)
CO2 SERPL-SCNC: 26 MMOL/L (ref 21–32)
CREAT SERPL-MCNC: 0.73 MG/DL (ref 0.5–1.05)
EGFRCR SERPLBLD CKD-EPI 2021: >90 ML/MIN/1.73M*2
ERYTHROCYTE [DISTWIDTH] IN BLOOD BY AUTOMATED COUNT: 21.1 % (ref 11.5–14.5)
GLUCOSE SERPL-MCNC: 90 MG/DL (ref 74–99)
HCT VFR BLD AUTO: 24.1 % (ref 36–46)
HGB BLD-MCNC: 7.4 G/DL (ref 12–16)
MAGNESIUM SERPL-MCNC: 1.6 MG/DL (ref 1.6–2.4)
MCH RBC QN AUTO: 26.1 PG (ref 26–34)
MCHC RBC AUTO-ENTMCNC: 30.7 G/DL (ref 32–36)
MCV RBC AUTO: 85 FL (ref 80–100)
NRBC BLD-RTO: 0.5 /100 WBCS (ref 0–0)
PHOSPHATE SERPL-MCNC: 3.7 MG/DL (ref 2.5–4.9)
PLATELET # BLD AUTO: 866 X10*3/UL (ref 150–450)
POTASSIUM SERPL-SCNC: 4.1 MMOL/L (ref 3.5–5.3)
RBC # BLD AUTO: 2.83 X10*6/UL (ref 4–5.2)
SODIUM SERPL-SCNC: 133 MMOL/L (ref 136–145)
WBC # BLD AUTO: 14.6 X10*3/UL (ref 4.4–11.3)

## 2024-07-15 PROCEDURE — 2500000005 HC RX 250 GENERAL PHARMACY W/O HCPCS: Performed by: INTERNAL MEDICINE

## 2024-07-15 PROCEDURE — 85027 COMPLETE CBC AUTOMATED: CPT | Performed by: STUDENT IN AN ORGANIZED HEALTH CARE EDUCATION/TRAINING PROGRAM

## 2024-07-15 PROCEDURE — 2500000001 HC RX 250 WO HCPCS SELF ADMINISTERED DRUGS (ALT 637 FOR MEDICARE OP): Performed by: INTERNAL MEDICINE

## 2024-07-15 PROCEDURE — 2500000001 HC RX 250 WO HCPCS SELF ADMINISTERED DRUGS (ALT 637 FOR MEDICARE OP): Performed by: STUDENT IN AN ORGANIZED HEALTH CARE EDUCATION/TRAINING PROGRAM

## 2024-07-15 PROCEDURE — C9113 INJ PANTOPRAZOLE SODIUM, VIA: HCPCS | Performed by: HOSPITALIST

## 2024-07-15 PROCEDURE — 94640 AIRWAY INHALATION TREATMENT: CPT

## 2024-07-15 PROCEDURE — 84100 ASSAY OF PHOSPHORUS: CPT

## 2024-07-15 PROCEDURE — 80048 BASIC METABOLIC PNL TOTAL CA: CPT | Performed by: STUDENT IN AN ORGANIZED HEALTH CARE EDUCATION/TRAINING PROGRAM

## 2024-07-15 PROCEDURE — 2500000004 HC RX 250 GENERAL PHARMACY W/ HCPCS (ALT 636 FOR OP/ED): Performed by: STUDENT IN AN ORGANIZED HEALTH CARE EDUCATION/TRAINING PROGRAM

## 2024-07-15 PROCEDURE — 2500000004 HC RX 250 GENERAL PHARMACY W/ HCPCS (ALT 636 FOR OP/ED): Performed by: HOSPITALIST

## 2024-07-15 PROCEDURE — 83735 ASSAY OF MAGNESIUM: CPT

## 2024-07-15 PROCEDURE — 2500000002 HC RX 250 W HCPCS SELF ADMINISTERED DRUGS (ALT 637 FOR MEDICARE OP, ALT 636 FOR OP/ED): Performed by: INTERNAL MEDICINE

## 2024-07-15 PROCEDURE — 2500000004 HC RX 250 GENERAL PHARMACY W/ HCPCS (ALT 636 FOR OP/ED): Performed by: INTERNAL MEDICINE

## 2024-07-15 PROCEDURE — 94664 DEMO&/EVAL PT USE INHALER: CPT

## 2024-07-15 PROCEDURE — 2500000001 HC RX 250 WO HCPCS SELF ADMINISTERED DRUGS (ALT 637 FOR MEDICARE OP)

## 2024-07-15 PROCEDURE — 2500000001 HC RX 250 WO HCPCS SELF ADMINISTERED DRUGS (ALT 637 FOR MEDICARE OP): Performed by: HOSPITALIST

## 2024-07-15 PROCEDURE — 99239 HOSP IP/OBS DSCHRG MGMT >30: CPT | Performed by: INTERNAL MEDICINE

## 2024-07-15 PROCEDURE — 97110 THERAPEUTIC EXERCISES: CPT | Mod: GP,CQ

## 2024-07-15 PROCEDURE — 2500000004 HC RX 250 GENERAL PHARMACY W/ HCPCS (ALT 636 FOR OP/ED)

## 2024-07-15 RX ORDER — LIDOCAINE 560 MG/1
1 PATCH PERCUTANEOUS; TOPICAL; TRANSDERMAL DAILY
Status: DISCONTINUED | OUTPATIENT
Start: 2024-07-15 | End: 2024-07-16 | Stop reason: HOSPADM

## 2024-07-15 RX ORDER — MORPHINE SULFATE 2 MG/ML
2 INJECTION, SOLUTION INTRAMUSCULAR; INTRAVENOUS ONCE
Status: COMPLETED | OUTPATIENT
Start: 2024-07-15 | End: 2024-07-15

## 2024-07-15 RX ORDER — MIRTAZAPINE 15 MG/1
15 TABLET, FILM COATED ORAL NIGHTLY
Qty: 30 TABLET | Refills: 0 | Status: SHIPPED | OUTPATIENT
Start: 2024-07-15 | End: 2024-08-14

## 2024-07-15 ASSESSMENT — COGNITIVE AND FUNCTIONAL STATUS - GENERAL
WALKING IN HOSPITAL ROOM: A LOT
STANDING UP FROM CHAIR USING ARMS: A LITTLE
CLIMB 3 TO 5 STEPS WITH RAILING: A LITTLE
DAILY ACTIVITIY SCORE: 20
CLIMB 3 TO 5 STEPS WITH RAILING: A LOT
DRESSING REGULAR UPPER BODY CLOTHING: A LITTLE
TOILETING: A LITTLE
TURNING FROM BACK TO SIDE WHILE IN FLAT BAD: A LITTLE
STANDING UP FROM CHAIR USING ARMS: A LITTLE
MOBILITY SCORE: 20
WALKING IN HOSPITAL ROOM: A LITTLE
MOVING TO AND FROM BED TO CHAIR: A LITTLE
DRESSING REGULAR LOWER BODY CLOTHING: A LITTLE
MOBILITY SCORE: 17
MOVING TO AND FROM BED TO CHAIR: A LITTLE
HELP NEEDED FOR BATHING: A LITTLE

## 2024-07-15 ASSESSMENT — PAIN - FUNCTIONAL ASSESSMENT
PAIN_FUNCTIONAL_ASSESSMENT: 0-10

## 2024-07-15 ASSESSMENT — PAIN SCALES - GENERAL
PAINLEVEL_OUTOF10: 4
PAINLEVEL_OUTOF10: 8
PAINLEVEL_OUTOF10: 10 - WORST POSSIBLE PAIN
PAINLEVEL_OUTOF10: 8
PAINLEVEL_OUTOF10: 10 - WORST POSSIBLE PAIN
PAINLEVEL_OUTOF10: 10 - WORST POSSIBLE PAIN
PAINLEVEL_OUTOF10: 9
PAINLEVEL_OUTOF10: 9

## 2024-07-15 ASSESSMENT — PAIN DESCRIPTION - LOCATION
LOCATION: BACK

## 2024-07-15 ASSESSMENT — PAIN DESCRIPTION - ORIENTATION
ORIENTATION: LOWER
ORIENTATION: LOWER

## 2024-07-15 NOTE — PROGRESS NOTES
Occupational Therapy                 Therapy Communication Note    Patient Name: Fernando Cuevas  MRN: [id number]  Today's Date: 7/15/2024     Discipline: Occupational Therapy    Missed Visit Reason: Patient refused (Provided max encouragement to participate in therapy session, however patient continued to refuse.)    Missed Time: Attempt    Comment:

## 2024-07-15 NOTE — PROGRESS NOTES
Fernando Cuevas is a 63 y.o. female on day 24 of admission presenting with Perforated viscus.      Subjective    seen assisting rounds. abdomen is distended per patient but improved compared to yesterday given that she has had a bowel movement.  Denies any current flank pain at baseline.       Objective     Last Recorded Vitals  /75   Pulse (!) 121   Temp 36.3 °C (97.4 °F) (Temporal)   Resp 24   Wt 63.5 kg (139 lb 14.4 oz)   SpO2 94%   Intake/Output last 3 Shifts:    Intake/Output Summary (Last 24 hours) at 7/15/2024 1319  Last data filed at 7/14/2024 2100  Gross per 24 hour   Intake 440 ml   Output 200 ml   Net 240 ml       Admission Weight  Weight: 64 kg (141 lb 1.5 oz) (06/20/24 2132)    Daily Weight  07/05/24 : 63.5 kg (139 lb 14.4 oz)    Image Results  XR chest 1 view  Narrative: Interpreted By:  Karina Ventura,   STUDY:  XR CHEST 1 VIEW;  7/9/2024 11:10 am      INDICATION:  Signs/Symptoms:sob.      COMPARISON:  07/04/2024      ACCESSION NUMBER(S):  [id number]      ORDERING CLINICIAN:  CORBY DAVE      FINDINGS:  Artifact from overlying monitoring leads noted. Right jugular central  line remains in place with tip overlying the distal SVC. Interval  removal of NG tube. hazy opacity in the left lung base. Pleural  angles are sharp. Subcentimeter nodular shadow in the right mid  chest. The cardiac silhouette is normal in size.      Impression: Subtle left basilar infiltrate or atelectasis.      Possible right chest nodule versus confluence of shadows. No definite  correlate on recent chest CT 06/20/2024. Attention at follow-up  suggested.      MACRO:  None.      Signed by: Karina Ventura 7/9/2024 12:58 PM  Dictation workstation:   [id number]      Physical Exam    Constitutional:       Appearance: Normal appearance.   Eyes:      Extraocular Movements: Extraocular movements intact.      Pupils: Pupils are equal, round, and reactive to light.   Cardiovascular:      Rate and Rhythm: Normal rate and regular  rhythm.      Pulses: Normal pulses.      Heart sounds: Normal heart sounds.   Pulmonary:      Effort: Pulmonary effort is normal.      Breath sounds: Normal breath sounds.   Abdominal:      General: There is distension.  soft compared to yesterday.  Tenderness over midline incision     Comments:    with wound VAC in place over midline incision   Musculoskeletal:      Cervical back: Normal range of motion.   Neurological:      General: No focal deficit present.      Mental Status: She is alert. Mental status is at baseline.     Relevant Results               Assessment/Plan      Principal Problem:    Perforated viscus  Active Problems:    Thrombocytosis    I reviewed:    All new and relevant labs and imaging   New EKGs  Consultant notes   Vitals Signs   Nursing notes         64 yo F PMH COPD, hypertension, leukocytosis, alcohol abuse, dyspnea, esophagitis, generalized anxiety disorder, history of cocaine abuse remotely per the patient but was positive for cocaine abuse today, depression, lung cancer, elevated troponins, hyperlipidemia, nicotine dependence, overactive bladder, spontaneous pneumothorax remotely, migraines, panic disorder, daily chronic headaches who presented for Perforated small bowel with peritonitis      Dispo: [facility]     Perforated small bowel's with peritonitis:  Concern for postop ileus versus partial obstruction:  6/21Underwent small bowel resection, and washout   -Culture for Candida albicans, concern for mix of gram-negative and anaerobes.  -Abx:  completed IV Zosyn, fluconazole 200 mg/day. For 10 days with stop date 7/3/24.,    -trend CBC  -Dietary following, appreciate recommendations.   -wound VAC in place  -NG tube removed  -tolerating oral intake  -discussed with ID staff 07/11/24    Continuing on 5 mg oxycodone for severe pain.     # Anemia  # Abdominal wall hematoma  -blood loss  -s/p blood transfusion  - hemoglobin at 7.4 today.      Acute DVT in the left distal brachial vein:  -Vascular  upper extremity ultrasound with acute finding, non-occlusive.  -Patient initiated on full dose Lovenox therapeutic dose and 6/27.   -Patient with contrast allergy, CT angio chest requiring prep, no acute findings.    -started apixaban 07/11/24.   Continue Eliquis.  Will need follow-up.     Acute on chronic hyponatremia, resolved:  In setting of poor oral intake  -Nephrology was managing, placed on IV isotonic fluid with improvement  -Fluids will be discontinued.  -Nephrology signed off  -Will continue to trend sodium      Hypomagnesemia  -replaced     Severe malnutrition:  -Nutrition following, appreciate recommendations.      Malnutrition Diagnosis Status: Ongoing  Malnutrition Diagnosis: Severe malnutrition related to acute disease or injury  As Evidenced by: oral intake less than 50% of estimated energy requirements for greater than five days, generalized edema, visualized bradley of depelted adipose tissues and skeletal tissues wasting  I agree with the dietitian's malnutrition diagnosis.     DISPOSITION:  Functional Status Prior to Admit:     Medical Necessity for Continued Hospitalization n     Plan of care discussed with: .  Patient to be discharged to SNF awaiting precertification              Rupa Khoury MD

## 2024-07-15 NOTE — PROGRESS NOTES
07/15/24 1444   Discharge Planning   Type of Post Acute Facility Services Skilled nursing   Expected Discharge Disposition SNF  (Waiting on auth for [facility])   Does the patient need discharge transport arranged? Yes   RoundTrip coordination needed? Yes     Met with patient at the bedside to discuss alternative discharge plan.  If SNF denied, she will go home with homecare.  Wound vac for home is in the front medication room.  Awaiting to hear from [facility] and SNF.  Lizette Blank RN     7/15/24 @ 9062  Insurance auth received for admittance today.  SNF will have wound vac for the patient.  Requested goldenrod be completed and then transport can be set up.  Lizette Blank RN

## 2024-07-15 NOTE — CARE PLAN
The patient's goals for the shift include      The clinical goals for the shift include Pt pain and comfort will be maintained throughout shift and safety will be maintained    Problem: Pain  Goal: Walks with improved pain control throughout the shift  Outcome: Progressing     Problem: Fall/Injury  Goal: Be free from injury by end of the shift  Outcome: Progressing

## 2024-07-15 NOTE — PROGRESS NOTES
Physical Therapy    Physical Therapy Treatment    Patient Name: Fernando Cuevas  MRN: [id number]  Today's Date: 7/15/2024  Time Calculation  Start Time: 0934  Stop Time: 0951  Time Calculation (min): 17 min    Assessment/Plan   PT Assessment  PT Assessment Results: Decreased strength, Decreased range of motion, Decreased endurance, Impaired balance, Decreased mobility, Impaired judgement, Pain  Rehab Prognosis: Good  Barriers to Discharge: Decreased endurance  Evaluation/Treatment Tolerance: Patient limited by fatigue, Patient limited by pain (dizziness)  Medical Staff Made Aware: Yes  Strengths: Ability to acquire knowledge  Barriers to Participation: Comorbidities  End of Session Communication: Bedside nurse  Assessment Comment:  and RN presesnt end of session to talk with pt.    stating he will put another order in for dc to SNF due to dc home is not safe at is time.  End of Session Patient Position: Bed, 3 rail up, Alarm on  PT Plan  Inpatient/Swing Bed or Outpatient: Inpatient  PT Plan  Treatment/Interventions: Bed mobility, Strengthening, Endurance training, Therapeutic exercise  PT Plan: Ongoing PT  PT Frequency: 4 times per week  PT Discharge Recommendations: Moderate intensity level of continued care  PT Recommended Transfer Status: Assist x1  PT - OK to Discharge: Yes (per PT POC)      General Visit Information:   PT  Visit  PT Received On: 07/15/24  General  Reason for Referral: 63 y.o. female presenting with COPD exacerbation and SOB.  Referred By: MD Barbi  Prior to Session Communication: Bedside nurse  Patient Position Received:  (Seated EOB, alarm off.)  Preferred Learning Style: auditory, verbal, kinesthetic  General Comment: Pt stating she was feeling very sick - nauseated and dizzy.  Agreeable to ther ex though limited by symptoms.    Subjective   Precautions:  Precautions  Medical Precautions: Abdominal precautions, Fall precautions  Post-Surgical Precautions: Abdominal surgery  precautions  Precautions Comment: wound vac  Vital Signs:  Vital Signs  Heart Rate: (!) 121  Heart Rate Source: Monitor  SpO2: 100 %  BP: 111/69  MAP (mmHg): 78  BP Location: Right arm  BP Method: Automatic  Patient Position: Sitting    Objective   Pain:  Pain Assessment  Pain Assessment: 0-10  0-10 (Numeric) Pain Score: 10 - Worst possible pain  Pain Type: Acute pain  Pain Location: Back  Pain Orientation: Lower  Pain Frequency: Constant/continuous  Patient's Stated Pain Goal: No pain  Pain Interventions: Medication (See MAR)  Cognition:  Cognition  Overall Cognitive Status: Within Functional Limits  Orientation Level: Oriented X4  Insight: Mild  Coordination:  Movements are Fluid and Coordinated: Yes  Postural Control:  Postural Control  Postural Control: Within Functional Limits  Static Sitting Balance  Static Sitting-Balance Support: Feet supported, No upper extremity supported  Static Sitting-Level of Assistance: Independent  Static Sitting-Comment/Number of Minutes: EOB sitting  Dynamic Sitting Balance  Dynamic Sitting-Balance Support: Feet supported, Left upper extremity supported  Dynamic Sitting-Balance: Lateral lean, Forward lean, Reaching for objects, Reaching across midline, Reaching for weighted objects  Dynamic Sitting-Comments: SBA  Static Standing Balance  Static Standing-Comment/Number of Minutes: Pt not agreeable to/tolerating standing activities this session.    Activity Tolerance:  Activity Tolerance  Endurance: Tolerates 10 - 20 min exercise with multiple rests  Treatments:  Therapeutic Exercise  Therapeutic Exercise Performed: Yes  Therapeutic Exercise Activity 1: Pt instructed in seated ther ex to increase strength and endurance to achieve funcitonal goals. HF, LAQ,  AP, all 2 x 5 reps each with rest needed between. Extended rest needed between sets to complete all. Cues given to improve technique for max benefits.        Outcome Measures:  [facility] Basic Mobility  Turning from your back to your  side while in a flat bed without using bedrails: None  Moving from lying on your back to sitting on the side of a flat bed without using bedrails: A little  Moving to and from bed to chair (including a wheelchair): A little  Standing up from a chair using your arms (e.g. wheelchair or bedside chair): A little  To walk in hospital room: A lot  Climbing 3-5 steps with railing: A lot  Basic Mobility - Total Score: 17    Education Documentation  Precautions, taught by Jeremy King PTA at 7/15/2024  2:02 PM.  Learner: Patient  Readiness: Acceptance  Method: Explanation, Demonstration  Response: Demonstrated Understanding    Body Mechanics, taught by Jeremy King PTA at 7/15/2024  2:02 PM.  Learner: Patient  Readiness: Acceptance  Method: Explanation, Demonstration  Response: Demonstrated Understanding    Mobility Training, taught by Jeremy King PTA at 7/15/2024  2:02 PM.  Learner: Patient  Readiness: Acceptance  Method: Explanation, Demonstration  Response: Demonstrated Understanding    Education Comments  No comments found.        OP EDUCATION:       Encounter Problems       Encounter Problems (Active)       Balance       Pt will tolerate 10+ mins dynamic standing balance activities with CGA or less and no LOB using a RW.  (Not Progressing)       Start:  06/23/24    Expected End:  07/13/24               PT Transfers       STG - Patient will perform bed mobility with SBA or less using log roll technique.  (Met)       Start:  06/23/24    Expected End:  07/07/24    Resolved:  06/28/24         STG - Patient will transfer sit to and from stand mod I with a RW.  (Not Progressing)       Start:  06/23/24    Expected End:  07/13/24               Pain - Adult          Safety       LTG - Patient will adhere to hip precautions during ADL's and transfers (Progressing)       Start:  06/21/24    Expected End:  07/13/24            LTG - Patient will demonstrate safety requirements appropriate to situation/environment  (Progressing)       Start:  06/21/24    Expected End:  07/13/24            LTG - Patient will utilize safety techniques (Progressing)       Start:  06/21/24    Expected End:  07/13/24            STG - Patient locks brakes on wheelchair (Progressing)       Start:  06/21/24    Expected End:  07/13/24            STG - Patient uses call light consistently to request assistance with transfers (Progressing)       Start:  06/21/24    Expected End:  07/13/24            STG - Patient uses gait belt during all transfers (Progressing)       Start:  06/21/24    Expected End:  07/13/24            Goal 1 (Progressing)       Start:  06/21/24    Expected End:  07/13/24            Goal 2 (Progressing)       Start:  06/21/24    Expected End:  07/13/24            Goal 3 (Progressing)       Start:  06/21/24    Expected End:  07/13/24                  Encounter Problems (Resolved)       Mobility       STG - Patient will ambulate 50 ft with CGA and a Rw with minimal SOB or fatigue.  (Met)       Start:  06/23/24    Expected End:  07/07/24    Resolved:  06/28/24

## 2024-07-15 NOTE — PROGRESS NOTES
"Music Therapy Note    Fernando Cuevas was referred by SIN Sheldon Session  Referral Type: New referral this admission  Visit Type: New visit  Session Start Time: 1455  Session End Time: 1523  Intervention Delivery: In-person  Conflict of Service: None  Number of family members present: 1     Pre-assessment  Unable to Assess Reason: Patient declined to answer  Pain Score: 10 - Worst possible pain  Stress Level (0-10): 10  Anxiety Level (0-10): 10  Mood/Affect: Calm, Cooperative  Verbalized Emotional State: Anxiety         Treatment/Interventions  Areas of Focus: Anxiety reduction, Coping  Music Therapy Interventions: Live music listening, Iso-principle    Post-assessment  Unable to Assess Reason: Patient declined to answer  0-10 (Numeric) Pain Score: 8  Stress Level (0-10): 7  Anxiety Level (0-10): 7  Mood/Affect: Calm, Cooperative  Verbalized Emotional State: Acceptance, Gratitude  Continue Visiting: Yes  Total Session Time (min): 28 minutes    Narrative  Assessment Detail: Pt was sitting up in chair, alert and welcoming to the MT. Upon assessment pt reported struggling from being in the hospital \"for two months\" but expressed that she attempts to maintain a positive and hopeful mindset that each day will be better. MT provided supportive listening, introduced services, and offered music interventions to assist with anxiety reduction. Pt was agreeable to session and stated preference for 60s music.  Plan: MT engaged pt in live music listening to reduce anxiety and enhance coping.  Intervention: Pt was invited to refocus her attention onto the music while the MT facilitated live preferred song material at bedside via piano and voice. MT adapted musical elements such as tempo, feel, and lyrical content to reflect pt's energetic and emotional state, then gradually shifting these qualities to promote feelings of hope and positivity. Pt was given multiple choice-making opportunities to control session " elements such as instrumentation and song selection.  Evaluation: Pt responded to the music by listening quietly, intermittently closing her eyes with relaxed affect. Between songs pt displayed brightened affect as she reminisced through peaceful memories of fishing throughout her life. MT supported pt's expressions verbally and through the continuation of the music. Afterwards pt expressed gratitude for the visit.  Follow-up: Pt agreeable to f/u; MT will follow as able.    Education Documentation  Coping Strategies, taught by GUERA Shin at 7/15/2024  3:52 PM.  Learner: Patient  Readiness: Acceptance  Method: Explanation, Demonstration  Response: Verbalizes Understanding    Focal Points, taught by GUERA Shin at 7/15/2024  3:52 PM.  Learner: Patient  Readiness: Acceptance  Method: Explanation, Demonstration  Response: Verbalizes Understanding

## 2024-07-15 NOTE — PROGRESS NOTES
07/15/24 1250   Current Planned Discharge Disposition   Current Planned Discharge Disposition SNF     Auth is still pending  for [facility]  [facility]    KENNETH Bolden, ENDY          7-15-24         1407  DC time is  8:30 pm  [facility] will   have wound  vac tonight  rn report [phone]  Nurse  told  Pt is  A+Ox4, able to inform family   members on own     KENNETH Bolden, SHAWNW

## 2024-07-16 ENCOUNTER — NURSING HOME VISIT (OUTPATIENT)
Dept: POST ACUTE CARE | Facility: EXTERNAL LOCATION | Age: 64
End: 2024-07-16
Payer: COMMERCIAL

## 2024-07-16 DIAGNOSIS — R19.8 PERFORATED VISCUS: Primary | ICD-10-CM

## 2024-07-16 DIAGNOSIS — I10 ESSENTIAL HYPERTENSION: Chronic | ICD-10-CM

## 2024-07-16 DIAGNOSIS — D50.0 IRON DEFICIENCY ANEMIA DUE TO CHRONIC BLOOD LOSS: ICD-10-CM

## 2024-07-16 DIAGNOSIS — J44.1 COPD EXACERBATION (MULTI): ICD-10-CM

## 2024-07-16 DIAGNOSIS — N39.41 URGE INCONTINENCE OF URINE: ICD-10-CM

## 2024-07-16 DIAGNOSIS — C34.31 MALIGNANT NEOPLASM OF LOWER LOBE OF RIGHT LUNG (MULTI): ICD-10-CM

## 2024-07-16 DIAGNOSIS — R00.0 SINUS TACHYCARDIA BY ELECTROCARDIOGRAM: ICD-10-CM

## 2024-07-16 DIAGNOSIS — E78.2 MIXED HYPERLIPIDEMIA: ICD-10-CM

## 2024-07-16 DIAGNOSIS — K21.9 GASTROESOPHAGEAL REFLUX DISEASE WITHOUT ESOPHAGITIS: Chronic | ICD-10-CM

## 2024-07-16 DIAGNOSIS — F41.0 PANIC DISORDER: Chronic | ICD-10-CM

## 2024-07-16 LAB
ATRIAL RATE: 114 BPM
P AXIS: 88 DEGREES
P OFFSET: 198 MS
P ONSET: 154 MS
PR INTERVAL: 140 MS
Q ONSET: 224 MS
QRS COUNT: 19 BEATS
QRS DURATION: 76 MS
QT INTERVAL: 306 MS
QTC CALCULATION(BAZETT): 421 MS
QTC FREDERICIA: 378 MS
R AXIS: 83 DEGREES
T AXIS: 75 DEGREES
T OFFSET: 377 MS
VENTRICULAR RATE: 114 BPM

## 2024-07-16 PROCEDURE — 99306 1ST NF CARE HIGH MDM 50: CPT | Performed by: FAMILY MEDICINE

## 2024-07-16 NOTE — DISCHARGE SUMMARY
Discharge Diagnosis  Perforated viscus    Issues Requiring Follow-Up  PCP follow up in 1-2 weeks    Discharge Meds     Your medication list        START taking these medications        Instructions Last Dose Given Next Dose Due   apixaban 5 mg tablet  Commonly known as: Eliquis      Take 1 tablet (5 mg) by mouth 2 times a day.       mirtazapine 15 mg tablet  Commonly known as: Remeron      Take 1 tablet (15 mg) by mouth once daily at bedtime.              CONTINUE taking these medications        Instructions Last Dose Given Next Dose Due   acetaminophen-codeine 300-30 mg tablet  Commonly known as: Tylenol w/ Codeine #3      Take 1 tablet by mouth every 6 hours if needed for severe pain (7 - 10).       albuterol 90 mcg/actuation inhaler           alum-mag hydroxide-simeth 200-200-20 mg/5 mL oral suspension  Commonly known as: Mylanta           ammonium lactate 12 % lotion  Commonly known as: Lac-Hydrin           aspirin 81 mg chewable tablet           benzonatate 100 mg capsule  Commonly known as: Tessalon      Take 1 capsule (100 mg) by mouth 3 times a day as needed for cough. Do not crush or chew.       busPIRone 5 mg tablet  Commonly known as: Buspar           calcium carbonate 600 mg calcium (1,500 mg) tablet           clotrimazole 1 % cream  Commonly known as: Lotrimin           diclofenac sodium 1 % gel  Commonly known as: Voltaren           dilTIAZem  mg 24 hr capsule  Commonly known as: Tiazac           fluocinonide 0.05 % cream  Commonly known as: Lidex           fluticasone propion-salmeteroL 100-50 mcg/dose diskus inhaler  Commonly known as: Advair Diskus      Inhale 1 puff 2 times a day. Rinse mouth with water after use to reduce aftertaste and incidence of candidiasis. Do not swallow.       guaiFENesin 600 mg 12 hr tablet  Commonly known as: Mucinex           ipratropium-albuteroL 0.5-2.5 mg/3 mL nebulizer solution  Commonly known as: Duo-Neb      Take 3 mL by nebulization 3 times a day.        lidocaine 5 % patch  Commonly known as: Lidoderm           metroNIDAZOLE 500 mg tablet  Commonly known as: Flagyl           mometasone-formoterol 100-5 mcg/actuation inhaler  Commonly known as: Dulera 100           montelukast 10 mg tablet  Commonly known as: Singulair           nitroglycerin 0.4 mg SL tablet  Commonly known as: Nitrostat           nortriptyline 50 mg capsule  Commonly known as: Pamelor           oxybutynin XL 5 mg 24 hr tablet  Commonly known as: Ditropan-XL           pantoprazole 40 mg EC tablet  Commonly known as: ProtoNix           Spiriva Respimat 2.5 mcg/actuation inhaler  Generic drug: tiotropium      Inhale 2 puffs once daily.       thiamine 100 mg tablet  Commonly known as: Vitamin B-1           varenicline 1 mg tablet  Commonly known as: Chantix                  STOP taking these medications      lisinopril 10 mg tablet        predniSONE 20 mg tablet  Commonly known as: Deltasone                  Where to Get Your Medications        These medications were sent to [facility]/pharmacy [address] AT [address] [address]      Phone: [phone]   apixaban 5 mg tablet  mirtazapine 15 mg tablet         Test Results Pending At Discharge  Pending Labs       Order Current Status    AFB Culture/Smear Preliminary result            Hospital Course  64 yo F PMH COPD, hypertension, leukocytosis, alcohol abuse, dyspnea, esophagitis, generalized anxiety disorder, history of cocaine abuse remotely per the patient but was positive for cocaine abuse today, depression, lung cancer, elevated troponins, hyperlipidemia, nicotine dependence, overactive bladder, spontaneous pneumothorax remotely, migraines, panic disorder, daily chronic headaches who presented for Perforated small bowel with peritonitis      Dispo: [facility]     Perforated small bowel's with peritonitis:  [facility] versus [facility]   -Culture for Candida albicans, concern for mix of gram-negative and anaerobes.  -Abx:  completed IV Zosyn, fluconazole 200 mg/day. For 10 days with stop date 7/3/24.,    -trend CBC  -Dietary following, appreciate recommendations.   -wound VAC in place  -NG tube removed  -tolerating oral intake  -discussed with ID staff 07/11/24    Continuing on 5 mg oxycodone for severe pain.     # Anemia  # Abdominal wall hematoma  -blood loss  -s/p blood transfusion  - hemoglobin at 7.4 today.      Acute DVT in the left distal brachial vein:  -Vascular upper extremity ultrasound with acute finding, non-occlusive.  -Patient initiated on full dose Lovenox therapeutic dose and 6/27.   -Patient with contrast allergy, CT angio chest requiring prep, no acute findings.    -started apixaban 07/11/24.   Continue Eliquis.  Will need follow-up.     Acute on chronic hyponatremia, resolved:  In setting of poor oral intake  -Nephrology was managing, placed on IV isotonic fluid with improvement  -Fluids will be discontinued.  -Nephrology signed off  -Will continue to trend sodium       Hypomagnesemia  -replaced     Severe malnutrition:  -Nutrition following, appreciate recommendations.      Patient is being discharged to SNF.  Advised her to follow with her PCP as outpatient in 1 to 2 weeks.      Discharge time spent more than 30 minutes.    Pertinent Physical Exam At Time of Discharge  Physical Exam  Constitutional:       Appearance: Normal appearance.   Eyes:      Extraocular Movements: Extraocular movements intact.      Pupils: Pupils are equal, round, and reactive to light.   Cardiovascular:      Rate and Rhythm: Normal rate and regular rhythm.      Pulses: Normal pulses.      Heart sounds: Normal heart sounds.   Pulmonary:      Effort: Pulmonary effort is normal.      Breath sounds: Normal breath sounds.   Abdominal:      General: There is distension.  soft compared to yesterday.  Tenderness over midline  incision     Comments:    with wound VAC in place over midline incision   Musculoskeletal:      Cervical back: Normal range of motion.   Neurological:      General: No focal deficit present.      Mental Status: She is alert. Mental status is at baseline.      Outpatient Follow-Up  No future appointments.      Rupa Khoury MD

## 2024-07-16 NOTE — LETTER
Patient: Fernando Cuevas  : 1960    Encounter Date: 2024    Chief Complaint/HPI:  Anemia- possible GIB in setting of eliquis use, reports melena prior to admission. GI consulted. EGD did not show any discrete bleeding lesion, however the stomach was diffusely inflamed and friable, with contact bleeding. Biopsies were obtained for H. pylori. Daily PPI. Follow up biopsy results.   AECOPD- CTA Chest with bibasilar airspace opacities slightly more consolidative in the right lung base. Procal low suggesting unlikely PNA. Aletha duonebs/Pulmicort. Improved with PO prednisone 40 mg daily, plan to taper on dc. Continue home azithromycin, mucinex, singulair.   Post op hematoma- CT showed bilobed hyperdense lesion abutting the pancreatic tail with the larger component measuring up to 1.3 cm. This is stable from CT scan of  but new when compared to CT angiogram 2016. Doubt this is new bleeding and cause of anemia.   Mild Lipase elevation- unlikely due to pancreatitis, no evidence of such on CT AP  UTI- Ucx with E Coli, continue empiric rocephin  Recent DVT- holding eliquis due to above  HTN- continue diltiazem  Anxiety/Depression-continue buspar, remeron  OAB- continue home oxybutynin  Constipation- daily miralax and senna-s bid  Recent perforated small bowel with peritonitis, s/p emergency ex laparotomy  on 24. Need OP follow up with her surgeon.   admietet dto [facility] for rehab      ROS otherwise negative aside from what was mentioned above in HPI.      Patient Active Problem List   Diagnosis   • COPD exacerbation (Multi)   • Anemia   • Anxiety   • Elevated troponin   • Adjustment reaction   • Alcohol abuse   • Dyspnea   • Dysthymic disorder   • Esophagitis   • Essential hypertension   • JASMINE (generalized anxiety disorder)   • Panic disorder   • Gastroesophageal reflux disease without esophagitis   • Habitual alcohol use   • History of cocaine use   • History of depression   • LFTs abnormal   •  Malignant neoplasm of lower lobe of right lung (Multi)   • Migraine headache   • Mixed hyperlipidemia   • Nicotine dependence   • Nodule of upper lobe of right lung   • Overactive bladder   • Panlobular emphysema (Multi)   • Paresthesia of hand   • Renal mass   • Spontaneous pneumothorax   • Sinus tachycardia by electrocardiogram   • Tobacco use disorder   • Urge incontinence of urine   • Acute exacerbation of chronic obstructive pulmonary disease (Multi)   • COPD (chronic obstructive pulmonary disease) (Multi)   • Shortness of breath   • Fall   • Head injury   • Headache   • Cocaine abuse (Multi)   • Perforated viscus   • Thrombocytosis   • Anemia, unspecified type     Past Medical History:   Diagnosis Date   • COPD (chronic obstructive pulmonary disease) (Multi)    • Depression    • DVT (deep venous thrombosis) (Multi)     bilateral upper extremities   • HTN (hypertension)    • Lung cancer (Multi)    • Migraines    • Panic disorder      Past Surgical History:   Procedure Laterality Date   •  SECTION, LOW TRANSVERSE     • COLONOSCOPY     • CT ABDOMEN PELVIS ANGIOGRAM W AND/OR WO IV CONTRAST  2016    CT ABDOMEN PELVIS ANGIOGRAM W AND/OR WO IV CONTRAST 3/22/2016 [facility] [place] EMERGENCY LEGACY   • CT ANGIO CORONARY ART WITH HEARTFLOW IF SCORE >30%  2023    CT ANGIO CORONARY ART WITH HEARTFLOW IF SCORE >30% 2023   • CYSTOSCOPY      botox injection   • ESOPHAGOGASTRODUODENOSCOPY     • EXPLORATORY LAPAROTOMY W/ BOWEL RESECTION  2024    SBR for perforation   • MR NECK ANGIO W IV CONTRAST  2021    MR NECK ANGIO W IV CONTRAST 2021     No family history on file.  Social History     Tobacco Use   • Smoking status: Some Days     Types: Cigarettes     Passive exposure: Current (3 cigarettes a day)   • Smokeless tobacco: Never   Vaping Use   • Vaping status: Never Used   Substance Use Topics   • Alcohol use: Not Currently     Comment: history of daily use   • Drug use: Not Currently     Types:  Cocaine         ALLERGIES  Allergies   Allergen Reactions   • Iodinated Contrast Media Hives     Hives to faces and neck with itching. Resolved with 50 mg benadryl, 20 mg pepcid & 60 mg prednisone.    Hives to faces and neck with itching. Resolved with 50 mg benadryl, 20 mg pepcid & 60 mg prednisone.   Hives to faces and neck with itching. Resolved with 50 mg benadryl, 20 mg pepcid & 60 mg prednisone.   • Adhesive Tape-Silicones Other         MEDICATIONS  Current Outpatient Medications   Medication Sig Dispense Refill   • acetaminophen (Tylenol Extra Strength) 500 mg tablet Take 2 tablets (1,000 mg) by mouth every 8 hours.     • albuterol 90 mcg/actuation inhaler Inhale 2 puffs every 4 hours if needed for wheezing or shortness of breath.     • ammonium lactate (Lac-Hydrin) 12 % lotion Apply topically twice a day.     • apixaban (Eliquis) 5 mg tablet Take 1 tablet (5 mg) by mouth 2 times a day. 60 tablet 1   • benzonatate (Tessalon) 100 mg capsule Take 1 capsule (100 mg) by mouth 3 times a day as needed for cough. Do not crush or chew. 20 capsule 0   • busPIRone (Buspar) 5 mg tablet Take 1 tablet (5 mg) by mouth twice a day.     • calcium carbonate (Oscal) 500 mg calcium (1,250 mg) tablet Take 1 tablet (1,250 mg) by mouth 2 times daily (morning and late afternoon).     • cefdinir (Omnicef) 300 mg capsule Take 1 capsule (300 mg) by mouth 2 times a day for 3 days. 6 capsule 0   • clotrimazole (Lotrimin) 1 % cream Apply topically 2 times a day.     • diclofenac sodium (Voltaren) 1 % gel Apply 4.5 inches (4 g) topically 4 times a day as needed.     • dilTIAZem ER (Tiazac) 240 mg 24 hr capsule Take 1 capsule (240 mg) by mouth once daily.     • fluocinonide 0.05 % cream APPLY THIN LAYER TO AFFECTED AREA TWICE A DAY AS NEEDED     • guaiFENesin (Mucinex) 600 mg 12 hr tablet Take 1 tablet (600 mg) by mouth.     • albuterol 2.5 mg /3 mL (0.083 %) nebulizer solution Take 3 mL by nebulization every 6 hours if needed for wheezing  or shortness of breath. 300 mL 0   • lidocaine (Lidoderm) 5 % patch Place 1 patch on the skin once every 24 hours.     • mirtazapine (Remeron) 15 mg tablet Take 1 tablet (15 mg) by mouth once daily at bedtime. 30 tablet 0   • mometasone-formoterol (Dulera 100) 100-5 mcg/actuation inhaler Inhale 200 mcg twice a day.     • montelukast (Singulair) 10 mg tablet Take 1 tablet (10 mg) by mouth once daily.     • oxybutynin XL (Ditropan-XL) 5 mg 24 hr tablet Take 1 tablet (5 mg) by mouth once daily. Do not crush, chew, or split.     • pantoprazole (ProtoNix) 40 mg EC tablet Take 1 tablet (40 mg) by mouth twice a day.     • polyethylene glycol (Glycolax, Miralax) 17 gram/dose powder Take 17 g by mouth once daily. Do not fill before July 28, 2024. 238 g 0   • predniSONE (Deltasone) 10 mg tablet Take 4 tablets (40 mg) by mouth once daily for 2 days, THEN 3 tablets (30 mg) once daily for 2 days, THEN 2 tablets (20 mg) once daily for 2 days, THEN 1 tablet (10 mg) once daily for 2 days. 20 tablet 0   • sennosides-docusate sodium (Sparkle-Colace) 8.6-50 mg tablet Take 1 tablet by mouth 2 times a day. 60 tablet 0   • sucralfate (Carafate) 1 gram tablet Take 1 tablet (1 g) by mouth 2 times a day. Crush and mix in luke warm water to make slurry and drink the slurry. 60 tablet 0   • thiamine 100 mg tablet Take 1 tablet (100 mg) by mouth once daily.     • tiotropium (Spiriva Respimat) 2.5 mcg/actuation inhaler Inhale 2 puffs once daily. 8 g 11   • varenicline (Chantix) 1 mg tablet Take 1 tablet (1 mg) by mouth twice a day.     • Zithromax 500 mg tablet Take 1 tablet (500 mg) by mouth once every 24 hours.       No current facility-administered medications for this visit.         PHYSICAL EXAM  tHENT:      Mouth/Throat:      Mouth: Mucous membranes are moist.      Pharynx: Oropharynx is clear.   Cardiovascular:      Rate and Rhythm: Normal rate and regular rhythm.   Pulmonary:      Effort: Pulmonary effort is normal.      Breath sounds: No  wheezing.   Abdominal:      General: There is no distension.      Palpations: Abdomen is soft.      Tenderness: There is no abdominal tenderness.   Neurological:      Mental Status: She is alert and oriented to person, place, and time.     ASSESSMENT/PLAN  Problem List Items Addressed This Visit       COPD exacerbation (Multi)    Anemia    Essential hypertension (Chronic)    Panic disorder (Chronic)    Gastroesophageal reflux disease without esophagitis (Chronic)    Malignant neoplasm of lower lobe of right lung (Multi)    Mixed hyperlipidemia    Sinus tachycardia by electrocardiogram    Urge incontinence of urine    Overview     Formatting of this note might be different from the original.   Added automatically from request for surgery [id number]         Perforated viscus - Primary       Anemia- possible GIB in setting of eliquis use, reports melena prior to admission. GI consulted. EGD did not show any discrete bleeding lesion, however the stomach was diffusely inflamed and friable, with contact bleeding. Biopsies were obtained for H. pylori. Holding asa, eliquis until tomorrow. Daily PPI. Follow up biopsy results.   AECOPD- CTA Chest with bibasilar airspace opacities slightly more consolidative in the right lung base. Procal low suggesting unlikely PNA. Aletha duonebs/Pulmicort. Continue PO prednisone 40 mg daily. Continue home azithromycin, mucinex, singulair  Post op hematoma- CT showed bilobed hyperdense lesion abutting the pancreatic tail with the larger component measuring up to 1.3 cm. This is stable from CT scan of 07/03/202 but new when compared to CT angiogram 03/22/2016. Doubt this is new bleeding and cause of anemia.   Mild Lipase elevation- unlikely due to pancreatitis, no evidence of such on CT AP  UTI- Ucx with E Coli, continue empiric rocephin  Recent DVT- holding eliquis due to above  HTN- continue diltiazem  Anxiety/Depression-continue buspar, remeron  OAB- continue home oxybutynin  Constipation- daily  miralax and senna-s bid  Recent perforated small bowel with peritonitis, s/p emergency ex laparotomy  on 6/21/24      PLAN:Reviewed orders, medications, records, and pertinent labs/x-rays from   hospital. Monitor VS, BS, O2, etc as per protocol. See written orders. PT/OT   will evaluate and start appropriate rehabilitation program. Reviewed and signed   off orders, medications,Labs, x-rays, and current diagnoses. Reviewed and   updated CPR status and any changes in Advanced directives. Continue Rehab Will   see 1-2 times weekly for next 30 days then reassess. Will always see at   resident, family , or nursing request. Discharge Planning   Time   Time Spent With Patient: 45 minutes of which greater than 50 percent was spent   counseling and or coordinating care.      Pernell Moses MD      Electronically Signed By: Pernell Moses MD   7/29/24 12:41 AM

## 2024-07-16 NOTE — CARE PLAN
The patient's goals for the shift include      The clinical goals for the shift include pt will remain safe and free from injury this shift    Problem: Pain - Adult  Goal: Verbalizes/displays adequate comfort level or baseline comfort level  Outcome: Progressing     Problem: Safety - Adult  Goal: Free from fall injury  Outcome: Progressing     Problem: Discharge Planning  Goal: Discharge to home or other facility with appropriate resources  Outcome: Progressing     Problem: Chronic Conditions and Co-morbidities  Goal: Patient's chronic conditions and co-morbidity symptoms are monitored and maintained or improved  Outcome: Progressing

## 2024-07-17 LAB
ACID FAST STN SPEC: NORMAL
MYCOBACTERIUM SPEC CULT: NORMAL

## 2024-07-18 ENCOUNTER — NURSING HOME VISIT (OUTPATIENT)
Dept: POST ACUTE CARE | Facility: EXTERNAL LOCATION | Age: 64
End: 2024-07-18
Payer: COMMERCIAL

## 2024-07-18 DIAGNOSIS — D64.9 ANEMIA, UNSPECIFIED TYPE: ICD-10-CM

## 2024-07-18 DIAGNOSIS — R10.84 GENERALIZED ABDOMINAL PAIN: ICD-10-CM

## 2024-07-18 DIAGNOSIS — J44.1 ACUTE EXACERBATION OF CHRONIC OBSTRUCTIVE PULMONARY DISEASE (MULTI): Primary | ICD-10-CM

## 2024-07-18 DIAGNOSIS — R00.0 SINUS TACHYCARDIA BY ELECTROCARDIOGRAM: ICD-10-CM

## 2024-07-18 DIAGNOSIS — K21.9 GASTROESOPHAGEAL REFLUX DISEASE WITHOUT ESOPHAGITIS: Chronic | ICD-10-CM

## 2024-07-18 DIAGNOSIS — N30.00 ACUTE CYSTITIS WITHOUT HEMATURIA: ICD-10-CM

## 2024-07-18 DIAGNOSIS — F14.10 COCAINE ABUSE (MULTI): ICD-10-CM

## 2024-07-18 DIAGNOSIS — F34.1 DYSTHYMIC DISORDER: ICD-10-CM

## 2024-07-18 DIAGNOSIS — J43.1 PANLOBULAR EMPHYSEMA (MULTI): ICD-10-CM

## 2024-07-18 DIAGNOSIS — I10 ESSENTIAL HYPERTENSION: Chronic | ICD-10-CM

## 2024-07-18 DIAGNOSIS — F41.9 ANXIETY: ICD-10-CM

## 2024-07-18 PROCEDURE — 99306 1ST NF CARE HIGH MDM 50: CPT | Performed by: FAMILY MEDICINE

## 2024-07-18 NOTE — LETTER
Patient: Fernando Cuevas  : 1960    Encounter Date: 2024    Chief Complaint/HPI:  Anemia- possible GIB in setting of eliquis use, reports melena prior to admission. GI consulted. EGD did not show any discrete bleeding lesion, however the stomach was diffusely inflamed and friable, with contact bleeding. Biopsies were obtained for H. pylori. Daily PPI. Follow up biopsy results.   AECOPD- CTA Chest with bibasilar airspace opacities slightly more consolidative in the right lung base. Procal low suggesting unlikely PNA. Aletha duonebs/Pulmicort. Improved with PO prednisone 40 mg daily, plan to taper on dc. Continue home azithromycin, mucinex, singulair.   Post op hematoma- CT showed bilobed hyperdense lesion abutting the pancreatic tail with the larger component measuring up to 1.3 cm. This is stable from CT scan of  but new when compared to CT angiogram 2016. Doubt this is new bleeding and cause of anemia.   Mild Lipase elevation- unlikely due to pancreatitis, no evidence of such on CT AP  UTI- Ucx with E Coli, continue empiric rocephin  Recent DVT- holding eliquis due to above  HTN- continue diltiazem  Anxiety/Depression-continue buspar, remeron  OAB- continue home oxybutynin  Constipation- daily miralax and senna-s bid  Recent perforated small bowel with peritonitis, s/p emergency ex laparotomy  on 24. Need OP follow up with her surgeon.   admietet dto [facility] for rehab      ROS otherwise negative aside from what was mentioned above in HPI.      Patient Active Problem List   Diagnosis   • COPD exacerbation (Multi)   • Anemia   • Anxiety   • Elevated troponin   • Adjustment reaction   • Alcohol abuse   • Dyspnea   • Dysthymic disorder   • Esophagitis   • Essential hypertension   • JASMINE (generalized anxiety disorder)   • Panic disorder   • Gastroesophageal reflux disease without esophagitis   • Habitual alcohol use   • History of cocaine use   • History of depression   • LFTs abnormal   •  Malignant neoplasm of lower lobe of right lung (Multi)   • Migraine headache   • Mixed hyperlipidemia   • Nicotine dependence   • Nodule of upper lobe of right lung   • Overactive bladder   • Panlobular emphysema (Multi)   • Paresthesia of hand   • Renal mass   • Spontaneous pneumothorax   • Sinus tachycardia by electrocardiogram   • Tobacco use disorder   • Urge incontinence of urine   • Acute exacerbation of chronic obstructive pulmonary disease (Multi)   • COPD (chronic obstructive pulmonary disease) (Multi)   • Shortness of breath   • Fall   • Head injury   • Headache   • Cocaine abuse (Multi)   • Perforated viscus   • Thrombocytosis   • Anemia, unspecified type   • Generalized abdominal pain   • Acute cystitis without hematuria     Past Medical History:   Diagnosis Date   • COPD (chronic obstructive pulmonary disease) (Multi)    • Depression    • DVT (deep venous thrombosis) (Multi)     bilateral upper extremities   • HTN (hypertension)    • Lung cancer (Multi)    • Migraines    • Panic disorder      Past Surgical History:   Procedure Laterality Date   •  SECTION, LOW TRANSVERSE     • COLONOSCOPY     • CT ABDOMEN PELVIS ANGIOGRAM W AND/OR WO IV CONTRAST  2016    CT ABDOMEN PELVIS ANGIOGRAM W AND/OR WO IV CONTRAST 3/22/2016 [facility] [place] [facility]GENCY LEGAC[facility]   • CT ANGIO CORONARY ART WITH HEARTFLOW IF SCORE >30%  2023    CT ANGIO CORONARY ART WITH HEARTFLOW IF SCORE >30% 2023   • CYSTOSCOPY      botox injection   • ESOPHAGOGASTRODUODENOSCOPY     • EXPLORATORY LAPAROTOMY W/ BOWEL RESECTION  2024    SBR for perforation   • MR NECK ANGIO W IV CONTRAST  2021    MR NECK ANGIO W IV CONTRAST 2021     No family history on file.  Social History     Tobacco Use   • Smoking status: Some Days     Types: Cigarettes     Passive exposure: Current (3 cigarettes a day)   • Smokeless tobacco: Never   Vaping Use   • Vaping status: Never Used   Substance Use Topics   • Alcohol use: Not Currently      Comment: history of daily use   • Drug use: Not Currently     Types: Cocaine         ALLERGIES  Allergies   Allergen Reactions   • Dilaudid [Hydromorphone] Itching   • Iodinated Contrast Media Hives     Hives to faces and neck with itching. Resolved with 50 mg benadryl, 20 mg pepcid & 60 mg prednisone.    Hives to faces and neck with itching. Resolved with 50 mg benadryl, 20 mg pepcid & 60 mg prednisone.   Hives to faces and neck with itching. Resolved with 50 mg benadryl, 20 mg pepcid & 60 mg prednisone.   • Adhesive Tape-Silicones Rash         MEDICATIONS  Current Outpatient Medications   Medication Sig Dispense Refill   • acetaminophen (Tylenol Extra Strength) 500 mg tablet Take 2 tablets (1,000 mg) by mouth every 8 hours.     • albuterol 90 mcg/actuation inhaler Inhale 2 puffs every 4 hours if needed for wheezing or shortness of breath.     • ammonium lactate (Lac-Hydrin) 12 % lotion Apply topically twice a day.     • apixaban (Eliquis) 5 mg tablet Take 1 tablet (5 mg) by mouth 2 times a day. 60 tablet 1   • benzonatate (Tessalon) 100 mg capsule Take 1 capsule (100 mg) by mouth 3 times a day as needed for cough. Do not crush or chew. 20 capsule 0   • busPIRone (Buspar) 5 mg tablet Take 1 tablet (5 mg) by mouth twice a day.     • calcium carbonate (Oscal) 500 mg calcium (1,250 mg) tablet Take 1 tablet (1,250 mg) by mouth 2 times daily (morning and late afternoon).     • calcium carbonate (Tums) 250 mg ([place] 100 mg) chewable split tablet Take 2 half tablet (500 mg) by mouth every 12 hours.     • clotrimazole (Lotrimin) 1 % cream Apply topically 2 times a day.     • dilTIAZem ER (Tiazac) 240 mg 24 hr capsule Take 1 capsule (240 mg) by mouth once daily.     • fluocinonide 0.05 % cream APPLY THIN LAYER TO AFFECTED AREA TWICE A DAY AS NEEDED     • albuterol 2.5 mg /3 mL (0.083 %) nebulizer solution Take 3 mL by nebulization every 6 hours if needed for wheezing or shortness of breath. 300 mL 0   • Lidocaine Pain Relief  4 % patch Place 1 patch on the skin every 24 (twenty four) hours if needed for mild pain (1 - 3).     • mirtazapine (Remeron) 15 mg tablet Take 1 tablet (15 mg) by mouth once daily at bedtime. 30 tablet 0   • mometasone-formoterol (Dulera 100) 100-5 mcg/actuation inhaler Inhale 200 mcg twice a day.     • montelukast (Singulair) 10 mg tablet Take 1 tablet (10 mg) by mouth once daily.     • oxybutynin XL (Ditropan-XL) 5 mg 24 hr tablet Take 1 tablet (5 mg) by mouth once daily. Do not crush, chew, or split.     • pantoprazole (ProtoNix) 40 mg EC tablet Take 1 tablet (40 mg) by mouth twice a day.     • polyethylene glycol (Glycolax, Miralax) 17 gram/dose powder Take 17 g by mouth once daily. Do not fill before July 28, 2024. 238 g 0   • sennosides-docusate sodium (Sparkle-Colace) 8.6-50 mg tablet Take 1 tablet by mouth 2 times a day. 60 tablet 0   • sucralfate (Carafate) 1 gram tablet Take 1 tablet (1 g) by mouth 2 times a day. Crush and mix in luke warm water to make slurry and drink the slurry. 60 tablet 0   • thiamine 100 mg tablet Take 1 tablet (100 mg) by mouth once daily.     • tiotropium (Spiriva Respimat) 2.5 mcg/actuation inhaler Inhale 2 puffs once daily. 8 g 11     No current facility-administered medications for this visit.         PHYSICAL EXAM  tHENT:      Mouth/Throat:      Mouth: Mucous membranes are moist.      Pharynx: Oropharynx is clear.   Cardiovascular:      Rate and Rhythm: Normal rate and regular rhythm.   Pulmonary:      Effort: Pulmonary effort is normal.      Breath sounds: No wheezing.   Abdominal:      General: There is no distension.      Palpations: Abdomen is soft.      Tenderness: There is no abdominal tenderness.   Neurological:      Mental Status: She is alert and oriented to person, place, and time.     ASSESSMENT/PLAN  Problem List Items Addressed This Visit       Anxiety    Dysthymic disorder    Essential hypertension (Chronic)    Gastroesophageal reflux disease without esophagitis  (Chronic)    Panlobular emphysema (Multi)    Overview     Formatting of this note might be different from the original.   Mod obst on pft         Sinus tachycardia by electrocardiogram    Acute exacerbation of chronic obstructive pulmonary disease (Multi) - Primary    Cocaine abuse (Multi)    Anemia, unspecified type    Generalized abdominal pain    Acute cystitis without hematuria         Anemia- possible GIB in setting of eliquis use, reports melena prior to admission. GI consulted. EGD did not show any discrete bleeding lesion, however the stomach was diffusely inflamed and friable, with contact bleeding. Biopsies were obtained for H. pylori. Holding asa, eliquis until tomorrow. Daily PPI. Follow up biopsy results.   AECOPD- CTA Chest with bibasilar airspace opacities slightly more consolidative in the right lung base. Procal low suggesting unlikely PNA. Aletha duonebs/Pulmicort. Continue PO prednisone 40 mg daily. Continue home azithromycin, mucinex, singulair  Post op hematoma- CT showed bilobed hyperdense lesion abutting the pancreatic tail with the larger component measuring up to 1.3 cm. This is stable from CT scan of 07/03/202 but new when compared to CT angiogram 03/22/2016. Doubt this is new bleeding and cause of anemia.   Mild Lipase elevation- unlikely due to pancreatitis, no evidence of such on CT AP  UTI- Ucx with E Coli, continue empiric rocephin  Recent DVT- holding eliquis due to above  HTN- continue diltiazem  Anxiety/Depression-continue buspar, remeron  OAB- continue home oxybutynin  Constipation- daily miralax and senna-s bid  Recent perforated small bowel with peritonitis, s/p emergency ex laparotomy  on 6/21/24      PLAN:Reviewed orders, medications, records, and pertinent labs/x-rays from   hospital. Monitor VS, BS, O2, etc as per protocol. See written orders. PT/OT   will evaluate and start appropriate rehabilitation program. Reviewed and signed   off orders, medications,Labs, x-rays, and  current diagnoses. Reviewed and   updated CPR status and any changes in Advanced directives. Continue Rehab Will   see 1-2 times weekly for next 30 days then reassess. Will always see at   resident, family , or nursing request. Discharge Planning   Time   Time Spent With Patient: 45 minutes          Electronically Signed By: Pernell Moses MD   8/11/24  5:54 PM

## 2024-07-20 ENCOUNTER — NURSING HOME VISIT (OUTPATIENT)
Dept: POST ACUTE CARE | Facility: EXTERNAL LOCATION | Age: 64
End: 2024-07-20
Payer: COMMERCIAL

## 2024-07-20 DIAGNOSIS — I10 ESSENTIAL HYPERTENSION: Chronic | ICD-10-CM

## 2024-07-20 DIAGNOSIS — R19.8 PERFORATED VISCUS: Primary | ICD-10-CM

## 2024-07-20 DIAGNOSIS — F34.1 DYSTHYMIC DISORDER: ICD-10-CM

## 2024-07-20 DIAGNOSIS — E78.2 MIXED HYPERLIPIDEMIA: ICD-10-CM

## 2024-07-20 DIAGNOSIS — R06.02 SHORTNESS OF BREATH: ICD-10-CM

## 2024-07-20 DIAGNOSIS — K20.90 ESOPHAGITIS: ICD-10-CM

## 2024-07-20 DIAGNOSIS — N30.00 ACUTE CYSTITIS WITHOUT HEMATURIA: ICD-10-CM

## 2024-07-20 DIAGNOSIS — F41.1 GAD (GENERALIZED ANXIETY DISORDER): ICD-10-CM

## 2024-07-20 DIAGNOSIS — D50.0 IRON DEFICIENCY ANEMIA DUE TO CHRONIC BLOOD LOSS: ICD-10-CM

## 2024-07-20 DIAGNOSIS — F41.0 PANIC DISORDER: Chronic | ICD-10-CM

## 2024-07-20 DIAGNOSIS — J44.1 ACUTE EXACERBATION OF CHRONIC OBSTRUCTIVE PULMONARY DISEASE (MULTI): ICD-10-CM

## 2024-07-20 DIAGNOSIS — F41.9 ANXIETY: ICD-10-CM

## 2024-07-20 DIAGNOSIS — R10.84 GENERALIZED ABDOMINAL PAIN: ICD-10-CM

## 2024-07-20 DIAGNOSIS — R00.0 SINUS TACHYCARDIA BY ELECTROCARDIOGRAM: ICD-10-CM

## 2024-07-20 PROCEDURE — 99309 SBSQ NF CARE MODERATE MDM 30: CPT | Performed by: FAMILY MEDICINE

## 2024-07-20 NOTE — LETTER
Patient: Fernando Cuevas  : 1960    Encounter Date: 2024    Chief Complaint/HPI:  Anemia- possible GIB in setting of eliquis use, reports melena prior to admission. GI consulted. EGD did not show any discrete bleeding lesion, however the stomach was diffusely inflamed and friable, with contact bleeding. Biopsies were obtained for H. pylori. Daily PPI. Follow up biopsy results.   AECOPD- CTA Chest with bibasilar airspace opacities slightly more consolidative in the right lung base. Procal low suggesting unlikely PNA. Aletha duonebs/Pulmicort. Improved with PO prednisone 40 mg daily, plan to taper on dc. Continue home azithromycin, mucinex, singulair.   Post op hematoma- CT showed bilobed hyperdense lesion abutting the pancreatic tail with the larger component measuring up to 1.3 cm. This is stable from CT scan of  but new when compared to CT angiogram 2016. Doubt this is new bleeding and cause of anemia.   Mild Lipase elevation- unlikely due to pancreatitis, no evidence of such on CT AP  UTI- Ucx with E Coli, continue empiric rocephin  Recent DVT- holding eliquis due to above  HTN- continue diltiazem  Anxiety/Depression-continue buspar, remeron  OAB- continue home oxybutynin  Constipation- daily miralax and senna-s bid  Recent perforated small bowel with peritonitis, s/p emergency ex laparotomy  on 24. Need OP follow up with her surgeon.   admietet dto [facility] for rehab      ROS otherwise negative aside from what was mentioned above in HPI.      Patient Active Problem List   Diagnosis   • COPD exacerbation (Multi)   • Anemia   • Anxiety   • Elevated troponin   • Adjustment reaction   • Alcohol abuse   • Dyspnea   • Dysthymic disorder   • Esophagitis   • Essential hypertension   • JASMINE (generalized anxiety disorder)   • Panic disorder   • Gastroesophageal reflux disease without esophagitis   • Habitual alcohol use   • History of cocaine use   • History of depression   • LFTs abnormal   •  Malignant neoplasm of lower lobe of right lung (Multi)   • Migraine headache   • Mixed hyperlipidemia   • Nicotine dependence   • Nodule of upper lobe of right lung   • Overactive bladder   • Panlobular emphysema (Multi)   • Paresthesia of hand   • Renal mass   • Spontaneous pneumothorax   • Sinus tachycardia by electrocardiogram   • Tobacco use disorder   • Urge incontinence of urine   • Acute exacerbation of chronic obstructive pulmonary disease (Multi)   • COPD (chronic obstructive pulmonary disease) (Multi)   • Shortness of breath   • Fall   • Head injury   • Headache   • Cocaine abuse (Multi)   • Perforated viscus   • Thrombocytosis   • Anemia, unspecified type   • Generalized abdominal pain   • Acute cystitis without hematuria     Past Medical History:   Diagnosis Date   • COPD (chronic obstructive pulmonary disease) (Multi)    • Depression    • DVT (deep venous thrombosis) (Multi)     bilateral upper extremities   • HTN (hypertension)    • Lung cancer (Multi)    • Migraines    • Panic disorder      Past Surgical History:   Procedure Laterality Date   •  SECTION, LOW TRANSVERSE     • COLONOSCOPY     • CT ABDOMEN PELVIS ANGIOGRAM W AND/OR WO IV CONTRAST  2016    CT ABDOMEN PELVIS ANGIOGRAM W AND/OR WO IV CONTRAST 3/22/2016 [facility] EMERGENCY LEGACY   • CT ANGIO CORONARY ART WITH HEARTFLOW IF SCORE >30%  2023    CT ANGIO CORONARY ART WITH HEARTFLOW IF SCORE >30% 2023   • CYSTOSCOPY      botox injection   • ESOPHAGOGASTRODUODENOSCOPY     • EXPLORATORY LAPAROTOMY W/ BOWEL RESECTION  2024    SBR for perforation   • MR NECK ANGIO W IV CONTRAST  2021    MR NECK ANGIO W IV CONTRAST 2021     No family history on file.  Social History     Tobacco Use   • Smoking status: Some Days     Types: Cigarettes     Passive exposure: Current (3 cigarettes a day)   • Smokeless tobacco: Never   Vaping Use   • Vaping status: Never Used   Substance Use Topics   • Alcohol use: Not Currently      Comment: history of daily use   • Drug use: Not Currently     Types: Cocaine         ALLERGIES  Allergies   Allergen Reactions   • Dilaudid [Hydromorphone] Itching   • Iodinated Contrast Media Hives     Hives to faces and neck with itching. Resolved with 50 mg benadryl, 20 mg pepcid & 60 mg prednisone.    Hives to faces and neck with itching. Resolved with 50 mg benadryl, 20 mg pepcid & 60 mg prednisone.   Hives to faces and neck with itching. Resolved with 50 mg benadryl, 20 mg pepcid & 60 mg prednisone.   • Adhesive Tape-Silicones Rash         MEDICATIONS  Current Outpatient Medications   Medication Sig Dispense Refill   • acetaminophen (Tylenol Extra Strength) 500 mg tablet Take 2 tablets (1,000 mg) by mouth every 8 hours.     • albuterol 90 mcg/actuation inhaler Inhale 2 puffs every 4 hours if needed for wheezing or shortness of breath.     • ammonium lactate (Lac-Hydrin) 12 % lotion Apply topically twice a day.     • apixaban (Eliquis) 5 mg tablet Take 1 tablet (5 mg) by mouth 2 times a day. 60 tablet 1   • benzonatate (Tessalon) 100 mg capsule Take 1 capsule (100 mg) by mouth 3 times a day as needed for cough. Do not crush or chew. 20 capsule 0   • busPIRone (Buspar) 5 mg tablet Take 1 tablet (5 mg) by mouth twice a day.     • calcium carbonate (Oscal) 500 mg calcium (1,250 mg) tablet Take 1 tablet (1,250 mg) by mouth 2 times daily (morning and late afternoon).     • calcium carbonate (Tums) 250 mg ([place] 100 mg) chewable split tablet Take 2 half tablet (500 mg) by mouth every 12 hours.     • clotrimazole (Lotrimin) 1 % cream Apply topically 2 times a day.     • dilTIAZem ER (Tiazac) 240 mg 24 hr capsule Take 1 capsule (240 mg) by mouth once daily.     • fluocinonide 0.05 % cream APPLY THIN LAYER TO AFFECTED AREA TWICE A DAY AS NEEDED     • albuterol 2.5 mg /3 mL (0.083 %) nebulizer solution Take 3 mL by nebulization every 6 hours if needed for wheezing or shortness of breath. 300 mL 0   • Lidocaine Pain Relief  4 % patch Place 1 patch on the skin every 24 (twenty four) hours if needed for mild pain (1 - 3).     • mirtazapine (Remeron) 15 mg tablet Take 1 tablet (15 mg) by mouth once daily at bedtime. 30 tablet 0   • mometasone-formoterol (Dulera 100) 100-5 mcg/actuation inhaler Inhale 200 mcg twice a day.     • montelukast (Singulair) 10 mg tablet Take 1 tablet (10 mg) by mouth once daily.     • oxybutynin XL (Ditropan-XL) 5 mg 24 hr tablet Take 1 tablet (5 mg) by mouth once daily. Do not crush, chew, or split.     • pantoprazole (ProtoNix) 40 mg EC tablet Take 1 tablet (40 mg) by mouth twice a day.     • polyethylene glycol (Glycolax, Miralax) 17 gram/dose powder Take 17 g by mouth once daily. Do not fill before July 28, 2024. 238 g 0   • sennosides-docusate sodium ([place]arkle-Colace) 8.6-50 mg tablet Take 1 tablet by mouth 2 times a day. 60 tablet 0   • sucralfate (Carafate) 1 gram tablet Take 1 tablet (1 g) by mouth 2 times a day. Crush and mix in luke warm water to make slurry and drink the slurry. 60 tablet 0   • thiamine 100 mg tablet Take 1 tablet (100 mg) by mouth once daily.     • tiotropium (Spiriva Respimat) 2.5 mcg/actuation inhaler Inhale 2 puffs once daily. 8 g 11     No current facility-administered medications for this visit.         PHYSICAL EXAM  tHENT:      Mouth/Throat:      Mouth: Mucous membranes are moist.      Pharynx: Oropharynx is clear.   Cardiovascular:      Rate and Rhythm: Normal rate and regular rhythm.   Pulmonary:      Effort: Pulmonary effort is normal.      Breath sounds: No wheezing.   Abdominal:      General: There is no distension.      Palpations: Abdomen is soft.      Tenderness: There is no abdominal tenderness.   Neurological:      Mental Status: She is alert and oriented to person, place, and time.     ASSESSMENT/PLAN  Problem List Items Addressed This Visit       Anemia    Anxiety    Dysthymic disorder    Esophagitis    Essential hypertension (Chronic)    JASMINE (generalized anxiety  disorder)    Panic disorder (Chronic)    Mixed hyperlipidemia    Sinus tachycardia by electrocardiogram    Acute exacerbation of chronic obstructive pulmonary disease (Multi)    Shortness of breath    Perforated viscus - Primary    Generalized abdominal pain    Acute cystitis without hematuria           Anemia- possible GIB in setting of eliquis use, reports melena prior to admission. GI consulted. EGD did not show any discrete bleeding lesion, however the stomach was diffusely inflamed and friable, with contact bleeding. Biopsies were obtained for H. pylori. Holding asa, eliquis until tomorrow. Daily PPI. Follow up biopsy results.   AECOPD- CTA Chest with bibasilar airspace opacities slightly more consolidative in the right lung base. Procal low suggesting unlikely PNA. Aletha duonebs/Pulmicort. Continue PO prednisone 40 mg daily. Continue home azithromycin, mucinex, singulair  Post op hematoma- CT showed bilobed hyperdense lesion abutting the pancreatic tail with the larger component measuring up to 1.3 cm. This is stable from CT scan of 07/03/202 but new when compared to CT angiogram 03/22/2016. Doubt this is new bleeding and cause of anemia.   Mild Lipase elevation- unlikely due to pancreatitis, no evidence of such on CT AP  UTI- Ucx with E Coli, continue empiric rocephin  Recent DVT- holding eliquis due to above  HTN- continue diltiazem  Anxiety/Depression-continue buspar, remeron  OAB- continue home oxybutynin  Constipation- daily miralax and senna-s bid  Recent perforated small bowel with peritonitis, s/p emergency ex laparotomy  on 6/21/24      PLAN:Reviewed orders, medications, records, and pertinent labs/x-rays from   hospital. Monitor VS, BS, O2, etc as per protocol. See written orders. PT/OT   will evaluate and start appropriate rehabilitation program. Reviewed and signed   off orders, medications,Labs, x-rays, and current diagnoses. Reviewed and   updated CPR status and any changes in Advanced directives.  Continue Rehab Will   see 1-2 times weekly for next 30 days then reassess. Will always see at   resident, family , or nursing request. Discharge Planning   Time   Time Spent With Patient: 45 minutes        Electronically Signed By: Pernell Moses MD   8/11/24  6:10 PM

## 2024-07-22 ENCOUNTER — NURSING HOME VISIT (OUTPATIENT)
Dept: POST ACUTE CARE | Facility: EXTERNAL LOCATION | Age: 64
End: 2024-07-22

## 2024-07-22 ENCOUNTER — APPOINTMENT (OUTPATIENT)
Dept: SURGERY | Facility: CLINIC | Age: 64
End: 2024-07-22
Payer: COMMERCIAL

## 2024-07-22 DIAGNOSIS — F41.1 GAD (GENERALIZED ANXIETY DISORDER): ICD-10-CM

## 2024-07-22 DIAGNOSIS — I10 ESSENTIAL HYPERTENSION: Chronic | ICD-10-CM

## 2024-07-22 DIAGNOSIS — Z09 FOLLOW-UP EXAM: Primary | ICD-10-CM

## 2024-07-22 DIAGNOSIS — E78.2 MIXED HYPERLIPIDEMIA: ICD-10-CM

## 2024-07-22 DIAGNOSIS — J43.1 PANLOBULAR EMPHYSEMA (MULTI): ICD-10-CM

## 2024-07-22 DIAGNOSIS — F41.9 ANXIETY: ICD-10-CM

## 2024-07-22 DIAGNOSIS — F41.0 PANIC DISORDER: Chronic | ICD-10-CM

## 2024-07-22 DIAGNOSIS — F34.1 DYSTHYMIC DISORDER: ICD-10-CM

## 2024-07-22 DIAGNOSIS — R19.8 PERFORATED VISCUS: ICD-10-CM

## 2024-07-22 DIAGNOSIS — J44.1 ACUTE EXACERBATION OF CHRONIC OBSTRUCTIVE PULMONARY DISEASE (MULTI): ICD-10-CM

## 2024-07-22 DIAGNOSIS — R10.84 GENERALIZED ABDOMINAL PAIN: Primary | ICD-10-CM

## 2024-07-22 PROCEDURE — 99024 POSTOP FOLLOW-UP VISIT: CPT | Performed by: SURGERY

## 2024-07-22 PROCEDURE — 99309 SBSQ NF CARE MODERATE MDM 30: CPT | Performed by: FAMILY MEDICINE

## 2024-07-22 NOTE — PATIENT INSTRUCTIONS
Patient is a 63-year-old female with multiple medical comorbid conditions.  She was admitted to the hospital 6/21 with severe abdominal pain found to have perforated viscus on imaging.    I took her to surgery for exploratory laparotomy.  She was found to have perforation in her small intestine.  She underwent partial resection.  Postop course complicated by prolonged ileus.    Currently resides [facility].    She has a wound VAC in place.  On exam she looks well.  Still requiring oxygen  Abdomen soft, nontender.  We took the wound VAC down and there is a opening that measures 3 cm x 2 cm x 1/2 cm.  There is clean granulation tissue.    Feel no longer needed for ongoing wound VAC.  Would treat the wound with sterile gauze and a dry sterile dressing.    Follow-up with me in 2 months.    Wilson Liang MD  [phone]

## 2024-07-22 NOTE — Clinical Note
Patient: Fernando Cuevas  : 1960    Encounter Date: 2024    Chief Complaint/HPI:        ROS otherwise negative aside from what was mentioned above in HPI.      Patient Active Problem List   Diagnosis    COPD exacerbation (Multi)    Anemia    Anxiety    Elevated troponin    Adjustment reaction    Alcohol abuse    Dyspnea    Dysthymic disorder    Esophagitis    Essential hypertension    JASMINE (generalized anxiety disorder)    Panic disorder    Gastroesophageal reflux disease without esophagitis    Habitual alcohol use    History of cocaine use    History of depression    LFTs abnormal    Lung cancer (Multi)    Malignant neoplasm of lung (Multi)    Malignant neoplasm of lower lobe of right lung (Multi)    Migraine headache    Mixed hyperlipidemia    Nicotine dependence    Nodule of upper lobe of right lung    Overactive bladder    Panlobular emphysema (Multi)    Paresthesia of hand    Renal mass    Spontaneous pneumothorax    Sinus tachycardia by electrocardiogram    Tobacco use disorder    Urge incontinence of urine    Acute exacerbation of chronic obstructive pulmonary disease (Multi)    COPD (chronic obstructive pulmonary disease) (Multi)    Shortness of breath    Fall    Head injury    Headache    Cocaine abuse (Multi)    Perforated viscus    Thrombocytosis     Past Medical History:   Diagnosis Date    Essential (primary) hypertension 2016    Benign hypertension    Personal history of other diseases of the respiratory system     H/O emphysema    Personal history of other diseases of the respiratory system     History of chronic obstructive lung disease    Personal history of other mental and behavioral disorders     History of depression     Past Surgical History:   Procedure Laterality Date     SECTION, LOW TRANSVERSE      CT ABDOMEN PELVIS ANGIOGRAM W AND/OR WO IV CONTRAST  2016    CT ABDOMEN PELVIS ANGIOGRAM W AND/OR WO IV CONTRAST 3/22/2016 [facility] EMERGENCY LEGACY    CT ANGIO CORONARY  ART WITH HEARTFLOW IF SCORE >30%  01/05/2023    CT ANGIO CORONARY ART WITH HEARTFLOW IF SCORE >30% 1/5/2023    MR NECK ANGIO W IV CONTRAST  04/19/2021    MR NECK ANGIO W IV CONTRAST 4/19/2021     No family history on file.  Social History     Tobacco Use    Smoking status: Every Day     Types: Cigarettes     Passive exposure: Current (3 cigarettes a day)    Smokeless tobacco: Never   Vaping Use    Vaping status: Never Used   Substance Use Topics    Alcohol use: Not Currently    Drug use: Not Currently         ALLERGIES  Allergies   Allergen Reactions    Iodinated Contrast Media Hives     Hives to faces and neck with itching. Resolved with 50 mg benadryl, 20 mg pepcid & 60 mg prednisone.    Hives to faces and neck with itching. Resolved with 50 mg benadryl, 20 mg pepcid & 60 mg prednisone.   Hives to faces and neck with itching. Resolved with 50 mg benadryl, 20 mg pepcid & 60 mg prednisone.    Adhesive Tape-Silicones Other         MEDICATIONS  Current Outpatient Medications   Medication Sig Dispense Refill    acetaminophen-codeine (Tylenol w/ Codeine #3) 300-30 mg tablet Take 1 tablet by mouth every 6 hours if needed for severe pain (7 - 10). 10 tablet 0    albuterol 90 mcg/actuation inhaler Inhale 2 puffs every 4 hours if needed for wheezing or shortness of breath.      alum-mag hydroxide-simeth (Mylanta) 200-200-20 mg/5 mL oral suspension TAKE 10 ML BY MOUTH EVERY 6 HOURS AS NEEDED      ammonium lactate (Lac-Hydrin) 12 % lotion Apply topically twice a day.      apixaban (Eliquis) 5 mg tablet Take 1 tablet (5 mg) by mouth 2 times a day. 60 tablet 1    aspirin 81 mg chewable tablet Chew 1 tablet (81 mg) once daily.      benzonatate (Tessalon) 100 mg capsule Take 1 capsule (100 mg) by mouth 3 times a day as needed for cough. Do not crush or chew. 20 capsule 0    busPIRone (Buspar) 5 mg tablet Take 1 tablet (5 mg) by mouth twice a day.      calcium carbonate 600 mg calcium (1,500 mg) tablet Take 1,200 mg by mouth once  daily.      clotrimazole (Lotrimin) 1 % cream Apply topically 2 times a day.      diclofenac sodium (Voltaren) 1 % gel Apply 4.5 inches (4 g) topically 4 times a day as needed.      dilTIAZem ER (Tiazac) 240 mg 24 hr capsule Take 1 capsule (240 mg) by mouth once daily.      fluocinonide 0.05 % cream APPLY THIN LAYER TO AFFECTED AREA TWICE A DAY AS NEEDED      fluticasone propion-salmeteroL (Advair Diskus) 100-50 mcg/dose diskus inhaler Inhale 1 puff 2 times a day. Rinse mouth with water after use to reduce aftertaste and incidence of candidiasis. Do not swallow. 60 each 0    guaiFENesin (Mucinex) 600 mg 12 hr tablet Take 1 tablet (600 mg) by mouth.      ipratropium-albuteroL (Duo-Neb) 0.5-2.5 mg/3 mL nebulizer solution Take 3 mL by nebulization 3 times a day. 270 mL 0    lidocaine (Lidoderm) 5 % patch Place 1 patch on the skin once every 24 hours.      metroNIDAZOLE (Flagyl) 500 mg tablet       mirtazapine (Remeron) 15 mg tablet Take 1 tablet (15 mg) by mouth once daily at bedtime. 30 tablet 0    mometasone-formoterol (Dulera 100) 100-5 mcg/actuation inhaler Inhale 200 mcg twice a day.      montelukast (Singulair) 10 mg tablet Take 1 tablet (10 mg) by mouth once daily.      nitroglycerin (Nitrostat) 0.4 mg SL tablet Place 1 tablet (0.4 mg) under the tongue.      nortriptyline (Pamelor) 50 mg capsule Take 1 capsule (50 mg) by mouth once daily at bedtime.      oxybutynin XL (Ditropan-XL) 5 mg 24 hr tablet Take 1 tablet (5 mg) by mouth once daily. Do not crush, chew, or split.      pantoprazole (ProtoNix) 40 mg EC tablet Take 1 tablet (40 mg) by mouth twice a day.      thiamine 100 mg tablet Take 1 tablet (100 mg) by mouth once daily.      tiotropium (Spiriva Respimat) 2.5 mcg/actuation inhaler Inhale 2 puffs once daily. 8 g 11    varenicline (Chantix) 1 mg tablet Take 1 tablet (1 mg) by mouth twice a day.       No current facility-administered medications for this visit.         PHYSICAL EXAM  There were no vitals  taken for this visit.  .FLOWAMB[11   .FLOWAMB[14   There is no height or weight on file to calculate BMI.  Gen: Alert, NAD  HEENT:  PERRLA, EOMI, conjunctiva and sclera normal in appearance  Respiratory:  Lungs CTAB  Cardiovascular:  Heart RRR. No M/R/G  Neuro:  Gross motor and sensory intact  Skin:  No suspicious lesions present    ASSESSMENT/PLAN  Problem List Items Addressed This Visit    None          Pernell Moses MD      Electronically Signed By: Pernell Moses MD   7/31/24 10:09 AM

## 2024-07-22 NOTE — PROGRESS NOTES
Patient is a 63-year-old female with multiple medical comorbid conditions.  She was admitted to the hospital 6/21 with severe abdominal pain found to have perforated viscus on imaging.    I took her to surgery for emergency exploratory laparotomy  on 6/21/24.  She was found to have perforation in her small intestine (etiology unclear). She underwent partial resection.  Postop course complicated by prolonged ileus, wound infection requiring VAC.     Doing much better at this point. Currently resides [facility].    She has a wound VAC in place.  On exam she looks well.  Still requiring oxygen  Abdomen soft, nontender.  We took the wound VAC down and there is a opening that measures 3 cm x 2 cm x 0.5 cm.  There is clean granulation tissue.        Feel no longer needed for ongoing wound VAC.  Would treat the wound with Aquacel Ag once daily  and a dry sterile dressing.    Follow-up with me in 2 months.      Surgical Pathology Exam: [id number]  Order: [id number]   Collected 6/21/2024 01:34       Status: Final result       Visible to patient: No (inaccessible in [facility])       Dx: Perforated viscus    0 Result Notes       Component  Resulting Agency   FINAL DIAGNOSIS   A. SMALL INTESTINE, RESECTION:  SEGMENT OF SMALL INTESTINE WITH ULCERATIONS, TRANSMURAL DEFECT (PERFORATION) AND ACUTE SEROSITIS.  MARGINS ARE VIABLE.

## 2024-07-24 ENCOUNTER — APPOINTMENT (OUTPATIENT)
Dept: RADIOLOGY | Facility: HOSPITAL | Age: 64
End: 2024-07-24
Payer: COMMERCIAL

## 2024-07-24 ENCOUNTER — HOSPITAL ENCOUNTER (INPATIENT)
Facility: HOSPITAL | Age: 64
LOS: 2 days | Discharge: HOME | End: 2024-07-27
Attending: EMERGENCY MEDICINE | Admitting: HOSPITALIST
Payer: COMMERCIAL

## 2024-07-24 DIAGNOSIS — K59.00 CONSTIPATION, UNSPECIFIED CONSTIPATION TYPE: ICD-10-CM

## 2024-07-24 DIAGNOSIS — K29.01 GASTROINTESTINAL HEMORRHAGE ASSOCIATED WITH ACUTE GASTRITIS: ICD-10-CM

## 2024-07-24 DIAGNOSIS — D64.9 ANEMIA, UNSPECIFIED TYPE: Primary | ICD-10-CM

## 2024-07-24 DIAGNOSIS — J44.1 COPD EXACERBATION (MULTI): ICD-10-CM

## 2024-07-24 DIAGNOSIS — K85.90 ACUTE PANCREATITIS, UNSPECIFIED COMPLICATION STATUS, UNSPECIFIED PANCREATITIS TYPE (HHS-HCC): ICD-10-CM

## 2024-07-24 DIAGNOSIS — M54.9 BACK PAIN, UNSPECIFIED BACK LOCATION, UNSPECIFIED BACK PAIN LATERALITY, UNSPECIFIED CHRONICITY: ICD-10-CM

## 2024-07-24 DIAGNOSIS — N39.0 URINARY TRACT INFECTION WITHOUT HEMATURIA, SITE UNSPECIFIED: ICD-10-CM

## 2024-07-24 LAB
ABO GROUP (TYPE) IN BLOOD: NORMAL
ACID FAST STN SPEC: NORMAL
ALBUMIN SERPL BCP-MCNC: 2.5 G/DL (ref 3.4–5)
ALP SERPL-CCNC: 133 U/L (ref 33–136)
ALT SERPL W P-5'-P-CCNC: 34 U/L (ref 7–45)
ANION GAP SERPL CALC-SCNC: 13 MMOL/L (ref 10–20)
ANTIBODY SCREEN: NORMAL
APPEARANCE UR: CLEAR
AST SERPL W P-5'-P-CCNC: 28 U/L (ref 9–39)
BACTERIA #/AREA URNS AUTO: ABNORMAL /HPF
BASOPHILS # BLD AUTO: 0.02 X10*3/UL (ref 0–0.1)
BASOPHILS NFR BLD AUTO: 0.2 %
BILIRUB SERPL-MCNC: 0.2 MG/DL (ref 0–1.2)
BILIRUB UR STRIP.AUTO-MCNC: NEGATIVE MG/DL
BITE CELLS BLD QL SMEAR: PRESENT
BUN SERPL-MCNC: 11 MG/DL (ref 6–23)
CALCIUM SERPL-MCNC: 7.3 MG/DL (ref 8.6–10.3)
CARDIAC TROPONIN I PNL SERPL HS: 7 NG/L (ref 0–13)
CHLORIDE SERPL-SCNC: 103 MMOL/L (ref 98–107)
CO2 SERPL-SCNC: 24 MMOL/L (ref 21–32)
COLOR UR: ABNORMAL
CREAT SERPL-MCNC: 0.67 MG/DL (ref 0.5–1.05)
DACRYOCYTES BLD QL SMEAR: NORMAL
EGFRCR SERPLBLD CKD-EPI 2021: >90 ML/MIN/1.73M*2
EOSINOPHIL # BLD AUTO: 0.01 X10*3/UL (ref 0–0.7)
EOSINOPHIL NFR BLD AUTO: 0.1 %
ERYTHROCYTE [DISTWIDTH] IN BLOOD BY AUTOMATED COUNT: 20.8 % (ref 11.5–14.5)
GLUCOSE SERPL-MCNC: 97 MG/DL (ref 74–99)
GLUCOSE UR STRIP.AUTO-MCNC: NORMAL MG/DL
HCT VFR BLD AUTO: 21.4 % (ref 36–46)
HGB BLD-MCNC: 6.2 G/DL (ref 12–16)
IMM GRANULOCYTES # BLD AUTO: 0.07 X10*3/UL (ref 0–0.7)
IMM GRANULOCYTES NFR BLD AUTO: 0.6 % (ref 0–0.9)
INR PPP: 1.4 (ref 0.9–1.1)
KETONES UR STRIP.AUTO-MCNC: NEGATIVE MG/DL
LEUKOCYTE ESTERASE UR QL STRIP.AUTO: ABNORMAL
LIPASE SERPL-CCNC: 163 U/L (ref 9–82)
LYMPHOCYTES # BLD AUTO: 3.12 X10*3/UL (ref 1.2–4.8)
LYMPHOCYTES NFR BLD AUTO: 27.6 %
MCH RBC QN AUTO: 25.8 PG (ref 26–34)
MCHC RBC AUTO-ENTMCNC: 29 G/DL (ref 32–36)
MCV RBC AUTO: 89 FL (ref 80–100)
MONOCYTES # BLD AUTO: 1.53 X10*3/UL (ref 0.1–1)
MONOCYTES NFR BLD AUTO: 13.5 %
MRSA DNA SPEC QL NAA+PROBE: NOT DETECTED
MUCOUS THREADS #/AREA URNS AUTO: ABNORMAL /LPF
MYCOBACTERIUM SPEC CULT: NORMAL
NEUTROPHILS # BLD AUTO: 6.57 X10*3/UL (ref 1.2–7.7)
NEUTROPHILS NFR BLD AUTO: 58 %
NITRITE UR QL STRIP.AUTO: ABNORMAL
NRBC BLD-RTO: 0.4 /100 WBCS (ref 0–0)
PH UR STRIP.AUTO: 6 [PH]
PLATELET # BLD AUTO: 1029 X10*3/UL (ref 150–450)
POLYCHROMASIA BLD QL SMEAR: NORMAL
POTASSIUM SERPL-SCNC: 4 MMOL/L (ref 3.5–5.3)
PROT SERPL-MCNC: 5 G/DL (ref 6.4–8.2)
PROT UR STRIP.AUTO-MCNC: NEGATIVE MG/DL
PROTHROMBIN TIME: 16 SECONDS (ref 9.8–12.8)
RBC # BLD AUTO: 2.4 X10*6/UL (ref 4–5.2)
RBC # UR STRIP.AUTO: NEGATIVE /UL
RBC #/AREA URNS AUTO: ABNORMAL /HPF
RBC MORPH BLD: NORMAL
RH FACTOR (ANTIGEN D): NORMAL
SARS-COV-2 RNA RESP QL NAA+PROBE: NOT DETECTED
SCHISTOCYTES BLD QL SMEAR: NORMAL
SODIUM SERPL-SCNC: 136 MMOL/L (ref 136–145)
SP GR UR STRIP.AUTO: 1.01
SQUAMOUS #/AREA URNS AUTO: ABNORMAL /HPF
TARGETS BLD QL SMEAR: NORMAL
UROBILINOGEN UR STRIP.AUTO-MCNC: NORMAL MG/DL
WBC # BLD AUTO: 11.3 X10*3/UL (ref 4.4–11.3)
WBC #/AREA URNS AUTO: >50 /HPF

## 2024-07-24 PROCEDURE — 85610 PROTHROMBIN TIME: CPT | Performed by: EMERGENCY MEDICINE

## 2024-07-24 PROCEDURE — 83690 ASSAY OF LIPASE: CPT | Performed by: EMERGENCY MEDICINE

## 2024-07-24 PROCEDURE — 36415 COLL VENOUS BLD VENIPUNCTURE: CPT | Performed by: EMERGENCY MEDICINE

## 2024-07-24 PROCEDURE — 74176 CT ABD & PELVIS W/O CONTRAST: CPT | Performed by: STUDENT IN AN ORGANIZED HEALTH CARE EDUCATION/TRAINING PROGRAM

## 2024-07-24 PROCEDURE — 96367 TX/PROPH/DG ADDL SEQ IV INF: CPT

## 2024-07-24 PROCEDURE — 80053 COMPREHEN METABOLIC PANEL: CPT | Performed by: EMERGENCY MEDICINE

## 2024-07-24 PROCEDURE — 81001 URINALYSIS AUTO W/SCOPE: CPT | Performed by: EMERGENCY MEDICINE

## 2024-07-24 PROCEDURE — 86920 COMPATIBILITY TEST SPIN: CPT

## 2024-07-24 PROCEDURE — 99285 EMERGENCY DEPT VISIT HI MDM: CPT | Mod: 25

## 2024-07-24 PROCEDURE — 83605 ASSAY OF LACTIC ACID: CPT | Performed by: EMERGENCY MEDICINE

## 2024-07-24 PROCEDURE — 96365 THER/PROPH/DIAG IV INF INIT: CPT

## 2024-07-24 PROCEDURE — 84484 ASSAY OF TROPONIN QUANT: CPT | Performed by: EMERGENCY MEDICINE

## 2024-07-24 PROCEDURE — 36430 TRANSFUSION BLD/BLD COMPNT: CPT

## 2024-07-24 PROCEDURE — 87641 MR-STAPH DNA AMP PROBE: CPT | Performed by: EMERGENCY MEDICINE

## 2024-07-24 PROCEDURE — 71045 X-RAY EXAM CHEST 1 VIEW: CPT

## 2024-07-24 PROCEDURE — P9016 RBC LEUKOCYTES REDUCED: HCPCS

## 2024-07-24 PROCEDURE — 96375 TX/PRO/DX INJ NEW DRUG ADDON: CPT

## 2024-07-24 PROCEDURE — 74176 CT ABD & PELVIS W/O CONTRAST: CPT

## 2024-07-24 PROCEDURE — 85025 COMPLETE CBC W/AUTO DIFF WBC: CPT | Performed by: EMERGENCY MEDICINE

## 2024-07-24 PROCEDURE — 71045 X-RAY EXAM CHEST 1 VIEW: CPT | Performed by: STUDENT IN AN ORGANIZED HEALTH CARE EDUCATION/TRAINING PROGRAM

## 2024-07-24 PROCEDURE — 86901 BLOOD TYPING SEROLOGIC RH(D): CPT | Performed by: EMERGENCY MEDICINE

## 2024-07-24 PROCEDURE — 87635 SARS-COV-2 COVID-19 AMP PRB: CPT | Performed by: EMERGENCY MEDICINE

## 2024-07-24 PROCEDURE — 87040 BLOOD CULTURE FOR BACTERIA: CPT | Mod: AHULAB | Performed by: EMERGENCY MEDICINE

## 2024-07-24 PROCEDURE — 87086 URINE CULTURE/COLONY COUNT: CPT | Mod: AHULAB | Performed by: EMERGENCY MEDICINE

## 2024-07-24 PROCEDURE — 2500000004 HC RX 250 GENERAL PHARMACY W/ HCPCS (ALT 636 FOR OP/ED): Performed by: EMERGENCY MEDICINE

## 2024-07-24 RX ORDER — DIPHENHYDRAMINE HYDROCHLORIDE 50 MG/ML
50 INJECTION INTRAMUSCULAR; INTRAVENOUS ONCE
Status: COMPLETED | OUTPATIENT
Start: 2024-07-25 | End: 2024-07-25

## 2024-07-24 ASSESSMENT — COLUMBIA-SUICIDE SEVERITY RATING SCALE - C-SSRS
2. HAVE YOU ACTUALLY HAD ANY THOUGHTS OF KILLING YOURSELF?: NO
1. IN THE PAST MONTH, HAVE YOU WISHED YOU WERE DEAD OR WISHED YOU COULD GO TO SLEEP AND NOT WAKE UP?: NO
6. HAVE YOU EVER DONE ANYTHING, STARTED TO DO ANYTHING, OR PREPARED TO DO ANYTHING TO END YOUR LIFE?: NO

## 2024-07-24 ASSESSMENT — PAIN DESCRIPTION - LOCATION: LOCATION: ABDOMEN

## 2024-07-24 ASSESSMENT — PAIN - FUNCTIONAL ASSESSMENT: PAIN_FUNCTIONAL_ASSESSMENT: 0-10

## 2024-07-24 ASSESSMENT — PAIN DESCRIPTION - PAIN TYPE: TYPE: ACUTE PAIN

## 2024-07-24 ASSESSMENT — PAIN SCALES - GENERAL: PAINLEVEL_OUTOF10: 8

## 2024-07-24 ASSESSMENT — PAIN DESCRIPTION - ONSET: ONSET: ONGOING

## 2024-07-24 ASSESSMENT — PAIN DESCRIPTION - ORIENTATION: ORIENTATION: RIGHT;LOWER

## 2024-07-24 ASSESSMENT — PAIN DESCRIPTION - FREQUENCY: FREQUENCY: CONSTANT/CONTINUOUS

## 2024-07-24 ASSESSMENT — PAIN DESCRIPTION - DESCRIPTORS: DESCRIPTORS: ACHING;TENDER

## 2024-07-24 ASSESSMENT — PAIN DESCRIPTION - PROGRESSION: CLINICAL_PROGRESSION: NOT CHANGED

## 2024-07-24 NOTE — ED PROVIDER NOTES
History/Exam limitations: none.   Additional history was obtained from patient and past medical records.          HPI:    Fernando Cuveas is a 63 y.o. female PMH COPD, hypertension, leukocytosis, alcohol use, esophagitis, dyspepsia, anxiety, prior cocaine use presenting for evaluation of anemia on outpatient labs.  Patient states that today she had development of worsening abdominal pain and chronic lower back discomfort.  States that she had similar symptoms when she was here in the hospital previously.  States that she has a lidocaine patch on from the facility for chronic low back pain.  Denies any tearing type sensation from the abdomen to the back.  No fever or chills.  Reportedly had a hemoglobin this morning 6.4.  1 week ago was 8.1.  Denies any black or bloody stools.  States that she has a worsening cough productive of yellowish sputum.  States that she felt fever and chills earlier today.  No headache, vision changes, chest pain, dysuria.  No vomiting.  Is on Eliquis.      Physical Exam:  ED Triage Vitals [07/24/24 1930]   Temperature Heart Rate Respirations BP   36.9 °C (98.5 °F) (!) 106 (!) 22 124/83      Pulse Ox Temp Source Heart Rate Source Patient Position   98 % Oral Monitor Lying      BP Location FiO2 (%)     Right arm --        GEN:      Alert, NAD  Eyes:       PERRL, EOMI  HENT:      NC/AT, OP clear, airway patent, MM  CV:      RRR, no MRG, no LE pitting edema, 2+ radial and pedal pulses  PULM:     CTAB, no w/r/r, easy WOB, symmetric chest rise  ABD:      Soft, mid abdominal surgical incision healing with no surrounding erythema or induration, no drainage, diffuse mild tenderness, ND, no masses, BS +, FRANK chaperoned by RN help with no stool  :       No CVA TTP  NEURO:   A&Ox3, no focal deficits    MSK:      FROM, no joint deformities or swelling, no e/o trauma, no midline CTL spine tenderness or step-offs  SKIN:       Warm and dry  PSYCH:    Appropriate mood and affect         MDM/ED Course:    Fernando Cuevas is a 63 y.o. female PMH COPD, hypertension, leukocytosis, alcohol use, esophagitis, dyspepsia, anxiety, prior cocaine use presenting for evaluation of anemia on outpatient labs.  Vitals markable for hypertension and mild tachypnea.  Exam as documented above.  Patient really had a hemoglobin of 6.4 on outpatient labs.  Is on Eliquis currently.  Patient reports worsening abdominal pain and low back pain.  On exam has diffuse abdominal tenderness,.  Medical surgical incision does appear to be healing with no active drainage.  Differential for abdominal pain includes but is not limited to SBO, incarcerated hernia, pancreatitis, viscous organ rupture, mesenteric ischemia, aortic pathology, biliary pathology, diverticulitis, appendicitis, UTI/pyelonephritis, nephrolithiasis, viral illness.  Differential for anemia includes GI bleed, FRANK performed and no stool obtained, patient denies any dark or bloody stool.  Differential includes increasing postoperative hematoma in setting of Eliquis use.  I placed labs drawn.  Troponin negative.  INR 1.4 except for DOAC use.  Lipase 163, potentially pancreatitis and could be contributing to patient's abdominal symptoms.  Lactate normal.  Patient increasingly tachycardic on my initial assessment and mildly Tachypneic, rhonchorous breath sounds, suspect pneumonia, given recent antibiotic use vital sign abnormalities sepsis alert initiated patient was covered with broad-spectrum antibiotics including vancomycin, Zosyn, azithromycin.  1 L IV fluids given.  Chemistry overall reassuring.  CBC with no leukocytosis, hemoglobin 6.2.  1 unit PRBCs to be given, patient consented for blood transfusion.  Patient has contrast allergy, CT abdomen pelvis without contrast obtained and no acute abnormality, hyperdense hematoma in the left rectus abdominis muscle noted and similar, NuSpin Galeon hernia noted with fat stranding, no herniated bowel, postsurgical anastomosis noted without  complication, new atelectasis in right lower medial lung.  Chest x-ray concerning for atelectasis versus scarring versus pneumonia.  On reassessment patient reports that she had increasing sharp abdominal/back pain very sharp and radiating up towards her chest.  Given this, plan for pretreatment with pretreatment protocol and CT angio.  Angio will also demonstrate whether any active bleeding into hematoma.  Patient care signed out to oncoming colleague at shift change pending CT imaging and ultimately hospitalization.    ED Course as of 07/26/24 1345   Wed Jul 24, 2024 2230 Patient is in the quality of her pain change and is having back pain radiating in her mid abdomen back up to her chest.  Given his concern for acute aortic pathology.  Would also benefit from contrast imaging to assess hematoma for active bleeding given Eliquis use.  Plan for pretreatment protocol. [JM]      ED Course User Index  [JM] Edilberto Padron MD         Diagnoses as of 07/26/24 1345   Anemia, unspecified type   Urinary tract infection without hematuria, site unspecified   Back pain, unspecified back location, unspecified back pain laterality, unspecified chronicity   Acute pancreatitis, unspecified complication status, unspecified pancreatitis type ([facility])         Chronic medical conditions affecting care listed in MDM. I independently reviewed imaging studies and agreed with radiology reads. I reviewed recent and relevant outside records including PCP notes, Prior discharge summaries, and prior radiology reports.    Procedure  Procedures    Diagnosis:   1.  Anemia  2.  Pneumonia  3.  Pancreatitis  4.  Abdominal pain    Dispo: Handoff in stable condition      Disclaimer: Portions of this note were dictated by speech recognition. An attempt at proof reading was made to minimize errors. Minor errors in transcription may be present.  Please call if questions.     Edilberto Padron MD  07/26/24 1346       Edilberto Padron MD  07/26/24  1925

## 2024-07-24 NOTE — PROGRESS NOTES
Chief Complaint/HPI:  Anemia- possible GIB in setting of eliquis use, reports melena prior to admission. GI consulted. EGD did not show any discrete bleeding lesion, however the stomach was diffusely inflamed and friable, with contact bleeding. Biopsies were obtained for H. pylori. Daily PPI. Follow up biopsy results.   AECOPD- CTA Chest with bibasilar airspace opacities slightly more consolidative in the right lung base. Procal low suggesting unlikely PNA. Aletha duonebs/Pulmicort. Improved with PO prednisone 40 mg daily, plan to taper on dc. Continue home azithromycin, mucinex, singulair.   Post op hematoma- CT showed bilobed hyperdense lesion abutting the pancreatic tail with the larger component measuring up to 1.3 cm. This is stable from CT scan of 07/03/202 but new when compared to CT angiogram 03/22/2016. Doubt this is new bleeding and cause of anemia.   Mild Lipase elevation- unlikely due to pancreatitis, no evidence of such on CT AP  UTI- Ucx with E Coli, continue empiric rocephin  Recent DVT- holding eliquis due to above  HTN- continue diltiazem  Anxiety/Depression-continue buspar, remeron  OAB- continue home oxybutynin  Constipation- daily miralax and senna-s bid  Recent perforated small bowel with peritonitis, s/p emergency ex laparotomy  on 6/21/24. Need OP follow up with her surgeon.   admietet dto [facility] for rehab      ROS otherwise negative aside from what was mentioned above in HPI.      Patient Active Problem List   Diagnosis    COPD exacerbation (Multi)    Anemia    Anxiety    Elevated troponin    Adjustment reaction    Alcohol abuse    Dyspnea    Dysthymic disorder    Esophagitis    Essential hypertension    JASMINE (generalized anxiety disorder)    Panic disorder    Gastroesophageal reflux disease without esophagitis    Habitual alcohol use    History of cocaine use    History of depression    LFTs abnormal    Malignant neoplasm of lower lobe of right lung (Multi)    Migraine headache    Mixed  hyperlipidemia    Nicotine dependence    Nodule of upper lobe of right lung    Overactive bladder    Panlobular emphysema (Multi)    Paresthesia of hand    Renal mass    Spontaneous pneumothorax    Sinus tachycardia by electrocardiogram    Tobacco use disorder    Urge incontinence of urine    Acute exacerbation of chronic obstructive pulmonary disease (Multi)    COPD (chronic obstructive pulmonary disease) (Multi)    Shortness of breath    Fall    Head injury    Headache    Cocaine abuse (Multi)    Perforated viscus    Thrombocytosis    Anemia, unspecified type    Generalized abdominal pain    Acute cystitis without hematuria     Past Medical History:   Diagnosis Date    COPD (chronic obstructive pulmonary disease) (Multi)     Depression     DVT (deep venous thrombosis) (Multi)     bilateral upper extremities    HTN (hypertension)     Lung cancer (Multi)     Migraines     Panic disorder      Past Surgical History:   Procedure Laterality Date     SECTION, LOW TRANSVERSE      COLONOSCOPY      CT ABDOMEN PELVIS ANGIOGRAM W AND/OR WO IV CONTRAST  2016    CT ABDOMEN PELVIS ANGIOGRAM W AND/OR WO IV CONTRAST 3/22/2016 [facility] [place] [facility]    CT ANGIO CORONARY ART WITH HEARTFLOW IF SCORE >30%  2023    CT ANGIO CORONARY ART WITH HEARTFLOW IF SCORE >30% 2023    CYSTOSCOPY      botox injection    ESOPHAGOGASTRODUODENOSCOPY      EXPLORATORY LAPAROTOMY W/ BOWEL RESECTION  2024    SBR for perforation    MR NECK ANGIO W IV CONTRAST  2021    MR NECK ANGIO W IV CONTRAST 2021     No family history on file.  Social History     Tobacco Use    Smoking status: Some Days     Types: Cigarettes     Passive exposure: Current (3 cigarettes a day)    Smokeless tobacco: Never   Vaping Use    Vaping status: Never Used   Substance Use Topics    Alcohol use: Not Currently     Comment: history of daily use    Drug use: Not Currently     Types: Cocaine         ALLERGIES  Allergies   Allergen Reactions     Dilaudid [Hydromorphone] Itching    Iodinated Contrast Media Hives     Hives to faces and neck with itching. Resolved with 50 mg benadryl, 20 mg pepcid & 60 mg prednisone.    Hives to faces and neck with itching. Resolved with 50 mg benadryl, 20 mg pepcid & 60 mg prednisone.   Hives to faces and neck with itching. Resolved with 50 mg benadryl, 20 mg pepcid & 60 mg prednisone.    Adhesive Tape-Silicones Rash         MEDICATIONS  Current Outpatient Medications   Medication Sig Dispense Refill    acetaminophen (Tylenol Extra Strength) 500 mg tablet Take 2 tablets (1,000 mg) by mouth every 8 hours.      albuterol 90 mcg/actuation inhaler Inhale 2 puffs every 4 hours if needed for wheezing or shortness of breath.      ammonium lactate (Lac-Hydrin) 12 % lotion Apply topically twice a day.      apixaban (Eliquis) 5 mg tablet Take 1 tablet (5 mg) by mouth 2 times a day. 60 tablet 1    benzonatate (Tessalon) 100 mg capsule Take 1 capsule (100 mg) by mouth 3 times a day as needed for cough. Do not crush or chew. 20 capsule 0    busPIRone (Buspar) 5 mg tablet Take 1 tablet (5 mg) by mouth twice a day.      calcium carbonate (Oscal) 500 mg calcium (1,250 mg) tablet Take 1 tablet (1,250 mg) by mouth 2 times daily (morning and late afternoon).      calcium carbonate (Tums) 250 mg ([place] 100 mg) chewable split tablet Take 2 half tablet (500 mg) by mouth every 12 hours.      clotrimazole (Lotrimin) 1 % cream Apply topically 2 times a day.      dilTIAZem ER (Tiazac) 240 mg 24 hr capsule Take 1 capsule (240 mg) by mouth once daily.      fluocinonide 0.05 % cream APPLY THIN LAYER TO AFFECTED AREA TWICE A DAY AS NEEDED      albuterol 2.5 mg /3 mL (0.083 %) nebulizer solution Take 3 mL by nebulization every 6 hours if needed for wheezing or shortness of breath. 300 mL 0    Lidocaine Pain Relief 4 % patch Place 1 patch on the skin every 24 (twenty four) hours if needed for mild pain (1 - 3).      mirtazapine (Remeron) 15 mg tablet Take 1  tablet (15 mg) by mouth once daily at bedtime. 30 tablet 0    mometasone-formoterol (Dulera 100) 100-5 mcg/actuation inhaler Inhale 200 mcg twice a day.      montelukast (Singulair) 10 mg tablet Take 1 tablet (10 mg) by mouth once daily.      oxybutynin XL (Ditropan-XL) 5 mg 24 hr tablet Take 1 tablet (5 mg) by mouth once daily. Do not crush, chew, or split.      pantoprazole (ProtoNix) 40 mg EC tablet Take 1 tablet (40 mg) by mouth twice a day.      polyethylene glycol (Glycolax, Miralax) 17 gram/dose powder Take 17 g by mouth once daily. Do not fill before July 28, 2024. 238 g 0    sennosides-docusate sodium (Sparkle-Colace) 8.6-50 mg tablet Take 1 tablet by mouth 2 times a day. 60 tablet 0    sucralfate (Carafate) 1 gram tablet Take 1 tablet (1 g) by mouth 2 times a day. Crush and mix in luke warm water to make slurry and drink the slurry. 60 tablet 0    thiamine 100 mg tablet Take 1 tablet (100 mg) by mouth once daily.      tiotropium (Spiriva Respimat) 2.5 mcg/actuation inhaler Inhale 2 puffs once daily. 8 g 11     No current facility-administered medications for this visit.         PHYSICAL EXAM  tHENT:      Mouth/Throat:      Mouth: Mucous membranes are moist.      Pharynx: Oropharynx is clear.   Cardiovascular:      Rate and Rhythm: Normal rate and regular rhythm.   Pulmonary:      Effort: Pulmonary effort is normal.      Breath sounds: No wheezing.   Abdominal:      General: There is no distension.      Palpations: Abdomen is soft.      Tenderness: There is no abdominal tenderness.   Neurological:      Mental Status: She is alert and oriented to person, place, and time.     ASSESSMENT/PLAN  Problem List Items Addressed This Visit       Anxiety    Dysthymic disorder    Essential hypertension (Chronic)    JASMINE (generalized anxiety disorder)    Panic disorder (Chronic)    Mixed hyperlipidemia    Panlobular emphysema (Multi)    Overview     Formatting of this note might be different from the original.   Mod obst on  pft         Acute exacerbation of chronic obstructive pulmonary disease (Multi)    Perforated viscus    Generalized abdominal pain - Primary             Anemia- possible GIB in setting of eliquis use, reports melena prior to admission. GI consulted. EGD did not show any discrete bleeding lesion, however the stomach was diffusely inflamed and friable, with contact bleeding. Biopsies were obtained for H. pylori. Holding asa, eliquis until tomorrow. Daily PPI. Follow up biopsy results.   AECOPD- CTA Chest with bibasilar airspace opacities slightly more consolidative in the right lung base. Procal low suggesting unlikely PNA. Aletha duonebs/Pulmicort. Continue PO prednisone 40 mg daily. Continue home azithromycin, mucinex, singulair  Post op hematoma- CT showed bilobed hyperdense lesion abutting the pancreatic tail with the larger component measuring up to 1.3 cm. This is stable from CT scan of 07/03/202 but new when compared to CT angiogram 03/22/2016. Doubt this is new bleeding and cause of anemia.   Mild Lipase elevation- unlikely due to pancreatitis, no evidence of such on CT AP  UTI- Ucx with E Coli, continue empiric rocephin  Recent DVT- holding eliquis due to above  HTN- continue diltiazem  Anxiety/Depression-continue buspar, remeron  OAB- continue home oxybutynin  Constipation- daily miralax and senna-s bid  Recent perforated small bowel with peritonitis, s/p emergency ex laparotomy  on 6/21/24      PLAN:Reviewed orders, medications, records, and pertinent labs/x-rays from   hospital. Monitor VS, BS, O2, etc as per protocol. See written orders. PT/OT   will evaluate and start appropriate rehabilitation program. Reviewed and signed   off orders, medications,Labs, x-rays, and current diagnoses. Reviewed and   updated CPR status and any changes in Advanced directives. Continue Rehab Will   see 1-2 times weekly for next 30 days then reassess. Will always see at   resident, family , or nursing request. Discharge  Planning   Time   Time Spent With Patient: 45 minutes

## 2024-07-24 NOTE — PROGRESS NOTES
Chief Complaint/HPI:  Anemia- possible GIB in setting of eliquis use, reports melena prior to admission. GI consulted. EGD did not show any discrete bleeding lesion, however the stomach was diffusely inflamed and friable, with contact bleeding. Biopsies were obtained for H. pylori. Daily PPI. Follow up biopsy results.   AECOPD- CTA Chest with bibasilar airspace opacities slightly more consolidative in the right lung base. Procal low suggesting unlikely PNA. Aletha duonebs/Pulmicort. Improved with PO prednisone 40 mg daily, plan to taper on dc. Continue home azithromycin, mucinex, singulair.   Post op hematoma- CT showed bilobed hyperdense lesion abutting the pancreatic tail with the larger component measuring up to 1.3 cm. This is stable from CT scan of 07/03/202 but new when compared to CT angiogram 03/22/2016. Doubt this is new bleeding and cause of anemia.   Mild Lipase elevation- unlikely due to pancreatitis, no evidence of such on CT AP  UTI- Ucx with E Coli, continue empiric rocephin  Recent DVT- holding eliquis due to above  HTN- continue diltiazem  Anxiety/Depression-continue buspar, remeron  OAB- continue home oxybutynin  Constipation- daily miralax and senna-s bid  Recent perforated small bowel with peritonitis, s/p emergency ex laparotomy  on 6/21/24. Need OP follow up with her surgeon.   admietet dto [facility] for rehab      ROS otherwise negative aside from what was mentioned above in HPI.      Patient Active Problem List   Diagnosis    COPD exacerbation (Multi)    Anemia    Anxiety    Elevated troponin    Adjustment reaction    Alcohol abuse    Dyspnea    Dysthymic disorder    Esophagitis    Essential hypertension    JASMINE (generalized anxiety disorder)    Panic disorder    Gastroesophageal reflux disease without esophagitis    Habitual alcohol use    History of cocaine use    History of depression    LFTs abnormal    Malignant neoplasm of lower lobe of right lung (Multi)    Migraine headache    Mixed  hyperlipidemia    Nicotine dependence    Nodule of upper lobe of right lung    Overactive bladder    Panlobular emphysema (Multi)    Paresthesia of hand    Renal mass    Spontaneous pneumothorax    Sinus tachycardia by electrocardiogram    Tobacco use disorder    Urge incontinence of urine    Acute exacerbation of chronic obstructive pulmonary disease (Multi)    COPD (chronic obstructive pulmonary disease) (Multi)    Shortness of breath    Fall    Head injury    Headache    Cocaine abuse (Multi)    Perforated viscus    Thrombocytosis    Anemia, unspecified type    Generalized abdominal pain    Acute cystitis without hematuria     Past Medical History:   Diagnosis Date    COPD (chronic obstructive pulmonary disease) (Multi)     Depression     DVT (deep venous thrombosis) (Multi)     bilateral upper extremities    HTN (hypertension)     Lung cancer (Multi)     Migraines     Panic disorder      Past Surgical History:   Procedure Laterality Date     SECTION, LOW TRANSVERSE      COLONOSCOPY      CT ABDOMEN PELVIS ANGIOGRAM W AND/OR WO IV CONTRAST  2016    CT ABDOMEN PELVIS ANGIOGRAM W AND/OR WO IV CONTRAST 3/22/2016 [facility] [place] EMERGENCY LEGACY    CT ANGIO CORONARY ART WITH HEARTFLOW IF SCORE >30%  2023    CT ANGIO CORONARY ART WITH HEARTFLOW IF SCORE >30% 2023    CYSTOSCOPY      botox injection    ESOPHAGOGASTRODUODENOSCOPY      EXPLORATORY LAPAROTOMY W/ BOWEL RESECTION  2024    SBR for perforation    MR NECK ANGIO W IV CONTRAST  2021    MR NECK ANGIO W IV CONTRAST 2021     No family history on file.  Social History     Tobacco Use    Smoking status: Some Days     Types: Cigarettes     Passive exposure: Current (3 cigarettes a day)    Smokeless tobacco: Never   Vaping Use    Vaping status: Never Used   Substance Use Topics    Alcohol use: Not Currently     Comment: history of daily use    Drug use: Not Currently     Types: Cocaine         ALLERGIES  Allergies   Allergen Reactions     Dilaudid [Hydromorphone] Itching    Iodinated Contrast Media Hives     Hives to faces and neck with itching. Resolved with 50 mg benadryl, 20 mg pepcid & 60 mg prednisone.    Hives to faces and neck with itching. Resolved with 50 mg benadryl, 20 mg pepcid & 60 mg prednisone.   Hives to faces and neck with itching. Resolved with 50 mg benadryl, 20 mg pepcid & 60 mg prednisone.    Adhesive Tape-Silicones Rash         MEDICATIONS  Current Outpatient Medications   Medication Sig Dispense Refill    acetaminophen (Tylenol Extra Strength) 500 mg tablet Take 2 tablets (1,000 mg) by mouth every 8 hours.      albuterol 90 mcg/actuation inhaler Inhale 2 puffs every 4 hours if needed for wheezing or shortness of breath.      ammonium lactate (Lac-Hydrin) 12 % lotion Apply topically twice a day.      apixaban (Eliquis) 5 mg tablet Take 1 tablet (5 mg) by mouth 2 times a day. 60 tablet 1    benzonatate (Tessalon) 100 mg capsule Take 1 capsule (100 mg) by mouth 3 times a day as needed for cough. Do not crush or chew. 20 capsule 0    busPIRone (Buspar) 5 mg tablet Take 1 tablet (5 mg) by mouth twice a day.      calcium carbonate (Oscal) 500 mg calcium (1,250 mg) tablet Take 1 tablet (1,250 mg) by mouth 2 times daily (morning and late afternoon).      calcium carbonate (Tums) 250 mg (Craig 100 mg) chewable split tablet Take 2 half tablet (500 mg) by mouth every 12 hours.      clotrimazole (Lotrimin) 1 % cream Apply topically 2 times a day.      dilTIAZem ER (Tiazac) 240 mg 24 hr capsule Take 1 capsule (240 mg) by mouth once daily.      fluocinonide 0.05 % cream APPLY THIN LAYER TO AFFECTED AREA TWICE A DAY AS NEEDED      albuterol 2.5 mg /3 mL (0.083 %) nebulizer solution Take 3 mL by nebulization every 6 hours if needed for wheezing or shortness of breath. 300 mL 0    Lidocaine Pain Relief 4 % patch Place 1 patch on the skin every 24 (twenty four) hours if needed for mild pain (1 - 3).      mirtazapine (Remeron) 15 mg tablet Take 1  tablet (15 mg) by mouth once daily at bedtime. 30 tablet 0    mometasone-formoterol (Dulera 100) 100-5 mcg/actuation inhaler Inhale 200 mcg twice a day.      montelukast (Singulair) 10 mg tablet Take 1 tablet (10 mg) by mouth once daily.      oxybutynin XL (Ditropan-XL) 5 mg 24 hr tablet Take 1 tablet (5 mg) by mouth once daily. Do not crush, chew, or split.      pantoprazole (ProtoNix) 40 mg EC tablet Take 1 tablet (40 mg) by mouth twice a day.      polyethylene glycol (Glycolax, Miralax) 17 gram/dose powder Take 17 g by mouth once daily. Do not fill before July 28, 2024. 238 g 0    sennosides-docusate sodium (Sparkle-Colace) 8.6-50 mg tablet Take 1 tablet by mouth 2 times a day. 60 tablet 0    sucralfate (Carafate) 1 gram tablet Take 1 tablet (1 g) by mouth 2 times a day. Crush and mix in luke warm water to make slurry and drink the slurry. 60 tablet 0    thiamine 100 mg tablet Take 1 tablet (100 mg) by mouth once daily.      tiotropium (Spiriva Respimat) 2.5 mcg/actuation inhaler Inhale 2 puffs once daily. 8 g 11     No current facility-administered medications for this visit.         PHYSICAL EXAM  tHENT:      Mouth/Throat:      Mouth: Mucous membranes are moist.      Pharynx: Oropharynx is clear.   Cardiovascular:      Rate and Rhythm: Normal rate and regular rhythm.   Pulmonary:      Effort: Pulmonary effort is normal.      Breath sounds: No wheezing.   Abdominal:      General: There is no distension.      Palpations: Abdomen is soft.      Tenderness: There is no abdominal tenderness.   Neurological:      Mental Status: She is alert and oriented to person, place, and time.     ASSESSMENT/PLAN  Problem List Items Addressed This Visit       Anemia    Anxiety    Dysthymic disorder    Esophagitis    Essential hypertension (Chronic)    JASMINE (generalized anxiety disorder)    Panic disorder (Chronic)    Mixed hyperlipidemia    Sinus tachycardia by electrocardiogram    Acute exacerbation of chronic obstructive pulmonary  disease (Multi)    Shortness of breath    Perforated viscus - Primary    Generalized abdominal pain    Acute cystitis without hematuria           Anemia- possible GIB in setting of eliquis use, reports melena prior to admission. GI consulted. EGD did not show any discrete bleeding lesion, however the stomach was diffusely inflamed and friable, with contact bleeding. Biopsies were obtained for H. pylori. Holding asa, eliquis until tomorrow. Daily PPI. Follow up biopsy results.   AECOPD- CTA Chest with bibasilar airspace opacities slightly more consolidative in the right lung base. Procal low suggesting unlikely PNA. Aletha duonebs/Pulmicort. Continue PO prednisone 40 mg daily. Continue home azithromycin, mucinex, singulair  Post op hematoma- CT showed bilobed hyperdense lesion abutting the pancreatic tail with the larger component measuring up to 1.3 cm. This is stable from CT scan of 07/03/202 but new when compared to CT angiogram 03/22/2016. Doubt this is new bleeding and cause of anemia.   Mild Lipase elevation- unlikely due to pancreatitis, no evidence of such on CT AP  UTI- Ucx with E Coli, continue empiric rocephin  Recent DVT- holding eliquis due to above  HTN- continue diltiazem  Anxiety/Depression-continue buspar, remeron  OAB- continue home oxybutynin  Constipation- daily miralax and senna-s bid  Recent perforated small bowel with peritonitis, s/p emergency ex laparotomy  on 6/21/24      PLAN:Reviewed orders, medications, records, and pertinent labs/x-rays from   hospital. Monitor VS, BS, O2, etc as per protocol. See written orders. PT/OT   will evaluate and start appropriate rehabilitation program. Reviewed and signed   off orders, medications,Labs, x-rays, and current diagnoses. Reviewed and   updated CPR status and any changes in Advanced directives. Continue Rehab Will   see 1-2 times weekly for next 30 days then reassess. Will always see at   resident, family , or nursing request. Discharge Planning    Time   Time Spent With Patient: 45 minutes

## 2024-07-24 NOTE — ED TRIAGE NOTES
"Patient arrives to ED from SNF via EMS. As per EMS report: She was sent to the ED due to \"abnormal labs\", with a \"low H&H\" and \"an elevated WBC count\". She was admitted to the SNF following Gastrointestinal surgery S/P a \"perforated bowel\". She is also complaining of Mid-Right Lower Quadrant Abdominal pain (adjacent to the region of her surgical wound) which is an expected baseline following her procedure. EMS reports that the Patient's bandages over her surgical wound appear \"clean\" and there is no indication or complaint of bleeding/drainage. The Patient denies any N/V/D, or any bleeding. She is however coughing and has Chest congestion which she states is due to Pneumonia that has not yet been resolved. She denies any Chest pain or dyspnea. SPO2 is @ 98% in R/A.  "

## 2024-07-24 NOTE — PROGRESS NOTES
Chief Complaint/HPI:  Anemia- possible GIB in setting of eliquis use, reports melena prior to admission. GI consulted. EGD did not show any discrete bleeding lesion, however the stomach was diffusely inflamed and friable, with contact bleeding. Biopsies were obtained for H. pylori. Daily PPI. Follow up biopsy results.   AECOPD- CTA Chest with bibasilar airspace opacities slightly more consolidative in the right lung base. Procal low suggesting unlikely PNA. Aletha duonebs/Pulmicort. Improved with PO prednisone 40 mg daily, plan to taper on dc. Continue home azithromycin, mucinex, singulair.   Post op hematoma- CT showed bilobed hyperdense lesion abutting the pancreatic tail with the larger component measuring up to 1.3 cm. This is stable from CT scan of 07/03/202 but new when compared to CT angiogram 03/22/2016. Doubt this is new bleeding and cause of anemia.   Mild Lipase elevation- unlikely due to pancreatitis, no evidence of such on CT AP  UTI- Ucx with E Coli, continue empiric rocephin  Recent DVT- holding eliquis due to above  HTN- continue diltiazem  Anxiety/Depression-continue buspar, remeron  OAB- continue home oxybutynin  Constipation- daily miralax and senna-s bid  Recent perforated small bowel with peritonitis, s/p emergency ex laparotomy  on 6/21/24. Need OP follow up with her surgeon.   admietet dto [facility] for rehab      ROS otherwise negative aside from what was mentioned above in HPI.      Patient Active Problem List   Diagnosis    COPD exacerbation (Multi)    Anemia    Anxiety    Elevated troponin    Adjustment reaction    Alcohol abuse    Dyspnea    Dysthymic disorder    Esophagitis    Essential hypertension    JASMINE (generalized anxiety disorder)    Panic disorder    Gastroesophageal reflux disease without esophagitis    Habitual alcohol use    History of cocaine use    History of depression    LFTs abnormal    Malignant neoplasm of lower lobe of right lung (Multi)    Migraine headache    Mixed  hyperlipidemia    Nicotine dependence    Nodule of upper lobe of right lung    Overactive bladder    Panlobular emphysema (Multi)    Paresthesia of hand    Renal mass    Spontaneous pneumothorax    Sinus tachycardia by electrocardiogram    Tobacco use disorder    Urge incontinence of urine    Acute exacerbation of chronic obstructive pulmonary disease (Multi)    COPD (chronic obstructive pulmonary disease) (Multi)    Shortness of breath    Fall    Head injury    Headache    Cocaine abuse (Multi)    Perforated viscus    Thrombocytosis    Anemia, unspecified type     Past Medical History:   Diagnosis Date    COPD (chronic obstructive pulmonary disease) (Multi)     Depression     DVT (deep venous thrombosis) (Multi)     bilateral upper extremities    HTN (hypertension)     Lung cancer (Multi)     Migraines     Panic disorder      Past Surgical History:   Procedure Laterality Date     SECTION, LOW TRANSVERSE      COLONOSCOPY      CT ABDOMEN PELVIS ANGIOGRAM W AND/OR WO IV CONTRAST  2016    CT ABDOMEN PELVIS ANGIOGRAM W AND/OR WO IV CONTRAST 3/22/2016 [facility] [place] EMERGENCY LEGACY    CT ANGIO CORONARY ART WITH HEARTFLOW IF SCORE >30%  2023    CT ANGIO CORONARY ART WITH HEARTFLOW IF SCORE >30% 2023    CYSTOSCOPY      botox injection    ESOPHAGOGASTRODUODENOSCOPY      EXPLORATORY LAPAROTOMY W/ BOWEL RESECTION  2024    SBR for perforation    MR NECK ANGIO W IV CONTRAST  2021    MR NECK ANGIO W IV CONTRAST 2021     No family history on file.  Social History     Tobacco Use    Smoking status: Some Days     Types: Cigarettes     Passive exposure: Current (3 cigarettes a day)    Smokeless tobacco: Never   Vaping Use    Vaping status: Never Used   Substance Use Topics    Alcohol use: Not Currently     Comment: history of daily use    Drug use: Not Currently     Types: Cocaine         ALLERGIES  Allergies   Allergen Reactions    Iodinated Contrast Media Hives     Hives to faces and neck with  itching. Resolved with 50 mg benadryl, 20 mg pepcid & 60 mg prednisone.    Hives to faces and neck with itching. Resolved with 50 mg benadryl, 20 mg pepcid & 60 mg prednisone.   Hives to faces and neck with itching. Resolved with 50 mg benadryl, 20 mg pepcid & 60 mg prednisone.    Adhesive Tape-Silicones Other         MEDICATIONS  Current Outpatient Medications   Medication Sig Dispense Refill    acetaminophen (Tylenol Extra Strength) 500 mg tablet Take 2 tablets (1,000 mg) by mouth every 8 hours.      albuterol 90 mcg/actuation inhaler Inhale 2 puffs every 4 hours if needed for wheezing or shortness of breath.      ammonium lactate (Lac-Hydrin) 12 % lotion Apply topically twice a day.      apixaban (Eliquis) 5 mg tablet Take 1 tablet (5 mg) by mouth 2 times a day. 60 tablet 1    benzonatate (Tessalon) 100 mg capsule Take 1 capsule (100 mg) by mouth 3 times a day as needed for cough. Do not crush or chew. 20 capsule 0    busPIRone (Buspar) 5 mg tablet Take 1 tablet (5 mg) by mouth twice a day.      calcium carbonate (Oscal) 500 mg calcium (1,250 mg) tablet Take 1 tablet (1,250 mg) by mouth 2 times daily (morning and late afternoon).      cefdinir (Omnicef) 300 mg capsule Take 1 capsule (300 mg) by mouth 2 times a day for 3 days. 6 capsule 0    clotrimazole (Lotrimin) 1 % cream Apply topically 2 times a day.      diclofenac sodium (Voltaren) 1 % gel Apply 4.5 inches (4 g) topically 4 times a day as needed.      dilTIAZem ER (Tiazac) 240 mg 24 hr capsule Take 1 capsule (240 mg) by mouth once daily.      fluocinonide 0.05 % cream APPLY THIN LAYER TO AFFECTED AREA TWICE A DAY AS NEEDED      guaiFENesin (Mucinex) 600 mg 12 hr tablet Take 1 tablet (600 mg) by mouth.      albuterol 2.5 mg /3 mL (0.083 %) nebulizer solution Take 3 mL by nebulization every 6 hours if needed for wheezing or shortness of breath. 300 mL 0    lidocaine (Lidoderm) 5 % patch Place 1 patch on the skin once every 24 hours.      mirtazapine (Remeron)  15 mg tablet Take 1 tablet (15 mg) by mouth once daily at bedtime. 30 tablet 0    mometasone-formoterol (Dulera 100) 100-5 mcg/actuation inhaler Inhale 200 mcg twice a day.      montelukast (Singulair) 10 mg tablet Take 1 tablet (10 mg) by mouth once daily.      oxybutynin XL (Ditropan-XL) 5 mg 24 hr tablet Take 1 tablet (5 mg) by mouth once daily. Do not crush, chew, or split.      pantoprazole (ProtoNix) 40 mg EC tablet Take 1 tablet (40 mg) by mouth twice a day.      polyethylene glycol (Glycolax, Miralax) 17 gram/dose powder Take 17 g by mouth once daily. Do not fill before July 28, 2024. 238 g 0    predniSONE (Deltasone) 10 mg tablet Take 4 tablets (40 mg) by mouth once daily for 2 days, THEN 3 tablets (30 mg) once daily for 2 days, THEN 2 tablets (20 mg) once daily for 2 days, THEN 1 tablet (10 mg) once daily for 2 days. 20 tablet 0    sennosides-docusate sodium (Sparkle-Colace) 8.6-50 mg tablet Take 1 tablet by mouth 2 times a day. 60 tablet 0    sucralfate (Carafate) 1 gram tablet Take 1 tablet (1 g) by mouth 2 times a day. Crush and mix in luke warm water to make slurry and drink the slurry. 60 tablet 0    thiamine 100 mg tablet Take 1 tablet (100 mg) by mouth once daily.      tiotropium (Spiriva Respimat) 2.5 mcg/actuation inhaler Inhale 2 puffs once daily. 8 g 11    varenicline (Chantix) 1 mg tablet Take 1 tablet (1 mg) by mouth twice a day.      Zithromax 500 mg tablet Take 1 tablet (500 mg) by mouth once every 24 hours.       No current facility-administered medications for this visit.         PHYSICAL EXAM  tHENT:      Mouth/Throat:      Mouth: Mucous membranes are moist.      Pharynx: Oropharynx is clear.   Cardiovascular:      Rate and Rhythm: Normal rate and regular rhythm.   Pulmonary:      Effort: Pulmonary effort is normal.      Breath sounds: No wheezing.   Abdominal:      General: There is no distension.      Palpations: Abdomen is soft.      Tenderness: There is no abdominal tenderness.    Neurological:      Mental Status: She is alert and oriented to person, place, and time.     ASSESSMENT/PLAN  Problem List Items Addressed This Visit       COPD exacerbation (Multi)    Anemia    Essential hypertension (Chronic)    Panic disorder (Chronic)    Gastroesophageal reflux disease without esophagitis (Chronic)    Malignant neoplasm of lower lobe of right lung (Multi)    Mixed hyperlipidemia    Sinus tachycardia by electrocardiogram    Urge incontinence of urine    Overview     Formatting of this note might be different from the original.   Added automatically from request for surgery [id number]         Perforated viscus - Primary       Anemia- possible GIB in setting of eliquis use, reports melena prior to admission. GI consulted. EGD did not show any discrete bleeding lesion, however the stomach was diffusely inflamed and friable, with contact bleeding. Biopsies were obtained for H. pylori. Holding asa, eliquis until tomorrow. Daily PPI. Follow up biopsy results.   AECOPD- CTA Chest with bibasilar airspace opacities slightly more consolidative in the right lung base. Procal low suggesting unlikely PNA. Aletha duonebs/Pulmicort. Continue PO prednisone 40 mg daily. Continue home azithromycin, mucinex, singulair  Post op hematoma- CT showed bilobed hyperdense lesion abutting the pancreatic tail with the larger component measuring up to 1.3 cm. This is stable from CT scan of 07/03/202 but new when compared to CT angiogram 03/22/2016. Doubt this is new bleeding and cause of anemia.   Mild Lipase elevation- unlikely due to pancreatitis, no evidence of such on CT AP  UTI- Ucx with E Coli, continue empiric rocephin  Recent DVT- holding eliquis due to above  HTN- continue diltiazem  Anxiety/Depression-continue buspar, remeron  OAB- continue home oxybutynin  Constipation- daily miralax and senna-s bid  Recent perforated small bowel with peritonitis, s/p emergency ex laparotomy  on 6/21/24      PLAN:Reviewed orders,  medications, records, and pertinent labs/x-rays from   hospital. Monitor VS, BS, O2, etc as per protocol. See written orders. PT/OT   will evaluate and start appropriate rehabilitation program. Reviewed and signed   off orders, medications,Labs, x-rays, and current diagnoses. Reviewed and   updated CPR status and any changes in Advanced directives. Continue Rehab Will   see 1-2 times weekly for next 30 days then reassess. Will always see at   resident, family , or nursing request. Discharge Planning   Time   Time Spent With Patient: 45 minutes of which greater than 50 percent was spent   counseling and or coordinating care.      Pernell Moses MD

## 2024-07-24 NOTE — PROGRESS NOTES
Chief Complaint/HPI:  Anemia- possible GIB in setting of eliquis use, reports melena prior to admission. GI consulted. EGD did not show any discrete bleeding lesion, however the stomach was diffusely inflamed and friable, with contact bleeding. Biopsies were obtained for H. pylori. Daily PPI. Follow up biopsy results.   AECOPD- CTA Chest with bibasilar airspace opacities slightly more consolidative in the right lung base. Procal low suggesting unlikely PNA. Aletha duonebs/Pulmicort. Improved with PO prednisone 40 mg daily, plan to taper on dc. Continue home azithromycin, mucinex, singulair.   Post op hematoma- CT showed bilobed hyperdense lesion abutting the pancreatic tail with the larger component measuring up to 1.3 cm. This is stable from CT scan of 07/03/202 but new when compared to CT angiogram 03/22/2016. Doubt this is new bleeding and cause of anemia.   Mild Lipase elevation- unlikely due to pancreatitis, no evidence of such on CT AP  UTI- Ucx with E Coli, continue empiric rocephin  Recent DVT- holding eliquis due to above  HTN- continue diltiazem  Anxiety/Depression-continue buspar, remeron  OAB- continue home oxybutynin  Constipation- daily miralax and senna-s bid  Recent perforated small bowel with peritonitis, s/p emergency ex laparotomy  on 6/21/24. Need OP follow up with her surgeon.   admietet dto [facility] for rehab      ROS otherwise negative aside from what was mentioned above in HPI.      Patient Active Problem List   Diagnosis    COPD exacerbation (Multi)    Anemia    Anxiety    Elevated troponin    Adjustment reaction    Alcohol abuse    Dyspnea    Dysthymic disorder    Esophagitis    Essential hypertension    JASMINE (generalized anxiety disorder)    Panic disorder    Gastroesophageal reflux disease without esophagitis    Habitual alcohol use    History of cocaine use    History of depression    LFTs abnormal    Malignant neoplasm of lower lobe of right lung (Multi)    Migraine headache    Mixed  hyperlipidemia    Nicotine dependence    Nodule of upper lobe of right lung    Overactive bladder    Panlobular emphysema (Multi)    Paresthesia of hand    Renal mass    Spontaneous pneumothorax    Sinus tachycardia by electrocardiogram    Tobacco use disorder    Urge incontinence of urine    Acute exacerbation of chronic obstructive pulmonary disease (Multi)    COPD (chronic obstructive pulmonary disease) (Multi)    Shortness of breath    Fall    Head injury    Headache    Cocaine abuse (Multi)    Perforated viscus    Thrombocytosis    Anemia, unspecified type    Generalized abdominal pain    Acute cystitis without hematuria     Past Medical History:   Diagnosis Date    COPD (chronic obstructive pulmonary disease) (Multi)     Depression     DVT (deep venous thrombosis) (Multi)     bilateral upper extremities    HTN (hypertension)     Lung cancer (Multi)     Migraines     Panic disorder      Past Surgical History:   Procedure Laterality Date     SECTION, LOW TRANSVERSE      COLONOSCOPY      CT ABDOMEN PELVIS ANGIOGRAM W AND/OR WO IV CONTRAST  2016    CT ABDOMEN PELVIS ANGIOGRAM W AND/OR WO IV CONTRAST 3/22/2016 [facility] [place] [facility]GENCY LEGACY    CT ANGIO CORONARY ART WITH HEARTFLOW IF SCORE >30%  2023    CT ANGIO CORONARY ART WITH HEARTFLOW IF SCORE >30% 2023    CYSTOSCOPY      botox injection    ESOPHAGOGASTRODUODENOSCOPY      EXPLORATORY LAPAROTOMY W/ BOWEL RESECTION  2024    SBR for perforation    MR NECK ANGIO W IV CONTRAST  2021    MR NECK ANGIO W IV CONTRAST 2021     No family history on file.  Social History     Tobacco Use    Smoking status: Some Days     Types: Cigarettes     Passive exposure: Current (3 cigarettes a day)    Smokeless tobacco: Never   Vaping Use    Vaping status: Never Used   Substance Use Topics    Alcohol use: Not Currently     Comment: history of daily use    Drug use: Not Currently     Types: Cocaine         ALLERGIES  Allergies   Allergen Reactions     Dilaudid [Hydromorphone] Itching    Iodinated Contrast Media Hives     Hives to faces and neck with itching. Resolved with 50 mg benadryl, 20 mg pepcid & 60 mg prednisone.    Hives to faces and neck with itching. Resolved with 50 mg benadryl, 20 mg pepcid & 60 mg prednisone.   Hives to faces and neck with itching. Resolved with 50 mg benadryl, 20 mg pepcid & 60 mg prednisone.    Adhesive Tape-Silicones Rash         MEDICATIONS  Current Outpatient Medications   Medication Sig Dispense Refill    acetaminophen (Tylenol Extra Strength) 500 mg tablet Take 2 tablets (1,000 mg) by mouth every 8 hours.      albuterol 90 mcg/actuation inhaler Inhale 2 puffs every 4 hours if needed for wheezing or shortness of breath.      ammonium lactate (Lac-Hydrin) 12 % lotion Apply topically twice a day.      apixaban (Eliquis) 5 mg tablet Take 1 tablet (5 mg) by mouth 2 times a day. 60 tablet 1    benzonatate (Tessalon) 100 mg capsule Take 1 capsule (100 mg) by mouth 3 times a day as needed for cough. Do not crush or chew. 20 capsule 0    busPIRone (Buspar) 5 mg tablet Take 1 tablet (5 mg) by mouth twice a day.      calcium carbonate (Oscal) 500 mg calcium (1,250 mg) tablet Take 1 tablet (1,250 mg) by mouth 2 times daily (morning and late afternoon).      calcium carbonate (Tums) 250 mg ([place] 100 mg) chewable split tablet Take 2 half tablet (500 mg) by mouth every 12 hours.      clotrimazole (Lotrimin) 1 % cream Apply topically 2 times a day.      dilTIAZem ER (Tiazac) 240 mg 24 hr capsule Take 1 capsule (240 mg) by mouth once daily.      fluocinonide 0.05 % cream APPLY THIN LAYER TO AFFECTED AREA TWICE A DAY AS NEEDED      albuterol 2.5 mg /3 mL (0.083 %) nebulizer solution Take 3 mL by nebulization every 6 hours if needed for wheezing or shortness of breath. 300 mL 0    Lidocaine Pain Relief 4 % patch Place 1 patch on the skin every 24 (twenty four) hours if needed for mild pain (1 - 3).      mirtazapine (Remeron) 15 mg tablet Take 1  tablet (15 mg) by mouth once daily at bedtime. 30 tablet 0    mometasone-formoterol (Dulera 100) 100-5 mcg/actuation inhaler Inhale 200 mcg twice a day.      montelukast (Singulair) 10 mg tablet Take 1 tablet (10 mg) by mouth once daily.      oxybutynin XL (Ditropan-XL) 5 mg 24 hr tablet Take 1 tablet (5 mg) by mouth once daily. Do not crush, chew, or split.      pantoprazole (ProtoNix) 40 mg EC tablet Take 1 tablet (40 mg) by mouth twice a day.      polyethylene glycol (Glycolax, Miralax) 17 gram/dose powder Take 17 g by mouth once daily. Do not fill before July 28, 2024. 238 g 0    sennosides-docusate sodium (Sparkle-Colace) 8.6-50 mg tablet Take 1 tablet by mouth 2 times a day. 60 tablet 0    sucralfate (Carafate) 1 gram tablet Take 1 tablet (1 g) by mouth 2 times a day. Crush and mix in luke warm water to make slurry and drink the slurry. 60 tablet 0    thiamine 100 mg tablet Take 1 tablet (100 mg) by mouth once daily.      tiotropium (Spiriva Respimat) 2.5 mcg/actuation inhaler Inhale 2 puffs once daily. 8 g 11     No current facility-administered medications for this visit.         PHYSICAL EXAM  tHENT:      Mouth/Throat:      Mouth: Mucous membranes are moist.      Pharynx: Oropharynx is clear.   Cardiovascular:      Rate and Rhythm: Normal rate and regular rhythm.   Pulmonary:      Effort: Pulmonary effort is normal.      Breath sounds: No wheezing.   Abdominal:      General: There is no distension.      Palpations: Abdomen is soft.      Tenderness: There is no abdominal tenderness.   Neurological:      Mental Status: She is alert and oriented to person, place, and time.     ASSESSMENT/PLAN  Problem List Items Addressed This Visit       Anxiety    Dysthymic disorder    Essential hypertension (Chronic)    Gastroesophageal reflux disease without esophagitis (Chronic)    Panlobular emphysema (Multi)    Overview     Formatting of this note might be different from the original.   Mod obst on pft         Sinus  tachycardia by electrocardiogram    Acute exacerbation of chronic obstructive pulmonary disease (Multi) - Primary    Cocaine abuse (Multi)    Anemia, unspecified type    Generalized abdominal pain    Acute cystitis without hematuria         Anemia- possible GIB in setting of eliquis use, reports melena prior to admission. GI consulted. EGD did not show any discrete bleeding lesion, however the stomach was diffusely inflamed and friable, with contact bleeding. Biopsies were obtained for H. pylori. Holding asa, eliquis until tomorrow. Daily PPI. Follow up biopsy results.   AECOPD- CTA Chest with bibasilar airspace opacities slightly more consolidative in the right lung base. Procal low suggesting unlikely PNA. Aletha duonebs/Pulmicort. Continue PO prednisone 40 mg daily. Continue home azithromycin, mucinex, singulair  Post op hematoma- CT showed bilobed hyperdense lesion abutting the pancreatic tail with the larger component measuring up to 1.3 cm. This is stable from CT scan of 07/03/202 but new when compared to CT angiogram 03/22/2016. Doubt this is new bleeding and cause of anemia.   Mild Lipase elevation- unlikely due to pancreatitis, no evidence of such on CT AP  UTI- Ucx with E Coli, continue empiric rocephin  Recent DVT- holding eliquis due to above  HTN- continue diltiazem  Anxiety/Depression-continue buspar, remeron  OAB- continue home oxybutynin  Constipation- daily miralax and senna-s bid  Recent perforated small bowel with peritonitis, s/p emergency ex laparotomy  on 6/21/24      PLAN:Reviewed orders, medications, records, and pertinent labs/x-rays from   hospital. Monitor VS, BS, O2, etc as per protocol. See written orders. PT/OT   will evaluate and start appropriate rehabilitation program. Reviewed and signed   off orders, medications,Labs, x-rays, and current diagnoses. Reviewed and   updated CPR status and any changes in Advanced directives. Continue Rehab Will   see 1-2 times weekly for next 30 days then  reassess. Will always see at   resident, family , or nursing request. Discharge Planning   Time   Time Spent With Patient: 45 minutes

## 2024-07-25 ENCOUNTER — APPOINTMENT (OUTPATIENT)
Dept: RADIOLOGY | Facility: HOSPITAL | Age: 64
End: 2024-07-25
Payer: COMMERCIAL

## 2024-07-25 PROBLEM — D64.9 ANEMIA, UNSPECIFIED TYPE: Status: ACTIVE | Noted: 2024-07-25

## 2024-07-25 LAB
ALBUMIN SERPL BCP-MCNC: 2.6 G/DL (ref 3.4–5)
ALP SERPL-CCNC: 119 U/L (ref 33–136)
ALT SERPL W P-5'-P-CCNC: 31 U/L (ref 7–45)
ANION GAP SERPL CALC-SCNC: 15 MMOL/L (ref 10–20)
AST SERPL W P-5'-P-CCNC: 26 U/L (ref 9–39)
BASOPHILS # BLD AUTO: 0 X10*3/UL (ref 0–0.1)
BASOPHILS NFR BLD AUTO: 0 %
BILIRUB SERPL-MCNC: 0.2 MG/DL (ref 0–1.2)
BLOOD EXPIRATION DATE: NORMAL
BUN SERPL-MCNC: 7 MG/DL (ref 6–23)
CALCIUM SERPL-MCNC: 7.6 MG/DL (ref 8.6–10.3)
CHLORIDE SERPL-SCNC: 105 MMOL/L (ref 98–107)
CO2 SERPL-SCNC: 22 MMOL/L (ref 21–32)
CREAT SERPL-MCNC: 0.56 MG/DL (ref 0.5–1.05)
DISPENSE STATUS: NORMAL
EGFRCR SERPLBLD CKD-EPI 2021: >90 ML/MIN/1.73M*2
EOSINOPHIL # BLD AUTO: 0 X10*3/UL (ref 0–0.7)
EOSINOPHIL NFR BLD AUTO: 0 %
ERYTHROCYTE [DISTWIDTH] IN BLOOD BY AUTOMATED COUNT: 19 % (ref 11.5–14.5)
FERRITIN SERPL-MCNC: 36 NG/ML (ref 8–150)
FOLATE SERPL-MCNC: 11.7 NG/ML
GLUCOSE SERPL-MCNC: 125 MG/DL (ref 74–99)
HCT VFR BLD AUTO: 28.1 % (ref 36–46)
HGB BLD-MCNC: 8.6 G/DL (ref 12–16)
HOLD SPECIMEN: NORMAL
IMM GRANULOCYTES # BLD AUTO: 0.07 X10*3/UL (ref 0–0.7)
IMM GRANULOCYTES NFR BLD AUTO: 0.8 % (ref 0–0.9)
IRON SATN MFR SERPL: 5 % (ref 25–45)
IRON SERPL-MCNC: 13 UG/DL (ref 35–150)
LACTATE SERPL-SCNC: 1.1 MMOL/L (ref 0.4–2)
LYMPHOCYTES # BLD AUTO: 2.43 X10*3/UL (ref 1.2–4.8)
LYMPHOCYTES NFR BLD AUTO: 28.9 %
MCH RBC QN AUTO: 26.4 PG (ref 26–34)
MCHC RBC AUTO-ENTMCNC: 30.6 G/DL (ref 32–36)
MCV RBC AUTO: 86 FL (ref 80–100)
MONOCYTES # BLD AUTO: 0.27 X10*3/UL (ref 0.1–1)
MONOCYTES NFR BLD AUTO: 3.2 %
NEUTROPHILS # BLD AUTO: 5.63 X10*3/UL (ref 1.2–7.7)
NEUTROPHILS NFR BLD AUTO: 67.1 %
NRBC BLD-RTO: 1.1 /100 WBCS (ref 0–0)
PATH REVIEW-CBC DIFFERENTIAL: NORMAL
PLATELET # BLD AUTO: 981 X10*3/UL (ref 150–450)
POTASSIUM SERPL-SCNC: 4.3 MMOL/L (ref 3.5–5.3)
PROCALCITONIN SERPL-MCNC: 0.08 NG/ML
PRODUCT BLOOD TYPE: 5100
PRODUCT CODE: NORMAL
PROT SERPL-MCNC: 5.1 G/DL (ref 6.4–8.2)
RBC # BLD AUTO: 3.26 X10*6/UL (ref 4–5.2)
SODIUM SERPL-SCNC: 138 MMOL/L (ref 136–145)
TIBC SERPL-MCNC: 242 UG/DL (ref 240–445)
UIBC SERPL-MCNC: 229 UG/DL (ref 110–370)
UNIT ABO: NORMAL
UNIT NUMBER: NORMAL
UNIT RH: NORMAL
UNIT VOLUME: 350
VIT B12 SERPL-MCNC: 1542 PG/ML (ref 211–911)
WBC # BLD AUTO: 8.4 X10*3/UL (ref 4.4–11.3)
XM INTEP: NORMAL

## 2024-07-25 PROCEDURE — 80053 COMPREHEN METABOLIC PANEL: CPT | Performed by: HOSPITALIST

## 2024-07-25 PROCEDURE — 2500000004 HC RX 250 GENERAL PHARMACY W/ HCPCS (ALT 636 FOR OP/ED): Performed by: HOSPITALIST

## 2024-07-25 PROCEDURE — 2500000004 HC RX 250 GENERAL PHARMACY W/ HCPCS (ALT 636 FOR OP/ED): Performed by: EMERGENCY MEDICINE

## 2024-07-25 PROCEDURE — 99222 1ST HOSP IP/OBS MODERATE 55: CPT | Performed by: HOSPITALIST

## 2024-07-25 PROCEDURE — 2550000001 HC RX 255 CONTRASTS: Performed by: EMERGENCY MEDICINE

## 2024-07-25 PROCEDURE — 36415 COLL VENOUS BLD VENIPUNCTURE: CPT | Performed by: HOSPITALIST

## 2024-07-25 PROCEDURE — 82607 VITAMIN B-12: CPT | Mod: AHULAB | Performed by: HOSPITALIST

## 2024-07-25 PROCEDURE — 2500000001 HC RX 250 WO HCPCS SELF ADMINISTERED DRUGS (ALT 637 FOR MEDICARE OP): Performed by: HOSPITALIST

## 2024-07-25 PROCEDURE — 85025 COMPLETE CBC W/AUTO DIFF WBC: CPT | Performed by: HOSPITALIST

## 2024-07-25 PROCEDURE — 83540 ASSAY OF IRON: CPT | Performed by: HOSPITALIST

## 2024-07-25 PROCEDURE — 71275 CT ANGIOGRAPHY CHEST: CPT

## 2024-07-25 PROCEDURE — 71275 CT ANGIOGRAPHY CHEST: CPT | Performed by: RADIOLOGY

## 2024-07-25 PROCEDURE — 82728 ASSAY OF FERRITIN: CPT | Mod: AHULAB | Performed by: HOSPITALIST

## 2024-07-25 PROCEDURE — 74174 CTA ABD&PLVS W/CONTRAST: CPT | Performed by: RADIOLOGY

## 2024-07-25 PROCEDURE — 96367 TX/PROPH/DG ADDL SEQ IV INF: CPT

## 2024-07-25 PROCEDURE — 84145 PROCALCITONIN (PCT): CPT | Mod: AHULAB | Performed by: HOSPITALIST

## 2024-07-25 PROCEDURE — 1210000001 HC SEMI-PRIVATE ROOM DAILY

## 2024-07-25 PROCEDURE — 2500000005 HC RX 250 GENERAL PHARMACY W/O HCPCS: Performed by: HOSPITALIST

## 2024-07-25 PROCEDURE — 99253 IP/OBS CNSLTJ NEW/EST LOW 45: CPT | Performed by: NURSE PRACTITIONER

## 2024-07-25 PROCEDURE — 2500000002 HC RX 250 W HCPCS SELF ADMINISTERED DRUGS (ALT 637 FOR MEDICARE OP, ALT 636 FOR OP/ED): Performed by: HOSPITALIST

## 2024-07-25 PROCEDURE — C9113 INJ PANTOPRAZOLE SODIUM, VIA: HCPCS | Performed by: HOSPITALIST

## 2024-07-25 PROCEDURE — 30233N1 TRANSFUSION OF NONAUTOLOGOUS RED BLOOD CELLS INTO PERIPHERAL VEIN, PERCUTANEOUS APPROACH: ICD-10-PCS | Performed by: HOSPITALIST

## 2024-07-25 PROCEDURE — 82746 ASSAY OF FOLIC ACID SERUM: CPT | Mod: AHULAB | Performed by: HOSPITALIST

## 2024-07-25 PROCEDURE — 96375 TX/PRO/DX INJ NEW DRUG ADDON: CPT

## 2024-07-25 RX ORDER — PREDNISONE 20 MG/1
40 TABLET ORAL DAILY
Status: DISCONTINUED | OUTPATIENT
Start: 2024-07-25 | End: 2024-07-27 | Stop reason: HOSPADM

## 2024-07-25 RX ORDER — AZITHROMYCIN DIHYDRATE 500 MG/1
1 TABLET, FILM COATED ORAL EVERY 24 HOURS
COMMUNITY
Start: 2024-07-17 | End: 2024-08-03 | Stop reason: ENTERED-IN-ERROR

## 2024-07-25 RX ORDER — LIDOCAINE 560 MG/1
1 PATCH PERCUTANEOUS; TOPICAL; TRANSDERMAL DAILY
Status: DISCONTINUED | OUTPATIENT
Start: 2024-07-25 | End: 2024-07-27 | Stop reason: HOSPADM

## 2024-07-25 RX ORDER — ONDANSETRON 4 MG/1
4 TABLET, ORALLY DISINTEGRATING ORAL EVERY 8 HOURS PRN
Status: DISCONTINUED | OUTPATIENT
Start: 2024-07-25 | End: 2024-07-27 | Stop reason: HOSPADM

## 2024-07-25 RX ORDER — BENZONATATE 100 MG/1
100 CAPSULE ORAL 3 TIMES DAILY PRN
Status: DISCONTINUED | OUTPATIENT
Start: 2024-07-25 | End: 2024-07-27 | Stop reason: HOSPADM

## 2024-07-25 RX ORDER — CEFTRIAXONE 1 G/50ML
1 INJECTION, SOLUTION INTRAVENOUS EVERY 24 HOURS
Status: DISCONTINUED | OUTPATIENT
Start: 2024-07-25 | End: 2024-07-27 | Stop reason: HOSPADM

## 2024-07-25 RX ORDER — IPRATROPIUM BROMIDE AND ALBUTEROL SULFATE 2.5; .5 MG/3ML; MG/3ML
3 SOLUTION RESPIRATORY (INHALATION) EVERY 2 HOUR PRN
Status: DISCONTINUED | OUTPATIENT
Start: 2024-07-25 | End: 2024-07-27 | Stop reason: HOSPADM

## 2024-07-25 RX ORDER — GUAIFENESIN 600 MG/1
600 TABLET, EXTENDED RELEASE ORAL 2 TIMES DAILY
Status: DISCONTINUED | OUTPATIENT
Start: 2024-07-25 | End: 2024-07-27 | Stop reason: HOSPADM

## 2024-07-25 RX ORDER — ONDANSETRON HYDROCHLORIDE 2 MG/ML
4 INJECTION, SOLUTION INTRAVENOUS EVERY 8 HOURS PRN
Status: DISCONTINUED | OUTPATIENT
Start: 2024-07-25 | End: 2024-07-27 | Stop reason: HOSPADM

## 2024-07-25 RX ORDER — AMOXICILLIN 250 MG
1 CAPSULE ORAL 2 TIMES DAILY
Status: DISCONTINUED | OUTPATIENT
Start: 2024-07-25 | End: 2024-07-27 | Stop reason: HOSPADM

## 2024-07-25 RX ORDER — HYDROXYZINE PAMOATE 25 MG/1
25 CAPSULE ORAL EVERY 6 HOURS PRN
Status: DISCONTINUED | OUTPATIENT
Start: 2024-07-25 | End: 2024-07-27 | Stop reason: HOSPADM

## 2024-07-25 RX ORDER — LANOLIN ALCOHOL/MO/W.PET/CERES
100 CREAM (GRAM) TOPICAL DAILY
Status: DISCONTINUED | OUTPATIENT
Start: 2024-07-25 | End: 2024-07-27 | Stop reason: HOSPADM

## 2024-07-25 RX ORDER — PANTOPRAZOLE SODIUM 40 MG/10ML
40 INJECTION, POWDER, LYOPHILIZED, FOR SOLUTION INTRAVENOUS EVERY 12 HOURS
Status: DISCONTINUED | OUTPATIENT
Start: 2024-07-25 | End: 2024-07-27 | Stop reason: HOSPADM

## 2024-07-25 RX ORDER — DILTIAZEM HYDROCHLORIDE 120 MG/1
240 CAPSULE, COATED, EXTENDED RELEASE ORAL DAILY
Status: DISCONTINUED | OUTPATIENT
Start: 2024-07-25 | End: 2024-07-27 | Stop reason: HOSPADM

## 2024-07-25 RX ORDER — MORPHINE SULFATE 4 MG/ML
4 INJECTION, SOLUTION INTRAMUSCULAR; INTRAVENOUS ONCE
Status: COMPLETED | OUTPATIENT
Start: 2024-07-25 | End: 2024-07-25

## 2024-07-25 RX ORDER — TALC
3 POWDER (GRAM) TOPICAL NIGHTLY
Status: DISCONTINUED | OUTPATIENT
Start: 2024-07-25 | End: 2024-07-27 | Stop reason: HOSPADM

## 2024-07-25 RX ORDER — ACETAMINOPHEN 500 MG
2 TABLET ORAL EVERY 8 HOURS
Status: ON HOLD | COMMUNITY
Start: 2024-07-19

## 2024-07-25 RX ORDER — CALCIUM CARBONATE 500(1250)
1250 TABLET ORAL
Status: DISCONTINUED | OUTPATIENT
Start: 2024-07-25 | End: 2024-07-27 | Stop reason: HOSPADM

## 2024-07-25 RX ORDER — POLYETHYLENE GLYCOL 3350 17 G/17G
17 POWDER, FOR SOLUTION ORAL DAILY
Status: DISCONTINUED | OUTPATIENT
Start: 2024-07-25 | End: 2024-07-27 | Stop reason: HOSPADM

## 2024-07-25 RX ORDER — BUSPIRONE HYDROCHLORIDE 5 MG/1
5 TABLET ORAL 2 TIMES DAILY
Status: DISCONTINUED | OUTPATIENT
Start: 2024-07-25 | End: 2024-07-27 | Stop reason: HOSPADM

## 2024-07-25 RX ORDER — AZITHROMYCIN 500 MG/1
500 TABLET, FILM COATED ORAL EVERY 24 HOURS
Status: DISCONTINUED | OUTPATIENT
Start: 2024-07-25 | End: 2024-07-27 | Stop reason: HOSPADM

## 2024-07-25 RX ORDER — CALCIUM CARBONATE 500(1250)
1 TABLET ORAL
Status: ON HOLD | COMMUNITY
Start: 2024-07-18

## 2024-07-25 RX ORDER — MONTELUKAST SODIUM 10 MG/1
10 TABLET ORAL DAILY
Status: DISCONTINUED | OUTPATIENT
Start: 2024-07-25 | End: 2024-07-27 | Stop reason: HOSPADM

## 2024-07-25 RX ORDER — BUDESONIDE 0.5 MG/2ML
0.5 INHALANT ORAL
Status: DISCONTINUED | OUTPATIENT
Start: 2024-07-25 | End: 2024-07-27 | Stop reason: HOSPADM

## 2024-07-25 RX ORDER — ACETAMINOPHEN 325 MG/1
1000 TABLET ORAL EVERY 8 HOURS
Status: DISCONTINUED | OUTPATIENT
Start: 2024-07-25 | End: 2024-07-27 | Stop reason: HOSPADM

## 2024-07-25 RX ORDER — IPRATROPIUM BROMIDE AND ALBUTEROL SULFATE 2.5; .5 MG/3ML; MG/3ML
3 SOLUTION RESPIRATORY (INHALATION)
Status: DISCONTINUED | OUTPATIENT
Start: 2024-07-25 | End: 2024-07-27 | Stop reason: HOSPADM

## 2024-07-25 RX ORDER — OXYBUTYNIN CHLORIDE 5 MG/1
2.5 TABLET ORAL 2 TIMES DAILY
Status: DISCONTINUED | OUTPATIENT
Start: 2024-07-25 | End: 2024-07-27 | Stop reason: HOSPADM

## 2024-07-25 RX ORDER — IPRATROPIUM BROMIDE AND ALBUTEROL SULFATE 2.5; .5 MG/3ML; MG/3ML
3 SOLUTION RESPIRATORY (INHALATION) 4 TIMES DAILY
Status: DISCONTINUED | OUTPATIENT
Start: 2024-07-25 | End: 2024-07-25

## 2024-07-25 RX ORDER — MIRTAZAPINE 15 MG/1
15 TABLET, FILM COATED ORAL NIGHTLY
Status: DISCONTINUED | OUTPATIENT
Start: 2024-07-25 | End: 2024-07-27 | Stop reason: HOSPADM

## 2024-07-25 ASSESSMENT — PAIN DESCRIPTION - LOCATION: LOCATION: BACK

## 2024-07-25 ASSESSMENT — PAIN - FUNCTIONAL ASSESSMENT: PAIN_FUNCTIONAL_ASSESSMENT: 0-10

## 2024-07-25 ASSESSMENT — PAIN SCALES - GENERAL
PAINLEVEL_OUTOF10: 8
PAINLEVEL_OUTOF10: 7
PAINLEVEL_OUTOF10: 9
PAINLEVEL_OUTOF10: 1

## 2024-07-25 ASSESSMENT — ACTIVITIES OF DAILY LIVING (ADL): LACK_OF_TRANSPORTATION: NO

## 2024-07-25 NOTE — PROGRESS NOTES
Transitional Care Coordination Progress Note:  Plan per Medical/Surgical team: treatment of anemia, UTI & pancreatitis with 1 unit PRBC transfusion, IV ATB, Iv solumedrol, morphine  Status: Inpatient   Payor source: [facility]   Discharge disposition: [facility]  Potential Barriers: H/H 6.2/21.4  ADOD: 7/27/2024   ИВАН Cardenas RN, BSN Transitional Care Coordinator ED# [phone]     Per SNF: thank you for the referral. she is short term skilled with us, and will need a new precert to return, PT/OT evals ordered      07/25/24 [id number]   Discharge Planning   Living Arrangements Alone   Support Systems Children   Assistance Needed blood transfusion, ATB plan   Type of Residence Skilled nursing facility   Do you have animals or pets at home? No   Home or Post Acute Services Post acute facilities (Rehab/SNF/etc)   Type of Post Acute Facility Services Skilled nursing   Expected Discharge Disposition SNF   Does the patient need discharge transport arranged? Yes   RoundTrip coordination needed? Yes   Has discharge transport been arranged? No   Financial Resource Strain   How hard is it for you to pay for the very basics like food, housing, medical care, and heating? Not hard   Housing Stability   In the last 12 months, was there a time when you were not able to pay the mortgage or rent on time? N   In the past 12 months, how many times have you moved where you were living? 1   At any time in the past 12 months, were you homeless or living in a shelter (including now)? N   Transportation Needs   In the past 12 months, has lack of transportation kept you from medical appointments or from getting medications? no   In the past 12 months, has lack of transportation kept you from meetings, work, or from getting things needed for daily living? No

## 2024-07-25 NOTE — H&P
Between 7AM-7PM please message me via Epic Secure Chat.  After 7PM please page Nocturnist on call.    [facility] History and Physical    Fernando Cuevas    :  1960(63 y.o.)    MRN:  [id number]  Date: 24     Assessment and Plan:      Anemia- possible GIB in setting of eliquis use, reports melena prior to admission. Holding asa, eliquis. IV PPI BID. Consult GI  AECOPD- CTA Chest with bibasilar airspace opacities slightly more consolidative in the right lung base. Procal low suggesting unlikely PNA. Aletha duonebs/Pulmicort. Plan for PO prednisone 40 mg daily. Continue home azithromycin, mucinex, singulair  Post op hematoma- CT showed bilobed hyperdense lesion abutting the pancreatic tail with the larger component measuring up to 1.3 cm. This is stable from CT scan of  but new when compared to CT angiogram 2016. Doubt this is new bleeding and cause of anemia.   Mild Lipase elevation- unlikely due to pancreatitis, no evidence of such on CT AP  UTI- Ucx with E Coli, continue empiric rocephin  Recent DVT- holding eliquis due to above  HTN- continue diltiazem  Anxiety/Depression-continue buspar, remeron  OAB- continue home oxybutynin  Constipation- daily miralax and senna-s bid  Recent perforated small bowel with peritonitis, s/p emergency ex laparotomy  on 24     DVT Prophylaxis: SCDs      BMI Classification: Body mass index is 25.42 kg/m². - overweight BMI 25-29.9    Disposition: Admit to inpatient,  await clinical improvement and treatment response, and await consultant recommendations    Electronically signed by Rei Kapoor DO on 24 at 10:21 AM     Subjective:      Chief Complaint: melena, fatigue, increased cough  PCP: No Assigned PCP Generic Provider, MD     HPI:    Fernando Cuevas is a 63 y.o. female with h/o COPD, HTN, alcohol abuse, dyspnea, esophagitis, JASMINE, h/o cocaine abuse, depression, lung cancer, elevated troponins, hyperlipidemia, nicotine dependence,  overactive bladder, spontaneous pneumothorax, migraines, panic disorder, daily chronic headaches, perforated small bowel with peritonitis, s/p emergency ex laparotomy  on 24,, DVT on Eliquis who presented with anemia. Pt reports having 2-3 black tarry stool for the past 3 days, not associated with abdominal pain, reports feeling dizzy, tired.  She reports cough with green mucus.  H&H on admission 6.2 and 21.4, normal BUN and creatinine.      Also reports increased cough over past few days. Reports she was not getting her usual inhalers at SNF.Had chills but no fevers. Increased sputum production with this.       Past Medical History:   Diagnosis Date    Essential (primary) hypertension 2016    Benign hypertension    Personal history of other diseases of the respiratory system     H/O emphysema    Personal history of other diseases of the respiratory system     History of chronic obstructive lung disease    Personal history of other mental and behavioral disorders     History of depression       Past Surgical History:   Procedure Laterality Date     SECTION, LOW TRANSVERSE      CT ABDOMEN PELVIS ANGIOGRAM W AND/OR WO IV CONTRAST  2016    CT ABDOMEN PELVIS ANGIOGRAM W AND/OR WO IV CONTRAST 3/22/2016 [facility]GENC[facility]    CT ANGIO CORONARY ART WITH HEARTFLOW IF SCORE >30%  2023    CT ANGIO CORONARY ART WITH HEARTFLOW IF SCORE >30% 2023    MR NECK ANGIO W IV CONTRAST  2021    MR NECK ANGIO W IV CONTRAST 2021       No family history on file.    Social History     Socioeconomic History    Marital status: Single     Spouse name: Not on file    Number of children: Not on file    Years of education: Not on file    Highest education level: Not on file   Occupational History    Not on file   Tobacco Use    Smoking status: Every Day     Types: Cigarettes     Passive exposure: Current (3 cigarettes a day)    Smokeless tobacco: Never   Vaping Use    Vaping status: Never Used    Substance and Sexual Activity    Alcohol use: Not Currently    Drug use: Not Currently    Sexual activity: Not on file   Other Topics Concern    Not on file   Social History Narrative    Not on file     Social Determinants of Health     Financial Resource Strain: Low Risk  (7/25/2024)    Overall Financial Resource Strain (CARDIA)     Difficulty of Paying Living Expenses: Not hard at all   Recent Concern: Financial Resource Strain - Medium Risk (5/13/2024)    Overall Financial Resource Strain (CARDIA)     Difficulty of Paying Living Expenses: Somewhat hard   Food Insecurity: No Food Insecurity (6/21/2024)    Hunger Vital Sign     Worried About Running Out of Food in the Last Year: Never true     Ran Out of Food in the Last Year: Never true   Transportation Needs: No Transportation Needs (7/25/2024)    PRAPARE - Transportation     Lack of Transportation (Medical): No     Lack of Transportation (Non-Medical): No   Physical Activity: Patient Declined (5/12/2024)    Exercise Vital Sign     Days of Exercise per Week: Patient declined     Minutes of Exercise per Session: Patient declined   Stress: No Stress Concern Present (6/8/2023)    [facility] - Occupational Stress Questionnaire     Feeling of Stress : Only a little   Social Connections: Patient Declined (5/12/2024)    Social Connection and Isolation Panel [NHANES]     Frequency of Communication with Friends and Family: Patient declined     Frequency of Social Gatherings with Friends and Family: Patient declined     Attends Spiritism Services: Patient declined     Active Member of Clubs or Organizations: Patient declined     Attends Club or Organization Meetings: Patient declined     Marital Status: Patient declined   Intimate Partner Violence: Patient Declined (5/12/2024)    Humiliation, Afraid, Rape, and Kick questionnaire     Fear of Current or Ex-Partner: Patient declined     Emotionally  Abused: Patient declined     Physically Abused: Patient declined     Sexually Abused: Patient declined   Housing Stability: Low Risk  (7/25/2024)    Housing Stability Vital Sign     Unable to Pay for Housing in the Last Year: No     Number of Times Moved in the Last Year: 1     Homeless in the Last Year: No       Allergies   Allergen Reactions    Iodinated Contrast Media Hives     Hives to faces and neck with itching. Resolved with 50 mg benadryl, 20 mg pepcid & 60 mg prednisone.    Hives to faces and neck with itching. Resolved with 50 mg benadryl, 20 mg pepcid & 60 mg prednisone.   Hives to faces and neck with itching. Resolved with 50 mg benadryl, 20 mg pepcid & 60 mg prednisone.    Adhesive Tape-Silicones Other       Prior to Admission medications    Medication Sig Start Date End Date Taking? Authorizing Provider   acetaminophen (Tylenol Extra Strength) 500 mg tablet Take 2 tablets (1,000 mg) by mouth every 8 hours. 7/19/24  Yes Historical Provider, MD   calcium carbonate (Oscal) 500 mg calcium (1,250 mg) tablet Take 1 tablet (1,250 mg) by mouth 2 times daily (morning and late afternoon). 7/18/24  Yes Historical Provider, MD   Zithromax 500 mg tablet Take 1 tablet (500 mg) by mouth once every 24 hours. 7/17/24  Yes Historical Provider, MD   acetaminophen-codeine (Tylenol w/ Codeine #3) 300-30 mg tablet Take 1 tablet by mouth every 6 hours if needed for severe pain (7 - 10).  Patient not taking: Reported on 7/25/2024 5/10/24   Rupa Khoury MD   albuterol 90 mcg/actuation inhaler Inhale 2 puffs every 4 hours if needed for wheezing or shortness of breath. 1/29/24   Historical Provider, MD   alum-mag hydroxide-simeth (Mylanta) 200-200-20 mg/5 mL oral suspension TAKE 10 ML BY MOUTH EVERY 6 HOURS AS NEEDED 3/26/24   Historical Provider, MD   ammonium lactate (Lac-Hydrin) 12 % lotion Apply topically twice a day. 1/17/24   Historical Provider, MD   apixaban (Eliquis) 5 mg tablet Take 1 tablet (5 mg) by mouth 2  times a day. 7/15/24 9/13/24  Rupa Khoury MD   aspirin 81 mg chewable tablet Chew 1 tablet (81 mg) once daily. 3/22/23   Historical Provider, MD   benzonatate (Tessalon) 100 mg capsule Take 1 capsule (100 mg) by mouth 3 times a day as needed for cough. Do not crush or chew. 5/14/24   Tonio Galvan,    busPIRone (Buspar) 5 mg tablet Take 1 tablet (5 mg) by mouth twice a day. 10/11/23   Historical Provider, MD   clotrimazole (Lotrimin) 1 % cream Apply topically 2 times a day. 5/12/24   Historical Provider, MD   diclofenac sodium (Voltaren) 1 % gel Apply 4.5 inches (4 g) topically 4 times a day as needed. 1/3/24   Historical Provider, MD   dilTIAZem ER (Tiazac) 240 mg 24 hr capsule Take 1 capsule (240 mg) by mouth once daily.    Historical Provider, MD   fluocinonide 0.05 % cream APPLY THIN LAYER TO AFFECTED AREA TWICE A DAY AS NEEDED    Historical Provider, MD   fluticasone propion-salmeteroL (Advair Diskus) 100-50 mcg/dose diskus inhaler Inhale 1 puff 2 times a day. Rinse mouth with water after use to reduce aftertaste and incidence of candidiasis. Do not swallow. 5/9/24 6/8/24  Rupa Khoury MD   guaiFENesin (Mucinex) 600 mg 12 hr tablet Take 1 tablet (600 mg) by mouth. 1/29/24   Historical Provider, MD   ipratropium-albuteroL (Duo-Neb) 0.5-2.5 mg/3 mL nebulizer solution Take 3 mL by nebulization 3 times a day. 5/9/24 6/8/24  Rupa Khoury MD   lidocaine (Lidoderm) 5 % patch Place 1 patch on the skin once every 24 hours. 1/29/24   Historical Provider, MD   metroNIDAZOLE (Flagyl) 500 mg tablet  6/3/24   Historical Provider, MD   mirtazapine (Remeron) 15 mg tablet Take 1 tablet (15 mg) by mouth once daily at bedtime. 7/15/24 8/14/24  Rupa Khoury MD   mometasone-formoterol (Dulera 100) 100-5 mcg/actuation inhaler Inhale 200 mcg twice a day. 6/7/24   Historical Provider, MD   montelukast (Singulair) 10 mg tablet Take 1 tablet (10 mg) by mouth once daily.    Historical Provider, MD    nitroglycerin (Nitrostat) 0.4 mg SL tablet Place 1 tablet (0.4 mg) under the tongue.    Historical Provider, MD   nortriptyline (Pamelor) 50 mg capsule Take 1 capsule (50 mg) by mouth once daily at bedtime. 3/29/24   Historical Provider, MD   oxybutynin XL (Ditropan-XL) 5 mg 24 hr tablet Take 1 tablet (5 mg) by mouth once daily. Do not crush, chew, or split.    Historical Provider, MD   pantoprazole (ProtoNix) 40 mg EC tablet Take 1 tablet (40 mg) by mouth twice a day. 1/29/24   Historical Provider, MD   thiamine 100 mg tablet Take 1 tablet (100 mg) by mouth once daily.    Historical Provider, MD   tiotropium (Spiriva Respimat) 2.5 mcg/actuation inhaler Inhale 2 puffs once daily. 5/9/24   Rupa Khoury MD   varenicline (Chantix) 1 mg tablet Take 1 tablet (1 mg) by mouth twice a day. 3/10/24   Historical Provider, MD   calcium carbonate 600 mg calcium (1,500 mg) tablet Take 1,200 mg by mouth once daily. 10/26/22 7/25/24  Historical Provider, MD       Review of systems:   Other than patient's chronic conditions and those complaints in the history above, the rest of the 10 systems review were done and were negative.     Objective:      Vitals:    07/25/24 0730 07/25/24 0745 07/25/24 0830 07/25/24 0930   BP: 123/83  123/72 (!) 136/106   Pulse: (!) 116 (!) 116 (!) 120 (!) 116   Resp: 18  18 18   Temp:       TempSrc:       SpO2: 97% 98% 99% 99%   Weight:       Height:            Physical Exam  Vitals and nursing note reviewed.   HENT:      Mouth/Throat:      Mouth: Mucous membranes are moist.      Pharynx: Oropharynx is clear.   Cardiovascular:      Rate and Rhythm: Regular rhythm. Tachycardia present.   Pulmonary:      Effort: Pulmonary effort is normal.   Abdominal:      Palpations: Abdomen is soft.      Tenderness: There is abdominal tenderness.   Musculoskeletal:      Right lower leg: No edema.      Left lower leg: No edema.   Neurological:      General: No focal deficit present.      Mental Status: She is  alert and oriented to person, place, and time.         Labs:   Lab Results   Component Value Date     07/25/2024    K 4.3 07/25/2024     07/25/2024    CO2 22 07/25/2024    BUN 7 07/25/2024    CREATININE 0.56 07/25/2024    GLUCOSE 125 (H) 07/25/2024    CALCIUM 7.6 (L) 07/25/2024    PROT 5.1 (L) 07/25/2024    BILITOT 0.2 07/25/2024    ALKPHOS 119 07/25/2024    AST 26 07/25/2024    ALT 31 07/25/2024       Lab Results   Component Value Date    WBC 8.4 07/25/2024    HGB 8.6 (L) 07/25/2024    HCT 28.1 (L) 07/25/2024    MCV 86 07/25/2024     (H) 07/25/2024       Imaging:   CT angio chest abdomen pelvis    Result Date: 7/25/2024  Interpreted By:  Abhishek Noble, STUDY: CT ANGIO CHEST ABDOMEN PELVIS;  7/25/2024 3:11 am   INDICATION: Signs/Symptoms:abd pain, radiating to  back and up to chest, post-operative hematoma on eliquis.   COMPARISON: CT scan of the abdomen and pelvis 07/24/2024.   ACCESSION NUMBER(S): [id number]   ORDERING CLINICIAN: YASMEEN THOMAS   TECHNIQUE: Axial CT images of the chest, abdomen and pelvis before and after intravenous administration of 100 mL of Omnipaque 350 using CT angiographic technique. Postcontrast imaging was performed in the arterial and delayed phases. Coronal and sagittal images are reconstructed.  3D reconstructions were obtained at a separate workstation.   FINDINGS: VASCULAR:   AORTA: Unenhanced images demonstrate no evidence for intramural hematoma. No thoracic aortic aneurysm or dissection. Calcific atherosclerosis of the aorta. 2 vessel arch anatomy, a normal variant. Mild calcified plaque at origin of the arch vessels without hemodynamically significant stenosis.   Abdominal aorta is normal in caliber and course. No evidence for dissection or aneurysmal dilatation. There is scattered calcified and noncalcified plaque in the abdominal aorta. 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 The superior and  inferior mesenteric arteries are patent. Single right renal artery demonstrates moderate narrowing proximally secondary to calcified and noncalcified plaque. Single left renal artery is widely patent.   There is scattered calcified and noncalcified plaque in the common and internal iliac arteries without hemodynamically significant stenosis. The external iliac and proximal femoral arteries are patent.   No acute pulmonary embolism to the proximal segmental arterial level.   The iliofemoral vessels and IVC are patent. Splenic, superior mesenteric, portal and hepatic veins are patent.   CHEST:   HEART: Normal size. No pericardial effusion. MEDIASTINUM AND ALESSANDRO: Prominent subcarinal lymph node measures 9 mm in short axis. No thoracic adenopathy. LUNG, PLEURA, LARGE AIRWAYS: Advanced centrilobular and paraseptal emphysema. Left Bochdalek's hernia contains fat. There are bibasilar airspace opacities slightly more consolidative in the right lung base. This may relate to atelectasis although superimposed infection not excluded. No effusion or pneumothorax. CHEST WALL AND LOWER NECK: Within normal limits. BONES: No acute osseous abnormality.   ABDOMEN: Arterial phase of imaging limits evaluation of the solid viscera.   LIVER: Normal morphology. A 5 mm hypervascular focus in the left hepatic lobe may relate to perfusion shunts versus small flash filling hemangioma. Focal area of low attenuation adjacent to the fissure for ligamentum teres may relate focal fatty infiltration. BILE DUCTS: Normal caliber. GALLBLADDER: Layering high density in the gallbladder neck likely relates to small calculi. No gallbladder wall thickening. PANCREAS: Homogeneous enhancement of the pancreas without evidence for ductal dilatation or peripancreatic inflammatory changes. There is a bilobed hyperdense lesion abutting the pancreatic tail with the larger component measuring up to 1.3 cm. This is stable from CT scan of 07/03/2024 but new when  compared to CT angiogram 03/22/2016. This measures approximately 48 Hounsfield units on the unenhanced CT, 49 Hounsfield units on the arterial phase and 47 Hounsfield units on the delayed imaging. No definite enhancement. SPLEEN: Within normal limits. ADRENALS:  Nodular thickening of the adrenal glands may relate to hyperplasia or adenomatous change. KIDNEYS: Right kidney is slightly atrophic when compared to the left. No hydronephrosis or perinephric fluid collection. URETERS: No hydroureter.   PELVIS:   REPRODUCTIVE ORGANS: Uterus is present. No adnexal mass. BLADDER: Within normal limits.   RETROPERITONEUM: Within normal limits. BOWEL: Stomach is partially distended. Postsurgical changes of small-bowel anastomosis in the right lower quadrant is redemonstrated. Moderate to large stool burden. No pneumatosis or portal venous gas. Normal appendix. PERITONEUM: Mild subcutaneous soft tissue stranding in the mid abdomen at level of the umbilicus. No free air. Interval evolution with decreased size of previously noted rectus sheath hematoma on CT scan of 07/03/2024. No new fluid collection is seen.   ABDOMINAL WALL: There is subcutaneous soft tissue stranding in the mid abdomen similar to recent CT likely postsurgical. BONES: Multilevel degenerative changes of the spine.         No aortic aneurysm or dissection.   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   Single right renal artery demonstrates moderate narrowing proximally secondary to calcified and noncalcified plaque. Right kidney is slightly atrophic when compared to the left, likely chronic.   Advanced centrilobular and paraseptal emphysema.  There are bibasilar airspace opacities slightly more consolidative in the right lung base. This may relate to atelectasis although superimposed infection not excluded. Correlate clinically.   Postsurgical changes of partial small-bowel resection with anastomosis in the  right lower quadrant. No evidence for bowel obstruction.   There is a bilobed hyperdense lesion abutting the pancreatic tail with the larger component measuring up to 1.3 cm. This is stable from CT scan of 07/03/2024 but new when compared to CT angiogram 03/22/2016. This measures approximately 48 Hounsfield units on the unenhanced CT without significant enhancement on the arterial and delayed phases. Findings are favored to relate to small hematoma. Attention on continued short-term follow-up is advised.   Mild subcutaneous soft tissue stranding in the mid abdomen at level of the umbilicus.  Interval resolution of previously noted rectus sheath hematoma. No new fluid collection is seen.   Additional findings as noted above.   MACRO: None   Signed by: Abhishek Noble 7/25/2024 4:00 AM Dictation workstation:   [id number]    XR chest 1 view    Result Date: 7/24/2024  Interpreted By:  Sheri Lakhani, STUDY: XR CHEST 1 VIEW;  7/24/2024 10:10 pm   INDICATION: Signs/Symptoms:cough, rhonchi L lung field.   COMPARISON: Radiographs of the chest dated 07/09/2024; same day CT of the abdomen/pelvis.   ACCESSION NUMBER(S): [id number]   ORDERING CLINICIAN: YASMEEN THOMAS   FINDINGS: AP radiograph of the chest was provided.       CARDIOMEDIASTINAL SILHOUETTE: Cardiomediastinal silhouette is upper limits of normal in size, similar to prior exam.   LUNGS: New airspace opacity in the medial lower right lung along the right heart border corresponds to area of atelectasis on same day CT. No sizable pleural effusion or consolidation is present.   ABDOMEN: No remarkable upper abdominal findings.   BONES: No acute osseous changes.       1.  New airspace opacity in the medial lower right lung along the right heart border corresponds to area of atelectasis/scarring on same day CT. No consolidation or sizable pleural effusion is present.       MACRO: None   Signed by: Sheri Lakhani 7/24/2024 10:37 PM Dictation workstation:    [id number]    CT abdomen pelvis wo IV contrast    Result Date: 7/24/2024  Interpreted By:  Sheri Lakhani, STUDY: CT ABDOMEN PELVIS WO IV CONTRAST;  7/24/2024 8:54 pm   INDICATION: Signs/Symptoms:abd pain, distention, anemia.   COMPARISON: CT of the abdomen and pelvis dated 07/03/2024   ACCESSION NUMBER(S): [id number]   ORDERING CLINICIAN: YASMEEN THOMAS   TECHNIQUE: CT of the abdomen and pelvis was performed. Contiguous axial images were obtained at 3 mm slice thickness through the abdomen and pelvis. Coronal and sagittal reconstructions at 3 mm slice thickness were performed.  No intravenous or oral contrast agents were administered.   FINDINGS: Please note that the evaluation of vessels, lymph nodes and organs is limited without intravenous contrast.   LOWER CHEST: Area of bandlike scarring/atelectasis is present in the right lower lobe, new since prior exam on 07/03/2024. No new consolidation or pleural effusion is present in the left lung base. Paraseptal and centrilobular emphysematous changes are similar to prior exam.   Heart is normal in size without pericardial effusion.   Distal esophagus is unremarkable in appearance.   ABDOMEN:   LIVER: Within limits of noncontrast exam, no acute hepatic abnormality is present. Several subcentimeter hepatic hypodensities are too small to definitely characterize.   BILE DUCTS: No new intrahepatic or extrahepatic biliary dilatation is evident.   GALLBLADDER: Several radiopaque stones are present in the gallbladder without evidence of new wall thickening or pericholecystic stranding.   PANCREAS: No ductal dilatation or peripancreatic stranding is present.   SPLEEN: Spleen is unremarkable in appearance.   ADRENAL GLANDS: Adrenal glands are unremarkable in appearance.   KIDNEYS AND URETERS: Kidneys are symmetric in size without evidence of new hydronephrosis or radiopaque nephrolithiasis. Visualized upper ureters are unremarkable in appearance.   PELVIS:    BLADDER: No bladder wall thickening is present.   REPRODUCTIVE ORGANS: Uterus is present. No adnexal masses or fluid collections are identified.   BOWEL: Stomach is somewhat distended with fluid and food debris without evidence of wall thickening. No abnormal small bowel dilatation is present. Postsurgical changes of prior bowel resection are present in the right lower quadrant with bowel ring anastomosis, similar to prior study. No inflammatory large bowel wall thickening is present.   Appendix is unremarkable in appearance.   VESSELS: Atherosclerotic plaques are present in the abdominal aorta without evidence of acute vascular abnormality.   PERITONEUM/RETROPERITONEUM/LYMPH NODES: There is no evidence of free air, free fluid, or thick-walled collections in the abdomen or pelvis. No new enlarged lymphadenopathy is present in the abdomen or pelvis.   There is slight mesenteric fat stranding present in the anterior lower abdomen near the umbilicus, immediately deep to the left rectus abdominis muscle at the site of hyperdense hematoma described on previous exam in July of 2024.   ABDOMINAL WALL: There is mild asymmetric thickening with some overlying cutaneous stranding present in the medial left rectus abdominis muscle near the level of the umbilicus, at the site of hyperdense attenuation described on previous exam on 07/03/2024, likely representing resolving postsurgical hematoma.   There is a small fat containing spigelian hernia are present in the left rectus abdominis muscle immediately superior (series 604, image 66), with mild surrounding fat stranding, which is new from prior exam.   No additional new abnormalities are present in the cutaneous tissues of the abdomen or pelvis.   BONES: Multilevel degenerative changes are present in the lumbar spine, similar to prior exam. No new compression fracture or high-grade stenosis is evident.       1.  No acute abnormality is present within the abdomen or pelvis. Mild  fat stranding is noted in the anterior mesentery, deep to the umbilicus and hyperdense hematoma in the left rectus abdominis muscle described on previous exam. 2. Small new spigelian hernia containing fat is present in the left rectus abdominis muscle with some overlying cutaneous fat stranding. No herniated bowel is present. 3. Postsurgical changes of small-bowel resection and anastomosis are present in the right lower quadrant without inflammatory bowel wall thickening or abnormal bowel dilatation. 4. New area of scarring/atelectasis is present in the right lower lobe medially, with improvement in the other areas of atelectasis seen on previous exam on 07/03/2024.     MACRO: None   Signed by: Sheri Lakhani 7/24/2024 9:10 PM Dictation workstation:   [id number]

## 2024-07-25 NOTE — CONSULTS
Reason For Consult  Anemia, melena    History Of Present Illness  Fernando Cuevas is a 63 y.o. female with h/o COPD, HTN, alcohol abuse, dyspnea, esophagitis, JASMINE, h/o cocaine abuse, depression, lung cancer, elevated troponins, hyperlipidemia, nicotine dependence, overactive bladder, spontaneous pneumothorax, migraines, panic disorder, daily chronic headaches, perforated small bowel with peritonitis, s/p emergency ex laparotomy  on 6/21/24, found to have perforation in her small intestine (etiology unclear), s/p partial resection, DVT on Eliquis, presented with anemia. Pt reports having 2-3 black tarry stool for the past 3 days, not associated with abdominal pain, reports feeling dizzy, tired.  She reports cough with green mucus.  H&H on admission 6.2 and 21.4, normal BUN and creatinine.      She has history of dysphagia.  Had EGD and colonoscopy on 2/27/2024 at [facility], EGD was done for esophageal dysphagia and heartburn, showed hiatal hernia, normal esophagus.  Colonoscopy showed internal hemorrhoids, 2 hyperplastic polyps removed from the rectum.    EGD 06/2016 for dysphagia   - Incidental benign bleb found in the esophagus.                         - Focal abnormal mucosa was found in a half                          circumferential pattern in the lower third of the                          esophagus, possibly squamous dysplasia vs focal                          inflammation. Biopsied.                         - Mildly abnormal mucosa was found in the upper third                          and middle third of the esophagus, characterized by                          mildly congested, erythematous, texture changed,                          decreased vascular pattern mucosa in the esophagus.                          Biopsied.                         - 2 cm hiatal hernia.                         - Erythematous mucosa in the gastric fundus, gastric                          body, antrum and prepyloric region of the  stomach.                          Biopsied.                         - Normal examined duodenum.     Past Medical History  She has a past medical history of Essential (primary) hypertension (2016), Personal history of other diseases of the respiratory system, Personal history of other diseases of the respiratory system, and Personal history of other mental and behavioral disorders.    Surgical History  She has a past surgical history that includes CT angio abdomen pelvis w and or wo IV IV contrast (2016); MR angio neck w IV contrast (2021); CT angio coronary art with heartflow if score >30% (2023); and  section, low transverse.     Social History  She reports that she has been smoking cigarettes. She has been exposed to tobacco smoke. She has never used smokeless tobacco. She reports that she does not currently use alcohol. She reports that she does not currently use drugs.    Family History  No family history on file.     Allergies  Iodinated contrast media and Adhesive tape-silicones    Review of Systems  10 systems reviewed and negative other than HPI     Physical Exam  Physical Exam  Vitals reviewed.   Constitutional:       General: She is not in acute distress.     Appearance: Normal appearance. She is not toxic-appearing.   HENT:      Head: Normocephalic and atraumatic.      Nose: Nose normal.      Mouth/Throat:      Mouth: Mucous membranes are moist.      Pharynx: Oropharynx is clear.   Eyes:      Conjunctiva/sclera: Conjunctivae normal.   Cardiovascular:      Rate and Rhythm: Regular rhythm. Tachycardia present.      Pulses: Normal pulses.      Heart sounds: Normal heart sounds.   Pulmonary:      Effort: Pulmonary effort is normal.      Breath sounds: Normal breath sounds.   Abdominal:      General: Bowel sounds are normal. There is distension.      Palpations: Abdomen is soft. There is no mass.      Tenderness: There is abdominal tenderness. There is no guarding or rebound.     "  Hernia: No hernia is present.      Comments: Midabdominal incision covered with dressing.   Musculoskeletal:      Cervical back: Neck supple.   Neurological:      Mental Status: She is alert.            Last Recorded Vitals  Blood pressure (!) 159/133, pulse (!) 124, temperature 36.9 °C (98.4 °F), resp. rate 18, height 1.575 m (5' 2\"), weight 63 kg (139 lb), SpO2 99%.    Relevant Results      Scheduled medications  acetaminophen, 975 mg, oral, q8h  azithromycin, 500 mg, oral, q24h  budesonide, 0.5 mg, nebulization, BID  busPIRone, 5 mg, oral, BID  calcium carbonate, 1,250 mg, oral, BID  cefTRIAXone, 1 g, intravenous, q24h  dilTIAZem ER, 240 mg, oral, Daily  guaiFENesin, 600 mg, oral, BID  ipratropium-albuteroL, 3 mL, nebulization, TID  lidocaine, 1 patch, transdermal, Daily  melatonin, 3 mg, oral, Nightly  mirtazapine, 15 mg, oral, Nightly  montelukast, 10 mg, oral, Daily  oxybutynin, 2.5 mg, oral, BID  pantoprazole, 40 mg, intravenous, q12h  polyethylene glycol, 17 g, oral, Daily  predniSONE, 40 mg, oral, Daily  sennosides-docusate sodium, 1 tablet, oral, BID  thiamine, 100 mg, oral, Daily      Continuous medications     PRN medications  PRN medications: benzonatate, ipratropium-albuteroL, ondansetron ODT **OR** ondansetron  CT angio chest abdomen pelvis    Result Date: 7/25/2024  Interpreted By:  Abhishek Noble, STUDY: CT ANGIO CHEST ABDOMEN PELVIS;  7/25/2024 3:11 am   INDICATION: Signs/Symptoms:abd pain, radiating to  back and up to chest, post-operative hematoma on eliquis.   COMPARISON: CT scan of the abdomen and pelvis 07/24/2024.   ACCESSION NUMBER(S): [id number]   ORDERING CLINICIAN: YASMEEN THOMAS   TECHNIQUE: Axial CT images of the chest, abdomen and pelvis before and after intravenous administration of 100 mL of Omnipaque 350 using CT angiographic technique. Postcontrast imaging was performed in the arterial and delayed phases. Coronal and sagittal images are reconstructed.  3D reconstructions were " obtained at a separate workstation.   FINDINGS: VASCULAR:   AORTA: Unenhanced images demonstrate no evidence for intramural hematoma. No thoracic aortic aneurysm or dissection. Calcific atherosclerosis of the aorta. 2 vessel arch anatomy, a normal variant. Mild calcified plaque at origin of the arch vessels without hemodynamically significant stenosis.   Abdominal aorta is normal in caliber and course. No evidence for dissection or aneurysmal dilatation. There is scattered calcified and noncalcified plaque in the abdominal aorta. 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 The superior and inferior mesenteric arteries are patent. Single right renal artery demonstrates moderate narrowing proximally secondary to calcified and noncalcified plaque. Single left renal artery is widely patent.   There is scattered calcified and noncalcified plaque in the common and internal iliac arteries without hemodynamically significant stenosis. The external iliac and proximal femoral arteries are patent.   No acute pulmonary embolism to the proximal segmental arterial level.   The iliofemoral vessels and IVC are patent. Splenic, superior mesenteric, portal and hepatic veins are patent.   CHEST:   HEART: Normal size. No pericardial effusion. MEDIASTINUM AND ALESSANDRO: Prominent subcarinal lymph node measures 9 mm in short axis. No thoracic adenopathy. LUNG, PLEURA, LARGE AIRWAYS: Advanced centrilobular and paraseptal emphysema. Left Bochdalek's hernia contains fat. There are bibasilar airspace opacities slightly more consolidative in the right lung base. This may relate to atelectasis although superimposed infection not excluded. No effusion or pneumothorax. CHEST WALL AND LOWER NECK: Within normal limits. BONES: No acute osseous abnormality.   ABDOMEN: Arterial phase of imaging limits evaluation of the solid viscera.   LIVER: Normal morphology. A 5 mm hypervascular focus in the left  hepatic lobe may relate to perfusion shunts versus small flash filling hemangioma. Focal area of low attenuation adjacent to the fissure for ligamentum teres may relate focal fatty infiltration. BILE DUCTS: Normal caliber. GALLBLADDER: Layering high density in the gallbladder neck likely relates to small calculi. No gallbladder wall thickening. PANCREAS: Homogeneous enhancement of the pancreas without evidence for ductal dilatation or peripancreatic inflammatory changes. There is a bilobed hyperdense lesion abutting the pancreatic tail with the larger component measuring up to 1.3 cm. This is stable from CT scan of 07/03/2024 but new when compared to CT angiogram 03/22/2016. This measures approximately 48 Hounsfield units on the unenhanced CT, 49 Hounsfield units on the arterial phase and 47 Hounsfield units on the delayed imaging. No definite enhancement. SPLEEN: Within normal limits. ADRENALS:  Nodular thickening of the adrenal glands may relate to hyperplasia or adenomatous change. KIDNEYS: Right kidney is slightly atrophic when compared to the left. No hydronephrosis or perinephric fluid collection. URETERS: No hydroureter.   PELVIS:   REPRODUCTIVE ORGANS: Uterus is present. No adnexal mass. BLADDER: Within normal limits.   RETROPERITONEUM: Within normal limits. BOWEL: Stomach is partially distended. Postsurgical changes of small-bowel anastomosis in the right lower quadrant is redemonstrated. Moderate to large stool burden. No pneumatosis or portal venous gas. Normal appendix. PERITONEUM: Mild subcutaneous soft tissue stranding in the mid abdomen at level of the umbilicus. No free air. Interval evolution with decreased size of previously noted rectus sheath hematoma on CT scan of 07/03/2024. No new fluid collection is seen.   ABDOMINAL WALL: There is subcutaneous soft tissue stranding in the mid abdomen similar to recent CT likely postsurgical. BONES: Multilevel degenerative changes of the spine.         No  aortic aneurysm or dissection.   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   Single right renal artery demonstrates moderate narrowing proximally secondary to calcified and noncalcified plaque. Right kidney is slightly atrophic when compared to the left, likely chronic.   Advanced centrilobular and paraseptal emphysema.  There are bibasilar airspace opacities slightly more consolidative in the right lung base. This may relate to atelectasis although superimposed infection not excluded. Correlate clinically.   Postsurgical changes of partial small-bowel resection with anastomosis in the right lower quadrant. No evidence for bowel obstruction.   There is a bilobed hyperdense lesion abutting the pancreatic tail with the larger component measuring up to 1.3 cm. This is stable from CT scan of 07/03/2024 but new when compared to CT angiogram 03/22/2016. This measures approximately 48 Hounsfield units on the unenhanced CT without significant enhancement on the arterial and delayed phases. Findings are favored to relate to small hematoma. Attention on continued short-term follow-up is advised.   Mild subcutaneous soft tissue stranding in the mid abdomen at level of the umbilicus.  Interval resolution of previously noted rectus sheath hematoma. No new fluid collection is seen.   Additional findings as noted above.   MACRO: None   Signed by: Abhishek Noble 7/25/2024 4:00 AM Dictation workstation:   [id number]    XR chest 1 view    Result Date: 7/24/2024  Interpreted By:  Sheri Lakhani, STUDY: XR CHEST 1 VIEW;  7/24/2024 10:10 pm   INDICATION: Signs/Symptoms:cough, rhonchi L lung field.   COMPARISON: Radiographs of the chest dated 07/09/2024; same day CT of the abdomen/pelvis.   ACCESSION NUMBER(S): [id number]   ORDERING CLINICIAN: YASMEEN THOMAS   FINDINGS: AP radiograph of the chest was provided.       CARDIOMEDIASTINAL SILHOUETTE: Cardiomediastinal silhouette  is upper limits of normal in size, similar to prior exam.   LUNGS: New airspace opacity in the medial lower right lung along the right heart border corresponds to area of atelectasis on same day CT. No sizable pleural effusion or consolidation is present.   ABDOMEN: No remarkable upper abdominal findings.   BONES: No acute osseous changes.       1.  New airspace opacity in the medial lower right lung along the right heart border corresponds to area of atelectasis/scarring on same day CT. No consolidation or sizable pleural effusion is present.       MACRO: None   Signed by: Sheri Lakhani 7/24/2024 10:37 PM Dictation workstation:   [id number]    CT abdomen pelvis wo IV contrast    Result Date: 7/24/2024  Interpreted By:  Sheri Lakhani, STUDY: CT ABDOMEN PELVIS WO IV CONTRAST;  7/24/2024 8:54 pm   INDICATION: Signs/Symptoms:abd pain, distention, anemia.   COMPARISON: CT of the abdomen and pelvis dated 07/03/2024   ACCESSION NUMBER(S): [id number]   ORDERING CLINICIAN: YASMEEN THOMAS   TECHNIQUE: CT of the abdomen and pelvis was performed. Contiguous axial images were obtained at 3 mm slice thickness through the abdomen and pelvis. Coronal and sagittal reconstructions at 3 mm slice thickness were performed.  No intravenous or oral contrast agents were administered.   FINDINGS: Please note that the evaluation of vessels, lymph nodes and organs is limited without intravenous contrast.   LOWER CHEST: Area of bandlike scarring/atelectasis is present in the right lower lobe, new since prior exam on 07/03/2024. No new consolidation or pleural effusion is present in the left lung base. Paraseptal and centrilobular emphysematous changes are similar to prior exam.   Heart is normal in size without pericardial effusion.   Distal esophagus is unremarkable in appearance.   ABDOMEN:   LIVER: Within limits of noncontrast exam, no acute hepatic abnormality is present. Several subcentimeter hepatic hypodensities  are too small to definitely characterize.   BILE DUCTS: No new intrahepatic or extrahepatic biliary dilatation is evident.   GALLBLADDER: Several radiopaque stones are present in the gallbladder without evidence of new wall thickening or pericholecystic stranding.   PANCREAS: No ductal dilatation or peripancreatic stranding is present.   SPLEEN: Spleen is unremarkable in appearance.   ADRENAL GLANDS: Adrenal glands are unremarkable in appearance.   KIDNEYS AND URETERS: Kidneys are symmetric in size without evidence of new hydronephrosis or radiopaque nephrolithiasis. Visualized upper ureters are unremarkable in appearance.   PELVIS:   BLADDER: No bladder wall thickening is present.   REPRODUCTIVE ORGANS: Uterus is present. No adnexal masses or fluid collections are identified.   BOWEL: Stomach is somewhat distended with fluid and food debris without evidence of wall thickening. No abnormal small bowel dilatation is present. Postsurgical changes of prior bowel resection are present in the right lower quadrant with bowel ring anastomosis, similar to prior study. No inflammatory large bowel wall thickening is present.   Appendix is unremarkable in appearance.   VESSELS: Atherosclerotic plaques are present in the abdominal aorta without evidence of acute vascular abnormality.   PERITONEUM/RETROPERITONEUM/LYMPH NODES: There is no evidence of free air, free fluid, or thick-walled collections in the abdomen or pelvis. No new enlarged lymphadenopathy is present in the abdomen or pelvis.   There is slight mesenteric fat stranding present in the anterior lower abdomen near the umbilicus, immediately deep to the left rectus abdominis muscle at the site of hyperdense hematoma described on previous exam in July of 2024.   ABDOMINAL WALL: There is mild asymmetric thickening with some overlying cutaneous stranding present in the medial left rectus abdominis muscle near the level of the umbilicus, at the site of hyperdense  attenuation described on previous exam on 07/03/2024, likely representing resolving postsurgical hematoma.   There is a small fat containing spigelian hernia are present in the left rectus abdominis muscle immediately superior (series 604, image 66), with mild surrounding fat stranding, which is new from prior exam.   No additional new abnormalities are present in the cutaneous tissues of the abdomen or pelvis.   BONES: Multilevel degenerative changes are present in the lumbar spine, similar to prior exam. No new compression fracture or high-grade stenosis is evident.       1.  No acute abnormality is present within the abdomen or pelvis. Mild fat stranding is noted in the anterior mesentery, deep to the umbilicus and hyperdense hematoma in the left rectus abdominis muscle described on previous exam. 2. Small new spigelian hernia containing fat is present in the left rectus abdominis muscle with some overlying cutaneous fat stranding. No herniated bowel is present. 3. Postsurgical changes of small-bowel resection and anastomosis are present in the right lower quadrant without inflammatory bowel wall thickening or abnormal bowel dilatation. 4. New area of scarring/atelectasis is present in the right lower lobe medially, with improvement in the other areas of atelectasis seen on previous exam on 07/03/2024.     MACRO: None   Signed by: Sheri Lakhani 7/24/2024 9:10 PM Dictation workstation:   [id number]    Electrocardiogram, 12-lead PRN ACS symptoms    Result Date: 7/16/2024  Sinus tachycardia Right atrial enlargement Borderline ECG When compared with ECG of 20-JUN-2024 21:19, No significant change was found Confirmed by Edward Wheat ([id number]) on 7/16/2024 4:34:29 PM    XR chest 1 view    Result Date: 7/9/2024  Interpreted By:  Karina Ventura, STUDY: XR CHEST 1 VIEW;  7/9/2024 11:10 am   INDICATION: Signs/Symptoms:sob.   COMPARISON: 07/04/2024   ACCESSION NUMBER(S): [id number]   ORDERING CLINICIAN: CORBY  TENZIN   FINDINGS: Artifact from overlying monitoring leads noted. Right jugular central line remains in place with tip overlying the distal SVC. Interval removal of NG tube. hazy opacity in the left lung base. Pleural angles are sharp. Subcentimeter nodular shadow in the right mid chest. The cardiac silhouette is normal in size.       Subtle left basilar infiltrate or atelectasis.   Possible right chest nodule versus confluence of shadows. No definite correlate on recent chest CT 06/20/2024. Attention at follow-up suggested.   MACRO: None.   Signed by: Karina Ventura 7/9/2024 12:58 PM Dictation workstation:   [id number]    Vascular US upper extremity venous duplex right    Result Date: 7/6/2024  Interpreted By:  Zack Sevilla, STUDY: VASC US UPPER EXTREMITY VENOUS DUPLEX RIGHT  7/6/2024 2:48 pm   INDICATION: 62 y/o   F with  Signs/Symptoms:Swelling.   COMPARISON: None.   ACCESSION NUMBER(S): [id number]   ORDERING CLINICIAN: DELPHINE VELÁZQUEZ   TECHNIQUE: Routine ultrasound of the right upper extremity was performed with duplex Doppler (color and spectral) evaluation.   Static images were obtained for remote interpretation.   FINDINGS: UPPER EXTREMITY VEINS:  Examination was performed with color and duplex Doppler.   Nonocclusive deep vein thrombosis is noted in the right brachial vein. All other deep veins in the right upper extremity are patent and compressible with no thrombosis. There is a PICC line in the internal jugular vein.       Nonocclusive thrombosis involving the right brachial vein   MACRO: None   Signed by: Zack Sevilla 7/6/2024 3:03 PM Dictation workstation:   [id number]    XR abdomen 1 view    Result Date: 7/5/2024  Interpreted By:  Magdalena Carrasco, STUDY: XR ABDOMEN 1 VIEW;  7/5/2024 9:19 am   INDICATION: Signs/Symptoms:abdominal distension.   COMPARISON: None.   ACCESSION NUMBER(S): [id number]   ORDERING CLINICIAN: MINERVA AVILEZ   FINDINGS: Nonobstructive bowel gas pattern. Limited evaluation of  pneumoperitoneum on supine imaging. NG tube terminating in the left upper quadrant likely within the gastric body. Excreted contrast within a nondistended colon     Visualized lungs are clear.   Osseous structures demonstrate no acute bony changes.       1. A nonspecific, nonobstructive bowel gas pattern. 2. NG tube   MACRO: None   Signed by: Magdalena Carrasco 7/5/2024 9:35 AM Dictation workstation:   [id number]    XR chest 1 view    Result Date: 7/4/2024  Interpreted By:  Abhishek Noble, STUDY: XR CHEST 1 VIEW;  7/4/2024 8:43 pm   INDICATION: Signs/Symptoms:cvc placement.   COMPARISON: Chest x-ray 06/18/2024   ACCESSION NUMBER(S): [id number]   ORDERING CLINICIAN: MAIRA WILL   FINDINGS: Right IJ central line terminates in the SVC. Enteric tube terminates in the mid abdomen with side hole below the GE junction. Multiple overlying leads are present.   CARDIOMEDIASTINAL SILHOUETTE: Cardiomediastinal silhouette is normal in size and configuration.   LUNGS: No consolidation, pleural effusion or pneumothorax. Bibasilar atelectasis.   ABDOMEN: No remarkable upper abdominal findings.   BONES: No acute osseous abnormality.       Support hardware as described above.   Bibasilar airspace opacities favored to relate to compressive atelectasis. Superimposed infection not excluded. Attention on continued follow-up is advised.   MACRO: None   Signed by: Abhishek Noble 7/4/2024 8:58 PM Dictation workstation:   [id number]    US abdomen limited    Result Date: 7/4/2024  Interpreted By:  Finkelstein, Evan, STUDY: US ABDOMEN LIMITED; ;  7/4/2024 2:44 am   INDICATION: rectus sheath Hematoma Evaluation.   COMPARISON: CT abdomen pelvis 07/03/2024   ACCESSION NUMBER(S): [id number]   ORDERING CLINICIAN: ROBERT NOLASCO   TECHNIQUE: Targeted grayscale sonographic imaging in the mid abdomen with color Doppler as needed.   FINDINGS: There is a 2.7 x 1 x 0.6 cm heterogeneous collection within the mid abdominal wall soft tissues just above the  site of surgical incision. The collection demonstrates no internal vascularity and is located approximately 0.4 cm from the skin surface.       2.7 x 1 x 0.6 cm heterogeneous collection within the anterior abdominal wall soft tissues most compatible with hematoma approximately 0.4 cm below the skin surface.   MACRO: None.   Signed by: Evan Finkelstein 7/4/2024 3:44 AM Dictation workstation:   [id number]    CT abdomen pelvis wo IV contrast    Result Date: 7/3/2024  Interpreted By:  Gabe Gage, STUDY: CT ABDOMEN PELVIS WO IV CONTRAST;  7/3/2024 10:25 am   INDICATION: 62 y/o   F with  Signs/Symptoms:POD 11 ex lap and partial SBR, ileus, increased abdominal distention.   LIMITATIONS: Evaluation of the solid organs is limited due to non use of IV contrast.   ACCESSION NUMBER(S): [id number]   ORDERING CLINICIAN: MARVA NAVA   TECHNIQUE: Thin-section noncontrast spiral axial images were obtained from the xiphoid down through the symphysis pubis. Sagittal and coronal reconstruction images were generated. Bone, mediastinal, lung, and liver windows were reviewed.   COMPARISON: Most recent prior is from 06/26/2024. Correlation with KUB from earlier today..   FINDINGS: LUNG BASES: Tiny bilateral pleural effusions. Emphysematous changes at the lung bases. There is mild atelectasis at both lung bases.   LIVER: No hepatomegaly. Liver density was  within the limits of normal. No gross liver lesion in this unenhaced exam.   GALLBLADDER: Mild cholelithiasis. No gallbladder wall thickening, or adjacent edema.   BILE DUCTS: No intrahepatic biliary ductal dilatation. Common bile duct was within the limits of normal.   SPLEEN: No splenomegaly. No gross splenic mass..   PANCREAS: No pancreatic mass or inflammation, or ductal dilatation.   KIDNEYS/ADRENALS: No adrenal mass or enlargement. Mild global right renal atrophy. No calcified stone, hydronephrosis, mass, or perinephric edema in either kidney. No ureteral stone or  dilatation.   BLADDER/PELVIS: Urinary bladder was grossly intact. No uterine enlargement or gross adnexal mass.   GREAT VESSELS/RETROPERITONEUM: Mild aortoiliac calcifications. Otherwise, abdominal aorta and IVC were intact. No suspicious retroperitoneal adenopathy. No suspicious mesenteric adenopathy. No suspicious pelvic or inguinal adenopathy.   PERITONEUM: See below.   BOWEL: The patient recently had a Gastrografin challenge. There is an NG tube in the stomach. The stomach otherwise unremarkable. There was a small amount of residual Gastrografin contrast material in multiple loops of mid to distal small bowel. Small bowel surgical reanastomosis in the anterior right pelvis on image 107 was unremarkable. There was no suspicious small bowel wall thickening or small bowel dilatation in this exam. There was Gastrografin contrast in the cecum extending all the way into the distal left colon. There was mild-to-moderate stool and gas throughout the colon down into the rectum. No colonic wall thickening or adjacent edema. There was a tiny amount mesenteric edema in the anterior inferior right pelvis in the region of the anastomosis, and there was trace edema in the mesentery of the deep posteroinferior pelvis. No focal fluid collection. No pneumoperitoneum.   BONES: No destructive lytic or blastic bone lesion.   ABDOMINAL WALL: Stable vertically oriented line skin staples in the midline the lower abdomen through the mid pelvis. There is a subtle localized area heterogeneous fullness in the mid medial aspect of the left rectus abdominus muscle at the level of the pelvic inlet measuring 24 x 17 mm on image 88, not evident previously..       Recent partial small bowel resection with reanastomosis. The reanastomosis is unremarkable today, with no indication of bowel obstruction or perforation. There is very mild mesenteric edema in the region of the anastomosis that is consistent with recent surgery. There is no focal fluid  collection within the peritoneal cavity worrisome for abscess or hematoma.   New heterogeneous fullness in the anteromedial left rectus abdominus muscle at the level of pelvic inlet measuring 24 x 17 mm on image 88. This could be a small hematoma or even a developing small abscess. No associated gas density. Clinical correlation needed.   There is Gastrografin contrast material in multiple loops of otherwise unremarkable distal small bowel and also throughout the colon down through the distal left. No indication of bowel obstruction in this exam.   Mild-to-moderate stool and gas throughout the colon and rectum.   NG tube in place as described.   Tiny bilateral pleural effusions with scant atelectasis at the lung bases.   Mild cholelithiasis.   Mild global atrophy of the right kidney.   MACRO: None   Signed by: Gabe Gage 7/3/2024 11:52 AM Dictation workstation:   [id number]    XR abdomen 1 view    Result Date: 7/3/2024  Interpreted By:  Gabe Gage, STUDY: XR ABDOMEN 1 VIEW;  7/3/2024 7:44 am   INDICATION: Signs/Symptoms:abdominal distension, s/p gastrographin study.   COMPARISON: Most recent prior KUB is from 07/02/2024.   ACCESSION NUMBER(S): [id number]   ORDERING CLINICIAN: MINERVA AVILEZ   TECHNIQUE: Single supine view of the abdomen and pelvis was obtained.     FINDINGS: There was   a stable NG tube in place. There is Gastrografin contrast material throughout the colon. There is mild persistent small-bowel gas with minimal dilatation. Vertically oriented line skin staples to the right of midline, unchanged.. There is mild-to-moderate stool throughout the colon.   There was no gross organomegaly. There were no abnormal calcifications. No destructive bone lesion.       Stable NG tube in place. Stable vertically oriented line of skin staples to the right of midline.   Previously  administered Gastrografin contrast material is now throughout the colon extending into the sigmoid. There is also mild-to-moderate  stool throughout the colon. Consistent with delayed bowel transit time.   Mild residual small bowel gas with minimal loop dilatation, slightly improved. Suspect underlying ileus.   MACRO: None   Signed by: Gabe Gage 7/3/2024 8:23 AM Dictation workstation:   [id number]    XR abdomen 2 views supine and erect or decub    Result Date: 7/2/2024  Interpreted By:  Karina Ventura, STUDY: XR ABDOMEN 2 VIEWS SUPINE AND ERECT OR DECUB; XR ABDOMEN 1 VIEW; 7/2/2024 1:00 am; 7/2/2024 8:03 am; 7/2/2024 2:20 am   INDICATION: Signs/Symptoms:Prolonged ilues; Signs/Symptoms:Gastrografin challenge   COMPARISON: 06/30/2024   ACCESSION NUMBER(S): [id number]; [id number]; [id number]   ORDERING CLINICIAN: MINERVA APARICIO   FINDINGS: 4 supine and erect views of the abdomen obtained at 12:42 a.m. and 1:59 a.m.. Additional single abdominal radiograph obtained at 7:57 a.m..   Midline skin clips. NG tube in place with tip overlying the mid stomach. Multiple distended large and small bowel loops containing air and fecal debris. No rectal gas on initial images.   Contrast material in the proximal stomach on images at 12:43 a.m. administered by unknown person. Transit of contrast material into distended central abdominal small bowel loops on images at 1:59 a.m. and into a few additional right-sided distended small bowel loops on image at 7:57 a.m..       Distended large and small bowel loops with prolonged small bowel contrast transit time consistent with ileus, less likely distal obstruction. Follow-up or further evaluation with CT as clinically warranted.   MACRO: None.   Signed by: Karina Ventura 7/2/2024 8:26 AM Dictation workstation:   [id number]    XR abdomen 2 views supine and erect or decub    Result Date: 7/2/2024  Interpreted By:  Karina Ventura, STUDY: XR ABDOMEN 2 VIEWS SUPINE AND ERECT OR DECUB; XR ABDOMEN 1 VIEW; 7/2/2024 1:00 am; 7/2/2024 8:03 am; 7/2/2024 2:20 am   INDICATION: Signs/Symptoms:Prolonged ilues;  Signs/Symptoms:Gastrografin challenge   COMPARISON: 06/30/2024   ACCESSION NUMBER(S): [id number]; [id number]; [id number]   ORDERING CLINICIAN: MINERVA APARICIO   FINDINGS: 4 supine and erect views of the abdomen obtained at 12:42 a.m. and 1:59 a.m.. Additional single abdominal radiograph obtained at 7:57 a.m..   Midline skin clips. NG tube in place with tip overlying the mid stomach. Multiple distended large and small bowel loops containing air and fecal debris. No rectal gas on initial images.   Contrast material in the proximal stomach on images at 12:43 a.m. administered by unknown person. Transit of contrast material into distended central abdominal small bowel loops on images at 1:59 a.m. and into a few additional right-sided distended small bowel loops on image at 7:57 a.m..       Distended large and small bowel loops with prolonged small bowel contrast transit time consistent with ileus, less likely distal obstruction. Follow-up or further evaluation with CT as clinically warranted.   MACRO: None.   Signed by: Karina Ventura 7/2/2024 8:26 AM Dictation workstation:   [id number]    XR abdomen 1 view    Result Date: 7/2/2024  Interpreted By:  Karina Ventura, STUDY: XR ABDOMEN 2 VIEWS SUPINE AND ERECT OR DECUB; XR ABDOMEN 1 VIEW; 7/2/2024 1:00 am; 7/2/2024 8:03 am; 7/2/2024 2:20 am   INDICATION: Signs/Symptoms:Prolonged ilues; Signs/Symptoms:Gastrografin challenge   COMPARISON: 06/30/2024   ACCESSION NUMBER(S): [id number]; [id number]; [id number]   ORDERING CLINICIAN: MINERVA APARICIO   FINDINGS: 4 supine and erect views of the abdomen obtained at 12:42 a.m. and 1:59 a.m.. Additional single abdominal radiograph obtained at 7:57 a.m..   Midline skin clips. NG tube in place with tip overlying the mid stomach. Multiple distended large and small bowel loops containing air and fecal debris. No rectal gas on initial images.   Contrast material in the proximal stomach on images at 12:43  a.m. administered by unknown person. Transit of contrast material into distended central abdominal small bowel loops on images at 1:59 a.m. and into a few additional right-sided distended small bowel loops on image at 7:57 a.m..       Distended large and small bowel loops with prolonged small bowel contrast transit time consistent with ileus, less likely distal obstruction. Follow-up or further evaluation with CT as clinically warranted.   MACRO: None.   Signed by: Robert Ventura 7/2/2024 8:26 AM Dictation workstation:   [id number]    XR abdomen 1 view    Result Date: 6/30/2024  Interpreted By:  Bethel Porras, STUDY: XR ABDOMEN 1 VIEW   INDICATION: Signs/Symptoms:NG moved, reconfirm proper placement.   COMPARISON: June 30th earlier the same day   ACCESSION NUMBER(S): [id number]   ORDERING CLINICIAN: ROBERT NOLASCO   FINDINGS: Nasogastric tube reflected upon itself slightly within the stomach over the gastric fundus.   Bowel-gas pattern incompletely assessed.       Nasogastric tube reflected upon itself slightly within the stomach over the gastric fundus.   Signed by: Bethel Porras 6/30/2024 4:01 PM Dictation workstation:   [id number]    XR abdomen 1 view    Result Date: 6/30/2024  Interpreted By:  Sejal Dykes, STUDY: XR ABDOMEN 1 VIEW;  6/30/2024 7:56 am. 2 views.   INDICATION: Signs/Symptoms:Post-op ileus.   COMPARISON: 06/29/2024   ACCESSION NUMBER(S): [id number]   ORDERING CLINICIAN: ROBERT NOLASCO   FINDINGS: There is a nasogastric tube with the tip in the proximal stomach and side hole beyond the gastroesophageal junction. There is no gastric distention. There is a vertical orientation of cutaneous staples from recent abdominal surgery. There is moderate-to-marked distention of central small bowel loops, unchanged. Findings could represent a postoperative ileus but could represent a partial small bowel obstruction. There is a large amount of stool in the right side of the colon, unchanged.   There are no  pathologic calcifications.   Visualized lungs are clear.   Osseous structures demonstrate no acute bony changes.         1. Nasogastric tube with the tip in the proximal stomach; no gastric distention. 2. Persistent moderate-to-marked central small-bowel dilatation which could represent a postoperative ileus versus partial small bowel obstruction. This is unchanged. 3. Large amount of stool right side of the colon.   MACRO: None   Signed by: Sejal Dykes 6/30/2024 8:11 AM Dictation workstation:   [id number]    XR abdomen 1 view    Result Date: 6/29/2024  Interpreted By:  [place] Roberts, STUDY: XR ABDOMEN 1 VIEW;  6/29/2024 9:05 am   INDICATION: Signs/Symptoms:Abdominal distension.   COMPARISON: 06/24/2024   ACCESSION NUMBER(S): [id number]   ORDERING CLINICIAN: ROBERT NOLASCO   FINDINGS: There is prominent stool in the right colon. There is gaseous distention of multiple loops of small bowel. Nasogastric tube overlies the stomach. Skin staples are seen in the midline of the lower abdomen.       Slight increase in the gaseous distention of the small bowel since the previous exam possibly due to postoperative ileus although partial small bowel obstruction can also have this appearance. Large amount of formed stool in the right colon.   MACRO: None   Signed by: Florida Roberts 6/29/2024 10:22 AM Dictation workstation:   [id number]    CT angio chest for pulmonary embolism    Result Date: 6/28/2024  Interpreted By:  Gabe Gage, STUDY: CT ANGIO CHEST FOR PULMONARY EMBOLISM;  6/28/2024 7:04 am   INDICATION: 62 y/o   F with  Signs/Symptoms:PE.   LIMITATIONS: None.   ACCESSION NUMBER(S): [id number]   ORDERING CLINICIAN: DELPHINE VELÁZQUEZ   TECHNIQUE: After the administration of intravenous nonionic contrast, thin-section arterial phase images were obtained  from the thoracic inlet down through the iliac crest. Sagittal and coronal reconstruction images were generated. Axial and coronal MIP vascular reconstruction images  were also generated.   Mediastinal, lung, bone, and liver windows were reviewed. 75 ML Omnipaque 350     COMPARISON: Most recent prior from 06/13/2024. correlation with CT scans of the abdomen and pelvis, most recent from 06/26/2024.   FINDINGS: CHEST WALL/BASE OF THE NECK: The chest wall was grossly intact. No thyromegaly or thyroid mass.   MEDIASTINUM/ALESSANDRO: No suspicious mediastinal, hilar, or axillary mass or adenopathy. No cardiomegaly. No pericardial effusion. No thoracic aortic aneurysm or dissection. No pulmonary artery filling defect.   LUNGS/ PLEURA/ AND TRACHEA: Stable underlying fat containing posteromedial left-sided diaphragmatic hernia. Band of atelectasis across the posterior right lung base. Mild atelectasis at the posteromedial left lung base. Underlying emphysema with upper lobe predominance. No mass or pneumonia in either lung. Tiny bilateral pleural effusions. No pneumothorax. The trachea was grossly intact.   BONES: No destructive lytic or blastic bone lesion.   UPPER ABDOMEN: There is an NG tube in place with distal tip in the distal gastric body. Cholelithiasis. Contracted gallbladder. The imaged portions of the liver   were unremarkable. There are subtle stable small hypodensities in the spleen compared to 06/26/2024. Imaged kidneys and adrenal glands were intact. No upper abdominal ascites. The imaged stomach and large bowel. There is a bilobed hypodensity adjacent the pancreatic tail the left abdomen measuring 24 mm AP x 17 mm transversely on image 287, measuring 24 Hounsfield units of CT density. This is stable compared to the most recent prior CT scan abdomen and pelvis. It is also grossly stable compared to 06/20/2024 and was not present on 03/22/2016.       No CT evidence of pulmonary embolism in the current exam.   Emphysema. Band of atelectasis at the right lung base. Confluent atelectasis at the posteromedial left lung base. Tiny bilateral pleural effusions.. No mass or pneumonia in  either lung.   NG tube in place as described.   Cholelithiasis.   Stable bilobed hypodensity adjacent to the pancreatic tail. Pancreatic pseudocyst versus cystic pancreatic neoplasms. In addition, there are subtle stable nonspecific splenic hypodensities which could be splenic hemangiomas. Recommend further evaluation with MRI the pancreas and spleen.   MACRO: None   Signed by: Gabe Gage 6/28/2024 7:55 AM Dictation workstation:   [id number]    Vascular US upper extremity venous duplex left    Result Date: 6/27/2024             [place] [address]   Tel [phone] and Fax [phone]  Vascular Lab Report VASC US UPPER EXTREMITY VENOUS DUPLEX LEFT  Patient Name:      DINA GREENE       Reading Physician:  [id number] Rusty Villalba DO Study Date:        6/27/2024            Ordering Physician: [id number] REECE MRN/PID:           [id number]             Technologist:       Caren Leigh RVT Accession#:        [id number]         Technologist 2: Date of Birth/Age: 1960 / 63 years Encounter#:         [id number] Gender:            F Admission Status:  Inpatient            Location Performed: [facility]  Diagnosis/ICD: Left arm swelling-M79.89 CPT Codes:     16572 Peripheral venous duplex scan for DVT Limited  **CRITICAL RESULT** Critical Result: Acute DVT in the left distal brachial vein Notification called to Katharina Pineda RN on 6/27/2024 at 3:12:42 PM.  CONCLUSIONS: Right Upper Venous: The subclavian vein demonstrates a pulsatile flow. Left Upper Venous: There is acute non-occlusive deep vein thrombosis visualized in the brachial vein. The remainder of the left arm is negative for deep vein thrombosis.  Imaging & Doppler Findings:  Right        Flow Subclavian Pulsatile  Left                Compress    Thrombus            Flow Internal Jugular      Yes         None           Pulsatile Subclavian            Yes         None Subclavian Proximal   Yes          None           Pulsatile Subclavian Mid        Yes         None           Pulsatile Subclavian Distal     Yes         None           Pulsatile Axillary              Yes         None       Spontaneous/Phasic Brachial               No    Acute occlusive Cephalic              Yes         None Basilic               Yes         None  [id number] Rusty Villalba DO Electronically signed by [id number] Rusty Villalba DO on 6/27/2024 at 9:37:37 PM  ** Final **     CT abdomen pelvis wo IV contrast    Result Date: 6/26/2024  Interpreted By:  Karina Ventura, STUDY: CT ABDOMEN PELVIS WO IV CONTRAST;  6/26/2024 8:51 am   INDICATION: Signs/Symptoms:POD 5 small bowel resection. Increased pain.   COMPARISON: 06/20/2024   ACCESSION NUMBER(S): [id number]   ORDERING CLINICIAN: ANITRA APARICIO   TECHNIQUE: CT of the abdomen and pelvis was performed. Sagittal and coronal reconstructions were generated.  No intravenous contrast given for the exam.   FINDINGS: Solid organ and vessel evaluation limited without IV contrast.   ABDOMINAL ORGANS:   LIVER: No focal lesion within limits of unenhanced exam.   GALL BLADDER AND BILIARY TREE: Subtle density layer near the gallbladder neck again seen. No gallbladder wall thickening or biliary dilatation within limits of unenhanced exam   SPLEEN: 1.3 cm subtle hypodense lesion in the inferior pole image 36/155.   PANCREAS: Probable 1.4 cm hypodense lesion in the tail image 44/155. Few punctate calcifications in the head.   ADRENALS: Uneven hypodense thickening of both adrenal glands similar to the prior exam.   KIDNEYS AND URETERS: Atrophic right kidney. Mild relatively symmetric perinephric fat stranding. No focal renal mass within limits of unenhanced exam. No hydronephrosis.   BOWEL: NG tube extends into the body of distended fluid and air-filled stomach. New right lower quadrant small bowel suture line. Multiple dilated air and fluid-filled small bowel loops with scattered air-fluid levels. Moderate colonic fecal  residue. Distal colon diverticulosis.   PERITONEUM, RETROPERITONEUM, NODES: Minimal free fluid in the pelvis. Mild stranding in the right lower quadrant inferior to suture line. No free intraperitoneal air. No significant retroperitoneal adenopathy within limits of unenhanced exam.   VESSELS:  Scattered atherosclerotic calcifications. Lack of IV contrast precludes vascular luminal assessment. No abdominal aortic aneurysm.   PELVIS: Urinary bladder is moderately distended and grossly normal in contour. Limited delineation of the uterus separate from adjacent bowel loops.   ABDOMINAL WALL: New midline skin clips. Few dots of air in the right lower quadrant and left mid abdominal wall. Mild fluid and stranding in both flanks, right-greater-than-left.   BONES: No focal concerning lytic or blastic osseous lesion.   LOWER CHEST: Moderate emphysematous changes. New coarse bandlike opacities in both lung bases with small pleural effusions.       New right lower quadrant suture line reportedly status post small bowel resection with multiple dilated air and fluid-filled small bowel loops that could reflect mild postop ileus or partial obstruction. Follow-up as clinically warranted.   Small hypodense lesions in the pancreatic tail and spleen, can not be further characterized on unenhanced exam. Attention at follow-up or further evaluation with MRI recommended.   Bandlike infiltrates or atelectasis in both lung bases with small pleural effusions.   Numerous additional findings as described above.   MACRO: None.   Signed by: Karina Ventura 6/26/2024 9:08 AM Dictation workstation:   [id number]   Results for orders placed or performed during the hospital encounter of 07/24/24 (from the past 24 hour(s))   CBC and Auto Differential   Result Value Ref Range    WBC 11.3 4.4 - 11.3 x10*3/uL    nRBC 0.4 (H) 0.0 - 0.0 /100 WBCs    RBC 2.40 (L) 4.00 - 5.20 x10*6/uL    Hemoglobin 6.2 (LL) 12.0 - 16.0 g/dL    Hematocrit 21.4 (L) 36.0 - 46.0  %    MCV 89 80 - 100 fL    MCH 25.8 (L) 26.0 - 34.0 pg    MCHC 29.0 (L) 32.0 - 36.0 g/dL    RDW 20.8 (H) 11.5 - 14.5 %    Platelets 1,029 (H) 150 - 450 x10*3/uL    Neutrophils % 58.0 40.0 - 80.0 %    Immature Granulocytes %, Automated 0.6 0.0 - 0.9 %    Lymphocytes % 27.6 13.0 - 44.0 %    Monocytes % 13.5 2.0 - 10.0 %    Eosinophils % 0.1 0.0 - 6.0 %    Basophils % 0.2 0.0 - 2.0 %    Neutrophils Absolute 6.57 1.20 - 7.70 x10*3/uL    Immature Granulocytes Absolute, Automated 0.07 0.00 - 0.70 x10*3/uL    Lymphocytes Absolute 3.12 1.20 - 4.80 x10*3/uL    Monocytes Absolute 1.53 (H) 0.10 - 1.00 x10*3/uL    Eosinophils Absolute 0.01 0.00 - 0.70 x10*3/uL    Basophils Absolute 0.02 0.00 - 0.10 x10*3/uL   Comprehensive Metabolic Panel   Result Value Ref Range    Glucose 97 74 - 99 mg/dL    Sodium 136 136 - 145 mmol/L    Potassium 4.0 3.5 - 5.3 mmol/L    Chloride 103 98 - 107 mmol/L    Bicarbonate 24 21 - 32 mmol/L    Anion Gap 13 10 - 20 mmol/L    Urea Nitrogen 11 6 - 23 mg/dL    Creatinine 0.67 0.50 - 1.05 mg/dL    eGFR >90 >60 mL/min/1.73m*2    Calcium 7.3 (L) 8.6 - 10.3 mg/dL    Albumin 2.5 (L) 3.4 - 5.0 g/dL    Alkaline Phosphatase 133 33 - 136 U/L    Total Protein 5.0 (L) 6.4 - 8.2 g/dL    AST 28 9 - 39 U/L    Bilirubin, Total 0.2 0.0 - 1.2 mg/dL    ALT 34 7 - 45 U/L   Lactate   Result Value Ref Range    Lactate 1.1 0.4 - 2.0 mmol/L   Troponin I, High Sensitivity   Result Value Ref Range    Troponin I, High Sensitivity 7 0 - 13 ng/L   Protime-INR   Result Value Ref Range    Protime 16.0 (H) 9.8 - 12.8 seconds    INR 1.4 (H) 0.9 - 1.1   Blood Culture    Specimen: Peripheral Venipuncture; Blood culture   Result Value Ref Range    Blood Culture Loaded on Instrument - Culture in progress    Blood Culture    Specimen: Peripheral Venipuncture; Blood culture   Result Value Ref Range    Blood Culture Loaded on Instrument - Culture in progress    Type and Screen   Result Value Ref Range    ABO TYPE O     Rh TYPE POS      ANTIBODY SCREEN NEG    Lipase   Result Value Ref Range    Lipase 163 (H) 9 - 82 U/L   Pathologist Review-CBC Differential   Result Value Ref Range    Pathologist Review-CBC Differential       Review of peripheral blood smear shows normocytic anemia with anisocytosis, polychromasia, ovalocytes and a few target cells.  Thrombocytosis and mild leukocytosis.  Further clinical correlation is recommended.   Morphology   Result Value Ref Range    RBC Morphology See Below     Polychromasia Mild     RBC Fragments Few     Target Cells Few     Teardrop Cells Few     Bite Cells Present    Urinalysis with Reflex Culture and Microscopic   Result Value Ref Range    Color, Urine Light-Yellow Light-Yellow, Yellow, Dark-Yellow    Appearance, Urine Clear Clear    Specific Gravity, Urine 1.012 1.005 - 1.035    pH, Urine 6.0 5.0, 5.5, 6.0, 6.5, 7.0, 7.5, 8.0    Protein, Urine NEGATIVE NEGATIVE, 10 (TRACE), 20 (TRACE) mg/dL    Glucose, Urine Normal Normal mg/dL    Blood, Urine NEGATIVE NEGATIVE    Ketones, Urine NEGATIVE NEGATIVE mg/dL    Bilirubin, Urine NEGATIVE NEGATIVE    Urobilinogen, Urine Normal Normal mg/dL    Nitrite, Urine 2+ (A) NEGATIVE    Leukocyte Esterase, Urine 500 Ivonne/µL (A) NEGATIVE   Microscopic Only, Urine   Result Value Ref Range    WBC, Urine >50 (A) 1-5, NONE /HPF    RBC, Urine 6-10 (A) NONE, 1-2, 3-5 /HPF    Squamous Epithelial Cells, Urine 1-9 (SPARSE) Reference range not established. /HPF    Bacteria, Urine 4+ (A) NONE SEEN /HPF    Mucus, Urine FEW Reference range not established. /LPF   Sars-CoV-2 PCR, Symptomatic   Result Value Ref Range    Coronavirus 2019, PCR Not Detected Not Detected   MRSA Surveillance for Vancomycin De-escalation, PCR    Specimen: Anterior Nares; Swab   Result Value Ref Range    MRSA PCR Not Detected Not Detected   Prepare RBC: 1 Units   Result Value Ref Range    PRODUCT CODE W7784R52     Unit Number [id number]     Unit ABO O     Unit RH POS     XM INTEP COMP     Dispense Status TR      Blood Expiration Date 8/19/2024 11:59:00 PM EDT     PRODUCT BLOOD TYPE 5100     UNIT VOLUME 350    CBC and Auto Differential   Result Value Ref Range    WBC 8.4 4.4 - 11.3 x10*3/uL    nRBC 1.1 (H) 0.0 - 0.0 /100 WBCs    RBC 3.26 (L) 4.00 - 5.20 x10*6/uL    Hemoglobin 8.6 (L) 12.0 - 16.0 g/dL    Hematocrit 28.1 (L) 36.0 - 46.0 %    MCV 86 80 - 100 fL    MCH 26.4 26.0 - 34.0 pg    MCHC 30.6 (L) 32.0 - 36.0 g/dL    RDW 19.0 (H) 11.5 - 14.5 %    Platelets 981 (H) 150 - 450 x10*3/uL    Neutrophils % 67.1 40.0 - 80.0 %    Immature Granulocytes %, Automated 0.8 0.0 - 0.9 %    Lymphocytes % 28.9 13.0 - 44.0 %    Monocytes % 3.2 2.0 - 10.0 %    Eosinophils % 0.0 0.0 - 6.0 %    Basophils % 0.0 0.0 - 2.0 %    Neutrophils Absolute 5.63 1.20 - 7.70 x10*3/uL    Immature Granulocytes Absolute, Automated 0.07 0.00 - 0.70 x10*3/uL    Lymphocytes Absolute 2.43 1.20 - 4.80 x10*3/uL    Monocytes Absolute 0.27 0.10 - 1.00 x10*3/uL    Eosinophils Absolute 0.00 0.00 - 0.70 x10*3/uL    Basophils Absolute 0.00 0.00 - 0.10 x10*3/uL   Comprehensive metabolic panel   Result Value Ref Range    Glucose 125 (H) 74 - 99 mg/dL    Sodium 138 136 - 145 mmol/L    Potassium 4.3 3.5 - 5.3 mmol/L    Chloride 105 98 - 107 mmol/L    Bicarbonate 22 21 - 32 mmol/L    Anion Gap 15 10 - 20 mmol/L    Urea Nitrogen 7 6 - 23 mg/dL    Creatinine 0.56 0.50 - 1.05 mg/dL    eGFR >90 >60 mL/min/1.73m*2    Calcium 7.6 (L) 8.6 - 10.3 mg/dL    Albumin 2.6 (L) 3.4 - 5.0 g/dL    Alkaline Phosphatase 119 33 - 136 U/L    Total Protein 5.1 (L) 6.4 - 8.2 g/dL    AST 26 9 - 39 U/L    Bilirubin, Total 0.2 0.0 - 1.2 mg/dL    ALT 31 7 - 45 U/L   Ferritin   Result Value Ref Range    Ferritin     Iron and TIBC   Result Value Ref Range    Iron 13 (L) 35 - 150 ug/dL    UIBC 229 110 - 370 ug/dL    TIBC 242 240 - 445 ug/dL    % Saturation 5 (L) 25 - 45 %          Assessment/Plan     63 y.o. F with multiple medical issues and recent admission for perforated small bowel with  peritonitis, s/p emergency ex laparotomy and small bowel resection on 6/21/24, presented with anemia and melena.  Possible etiology include upper GI bleed in the setting of anticoagulation, possibly PUD, erosive gastritis, small bowel AVM.    -Recommend twice daily PPI  -Monitor H&H, transfuse as needed to keep hemoglobin above 7  -Recommend to hold Eliquis  -N.p.o. after midnight, she is okay to eat today  -EGD tomorrow      I spent 45 minutes in the professional and overall care of this patient.      Nicki Pollock, APRN-CNP

## 2024-07-25 NOTE — PROGRESS NOTES
[facility]      07/25/24 0828   Current Planned Discharge Disposition   Current Planned Discharge Disposition SNF

## 2024-07-25 NOTE — PROGRESS NOTES
07/25/24 0828   ACS Disability Status   Are you deaf or do you have serious difficulty hearing? N   Are you blind or do you have serious difficulty seeing, even when wearing glasses? N   Because of a physical, mental, or emotional condition, do you have serious difficulty concentrating, remembering, or making decisions? (5 years old or older) N   Do you have serious difficulty walking or climbing stairs? Y   Do you have serious difficulty dressing or bathing? Y   Because of a physical, mental, or emotional condition, do you have serious difficulty doing errands alone such as visiting the doctor? Y

## 2024-07-25 NOTE — H&P (VIEW-ONLY)
Reason For Consult  Anemia, melena    History Of Present Illness  Fernando Cuevas is a 63 y.o. female with h/o COPD, HTN, alcohol abuse, dyspnea, esophagitis, JASMINE, h/o cocaine abuse, depression, lung cancer, elevated troponins, hyperlipidemia, nicotine dependence, overactive bladder, spontaneous pneumothorax, migraines, panic disorder, daily chronic headaches, perforated small bowel with peritonitis, s/p emergency ex laparotomy  on 6/21/24, found to have perforation in her small intestine (etiology unclear), s/p partial resection, DVT on Eliquis, presented with anemia. Pt reports having 2-3 black tarry stool for the past 3 days, not associated with abdominal pain, reports feeling dizzy, tired.  She reports cough with green mucus.  H&H on admission 6.2 and 21.4, normal BUN and creatinine.      She has history of dysphagia.  Had EGD and colonoscopy on 2/27/2024 at [facility], EGD was done for esophageal dysphagia and heartburn, showed hiatal hernia, normal esophagus.  Colonoscopy showed internal hemorrhoids, 2 hyperplastic polyps removed from the rectum.    EGD 06/2016 for dysphagia   - Incidental benign bleb found in the esophagus.                         - Focal abnormal mucosa was found in a half                          circumferential pattern in the lower third of the                          esophagus, possibly squamous dysplasia vs focal                          inflammation. Biopsied.                         - Mildly abnormal mucosa was found in the upper third                          and middle third of the esophagus, characterized by                          mildly congested, erythematous, texture changed,                          decreased vascular pattern mucosa in the esophagus.                          Biopsied.                         - 2 cm hiatal hernia.                         - Erythematous mucosa in the gastric fundus, gastric                          body, antrum and prepyloric region of the  stomach.                          Biopsied.                         - Normal examined duodenum.     Past Medical History  She has a past medical history of Essential (primary) hypertension (2016), Personal history of other diseases of the respiratory system, Personal history of other diseases of the respiratory system, and Personal history of other mental and behavioral disorders.    Surgical History  She has a past surgical history that includes CT angio abdomen pelvis w and or wo IV IV contrast (2016); MR angio neck w IV contrast (2021); CT angio coronary art with heartflow if score >30% (2023); and  section, low transverse.     Social History  She reports that she has been smoking cigarettes. She has been exposed to tobacco smoke. She has never used smokeless tobacco. She reports that she does not currently use alcohol. She reports that she does not currently use drugs.    Family History  No family history on file.     Allergies  Iodinated contrast media and Adhesive tape-silicones    Review of Systems  10 systems reviewed and negative other than HPI     Physical Exam  Physical Exam  Vitals reviewed.   Constitutional:       General: She is not in acute distress.     Appearance: Normal appearance. She is not toxic-appearing.   HENT:      Head: Normocephalic and atraumatic.      Nose: Nose normal.      Mouth/Throat:      Mouth: Mucous membranes are moist.      Pharynx: Oropharynx is clear.   Eyes:      Conjunctiva/sclera: Conjunctivae normal.   Cardiovascular:      Rate and Rhythm: Regular rhythm. Tachycardia present.      Pulses: Normal pulses.      Heart sounds: Normal heart sounds.   Pulmonary:      Effort: Pulmonary effort is normal.      Breath sounds: Normal breath sounds.   Abdominal:      General: Bowel sounds are normal. There is distension.      Palpations: Abdomen is soft. There is no mass.      Tenderness: There is abdominal tenderness. There is no guarding or rebound.     "  Hernia: No hernia is present.      Comments: Midabdominal incision covered with dressing.   Musculoskeletal:      Cervical back: Neck supple.   Neurological:      Mental Status: She is alert.            Last Recorded Vitals  Blood pressure (!) 159/133, pulse (!) 124, temperature 36.9 °C (98.4 °F), resp. rate 18, height 1.575 m (5' 2\"), weight 63 kg (139 lb), SpO2 99%.    Relevant Results      Scheduled medications  acetaminophen, 975 mg, oral, q8h  azithromycin, 500 mg, oral, q24h  budesonide, 0.5 mg, nebulization, BID  busPIRone, 5 mg, oral, BID  calcium carbonate, 1,250 mg, oral, BID  cefTRIAXone, 1 g, intravenous, q24h  dilTIAZem ER, 240 mg, oral, Daily  guaiFENesin, 600 mg, oral, BID  ipratropium-albuteroL, 3 mL, nebulization, TID  lidocaine, 1 patch, transdermal, Daily  melatonin, 3 mg, oral, Nightly  mirtazapine, 15 mg, oral, Nightly  montelukast, 10 mg, oral, Daily  oxybutynin, 2.5 mg, oral, BID  pantoprazole, 40 mg, intravenous, q12h  polyethylene glycol, 17 g, oral, Daily  predniSONE, 40 mg, oral, Daily  sennosides-docusate sodium, 1 tablet, oral, BID  thiamine, 100 mg, oral, Daily      Continuous medications     PRN medications  PRN medications: benzonatate, ipratropium-albuteroL, ondansetron ODT **OR** ondansetron  CT angio chest abdomen pelvis    Result Date: 7/25/2024  Interpreted By:  Abhishek Noble, STUDY: CT ANGIO CHEST ABDOMEN PELVIS;  7/25/2024 3:11 am   INDICATION: Signs/Symptoms:abd pain, radiating to  back and up to chest, post-operative hematoma on eliquis.   COMPARISON: CT scan of the abdomen and pelvis 07/24/2024.   ACCESSION NUMBER(S): [id number]   ORDERING CLINICIAN: YASMEEN THOMAS   TECHNIQUE: Axial CT images of the chest, abdomen and pelvis before and after intravenous administration of 100 mL of Omnipaque 350 using CT angiographic technique. Postcontrast imaging was performed in the arterial and delayed phases. Coronal and sagittal images are reconstructed.  3D reconstructions were " obtained at a separate workstation.   FINDINGS: VASCULAR:   AORTA: Unenhanced images demonstrate no evidence for intramural hematoma. No thoracic aortic aneurysm or dissection. Calcific atherosclerosis of the aorta. 2 vessel arch anatomy, a normal variant. Mild calcified plaque at origin of the arch vessels without hemodynamically significant stenosis.   Abdominal aorta is normal in caliber and course. No evidence for dissection or aneurysmal dilatation. There is scattered calcified and noncalcified plaque in the abdominal aorta. 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 The superior and inferior mesenteric arteries are patent. Single right renal artery demonstrates moderate narrowing proximally secondary to calcified and noncalcified plaque. Single left renal artery is widely patent.   There is scattered calcified and noncalcified plaque in the common and internal iliac arteries without hemodynamically significant stenosis. The external iliac and proximal femoral arteries are patent.   No acute pulmonary embolism to the proximal segmental arterial level.   The iliofemoral vessels and IVC are patent. Splenic, superior mesenteric, portal and hepatic veins are patent.   CHEST:   HEART: Normal size. No pericardial effusion. MEDIASTINUM AND ALESSANDRO: Prominent subcarinal lymph node measures 9 mm in short axis. No thoracic adenopathy. LUNG, PLEURA, LARGE AIRWAYS: Advanced centrilobular and paraseptal emphysema. Left Bochdalek's hernia contains fat. There are bibasilar airspace opacities slightly more consolidative in the right lung base. This may relate to atelectasis although superimposed infection not excluded. No effusion or pneumothorax. CHEST WALL AND LOWER NECK: Within normal limits. BONES: No acute osseous abnormality.   ABDOMEN: Arterial phase of imaging limits evaluation of the solid viscera.   LIVER: Normal morphology. A 5 mm hypervascular focus in the left  hepatic lobe may relate to perfusion shunts versus small flash filling hemangioma. Focal area of low attenuation adjacent to the fissure for ligamentum teres may relate focal fatty infiltration. BILE DUCTS: Normal caliber. GALLBLADDER: Layering high density in the gallbladder neck likely relates to small calculi. No gallbladder wall thickening. PANCREAS: Homogeneous enhancement of the pancreas without evidence for ductal dilatation or peripancreatic inflammatory changes. There is a bilobed hyperdense lesion abutting the pancreatic tail with the larger component measuring up to 1.3 cm. This is stable from CT scan of 07/03/2024 but new when compared to CT angiogram 03/22/2016. This measures approximately 48 Hounsfield units on the unenhanced CT, 49 Hounsfield units on the arterial phase and 47 Hounsfield units on the delayed imaging. No definite enhancement. SPLEEN: Within normal limits. ADRENALS:  Nodular thickening of the adrenal glands may relate to hyperplasia or adenomatous change. KIDNEYS: Right kidney is slightly atrophic when compared to the left. No hydronephrosis or perinephric fluid collection. URETERS: No hydroureter.   PELVIS:   REPRODUCTIVE ORGANS: Uterus is present. No adnexal mass. BLADDER: Within normal limits.   RETROPERITONEUM: Within normal limits. BOWEL: Stomach is partially distended. Postsurgical changes of small-bowel anastomosis in the right lower quadrant is redemonstrated. Moderate to large stool burden. No pneumatosis or portal venous gas. Normal appendix. PERITONEUM: Mild subcutaneous soft tissue stranding in the mid abdomen at level of the umbilicus. No free air. Interval evolution with decreased size of previously noted rectus sheath hematoma on CT scan of 07/03/2024. No new fluid collection is seen.   ABDOMINAL WALL: There is subcutaneous soft tissue stranding in the mid abdomen similar to recent CT likely postsurgical. BONES: Multilevel degenerative changes of the spine.         No  aortic aneurysm or dissection.   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   Single right renal artery demonstrates moderate narrowing proximally secondary to calcified and noncalcified plaque. Right kidney is slightly atrophic when compared to the left, likely chronic.   Advanced centrilobular and paraseptal emphysema.  There are bibasilar airspace opacities slightly more consolidative in the right lung base. This may relate to atelectasis although superimposed infection not excluded. Correlate clinically.   Postsurgical changes of partial small-bowel resection with anastomosis in the right lower quadrant. No evidence for bowel obstruction.   There is a bilobed hyperdense lesion abutting the pancreatic tail with the larger component measuring up to 1.3 cm. This is stable from CT scan of 07/03/2024 but new when compared to CT angiogram 03/22/2016. This measures approximately 48 Hounsfield units on the unenhanced CT without significant enhancement on the arterial and delayed phases. Findings are favored to relate to small hematoma. Attention on continued short-term follow-up is advised.   Mild subcutaneous soft tissue stranding in the mid abdomen at level of the umbilicus.  Interval resolution of previously noted rectus sheath hematoma. No new fluid collection is seen.   Additional findings as noted above.   MACRO: None   Signed by: Abhishek Noble 7/25/2024 4:00 AM Dictation workstation:   [id number]    XR chest 1 view    Result Date: 7/24/2024  Interpreted By:  Sheri Lakhani, STUDY: XR CHEST 1 VIEW;  7/24/2024 10:10 pm   INDICATION: Signs/Symptoms:cough, rhonchi L lung field.   COMPARISON: Radiographs of the chest dated 07/09/2024; same day CT of the abdomen/pelvis.   ACCESSION NUMBER(S): [id number]   ORDERING CLINICIAN: YASMEEN THOMAS   FINDINGS: AP radiograph of the chest was provided.       CARDIOMEDIASTINAL SILHOUETTE: Cardiomediastinal silhouette  is upper limits of normal in size, similar to prior exam.   LUNGS: New airspace opacity in the medial lower right lung along the right heart border corresponds to area of atelectasis on same day CT. No sizable pleural effusion or consolidation is present.   ABDOMEN: No remarkable upper abdominal findings.   BONES: No acute osseous changes.       1.  New airspace opacity in the medial lower right lung along the right heart border corresponds to area of atelectasis/scarring on same day CT. No consolidation or sizable pleural effusion is present.       MACRO: None   Signed by: Sheri Lakahni 7/24/2024 10:37 PM Dictation workstation:   [id number]    CT abdomen pelvis wo IV contrast    Result Date: 7/24/2024  Interpreted By:  Sheri Lakhani, STUDY: CT ABDOMEN PELVIS WO IV CONTRAST;  7/24/2024 8:54 pm   INDICATION: Signs/Symptoms:abd pain, distention, anemia.   COMPARISON: CT of the abdomen and pelvis dated 07/03/2024   ACCESSION NUMBER(S): [id number]   ORDERING CLINICIAN: YASMEEN THOMAS   TECHNIQUE: CT of the abdomen and pelvis was performed. Contiguous axial images were obtained at 3 mm slice thickness through the abdomen and pelvis. Coronal and sagittal reconstructions at 3 mm slice thickness were performed.  No intravenous or oral contrast agents were administered.   FINDINGS: Please note that the evaluation of vessels, lymph nodes and organs is limited without intravenous contrast.   LOWER CHEST: Area of bandlike scarring/atelectasis is present in the right lower lobe, new since prior exam on 07/03/2024. No new consolidation or pleural effusion is present in the left lung base. Paraseptal and centrilobular emphysematous changes are similar to prior exam.   Heart is normal in size without pericardial effusion.   Distal esophagus is unremarkable in appearance.   ABDOMEN:   LIVER: Within limits of noncontrast exam, no acute hepatic abnormality is present. Several subcentimeter hepatic hypodensities  are too small to definitely characterize.   BILE DUCTS: No new intrahepatic or extrahepatic biliary dilatation is evident.   GALLBLADDER: Several radiopaque stones are present in the gallbladder without evidence of new wall thickening or pericholecystic stranding.   PANCREAS: No ductal dilatation or peripancreatic stranding is present.   SPLEEN: Spleen is unremarkable in appearance.   ADRENAL GLANDS: Adrenal glands are unremarkable in appearance.   KIDNEYS AND URETERS: Kidneys are symmetric in size without evidence of new hydronephrosis or radiopaque nephrolithiasis. Visualized upper ureters are unremarkable in appearance.   PELVIS:   BLADDER: No bladder wall thickening is present.   REPRODUCTIVE ORGANS: Uterus is present. No adnexal masses or fluid collections are identified.   BOWEL: Stomach is somewhat distended with fluid and food debris without evidence of wall thickening. No abnormal small bowel dilatation is present. Postsurgical changes of prior bowel resection are present in the right lower quadrant with bowel ring anastomosis, similar to prior study. No inflammatory large bowel wall thickening is present.   Appendix is unremarkable in appearance.   VESSELS: Atherosclerotic plaques are present in the abdominal aorta without evidence of acute vascular abnormality.   PERITONEUM/RETROPERITONEUM/LYMPH NODES: There is no evidence of free air, free fluid, or thick-walled collections in the abdomen or pelvis. No new enlarged lymphadenopathy is present in the abdomen or pelvis.   There is slight mesenteric fat stranding present in the anterior lower abdomen near the umbilicus, immediately deep to the left rectus abdominis muscle at the site of hyperdense hematoma described on previous exam in July of 2024.   ABDOMINAL WALL: There is mild asymmetric thickening with some overlying cutaneous stranding present in the medial left rectus abdominis muscle near the level of the umbilicus, at the site of hyperdense  attenuation described on previous exam on 07/03/2024, likely representing resolving postsurgical hematoma.   There is a small fat containing spigelian hernia are present in the left rectus abdominis muscle immediately superior (series 604, image 66), with mild surrounding fat stranding, which is new from prior exam.   No additional new abnormalities are present in the cutaneous tissues of the abdomen or pelvis.   BONES: Multilevel degenerative changes are present in the lumbar spine, similar to prior exam. No new compression fracture or high-grade stenosis is evident.       1.  No acute abnormality is present within the abdomen or pelvis. Mild fat stranding is noted in the anterior mesentery, deep to the umbilicus and hyperdense hematoma in the left rectus abdominis muscle described on previous exam. 2. Small new spigelian hernia containing fat is present in the left rectus abdominis muscle with some overlying cutaneous fat stranding. No herniated bowel is present. 3. Postsurgical changes of small-bowel resection and anastomosis are present in the right lower quadrant without inflammatory bowel wall thickening or abnormal bowel dilatation. 4. New area of scarring/atelectasis is present in the right lower lobe medially, with improvement in the other areas of atelectasis seen on previous exam on 07/03/2024.     MACRO: None   Signed by: Sheri Lakhani 7/24/2024 9:10 PM Dictation workstation:   [id number]    Electrocardiogram, 12-lead PRN ACS symptoms    Result Date: 7/16/2024  Sinus tachycardia Right atrial enlargement Borderline ECG When compared with ECG of 20-JUN-2024 21:19, No significant change was found Confirmed by Edward Wheat ([id number]) on 7/16/2024 4:34:29 PM    XR chest 1 view    Result Date: 7/9/2024  Interpreted By:  Karina Ventura, STUDY: XR CHEST 1 VIEW;  7/9/2024 11:10 am   INDICATION: Signs/Symptoms:sob.   COMPARISON: 07/04/2024   ACCESSION NUMBER(S): [id number]   ORDERING CLINICIAN: CORBY  TENZIN   FINDINGS: Artifact from overlying monitoring leads noted. Right jugular central line remains in place with tip overlying the distal SVC. Interval removal of NG tube. hazy opacity in the left lung base. Pleural angles are sharp. Subcentimeter nodular shadow in the right mid chest. The cardiac silhouette is normal in size.       Subtle left basilar infiltrate or atelectasis.   Possible right chest nodule versus confluence of shadows. No definite correlate on recent chest CT 06/20/2024. Attention at follow-up suggested.   MACRO: None.   Signed by: Karina Ventura 7/9/2024 12:58 PM Dictation workstation:   [id number]    Vascular US upper extremity venous duplex right    Result Date: 7/6/2024  Interpreted By:  Zack Sevilla, STUDY: VASC US UPPER EXTREMITY VENOUS DUPLEX RIGHT  7/6/2024 2:48 pm   INDICATION: 64 y/o   F with  Signs/Symptoms:Swelling.   COMPARISON: None.   ACCESSION NUMBER(S): [id number]   ORDERING CLINICIAN: DELPHINE VELÁZQUEZ   TECHNIQUE: Routine ultrasound of the right upper extremity was performed with duplex Doppler (color and spectral) evaluation.   Static images were obtained for remote interpretation.   FINDINGS: UPPER EXTREMITY VEINS:  Examination was performed with color and duplex Doppler.   Nonocclusive deep vein thrombosis is noted in the right brachial vein. All other deep veins in the right upper extremity are patent and compressible with no thrombosis. There is a PICC line in the internal jugular vein.       Nonocclusive thrombosis involving the right brachial vein   MACRO: None   Signed by: Zack Sevilla 7/6/2024 3:03 PM Dictation workstation:   [id number]    XR abdomen 1 view    Result Date: 7/5/2024  Interpreted By:  Magdalena Carrasco, STUDY: XR ABDOMEN 1 VIEW;  7/5/2024 9:19 am   INDICATION: Signs/Symptoms:abdominal distension.   COMPARISON: None.   ACCESSION NUMBER(S): [id number]   ORDERING CLINICIAN: MINERVA AVILEZ   FINDINGS: Nonobstructive bowel gas pattern. Limited evaluation of  pneumoperitoneum on supine imaging. NG tube terminating in the left upper quadrant likely within the gastric body. Excreted contrast within a nondistended colon     Visualized lungs are clear.   Osseous structures demonstrate no acute bony changes.       1. A nonspecific, nonobstructive bowel gas pattern. 2. NG tube   MACRO: None   Signed by: Magdalena Carrasco 7/5/2024 9:35 AM Dictation workstation:   [id number]    XR chest 1 view    Result Date: 7/4/2024  Interpreted By:  Abhishek Noble, STUDY: XR CHEST 1 VIEW;  7/4/2024 8:43 pm   INDICATION: Signs/Symptoms:cvc placement.   COMPARISON: Chest x-ray 06/18/2024   ACCESSION NUMBER(S): [id number]   ORDERING CLINICIAN: MAIRA WILL   FINDINGS: Right IJ central line terminates in the SVC. Enteric tube terminates in the mid abdomen with side hole below the GE junction. Multiple overlying leads are present.   CARDIOMEDIASTINAL SILHOUETTE: Cardiomediastinal silhouette is normal in size and configuration.   LUNGS: No consolidation, pleural effusion or pneumothorax. Bibasilar atelectasis.   ABDOMEN: No remarkable upper abdominal findings.   BONES: No acute osseous abnormality.       Support hardware as described above.   Bibasilar airspace opacities favored to relate to compressive atelectasis. Superimposed infection not excluded. Attention on continued follow-up is advised.   MACRO: None   Signed by: Abhishek Noble 7/4/2024 8:58 PM Dictation workstation:   [id number]    US abdomen limited    Result Date: 7/4/2024  Interpreted By:  Finkelstein, Evan, STUDY: US ABDOMEN LIMITED; ;  7/4/2024 2:44 am   INDICATION: rectus sheath Hematoma Evaluation.   COMPARISON: CT abdomen pelvis 07/03/2024   ACCESSION NUMBER(S): [id number]   ORDERING CLINICIAN: ROBERT NOLASCO   TECHNIQUE: Targeted grayscale sonographic imaging in the mid abdomen with color Doppler as needed.   FINDINGS: There is a 2.7 x 1 x 0.6 cm heterogeneous collection within the mid abdominal wall soft tissues just above the  site of surgical incision. The collection demonstrates no internal vascularity and is located approximately 0.4 cm from the skin surface.       2.7 x 1 x 0.6 cm heterogeneous collection within the anterior abdominal wall soft tissues most compatible with hematoma approximately 0.4 cm below the skin surface.   MACRO: None.   Signed by: Evan Finkelstein 7/4/2024 3:44 AM Dictation workstation:   [id number]    CT abdomen pelvis wo IV contrast    Result Date: 7/3/2024  Interpreted By:  Gabe Gage, STUDY: CT ABDOMEN PELVIS WO IV CONTRAST;  7/3/2024 10:25 am   INDICATION: 62 y/o   F with  Signs/Symptoms:POD 11 ex lap and partial SBR, ileus, increased abdominal distention.   LIMITATIONS: Evaluation of the solid organs is limited due to non use of IV contrast.   ACCESSION NUMBER(S): [id number]   ORDERING CLINICIAN: MARVA NAVA   TECHNIQUE: Thin-section noncontrast spiral axial images were obtained from the xiphoid down through the symphysis pubis. Sagittal and coronal reconstruction images were generated. Bone, mediastinal, lung, and liver windows were reviewed.   COMPARISON: Most recent prior is from 06/26/2024. Correlation with KUB from earlier today..   FINDINGS: LUNG BASES: Tiny bilateral pleural effusions. Emphysematous changes at the lung bases. There is mild atelectasis at both lung bases.   LIVER: No hepatomegaly. Liver density was  within the limits of normal. No gross liver lesion in this unenhaced exam.   GALLBLADDER: Mild cholelithiasis. No gallbladder wall thickening, or adjacent edema.   BILE DUCTS: No intrahepatic biliary ductal dilatation. Common bile duct was within the limits of normal.   SPLEEN: No splenomegaly. No gross splenic mass..   PANCREAS: No pancreatic mass or inflammation, or ductal dilatation.   KIDNEYS/ADRENALS: No adrenal mass or enlargement. Mild global right renal atrophy. No calcified stone, hydronephrosis, mass, or perinephric edema in either kidney. No ureteral stone or  dilatation.   BLADDER/PELVIS: Urinary bladder was grossly intact. No uterine enlargement or gross adnexal mass.   GREAT VESSELS/RETROPERITONEUM: Mild aortoiliac calcifications. Otherwise, abdominal aorta and IVC were intact. No suspicious retroperitoneal adenopathy. No suspicious mesenteric adenopathy. No suspicious pelvic or inguinal adenopathy.   PERITONEUM: See below.   BOWEL: The patient recently had a Gastrografin challenge. There is an NG tube in the stomach. The stomach otherwise unremarkable. There was a small amount of residual Gastrografin contrast material in multiple loops of mid to distal small bowel. Small bowel surgical reanastomosis in the anterior right pelvis on image 107 was unremarkable. There was no suspicious small bowel wall thickening or small bowel dilatation in this exam. There was Gastrografin contrast in the cecum extending all the way into the distal left colon. There was mild-to-moderate stool and gas throughout the colon down into the rectum. No colonic wall thickening or adjacent edema. There was a tiny amount mesenteric edema in the anterior inferior right pelvis in the region of the anastomosis, and there was trace edema in the mesentery of the deep posteroinferior pelvis. No focal fluid collection. No pneumoperitoneum.   BONES: No destructive lytic or blastic bone lesion.   ABDOMINAL WALL: Stable vertically oriented line skin staples in the midline the lower abdomen through the mid pelvis. There is a subtle localized area heterogeneous fullness in the mid medial aspect of the left rectus abdominus muscle at the level of the pelvic inlet measuring 24 x 17 mm on image 88, not evident previously..       Recent partial small bowel resection with reanastomosis. The reanastomosis is unremarkable today, with no indication of bowel obstruction or perforation. There is very mild mesenteric edema in the region of the anastomosis that is consistent with recent surgery. There is no focal fluid  collection within the peritoneal cavity worrisome for abscess or hematoma.   New heterogeneous fullness in the anteromedial left rectus abdominus muscle at the level of pelvic inlet measuring 24 x 17 mm on image 88. This could be a small hematoma or even a developing small abscess. No associated gas density. Clinical correlation needed.   There is Gastrografin contrast material in multiple loops of otherwise unremarkable distal small bowel and also throughout the colon down through the distal left. No indication of bowel obstruction in this exam.   Mild-to-moderate stool and gas throughout the colon and rectum.   NG tube in place as described.   Tiny bilateral pleural effusions with scant atelectasis at the lung bases.   Mild cholelithiasis.   Mild global atrophy of the right kidney.   MACRO: None   Signed by: Gabe Gage 7/3/2024 11:52 AM Dictation workstation:   [id number]    XR abdomen 1 view    Result Date: 7/3/2024  Interpreted By:  Gabe Gage, STUDY: XR ABDOMEN 1 VIEW;  7/3/2024 7:44 am   INDICATION: Signs/Symptoms:abdominal distension, s/p gastrographin study.   COMPARISON: Most recent prior KUB is from 07/02/2024.   ACCESSION NUMBER(S): [id number]   ORDERING CLINICIAN: MINERVA AVILEZ   TECHNIQUE: Single supine view of the abdomen and pelvis was obtained.     FINDINGS: There was   a stable NG tube in place. There is Gastrografin contrast material throughout the colon. There is mild persistent small-bowel gas with minimal dilatation. Vertically oriented line skin staples to the right of midline, unchanged.. There is mild-to-moderate stool throughout the colon.   There was no gross organomegaly. There were no abnormal calcifications. No destructive bone lesion.       Stable NG tube in place. Stable vertically oriented line of skin staples to the right of midline.   Previously  administered Gastrografin contrast material is now throughout the colon extending into the sigmoid. There is also mild-to-moderate  stool throughout the colon. Consistent with delayed bowel transit time.   Mild residual small bowel gas with minimal loop dilatation, slightly improved. Suspect underlying ileus.   MACRO: None   Signed by: Gabe Gage 7/3/2024 8:23 AM Dictation workstation:   [id number]    XR abdomen 2 views supine and erect or decub    Result Date: 7/2/2024  Interpreted By:  Karina Ventura, STUDY: XR ABDOMEN 2 VIEWS SUPINE AND ERECT OR DECUB; XR ABDOMEN 1 VIEW; 7/2/2024 1:00 am; 7/2/2024 8:03 am; 7/2/2024 2:20 am   INDICATION: Signs/Symptoms:Prolonged ilues; Signs/Symptoms:Gastrografin challenge   COMPARISON: 06/30/2024   ACCESSION NUMBER(S): [id number]; [id number]; [id number]   ORDERING CLINICIAN: MINERVA APARICIO   FINDINGS: 4 supine and erect views of the abdomen obtained at 12:42 a.m. and 1:59 a.m.. Additional single abdominal radiograph obtained at 7:57 a.m..   Midline skin clips. NG tube in place with tip overlying the mid stomach. Multiple distended large and small bowel loops containing air and fecal debris. No rectal gas on initial images.   Contrast material in the proximal stomach on images at 12:43 a.m. administered by unknown person. Transit of contrast material into distended central abdominal small bowel loops on images at 1:59 a.m. and into a few additional right-sided distended small bowel loops on image at 7:57 a.m..       Distended large and small bowel loops with prolonged small bowel contrast transit time consistent with ileus, less likely distal obstruction. Follow-up or further evaluation with CT as clinically warranted.   MACRO: None.   Signed by: Karina Ventura 7/2/2024 8:26 AM Dictation workstation:   [id number]    XR abdomen 2 views supine and erect or decub    Result Date: 7/2/2024  Interpreted By:  Karina Ventura, STUDY: XR ABDOMEN 2 VIEWS SUPINE AND ERECT OR DECUB; XR ABDOMEN 1 VIEW; 7/2/2024 1:00 am; 7/2/2024 8:03 am; 7/2/2024 2:20 am   INDICATION: Signs/Symptoms:Prolonged ilues;  Signs/Symptoms:Gastrografin challenge   COMPARISON: 06/30/2024   ACCESSION NUMBER(S): [id number]; [id number]; [id number]   ORDERING CLINICIAN: MINERVA APARICIO   FINDINGS: 4 supine and erect views of the abdomen obtained at 12:42 a.m. and 1:59 a.m.. Additional single abdominal radiograph obtained at 7:57 a.m..   Midline skin clips. NG tube in place with tip overlying the mid stomach. Multiple distended large and small bowel loops containing air and fecal debris. No rectal gas on initial images.   Contrast material in the proximal stomach on images at 12:43 a.m. administered by unknown person. Transit of contrast material into distended central abdominal small bowel loops on images at 1:59 a.m. and into a few additional right-sided distended small bowel loops on image at 7:57 a.m..       Distended large and small bowel loops with prolonged small bowel contrast transit time consistent with ileus, less likely distal obstruction. Follow-up or further evaluation with CT as clinically warranted.   MACRO: None.   Signed by: Karina Ventura 7/2/2024 8:26 AM Dictation workstation:   [id number]    XR abdomen 1 view    Result Date: 7/2/2024  Interpreted By:  Karina Ventura, STUDY: XR ABDOMEN 2 VIEWS SUPINE AND ERECT OR DECUB; XR ABDOMEN 1 VIEW; 7/2/2024 1:00 am; 7/2/2024 8:03 am; 7/2/2024 2:20 am   INDICATION: Signs/Symptoms:Prolonged ilues; Signs/Symptoms:Gastrografin challenge   COMPARISON: 06/30/2024   ACCESSION NUMBER(S): [id number]; [id number]; [id number]   ORDERING CLINICIAN: MINERVA APARICIO   FINDINGS: 4 supine and erect views of the abdomen obtained at 12:42 a.m. and 1:59 a.m.. Additional single abdominal radiograph obtained at 7:57 a.m..   Midline skin clips. NG tube in place with tip overlying the mid stomach. Multiple distended large and small bowel loops containing air and fecal debris. No rectal gas on initial images.   Contrast material in the proximal stomach on images at 12:43  a.m. administered by unknown person. Transit of contrast material into distended central abdominal small bowel loops on images at 1:59 a.m. and into a few additional right-sided distended small bowel loops on image at 7:57 a.m..       Distended large and small bowel loops with prolonged small bowel contrast transit time consistent with ileus, less likely distal obstruction. Follow-up or further evaluation with CT as clinically warranted.   MACRO: None.   Signed by: Robert Ventura 7/2/2024 8:26 AM Dictation workstation:   [id number]    XR abdomen 1 view    Result Date: 6/30/2024  Interpreted By:  Bethel Porras, STUDY: XR ABDOMEN 1 VIEW   INDICATION: Signs/Symptoms:NG moved, reconfirm proper placement.   COMPARISON: June 30th earlier the same day   ACCESSION NUMBER(S): [id number]   ORDERING CLINICIAN: ROBERT NOLASCO   FINDINGS: Nasogastric tube reflected upon itself slightly within the stomach over the gastric fundus.   Bowel-gas pattern incompletely assessed.       Nasogastric tube reflected upon itself slightly within the stomach over the gastric fundus.   Signed by: Bethel Porras 6/30/2024 4:01 PM Dictation workstation:   [id number]    XR abdomen 1 view    Result Date: 6/30/2024  Interpreted By:  Sejal Dykes, STUDY: XR ABDOMEN 1 VIEW;  6/30/2024 7:56 am. 2 views.   INDICATION: Signs/Symptoms:Post-op ileus.   COMPARISON: 06/29/2024   ACCESSION NUMBER(S): [id number]   ORDERING CLINICIAN: ROBERT NOLASCO   FINDINGS: There is a nasogastric tube with the tip in the proximal stomach and side hole beyond the gastroesophageal junction. There is no gastric distention. There is a vertical orientation of cutaneous staples from recent abdominal surgery. There is moderate-to-marked distention of central small bowel loops, unchanged. Findings could represent a postoperative ileus but could represent a partial small bowel obstruction. There is a large amount of stool in the right side of the colon, unchanged.   There are no  pathologic calcifications.   Visualized lungs are clear.   Osseous structures demonstrate no acute bony changes.         1. Nasogastric tube with the tip in the proximal stomach; no gastric distention. 2. Persistent moderate-to-marked central small-bowel dilatation which could represent a postoperative ileus versus partial small bowel obstruction. This is unchanged. 3. Large amount of stool right side of the colon.   MACRO: None   Signed by: Sejal Dykes 6/30/2024 8:11 AM Dictation workstation:   [id number]    XR abdomen 1 view    Result Date: 6/29/2024  Interpreted By:  [place] Roberts, STUDY: XR ABDOMEN 1 VIEW;  6/29/2024 9:05 am   INDICATION: Signs/Symptoms:Abdominal distension.   COMPARISON: 06/24/2024   ACCESSION NUMBER(S): [id number]   ORDERING CLINICIAN: ROBERT NOLASCO   FINDINGS: There is prominent stool in the right colon. There is gaseous distention of multiple loops of small bowel. Nasogastric tube overlies the stomach. Skin staples are seen in the midline of the lower abdomen.       Slight increase in the gaseous distention of the small bowel since the previous exam possibly due to postoperative ileus although partial small bowel obstruction can also have this appearance. Large amount of formed stool in the right colon.   MACRO: None   Signed by: Florida Roberts 6/29/2024 10:22 AM Dictation workstation:   [id number]    CT angio chest for pulmonary embolism    Result Date: 6/28/2024  Interpreted By:  Gabe Gage, STUDY: CT ANGIO CHEST FOR PULMONARY EMBOLISM;  6/28/2024 7:04 am   INDICATION: 62 y/o   F with  Signs/Symptoms:PE.   LIMITATIONS: None.   ACCESSION NUMBER(S): [id number]   ORDERING CLINICIAN: DELPHINE VELÁZQUEZ   TECHNIQUE: After the administration of intravenous nonionic contrast, thin-section arterial phase images were obtained  from the thoracic inlet down through the iliac crest. Sagittal and coronal reconstruction images were generated. Axial and coronal MIP vascular reconstruction images  were also generated.   Mediastinal, lung, bone, and liver windows were reviewed. 75 ML Omnipaque 350     COMPARISON: Most recent prior from 06/13/2024. correlation with CT scans of the abdomen and pelvis, most recent from 06/26/2024.   FINDINGS: CHEST WALL/BASE OF THE NECK: The chest wall was grossly intact. No thyromegaly or thyroid mass.   MEDIASTINUM/ALESSANDRO: No suspicious mediastinal, hilar, or axillary mass or adenopathy. No cardiomegaly. No pericardial effusion. No thoracic aortic aneurysm or dissection. No pulmonary artery filling defect.   LUNGS/ PLEURA/ AND TRACHEA: Stable underlying fat containing posteromedial left-sided diaphragmatic hernia. Band of atelectasis across the posterior right lung base. Mild atelectasis at the posteromedial left lung base. Underlying emphysema with upper lobe predominance. No mass or pneumonia in either lung. Tiny bilateral pleural effusions. No pneumothorax. The trachea was grossly intact.   BONES: No destructive lytic or blastic bone lesion.   UPPER ABDOMEN: There is an NG tube in place with distal tip in the distal gastric body. Cholelithiasis. Contracted gallbladder. The imaged portions of the liver   were unremarkable. There are subtle stable small hypodensities in the spleen compared to 06/26/2024. Imaged kidneys and adrenal glands were intact. No upper abdominal ascites. The imaged stomach and large bowel. There is a bilobed hypodensity adjacent the pancreatic tail the left abdomen measuring 24 mm AP x 17 mm transversely on image 287, measuring 24 Hounsfield units of CT density. This is stable compared to the most recent prior CT scan abdomen and pelvis. It is also grossly stable compared to 06/20/2024 and was not present on 03/22/2016.       No CT evidence of pulmonary embolism in the current exam.   Emphysema. Band of atelectasis at the right lung base. Confluent atelectasis at the posteromedial left lung base. Tiny bilateral pleural effusions.. No mass or pneumonia in  either lung.   NG tube in place as described.   Cholelithiasis.   Stable bilobed hypodensity adjacent to the pancreatic tail. Pancreatic pseudocyst versus cystic pancreatic neoplasms. In addition, there are subtle stable nonspecific splenic hypodensities which could be splenic hemangiomas. Recommend further evaluation with MRI the pancreas and spleen.   MACRO: None   Signed by: Gabe Gage 6/28/2024 7:55 AM Dictation workstation:   [id number]    Vascular US upper extremity venous duplex left    Result Date: 6/27/2024             [place] [id number]   Tel [phone] and Fax [phone]  Vascular Lab Report VASC US UPPER EXTREMITY VENOUS DUPLEX LEFT  Patient Name:      DINA GREENE       Reading Physician:  [id number] Rusty Villalba DO Study Date:        6/27/2024            Ordering Physician: [id number] REECE MRN/PID:           [id number]             Technologist:       Caren Leigh RVT Accession#:        [id number]         Technologist 2: Date of Birth/Age: 1960 / 63 years Encounter#:         [id number] Gender:            F Admission Status:  Inpatient            Location Performed: [facility]  Diagnosis/ICD: Left arm swelling-M79.89 CPT Codes:     23658 Peripheral venous duplex scan for DVT Limited  **CRITICAL RESULT** Critical Result: Acute DVT in the left distal brachial vein Notification called to Katharina Pineda RN on 6/27/2024 at 3:12:42 PM.  CONCLUSIONS: Right Upper Venous: The subclavian vein demonstrates a pulsatile flow. Left Upper Venous: There is acute non-occlusive deep vein thrombosis visualized in the brachial vein. The remainder of the left arm is negative for deep vein thrombosis.  Imaging & Doppler Findings:  Right        Flow Subclavian Pulsatile  Left                Compress    Thrombus            Flow Internal Jugular      Yes         None           Pulsatile Subclavian            Yes         None Subclavian Proximal   Yes          None           Pulsatile Subclavian Mid        Yes         None           Pulsatile Subclavian Distal     Yes         None           Pulsatile Axillary              Yes         None       Spontaneous/Phasic Brachial               No    Acute occlusive Cephalic              Yes         None Basilic               Yes         None  [id number] Rusty Villalba DO Electronically signed by [id number] Rusty Villalba DO on 6/27/2024 at 9:37:37 PM  ** Final **     CT abdomen pelvis wo IV contrast    Result Date: 6/26/2024  Interpreted By:  Karina Ventura, STUDY: CT ABDOMEN PELVIS WO IV CONTRAST;  6/26/2024 8:51 am   INDICATION: Signs/Symptoms:POD 5 small bowel resection. Increased pain.   COMPARISON: 06/20/2024   ACCESSION NUMBER(S): [id number]   ORDERING CLINICIAN: ANITRA APARICIO   TECHNIQUE: CT of the abdomen and pelvis was performed. Sagittal and coronal reconstructions were generated.  No intravenous contrast given for the exam.   FINDINGS: Solid organ and vessel evaluation limited without IV contrast.   ABDOMINAL ORGANS:   LIVER: No focal lesion within limits of unenhanced exam.   GALL BLADDER AND BILIARY TREE: Subtle density layer near the gallbladder neck again seen. No gallbladder wall thickening or biliary dilatation within limits of unenhanced exam   SPLEEN: 1.3 cm subtle hypodense lesion in the inferior pole image 36/155.   PANCREAS: Probable 1.4 cm hypodense lesion in the tail image 44/155. Few punctate calcifications in the head.   ADRENALS: Uneven hypodense thickening of both adrenal glands similar to the prior exam.   KIDNEYS AND URETERS: Atrophic right kidney. Mild relatively symmetric perinephric fat stranding. No focal renal mass within limits of unenhanced exam. No hydronephrosis.   BOWEL: NG tube extends into the body of distended fluid and air-filled stomach. New right lower quadrant small bowel suture line. Multiple dilated air and fluid-filled small bowel loops with scattered air-fluid levels. Moderate colonic fecal  residue. Distal colon diverticulosis.   PERITONEUM, RETROPERITONEUM, NODES: Minimal free fluid in the pelvis. Mild stranding in the right lower quadrant inferior to suture line. No free intraperitoneal air. No significant retroperitoneal adenopathy within limits of unenhanced exam.   VESSELS:  Scattered atherosclerotic calcifications. Lack of IV contrast precludes vascular luminal assessment. No abdominal aortic aneurysm.   PELVIS: Urinary bladder is moderately distended and grossly normal in contour. Limited delineation of the uterus separate from adjacent bowel loops.   ABDOMINAL WALL: New midline skin clips. Few dots of air in the right lower quadrant and left mid abdominal wall. Mild fluid and stranding in both flanks, right-greater-than-left.   BONES: No focal concerning lytic or blastic osseous lesion.   LOWER CHEST: Moderate emphysematous changes. New coarse bandlike opacities in both lung bases with small pleural effusions.       New right lower quadrant suture line reportedly status post small bowel resection with multiple dilated air and fluid-filled small bowel loops that could reflect mild postop ileus or partial obstruction. Follow-up as clinically warranted.   Small hypodense lesions in the pancreatic tail and spleen, can not be further characterized on unenhanced exam. Attention at follow-up or further evaluation with MRI recommended.   Bandlike infiltrates or atelectasis in both lung bases with small pleural effusions.   Numerous additional findings as described above.   MACRO: None.   Signed by: Karina Ventura 6/26/2024 9:08 AM Dictation workstation:   [id number]   Results for orders placed or performed during the hospital encounter of 07/24/24 (from the past 24 hour(s))   CBC and Auto Differential   Result Value Ref Range    WBC 11.3 4.4 - 11.3 x10*3/uL    nRBC 0.4 (H) 0.0 - 0.0 /100 WBCs    RBC 2.40 (L) 4.00 - 5.20 x10*6/uL    Hemoglobin 6.2 (LL) 12.0 - 16.0 g/dL    Hematocrit 21.4 (L) 36.0 - 46.0  %    MCV 89 80 - 100 fL    MCH 25.8 (L) 26.0 - 34.0 pg    MCHC 29.0 (L) 32.0 - 36.0 g/dL    RDW 20.8 (H) 11.5 - 14.5 %    Platelets 1,029 (H) 150 - 450 x10*3/uL    Neutrophils % 58.0 40.0 - 80.0 %    Immature Granulocytes %, Automated 0.6 0.0 - 0.9 %    Lymphocytes % 27.6 13.0 - 44.0 %    Monocytes % 13.5 2.0 - 10.0 %    Eosinophils % 0.1 0.0 - 6.0 %    Basophils % 0.2 0.0 - 2.0 %    Neutrophils Absolute 6.57 1.20 - 7.70 x10*3/uL    Immature Granulocytes Absolute, Automated 0.07 0.00 - 0.70 x10*3/uL    Lymphocytes Absolute 3.12 1.20 - 4.80 x10*3/uL    Monocytes Absolute 1.53 (H) 0.10 - 1.00 x10*3/uL    Eosinophils Absolute 0.01 0.00 - 0.70 x10*3/uL    Basophils Absolute 0.02 0.00 - 0.10 x10*3/uL   Comprehensive Metabolic Panel   Result Value Ref Range    Glucose 97 74 - 99 mg/dL    Sodium 136 136 - 145 mmol/L    Potassium 4.0 3.5 - 5.3 mmol/L    Chloride 103 98 - 107 mmol/L    Bicarbonate 24 21 - 32 mmol/L    Anion Gap 13 10 - 20 mmol/L    Urea Nitrogen 11 6 - 23 mg/dL    Creatinine 0.67 0.50 - 1.05 mg/dL    eGFR >90 >60 mL/min/1.73m*2    Calcium 7.3 (L) 8.6 - 10.3 mg/dL    Albumin 2.5 (L) 3.4 - 5.0 g/dL    Alkaline Phosphatase 133 33 - 136 U/L    Total Protein 5.0 (L) 6.4 - 8.2 g/dL    AST 28 9 - 39 U/L    Bilirubin, Total 0.2 0.0 - 1.2 mg/dL    ALT 34 7 - 45 U/L   Lactate   Result Value Ref Range    Lactate 1.1 0.4 - 2.0 mmol/L   Troponin I, High Sensitivity   Result Value Ref Range    Troponin I, High Sensitivity 7 0 - 13 ng/L   Protime-INR   Result Value Ref Range    Protime 16.0 (H) 9.8 - 12.8 seconds    INR 1.4 (H) 0.9 - 1.1   Blood Culture    Specimen: Peripheral Venipuncture; Blood culture   Result Value Ref Range    Blood Culture Loaded on Instrument - Culture in progress    Blood Culture    Specimen: Peripheral Venipuncture; Blood culture   Result Value Ref Range    Blood Culture Loaded on Instrument - Culture in progress    Type and Screen   Result Value Ref Range    ABO TYPE O     Rh TYPE POS      ANTIBODY SCREEN NEG    Lipase   Result Value Ref Range    Lipase 163 (H) 9 - 82 U/L   Pathologist Review-CBC Differential   Result Value Ref Range    Pathologist Review-CBC Differential       Review of peripheral blood smear shows normocytic anemia with anisocytosis, polychromasia, ovalocytes and a few target cells.  Thrombocytosis and mild leukocytosis.  Further clinical correlation is recommended.   Morphology   Result Value Ref Range    RBC Morphology See Below     Polychromasia Mild     RBC Fragments Few     Target Cells Few     Teardrop Cells Few     Bite Cells Present    Urinalysis with Reflex Culture and Microscopic   Result Value Ref Range    Color, Urine Light-Yellow Light-Yellow, Yellow, Dark-Yellow    Appearance, Urine Clear Clear    Specific Gravity, Urine 1.012 1.005 - 1.035    pH, Urine 6.0 5.0, 5.5, 6.0, 6.5, 7.0, 7.5, 8.0    Protein, Urine NEGATIVE NEGATIVE, 10 (TRACE), 20 (TRACE) mg/dL    Glucose, Urine Normal Normal mg/dL    Blood, Urine NEGATIVE NEGATIVE    Ketones, Urine NEGATIVE NEGATIVE mg/dL    Bilirubin, Urine NEGATIVE NEGATIVE    Urobilinogen, Urine Normal Normal mg/dL    Nitrite, Urine 2+ (A) NEGATIVE    Leukocyte Esterase, Urine 500 Ivonne/µL (A) NEGATIVE   Microscopic Only, Urine   Result Value Ref Range    WBC, Urine >50 (A) 1-5, NONE /HPF    RBC, Urine 6-10 (A) NONE, 1-2, 3-5 /HPF    Squamous Epithelial Cells, Urine 1-9 (SPARSE) Reference range not established. /HPF    Bacteria, Urine 4+ (A) NONE SEEN /HPF    Mucus, Urine FEW Reference range not established. /LPF   Sars-CoV-2 PCR, Symptomatic   Result Value Ref Range    Coronavirus 2019, PCR Not Detected Not Detected   MRSA Surveillance for Vancomycin De-escalation, PCR    Specimen: Anterior Nares; Swab   Result Value Ref Range    MRSA PCR Not Detected Not Detected   Prepare RBC: 1 Units   Result Value Ref Range    PRODUCT CODE M5044Q56     Unit Number [id number]     Unit ABO O     Unit RH POS     XM INTEP COMP     Dispense Status TR      Blood Expiration Date 8/19/2024 11:59:00 PM EDT     PRODUCT BLOOD TYPE 5100     UNIT VOLUME 350    CBC and Auto Differential   Result Value Ref Range    WBC 8.4 4.4 - 11.3 x10*3/uL    nRBC 1.1 (H) 0.0 - 0.0 /100 WBCs    RBC 3.26 (L) 4.00 - 5.20 x10*6/uL    Hemoglobin 8.6 (L) 12.0 - 16.0 g/dL    Hematocrit 28.1 (L) 36.0 - 46.0 %    MCV 86 80 - 100 fL    MCH 26.4 26.0 - 34.0 pg    MCHC 30.6 (L) 32.0 - 36.0 g/dL    RDW 19.0 (H) 11.5 - 14.5 %    Platelets 981 (H) 150 - 450 x10*3/uL    Neutrophils % 67.1 40.0 - 80.0 %    Immature Granulocytes %, Automated 0.8 0.0 - 0.9 %    Lymphocytes % 28.9 13.0 - 44.0 %    Monocytes % 3.2 2.0 - 10.0 %    Eosinophils % 0.0 0.0 - 6.0 %    Basophils % 0.0 0.0 - 2.0 %    Neutrophils Absolute 5.63 1.20 - 7.70 x10*3/uL    Immature Granulocytes Absolute, Automated 0.07 0.00 - 0.70 x10*3/uL    Lymphocytes Absolute 2.43 1.20 - 4.80 x10*3/uL    Monocytes Absolute 0.27 0.10 - 1.00 x10*3/uL    Eosinophils Absolute 0.00 0.00 - 0.70 x10*3/uL    Basophils Absolute 0.00 0.00 - 0.10 x10*3/uL   Comprehensive metabolic panel   Result Value Ref Range    Glucose 125 (H) 74 - 99 mg/dL    Sodium 138 136 - 145 mmol/L    Potassium 4.3 3.5 - 5.3 mmol/L    Chloride 105 98 - 107 mmol/L    Bicarbonate 22 21 - 32 mmol/L    Anion Gap 15 10 - 20 mmol/L    Urea Nitrogen 7 6 - 23 mg/dL    Creatinine 0.56 0.50 - 1.05 mg/dL    eGFR >90 >60 mL/min/1.73m*2    Calcium 7.6 (L) 8.6 - 10.3 mg/dL    Albumin 2.6 (L) 3.4 - 5.0 g/dL    Alkaline Phosphatase 119 33 - 136 U/L    Total Protein 5.1 (L) 6.4 - 8.2 g/dL    AST 26 9 - 39 U/L    Bilirubin, Total 0.2 0.0 - 1.2 mg/dL    ALT 31 7 - 45 U/L   Ferritin   Result Value Ref Range    Ferritin     Iron and TIBC   Result Value Ref Range    Iron 13 (L) 35 - 150 ug/dL    UIBC 229 110 - 370 ug/dL    TIBC 242 240 - 445 ug/dL    % Saturation 5 (L) 25 - 45 %          Assessment/Plan     63 y.o. F with multiple medical issues and recent admission for perforated small bowel with  peritonitis, s/p emergency ex laparotomy and small bowel resection on 6/21/24, presented with anemia and melena.  Possible etiology include upper GI bleed in the setting of anticoagulation, possibly PUD, erosive gastritis, small bowel AVM.    -Recommend twice daily PPI  -Monitor H&H, transfuse as needed to keep hemoglobin above 7  -Recommend to hold Eliquis  -N.p.o. after midnight, she is okay to eat today  -EGD tomorrow      I spent 45 minutes in the professional and overall care of this patient.      Nicki Pollock, APRN-CNP

## 2024-07-25 NOTE — PROGRESS NOTES
Emergency Medicine Transition of Care Note.    I received Fernando Cuevas in signout from Dr. Rebolledo.  Please see the previous ED provider note for all HPI, PE and MDM up to the time of signout. This is in addition to the primary record.    In brief Fernando Cuevas is an 63 y.o. female presenting for   Chief Complaint   Patient presents with    Abnormal Labs    Abdominal Pain    Cough     At the time of signout we were awaiting: CTA    ED Course as of 07/25/24 0512 Wed Jul 24, 2024 2230 Patient is in the quality of her pain change and is having back pain radiating in her mid abdomen back up to her chest.  Given his concern for acute aortic pathology.  Would also benefit from contrast imaging to assess hematoma for active bleeding given Eliquis use.  Plan for pretreatment protocol. [JM]      ED Course User Index  [JM] Edilberto Rebolledo MD         Diagnoses as of 07/25/24 0512   Anemia, unspecified type   Urinary tract infection without hematuria, site unspecified   Back pain, unspecified back location, unspecified back pain laterality, unspecified chronicity   Acute pancreatitis, unspecified complication status, unspecified pancreatitis type ([facility])       Medical Decision Making  CT angio shows no dissection.  The patient is anemic.  Patient has a UTI.  Patient has back pain and acute pancreatitis.    Final diagnoses:   [D64.9] Anemia, unspecified type   [N39.0] Urinary tract infection without hematuria, site unspecified   [M54.9] Back pain, unspecified back location, unspecified back pain laterality, unspecified chronicity   [K85.90] Acute pancreatitis, unspecified complication status, unspecified pancreatitis type ([facility])           Procedure  Procedures    Eddi Bullard MD

## 2024-07-25 NOTE — PROGRESS NOTES
Pharmacy Medication History Review~~~~MEDICATION LIST SENT FROM FACILITY~~~~    Fernando Cuevas is a 63 y.o. female admitted for Anemia, unspecified type. Pharmacy reviewed the patient's [id number]-[id number] medications and allergies for accuracy.    The list below reflectives the updated PTA list. Please review each medication in order reconciliation for additional clarification and justification.  Prior to Admission Medications   Prescriptions Last Dose Informant     Zithromax 500 mg tablet       Sig: Take 1 tablet (500 mg) by mouth once every 24 hours.   acetaminophen (Tylenol Extra Strength) 500 mg tablet       Sig: Take 2 tablets (1,000 mg) by mouth every 8 hours.                albuterol 90 mcg/actuation inhaler  Self     Sig: Inhale 2 puffs every 4 hours if needed for wheezing or shortness of breath.   alum-mag hydroxide-simeth (Mylanta) 200-200-20 mg/5 mL oral suspension  Self     Sig: TAKE 10 ML BY MOUTH EVERY 6 HOURS AS NEEDED   ammonium lactate (Lac-Hydrin) 12 % lotion  Self     Sig: Apply topically twice a day.   apixaban (Eliquis) 5 mg tablet       Sig: Take 1 tablet (5 mg) by mouth 2 times a day.   aspirin 81 mg chewable tablet  Self     Sig: Chew 1 tablet (81 mg) once daily.   benzonatate (Tessalon) 100 mg capsule  Self     Sig: Take 1 capsule (100 mg) by mouth 3 times a day as needed for cough. Do not crush or chew.   busPIRone (Buspar) 5 mg tablet  Self     Sig: Take 1 tablet (5 mg) by mouth twice a day.   calcium carbonate (Oscal) 500 mg calcium (1,250 mg) tablet       Sig: Take 1 tablet (1,250 mg) by mouth 2 times daily (morning and late afternoon).   clotrimazole (Lotrimin) 1 % cream  Self     Sig: Apply topically 2 times a day.   diclofenac sodium (Voltaren) 1 % gel  Self     Sig: Apply 4.5 inches (4 g) topically 4 times a day as needed.   dilTIAZem ER (Tiazac) 240 mg 24 hr capsule  Self     Sig: Take 1 capsule (240 mg) by mouth once daily.   fluocinonide 0.05 % cream  Self     Sig: APPLY THIN  LAYER TO AFFECTED AREA TWICE A DAY AS NEEDED   fluticasone propion-salmeteroL (Advair Diskus) 100-50 mcg/dose diskus inhaler       Sig: Inhale 1 puff 2 times a day. Rinse mouth with water after use to reduce aftertaste and incidence of candidiasis. Do not swallow.   guaiFENesin (Mucinex) 600 mg 12 hr tablet  Self Yes No   Sig: Take 1 tablet (600 mg) by mouth.   ipratropium-albuteroL (Duo-Neb) 0.5-2.5 mg/3 mL nebulizer solution   No No   Sig: Take 3 mL by nebulization 3 times a day.   lidocaine (Lidoderm) 5 % patch  Self     Sig: Place 1 patch on the skin once every 24 hours.     Self     mirtazapine (Remeron) 15 mg tablet       Sig: Take 1 tablet (15 mg) by mouth once daily at bedtime.   mometasone-formoterol (Dulera 100) 100-5 mcg/actuation inhaler  Self     Sig: Inhale 200 mcg twice a day.   montelukast (Singulair) 10 mg tablet  Self     Sig: Take 1 tablet (10 mg) by mouth once daily.   nitroglycerin (Nitrostat) 0.4 mg SL tablet  Self     Sig: Place 1 tablet (0.4 mg) under the tongue.             oxybutynin XL (Ditropan-XL) 5 mg 24 hr tablet  Self     Sig: Take 1 tablet (5 mg) by mouth once daily. Do not crush, chew, or split.   pantoprazole (ProtoNix) 40 mg EC tablet  Self     Sig: Take 1 tablet (40 mg) by mouth twice a day.   thiamine 100 mg tablet  Self     Sig: Take 1 tablet (100 mg) by mouth once daily.   tiotropium (Spiriva Respimat) 2.5 mcg/actuation inhaler  Self     Sig: Inhale 2 puffs once daily.   varenicline (Chantix) 1 mg tablet  Self     Sig: Take 1 tablet (1 mg) by mouth twice a day.      Facility-Administered Medications: None      Allergies  Reviewed by Vani Alvarenga on 7/25/2024        Severity Reactions Comments    Iodinated Contrast Media Medium Hives Hives to faces and neck with itching. Resolved with 50 mg benadryl, 20 mg pepcid & 60 mg prednisone. Hives to faces and neck with itching. Resolved with 50 mg benadryl, 20 mg pepcid & 60 mg prednisone.   Hives to faces and neck with  itching. Resolved with 50 mg benadryl, 20 mg pepcid & 60 mg prednisone.    Adhesive Tape-silicones Not Specified Other             Below are additional concerns with the patient's PTA list.  Medication list sent from facility.    Vani Alvarenga

## 2024-07-26 ENCOUNTER — ANESTHESIA EVENT (OUTPATIENT)
Dept: GASTROENTEROLOGY | Facility: HOSPITAL | Age: 64
End: 2024-07-26
Payer: COMMERCIAL

## 2024-07-26 ENCOUNTER — APPOINTMENT (OUTPATIENT)
Dept: CARDIOLOGY | Facility: HOSPITAL | Age: 64
End: 2024-07-26
Payer: COMMERCIAL

## 2024-07-26 ENCOUNTER — APPOINTMENT (OUTPATIENT)
Dept: GASTROENTEROLOGY | Facility: HOSPITAL | Age: 64
End: 2024-07-26
Payer: COMMERCIAL

## 2024-07-26 ENCOUNTER — ANESTHESIA (OUTPATIENT)
Dept: GASTROENTEROLOGY | Facility: HOSPITAL | Age: 64
End: 2024-07-26
Payer: COMMERCIAL

## 2024-07-26 LAB
ALBUMIN SERPL BCP-MCNC: 2.8 G/DL (ref 3.4–5)
ANION GAP SERPL CALC-SCNC: 11 MMOL/L (ref 10–20)
BASOPHILS # BLD AUTO: 0.01 X10*3/UL (ref 0–0.1)
BASOPHILS NFR BLD AUTO: 0.1 %
BUN SERPL-MCNC: 8 MG/DL (ref 6–23)
CALCIUM SERPL-MCNC: 7.6 MG/DL (ref 8.6–10.3)
CHLORIDE SERPL-SCNC: 105 MMOL/L (ref 98–107)
CO2 SERPL-SCNC: 26 MMOL/L (ref 21–32)
CREAT SERPL-MCNC: 0.53 MG/DL (ref 0.5–1.05)
EGFRCR SERPLBLD CKD-EPI 2021: >90 ML/MIN/1.73M*2
EOSINOPHIL # BLD AUTO: 0 X10*3/UL (ref 0–0.7)
EOSINOPHIL NFR BLD AUTO: 0 %
ERYTHROCYTE [DISTWIDTH] IN BLOOD BY AUTOMATED COUNT: 19.4 % (ref 11.5–14.5)
GLUCOSE SERPL-MCNC: 96 MG/DL (ref 74–99)
HCT VFR BLD AUTO: 26.9 % (ref 36–46)
HGB BLD-MCNC: 8.2 G/DL (ref 12–16)
IMM GRANULOCYTES # BLD AUTO: 0.07 X10*3/UL (ref 0–0.7)
IMM GRANULOCYTES NFR BLD AUTO: 0.6 % (ref 0–0.9)
LYMPHOCYTES # BLD AUTO: 3.29 X10*3/UL (ref 1.2–4.8)
LYMPHOCYTES NFR BLD AUTO: 26.1 %
MCH RBC QN AUTO: 25.9 PG (ref 26–34)
MCHC RBC AUTO-ENTMCNC: 30.5 G/DL (ref 32–36)
MCV RBC AUTO: 85 FL (ref 80–100)
MONOCYTES # BLD AUTO: 1.6 X10*3/UL (ref 0.1–1)
MONOCYTES NFR BLD AUTO: 12.7 %
NEUTROPHILS # BLD AUTO: 7.65 X10*3/UL (ref 1.2–7.7)
NEUTROPHILS NFR BLD AUTO: 60.5 %
NRBC BLD-RTO: 0.3 /100 WBCS (ref 0–0)
PHOSPHATE SERPL-MCNC: 2.1 MG/DL (ref 2.5–4.9)
PLATELET # BLD AUTO: 975 X10*3/UL (ref 150–450)
POTASSIUM SERPL-SCNC: 3.7 MMOL/L (ref 3.5–5.3)
RBC # BLD AUTO: 3.16 X10*6/UL (ref 4–5.2)
SODIUM SERPL-SCNC: 138 MMOL/L (ref 136–145)
WBC # BLD AUTO: 12.6 X10*3/UL (ref 4.4–11.3)

## 2024-07-26 PROCEDURE — 2500000001 HC RX 250 WO HCPCS SELF ADMINISTERED DRUGS (ALT 637 FOR MEDICARE OP): Performed by: HOSPITALIST

## 2024-07-26 PROCEDURE — C9113 INJ PANTOPRAZOLE SODIUM, VIA: HCPCS | Performed by: HOSPITALIST

## 2024-07-26 PROCEDURE — 2500000005 HC RX 250 GENERAL PHARMACY W/O HCPCS: Performed by: HOSPITALIST

## 2024-07-26 PROCEDURE — 36415 COLL VENOUS BLD VENIPUNCTURE: CPT | Performed by: HOSPITALIST

## 2024-07-26 PROCEDURE — 93005 ELECTROCARDIOGRAM TRACING: CPT

## 2024-07-26 PROCEDURE — 43239 EGD BIOPSY SINGLE/MULTIPLE: CPT | Performed by: INTERNAL MEDICINE

## 2024-07-26 PROCEDURE — 3700000001 HC GENERAL ANESTHESIA TIME - INITIAL BASE CHARGE

## 2024-07-26 PROCEDURE — 99232 SBSQ HOSP IP/OBS MODERATE 35: CPT | Performed by: HOSPITALIST

## 2024-07-26 PROCEDURE — 2500000004 HC RX 250 GENERAL PHARMACY W/ HCPCS (ALT 636 FOR OP/ED): Performed by: ANESTHESIOLOGIST ASSISTANT

## 2024-07-26 PROCEDURE — 0DB78ZX EXCISION OF STOMACH, PYLORUS, VIA NATURAL OR ARTIFICIAL OPENING ENDOSCOPIC, DIAGNOSTIC: ICD-10-PCS | Performed by: INTERNAL MEDICINE

## 2024-07-26 PROCEDURE — 3700000002 HC GENERAL ANESTHESIA TIME - EACH INCREMENTAL 1 MINUTE

## 2024-07-26 PROCEDURE — 97161 PT EVAL LOW COMPLEX 20 MIN: CPT | Mod: GP

## 2024-07-26 PROCEDURE — 85025 COMPLETE CBC W/AUTO DIFF WBC: CPT | Performed by: HOSPITALIST

## 2024-07-26 PROCEDURE — 43239 EGD BIOPSY SINGLE/MULTIPLE: CPT

## 2024-07-26 PROCEDURE — 2500000005 HC RX 250 GENERAL PHARMACY W/O HCPCS: Performed by: ANESTHESIOLOGIST ASSISTANT

## 2024-07-26 PROCEDURE — 97165 OT EVAL LOW COMPLEX 30 MIN: CPT | Mod: GO

## 2024-07-26 PROCEDURE — 94640 AIRWAY INHALATION TREATMENT: CPT

## 2024-07-26 PROCEDURE — 7100000009 HC PHASE TWO TIME - INITIAL BASE CHARGE

## 2024-07-26 PROCEDURE — 1100000001 HC PRIVATE ROOM DAILY

## 2024-07-26 PROCEDURE — 2500000004 HC RX 250 GENERAL PHARMACY W/ HCPCS (ALT 636 FOR OP/ED): Performed by: HOSPITALIST

## 2024-07-26 PROCEDURE — 2500000002 HC RX 250 W HCPCS SELF ADMINISTERED DRUGS (ALT 637 FOR MEDICARE OP, ALT 636 FOR OP/ED): Performed by: HOSPITALIST

## 2024-07-26 PROCEDURE — 84100 ASSAY OF PHOSPHORUS: CPT | Performed by: HOSPITALIST

## 2024-07-26 PROCEDURE — 7100000010 HC PHASE TWO TIME - EACH INCREMENTAL 1 MINUTE

## 2024-07-26 RX ORDER — LIDOCAINE HYDROCHLORIDE 20 MG/ML
INJECTION, SOLUTION INFILTRATION; PERINEURAL AS NEEDED
Status: DISCONTINUED | OUTPATIENT
Start: 2024-07-26 | End: 2024-07-26

## 2024-07-26 RX ORDER — FENTANYL CITRATE 50 UG/ML
INJECTION, SOLUTION INTRAMUSCULAR; INTRAVENOUS AS NEEDED
Status: DISCONTINUED | OUTPATIENT
Start: 2024-07-26 | End: 2024-07-26

## 2024-07-26 RX ORDER — PROPOFOL 10 MG/ML
INJECTION, EMULSION INTRAVENOUS AS NEEDED
Status: DISCONTINUED | OUTPATIENT
Start: 2024-07-26 | End: 2024-07-26

## 2024-07-26 RX ORDER — OXYCODONE HYDROCHLORIDE 5 MG/1
5 TABLET ORAL EVERY 6 HOURS PRN
Status: DISCONTINUED | OUTPATIENT
Start: 2024-07-26 | End: 2024-07-27 | Stop reason: HOSPADM

## 2024-07-26 RX ORDER — MIDAZOLAM HYDROCHLORIDE 1 MG/ML
INJECTION INTRAMUSCULAR; INTRAVENOUS AS NEEDED
Status: DISCONTINUED | OUTPATIENT
Start: 2024-07-26 | End: 2024-07-26

## 2024-07-26 RX ORDER — ACETAMINOPHEN 325 MG/1
650 TABLET ORAL EVERY 6 HOURS PRN
Status: DISCONTINUED | OUTPATIENT
Start: 2024-07-26 | End: 2024-07-26

## 2024-07-26 SDOH — SOCIAL STABILITY: SOCIAL INSECURITY: DOES ANYONE TRY TO KEEP YOU FROM HAVING/CONTACTING OTHER FRIENDS OR DOING THINGS OUTSIDE YOUR HOME?: NO

## 2024-07-26 SDOH — SOCIAL STABILITY: SOCIAL INSECURITY: DO YOU FEEL ANYONE HAS EXPLOITED OR TAKEN ADVANTAGE OF YOU FINANCIALLY OR OF YOUR PERSONAL PROPERTY?: NO

## 2024-07-26 SDOH — SOCIAL STABILITY: SOCIAL INSECURITY: HAS ANYONE EVER THREATENED TO HURT YOUR FAMILY OR YOUR PETS?: NO

## 2024-07-26 SDOH — SOCIAL STABILITY: SOCIAL INSECURITY: ABUSE: ADULT

## 2024-07-26 SDOH — SOCIAL STABILITY: SOCIAL INSECURITY: ARE THERE ANY APPARENT SIGNS OF INJURIES/BEHAVIORS THAT COULD BE RELATED TO ABUSE/NEGLECT?: NO

## 2024-07-26 SDOH — SOCIAL STABILITY: SOCIAL INSECURITY: ARE YOU OR HAVE YOU BEEN THREATENED OR ABUSED PHYSICALLY, EMOTIONALLY, OR SEXUALLY BY ANYONE?: NO

## 2024-07-26 SDOH — SOCIAL STABILITY: SOCIAL INSECURITY: HAVE YOU HAD ANY THOUGHTS OF HARMING ANYONE ELSE?: NO

## 2024-07-26 SDOH — SOCIAL STABILITY: SOCIAL INSECURITY: HAVE YOU HAD THOUGHTS OF HARMING ANYONE ELSE?: NO

## 2024-07-26 SDOH — SOCIAL STABILITY: SOCIAL INSECURITY: DO YOU FEEL UNSAFE GOING BACK TO THE PLACE WHERE YOU ARE LIVING?: NO

## 2024-07-26 ASSESSMENT — PAIN - FUNCTIONAL ASSESSMENT
PAIN_FUNCTIONAL_ASSESSMENT: 0-10

## 2024-07-26 ASSESSMENT — COGNITIVE AND FUNCTIONAL STATUS - GENERAL
CLIMB 3 TO 5 STEPS WITH RAILING: A LITTLE
DRESSING REGULAR LOWER BODY CLOTHING: A LITTLE
TURNING FROM BACK TO SIDE WHILE IN FLAT BAD: A LITTLE
MOVING FROM LYING ON BACK TO SITTING ON SIDE OF FLAT BED WITH BEDRAILS: A LITTLE
HELP NEEDED FOR BATHING: A LITTLE
MOVING TO AND FROM BED TO CHAIR: A LITTLE
MOBILITY SCORE: 18
CLIMB 3 TO 5 STEPS WITH RAILING: A LITTLE
MOBILITY SCORE: 22
WALKING IN HOSPITAL ROOM: A LITTLE
MOVING FROM LYING ON BACK TO SITTING ON SIDE OF FLAT BED WITH BEDRAILS: A LITTLE
DRESSING REGULAR LOWER BODY CLOTHING: A LITTLE
DAILY ACTIVITIY SCORE: 18
EATING MEALS: A LITTLE
MOBILITY SCORE: 18
STANDING UP FROM CHAIR USING ARMS: A LITTLE
WALKING IN HOSPITAL ROOM: A LITTLE
WALKING IN HOSPITAL ROOM: A LITTLE
HELP NEEDED FOR BATHING: A LITTLE
STANDING UP FROM CHAIR USING ARMS: A LITTLE
TOILETING: A LITTLE
DRESSING REGULAR UPPER BODY CLOTHING: A LITTLE
TOILETING: A LITTLE
DRESSING REGULAR UPPER BODY CLOTHING: A LITTLE
TOILETING: A LITTLE
DAILY ACTIVITIY SCORE: 21
MOVING FROM LYING ON BACK TO SITTING ON SIDE OF FLAT BED WITH BEDRAILS: A LITTLE
MOVING FROM LYING ON BACK TO SITTING ON SIDE OF FLAT BED WITH BEDRAILS: A LITTLE
STANDING UP FROM CHAIR USING ARMS: A LITTLE
STANDING UP FROM CHAIR USING ARMS: A LITTLE
TURNING FROM BACK TO SIDE WHILE IN FLAT BAD: A LITTLE
TOILETING: A LITTLE
DAILY ACTIVITIY SCORE: 20
TURNING FROM BACK TO SIDE WHILE IN FLAT BAD: A LITTLE
WALKING IN HOSPITAL ROOM: A LITTLE
MOVING FROM LYING ON BACK TO SITTING ON SIDE OF FLAT BED WITH BEDRAILS: A LITTLE
MOBILITY SCORE: 18
TURNING FROM BACK TO SIDE WHILE IN FLAT BAD: A LITTLE
CLIMB 3 TO 5 STEPS WITH RAILING: A LITTLE
TURNING FROM BACK TO SIDE WHILE IN FLAT BAD: A LITTLE
EATING MEALS: A LITTLE
MOVING TO AND FROM BED TO CHAIR: A LITTLE
STANDING UP FROM CHAIR USING ARMS: A LITTLE
MOBILITY SCORE: 18
CLIMB 3 TO 5 STEPS WITH RAILING: A LITTLE
HELP NEEDED FOR BATHING: A LITTLE
WALKING IN HOSPITAL ROOM: A LITTLE
DRESSING REGULAR LOWER BODY CLOTHING: A LITTLE
PERSONAL GROOMING: A LITTLE
MOVING FROM LYING ON BACK TO SITTING ON SIDE OF FLAT BED WITH BEDRAILS: A LITTLE
DRESSING REGULAR UPPER BODY CLOTHING: A LITTLE
DAILY ACTIVITIY SCORE: 21
PERSONAL GROOMING: A LITTLE
DRESSING REGULAR LOWER BODY CLOTHING: A LITTLE
HELP NEEDED FOR BATHING: A LITTLE
TURNING FROM BACK TO SIDE WHILE IN FLAT BAD: A LITTLE
CLIMB 3 TO 5 STEPS WITH RAILING: A LITTLE
PERSONAL GROOMING: A LITTLE
HELP NEEDED FOR BATHING: A LITTLE
DRESSING REGULAR UPPER BODY CLOTHING: A LITTLE
WALKING IN HOSPITAL ROOM: A LITTLE
CLIMB 3 TO 5 STEPS WITH RAILING: A LITTLE
MOBILITY SCORE: 18
MOVING TO AND FROM BED TO CHAIR: A LITTLE
WALKING IN HOSPITAL ROOM: A LITTLE
MOVING TO AND FROM BED TO CHAIR: A LITTLE
CLIMB 3 TO 5 STEPS WITH RAILING: A LITTLE
MOBILITY SCORE: 18
MOVING TO AND FROM BED TO CHAIR: A LITTLE
MOBILITY SCORE: 18
TURNING FROM BACK TO SIDE WHILE IN FLAT BAD: A LITTLE
DRESSING REGULAR LOWER BODY CLOTHING: A LITTLE
DAILY ACTIVITIY SCORE: 18
DRESSING REGULAR LOWER BODY CLOTHING: A LITTLE
HELP NEEDED FOR BATHING: A LITTLE
DRESSING REGULAR LOWER BODY CLOTHING: A LITTLE
CLIMB 3 TO 5 STEPS WITH RAILING: A LITTLE
WALKING IN HOSPITAL ROOM: A LITTLE
MOVING TO AND FROM BED TO CHAIR: A LITTLE
STANDING UP FROM CHAIR USING ARMS: A LITTLE
HELP NEEDED FOR BATHING: A LITTLE
DAILY ACTIVITIY SCORE: 18
PERSONAL GROOMING: A LITTLE
DRESSING REGULAR LOWER BODY CLOTHING: A LITTLE
MOBILITY SCORE: 18
TOILETING: A LITTLE
DAILY ACTIVITIY SCORE: 18
STANDING UP FROM CHAIR USING ARMS: A LITTLE
TURNING FROM BACK TO SIDE WHILE IN FLAT BAD: A LITTLE
MOVING TO AND FROM BED TO CHAIR: A LITTLE
HELP NEEDED FOR BATHING: A LITTLE
DAILY ACTIVITIY SCORE: 19
DRESSING REGULAR LOWER BODY CLOTHING: A LITTLE
TOILETING: A LITTLE
DRESSING REGULAR UPPER BODY CLOTHING: A LITTLE
TOILETING: A LITTLE
MOVING TO AND FROM BED TO CHAIR: A LITTLE
EATING MEALS: A LITTLE
DAILY ACTIVITIY SCORE: 21
DRESSING REGULAR UPPER BODY CLOTHING: A LITTLE
CLIMB 3 TO 5 STEPS WITH RAILING: A LITTLE
MOVING FROM LYING ON BACK TO SITTING ON SIDE OF FLAT BED WITH BEDRAILS: A LITTLE
PERSONAL GROOMING: A LITTLE
WALKING IN HOSPITAL ROOM: A LITTLE
EATING MEALS: A LITTLE
STANDING UP FROM CHAIR USING ARMS: A LITTLE
TOILETING: A LITTLE
TOILETING: A LITTLE
MOVING FROM LYING ON BACK TO SITTING ON SIDE OF FLAT BED WITH BEDRAILS: A LITTLE
HELP NEEDED FOR BATHING: A LITTLE

## 2024-07-26 ASSESSMENT — PAIN SCALES - GENERAL
PAINLEVEL_OUTOF10: 0 - NO PAIN
PAINLEVEL_OUTOF10: 8
PAINLEVEL_OUTOF10: 0 - NO PAIN
PAINLEVEL_OUTOF10: 8
PAINLEVEL_OUTOF10: 0 - NO PAIN
PAINLEVEL_OUTOF10: 8
PAINLEVEL_OUTOF10: 10 - WORST POSSIBLE PAIN
PAINLEVEL_OUTOF10: 0 - NO PAIN

## 2024-07-26 ASSESSMENT — LIFESTYLE VARIABLES
AUDIT-C TOTAL SCORE: 2
SKIP TO QUESTIONS 9-10: 1
HOW OFTEN DO YOU HAVE A DRINK CONTAINING ALCOHOL: 2-4 TIMES A MONTH
SUBSTANCE_ABUSE_PAST_12_MONTHS: NO
HOW MANY STANDARD DRINKS CONTAINING ALCOHOL DO YOU HAVE ON A TYPICAL DAY: 1 OR 2
PRESCIPTION_ABUSE_PAST_12_MONTHS: NO
HOW OFTEN DO YOU HAVE 6 OR MORE DRINKS ON ONE OCCASION: NEVER
AUDIT-C TOTAL SCORE: 2

## 2024-07-26 ASSESSMENT — PATIENT HEALTH QUESTIONNAIRE - PHQ9
SUM OF ALL RESPONSES TO PHQ9 QUESTIONS 1 & 2: 1
1. LITTLE INTEREST OR PLEASURE IN DOING THINGS: NOT AT ALL
2. FEELING DOWN, DEPRESSED OR HOPELESS: SEVERAL DAYS

## 2024-07-26 ASSESSMENT — PAIN DESCRIPTION - LOCATION
LOCATION: BACK
LOCATION: ABDOMEN
LOCATION: HEAD

## 2024-07-26 ASSESSMENT — ACTIVITIES OF DAILY LIVING (ADL)
BATHING_ASSISTANCE: STAND BY
ADL_ASSISTANCE: INDEPENDENT
ADL_ASSISTANCE: INDEPENDENT

## 2024-07-26 ASSESSMENT — PAIN SCALES - WONG BAKER: WONGBAKER_NUMERICALRESPONSE: HURTS EVEN MORE

## 2024-07-26 NOTE — CARE PLAN
The patient's goals for the shift include to feel better    The clinical goals for the shift include Patient will remain comfortable throughout the shift.    Over the shift, the patient did not make progress toward the following goals. Barriers to progression include acute pancreatitis. Recommendations to address these barriers include bowel rest and proton pump inhibitors.

## 2024-07-26 NOTE — PROGRESS NOTES
Physical Therapy    Physical Therapy Evaluation    Patient Name: Fernando Cuevas  MRN: [id number]  Today's Date: 7/26/2024   Time Calculation  Start Time: 1414  Stop Time: 1423  Time Calculation (min): 9 min    Assessment/Plan   PT Assessment  PT Assessment Results: Decreased strength, Decreased endurance, Impaired balance, Decreased mobility  Rehab Prognosis: Good  Evaluation/Treatment Tolerance: Patient tolerated treatment well  Medical Staff Made Aware: Yes  Strengths: Coping skills, Housing layout, Insight into problems, Premorbid level of function  Barriers to Participation: Comorbidities  End of Session Communication: Bedside nurse  Assessment Comment: Pt presenting with anemia from SNF. Pt demonstrating ind with bed mobility and transfers and SUP for ambulation. Pt demonstrates no acute care needs due to ind with mobility however recommending LOW intensity therapy  End of Session Patient Position: Bed, 3 rail up, Alarm off, not on at start of session  IP OR SWING BED PT PLAN  Inpatient or Swing Bed: Inpatient  PT Plan  Treatment/Interventions: Bed mobility, Transfer training, Gait training, Stair training, Balance training, Neuromuscular re-education, Strengthening, Endurance training, Therapeutic exercise, Therapeutic activity, Home exercise program  PT Plan: PT Eval only  PT Eval Only Reason: No acute PT needs identified  PT Discharge Recommendations: No further acute PT, Low intensity level of continued care  Equipment Recommended upon Discharge:  (owns FWW and rollator)  PT Recommended Transfer Status: Independent  PT - OK to Discharge: Yes (PT POC initiated this date)      Subjective   General Visit Information:  General  Reason for Referral: Pt is a 62 yo female presenting with anemia and UTI from SNF. pt recently with abdominal sx for perforated bowel.  Referred By: Dr. Kapoor  Past Medical History Relevant to Rehab:   Past Medical History:   Diagnosis Date    COPD (chronic obstructive pulmonary disease)  "(Multi)     Depression     DVT (deep venous thrombosis) (Multi)     bilateral upper extremities    HTN (hypertension)     Lung cancer (Multi)     Migraines     Panic disorder        Missed Visit: Yes  Missed Visit Reason: Patient in a medical procedure (Pt in EGD)  Co-Treatment: OT  Co-Treatment Reason: maximum patient safety and outcomes  Prior to Session Communication: Bedside nurse  Patient Position Received: Bed, 3 rail up, Alarm off, not on at start of session  General Comment: pleasant and agreeable to PT, reports she is NOT going to SNF because she \"has done more for herself than they have done for her.\"  Home Living:  Home Living  Type of Home: House  Lives With: Adult children  Home Adaptive Equipment: Walker rolling or standard (RW, rollator)  Home Layout: One level  Home Access: Stairs to enter with rails  Entrance Stairs-Rails: Right  Entrance Stairs-Number of Steps: 2  Bathroom Shower/Tub: Tub/shower unit  Bathroom Toilet: Standard  Bathroom Equipment: None  Prior Level of Function:  Prior Function Per Pt/Caregiver Report  Level of [place]: Independent with ADLs and functional transfers, Independent with homemaking with ambulation  Receives Help From: Family  ADL Assistance: Independent  Homemaking Assistance: Independent  Ambulatory Assistance: Independent (rollator)  Precautions:  Precautions  Medical Precautions: Abdominal precautions, Fall precautions  Post-Surgical Precautions: Abdominal surgery precautions  Vital Signs:       Objective   Pain:  Pain Assessment  Pain Assessment: 0-10  0-10 (Numeric) Pain Score: 0 - No pain  Cognition:  Cognition  Overall Cognitive Status: Within Functional Limits  Orientation Level: Oriented X4    General Assessments:  Activity Tolerance  Endurance: Tolerates 10 - 20 min exercise with multiple rests    Sensation  Light Touch: No apparent deficits    Coordination  Movements are Fluid and Coordinated: Yes    Static Sitting Balance  Static Sitting-Balance " Support: No upper extremity supported, Feet supported  Static Sitting-Level of Assistance: Modified independent  Static Sitting-Comment/Number of Minutes: 2 min    Static Standing Balance  Static Standing-Balance Support: Bilateral upper extremity supported (FWW)  Static Standing-Level of Assistance: Modified independent  Static Standing-Comment/Number of Minutes: 2 min  Functional Assessments:  Bed Mobility  Bed Mobility: Yes  Bed Mobility 1  Bed Mobility 1: Supine to sitting, Sitting to supine  Level of Assistance 1: Modified independent    Transfers  Transfer: Yes  Transfer 1  Transfer From 1: Bed to  Transfer to 1: Stand  Technique 1: Sit to stand, Stand to sit  Transfer Device 1: Walker  Transfer Level of Assistance 1: Modified independent    Ambulation/Gait Training  Ambulation/Gait Training Performed: Yes  Ambulation/Gait Training 1  Surface 1: Level tile  Device 1: Rolling walker  Gait Support Devices: Gait belt  Assistance 1: Distant supervision  Quality of Gait 1: Decreased step length, Forward flexed posture (PT managing IV pole)  Comments/Distance (ft) 1: 70ft  Extremity/Trunk Assessments:  RLE   RLE : Within Functional Limits  LLE   LLE : Within Functional Limits  Outcome Measures:  [facility] Basic Mobility  Turning from your back to your side while in a flat bed without using bedrails: None  Moving from lying on your back to sitting on the side of a flat bed without using bedrails: None  Moving to and from bed to chair (including a wheelchair): None  Standing up from a chair using your arms (e.g. wheelchair or bedside chair): None  To walk in hospital room: A little  Climbing 3-5 steps with railing: A little  Basic Mobility - Total Score: 22    Encounter Problems       Encounter Problems (Active)       Pain - Adult              Education Documentation  Precautions, taught by Zuhair Munroe, PT at 7/26/2024  3:53 PM.  Learner: Patient  Readiness: Acceptance  Method: Explanation  Response: Verbalizes  Understanding    Body Mechanics, taught by Zuhair Munroe, PT at 7/26/2024  3:53 PM.  Learner: Patient  Readiness: Acceptance  Method: Explanation  Response: Verbalizes Understanding    Mobility Training, taught by Zuhair Munroe, PT at 7/26/2024  3:53 PM.  Learner: Patient  Readiness: Acceptance  Method: Explanation  Response: Verbalizes Understanding    Education Comments  No comments found.

## 2024-07-26 NOTE — ANESTHESIA POSTPROCEDURE EVALUATION
Patient: Fernando Cuevas    Procedure Summary       Date: 07/26/24 Room / Location: [facility]    Anesthesia Start: 1019 Anesthesia Stop: 1042    Procedure: EGD Diagnosis: Anemia, unspecified type    Scheduled Providers: Rusty Hayward MD; Saturnino Paulino MD Responsible Provider: Saturnino Paulino MD    Anesthesia Type: MAC ASA Status: 3            Anesthesia Type: MAC    Vitals Value Taken Time   /74 07/26/24 1109   Temp 36.8 °C (98.2 °F) 07/26/24 1040   Pulse 108 07/26/24 1106   Resp 18 07/26/24 1109   SpO2 99 % 07/26/24 1109   Vitals shown include unfiled device data.    Anesthesia Post Evaluation    Patient location during evaluation: PACU  Patient participation: complete - patient participated  Level of consciousness: awake and alert  Pain management: adequate  Airway patency: patent  Cardiovascular status: acceptable and hemodynamically stable  Respiratory status: acceptable, spontaneous ventilation and nonlabored ventilation  Hydration status: acceptable  Postoperative Nausea and Vomiting: none        There were no known notable events for this encounter.

## 2024-07-26 NOTE — POST-PROCEDURE NOTE
EGD done this morning for evaluation of anemia and melenic stools.   The EGD did not show any discrete bleeding lesion, however the stomach was diffusely inflamed and friable, with contact bleeding.  Biopsies were obtained for H. pylori.  There was no fresh or old blood seen on the exam.    Recommendations:  - I will follow up biopsies  - OK to restart diet  - continue pantoprazole 40mg daily even upon discharge  - OK to restart Eliquis tomorrow  - no further inpatient GI recommendations, will sign off

## 2024-07-26 NOTE — PROGRESS NOTES
07/26/24 1041   Current Planned Discharge Disposition   Current Planned Discharge Disposition SNF     Once patient is med ready plan is to discharge either back home or back to SNF, patient will need PT/OT eval for safe discharge, per notes patient pending EGD for today,

## 2024-07-26 NOTE — CARE PLAN
The patient's goals for the shift include to feel better    The clinical goals for the shift include Patient will be free from falls this shift.      Problem: Pain - Adult  Goal: Verbalizes/displays adequate comfort level or baseline comfort level  Reactivated     Problem: Safety - Adult  Goal: Free from fall injury  Reactivated     Problem: Discharge Planning  Goal: Discharge to home or other facility with appropriate resources  Reactivated     Problem: Chronic Conditions and Co-morbidities  Goal: Patient's chronic conditions and co-morbidity symptoms are monitored and maintained or improved  Reactivated     Problem: Skin  Goal: Decreased wound size/increased tissue granulation at next dressing change  Reactivated  Goal: Participates in plan/prevention/treatment measures  Reactivated  Goal: Prevent/manage excess moisture  Reactivated  Goal: Prevent/minimize sheer/friction injuries  Reactivated  Goal: Promote/optimize nutrition  Reactivated  Goal: Promote skin healing  Reactivated     Problem: Fall/Injury  Goal: Not fall by end of shift  Reactivated  Goal: Be free from injury by end of the shift  Reactivated  Goal: Verbalize understanding of personal risk factors for fall in the hospital  Reactivated  Goal: Verbalize understanding of risk factor reduction measures to prevent injury from fall in the home  Reactivated  Goal: Use assistive devices by end of the shift  Reactivated  Goal: Pace activities to prevent fatigue by end of the shift  Reactivated     Problem: Pain  Goal: Takes deep breaths with improved pain control throughout the shift  Reactivated  Goal: Turns in bed with improved pain control throughout the shift  Reactivated  Goal: Walks with improved pain control throughout the shift  Reactivated  Goal: Performs ADL's with improved pain control throughout shift  Reactivated  Goal: Participates in PT with improved pain control throughout the shift  Reactivated  Goal: Free from opioid side effects throughout  the shift  Reactivated  Goal: Free from acute confusion related to pain meds throughout the shift  Reactivated

## 2024-07-26 NOTE — PROGRESS NOTES
Physical Therapy                 Therapy Communication Note    Patient Name: Fernando Cuevas  MRN: [id number]  Today's Date: 7/26/2024     Discipline: Physical Therapy    Missed Visit Reason: Patient in a medical procedure (Pt in EGD)    Missed Time: Attempt

## 2024-07-26 NOTE — PROGRESS NOTES
Occupational Therapy    Evaluation    Patient Name: Fernando Cuevas  MRN: [id number]  Today's Date: 7/26/2024  Time Calculation  Start Time: 1413  Stop Time: 1422  Time Calculation (min): 9 min        Assessment:  OT Assessment: pt demonstrates slightly impaired balance and activity tolerance. she is very motivated and moving well, recommending low intensity OT at d/c for safety with bathing and home setup. no acute OT needs at this time.  Prognosis: Good  Barriers to Discharge: None  Evaluation/Treatment Tolerance: Patient tolerated treatment well  Medical Staff Made Aware: Yes  End of Session Communication: Bedside nurse  End of Session Patient Position: Bed, 3 rail up, Alarm off, not on at start of session  OT Assessment Results: Decreased endurance  Prognosis: Good  Barriers to Discharge: None  Evaluation/Treatment Tolerance: Patient tolerated treatment well  Medical Staff Made Aware: Yes  Strengths: Coping skills, Housing layout, Insight into problems, Premorbid level of function  Barriers to Participation: Comorbidities  Plan:  No Skilled OT: No acute OT goals identified  OT Frequency: OT eval only  OT Discharge Recommendations: Low intensity level of continued care  OT - OK to Discharge: Yes       Subjective   Current Problem:  1. Anemia, unspecified type  Esophagogastroduodenoscopy (EGD)    Esophagogastroduodenoscopy (EGD)    Surgical Pathology Exam    Surgical Pathology Exam      2. Urinary tract infection without hematuria, site unspecified  Surgical Pathology Exam    Surgical Pathology Exam      3. Back pain, unspecified back location, unspecified back pain laterality, unspecified chronicity  Surgical Pathology Exam    Surgical Pathology Exam      4. Acute pancreatitis, unspecified complication status, unspecified pancreatitis type (HHS-HCC)  Surgical Pathology Exam    Surgical Pathology Exam        General:  General  Reason for Referral: anemia and UTI from SNF. pt recently with abdominal sx for perforated  "bowel.  Referred By: Dr. Kapoor  Past Medical History Relevant to Rehab:   Past Medical History:   Diagnosis Date    COPD (chronic obstructive pulmonary disease) (Multi)     Depression     DVT (deep venous thrombosis) (Multi)     bilateral upper extremities    HTN (hypertension)     Lung cancer (Multi)     Migraines     Panic disorder      Co-Treatment: PT  Co-Treatment Reason: maximum patient safety and outcomes  Prior to Session Communication: Bedside nurse  Patient Position Received: Bed, 3 rail up, Alarm off, not on at start of session  General Comment: pleasant and agreeable to OT, reports she is NOT going to SNF because she \"has done more for herself than they have done for her.\"  Precautions:  Medical Precautions: Abdominal precautions, Fall precautions  Post-Surgical Precautions: Abdominal surgery precautions  Vital Signs:     Pain:  Pain Assessment  0-10 (Numeric) Pain Score: 0 - No pain    Objective   Cognition:  Orientation Level: Oriented X4   Home Living:  Type of Home: House  Lives With: Adult children  Home Adaptive Equipment: Walker rolling or standard (RW, rollator)  Home Layout: One level  Home Access: Stairs to enter with rails  Entrance Stairs-Rails: Right  Entrance Stairs-Number of Steps: 2  Bathroom Shower/Tub: Tub/shower unit  Bathroom Toilet: Standard  Prior Function:  Level of [place]: Independent with ADLs and functional transfers, Independent with homemaking with ambulation  Receives Help From: Family  ADL Assistance: Independent  Homemaking Assistance: Independent  Ambulatory Assistance: Independent  ADL:  Eating Assistance: Independent  Grooming Assistance: Independent  Bathing Assistance: Stand by  UE Dressing Assistance: Independent  LE Dressing Assistance: Independent  Toileting Assistance with Device: Independent  Activity Tolerance:  Endurance: Tolerates 10 - 20 min exercise with multiple rests  Bed Mobility/Transfers: Bed Mobility  Bed Mobility: Yes  Bed Mobility 1  Bed " Mobility 1: Supine to sitting, Sitting to supine  Level of Assistance 1: Modified independent    Transfers  Transfer: Yes  Transfer 1  Transfer From 1: Bed to  Transfer to 1: Stand  Technique 1: Sit to stand, Stand to sit  Transfer Device 1: Walker  Transfer Level of Assistance 1: Modified independent      Functional Mobility:  Functional Mobility  Functional Mobility Performed: Yes  Functional Mobility 1  Surface 1: Level tile  Device 1: Rolling walker  Assistance 1: Close supervision  Comments 1: assist with managing IV pole  Standing Balance:  Dynamic Standing Balance  Dynamic Standing-Balance Support: Bilateral upper extremity supported  Dynamic Standing-Balance: Turning  Dynamic Standing-Comments: close SUP for IV pole mgmt   Modalities:     Vision:Vision - Basic Assessment  Current Vision: No visual deficits  Sensation:  Light Touch: No apparent deficits  Sharp/Dull: No apparent deficits  Stereognosis: No apparent deficits  Proprioception: No apparent deficits  Coordination:  Movements are Fluid and Coordinated: Yes   Hand Function:  Gross Grasp: Functional  Coordination: Functional  Extremities: RUE   RUE : Within Functional Limits and LUE   LUE: Within Functional Limits    Outcome Measures:[facility] Daily Activity  Putting on and taking off regular lower body clothing: A little  Bathing (including washing, rinsing, drying): A little  Putting on and taking off regular upper body clothing: None  Toileting, which includes using toilet, bedpan or urinal: A little  Taking care of personal grooming such as brushing teeth: None  Eating Meals: None  Daily Activity - Total Score: 21    Education Documentation  Precautions, taught by Nanda Bowen OT at 7/26/2024  2:38 PM.  Learner: Patient  Readiness: Acceptance  Method: Explanation  Response: Verbalizes Understanding    Body Mechanics, taught by Nanda Bowen OT at 7/26/2024  2:38 PM.  Learner: Patient  Readiness: Acceptance  Method: Explanation  Response:  Verbalizes Understanding    ADL Training, taught by Nanda Bowen OT at 7/26/2024  2:38 PM.  Learner: Patient  Readiness: Acceptance  Method: Explanation  Response: Verbalizes Understanding    Education Comments  No comments found.

## 2024-07-26 NOTE — ANESTHESIA PREPROCEDURE EVALUATION
Patient: Fernando Ceuvas    Procedure Information       Date/Time: 24 9585    Scheduled providers: Rusty Hayward MD; Saturnino Paulino MD    Procedure: EGD    Location: [facility]            Relevant Problems   Cardiac   (+) Essential hypertension   (+) Mixed hyperlipidemia      Pulmonary   (+) Acute exacerbation of chronic obstructive pulmonary disease (Multi)   (+) COPD (chronic obstructive pulmonary disease) (Multi)   (+) COPD exacerbation (Multi)   (+) Lung cancer (Multi)   (+) Malignant neoplasm of lower lobe of right lung (Multi)   (+) Malignant neoplasm of lung (Multi)   (+) Panlobular emphysema (Multi)      Neuro   (+) Anxiety   (+) Dysthymic disorder   (+) JASMINE (generalized anxiety disorder)   (+) Panic disorder      GI   (+) Gastroesophageal reflux disease without esophagitis      Hematology   (+) Anemia   (+) Anemia, unspecified type       Clinical information reviewed:   Tobacco  Allergies  Meds   Med Hx  Surg Hx   Fam Hx  Soc Hx         Past Medical History:   Diagnosis Date    COPD (chronic obstructive pulmonary disease) (Multi)     Depression     DVT (deep venous thrombosis) (Multi)     bilateral upper extremities    HTN (hypertension)     Lung cancer (Multi)     Migraines     Panic disorder       Past Surgical History:   Procedure Laterality Date     SECTION, LOW TRANSVERSE      COLONOSCOPY      CT ABDOMEN PELVIS ANGIOGRAM W AND/OR WO IV CONTRAST  2016    CT ABDOMEN PELVIS ANGIOGRAM W AND/OR WO IV CONTRAST [place] [facility]    CT ANGIO CORONARY ART WITH HEARTFLOW IF SCORE >30%  2023    CT ANGIO CORONARY ART WITH HEARTFLOW IF SCORE >30% 2023    CYSTOSCOPY      botox injection    ESOPHAGOGASTRODUODENOSCOPY      EXPLORATORY LAPAROTOMY W/ BOWEL RESECTION  2024    SBR for perforation    MR NECK ANGIO W IV CONTRAST  2021    MR NECK ANGIO W IV CONTRAST 2021     Social History     Tobacco Use    Smoking status: Some Days     Types:  Cigarettes     Passive exposure: Current (3 cigarettes a day)    Smokeless tobacco: Never   Vaping Use    Vaping status: Never Used   Substance Use Topics    Alcohol use: Not Currently     Comment: history of daily use    Drug use: Not Currently     Types: Cocaine      Current Outpatient Medications   Medication Instructions    acetaminophen (Tylenol Extra Strength) 500 mg tablet 2 tablets, oral, Every 8 hours    acetaminophen-codeine (Tylenol w/ Codeine #3) 300-30 mg tablet 1 tablet, oral, Every 6 hours PRN    albuterol 90 mcg/actuation inhaler 2 puffs, inhalation, Every 4 hours PRN    alum-mag hydroxide-simeth (Mylanta) 200-200-20 mg/5 mL oral suspension TAKE 10 ML BY MOUTH EVERY 6 HOURS AS NEEDED    ammonium lactate (Lac-Hydrin) 12 % lotion Topical, 2 times daily    apixaban (ELIQUIS) 5 mg, oral, 2 times daily    aspirin 81 mg, oral, Daily    benzonatate (TESSALON) 100 mg, oral, 3 times daily PRN, Do not crush or chew.    busPIRone (BUSPAR) 5 mg, oral, 2 times daily    calcium carbonate (Oscal) 500 mg calcium (1,250 mg) tablet 1 tablet, oral, 2 times daily (morning and late afternoon)    clotrimazole (Lotrimin) 1 % cream Topical, 2 times daily    diclofenac sodium (VOLTAREN) 4 g, Topical, 4 times daily PRN    dilTIAZem ER (TIAZAC) 240 mg, oral, Daily    fluocinonide 0.05 % cream APPLY THIN LAYER TO AFFECTED AREA TWICE A DAY AS NEEDED    fluticasone propion-salmeteroL (Advair Diskus) 100-50 mcg/dose diskus inhaler 1 puff, inhalation, 2 times daily RT, Rinse mouth with water after use to reduce aftertaste and incidence of candidiasis. Do not swallow.    guaiFENesin (MUCINEX) 600 mg, oral    ipratropium-albuteroL (Duo-Neb) 0.5-2.5 mg/3 mL nebulizer solution 3 mL, nebulization, 3 times daily RT    lidocaine (Lidoderm) 5 % patch 1 patch, transdermal, Every 24 hours    metroNIDAZOLE (Flagyl) 500 mg tablet     mirtazapine (REMERON) 15 mg, oral, Nightly    mometasone-formoterol (Dulera 100) 100-5 mcg/actuation inhaler  "200 mcg, inhalation, 2 times daily    montelukast (SINGULAIR) 10 mg, oral, Daily    nitroglycerin (NITROSTAT) 0.4 mg, sublingual    nortriptyline (Pamelor) 50 mg capsule 1 capsule, oral, Nightly    oxybutynin XL (DITROPAN-XL) 5 mg, oral, Daily, Do not crush, chew, or split.    pantoprazole (PROTONIX) 40 mg, oral, 2 times daily    thiamine (VITAMIN B-1) 100 mg, oral, Daily    tiotropium (Spiriva Respimat) 2.5 mcg/actuation inhaler 2 puffs, inhalation, Daily    varenicline (CHANTIX) 1 mg, oral, 2 times daily    Zithromax 500 mg tablet 1 tablet, oral, Every 24 hours      Allergies   Allergen Reactions    Iodinated Contrast Media Hives     Hives to faces and neck with itching. Resolved with 50 mg benadryl, 20 mg pepcid & 60 mg prednisone.    Hives to faces and neck with itching. Resolved with 50 mg benadryl, 20 mg pepcid & 60 mg prednisone.   Hives to faces and neck with itching. Resolved with 50 mg benadryl, 20 mg pepcid & 60 mg prednisone.    Adhesive Tape-Silicones Other        Chemistry    Lab Results   Component Value Date/Time     07/26/2024 0603    K 3.7 07/26/2024 0603     07/26/2024 0603    CO2 26 07/26/2024 0603    BUN 8 07/26/2024 0603    CREATININE 0.53 07/26/2024 0603    Lab Results   Component Value Date/Time    CALCIUM 7.6 (L) 07/26/2024 0603    ALKPHOS 119 07/25/2024 0942    AST 26 07/25/2024 0942    ALT 31 07/25/2024 0942    BILITOT 0.2 07/25/2024 0942          Lab Results   Component Value Date    HGBA1C 5.2 04/13/2022     Lab Results   Component Value Date/Time    WBC 12.6 (H) 07/26/2024 0603    HGB 8.2 (L) 07/26/2024 0603    HCT 26.9 (L) 07/26/2024 0603     (H) 07/26/2024 0603     Lab Results   Component Value Date/Time    PROTIME 16.0 (H) 07/24/2024 2133    INR 1.4 (H) 07/24/2024 2133     No results found for: \"ABORH\"  Encounter Date: 06/20/24   Electrocardiogram, 12-lead PRN ACS symptoms   Result Value    Ventricular Rate 114    Atrial Rate 114    DE Interval 140    QRS Duration " "76    QT Interval 306    QTC Calculation(Bazett) 421    P Axis 88    R Axis 83    T Axis 75    QRS Count 19    Q Onset 224    P Onset 154    P Offset 198    T Offset 377    QTC Fredericia 378    Narrative    Sinus tachycardia  Right atrial enlargement  Borderline ECG  When compared with ECG of 20-JUN-2024 21:19,  No significant change was found  Confirmed by Edward Wheat ([id number]) on 7/16/2024 4:34:29 PM     No results found for this or any previous visit from the past 1095 days.   Nuclear stress 3/23/2016:  IMPRESSION:        1. No evidence of pharmacologic induced reversible ischemic change or   infarction.   2. Ejection fraction measures greater than 65%.    CT coronary artery 1/5/2023@[facility]:  MPRESSION:   1. No coronary artery calcification or stenosis.   2.  The circumflex coronary artery arises from the right cusp separate from the right coronary artery with a retroaortic course. This is a benign variant.   3.  Centrilobular emphysema is present. Bilateral bronchial wall thickening and scattered mucus plugging. The known biopsy-proven lung cancer in the right lower lobe is similar in size the prior exam.     Visit Vitals  /76   Pulse 108   Temp 36 °C (96.8 °F) (Temporal)   Resp 20   Ht 1.575 m (5' 2\")   Wt 61.8 kg (136 lb 3.2 oz)   SpO2 98%   BMI 24.91 kg/m²   OB Status Menopausal   Smoking Status Some Days   BSA 1.64 m²     NPO/Void Status  Carbohydrate Drink Given Prior to Surgery? : N  Date of Last Liquid: 07/26/24  Time of Last Liquid: 0800  Date of Last Solid: 07/25/24  Time of Last Solid: 1700  Last Intake Type: Clear fluids  Time of Last Void: 0900         Physical Exam    Airway  Mallampati: III  TM distance: >3 FB  Neck ROM: full     Cardiovascular   Rhythm: regular  Rate: normal     Dental - normal exam     Pulmonary   Breath sounds clear to auscultation     Abdominal - normal exam              Anesthesia Plan    History of general anesthesia?: yes  History of complications of general " anesthesia?: no    ASA 3     MAC   (With standard ASA monitoring.)  intravenous induction   Anesthetic plan and risks discussed with patient.    Plan discussed with CRNA and CAA.

## 2024-07-26 NOTE — PROGRESS NOTES
Occupational Therapy                 Therapy Communication Note    Patient Name: Fernando Cuevas  MRN: [id number]  Today's Date: 7/26/2024     Discipline: Occupational Therapy    Missed Visit Reason: Missed Visit Reason: Patient in a medical procedure (Pt in EGD)    Missed Time: Attempt

## 2024-07-27 ENCOUNTER — PHARMACY VISIT (OUTPATIENT)
Dept: PHARMACY | Facility: CLINIC | Age: 64
End: 2024-07-27
Payer: MEDICAID

## 2024-07-27 ENCOUNTER — DOCUMENTATION (OUTPATIENT)
Dept: HOME HEALTH SERVICES | Facility: HOME HEALTH | Age: 64
End: 2024-07-27
Payer: COMMERCIAL

## 2024-07-27 ENCOUNTER — HOME HEALTH ADMISSION (OUTPATIENT)
Dept: HOME HEALTH SERVICES | Facility: HOME HEALTH | Age: 64
End: 2024-07-27

## 2024-07-27 VITALS
RESPIRATION RATE: 17 BRPM | DIASTOLIC BLOOD PRESSURE: 69 MMHG | HEIGHT: 62 IN | SYSTOLIC BLOOD PRESSURE: 128 MMHG | HEART RATE: 120 BPM | OXYGEN SATURATION: 99 % | TEMPERATURE: 97.7 F | BODY MASS INDEX: 25.06 KG/M2 | WEIGHT: 136.2 LBS

## 2024-07-27 LAB
ALBUMIN SERPL BCP-MCNC: 2.9 G/DL (ref 3.4–5)
ANION GAP SERPL CALC-SCNC: 14 MMOL/L (ref 10–20)
BACTERIA UR CULT: ABNORMAL
BASOPHILS # BLD AUTO: 0.01 X10*3/UL (ref 0–0.1)
BASOPHILS NFR BLD AUTO: 0.1 %
BUN SERPL-MCNC: 9 MG/DL (ref 6–23)
CALCIUM SERPL-MCNC: 7.8 MG/DL (ref 8.6–10.3)
CHLORIDE SERPL-SCNC: 107 MMOL/L (ref 98–107)
CO2 SERPL-SCNC: 22 MMOL/L (ref 21–32)
CREAT SERPL-MCNC: 0.52 MG/DL (ref 0.5–1.05)
EGFRCR SERPLBLD CKD-EPI 2021: >90 ML/MIN/1.73M*2
EOSINOPHIL # BLD AUTO: 0.01 X10*3/UL (ref 0–0.7)
EOSINOPHIL NFR BLD AUTO: 0.1 %
ERYTHROCYTE [DISTWIDTH] IN BLOOD BY AUTOMATED COUNT: 19.6 % (ref 11.5–14.5)
GLUCOSE SERPL-MCNC: 88 MG/DL (ref 74–99)
HCT VFR BLD AUTO: 26.7 % (ref 36–46)
HGB BLD-MCNC: 8 G/DL (ref 12–16)
IMM GRANULOCYTES # BLD AUTO: 0.08 X10*3/UL (ref 0–0.7)
IMM GRANULOCYTES NFR BLD AUTO: 0.6 % (ref 0–0.9)
LYMPHOCYTES # BLD AUTO: 2.83 X10*3/UL (ref 1.2–4.8)
LYMPHOCYTES NFR BLD AUTO: 21.7 %
MCH RBC QN AUTO: 26.1 PG (ref 26–34)
MCHC RBC AUTO-ENTMCNC: 30 G/DL (ref 32–36)
MCV RBC AUTO: 87 FL (ref 80–100)
MONOCYTES # BLD AUTO: 1.65 X10*3/UL (ref 0.1–1)
MONOCYTES NFR BLD AUTO: 12.7 %
NEUTROPHILS # BLD AUTO: 8.46 X10*3/UL (ref 1.2–7.7)
NEUTROPHILS NFR BLD AUTO: 64.8 %
NRBC BLD-RTO: 0.2 /100 WBCS (ref 0–0)
PHOSPHATE SERPL-MCNC: 2.1 MG/DL (ref 2.5–4.9)
PLATELET # BLD AUTO: 924 X10*3/UL (ref 150–450)
POTASSIUM SERPL-SCNC: 3.7 MMOL/L (ref 3.5–5.3)
RBC # BLD AUTO: 3.07 X10*6/UL (ref 4–5.2)
SODIUM SERPL-SCNC: 139 MMOL/L (ref 136–145)
WBC # BLD AUTO: 13 X10*3/UL (ref 4.4–11.3)

## 2024-07-27 PROCEDURE — 2500000002 HC RX 250 W HCPCS SELF ADMINISTERED DRUGS (ALT 637 FOR MEDICARE OP, ALT 636 FOR OP/ED): Performed by: HOSPITALIST

## 2024-07-27 PROCEDURE — 85025 COMPLETE CBC W/AUTO DIFF WBC: CPT | Performed by: HOSPITALIST

## 2024-07-27 PROCEDURE — 2500000001 HC RX 250 WO HCPCS SELF ADMINISTERED DRUGS (ALT 637 FOR MEDICARE OP): Performed by: HOSPITALIST

## 2024-07-27 PROCEDURE — 99239 HOSP IP/OBS DSCHRG MGMT >30: CPT | Performed by: HOSPITALIST

## 2024-07-27 PROCEDURE — 80069 RENAL FUNCTION PANEL: CPT | Performed by: HOSPITALIST

## 2024-07-27 PROCEDURE — 94640 AIRWAY INHALATION TREATMENT: CPT

## 2024-07-27 PROCEDURE — RXMED WILLOW AMBULATORY MEDICATION CHARGE

## 2024-07-27 PROCEDURE — C9113 INJ PANTOPRAZOLE SODIUM, VIA: HCPCS | Performed by: HOSPITALIST

## 2024-07-27 PROCEDURE — 2500000004 HC RX 250 GENERAL PHARMACY W/ HCPCS (ALT 636 FOR OP/ED): Performed by: HOSPITALIST

## 2024-07-27 PROCEDURE — 2500000005 HC RX 250 GENERAL PHARMACY W/O HCPCS: Performed by: HOSPITALIST

## 2024-07-27 PROCEDURE — 36415 COLL VENOUS BLD VENIPUNCTURE: CPT | Performed by: HOSPITALIST

## 2024-07-27 RX ORDER — POLYETHYLENE GLYCOL 3350 17 G/17G
17 POWDER, FOR SOLUTION ORAL DAILY
Qty: 238 G | Refills: 0 | Status: ON HOLD | OUTPATIENT
Start: 2024-07-28

## 2024-07-27 RX ORDER — PREDNISONE 10 MG/1
TABLET ORAL
Qty: 20 TABLET | Refills: 0 | Status: ON HOLD | OUTPATIENT
Start: 2024-07-28 | End: 2024-08-05

## 2024-07-27 RX ORDER — CEFDINIR 300 MG/1
300 CAPSULE ORAL 2 TIMES DAILY
Qty: 6 CAPSULE | Refills: 0 | Status: ON HOLD | OUTPATIENT
Start: 2024-07-27 | End: 2024-07-30

## 2024-07-27 RX ORDER — SUCRALFATE 1 G/1
1 TABLET ORAL 2 TIMES DAILY
Qty: 60 TABLET | Refills: 0 | Status: ON HOLD | OUTPATIENT
Start: 2024-07-27

## 2024-07-27 RX ORDER — ALBUTEROL SULFATE 0.83 MG/ML
3 SOLUTION RESPIRATORY (INHALATION) EVERY 6 HOURS PRN
Qty: 300 ML | Refills: 0 | Status: ON HOLD | OUTPATIENT
Start: 2024-07-27

## 2024-07-27 RX ORDER — AMOXICILLIN 250 MG
1 CAPSULE ORAL 2 TIMES DAILY
Qty: 60 TABLET | Refills: 0 | Status: ON HOLD | OUTPATIENT
Start: 2024-07-27 | End: 2024-08-26

## 2024-07-27 ASSESSMENT — COGNITIVE AND FUNCTIONAL STATUS - GENERAL
DAILY ACTIVITIY SCORE: 24
MOBILITY SCORE: 22
CLIMB 3 TO 5 STEPS WITH RAILING: A LITTLE
WALKING IN HOSPITAL ROOM: A LITTLE

## 2024-07-27 ASSESSMENT — PAIN SCALES - GENERAL
PAINLEVEL_OUTOF10: 10 - WORST POSSIBLE PAIN
PAINLEVEL_OUTOF10: 10 - WORST POSSIBLE PAIN
PAINLEVEL_OUTOF10: 4
PAINLEVEL_OUTOF10: 10 - WORST POSSIBLE PAIN
PAINLEVEL_OUTOF10: 6
PAINLEVEL_OUTOF10: 0 - NO PAIN

## 2024-07-27 ASSESSMENT — PAIN SCALES - WONG BAKER
WONGBAKER_NUMERICALRESPONSE: HURTS EVEN MORE

## 2024-07-27 ASSESSMENT — PAIN SCALES - PAIN ASSESSMENT IN ADVANCED DEMENTIA (PAINAD)
TOTALSCORE: MEDICATION (SEE MAR)
TOTALSCORE: MEDICATION (SEE MAR)

## 2024-07-27 ASSESSMENT — PAIN - FUNCTIONAL ASSESSMENT
PAIN_FUNCTIONAL_ASSESSMENT: 0-10

## 2024-07-27 ASSESSMENT — PAIN DESCRIPTION - LOCATION
LOCATION: BACK
LOCATION: BACK

## 2024-07-27 ASSESSMENT — PAIN DESCRIPTION - ORIENTATION: ORIENTATION: LOWER

## 2024-07-27 NOTE — CARE PLAN
The patient's goals for the shift include to feel better    The clinical goals for the shift include Patient will remain comfortable throughout the shift.

## 2024-07-27 NOTE — CARE PLAN
The patient's goals for the shift include to feel better    The clinical goals for the shift include pt will remain safe throughout the shift

## 2024-07-27 NOTE — PROGRESS NOTES
07/27/24 0844   Discharge Planning   Expected Discharge Disposition  Services     Once med ready plan will be home with C, OT recommending Low score 21 and PT recommending Low score 22 will continue to monitor for discharge planning.    12:29 I spoke to the patient at her bedside in regards to discharge planning, advised [facility] could not follow because of insurance she stated usually her  through [employer] sets her up with [facility], I did let her know that I sent out some blank referrals she is agreeable to which ever agency is able to accept her. I did advise SOC is usually 24-48hrs after discharge, she stated she is aware that sometimes it takes a few days to get people out to the house.

## 2024-07-27 NOTE — HH CARE COORDINATION
This referral has been made a Non Admit with  Home Care due to  [facility] PCP . If you have further questions, feel free to reach out to our office at [phone]. Thank you, [facility] Intake.

## 2024-07-27 NOTE — PROGRESS NOTES
Between 7AM-7PM please message me via Epic Secure Chat.  After 7PM please page Nocturnist on call.    [facility] Hospitalist Progress Note      Fernando Cuevas    :  1960(63 y.o.)    MRN:  [id number]  Date: 24     Assessment and Plan:     Anemia- possible GIB in setting of eliquis use, reports melena prior to admission. GI consulted. EGD did not show any discrete bleeding lesion, however the stomach was diffusely inflamed and friable, with contact bleeding. Biopsies were obtained for H. pylori. Holding asa, eliquis until tomorrow. Daily PPI. Follow up biopsy results.   AECOPD- CTA Chest with bibasilar airspace opacities slightly more consolidative in the right lung base. Procal low suggesting unlikely PNA. Aletha duonebs/Pulmicort. Continue PO prednisone 40 mg daily. Continue home azithromycin, mucinex, singulair  Post op hematoma- CT showed bilobed hyperdense lesion abutting the pancreatic tail with the larger component measuring up to 1.3 cm. This is stable from CT scan of  but new when compared to CT angiogram 2016. Doubt this is new bleeding and cause of anemia.   Mild Lipase elevation- unlikely due to pancreatitis, no evidence of such on CT AP  UTI- Ucx with E Coli, continue empiric rocephin  Recent DVT- holding eliquis due to above  HTN- continue diltiazem  Anxiety/Depression-continue buspar, remeron  OAB- continue home oxybutynin  Constipation- daily miralax and senna-s bid  Recent perforated small bowel with peritonitis, s/p emergency ex laparotomy  on 24      DVT Prophylaxis: SCDs    Disposition:  home tomorrow if stable overnight    Electronically signed by Rei Kapoor DO on 24 at 8:30 PM     Subjective:      Interval History:   Vitals and chart notes from overnight reviewed.   No acute issues overnight.   Patient seen and evaluated at bedside.   Seen after EGD. No chest pain or shortness of breath. No nausea, vomiting.     Review of Systems:   Other than  "patient's chronic conditions and those complaints in the history above, the rest of the 10 systems review were done and were negative.     Current medications:  Scheduled Meds:acetaminophen, 975 mg, oral, q8h  azithromycin, 500 mg, oral, q24h  budesonide, 0.5 mg, nebulization, BID  busPIRone, 5 mg, oral, BID  calcium carbonate, 1,250 mg, oral, BID  cefTRIAXone, 1 g, intravenous, q24h  dilTIAZem ER, 240 mg, oral, Daily  guaiFENesin, 600 mg, oral, BID  ipratropium-albuteroL, 3 mL, nebulization, TID  lidocaine, 1 patch, transdermal, Daily  melatonin, 3 mg, oral, Nightly  mirtazapine, 15 mg, oral, Nightly  montelukast, 10 mg, oral, Daily  oxybutynin, 2.5 mg, oral, BID  pantoprazole, 40 mg, intravenous, q12h  polyethylene glycol, 17 g, oral, Daily  predniSONE, 40 mg, oral, Daily  sennosides-docusate sodium, 1 tablet, oral, BID  thiamine, 100 mg, oral, Daily      Continuous Infusions:   PRN Meds:PRN medications: benzonatate, hydrOXYzine pamoate, ipratropium-albuteroL, ondansetron ODT **OR** ondansetron, oxyCODONE      Objective:     Heart Rate:  []   Temp:  [36 °C (96.8 °F)-36.8 °C (98.2 °F)]   Resp:  [16-20]   BP: (102-141)/(65-82)   Height:  [157.5 cm (5' 2\")]   Weight:  [61.8 kg (136 lb 3.2 oz)]   SpO2:  [96 %-100 %]          Physical Exam  Vitals and nursing note reviewed.   HENT:      Mouth/Throat:      Mouth: Mucous membranes are moist.      Pharynx: Oropharynx is clear.   Cardiovascular:      Rate and Rhythm: Regular rhythm. Tachycardia present.   Pulmonary:      Effort: Pulmonary effort is normal.      Breath sounds: No wheezing.   Abdominal:      Palpations: Abdomen is soft.   Neurological:      Mental Status: She is alert.         Labs:   Lab Results   Component Value Date     07/26/2024    K 3.7 07/26/2024     07/26/2024    CO2 26 07/26/2024    BUN 8 07/26/2024    CREATININE 0.53 07/26/2024    GLUCOSE 96 07/26/2024    CALCIUM 7.6 (L) 07/26/2024    PROT 5.1 (L) 07/25/2024    BILITOT 0.2 " 07/25/2024    ALKPHOS 119 07/25/2024    AST 26 07/25/2024    ALT 31 07/25/2024       Lab Results   Component Value Date    WBC 12.6 (H) 07/26/2024    HGB 8.2 (L) 07/26/2024    HCT 26.9 (L) 07/26/2024    MCV 85 07/26/2024     (H) 07/26/2024

## 2024-07-28 LAB
BACTERIA BLD CULT: NORMAL
BACTERIA BLD CULT: NORMAL

## 2024-07-29 PROBLEM — C34.90 LUNG CANCER (MULTI): Status: RESOLVED | Noted: 2024-06-11 | Resolved: 2024-07-29

## 2024-07-29 PROBLEM — C34.90 MALIGNANT NEOPLASM OF LUNG (MULTI): Status: RESOLVED | Noted: 2024-06-11 | Resolved: 2024-07-29

## 2024-07-29 LAB
BACTERIA BLD CULT: NORMAL
BACTERIA BLD CULT: NORMAL

## 2024-07-29 NOTE — DISCHARGE SUMMARY
Discharge Diagnosis  Anemia due to GIB  AE COPD  Post op Hematoma  UTI  Recent DVT  HTN  Anxiety/Depression  OAB  Constipation    Issues Requiring Follow-Up  - repeat cbc, bmp in 1 week  - op follow up with pcp, pulm, surgery    Discharge Meds     Your medication list        START taking these medications        Instructions Last Dose Given Next Dose Due   cefdinir 300 mg capsule  Commonly known as: Omnicef      Take 1 capsule (300 mg) by mouth 2 times a day for 3 days.       polyethylene glycol 17 gram/dose powder  Commonly known as: Glycolax, Miralax  Start taking on: July 28, 2024      Take 17 g by mouth once daily. Do not fill before July 28, 2024.       predniSONE 10 mg tablet  Commonly known as: Deltasone  Start taking on: July 28, 2024      Take 4 tablets (40 mg) by mouth once daily for 2 days, THEN 3 tablets (30 mg) once daily for 2 days, THEN 2 tablets (20 mg) once daily for 2 days, THEN 1 tablet (10 mg) once daily for 2 days.       sennosides-docusate sodium 8.6-50 mg tablet  Commonly known as: Sparkle-Colace      Take 1 tablet by mouth 2 times a day.       sucralfate 1 gram tablet  Commonly known as: Carafate      Take 1 tablet (1 g) by mouth 2 times a day. Crush and mix in luke warm water to make slurry and drink the slurry.              CHANGE how you take these medications        Instructions Last Dose Given Next Dose Due   ipratropium-albuteroL 0.5-2.5 mg/3 mL nebulizer solution  Commonly known as: Duo-Neb  What changed:   when to take this  reasons to take this      Take 3 mL by nebulization every 6 hours if needed for wheezing or shortness of breath.              CONTINUE taking these medications        Instructions Last Dose Given Next Dose Due   albuterol 90 mcg/actuation inhaler           ammonium lactate 12 % lotion  Commonly known as: Lac-Hydrin           apixaban 5 mg tablet  Commonly known as: Eliquis      Take 1 tablet (5 mg) by mouth 2 times a day.       benzonatate 100 mg capsule  Commonly  known as: Tessalon      Take 1 capsule (100 mg) by mouth 3 times a day as needed for cough. Do not crush or chew.       busPIRone 5 mg tablet  Commonly known as: Buspar           calcium carbonate 500 mg calcium (1,250 mg) tablet  Commonly known as: Oscal           clotrimazole 1 % cream  Commonly known as: Lotrimin           diclofenac sodium 1 % gel  Commonly known as: Voltaren           dilTIAZem  mg 24 hr capsule  Commonly known as: Tiazac           fluocinonide 0.05 % cream  Commonly known as: Lidex           guaiFENesin 600 mg 12 hr tablet  Commonly known as: Mucinex           lidocaine 5 % patch  Commonly known as: Lidoderm           mirtazapine 15 mg tablet  Commonly known as: Remeron      Take 1 tablet (15 mg) by mouth once daily at bedtime.       mometasone-formoterol 100-5 mcg/actuation inhaler  Commonly known as: Dulera 100           montelukast 10 mg tablet  Commonly known as: Singulair           oxybutynin XL 5 mg 24 hr tablet  Commonly known as: Ditropan-XL           pantoprazole 40 mg EC tablet  Commonly known as: ProtoNix           Spiriva Respimat 2.5 mcg/actuation inhaler  Generic drug: tiotropium      Inhale 2 puffs once daily.       thiamine 100 mg tablet  Commonly known as: Vitamin B-1           Tylenol Extra Strength 500 mg tablet  Generic drug: acetaminophen           varenicline 1 mg tablet  Commonly known as: Chantix           Zithromax 500 mg tablet  Generic drug: azithromycin                  STOP taking these medications      acetaminophen-codeine 300-30 mg tablet  Commonly known as: Tylenol w/ Codeine #3        alum-mag hydroxide-simeth 200-200-20 mg/5 mL oral suspension  Commonly known as: Mylanta        aspirin 81 mg chewable tablet        fluticasone propion-salmeteroL 100-50 mcg/dose diskus inhaler  Commonly known as: Advair Diskus        metroNIDAZOLE 500 mg tablet  Commonly known as: Flagyl        nitroglycerin 0.4 mg SL tablet  Commonly known as: Nitrostat         nortriptyline 50 mg capsule  Commonly known as: Pamelor                  Where to Get Your Medications        These medications were sent to [facility]  [address] , [address], [facility] [address]      Hours: 8 AM to 6 PM Mon-Fri, 9 AM to 1 PM Saturday Phone: [phone]   cefdinir 300 mg capsule  ipratropium-albuteroL 0.5-2.5 mg/3 mL nebulizer solution  polyethylene glycol 17 gram/dose powder  predniSONE 10 mg tablet  sennosides-docusate sodium 8.6-50 mg tablet  sucralfate 1 gram tablet         Test Results Pending At Discharge  Pending Labs       Order Current Status    Surgical Pathology Exam In process    Blood Culture Preliminary result    Blood Culture Preliminary result            Hospital Course    Anemia- possible GIB in setting of eliquis use, reports melena prior to admission. GI consulted. EGD did not show any discrete bleeding lesion, however the stomach was diffusely inflamed and friable, with contact bleeding. Biopsies were obtained for H. pylori. Daily PPI. Follow up biopsy results.   AECOPD- CTA Chest with bibasilar airspace opacities slightly more consolidative in the right lung base. Procal low suggesting unlikely PNA. Aletha duonebs/Pulmicort. Improved with PO prednisone 40 mg daily, plan to taper on dc. Continue home azithromycin, mucinex, singulair.   Post op hematoma- CT showed bilobed hyperdense lesion abutting the pancreatic tail with the larger component measuring up to 1.3 cm. This is stable from CT scan of 07/03/202 but new when compared to CT angiogram 03/22/2016. Doubt this is new bleeding and cause of anemia.   Mild Lipase elevation- unlikely due to pancreatitis, no evidence of such on CT AP  UTI- Ucx with E Coli, continue empiric rocephin  Recent DVT- holding eliquis due to above  HTN- continue diltiazem  Anxiety/Depression-continue buspar, remeron  OAB- continue home oxybutynin  Constipation- daily miralax and senna-s bid  Recent perforated small bowel with  peritonitis, s/p emergency ex laparotomy  on 6/21/24. Need OP follow up with her surgeon.    Pertinent Physical Exam At Time of Discharge  Physical Exam  Vitals and nursing note reviewed.   HENT:      Mouth/Throat:      Mouth: Mucous membranes are moist.      Pharynx: Oropharynx is clear.   Cardiovascular:      Rate and Rhythm: Normal rate and regular rhythm.   Pulmonary:      Effort: Pulmonary effort is normal.      Breath sounds: No wheezing.   Abdominal:      General: There is no distension.      Palpations: Abdomen is soft.      Tenderness: There is no abdominal tenderness.   Neurological:      Mental Status: She is alert and oriented to person, place, and time.         Outpatient Follow-Up  No future appointments.    DISCHARGE TIME: > 30 minutes    Deep Kapoor, DO

## 2024-07-30 ENCOUNTER — PATIENT OUTREACH (OUTPATIENT)
Dept: HOME HEALTH SERVICES | Age: 64
End: 2024-07-30
Payer: COMMERCIAL

## 2024-07-30 SDOH — SOCIAL STABILITY: SOCIAL INSECURITY: WITHIN THE LAST YEAR, HAVE YOU BEEN HUMILIATED OR EMOTIONALLY ABUSED IN OTHER WAYS BY YOUR PARTNER OR EX-PARTNER?: NO

## 2024-07-30 SDOH — ECONOMIC STABILITY: INCOME INSECURITY: IN THE PAST 12 MONTHS, HAS THE ELECTRIC, GAS, OIL, OR WATER COMPANY THREATENED TO SHUT OFF SERVICE IN YOUR HOME?: NO

## 2024-07-30 SDOH — ECONOMIC STABILITY: FOOD INSECURITY: WITHIN THE PAST 12 MONTHS, YOU WORRIED THAT YOUR FOOD WOULD RUN OUT BEFORE YOU GOT MONEY TO BUY MORE.: NEVER TRUE

## 2024-07-30 SDOH — ECONOMIC STABILITY: FOOD INSECURITY: WITHIN THE PAST 12 MONTHS, THE FOOD YOU BOUGHT JUST DIDN'T LAST AND YOU DIDN'T HAVE MONEY TO GET MORE.: NEVER TRUE

## 2024-07-30 SDOH — SOCIAL STABILITY: SOCIAL NETWORK
DO YOU BELONG TO ANY CLUBS OR ORGANIZATIONS SUCH AS CHURCH GROUPS UNIONS, FRATERNAL OR ATHLETIC GROUPS, OR SCHOOL GROUPS?: NO

## 2024-07-30 SDOH — SOCIAL STABILITY: SOCIAL INSECURITY
WITHIN THE LAST YEAR, HAVE YOU BEEN KICKED, HIT, SLAPPED, OR OTHERWISE PHYSICALLY HURT BY YOUR PARTNER OR EX-PARTNER?: NO

## 2024-07-30 SDOH — SOCIAL STABILITY: SOCIAL INSECURITY
WITHIN THE LAST YEAR, HAVE TO BEEN RAPED OR FORCED TO HAVE ANY KIND OF SEXUAL ACTIVITY BY YOUR PARTNER OR EX-PARTNER?: NO

## 2024-07-30 SDOH — HEALTH STABILITY: MENTAL HEALTH: HOW MANY STANDARD DRINKS CONTAINING ALCOHOL DO YOU HAVE ON A TYPICAL DAY?: 1 OR 2

## 2024-07-30 SDOH — HEALTH STABILITY: MENTAL HEALTH: HOW OFTEN DO YOU HAVE A DRINK CONTAINING ALCOHOL?: MONTHLY OR LESS

## 2024-07-30 SDOH — ECONOMIC STABILITY: HOUSING INSECURITY: AT ANY TIME IN THE PAST 12 MONTHS, WERE YOU HOMELESS OR LIVING IN A SHELTER (INCLUDING NOW)?: NO

## 2024-07-30 SDOH — SOCIAL STABILITY: SOCIAL INSECURITY: WITHIN THE LAST YEAR, HAVE YOU BEEN AFRAID OF YOUR PARTNER OR EX-PARTNER?: NO

## 2024-07-30 SDOH — SOCIAL STABILITY: SOCIAL NETWORK: HOW OFTEN DO YOU ATTEND CHURCH OR RELIGIOUS SERVICES?: NEVER

## 2024-07-30 SDOH — SOCIAL STABILITY: SOCIAL NETWORK: HOW OFTEN DO YOU ATTENT MEETINGS OF THE CLUB OR ORGANIZATION YOU BELONG TO?: NEVER

## 2024-07-30 SDOH — HEALTH STABILITY: MENTAL HEALTH
STRESS IS WHEN SOMEONE FEELS TENSE, NERVOUS, ANXIOUS, OR CAN'T SLEEP AT NIGHT BECAUSE THEIR MIND IS TROUBLED. HOW STRESSED ARE YOU?: NOT AT ALL

## 2024-07-30 SDOH — HEALTH STABILITY: PHYSICAL HEALTH: ON AVERAGE, HOW MANY DAYS PER WEEK DO YOU ENGAGE IN MODERATE TO STRENUOUS EXERCISE (LIKE A BRISK WALK)?: 0 DAYS

## 2024-07-30 SDOH — HEALTH STABILITY: MENTAL HEALTH: HOW OFTEN DO YOU HAVE 6 OR MORE DRINKS ON ONE OCCASION?: NEVER

## 2024-07-30 SDOH — ECONOMIC STABILITY: INCOME INSECURITY: IN THE LAST 12 MONTHS, WAS THERE A TIME WHEN YOU WERE NOT ABLE TO PAY THE MORTGAGE OR RENT ON TIME?: NO

## 2024-07-30 SDOH — ECONOMIC STABILITY: HOUSING INSECURITY: IN THE PAST 12 MONTHS, HOW MANY TIMES HAVE YOU MOVED WHERE YOU WERE LIVING?: 1

## 2024-07-30 SDOH — SOCIAL STABILITY: SOCIAL NETWORK: HOW OFTEN DO YOU GET TOGETHER WITH FRIENDS OR RELATIVES?: MORE THAN THREE TIMES A WEEK

## 2024-07-30 SDOH — ECONOMIC STABILITY: INCOME INSECURITY: HOW HARD IS IT FOR YOU TO PAY FOR THE VERY BASICS LIKE FOOD, HOUSING, MEDICAL CARE, AND HEATING?: NOT HARD AT ALL

## 2024-07-30 SDOH — HEALTH STABILITY: PHYSICAL HEALTH: ON AVERAGE, HOW MANY MINUTES DO YOU ENGAGE IN EXERCISE AT THIS LEVEL?: 0 MIN

## 2024-07-30 SDOH — SOCIAL STABILITY: SOCIAL NETWORK
IN A TYPICAL WEEK, HOW MANY TIMES DO YOU TALK ON THE PHONE WITH FAMILY, FRIENDS, OR NEIGHBORS?: MORE THAN THREE TIMES A WEEK

## 2024-07-30 ASSESSMENT — LIFESTYLE VARIABLES
AUDIT-C TOTAL SCORE: 1
SKIP TO QUESTIONS 9-10: 1

## 2024-07-30 NOTE — PROGRESS NOTES
Pt called to enroll in Healthy at Home Program. Recent DC from hospital with COPD, GI bleed, DVT.  Pt lives with friend. Will have  HHC (PT/OT/SN), shandra sets up her HHC she says. Pt uses a rollator walker in her home. Has a nebulizer machine (no home oxygen) but unsure DME. Pt prefers calls in afternoon. [facility] 8/1@ 1600      Patient's Address:   [address] [place] [id number]  **  If this is not the address patient will receive services - alert team and address in EMR**       Patient Contacts:  Extended Emergency Contact Information  Primary Emergency Contact: Portia Landa  Mobile Phone: [phone]  Relation: Relative   needed? No  Secondary Emergency Contact: Nick Cuevas  Mobile Phone: [phone]  Relation: Son  Preferred language: English                                Patient's Preferred Phone: [phone]  Patient's E-mail: No e-mail address on record

## 2024-07-31 ENCOUNTER — PATIENT OUTREACH (OUTPATIENT)
Dept: HOME HEALTH SERVICES | Age: 64
End: 2024-07-31
Payer: COMMERCIAL

## 2024-07-31 LAB
ACID FAST STN SPEC: NORMAL
MYCOBACTERIUM SPEC CULT: NORMAL

## 2024-08-01 ENCOUNTER — APPOINTMENT (OUTPATIENT)
Dept: CARE COORDINATION | Age: 64
End: 2024-08-01
Payer: COMMERCIAL

## 2024-08-02 ENCOUNTER — APPOINTMENT (OUTPATIENT)
Dept: CARDIOLOGY | Facility: HOSPITAL | Age: 64
End: 2024-08-02
Payer: COMMERCIAL

## 2024-08-02 ENCOUNTER — HOSPITAL ENCOUNTER (INPATIENT)
Facility: HOSPITAL | Age: 64
End: 2024-08-02
Attending: GENERAL PRACTICE | Admitting: HOSPITALIST
Payer: COMMERCIAL

## 2024-08-02 ENCOUNTER — APPOINTMENT (OUTPATIENT)
Dept: RADIOLOGY | Facility: HOSPITAL | Age: 64
End: 2024-08-02
Payer: COMMERCIAL

## 2024-08-02 DIAGNOSIS — R10.84 GENERALIZED ABDOMINAL PAIN: Primary | ICD-10-CM

## 2024-08-02 LAB
ABO GROUP (TYPE) IN BLOOD: NORMAL
ALBUMIN SERPL BCP-MCNC: 3.9 G/DL (ref 3.4–5)
ALP SERPL-CCNC: 97 U/L (ref 33–136)
ALT SERPL W P-5'-P-CCNC: 50 U/L (ref 7–45)
ANION GAP SERPL CALC-SCNC: 17 MMOL/L (ref 10–20)
ANTIBODY SCREEN: NORMAL
AST SERPL W P-5'-P-CCNC: 30 U/L (ref 9–39)
BASOPHILS # BLD AUTO: 0.03 X10*3/UL (ref 0–0.1)
BASOPHILS NFR BLD AUTO: 0.1 %
BILIRUB SERPL-MCNC: 0.3 MG/DL (ref 0–1.2)
BUN SERPL-MCNC: 8 MG/DL (ref 6–23)
CALCIUM SERPL-MCNC: 8.6 MG/DL (ref 8.6–10.3)
CHLORIDE SERPL-SCNC: 99 MMOL/L (ref 98–107)
CO2 SERPL-SCNC: 26 MMOL/L (ref 21–32)
CREAT SERPL-MCNC: 0.67 MG/DL (ref 0.5–1.05)
EGFRCR SERPLBLD CKD-EPI 2021: >90 ML/MIN/1.73M*2
EOSINOPHIL # BLD AUTO: 0 X10*3/UL (ref 0–0.7)
EOSINOPHIL NFR BLD AUTO: 0 %
ERYTHROCYTE [DISTWIDTH] IN BLOOD BY AUTOMATED COUNT: 19.3 % (ref 11.5–14.5)
GLUCOSE SERPL-MCNC: 123 MG/DL (ref 74–99)
HCT VFR BLD AUTO: 30.9 % (ref 36–46)
HGB BLD-MCNC: 9.1 G/DL (ref 12–16)
IMM GRANULOCYTES # BLD AUTO: 0.21 X10*3/UL (ref 0–0.7)
IMM GRANULOCYTES NFR BLD AUTO: 0.7 % (ref 0–0.9)
INR PPP: 1 (ref 0.9–1.1)
LYMPHOCYTES # BLD AUTO: 3.28 X10*3/UL (ref 1.2–4.8)
LYMPHOCYTES NFR BLD AUTO: 11.2 %
MCH RBC QN AUTO: 25.3 PG (ref 26–34)
MCHC RBC AUTO-ENTMCNC: 29.4 G/DL (ref 32–36)
MCV RBC AUTO: 86 FL (ref 80–100)
MONOCYTES # BLD AUTO: 0.94 X10*3/UL (ref 0.1–1)
MONOCYTES NFR BLD AUTO: 3.2 %
NEUTROPHILS # BLD AUTO: 24.86 X10*3/UL (ref 1.2–7.7)
NEUTROPHILS NFR BLD AUTO: 84.8 %
NRBC BLD-RTO: 0.3 /100 WBCS (ref 0–0)
PLATELET # BLD AUTO: 880 X10*3/UL (ref 150–450)
POTASSIUM SERPL-SCNC: 3.5 MMOL/L (ref 3.5–5.3)
PROT SERPL-MCNC: 6.7 G/DL (ref 6.4–8.2)
PROTHROMBIN TIME: 11.8 SECONDS (ref 9.8–12.8)
RBC # BLD AUTO: 3.59 X10*6/UL (ref 4–5.2)
RH FACTOR (ANTIGEN D): NORMAL
SODIUM SERPL-SCNC: 138 MMOL/L (ref 136–145)
WBC # BLD AUTO: 29.3 X10*3/UL (ref 4.4–11.3)

## 2024-08-02 PROCEDURE — 2500000004 HC RX 250 GENERAL PHARMACY W/ HCPCS (ALT 636 FOR OP/ED)

## 2024-08-02 PROCEDURE — 84075 ASSAY ALKALINE PHOSPHATASE: CPT | Performed by: GENERAL PRACTICE

## 2024-08-02 PROCEDURE — 96365 THER/PROPH/DIAG IV INF INIT: CPT

## 2024-08-02 PROCEDURE — 85025 COMPLETE CBC W/AUTO DIFF WBC: CPT | Performed by: GENERAL PRACTICE

## 2024-08-02 PROCEDURE — 96375 TX/PRO/DX INJ NEW DRUG ADDON: CPT

## 2024-08-02 PROCEDURE — 93005 ELECTROCARDIOGRAM TRACING: CPT

## 2024-08-02 PROCEDURE — 99285 EMERGENCY DEPT VISIT HI MDM: CPT | Mod: 25

## 2024-08-02 PROCEDURE — 36415 COLL VENOUS BLD VENIPUNCTURE: CPT | Performed by: GENERAL PRACTICE

## 2024-08-02 PROCEDURE — 2500000004 HC RX 250 GENERAL PHARMACY W/ HCPCS (ALT 636 FOR OP/ED): Performed by: GENERAL PRACTICE

## 2024-08-02 PROCEDURE — 74176 CT ABD & PELVIS W/O CONTRAST: CPT

## 2024-08-02 PROCEDURE — 85610 PROTHROMBIN TIME: CPT | Performed by: GENERAL PRACTICE

## 2024-08-02 PROCEDURE — 74176 CT ABD & PELVIS W/O CONTRAST: CPT | Performed by: RADIOLOGY

## 2024-08-02 PROCEDURE — 99221 1ST HOSP IP/OBS SF/LOW 40: CPT

## 2024-08-02 PROCEDURE — 86901 BLOOD TYPING SEROLOGIC RH(D): CPT | Performed by: GENERAL PRACTICE

## 2024-08-02 RX ORDER — HYDROMORPHONE HYDROCHLORIDE 1 MG/ML
1 INJECTION, SOLUTION INTRAMUSCULAR; INTRAVENOUS; SUBCUTANEOUS ONCE
Status: COMPLETED | OUTPATIENT
Start: 2024-08-02 | End: 2024-08-02

## 2024-08-02 RX ORDER — VANCOMYCIN HYDROCHLORIDE 1 G/20ML
INJECTION, POWDER, LYOPHILIZED, FOR SOLUTION INTRAVENOUS DAILY PRN
Status: DISCONTINUED | OUTPATIENT
Start: 2024-08-02 | End: 2024-08-02

## 2024-08-02 RX ORDER — ONDANSETRON HYDROCHLORIDE 2 MG/ML
4 INJECTION, SOLUTION INTRAVENOUS ONCE
Status: COMPLETED | OUTPATIENT
Start: 2024-08-02 | End: 2024-08-02

## 2024-08-02 RX ORDER — DIPHENHYDRAMINE HYDROCHLORIDE 50 MG/ML
INJECTION INTRAMUSCULAR; INTRAVENOUS
Status: COMPLETED
Start: 2024-08-02 | End: 2024-08-02

## 2024-08-02 RX ORDER — MORPHINE SULFATE 4 MG/ML
INJECTION, SOLUTION INTRAMUSCULAR; INTRAVENOUS
Status: COMPLETED
Start: 2024-08-02 | End: 2024-08-02

## 2024-08-02 RX ADMIN — ONDANSETRON 4 MG: 2 INJECTION INTRAMUSCULAR; INTRAVENOUS at 21:53

## 2024-08-02 RX ADMIN — HYDROMORPHONE HYDROCHLORIDE 1 MG: 1 INJECTION, SOLUTION INTRAMUSCULAR; INTRAVENOUS; SUBCUTANEOUS at 21:52

## 2024-08-02 RX ADMIN — PIPERACILLIN SODIUM AND TAZOBACTAM SODIUM 3.38 G: 3; .375 INJECTION, SOLUTION INTRAVENOUS at 23:59

## 2024-08-02 RX ADMIN — MORPHINE SULFATE 4 MG: 4 INJECTION, SOLUTION INTRAMUSCULAR; INTRAVENOUS at 22:36

## 2024-08-02 RX ADMIN — DIPHENHYDRAMINE HYDROCHLORIDE 25 MG: 50 INJECTION, SOLUTION INTRAMUSCULAR; INTRAVENOUS at 23:40

## 2024-08-02 ASSESSMENT — PAIN DESCRIPTION - ONSET: ONSET: ONGOING

## 2024-08-02 ASSESSMENT — PAIN DESCRIPTION - PROGRESSION: CLINICAL_PROGRESSION: GRADUALLY WORSENING

## 2024-08-02 ASSESSMENT — PAIN DESCRIPTION - LOCATION
LOCATION: ABDOMEN
LOCATION: ABDOMEN

## 2024-08-02 ASSESSMENT — PAIN DESCRIPTION - ORIENTATION: ORIENTATION: RIGHT

## 2024-08-02 ASSESSMENT — PAIN - FUNCTIONAL ASSESSMENT: PAIN_FUNCTIONAL_ASSESSMENT: 0-10

## 2024-08-02 ASSESSMENT — PAIN DESCRIPTION - FREQUENCY: FREQUENCY: CONSTANT/CONTINUOUS

## 2024-08-02 ASSESSMENT — PAIN SCALES - GENERAL
PAINLEVEL_OUTOF10: 10 - WORST POSSIBLE PAIN
PAINLEVEL_OUTOF10: 10 - WORST POSSIBLE PAIN

## 2024-08-02 ASSESSMENT — PAIN DESCRIPTION - PAIN TYPE: TYPE: ACUTE PAIN

## 2024-08-02 NOTE — ED TRIAGE NOTES
Pt came in for having post op problem on her ABD. Pt stated that she thinks that she might be bleeding internally. Pt stated that her pain hurts and she feels ill from it.

## 2024-08-03 ENCOUNTER — APPOINTMENT (OUTPATIENT)
Dept: RADIOLOGY | Facility: HOSPITAL | Age: 64
End: 2024-08-03
Payer: COMMERCIAL

## 2024-08-03 PROBLEM — R10.84 GENERALIZED ABDOMINAL PAIN: Status: ACTIVE | Noted: 2024-08-03

## 2024-08-03 LAB
AMPHETAMINES UR QL SCN: ABNORMAL
ANION GAP SERPL CALC-SCNC: 15 MMOL/L (ref 10–20)
APPEARANCE UR: CLEAR
BARBITURATES UR QL SCN: ABNORMAL
BENZODIAZ UR QL SCN: ABNORMAL
BILIRUB UR STRIP.AUTO-MCNC: NEGATIVE MG/DL
BUN SERPL-MCNC: 8 MG/DL (ref 6–23)
BZE UR QL SCN: ABNORMAL
CALCIUM SERPL-MCNC: 9 MG/DL (ref 8.6–10.3)
CANNABINOIDS UR QL SCN: ABNORMAL
CHLORIDE SERPL-SCNC: 97 MMOL/L (ref 98–107)
CO2 SERPL-SCNC: 28 MMOL/L (ref 21–32)
COLOR UR: COLORLESS
CREAT SERPL-MCNC: 0.91 MG/DL (ref 0.5–1.05)
EGFRCR SERPLBLD CKD-EPI 2021: 71 ML/MIN/1.73M*2
ERYTHROCYTE [DISTWIDTH] IN BLOOD BY AUTOMATED COUNT: 19.9 % (ref 11.5–14.5)
FENTANYL+NORFENTANYL UR QL SCN: ABNORMAL
GLUCOSE BLD MANUAL STRIP-MCNC: 78 MG/DL (ref 74–99)
GLUCOSE SERPL-MCNC: 92 MG/DL (ref 74–99)
GLUCOSE UR STRIP.AUTO-MCNC: NORMAL MG/DL
HCT VFR BLD AUTO: 35.5 % (ref 36–46)
HGB BLD-MCNC: 10.1 G/DL (ref 12–16)
KETONES UR STRIP.AUTO-MCNC: NEGATIVE MG/DL
LACTATE SERPL-SCNC: 1.7 MMOL/L (ref 0.4–2)
LACTATE SERPL-SCNC: 2.4 MMOL/L (ref 0.4–2)
LEUKOCYTE ESTERASE UR QL STRIP.AUTO: NEGATIVE
MCH RBC QN AUTO: 25.7 PG (ref 26–34)
MCHC RBC AUTO-ENTMCNC: 28.5 G/DL (ref 32–36)
MCV RBC AUTO: 90 FL (ref 80–100)
METHADONE UR QL SCN: ABNORMAL
NITRITE UR QL STRIP.AUTO: NEGATIVE
NRBC BLD-RTO: 0.3 /100 WBCS (ref 0–0)
OPIATES UR QL SCN: ABNORMAL
OXYCODONE+OXYMORPHONE UR QL SCN: ABNORMAL
PCP UR QL SCN: ABNORMAL
PH UR STRIP.AUTO: 6 [PH]
PLATELET # BLD AUTO: 686 X10*3/UL (ref 150–450)
POTASSIUM SERPL-SCNC: 3.7 MMOL/L (ref 3.5–5.3)
PROT UR STRIP.AUTO-MCNC: ABNORMAL MG/DL
RBC # BLD AUTO: 3.93 X10*6/UL (ref 4–5.2)
RBC # UR STRIP.AUTO: NEGATIVE /UL
RBC #/AREA URNS AUTO: NORMAL /HPF
SODIUM SERPL-SCNC: 136 MMOL/L (ref 136–145)
SP GR UR STRIP.AUTO: 1.01
SQUAMOUS #/AREA URNS AUTO: NORMAL /HPF
UROBILINOGEN UR STRIP.AUTO-MCNC: NORMAL MG/DL
WBC # BLD AUTO: 25.9 X10*3/UL (ref 4.4–11.3)
WBC #/AREA URNS AUTO: NORMAL /HPF

## 2024-08-03 PROCEDURE — 82374 ASSAY BLOOD CARBON DIOXIDE: CPT

## 2024-08-03 PROCEDURE — 71045 X-RAY EXAM CHEST 1 VIEW: CPT

## 2024-08-03 PROCEDURE — 1100000001 HC PRIVATE ROOM DAILY

## 2024-08-03 PROCEDURE — 99232 SBSQ HOSP IP/OBS MODERATE 35: CPT | Performed by: SURGERY

## 2024-08-03 PROCEDURE — 36415 COLL VENOUS BLD VENIPUNCTURE: CPT

## 2024-08-03 PROCEDURE — 81003 URINALYSIS AUTO W/O SCOPE: CPT | Performed by: INTERNAL MEDICINE

## 2024-08-03 PROCEDURE — 2500000004 HC RX 250 GENERAL PHARMACY W/ HCPCS (ALT 636 FOR OP/ED): Performed by: PHARMACIST

## 2024-08-03 PROCEDURE — 71045 X-RAY EXAM CHEST 1 VIEW: CPT | Mod: FOREIGN READ | Performed by: RADIOLOGY

## 2024-08-03 PROCEDURE — 2500000001 HC RX 250 WO HCPCS SELF ADMINISTERED DRUGS (ALT 637 FOR MEDICARE OP): Performed by: HOSPITALIST

## 2024-08-03 PROCEDURE — 83605 ASSAY OF LACTIC ACID: CPT

## 2024-08-03 PROCEDURE — 0D9670Z DRAINAGE OF STOMACH WITH DRAINAGE DEVICE, VIA NATURAL OR ARTIFICIAL OPENING: ICD-10-PCS | Performed by: SURGERY

## 2024-08-03 PROCEDURE — 87040 BLOOD CULTURE FOR BACTERIA: CPT | Mod: AHULAB | Performed by: INTERNAL MEDICINE

## 2024-08-03 PROCEDURE — 2500000002 HC RX 250 W HCPCS SELF ADMINISTERED DRUGS (ALT 637 FOR MEDICARE OP, ALT 636 FOR OP/ED): Performed by: INTERNAL MEDICINE

## 2024-08-03 PROCEDURE — 82947 ASSAY GLUCOSE BLOOD QUANT: CPT

## 2024-08-03 PROCEDURE — 2500000001 HC RX 250 WO HCPCS SELF ADMINISTERED DRUGS (ALT 637 FOR MEDICARE OP): Performed by: INTERNAL MEDICINE

## 2024-08-03 PROCEDURE — 74018 RADEX ABDOMEN 1 VIEW: CPT | Mod: FOREIGN READ | Performed by: RADIOLOGY

## 2024-08-03 PROCEDURE — 96367 TX/PROPH/DG ADDL SEQ IV INF: CPT

## 2024-08-03 PROCEDURE — 2500000004 HC RX 250 GENERAL PHARMACY W/ HCPCS (ALT 636 FOR OP/ED): Performed by: HOSPITALIST

## 2024-08-03 PROCEDURE — 99223 1ST HOSP IP/OBS HIGH 75: CPT | Performed by: INTERNAL MEDICINE

## 2024-08-03 PROCEDURE — 2500000002 HC RX 250 W HCPCS SELF ADMINISTERED DRUGS (ALT 637 FOR MEDICARE OP, ALT 636 FOR OP/ED)

## 2024-08-03 PROCEDURE — 2500000004 HC RX 250 GENERAL PHARMACY W/ HCPCS (ALT 636 FOR OP/ED): Performed by: INTERNAL MEDICINE

## 2024-08-03 PROCEDURE — 2500000004 HC RX 250 GENERAL PHARMACY W/ HCPCS (ALT 636 FOR OP/ED): Performed by: EMERGENCY MEDICINE

## 2024-08-03 PROCEDURE — 2500000004 HC RX 250 GENERAL PHARMACY W/ HCPCS (ALT 636 FOR OP/ED)

## 2024-08-03 PROCEDURE — 85027 COMPLETE CBC AUTOMATED: CPT

## 2024-08-03 PROCEDURE — 94640 AIRWAY INHALATION TREATMENT: CPT

## 2024-08-03 PROCEDURE — 80307 DRUG TEST PRSMV CHEM ANLYZR: CPT | Performed by: HOSPITALIST

## 2024-08-03 RX ORDER — VARENICLINE TARTRATE 0.5 (11)-1
1 KIT ORAL 2 TIMES DAILY
COMMUNITY
Start: 2024-02-09 | End: 2024-08-03 | Stop reason: ENTERED-IN-ERROR

## 2024-08-03 RX ORDER — ENOXAPARIN SODIUM 100 MG/ML
40 INJECTION SUBCUTANEOUS EVERY 24 HOURS
Status: DISCONTINUED | OUTPATIENT
Start: 2024-08-03 | End: 2024-08-03

## 2024-08-03 RX ORDER — DILTIAZEM HYDROCHLORIDE 240 MG/1
240 CAPSULE, COATED, EXTENDED RELEASE ORAL DAILY
Status: DISCONTINUED | OUTPATIENT
Start: 2024-08-03 | End: 2024-08-11 | Stop reason: HOSPADM

## 2024-08-03 RX ORDER — PROCHLORPERAZINE EDISYLATE 5 MG/ML
10 INJECTION INTRAMUSCULAR; INTRAVENOUS EVERY 6 HOURS PRN
Status: DISCONTINUED | OUTPATIENT
Start: 2024-08-03 | End: 2024-08-11 | Stop reason: HOSPADM

## 2024-08-03 RX ORDER — PROCHLORPERAZINE MALEATE 10 MG
10 TABLET ORAL EVERY 6 HOURS PRN
Status: DISCONTINUED | OUTPATIENT
Start: 2024-08-03 | End: 2024-08-11 | Stop reason: HOSPADM

## 2024-08-03 RX ORDER — SUCRALFATE 1 G/10ML
1 SUSPENSION ORAL 4 TIMES DAILY
Status: DISCONTINUED | OUTPATIENT
Start: 2024-08-03 | End: 2024-08-11 | Stop reason: HOSPADM

## 2024-08-03 RX ORDER — ALBUTEROL SULFATE 0.83 MG/ML
2.5 SOLUTION RESPIRATORY (INHALATION) EVERY 2 HOUR PRN
Status: DISCONTINUED | OUTPATIENT
Start: 2024-08-03 | End: 2024-08-11 | Stop reason: HOSPADM

## 2024-08-03 RX ORDER — LANOLIN ALCOHOL/MO/W.PET/CERES
100 CREAM (GRAM) TOPICAL DAILY
Status: DISCONTINUED | OUTPATIENT
Start: 2024-08-03 | End: 2024-08-11 | Stop reason: HOSPADM

## 2024-08-03 RX ORDER — ALBUTEROL SULFATE 0.83 MG/ML
2.5 SOLUTION RESPIRATORY (INHALATION)
Status: DISCONTINUED | OUTPATIENT
Start: 2024-08-03 | End: 2024-08-11 | Stop reason: HOSPADM

## 2024-08-03 RX ORDER — LIDOCAINE 4 G/100G
1 PATCH TOPICAL
COMMUNITY

## 2024-08-03 RX ORDER — BUSPIRONE HYDROCHLORIDE 5 MG/1
5 TABLET ORAL 2 TIMES DAILY
Status: DISCONTINUED | OUTPATIENT
Start: 2024-08-03 | End: 2024-08-11 | Stop reason: HOSPADM

## 2024-08-03 RX ORDER — DIPHENHYDRAMINE HYDROCHLORIDE 50 MG/ML
INJECTION INTRAMUSCULAR; INTRAVENOUS
Status: COMPLETED
Start: 2024-08-03 | End: 2024-08-03

## 2024-08-03 RX ORDER — MIRTAZAPINE 15 MG/1
15 TABLET, FILM COATED ORAL NIGHTLY
Status: DISCONTINUED | OUTPATIENT
Start: 2024-08-03 | End: 2024-08-03

## 2024-08-03 RX ORDER — DILTIAZEM HYDROCHLORIDE 240 MG/1
240 CAPSULE, COATED, EXTENDED RELEASE ORAL DAILY
COMMUNITY
Start: 2024-07-16 | End: 2024-08-03 | Stop reason: ENTERED-IN-ERROR

## 2024-08-03 RX ORDER — OXYBUTYNIN CHLORIDE 5 MG/1
2.5 TABLET ORAL 2 TIMES DAILY
Status: DISCONTINUED | OUTPATIENT
Start: 2024-08-03 | End: 2024-08-11 | Stop reason: HOSPADM

## 2024-08-03 RX ORDER — MORPHINE SULFATE 2 MG/ML
1 INJECTION, SOLUTION INTRAMUSCULAR; INTRAVENOUS ONCE
Status: COMPLETED | OUTPATIENT
Start: 2024-08-03 | End: 2024-08-03

## 2024-08-03 RX ORDER — OXYBUTYNIN CHLORIDE 5 MG/1
5 TABLET, EXTENDED RELEASE ORAL DAILY
Status: DISCONTINUED | OUTPATIENT
Start: 2024-08-03 | End: 2024-08-03 | Stop reason: CLARIF

## 2024-08-03 RX ORDER — MONTELUKAST SODIUM 10 MG/1
10 TABLET ORAL NIGHTLY
Status: DISCONTINUED | OUTPATIENT
Start: 2024-08-03 | End: 2024-08-11 | Stop reason: HOSPADM

## 2024-08-03 RX ORDER — ALBUTEROL SULFATE 0.83 MG/ML
SOLUTION RESPIRATORY (INHALATION)
Status: COMPLETED
Start: 2024-08-03 | End: 2024-08-03

## 2024-08-03 RX ORDER — TIOTROPIUM BROMIDE 18 UG/1
1 CAPSULE ORAL; RESPIRATORY (INHALATION)
COMMUNITY
End: 2024-08-03 | Stop reason: ENTERED-IN-ERROR

## 2024-08-03 RX ORDER — ACETAMINOPHEN 160 MG/5ML
650 SOLUTION ORAL EVERY 4 HOURS PRN
Status: DISCONTINUED | OUTPATIENT
Start: 2024-08-03 | End: 2024-08-11 | Stop reason: HOSPADM

## 2024-08-03 RX ORDER — ACETAMINOPHEN 650 MG/1
650 SUPPOSITORY RECTAL EVERY 4 HOURS PRN
Status: DISCONTINUED | OUTPATIENT
Start: 2024-08-03 | End: 2024-08-11 | Stop reason: HOSPADM

## 2024-08-03 RX ORDER — OXYBUTYNIN CHLORIDE 5 MG/1
1 TABLET, EXTENDED RELEASE ORAL DAILY
COMMUNITY
Start: 2016-04-04 | End: 2024-08-03 | Stop reason: ENTERED-IN-ERROR

## 2024-08-03 RX ORDER — AMOXICILLIN 250 MG
1 CAPSULE ORAL 2 TIMES DAILY
Status: DISCONTINUED | OUTPATIENT
Start: 2024-08-03 | End: 2024-08-11 | Stop reason: HOSPADM

## 2024-08-03 RX ORDER — HYDROMORPHONE HYDROCHLORIDE 1 MG/ML
1 INJECTION, SOLUTION INTRAMUSCULAR; INTRAVENOUS; SUBCUTANEOUS EVERY 4 HOURS PRN
Status: DISCONTINUED | OUTPATIENT
Start: 2024-08-03 | End: 2024-08-03

## 2024-08-03 RX ORDER — ONDANSETRON 4 MG/1
4 TABLET, FILM COATED ORAL EVERY 8 HOURS PRN
Status: DISCONTINUED | OUTPATIENT
Start: 2024-08-03 | End: 2024-08-11 | Stop reason: HOSPADM

## 2024-08-03 RX ORDER — PROCHLORPERAZINE 25 MG/1
25 SUPPOSITORY RECTAL EVERY 12 HOURS PRN
Status: DISCONTINUED | OUTPATIENT
Start: 2024-08-03 | End: 2024-08-11 | Stop reason: HOSPADM

## 2024-08-03 RX ORDER — CALCIUM CARBONATE 500(1250)
1250 TABLET ORAL
Status: DISCONTINUED | OUTPATIENT
Start: 2024-08-03 | End: 2024-08-11 | Stop reason: HOSPADM

## 2024-08-03 RX ORDER — FLUTICASONE FUROATE AND VILANTEROL 100; 25 UG/1; UG/1
1 POWDER RESPIRATORY (INHALATION)
Status: DISCONTINUED | OUTPATIENT
Start: 2024-08-03 | End: 2024-08-03 | Stop reason: CLARIF

## 2024-08-03 RX ORDER — ALBUTEROL SULFATE 90 UG/1
2 INHALANT RESPIRATORY (INHALATION) EVERY 4 HOURS PRN
Status: DISCONTINUED | OUTPATIENT
Start: 2024-08-03 | End: 2024-08-03

## 2024-08-03 RX ORDER — ONDANSETRON HYDROCHLORIDE 2 MG/ML
4 INJECTION, SOLUTION INTRAVENOUS EVERY 8 HOURS PRN
Status: DISCONTINUED | OUTPATIENT
Start: 2024-08-03 | End: 2024-08-11 | Stop reason: HOSPADM

## 2024-08-03 RX ORDER — PANTOPRAZOLE SODIUM 40 MG/1
40 TABLET, DELAYED RELEASE ORAL
Status: DISCONTINUED | OUTPATIENT
Start: 2024-08-04 | End: 2024-08-11 | Stop reason: HOSPADM

## 2024-08-03 RX ORDER — FORMOTEROL FUMARATE DIHYDRATE 20 UG/2ML
20 SOLUTION RESPIRATORY (INHALATION)
Status: DISCONTINUED | OUTPATIENT
Start: 2024-08-03 | End: 2024-08-11 | Stop reason: HOSPADM

## 2024-08-03 RX ORDER — MIRTAZAPINE 15 MG/1
15 TABLET, FILM COATED ORAL NIGHTLY
Status: DISCONTINUED | OUTPATIENT
Start: 2024-08-03 | End: 2024-08-11 | Stop reason: HOSPADM

## 2024-08-03 RX ORDER — POLYETHYLENE GLYCOL 3350 17 G/17G
17 POWDER, FOR SOLUTION ORAL DAILY
Status: DISCONTINUED | OUTPATIENT
Start: 2024-08-03 | End: 2024-08-06

## 2024-08-03 RX ORDER — SUCRALFATE 1 G/1
1 TABLET ORAL 2 TIMES DAILY
Status: DISCONTINUED | OUTPATIENT
Start: 2024-08-03 | End: 2024-08-03

## 2024-08-03 RX ORDER — ACETAMINOPHEN 325 MG/1
650 TABLET ORAL EVERY 4 HOURS PRN
Status: DISCONTINUED | OUTPATIENT
Start: 2024-08-03 | End: 2024-08-11 | Stop reason: HOSPADM

## 2024-08-03 RX ORDER — MORPHINE SULFATE 2 MG/ML
1 INJECTION, SOLUTION INTRAMUSCULAR; INTRAVENOUS EVERY 4 HOURS PRN
Status: DISCONTINUED | OUTPATIENT
Start: 2024-08-03 | End: 2024-08-04

## 2024-08-03 RX ORDER — MORPHINE SULFATE 2 MG/ML
2 INJECTION, SOLUTION INTRAMUSCULAR; INTRAVENOUS EVERY 4 HOURS PRN
Status: DISCONTINUED | OUTPATIENT
Start: 2024-08-03 | End: 2024-08-05

## 2024-08-03 RX ORDER — BUSPIRONE HYDROCHLORIDE 5 MG/1
5 TABLET ORAL 2 TIMES DAILY
Status: DISCONTINUED | OUTPATIENT
Start: 2024-08-03 | End: 2024-08-03

## 2024-08-03 RX ORDER — BUDESONIDE 0.25 MG/2ML
0.25 INHALANT ORAL
Status: DISCONTINUED | OUTPATIENT
Start: 2024-08-03 | End: 2024-08-11 | Stop reason: HOSPADM

## 2024-08-03 RX ORDER — DIPHENHYDRAMINE HYDROCHLORIDE 50 MG/ML
50 INJECTION INTRAMUSCULAR; INTRAVENOUS EVERY 6 HOURS PRN
Status: DISCONTINUED | OUTPATIENT
Start: 2024-08-03 | End: 2024-08-11 | Stop reason: HOSPADM

## 2024-08-03 RX ADMIN — ALBUTEROL SULFATE 2.5 MG: 2.5 SOLUTION RESPIRATORY (INHALATION) at 12:34

## 2024-08-03 RX ADMIN — BUSPIRONE HYDROCHLORIDE 5 MG: 5 TABLET ORAL at 22:52

## 2024-08-03 RX ADMIN — PIPERACILLIN SODIUM AND TAZOBACTAM SODIUM 3.38 G: 3; .375 INJECTION, SOLUTION INTRAVENOUS at 15:04

## 2024-08-03 RX ADMIN — DIPHENHYDRAMINE HYDROCHLORIDE 50 MG: 50 INJECTION, SOLUTION INTRAMUSCULAR; INTRAVENOUS at 23:18

## 2024-08-03 RX ADMIN — ONDANSETRON 4 MG: 2 INJECTION INTRAMUSCULAR; INTRAVENOUS at 09:36

## 2024-08-03 RX ADMIN — DILTIAZEM HYDROCHLORIDE 240 MG: 120 CAPSULE, COATED, EXTENDED RELEASE ORAL at 14:54

## 2024-08-03 RX ADMIN — MIRTAZAPINE 15 MG: 15 TABLET, FILM COATED ORAL at 22:52

## 2024-08-03 RX ADMIN — MORPHINE SULFATE 2 MG: 2 INJECTION, SOLUTION INTRAMUSCULAR; INTRAVENOUS at 22:13

## 2024-08-03 RX ADMIN — APIXABAN 5 MG: 5 TABLET, FILM COATED ORAL at 22:51

## 2024-08-03 RX ADMIN — MORPHINE SULFATE 1 MG: 2 INJECTION, SOLUTION INTRAMUSCULAR; INTRAVENOUS at 11:27

## 2024-08-03 RX ADMIN — SUCRALFATE 1 G: 1 SUSPENSION ORAL at 22:53

## 2024-08-03 RX ADMIN — DIPHENHYDRAMINE HYDROCHLORIDE 50 MG: 50 INJECTION, SOLUTION INTRAMUSCULAR; INTRAVENOUS at 10:22

## 2024-08-03 RX ADMIN — BUSPIRONE HYDROCHLORIDE 5 MG: 5 TABLET ORAL at 14:54

## 2024-08-03 RX ADMIN — MONTELUKAST 10 MG: 10 TABLET, FILM COATED ORAL at 22:52

## 2024-08-03 RX ADMIN — SENNOSIDES AND DOCUSATE SODIUM 1 TABLET: 8.6; 5 TABLET ORAL at 22:52

## 2024-08-03 RX ADMIN — PIPERACILLIN SODIUM AND TAZOBACTAM SODIUM 3.38 G: 3; .375 INJECTION, SOLUTION INTRAVENOUS at 22:53

## 2024-08-03 RX ADMIN — MORPHINE SULFATE 2 MG: 2 INJECTION, SOLUTION INTRAMUSCULAR; INTRAVENOUS at 16:19

## 2024-08-03 RX ADMIN — HYDROMORPHONE HYDROCHLORIDE 1 MG: 1 INJECTION, SOLUTION INTRAMUSCULAR; INTRAVENOUS; SUBCUTANEOUS at 09:36

## 2024-08-03 RX ADMIN — VANCOMYCIN HYDROCHLORIDE 1500 MG: 1.5 INJECTION, POWDER, LYOPHILIZED, FOR SOLUTION INTRAVENOUS at 01:35

## 2024-08-03 RX ADMIN — BUDESONIDE 0.25 MG: 0.25 INHALANT ORAL at 20:14

## 2024-08-03 RX ADMIN — OXYBUTYNIN CHLORIDE 2.5 MG: 5 TABLET ORAL at 22:51

## 2024-08-03 RX ADMIN — OXYBUTYNIN CHLORIDE 2.5 MG: 5 TABLET ORAL at 14:54

## 2024-08-03 RX ADMIN — Medication 100 MG: at 14:55

## 2024-08-03 RX ADMIN — SUCRALFATE 1 G: 1 SUSPENSION ORAL at 17:24

## 2024-08-03 RX ADMIN — FORMOTEROL FUMARATE DIHYDRATE 20 MCG: 20 SOLUTION RESPIRATORY (INHALATION) at 20:14

## 2024-08-03 RX ADMIN — PIPERACILLIN SODIUM AND TAZOBACTAM SODIUM 3.38 G: 3; .375 INJECTION, SOLUTION INTRAVENOUS at 09:36

## 2024-08-03 RX ADMIN — SODIUM CHLORIDE, POTASSIUM CHLORIDE, SODIUM LACTATE AND CALCIUM CHLORIDE 1000 ML: 600; 310; 30; 20 INJECTION, SOLUTION INTRAVENOUS at 17:24

## 2024-08-03 SDOH — SOCIAL STABILITY: SOCIAL INSECURITY: WERE YOU ABLE TO COMPLETE ALL THE BEHAVIORAL HEALTH SCREENINGS?: YES

## 2024-08-03 SDOH — SOCIAL STABILITY: SOCIAL INSECURITY: DO YOU FEEL UNSAFE GOING BACK TO THE PLACE WHERE YOU ARE LIVING?: NO

## 2024-08-03 SDOH — SOCIAL STABILITY: SOCIAL INSECURITY: HAVE YOU HAD THOUGHTS OF HARMING ANYONE ELSE?: NO

## 2024-08-03 SDOH — SOCIAL STABILITY: SOCIAL INSECURITY: HAVE YOU HAD ANY THOUGHTS OF HARMING ANYONE ELSE?: NO

## 2024-08-03 SDOH — SOCIAL STABILITY: SOCIAL INSECURITY: ARE THERE ANY APPARENT SIGNS OF INJURIES/BEHAVIORS THAT COULD BE RELATED TO ABUSE/NEGLECT?: NO

## 2024-08-03 SDOH — SOCIAL STABILITY: SOCIAL INSECURITY: DOES ANYONE TRY TO KEEP YOU FROM HAVING/CONTACTING OTHER FRIENDS OR DOING THINGS OUTSIDE YOUR HOME?: NO

## 2024-08-03 SDOH — SOCIAL STABILITY: SOCIAL INSECURITY: ARE YOU OR HAVE YOU BEEN THREATENED OR ABUSED PHYSICALLY, EMOTIONALLY, OR SEXUALLY BY ANYONE?: NO

## 2024-08-03 SDOH — SOCIAL STABILITY: SOCIAL INSECURITY: HAS ANYONE EVER THREATENED TO HURT YOUR FAMILY OR YOUR PETS?: NO

## 2024-08-03 SDOH — SOCIAL STABILITY: SOCIAL INSECURITY: DO YOU FEEL ANYONE HAS EXPLOITED OR TAKEN ADVANTAGE OF YOU FINANCIALLY OR OF YOUR PERSONAL PROPERTY?: NO

## 2024-08-03 SDOH — SOCIAL STABILITY: SOCIAL INSECURITY: ABUSE: ADULT

## 2024-08-03 ASSESSMENT — COGNITIVE AND FUNCTIONAL STATUS - GENERAL
PATIENT BASELINE BEDBOUND: NO
MOBILITY SCORE: 24
MOBILITY SCORE: 24
DAILY ACTIVITIY SCORE: 24
DAILY ACTIVITIY SCORE: 24

## 2024-08-03 ASSESSMENT — ACTIVITIES OF DAILY LIVING (ADL)
DRESSING YOURSELF: INDEPENDENT
GROOMING: INDEPENDENT
BATHING: INDEPENDENT
FEEDING YOURSELF: INDEPENDENT
LACK_OF_TRANSPORTATION: NO
PATIENT'S MEMORY ADEQUATE TO SAFELY COMPLETE DAILY ACTIVITIES?: YES
ASSISTIVE_DEVICE: WALKER
HEARING - RIGHT EAR: FUNCTIONAL
ADEQUATE_TO_COMPLETE_ADL: YES
HEARING - LEFT EAR: FUNCTIONAL
TOILETING: INDEPENDENT
WALKS IN HOME: INDEPENDENT
JUDGMENT_ADEQUATE_SAFELY_COMPLETE_DAILY_ACTIVITIES: YES

## 2024-08-03 ASSESSMENT — LIFESTYLE VARIABLES
AUDIT-C TOTAL SCORE: 1
HOW OFTEN DO YOU HAVE A DRINK CONTAINING ALCOHOL: MONTHLY OR LESS
HOW MANY STANDARD DRINKS CONTAINING ALCOHOL DO YOU HAVE ON A TYPICAL DAY: 1 OR 2
AUDIT-C TOTAL SCORE: 1
HOW OFTEN DO YOU HAVE 6 OR MORE DRINKS ON ONE OCCASION: NEVER
SKIP TO QUESTIONS 9-10: 1

## 2024-08-03 ASSESSMENT — PAIN SCALES - GENERAL: PAINLEVEL_OUTOF10: 4

## 2024-08-03 NOTE — PROGRESS NOTES
"Fernando Cuevas is a 63 y.o. female on day 0 of admission presenting with Generalized abdominal pain.    Subjective   Patient over a month out from emergency surgery for perforated bowel with small bowel resection.  Was doing well up until yesterday when she developed severe epigastric abdominal pain that radiated outwards.  Repeat CT scan showed some nonspecific findings.  No free air or abscess seen.  This morning she is feeling a little bit better.       Objective     Physical Exam  No acute distress  Alert and oriented x 3  Sinus tachycardia  Lungs are clear  Abdomen soft diffuse tenderness without rigidity or rebound.  Surgical incision from before well-healed without signs of infection    Last Recorded Vitals  Blood pressure 159/90, pulse (!) 114, temperature 36.9 °C (98.4 °F), temperature source Oral, resp. rate 20, height 1.575 m (5' 2\"), weight 61.7 kg (136 lb), SpO2 95%.  Intake/Output last 3 Shifts:  I/O last 3 completed shifts:  In: 550 (8.9 mL/kg) [IV Piggyback:550]  Out: - (0 mL/kg)   Weight: 61.7 kg     Relevant Results    Narrative & Impression   Interpreted By:  Paulie Delarosa,   STUDY:  CT ABDOMEN PELVIS WO IV CONTRAST;  8/2/2024 10:02 pm      INDICATION:  Signs/Symptoms:Abdominal distention, diffuse abdominal pain, history  of GI bleed.      COMPARISON:  7/25/2024      ACCESSION NUMBER(S):  [id number]      ORDERING CLINICIAN:  DONOVAN ALBA      TECHNIQUE:  Contiguous axial images of the abdomen and pelvis were obtained  without intravenous contrast. Coronal and sagittal reformatted images  were obtained from the axial images.      FINDINGS:  There is limited evaluation of the lung bases. There is pulmonary  emphysema. There is bronchial wall thickening and opacity right lower  lobe which measure 1.4 cm. There is also a 1.2 cm opacity in the  medial left lower lobe.      Evaluation of the abdomen and pelvis is limited the secondary to lack  of intravenous contrast. Limited evaluation for liver mass " on  noncontrast examination. There is cholelithiasis.      No splenomegaly.      Mild nodular thickening left adrenal gland.  The right adrenal gland appears unremarkable.      There is stable appearance of previously described 11 mm density  adjacent to the pancreatic tail as described on prior examination.      No evidence of renal calculus or hydronephrosis.      Atherosclerotic calcification of the abdominal aorta and iliac  arteries.      There is stable edema in the subcutaneous fat in the left medial  anterior abdominal wall.      There is redemonstration of postsurgical change of prior partial  bowel resection. There is evidence of thickening of the small bowel  loop in the right lower quadrant at site of anastomosis. There is  mild hyperdensity in the bowel lumen at this level and hemorrhage is  not excluded. No evidence of bowel obstruction or acute appendicitis.  There is colonic diverticulosis without evidence of acute  diverticulitis.      Urinary bladder appears unremarkable.      Limited evaluation of the uterus and adnexa.      No significant free abdominal or pelvic fluid.      IMPRESSION:  Postsurgical change of prior partial small bowel resection. There is  evidence of wall thickening of small bowel loop in the right lower  quadrant at site of anastomosis which may be infectious or  inflammatory in etiology. There is also mild hyperdensity in the  bowel lumen and mild hemorrhage within the bowel is not excluded.      No evidence of bowel obstruction or acute appendicitis. Colonic  diverticulosis without evidence of acute diverticulitis.      Stable appearance of previously described 11 mm density adjacent  pancreatic tail, and attention on progress imaging recommended.      Pulmonary emphysema. Bronchial wall thickening and opacities in the  lower lungs which has somewhat nodular appearance measuring 13 mm in  the right lower lobe and 12 mm in the left lower lobe and may relate  to  infectious/inflammatory process, however attention on progress  imaging recommended to assure resolution and exclude other pathology.      MACRO:  None      Signed by: Paulie Delarosa 8/2/2024 11:09 PM  Dictation workstation:   [id number]                          Assessment/Plan   Principal Problem:    Generalized abdominal pain    Patient with acute abdominal pain over a month out from recent surgery.  I do not see anything on CT scan that would warrant immediate surgical intervention.  Will admit for IV fluids and nasogastric decompression initially.  Antibiotics have been started.  Surgery to follow her progress closely         I spent 30 minutes in the professional and overall care of this patient.      Reid Bingham MD

## 2024-08-03 NOTE — H&P
History Of Present Illness  Fernando Cuevas is a 63 y.o. female presenting with abdominal pain diffuse that began  around 11 AM.  Patient is a 63-year-old female with a complex medical history most recently patient had emergency surgery performed for perforated bowel on  with a small bowel resection postop course was complicated by prolonged ileus wound infection requiring wound VAC which was removed around .  Patient then was readmitted to the hospital on  with a possible GI bleed and postop hematoma, patient at that time did have an EGD done which did not show discrete bleeding lesion but the stomach was diffusely inflamed and friable with contact bleeding.  Patient also has past medical history of COPD, depression, DVT was on Eliquis and hypertension.  Patient states since her discharge on the  she was doing fairly well finally eating orals.  Stated she had been doing well up until  she woke up she felt fine around 11 AM started having diffuse abdominal pain radiating to her back along with nausea vomiting has been passing stools last bowel movement was yesterday.  Worsening pain therefore she presented to the ED.     Past Medical History  She has a past medical history of COPD (chronic obstructive pulmonary disease) (Multi), Depression, DVT (deep venous thrombosis) (Multi), HTN (hypertension), Lung cancer (Multi), Migraines, and Panic disorder.    Surgical History  She has a past surgical history that includes CT angio abdomen pelvis w and or wo IV IV contrast (2016); MR angio neck w IV contrast (2021); CT angio coronary art with heartflow if score >30% (2023);  section, low transverse; Esophagogastroduodenoscopy; Colonoscopy; Exploratory laparotomy w/ bowel resection (2024); and Cystoscopy.     Social History  She reports that she has been smoking cigarettes. She has been exposed to tobacco smoke. She has never used smokeless tobacco.  She reports that she does not currently use alcohol. She reports that she does not currently use drugs after having used the following drugs: Cocaine.    Family History  No family history on file.     Allergies  Iodinated contrast media, Dilaudid [hydromorphone], and Adhesive tape-silicones    Review of Systems   All other systems reviewed and are negative.       Physical Exam  Vitals reviewed.   Constitutional:       Appearance: Normal appearance.   HENT:      Head: Normocephalic.      Right Ear: Tympanic membrane normal.      Nose: Nose normal.   Cardiovascular:      Rate and Rhythm: Normal rate and regular rhythm.   Pulmonary:      Effort: Pulmonary effort is normal.   Abdominal:      Comments: No bs distended   Skin:     Capillary Refill: Capillary refill takes less than 2 seconds.   Neurological:      General: No focal deficit present.      Mental Status: She is alert.          Last Recorded Vitals  /90   Pulse (!) 114   Temp 36.9 °C (98.4 °F) (Oral)   Resp 20   Wt 61.7 kg (136 lb)   SpO2 98%     Relevant Results  .  Results for orders placed or performed during the hospital encounter of 08/02/24 (from the past 24 hour(s))   CBC and Auto Differential   Result Value Ref Range    WBC 29.3 (H) 4.4 - 11.3 x10*3/uL    nRBC 0.3 (H) 0.0 - 0.0 /100 WBCs    RBC 3.59 (L) 4.00 - 5.20 x10*6/uL    Hemoglobin 9.1 (L) 12.0 - 16.0 g/dL    Hematocrit 30.9 (L) 36.0 - 46.0 %    MCV 86 80 - 100 fL    MCH 25.3 (L) 26.0 - 34.0 pg    MCHC 29.4 (L) 32.0 - 36.0 g/dL    RDW 19.3 (H) 11.5 - 14.5 %    Platelets 880 (H) 150 - 450 x10*3/uL    Neutrophils % 84.8 40.0 - 80.0 %    Immature Granulocytes %, Automated 0.7 0.0 - 0.9 %    Lymphocytes % 11.2 13.0 - 44.0 %    Monocytes % 3.2 2.0 - 10.0 %    Eosinophils % 0.0 0.0 - 6.0 %    Basophils % 0.1 0.0 - 2.0 %    Neutrophils Absolute 24.86 (H) 1.20 - 7.70 x10*3/uL    Immature Granulocytes Absolute, Automated 0.21 0.00 - 0.70 x10*3/uL    Lymphocytes Absolute 3.28 1.20 - 4.80 x10*3/uL     Monocytes Absolute 0.94 0.10 - 1.00 x10*3/uL    Eosinophils Absolute 0.00 0.00 - 0.70 x10*3/uL    Basophils Absolute 0.03 0.00 - 0.10 x10*3/uL   Comprehensive metabolic panel   Result Value Ref Range    Glucose 123 (H) 74 - 99 mg/dL    Sodium 138 136 - 145 mmol/L    Potassium 3.5 3.5 - 5.3 mmol/L    Chloride 99 98 - 107 mmol/L    Bicarbonate 26 21 - 32 mmol/L    Anion Gap 17 10 - 20 mmol/L    Urea Nitrogen 8 6 - 23 mg/dL    Creatinine 0.67 0.50 - 1.05 mg/dL    eGFR >90 >60 mL/min/1.73m*2    Calcium 8.6 8.6 - 10.3 mg/dL    Albumin 3.9 3.4 - 5.0 g/dL    Alkaline Phosphatase 97 33 - 136 U/L    Total Protein 6.7 6.4 - 8.2 g/dL    AST 30 9 - 39 U/L    Bilirubin, Total 0.3 0.0 - 1.2 mg/dL    ALT 50 (H) 7 - 45 U/L   Protime-INR   Result Value Ref Range    Protime 11.8 9.8 - 12.8 seconds    INR 1.0 0.9 - 1.1   Type and Screen   Result Value Ref Range    ABO TYPE O     Rh TYPE POS     ANTIBODY SCREEN NEG    Lactate   Result Value Ref Range    Lactate 2.4 (H) 0.4 - 2.0 mmol/L   POCT GLUCOSE   Result Value Ref Range    POCT Glucose 78 74 - 99 mg/dL     albuterol, 2.5 mg, nebulization, TID  [Held by provider] apixaban, 5 mg, oral, BID  busPIRone, 5 mg, oral, BID  calcium carbonate, 1,250 mg, oral, BID  dilTIAZem ER, 240 mg, oral, Daily  [Held by provider] enoxaparin, 40 mg, subcutaneous, q24h  fluticasone furoate-vilanteroL, 1 puff, inhalation, Daily  mirtazapine, 15 mg, oral, Nightly  montelukast, 10 mg, oral, Daily  oxybutynin XL, 5 mg, oral, Daily  [START ON 8/4/2024] pantoprazole, 40 mg, oral, Daily before breakfast  piperacillin-tazobactam, 3.375 g, intravenous, q6h  polyethylene glycol, 17 g, oral, Daily  sennosides-docusate sodium, 1 tablet, oral, BID  sucralfate, 1 g, oral, q6h LISET  thiamine, 100 mg, oral, Daily  tiotropium, 2 puff, inhalation, Daily    Imaging:  Ct abd/pelvis:  IMPRESSION:  Postsurgical change of prior partial small bowel resection. There is  evidence of wall thickening of small bowel loop in the  right lower  quadrant at site of anastomosis which may be infectious or  inflammatory in etiology. There is also mild hyperdensity in the  bowel lumen and mild hemorrhage within the bowel is not excluded.      No evidence of bowel obstruction or acute appendicitis. Colonic  diverticulosis without evidence of acute diverticulitis.      Stable appearance of previously described 11 mm density adjacent  pancreatic tail, and attention on progress imaging recommended.      Pulmonary emphysema. Bronchial wall thickening and opacities in the  lower lungs which has somewhat nodular appearance measuring 13 mm in  the right lower lobe and 12 mm in the left lower lobe and may relate  to infectious/inflammatory process, however attention on progress  imaging recommended to assure resolution and exclude other pathology.    Ct chest:  IMPRESSION:  No aortic aneurysm or dissection.      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      Single right renal artery demonstrates moderate narrowing proximally  secondary to calcified and noncalcified plaque. Right kidney is  slightly atrophic when compared to the left, likely chronic.      Advanced centrilobular and paraseptal emphysema.  There are bibasilar  airspace opacities slightly more consolidative in the right lung  base. This may relate to atelectasis although superimposed infection  not excluded. Correlate clinically.      Postsurgical changes of partial small-bowel resection with  anastomosis in the right lower quadrant. No evidence for bowel  obstruction.      There is a bilobed hyperdense lesion abutting the pancreatic tail  with the larger component measuring up to 1.3 cm. This is stable from  CT scan of 07/03/2024 but new when compared to CT angiogram  03/22/2016. This measures approximately 48 Hounsfield units on the  unenhanced CT without significant enhancement on the arterial and  delayed phases. Findings are favored to  relate to small hematoma.  Attention on continued short-term follow-up is advised.      Mild subcutaneous soft tissue stranding in the mid abdomen at level  of the umbilicus.  Interval resolution of previously noted rectus  sheath hematoma. No new fluid collection is seen.      Additional findings as noted above.       Assessment/Plan   Principal Problem:    Generalized abdominal pain  evidence of wall thickening of small bowel loop in the right lower quadrant at site of anastomosis which may be infectious or inflammatory in etiology with presentation of abdominal pain  -NG tube in place to decompress the bowel question if there is an infection at the site of anastomosis will discuss with surgery for now have started the patient on IV Zosyn pain control with IV morphine with Dilaudid patient was itching significantly.  -wbc 29k    2. H/o DVT  -on eliquis    3. COPD  -continue inhaler and prn duonebs    4. Leukocytosis  -? Infection at site of anastomosis  -continue iv zosyn  -repeat cbc in am   -bcx         Morena Landon MD

## 2024-08-03 NOTE — PROGRESS NOTES
Pharmacy Medication History Review   Spoke to the patient, went thru all her medications. One med (Buspar) pt stated she has been out for couple months    Fernando Cuevas is a 63 y.o. female admitted for Generalized abdominal pain. Pharmacy reviewed the patient's gozow-kg-qhauqienc medications and allergies for accuracy.    The list below reflectives the updated PTA list. Please review each medication in order reconciliation for additional clarification and justification.  Prior to Admission Medications   Prescriptions Last Dose Informant   Lidocaine Pain Relief 4 % patch     Sig: Place 1 patch on the skin every 24 (twenty four) hours if needed for mild pain (1 - 3).   acetaminophen (Tylenol Extra Strength) 500 mg tablet     Sig: Take 2 tablets (1,000 mg) by mouth every 8 hours.   albuterol 2.5 mg /3 mL (0.083 %) nebulizer solution     Sig: Take 3 mL by nebulization every 6 hours if needed for wheezing or shortness of breath.   albuterol 90 mcg/actuation inhaler  Self   Sig: Inhale 2 puffs every 4 hours if needed for wheezing or shortness of breath.   ammonium lactate (Lac-Hydrin) 12 % lotion  Self   Sig: Apply topically twice a day.   apixaban (Eliquis) 5 mg tablet 8/2/2024    Sig: Take 1 tablet (5 mg) by mouth 2 times a day.   benzonatate (Tessalon) 100 mg capsule  Self   Sig: Take 1 capsule (100 mg) by mouth 3 times a day as needed for cough. Do not crush or chew.   busPIRone (Buspar) 5 mg tablet  Self   Sig: Take 1 tablet (5 mg) by mouth twice a day.   calcium carbonate (Oscal) 500 mg calcium (1,250 mg) tablet     Sig: Take 1 tablet (1,250 mg) by mouth 2 times daily (morning and late afternoon).   calcium carbonate (Tums) 250 mg ([place] 100 mg) chewable split tablet     Sig: Take 2 half tablet (500 mg) by mouth every 12 hours.   cefdinir (Omnicef) 300 mg capsule Not Taking    Sig: Take 1 capsule (300 mg) by mouth 2 times a day for 3 days.   Patient not taking: Reported on 8/3/2024   clotrimazole (Lotrimin) 1 %  cream  Self   Sig: Apply topically 2 times a day.   dilTIAZem ER (Tiazac) 240 mg 24 hr capsule 8/2/2024 Self   Sig: Take 1 capsule (240 mg) by mouth once daily.   fluocinonide 0.05 % cream  Self   Sig: APPLY THIN LAYER TO AFFECTED AREA TWICE A DAY AS NEEDED   mirtazapine (Remeron) 15 mg tablet 8/1/2024    Sig: Take 1 tablet (15 mg) by mouth once daily at bedtime.   mometasone-formoterol (Dulera 100) 100-5 mcg/actuation inhaler 8/2/2024 Self   Sig: Inhale 200 mcg twice a day.   montelukast (Singulair) 10 mg tablet 8/2/2024 Self   Sig: Take 1 tablet (10 mg) by mouth once daily.   oxybutynin XL (Ditropan-XL) 5 mg 24 hr tablet 8/2/2024 Self   Sig: Take 1 tablet (5 mg) by mouth once daily. Do not crush, chew, or split.   pantoprazole (ProtoNix) 40 mg EC tablet 8/2/2024 Self   Sig: Take 1 tablet (40 mg) by mouth twice a day.   polyethylene glycol (Glycolax, Miralax) 17 gram/dose powder     Sig: Take 17 g by mouth once daily. Do not fill before July 28, 2024.   predniSONE (Deltasone) 10 mg tablet Not Taking    Sig: Take 4 tablets (40 mg) by mouth once daily for 2 days, THEN 3 tablets (30 mg) once daily for 2 days, THEN 2 tablets (20 mg) once daily for 2 days, THEN 1 tablet (10 mg) once daily for 2 days.   Patient not taking: Reported on 8/3/2024   sennosides-docusate sodium (Sparkle-Colace) 8.6-50 mg tablet     Sig: Take 1 tablet by mouth 2 times a day.   sucralfate (Carafate) 1 gram tablet 8/2/2024    Sig: Take 1 tablet (1 g) by mouth 2 times a day. Crush and mix in luke warm water to make slurry and drink the slurry.   thiamine 100 mg tablet 8/2/2024 Self   Sig: Take 1 tablet (100 mg) by mouth once daily.   tiotropium (Spiriva Respimat) 2.5 mcg/actuation inhaler  Self   Sig: Inhale 2 puffs once daily.      Facility-Administered Medications: None       The list below reflectives the updated allergy list. Please review each documented allergy for additional clarification and justification.  Allergies  Reviewed by Lakesha  ELIZABETH Evans on 8/2/2024        Severity Reactions Comments    Iodinated Contrast Media Medium Hives Hives to faces and neck with itching. Resolved with 50 mg benadryl, 20 mg pepcid & 60 mg prednisone. Hives to faces and neck with itching. Resolved with 50 mg benadryl, 20 mg pepcid & 60 mg prednisone.   Hives to faces and neck with itching. Resolved with 50 mg benadryl, 20 mg pepcid & 60 mg prednisone.    Adhesive Tape-silicones Low Rash             Below are additional concerns with the patient's PTA list.      Valentina Ireland

## 2024-08-03 NOTE — ED PROVIDER NOTES
HPI   Chief Complaint   Patient presents with    Post-op Problem       HPI: 63-year-old female with a history of perforated viscus presents for abdominal pain and distention.  She states that shortly prior to presentation she had the acute onset of her pain.  She reports nausea without vomiting.  Her pain is diffuse and she cites no provocative or palliative factors.  The pain is constant      Limitations to history: None  Independent Historians: Patient  External Records Reviewed: HIE, outpatient notes, inpatient notes  ------------------------------------------------------------------------------------------------------------------------------------------  ROS: a ten point review of systems was performed and was negative except as per HPI.  ------------------------------------------------------------------------------------------------------------------------------------------  PMH / PSH: as per HPI, otherwise reviewed in EMR  MEDS: as per HPI, otherwise reviewed in EMR  ALLERGIES: as per HPI, otherwise reviewed in EMR  SocH:  as per HPI, otherwise reviewed in EMR  FH:  as per HPI, otherwise reviewed in EMR  ------------------------------------------------------------------------------------------------------------------------------------------  Physical Exam:  VS: As documented in the triage note and EMR flowsheet from this visit was reviewed  General: Extremely uncomfortable appearing   eyes:  Extraocular movements grossly intact. No scleral icterus. No discharge  HEENT:  Normocephalic.  Atraumatic  Neck: Moves neck freely. No gross masses  CV: Regular rhythm. No murmurs, rubs or gallops   Resp: Clear to auscultation bilaterally. No respiratory distress.    GI: Abdomen is diffusely tender.  There is guarding   MSK: Symmetric muscle bulk. No deformities. No lower extremity edema.    Skin: Warm, dry, intact.   Neuro: No focal deficits.  A&O x3.   Psych: Appropriate for  situation  ------------------------------------------------------------------------------------------------------------------------------------------  Hospital Course / Medical Decision Making:  Independent Interpretations: CT abd / pelvis  EKG as interpreted by me: Sinus tachycardia 115 bpm with a normal axis, no bundle branch block and no signs of acute ischemia    MDM: 63-year-old female with a history of a perforated viscus presents for abdominal pain and distention.  She appears very uncomfortable on presentation and was given analgesics.  There is a significant leukocytosis and she was given Zosyn.  CT shows thickening of the bowel wall at the site of anastomosis with hyperdense material in the bowel.  I spoke to the surgical MARGIE, [employer], who is in communication with Dr. Bingham who did not feel that there is any need for acute surgical intervention.  The patient was admitted to the medicine service for further evaluation.    Discussion of Management with Other Providers:   I discussed the patient/results with: Emergency medicine team    Final diagnosis and disposition as below.    Results for orders placed or performed during the hospital encounter of 08/02/24  -CBC and Auto Differential:        Result                      Value             Ref Range           WBC                         29.3 (H)          4.4 - 11.3 x*       nRBC                        0.3 (H)           0.0 - 0.0 /1*       RBC                         3.59 (L)          4.00 - 5.20 *       Hemoglobin                  9.1 (L)           12.0 - 16.0 *       Hematocrit                  30.9 (L)          36.0 - 46.0 %       MCV                         86                80 - 100 fL         MCH                         25.3 (L)          26.0 - 34.0 *       MCHC                        29.4 (L)          32.0 - 36.0 *       RDW                         19.3 (H)          11.5 - 14.5 %       Platelets                   880 (H)           150 - 450 x1*        Neutrophils %               84.8              40.0 - 80.0 %       Immature Granulocytes *     0.7               0.0 - 0.9 %         Lymphocytes %               11.2              13.0 - 44.0 %       Monocytes %                 3.2               2.0 - 10.0 %        Eosinophils %               0.0               0.0 - 6.0 %         Basophils %                 0.1               0.0 - 2.0 %         Neutrophils Absolute        24.86 (H)         1.20 - 7.70 *       Immature Granulocytes *     0.21              0.00 - 0.70 *       Lymphocytes Absolute        3.28              1.20 - 4.80 *       Monocytes Absolute          0.94              0.10 - 1.00 *       Eosinophils Absolute        0.00              0.00 - 0.70 *       Basophils Absolute          0.03              0.00 - 0.10 *  -Comprehensive metabolic panel:        Result                      Value             Ref Range           Glucose                     123 (H)           74 - 99 mg/dL       Sodium                      138               136 - 145 mm*       Potassium                   3.5               3.5 - 5.3 mm*       Chloride                    99                98 - 107 mmo*       Bicarbonate                 26                21 - 32 mmol*       Anion Gap                   17                10 - 20 mmol*       Urea Nitrogen               8                 6 - 23 mg/dL        Creatinine                  0.67              0.50 - 1.05 *       eGFR                        >90               >60 mL/min/1*       Calcium                     8.6               8.6 - 10.3 m*       Albumin                     3.9               3.4 - 5.0 g/*       Alkaline Phosphatase        97                33 - 136 U/L        Total Protein               6.7               6.4 - 8.2 g/*       AST                         30                9 - 39 U/L          Bilirubin, Total            0.3               0.0 - 1.2 mg*       ALT                         50 (H)            7 - 45 U/L     -Type  and Screen:        Result                      Value             Ref Range           ABO TYPE                    O                                     Rh TYPE                     POS                                   ANTIBODY SCREEN             NEG                              -Protime-INR:        Result                      Value             Ref Range           Protime                     11.8              9.8 - 12.8 s*       INR                         1.0               0.9 - 1.1      -Lactate:        Result                      Value             Ref Range           Lactate                     2.4 (H)           0.4 - 2.0 mm*  XR chest abdomen for OG NG placement   Final Result    No acute cardiopulmonary disease.     Signed by Darshan David     CT abdomen pelvis wo IV contrast   Final Result    Postsurgical change of prior partial small bowel resection. There is    evidence of wall thickening of small bowel loop in the right lower    quadrant at site of anastomosis which may be infectious or    inflammatory in etiology. There is also mild hyperdensity in the    bowel lumen and mild hemorrhage within the bowel is not excluded.          No evidence of bowel obstruction or acute appendicitis. Colonic    diverticulosis without evidence of acute diverticulitis.          Stable appearance of previously described 11 mm density adjacent    pancreatic tail, and attention on progress imaging recommended.          Pulmonary emphysema. Bronchial wall thickening and opacities in the    lower lungs which has somewhat nodular appearance measuring 13 mm in    the right lower lobe and 12 mm in the left lower lobe and may relate    to infectious/inflammatory process, however attention on progress    imaging recommended to assure resolution and exclude other pathology.          MACRO:    None          Signed by: Paulie Delarosa 8/2/2024 11:09 PM    Dictation workstation:   [id number]                   Patient History   Past Medical  History:   Diagnosis Date    COPD (chronic obstructive pulmonary disease) (Multi)     Depression     DVT (deep venous thrombosis) (Multi)     bilateral upper extremities    HTN (hypertension)     Lung cancer (Multi)     Migraines     Panic disorder      Past Surgical History:   Procedure Laterality Date     SECTION, LOW TRANSVERSE      COLONOSCOPY      CT ABDOMEN PELVIS ANGIOGRAM W AND/OR WO IV CONTRAST  2016    CT ABDOMEN PELVIS ANGIOGRAM W AND/OR WO IV CONTRAST 3/22/2016 [facility] [place] [facility]    CT ANGIO CORONARY ART WITH HEARTFLOW IF SCORE >30%  2023    CT ANGIO CORONARY ART WITH HEARTFLOW IF SCORE >30% 2023    CYSTOSCOPY      botox injection    ESOPHAGOGASTRODUODENOSCOPY      EXPLORATORY LAPAROTOMY W/ BOWEL RESECTION  2024    SBR for perforation    MR NECK ANGIO W IV CONTRAST  2021    MR NECK ANGIO W IV CONTRAST 2021     No family history on file.  Social History     Tobacco Use    Smoking status: Some Days     Types: Cigarettes     Passive exposure: Current (3 cigarettes a day)    Smokeless tobacco: Never   Vaping Use    Vaping status: Never Used   Substance Use Topics    Alcohol use: Not Currently     Comment: history of daily use    Drug use: Not Currently     Types: Cocaine       Physical Exam   ED Triage Vitals [24 1926]   Temperature Heart Rate Respirations BP   36.9 °C (98.4 °F) (!) 120 20 143/75      Pulse Ox Temp Source Heart Rate Source Patient Position   99 % Oral Monitor Sitting      BP Location FiO2 (%)     Left arm --       Physical Exam      ED Course & MDM   Diagnoses as of 24 0754   Generalized abdominal pain                       [place] Coma Scale Score: 15                        Medical Decision Making      Procedure  Procedures     Sheldon Champagne,   24 1514

## 2024-08-03 NOTE — CONSULTS
Reason For Consult  Abdominal pain, wall thickening of small bowel loop in the RLQ at site of anastomosis    History Of Present Illness  Fernando Cuevas is a 63 y.o. female presenting with abdominal pain. The pain is located in the RLQ and started at around 11 AM yesterday morning. She endorses nausea and vomiting. She also endorses diarrhea, but denies any hematochezia. She is on Eliquis.     Of note, on 6/21/24, she went to the OR emergently for an exploration laparotomy and pSBR with Dr. Liang 2/2 perforated viscus (Intraoperative Findings: Segment of perforated small bowel). Post-op course was complicated by prolonged post-op ileus and wound hematoma with eventual wound vac placement.     She did see Dr. Liang in the office on 7/22/24. She was noted to be doing better at that time. Her wound vac was taken down and there was no more need for the wound vac. She was to follow up with Dr. Liang in 2 months.     [place], [place] [facility] [place] to [place]possible [place] EGD on 7/26 did not show any discrete bleeding lesion; however, the stomach was noted to be diffusely inflamed and friable with contact bleeding.     In the ED, she is afebrile and tachycardic at 120 BPM. WBC 29.3, H/H 9.1/30.9. CT A/P demonstrated evidence of wall thickening of small bowel loop in the RLQ at site of anastomosis, which may be infectious or inflammatory in etiology. There is also mild hyperdensity in the bowel lumen and mild hemorrhage within the bowel is not excluded. Due to this, general surgery was contacted by the ED.      Past Medical History  She has a past medical history of COPD (chronic obstructive pulmonary disease) (Multi), Depression, DVT (deep venous thrombosis) (Multi), HTN (hypertension), Lung cancer (Multi), Migraines, and Panic disorder.    Surgical History  She has a past surgical history that includes CT angio abdomen pelvis w and or wo IV IV contrast (03/22/2016); MR angio neck w IV contrast (04/19/2021); CT  "angio coronary art with heartflow if score >30% (2023);  section, low transverse; Esophagogastroduodenoscopy; Colonoscopy; Exploratory laparotomy w/ bowel resection (2024); and Cystoscopy.     Social History  She reports that she has been smoking cigarettes. She has been exposed to tobacco smoke. She has never used smokeless tobacco. She reports that she does not currently use alcohol. She reports that she does not currently use drugs after having used the following drugs: Cocaine.    Family History  No family history on file.     Allergies  Iodinated contrast media and Adhesive tape-silicones    Review of Systems  ABD: + pain, nausea, vomiting, diarrhea. Denies constipation.      Physical Exam  Constitutional: Awake/alert/oriented x3, no distress, cooperative.  Skin: Warm and dry.  Eyes: EOMI, clear sclera.  ENMT: Mucus membranes moist, no apparent injury.  Head/Neck: Neck supple, no apparent injury.  Respiratory: Unlabored breathing.  Cardiovascular: Tachycardic at 120 BPM.   GI: Moderately distended, soft, severely tender to palpation of RLQ.   Musculoskeletal: ROM intact.  Extremities: PRATT.  Neurological: Alert and oriented x3, no focal deficits.  Psychological: Appropriate mood and behavior.     Last Recorded Vitals  Blood pressure 159/84, pulse (!) 119, temperature 36.9 °C (98.4 °F), temperature source Oral, resp. rate 20, height 1.575 m (5' 2\"), weight 61.7 kg (136 lb), SpO2 94%.    Relevant Results  Results for orders placed or performed during the hospital encounter of 24 (from the past 24 hour(s))   CBC and Auto Differential   Result Value Ref Range    WBC 29.3 (H) 4.4 - 11.3 x10*3/uL    nRBC 0.3 (H) 0.0 - 0.0 /100 WBCs    RBC 3.59 (L) 4.00 - 5.20 x10*6/uL    Hemoglobin 9.1 (L) 12.0 - 16.0 g/dL    Hematocrit 30.9 (L) 36.0 - 46.0 %    MCV 86 80 - 100 fL    MCH 25.3 (L) 26.0 - 34.0 pg    MCHC 29.4 (L) 32.0 - 36.0 g/dL    RDW 19.3 (H) 11.5 - 14.5 %    Platelets 880 (H) 150 - 450 " x10*3/uL    Neutrophils % 84.8 40.0 - 80.0 %    Immature Granulocytes %, Automated 0.7 0.0 - 0.9 %    Lymphocytes % 11.2 13.0 - 44.0 %    Monocytes % 3.2 2.0 - 10.0 %    Eosinophils % 0.0 0.0 - 6.0 %    Basophils % 0.1 0.0 - 2.0 %    Neutrophils Absolute 24.86 (H) 1.20 - 7.70 x10*3/uL    Immature Granulocytes Absolute, Automated 0.21 0.00 - 0.70 x10*3/uL    Lymphocytes Absolute 3.28 1.20 - 4.80 x10*3/uL    Monocytes Absolute 0.94 0.10 - 1.00 x10*3/uL    Eosinophils Absolute 0.00 0.00 - 0.70 x10*3/uL    Basophils Absolute 0.03 0.00 - 0.10 x10*3/uL   Comprehensive metabolic panel   Result Value Ref Range    Glucose 123 (H) 74 - 99 mg/dL    Sodium 138 136 - 145 mmol/L    Potassium 3.5 3.5 - 5.3 mmol/L    Chloride 99 98 - 107 mmol/L    Bicarbonate 26 21 - 32 mmol/L    Anion Gap 17 10 - 20 mmol/L    Urea Nitrogen 8 6 - 23 mg/dL    Creatinine 0.67 0.50 - 1.05 mg/dL    eGFR >90 >60 mL/min/1.73m*2    Calcium 8.6 8.6 - 10.3 mg/dL    Albumin 3.9 3.4 - 5.0 g/dL    Alkaline Phosphatase 97 33 - 136 U/L    Total Protein 6.7 6.4 - 8.2 g/dL    AST 30 9 - 39 U/L    Bilirubin, Total 0.3 0.0 - 1.2 mg/dL    ALT 50 (H) 7 - 45 U/L   Protime-INR   Result Value Ref Range    Protime 11.8 9.8 - 12.8 seconds    INR 1.0 0.9 - 1.1   Type and Screen   Result Value Ref Range    ABO TYPE O     Rh TYPE POS     ANTIBODY SCREEN NEG        CT abdomen pelvis wo IV contrast    Result Date: 8/2/2024  Interpreted By:  Paulie Delarosa, STUDY: CT ABDOMEN PELVIS WO IV CONTRAST;  8/2/2024 10:02 pm   INDICATION: Signs/Symptoms:Abdominal distention, diffuse abdominal pain, history of GI bleed.   COMPARISON: 7/25/2024   ACCESSION NUMBER(S): [id number]   ORDERING CLINICIAN: DONOVAN ALBA   TECHNIQUE: Contiguous axial images of the abdomen and pelvis were obtained without intravenous contrast. Coronal and sagittal reformatted images were obtained from the axial images.   FINDINGS: There is limited evaluation of the lung bases. There is pulmonary emphysema. There is  bronchial wall thickening and opacity right lower lobe which measure 1.4 cm. There is also a 1.2 cm opacity in the medial left lower lobe.   Evaluation of the abdomen and pelvis is limited the secondary to lack of intravenous contrast. Limited evaluation for liver mass on noncontrast examination. There is cholelithiasis.   No splenomegaly.   Mild nodular thickening left adrenal gland. The right adrenal gland appears unremarkable.   There is stable appearance of previously described 11 mm density adjacent to the pancreatic tail as described on prior examination.   No evidence of renal calculus or hydronephrosis.   Atherosclerotic calcification of the abdominal aorta and iliac arteries.   There is stable edema in the subcutaneous fat in the left medial anterior abdominal wall.   There is redemonstration of postsurgical change of prior partial bowel resection. There is evidence of thickening of the small bowel loop in the right lower quadrant at site of anastomosis. There is mild hyperdensity in the bowel lumen at this level and hemorrhage is not excluded. No evidence of bowel obstruction or acute appendicitis. There is colonic diverticulosis without evidence of acute diverticulitis.   Urinary bladder appears unremarkable.   Limited evaluation of the uterus and adnexa.   No significant free abdominal or pelvic fluid.       Postsurgical change of prior partial small bowel resection. There is evidence of wall thickening of small bowel loop in the right lower quadrant at site of anastomosis which may be infectious or inflammatory in etiology. There is also mild hyperdensity in the bowel lumen and mild hemorrhage within the bowel is not excluded.   No evidence of bowel obstruction or acute appendicitis. Colonic diverticulosis without evidence of acute diverticulitis.   Stable appearance of previously described 11 mm density adjacent pancreatic tail, and attention on progress imaging recommended.   Pulmonary emphysema.  Bronchial wall thickening and opacities in the lower lungs which has somewhat nodular appearance measuring 13 mm in the right lower lobe and 12 mm in the left lower lobe and may relate to infectious/inflammatory process, however attention on progress imaging recommended to assure resolution and exclude other pathology.   MACRO: None   Signed by: Paulie Delarosa 8/2/2024 11:09 PM Dictation workstation:   [id number]    Esophagogastroduodenoscopy (EGD)    Result Date: 7/26/2024  Table formatting from the original result was not included. Impression Erythematous, friable mucosa with contact bleeding, consistent with gastritis in the stomach; performed cold forceps biopsy to evaluate for H. pylori infection. The esophagus appeared normal. The duodenal bulb, 2nd part of the duodenum and 3rd part of the duodenum appeared normal.  No ulcers or AVMs were seen. Findings Generalized erythematous and friable mucosa in the stomach, consistent with gastritis; performed cold forceps biopsy to rule out H. pylori The esophagus appeared normal. The duodenal bulb, 2nd part of the duodenum and 3rd part of the duodenum appeared normal. Recommendation Await pathology results Continue pantoprazole 40mg daily. OK to restart previous diet. Return patient to hospital floor for possible discharge same day. Quit smoking.  Indication Anemia, unspecified type Staff Staff Role Rusty Hayward MD Proceduralist Medications See Anesthesia Record. Preprocedure A history and physical has been performed, and patient medication allergies have been reviewed. The patient's tolerance of previous anesthesia has been reviewed. The risks and benefits of the procedure and the sedation options and risks were discussed with the patient. All questions were answered and informed consent obtained. Details of the Procedure The patient underwent monitored anesthesia care, which was administered by an anesthesia professional. The patient's blood pressure, ECG,  ETCO2, heart rate, level of consciousness, oxygen and respirations were monitored throughout the procedure. The scope was introduced through the mouth and advanced to the third part of the duodenum. Retroflexion was performed in the cardia. Prior to the procedure, the patient's H. Pylori status was unknown. The patient's estimated blood loss was minimal (<5 mL). The procedure was not difficult. The patient tolerated the procedure well. There were no apparent adverse events. Events Procedure Events Event Event Time ENDO SCOPE IN TIME 7/26/2024 10:27 AM ENDO SCOPE OUT TIME 7/26/2024 10:36 AM Specimens ID Type Source Tests Collected by Time 1 : and body Tissue STOMACH ANTRUM BIOPSY SURGICAL PATHOLOGY EXAM Lisa Gordon RN 7/26/2024 1033 Procedure Location [facility] [address] [facility] [address] [phone] Referring Provider Nicki Pollock, APRN-CNP Procedure Provider Rusty Hayward MD     CT angio chest abdomen pelvis    Result Date: 7/25/2024  Interpreted By:  Abhishek Noble, STUDY: CT ANGIO CHEST ABDOMEN PELVIS;  7/25/2024 3:11 am   INDICATION: Signs/Symptoms:abd pain, radiating to  back and up to chest, post-operative hematoma on eliquis.   COMPARISON: CT scan of the abdomen and pelvis 07/24/2024.   ACCESSION NUMBER(S): [id number]   ORDERING CLINICIAN: YASMEEN THOMAS   TECHNIQUE: Axial CT images of the chest, abdomen and pelvis before and after intravenous administration of 100 mL of Omnipaque 350 using CT angiographic technique. Postcontrast imaging was performed in the arterial and delayed phases. Coronal and sagittal images are reconstructed.  3D reconstructions were obtained at a separate workstation.   FINDINGS: VASCULAR:   AORTA: Unenhanced images demonstrate no evidence for intramural hematoma. No thoracic aortic aneurysm or dissection. Calcific atherosclerosis of the aorta. 2 vessel arch anatomy, a normal variant. Mild calcified plaque at  origin of the arch vessels without hemodynamically significant stenosis.   Abdominal aorta is normal in caliber and course. No evidence for dissection or aneurysmal dilatation. There is scattered calcified and noncalcified plaque in the abdominal aorta. 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 The superior and inferior mesenteric arteries are patent. Single right renal artery demonstrates moderate narrowing proximally secondary to calcified and noncalcified plaque. Single left renal artery is widely patent.   There is scattered calcified and noncalcified plaque in the common and internal iliac arteries without hemodynamically significant stenosis. The external iliac and proximal femoral arteries are patent.   No acute pulmonary embolism to the proximal segmental arterial level.   The iliofemoral vessels and IVC are patent. Splenic, superior mesenteric, portal and hepatic veins are patent.   CHEST:   HEART: Normal size. No pericardial effusion. MEDIASTINUM AND ALESSANDRO: Prominent subcarinal lymph node measures 9 mm in short axis. No thoracic adenopathy. LUNG, PLEURA, LARGE AIRWAYS: Advanced centrilobular and paraseptal emphysema. Left Bochdalek's hernia contains fat. There are bibasilar airspace opacities slightly more consolidative in the right lung base. This may relate to atelectasis although superimposed infection not excluded. No effusion or pneumothorax. CHEST WALL AND LOWER NECK: Within normal limits. BONES: No acute osseous abnormality.   ABDOMEN: Arterial phase of imaging limits evaluation of the solid viscera.   LIVER: Normal morphology. A 5 mm hypervascular focus in the left hepatic lobe may relate to perfusion shunts versus small flash filling hemangioma. Focal area of low attenuation adjacent to the fissure for ligamentum teres may relate focal fatty infiltration. BILE DUCTS: Normal caliber. GALLBLADDER: Layering high density in the gallbladder neck  likely relates to small calculi. No gallbladder wall thickening. PANCREAS: Homogeneous enhancement of the pancreas without evidence for ductal dilatation or peripancreatic inflammatory changes. There is a bilobed hyperdense lesion abutting the pancreatic tail with the larger component measuring up to 1.3 cm. This is stable from CT scan of 07/03/2024 but new when compared to CT angiogram 03/22/2016. This measures approximately 48 Hounsfield units on the unenhanced CT, 49 Hounsfield units on the arterial phase and 47 Hounsfield units on the delayed imaging. No definite enhancement. SPLEEN: Within normal limits. ADRENALS:  Nodular thickening of the adrenal glands may relate to hyperplasia or adenomatous change. KIDNEYS: Right kidney is slightly atrophic when compared to the left. No hydronephrosis or perinephric fluid collection. URETERS: No hydroureter.   PELVIS:   REPRODUCTIVE ORGANS: Uterus is present. No adnexal mass. BLADDER: Within normal limits.   RETROPERITONEUM: Within normal limits. BOWEL: Stomach is partially distended. Postsurgical changes of small-bowel anastomosis in the right lower quadrant is redemonstrated. Moderate to large stool burden. No pneumatosis or portal venous gas. Normal appendix. PERITONEUM: Mild subcutaneous soft tissue stranding in the mid abdomen at level of the umbilicus. No free air. Interval evolution with decreased size of previously noted rectus sheath hematoma on CT scan of 07/03/2024. No new fluid collection is seen.   ABDOMINAL WALL: There is subcutaneous soft tissue stranding in the mid abdomen similar to recent CT likely postsurgical. BONES: Multilevel degenerative changes of the spine.         No aortic aneurysm or dissection.   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   Single right renal artery demonstrates moderate narrowing proximally secondary to calcified and noncalcified plaque. Right kidney is slightly atrophic when  compared to the left, likely chronic.   Advanced centrilobular and paraseptal emphysema.  There are bibasilar airspace opacities slightly more consolidative in the right lung base. This may relate to atelectasis although superimposed infection not excluded. Correlate clinically.   Postsurgical changes of partial small-bowel resection with anastomosis in the right lower quadrant. No evidence for bowel obstruction.   There is a bilobed hyperdense lesion abutting the pancreatic tail with the larger component measuring up to 1.3 cm. This is stable from CT scan of 07/03/2024 but new when compared to CT angiogram 03/22/2016. This measures approximately 48 Hounsfield units on the unenhanced CT without significant enhancement on the arterial and delayed phases. Findings are favored to relate to small hematoma. Attention on continued short-term follow-up is advised.   Mild subcutaneous soft tissue stranding in the mid abdomen at level of the umbilicus.  Interval resolution of previously noted rectus sheath hematoma. No new fluid collection is seen.   Additional findings as noted above.   MACRO: None   Signed by: Abhishek Noble 7/25/2024 4:00 AM Dictation workstation:   [id number]    XR chest 1 view    Result Date: 7/24/2024  Interpreted By:  Sheri Lakhani, STUDY: XR CHEST 1 VIEW;  7/24/2024 10:10 pm   INDICATION: Signs/Symptoms:cough, rhonchi L lung field.   COMPARISON: Radiographs of the chest dated 07/09/2024; same day CT of the abdomen/pelvis.   ACCESSION NUMBER(S): [id number]   ORDERING CLINICIAN: YASMEEN THOMAS   FINDINGS: AP radiograph of the chest was provided.       CARDIOMEDIASTINAL SILHOUETTE: Cardiomediastinal silhouette is upper limits of normal in size, similar to prior exam.   LUNGS: New airspace opacity in the medial lower right lung along the right heart border corresponds to area of atelectasis on same day CT. No sizable pleural effusion or consolidation is present.   ABDOMEN: No remarkable  upper abdominal findings.   BONES: No acute osseous changes.       1.  New airspace opacity in the medial lower right lung along the right heart border corresponds to area of atelectasis/scarring on same day CT. No consolidation or sizable pleural effusion is present.       MACRO: None   Signed by: Sheri Lakhani 7/24/2024 10:37 PM Dictation workstation:   [id number]    CT abdomen pelvis wo IV contrast    Result Date: 7/24/2024  Interpreted By:  Sheri Lakhani, STUDY: CT ABDOMEN PELVIS WO IV CONTRAST;  7/24/2024 8:54 pm   INDICATION: Signs/Symptoms:abd pain, distention, anemia.   COMPARISON: CT of the abdomen and pelvis dated 07/03/2024   ACCESSION NUMBER(S): [id number]   ORDERING CLINICIAN: YASMEEN THOMAS   TECHNIQUE: CT of the abdomen and pelvis was performed. Contiguous axial images were obtained at 3 mm slice thickness through the abdomen and pelvis. Coronal and sagittal reconstructions at 3 mm slice thickness were performed.  No intravenous or oral contrast agents were administered.   FINDINGS: Please note that the evaluation of vessels, lymph nodes and organs is limited without intravenous contrast.   LOWER CHEST: Area of bandlike scarring/atelectasis is present in the right lower lobe, new since prior exam on 07/03/2024. No new consolidation or pleural effusion is present in the left lung base. Paraseptal and centrilobular emphysematous changes are similar to prior exam.   Heart is normal in size without pericardial effusion.   Distal esophagus is unremarkable in appearance.   ABDOMEN:   LIVER: Within limits of noncontrast exam, no acute hepatic abnormality is present. Several subcentimeter hepatic hypodensities are too small to definitely characterize.   BILE DUCTS: No new intrahepatic or extrahepatic biliary dilatation is evident.   GALLBLADDER: Several radiopaque stones are present in the gallbladder without evidence of new wall thickening or pericholecystic stranding.   PANCREAS: No  ductal dilatation or peripancreatic stranding is present.   SPLEEN: Spleen is unremarkable in appearance.   ADRENAL GLANDS: Adrenal glands are unremarkable in appearance.   KIDNEYS AND URETERS: Kidneys are symmetric in size without evidence of new hydronephrosis or radiopaque nephrolithiasis. Visualized upper ureters are unremarkable in appearance.   PELVIS:   BLADDER: No bladder wall thickening is present.   REPRODUCTIVE ORGANS: Uterus is present. No adnexal masses or fluid collections are identified.   BOWEL: Stomach is somewhat distended with fluid and food debris without evidence of wall thickening. No abnormal small bowel dilatation is present. Postsurgical changes of prior bowel resection are present in the right lower quadrant with bowel ring anastomosis, similar to prior study. No inflammatory large bowel wall thickening is present.   Appendix is unremarkable in appearance.   VESSELS: Atherosclerotic plaques are present in the abdominal aorta without evidence of acute vascular abnormality.   PERITONEUM/RETROPERITONEUM/LYMPH NODES: There is no evidence of free air, free fluid, or thick-walled collections in the abdomen or pelvis. No new enlarged lymphadenopathy is present in the abdomen or pelvis.   There is slight mesenteric fat stranding present in the anterior lower abdomen near the umbilicus, immediately deep to the left rectus abdominis muscle at the site of hyperdense hematoma described on previous exam in July of 2024.   ABDOMINAL WALL: There is mild asymmetric thickening with some overlying cutaneous stranding present in the medial left rectus abdominis muscle near the level of the umbilicus, at the site of hyperdense attenuation described on previous exam on 07/03/2024, likely representing resolving postsurgical hematoma.   There is a small fat containing spigelian hernia are present in the left rectus abdominis muscle immediately superior (series 604, image 66), with mild surrounding fat stranding,  which is new from prior exam.   No additional new abnormalities are present in the cutaneous tissues of the abdomen or pelvis.   BONES: Multilevel degenerative changes are present in the lumbar spine, similar to prior exam. No new compression fracture or high-grade stenosis is evident.       1.  No acute abnormality is present within the abdomen or pelvis. Mild fat stranding is noted in the anterior mesentery, deep to the umbilicus and hyperdense hematoma in the left rectus abdominis muscle described on previous exam. 2. Small new spigelian hernia containing fat is present in the left rectus abdominis muscle with some overlying cutaneous fat stranding. No herniated bowel is present. 3. Postsurgical changes of small-bowel resection and anastomosis are present in the right lower quadrant without inflammatory bowel wall thickening or abnormal bowel dilatation. 4. New area of scarring/atelectasis is present in the right lower lobe medially, with improvement in the other areas of atelectasis seen on previous exam on 07/03/2024.     MACRO: None   Signed by: Sheri Lakhani 7/24/2024 9:10 PM Dictation workstation:   [id number]    Electrocardiogram, 12-lead PRN ACS symptoms    Result Date: 7/16/2024  Sinus tachycardia Right atrial enlargement Borderline ECG When compared with ECG of 20-JUN-2024 21:19, No significant change was found Confirmed by Edward Wheat ([id number]) on 7/16/2024 4:34:29 PM    XR chest 1 view    Result Date: 7/9/2024  Interpreted By:  Karina Ventura, STUDY: XR CHEST 1 VIEW;  7/9/2024 11:10 am   INDICATION: Signs/Symptoms:sob.   COMPARISON: 07/04/2024   ACCESSION NUMBER(S): [id number]   ORDERING CLINICIAN: CORBY DAVE   FINDINGS: Artifact from overlying monitoring leads noted. Right jugular central line remains in place with tip overlying the distal SVC. Interval removal of NG tube. hazy opacity in the left lung base. Pleural angles are sharp. Subcentimeter nodular shadow in the right mid  chest. The cardiac silhouette is normal in size.       Subtle left basilar infiltrate or atelectasis.   Possible right chest nodule versus confluence of shadows. No definite correlate on recent chest CT 06/20/2024. Attention at follow-up suggested.   MACRO: None.   Signed by: Karina Ventura 7/9/2024 12:58 PM Dictation workstation:   [id number]    Vascular US upper extremity venous duplex right    Result Date: 7/6/2024  Interpreted By:  Zack Sevilla, STUDY: VASC US UPPER EXTREMITY VENOUS DUPLEX RIGHT  7/6/2024 2:48 pm   INDICATION: 62 y/o   F with  Signs/Symptoms:Swelling.   COMPARISON: None.   ACCESSION NUMBER(S): [id number]   ORDERING CLINICIAN: DELPHINE VELÁZQUEZ   TECHNIQUE: Routine ultrasound of the right upper extremity was performed with duplex Doppler (color and spectral) evaluation.   Static images were obtained for remote interpretation.   FINDINGS: UPPER EXTREMITY VEINS:  Examination was performed with color and duplex Doppler.   Nonocclusive deep vein thrombosis is noted in the right brachial vein. All other deep veins in the right upper extremity are patent and compressible with no thrombosis. There is a PICC line in the internal jugular vein.       Nonocclusive thrombosis involving the right brachial vein   MACRO: None   Signed by: Zack Sevilla 7/6/2024 3:03 PM Dictation workstation:   [id number]    XR abdomen 1 view    Result Date: 7/5/2024  Interpreted By:  Magdalena Carrasco, STUDY: XR ABDOMEN 1 VIEW;  7/5/2024 9:19 am   INDICATION: Signs/Symptoms:abdominal distension.   COMPARISON: None.   ACCESSION NUMBER(S): [id number]   ORDERING CLINICIAN: MINERVA AVILEZ   FINDINGS: Nonobstructive bowel gas pattern. Limited evaluation of pneumoperitoneum on supine imaging. NG tube terminating in the left upper quadrant likely within the gastric body. Excreted contrast within a nondistended colon     Visualized lungs are clear.   Osseous structures demonstrate no acute bony changes.       1. A nonspecific,  nonobstructive bowel gas pattern. 2. NG tube   MACRO: None   Signed by: Magdalena Carrasco 7/5/2024 9:35 AM Dictation workstation:   [id number]    XR chest 1 view    Result Date: 7/4/2024  Interpreted By:  Abhishek Noble, STUDY: XR CHEST 1 VIEW;  7/4/2024 8:43 pm   INDICATION: Signs/Symptoms:cvc placement.   COMPARISON: Chest x-ray 06/18/2024   ACCESSION NUMBER(S): [id number]   ORDERING CLINICIAN: MAIRA WILL   FINDINGS: Right IJ central line terminates in the SVC. Enteric tube terminates in the mid abdomen with side hole below the GE junction. Multiple overlying leads are present.   CARDIOMEDIASTINAL SILHOUETTE: Cardiomediastinal silhouette is normal in size and configuration.   LUNGS: No consolidation, pleural effusion or pneumothorax. Bibasilar atelectasis.   ABDOMEN: No remarkable upper abdominal findings.   BONES: No acute osseous abnormality.       Support hardware as described above.   Bibasilar airspace opacities favored to relate to compressive atelectasis. Superimposed infection not excluded. Attention on continued follow-up is advised.   MACRO: None   Signed by: Abhishek Noble 7/4/2024 8:58 PM Dictation workstation:   [id number]    US abdomen limited    Result Date: 7/4/2024  Interpreted By:  Finkelstein, Evan, STUDY: US ABDOMEN LIMITED; ;  7/4/2024 2:44 am   INDICATION: rectus sheath Hematoma Evaluation.   COMPARISON: CT abdomen pelvis 07/03/2024   ACCESSION NUMBER(S): [id number]   ORDERING CLINICIAN: ROBERT NOLASCO   TECHNIQUE: Targeted grayscale sonographic imaging in the mid abdomen with color Doppler as needed.   FINDINGS: There is a 2.7 x 1 x 0.6 cm heterogeneous collection within the mid abdominal wall soft tissues just above the site of surgical incision. The collection demonstrates no internal vascularity and is located approximately 0.4 cm from the skin surface.       2.7 x 1 x 0.6 cm heterogeneous collection within the anterior abdominal wall soft tissues most compatible with hematoma  approximately 0.4 cm below the skin surface.   MACRO: None.   Signed by: Evan Finkelstein 7/4/2024 3:44 AM Dictation workstation:   [id number]      Assessment/Plan   - Abdomen moderately distended, soft, severely tender to palpation of RLQ. Endorses nausea and vomiting.   - Pain control as needed  - PRN antiemetic  - NPO  - IV antibiotics  - NG LIWS    Dispo: Pain control. IV antibiotics. NG placement. Discussed on the phone with Dr. Bingham, he will evaluate her in the AM. Will closely monitor right now.     I spent 45 minutes in the professional and overall care of this patient.      Beverly Villeda, APRN-CNP

## 2024-08-04 ENCOUNTER — APPOINTMENT (OUTPATIENT)
Dept: RADIOLOGY | Facility: HOSPITAL | Age: 64
End: 2024-08-04
Payer: COMMERCIAL

## 2024-08-04 VITALS
HEART RATE: 102 BPM | HEIGHT: 62 IN | DIASTOLIC BLOOD PRESSURE: 67 MMHG | RESPIRATION RATE: 17 BRPM | BODY MASS INDEX: 25.03 KG/M2 | TEMPERATURE: 96.4 F | SYSTOLIC BLOOD PRESSURE: 112 MMHG | OXYGEN SATURATION: 96 % | WEIGHT: 136 LBS

## 2024-08-04 LAB
ANION GAP SERPL CALC-SCNC: 16 MMOL/L (ref 10–20)
BACTERIA BLD CULT: NORMAL
BUN SERPL-MCNC: 13 MG/DL (ref 6–23)
CALCIUM SERPL-MCNC: 8 MG/DL (ref 8.6–10.3)
CHLORIDE SERPL-SCNC: 102 MMOL/L (ref 98–107)
CO2 SERPL-SCNC: 26 MMOL/L (ref 21–32)
CREAT SERPL-MCNC: 1.04 MG/DL (ref 0.5–1.05)
EGFRCR SERPLBLD CKD-EPI 2021: 61 ML/MIN/1.73M*2
ERYTHROCYTE [DISTWIDTH] IN BLOOD BY AUTOMATED COUNT: 19.5 % (ref 11.5–14.5)
GLUCOSE SERPL-MCNC: 92 MG/DL (ref 74–99)
HCT VFR BLD AUTO: 26.2 % (ref 36–46)
HGB BLD-MCNC: 7.9 G/DL (ref 12–16)
MCH RBC QN AUTO: 25.2 PG (ref 26–34)
MCHC RBC AUTO-ENTMCNC: 30.2 G/DL (ref 32–36)
MCV RBC AUTO: 84 FL (ref 80–100)
NRBC BLD-RTO: 0.1 /100 WBCS (ref 0–0)
PLATELET # BLD AUTO: 569 X10*3/UL (ref 150–450)
POTASSIUM SERPL-SCNC: 3.3 MMOL/L (ref 3.5–5.3)
RBC # BLD AUTO: 3.13 X10*6/UL (ref 4–5.2)
SODIUM SERPL-SCNC: 141 MMOL/L (ref 136–145)
WBC # BLD AUTO: 23 X10*3/UL (ref 4.4–11.3)

## 2024-08-04 PROCEDURE — 2500000002 HC RX 250 W HCPCS SELF ADMINISTERED DRUGS (ALT 637 FOR MEDICARE OP, ALT 636 FOR OP/ED): Performed by: INTERNAL MEDICINE

## 2024-08-04 PROCEDURE — 74018 RADEX ABDOMEN 1 VIEW: CPT | Performed by: RADIOLOGY

## 2024-08-04 PROCEDURE — 99233 SBSQ HOSP IP/OBS HIGH 50: CPT | Performed by: INTERNAL MEDICINE

## 2024-08-04 PROCEDURE — 85027 COMPLETE CBC AUTOMATED: CPT | Performed by: INTERNAL MEDICINE

## 2024-08-04 PROCEDURE — 74018 RADEX ABDOMEN 1 VIEW: CPT

## 2024-08-04 PROCEDURE — 1100000001 HC PRIVATE ROOM DAILY

## 2024-08-04 PROCEDURE — 80048 BASIC METABOLIC PNL TOTAL CA: CPT | Performed by: INTERNAL MEDICINE

## 2024-08-04 PROCEDURE — 2500000001 HC RX 250 WO HCPCS SELF ADMINISTERED DRUGS (ALT 637 FOR MEDICARE OP): Performed by: HOSPITALIST

## 2024-08-04 PROCEDURE — 94640 AIRWAY INHALATION TREATMENT: CPT

## 2024-08-04 PROCEDURE — 2500000001 HC RX 250 WO HCPCS SELF ADMINISTERED DRUGS (ALT 637 FOR MEDICARE OP): Performed by: INTERNAL MEDICINE

## 2024-08-04 PROCEDURE — 99232 SBSQ HOSP IP/OBS MODERATE 35: CPT | Performed by: SURGERY

## 2024-08-04 PROCEDURE — 2500000004 HC RX 250 GENERAL PHARMACY W/ HCPCS (ALT 636 FOR OP/ED): Performed by: INTERNAL MEDICINE

## 2024-08-04 PROCEDURE — 36415 COLL VENOUS BLD VENIPUNCTURE: CPT | Performed by: INTERNAL MEDICINE

## 2024-08-04 PROCEDURE — 2500000004 HC RX 250 GENERAL PHARMACY W/ HCPCS (ALT 636 FOR OP/ED): Performed by: HOSPITALIST

## 2024-08-04 RX ORDER — HYDROXYZINE HYDROCHLORIDE 25 MG/1
50 TABLET, FILM COATED ORAL ONCE
Status: COMPLETED | OUTPATIENT
Start: 2024-08-04 | End: 2024-08-04

## 2024-08-04 RX ORDER — POTASSIUM CHLORIDE 1.5 G/1.58G
40 POWDER, FOR SOLUTION ORAL ONCE
Status: COMPLETED | OUTPATIENT
Start: 2024-08-04 | End: 2024-08-04

## 2024-08-04 RX ORDER — MORPHINE SULFATE 2 MG/ML
1.5 INJECTION, SOLUTION INTRAMUSCULAR; INTRAVENOUS EVERY 4 HOURS PRN
Status: DISCONTINUED | OUTPATIENT
Start: 2024-08-04 | End: 2024-08-05

## 2024-08-04 RX ADMIN — POLYETHYLENE GLYCOL 3350 17 G: 17 POWDER, FOR SOLUTION ORAL at 11:15

## 2024-08-04 RX ADMIN — CALCIUM 1250 MG: 500 TABLET ORAL at 18:56

## 2024-08-04 RX ADMIN — APIXABAN 5 MG: 5 TABLET, FILM COATED ORAL at 20:07

## 2024-08-04 RX ADMIN — PIPERACILLIN SODIUM AND TAZOBACTAM SODIUM 3.38 G: 3; .375 INJECTION, SOLUTION INTRAVENOUS at 23:20

## 2024-08-04 RX ADMIN — DIPHENHYDRAMINE HYDROCHLORIDE 50 MG: 50 INJECTION, SOLUTION INTRAMUSCULAR; INTRAVENOUS at 20:35

## 2024-08-04 RX ADMIN — SUCRALFATE 1 G: 1 SUSPENSION ORAL at 19:14

## 2024-08-04 RX ADMIN — SUCRALFATE 1 G: 1 SUSPENSION ORAL at 06:55

## 2024-08-04 RX ADMIN — FORMOTEROL FUMARATE DIHYDRATE 20 MCG: 20 SOLUTION RESPIRATORY (INHALATION) at 19:31

## 2024-08-04 RX ADMIN — ALBUTEROL SULFATE 2.5 MG: 2.5 SOLUTION RESPIRATORY (INHALATION) at 07:15

## 2024-08-04 RX ADMIN — PIPERACILLIN SODIUM AND TAZOBACTAM SODIUM 3.38 G: 3; .375 INJECTION, SOLUTION INTRAVENOUS at 05:24

## 2024-08-04 RX ADMIN — CALCIUM 1250 MG: 500 TABLET ORAL at 10:29

## 2024-08-04 RX ADMIN — MIRTAZAPINE 15 MG: 15 TABLET, FILM COATED ORAL at 20:07

## 2024-08-04 RX ADMIN — HYDROXYZINE HYDROCHLORIDE 50 MG: 25 TABLET ORAL at 01:19

## 2024-08-04 RX ADMIN — ALBUTEROL SULFATE 2.5 MG: 2.5 SOLUTION RESPIRATORY (INHALATION) at 13:42

## 2024-08-04 RX ADMIN — FORMOTEROL FUMARATE DIHYDRATE 20 MCG: 20 SOLUTION RESPIRATORY (INHALATION) at 07:15

## 2024-08-04 RX ADMIN — DILTIAZEM HYDROCHLORIDE 240 MG: 120 CAPSULE, COATED, EXTENDED RELEASE ORAL at 13:55

## 2024-08-04 RX ADMIN — MORPHINE SULFATE 2 MG: 2 INJECTION, SOLUTION INTRAMUSCULAR; INTRAVENOUS at 02:27

## 2024-08-04 RX ADMIN — Medication 100 MG: at 10:28

## 2024-08-04 RX ADMIN — APIXABAN 5 MG: 5 TABLET, FILM COATED ORAL at 10:28

## 2024-08-04 RX ADMIN — MORPHINE SULFATE 2 MG: 2 INJECTION, SOLUTION INTRAMUSCULAR; INTRAVENOUS at 23:22

## 2024-08-04 RX ADMIN — BUSPIRONE HYDROCHLORIDE 5 MG: 5 TABLET ORAL at 10:29

## 2024-08-04 RX ADMIN — MORPHINE SULFATE 2 MG: 2 INJECTION, SOLUTION INTRAMUSCULAR; INTRAVENOUS at 06:55

## 2024-08-04 RX ADMIN — PANTOPRAZOLE SODIUM 40 MG: 40 TABLET, DELAYED RELEASE ORAL at 06:55

## 2024-08-04 RX ADMIN — SUCRALFATE 1 G: 1 SUSPENSION ORAL at 13:55

## 2024-08-04 RX ADMIN — ALBUTEROL SULFATE 2.5 MG: 2.5 SOLUTION RESPIRATORY (INHALATION) at 19:31

## 2024-08-04 RX ADMIN — MORPHINE SULFATE 2 MG: 2 INJECTION, SOLUTION INTRAMUSCULAR; INTRAVENOUS at 11:15

## 2024-08-04 RX ADMIN — MORPHINE SULFATE 2 MG: 2 INJECTION, SOLUTION INTRAMUSCULAR; INTRAVENOUS at 18:57

## 2024-08-04 RX ADMIN — OXYBUTYNIN CHLORIDE 2.5 MG: 5 TABLET ORAL at 10:29

## 2024-08-04 RX ADMIN — BUDESONIDE 0.25 MG: 0.25 INHALANT ORAL at 19:31

## 2024-08-04 RX ADMIN — SUCRALFATE 1 G: 1 SUSPENSION ORAL at 20:07

## 2024-08-04 RX ADMIN — SENNOSIDES AND DOCUSATE SODIUM 1 TABLET: 8.6; 5 TABLET ORAL at 10:29

## 2024-08-04 RX ADMIN — PIPERACILLIN SODIUM AND TAZOBACTAM SODIUM 3.38 G: 3; .375 INJECTION, SOLUTION INTRAVENOUS at 19:13

## 2024-08-04 RX ADMIN — SENNOSIDES AND DOCUSATE SODIUM 1 TABLET: 8.6; 5 TABLET ORAL at 20:07

## 2024-08-04 RX ADMIN — MORPHINE SULFATE 1.5 MG: 2 INJECTION, SOLUTION INTRAMUSCULAR; INTRAVENOUS at 14:17

## 2024-08-04 RX ADMIN — ACETAMINOPHEN 650 MG: 325 TABLET ORAL at 14:00

## 2024-08-04 RX ADMIN — OXYBUTYNIN CHLORIDE 2.5 MG: 5 TABLET ORAL at 20:07

## 2024-08-04 RX ADMIN — MONTELUKAST 10 MG: 10 TABLET, FILM COATED ORAL at 20:07

## 2024-08-04 RX ADMIN — BUSPIRONE HYDROCHLORIDE 5 MG: 5 TABLET ORAL at 20:07

## 2024-08-04 RX ADMIN — PIPERACILLIN SODIUM AND TAZOBACTAM SODIUM 3.38 G: 3; .375 INJECTION, SOLUTION INTRAVENOUS at 10:29

## 2024-08-04 RX ADMIN — BUDESONIDE 0.25 MG: 0.25 INHALANT ORAL at 07:15

## 2024-08-04 RX ADMIN — POTASSIUM CHLORIDE 40 MEQ: 1.5 POWDER, FOR SOLUTION ORAL at 14:00

## 2024-08-04 ASSESSMENT — PAIN DESCRIPTION - LOCATION
LOCATION: ABDOMEN
LOCATION: GENERALIZED
LOCATION: GENERALIZED

## 2024-08-04 ASSESSMENT — COGNITIVE AND FUNCTIONAL STATUS - GENERAL
TOILETING: A LITTLE
CLIMB 3 TO 5 STEPS WITH RAILING: A LITTLE
DAILY ACTIVITIY SCORE: 23
WALKING IN HOSPITAL ROOM: A LITTLE
MOBILITY SCORE: 22

## 2024-08-04 ASSESSMENT — PAIN SCALES - GENERAL
PAINLEVEL_OUTOF10: 10 - WORST POSSIBLE PAIN
PAINLEVEL_OUTOF10: 8
PAINLEVEL_OUTOF10: 10 - WORST POSSIBLE PAIN
PAINLEVEL_OUTOF10: 8

## 2024-08-04 ASSESSMENT — PAIN DESCRIPTION - ORIENTATION: ORIENTATION: LOWER;UPPER

## 2024-08-04 ASSESSMENT — PAIN SCALES - PAIN ASSESSMENT IN ADVANCED DEMENTIA (PAINAD): TOTALSCORE: MEDICATION (SEE MAR)

## 2024-08-04 ASSESSMENT — PAIN - FUNCTIONAL ASSESSMENT: PAIN_FUNCTIONAL_ASSESSMENT: 0-10

## 2024-08-04 ASSESSMENT — PAIN SCALES - WONG BAKER: WONGBAKER_NUMERICALRESPONSE: HURTS EVEN MORE

## 2024-08-04 NOTE — CARE PLAN
The patient's goals for the shift include  pain control     The clinical goals for the shift include pain control

## 2024-08-04 NOTE — PROGRESS NOTES
"Fernando Cuevas is a 63 y.o. female on day 1 of admission presenting with Generalized abdominal pain.    Subjective   Patient abdomen is even more distended today, is having abdominal pain states the dosage of the morphine is not enough therefore was increased.  Per surgery NG tube will be removed and will be started on a diet.  White count slight improvement to 23,000, repleted potassium       Objective     Physical Exam  Vitals reviewed.   HENT:      Head: Normocephalic.      Right Ear: Tympanic membrane normal.      Nose: Nose normal.   Cardiovascular:      Rate and Rhythm: Normal rate and regular rhythm.   Abdominal:      Comments: Distended but patient does have bowel sounds.   Skin:     General: Skin is warm.      Capillary Refill: Capillary refill takes less than 2 seconds.   Neurological:      General: No focal deficit present.      Mental Status: She is alert.         Last Recorded Vitals  Blood pressure 147/81, pulse 105, temperature 36.1 °C (96.9 °F), temperature source Temporal, resp. rate 17, height 1.575 m (5' 2\"), weight 61.7 kg (136 lb), SpO2 99%.  Intake/Output last 3 Shifts:  I/O last 3 completed shifts:  In: 1599 (25.9 mL/kg) [IV Piggyback:1599]  Out: 550 (8.9 mL/kg) [Urine:300 (0.1 mL/kg/hr); Emesis/NG output:250]  Weight: 61.7 kg     Relevant Results  Results for orders placed or performed during the hospital encounter of 08/02/24 (from the past 24 hour(s))   CBC   Result Value Ref Range    WBC 25.9 (H) 4.4 - 11.3 x10*3/uL    nRBC 0.3 (H) 0.0 - 0.0 /100 WBCs    RBC 3.93 (L) 4.00 - 5.20 x10*6/uL    Hemoglobin 10.1 (L) 12.0 - 16.0 g/dL    Hematocrit 35.5 (L) 36.0 - 46.0 %    MCV 90 80 - 100 fL    MCH 25.7 (L) 26.0 - 34.0 pg    MCHC 28.5 (L) 32.0 - 36.0 g/dL    RDW 19.9 (H) 11.5 - 14.5 %    Platelets 686 (H) 150 - 450 x10*3/uL   Basic Metabolic Panel   Result Value Ref Range    Glucose 92 74 - 99 mg/dL    Sodium 136 136 - 145 mmol/L    Potassium 3.7 3.5 - 5.3 mmol/L    Chloride 97 (L) 98 - 107 " mmol/L    Bicarbonate 28 21 - 32 mmol/L    Anion Gap 15 10 - 20 mmol/L    Urea Nitrogen 8 6 - 23 mg/dL    Creatinine 0.91 0.50 - 1.05 mg/dL    eGFR 71 >60 mL/min/1.73m*2    Calcium 9.0 8.6 - 10.3 mg/dL   Lactate   Result Value Ref Range    Lactate 1.7 0.4 - 2.0 mmol/L   Blood Culture    Specimen: Peripheral Venipuncture; Blood culture   Result Value Ref Range    Blood Culture Loaded on Instrument - Culture in progress    Drug Screen, Urine   Result Value Ref Range    Amphetamine Screen, Urine Presumptive Negative Presumptive Negative    Barbiturate Screen, Urine Presumptive Negative Presumptive Negative    Benzodiazepines Screen, Urine Presumptive Negative Presumptive Negative    Cannabinoid Screen, Urine Presumptive Negative Presumptive Negative    Cocaine Metabolite Screen, Urine Presumptive Positive (A) Presumptive Negative    Fentanyl Screen, Urine Presumptive Negative Presumptive Negative    Opiate Screen, Urine Presumptive Positive (A) Presumptive Negative    Oxycodone Screen, Urine Presumptive Negative Presumptive Negative    PCP Screen, Urine Presumptive Negative Presumptive Negative    Methadone Screen, Urine Presumptive Negative Presumptive Negative   Urinalysis with Reflex Microscopic   Result Value Ref Range    Color, Urine Colorless (N) Light-Yellow, Yellow, Dark-Yellow    Appearance, Urine Clear Clear    Specific Gravity, Urine 1.010 1.005 - 1.035    pH, Urine 6.0 5.0, 5.5, 6.0, 6.5, 7.0, 7.5, 8.0    Protein, Urine 30 (1+) (A) NEGATIVE, 10 (TRACE), 20 (TRACE) mg/dL    Glucose, Urine Normal Normal mg/dL    Blood, Urine NEGATIVE NEGATIVE    Ketones, Urine NEGATIVE NEGATIVE mg/dL    Bilirubin, Urine NEGATIVE NEGATIVE    Urobilinogen, Urine Normal Normal mg/dL    Nitrite, Urine NEGATIVE NEGATIVE    Leukocyte Esterase, Urine NEGATIVE NEGATIVE   Microscopic Only, Urine   Result Value Ref Range    WBC, Urine 1-5 1-5, NONE /HPF    RBC, Urine 1-2 NONE, 1-2, 3-5 /HPF    Squamous Epithelial Cells, Urine 1-9  (SPARSE) Reference range not established. /HPF   CBC   Result Value Ref Range    WBC 23.0 (H) 4.4 - 11.3 x10*3/uL    nRBC 0.1 (H) 0.0 - 0.0 /100 WBCs    RBC 3.13 (L) 4.00 - 5.20 x10*6/uL    Hemoglobin 7.9 (L) 12.0 - 16.0 g/dL    Hematocrit 26.2 (L) 36.0 - 46.0 %    MCV 84 80 - 100 fL    MCH 25.2 (L) 26.0 - 34.0 pg    MCHC 30.2 (L) 32.0 - 36.0 g/dL    RDW 19.5 (H) 11.5 - 14.5 %    Platelets 569 (H) 150 - 450 x10*3/uL   Basic metabolic panel   Result Value Ref Range    Glucose 92 74 - 99 mg/dL    Sodium 141 136 - 145 mmol/L    Potassium 3.3 (L) 3.5 - 5.3 mmol/L    Chloride 102 98 - 107 mmol/L    Bicarbonate 26 21 - 32 mmol/L    Anion Gap 16 10 - 20 mmol/L    Urea Nitrogen 13 6 - 23 mg/dL    Creatinine 1.04 0.50 - 1.05 mg/dL    eGFR 61 >60 mL/min/1.73m*2    Calcium 8.0 (L) 8.6 - 10.3 mg/dL       Imaging Results    X ray chest/abdomen:  IMPRESSION:  No acute cardiopulmonary disease.     Medications:  albuterol, 2.5 mg, nebulization, TID  apixaban, 5 mg, oral, BID  budesonide, 0.25 mg, nebulization, BID  busPIRone, 5 mg, oral, BID  calcium carbonate, 1,250 mg, oral, BID  dilTIAZem ER, 240 mg, oral, Daily  formoterol, 20 mcg, nebulization, BID  mirtazapine, 15 mg, oral, Nightly  montelukast, 10 mg, oral, Nightly  oxybutynin, 2.5 mg, oral, BID  pantoprazole, 40 mg, oral, Daily before breakfast  piperacillin-tazobactam, 3.375 g, intravenous, q6h  polyethylene glycol, 17 g, oral, Daily  sennosides-docusate sodium, 1 tablet, oral, BID  sucralfate, 1 g, oral, 4x daily  thiamine, 100 mg, oral, Daily  tiotropium, 2 puff, inhalation, Daily       PRN medications: acetaminophen **OR** acetaminophen **OR** acetaminophen, albuterol, diphenhydrAMINE, morphine, morphine, ondansetron **OR** ondansetron, prochlorperazine **OR** prochlorperazine **OR** prochlorperazine     Assessment/Plan   evidence of wall thickening of small bowel loop in the right lower quadrant at site of anastomosis which may be infectious or inflammatory in etiology  with presentation of abdominal pain  -Per surgery plan on starting the patient on diet, white count has improved slightly down to 23,000     2. H/o DVT  -on eliquis     3. COPD  -continue inhaler and prn duonebs     4. Leukocytosis  -? Infection at site of anastomosis  -continue iv zosyn  -bcx pending    DVT Prophylaxis:  Marcus Landon MD  [facility]

## 2024-08-04 NOTE — PROGRESS NOTES
"Fernando Cuevas is a 63 y.o. female on day 1 of admission presenting with Generalized abdominal pain.    Assessment/Plan   Patient came in with acute abdominal pain and leukocytosis.  CT scan however was fairly unremarkable.  Clinical exam better today.  Films do not show obstruction.  We can probably take her NG tube out and start liquid diet.  Would try MiraLAX and enemas to try to stimulate her bowels.  Surgery to follow progress    Subjective   Complains of pain but has been ambulating around the room without difficulty.  No bowel movement yet.        Objective     Physical Exam  NAD  A&Ox3  Non icteric  CTA  RR  Abdomen soft seems somewhat distended.  Her abdomen is softer than yesterday.  No diffuse peritoneal signs  Extremities warm, well perfused     Last Recorded Vitals  Blood pressure 147/81, pulse 105, temperature 36.1 °C (96.9 °F), temperature source Temporal, resp. rate 17, height 1.575 m (5' 2\"), weight 61.7 kg (136 lb), SpO2 99%.  Intake/Output last 3 Shifts:  I/O last 3 completed shifts:  In: 1599 (25.9 mL/kg) [IV Piggyback:1599]  Out: 550 (8.9 mL/kg) [Urine:300 (0.1 mL/kg/hr); Emesis/NG output:250]  Weight: 61.7 kg     Relevant Results    Scheduled medications  albuterol, 2.5 mg, nebulization, TID  apixaban, 5 mg, oral, BID  budesonide, 0.25 mg, nebulization, BID  busPIRone, 5 mg, oral, BID  calcium carbonate, 1,250 mg, oral, BID  dilTIAZem ER, 240 mg, oral, Daily  formoterol, 20 mcg, nebulization, BID  mirtazapine, 15 mg, oral, Nightly  montelukast, 10 mg, oral, Nightly  oxybutynin, 2.5 mg, oral, BID  pantoprazole, 40 mg, oral, Daily before breakfast  piperacillin-tazobactam, 3.375 g, intravenous, q6h  polyethylene glycol, 17 g, oral, Daily  sennosides-docusate sodium, 1 tablet, oral, BID  sucralfate, 1 g, oral, 4x daily  thiamine, 100 mg, oral, Daily  tiotropium, 2 puff, inhalation, Daily      Continuous medications     PRN medications  PRN medications: acetaminophen **OR** acetaminophen **OR** " acetaminophen, albuterol, diphenhydrAMINE, morphine, morphine, ondansetron **OR** ondansetron, prochlorperazine **OR** prochlorperazine **OR** prochlorperazine    Results for orders placed or performed during the hospital encounter of 08/02/24 (from the past 24 hour(s))   CBC   Result Value Ref Range    WBC 25.9 (H) 4.4 - 11.3 x10*3/uL    nRBC 0.3 (H) 0.0 - 0.0 /100 WBCs    RBC 3.93 (L) 4.00 - 5.20 x10*6/uL    Hemoglobin 10.1 (L) 12.0 - 16.0 g/dL    Hematocrit 35.5 (L) 36.0 - 46.0 %    MCV 90 80 - 100 fL    MCH 25.7 (L) 26.0 - 34.0 pg    MCHC 28.5 (L) 32.0 - 36.0 g/dL    RDW 19.9 (H) 11.5 - 14.5 %    Platelets 686 (H) 150 - 450 x10*3/uL   Basic Metabolic Panel   Result Value Ref Range    Glucose 92 74 - 99 mg/dL    Sodium 136 136 - 145 mmol/L    Potassium 3.7 3.5 - 5.3 mmol/L    Chloride 97 (L) 98 - 107 mmol/L    Bicarbonate 28 21 - 32 mmol/L    Anion Gap 15 10 - 20 mmol/L    Urea Nitrogen 8 6 - 23 mg/dL    Creatinine 0.91 0.50 - 1.05 mg/dL    eGFR 71 >60 mL/min/1.73m*2    Calcium 9.0 8.6 - 10.3 mg/dL   Lactate   Result Value Ref Range    Lactate 1.7 0.4 - 2.0 mmol/L   Blood Culture    Specimen: Peripheral Venipuncture; Blood culture   Result Value Ref Range    Blood Culture Loaded on Instrument - Culture in progress    Drug Screen, Urine   Result Value Ref Range    Amphetamine Screen, Urine Presumptive Negative Presumptive Negative    Barbiturate Screen, Urine Presumptive Negative Presumptive Negative    Benzodiazepines Screen, Urine Presumptive Negative Presumptive Negative    Cannabinoid Screen, Urine Presumptive Negative Presumptive Negative    Cocaine Metabolite Screen, Urine Presumptive Positive (A) Presumptive Negative    Fentanyl Screen, Urine Presumptive Negative Presumptive Negative    Opiate Screen, Urine Presumptive Positive (A) Presumptive Negative    Oxycodone Screen, Urine Presumptive Negative Presumptive Negative    PCP Screen, Urine Presumptive Negative Presumptive Negative    Methadone Screen,  Urine Presumptive Negative Presumptive Negative   Urinalysis with Reflex Microscopic   Result Value Ref Range    Color, Urine Colorless (N) Light-Yellow, Yellow, Dark-Yellow    Appearance, Urine Clear Clear    Specific Gravity, Urine 1.010 1.005 - 1.035    pH, Urine 6.0 5.0, 5.5, 6.0, 6.5, 7.0, 7.5, 8.0    Protein, Urine 30 (1+) (A) NEGATIVE, 10 (TRACE), 20 (TRACE) mg/dL    Glucose, Urine Normal Normal mg/dL    Blood, Urine NEGATIVE NEGATIVE    Ketones, Urine NEGATIVE NEGATIVE mg/dL    Bilirubin, Urine NEGATIVE NEGATIVE    Urobilinogen, Urine Normal Normal mg/dL    Nitrite, Urine NEGATIVE NEGATIVE    Leukocyte Esterase, Urine NEGATIVE NEGATIVE   Microscopic Only, Urine   Result Value Ref Range    WBC, Urine 1-5 1-5, NONE /HPF    RBC, Urine 1-2 NONE, 1-2, 3-5 /HPF    Squamous Epithelial Cells, Urine 1-9 (SPARSE) Reference range not established. /HPF   CBC   Result Value Ref Range    WBC 23.0 (H) 4.4 - 11.3 x10*3/uL    nRBC 0.1 (H) 0.0 - 0.0 /100 WBCs    RBC 3.13 (L) 4.00 - 5.20 x10*6/uL    Hemoglobin 7.9 (L) 12.0 - 16.0 g/dL    Hematocrit 26.2 (L) 36.0 - 46.0 %    MCV 84 80 - 100 fL    MCH 25.2 (L) 26.0 - 34.0 pg    MCHC 30.2 (L) 32.0 - 36.0 g/dL    RDW 19.5 (H) 11.5 - 14.5 %    Platelets 569 (H) 150 - 450 x10*3/uL   Basic metabolic panel   Result Value Ref Range    Glucose 92 74 - 99 mg/dL    Sodium 141 136 - 145 mmol/L    Potassium 3.3 (L) 3.5 - 5.3 mmol/L    Chloride 102 98 - 107 mmol/L    Bicarbonate 26 21 - 32 mmol/L    Anion Gap 16 10 - 20 mmol/L    Urea Nitrogen 13 6 - 23 mg/dL    Creatinine 1.04 0.50 - 1.05 mg/dL    eGFR 61 >60 mL/min/1.73m*2    Calcium 8.0 (L) 8.6 - 10.3 mg/dL           I spent 25 minutes in the professional and overall care of this patient.      Reid Bingham MD

## 2024-08-05 LAB
ANION GAP SERPL CALC-SCNC: 13 MMOL/L (ref 10–20)
ATRIAL RATE: 115 BPM
BASOPHILS # BLD AUTO: 0.03 X10*3/UL (ref 0–0.1)
BASOPHILS NFR BLD AUTO: 0.2 %
BUN SERPL-MCNC: 9 MG/DL (ref 6–23)
CALCIUM SERPL-MCNC: 8.3 MG/DL (ref 8.6–10.3)
CHLORIDE SERPL-SCNC: 102 MMOL/L (ref 98–107)
CO2 SERPL-SCNC: 28 MMOL/L (ref 21–32)
CREAT SERPL-MCNC: 0.98 MG/DL (ref 0.5–1.05)
EGFRCR SERPLBLD CKD-EPI 2021: 65 ML/MIN/1.73M*2
EOSINOPHIL # BLD AUTO: 0.1 X10*3/UL (ref 0–0.7)
EOSINOPHIL NFR BLD AUTO: 0.6 %
ERYTHROCYTE [DISTWIDTH] IN BLOOD BY AUTOMATED COUNT: 19.9 % (ref 11.5–14.5)
GLUCOSE SERPL-MCNC: 99 MG/DL (ref 74–99)
HCT VFR BLD AUTO: 28.2 % (ref 36–46)
HGB BLD-MCNC: 8 G/DL (ref 12–16)
IMM GRANULOCYTES # BLD AUTO: 0.1 X10*3/UL (ref 0–0.7)
IMM GRANULOCYTES NFR BLD AUTO: 0.6 % (ref 0–0.9)
LAB AP ASR DISCLAIMER: NORMAL
LABORATORY COMMENT REPORT: NORMAL
LYMPHOCYTES # BLD AUTO: 2.46 X10*3/UL (ref 1.2–4.8)
LYMPHOCYTES NFR BLD AUTO: 13.9 %
MCH RBC QN AUTO: 25.2 PG (ref 26–34)
MCHC RBC AUTO-ENTMCNC: 28.4 G/DL (ref 32–36)
MCV RBC AUTO: 89 FL (ref 80–100)
MONOCYTES # BLD AUTO: 1.95 X10*3/UL (ref 0.1–1)
MONOCYTES NFR BLD AUTO: 11 %
NEUTROPHILS # BLD AUTO: 13.05 X10*3/UL (ref 1.2–7.7)
NEUTROPHILS NFR BLD AUTO: 73.7 %
NRBC BLD-RTO: 0 /100 WBCS (ref 0–0)
P AXIS: 85 DEGREES
P OFFSET: 209 MS
P ONSET: 166 MS
PATH REPORT.COMMENTS IMP SPEC: NORMAL
PATH REPORT.FINAL DX SPEC: NORMAL
PATH REPORT.GROSS SPEC: NORMAL
PATH REPORT.TOTAL CANCER: NORMAL
PLATELET # BLD AUTO: 516 X10*3/UL (ref 150–450)
POTASSIUM SERPL-SCNC: 3 MMOL/L (ref 3.5–5.3)
PR INTERVAL: 120 MS
Q ONSET: 226 MS
QRS COUNT: 19 BEATS
QRS DURATION: 60 MS
QT INTERVAL: 334 MS
QTC CALCULATION(BAZETT): 462 MS
QTC FREDERICIA: 414 MS
R AXIS: 74 DEGREES
RBC # BLD AUTO: 3.18 X10*6/UL (ref 4–5.2)
SODIUM SERPL-SCNC: 140 MMOL/L (ref 136–145)
T AXIS: 74 DEGREES
T OFFSET: 393 MS
VENTRICULAR RATE: 115 BPM
WBC # BLD AUTO: 17.7 X10*3/UL (ref 4.4–11.3)

## 2024-08-05 PROCEDURE — 2500000004 HC RX 250 GENERAL PHARMACY W/ HCPCS (ALT 636 FOR OP/ED): Performed by: HOSPITALIST

## 2024-08-05 PROCEDURE — 80048 BASIC METABOLIC PNL TOTAL CA: CPT | Performed by: INTERNAL MEDICINE

## 2024-08-05 PROCEDURE — 99233 SBSQ HOSP IP/OBS HIGH 50: CPT | Performed by: INTERNAL MEDICINE

## 2024-08-05 PROCEDURE — 94640 AIRWAY INHALATION TREATMENT: CPT

## 2024-08-05 PROCEDURE — 1100000001 HC PRIVATE ROOM DAILY

## 2024-08-05 PROCEDURE — 99232 SBSQ HOSP IP/OBS MODERATE 35: CPT | Performed by: SURGERY

## 2024-08-05 PROCEDURE — 85025 COMPLETE CBC W/AUTO DIFF WBC: CPT | Performed by: INTERNAL MEDICINE

## 2024-08-05 PROCEDURE — 36415 COLL VENOUS BLD VENIPUNCTURE: CPT | Performed by: INTERNAL MEDICINE

## 2024-08-05 PROCEDURE — 2500000004 HC RX 250 GENERAL PHARMACY W/ HCPCS (ALT 636 FOR OP/ED): Performed by: INTERNAL MEDICINE

## 2024-08-05 PROCEDURE — 2500000002 HC RX 250 W HCPCS SELF ADMINISTERED DRUGS (ALT 637 FOR MEDICARE OP, ALT 636 FOR OP/ED): Performed by: INTERNAL MEDICINE

## 2024-08-05 PROCEDURE — 2500000001 HC RX 250 WO HCPCS SELF ADMINISTERED DRUGS (ALT 637 FOR MEDICARE OP): Performed by: INTERNAL MEDICINE

## 2024-08-05 PROCEDURE — 2500000001 HC RX 250 WO HCPCS SELF ADMINISTERED DRUGS (ALT 637 FOR MEDICARE OP): Performed by: HOSPITALIST

## 2024-08-05 PROCEDURE — 2500000004 HC RX 250 GENERAL PHARMACY W/ HCPCS (ALT 636 FOR OP/ED): Performed by: REGISTERED NURSE

## 2024-08-05 RX ORDER — DIPHENHYDRAMINE HYDROCHLORIDE 50 MG/ML
50 INJECTION INTRAMUSCULAR; INTRAVENOUS ONCE
Status: COMPLETED | OUTPATIENT
Start: 2024-08-05 | End: 2024-08-05

## 2024-08-05 RX ORDER — MORPHINE SULFATE 2 MG/ML
2 INJECTION, SOLUTION INTRAMUSCULAR; INTRAVENOUS EVERY 4 HOURS PRN
Status: DISCONTINUED | OUTPATIENT
Start: 2024-08-05 | End: 2024-08-11 | Stop reason: HOSPADM

## 2024-08-05 RX ORDER — LIDOCAINE HYDROCHLORIDE 10 MG/ML
5 INJECTION INFILTRATION; PERINEURAL ONCE
Status: DISCONTINUED | OUTPATIENT
Start: 2024-08-05 | End: 2024-08-05

## 2024-08-05 RX ORDER — MORPHINE SULFATE 4 MG/ML
4 INJECTION, SOLUTION INTRAMUSCULAR; INTRAVENOUS EVERY 4 HOURS PRN
Status: DISCONTINUED | OUTPATIENT
Start: 2024-08-05 | End: 2024-08-11 | Stop reason: HOSPADM

## 2024-08-05 RX ORDER — AZITHROMYCIN 500 MG/1
250 TABLET, FILM COATED ORAL 3 TIMES WEEKLY
Status: DISCONTINUED | OUTPATIENT
Start: 2024-08-05 | End: 2024-08-11 | Stop reason: HOSPADM

## 2024-08-05 RX ORDER — POTASSIUM CHLORIDE 1.5 G/1.58G
40 POWDER, FOR SOLUTION ORAL ONCE
Status: COMPLETED | OUTPATIENT
Start: 2024-08-05 | End: 2024-08-05

## 2024-08-05 RX ADMIN — TIOTROPIUM BROMIDE INHALATION SPRAY 2 PUFF: 3.12 SPRAY, METERED RESPIRATORY (INHALATION) at 07:52

## 2024-08-05 RX ADMIN — MORPHINE SULFATE 2 MG: 2 INJECTION, SOLUTION INTRAMUSCULAR; INTRAVENOUS at 10:56

## 2024-08-05 RX ADMIN — POTASSIUM CHLORIDE 40 MEQ: 1.5 POWDER, FOR SOLUTION ORAL at 09:39

## 2024-08-05 RX ADMIN — SUCRALFATE 1 G: 1 SUSPENSION ORAL at 16:52

## 2024-08-05 RX ADMIN — MONTELUKAST 10 MG: 10 TABLET, FILM COATED ORAL at 20:03

## 2024-08-05 RX ADMIN — FORMOTEROL FUMARATE DIHYDRATE 20 MCG: 20 SOLUTION RESPIRATORY (INHALATION) at 07:51

## 2024-08-05 RX ADMIN — HYDROCORTISONE SODIUM SUCCINATE 200 MG: 100 INJECTION, POWDER, FOR SOLUTION INTRAMUSCULAR; INTRAVENOUS at 15:27

## 2024-08-05 RX ADMIN — SENNOSIDES AND DOCUSATE SODIUM 1 TABLET: 8.6; 5 TABLET ORAL at 09:39

## 2024-08-05 RX ADMIN — CALCIUM 1250 MG: 500 TABLET ORAL at 16:52

## 2024-08-05 RX ADMIN — POLYETHYLENE GLYCOL 3350 17 G: 17 POWDER, FOR SOLUTION ORAL at 09:39

## 2024-08-05 RX ADMIN — MIRTAZAPINE 15 MG: 15 TABLET, FILM COATED ORAL at 20:03

## 2024-08-05 RX ADMIN — DIPHENHYDRAMINE HYDROCHLORIDE 50 MG: 50 INJECTION, SOLUTION INTRAMUSCULAR; INTRAVENOUS at 10:55

## 2024-08-05 RX ADMIN — ALBUTEROL SULFATE 2.5 MG: 2.5 SOLUTION RESPIRATORY (INHALATION) at 14:30

## 2024-08-05 RX ADMIN — DILTIAZEM HYDROCHLORIDE 240 MG: 120 CAPSULE, COATED, EXTENDED RELEASE ORAL at 09:39

## 2024-08-05 RX ADMIN — PIPERACILLIN SODIUM AND TAZOBACTAM SODIUM 3.38 G: 3; .375 INJECTION, SOLUTION INTRAVENOUS at 05:18

## 2024-08-05 RX ADMIN — OXYBUTYNIN CHLORIDE 2.5 MG: 5 TABLET ORAL at 20:03

## 2024-08-05 RX ADMIN — BUSPIRONE HYDROCHLORIDE 5 MG: 5 TABLET ORAL at 09:39

## 2024-08-05 RX ADMIN — DIPHENHYDRAMINE HYDROCHLORIDE 50 MG: 50 INJECTION, SOLUTION INTRAMUSCULAR; INTRAVENOUS at 23:16

## 2024-08-05 RX ADMIN — HYDROCORTISONE SODIUM SUCCINATE 200 MG: 100 INJECTION, POWDER, FOR SOLUTION INTRAMUSCULAR; INTRAVENOUS at 23:16

## 2024-08-05 RX ADMIN — BUSPIRONE HYDROCHLORIDE 5 MG: 5 TABLET ORAL at 20:03

## 2024-08-05 RX ADMIN — DIPHENHYDRAMINE HYDROCHLORIDE 50 MG: 50 INJECTION, SOLUTION INTRAMUSCULAR; INTRAVENOUS at 16:56

## 2024-08-05 RX ADMIN — ALBUTEROL SULFATE 2.5 MG: 2.5 SOLUTION RESPIRATORY (INHALATION) at 07:51

## 2024-08-05 RX ADMIN — SUCRALFATE 1 G: 1 SUSPENSION ORAL at 20:03

## 2024-08-05 RX ADMIN — OXYBUTYNIN CHLORIDE 2.5 MG: 5 TABLET ORAL at 09:39

## 2024-08-05 RX ADMIN — MORPHINE SULFATE 2 MG: 2 INJECTION, SOLUTION INTRAMUSCULAR; INTRAVENOUS at 03:18

## 2024-08-05 RX ADMIN — PIPERACILLIN SODIUM AND TAZOBACTAM SODIUM 3.38 G: 3; .375 INJECTION, SOLUTION INTRAVENOUS at 10:55

## 2024-08-05 RX ADMIN — APIXABAN 5 MG: 5 TABLET, FILM COATED ORAL at 20:03

## 2024-08-05 RX ADMIN — ALBUTEROL SULFATE 2.5 MG: 2.5 SOLUTION RESPIRATORY (INHALATION) at 19:36

## 2024-08-05 RX ADMIN — BUDESONIDE 0.25 MG: 0.25 INHALANT ORAL at 19:36

## 2024-08-05 RX ADMIN — SENNOSIDES AND DOCUSATE SODIUM 1 TABLET: 8.6; 5 TABLET ORAL at 20:03

## 2024-08-05 RX ADMIN — SUCRALFATE 1 G: 1 SUSPENSION ORAL at 13:02

## 2024-08-05 RX ADMIN — DIPHENHYDRAMINE HYDROCHLORIDE 50 MG: 50 INJECTION, SOLUTION INTRAMUSCULAR; INTRAVENOUS at 03:22

## 2024-08-05 RX ADMIN — Medication 100 MG: at 09:39

## 2024-08-05 RX ADMIN — PIPERACILLIN SODIUM AND TAZOBACTAM SODIUM 3.38 G: 3; .375 INJECTION, SOLUTION INTRAVENOUS at 16:52

## 2024-08-05 RX ADMIN — HYDROCORTISONE SODIUM SUCCINATE 200 MG: 100 INJECTION, POWDER, FOR SOLUTION INTRAMUSCULAR; INTRAVENOUS at 10:55

## 2024-08-05 RX ADMIN — FORMOTEROL FUMARATE DIHYDRATE 20 MCG: 20 SOLUTION RESPIRATORY (INHALATION) at 19:36

## 2024-08-05 RX ADMIN — CALCIUM 1250 MG: 500 TABLET ORAL at 09:39

## 2024-08-05 RX ADMIN — MORPHINE SULFATE 4 MG: 4 INJECTION, SOLUTION INTRAMUSCULAR; INTRAVENOUS at 15:27

## 2024-08-05 RX ADMIN — MORPHINE SULFATE 4 MG: 4 INJECTION, SOLUTION INTRAMUSCULAR; INTRAVENOUS at 20:03

## 2024-08-05 RX ADMIN — BUDESONIDE 0.25 MG: 0.25 INHALANT ORAL at 07:52

## 2024-08-05 RX ADMIN — SUCRALFATE 1 G: 1 SUSPENSION ORAL at 06:36

## 2024-08-05 RX ADMIN — PANTOPRAZOLE SODIUM 40 MG: 40 TABLET, DELAYED RELEASE ORAL at 06:36

## 2024-08-05 RX ADMIN — AZITHROMYCIN 250 MG: 500 TABLET, FILM COATED ORAL at 15:27

## 2024-08-05 RX ADMIN — APIXABAN 5 MG: 5 TABLET, FILM COATED ORAL at 09:39

## 2024-08-05 ASSESSMENT — PAIN SCALES - GENERAL
PAINLEVEL_OUTOF10: 5 - MODERATE PAIN
PAINLEVEL_OUTOF10: 8
PAINLEVEL_OUTOF10: 4
PAINLEVEL_OUTOF10: 10 - WORST POSSIBLE PAIN
PAINLEVEL_OUTOF10: 10 - WORST POSSIBLE PAIN
PAINLEVEL_OUTOF10: 5 - MODERATE PAIN
PAINLEVEL_OUTOF10: 4
PAINLEVEL_OUTOF10: 8

## 2024-08-05 ASSESSMENT — PAIN DESCRIPTION - LOCATION
LOCATION: GENERALIZED
LOCATION: ABDOMEN

## 2024-08-05 ASSESSMENT — ACTIVITIES OF DAILY LIVING (ADL): LACK_OF_TRANSPORTATION: NO

## 2024-08-05 ASSESSMENT — PAIN - FUNCTIONAL ASSESSMENT: PAIN_FUNCTIONAL_ASSESSMENT: 0-10

## 2024-08-05 NOTE — CARE PLAN
Problem: Pain - Adult  Goal: Verbalizes/displays adequate comfort level or baseline comfort level  Outcome: Progressing     Problem: Safety - Adult  Goal: Free from fall injury  Outcome: Progressing     Problem: Discharge Planning  Goal: Discharge to home or other facility with appropriate resources  Outcome: Progressing     Problem: Chronic Conditions and Co-morbidities  Goal: Patient's chronic conditions and co-morbidity symptoms are monitored and maintained or improved  Outcome: Progressing   The patient's goals for the shift include      The clinical goals for the shift include pain control

## 2024-08-05 NOTE — PROGRESS NOTES
08/05/24 0853   Discharge Planning   Living Arrangements Children   Support Systems Children;Family members   Assistance Needed Patient uses a rollator at home and her son or other family members can assist with getting her to appointment.  She does use [facility].   Type of Residence Private residence   Number of Stairs to Enter Residence 3   Number of Stairs Within Residence 0   Do you have animals or pets at home? No   Who is requesting discharge planning? Provider   Home or Post Acute Services In home services   Type of Home Care Services Home OT;Home PT   Expected Discharge Disposition  Services   Does the patient need discharge transport arranged? No   Financial Resource Strain   How hard is it for you to pay for the very basics like food, housing, medical care, and heating? Not hard   Housing Stability   In the last 12 months, was there a time when you were not able to pay the mortgage or rent on time? N   In the past 12 months, how many times have you moved where you were living? 0   At any time in the past 12 months, were you homeless or living in a shelter (including now)? N   Transportation Needs   In the past 12 months, has lack of transportation kept you from medical appointments or from getting medications? no   In the past 12 months, has lack of transportation kept you from meetings, work, or from getting things needed for daily living? No   Patient Choice   Provider Choice list and CMS website (https://medicare.gov/care-compare#search) for post-acute Quality and Resource Measure Data were provided and reviewed with: Patient   Patient / Family choosing to utilize agency / facility established prior to hospitalization Yes     Met with patient at the bedside to discuss discharge plan.  Patient is a re-admit due to abdominal pain.  She plans to return home with homecare if an agency will accept her due to her insurance.  Referral placed to [facility].  [facility] [place]  contact info:  [phone]  F)[phone]  ADOD: 3 days  Lizette Blank RN

## 2024-08-05 NOTE — PROGRESS NOTES
"Fernando Cuevas is a 63 y.o. female on day 2 of admission presenting with Generalized abdominal pain.    Assessment/Plan   Repeating CT angiogram of the abdomen pelvis.  White count down to 17.  Cocaine screen was positive again upon admission.  Ruling out ischemic bowel    Subjective   Feeling more abdominal pain today.  No bowel movement yet.  Passing gas       Objective     Physical Exam  NAD  A&Ox3  Non icteric  CTA  RR  Abdomen soft slightly more distended with diffuse tenderness.  Extremities warm, well perfused     Last Recorded Vitals  Blood pressure 136/65, pulse 99, temperature 36.9 °C (98.4 °F), temperature source Temporal, resp. rate 16, height 1.575 m (5' 2\"), weight 61.7 kg (136 lb), SpO2 93%.  Intake/Output last 3 Shifts:  I/O last 3 completed shifts:  In: - (0 mL/kg)   Out: 2150 (34.9 mL/kg) [Urine:1900 (0.9 mL/kg/hr); Emesis/NG output:250]  Weight: 61.7 kg     Relevant Results    Scheduled medications  albuterol, 2.5 mg, nebulization, TID  apixaban, 5 mg, oral, BID  budesonide, 0.25 mg, nebulization, BID  busPIRone, 5 mg, oral, BID  calcium carbonate, 1,250 mg, oral, BID  dilTIAZem CD, 240 mg, oral, Daily  formoterol, 20 mcg, nebulization, BID  mirtazapine, 15 mg, oral, Nightly  montelukast, 10 mg, oral, Nightly  oxybutynin, 2.5 mg, oral, BID  pantoprazole, 40 mg, oral, Daily before breakfast  piperacillin-tazobactam, 3.375 g, intravenous, q6h  polyethylene glycol, 17 g, oral, Daily  sennosides-docusate sodium, 1 tablet, oral, BID  sucralfate, 1 g, oral, 4x daily  thiamine, 100 mg, oral, Daily  tiotropium, 2 puff, inhalation, Daily      Continuous medications     PRN medications  PRN medications: acetaminophen **OR** acetaminophen **OR** acetaminophen, albuterol, diphenhydrAMINE, morphine, morphine, ondansetron **OR** ondansetron, prochlorperazine **OR** prochlorperazine **OR** prochlorperazine    Results for orders placed or performed during the hospital encounter of 08/02/24 (from the past 24 " hour(s))   CBC   Result Value Ref Range    WBC 23.0 (H) 4.4 - 11.3 x10*3/uL    nRBC 0.1 (H) 0.0 - 0.0 /100 WBCs    RBC 3.13 (L) 4.00 - 5.20 x10*6/uL    Hemoglobin 7.9 (L) 12.0 - 16.0 g/dL    Hematocrit 26.2 (L) 36.0 - 46.0 %    MCV 84 80 - 100 fL    MCH 25.2 (L) 26.0 - 34.0 pg    MCHC 30.2 (L) 32.0 - 36.0 g/dL    RDW 19.5 (H) 11.5 - 14.5 %    Platelets 569 (H) 150 - 450 x10*3/uL   Basic metabolic panel   Result Value Ref Range    Glucose 92 74 - 99 mg/dL    Sodium 141 136 - 145 mmol/L    Potassium 3.3 (L) 3.5 - 5.3 mmol/L    Chloride 102 98 - 107 mmol/L    Bicarbonate 26 21 - 32 mmol/L    Anion Gap 16 10 - 20 mmol/L    Urea Nitrogen 13 6 - 23 mg/dL    Creatinine 1.04 0.50 - 1.05 mg/dL    eGFR 61 >60 mL/min/1.73m*2    Calcium 8.0 (L) 8.6 - 10.3 mg/dL   Basic metabolic panel   Result Value Ref Range    Glucose 99 74 - 99 mg/dL    Sodium 140 136 - 145 mmol/L    Potassium 3.0 (L) 3.5 - 5.3 mmol/L    Chloride 102 98 - 107 mmol/L    Bicarbonate 28 21 - 32 mmol/L    Anion Gap 13 10 - 20 mmol/L    Urea Nitrogen 9 6 - 23 mg/dL    Creatinine 0.98 0.50 - 1.05 mg/dL    eGFR 65 >60 mL/min/1.73m*2    Calcium 8.3 (L) 8.6 - 10.3 mg/dL   CBC and Auto Differential   Result Value Ref Range    WBC 17.7 (H) 4.4 - 11.3 x10*3/uL    nRBC 0.0 0.0 - 0.0 /100 WBCs    RBC 3.18 (L) 4.00 - 5.20 x10*6/uL    Hemoglobin 8.0 (L) 12.0 - 16.0 g/dL    Hematocrit 28.2 (L) 36.0 - 46.0 %    MCV 89 80 - 100 fL    MCH 25.2 (L) 26.0 - 34.0 pg    MCHC 28.4 (L) 32.0 - 36.0 g/dL    RDW 19.9 (H) 11.5 - 14.5 %    Platelets 516 (H) 150 - 450 x10*3/uL    Neutrophils % 73.7 40.0 - 80.0 %    Immature Granulocytes %, Automated 0.6 0.0 - 0.9 %    Lymphocytes % 13.9 13.0 - 44.0 %    Monocytes % 11.0 2.0 - 10.0 %    Eosinophils % 0.6 0.0 - 6.0 %    Basophils % 0.2 0.0 - 2.0 %    Neutrophils Absolute 13.05 (H) 1.20 - 7.70 x10*3/uL    Immature Granulocytes Absolute, Automated 0.10 0.00 - 0.70 x10*3/uL    Lymphocytes Absolute 2.46 1.20 - 4.80 x10*3/uL    Monocytes Absolute  1.95 (H) 0.10 - 1.00 x10*3/uL    Eosinophils Absolute 0.10 0.00 - 0.70 x10*3/uL    Basophils Absolute 0.03 0.00 - 0.10 x10*3/uL           I spent 25 minutes in the professional and overall care of this patient.      Reid Bingham MD

## 2024-08-05 NOTE — PROGRESS NOTES
"Fernando Ceuvas is a 63 y.o. female on day 2 of admission presenting with Generalized abdominal pain.    Subjective   Having significant abdominal pain has not had a bowel movement patient was tearful, increased IV morphine, patient having a CT abdomen pelvis performed today white count did improve down to 17 discussed with surgery more concern for ischemic bowel due to cocaine use.       Objective     Physical Exam  Vitals reviewed.   Constitutional:       Comments: Tearful   HENT:      Head: Normocephalic.      Right Ear: Tympanic membrane normal.      Nose: Nose normal.   Cardiovascular:      Rate and Rhythm: Normal rate and regular rhythm.   Abdominal:      Comments: Distended but patient does have bowel sounds.  significant pain on tenderness   Skin:     General: Skin is warm.      Capillary Refill: Capillary refill takes less than 2 seconds.   Neurological:      General: No focal deficit present.      Mental Status: She is alert.         Last Recorded Vitals  Blood pressure 136/65, pulse 99, temperature 36.9 °C (98.4 °F), temperature source Temporal, resp. rate 16, height 1.575 m (5' 2\"), weight 61.7 kg (136 lb), SpO2 93%.  Intake/Output last 3 Shifts:  I/O last 3 completed shifts:  In: - (0 mL/kg)   Out: 2150 (34.9 mL/kg) [Urine:1900 (0.9 mL/kg/hr); Emesis/NG output:250]  Weight: 61.7 kg     Relevant Results  Results for orders placed or performed during the hospital encounter of 08/02/24 (from the past 24 hour(s))   Basic metabolic panel   Result Value Ref Range    Glucose 99 74 - 99 mg/dL    Sodium 140 136 - 145 mmol/L    Potassium 3.0 (L) 3.5 - 5.3 mmol/L    Chloride 102 98 - 107 mmol/L    Bicarbonate 28 21 - 32 mmol/L    Anion Gap 13 10 - 20 mmol/L    Urea Nitrogen 9 6 - 23 mg/dL    Creatinine 0.98 0.50 - 1.05 mg/dL    eGFR 65 >60 mL/min/1.73m*2    Calcium 8.3 (L) 8.6 - 10.3 mg/dL   CBC and Auto Differential   Result Value Ref Range    WBC 17.7 (H) 4.4 - 11.3 x10*3/uL    nRBC 0.0 0.0 - 0.0 /100 WBCs    RBC " 3.18 (L) 4.00 - 5.20 x10*6/uL    Hemoglobin 8.0 (L) 12.0 - 16.0 g/dL    Hematocrit 28.2 (L) 36.0 - 46.0 %    MCV 89 80 - 100 fL    MCH 25.2 (L) 26.0 - 34.0 pg    MCHC 28.4 (L) 32.0 - 36.0 g/dL    RDW 19.9 (H) 11.5 - 14.5 %    Platelets 516 (H) 150 - 450 x10*3/uL    Neutrophils % 73.7 40.0 - 80.0 %    Immature Granulocytes %, Automated 0.6 0.0 - 0.9 %    Lymphocytes % 13.9 13.0 - 44.0 %    Monocytes % 11.0 2.0 - 10.0 %    Eosinophils % 0.6 0.0 - 6.0 %    Basophils % 0.2 0.0 - 2.0 %    Neutrophils Absolute 13.05 (H) 1.20 - 7.70 x10*3/uL    Immature Granulocytes Absolute, Automated 0.10 0.00 - 0.70 x10*3/uL    Lymphocytes Absolute 2.46 1.20 - 4.80 x10*3/uL    Monocytes Absolute 1.95 (H) 0.10 - 1.00 x10*3/uL    Eosinophils Absolute 0.10 0.00 - 0.70 x10*3/uL    Basophils Absolute 0.03 0.00 - 0.10 x10*3/uL       Imaging Results    X ray chest/abdomen:  IMPRESSION:  No acute cardiopulmonary disease.     Medications:  albuterol, 2.5 mg, nebulization, TID  apixaban, 5 mg, oral, BID  azithromycin, 250 mg, oral, Once per day on Monday Wednesday Friday  budesonide, 0.25 mg, nebulization, BID  busPIRone, 5 mg, oral, BID  calcium carbonate, 1,250 mg, oral, BID  dilTIAZem CD, 240 mg, oral, Daily  diphenhydrAMINE, 50 mg, intravenous, Once  formoterol, 20 mcg, nebulization, BID  hydrocortisone sodium succinate, 200 mg, intravenous, q6h  lidocaine, 5 mL, infiltration, Once  mirtazapine, 15 mg, oral, Nightly  montelukast, 10 mg, oral, Nightly  oxybutynin, 2.5 mg, oral, BID  pantoprazole, 40 mg, oral, Daily before breakfast  piperacillin-tazobactam, 3.375 g, intravenous, q6h  polyethylene glycol, 17 g, oral, Daily  sennosides-docusate sodium, 1 tablet, oral, BID  sucralfate, 1 g, oral, 4x daily  thiamine, 100 mg, oral, Daily  tiotropium, 2 puff, inhalation, Daily       PRN medications: acetaminophen **OR** acetaminophen **OR** acetaminophen, albuterol, diphenhydrAMINE, morphine, morphine, ondansetron **OR** ondansetron,  prochlorperazine **OR** prochlorperazine **OR** prochlorperazine     Assessment/Plan   evidence of wall thickening of small bowel loop in the right lower quadrant at site of anastomosis which may be infectious or inflammatory in etiology with presentation of abdominal pain  -White count slight improvement to 17,000, discussed the case with surgery do not feel that there is anastomosis infection which is difficult to decipher but concern for ischemic bowel therefore patient will have a CT abdomen pelvis     2. H/o DVT  -on eliquis     3. COPD  -continue inhaler and prn duonebs     4. Leukocytosis  -improving more concern for ishcemic bowel  -continue iv zosyn  -bcx pending    DVT Prophylaxis:  Marcus Landon MD  [facility]

## 2024-08-06 ENCOUNTER — APPOINTMENT (OUTPATIENT)
Dept: RADIOLOGY | Facility: HOSPITAL | Age: 64
End: 2024-08-06
Payer: COMMERCIAL

## 2024-08-06 LAB
ANION GAP SERPL CALC-SCNC: 18 MMOL/L (ref 10–20)
BASOPHILS # BLD AUTO: 0.01 X10*3/UL (ref 0–0.1)
BASOPHILS NFR BLD AUTO: 0.1 %
BUN SERPL-MCNC: 8 MG/DL (ref 6–23)
CALCIUM SERPL-MCNC: 8.8 MG/DL (ref 8.6–10.3)
CHLORIDE SERPL-SCNC: 100 MMOL/L (ref 98–107)
CO2 SERPL-SCNC: 27 MMOL/L (ref 21–32)
CREAT SERPL-MCNC: 0.94 MG/DL (ref 0.5–1.05)
EGFRCR SERPLBLD CKD-EPI 2021: 68 ML/MIN/1.73M*2
EOSINOPHIL # BLD AUTO: 0 X10*3/UL (ref 0–0.7)
EOSINOPHIL NFR BLD AUTO: 0 %
ERYTHROCYTE [DISTWIDTH] IN BLOOD BY AUTOMATED COUNT: 19.6 % (ref 11.5–14.5)
GLUCOSE SERPL-MCNC: 130 MG/DL (ref 74–99)
HCT VFR BLD AUTO: 28.3 % (ref 36–46)
HGB BLD-MCNC: 8.5 G/DL (ref 12–16)
IMM GRANULOCYTES # BLD AUTO: 0.08 X10*3/UL (ref 0–0.7)
IMM GRANULOCYTES NFR BLD AUTO: 0.8 % (ref 0–0.9)
LYMPHOCYTES # BLD AUTO: 1.32 X10*3/UL (ref 1.2–4.8)
LYMPHOCYTES NFR BLD AUTO: 12.8 %
MCH RBC QN AUTO: 25.3 PG (ref 26–34)
MCHC RBC AUTO-ENTMCNC: 30 G/DL (ref 32–36)
MCV RBC AUTO: 84 FL (ref 80–100)
MONOCYTES # BLD AUTO: 0.46 X10*3/UL (ref 0.1–1)
MONOCYTES NFR BLD AUTO: 4.5 %
NEUTROPHILS # BLD AUTO: 8.41 X10*3/UL (ref 1.2–7.7)
NEUTROPHILS NFR BLD AUTO: 81.8 %
NRBC BLD-RTO: 0.3 /100 WBCS (ref 0–0)
PLATELET # BLD AUTO: 612 X10*3/UL (ref 150–450)
POTASSIUM SERPL-SCNC: 3.4 MMOL/L (ref 3.5–5.3)
RBC # BLD AUTO: 3.36 X10*6/UL (ref 4–5.2)
SODIUM SERPL-SCNC: 142 MMOL/L (ref 136–145)
WBC # BLD AUTO: 10.3 X10*3/UL (ref 4.4–11.3)

## 2024-08-06 PROCEDURE — 36415 COLL VENOUS BLD VENIPUNCTURE: CPT | Performed by: INTERNAL MEDICINE

## 2024-08-06 PROCEDURE — 2500000004 HC RX 250 GENERAL PHARMACY W/ HCPCS (ALT 636 FOR OP/ED): Performed by: INTERNAL MEDICINE

## 2024-08-06 PROCEDURE — 2500000002 HC RX 250 W HCPCS SELF ADMINISTERED DRUGS (ALT 637 FOR MEDICARE OP, ALT 636 FOR OP/ED): Performed by: INTERNAL MEDICINE

## 2024-08-06 PROCEDURE — 2500000001 HC RX 250 WO HCPCS SELF ADMINISTERED DRUGS (ALT 637 FOR MEDICARE OP): Performed by: HOSPITALIST

## 2024-08-06 PROCEDURE — 99232 SBSQ HOSP IP/OBS MODERATE 35: CPT

## 2024-08-06 PROCEDURE — 94640 AIRWAY INHALATION TREATMENT: CPT

## 2024-08-06 PROCEDURE — 99024 POSTOP FOLLOW-UP VISIT: CPT | Performed by: SURGERY

## 2024-08-06 PROCEDURE — 1100000001 HC PRIVATE ROOM DAILY

## 2024-08-06 PROCEDURE — 2500000004 HC RX 250 GENERAL PHARMACY W/ HCPCS (ALT 636 FOR OP/ED)

## 2024-08-06 PROCEDURE — 99233 SBSQ HOSP IP/OBS HIGH 50: CPT | Performed by: INTERNAL MEDICINE

## 2024-08-06 PROCEDURE — 2550000001 HC RX 255 CONTRASTS: Performed by: INTERNAL MEDICINE

## 2024-08-06 PROCEDURE — 2500000004 HC RX 250 GENERAL PHARMACY W/ HCPCS (ALT 636 FOR OP/ED): Performed by: HOSPITALIST

## 2024-08-06 PROCEDURE — 2500000005 HC RX 250 GENERAL PHARMACY W/O HCPCS: Performed by: INTERNAL MEDICINE

## 2024-08-06 PROCEDURE — 74174 CTA ABD&PLVS W/CONTRAST: CPT | Performed by: RADIOLOGY

## 2024-08-06 PROCEDURE — 80048 BASIC METABOLIC PNL TOTAL CA: CPT | Performed by: INTERNAL MEDICINE

## 2024-08-06 PROCEDURE — 2500000001 HC RX 250 WO HCPCS SELF ADMINISTERED DRUGS (ALT 637 FOR MEDICARE OP): Performed by: INTERNAL MEDICINE

## 2024-08-06 PROCEDURE — 85025 COMPLETE CBC W/AUTO DIFF WBC: CPT | Performed by: INTERNAL MEDICINE

## 2024-08-06 PROCEDURE — 74174 CTA ABD&PLVS W/CONTRAST: CPT

## 2024-08-06 RX ORDER — HYDROXYZINE HYDROCHLORIDE 25 MG/1
25 TABLET, FILM COATED ORAL EVERY 4 HOURS PRN
Status: DISCONTINUED | OUTPATIENT
Start: 2024-08-06 | End: 2024-08-11 | Stop reason: HOSPADM

## 2024-08-06 RX ORDER — HYDROXYZINE HYDROCHLORIDE 25 MG/1
25 TABLET, FILM COATED ORAL EVERY 8 HOURS PRN
Status: DISCONTINUED | OUTPATIENT
Start: 2024-08-06 | End: 2024-08-06

## 2024-08-06 RX ORDER — MICONAZOLE NITRATE 2 %
1 CREAM WITH APPLICATOR VAGINAL NIGHTLY
Status: DISCONTINUED | OUTPATIENT
Start: 2024-08-06 | End: 2024-08-11 | Stop reason: HOSPADM

## 2024-08-06 RX ORDER — POLYETHYLENE GLYCOL 3350 17 G/17G
17 POWDER, FOR SOLUTION ORAL 2 TIMES DAILY
Status: DISCONTINUED | OUTPATIENT
Start: 2024-08-06 | End: 2024-08-11 | Stop reason: HOSPADM

## 2024-08-06 RX ORDER — POTASSIUM CHLORIDE 1.5 G/1.58G
40 POWDER, FOR SOLUTION ORAL ONCE
Status: COMPLETED | OUTPATIENT
Start: 2024-08-06 | End: 2024-08-06

## 2024-08-06 RX ADMIN — BUSPIRONE HYDROCHLORIDE 5 MG: 5 TABLET ORAL at 21:18

## 2024-08-06 RX ADMIN — DIPHENHYDRAMINE HYDROCHLORIDE 50 MG: 50 INJECTION, SOLUTION INTRAMUSCULAR; INTRAVENOUS at 21:18

## 2024-08-06 RX ADMIN — PANTOPRAZOLE SODIUM 40 MG: 40 TABLET, DELAYED RELEASE ORAL at 05:14

## 2024-08-06 RX ADMIN — SUCRALFATE 1 G: 1 SUSPENSION ORAL at 21:18

## 2024-08-06 RX ADMIN — SENNOSIDES AND DOCUSATE SODIUM 1 TABLET: 8.6; 5 TABLET ORAL at 08:40

## 2024-08-06 RX ADMIN — POTASSIUM CHLORIDE 40 MEQ: 1.5 POWDER, FOR SOLUTION ORAL at 11:49

## 2024-08-06 RX ADMIN — SENNOSIDES AND DOCUSATE SODIUM 1 TABLET: 8.6; 5 TABLET ORAL at 21:18

## 2024-08-06 RX ADMIN — FORMOTEROL FUMARATE DIHYDRATE 20 MCG: 20 SOLUTION RESPIRATORY (INHALATION) at 08:00

## 2024-08-06 RX ADMIN — CALCIUM 1250 MG: 500 TABLET ORAL at 17:14

## 2024-08-06 RX ADMIN — MORPHINE SULFATE 2 MG: 2 INJECTION, SOLUTION INTRAMUSCULAR; INTRAVENOUS at 13:27

## 2024-08-06 RX ADMIN — MORPHINE SULFATE 4 MG: 4 INJECTION, SOLUTION INTRAMUSCULAR; INTRAVENOUS at 00:25

## 2024-08-06 RX ADMIN — MIRTAZAPINE 15 MG: 15 TABLET, FILM COATED ORAL at 21:18

## 2024-08-06 RX ADMIN — OXYBUTYNIN CHLORIDE 2.5 MG: 5 TABLET ORAL at 21:17

## 2024-08-06 RX ADMIN — ALBUTEROL SULFATE 2.5 MG: 2.5 SOLUTION RESPIRATORY (INHALATION) at 13:25

## 2024-08-06 RX ADMIN — CALCIUM 1250 MG: 500 TABLET ORAL at 08:40

## 2024-08-06 RX ADMIN — BUSPIRONE HYDROCHLORIDE 5 MG: 5 TABLET ORAL at 08:40

## 2024-08-06 RX ADMIN — APIXABAN 5 MG: 5 TABLET, FILM COATED ORAL at 21:17

## 2024-08-06 RX ADMIN — BUDESONIDE 0.25 MG: 0.25 INHALANT ORAL at 08:00

## 2024-08-06 RX ADMIN — MORPHINE SULFATE 4 MG: 4 INJECTION, SOLUTION INTRAMUSCULAR; INTRAVENOUS at 17:17

## 2024-08-06 RX ADMIN — PIPERACILLIN SODIUM AND TAZOBACTAM SODIUM 3.38 G: 3; .375 INJECTION, SOLUTION INTRAVENOUS at 00:25

## 2024-08-06 RX ADMIN — IOHEXOL 90 ML: 350 INJECTION, SOLUTION INTRAVENOUS at 00:18

## 2024-08-06 RX ADMIN — APIXABAN 5 MG: 5 TABLET, FILM COATED ORAL at 08:40

## 2024-08-06 RX ADMIN — DIPHENHYDRAMINE HYDROCHLORIDE 50 MG: 50 INJECTION, SOLUTION INTRAMUSCULAR; INTRAVENOUS at 15:23

## 2024-08-06 RX ADMIN — HYDROXYZINE HYDROCHLORIDE 25 MG: 25 TABLET ORAL at 08:53

## 2024-08-06 RX ADMIN — PIPERACILLIN SODIUM AND TAZOBACTAM SODIUM 3.38 G: 3; .375 INJECTION, SOLUTION INTRAVENOUS at 23:46

## 2024-08-06 RX ADMIN — HYDROXYZINE HYDROCHLORIDE 25 MG: 25 TABLET ORAL at 17:14

## 2024-08-06 RX ADMIN — MORPHINE SULFATE 4 MG: 4 INJECTION, SOLUTION INTRAMUSCULAR; INTRAVENOUS at 08:42

## 2024-08-06 RX ADMIN — Medication 2 L/MIN: at 13:25

## 2024-08-06 RX ADMIN — Medication 100 MG: at 08:40

## 2024-08-06 RX ADMIN — OXYBUTYNIN CHLORIDE 2.5 MG: 5 TABLET ORAL at 08:40

## 2024-08-06 RX ADMIN — PIPERACILLIN SODIUM AND TAZOBACTAM SODIUM 3.38 G: 3; .375 INJECTION, SOLUTION INTRAVENOUS at 11:49

## 2024-08-06 RX ADMIN — MORPHINE SULFATE 2 MG: 2 INJECTION, SOLUTION INTRAMUSCULAR; INTRAVENOUS at 04:14

## 2024-08-06 RX ADMIN — DILTIAZEM HYDROCHLORIDE 240 MG: 120 CAPSULE, COATED, EXTENDED RELEASE ORAL at 08:41

## 2024-08-06 RX ADMIN — PIPERACILLIN SODIUM AND TAZOBACTAM SODIUM 3.38 G: 3; .375 INJECTION, SOLUTION INTRAVENOUS at 17:14

## 2024-08-06 RX ADMIN — SUCRALFATE 1 G: 1 SUSPENSION ORAL at 11:48

## 2024-08-06 RX ADMIN — SUCRALFATE 1 G: 1 SUSPENSION ORAL at 17:14

## 2024-08-06 RX ADMIN — ALBUTEROL SULFATE 2.5 MG: 2.5 SOLUTION RESPIRATORY (INHALATION) at 19:36

## 2024-08-06 RX ADMIN — MORPHINE SULFATE 4 MG: 4 INJECTION, SOLUTION INTRAMUSCULAR; INTRAVENOUS at 21:18

## 2024-08-06 RX ADMIN — TIOTROPIUM BROMIDE INHALATION SPRAY 2 PUFF: 3.12 SPRAY, METERED RESPIRATORY (INHALATION) at 08:02

## 2024-08-06 RX ADMIN — ALBUTEROL SULFATE 2.5 MG: 2.5 SOLUTION RESPIRATORY (INHALATION) at 08:00

## 2024-08-06 RX ADMIN — Medication 1 APPLICATION: at 21:19

## 2024-08-06 RX ADMIN — BUDESONIDE 0.25 MG: 0.25 INHALANT ORAL at 19:35

## 2024-08-06 RX ADMIN — PIPERACILLIN SODIUM AND TAZOBACTAM SODIUM 3.38 G: 3; .375 INJECTION, SOLUTION INTRAVENOUS at 05:14

## 2024-08-06 RX ADMIN — SUCRALFATE 1 G: 1 SUSPENSION ORAL at 05:14

## 2024-08-06 RX ADMIN — POLYETHYLENE GLYCOL 3350 17 G: 17 POWDER, FOR SOLUTION ORAL at 08:39

## 2024-08-06 RX ADMIN — MONTELUKAST 10 MG: 10 TABLET, FILM COATED ORAL at 21:17

## 2024-08-06 RX ADMIN — HYDROXYZINE HYDROCHLORIDE 25 MG: 25 TABLET ORAL at 23:46

## 2024-08-06 RX ADMIN — POLYETHYLENE GLYCOL 3350 17 G: 17 POWDER, FOR SOLUTION ORAL at 21:18

## 2024-08-06 RX ADMIN — DIPHENHYDRAMINE HYDROCHLORIDE 50 MG: 50 INJECTION, SOLUTION INTRAMUSCULAR; INTRAVENOUS at 05:14

## 2024-08-06 RX ADMIN — FORMOTEROL FUMARATE DIHYDRATE 20 MCG: 20 SOLUTION RESPIRATORY (INHALATION) at 19:36

## 2024-08-06 ASSESSMENT — PAIN DESCRIPTION - LOCATION
LOCATION: ABDOMEN
LOCATION: BACK
LOCATION: ABDOMEN

## 2024-08-06 ASSESSMENT — PAIN SCALES - GENERAL
PAINLEVEL_OUTOF10: 6
PAINLEVEL_OUTOF10: 10 - WORST POSSIBLE PAIN
PAINLEVEL_OUTOF10: 4
PAINLEVEL_OUTOF10: 4
PAINLEVEL_OUTOF10: 2
PAINLEVEL_OUTOF10: 4
PAINLEVEL_OUTOF10: 6
PAINLEVEL_OUTOF10: 9
PAINLEVEL_OUTOF10: 9
PAINLEVEL_OUTOF10: 8
PAINLEVEL_OUTOF10: 10 - WORST POSSIBLE PAIN
PAINLEVEL_OUTOF10: 3

## 2024-08-06 ASSESSMENT — COGNITIVE AND FUNCTIONAL STATUS - GENERAL: MOBILITY SCORE: 24

## 2024-08-06 ASSESSMENT — PAIN - FUNCTIONAL ASSESSMENT
PAIN_FUNCTIONAL_ASSESSMENT: 0-10

## 2024-08-06 NOTE — PROGRESS NOTES
PALLIATIVE CARE SOCIAL WORK NOTE     Received telephone call this morning notifying us that pt has been active with [facility]'s Navigator, community palliative care, program since last year. Navigator program requesting to be notified when she is discharged home. Navigator phone number: [phone]. Thank you.    PRAVEEN Lamb

## 2024-08-06 NOTE — PROGRESS NOTES
"Fernando Cuevas is a 63 y.o. female on day 3 of admission presenting with Generalized abdominal pain.    Subjective     Pain much better abdominal less distended positive flatus no bowel movement yet.  Added more laxatives per surgery.  Improved will read of the CTA abdomen pelvis       Objective     Physical Exam  Vitals reviewed.   Constitutional:       Comments: Tearful   HENT:      Head: Normocephalic.      Right Ear: Tympanic membrane normal.      Nose: Nose normal.   Cardiovascular:      Rate and Rhythm: Normal rate and regular rhythm.   Abdominal:      Comments:  abdomen is still distended with bowel sounds nontender   Skin:     General: Skin is warm.      Capillary Refill: Capillary refill takes less than 2 seconds.   Neurological:      General: No focal deficit present.      Mental Status: She is alert.         Last Recorded Vitals  Blood pressure 169/77, pulse 106, temperature 36.8 °C (98.2 °F), temperature source Oral, resp. rate 16, height 1.575 m (5' 2\"), weight 61.7 kg (136 lb), SpO2 100%.  Intake/Output last 3 Shifts:  I/O last 3 completed shifts:  In: 220 (3.6 mL/kg) [P.O.:120; IV Piggyback:100]  Out: - (0 mL/kg)   Weight: 61.7 kg     Relevant Results  Results for orders placed or performed during the hospital encounter of 08/02/24 (from the past 24 hour(s))   CBC and Auto Differential   Result Value Ref Range    WBC 10.3 4.4 - 11.3 x10*3/uL    nRBC 0.3 (H) 0.0 - 0.0 /100 WBCs    RBC 3.36 (L) 4.00 - 5.20 x10*6/uL    Hemoglobin 8.5 (L) 12.0 - 16.0 g/dL    Hematocrit 28.3 (L) 36.0 - 46.0 %    MCV 84 80 - 100 fL    MCH 25.3 (L) 26.0 - 34.0 pg    MCHC 30.0 (L) 32.0 - 36.0 g/dL    RDW 19.6 (H) 11.5 - 14.5 %    Platelets 612 (H) 150 - 450 x10*3/uL    Neutrophils % 81.8 40.0 - 80.0 %    Immature Granulocytes %, Automated 0.8 0.0 - 0.9 %    Lymphocytes % 12.8 13.0 - 44.0 %    Monocytes % 4.5 2.0 - 10.0 %    Eosinophils % 0.0 0.0 - 6.0 %    Basophils % 0.1 0.0 - 2.0 %    Neutrophils Absolute 8.41 (H) 1.20 - " 7.70 x10*3/uL    Immature Granulocytes Absolute, Automated 0.08 0.00 - 0.70 x10*3/uL    Lymphocytes Absolute 1.32 1.20 - 4.80 x10*3/uL    Monocytes Absolute 0.46 0.10 - 1.00 x10*3/uL    Eosinophils Absolute 0.00 0.00 - 0.70 x10*3/uL    Basophils Absolute 0.01 0.00 - 0.10 x10*3/uL   Basic metabolic panel   Result Value Ref Range    Glucose 130 (H) 74 - 99 mg/dL    Sodium 142 136 - 145 mmol/L    Potassium 3.4 (L) 3.5 - 5.3 mmol/L    Chloride 100 98 - 107 mmol/L    Bicarbonate 27 21 - 32 mmol/L    Anion Gap 18 10 - 20 mmol/L    Urea Nitrogen 8 6 - 23 mg/dL    Creatinine 0.94 0.50 - 1.05 mg/dL    eGFR 68 >60 mL/min/1.73m*2    Calcium 8.8 8.6 - 10.3 mg/dL       Imaging Results    X ray chest/abdomen:  IMPRESSION:  No acute cardiopulmonary disease.     Medications:  albuterol, 2.5 mg, nebulization, TID  apixaban, 5 mg, oral, BID  azithromycin, 250 mg, oral, Once per day on Monday Wednesday Friday  budesonide, 0.25 mg, nebulization, BID  busPIRone, 5 mg, oral, BID  calcium carbonate, 1,250 mg, oral, BID  dilTIAZem CD, 240 mg, oral, Daily  formoterol, 20 mcg, nebulization, BID  miconazole, 1 applicator, vaginal, Nightly  mirtazapine, 15 mg, oral, Nightly  montelukast, 10 mg, oral, Nightly  oxybutynin, 2.5 mg, oral, BID  pantoprazole, 40 mg, oral, Daily before breakfast  piperacillin-tazobactam, 3.375 g, intravenous, q6h  polyethylene glycol, 17 g, oral, BID  sennosides-docusate sodium, 1 tablet, oral, BID  sucralfate, 1 g, oral, 4x daily  thiamine, 100 mg, oral, Daily  tiotropium, 2 puff, inhalation, Daily       PRN medications: acetaminophen **OR** acetaminophen **OR** acetaminophen, albuterol, diphenhydrAMINE, hydrOXYzine HCL, morphine, morphine, ondansetron **OR** ondansetron, oxygen, prochlorperazine **OR** prochlorperazine **OR** prochlorperazine     Assessment/Plan   evidence of wall thickening of small bowel loop in the right lower quadrant at site of anastomosis which may be infectious or inflammatory in etiology  with presentation of abdominal pain  - white count improved continue IV Zosyn, CT of the abdomen pelvis is still pending    2. H/o DVT  -on eliquis     3. COPD  -continue inhaler and prn duonebs     4. Leukocytosis  -improving more concern for ishcemic bowel  -continue iv zosyn  -bcx pending    DVT Prophylaxis:  Marcus Landon MD  [facility]       X Size Of Lesion In Cm (Optional): 0.6

## 2024-08-06 NOTE — PROGRESS NOTES
"Fernando Cuevas is a 63 y.o. female on day 3 of admission presenting with Generalized abdominal pain.    Assessment/Plan   CT angio doesn't show anything like free air.  Await formal rads read.  Adding suppository/miralax.     Subjective   No BM. Pain overall improved.       Objective     Physical Exam  NAD  A&Ox3  Non icteric  CTA  RR  Abdomen soft min tender. Wounds clean, intact  Extremities warm, well perfused     Last Recorded Vitals  Blood pressure 169/77, pulse 106, temperature 36.8 °C (98.2 °F), temperature source Oral, resp. rate 16, height 1.575 m (5' 2\"), weight 61.7 kg (136 lb), SpO2 100%.  Intake/Output last 3 Shifts:  I/O last 3 completed shifts:  In: 220 (3.6 mL/kg) [P.O.:120; IV Piggyback:100]  Out: - (0 mL/kg)   Weight: 61.7 kg     Relevant Results    Scheduled medications  albuterol, 2.5 mg, nebulization, TID  apixaban, 5 mg, oral, BID  azithromycin, 250 mg, oral, Once per day on Monday Wednesday Friday  budesonide, 0.25 mg, nebulization, BID  busPIRone, 5 mg, oral, BID  calcium carbonate, 1,250 mg, oral, BID  dilTIAZem CD, 240 mg, oral, Daily  formoterol, 20 mcg, nebulization, BID  miconazole, 1 applicator, vaginal, Nightly  mirtazapine, 15 mg, oral, Nightly  montelukast, 10 mg, oral, Nightly  oxybutynin, 2.5 mg, oral, BID  pantoprazole, 40 mg, oral, Daily before breakfast  piperacillin-tazobactam, 3.375 g, intravenous, q6h  polyethylene glycol, 17 g, oral, BID  sennosides-docusate sodium, 1 tablet, oral, BID  sucralfate, 1 g, oral, 4x daily  thiamine, 100 mg, oral, Daily  tiotropium, 2 puff, inhalation, Daily      Continuous medications     PRN medications  PRN medications: acetaminophen **OR** acetaminophen **OR** acetaminophen, albuterol, diphenhydrAMINE, hydrOXYzine HCL, morphine, morphine, ondansetron **OR** ondansetron, oxygen, prochlorperazine **OR** prochlorperazine **OR** prochlorperazine    Results for orders placed or performed during the hospital encounter of 08/02/24 (from the past 24 " hour(s))   CBC and Auto Differential   Result Value Ref Range    WBC 10.3 4.4 - 11.3 x10*3/uL    nRBC 0.3 (H) 0.0 - 0.0 /100 WBCs    RBC 3.36 (L) 4.00 - 5.20 x10*6/uL    Hemoglobin 8.5 (L) 12.0 - 16.0 g/dL    Hematocrit 28.3 (L) 36.0 - 46.0 %    MCV 84 80 - 100 fL    MCH 25.3 (L) 26.0 - 34.0 pg    MCHC 30.0 (L) 32.0 - 36.0 g/dL    RDW 19.6 (H) 11.5 - 14.5 %    Platelets 612 (H) 150 - 450 x10*3/uL    Neutrophils % 81.8 40.0 - 80.0 %    Immature Granulocytes %, Automated 0.8 0.0 - 0.9 %    Lymphocytes % 12.8 13.0 - 44.0 %    Monocytes % 4.5 2.0 - 10.0 %    Eosinophils % 0.0 0.0 - 6.0 %    Basophils % 0.1 0.0 - 2.0 %    Neutrophils Absolute 8.41 (H) 1.20 - 7.70 x10*3/uL    Immature Granulocytes Absolute, Automated 0.08 0.00 - 0.70 x10*3/uL    Lymphocytes Absolute 1.32 1.20 - 4.80 x10*3/uL    Monocytes Absolute 0.46 0.10 - 1.00 x10*3/uL    Eosinophils Absolute 0.00 0.00 - 0.70 x10*3/uL    Basophils Absolute 0.01 0.00 - 0.10 x10*3/uL   Basic metabolic panel   Result Value Ref Range    Glucose 130 (H) 74 - 99 mg/dL    Sodium 142 136 - 145 mmol/L    Potassium 3.4 (L) 3.5 - 5.3 mmol/L    Chloride 100 98 - 107 mmol/L    Bicarbonate 27 21 - 32 mmol/L    Anion Gap 18 10 - 20 mmol/L    Urea Nitrogen 8 6 - 23 mg/dL    Creatinine 0.94 0.50 - 1.05 mg/dL    eGFR 68 >60 mL/min/1.73m*2    Calcium 8.8 8.6 - 10.3 mg/dL           I spent 25 minutes in the professional and overall care of this patient.      Reid Bingham MD

## 2024-08-06 NOTE — PROGRESS NOTES
"Fernando Cuevas is a 63 y.o. female on day 3 of admission presenting with Generalized abdominal pain.    Subjective   Patient about the same. No flatus or bm, Tolerating small sips of liquids.    Objective   PE:  Constitutional: A&Ox3, calm and cooperative, NAD  Eyes: PERRL, clear sclera   Head/Neck: Neck supple  Cardiovascular: Normal rate and regular rhythm.  Respiratory/Thorax: Good symmetric chest expansion. No labored breathing.  Gastrointestinal: Abdomen slightly distended, soft, nontender  Genitourinary: Voiding independently   Extremities: No peripheral edema  Neurological: A&Ox3, No focal deficits  Psychological: Appropriate mood and behavior  Skin: Warm and dry    Last Recorded Vitals  Blood pressure 169/77, pulse 106, temperature 36.8 °C (98.2 °F), temperature source Oral, resp. rate 16, height 1.575 m (5' 2\"), weight 61.7 kg (136 lb), SpO2 97%.  Intake/Output last 3 Shifts:  I/O last 3 completed shifts:  In: 220 (3.6 mL/kg) [P.O.:120; IV Piggyback:100]  Out: - (0 mL/kg)   Weight: 61.7 kg     Relevant Results  Scheduled medications  albuterol, 2.5 mg, nebulization, TID  apixaban, 5 mg, oral, BID  azithromycin, 250 mg, oral, Once per day on Monday Wednesday Friday  budesonide, 0.25 mg, nebulization, BID  busPIRone, 5 mg, oral, BID  calcium carbonate, 1,250 mg, oral, BID  dilTIAZem CD, 240 mg, oral, Daily  formoterol, 20 mcg, nebulization, BID  mirtazapine, 15 mg, oral, Nightly  montelukast, 10 mg, oral, Nightly  oxybutynin, 2.5 mg, oral, BID  pantoprazole, 40 mg, oral, Daily before breakfast  piperacillin-tazobactam, 3.375 g, intravenous, q6h  polyethylene glycol, 17 g, oral, BID  sennosides-docusate sodium, 1 tablet, oral, BID  sucralfate, 1 g, oral, 4x daily  thiamine, 100 mg, oral, Daily  tiotropium, 2 puff, inhalation, Daily      Continuous medications     PRN medications  PRN medications: acetaminophen **OR** acetaminophen **OR** acetaminophen, albuterol, diphenhydrAMINE, hydrOXYzine HCL, morphine, " morphine, ondansetron **OR** ondansetron, oxygen, prochlorperazine **OR** prochlorperazine **OR** prochlorperazine    Results for orders placed or performed during the hospital encounter of 08/02/24 (from the past 24 hour(s))   CBC and Auto Differential   Result Value Ref Range    WBC 10.3 4.4 - 11.3 x10*3/uL    nRBC 0.3 (H) 0.0 - 0.0 /100 WBCs    RBC 3.36 (L) 4.00 - 5.20 x10*6/uL    Hemoglobin 8.5 (L) 12.0 - 16.0 g/dL    Hematocrit 28.3 (L) 36.0 - 46.0 %    MCV 84 80 - 100 fL    MCH 25.3 (L) 26.0 - 34.0 pg    MCHC 30.0 (L) 32.0 - 36.0 g/dL    RDW 19.6 (H) 11.5 - 14.5 %    Platelets 612 (H) 150 - 450 x10*3/uL    Neutrophils % 81.8 40.0 - 80.0 %    Immature Granulocytes %, Automated 0.8 0.0 - 0.9 %    Lymphocytes % 12.8 13.0 - 44.0 %    Monocytes % 4.5 2.0 - 10.0 %    Eosinophils % 0.0 0.0 - 6.0 %    Basophils % 0.1 0.0 - 2.0 %    Neutrophils Absolute 8.41 (H) 1.20 - 7.70 x10*3/uL    Immature Granulocytes Absolute, Automated 0.08 0.00 - 0.70 x10*3/uL    Lymphocytes Absolute 1.32 1.20 - 4.80 x10*3/uL    Monocytes Absolute 0.46 0.10 - 1.00 x10*3/uL    Eosinophils Absolute 0.00 0.00 - 0.70 x10*3/uL    Basophils Absolute 0.01 0.00 - 0.10 x10*3/uL   Basic metabolic panel   Result Value Ref Range    Glucose 130 (H) 74 - 99 mg/dL    Sodium 142 136 - 145 mmol/L    Potassium 3.4 (L) 3.5 - 5.3 mmol/L    Chloride 100 98 - 107 mmol/L    Bicarbonate 27 21 - 32 mmol/L    Anion Gap 18 10 - 20 mmol/L    Urea Nitrogen 8 6 - 23 mg/dL    Creatinine 0.94 0.50 - 1.05 mg/dL    eGFR 68 >60 mL/min/1.73m*2    Calcium 8.8 8.6 - 10.3 mg/dL       Assessment/Plan   Principal Problem:    Generalized abdominal pain    No apparent distress. Hemodynamically stable, afebrile. Leukocytosis improving to 10.3, hgb 8.5.    Plan:  - Continue conservative management  - CLD, ADAT  - PRN antiemetic  - OOB/ambulation  - Awaiting CTA results    Surgery to follow.    Discussed with Dr Bingham.    I spent 35 minutes in the professional and overall care of this  patient.    Mahad Boateng PA-C

## 2024-08-07 ENCOUNTER — APPOINTMENT (OUTPATIENT)
Dept: RADIOLOGY | Facility: HOSPITAL | Age: 64
End: 2024-08-07
Payer: COMMERCIAL

## 2024-08-07 ENCOUNTER — TELEPHONE (OUTPATIENT)
Dept: HOME HEALTH SERVICES | Facility: HOME HEALTH | Age: 64
End: 2024-08-07
Payer: COMMERCIAL

## 2024-08-07 ENCOUNTER — DOCUMENTATION (OUTPATIENT)
Dept: HOME HEALTH SERVICES | Facility: HOME HEALTH | Age: 64
End: 2024-08-07
Payer: COMMERCIAL

## 2024-08-07 LAB
ANION GAP SERPL CALC-SCNC: 14 MMOL/L (ref 10–20)
ATRIAL RATE: 93 BPM
BACTERIA BLD CULT: NORMAL
BASOPHILS # BLD AUTO: 0.01 X10*3/UL (ref 0–0.1)
BASOPHILS NFR BLD AUTO: 0.1 %
BUN SERPL-MCNC: 8 MG/DL (ref 6–23)
CALCIUM SERPL-MCNC: 8 MG/DL (ref 8.6–10.3)
CHLORIDE SERPL-SCNC: 102 MMOL/L (ref 98–107)
CO2 SERPL-SCNC: 27 MMOL/L (ref 21–32)
CREAT SERPL-MCNC: 0.89 MG/DL (ref 0.5–1.05)
EGFRCR SERPLBLD CKD-EPI 2021: 73 ML/MIN/1.73M*2
EOSINOPHIL # BLD AUTO: 0.09 X10*3/UL (ref 0–0.7)
EOSINOPHIL NFR BLD AUTO: 0.6 %
ERYTHROCYTE [DISTWIDTH] IN BLOOD BY AUTOMATED COUNT: 20 % (ref 11.5–14.5)
GLUCOSE SERPL-MCNC: 86 MG/DL (ref 74–99)
HCT VFR BLD AUTO: 25.8 % (ref 36–46)
HGB BLD-MCNC: 7.4 G/DL (ref 12–16)
IMM GRANULOCYTES # BLD AUTO: 0.07 X10*3/UL (ref 0–0.7)
IMM GRANULOCYTES NFR BLD AUTO: 0.5 % (ref 0–0.9)
LYMPHOCYTES # BLD AUTO: 2.71 X10*3/UL (ref 1.2–4.8)
LYMPHOCYTES NFR BLD AUTO: 17.7 %
MCH RBC QN AUTO: 25.5 PG (ref 26–34)
MCHC RBC AUTO-ENTMCNC: 28.7 G/DL (ref 32–36)
MCV RBC AUTO: 89 FL (ref 80–100)
MONOCYTES # BLD AUTO: 1.89 X10*3/UL (ref 0.1–1)
MONOCYTES NFR BLD AUTO: 12.4 %
NEUTROPHILS # BLD AUTO: 10.5 X10*3/UL (ref 1.2–7.7)
NEUTROPHILS NFR BLD AUTO: 68.7 %
NRBC BLD-RTO: 0.1 /100 WBCS (ref 0–0)
P AXIS: 86 DEGREES
P OFFSET: 204 MS
P ONSET: 161 MS
PLATELET # BLD AUTO: 531 X10*3/UL (ref 150–450)
POTASSIUM SERPL-SCNC: 2.8 MMOL/L (ref 3.5–5.3)
PR INTERVAL: 132 MS
Q ONSET: 227 MS
QRS COUNT: 15 BEATS
QRS DURATION: 64 MS
QT INTERVAL: 344 MS
QTC CALCULATION(BAZETT): 427 MS
QTC FREDERICIA: 398 MS
R AXIS: 73 DEGREES
RBC # BLD AUTO: 2.9 X10*6/UL (ref 4–5.2)
SODIUM SERPL-SCNC: 140 MMOL/L (ref 136–145)
T AXIS: 62 DEGREES
T OFFSET: 399 MS
VENTRICULAR RATE: 93 BPM
WBC # BLD AUTO: 15.3 X10*3/UL (ref 4.4–11.3)

## 2024-08-07 PROCEDURE — 2500000002 HC RX 250 W HCPCS SELF ADMINISTERED DRUGS (ALT 637 FOR MEDICARE OP, ALT 636 FOR OP/ED): Performed by: INTERNAL MEDICINE

## 2024-08-07 PROCEDURE — 74018 RADEX ABDOMEN 1 VIEW: CPT

## 2024-08-07 PROCEDURE — 2500000001 HC RX 250 WO HCPCS SELF ADMINISTERED DRUGS (ALT 637 FOR MEDICARE OP): Performed by: HOSPITALIST

## 2024-08-07 PROCEDURE — 2500000004 HC RX 250 GENERAL PHARMACY W/ HCPCS (ALT 636 FOR OP/ED): Performed by: INTERNAL MEDICINE

## 2024-08-07 PROCEDURE — 99233 SBSQ HOSP IP/OBS HIGH 50: CPT | Performed by: INTERNAL MEDICINE

## 2024-08-07 PROCEDURE — 74018 RADEX ABDOMEN 1 VIEW: CPT | Performed by: RADIOLOGY

## 2024-08-07 PROCEDURE — 2500000001 HC RX 250 WO HCPCS SELF ADMINISTERED DRUGS (ALT 637 FOR MEDICARE OP): Performed by: INTERNAL MEDICINE

## 2024-08-07 PROCEDURE — 85025 COMPLETE CBC W/AUTO DIFF WBC: CPT | Performed by: INTERNAL MEDICINE

## 2024-08-07 PROCEDURE — 1100000001 HC PRIVATE ROOM DAILY

## 2024-08-07 PROCEDURE — 2500000004 HC RX 250 GENERAL PHARMACY W/ HCPCS (ALT 636 FOR OP/ED): Performed by: HOSPITALIST

## 2024-08-07 PROCEDURE — 94640 AIRWAY INHALATION TREATMENT: CPT

## 2024-08-07 PROCEDURE — 36415 COLL VENOUS BLD VENIPUNCTURE: CPT | Performed by: INTERNAL MEDICINE

## 2024-08-07 PROCEDURE — 80048 BASIC METABOLIC PNL TOTAL CA: CPT | Performed by: INTERNAL MEDICINE

## 2024-08-07 PROCEDURE — 2500000005 HC RX 250 GENERAL PHARMACY W/O HCPCS: Performed by: INTERNAL MEDICINE

## 2024-08-07 PROCEDURE — 99232 SBSQ HOSP IP/OBS MODERATE 35: CPT

## 2024-08-07 PROCEDURE — 2500000001 HC RX 250 WO HCPCS SELF ADMINISTERED DRUGS (ALT 637 FOR MEDICARE OP)

## 2024-08-07 PROCEDURE — 2500000004 HC RX 250 GENERAL PHARMACY W/ HCPCS (ALT 636 FOR OP/ED)

## 2024-08-07 RX ORDER — POTASSIUM CHLORIDE 14.9 MG/ML
20 INJECTION INTRAVENOUS
Status: COMPLETED | OUTPATIENT
Start: 2024-08-07 | End: 2024-08-07

## 2024-08-07 RX ORDER — NYSTATIN 100000 [USP'U]/ML
5 SUSPENSION ORAL 3 TIMES DAILY
Status: DISCONTINUED | OUTPATIENT
Start: 2024-08-07 | End: 2024-08-11 | Stop reason: HOSPADM

## 2024-08-07 RX ORDER — PREDNISONE 20 MG/1
40 TABLET ORAL DAILY
Status: DISCONTINUED | OUTPATIENT
Start: 2024-08-07 | End: 2024-08-09

## 2024-08-07 RX ORDER — BISACODYL 10 MG/1
10 SUPPOSITORY RECTAL ONCE
Status: COMPLETED | OUTPATIENT
Start: 2024-08-07 | End: 2024-08-07

## 2024-08-07 RX ADMIN — PIPERACILLIN SODIUM AND TAZOBACTAM SODIUM 3.38 G: 3; .375 INJECTION, SOLUTION INTRAVENOUS at 23:31

## 2024-08-07 RX ADMIN — NYSTATIN 500000 UNITS: 100000 SUSPENSION ORAL at 21:46

## 2024-08-07 RX ADMIN — CALCIUM 1250 MG: 500 TABLET ORAL at 08:34

## 2024-08-07 RX ADMIN — OXYBUTYNIN CHLORIDE 2.5 MG: 5 TABLET ORAL at 20:39

## 2024-08-07 RX ADMIN — MONTELUKAST 10 MG: 10 TABLET, FILM COATED ORAL at 20:40

## 2024-08-07 RX ADMIN — POTASSIUM CHLORIDE 20 MEQ: 14.9 INJECTION, SOLUTION INTRAVENOUS at 09:24

## 2024-08-07 RX ADMIN — Medication 100 MG: at 08:34

## 2024-08-07 RX ADMIN — CALCIUM 1250 MG: 500 TABLET ORAL at 17:30

## 2024-08-07 RX ADMIN — PREDNISONE 40 MG: 20 TABLET ORAL at 12:53

## 2024-08-07 RX ADMIN — OXYBUTYNIN CHLORIDE 2.5 MG: 5 TABLET ORAL at 08:34

## 2024-08-07 RX ADMIN — BISACODYL 10 MG: 10 SUPPOSITORY RECTAL at 14:47

## 2024-08-07 RX ADMIN — APIXABAN 5 MG: 5 TABLET, FILM COATED ORAL at 08:34

## 2024-08-07 RX ADMIN — HYDROXYZINE HYDROCHLORIDE 25 MG: 25 TABLET ORAL at 18:54

## 2024-08-07 RX ADMIN — SUCRALFATE 1 G: 1 SUSPENSION ORAL at 20:39

## 2024-08-07 RX ADMIN — MIRTAZAPINE 15 MG: 15 TABLET, FILM COATED ORAL at 20:40

## 2024-08-07 RX ADMIN — Medication 2 L/MIN: at 19:53

## 2024-08-07 RX ADMIN — SUCRALFATE 1 G: 1 SUSPENSION ORAL at 17:30

## 2024-08-07 RX ADMIN — ALBUTEROL SULFATE 2.5 MG: 2.5 SOLUTION RESPIRATORY (INHALATION) at 00:39

## 2024-08-07 RX ADMIN — SUCRALFATE 1 G: 1 SUSPENSION ORAL at 06:02

## 2024-08-07 RX ADMIN — BUSPIRONE HYDROCHLORIDE 5 MG: 5 TABLET ORAL at 20:39

## 2024-08-07 RX ADMIN — HYDROXYZINE HYDROCHLORIDE 25 MG: 25 TABLET ORAL at 08:40

## 2024-08-07 RX ADMIN — AZITHROMYCIN 250 MG: 500 TABLET, FILM COATED ORAL at 08:34

## 2024-08-07 RX ADMIN — ALBUTEROL SULFATE 2.5 MG: 2.5 SOLUTION RESPIRATORY (INHALATION) at 19:53

## 2024-08-07 RX ADMIN — ALBUTEROL SULFATE 2.5 MG: 2.5 SOLUTION RESPIRATORY (INHALATION) at 14:23

## 2024-08-07 RX ADMIN — Medication 1 APPLICATION: at 21:47

## 2024-08-07 RX ADMIN — MORPHINE SULFATE 4 MG: 4 INJECTION, SOLUTION INTRAMUSCULAR; INTRAVENOUS at 01:19

## 2024-08-07 RX ADMIN — POTASSIUM CHLORIDE 20 MEQ: 14.9 INJECTION, SOLUTION INTRAVENOUS at 11:34

## 2024-08-07 RX ADMIN — MORPHINE SULFATE 4 MG: 4 INJECTION, SOLUTION INTRAMUSCULAR; INTRAVENOUS at 11:42

## 2024-08-07 RX ADMIN — DIPHENHYDRAMINE HYDROCHLORIDE 50 MG: 50 INJECTION, SOLUTION INTRAMUSCULAR; INTRAVENOUS at 06:03

## 2024-08-07 RX ADMIN — PIPERACILLIN SODIUM AND TAZOBACTAM SODIUM 3.38 G: 3; .375 INJECTION, SOLUTION INTRAVENOUS at 11:43

## 2024-08-07 RX ADMIN — BUDESONIDE 0.25 MG: 0.25 INHALANT ORAL at 19:53

## 2024-08-07 RX ADMIN — MORPHINE SULFATE 4 MG: 4 INJECTION, SOLUTION INTRAMUSCULAR; INTRAVENOUS at 21:49

## 2024-08-07 RX ADMIN — DIPHENHYDRAMINE HYDROCHLORIDE 50 MG: 50 INJECTION, SOLUTION INTRAMUSCULAR; INTRAVENOUS at 14:28

## 2024-08-07 RX ADMIN — POLYETHYLENE GLYCOL 3350 17 G: 17 POWDER, FOR SOLUTION ORAL at 08:34

## 2024-08-07 RX ADMIN — SENNOSIDES AND DOCUSATE SODIUM 1 TABLET: 8.6; 5 TABLET ORAL at 08:34

## 2024-08-07 RX ADMIN — SUCRALFATE 1 G: 1 SUSPENSION ORAL at 11:45

## 2024-08-07 RX ADMIN — DILTIAZEM HYDROCHLORIDE 240 MG: 120 CAPSULE, COATED, EXTENDED RELEASE ORAL at 08:34

## 2024-08-07 RX ADMIN — DIPHENHYDRAMINE HYDROCHLORIDE 50 MG: 50 INJECTION, SOLUTION INTRAMUSCULAR; INTRAVENOUS at 20:40

## 2024-08-07 RX ADMIN — PIPERACILLIN SODIUM AND TAZOBACTAM SODIUM 3.38 G: 3; .375 INJECTION, SOLUTION INTRAVENOUS at 06:05

## 2024-08-07 RX ADMIN — PANTOPRAZOLE SODIUM 40 MG: 40 TABLET, DELAYED RELEASE ORAL at 06:02

## 2024-08-07 RX ADMIN — MORPHINE SULFATE 4 MG: 4 INJECTION, SOLUTION INTRAMUSCULAR; INTRAVENOUS at 17:30

## 2024-08-07 RX ADMIN — BUSPIRONE HYDROCHLORIDE 5 MG: 5 TABLET ORAL at 08:34

## 2024-08-07 RX ADMIN — POTASSIUM CHLORIDE 20 MEQ: 14.9 INJECTION, SOLUTION INTRAVENOUS at 14:29

## 2024-08-07 RX ADMIN — SENNOSIDES AND DOCUSATE SODIUM 1 TABLET: 8.6; 5 TABLET ORAL at 20:40

## 2024-08-07 RX ADMIN — MORPHINE SULFATE 4 MG: 4 INJECTION, SOLUTION INTRAMUSCULAR; INTRAVENOUS at 06:03

## 2024-08-07 RX ADMIN — APIXABAN 5 MG: 5 TABLET, FILM COATED ORAL at 20:39

## 2024-08-07 RX ADMIN — FORMOTEROL FUMARATE DIHYDRATE 20 MCG: 20 SOLUTION RESPIRATORY (INHALATION) at 19:53

## 2024-08-07 RX ADMIN — POLYETHYLENE GLYCOL 3350 17 G: 17 POWDER, FOR SOLUTION ORAL at 20:39

## 2024-08-07 RX ADMIN — PIPERACILLIN SODIUM AND TAZOBACTAM SODIUM 3.38 G: 3; .375 INJECTION, SOLUTION INTRAVENOUS at 17:30

## 2024-08-07 ASSESSMENT — COGNITIVE AND FUNCTIONAL STATUS - GENERAL
DAILY ACTIVITIY SCORE: 23
TOILETING: A LITTLE
DAILY ACTIVITIY SCORE: 23
TOILETING: A LITTLE
MOBILITY SCORE: 24
MOBILITY SCORE: 23
DAILY ACTIVITIY SCORE: 23
MOBILITY SCORE: 24
MOBILITY SCORE: 24
DAILY ACTIVITIY SCORE: 23
MOBILITY SCORE: 24
TOILETING: A LITTLE
TOILETING: A LITTLE
MOBILITY SCORE: 24
CLIMB 3 TO 5 STEPS WITH RAILING: A LITTLE
TOILETING: A LITTLE
DAILY ACTIVITIY SCORE: 23
DAILY ACTIVITIY SCORE: 23
TOILETING: A LITTLE
MOBILITY SCORE: 24
DAILY ACTIVITIY SCORE: 23
TOILETING: A LITTLE

## 2024-08-07 ASSESSMENT — PAIN SCALES - GENERAL
PAINLEVEL_OUTOF10: 0 - NO PAIN
PAINLEVEL_OUTOF10: 9
PAINLEVEL_OUTOF10: 10 - WORST POSSIBLE PAIN
PAINLEVEL_OUTOF10: 6
PAINLEVEL_OUTOF10: 0 - NO PAIN
PAINLEVEL_OUTOF10: 0 - NO PAIN
PAINLEVEL_OUTOF10: 9
PAINLEVEL_OUTOF10: 8

## 2024-08-07 ASSESSMENT — PAIN - FUNCTIONAL ASSESSMENT
PAIN_FUNCTIONAL_ASSESSMENT: 0-10

## 2024-08-07 ASSESSMENT — PAIN DESCRIPTION - LOCATION
LOCATION: ABDOMEN

## 2024-08-07 ASSESSMENT — PAIN SCALES - WONG BAKER
WONGBAKER_NUMERICALRESPONSE: HURTS WHOLE LOT
WONGBAKER_NUMERICALRESPONSE: HURTS WHOLE LOT

## 2024-08-07 NOTE — PROGRESS NOTES
"Fernando Cuevas is a 63 y.o. female on day 4 of admission presenting with Generalized abdominal pain.    Subjective   Patient without much progress. No flatus or bm, Tolerating small sips of liquids.    Objective   PE:  Constitutional: A&Ox3, calm and cooperative, NAD  Eyes: PERRL, clear sclera   Head/Neck: Neck supple  Cardiovascular: Normal rate and regular rhythm.  Respiratory/Thorax: Good symmetric chest expansion. No labored breathing.  Gastrointestinal: Abdomen slightly distended, soft, nontender  Genitourinary: Voiding independently   Extremities: No peripheral edema  Neurological: A&Ox3, No focal deficits  Psychological: Appropriate mood and behavior  Skin: Warm and dry    Last Recorded Vitals  Blood pressure 135/85, pulse 101, temperature 36.4 °C (97.5 °F), temperature source Temporal, resp. rate 16, height 1.575 m (5' 2\"), weight 61.7 kg (136 lb), SpO2 99%.  Intake/Output last 3 Shifts:  I/O last 3 completed shifts:  In: 340 (5.5 mL/kg) [P.O.:240; IV Piggyback:100]  Out: - (0 mL/kg)   Weight: 61.7 kg     Relevant Results  Scheduled medications  albuterol, 2.5 mg, nebulization, TID  apixaban, 5 mg, oral, BID  azithromycin, 250 mg, oral, Once per day on Monday Wednesday Friday  budesonide, 0.25 mg, nebulization, BID  busPIRone, 5 mg, oral, BID  calcium carbonate, 1,250 mg, oral, BID  dilTIAZem CD, 240 mg, oral, Daily  formoterol, 20 mcg, nebulization, BID  miconazole, 1 applicator, vaginal, Nightly  mirtazapine, 15 mg, oral, Nightly  montelukast, 10 mg, oral, Nightly  oxybutynin, 2.5 mg, oral, BID  pantoprazole, 40 mg, oral, Daily before breakfast  piperacillin-tazobactam, 3.375 g, intravenous, q6h  polyethylene glycol, 17 g, oral, BID  potassium chloride, 20 mEq, intravenous, q2h  predniSONE, 40 mg, oral, Daily  sennosides-docusate sodium, 1 tablet, oral, BID  sucralfate, 1 g, oral, 4x daily  thiamine, 100 mg, oral, Daily  tiotropium, 2 puff, inhalation, Daily      Continuous medications     PRN " medications  PRN medications: acetaminophen **OR** acetaminophen **OR** acetaminophen, albuterol, diphenhydrAMINE, hydrOXYzine HCL, morphine, morphine, ondansetron **OR** ondansetron, oxygen, prochlorperazine **OR** prochlorperazine **OR** prochlorperazine    Results for orders placed or performed during the hospital encounter of 08/02/24 (from the past 24 hour(s))   CBC and Auto Differential   Result Value Ref Range    WBC 15.3 (H) 4.4 - 11.3 x10*3/uL    nRBC 0.1 (H) 0.0 - 0.0 /100 WBCs    RBC 2.90 (L) 4.00 - 5.20 x10*6/uL    Hemoglobin 7.4 (L) 12.0 - 16.0 g/dL    Hematocrit 25.8 (L) 36.0 - 46.0 %    MCV 89 80 - 100 fL    MCH 25.5 (L) 26.0 - 34.0 pg    MCHC 28.7 (L) 32.0 - 36.0 g/dL    RDW 20.0 (H) 11.5 - 14.5 %    Platelets 531 (H) 150 - 450 x10*3/uL    Neutrophils % 68.7 40.0 - 80.0 %    Immature Granulocytes %, Automated 0.5 0.0 - 0.9 %    Lymphocytes % 17.7 13.0 - 44.0 %    Monocytes % 12.4 2.0 - 10.0 %    Eosinophils % 0.6 0.0 - 6.0 %    Basophils % 0.1 0.0 - 2.0 %    Neutrophils Absolute 10.50 (H) 1.20 - 7.70 x10*3/uL    Immature Granulocytes Absolute, Automated 0.07 0.00 - 0.70 x10*3/uL    Lymphocytes Absolute 2.71 1.20 - 4.80 x10*3/uL    Monocytes Absolute 1.89 (H) 0.10 - 1.00 x10*3/uL    Eosinophils Absolute 0.09 0.00 - 0.70 x10*3/uL    Basophils Absolute 0.01 0.00 - 0.10 x10*3/uL   Basic metabolic panel   Result Value Ref Range    Glucose 86 74 - 99 mg/dL    Sodium 140 136 - 145 mmol/L    Potassium 2.8 (LL) 3.5 - 5.3 mmol/L    Chloride 102 98 - 107 mmol/L    Bicarbonate 27 21 - 32 mmol/L    Anion Gap 14 10 - 20 mmol/L    Urea Nitrogen 8 6 - 23 mg/dL    Creatinine 0.89 0.50 - 1.05 mg/dL    eGFR 73 >60 mL/min/1.73m*2    Calcium 8.0 (L) 8.6 - 10.3 mg/dL       Assessment/Plan   Principal Problem:    Generalized abdominal pain    No apparent distress. Hemodynamically stable, afebrile. WBC 15.3, hgb 7.4 from 8.5.    Plan:  - Continue conservative management  - CLD, ADAT  - PRN antiemetic  - OOB/ambulation  -  Awaiting CTA results  - Can try suppository today    Surgery to follow.    Discussed with Dr Bingham.    I spent 35 minutes in the professional and overall care of this patient.    Mahad Boateng PA-C

## 2024-08-07 NOTE — PROGRESS NOTES
"Fernando Cuevas is a 63 y.o. female on day 4 of admission presenting with Generalized abdominal pain.    Subjective   CTA abdomen pelvis pending patient states this morning she started experiencing dyspnea has severe COPD added oral prednisone.  Patient's white count has gone up to 15,000.  Was tachycardic but may have been secondary to the dyspnea.  Will discuss the case with surgery       Objective     Physical Exam  Vitals reviewed.   Constitutional:       Appearance: Normal appearance.   HENT:      Head: Normocephalic.      Right Ear: Tympanic membrane normal.      Nose: Nose normal.   Cardiovascular:      Rate and Rhythm: Normal rate and regular rhythm.   Pulmonary:      Breath sounds: Normal breath sounds.      Comments: Diminished but clear no wheezing appreciated  Abdominal:      Comments:  abdomen is still distended with bowel sounds nontender   Skin:     General: Skin is warm.      Capillary Refill: Capillary refill takes less than 2 seconds.   Neurological:      General: No focal deficit present.      Mental Status: She is alert.         Last Recorded Vitals  Blood pressure 135/85, pulse 101, temperature 36.4 °C (97.5 °F), temperature source Temporal, resp. rate 16, height 1.575 m (5' 2\"), weight 61.7 kg (136 lb), SpO2 99%.  Intake/Output last 3 Shifts:  I/O last 3 completed shifts:  In: 340 (5.5 mL/kg) [P.O.:240; IV Piggyback:100]  Out: - (0 mL/kg)   Weight: 61.7 kg     Relevant Results  Results for orders placed or performed during the hospital encounter of 08/02/24 (from the past 24 hour(s))   CBC and Auto Differential   Result Value Ref Range    WBC 15.3 (H) 4.4 - 11.3 x10*3/uL    nRBC 0.1 (H) 0.0 - 0.0 /100 WBCs    RBC 2.90 (L) 4.00 - 5.20 x10*6/uL    Hemoglobin 7.4 (L) 12.0 - 16.0 g/dL    Hematocrit 25.8 (L) 36.0 - 46.0 %    MCV 89 80 - 100 fL    MCH 25.5 (L) 26.0 - 34.0 pg    MCHC 28.7 (L) 32.0 - 36.0 g/dL    RDW 20.0 (H) 11.5 - 14.5 %    Platelets 531 (H) 150 - 450 x10*3/uL    Neutrophils % 68.7 " 40.0 - 80.0 %    Immature Granulocytes %, Automated 0.5 0.0 - 0.9 %    Lymphocytes % 17.7 13.0 - 44.0 %    Monocytes % 12.4 2.0 - 10.0 %    Eosinophils % 0.6 0.0 - 6.0 %    Basophils % 0.1 0.0 - 2.0 %    Neutrophils Absolute 10.50 (H) 1.20 - 7.70 x10*3/uL    Immature Granulocytes Absolute, Automated 0.07 0.00 - 0.70 x10*3/uL    Lymphocytes Absolute 2.71 1.20 - 4.80 x10*3/uL    Monocytes Absolute 1.89 (H) 0.10 - 1.00 x10*3/uL    Eosinophils Absolute 0.09 0.00 - 0.70 x10*3/uL    Basophils Absolute 0.01 0.00 - 0.10 x10*3/uL   Basic metabolic panel   Result Value Ref Range    Glucose 86 74 - 99 mg/dL    Sodium 140 136 - 145 mmol/L    Potassium 2.8 (LL) 3.5 - 5.3 mmol/L    Chloride 102 98 - 107 mmol/L    Bicarbonate 27 21 - 32 mmol/L    Anion Gap 14 10 - 20 mmol/L    Urea Nitrogen 8 6 - 23 mg/dL    Creatinine 0.89 0.50 - 1.05 mg/dL    eGFR 73 >60 mL/min/1.73m*2    Calcium 8.0 (L) 8.6 - 10.3 mg/dL       Imaging Results    X ray chest/abdomen:  IMPRESSION:  No acute cardiopulmonary disease.     Cta abd/pelvis:  pending    Medications:  albuterol, 2.5 mg, nebulization, TID  apixaban, 5 mg, oral, BID  azithromycin, 250 mg, oral, Once per day on Monday Wednesday Friday  budesonide, 0.25 mg, nebulization, BID  busPIRone, 5 mg, oral, BID  calcium carbonate, 1,250 mg, oral, BID  dilTIAZem CD, 240 mg, oral, Daily  formoterol, 20 mcg, nebulization, BID  miconazole, 1 applicator, vaginal, Nightly  mirtazapine, 15 mg, oral, Nightly  montelukast, 10 mg, oral, Nightly  oxybutynin, 2.5 mg, oral, BID  pantoprazole, 40 mg, oral, Daily before breakfast  piperacillin-tazobactam, 3.375 g, intravenous, q6h  polyethylene glycol, 17 g, oral, BID  potassium chloride, 20 mEq, intravenous, q2h  predniSONE, 40 mg, oral, Daily  sennosides-docusate sodium, 1 tablet, oral, BID  sucralfate, 1 g, oral, 4x daily  thiamine, 100 mg, oral, Daily  tiotropium, 2 puff, inhalation, Daily       PRN medications: acetaminophen **OR** acetaminophen **OR**  acetaminophen, albuterol, diphenhydrAMINE, hydrOXYzine HCL, morphine, morphine, ondansetron **OR** ondansetron, oxygen, prochlorperazine **OR** prochlorperazine **OR** prochlorperazine     Assessment/Plan   evidence of wall thickening of small bowel loop in the right lower quadrant at site of anastomosis which may be infectious or inflammatory in etiology with presentation of abdominal pain  - white count increased again continue IV Zosyn, CTA abd/pelvis with similar results  -abd x ray    2. H/o DVT  -on eliquis     3. COPD  -continue inhaler and prn duonebs     4. Leukocytosis  -worsening again  -continue iv zosyn  -bcx pending    5. COPD  -added prednisone   DVT Prophylaxis:  Marcus Landon MD  [facility]

## 2024-08-07 NOTE — PROGRESS NOTES
Fernando Cuevas is a 63 y.o. female on day 4 of admission presenting with Generalized abdominal pain.    Plan for patient to return home with [facility] and healthy at home will help with home care set up and following. Relayed information to DR Landon to change orders. Awaiting surgical clearance and lab work pending.     08/07/24 0925   Discharge Planning   Expected Discharge Disposition  Services  ([facility])         Myrtle Mejia RN

## 2024-08-07 NOTE — CARE PLAN
Problem: Pain - Adult  Goal: Verbalizes/displays adequate comfort level or baseline comfort level  Outcome: Progressing     Problem: Safety - Adult  Goal: Free from fall injury  Outcome: Progressing     Problem: Discharge Planning  Goal: Discharge to home or other facility with appropriate resources  Outcome: Progressing     Problem: Chronic Conditions and Co-morbidities  Goal: Patient's chronic conditions and co-morbidity symptoms are monitored and maintained or improved  Outcome: Progressing   The patient's goals for the shift include      The clinical goals for the shift include VSS, safety, pain control, monitor I&Os, ambulation    A&Ox4, able to communicate needs, reports pain 10/10, PRN Morphine administered. Patient also reports itching, but Benadryl and Atarax not effective. Message sent to on call physician. Patient up ad redd. Call light in reach, no acute distress noted.

## 2024-08-07 NOTE — CARE PLAN
Problem: Pain - Adult  Goal: Verbalizes/displays adequate comfort level or baseline comfort level  Outcome: Progressing  Flowsheets (Taken 8/7/2024 1956)  Verbalizes/displays adequate comfort level or baseline comfort level:   Notify Licensed Independent Practitioner if interventions unsuccessful or patient reports new pain   Implement non-pharmacological measures as appropriate and evaluate response   Administer analgesics based on type and severity of pain and evaluate response   Assess pain using appropriate pain scale   Encourage patient to monitor pain and request assistance     Problem: Safety - Adult  Goal: Free from fall injury  Outcome: Progressing  Flowsheets (Taken 8/7/2024 1956)  Free from fall injury:   Instruct family/caregiver on patient safety   Based on caregiver fall risk screen, instruct family/caregiver to ask for assistance with transferring infant if caregiver noted to have fall risk factors     Problem: Discharge Planning  Goal: Discharge to home or other facility with appropriate resources  Outcome: Progressing  Flowsheets (Taken 8/7/2024 1956)  Discharge to home or other facility with appropriate resources:   Refer to discharge planning if patient needs post-hospital services based on physician order or complex needs related to functional status, cognitive ability or social support system   Identify discharge learning needs (meds, wound care, etc)   Arrange for needed discharge resources and transportation as appropriate   Identify barriers to discharge with patient and caregiver     Problem: Chronic Conditions and Co-morbidities  Goal: Patient's chronic conditions and co-morbidity symptoms are monitored and maintained or improved  Outcome: Progressing  Flowsheets (Taken 8/7/2024 1956)  Care Plan - Patient's Chronic Conditions and Co-Morbidity Symptoms are Monitored and Maintained or Improved:   Update acute care plan with appropriate goals if chronic or comorbid symptoms are exacerbated  and prevent overall improvement and discharge   Collaborate with multidisciplinary team to address chronic and comorbid conditions and prevent exacerbation or deterioration   Monitor and assess patient's chronic conditions and comorbid symptoms for stability, deterioration, or improvement   The patient's goals for the shift include  Pt concerned with itching/thrush     The clinical goals for the shift include Patient will remain comfortable throughout the shift

## 2024-08-07 NOTE — NURSING NOTE
Patient reports itching all over, states Benadryl not effective. Message sent to Dr. Hernández to request something else, waiting response.

## 2024-08-07 NOTE — TELEPHONE ENCOUNTER
This referral has been made a Non Admit with  Home Care due to Inability to accommodate the patient's needs in a safe and timely manner. If you have further questions, feel free to reach out to our office at [phone]. Thank you, [facility]ake.

## 2024-08-07 NOTE — CARE PLAN
The patient's goals for the shift include      The clinical goals for the shift include Patient will remain comfortable throughout the shift    Over the shift, the patient did not make progress toward the following goals. Barriers to progression include epigastric pain. Recommendations to address these barriers include pain meds as needed and fluids.

## 2024-08-08 ENCOUNTER — APPOINTMENT (OUTPATIENT)
Dept: CARDIOLOGY | Facility: HOSPITAL | Age: 64
End: 2024-08-08
Payer: COMMERCIAL

## 2024-08-08 ENCOUNTER — APPOINTMENT (OUTPATIENT)
Dept: RADIOLOGY | Facility: HOSPITAL | Age: 64
End: 2024-08-08
Payer: COMMERCIAL

## 2024-08-08 LAB
ACID FAST STN SPEC: NORMAL
ANION GAP SERPL CALC-SCNC: 17 MMOL/L (ref 10–20)
BASOPHILS # BLD AUTO: 0.02 X10*3/UL (ref 0–0.1)
BASOPHILS NFR BLD AUTO: 0.1 %
BUN SERPL-MCNC: 10 MG/DL (ref 6–23)
CALCIUM SERPL-MCNC: 8.4 MG/DL (ref 8.6–10.3)
CHLORIDE SERPL-SCNC: 100 MMOL/L (ref 98–107)
CO2 SERPL-SCNC: 26 MMOL/L (ref 21–32)
CREAT SERPL-MCNC: 1.03 MG/DL (ref 0.5–1.05)
EGFRCR SERPLBLD CKD-EPI 2021: 61 ML/MIN/1.73M*2
EOSINOPHIL # BLD AUTO: 0.18 X10*3/UL (ref 0–0.7)
EOSINOPHIL NFR BLD AUTO: 1.1 %
ERYTHROCYTE [DISTWIDTH] IN BLOOD BY AUTOMATED COUNT: 20.2 % (ref 11.5–14.5)
GLUCOSE SERPL-MCNC: 68 MG/DL (ref 74–99)
HCT VFR BLD AUTO: 27.9 % (ref 36–46)
HGB BLD-MCNC: 8.1 G/DL (ref 12–16)
HYPOCHROMIA BLD QL SMEAR: NORMAL
IMM GRANULOCYTES # BLD AUTO: 0.08 X10*3/UL (ref 0–0.7)
IMM GRANULOCYTES NFR BLD AUTO: 0.5 % (ref 0–0.9)
LYMPHOCYTES # BLD AUTO: 3.31 X10*3/UL (ref 1.2–4.8)
LYMPHOCYTES NFR BLD AUTO: 20.8 %
MCH RBC QN AUTO: 24.9 PG (ref 26–34)
MCHC RBC AUTO-ENTMCNC: 29 G/DL (ref 32–36)
MCV RBC AUTO: 86 FL (ref 80–100)
MONOCYTES # BLD AUTO: 2.05 X10*3/UL (ref 0.1–1)
MONOCYTES NFR BLD AUTO: 12.9 %
MYCOBACTERIUM SPEC CULT: NORMAL
NEUTROPHILS # BLD AUTO: 10.24 X10*3/UL (ref 1.2–7.7)
NEUTROPHILS NFR BLD AUTO: 64.6 %
NRBC BLD-RTO: 0.1 /100 WBCS (ref 0–0)
PLATELET # BLD AUTO: 658 X10*3/UL (ref 150–450)
POLYCHROMASIA BLD QL SMEAR: NORMAL
POTASSIUM SERPL-SCNC: 4.1 MMOL/L (ref 3.5–5.3)
RBC # BLD AUTO: 3.25 X10*6/UL (ref 4–5.2)
RBC MORPH BLD: NORMAL
SODIUM SERPL-SCNC: 139 MMOL/L (ref 136–145)
TARGETS BLD QL SMEAR: NORMAL
WBC # BLD AUTO: 15.9 X10*3/UL (ref 4.4–11.3)

## 2024-08-08 PROCEDURE — 94640 AIRWAY INHALATION TREATMENT: CPT

## 2024-08-08 PROCEDURE — 78830 RP LOCLZJ TUM SPECT W/CT 1: CPT | Performed by: NUCLEAR MEDICINE

## 2024-08-08 PROCEDURE — 36415 COLL VENOUS BLD VENIPUNCTURE: CPT

## 2024-08-08 PROCEDURE — 2500000001 HC RX 250 WO HCPCS SELF ADMINISTERED DRUGS (ALT 637 FOR MEDICARE OP): Performed by: INTERNAL MEDICINE

## 2024-08-08 PROCEDURE — 99232 SBSQ HOSP IP/OBS MODERATE 35: CPT | Performed by: SURGERY

## 2024-08-08 PROCEDURE — 1100000001 HC PRIVATE ROOM DAILY

## 2024-08-08 PROCEDURE — 2500000004 HC RX 250 GENERAL PHARMACY W/ HCPCS (ALT 636 FOR OP/ED)

## 2024-08-08 PROCEDURE — 85025 COMPLETE CBC W/AUTO DIFF WBC: CPT

## 2024-08-08 PROCEDURE — 2500000004 HC RX 250 GENERAL PHARMACY W/ HCPCS (ALT 636 FOR OP/ED): Performed by: INTERNAL MEDICINE

## 2024-08-08 PROCEDURE — 3430000001 HC RX 343 DIAGNOSTIC RADIOPHARMACEUTICALS: Performed by: INTERNAL MEDICINE

## 2024-08-08 PROCEDURE — 2500000001 HC RX 250 WO HCPCS SELF ADMINISTERED DRUGS (ALT 637 FOR MEDICARE OP): Performed by: HOSPITALIST

## 2024-08-08 PROCEDURE — 99233 SBSQ HOSP IP/OBS HIGH 50: CPT | Performed by: INTERNAL MEDICINE

## 2024-08-08 PROCEDURE — 2500000002 HC RX 250 W HCPCS SELF ADMINISTERED DRUGS (ALT 637 FOR MEDICARE OP, ALT 636 FOR OP/ED): Performed by: INTERNAL MEDICINE

## 2024-08-08 PROCEDURE — 78830 RP LOCLZJ TUM SPECT W/CT 1: CPT

## 2024-08-08 PROCEDURE — A9540 TC99M MAA: HCPCS | Performed by: INTERNAL MEDICINE

## 2024-08-08 PROCEDURE — 2500000004 HC RX 250 GENERAL PHARMACY W/ HCPCS (ALT 636 FOR OP/ED): Performed by: HOSPITALIST

## 2024-08-08 PROCEDURE — 93005 ELECTROCARDIOGRAM TRACING: CPT

## 2024-08-08 PROCEDURE — 80048 BASIC METABOLIC PNL TOTAL CA: CPT | Performed by: INTERNAL MEDICINE

## 2024-08-08 RX ORDER — ENOXAPARIN SODIUM 100 MG/ML
1 INJECTION SUBCUTANEOUS EVERY 12 HOURS
Status: DISCONTINUED | OUTPATIENT
Start: 2024-08-08 | End: 2024-08-11 | Stop reason: HOSPADM

## 2024-08-08 RX ORDER — BISACODYL 10 MG/1
10 SUPPOSITORY RECTAL ONCE
Status: DISCONTINUED | OUTPATIENT
Start: 2024-08-08 | End: 2024-08-11 | Stop reason: HOSPADM

## 2024-08-08 RX ADMIN — SENNOSIDES AND DOCUSATE SODIUM 1 TABLET: 8.6; 5 TABLET ORAL at 11:18

## 2024-08-08 RX ADMIN — PANTOPRAZOLE SODIUM 40 MG: 40 TABLET, DELAYED RELEASE ORAL at 05:56

## 2024-08-08 RX ADMIN — MORPHINE SULFATE 4 MG: 4 INJECTION, SOLUTION INTRAMUSCULAR; INTRAVENOUS at 06:49

## 2024-08-08 RX ADMIN — POLYETHYLENE GLYCOL 3350 17 G: 17 POWDER, FOR SOLUTION ORAL at 20:28

## 2024-08-08 RX ADMIN — CALCIUM 1250 MG: 500 TABLET ORAL at 19:14

## 2024-08-08 RX ADMIN — TIOTROPIUM BROMIDE INHALATION SPRAY 2 PUFF: 3.12 SPRAY, METERED RESPIRATORY (INHALATION) at 07:23

## 2024-08-08 RX ADMIN — SUCRALFATE 1 G: 1 SUSPENSION ORAL at 20:27

## 2024-08-08 RX ADMIN — Medication 100 MG: at 11:18

## 2024-08-08 RX ADMIN — SENNOSIDES AND DOCUSATE SODIUM 1 TABLET: 8.6; 5 TABLET ORAL at 20:29

## 2024-08-08 RX ADMIN — DILTIAZEM HYDROCHLORIDE 240 MG: 120 CAPSULE, COATED, EXTENDED RELEASE ORAL at 11:21

## 2024-08-08 RX ADMIN — POLYETHYLENE GLYCOL 3350 17 G: 17 POWDER, FOR SOLUTION ORAL at 11:18

## 2024-08-08 RX ADMIN — FORMOTEROL FUMARATE DIHYDRATE 20 MCG: 20 SOLUTION RESPIRATORY (INHALATION) at 07:20

## 2024-08-08 RX ADMIN — OXYBUTYNIN CHLORIDE 2.5 MG: 5 TABLET ORAL at 20:27

## 2024-08-08 RX ADMIN — DIPHENHYDRAMINE HYDROCHLORIDE 50 MG: 50 INJECTION, SOLUTION INTRAMUSCULAR; INTRAVENOUS at 08:36

## 2024-08-08 RX ADMIN — PIPERACILLIN SODIUM AND TAZOBACTAM SODIUM 3.38 G: 3; .375 INJECTION, SOLUTION INTRAVENOUS at 11:21

## 2024-08-08 RX ADMIN — MONTELUKAST 10 MG: 10 TABLET, FILM COATED ORAL at 20:27

## 2024-08-08 RX ADMIN — BUDESONIDE 0.25 MG: 0.25 INHALANT ORAL at 19:57

## 2024-08-08 RX ADMIN — BUSPIRONE HYDROCHLORIDE 5 MG: 5 TABLET ORAL at 11:19

## 2024-08-08 RX ADMIN — DIPHENHYDRAMINE HYDROCHLORIDE 50 MG: 50 INJECTION, SOLUTION INTRAMUSCULAR; INTRAVENOUS at 02:23

## 2024-08-08 RX ADMIN — MORPHINE SULFATE 2 MG: 2 INJECTION, SOLUTION INTRAMUSCULAR; INTRAVENOUS at 11:58

## 2024-08-08 RX ADMIN — BUSPIRONE HYDROCHLORIDE 5 MG: 5 TABLET ORAL at 20:27

## 2024-08-08 RX ADMIN — DIPHENHYDRAMINE HYDROCHLORIDE 50 MG: 50 INJECTION, SOLUTION INTRAMUSCULAR; INTRAVENOUS at 14:36

## 2024-08-08 RX ADMIN — ALBUTEROL SULFATE 2.5 MG: 2.5 SOLUTION RESPIRATORY (INHALATION) at 19:57

## 2024-08-08 RX ADMIN — OXYBUTYNIN CHLORIDE 2.5 MG: 5 TABLET ORAL at 11:19

## 2024-08-08 RX ADMIN — MORPHINE SULFATE 4 MG: 4 INJECTION, SOLUTION INTRAMUSCULAR; INTRAVENOUS at 02:19

## 2024-08-08 RX ADMIN — NYSTATIN 500000 UNITS: 100000 SUSPENSION ORAL at 15:14

## 2024-08-08 RX ADMIN — DIPHENHYDRAMINE HYDROCHLORIDE 50 MG: 50 INJECTION, SOLUTION INTRAMUSCULAR; INTRAVENOUS at 20:37

## 2024-08-08 RX ADMIN — ENOXAPARIN SODIUM 60 MG: 60 INJECTION SUBCUTANEOUS at 20:29

## 2024-08-08 RX ADMIN — APIXABAN 5 MG: 5 TABLET, FILM COATED ORAL at 11:18

## 2024-08-08 RX ADMIN — Medication 1 APPLICATION: at 20:28

## 2024-08-08 RX ADMIN — FORMOTEROL FUMARATE DIHYDRATE 20 MCG: 20 SOLUTION RESPIRATORY (INHALATION) at 19:56

## 2024-08-08 RX ADMIN — NYSTATIN 500000 UNITS: 100000 SUSPENSION ORAL at 11:30

## 2024-08-08 RX ADMIN — SUCRALFATE 1 G: 1 SUSPENSION ORAL at 14:03

## 2024-08-08 RX ADMIN — MORPHINE SULFATE 2 MG: 2 INJECTION, SOLUTION INTRAMUSCULAR; INTRAVENOUS at 16:14

## 2024-08-08 RX ADMIN — SUCRALFATE 1 G: 1 SUSPENSION ORAL at 19:14

## 2024-08-08 RX ADMIN — BUDESONIDE 0.25 MG: 0.25 INHALANT ORAL at 07:22

## 2024-08-08 RX ADMIN — PREDNISONE 40 MG: 20 TABLET ORAL at 11:18

## 2024-08-08 RX ADMIN — CALCIUM 1250 MG: 500 TABLET ORAL at 11:19

## 2024-08-08 RX ADMIN — MIRTAZAPINE 15 MG: 15 TABLET, FILM COATED ORAL at 20:27

## 2024-08-08 RX ADMIN — HYDROXYZINE HYDROCHLORIDE 25 MG: 25 TABLET ORAL at 04:00

## 2024-08-08 RX ADMIN — KIT FOR THE PREPARATION OF TECHNETIUM TC 99M ALBUMIN AGGREGATED 4.4 MILLICURIE: 2.5 INJECTION, POWDER, FOR SOLUTION INTRAVENOUS at 13:24

## 2024-08-08 RX ADMIN — ALBUTEROL SULFATE 2.5 MG: 2.5 SOLUTION RESPIRATORY (INHALATION) at 07:20

## 2024-08-08 RX ADMIN — ALBUTEROL SULFATE 2.5 MG: 2.5 SOLUTION RESPIRATORY (INHALATION) at 13:09

## 2024-08-08 RX ADMIN — NYSTATIN 500000 UNITS: 100000 SUSPENSION ORAL at 21:21

## 2024-08-08 RX ADMIN — SUCRALFATE 1 G: 1 SUSPENSION ORAL at 05:56

## 2024-08-08 RX ADMIN — PIPERACILLIN SODIUM AND TAZOBACTAM SODIUM 3.38 G: 3; .375 INJECTION, SOLUTION INTRAVENOUS at 05:56

## 2024-08-08 RX ADMIN — MORPHINE SULFATE 4 MG: 4 INJECTION, SOLUTION INTRAMUSCULAR; INTRAVENOUS at 20:28

## 2024-08-08 RX ADMIN — PIPERACILLIN SODIUM AND TAZOBACTAM SODIUM 3.38 G: 3; .375 INJECTION, SOLUTION INTRAVENOUS at 19:15

## 2024-08-08 ASSESSMENT — COGNITIVE AND FUNCTIONAL STATUS - GENERAL
DAILY ACTIVITIY SCORE: 24
DAILY ACTIVITIY SCORE: 24
MOBILITY SCORE: 24
DAILY ACTIVITIY SCORE: 24
MOBILITY SCORE: 24
DAILY ACTIVITIY SCORE: 24
CLIMB 3 TO 5 STEPS WITH RAILING: A LITTLE
MOBILITY SCORE: 24
MOBILITY SCORE: 24
DAILY ACTIVITIY SCORE: 24
MOBILITY SCORE: 24
DAILY ACTIVITIY SCORE: 24
MOBILITY SCORE: 24
DAILY ACTIVITIY SCORE: 23
MOBILITY SCORE: 23
TOILETING: A LITTLE

## 2024-08-08 ASSESSMENT — PAIN - FUNCTIONAL ASSESSMENT
PAIN_FUNCTIONAL_ASSESSMENT: 0-10

## 2024-08-08 ASSESSMENT — PAIN SCALES - GENERAL
PAINLEVEL_OUTOF10: 8
PAINLEVEL_OUTOF10: 8
PAINLEVEL_OUTOF10: 6
PAINLEVEL_OUTOF10: 5 - MODERATE PAIN
PAINLEVEL_OUTOF10: 6
PAINLEVEL_OUTOF10: 8
PAINLEVEL_OUTOF10: 0 - NO PAIN
PAINLEVEL_OUTOF10: 8
PAINLEVEL_OUTOF10: 0 - NO PAIN
PAINLEVEL_OUTOF10: 0 - NO PAIN
PAINLEVEL_OUTOF10: 6

## 2024-08-08 ASSESSMENT — PAIN SCALES - WONG BAKER
WONGBAKER_NUMERICALRESPONSE: HURTS EVEN MORE
WONGBAKER_NUMERICALRESPONSE: HURTS EVEN MORE

## 2024-08-08 ASSESSMENT — PAIN DESCRIPTION - LOCATION
LOCATION: ABDOMEN

## 2024-08-08 NOTE — CARE PLAN
Problem: Pain - Adult  Goal: Verbalizes/displays adequate comfort level or baseline comfort level  Outcome: Progressing  Flowsheets (Taken 8/8/2024 1947)  Verbalizes/displays adequate comfort level or baseline comfort level:   Notify Licensed Independent Practitioner if interventions unsuccessful or patient reports new pain   Implement non-pharmacological measures as appropriate and evaluate response   Administer analgesics based on type and severity of pain and evaluate response   Assess pain using appropriate pain scale   Encourage patient to monitor pain and request assistance     Problem: Safety - Adult  Goal: Free from fall injury  Outcome: Progressing  Flowsheets (Taken 8/8/2024 1947)  Free from fall injury:   Based on caregiver fall risk screen, instruct family/caregiver to ask for assistance with transferring infant if caregiver noted to have fall risk factors   Instruct family/caregiver on patient safety     Problem: Discharge Planning  Goal: Discharge to home or other facility with appropriate resources  Outcome: Progressing  Flowsheets (Taken 8/8/2024 1947)  Discharge to home or other facility with appropriate resources:   Refer to discharge planning if patient needs post-hospital services based on physician order or complex needs related to functional status, cognitive ability or social support system   Identify discharge learning needs (meds, wound care, etc)   Arrange for needed discharge resources and transportation as appropriate   Identify barriers to discharge with patient and caregiver     Problem: Chronic Conditions and Co-morbidities  Goal: Patient's chronic conditions and co-morbidity symptoms are monitored and maintained or improved  Outcome: Progressing  Flowsheets (Taken 8/8/2024 1947)  Care Plan - Patient's Chronic Conditions and Co-Morbidity Symptoms are Monitored and Maintained or Improved:   Update acute care plan with appropriate goals if chronic or comorbid symptoms are exacerbated  and prevent overall improvement and discharge   Collaborate with multidisciplinary team to address chronic and comorbid conditions and prevent exacerbation or deterioration   Monitor and assess patient's chronic conditions and comorbid symptoms for stability, deterioration, or improvement   The patient's goals for the shift include      The clinical goals for the shift include control pain

## 2024-08-08 NOTE — CARE PLAN
The patient's goals for the shift include      The clinical goals for the shift include Patient will remain comfortable throughout the shift.    Over the shift, the patient did not make progress toward the following goals. Barriers to progression include sepsis with encephalopathy. Recommendations to address these barriers include IV antibiotics, rest, fluids and pain meds as needed.

## 2024-08-08 NOTE — PROGRESS NOTES
"Fernando Cuevas is a 63 y.o. female on day 5 of admission presenting with Generalized abdominal pain.    Subjective     Patient states having dyspnea on exertion white count still the same at 15, hemoglobin has improved to 8.1 will order a VQ scan as patient has contrast allergy.       Objective     Physical Exam  Vitals reviewed.   Constitutional:       Appearance: Normal appearance.   HENT:      Head: Normocephalic.      Right Ear: Tympanic membrane normal.      Nose: Nose normal.   Cardiovascular:      Rate and Rhythm: Normal rate and regular rhythm.   Pulmonary:      Breath sounds: Normal breath sounds.      Comments: Diminished but clear no wheezing appreciated  Abdominal:      Comments:  abdomen is still distended with bowel sounds nontender   Skin:     General: Skin is warm.      Capillary Refill: Capillary refill takes less than 2 seconds.   Neurological:      General: No focal deficit present.      Mental Status: She is alert.         Last Recorded Vitals  Blood pressure 152/75, pulse 108, temperature 36.5 °C (97.7 °F), temperature source Temporal, resp. rate 18, height 1.575 m (5' 2\"), weight 61.7 kg (136 lb), SpO2 94%.  Intake/Output last 3 Shifts:  I/O last 3 completed shifts:  In: 1690 (27.4 mL/kg) [P.O.:1140; IV Piggyback:550]  Out: - (0 mL/kg)   Weight: 61.7 kg     Relevant Results  Results for orders placed or performed during the hospital encounter of 08/02/24 (from the past 24 hour(s))   Basic metabolic panel   Result Value Ref Range    Glucose 68 (L) 74 - 99 mg/dL    Sodium 139 136 - 145 mmol/L    Potassium 4.1 3.5 - 5.3 mmol/L    Chloride 100 98 - 107 mmol/L    Bicarbonate 26 21 - 32 mmol/L    Anion Gap 17 10 - 20 mmol/L    Urea Nitrogen 10 6 - 23 mg/dL    Creatinine 1.03 0.50 - 1.05 mg/dL    eGFR 61 >60 mL/min/1.73m*2    Calcium 8.4 (L) 8.6 - 10.3 mg/dL   CBC and Auto Differential   Result Value Ref Range    WBC 15.9 (H) 4.4 - 11.3 x10*3/uL    nRBC 0.1 (H) 0.0 - 0.0 /100 WBCs    RBC 3.25 (L) 4.00 " - 5.20 x10*6/uL    Hemoglobin 8.1 (L) 12.0 - 16.0 g/dL    Hematocrit 27.9 (L) 36.0 - 46.0 %    MCV 86 80 - 100 fL    MCH 24.9 (L) 26.0 - 34.0 pg    MCHC 29.0 (L) 32.0 - 36.0 g/dL    RDW 20.2 (H) 11.5 - 14.5 %    Platelets 658 (H) 150 - 450 x10*3/uL    Neutrophils % 64.6 40.0 - 80.0 %    Immature Granulocytes %, Automated 0.5 0.0 - 0.9 %    Lymphocytes % 20.8 13.0 - 44.0 %    Monocytes % 12.9 2.0 - 10.0 %    Eosinophils % 1.1 0.0 - 6.0 %    Basophils % 0.1 0.0 - 2.0 %    Neutrophils Absolute 10.24 (H) 1.20 - 7.70 x10*3/uL    Immature Granulocytes Absolute, Automated 0.08 0.00 - 0.70 x10*3/uL    Lymphocytes Absolute 3.31 1.20 - 4.80 x10*3/uL    Monocytes Absolute 2.05 (H) 0.10 - 1.00 x10*3/uL    Eosinophils Absolute 0.18 0.00 - 0.70 x10*3/uL    Basophils Absolute 0.02 0.00 - 0.10 x10*3/uL   Morphology   Result Value Ref Range    RBC Morphology See Below     Polychromasia Mild     Hypochromia Mild     Target Cells Few        Imaging Results    X ray chest/abdomen:  IMPRESSION:  No acute cardiopulmonary disease.     Cta abd/pelvis:  pending    Medications:  albuterol, 2.5 mg, nebulization, TID  apixaban, 5 mg, oral, BID  azithromycin, 250 mg, oral, Once per day on Monday Wednesday Friday  bisacodyl, 10 mg, rectal, Once  budesonide, 0.25 mg, nebulization, BID  busPIRone, 5 mg, oral, BID  calcium carbonate, 1,250 mg, oral, BID  dilTIAZem CD, 240 mg, oral, Daily  formoterol, 20 mcg, nebulization, BID  miconazole, 1 applicator, vaginal, Nightly  mirtazapine, 15 mg, oral, Nightly  montelukast, 10 mg, oral, Nightly  nystatin, 5 mL, Swish & Swallow, TID  oxybutynin, 2.5 mg, oral, BID  pantoprazole, 40 mg, oral, Daily before breakfast  piperacillin-tazobactam, 3.375 g, intravenous, q6h  polyethylene glycol, 17 g, oral, BID  predniSONE, 40 mg, oral, Daily  sennosides-docusate sodium, 1 tablet, oral, BID  sucralfate, 1 g, oral, 4x daily  thiamine, 100 mg, oral, Daily  tiotropium, 2 puff, inhalation, Daily       PRN medications:  acetaminophen **OR** acetaminophen **OR** acetaminophen, albuterol, diphenhydrAMINE, hydrOXYzine HCL, morphine, morphine, ondansetron **OR** ondansetron, oxygen, prochlorperazine **OR** prochlorperazine **OR** prochlorperazine     Assessment/Plan   evidence of wall thickening of small bowel loop in the right lower quadrant at site of anastomosis which may be infectious or inflammatory in etiology with presentation of abdominal pain  - white count stable continue IV Zosyn, CTA abd/pelvis with similar results  -had bms tolerating regular diet    2. H/o DVT  -on eliquis     3. COPD ? exacerbation  -continue inhaler and prn duonebs  -added prednisone, pending vq scan     4. Leukocytosis  -stable at 15  -continue iv zosyn  -bcx pending      DVT Prophylaxis:  Eliquis             Morena Landon MD  [facility] Medicine

## 2024-08-08 NOTE — PROGRESS NOTES
"Fernando Cuevas is a 63 y.o. female on day 5 of admission presenting with Generalized abdominal pain.    Subjective   Still with abdominal pain but improved.  Had bowel movements yesterday.    Chief complaint is itching, apparently she is allergic to some she has recently gotten.  She is getting Benadryl     Tolerating regular diet    Objective     Physical Exam  Comfortable  Distended, mild diffuse tenderness  Last Recorded Vitals  Blood pressure 152/75, pulse 108, temperature 36.5 °C (97.7 °F), temperature source Temporal, resp. rate 18, height 1.575 m (5' 2\"), weight 61.7 kg (136 lb), SpO2 94%.  Intake/Output last 3 Shifts:  I/O last 3 completed shifts:  In: 1690 (27.4 mL/kg) [P.O.:1140; IV Piggyback:550]  Out: - (0 mL/kg)   Weight: 61.7 kg     Relevant Results  CT scan from yesterday pending.  Preliminary low shows no obvious signs of ischemia    KUB with constipation      Lab Results   Component Value Date    WBC 15.9 (H) 08/08/2024    HGB 8.1 (L) 08/08/2024    HCT 27.9 (L) 08/08/2024    MCV 86 08/08/2024     (H) 08/08/2024            Assessment/Plan   Principal Problem:    Generalized abdominal pain  Abdominal pain improved.  Bam a diet  Kub with constipation.  Suppositories ordered  Await read of CTA            Wilson Liang MD      "

## 2024-08-09 LAB
ANION GAP SERPL CALC-SCNC: 13 MMOL/L (ref 10–20)
ANION GAP SERPL CALC-SCNC: 13 MMOL/L (ref 10–20)
BASOPHILS # BLD AUTO: 0.02 X10*3/UL (ref 0–0.1)
BASOPHILS NFR BLD AUTO: 0.1 %
BUN SERPL-MCNC: 10 MG/DL (ref 6–23)
BUN SERPL-MCNC: 12 MG/DL (ref 6–23)
CALCIUM SERPL-MCNC: 8.4 MG/DL (ref 8.6–10.3)
CALCIUM SERPL-MCNC: 8.4 MG/DL (ref 8.6–10.3)
CHLORIDE SERPL-SCNC: 101 MMOL/L (ref 98–107)
CHLORIDE SERPL-SCNC: 102 MMOL/L (ref 98–107)
CO2 SERPL-SCNC: 27 MMOL/L (ref 21–32)
CO2 SERPL-SCNC: 28 MMOL/L (ref 21–32)
CREAT SERPL-MCNC: 0.86 MG/DL (ref 0.5–1.05)
CREAT SERPL-MCNC: 0.91 MG/DL (ref 0.5–1.05)
D DIMER PPP FEU-MCNC: 926 NG/ML FEU
DACRYOCYTES BLD QL SMEAR: NORMAL
EGFRCR SERPLBLD CKD-EPI 2021: 71 ML/MIN/1.73M*2
EGFRCR SERPLBLD CKD-EPI 2021: 76 ML/MIN/1.73M*2
EOSINOPHIL # BLD AUTO: 0.03 X10*3/UL (ref 0–0.7)
EOSINOPHIL NFR BLD AUTO: 0.2 %
ERYTHROCYTE [DISTWIDTH] IN BLOOD BY AUTOMATED COUNT: 20.3 % (ref 11.5–14.5)
GIANT PLATELETS BLD QL SMEAR: NORMAL
GLUCOSE SERPL-MCNC: 101 MG/DL (ref 74–99)
GLUCOSE SERPL-MCNC: 92 MG/DL (ref 74–99)
HCT VFR BLD AUTO: 24.4 % (ref 36–46)
HGB BLD-MCNC: 7.2 G/DL (ref 12–16)
HGB RETIC QN: 21 PG (ref 28–38)
HYPOCHROMIA BLD QL SMEAR: NORMAL
IMM GRANULOCYTES # BLD AUTO: 0.12 X10*3/UL (ref 0–0.7)
IMM GRANULOCYTES NFR BLD AUTO: 0.8 % (ref 0–0.9)
IMMATURE RETIC FRACTION: 28.5 %
IRON SATN MFR SERPL: 5 % (ref 25–45)
IRON SERPL-MCNC: 17 UG/DL (ref 35–150)
LDH SERPL L TO P-CCNC: 330 U/L (ref 84–246)
LYMPHOCYTES # BLD AUTO: 3.11 X10*3/UL (ref 1.2–4.8)
LYMPHOCYTES NFR BLD AUTO: 19.5 %
MCH RBC QN AUTO: 24.7 PG (ref 26–34)
MCHC RBC AUTO-ENTMCNC: 29.5 G/DL (ref 32–36)
MCV RBC AUTO: 84 FL (ref 80–100)
MONOCYTES # BLD AUTO: 1.92 X10*3/UL (ref 0.1–1)
MONOCYTES NFR BLD AUTO: 12 %
NEUTROPHILS # BLD AUTO: 10.75 X10*3/UL (ref 1.2–7.7)
NEUTROPHILS NFR BLD AUTO: 67.4 %
NRBC BLD-RTO: 0.1 /100 WBCS (ref 0–0)
PLATELET # BLD AUTO: 689 X10*3/UL (ref 150–450)
POLYCHROMASIA BLD QL SMEAR: NORMAL
POTASSIUM SERPL-SCNC: 3.4 MMOL/L (ref 3.5–5.3)
POTASSIUM SERPL-SCNC: 3.4 MMOL/L (ref 3.5–5.3)
RBC # BLD AUTO: 2.91 X10*6/UL (ref 4–5.2)
RBC MORPH BLD: NORMAL
RETICS #: 0.09 X10*6/UL (ref 0.02–0.08)
RETICS/RBC NFR AUTO: 3.2 % (ref 0.5–2)
SCHISTOCYTES BLD QL SMEAR: NORMAL
SODIUM SERPL-SCNC: 139 MMOL/L (ref 136–145)
SODIUM SERPL-SCNC: 139 MMOL/L (ref 136–145)
TIBC SERPL-MCNC: 312 UG/DL (ref 240–445)
UIBC SERPL-MCNC: 295 UG/DL (ref 110–370)
WBC # BLD AUTO: 16 X10*3/UL (ref 4.4–11.3)

## 2024-08-09 PROCEDURE — 85025 COMPLETE CBC W/AUTO DIFF WBC: CPT

## 2024-08-09 PROCEDURE — 83540 ASSAY OF IRON: CPT | Performed by: NURSE PRACTITIONER

## 2024-08-09 PROCEDURE — 2500000001 HC RX 250 WO HCPCS SELF ADMINISTERED DRUGS (ALT 637 FOR MEDICARE OP): Performed by: INTERNAL MEDICINE

## 2024-08-09 PROCEDURE — 85045 AUTOMATED RETICULOCYTE COUNT: CPT | Performed by: NURSE PRACTITIONER

## 2024-08-09 PROCEDURE — 1100000001 HC PRIVATE ROOM DAILY

## 2024-08-09 PROCEDURE — 80048 BASIC METABOLIC PNL TOTAL CA: CPT | Performed by: INTERNAL MEDICINE

## 2024-08-09 PROCEDURE — 94640 AIRWAY INHALATION TREATMENT: CPT

## 2024-08-09 PROCEDURE — 94667 MNPJ CHEST WALL 1ST: CPT

## 2024-08-09 PROCEDURE — 2500000004 HC RX 250 GENERAL PHARMACY W/ HCPCS (ALT 636 FOR OP/ED): Performed by: HOSPITALIST

## 2024-08-09 PROCEDURE — 82374 ASSAY BLOOD CARBON DIOXIDE: CPT | Performed by: INTERNAL MEDICINE

## 2024-08-09 PROCEDURE — 99239 HOSP IP/OBS DSCHRG MGMT >30: CPT | Performed by: INTERNAL MEDICINE

## 2024-08-09 PROCEDURE — 36415 COLL VENOUS BLD VENIPUNCTURE: CPT | Performed by: INTERNAL MEDICINE

## 2024-08-09 PROCEDURE — 2500000001 HC RX 250 WO HCPCS SELF ADMINISTERED DRUGS (ALT 637 FOR MEDICARE OP): Performed by: HOSPITALIST

## 2024-08-09 PROCEDURE — 85379 FIBRIN DEGRADATION QUANT: CPT | Performed by: NURSE PRACTITIONER

## 2024-08-09 PROCEDURE — 36415 COLL VENOUS BLD VENIPUNCTURE: CPT

## 2024-08-09 PROCEDURE — 2500000004 HC RX 250 GENERAL PHARMACY W/ HCPCS (ALT 636 FOR OP/ED): Performed by: INTERNAL MEDICINE

## 2024-08-09 PROCEDURE — 2500000002 HC RX 250 W HCPCS SELF ADMINISTERED DRUGS (ALT 637 FOR MEDICARE OP, ALT 636 FOR OP/ED): Performed by: INTERNAL MEDICINE

## 2024-08-09 PROCEDURE — 83615 LACTATE (LD) (LDH) ENZYME: CPT | Performed by: NURSE PRACTITIONER

## 2024-08-09 PROCEDURE — 99231 SBSQ HOSP IP/OBS SF/LOW 25: CPT | Performed by: NURSE PRACTITIONER

## 2024-08-09 PROCEDURE — 82728 ASSAY OF FERRITIN: CPT | Mod: AHULAB | Performed by: NURSE PRACTITIONER

## 2024-08-09 RX ORDER — POTASSIUM CHLORIDE 20 MEQ/1
40 TABLET, EXTENDED RELEASE ORAL ONCE
Status: COMPLETED | OUTPATIENT
Start: 2024-08-09 | End: 2024-08-09

## 2024-08-09 RX ADMIN — NYSTATIN 500000 UNITS: 100000 SUSPENSION ORAL at 22:53

## 2024-08-09 RX ADMIN — PREDNISONE 40 MG: 20 TABLET ORAL at 08:33

## 2024-08-09 RX ADMIN — CALCIUM 1250 MG: 500 TABLET ORAL at 08:33

## 2024-08-09 RX ADMIN — BUSPIRONE HYDROCHLORIDE 5 MG: 5 TABLET ORAL at 21:47

## 2024-08-09 RX ADMIN — POTASSIUM CHLORIDE 40 MEQ: 1500 TABLET, EXTENDED RELEASE ORAL at 10:41

## 2024-08-09 RX ADMIN — NYSTATIN 500000 UNITS: 100000 SUSPENSION ORAL at 15:31

## 2024-08-09 RX ADMIN — DIPHENHYDRAMINE HYDROCHLORIDE 50 MG: 50 INJECTION, SOLUTION INTRAMUSCULAR; INTRAVENOUS at 02:40

## 2024-08-09 RX ADMIN — FORMOTEROL FUMARATE DIHYDRATE 20 MCG: 20 SOLUTION RESPIRATORY (INHALATION) at 19:46

## 2024-08-09 RX ADMIN — PIPERACILLIN SODIUM AND TAZOBACTAM SODIUM 3.38 G: 3; .375 INJECTION, SOLUTION INTRAVENOUS at 00:10

## 2024-08-09 RX ADMIN — SUCRALFATE 1 G: 1 SUSPENSION ORAL at 06:52

## 2024-08-09 RX ADMIN — PIPERACILLIN SODIUM AND TAZOBACTAM SODIUM 3.38 G: 3; .375 INJECTION, SOLUTION INTRAVENOUS at 06:51

## 2024-08-09 RX ADMIN — FORMOTEROL FUMARATE DIHYDRATE 20 MCG: 20 SOLUTION RESPIRATORY (INHALATION) at 07:51

## 2024-08-09 RX ADMIN — ALBUTEROL SULFATE 2.5 MG: 2.5 SOLUTION RESPIRATORY (INHALATION) at 15:51

## 2024-08-09 RX ADMIN — MORPHINE SULFATE 2 MG: 2 INJECTION, SOLUTION INTRAMUSCULAR; INTRAVENOUS at 11:37

## 2024-08-09 RX ADMIN — DILTIAZEM HYDROCHLORIDE 240 MG: 120 CAPSULE, COATED, EXTENDED RELEASE ORAL at 08:33

## 2024-08-09 RX ADMIN — ALBUTEROL SULFATE 2.5 MG: 2.5 SOLUTION RESPIRATORY (INHALATION) at 07:51

## 2024-08-09 RX ADMIN — NYSTATIN 500000 UNITS: 100000 SUSPENSION ORAL at 08:58

## 2024-08-09 RX ADMIN — DIPHENHYDRAMINE HYDROCHLORIDE 50 MG: 50 INJECTION, SOLUTION INTRAMUSCULAR; INTRAVENOUS at 19:01

## 2024-08-09 RX ADMIN — SUCRALFATE 1 G: 1 SUSPENSION ORAL at 17:20

## 2024-08-09 RX ADMIN — Medication 100 MG: at 08:33

## 2024-08-09 RX ADMIN — BUDESONIDE 0.25 MG: 0.25 INHALANT ORAL at 07:51

## 2024-08-09 RX ADMIN — OXYBUTYNIN CHLORIDE 2.5 MG: 5 TABLET ORAL at 08:33

## 2024-08-09 RX ADMIN — OXYBUTYNIN CHLORIDE 2.5 MG: 5 TABLET ORAL at 21:49

## 2024-08-09 RX ADMIN — ENOXAPARIN SODIUM 60 MG: 60 INJECTION SUBCUTANEOUS at 21:45

## 2024-08-09 RX ADMIN — MORPHINE SULFATE 2 MG: 2 INJECTION, SOLUTION INTRAMUSCULAR; INTRAVENOUS at 20:35

## 2024-08-09 RX ADMIN — DIPHENHYDRAMINE HYDROCHLORIDE 50 MG: 50 INJECTION, SOLUTION INTRAMUSCULAR; INTRAVENOUS at 12:59

## 2024-08-09 RX ADMIN — MIRTAZAPINE 15 MG: 15 TABLET, FILM COATED ORAL at 21:47

## 2024-08-09 RX ADMIN — AZITHROMYCIN 250 MG: 500 TABLET, FILM COATED ORAL at 08:33

## 2024-08-09 RX ADMIN — CALCIUM 1250 MG: 500 TABLET ORAL at 17:20

## 2024-08-09 RX ADMIN — MORPHINE SULFATE 4 MG: 4 INJECTION, SOLUTION INTRAMUSCULAR; INTRAVENOUS at 02:40

## 2024-08-09 RX ADMIN — PIPERACILLIN SODIUM AND TAZOBACTAM SODIUM 3.38 G: 3; .375 INJECTION, SOLUTION INTRAVENOUS at 22:53

## 2024-08-09 RX ADMIN — MORPHINE SULFATE 4 MG: 4 INJECTION, SOLUTION INTRAMUSCULAR; INTRAVENOUS at 07:36

## 2024-08-09 RX ADMIN — PANTOPRAZOLE SODIUM 40 MG: 40 TABLET, DELAYED RELEASE ORAL at 06:52

## 2024-08-09 RX ADMIN — ENOXAPARIN SODIUM 60 MG: 60 INJECTION SUBCUTANEOUS at 08:33

## 2024-08-09 RX ADMIN — SUCRALFATE 1 G: 1 SUSPENSION ORAL at 21:48

## 2024-08-09 RX ADMIN — MONTELUKAST 10 MG: 10 TABLET, FILM COATED ORAL at 21:50

## 2024-08-09 RX ADMIN — Medication 1 APPLICATION: at 21:00

## 2024-08-09 RX ADMIN — BUDESONIDE 0.25 MG: 0.25 INHALANT ORAL at 19:46

## 2024-08-09 RX ADMIN — BUSPIRONE HYDROCHLORIDE 5 MG: 5 TABLET ORAL at 08:33

## 2024-08-09 RX ADMIN — ALBUTEROL SULFATE 2.5 MG: 2.5 SOLUTION RESPIRATORY (INHALATION) at 19:46

## 2024-08-09 RX ADMIN — PIPERACILLIN SODIUM AND TAZOBACTAM SODIUM 3.38 G: 3; .375 INJECTION, SOLUTION INTRAVENOUS at 17:20

## 2024-08-09 RX ADMIN — HYDROXYZINE HYDROCHLORIDE 25 MG: 25 TABLET ORAL at 10:54

## 2024-08-09 RX ADMIN — MORPHINE SULFATE 2 MG: 2 INJECTION, SOLUTION INTRAMUSCULAR; INTRAVENOUS at 15:41

## 2024-08-09 RX ADMIN — PIPERACILLIN SODIUM AND TAZOBACTAM SODIUM 3.38 G: 3; .375 INJECTION, SOLUTION INTRAVENOUS at 11:37

## 2024-08-09 ASSESSMENT — ENCOUNTER SYMPTOMS
FATIGUE: 1
ACTIVITY CHANGE: 1
UNEXPECTED WEIGHT CHANGE: 0
PALPITATIONS: 0
DYSURIA: 0
SHORTNESS OF BREATH: 1
CHILLS: 0
VOMITING: 1
ARTHRALGIAS: 0
APPETITE CHANGE: 1
ADENOPATHY: 0
MYALGIAS: 0
NAUSEA: 1
FEVER: 0
HEMATURIA: 0
TROUBLE SWALLOWING: 0

## 2024-08-09 ASSESSMENT — COGNITIVE AND FUNCTIONAL STATUS - GENERAL
MOBILITY SCORE: 24
MOBILITY SCORE: 24
DAILY ACTIVITIY SCORE: 24
DAILY ACTIVITIY SCORE: 24

## 2024-08-09 ASSESSMENT — PAIN DESCRIPTION - LOCATION
LOCATION: ABDOMEN
LOCATION: ABDOMEN
LOCATION: BACK
LOCATION: BACK

## 2024-08-09 ASSESSMENT — PAIN SCALES - GENERAL
PAINLEVEL_OUTOF10: 8
PAINLEVEL_OUTOF10: 8
PAINLEVEL_OUTOF10: 10 - WORST POSSIBLE PAIN
PAINLEVEL_OUTOF10: 8
PAINLEVEL_OUTOF10: 10 - WORST POSSIBLE PAIN
PAINLEVEL_OUTOF10: 6
PAINLEVEL_OUTOF10: 7
PAINLEVEL_OUTOF10: 10 - WORST POSSIBLE PAIN
PAINLEVEL_OUTOF10: 5 - MODERATE PAIN
PAINLEVEL_OUTOF10: 10 - WORST POSSIBLE PAIN

## 2024-08-09 ASSESSMENT — PAIN - FUNCTIONAL ASSESSMENT
PAIN_FUNCTIONAL_ASSESSMENT: 0-10

## 2024-08-09 ASSESSMENT — PAIN SCALES - PAIN ASSESSMENT IN ADVANCED DEMENTIA (PAINAD): TOTALSCORE: MEDICATION (SEE MAR)

## 2024-08-09 NOTE — PROGRESS NOTES
08/09/24 0801   Discharge Planning   Who is requesting discharge planning? Provider   Home or Post Acute Services In home services   Type of Home Care Services Home nursing visits;Home OT;Home PT   Expected Discharge Disposition  Services   Does the patient need discharge transport arranged? No     8/9/24 0802  VQ scan showed high probability for PE.  Currently on Lovenox.  Will need oral AC for discharge.  Tolerating diet and having BM.  H/H 7.2/24.4, K 3.4, WBC 16.  Not medically ready for discharge.  Unable to find [facility].  Additional referrals sent.  Brittany Manjarrez RN [facility]    8/9/24 1533  [facility] can accept patient.  Will need to send [facility] when patient is ready for discharge.  Brittany Manjarrez RN [facility]

## 2024-08-09 NOTE — CONSULTS
"Inpatient consult to Hematology  Consult performed by: Yanni Sprague, LISETH-CNP  Consult ordered by: Morena Landon MD  Reason for consult: Anticoagulation recommendation  Assessment/Recommendations: Fernando Cuevas is a 63 y.o. female presenting with RLQ abdominal pain nausea, vomiting and diarrhea on 8/2/24. PMH GERD, COPD (chronic obstructive pulmonary disease) (Multi), Depression, DVT (deep venous thrombosis) on apixaban since 6/27/24 for acute non-occlusive deep vein thrombosis visualized in the brachial vein 2/2PIV, no other history of VTE, no family history of VTE, The remainder of the left arm was negative for deep vein thrombosis, CTA scan of the chest did not reveal a PE, no other imaging for VTE obtained,  HTN (hypertension), Lung cancer (Multi), Migraines, smoker and Panic disorder.  Denied hematochezia. Of note, on 6/21/24, she went to the OR emergently for an exploration laparotomy and pSBR with Dr. Liang 2/2 perforated viscus (Intraoperative Findings: Segment of perforated small bowel). Post-op course was complicated by prolonged post-op ileus and wound hematoma with eventual wound vac placement. On 7/26, [facility] was [facility] due to anemia (possible GIB) and her apixaban was placed on hold for work up. EGD on 7/26 did not show any discrete bleeding lesion; however, the stomach was noted to be diffusely inflamed and friable with contact bleeding. She was discharged home and her apixaban was resumed, no interruption in apixaban this admission.     In the ED, arrived afebrile and tachycardic at 120 BPM. WBC 29.3, H/H 9.1/30.9. CT A/P demonstrated evidence of wall thickening of small bowel loop in the RLQ at site of anastomosis, which may be infectious or inflammatory in etiology. There was also mild hyperdensity in the bowel lumen and mild hemorrhage within the bowel is not excluded. CT angio was performed 8/7 and showed \"Small fat containing left anterior abdominal wall hernia with stranding of " "the herniated mesenteric fat suggesting incarceration of the herniated fat with inflammatory changes.\"  She is now having Bms and tolerating a regular diet and surgery signed off - no intervention needed this admission. Reportedly c/o dyspnea on exertion on 8/8 and attending ordered VQ d/t suspected contrast allergy with c/o itching following CTA of the abdomen, patient never had reactions to contrast in the past. She was found to have probable PE on VQ scan yesterday and was started on therapeutic lovenox and her apixaban is discontinued. No apparent distress. Hemodynamically stable, afebrile. WBC 15.3, likely reactive, anemia since 5/2023, Hb was 7.4 on 6/19 then 9.7 following surgery and blood transfusion on 6/22, Hb 6.2 on admission for suspected GIB again incremented well to 8.6 7/24, was 10.1 on 8/3-> 7.9-> 8/4-> 8-> 8.5-> 7.4->8.1- 8/8 then 7.2 this am. PLT ct is elevated to 689. Last blood transfusion 7/24/24.  Patient stated that she has not followed up with her oncologist because she has been in and out of the hospital since May.  Her pulmonologist told her that she does not require any treatment right now but recommended repeat CT of the chest with contrast every 3 months to survey.    Anticoagulation recommendations  - recent surgery- 6/21/24, she went to the OR emergently for an exploration laparotomy and pSBR with Dr. Liang 2/2 perforated viscus (Intraoperative Findings: Segment of perforated small bowel). Post-op course was complicated by prolonged post-op ileus and wound hematoma with eventual wound vac placement.   - Patient initiated on full dose Lovenox therapeutic dose and 6/27 for SCHUYLER non occlusive brachial DVT, transitioned to oral regimen on apixaban at discharge - no prior h/o VTE  - On 7/26, she was admitted to [facility] due to anemia (possible GIB) and her apixaban was placed on hold for work up. EGD on 7/26 did not show any discrete bleeding lesion; however, the stomach was noted to be " diffusely inflamed and friable with contact bleeding. She was discharged home and her apixaban was resumed, no interruption in apixaban this admission.  - 8/2 admission for abdominal pain  - Reportedly c/o dyspnea on exertion on 8/8 and attending ordered VQ d/t suspected contrast allergy with c/o itching following CTA of the abdomen, patient never had reactions to contrast in the past. She was found to have probable PE on VQ scan yesterday and was started on therapeutic lovenox and her apixaban is discontinued.  -D-dimer ordered for further workup given concern for PE  -Nuclear medicine study revealed probable PE on VQ, patient stated she does not think that she is allergic to contrast dye and she is not sure what is making her itch but she has had contrast dye many times in the past without any complication, consider CT chest with contrast to rule out PE with premedication given  -Patient was originally placed on anticoagulation with Xarelto following discharge in June after being treated with therapeutic Lovenox for a nonocclusive brachial DVT in her left arm secondary to peripheral IV  -Patient has completed anticoagulation therapy for the nonocclusive brachial DVT in the left arm, would recommend ensuring if she truly has a pulmonary embolism prior to repeating a second course of anticoagulation given the recent GI bleeding episodes and EGD with concern for diffusely inflamed and friable stomach lining with contact bleeding  -DOAC is contraindicated and GI bleeding, continue Lovenox and closely monitor for bleeding concerns  - anemia and thrombocytosis with last ferritin 2 weeks ago only 36 and low serum iron and sat  - repeat iron and ferritin, iron deficiency may be 2/2 chronic GI blood loss  -CBC daily  -Hemoglobin goal 7  -If patient's D-dimer is less than 500 PE is very unlikely and anticoagulation can be considered for discontinuation, if patient's D-dimer is greater than 500 would consider CTA with  premedication for allergy concerns to definitively rule out PE as patient has other factors that could affect the D-dimer  - patient c/o itching and hives on her face and neck following contrast dye resolved apparently with one time benadryl 50 pepcid 20 and prednisone 60  -Patient has a remote history of lung cancer treated with radiation in 2022 with recent imaging showing concerns for nodule, she follows with pulmonology, she should follow-up with her pulmonologist at [facility] soon after discharge for further work up and recommendations, of note, patient was recommended to have surveillance CT with contrast every 3 months for the previously noted nodule suspicious for malignancy  - recommend correcting iron deficit if no active infection    Discussed with Dr. King and Dr. Landon        NO HEMATOLOGY COVERAGE OVER THE WEEKEND PLEASE CALL [facility] WITH QUESTIONS OF CONCERNS      Reason For Consult  Anticoagulation recommendation    History Of Present Illness  Fernando Cuevas is a 63 y.o. female presenting with RLQ abdominal pain nausea, vomiting and diarrhea on 8/2/24. PMH GERD, COPD (chronic obstructive pulmonary disease) (Multi), Depression, DVT (deep venous thrombosis) on apixaban since 6/27/24 for acute non-occlusive deep vein thrombosis visualized in the brachial vein 2/2PIV, no other history of VTE, no family history of VTE, The remainder of the left arm was negative for deep vein thrombosis, CTA scan of the chest did not reveal a PE, no other imaging for VTE obtained,  HTN (hypertension), Lung cancer (Multi), Migraines, smoker and Panic disorder.  Denied hematochezia. Of note, on 6/21/24, she went to the OR emergently for an exploration laparotomy and pSBR with Dr. Liang 2/2 perforated viscus (Intraoperative Findings: Segment of perforated small bowel). Post-op course was complicated by prolonged post-op ileus and wound hematoma with eventual wound vac placement. On 7/26, she was admitted to [facility] due to anemia  "(possible GIB) and her apixaban was placed on hold for work up. EGD on 7/26 did not show any discrete bleeding lesion; however, the stomach was noted to be diffusely inflamed and friable with contact bleeding. She was discharged home and her apixaban was resumed, no interruption in apixaban this admission.     In the ED, arrived afebrile and tachycardic at 120 BPM. WBC 29.3, H/H 9.1/30.9. CT A/P demonstrated evidence of wall thickening of small bowel loop in the RLQ at site of anastomosis, which may be infectious or inflammatory in etiology. There was also mild hyperdensity in the bowel lumen and mild hemorrhage within the bowel is not excluded. CT angio was performed 8/7 and showed \"Small fat containing left anterior abdominal wall hernia with stranding of the herniated mesenteric fat suggesting incarceration of the herniated fat with inflammatory changes.\"  She is now having Bms and tolerating a regular diet and surgery signed off - no intervention needed this admission. Reportedly c/o dyspnea on exertion on 8/8 and attending ordered VQ d/t suspected contrast allergy with c/o hives and itching following CTA of the abdomen 8/7, patient never had reactions to contrast in the past. She was found to have probable PE on VQ scan yesterday and was started on therapeutic lovenox and her apixaban is discontinued. No apparent distress. Hemodynamically stable, afebrile. WBC 15.3, likely reactive, anemia since 5/2023, Hb was 7.4 on 6/19 then 9.7 following surgery and blood transfusion on 6/22, Hb 6.2 on admission for suspected GIB again incremented well to 8.6 7/24, was 10.1 on 8/3-> 7.9-> 8/4-> 8-> 8.5-> 7.4->8.1- 8/8 then 7.2 this am. PLT ct is elevated to 689. Last blood transfusion 7/24/24.  Patient stated that she has not followed up with her oncologist because she has been in and out of the hospital since May.  Her pulmonologist told her that she does not require any treatment right now but recommended repeat CT of the " chest with contrast every 3 months to survey.    Hematology is consulted for anticoagulation recommendations     Oncology history  This is a patient diagnosed with non-small cell carcinoma of the lung, adenocarcinoma right lower lobe. Patient had a low dose CT scan of the chest on May 2, 2022 which demonstrated a 1.1 cm nodule in the right lower lobe.     On 2022 a CT guided biopsy demonstrated adenocarcinoma of the lung.     Patient was deemed not to be a surgical candidate.     2022 - 2022:  Patient was treated with SBRT 5000 centigrade over five fractions.     Follow up with oncology at [facility] 2023 for CT scan cf malignancy, 8 x 9 x 6 mm spiculated right upper lobe pulmonary nodule, highly suspicious for malignancy. 2023: CT Chest stable to improved RUL Nodules, per EMR review and patient information, she was to have repeat CT scans of the chest every 3 months advised by pulmonology at [facility], Dr. Reyes     Past Medical History  She has a past medical history of COPD (chronic obstructive pulmonary disease) (Multi), Depression, DVT (deep venous thrombosis) (Multi), HTN (hypertension), Lung cancer (Multi), Migraines, and Panic disorder.    Surgical History  She has a past surgical history that includes CT angio abdomen pelvis w and or wo IV IV contrast (2016); MR angio neck w IV contrast (2021); CT angio coronary art with heartflow if score >30% (2023);  section, low transverse; Esophagogastroduodenoscopy; Colonoscopy; Exploratory laparotomy w/ bowel resection (2024); and Cystoscopy.     Social History  She reports that she has been smoking cigarettes. She has been exposed to tobacco smoke. She has never used smokeless tobacco. She reports that she does not currently use alcohol. She reports that she does not currently use drugs after having used the following drugs: Cocaine.    Family History  No family history on file.     Allergies  Dilaudid [hydromorphone],  "Iodinated contrast media, and Adhesive tape-silicones    Review of Systems   Constitutional:  Positive for activity change, appetite change and fatigue. Negative for chills, fever and unexpected weight change.   HENT:  Negative for nosebleeds and trouble swallowing.    Respiratory:  Positive for shortness of breath.    Cardiovascular:  Negative for chest pain, palpitations and leg swelling.   Gastrointestinal:  Positive for nausea and vomiting.   Genitourinary:  Negative for dysuria and hematuria.   Musculoskeletal:  Negative for arthralgias and myalgias.   Skin:  Negative for rash.   Neurological:  Negative for syncope.   Hematological:  Negative for adenopathy.        Physical Exam  Vitals and nursing note reviewed.   Constitutional:       General: She is not in acute distress.     Appearance: She is not toxic-appearing.   HENT:      Nose: No congestion.      Mouth/Throat:      Mouth: Mucous membranes are moist.   Eyes:      General: No scleral icterus.  Cardiovascular:      Rate and Rhythm: Tachycardia present.      Pulses: Normal pulses.   Pulmonary:      Effort: No respiratory distress.      Breath sounds: Normal breath sounds.   Abdominal:      Palpations: Abdomen is soft.      Tenderness: There is no abdominal tenderness.   Musculoskeletal:      Right lower leg: No edema.      Left lower leg: No edema.   Lymphadenopathy:      Cervical: No cervical adenopathy.   Skin:     Findings: No rash.   Neurological:      General: No focal deficit present.      Mental Status: She is alert and oriented to person, place, and time.          Last Recorded Vitals  Blood pressure 125/64, pulse (!) 111, temperature 36.3 °C (97.4 °F), temperature source Temporal, resp. rate 16, height 1.575 m (5' 2\"), weight 61.7 kg (136 lb), SpO2 98%.    Relevant Results  Results for orders placed or performed during the hospital encounter of 08/02/24 (from the past 96 hour(s))   CBC and Auto Differential   Result Value Ref Range    WBC 10.3 " 4.4 - 11.3 x10*3/uL    nRBC 0.3 (H) 0.0 - 0.0 /100 WBCs    RBC 3.36 (L) 4.00 - 5.20 x10*6/uL    Hemoglobin 8.5 (L) 12.0 - 16.0 g/dL    Hematocrit 28.3 (L) 36.0 - 46.0 %    MCV 84 80 - 100 fL    MCH 25.3 (L) 26.0 - 34.0 pg    MCHC 30.0 (L) 32.0 - 36.0 g/dL    RDW 19.6 (H) 11.5 - 14.5 %    Platelets 612 (H) 150 - 450 x10*3/uL    Neutrophils % 81.8 40.0 - 80.0 %    Immature Granulocytes %, Automated 0.8 0.0 - 0.9 %    Lymphocytes % 12.8 13.0 - 44.0 %    Monocytes % 4.5 2.0 - 10.0 %    Eosinophils % 0.0 0.0 - 6.0 %    Basophils % 0.1 0.0 - 2.0 %    Neutrophils Absolute 8.41 (H) 1.20 - 7.70 x10*3/uL    Immature Granulocytes Absolute, Automated 0.08 0.00 - 0.70 x10*3/uL    Lymphocytes Absolute 1.32 1.20 - 4.80 x10*3/uL    Monocytes Absolute 0.46 0.10 - 1.00 x10*3/uL    Eosinophils Absolute 0.00 0.00 - 0.70 x10*3/uL    Basophils Absolute 0.01 0.00 - 0.10 x10*3/uL   Basic metabolic panel   Result Value Ref Range    Glucose 130 (H) 74 - 99 mg/dL    Sodium 142 136 - 145 mmol/L    Potassium 3.4 (L) 3.5 - 5.3 mmol/L    Chloride 100 98 - 107 mmol/L    Bicarbonate 27 21 - 32 mmol/L    Anion Gap 18 10 - 20 mmol/L    Urea Nitrogen 8 6 - 23 mg/dL    Creatinine 0.94 0.50 - 1.05 mg/dL    eGFR 68 >60 mL/min/1.73m*2    Calcium 8.8 8.6 - 10.3 mg/dL   CBC and Auto Differential   Result Value Ref Range    WBC 15.3 (H) 4.4 - 11.3 x10*3/uL    nRBC 0.1 (H) 0.0 - 0.0 /100 WBCs    RBC 2.90 (L) 4.00 - 5.20 x10*6/uL    Hemoglobin 7.4 (L) 12.0 - 16.0 g/dL    Hematocrit 25.8 (L) 36.0 - 46.0 %    MCV 89 80 - 100 fL    MCH 25.5 (L) 26.0 - 34.0 pg    MCHC 28.7 (L) 32.0 - 36.0 g/dL    RDW 20.0 (H) 11.5 - 14.5 %    Platelets 531 (H) 150 - 450 x10*3/uL    Neutrophils % 68.7 40.0 - 80.0 %    Immature Granulocytes %, Automated 0.5 0.0 - 0.9 %    Lymphocytes % 17.7 13.0 - 44.0 %    Monocytes % 12.4 2.0 - 10.0 %    Eosinophils % 0.6 0.0 - 6.0 %    Basophils % 0.1 0.0 - 2.0 %    Neutrophils Absolute 10.50 (H) 1.20 - 7.70 x10*3/uL    Immature Granulocytes  Absolute, Automated 0.07 0.00 - 0.70 x10*3/uL    Lymphocytes Absolute 2.71 1.20 - 4.80 x10*3/uL    Monocytes Absolute 1.89 (H) 0.10 - 1.00 x10*3/uL    Eosinophils Absolute 0.09 0.00 - 0.70 x10*3/uL    Basophils Absolute 0.01 0.00 - 0.10 x10*3/uL   Basic metabolic panel   Result Value Ref Range    Glucose 86 74 - 99 mg/dL    Sodium 140 136 - 145 mmol/L    Potassium 2.8 (LL) 3.5 - 5.3 mmol/L    Chloride 102 98 - 107 mmol/L    Bicarbonate 27 21 - 32 mmol/L    Anion Gap 14 10 - 20 mmol/L    Urea Nitrogen 8 6 - 23 mg/dL    Creatinine 0.89 0.50 - 1.05 mg/dL    eGFR 73 >60 mL/min/1.73m*2    Calcium 8.0 (L) 8.6 - 10.3 mg/dL   Basic metabolic panel   Result Value Ref Range    Glucose 68 (L) 74 - 99 mg/dL    Sodium 139 136 - 145 mmol/L    Potassium 4.1 3.5 - 5.3 mmol/L    Chloride 100 98 - 107 mmol/L    Bicarbonate 26 21 - 32 mmol/L    Anion Gap 17 10 - 20 mmol/L    Urea Nitrogen 10 6 - 23 mg/dL    Creatinine 1.03 0.50 - 1.05 mg/dL    eGFR 61 >60 mL/min/1.73m*2    Calcium 8.4 (L) 8.6 - 10.3 mg/dL   CBC and Auto Differential   Result Value Ref Range    WBC 15.9 (H) 4.4 - 11.3 x10*3/uL    nRBC 0.1 (H) 0.0 - 0.0 /100 WBCs    RBC 3.25 (L) 4.00 - 5.20 x10*6/uL    Hemoglobin 8.1 (L) 12.0 - 16.0 g/dL    Hematocrit 27.9 (L) 36.0 - 46.0 %    MCV 86 80 - 100 fL    MCH 24.9 (L) 26.0 - 34.0 pg    MCHC 29.0 (L) 32.0 - 36.0 g/dL    RDW 20.2 (H) 11.5 - 14.5 %    Platelets 658 (H) 150 - 450 x10*3/uL    Neutrophils % 64.6 40.0 - 80.0 %    Immature Granulocytes %, Automated 0.5 0.0 - 0.9 %    Lymphocytes % 20.8 13.0 - 44.0 %    Monocytes % 12.9 2.0 - 10.0 %    Eosinophils % 1.1 0.0 - 6.0 %    Basophils % 0.1 0.0 - 2.0 %    Neutrophils Absolute 10.24 (H) 1.20 - 7.70 x10*3/uL    Immature Granulocytes Absolute, Automated 0.08 0.00 - 0.70 x10*3/uL    Lymphocytes Absolute 3.31 1.20 - 4.80 x10*3/uL    Monocytes Absolute 2.05 (H) 0.10 - 1.00 x10*3/uL    Eosinophils Absolute 0.18 0.00 - 0.70 x10*3/uL    Basophils Absolute 0.02 0.00 - 0.10 x10*3/uL    Morphology   Result Value Ref Range    RBC Morphology See Below     Polychromasia Mild     Hypochromia Mild     Target Cells Few    Basic metabolic panel   Result Value Ref Range    Glucose 101 (H) 74 - 99 mg/dL    Sodium 139 136 - 145 mmol/L    Potassium 3.4 (L) 3.5 - 5.3 mmol/L    Chloride 101 98 - 107 mmol/L    Bicarbonate 28 21 - 32 mmol/L    Anion Gap 13 10 - 20 mmol/L    Urea Nitrogen 12 6 - 23 mg/dL    Creatinine 0.91 0.50 - 1.05 mg/dL    eGFR 71 >60 mL/min/1.73m*2    Calcium 8.4 (L) 8.6 - 10.3 mg/dL   CBC and Auto Differential   Result Value Ref Range    WBC 16.0 (H) 4.4 - 11.3 x10*3/uL    nRBC 0.1 (H) 0.0 - 0.0 /100 WBCs    RBC 2.91 (L) 4.00 - 5.20 x10*6/uL    Hemoglobin 7.2 (L) 12.0 - 16.0 g/dL    Hematocrit 24.4 (L) 36.0 - 46.0 %    MCV 84 80 - 100 fL    MCH 24.7 (L) 26.0 - 34.0 pg    MCHC 29.5 (L) 32.0 - 36.0 g/dL    RDW 20.3 (H) 11.5 - 14.5 %    Platelets 689 (H) 150 - 450 x10*3/uL    Neutrophils % 67.4 40.0 - 80.0 %    Immature Granulocytes %, Automated 0.8 0.0 - 0.9 %    Lymphocytes % 19.5 13.0 - 44.0 %    Monocytes % 12.0 2.0 - 10.0 %    Eosinophils % 0.2 0.0 - 6.0 %    Basophils % 0.1 0.0 - 2.0 %    Neutrophils Absolute 10.75 (H) 1.20 - 7.70 x10*3/uL    Immature Granulocytes Absolute, Automated 0.12 0.00 - 0.70 x10*3/uL    Lymphocytes Absolute 3.11 1.20 - 4.80 x10*3/uL    Monocytes Absolute 1.92 (H) 0.10 - 1.00 x10*3/uL    Eosinophils Absolute 0.03 0.00 - 0.70 x10*3/uL    Basophils Absolute 0.02 0.00 - 0.10 x10*3/uL   Morphology   Result Value Ref Range    RBC Morphology See Below     Polychromasia Mild     Hypochromia Mild     RBC Fragments Few     Teardrop Cells Few     Giant Platelets Few    Basic metabolic panel   Result Value Ref Range    Glucose 92 74 - 99 mg/dL    Sodium 139 136 - 145 mmol/L    Potassium 3.4 (L) 3.5 - 5.3 mmol/L    Chloride 102 98 - 107 mmol/L    Bicarbonate 27 21 - 32 mmol/L    Anion Gap 13 10 - 20 mmol/L    Urea Nitrogen 10 6 - 23 mg/dL    Creatinine 0.86 0.50 - 1.05  mg/dL    eGFR 76 >60 mL/min/1.73m*2    Calcium 8.4 (L) 8.6 - 10.3 mg/dL            Component  Ref Range & Units 2 wk ago 1 mo ago 3 mo ago 1 yr ago   Iron  35 - 150 ug/dL 13 Low  11 Low  10 Low  10 Low  CM   UIBC  110 - 370 ug/dL 229 417 High  >450 High     TIBC  240 - 445 ug/dL 242 428 R, CM >460 Abnormal  CM   % Saturation  25 - 45 % 5 Low  3 Low  R, CM NOT CALC. CM        Lab Results   Component Value Date    FERRITIN 36 07/25/2024     US left upper ext 6/27/24: CONCLUSIONS:  Right Upper Venous: The subclavian vein demonstrates a pulsatile flow.  Left Upper Venous: There is acute non-occlusive deep vein thrombosis visualized in the brachial vein. The remainder of the left arm is negative for deep vein thrombosis.     Study Result    Narrative & Impression   Interpreted By:  Gabe Gage,   STUDY:  CT ANGIO CHEST FOR PULMONARY EMBOLISM;  6/28/2024 7:04 am      INDICATION:  62 y/o   F with  Signs/Symptoms:PE.      LIMITATIONS:  None.      ACCESSION NUMBER(S):  [id number]      ORDERING CLINICIAN:  DELPHINE VELÁZQUEZ      TECHNIQUE:  After the administration of intravenous nonionic contrast,  thin-section arterial phase images were obtained  from the thoracic  inlet down through the iliac crest. Sagittal and coronal  reconstruction images were generated. Axial and coronal MIP vascular  reconstruction images were also generated.   Mediastinal, lung, bone,  and liver windows were reviewed. 75 ML Omnipaque 350          COMPARISON:  Most recent prior from 06/13/2024. correlation with CT scans of the  abdomen and pelvis, most recent from 06/26/2024.      FINDINGS:  CHEST WALL/BASE OF THE NECK:  The chest wall was grossly intact.  No thyromegaly or thyroid mass.      MEDIASTINUM/ALESSANDRO:  No suspicious mediastinal, hilar, or axillary mass or adenopathy.  No cardiomegaly.  No pericardial effusion.  No thoracic aortic aneurysm or dissection.  No pulmonary artery filling defect.      LUNGS/ PLEURA/ AND TRACHEA:  Stable  underlying fat containing posteromedial left-sided  diaphragmatic hernia. Band of atelectasis across the posterior right  lung base. Mild atelectasis at the posteromedial left lung base.  Underlying emphysema with upper lobe predominance. No mass or  pneumonia in either lung. Tiny bilateral pleural effusions. No  pneumothorax. The trachea was grossly intact.      BONES:  No destructive lytic or blastic bone lesion.      UPPER ABDOMEN:  There is an NG tube in place with distal tip in the distal gastric  body. Cholelithiasis. Contracted gallbladder. The imaged portions of  the liver   were unremarkable. There are subtle stable small  hypodensities in the spleen compared to 06/26/2024. Imaged kidneys  and adrenal glands were intact. No upper abdominal ascites. The  imaged stomach and large bowel. There is a bilobed hypodensity  adjacent the pancreatic tail the left abdomen measuring 24 mm AP x 17  mm transversely on image 287, measuring 24 Hounsfield units of CT  density. This is stable compared to the most recent prior CT scan  abdomen and pelvis. It is also grossly stable compared to 06/20/2024  and was not present on 03/22/2016.      IMPRESSION:  No CT evidence of pulmonary embolism in the current exam.      Emphysema. Band of atelectasis at the right lung base. Confluent  atelectasis at the posteromedial left lung base. Tiny bilateral  pleural effusions.. No mass or pneumonia in either lung.      NG tube in place as described.      Cholelithiasis.      Stable bilobed hypodensity adjacent to the pancreatic tail.  Pancreatic pseudocyst versus cystic pancreatic neoplasms. In  addition, there are subtle stable nonspecific splenic hypodensities  which could be splenic hemangiomas. Recommend further evaluation with  MRI the pancreas and spleen.      MACRO:  None      Signed by: Gabe Gage 6/28/2024 7:55 AM  Dictation workstation:   [id number]       I spent 90 minutes in the professional and overall care of this  patient.

## 2024-08-09 NOTE — CONSULTS
Inpatient consult to Infectious Diseases  Consult performed by: Juliann Espino DO  Consult ordered by: Morena Landon MD          Referred by Dr Barbi Vee MD: Ann Muñoz MD    Reason For Consult  leukocytosis    History Of Present Illness  Fernando Cuevas is a 63 y.o. female presenting with abd pain    She had emergency surgery ~1 month ago for perforated bowel with small bowel resection. She presented with severe epigastric pain. No free air or abscess. She was started on pip tazo    CT angio with small fat containing left ant. Abd wall hernia. She was constipated and now having BM.    Also with possible PE    She has had waxing and waning leukocytosis that resolved on  only to worsen over the next few days. She has remained on the abx    However, she was also started on steroids at that time. She feels better overall. No fevers    There was concern for ?infectious or inflammatory changes at the anastomosis site       Past Medical History  She has a past medical history of COPD (chronic obstructive pulmonary disease) (Multi), Depression, DVT (deep venous thrombosis) (Multi), HTN (hypertension), Lung cancer (Multi), Migraines, and Panic disorder.    Surgical History  She has a past surgical history that includes CT angio abdomen pelvis w and or wo IV IV contrast (2016); MR angio neck w IV contrast (2021); CT angio coronary art with heartflow if score >30% (2023);  section, low transverse; Esophagogastroduodenoscopy; Colonoscopy; Exploratory laparotomy w/ bowel resection (2024); and Cystoscopy.     Social History  She reports that she has been smoking cigarettes. She has been exposed to tobacco smoke. She has never used smokeless tobacco. She reports that she does not currently use alcohol. She reports that she does not currently use drugs after having used the following drugs: Cocaine.   Travel Screening       Question Response    Do you have any of the  following new or worsening symptoms? None of these    In the last 10 days, have you been in contact with someone who was confirmed or suspected to have Coronavirus/COVID-19? No / Unsure    Have you had a COVID-19 viral test in the last 10 days? No    Have you traveled internationally or domestically in the last month? No          Travel History   Travel since 07/09/24    No documented travel since 07/09/24           Family History  No family history on file.  Allergies  Dilaudid [hydromorphone], Iodinated contrast media, and Adhesive tape-silicones     Immunization History   Administered Date(s) Administered    Moderna SARS-CoV-2 Vaccination 03/26/2021, 04/23/2021    Pfizer COVID-19 vaccine, Fall 2023, 12 years and older, (30mcg/0.3mL) 11/01/2023    Pfizer COVID-19 vaccine, bivalent, age 12 years and older (30 mcg/0.3 mL) 02/08/2023    Pfizer Gray Cap SARS-CoV-2 04/29/2022, 08/24/2022     Review of Systems  Abd pain improving, no cough or SOB     Physical Exam  General: AAOx3, NAD, nontoxic appearing  Eyes: PERRL, EOMI, no scleral icterus  ENT: no oral thrush or lesions  CV: RRR, +S1/S2  Resp: lungs CTA b/l, normal resp effort  Abd: soft, nontender, nondistended, incision c/d/i  : no easton  Ext: no edema  Skin: no rashes or wounds     Range of Vitals (last 24 hours)  Heart Rate:  [106-119]   Temp:  [36.1 °C (96.9 °F)-36.6 °C (97.9 °F)]   Resp:  [16]   BP: (125-178)/(64-86)   SpO2:  [95 %-100 %]     Relevant Results  Lab Results   Component Value Date    WBC 16.0 (H) 08/09/2024    HGB 7.2 (L) 08/09/2024    HCT 24.4 (L) 08/09/2024    MCV 84 08/09/2024     (H) 08/09/2024      Results from last 72 hours   Lab Units 08/09/24  1124   SODIUM mmol/L 139   POTASSIUM mmol/L 3.4*   CHLORIDE mmol/L 102   CO2 mmol/L 27   BUN mg/dL 10   CREATININE mg/dL 0.86   GLUCOSE mg/dL 92   CALCIUM mg/dL 8.4*   ANION GAP mmol/L 13   EGFR mL/min/1.73m*2 76         Estimated Creatinine Clearance: 57.8 mL/min (by C-G formula based on  "SCr of 0.86 mg/dL).  C-Reactive Protein   Date/Time Value Ref Range Status   06/16/2024 06:36 AM 0.57 <1.00 mg/dL Final     Sedimentation Rate   Date/Time Value Ref Range Status   06/16/2024 06:36 AM 35 (H) 0 - 30 mm/h Final     No results found for: \"HIV1X2\", \"HIVCONF\", \"[id number]\"  No results found for: \"HCVPCRQUANT\"    Cultures/Micro  No results found for the last 14 days.  8/3 blood cx: negative       Imaging:  CTA A/P  IMPRESSION:  1. Small fat containing left anterior abdominal wall hernia with  stranding of the herniated mesenteric fat suggesting incarceration of  the herniated fat with inflammatory changes.  2. 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 The SMA and inferior mesenteric artery are  also widely patent.  3. Severe emphysematous changes of the partially visualized lower  lung lobes.  4. Additional incidental findings are as described above.    Assessment:  Leukocytosis--multifactorial--PE, steroids  Abd pain  Recent small bowel resection    Plan/Recommendations:  Would not recommend long term abx at this time. She is improving  Has completed ~1 week of pip tazo which is adequate for a colitis/inflammatory changes at the anastomotic site without abscess  Would be ok for discharge from my standpoint without additional abx    Will sign off, please recall if any questions    Discussed with Dr Barbi Espino, DO  [facility]  #[phone]      "

## 2024-08-09 NOTE — PROGRESS NOTES
"Fernando Cuevas is a 63 y.o. female on day 6 of admission presenting with Generalized abdominal pain.    Subjective   White count continues to slightly go up today at 16 but she has no new symptoms, has had multiple bowel movements as expected, has been afebrile.  Notified hematology about patient's consult I did switch the patient over to Lovenox last evening after VQ scan came back as high probability for PE.  No complains of dyspnea on exertion       Objective     Physical Exam  Vitals reviewed.   Constitutional:       Appearance: Normal appearance.   HENT:      Head: Normocephalic.      Right Ear: Tympanic membrane normal.      Nose: Nose normal.   Cardiovascular:      Rate and Rhythm: Normal rate and regular rhythm.   Pulmonary:      Breath sounds: Normal breath sounds.      Comments: Diminished but clear no wheezing appreciated  Abdominal:      Comments: Distened but normal bs     Skin:     General: Skin is warm.      Capillary Refill: Capillary refill takes less than 2 seconds.   Neurological:      General: No focal deficit present.      Mental Status: She is alert.         Last Recorded Vitals  Blood pressure 178/86, pulse (!) 111, temperature 36.3 °C (97.4 °F), temperature source Temporal, resp. rate 16, height 1.575 m (5' 2\"), weight 61.7 kg (136 lb), SpO2 98%.  Intake/Output last 3 Shifts:  I/O last 3 completed shifts:  In: 960 (15.6 mL/kg) [P.O.:960]  Out: - (0 mL/kg)   Weight: 61.7 kg     Relevant Results  Results for orders placed or performed during the hospital encounter of 08/02/24 (from the past 24 hour(s))   Basic metabolic panel   Result Value Ref Range    Glucose 101 (H) 74 - 99 mg/dL    Sodium 139 136 - 145 mmol/L    Potassium 3.4 (L) 3.5 - 5.3 mmol/L    Chloride 101 98 - 107 mmol/L    Bicarbonate 28 21 - 32 mmol/L    Anion Gap 13 10 - 20 mmol/L    Urea Nitrogen 12 6 - 23 mg/dL    Creatinine 0.91 0.50 - 1.05 mg/dL    eGFR 71 >60 mL/min/1.73m*2    Calcium 8.4 (L) 8.6 - 10.3 mg/dL   CBC and Auto " Differential   Result Value Ref Range    WBC 16.0 (H) 4.4 - 11.3 x10*3/uL    nRBC 0.1 (H) 0.0 - 0.0 /100 WBCs    RBC 2.91 (L) 4.00 - 5.20 x10*6/uL    Hemoglobin 7.2 (L) 12.0 - 16.0 g/dL    Hematocrit 24.4 (L) 36.0 - 46.0 %    MCV 84 80 - 100 fL    MCH 24.7 (L) 26.0 - 34.0 pg    MCHC 29.5 (L) 32.0 - 36.0 g/dL    RDW 20.3 (H) 11.5 - 14.5 %    Platelets 689 (H) 150 - 450 x10*3/uL    Neutrophils % 67.4 40.0 - 80.0 %    Immature Granulocytes %, Automated 0.8 0.0 - 0.9 %    Lymphocytes % 19.5 13.0 - 44.0 %    Monocytes % 12.0 2.0 - 10.0 %    Eosinophils % 0.2 0.0 - 6.0 %    Basophils % 0.1 0.0 - 2.0 %    Neutrophils Absolute 10.75 (H) 1.20 - 7.70 x10*3/uL    Immature Granulocytes Absolute, Automated 0.12 0.00 - 0.70 x10*3/uL    Lymphocytes Absolute 3.11 1.20 - 4.80 x10*3/uL    Monocytes Absolute 1.92 (H) 0.10 - 1.00 x10*3/uL    Eosinophils Absolute 0.03 0.00 - 0.70 x10*3/uL    Basophils Absolute 0.02 0.00 - 0.10 x10*3/uL   Morphology   Result Value Ref Range    RBC Morphology See Below     Polychromasia Mild     Hypochromia Mild     RBC Fragments Few     Teardrop Cells Few     Giant Platelets Few    Basic metabolic panel   Result Value Ref Range    Glucose 92 74 - 99 mg/dL    Sodium 139 136 - 145 mmol/L    Potassium 3.4 (L) 3.5 - 5.3 mmol/L    Chloride 102 98 - 107 mmol/L    Bicarbonate 27 21 - 32 mmol/L    Anion Gap 13 10 - 20 mmol/L    Urea Nitrogen 10 6 - 23 mg/dL    Creatinine 0.86 0.50 - 1.05 mg/dL    eGFR 76 >60 mL/min/1.73m*2    Calcium 8.4 (L) 8.6 - 10.3 mg/dL       Imaging Results    X ray chest/abdomen:  IMPRESSION:  No acute cardiopulmonary disease.     V/q scan:  IMPRESSION:  There are two or more segmental perfusion defects as described above  suggestive of acute pulmonary embolism (high probability).      The interpretation above is based on modified PIOPED II and PISAPED  criteria.      I personally reviewed the images/study and I agree with the findings  as stated by Christopher Florez MD (resident). This  study was  interpreted at [facility],    Cta abd/pelvis:  pending    Medications:  albuterol, 2.5 mg, nebulization, TID  azithromycin, 250 mg, oral, Once per day on Monday Wednesday Friday  bisacodyl, 10 mg, rectal, Once  budesonide, 0.25 mg, nebulization, BID  busPIRone, 5 mg, oral, BID  calcium carbonate, 1,250 mg, oral, BID  dilTIAZem CD, 240 mg, oral, Daily  enoxaparin, 1 mg/kg, subcutaneous, q12h  formoterol, 20 mcg, nebulization, BID  miconazole, 1 applicator, vaginal, Nightly  mirtazapine, 15 mg, oral, Nightly  montelukast, 10 mg, oral, Nightly  nystatin, 5 mL, Swish & Swallow, TID  oxybutynin, 2.5 mg, oral, BID  pantoprazole, 40 mg, oral, Daily before breakfast  piperacillin-tazobactam, 3.375 g, intravenous, q6h  polyethylene glycol, 17 g, oral, BID  predniSONE, 40 mg, oral, Daily  sennosides-docusate sodium, 1 tablet, oral, BID  sucralfate, 1 g, oral, 4x daily  thiamine, 100 mg, oral, Daily  tiotropium, 2 puff, inhalation, Daily       PRN medications: acetaminophen **OR** acetaminophen **OR** acetaminophen, albuterol, diphenhydrAMINE, hydrOXYzine HCL, morphine, morphine, ondansetron **OR** ondansetron, oxygen, prochlorperazine **OR** prochlorperazine **OR** prochlorperazine     Assessment/Plan   evidence of wall thickening of small bowel loop in the right lower quadrant at site of anastomosis which may be infectious or inflammatory in etiology with presentation of abdominal pain  -White count slight elevation unclear cause the patient has been on prednisone for a few days now and then white count elevated.  -had bms tolerating regular diet    2. H/o DVT  -on eliquis     3.  Without COPD exacerbation  -continue inhaler and prn duonebs  -discontinue prednisone     4. Leukocytosis  -worsening at 16k  -continue iv zosyn  -bcx pending    5.  High Probability of PE noted on VQ scan  -Patient has been receiving Eliquis here but she did have to stop it briefly while she had a GI  bleed but then was resumed back on it I did consult hematology to see if this is failure of Eliquis I did stop the Eliquis for now transition to subcutaneous Lovenox I will get final recommendations from hematology      DVT Prophylaxis:  Marcus Landon MD  [facility]

## 2024-08-09 NOTE — PROGRESS NOTES
"Fernando Cuevas is a 63 y.o. female on day 6 of admission presenting with Generalized abdominal pain.    Subjective   Reports having multiple Bms and tolerating diet. Felt very dizzy and almost passed out this morning. VSS, afebrile.        Objective     Physical Exam  Constitutional:       Appearance: Normal appearance.   Cardiovascular:      Rate and Rhythm: Normal rate.   Pulmonary:      Effort: Pulmonary effort is normal.   Abdominal:      Comments: Abdomen very soft, non tender, mildly distened.    Genitourinary:     Comments: Voiding without difficulty   Musculoskeletal:         General: Normal range of motion.   Skin:     General: Skin is warm and dry.   Neurological:      Mental Status: She is oriented to person, place, and time.   Psychiatric:         Mood and Affect: Mood normal.         Behavior: Behavior normal.         Last Recorded Vitals  Blood pressure 178/86, pulse (!) 111, temperature 36.3 °C (97.4 °F), temperature source Temporal, resp. rate 16, height 1.575 m (5' 2\"), weight 61.7 kg (136 lb), SpO2 98%.  Intake/Output last 3 Shifts:  I/O last 3 completed shifts:  In: 960 (15.6 mL/kg) [P.O.:960]  Out: - (0 mL/kg)   Weight: 61.7 kg     Medications:   Scheduled medications  albuterol, 2.5 mg, nebulization, TID  azithromycin, 250 mg, oral, Once per day on Monday Wednesday Friday  bisacodyl, 10 mg, rectal, Once  budesonide, 0.25 mg, nebulization, BID  busPIRone, 5 mg, oral, BID  calcium carbonate, 1,250 mg, oral, BID  dilTIAZem CD, 240 mg, oral, Daily  enoxaparin, 1 mg/kg, subcutaneous, q12h  formoterol, 20 mcg, nebulization, BID  miconazole, 1 applicator, vaginal, Nightly  mirtazapine, 15 mg, oral, Nightly  montelukast, 10 mg, oral, Nightly  nystatin, 5 mL, Swish & Swallow, TID  oxybutynin, 2.5 mg, oral, BID  pantoprazole, 40 mg, oral, Daily before breakfast  piperacillin-tazobactam, 3.375 g, intravenous, q6h  polyethylene glycol, 17 g, oral, BID  potassium chloride CR, 40 mEq, oral, Once  predniSONE, " 40 mg, oral, Daily  sennosides-docusate sodium, 1 tablet, oral, BID  sucralfate, 1 g, oral, 4x daily  thiamine, 100 mg, oral, Daily  tiotropium, 2 puff, inhalation, Daily      Continuous medications     PRN medications  PRN medications: acetaminophen **OR** acetaminophen **OR** acetaminophen, albuterol, diphenhydrAMINE, hydrOXYzine HCL, morphine, morphine, ondansetron **OR** ondansetron, oxygen, prochlorperazine **OR** prochlorperazine **OR** prochlorperazine    Relevant Results  Results for orders placed or performed during the hospital encounter of 08/02/24 (from the past 24 hour(s))   Basic metabolic panel   Result Value Ref Range    Glucose 101 (H) 74 - 99 mg/dL    Sodium 139 136 - 145 mmol/L    Potassium 3.4 (L) 3.5 - 5.3 mmol/L    Chloride 101 98 - 107 mmol/L    Bicarbonate 28 21 - 32 mmol/L    Anion Gap 13 10 - 20 mmol/L    Urea Nitrogen 12 6 - 23 mg/dL    Creatinine 0.91 0.50 - 1.05 mg/dL    eGFR 71 >60 mL/min/1.73m*2    Calcium 8.4 (L) 8.6 - 10.3 mg/dL   CBC and Auto Differential   Result Value Ref Range    WBC 16.0 (H) 4.4 - 11.3 x10*3/uL    nRBC 0.1 (H) 0.0 - 0.0 /100 WBCs    RBC 2.91 (L) 4.00 - 5.20 x10*6/uL    Hemoglobin 7.2 (L) 12.0 - 16.0 g/dL    Hematocrit 24.4 (L) 36.0 - 46.0 %    MCV 84 80 - 100 fL    MCH 24.7 (L) 26.0 - 34.0 pg    MCHC 29.5 (L) 32.0 - 36.0 g/dL    RDW 20.3 (H) 11.5 - 14.5 %    Platelets 689 (H) 150 - 450 x10*3/uL    Neutrophils % 67.4 40.0 - 80.0 %    Immature Granulocytes %, Automated 0.8 0.0 - 0.9 %    Lymphocytes % 19.5 13.0 - 44.0 %    Monocytes % 12.0 2.0 - 10.0 %    Eosinophils % 0.2 0.0 - 6.0 %    Basophils % 0.1 0.0 - 2.0 %    Neutrophils Absolute 10.75 (H) 1.20 - 7.70 x10*3/uL    Immature Granulocytes Absolute, Automated 0.12 0.00 - 0.70 x10*3/uL    Lymphocytes Absolute 3.11 1.20 - 4.80 x10*3/uL    Monocytes Absolute 1.92 (H) 0.10 - 1.00 x10*3/uL    Eosinophils Absolute 0.03 0.00 - 0.70 x10*3/uL    Basophils Absolute 0.02 0.00 - 0.10 x10*3/uL   Morphology   Result Value  Ref Range    RBC Morphology See Below     Polychromasia Mild     Hypochromia Mild     RBC Fragments Few     Teardrop Cells Few     Giant Platelets Few      CT angio abdomen pelvis w and or wo IV IV contrast    Result Date: 8/9/2024  Interpreted By:  Randy Sung and Dervishi Mario STUDY: CT ANGIO ABDOMEN PELVIS W AND/OR WO IV IV CONTRAST;  8/6/2024 12:19 am   INDICATION: Signs/Symptoms:r/o ischemic bowel.   COMPARISON: CT abdomen pelvis 07/24/2024. 07/25/2024, 03/22/2016.   ACCESSION NUMBER(S): [id number]   ORDERING CLINICIAN: YOSELYN HILL   TECHNIQUE: Axial non-contrast images of the  abdomen, and pelvis with coronal and sagittal reformatted images. Axial CT images of the abdomen and pelvis after the intravenous administration of 90 mL of Omnipaque 350 using angiographic technique with coronal and sagittal reformatted images. MIP images were provided and reviewed. 3D reconstructions were created on a separate independent workstation and reviewed.   FINDINGS: VASCULATURE:   ABDOMINAL AORTA:   No evidence of acute aortic pathology on noncontrast and contrasted imaging.   No abdominal aortic aneurysm .   Mild-to-moderate abdominal aortic atherosclerosis.   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 There is retrograde opacification of the celiac artery branches via the SMA collateralization.   Superior mesenteric artery: Widely patent.   Renal arteries: Mild atherosclerotic calcification otherwise widely patent.   Inferior mesenteric artery: Patent.   Iliac arteries: Bilateral common iliac arteries demonstrate scattered eccentric atherosclerotic calcification with no hemodynamically significant vessel stenosis. Bilateral external iliac arteries are widely patent.   Femoral arteries: Widely patent.   PORTAL - VENOUS: No significant abnormality of the portal venous system.     NON-VASCULAR   LOWER CHEST: The visualized lung base demonstrates  bibasilar atelectasis. There are severe emphysematous changes. The heart is normal in size without pericardial effusion. No pleural effusion is present. Visualized distal esophagus appears normal.   ABDOMEN:   LIVER: The liver is normal in size. There are few scattered subcentimeter focal liver lesions again noted which are felt to represent. There is re-demonstration of a hypodense focal lesion in hepatic segment 4A by the falciform ligament which measures 1.4 cm in largest diameter which remains unchanged dating back to imaging from 03/22/2016 most likely of benign etiology. No additional liver lesions.   BILE DUCTS: The intrahepatic and extrahepatic ducts are not dilated.   GALLBLADDER: No calcified stones. No wall thickening.   PANCREAS: The pancreas appears unremarkable without evidence of ductal dilatation or masses.   SPLEEN: There are new multiple wedge-shaped focal areas of hypodensities throughout the splenic parenchyma compatible with infarcts. No splenic lesions.   ADRENAL GLANDS: Within normal limits.   KIDNEYS AND URETERS: The kidneys are normal in size and enhance symmetrically. No hydroureteronephrosis or nephroureterolithiasis is identified.   PELVIS:   BLADDER: Within normal limits.   REPRODUCTIVE ORGANS: No pelvic masses.   BOWEL: The stomach is unremarkable. Re-demonstration of prior surgical changes from bowel resection. There is a focal dilatation of the site of anastomosis. Otherwise the small and large bowel are within normal limits in caliber and demonstrates no wall thickening. There is a severe amount retained stool throughout the colon.. The appendix is not definitely visualized. There is however no pericecal stranding or fluid.   PERITONEUM/RETROPERITONEUM/LYMPH NODES: There is no free or loculated fluid collection, no free intraperitoneal air. The retroperitoneum appears normal. No enlarged mesenteric lymph nodes.   BONES AND ABDOMINAL WALL: No suspicious osseous lesions are identified.  Degenerative discogenic disease is noted in the lower thoracic and lumbar spine. There is a small left anterior abdominal wall defect with herniated fat which demonstrates stranding. No herniated loops of bowel. Remaining portions of the abdominal wall soft tissues are within normal limits.       1. Small fat containing left anterior abdominal wall hernia with stranding of the herniated mesenteric fat suggesting incarceration of the herniated fat with inflammatory changes. 2. 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 The SMA and inferior mesenteric artery are also widely patent. 3. Severe emphysematous changes of the partially visualized lower lung lobes. 4. Additional incidental findings are as described above.     I personally reviewed the images/study and I agree with the findings as stated by Resident Anthony Romano MD.   MACRO: None.   Signed by: Randy Sung 8/9/2024 6:00 AM Dictation workstation:   [id number]    NM Lung perfusion with spect/ct    Result Date: 8/8/2024  Interpreted By:  Albert Shaw and Elsamaloty Mazzin STUDY: NM LUNG PERFUSION WITH SPECT/CT;  8/8/2024 1:55 pm   INDICATION: Signs/Symptoms:dyspnea.   COMPARISON: None   ACCESSION NUMBER(S): [id number]   ORDERING CLINICIAN: MARCOS REECE   TECHNIQUE: DIVISION OF NUCLEAR MEDICINE PERFUSION LUNG SCANS   Multiple perfusion images of the lungs were acquired after the intravenous administration of 4.4 mCi of Tc-99m macroaggregated albumin (MAA). In addition, SPECT/CT of the chest was performed.   FINDINGS: Perfusion images show multiple segmental and subsegmental defects in the right lung as well as a subsegmental defect in the left posterior lung.       There are two or more segmental perfusion defects as described above suggestive of acute pulmonary embolism (high probability).   The interpretation above is based on modified  PIOPED II and PISAPED criteria.   I personally reviewed the images/study and I agree with the findings as stated by Christopher Florez MD (resident). This study was interpreted at [place], [place].   MACRO: Christopher Florez discussed the significance and urgency of this critical finding by telephone with  MARCOS REECE on 8/8/2024 at 2:17 pm.  (**-RCF-**) Findings:  See findings.     Signed by: Albert Shaw 8/8/2024 2:23 PM Dictation workstation:   [id number]    XR abdomen 1 view    Result Date: 8/7/2024  Interpreted By:  Gabe Gage, STUDY: XR ABDOMEN 1 VIEW;  8/7/2024 11:25 am   INDICATION: Signs/Symptoms:abdominal distension.   COMPARISON: CT scan abdomen and pelvis from 08/06/2024. KUB from 08/04/2024.   ACCESSION NUMBER(S): [id number]   ORDERING CLINICIAN: MINERVA AVILEZ   TECHNIQUE: Single supine view of the abdomen and pelvis was obtained.     FINDINGS: Previous NG tube has been removed. There was   a moderate amount of retained colonic stool and gas.  . There are several loops of gas-filled nondilated small bowel in the central abdomen. Mild gas in the stomach.. There was no gross organomegaly. There were no abnormal calcifications. No destructive bone lesion. The extreme lung bases were clear.       NG tube is been removed.   Moderate stool throughout the colon and rectum.     MACRO: None   Signed by: Gabe Gage 8/7/2024 11:50 AM Dictation workstation:   [id number]    Electrocardiogram, 12-lead PRN ACS symptoms    Result Date: 8/5/2024  Sinus tachycardia Otherwise normal ECG When compared with ECG of 08-JUL-2024 04:51, No significant change was found See ED provider note for full interpretation and clinical correlation Confirmed by Suzette De Luna ([id number]) on 8/5/2024 2:42:48 PM    XR abdomen 1 view    Result Date: 8/5/2024  Interpreted By:  aGbe Gage, STUDY: XR ABDOMEN 1 VIEW;  8/4/2024 8:09 am   INDICATION: Signs/Symptoms:SBO.   COMPARISON:  Plain films from 08/03/2024. CT scan abdomen and pelvis from 08/02/2024.   ACCESSION NUMBER(S): [id number]   ORDERING CLINICIAN: ANITRA APARICIO   TECHNIQUE: Single supine view of the abdomen and pelvis was obtained.     FINDINGS: There was   an NG tube in place with distal tip in the mid stomach. Mild-to-moderate diffuse retained colonic stool with scattered colonic gas. No suspicious small bowel dilatation..   There was no gross organomegaly. There were no abnormal calcifications. No destructive bone lesion. The extreme lung bases were clear.       NG tube in place as described.   Retained colonic stool and gas.   MACRO: None   Signed by: Gabe Gage 8/5/2024 8:44 AM Dictation workstation:   [id number]    XR chest abdomen for OG NG placement    Result Date: 8/3/2024  STUDY: Single View Chest and Abdomen Radiographs;  8/3/2024 3:39 AM INDICATION: NG tube placement. COMPARISON: 6/20/2024 CT Abdomen, 6/18/2024 XR Chest one view. ACCESSION NUMBER(S): [id number] ORDERING CLINICIAN: TECHNIQUE:  Single view of the chest and one view(s) of the abdomen. FINDINGS:  CARDIOMEDIASTINAL SILHOUETTE: Cardiomediastinal silhouette is normal in size and configuration. Enteric tube extends into the gastric fundus.  LUNGS: Lungs are clear.  ABDOMEN: Bowel gas pattern is normal without obstruction or ileus.  There are no convincing calculi or abnormal calcifications.  BONES: No acute osseous changes    No acute cardiopulmonary disease. Signed by Darshan David    CT abdomen pelvis wo IV contrast    Result Date: 8/2/2024  Interpreted By:  Paulie Delarosa, STUDY: CT ABDOMEN PELVIS WO IV CONTRAST;  8/2/2024 10:02 pm   INDICATION: Signs/Symptoms:Abdominal distention, diffuse abdominal pain, history of GI bleed.   COMPARISON: 7/25/2024   ACCESSION NUMBER(S): [id number]   ORDERING CLINICIAN: DONOVAN ALBA   TECHNIQUE: Contiguous axial images of the abdomen and pelvis were obtained without intravenous contrast. Coronal and sagittal  reformatted images were obtained from the axial images.   FINDINGS: There is limited evaluation of the lung bases. There is pulmonary emphysema. There is bronchial wall thickening and opacity right lower lobe which measure 1.4 cm. There is also a 1.2 cm opacity in the medial left lower lobe.   Evaluation of the abdomen and pelvis is limited the secondary to lack of intravenous contrast. Limited evaluation for liver mass on noncontrast examination. There is cholelithiasis.   No splenomegaly.   Mild nodular thickening left adrenal gland. The right adrenal gland appears unremarkable.   There is stable appearance of previously described 11 mm density adjacent to the pancreatic tail as described on prior examination.   No evidence of renal calculus or hydronephrosis.   Atherosclerotic calcification of the abdominal aorta and iliac arteries.   There is stable edema in the subcutaneous fat in the left medial anterior abdominal wall.   There is redemonstration of postsurgical change of prior partial bowel resection. There is evidence of thickening of the small bowel loop in the right lower quadrant at site of anastomosis. There is mild hyperdensity in the bowel lumen at this level and hemorrhage is not excluded. No evidence of bowel obstruction or acute appendicitis. There is colonic diverticulosis without evidence of acute diverticulitis.   Urinary bladder appears unremarkable.   Limited evaluation of the uterus and adnexa.   No significant free abdominal or pelvic fluid.       Postsurgical change of prior partial small bowel resection. There is evidence of wall thickening of small bowel loop in the right lower quadrant at site of anastomosis which may be infectious or inflammatory in etiology. There is also mild hyperdensity in the bowel lumen and mild hemorrhage within the bowel is not excluded.   No evidence of bowel obstruction or acute appendicitis. Colonic diverticulosis without evidence of acute diverticulitis.    "Stable appearance of previously described 11 mm density adjacent pancreatic tail, and attention on progress imaging recommended.   Pulmonary emphysema. Bronchial wall thickening and opacities in the lower lungs which has somewhat nodular appearance measuring 13 mm in the right lower lobe and 12 mm in the left lower lobe and may relate to infectious/inflammatory process, however attention on progress imaging recommended to assure resolution and exclude other pathology.   MACRO: None   Signed by: Paulie Delarosa 8/2/2024 11:09 PM Dictation workstation:   [id number]      Assessment/Plan   Principal Problem:    Generalized abdominal pain    Fernando Cuevas is a 64 yo female who is admitted with abdominal pain. She recently underwent ex-lap  with pSBR with Dr Liang 6/21, and was found to have perforated viscus at the time. CT angio was performed 8/7 and showed \"Small fat containing left anterior abdominal wall hernia with stranding of the herniated mesenteric fat suggesting incarceration of the herniated fat with inflammatory changes.\"  She is now having Bms and tolerating a regular diet. She was also found to have probable PE on VQ scan yesterday and was started on therapeutic lovenox.     Plan:   Abdominal pain   - continue regular diet  - bowel regimen   - continue treatment for PE  - supportive care per primary team    Dispo: surgery to s/o, please reach out with any questions.  Discussed with Dr Liang     I spent 35 minutes in the professional and overall care of this patient.      Earline Stahl, APRN-CNP      "

## 2024-08-09 NOTE — NURSING NOTE
Rapid Response Nurse Note:     Due to bedside nurse difficulty obtaining access, an ultrasound-guided IV was placed.  Access was obtained after 1 attempt an 20 gauge was placed in the left Upper Arm.  Confirmed via ultrasound, blood return noted.  Saline lock placed.  Tegaderm used to secure IV. Patient tolerated the procedure well. Education provided to bedside nurse and patient. No questions/concerns at this time.

## 2024-08-10 LAB
ANION GAP SERPL CALC-SCNC: 15 MMOL/L (ref 10–20)
BASOPHILS # BLD AUTO: 0.02 X10*3/UL (ref 0–0.1)
BASOPHILS NFR BLD AUTO: 0.1 %
BUN SERPL-MCNC: 12 MG/DL (ref 6–23)
CALCIUM SERPL-MCNC: 8.6 MG/DL (ref 8.6–10.3)
CHLORIDE SERPL-SCNC: 103 MMOL/L (ref 98–107)
CO2 SERPL-SCNC: 25 MMOL/L (ref 21–32)
CREAT SERPL-MCNC: 0.95 MG/DL (ref 0.5–1.05)
EGFRCR SERPLBLD CKD-EPI 2021: 67 ML/MIN/1.73M*2
EOSINOPHIL # BLD AUTO: 0.04 X10*3/UL (ref 0–0.7)
EOSINOPHIL NFR BLD AUTO: 0.2 %
ERYTHROCYTE [DISTWIDTH] IN BLOOD BY AUTOMATED COUNT: 20.9 % (ref 11.5–14.5)
FERRITIN SERPL-MCNC: 49 NG/ML (ref 8–150)
GLUCOSE SERPL-MCNC: 89 MG/DL (ref 74–99)
HCT VFR BLD AUTO: 25.8 % (ref 36–46)
HGB BLD-MCNC: 7.2 G/DL (ref 12–16)
IMM GRANULOCYTES # BLD AUTO: 0.19 X10*3/UL (ref 0–0.7)
IMM GRANULOCYTES NFR BLD AUTO: 1 % (ref 0–0.9)
LYMPHOCYTES # BLD AUTO: 3.63 X10*3/UL (ref 1.2–4.8)
LYMPHOCYTES NFR BLD AUTO: 19.6 %
MCH RBC QN AUTO: 24.4 PG (ref 26–34)
MCHC RBC AUTO-ENTMCNC: 27.9 G/DL (ref 32–36)
MCV RBC AUTO: 88 FL (ref 80–100)
MONOCYTES # BLD AUTO: 1.76 X10*3/UL (ref 0.1–1)
MONOCYTES NFR BLD AUTO: 9.5 %
NEUTROPHILS # BLD AUTO: 12.84 X10*3/UL (ref 1.2–7.7)
NEUTROPHILS NFR BLD AUTO: 69.6 %
NRBC BLD-RTO: 0.4 /100 WBCS (ref 0–0)
PLATELET # BLD AUTO: 803 X10*3/UL (ref 150–450)
POTASSIUM SERPL-SCNC: 3.5 MMOL/L (ref 3.5–5.3)
RBC # BLD AUTO: 2.95 X10*6/UL (ref 4–5.2)
SODIUM SERPL-SCNC: 139 MMOL/L (ref 136–145)
WBC # BLD AUTO: 18.5 X10*3/UL (ref 4.4–11.3)

## 2024-08-10 PROCEDURE — 99233 SBSQ HOSP IP/OBS HIGH 50: CPT | Performed by: INTERNAL MEDICINE

## 2024-08-10 PROCEDURE — 94640 AIRWAY INHALATION TREATMENT: CPT

## 2024-08-10 PROCEDURE — 2500000004 HC RX 250 GENERAL PHARMACY W/ HCPCS (ALT 636 FOR OP/ED)

## 2024-08-10 PROCEDURE — 2500000004 HC RX 250 GENERAL PHARMACY W/ HCPCS (ALT 636 FOR OP/ED): Performed by: HOSPITALIST

## 2024-08-10 PROCEDURE — 1100000001 HC PRIVATE ROOM DAILY

## 2024-08-10 PROCEDURE — 2500000005 HC RX 250 GENERAL PHARMACY W/O HCPCS: Performed by: INTERNAL MEDICINE

## 2024-08-10 PROCEDURE — 2500000001 HC RX 250 WO HCPCS SELF ADMINISTERED DRUGS (ALT 637 FOR MEDICARE OP): Performed by: INTERNAL MEDICINE

## 2024-08-10 PROCEDURE — 2500000004 HC RX 250 GENERAL PHARMACY W/ HCPCS (ALT 636 FOR OP/ED): Performed by: INTERNAL MEDICINE

## 2024-08-10 PROCEDURE — 2500000001 HC RX 250 WO HCPCS SELF ADMINISTERED DRUGS (ALT 637 FOR MEDICARE OP): Performed by: HOSPITALIST

## 2024-08-10 PROCEDURE — 80048 BASIC METABOLIC PNL TOTAL CA: CPT | Performed by: INTERNAL MEDICINE

## 2024-08-10 PROCEDURE — 2500000002 HC RX 250 W HCPCS SELF ADMINISTERED DRUGS (ALT 637 FOR MEDICARE OP, ALT 636 FOR OP/ED): Performed by: INTERNAL MEDICINE

## 2024-08-10 PROCEDURE — 85025 COMPLETE CBC W/AUTO DIFF WBC: CPT | Performed by: INTERNAL MEDICINE

## 2024-08-10 PROCEDURE — 36415 COLL VENOUS BLD VENIPUNCTURE: CPT | Performed by: INTERNAL MEDICINE

## 2024-08-10 RX ORDER — DIPHENHYDRAMINE HYDROCHLORIDE 50 MG/ML
50 INJECTION INTRAMUSCULAR; INTRAVENOUS ONCE
Status: COMPLETED | OUTPATIENT
Start: 2024-08-10 | End: 2024-08-10

## 2024-08-10 RX ADMIN — FORMOTEROL FUMARATE DIHYDRATE 20 MCG: 20 SOLUTION RESPIRATORY (INHALATION) at 19:31

## 2024-08-10 RX ADMIN — MORPHINE SULFATE 4 MG: 4 INJECTION, SOLUTION INTRAMUSCULAR; INTRAVENOUS at 12:57

## 2024-08-10 RX ADMIN — MORPHINE SULFATE 4 MG: 4 INJECTION, SOLUTION INTRAMUSCULAR; INTRAVENOUS at 17:22

## 2024-08-10 RX ADMIN — NYSTATIN 500000 UNITS: 100000 SUSPENSION ORAL at 10:25

## 2024-08-10 RX ADMIN — NYSTATIN 500000 UNITS: 100000 SUSPENSION ORAL at 15:56

## 2024-08-10 RX ADMIN — MORPHINE SULFATE 4 MG: 4 INJECTION, SOLUTION INTRAMUSCULAR; INTRAVENOUS at 03:21

## 2024-08-10 RX ADMIN — MIRTAZAPINE 15 MG: 15 TABLET, FILM COATED ORAL at 20:28

## 2024-08-10 RX ADMIN — Medication 100 MG: at 08:50

## 2024-08-10 RX ADMIN — ENOXAPARIN SODIUM 60 MG: 60 INJECTION SUBCUTANEOUS at 20:28

## 2024-08-10 RX ADMIN — BUSPIRONE HYDROCHLORIDE 5 MG: 5 TABLET ORAL at 20:28

## 2024-08-10 RX ADMIN — ENOXAPARIN SODIUM 60 MG: 60 INJECTION SUBCUTANEOUS at 08:50

## 2024-08-10 RX ADMIN — OXYBUTYNIN CHLORIDE 2.5 MG: 5 TABLET ORAL at 20:28

## 2024-08-10 RX ADMIN — SUCRALFATE 1 G: 1 SUSPENSION ORAL at 17:21

## 2024-08-10 RX ADMIN — SUCRALFATE 1 G: 1 SUSPENSION ORAL at 06:20

## 2024-08-10 RX ADMIN — SUCRALFATE 1 G: 1 SUSPENSION ORAL at 13:02

## 2024-08-10 RX ADMIN — DIPHENHYDRAMINE HYDROCHLORIDE 50 MG: 50 INJECTION, SOLUTION INTRAMUSCULAR; INTRAVENOUS at 23:11

## 2024-08-10 RX ADMIN — PREDNISONE 50 MG: 20 TABLET ORAL at 23:12

## 2024-08-10 RX ADMIN — POLYETHYLENE GLYCOL 3350 17 G: 17 POWDER, FOR SOLUTION ORAL at 20:29

## 2024-08-10 RX ADMIN — BUDESONIDE 0.25 MG: 0.25 INHALANT ORAL at 19:31

## 2024-08-10 RX ADMIN — DIPHENHYDRAMINE HYDROCHLORIDE 50 MG: 50 INJECTION, SOLUTION INTRAMUSCULAR; INTRAVENOUS at 03:23

## 2024-08-10 RX ADMIN — DILTIAZEM HYDROCHLORIDE 240 MG: 120 CAPSULE, COATED, EXTENDED RELEASE ORAL at 08:50

## 2024-08-10 RX ADMIN — POLYETHYLENE GLYCOL 3350 17 G: 17 POWDER, FOR SOLUTION ORAL at 08:50

## 2024-08-10 RX ADMIN — PIPERACILLIN SODIUM AND TAZOBACTAM SODIUM 3.38 G: 3; .375 INJECTION, SOLUTION INTRAVENOUS at 05:25

## 2024-08-10 RX ADMIN — CALCIUM 1250 MG: 500 TABLET ORAL at 17:21

## 2024-08-10 RX ADMIN — HYDROXYZINE HYDROCHLORIDE 25 MG: 25 TABLET ORAL at 05:35

## 2024-08-10 RX ADMIN — MORPHINE SULFATE 4 MG: 4 INJECTION, SOLUTION INTRAMUSCULAR; INTRAVENOUS at 21:48

## 2024-08-10 RX ADMIN — OXYBUTYNIN CHLORIDE 2.5 MG: 5 TABLET ORAL at 08:50

## 2024-08-10 RX ADMIN — PREDNISONE 50 MG: 20 TABLET ORAL at 17:21

## 2024-08-10 RX ADMIN — DIPHENHYDRAMINE HYDROCHLORIDE 50 MG: 50 INJECTION, SOLUTION INTRAMUSCULAR; INTRAVENOUS at 08:33

## 2024-08-10 RX ADMIN — PREDNISONE 50 MG: 20 TABLET ORAL at 10:58

## 2024-08-10 RX ADMIN — CALCIUM 1250 MG: 500 TABLET ORAL at 08:50

## 2024-08-10 RX ADMIN — MORPHINE SULFATE 4 MG: 4 INJECTION, SOLUTION INTRAMUSCULAR; INTRAVENOUS at 08:32

## 2024-08-10 RX ADMIN — ALBUTEROL SULFATE 2.5 MG: 2.5 SOLUTION RESPIRATORY (INHALATION) at 12:08

## 2024-08-10 RX ADMIN — ALBUTEROL SULFATE 2.5 MG: 2.5 SOLUTION RESPIRATORY (INHALATION) at 19:31

## 2024-08-10 RX ADMIN — MONTELUKAST 10 MG: 10 TABLET, FILM COATED ORAL at 20:28

## 2024-08-10 RX ADMIN — HYDROXYZINE HYDROCHLORIDE 25 MG: 25 TABLET ORAL at 20:28

## 2024-08-10 RX ADMIN — PANTOPRAZOLE SODIUM 40 MG: 40 TABLET, DELAYED RELEASE ORAL at 06:20

## 2024-08-10 RX ADMIN — BUSPIRONE HYDROCHLORIDE 5 MG: 5 TABLET ORAL at 08:50

## 2024-08-10 RX ADMIN — SENNOSIDES AND DOCUSATE SODIUM 1 TABLET: 8.6; 5 TABLET ORAL at 08:50

## 2024-08-10 RX ADMIN — SUCRALFATE 1 G: 1 SUSPENSION ORAL at 20:28

## 2024-08-10 RX ADMIN — PIPERACILLIN SODIUM AND TAZOBACTAM SODIUM 3.38 G: 3; .375 INJECTION, SOLUTION INTRAVENOUS at 10:25

## 2024-08-10 RX ADMIN — DIPHENHYDRAMINE HYDROCHLORIDE 50 MG: 50 INJECTION, SOLUTION INTRAMUSCULAR; INTRAVENOUS at 15:13

## 2024-08-10 ASSESSMENT — PAIN DESCRIPTION - ORIENTATION
ORIENTATION: MID
ORIENTATION: RIGHT

## 2024-08-10 ASSESSMENT — PAIN DESCRIPTION - LOCATION
LOCATION: BACK

## 2024-08-10 ASSESSMENT — COGNITIVE AND FUNCTIONAL STATUS - GENERAL
DAILY ACTIVITIY SCORE: 24
MOBILITY SCORE: 24
MOBILITY SCORE: 24
DAILY ACTIVITIY SCORE: 24

## 2024-08-10 ASSESSMENT — PAIN SCALES - WONG BAKER: WONGBAKER_NUMERICALRESPONSE: HURTS WHOLE LOT

## 2024-08-10 ASSESSMENT — PAIN SCALES - GENERAL
PAINLEVEL_OUTOF10: 9
PAINLEVEL_OUTOF10: 10 - WORST POSSIBLE PAIN
PAINLEVEL_OUTOF10: 7
PAINLEVEL_OUTOF10: 8
PAINLEVEL_OUTOF10: 5 - MODERATE PAIN
PAINLEVEL_OUTOF10: 10 - WORST POSSIBLE PAIN
PAINLEVEL_OUTOF10: 3
PAINLEVEL_OUTOF10: 8

## 2024-08-10 ASSESSMENT — PAIN - FUNCTIONAL ASSESSMENT
PAIN_FUNCTIONAL_ASSESSMENT: 0-10

## 2024-08-10 ASSESSMENT — PAIN SCALES - PAIN ASSESSMENT IN ADVANCED DEMENTIA (PAINAD)
TOTALSCORE: MEDICATION (SEE MAR)
TOTALSCORE: MEDICATION (SEE MAR)

## 2024-08-10 NOTE — CARE PLAN
The patient's goals for the shift include      The clinical goals for the shift include control pain

## 2024-08-10 NOTE — PROGRESS NOTES
"Fernando Cuevas is a 63 y.o. female on day 7 of admission presenting with Generalized abdominal pain.    Subjective   Hematology recommended chest CTA to ensure patient does have a PE to decide on anticoagulation.  For patient is getting premedicated today hopefully chest CT late this evening  Wbc 18k       Objective     Physical Exam  Vitals reviewed.   Constitutional:       Appearance: Normal appearance.   HENT:      Head: Normocephalic.      Right Ear: Tympanic membrane normal.      Nose: Nose normal.   Cardiovascular:      Rate and Rhythm: Normal rate and regular rhythm.   Pulmonary:      Breath sounds: Normal breath sounds.      Comments: Diminished but clear no wheezing appreciated  Abdominal:      Comments: Positive bs     Skin:     General: Skin is warm.      Capillary Refill: Capillary refill takes less than 2 seconds.   Neurological:      General: No focal deficit present.      Mental Status: She is alert.         Last Recorded Vitals  Blood pressure 155/81, pulse 97, temperature 36 °C (96.8 °F), temperature source Temporal, resp. rate 18, height 1.575 m (5' 2\"), weight 61.7 kg (136 lb), SpO2 100%.  Intake/Output last 3 Shifts:  I/O last 3 completed shifts:  In: 480 (7.8 mL/kg) [P.O.:480]  Out: - (0 mL/kg)   Weight: 61.7 kg     Relevant Results  Results for orders placed or performed during the hospital encounter of 08/02/24 (from the past 24 hour(s))   D-dimer, Non VTE   Result Value Ref Range    D-Dimer Non VTE, Quant (ng/mL FEU) 926 (H) <=500 ng/mL FEU   Basic metabolic panel   Result Value Ref Range    Glucose 89 74 - 99 mg/dL    Sodium 139 136 - 145 mmol/L    Potassium 3.5 3.5 - 5.3 mmol/L    Chloride 103 98 - 107 mmol/L    Bicarbonate 25 21 - 32 mmol/L    Anion Gap 15 10 - 20 mmol/L    Urea Nitrogen 12 6 - 23 mg/dL    Creatinine 0.95 0.50 - 1.05 mg/dL    eGFR 67 >60 mL/min/1.73m*2    Calcium 8.6 8.6 - 10.3 mg/dL   CBC and Auto Differential   Result Value Ref Range    WBC 18.5 (H) 4.4 - 11.3 x10*3/uL "    nRBC 0.4 (H) 0.0 - 0.0 /100 WBCs    RBC 2.95 (L) 4.00 - 5.20 x10*6/uL    Hemoglobin 7.2 (L) 12.0 - 16.0 g/dL    Hematocrit 25.8 (L) 36.0 - 46.0 %    MCV 88 80 - 100 fL    MCH 24.4 (L) 26.0 - 34.0 pg    MCHC 27.9 (L) 32.0 - 36.0 g/dL    RDW 20.9 (H) 11.5 - 14.5 %    Platelets 803 (H) 150 - 450 x10*3/uL    Neutrophils % 69.6 40.0 - 80.0 %    Immature Granulocytes %, Automated 1.0 (H) 0.0 - 0.9 %    Lymphocytes % 19.6 13.0 - 44.0 %    Monocytes % 9.5 2.0 - 10.0 %    Eosinophils % 0.2 0.0 - 6.0 %    Basophils % 0.1 0.0 - 2.0 %    Neutrophils Absolute 12.84 (H) 1.20 - 7.70 x10*3/uL    Immature Granulocytes Absolute, Automated 0.19 0.00 - 0.70 x10*3/uL    Lymphocytes Absolute 3.63 1.20 - 4.80 x10*3/uL    Monocytes Absolute 1.76 (H) 0.10 - 1.00 x10*3/uL    Eosinophils Absolute 0.04 0.00 - 0.70 x10*3/uL    Basophils Absolute 0.02 0.00 - 0.10 x10*3/uL       Imaging Results    X ray chest/abdomen:  IMPRESSION:  No acute cardiopulmonary disease.     Cta abd/pelvis:  pending    Medications:  albuterol, 2.5 mg, nebulization, TID  azithromycin, 250 mg, oral, Once per day on Monday Wednesday Friday  bisacodyl, 10 mg, rectal, Once  budesonide, 0.25 mg, nebulization, BID  busPIRone, 5 mg, oral, BID  calcium carbonate, 1,250 mg, oral, BID  dilTIAZem CD, 240 mg, oral, Daily  diphenhydrAMINE, 50 mg, intravenous, Once  enoxaparin, 1 mg/kg, subcutaneous, q12h  formoterol, 20 mcg, nebulization, BID  miconazole, 1 applicator, vaginal, Nightly  mirtazapine, 15 mg, oral, Nightly  montelukast, 10 mg, oral, Nightly  nystatin, 5 mL, Swish & Swallow, TID  oxybutynin, 2.5 mg, oral, BID  pantoprazole, 40 mg, oral, Daily before breakfast  piperacillin-tazobactam, 3.375 g, intravenous, q6h  polyethylene glycol, 17 g, oral, BID  predniSONE, 50 mg, oral, q6h  sennosides-docusate sodium, 1 tablet, oral, BID  sucralfate, 1 g, oral, 4x daily  thiamine, 100 mg, oral, Daily  tiotropium, 2 puff, inhalation, Daily       PRN medications: acetaminophen  **OR** acetaminophen **OR** acetaminophen, albuterol, diphenhydrAMINE, hydrOXYzine HCL, morphine, morphine, ondansetron **OR** ondansetron, oxygen, prochlorperazine **OR** prochlorperazine **OR** prochlorperazine     Assessment/Plan   evidence of wall thickening of small bowel loop in the right lower quadrant at site of anastomosis which may be infectious or inflammatory in etiology with presentation of abdominal pain  - dc iv zosyn  has completed 7 days    2. H/o DVT  -on eliquis     3. COPD ? exacerbation  -continue inhaler and prn duonebs     4. Leukocytosis  -18k likely from steriods    5. High probability of PE on vq scan  -Hematology recommending chest CTA prior to deciding if patient needs different coagulation then Eliquis due to question failure as patient was taking Eliquis      DVT Prophylaxis:  Marcus Landon MD  [facility]

## 2024-08-11 ENCOUNTER — APPOINTMENT (OUTPATIENT)
Dept: RADIOLOGY | Facility: HOSPITAL | Age: 64
End: 2024-08-11
Payer: COMMERCIAL

## 2024-08-11 VITALS
BODY MASS INDEX: 25.03 KG/M2 | WEIGHT: 136 LBS | RESPIRATION RATE: 20 BRPM | HEIGHT: 62 IN | HEART RATE: 112 BPM | SYSTOLIC BLOOD PRESSURE: 152 MMHG | DIASTOLIC BLOOD PRESSURE: 65 MMHG | OXYGEN SATURATION: 97 % | TEMPERATURE: 98.2 F

## 2024-08-11 PROBLEM — N30.00 ACUTE CYSTITIS WITHOUT HEMATURIA: Status: ACTIVE | Noted: 2024-08-11

## 2024-08-11 LAB
ANION GAP SERPL CALC-SCNC: 16 MMOL/L (ref 10–20)
BASOPHILS # BLD AUTO: 0.02 X10*3/UL (ref 0–0.1)
BASOPHILS NFR BLD AUTO: 0.1 %
BUN SERPL-MCNC: 14 MG/DL (ref 6–23)
CALCIUM SERPL-MCNC: 8.6 MG/DL (ref 8.6–10.3)
CHLORIDE SERPL-SCNC: 100 MMOL/L (ref 98–107)
CO2 SERPL-SCNC: 24 MMOL/L (ref 21–32)
CREAT SERPL-MCNC: 0.87 MG/DL (ref 0.5–1.05)
EGFRCR SERPLBLD CKD-EPI 2021: 75 ML/MIN/1.73M*2
EOSINOPHIL # BLD AUTO: 0 X10*3/UL (ref 0–0.7)
EOSINOPHIL NFR BLD AUTO: 0 %
ERYTHROCYTE [DISTWIDTH] IN BLOOD BY AUTOMATED COUNT: 20.3 % (ref 11.5–14.5)
GLUCOSE SERPL-MCNC: 127 MG/DL (ref 74–99)
HCT VFR BLD AUTO: 23.2 % (ref 36–46)
HGB BLD-MCNC: 7.2 G/DL (ref 12–16)
HYPOCHROMIA BLD QL SMEAR: NORMAL
IMM GRANULOCYTES # BLD AUTO: 0.24 X10*3/UL (ref 0–0.7)
IMM GRANULOCYTES NFR BLD AUTO: 1.6 % (ref 0–0.9)
LYMPHOCYTES # BLD AUTO: 1.63 X10*3/UL (ref 1.2–4.8)
LYMPHOCYTES NFR BLD AUTO: 10.8 %
MCH RBC QN AUTO: 24.7 PG (ref 26–34)
MCHC RBC AUTO-ENTMCNC: 31 G/DL (ref 32–36)
MCV RBC AUTO: 80 FL (ref 80–100)
MONOCYTES # BLD AUTO: 0.28 X10*3/UL (ref 0.1–1)
MONOCYTES NFR BLD AUTO: 1.9 %
NEUTROPHILS # BLD AUTO: 12.96 X10*3/UL (ref 1.2–7.7)
NEUTROPHILS NFR BLD AUTO: 85.6 %
NRBC BLD-RTO: 0.9 /100 WBCS (ref 0–0)
PLATELET # BLD AUTO: 870 X10*3/UL (ref 150–450)
POLYCHROMASIA BLD QL SMEAR: NORMAL
POTASSIUM SERPL-SCNC: 3.9 MMOL/L (ref 3.5–5.3)
RBC # BLD AUTO: 2.92 X10*6/UL (ref 4–5.2)
RBC MORPH BLD: NORMAL
SODIUM SERPL-SCNC: 136 MMOL/L (ref 136–145)
TARGETS BLD QL SMEAR: NORMAL
WBC # BLD AUTO: 15.1 X10*3/UL (ref 4.4–11.3)

## 2024-08-11 PROCEDURE — 80048 BASIC METABOLIC PNL TOTAL CA: CPT | Performed by: INTERNAL MEDICINE

## 2024-08-11 PROCEDURE — 36415 COLL VENOUS BLD VENIPUNCTURE: CPT | Performed by: INTERNAL MEDICINE

## 2024-08-11 PROCEDURE — 94640 AIRWAY INHALATION TREATMENT: CPT

## 2024-08-11 PROCEDURE — 2500000001 HC RX 250 WO HCPCS SELF ADMINISTERED DRUGS (ALT 637 FOR MEDICARE OP): Performed by: INTERNAL MEDICINE

## 2024-08-11 PROCEDURE — 2500000001 HC RX 250 WO HCPCS SELF ADMINISTERED DRUGS (ALT 637 FOR MEDICARE OP): Performed by: HOSPITALIST

## 2024-08-11 PROCEDURE — 71275 CT ANGIOGRAPHY CHEST: CPT | Performed by: RADIOLOGY

## 2024-08-11 PROCEDURE — 99239 HOSP IP/OBS DSCHRG MGMT >30: CPT | Performed by: INTERNAL MEDICINE

## 2024-08-11 PROCEDURE — 71275 CT ANGIOGRAPHY CHEST: CPT

## 2024-08-11 PROCEDURE — 2500000002 HC RX 250 W HCPCS SELF ADMINISTERED DRUGS (ALT 637 FOR MEDICARE OP, ALT 636 FOR OP/ED): Performed by: INTERNAL MEDICINE

## 2024-08-11 PROCEDURE — 85025 COMPLETE CBC W/AUTO DIFF WBC: CPT | Performed by: INTERNAL MEDICINE

## 2024-08-11 PROCEDURE — 2550000001 HC RX 255 CONTRASTS: Performed by: INTERNAL MEDICINE

## 2024-08-11 PROCEDURE — 2500000004 HC RX 250 GENERAL PHARMACY W/ HCPCS (ALT 636 FOR OP/ED): Performed by: INTERNAL MEDICINE

## 2024-08-11 RX ADMIN — MORPHINE SULFATE 4 MG: 4 INJECTION, SOLUTION INTRAMUSCULAR; INTRAVENOUS at 07:16

## 2024-08-11 RX ADMIN — OXYBUTYNIN CHLORIDE 2.5 MG: 5 TABLET ORAL at 08:09

## 2024-08-11 RX ADMIN — IOHEXOL 40 ML: 350 INJECTION, SOLUTION INTRAVENOUS at 01:33

## 2024-08-11 RX ADMIN — Medication 100 MG: at 08:17

## 2024-08-11 RX ADMIN — SUCRALFATE 1 G: 1 SUSPENSION ORAL at 06:15

## 2024-08-11 RX ADMIN — ALBUTEROL SULFATE 2.5 MG: 2.5 SOLUTION RESPIRATORY (INHALATION) at 07:16

## 2024-08-11 RX ADMIN — CALCIUM 1250 MG: 500 TABLET ORAL at 08:09

## 2024-08-11 RX ADMIN — BUDESONIDE 0.25 MG: 0.25 INHALANT ORAL at 07:16

## 2024-08-11 RX ADMIN — NYSTATIN 500000 UNITS: 100000 SUSPENSION ORAL at 01:05

## 2024-08-11 RX ADMIN — DIPHENHYDRAMINE HYDROCHLORIDE 50 MG: 50 INJECTION, SOLUTION INTRAMUSCULAR; INTRAVENOUS at 08:09

## 2024-08-11 RX ADMIN — ALBUTEROL SULFATE 2.5 MG: 2.5 SOLUTION RESPIRATORY (INHALATION) at 12:27

## 2024-08-11 RX ADMIN — MORPHINE SULFATE 4 MG: 4 INJECTION, SOLUTION INTRAMUSCULAR; INTRAVENOUS at 11:18

## 2024-08-11 RX ADMIN — Medication 1 APPLICATION: at 00:36

## 2024-08-11 RX ADMIN — DILTIAZEM HYDROCHLORIDE 240 MG: 120 CAPSULE, COATED, EXTENDED RELEASE ORAL at 08:09

## 2024-08-11 RX ADMIN — MORPHINE SULFATE 4 MG: 4 INJECTION, SOLUTION INTRAMUSCULAR; INTRAVENOUS at 02:23

## 2024-08-11 RX ADMIN — PANTOPRAZOLE SODIUM 40 MG: 40 TABLET, DELAYED RELEASE ORAL at 06:15

## 2024-08-11 RX ADMIN — ENOXAPARIN SODIUM 60 MG: 60 INJECTION SUBCUTANEOUS at 08:09

## 2024-08-11 RX ADMIN — BUSPIRONE HYDROCHLORIDE 5 MG: 5 TABLET ORAL at 08:09

## 2024-08-11 RX ADMIN — FORMOTEROL FUMARATE DIHYDRATE 20 MCG: 20 SOLUTION RESPIRATORY (INHALATION) at 07:16

## 2024-08-11 RX ADMIN — TIOTROPIUM BROMIDE INHALATION SPRAY 2 PUFF: 3.12 SPRAY, METERED RESPIRATORY (INHALATION) at 07:17

## 2024-08-11 ASSESSMENT — COGNITIVE AND FUNCTIONAL STATUS - GENERAL
MOBILITY SCORE: 24
DAILY ACTIVITIY SCORE: 24

## 2024-08-11 ASSESSMENT — PAIN SCALES - GENERAL
PAINLEVEL_OUTOF10: 8
PAINLEVEL_OUTOF10: 8
PAINLEVEL_OUTOF10: 4
PAINLEVEL_OUTOF10: 8

## 2024-08-11 ASSESSMENT — PAIN SCALES - WONG BAKER
WONGBAKER_NUMERICALRESPONSE: HURTS LITTLE MORE
WONGBAKER_NUMERICALRESPONSE: HURTS WHOLE LOT
WONGBAKER_NUMERICALRESPONSE: HURTS WHOLE LOT

## 2024-08-11 ASSESSMENT — PAIN DESCRIPTION - LOCATION
LOCATION: BACK
LOCATION: BACK
LOCATION: HIP

## 2024-08-11 ASSESSMENT — PAIN - FUNCTIONAL ASSESSMENT
PAIN_FUNCTIONAL_ASSESSMENT: 0-10
PAIN_FUNCTIONAL_ASSESSMENT: 0-10

## 2024-08-11 ASSESSMENT — PAIN DESCRIPTION - ORIENTATION
ORIENTATION: RIGHT
ORIENTATION: RIGHT

## 2024-08-11 NOTE — DISCHARGE SUMMARY
Discharge Diagnosis  Abdominal pain no ileus or obstruction, possibly secondary to severe constipation  Ruled out PE    Issues Requiring Follow-Up  Pcp, surgery    Discharge Meds     Your medication list        CONTINUE taking these medications        Instructions Last Dose Given Next Dose Due   albuterol 90 mcg/actuation inhaler           albuterol 2.5 mg /3 mL (0.083 %) nebulizer solution      Take 3 mL by nebulization every 6 hours if needed for wheezing or shortness of breath.       ammonium lactate 12 % lotion  Commonly known as: Lac-Hydrin           apixaban 5 mg tablet  Commonly known as: Eliquis      Take 1 tablet (5 mg) by mouth 2 times a day.       benzonatate 100 mg capsule  Commonly known as: Tessalon      Take 1 capsule (100 mg) by mouth 3 times a day as needed for cough. Do not crush or chew.       busPIRone 5 mg tablet  Commonly known as: Buspar           calcium carbonate 500 mg calcium (1,250 mg) tablet  Commonly known as: Oscal           calcium carbonate 250 mg ([place] 100 mg) chewable split tablet  Commonly known as: Tums           clotrimazole 1 % cream  Commonly known as: Lotrimin           dilTIAZem  mg 24 hr capsule  Commonly known as: Tiazac           fluocinonide 0.05 % cream  Commonly known as: Lidex           Lidocaine Pain Relief 4 % patch  Generic drug: lidocaine           mirtazapine 15 mg tablet  Commonly known as: Remeron      Take 1 tablet (15 mg) by mouth once daily at bedtime.       mometasone-formoterol 100-5 mcg/actuation inhaler  Commonly known as: Dulera 100           montelukast 10 mg tablet  Commonly known as: Singulair           oxybutynin XL 5 mg 24 hr tablet  Commonly known as: Ditropan-XL           pantoprazole 40 mg EC tablet  Commonly known as: ProtoNix           polyethylene glycol 17 gram/dose powder  Commonly known as: Glycolax, Miralax      Take 17 g by mouth once daily. Do not fill before July 28, 2024.       Spiriva Respimat 2.5 mcg/actuation inhaler  Generic  drug: tiotropium      Inhale 2 puffs once daily.       Stimulant Laxative Plus 8.6-50 mg tablet  Generic drug: sennosides-docusate sodium      Take 1 tablet by mouth 2 times a day.       sucralfate 1 gram tablet  Commonly known as: Carafate      Take 1 tablet (1 g) by mouth 2 times a day. Crush and mix in luke warm water to make slurry and drink the slurry.       thiamine 100 mg tablet  Commonly known as: Vitamin B-1           Tylenol Extra Strength 500 mg tablet  Generic drug: acetaminophen                  STOP taking these medications      cefdinir 300 mg capsule  Commonly known as: Omnicef        predniSONE 10 mg tablet  Commonly known as: Deltasone                 Test Results Pending At Discharge  Pending Labs       No current pending labs.            Hospital Course   Patient is a 63-year-old female present to the hospital with complaints of severe abdominal pain no bowel movements.  Patient recently had surgery had a CT abdomen pelvis done which showed poor surgical change of prior partial small bowel resection.  Wall thickening of the small bowel in the right lower quadrant at the site of anastomosis which may be infectious or inflammatory in etiology.  Mild hyperdensity in the bowel lumen and mild hemorrhage within the bowel is not excluded.  Did not show ileus or obstruction patient had an extensive bowel regimen she started producing stools after that her abdomen did feel better.  Patient was complaining of dyspnea on exertion was not necessarily a COPD exacerbation therefore VQ scan was done which showed high probability of PE therefore patient had a chest CT to confirm she did not have PEs therefore she can still be discharged on her Eliquis.  Patient aware to follow-up with surgery if needed.  Patient did receive a week of IV Zosyn but surgery did not feel patient had a anastomosis infection.  Patient was stable at the time of discharge.    Time spent more than 30 minutes on discharge      Pertinent  Physical Exam At Time of Discharge  Physical Exam    Outpatient Follow-Up  Future Appointments   Date Time Provider Department Center   9/23/2024 10:30 AM Wilson Liang MD [id number] [facility]         Morena Landon MD

## 2024-08-11 NOTE — PROGRESS NOTES
08/11/24 1120   Current Planned Discharge Disposition   Current Planned Discharge Disposition Home H     Pt will get home health  care  thru  [facility]  at  [place]. Orders obtained from MD and  sent to    [facility]. Home care info put on  AVS    Chart faxed to     Pt updated    Fatmua Anthony, MSW, LSW

## 2024-08-11 NOTE — CARE PLAN
The patient's goals for the shift include  ct scan pain control    The clinical goals for the shift include pain control

## 2024-08-11 NOTE — NURSING NOTE
Rapid Response Nurse Note:     Due to bedside nurse difficulty obtaining access, an ultrasound-guided IV was placed.  Access was obtained after 1 attempt an 20 gauge was placed in the right Upper Arm.  Confirmed via ultrasound, blood return noted.  Saline lock placed.  Tegaderm used to secure IV. Patient tolerated the procedure well. Education provided to bedside nurse and patient. No questions/concerns at this time.

## 2024-08-11 NOTE — DISCHARGE INSTRUCTIONS
You will get home care  services  (skilled nursing, physical therapy, occupational therapy) through    [facility]   [address] [id number] [phone]   [email or web address]

## 2024-08-19 ENCOUNTER — HOSPITAL ENCOUNTER (INPATIENT)
Facility: HOSPITAL | Age: 64
End: 2024-08-19
Attending: INTERNAL MEDICINE | Admitting: INTERNAL MEDICINE
Payer: COMMERCIAL

## 2024-08-19 ENCOUNTER — APPOINTMENT (OUTPATIENT)
Dept: RADIOLOGY | Facility: HOSPITAL | Age: 64
End: 2024-08-19
Payer: COMMERCIAL

## 2024-08-19 DIAGNOSIS — M79.671 PAIN IN RIGHT FOOT: ICD-10-CM

## 2024-08-19 DIAGNOSIS — D75.839 THROMBOCYTOSIS: ICD-10-CM

## 2024-08-19 DIAGNOSIS — J44.9 CHRONIC OBSTRUCTIVE PULMONARY DISEASE, UNSPECIFIED COPD TYPE (MULTI): ICD-10-CM

## 2024-08-19 DIAGNOSIS — I82.629 DEEP VEIN THROMBOSIS (DVT) OF UPPER EXTREMITY, UNSPECIFIED CHRONICITY, UNSPECIFIED LATERALITY, UNSPECIFIED VEIN (MULTI): ICD-10-CM

## 2024-08-19 DIAGNOSIS — R10.9 ABDOMINAL PAIN, UNSPECIFIED ABDOMINAL LOCATION: ICD-10-CM

## 2024-08-19 DIAGNOSIS — A41.9: Primary | ICD-10-CM

## 2024-08-19 DIAGNOSIS — J44.1 COPD EXACERBATION (MULTI): ICD-10-CM

## 2024-08-19 DIAGNOSIS — M79.674 PAIN IN RIGHT TOE(S): ICD-10-CM

## 2024-08-19 DIAGNOSIS — J44.1 ACUTE EXACERBATION OF CHRONIC OBSTRUCTIVE PULMONARY DISEASE (MULTI): ICD-10-CM

## 2024-08-19 PROBLEM — D72.829 LEUKOCYTOSIS: Status: ACTIVE | Noted: 2024-08-19

## 2024-08-19 PROBLEM — R14.0 ABDOMINAL DISTENTION: Status: ACTIVE | Noted: 2023-05-19

## 2024-08-19 PROBLEM — E43 UNSPECIFIED SEVERE PROTEIN-CALORIE MALNUTRITION (MULTI): Status: ACTIVE | Noted: 2024-07-15

## 2024-08-19 PROBLEM — R39.15 URINARY URGENCY: Status: ACTIVE | Noted: 2024-08-19

## 2024-08-19 PROBLEM — R19.7 DIARRHEA: Status: ACTIVE | Noted: 2024-08-19

## 2024-08-19 PROBLEM — M62.81 MUSCLE WEAKNESS (GENERALIZED): Status: ACTIVE | Noted: 2024-07-16

## 2024-08-19 PROBLEM — I82.622 ACUTE EMBOLISM AND THROMBOSIS OF DEEP VEINS OF LEFT UPPER EXTREMITY (MULTI): Status: ACTIVE | Noted: 2024-07-15

## 2024-08-19 PROBLEM — R33.9 URINARY RETENTION: Status: ACTIVE | Noted: 2023-05-21

## 2024-08-19 PROBLEM — R41.841 COGNITIVE COMMUNICATION DEFICIT: Status: ACTIVE | Noted: 2024-07-16

## 2024-08-19 PROBLEM — K59.00 CONSTIPATION, UNSPECIFIED: Status: ACTIVE | Noted: 2024-07-16

## 2024-08-19 PROBLEM — R11.2 NAUSEA & VOMITING: Status: ACTIVE | Noted: 2024-08-19

## 2024-08-19 LAB
ALBUMIN SERPL BCP-MCNC: 3.7 G/DL (ref 3.4–5)
ALP SERPL-CCNC: 110 U/L (ref 33–136)
ALT SERPL W P-5'-P-CCNC: 56 U/L (ref 7–45)
AMPHETAMINES UR QL SCN: ABNORMAL
ANION GAP BLDV CALCULATED.4IONS-SCNC: 10 MMOL/L (ref 10–25)
ANION GAP SERPL CALC-SCNC: 16 MMOL/L (ref 10–20)
APPEARANCE UR: CLEAR
AST SERPL W P-5'-P-CCNC: 37 U/L (ref 9–39)
BACTERIA #/AREA URNS AUTO: ABNORMAL /HPF
BARBITURATES UR QL SCN: ABNORMAL
BASE EXCESS BLDV CALC-SCNC: 2.4 MMOL/L (ref -2–3)
BASOPHILS # BLD AUTO: 0.04 X10*3/UL (ref 0–0.1)
BASOPHILS NFR BLD AUTO: 0.2 %
BENZODIAZ UR QL SCN: ABNORMAL
BILIRUB SERPL-MCNC: 0.4 MG/DL (ref 0–1.2)
BILIRUB UR STRIP.AUTO-MCNC: NEGATIVE MG/DL
BODY TEMPERATURE: 37 DEGREES CELSIUS
BUN SERPL-MCNC: 8 MG/DL (ref 6–23)
BZE UR QL SCN: ABNORMAL
CA-I BLDV-SCNC: 1.18 MMOL/L (ref 1.1–1.33)
CALCIUM SERPL-MCNC: 9.2 MG/DL (ref 8.6–10.3)
CANNABINOIDS UR QL SCN: ABNORMAL
CHLORIDE BLDV-SCNC: 101 MMOL/L (ref 98–107)
CHLORIDE SERPL-SCNC: 98 MMOL/L (ref 98–107)
CO2 SERPL-SCNC: 26 MMOL/L (ref 21–32)
COLOR UR: ABNORMAL
CREAT SERPL-MCNC: 0.62 MG/DL (ref 0.5–1.05)
EGFRCR SERPLBLD CKD-EPI 2021: >90 ML/MIN/1.73M*2
EOSINOPHIL # BLD AUTO: 0.01 X10*3/UL (ref 0–0.7)
EOSINOPHIL NFR BLD AUTO: 0 %
ERYTHROCYTE [DISTWIDTH] IN BLOOD BY AUTOMATED COUNT: 20.4 % (ref 11.5–14.5)
ETHANOL SERPL-MCNC: <10 MG/DL
FENTANYL+NORFENTANYL UR QL SCN: ABNORMAL
GLUCOSE BLDV-MCNC: 135 MG/DL (ref 74–99)
GLUCOSE SERPL-MCNC: 131 MG/DL (ref 74–99)
GLUCOSE UR STRIP.AUTO-MCNC: NORMAL MG/DL
HCO3 BLDV-SCNC: 27.3 MMOL/L (ref 22–26)
HCT VFR BLD AUTO: 24.8 % (ref 36–46)
HCT VFR BLD EST: 23 % (ref 36–46)
HGB BLD-MCNC: 7.3 G/DL (ref 12–16)
HGB BLDV-MCNC: 7.8 G/DL (ref 12–16)
HYPOCHROMIA BLD QL SMEAR: NORMAL
IMM GRANULOCYTES # BLD AUTO: 0.19 X10*3/UL (ref 0–0.7)
IMM GRANULOCYTES NFR BLD AUTO: 0.7 % (ref 0–0.9)
INHALED O2 CONCENTRATION: 21 %
KETONES UR STRIP.AUTO-MCNC: NEGATIVE MG/DL
LACTATE BLDV-SCNC: 2.5 MMOL/L (ref 0.4–2)
LEUKOCYTE ESTERASE UR QL STRIP.AUTO: ABNORMAL
LIPASE SERPL-CCNC: 43 U/L (ref 9–82)
LYMPHOCYTES # BLD AUTO: 1.28 X10*3/UL (ref 1.2–4.8)
LYMPHOCYTES NFR BLD AUTO: 4.9 %
MCH RBC QN AUTO: 23.1 PG (ref 26–34)
MCHC RBC AUTO-ENTMCNC: 29.4 G/DL (ref 32–36)
MCV RBC AUTO: 79 FL (ref 80–100)
METHADONE UR QL SCN: ABNORMAL
MONOCYTES # BLD AUTO: 1.21 X10*3/UL (ref 0.1–1)
MONOCYTES NFR BLD AUTO: 4.6 %
MUCOUS THREADS #/AREA URNS AUTO: ABNORMAL /LPF
NEUTROPHILS # BLD AUTO: 23.3 X10*3/UL (ref 1.2–7.7)
NEUTROPHILS NFR BLD AUTO: 89.6 %
NITRITE UR QL STRIP.AUTO: NEGATIVE
NRBC BLD-RTO: 0.3 /100 WBCS (ref 0–0)
OPIATES UR QL SCN: ABNORMAL
OXYCODONE+OXYMORPHONE UR QL SCN: ABNORMAL
OXYHGB MFR BLDV: 44.4 % (ref 45–75)
PCO2 BLDV: 43 MM HG (ref 41–51)
PCP UR QL SCN: ABNORMAL
PH BLDV: 7.41 PH (ref 7.33–7.43)
PH UR STRIP.AUTO: 7.5 [PH]
PLATELET # BLD AUTO: 900 X10*3/UL (ref 150–450)
PO2 BLDV: 33 MM HG (ref 35–45)
POLYCHROMASIA BLD QL SMEAR: NORMAL
POTASSIUM BLDV-SCNC: 3.6 MMOL/L (ref 3.5–5.3)
POTASSIUM SERPL-SCNC: 3.5 MMOL/L (ref 3.5–5.3)
PROT SERPL-MCNC: 6.8 G/DL (ref 6.4–8.2)
PROT UR STRIP.AUTO-MCNC: NEGATIVE MG/DL
RBC # BLD AUTO: 3.16 X10*6/UL (ref 4–5.2)
RBC # UR STRIP.AUTO: NEGATIVE /UL
RBC #/AREA URNS AUTO: ABNORMAL /HPF
RBC MORPH BLD: NORMAL
SAO2 % BLDV: 46 % (ref 45–75)
SCHISTOCYTES BLD QL SMEAR: NORMAL
SODIUM BLDV-SCNC: 135 MMOL/L (ref 136–145)
SODIUM SERPL-SCNC: 136 MMOL/L (ref 136–145)
SP GR UR STRIP.AUTO: 1.01
TARGETS BLD QL SMEAR: NORMAL
UROBILINOGEN UR STRIP.AUTO-MCNC: NORMAL MG/DL
WBC # BLD AUTO: 26 X10*3/UL (ref 4.4–11.3)
WBC #/AREA URNS AUTO: ABNORMAL /HPF

## 2024-08-19 PROCEDURE — 96374 THER/PROPH/DIAG INJ IV PUSH: CPT | Mod: 59

## 2024-08-19 PROCEDURE — 99285 EMERGENCY DEPT VISIT HI MDM: CPT

## 2024-08-19 PROCEDURE — 1200000002 HC GENERAL ROOM WITH TELEMETRY DAILY

## 2024-08-19 PROCEDURE — 85025 COMPLETE CBC W/AUTO DIFF WBC: CPT

## 2024-08-19 PROCEDURE — 96368 THER/DIAG CONCURRENT INF: CPT

## 2024-08-19 PROCEDURE — 82435 ASSAY OF BLOOD CHLORIDE: CPT

## 2024-08-19 PROCEDURE — 96375 TX/PRO/DX INJ NEW DRUG ADDON: CPT

## 2024-08-19 PROCEDURE — 99223 1ST HOSP IP/OBS HIGH 75: CPT | Performed by: PHYSICIAN ASSISTANT

## 2024-08-19 PROCEDURE — 96361 HYDRATE IV INFUSION ADD-ON: CPT

## 2024-08-19 PROCEDURE — 96366 THER/PROPH/DIAG IV INF ADDON: CPT

## 2024-08-19 PROCEDURE — 94640 AIRWAY INHALATION TREATMENT: CPT

## 2024-08-19 PROCEDURE — 2500000004 HC RX 250 GENERAL PHARMACY W/ HCPCS (ALT 636 FOR OP/ED): Performed by: PHYSICIAN ASSISTANT

## 2024-08-19 PROCEDURE — 81001 URINALYSIS AUTO W/SCOPE: CPT | Performed by: INTERNAL MEDICINE

## 2024-08-19 PROCEDURE — 82077 ASSAY SPEC XCP UR&BREATH IA: CPT | Performed by: PHYSICIAN ASSISTANT

## 2024-08-19 PROCEDURE — 96365 THER/PROPH/DIAG IV INF INIT: CPT

## 2024-08-19 PROCEDURE — 36415 COLL VENOUS BLD VENIPUNCTURE: CPT

## 2024-08-19 PROCEDURE — 83690 ASSAY OF LIPASE: CPT

## 2024-08-19 PROCEDURE — 80307 DRUG TEST PRSMV CHEM ANLYZR: CPT | Performed by: PHYSICIAN ASSISTANT

## 2024-08-19 PROCEDURE — 2500000002 HC RX 250 W HCPCS SELF ADMINISTERED DRUGS (ALT 637 FOR MEDICARE OP, ALT 636 FOR OP/ED): Performed by: PHYSICIAN ASSISTANT

## 2024-08-19 PROCEDURE — 87086 URINE CULTURE/COLONY COUNT: CPT | Mod: AHULAB | Performed by: INTERNAL MEDICINE

## 2024-08-19 PROCEDURE — 2500000001 HC RX 250 WO HCPCS SELF ADMINISTERED DRUGS (ALT 637 FOR MEDICARE OP): Performed by: PHYSICIAN ASSISTANT

## 2024-08-19 PROCEDURE — 2500000004 HC RX 250 GENERAL PHARMACY W/ HCPCS (ALT 636 FOR OP/ED)

## 2024-08-19 PROCEDURE — 2500000002 HC RX 250 W HCPCS SELF ADMINISTERED DRUGS (ALT 637 FOR MEDICARE OP, ALT 636 FOR OP/ED): Performed by: PHARMACIST

## 2024-08-19 PROCEDURE — 74176 CT ABD & PELVIS W/O CONTRAST: CPT | Mod: FOREIGN READ | Performed by: RADIOLOGY

## 2024-08-19 PROCEDURE — 74176 CT ABD & PELVIS W/O CONTRAST: CPT

## 2024-08-19 RX ORDER — SODIUM CHLORIDE, SODIUM LACTATE, POTASSIUM CHLORIDE, CALCIUM CHLORIDE 600; 310; 30; 20 MG/100ML; MG/100ML; MG/100ML; MG/100ML
125 INJECTION, SOLUTION INTRAVENOUS ONCE
Status: COMPLETED | OUTPATIENT
Start: 2024-08-19 | End: 2024-08-20

## 2024-08-19 RX ORDER — BUDESONIDE 0.5 MG/2ML
0.5 INHALANT ORAL
Status: DISCONTINUED | OUTPATIENT
Start: 2024-08-19 | End: 2024-09-01 | Stop reason: HOSPADM

## 2024-08-19 RX ORDER — ACETAMINOPHEN 650 MG/1
650 SUPPOSITORY RECTAL EVERY 4 HOURS PRN
Status: DISCONTINUED | OUTPATIENT
Start: 2024-08-19 | End: 2024-08-27 | Stop reason: ALTCHOICE

## 2024-08-19 RX ORDER — DIPHENHYDRAMINE HYDROCHLORIDE 50 MG/ML
25 INJECTION INTRAMUSCULAR; INTRAVENOUS ONCE
Status: COMPLETED | OUTPATIENT
Start: 2024-08-19 | End: 2024-08-19

## 2024-08-19 RX ORDER — MORPHINE SULFATE 2 MG/ML
2 INJECTION, SOLUTION INTRAMUSCULAR; INTRAVENOUS
Status: DISCONTINUED | OUTPATIENT
Start: 2024-08-19 | End: 2024-08-27 | Stop reason: ALTCHOICE

## 2024-08-19 RX ORDER — MORPHINE SULFATE 4 MG/ML
4 INJECTION, SOLUTION INTRAMUSCULAR; INTRAVENOUS ONCE
Status: COMPLETED | OUTPATIENT
Start: 2024-08-19 | End: 2024-08-19

## 2024-08-19 RX ORDER — ACETAMINOPHEN 325 MG/1
650 TABLET ORAL EVERY 4 HOURS PRN
Status: DISCONTINUED | OUTPATIENT
Start: 2024-08-19 | End: 2024-08-27 | Stop reason: ALTCHOICE

## 2024-08-19 RX ORDER — BENZONATATE 100 MG/1
100 CAPSULE ORAL 3 TIMES DAILY PRN
Status: DISCONTINUED | OUTPATIENT
Start: 2024-08-19 | End: 2024-09-01 | Stop reason: HOSPADM

## 2024-08-19 RX ORDER — HYDRALAZINE HYDROCHLORIDE 20 MG/ML
10 INJECTION INTRAMUSCULAR; INTRAVENOUS EVERY 8 HOURS PRN
Status: DISCONTINUED | OUTPATIENT
Start: 2024-08-19 | End: 2024-08-26

## 2024-08-19 RX ORDER — VANCOMYCIN HYDROCHLORIDE 1 G/200ML
1000 INJECTION, SOLUTION INTRAVENOUS EVERY 12 HOURS
Status: DISCONTINUED | OUTPATIENT
Start: 2024-08-20 | End: 2024-08-20

## 2024-08-19 RX ORDER — METOCLOPRAMIDE HYDROCHLORIDE 5 MG/ML
10 INJECTION INTRAMUSCULAR; INTRAVENOUS ONCE
Status: COMPLETED | OUTPATIENT
Start: 2024-08-19 | End: 2024-08-19

## 2024-08-19 RX ORDER — FLUTICASONE FUROATE AND VILANTEROL 100; 25 UG/1; UG/1
1 POWDER RESPIRATORY (INHALATION)
Status: DISCONTINUED | OUTPATIENT
Start: 2024-08-20 | End: 2024-08-19

## 2024-08-19 RX ORDER — MORPHINE SULFATE 4 MG/ML
4 INJECTION, SOLUTION INTRAMUSCULAR; INTRAVENOUS
Status: DISCONTINUED | OUTPATIENT
Start: 2024-08-19 | End: 2024-08-27 | Stop reason: ALTCHOICE

## 2024-08-19 RX ORDER — PANTOPRAZOLE SODIUM 40 MG/10ML
40 INJECTION, POWDER, LYOPHILIZED, FOR SOLUTION INTRAVENOUS 2 TIMES DAILY
Status: DISCONTINUED | OUTPATIENT
Start: 2024-08-19 | End: 2024-08-21

## 2024-08-19 RX ORDER — PREDNISONE 20 MG/1
40 TABLET ORAL DAILY
Status: DISCONTINUED | OUTPATIENT
Start: 2024-08-20 | End: 2024-08-21

## 2024-08-19 RX ORDER — ACETAMINOPHEN 160 MG/5ML
650 SOLUTION ORAL EVERY 4 HOURS PRN
Status: DISCONTINUED | OUTPATIENT
Start: 2024-08-19 | End: 2024-08-27 | Stop reason: ALTCHOICE

## 2024-08-19 RX ORDER — ONDANSETRON 4 MG/1
4 TABLET, ORALLY DISINTEGRATING ORAL EVERY 8 HOURS PRN
Status: DISCONTINUED | OUTPATIENT
Start: 2024-08-19 | End: 2024-09-01 | Stop reason: HOSPADM

## 2024-08-19 RX ORDER — ONDANSETRON HYDROCHLORIDE 2 MG/ML
4 INJECTION, SOLUTION INTRAVENOUS EVERY 8 HOURS PRN
Status: DISCONTINUED | OUTPATIENT
Start: 2024-08-19 | End: 2024-09-01 | Stop reason: HOSPADM

## 2024-08-19 RX ORDER — VANCOMYCIN HYDROCHLORIDE 1 G/20ML
INJECTION, POWDER, LYOPHILIZED, FOR SOLUTION INTRAVENOUS DAILY PRN
Status: DISCONTINUED | OUTPATIENT
Start: 2024-08-19 | End: 2024-08-21

## 2024-08-19 RX ORDER — IPRATROPIUM BROMIDE AND ALBUTEROL SULFATE 2.5; .5 MG/3ML; MG/3ML
3 SOLUTION RESPIRATORY (INHALATION)
Status: DISCONTINUED | OUTPATIENT
Start: 2024-08-19 | End: 2024-08-19

## 2024-08-19 RX ORDER — VANCOMYCIN HYDROCHLORIDE 1 G/200ML
1000 INJECTION, SOLUTION INTRAVENOUS ONCE
Status: COMPLETED | OUTPATIENT
Start: 2024-08-19 | End: 2024-08-19

## 2024-08-19 RX ORDER — FORMOTEROL FUMARATE DIHYDRATE 20 UG/2ML
20 SOLUTION RESPIRATORY (INHALATION)
Status: DISCONTINUED | OUTPATIENT
Start: 2024-08-19 | End: 2024-09-01 | Stop reason: HOSPADM

## 2024-08-19 RX ORDER — IPRATROPIUM BROMIDE AND ALBUTEROL SULFATE 2.5; .5 MG/3ML; MG/3ML
3 SOLUTION RESPIRATORY (INHALATION)
Status: DISCONTINUED | OUTPATIENT
Start: 2024-08-20 | End: 2024-09-01 | Stop reason: HOSPADM

## 2024-08-19 RX ORDER — TALC
3 POWDER (GRAM) TOPICAL NIGHTLY PRN
Status: DISCONTINUED | OUTPATIENT
Start: 2024-08-19 | End: 2024-09-01 | Stop reason: HOSPADM

## 2024-08-19 RX ORDER — PROCHLORPERAZINE MALEATE 10 MG
10 TABLET ORAL EVERY 6 HOURS PRN
Status: DISCONTINUED | OUTPATIENT
Start: 2024-08-19 | End: 2024-09-01 | Stop reason: HOSPADM

## 2024-08-19 RX ORDER — LANOLIN ALCOHOL/MO/W.PET/CERES
100 CREAM (GRAM) TOPICAL DAILY
Status: DISCONTINUED | OUTPATIENT
Start: 2024-08-20 | End: 2024-09-01 | Stop reason: HOSPADM

## 2024-08-19 RX ORDER — ONDANSETRON HYDROCHLORIDE 2 MG/ML
4 INJECTION, SOLUTION INTRAVENOUS ONCE
Status: COMPLETED | OUTPATIENT
Start: 2024-08-19 | End: 2024-08-19

## 2024-08-19 RX ORDER — ALBUTEROL SULFATE 0.83 MG/ML
2.5 SOLUTION RESPIRATORY (INHALATION) EVERY 4 HOURS PRN
Status: DISCONTINUED | OUTPATIENT
Start: 2024-08-19 | End: 2024-08-19

## 2024-08-19 RX ORDER — PROCHLORPERAZINE EDISYLATE 5 MG/ML
10 INJECTION INTRAMUSCULAR; INTRAVENOUS EVERY 6 HOURS PRN
Status: DISCONTINUED | OUTPATIENT
Start: 2024-08-19 | End: 2024-09-01 | Stop reason: HOSPADM

## 2024-08-19 RX ORDER — MONTELUKAST SODIUM 10 MG/1
10 TABLET ORAL DAILY
Status: DISCONTINUED | OUTPATIENT
Start: 2024-08-20 | End: 2024-09-01 | Stop reason: HOSPADM

## 2024-08-19 RX ORDER — ALBUTEROL SULFATE 0.83 MG/ML
2.5 SOLUTION RESPIRATORY (INHALATION) EVERY 2 HOUR PRN
Status: DISCONTINUED | OUTPATIENT
Start: 2024-08-19 | End: 2024-09-01 | Stop reason: HOSPADM

## 2024-08-19 RX ORDER — DILTIAZEM HYDROCHLORIDE 120 MG/1
240 CAPSULE, COATED, EXTENDED RELEASE ORAL DAILY
Status: DISCONTINUED | OUTPATIENT
Start: 2024-08-19 | End: 2024-09-01 | Stop reason: HOSPADM

## 2024-08-19 RX ADMIN — SODIUM CHLORIDE, POTASSIUM CHLORIDE, SODIUM LACTATE AND CALCIUM CHLORIDE 1000 ML: 600; 310; 30; 20 INJECTION, SOLUTION INTRAVENOUS at 18:25

## 2024-08-19 RX ADMIN — APIXABAN 5 MG: 5 TABLET, FILM COATED ORAL at 21:11

## 2024-08-19 RX ADMIN — PANTOPRAZOLE SODIUM 40 MG: 40 INJECTION, POWDER, FOR SOLUTION INTRAVENOUS at 21:11

## 2024-08-19 RX ADMIN — FORMOTEROL FUMARATE DIHYDRATE 20 MCG: 20 SOLUTION RESPIRATORY (INHALATION) at 20:46

## 2024-08-19 RX ADMIN — ALBUTEROL SULFATE 2.5 MG: 2.5 SOLUTION RESPIRATORY (INHALATION) at 19:05

## 2024-08-19 RX ADMIN — MORPHINE SULFATE 4 MG: 4 INJECTION, SOLUTION INTRAMUSCULAR; INTRAVENOUS at 17:10

## 2024-08-19 RX ADMIN — SODIUM CHLORIDE, POTASSIUM CHLORIDE, SODIUM LACTATE AND CALCIUM CHLORIDE 125 ML/HR: 600; 310; 30; 20 INJECTION, SOLUTION INTRAVENOUS at 23:52

## 2024-08-19 RX ADMIN — METHYLPREDNISOLONE SODIUM SUCCINATE 125 MG: 125 INJECTION, POWDER, FOR SOLUTION INTRAMUSCULAR; INTRAVENOUS at 19:11

## 2024-08-19 RX ADMIN — VANCOMYCIN HYDROCHLORIDE 1000 MG: 1 INJECTION, SOLUTION INTRAVENOUS at 14:55

## 2024-08-19 RX ADMIN — IPRATROPIUM BROMIDE AND ALBUTEROL SULFATE 3 ML: 2.5; .5 SOLUTION RESPIRATORY (INHALATION) at 19:05

## 2024-08-19 RX ADMIN — MORPHINE SULFATE 4 MG: 4 INJECTION, SOLUTION INTRAMUSCULAR; INTRAVENOUS at 23:49

## 2024-08-19 RX ADMIN — BUDESONIDE 0.5 MG: 0.5 INHALANT RESPIRATORY (INHALATION) at 20:46

## 2024-08-19 RX ADMIN — METOCLOPRAMIDE 10 MG: 5 INJECTION, SOLUTION INTRAMUSCULAR; INTRAVENOUS at 14:31

## 2024-08-19 RX ADMIN — MORPHINE SULFATE 4 MG: 4 INJECTION, SOLUTION INTRAMUSCULAR; INTRAVENOUS at 20:49

## 2024-08-19 RX ADMIN — DIPHENHYDRAMINE HYDROCHLORIDE 25 MG: 50 INJECTION, SOLUTION INTRAMUSCULAR; INTRAVENOUS at 14:37

## 2024-08-19 RX ADMIN — MORPHINE SULFATE 4 MG: 4 INJECTION, SOLUTION INTRAMUSCULAR; INTRAVENOUS at 12:30

## 2024-08-19 RX ADMIN — ONDANSETRON 4 MG: 2 INJECTION INTRAMUSCULAR; INTRAVENOUS at 12:50

## 2024-08-19 RX ADMIN — CEFEPIME HYDROCHLORIDE 2 G: 2 INJECTION, POWDER, FOR SOLUTION INTRAVENOUS at 14:55

## 2024-08-19 RX ADMIN — DILTIAZEM HYDROCHLORIDE 240 MG: 120 CAPSULE, EXTENDED RELEASE ORAL at 21:11

## 2024-08-19 ASSESSMENT — PAIN DESCRIPTION - LOCATION: LOCATION: ABDOMEN

## 2024-08-19 ASSESSMENT — PAIN SCALES - WONG BAKER: WONGBAKER_NUMERICALRESPONSE: HURTS WHOLE LOT

## 2024-08-19 ASSESSMENT — PAIN - FUNCTIONAL ASSESSMENT
PAIN_FUNCTIONAL_ASSESSMENT: 0-10

## 2024-08-19 ASSESSMENT — PAIN SCALES - GENERAL
PAINLEVEL_OUTOF10: 10 - WORST POSSIBLE PAIN
PAINLEVEL_OUTOF10: 7
PAINLEVEL_OUTOF10: 10 - WORST POSSIBLE PAIN
PAINLEVEL_OUTOF10: 10 - WORST POSSIBLE PAIN

## 2024-08-19 ASSESSMENT — PAIN DESCRIPTION - ORIENTATION: ORIENTATION: OTHER (COMMENT)

## 2024-08-19 NOTE — H&P
History Of Present Illness  Fernando Cuevas is a 63 y.o. female presenting with sepsis, abdominal pain, leukocytosis, cocaine abuse, tachycardia.  Patient presents for abdominal pain that she states started yesterday morning.  She rates it a 10 out of 10.  Patient states she had multiple episodes of nausea vomiting and diarrhea yesterday but not today.  Patient complains of being short of breath in the ED but denies chest pain.  She has chronic cough and she has daily headaches.  Denies dysuria.  This patient has an extensive history.  She was admitted June 11 for COPD exacerbation requiring BiPAP, cocaine abuse, fall and head injury etc.  On June 21 she was admitted for bowel obstruction with perforation status post partial small bowel resection and anastomosis with wound infection and prolonged ileus.  She was admitted August 2 through August 11 for abdominal pain and constipation with questionable anastomosis infection or inflammation.  She also has a recent DVT, dyspnea on exertion but no PE, was on Zosyn.  Patient states she was never prescribed anticoagulants but there is indication she was discharged on apixaban but she is not taking it.  She also has a history of lung cancer, anemia etc.      Past medical history:  COPD, cocaine abuse, constipation, polyps direction with small bowel perforation status post partial resection and anastomosis with subsequent wound infection and prolonged ileus, daily migraines, right arm DVT, alcohol abuse, hypertension, lung cancer, anemia, postop hematoma with admission from July 24 through July 27, UTIs, anxiety and depression, panic disorder, thrombocytosis    Medications: Records indicate patient has oxygen at home but she states she is not using it, she is also supposed to be on apixaban but states she never received the prescription and is not taking it, she is also supposed to be on thiamine, Tessalon Perles, diltiazem, mirtazapine, montelukast, oxybutynin, pantoprazole,  Advair, Spiriva, lisinopril and buspirone.  Patient is very unclear about which medications she is taking or not taking except she states she has been out of buspirone for at least 2 months.    Allergies: Contrast media, iodine, tape, Dilaudid caused a rash    Social history: Patient is an ex-smoker for about 1 month due to prolonged hospitalization, she drinks alcohol about twice a week, cocaine abuse with the last time being yesterday.    ED course: CMP is within normal limits other than a glucose of 131 and an ALT of 56 which is similar to previous studies.  Total protein, and lipase are within normal limits.  CBC shows a leukocytosis of 26,000 which is significantly increased from August 11, 8 days ago, when it was 15,000..  Patient also has a large left shift and platelets are high at 900,000.  Patient has a stable anemia at 7.3 and 24.8.  These were the same when she was discharged on August 11.  Lactate on her venous blood gas at about 1230 this afternoon was 2.5, pCO2 was 43 and pO2 was 33.  Urine showed 1+ bacteria, 75 leukocytes, but 1-5 WBCs and 1-2 RBCs  Urine toxicology screen was positive for cocaine and opiates but she had received morphine.  CAT scan of the abdomen and pelvis without contrast showed postsurgical changes in the right lower quadrant within the small bowel with mild wall thickening with no evidence of obstruction.  There was also stranding in the left anterior abdominal wall in the subcutaneous soft tissue appear stable.  She has a small fat-containing hernia.  Scarring and parenchymal opacities in the bilateral medial and lower lobes appear stable, cholelithiasis.    In the ED the patient was treated with vancomycin, cefepime, Benadryl, Reglan, morphine, Zofran, 1 L of lactated Ringer's.  Apparently the patient was seen by the [facility] MARGIE but there was no note yet.         Past Medical History:   Diagnosis Date    COPD (chronic obstructive pulmonary disease) (Multi)     Depression     DVT  (deep venous thrombosis) (Multi)     bilateral upper extremities    HTN (hypertension)     Lung cancer (Multi)     Migraines     Panic disorder      Past Surgical History:   Procedure Laterality Date     SECTION, LOW TRANSVERSE      COLONOSCOPY      CT ABDOMEN PELVIS ANGIOGRAM W AND/OR WO IV CONTRAST  2016    CT ABDOMEN PELVIS ANGIOGRAM W AND/OR WO IV CONTRAST 3/22/2016 [facility] [place] [facility]    CT ANGIO CORONARY ART WITH HEARTFLOW IF SCORE >30%  2023    CT ANGIO CORONARY ART WITH HEARTFLOW IF SCORE >30% 2023    CYSTOSCOPY      botox injection    ESOPHAGOGASTRODUODENOSCOPY      EXPLORATORY LAPAROTOMY W/ BOWEL RESECTION  2024    SBR for perforation    MR NECK ANGIO W IV CONTRAST  2021    MR NECK ANGIO W IV CONTRAST 2021     Social History     Tobacco Use    Smoking status: Some Days     Types: Cigarettes     Passive exposure: Current (3 cigarettes a day)    Smokeless tobacco: Never   Vaping Use    Vaping status: Never Used   Substance Use Topics    Alcohol use: Not Currently     Comment: history of daily use    Drug use: Not Currently     Types: Cocaine        Family History  No family history on file.     Allergies  Dilaudid [hydromorphone], Iodinated contrast media, Iodine, and Adhesive tape-silicones    Review of Systems  Patient denies chest pain,  fever, chills, constipation,  vision changes, rashes, dysuria, paresthesias, vertigo, cold symptoms, or any other complaints at this time. A complete review of systems was done, and is as stated in the history of present illness, is otherwise negative or not pertinent to the complaint.    Physical Exam  Physical exam: Vital signs and nurses notes were reviewed.  Patient complained of shortness of breath but her saturations were 100% on oxygen, heart rate was tacky at 129, blood pressure 166/105, respiratory rate was 20    General: Moderate distress. Alert and oriented  x 3.  Talks in full sentences    Head: atraumatic and  normocephalic    Eyes: Pupils equal round reactive to light, EOMs are intact, conjunctivae is not injected.    Oropharynx: Tongue was moist but the lips were dry.    Ears:  normal external exam, no swelling or erythema,     Nasal: normal external exam,     Neck: Supple, full range of motion,     Cardiac: Tachycardic rate, regular rhythm.  No murmurs noted.     Pulmonary: Decreased air exchange and faint end expiratory wheezing.  No accessory muscle use no retraction noted.  Patient could speak easily in full sentences.    Abdomen: Soft, patient's abdomen was diffusely tender to palpation and appeared mildly distended, she had slight guarding on exam.  No rigidity.  Normoactive bowel sounds. No pulsatile masses, no bruits.     Extremities:  Full range of motion.  No pitting edema    Skin: No rash seen. Skin is warm and dry     Neuro: Patient is alert and oriented x3. Speech is clear. There is no asymmetry with facial grimaces, and no tongue deviation. Patient moves all extremities independently. Sensation is intact. No obvious neuro deficits are noted.     Last Recorded Vitals  BP (!) 166/105   Pulse (!) 123   Temp 36.7 °C (98 °F) (Oral)   Resp 18   Wt 61.7 kg (136 lb)   SpO2 99%     Relevant Results  Scheduled medications  apixaban, 5 mg, oral, BID  [START ON 8/20/2024] cefepime, 2 g, intravenous, q12h  dilTIAZem ER, 240 mg, oral, Daily  [START ON 8/20/2024] fluticasone furoate-vilanteroL, 1 puff, inhalation, Daily  ipratropium-albuteroL, 3 mL, nebulization, q6h  lactated Ringer's, 125 mL/hr, intravenous, Once  montelukast, 10 mg, oral, Daily  oxygen, , inhalation, Continuous - Inhalation  pantoprazole, 40 mg, intravenous, BID  [START ON 8/20/2024] predniSONE, 40 mg, oral, Daily  thiamine, 100 mg, oral, Daily  tiotropium, 2 puff, inhalation, Daily  [START ON 8/20/2024] vancomycin, 1,000 mg, intravenous, q12h      Continuous medications     PRN medications  PRN medications: acetaminophen **OR** acetaminophen  **OR** acetaminophen, albuterol, benzonatate, hydrALAZINE, melatonin, morphine, morphine, ondansetron ODT **OR** ondansetron, prochlorperazine **OR** prochlorperazine, vancomycin    Results for orders placed or performed during the hospital encounter of 08/19/24 (from the past 24 hour(s))   CBC and Auto Differential   Result Value Ref Range    WBC 26.0 (H) 4.4 - 11.3 x10*3/uL    nRBC 0.3 (H) 0.0 - 0.0 /100 WBCs    RBC 3.16 (L) 4.00 - 5.20 x10*6/uL    Hemoglobin 7.3 (L) 12.0 - 16.0 g/dL    Hematocrit 24.8 (L) 36.0 - 46.0 %    MCV 79 (L) 80 - 100 fL    MCH 23.1 (L) 26.0 - 34.0 pg    MCHC 29.4 (L) 32.0 - 36.0 g/dL    RDW 20.4 (H) 11.5 - 14.5 %    Platelets 900 (H) 150 - 450 x10*3/uL    Neutrophils % 89.6 40.0 - 80.0 %    Immature Granulocytes %, Automated 0.7 0.0 - 0.9 %    Lymphocytes % 4.9 13.0 - 44.0 %    Monocytes % 4.6 2.0 - 10.0 %    Eosinophils % 0.0 0.0 - 6.0 %    Basophils % 0.2 0.0 - 2.0 %    Neutrophils Absolute 23.30 (H) 1.20 - 7.70 x10*3/uL    Immature Granulocytes Absolute, Automated 0.19 0.00 - 0.70 x10*3/uL    Lymphocytes Absolute 1.28 1.20 - 4.80 x10*3/uL    Monocytes Absolute 1.21 (H) 0.10 - 1.00 x10*3/uL    Eosinophils Absolute 0.01 0.00 - 0.70 x10*3/uL    Basophils Absolute 0.04 0.00 - 0.10 x10*3/uL   Comprehensive metabolic panel   Result Value Ref Range    Glucose 131 (H) 74 - 99 mg/dL    Sodium 136 136 - 145 mmol/L    Potassium 3.5 3.5 - 5.3 mmol/L    Chloride 98 98 - 107 mmol/L    Bicarbonate 26 21 - 32 mmol/L    Anion Gap 16 10 - 20 mmol/L    Urea Nitrogen 8 6 - 23 mg/dL    Creatinine 0.62 0.50 - 1.05 mg/dL    eGFR >90 >60 mL/min/1.73m*2    Calcium 9.2 8.6 - 10.3 mg/dL    Albumin 3.7 3.4 - 5.0 g/dL    Alkaline Phosphatase 110 33 - 136 U/L    Total Protein 6.8 6.4 - 8.2 g/dL    AST 37 9 - 39 U/L    Bilirubin, Total 0.4 0.0 - 1.2 mg/dL    ALT 56 (H) 7 - 45 U/L   Lipase   Result Value Ref Range    Lipase 43 9 - 82 U/L   BLOOD GAS VENOUS FULL PANEL   Result Value Ref Range    POCT pH, Venous 7.41  7.33 - 7.43 pH    POCT pCO2, Venous 43 41 - 51 mm Hg    POCT pO2, Venous 33 (L) 35 - 45 mm Hg    POCT SO2, Venous 46 45 - 75 %    POCT Oxy Hemoglobin, Venous 44.4 (L) 45.0 - 75.0 %    POCT Hematocrit Calculated, Venous 23.0 (L) 36.0 - 46.0 %    POCT Sodium, Venous 135 (L) 136 - 145 mmol/L    POCT Potassium, Venous 3.6 3.5 - 5.3 mmol/L    POCT Chloride, Venous 101 98 - 107 mmol/L    POCT Ionized Calicum, Venous 1.18 1.10 - 1.33 mmol/L    POCT Glucose, Venous 135 (H) 74 - 99 mg/dL    POCT Lactate, Venous 2.5 (H) 0.4 - 2.0 mmol/L    POCT Base Excess, Venous 2.4 -2.0 - 3.0 mmol/L    POCT HCO3 Calculated, Venous 27.3 (H) 22.0 - 26.0 mmol/L    POCT Hemoglobin, Venous 7.8 (L) 12.0 - 16.0 g/dL    POCT Anion Gap, Venous 10.0 10.0 - 25.0 mmol/L    Patient Temperature 37.0 degrees Celsius    FiO2 21 %   Morphology   Result Value Ref Range    RBC Morphology See Below     Polychromasia Mild     Hypochromia Mild     RBC Fragments Few     Target Cells Few    Ethanol   Result Value Ref Range    Alcohol <10 <=10 mg/dL   Urinalysis with Reflex Culture and Microscopic   Result Value Ref Range    Color, Urine Light-Yellow Light-Yellow, Yellow, Dark-Yellow    Appearance, Urine Clear Clear    Specific Gravity, Urine 1.011 1.005 - 1.035    pH, Urine 7.5 5.0, 5.5, 6.0, 6.5, 7.0, 7.5, 8.0    Protein, Urine NEGATIVE NEGATIVE, 10 (TRACE), 20 (TRACE) mg/dL    Glucose, Urine Normal Normal mg/dL    Blood, Urine NEGATIVE NEGATIVE    Ketones, Urine NEGATIVE NEGATIVE mg/dL    Bilirubin, Urine NEGATIVE NEGATIVE    Urobilinogen, Urine Normal Normal mg/dL    Nitrite, Urine NEGATIVE NEGATIVE    Leukocyte Esterase, Urine 75 Ivonne/µL (A) NEGATIVE   Microscopic Only, Urine   Result Value Ref Range    WBC, Urine 1-5 1-5, NONE /HPF    RBC, Urine 1-2 NONE, 1-2, 3-5 /HPF    Bacteria, Urine 1+ (A) NONE SEEN /HPF    Mucus, Urine FEW Reference range not established. /LPF   Drug Screen, Urine   Result Value Ref Range    Amphetamine Screen, Urine Presumptive  Negative Presumptive Negative    Barbiturate Screen, Urine Presumptive Negative Presumptive Negative    Benzodiazepines Screen, Urine Presumptive Negative Presumptive Negative    Cannabinoid Screen, Urine Presumptive Negative Presumptive Negative    Cocaine Metabolite Screen, Urine Presumptive Positive (A) Presumptive Negative    Fentanyl Screen, Urine Presumptive Negative Presumptive Negative    Opiate Screen, Urine Presumptive Positive (A) Presumptive Negative    Oxycodone Screen, Urine Presumptive Negative Presumptive Negative    PCP Screen, Urine Presumptive Negative Presumptive Negative    Methadone Screen, Urine Presumptive Negative Presumptive Negative     CT abdomen pelvis wo IV contrast   Final Result   Postsurgical changes right lower quadrant within the small bowel with   mild wall thickening without evidence of obstruction.   Stranding in the left anterior abdominal wall the subcutaneous soft   tissues which appears stable.  This appears to be related to small   fat-containing hernia.   Scarring and parenchymal opacities in the bilateral medial and lower   lobes appear stable.   Cholelithiasis.   Signed by Armando Mcgarry, DO             Assessment/Plan   Assessment & Plan  Sepsis due to undetermined organism (Multi)    COPD exacerbation (Multi)    Shortness of breath    Cocaine abuse (Multi)    Generalized abdominal pain    Leukocytosis    Nausea & vomiting    Diarrhea      Plan:  ACS consult pending for her abdominal pain  ID consult pending for the leukocytosis  Cefepime and vancomycin continued  Repeat lactate pending  LR at 125 cc an hour  Patient was given Solu-Medrol 125 mg IV and has orders for prednisone 40 mg daily for COPD exacerbation  Morphine as needed pain  Zofran first-line, Compazine second line as needed nausea vomiting  DuoNebs and albuterol as needed for her COPD  Hydralazine IV as needed systolic greater than 160 or diastolic greater than 110  Patient's home diltiazem as ordered  for her hypertension  Telemetry  Oxygen  N.p.o. with sips for meds and sips of clear liquids per ACS  Patient's home Eliquis, Tessalon Perles, pantoprazole, thiamine, Spiriva, montelukast, Breo etc. are all ordered  Findings, orders, plan all discussed with Dr. Fry.       Mora Rubalcava PA-C

## 2024-08-19 NOTE — ED TRIAGE NOTES
Pt to ED from home with c/o abd pain that started this morning with N/V/D. Month and a half ago pt had bowel obstruction and had surgery, pt was discharged last Sunday from main campus. Pt is A&Ox4 and was able to walk with assistance from EMS. Pt wears 2L at all times for COPD. Pts daughter is on the way.

## 2024-08-19 NOTE — ED PROVIDER NOTES
HPI   Chief Complaint   Patient presents with    Abdominal Pain       Patient is a 63-year-old female with past medical history of bowel perforation on 2024 s/p resection and anastomosis with course complicated by wound infection and prolonged ileus, DVT (prescribed apixaban but patient does not report she is taking), COPD (on 2 L O2), HTN, lung cancer presenting with concern for abdominal pain.  Endorses still having regular diarrhea-like bowel movements including earlier today.  Endorses belly pain started today does not endorse any clear provocation.  Does not endorse nausea, vomiting, fevers, chills or other infectious symptoms.  Endorses no chest pain, shortness of breath, leg swelling.            Patient History   Past Medical History:   Diagnosis Date    COPD (chronic obstructive pulmonary disease) (Multi)     Depression     DVT (deep venous thrombosis) (Multi)     bilateral upper extremities    HTN (hypertension)     Lung cancer (Multi)     Migraines     Panic disorder      Past Surgical History:   Procedure Laterality Date     SECTION, LOW TRANSVERSE      COLONOSCOPY      CT ABDOMEN PELVIS ANGIOGRAM W AND/OR WO IV CONTRAST  2016    CT ABDOMEN PELVIS ANGIOGRAM W AND/OR WO IV CONTRAST 3/22/2016 [facility] [place] EMERGENCY LEGAC[facility]    CT ANGIO CORONARY ART WITH HEARTFLOW IF SCORE >30%  2023    CT ANGIO CORONARY ART WITH HEARTFLOW IF SCORE >30% 2023    CYSTOSCOPY      botox injection    ESOPHAGOGASTRODUODENOSCOPY      EXPLORATORY LAPAROTOMY W/ BOWEL RESECTION  2024    SBR for perforation    MR NECK ANGIO W IV CONTRAST  2021    MR NECK ANGIO W IV CONTRAST 2021     No family history on file.  Social History     Tobacco Use    Smoking status: Some Days     Types: Cigarettes     Passive exposure: Current (3 cigarettes a day)    Smokeless tobacco: Never   Vaping Use    Vaping status: Never Used   Substance Use Topics    Alcohol use: Not Currently     Comment: history of daily use     Drug use: Not Currently     Types: Cocaine       Physical Exam   ED Triage Vitals   Temp Pulse Resp BP   -- -- -- --      SpO2 Temp src Heart Rate Source Patient Position   -- -- -- --      BP Location FiO2 (%)     -- --       Physical Exam  Constitutional:       Appearance: Normal appearance.   HENT:      Head: Normocephalic and atraumatic.   Eyes:      Extraocular Movements: Extraocular movements intact.   Cardiovascular:      Rate and Rhythm: Normal rate.   Pulmonary:      Effort: Pulmonary effort is normal.   Abdominal:      Comments: Diffuse voluntary guarding in all 4 quadrants.  Negative Aldridge sign.  No focal overlying skin changes or focal tenderness palpation.  No rebound tenderness or involuntary guarding.   Musculoskeletal:         General: Normal range of motion.      Cervical back: Normal range of motion.   Skin:     General: Skin is warm and dry.   Neurological:      General: No focal deficit present.      Mental Status: She is alert and oriented to person, place, and time.   Psychiatric:         Mood and Affect: Mood normal.         Behavior: Behavior normal.           ED Course & MDM   Diagnoses as of 08/19/24 1806   Abdominal pain, unspecified abdominal location   Sepsis due to undetermined organism (Multi)                 No data recorded     Gama Coma Scale Score: 15 (08/19/24 1217 : Chio Caldwell RN)                           Medical Decision Making  Patient is a 63-year-old female with past medical history of bowel perforation on June 21 2024 s/p resection and anastomosis with course complicated by wound infection and prolonged ileus, DVT (prescribed apixaban but patient does not report she is taking), COPD (on 2 L O2), HTN, lung cancer presenting with concern for abdominal pain.  CT scan with no clear etiology other than continued inflammatory changes possibly consistent with postsurgical course at site of anastomosis.  Patient does have diffuse guarding, marked tachycardia and leukocytosis  concerning for sepsis and covered with vancomycin and cefepime with assumption that Zosyn may have inadequately covered prior infection.  Otherwise patient without symptoms of infection other than abdomen and no other probable source of infection to explain leukocytosis and tachycardia.  No evidence of hypotension or shock and fluid resuscitation performed judiciously with 1 L LR.  Lactate 2.5, no secondary findings concerning for bowel ischemia on CT of abdomen although performed without contrast given patient's severe contrast allergy.  ACS consulted with final recommendations pending at time of admission.  Case discussed with hospitalist and patient admitted to medicine for further management.    Patient seen and discussed with Dr. Mahesh Redding MD, PhD  Emergency Medicine PGY3          Procedure  Procedures     Nick Redding MD  Resident  08/19/24 5956

## 2024-08-20 ENCOUNTER — APPOINTMENT (OUTPATIENT)
Dept: RADIOLOGY | Facility: HOSPITAL | Age: 64
End: 2024-08-20
Payer: COMMERCIAL

## 2024-08-20 LAB
ANION GAP SERPL CALC-SCNC: 15 MMOL/L (ref 10–20)
BUN SERPL-MCNC: 12 MG/DL (ref 6–23)
CALCIUM SERPL-MCNC: 9.1 MG/DL (ref 8.6–10.3)
CARDIAC TROPONIN I PNL SERPL HS: 16 NG/L (ref 0–13)
CHLORIDE SERPL-SCNC: 97 MMOL/L (ref 98–107)
CO2 SERPL-SCNC: 24 MMOL/L (ref 21–32)
CREAT SERPL-MCNC: 0.67 MG/DL (ref 0.5–1.05)
EGFRCR SERPLBLD CKD-EPI 2021: >90 ML/MIN/1.73M*2
ERYTHROCYTE [DISTWIDTH] IN BLOOD BY AUTOMATED COUNT: 20.2 % (ref 11.5–14.5)
GLUCOSE SERPL-MCNC: 129 MG/DL (ref 74–99)
HCT VFR BLD AUTO: 24.4 % (ref 36–46)
HGB BLD-MCNC: 7.7 G/DL (ref 12–16)
MCH RBC QN AUTO: 23.6 PG (ref 26–34)
MCHC RBC AUTO-ENTMCNC: 31.6 G/DL (ref 32–36)
MCV RBC AUTO: 75 FL (ref 80–100)
NRBC BLD-RTO: 0.2 /100 WBCS (ref 0–0)
PLATELET # BLD AUTO: 738 X10*3/UL (ref 150–450)
POTASSIUM SERPL-SCNC: 4.6 MMOL/L (ref 3.5–5.3)
RBC # BLD AUTO: 3.26 X10*6/UL (ref 4–5.2)
SODIUM SERPL-SCNC: 131 MMOL/L (ref 136–145)
VANCOMYCIN SERPL-MCNC: 18.1 UG/ML (ref 5–20)
WBC # BLD AUTO: 28.7 X10*3/UL (ref 4.4–11.3)

## 2024-08-20 PROCEDURE — 36415 COLL VENOUS BLD VENIPUNCTURE: CPT | Performed by: PHYSICIAN ASSISTANT

## 2024-08-20 PROCEDURE — 84484 ASSAY OF TROPONIN QUANT: CPT | Performed by: INTERNAL MEDICINE

## 2024-08-20 PROCEDURE — 99024 POSTOP FOLLOW-UP VISIT: CPT | Performed by: SURGERY

## 2024-08-20 PROCEDURE — 74018 RADEX ABDOMEN 1 VIEW: CPT | Performed by: RADIOLOGY

## 2024-08-20 PROCEDURE — 2500000004 HC RX 250 GENERAL PHARMACY W/ HCPCS (ALT 636 FOR OP/ED): Performed by: PHYSICIAN ASSISTANT

## 2024-08-20 PROCEDURE — 2500000001 HC RX 250 WO HCPCS SELF ADMINISTERED DRUGS (ALT 637 FOR MEDICARE OP): Performed by: PHYSICIAN ASSISTANT

## 2024-08-20 PROCEDURE — 85027 COMPLETE CBC AUTOMATED: CPT | Performed by: PHYSICIAN ASSISTANT

## 2024-08-20 PROCEDURE — 94640 AIRWAY INHALATION TREATMENT: CPT

## 2024-08-20 PROCEDURE — 2500000005 HC RX 250 GENERAL PHARMACY W/O HCPCS: Performed by: PHYSICIAN ASSISTANT

## 2024-08-20 PROCEDURE — 99233 SBSQ HOSP IP/OBS HIGH 50: CPT | Performed by: INTERNAL MEDICINE

## 2024-08-20 PROCEDURE — 1200000002 HC GENERAL ROOM WITH TELEMETRY DAILY

## 2024-08-20 PROCEDURE — 74018 RADEX ABDOMEN 1 VIEW: CPT

## 2024-08-20 PROCEDURE — 80202 ASSAY OF VANCOMYCIN: CPT | Performed by: PHARMACIST

## 2024-08-20 PROCEDURE — 2500000002 HC RX 250 W HCPCS SELF ADMINISTERED DRUGS (ALT 637 FOR MEDICARE OP, ALT 636 FOR OP/ED): Performed by: PHARMACIST

## 2024-08-20 PROCEDURE — 80048 BASIC METABOLIC PNL TOTAL CA: CPT | Performed by: PHYSICIAN ASSISTANT

## 2024-08-20 PROCEDURE — 2500000004 HC RX 250 GENERAL PHARMACY W/ HCPCS (ALT 636 FOR OP/ED): Performed by: PHARMACIST

## 2024-08-20 PROCEDURE — 2500000004 HC RX 250 GENERAL PHARMACY W/ HCPCS (ALT 636 FOR OP/ED): Performed by: INTERNAL MEDICINE

## 2024-08-20 PROCEDURE — 2500000002 HC RX 250 W HCPCS SELF ADMINISTERED DRUGS (ALT 637 FOR MEDICARE OP, ALT 636 FOR OP/ED): Performed by: INTERNAL MEDICINE

## 2024-08-20 RX ORDER — VANCOMYCIN HYDROCHLORIDE 750 MG/150ML
750 INJECTION, SOLUTION INTRAVENOUS EVERY 12 HOURS
Status: DISCONTINUED | OUTPATIENT
Start: 2024-08-20 | End: 2024-08-21

## 2024-08-20 RX ORDER — DIPHENHYDRAMINE HYDROCHLORIDE 50 MG/ML
12.5 INJECTION INTRAMUSCULAR; INTRAVENOUS EVERY 6 HOURS PRN
Status: DISCONTINUED | OUTPATIENT
Start: 2024-08-20 | End: 2024-09-01 | Stop reason: HOSPADM

## 2024-08-20 RX ADMIN — Medication 3 L/MIN: at 20:00

## 2024-08-20 RX ADMIN — MORPHINE SULFATE 4 MG: 4 INJECTION, SOLUTION INTRAMUSCULAR; INTRAVENOUS at 06:12

## 2024-08-20 RX ADMIN — DIPHENHYDRAMINE HYDROCHLORIDE 12.5 MG: 50 INJECTION, SOLUTION INTRAMUSCULAR; INTRAVENOUS at 12:32

## 2024-08-20 RX ADMIN — PREDNISONE 40 MG: 20 TABLET ORAL at 09:07

## 2024-08-20 RX ADMIN — MORPHINE SULFATE 4 MG: 4 INJECTION, SOLUTION INTRAMUSCULAR; INTRAVENOUS at 15:59

## 2024-08-20 RX ADMIN — APIXABAN 5 MG: 5 TABLET, FILM COATED ORAL at 20:23

## 2024-08-20 RX ADMIN — MORPHINE SULFATE 4 MG: 4 INJECTION, SOLUTION INTRAMUSCULAR; INTRAVENOUS at 02:45

## 2024-08-20 RX ADMIN — MORPHINE SULFATE 4 MG: 4 INJECTION, SOLUTION INTRAMUSCULAR; INTRAVENOUS at 22:23

## 2024-08-20 RX ADMIN — PANTOPRAZOLE SODIUM 40 MG: 40 INJECTION, POWDER, FOR SOLUTION INTRAVENOUS at 20:23

## 2024-08-20 RX ADMIN — APIXABAN 5 MG: 5 TABLET, FILM COATED ORAL at 09:07

## 2024-08-20 RX ADMIN — IPRATROPIUM BROMIDE AND ALBUTEROL SULFATE 3 ML: 2.5; .5 SOLUTION RESPIRATORY (INHALATION) at 12:10

## 2024-08-20 RX ADMIN — MORPHINE SULFATE 4 MG: 4 INJECTION, SOLUTION INTRAMUSCULAR; INTRAVENOUS at 19:09

## 2024-08-20 RX ADMIN — ONDANSETRON 4 MG: 2 INJECTION INTRAMUSCULAR; INTRAVENOUS at 01:36

## 2024-08-20 RX ADMIN — TIOTROPIUM BROMIDE INHALATION SPRAY 2 PUFF: 3.12 SPRAY, METERED RESPIRATORY (INHALATION) at 12:07

## 2024-08-20 RX ADMIN — DILTIAZEM HYDROCHLORIDE 240 MG: 120 CAPSULE, EXTENDED RELEASE ORAL at 09:06

## 2024-08-20 RX ADMIN — BUDESONIDE 0.5 MG: 0.5 INHALANT RESPIRATORY (INHALATION) at 07:14

## 2024-08-20 RX ADMIN — Medication 100 MG: at 09:07

## 2024-08-20 RX ADMIN — MORPHINE SULFATE 4 MG: 4 INJECTION, SOLUTION INTRAMUSCULAR; INTRAVENOUS at 12:32

## 2024-08-20 RX ADMIN — FORMOTEROL FUMARATE DIHYDRATE 20 MCG: 20 SOLUTION RESPIRATORY (INHALATION) at 07:14

## 2024-08-20 RX ADMIN — IPRATROPIUM BROMIDE AND ALBUTEROL SULFATE 3 ML: 2.5; .5 SOLUTION RESPIRATORY (INHALATION) at 20:13

## 2024-08-20 RX ADMIN — VANCOMYCIN HYDROCHLORIDE 1000 MG: 1 INJECTION, SOLUTION INTRAVENOUS at 03:34

## 2024-08-20 RX ADMIN — DIPHENHYDRAMINE HYDROCHLORIDE 12.5 MG: 50 INJECTION, SOLUTION INTRAMUSCULAR; INTRAVENOUS at 20:27

## 2024-08-20 RX ADMIN — PANTOPRAZOLE SODIUM 40 MG: 40 INJECTION, POWDER, FOR SOLUTION INTRAVENOUS at 09:06

## 2024-08-20 RX ADMIN — Medication 3 L/MIN: at 07:14

## 2024-08-20 RX ADMIN — MORPHINE SULFATE 4 MG: 4 INJECTION, SOLUTION INTRAMUSCULAR; INTRAVENOUS at 09:07

## 2024-08-20 RX ADMIN — MONTELUKAST 10 MG: 10 TABLET, FILM COATED ORAL at 09:07

## 2024-08-20 RX ADMIN — VANCOMYCIN HYDROCHLORIDE 750 MG: 750 INJECTION, SOLUTION INTRAVENOUS at 21:00

## 2024-08-20 RX ADMIN — CEFEPIME 2 G: 2 INJECTION, POWDER, FOR SOLUTION INTRAVENOUS at 02:45

## 2024-08-20 RX ADMIN — CEFEPIME 2 G: 2 INJECTION, POWDER, FOR SOLUTION INTRAVENOUS at 14:17

## 2024-08-20 RX ADMIN — IPRATROPIUM BROMIDE AND ALBUTEROL SULFATE 3 ML: 2.5; .5 SOLUTION RESPIRATORY (INHALATION) at 07:14

## 2024-08-20 ASSESSMENT — COGNITIVE AND FUNCTIONAL STATUS - GENERAL
CLIMB 3 TO 5 STEPS WITH RAILING: A LITTLE
WALKING IN HOSPITAL ROOM: A LITTLE
MOBILITY SCORE: 22
DAILY ACTIVITIY SCORE: 24

## 2024-08-20 ASSESSMENT — PAIN SCALES - GENERAL
PAINLEVEL_OUTOF10: 5 - MODERATE PAIN
PAINLEVEL_OUTOF10: 7
PAINLEVEL_OUTOF10: 10 - WORST POSSIBLE PAIN
PAINLEVEL_OUTOF10: 8
PAINLEVEL_OUTOF10: 10 - WORST POSSIBLE PAIN
PAINLEVEL_OUTOF10: 7
PAINLEVEL_OUTOF10: 8
PAINLEVEL_OUTOF10: 7
PAINLEVEL_OUTOF10: 6

## 2024-08-20 ASSESSMENT — PAIN DESCRIPTION - PAIN TYPE: TYPE: ACUTE PAIN

## 2024-08-20 ASSESSMENT — PAIN DESCRIPTION - LOCATION: LOCATION: ABDOMEN

## 2024-08-20 ASSESSMENT — PAIN - FUNCTIONAL ASSESSMENT
PAIN_FUNCTIONAL_ASSESSMENT: 0-10

## 2024-08-20 ASSESSMENT — PAIN SCALES - WONG BAKER
WONGBAKER_NUMERICALRESPONSE: HURTS LITTLE MORE
WONGBAKER_NUMERICALRESPONSE: HURTS LITTLE BIT
WONGBAKER_NUMERICALRESPONSE: HURTS LITTLE MORE
WONGBAKER_NUMERICALRESPONSE: HURTS EVEN MORE

## 2024-08-20 ASSESSMENT — ACTIVITIES OF DAILY LIVING (ADL): LACK_OF_TRANSPORTATION: NO

## 2024-08-20 NOTE — CONSULTS
INFECTIOUS DISEASE INPATIENT INITIAL CONSULTATION    Referred By: Mora Rubalcava    Reason For Consult: large leukocytosis, abdominal pain, recent small bowel resection, questionable infection at anastomosis site,     HPI:  This is a 63 y.o. female with PMH of COPD, cocaine abuse, recent bowel perforation needing small bowel resection/anastomosis 6/2024, c/b suspected infection in early 8/2024 who presented now with abd pain, n/v/d.    Patient says she has been having pain starting in her thighs that then radiates up into the abdomen. No fevers/chills. Seems to be doing fine in the room on exam.    Afebrile. WBC is high 26K. Urine cx sent although US has no pyuria. No blood cx. Tox screen positive for cocaine.     Allergies:  Dilaudid [hydromorphone], Iodinated contrast media, Iodine, and Adhesive tape-silicones     Vitals (Last 24 Hours):  Heart Rate:  []   Temp:  [36.7 °C (98 °F)-37 °C (98.6 °F)]   Resp:  [16-20]   BP: (132-174)/()   Weight:  [61.7 kg (136 lb)]   SpO2:  [92 %-100 %]      PHYSICAL EXAM:  Gen - NAD  Heart - RRR  Lungs - no wheezing  Abd - soft, she has tenderness to barely any palpation but this is not reproduced with significant pressure while auscultating with stethoscope  Skin - no rash    MEDS:    Current Facility-Administered Medications:     acetaminophen (Tylenol) tablet 650 mg, 650 mg, oral, q4h PRN **OR** acetaminophen (Tylenol) oral liquid 650 mg, 650 mg, oral, q4h PRN **OR** acetaminophen (Tylenol) suppository 650 mg, 650 mg, rectal, q4h PRN, Mora Rubalcava PA-C    albuterol 2.5 mg /3 mL (0.083 %) nebulizer solution 2.5 mg, 2.5 mg, nebulization, q2h PRN, Juliann Aragon MD    apixaban (Eliquis) tablet 5 mg, 5 mg, oral, BID, Mora Rubalcava PA-C, 5 mg at 08/19/24 2111    benzonatate (Tessalon) capsule 100 mg, 100 mg, oral, TID PRN, Mora Rubalcava PA-C    budesonide (Pulmicort) 0.5 mg/2 mL nebulizer solution 0.5 mg, 0.5 mg, nebulization, BID, Avelino Hughes, PharmD, 0.5  mg at 08/20/24 0714    cefepime (Maxipime) in dextrose 5% IV 2 g, 2 g, intravenous, q12h, Mora Rubalcava PA-C, 2 g at 08/20/24 0245    dilTIAZem CD (Cardizem CD) 24 hr capsule 240 mg, 240 mg, oral, Daily, Mora Rubalcava PA-C, 240 mg at 08/19/24 2111    formoterol (Perforomist) 20 mcg/2 mL nebulizer solution 20 mcg, 20 mcg, nebulization, BID, Avelino Hughes PharmD, 20 mcg at 08/20/24 0714    hydrALAZINE (Apresoline) injection 10 mg, 10 mg, intravenous, q8h PRN, Mora Rubalcava PA-C    ipratropium-albuteroL (Duo-Neb) 0.5-2.5 mg/3 mL nebulizer solution 3 mL, 3 mL, nebulization, TID, Juliann Aragon MD, 3 mL at 08/20/24 0714    melatonin tablet 3 mg, 3 mg, oral, Nightly PRN, Mora Rubalcava PA-C    montelukast (Singulair) tablet 10 mg, 10 mg, oral, Daily, Mora Rubalcava PA-C    morphine injection 2 mg, 2 mg, intravenous, q3h PRN, Mora Rubalcava PA-C    morphine injection 4 mg, 4 mg, intravenous, q3h PRN, Mora Rubalcava PA-C, 4 mg at 08/20/24 0612    ondansetron ODT (Zofran-ODT) disintegrating tablet 4 mg, 4 mg, oral, q8h PRN **OR** ondansetron (Zofran) injection 4 mg, 4 mg, intravenous, q8h PRN, Mora Rubalcava PA-C, 4 mg at 08/20/24 0136    oxygen (O2) therapy, , inhalation, Continuous - Inhalation, Mora Rubalcava PA-C, 3 L/min at 08/20/24 0714    pantoprazole (ProtoNix) injection 40 mg, 40 mg, intravenous, BID, Mora Rubalcava PA-C, 40 mg at 08/19/24 2111    predniSONE (Deltasone) tablet 40 mg, 40 mg, oral, Daily, Mora Rubalcava PA-C    prochlorperazine (Compazine) tablet 10 mg, 10 mg, oral, q6h PRN **OR** prochlorperazine (Compazine) injection 10 mg, 10 mg, intravenous, q6h PRN, Mora Rubalcava PA-C    thiamine (Vitamin B-1) tablet 100 mg, 100 mg, oral, Daily, Mora Rubalcava PA-C    tiotropium (Spiriva Respimat) 2.5 mcg/actuation inhaler 2 puff, 2 puff, inhalation, Daily, Mora Rubalcava PA-C    vancomycin (Vancocin) 1,000 mg in dextrose 5%  mL, 1,000 mg, intravenous, q12h, Mora OATES  MARCELO Rubalcava, Last Rate: 200 mL/hr at 08/20/24 0334, 1,000 mg at 08/20/24 0334    vancomycin (Vancocin) pharmacy to dose - pharmacy monitoring, , miscellaneous, Daily PRN, Mora Rubalcava PA-C     LABS:  Lab Results   Component Value Date    WBC 26.0 (H) 08/19/2024    HGB 7.3 (L) 08/19/2024    HCT 24.8 (L) 08/19/2024    MCV 79 (L) 08/19/2024     (H) 08/19/2024      Results from last 72 hours   Lab Units 08/19/24  1225   SODIUM mmol/L 136   POTASSIUM mmol/L 3.5   CHLORIDE mmol/L 98   CO2 mmol/L 26   BUN mg/dL 8   CREATININE mg/dL 0.62   GLUCOSE mg/dL 131*   CALCIUM mg/dL 9.2   ANION GAP mmol/L 16   EGFR mL/min/1.73m*2 >90     Results from last 72 hours   Lab Units 08/19/24  1225   ALK PHOS U/L 110   BILIRUBIN TOTAL mg/dL 0.4   PROTEIN TOTAL g/dL 6.8   ALT U/L 56*   AST U/L 37   ALBUMIN g/dL 3.7     Estimated Creatinine Clearance: 80.2 mL/min (by C-G formula based on SCr of 0.62 mg/dL).    IMAGING:  CT A/P 8/19  Impression:     Postsurgical changes right lower quadrant within the small bowel with  mild wall thickening without evidence of obstruction.  Stranding in the left anterior abdominal wall the subcutaneous soft  tissues which appears stable.  This appears to be related to small  fat-containing hernia.  Scarring and parenchymal opacities in the bilateral medial and lower  lobes appear stable.  Cholelithiasis.       ASSESSMENT/PLAN:    Abd Pain - pain easily distractible and doesn't truly have tenderness on examination.  Cocaine Abuse    Perhaps leukocytosis is just stress reaction/cocaine related. Abd exam is pretty benign and CT without major concern for infection. Monitor for resolution of leukocytosis. OK for empiric abx right now. If there is diarrhea we can check for C. Diff although she didn't mention much diarrhea to me.    Will follow. Thanks!    Tim Nelson MD  [facility]  Office #[phone]

## 2024-08-20 NOTE — CONSULTS
Patient is a 63-year-old female with multiple medical comorbid conditions who is known to the surgery service having undergone emergent laparoscopic assisted partial small bowel resection after she presented with severe abdominal pain and found to have perforated small intestine.  She had a prolonged recovery following this surgery complicated by a prolonged ileus.      Comes back now with abdominal pain with abdominal distention and nausea/vomiting and loose stools over the past 36 hours.     CT scan shows Postsurgical changes right lower quadrant within the small bowel with mild wall thickening without evidence of obstruction.      PMH:  COPD, cocaine abuse, constipation, polyps direction with small bowel perforation status post partial resection and anastomosis with subsequent wound infection and prolonged ileus, daily migraines, right arm DVT, alcohol abuse, hypertension, lung cancer, anemia, postop hematoma with admission from  through , UTIs, anxiety and depression, panic disorder, thrombocytosis       Surgical History  She has a past surgical history that includes CT angio abdomen pelvis w and or wo IV IV contrast (2016); MR angio neck w IV contrast (2021); CT angio coronary art with heartflow if score >30% (2023);  section, low transverse; Esophagogastroduodenoscopy; Colonoscopy; Exploratory laparotomy w/ bowel resection (2024); and Cystoscopy.     Social History  She reports that she has been smoking cigarettes. She has been exposed to tobacco smoke. She has never used smokeless tobacco. She reports that she does not currently use alcohol. She reports that she does not currently use drugs after having used the following drugs: Cocaine.      /79 (BP Location: Right arm, Patient Position: Lying)   Pulse (!) 114   Temp 36.2 °C (97.2 °F) (Temporal)   Resp 16   Wt 61.7 kg (136 lb)   SpO2 100%   BMI 24.87 kg/m²    Uncomfortable but In no distress  Alert  and oriented x 3  Lungs are clear to auscultation bilaterally.  Cardiac exam is regular rhythm and rate.  Abdomen is soft   distended mild to moderate diffuse tenderness   Neurologic exam is without focal deficit.      === 08/19/24 ===    CT ABDOMEN PELVIS WO IV CONTRAST    - Impression -  Postsurgical changes right lower quadrant within the small bowel with  mild wall thickening without evidence of obstruction.  Stranding in the left anterior abdominal wall the subcutaneous soft  tissues which appears stable.  This appears to be related to small  fat-containing hernia.  Scarring and parenchymal opacities in the bilateral medial and lower  lobes appear stable.  Cholelithiasis.      Lab Results   Component Value Date    WBC 28.7 (H) 08/20/2024    HGB 7.7 (L) 08/20/2024    HCT 24.4 (L) 08/20/2024    MCV 75 (L) 08/20/2024     (H) 08/20/2024     Lab Results   Component Value Date    GLUCOSE 129 (H) 08/20/2024     (L) 08/20/2024    K 4.6 08/20/2024    CL 97 (L) 08/20/2024    CO2 24 08/20/2024    ANIONGAP 15 08/20/2024    BUN 12 08/20/2024    CREATININE 0.67 08/20/2024    EGFR >90 08/20/2024    CALCIUM 9.1 08/20/2024    ALBUMIN 3.7 08/19/2024    ALKPHOS 110 08/19/2024    PROT 6.8 08/19/2024    AST 37 08/19/2024    BILITOT 0.4 08/19/2024    ALT 56 (H) 08/19/2024         Impression;  Chronic, severe abdominal pain with distention, nausea vomiting and diarrhea.    History of recent small bowel resection for unexplained small bowel perforation.    CT scan remarkable only for postsurgical changes and some thickened small bowel loops.    Exam tender without peritoneal signs.    Marked leukocytosis, tox screen positive for cocaine (? Intestinal ischemia)    At this time recommend bowel rest, antibiotics per ID    No surgical indications currently     Send assay for C. Diff

## 2024-08-20 NOTE — PROGRESS NOTES
Pharmacy Medication History Review   Spoke to the patient, pt stated she never took Eliquis at home. And all her other meds she is going to need a new Rx.  Made no med changes or additions    Fernando Cuevas is a 63 y.o. female admitted for Sepsis due to undetermined organism (Multi). Pharmacy reviewed the patient's [id number] medications and allergies for accuracy.    The list below reflectives the updated PTA list. Please review each medication in order reconciliation for additional clarification and justification.  Prior to Admission Medications   Prescriptions Last Dose Informant   Lidocaine Pain Relief 4 % patch     Sig: Place 1 patch on the skin every 24 (twenty four) hours if needed for mild pain (1 - 3).   acetaminophen (Tylenol Extra Strength) 500 mg tablet     Sig: Take 2 tablets (1,000 mg) by mouth every 8 hours.   albuterol 2.5 mg /3 mL (0.083 %) nebulizer solution     Sig: Take 3 mL by nebulization every 6 hours if needed for wheezing or shortness of breath.   albuterol 90 mcg/actuation inhaler  Self   Sig: Inhale 2 puffs every 4 hours if needed for wheezing or shortness of breath.   ammonium lactate (Lac-Hydrin) 12 % lotion  Self   Sig: Apply topically twice a day.   apixaban (Eliquis) 5 mg tablet     Sig: Take 1 tablet (5 mg) by mouth 2 times a day.   Patient taking differently: Take 1 tablet (5 mg) by mouth 2 times a day. Pt stated she never took this at home   benzonatate (Tessalon) 100 mg capsule  Self   Sig: Take 1 capsule (100 mg) by mouth 3 times a day as needed for cough. Do not crush or chew.   busPIRone (Buspar) 5 mg tablet  Self   Sig: Take 1 tablet (5 mg) by mouth twice a day. Supposed to take. Needs Rx   calcium carbonate (Oscal) 500 mg calcium (1,250 mg) tablet     Sig: Take 1 tablet (1,250 mg) by mouth 2 times daily (morning and late afternoon).   calcium carbonate (Tums) 250 mg (Native 100 mg) chewable split tablet     Sig: Take 2 half tablet (500 mg) by mouth every 12 hours.    clotrimazole (Lotrimin) 1 % cream  Self   Sig: Apply topically 2 times a day.   dilTIAZem ER (Tiazac) 240 mg 24 hr capsule 8/18/2024 Self   Sig: Take 1 capsule (240 mg) by mouth once daily.   fluocinonide 0.05 % cream  Self   Sig: APPLY THIN LAYER TO AFFECTED AREA TWICE A DAY AS NEEDED   mirtazapine (Remeron) 15 mg tablet     Sig: Take 1 tablet (15 mg) by mouth once daily at bedtime.   mometasone-formoterol (Dulera 100) 100-5 mcg/actuation inhaler 8/18/2024 Self   Sig: Inhale 200 mcg twice a day.   montelukast (Singulair) 10 mg tablet 8/18/2024 Self   Sig: Take 1 tablet (10 mg) by mouth once daily.   oxybutynin XL (Ditropan-XL) 5 mg 24 hr tablet  Self   Sig: Take 1 tablet (5 mg) by mouth once daily. Does not take everyday   pantoprazole (ProtoNix) 40 mg EC tablet  Self   Sig: Take 1 tablet (40 mg) by mouth twice a day.   polyethylene glycol (Glycolax, Miralax) 17 gram/dose powder     Sig: Take 17 g by mouth once daily. Do not fill before July 28, 2024.   sennosides-docusate sodium (Sparkle-Colace) 8.6-50 mg tablet     Sig: Take 1 tablet by mouth 2 times a day.   sucralfate (Carafate) 1 gram tablet     Sig: Take 1 tablet (1 g) by mouth 2 times a day. Crush and mix in luke warm water to make slurry and drink the slurry.   thiamine 100 mg tablet  Self   Sig: Take 1 tablet (100 mg) by mouth once daily.   tiotropium (Spiriva Respimat) 2.5 mcg/actuation inhaler 8/18/2024 Self   Sig: Inhale 2 puffs once daily.      Facility-Administered Medications: None       The list below reflectives the updated allergy list. Please review each documented allergy for additional clarification and justification.  Allergies  Reviewed by Valentina Ireland CPhT on 8/20/2024        Severity Reactions Comments    Dilaudid [hydromorphone] Medium Itching     Iodinated Contrast Media Medium Hives Hives to faces and neck with itching. Resolved with 50 mg benadryl, 20 mg pepcid & 60 mg prednisone. Hives to faces and neck with itching. Resolved with 50  mg benadryl, 20 mg pepcid & 60 mg prednisone.   Hives to faces and neck with itching. Resolved with 50 mg benadryl, 20 mg pepcid & 60 mg prednisone.    Iodine Not Specified Other, Unknown     Adhesive Tape-silicones Low Rash             Below are additional concerns with the patient's PTA list.      Valentina Ireland, RADHAhT

## 2024-08-20 NOTE — PROGRESS NOTES
Fernando Cuevas is a 63 y.o. female on day 1 of admission presenting with Sepsis due to undetermined organism (Multi).      Subjective   On 3L NC, afebrile, no n/v/d overnight. Very tender belly with guarding. Tachy. No overnight events reported.        Objective     Last Recorded Vitals  /69 (BP Location: Left arm, Patient Position: Lying)   Pulse (!) 121   Temp 36.3 °C (97.4 °F) (Temporal)   Resp 16   Wt 61.7 kg (136 lb)   SpO2 100%   Intake/Output last 3 Shifts:    Intake/Output Summary (Last 24 hours) at 8/20/2024 1008  Last data filed at 8/20/2024 0614  Gross per 24 hour   Intake 2298 ml   Output --   Net 2298 ml       Admission Weight  Weight: 61.7 kg (136 lb) (08/19/24 1851)    Daily Weight  08/19/24 : 61.7 kg (136 lb)    Image Results  CT abdomen pelvis wo IV contrast  Narrative: STUDY:  CT Abdomen and Pelvis without IV Contrast; 08/19/2024, 1:59 PM.  INDICATION:  Recent bowel resection. Recurrent diffuse abdominal pain.  COMPARISON:  XR chest/abdomen: 08/03/24; CT AP: 08/02/24, 07/24/24, 07/03/24,  06/26/24, 06/20/24.  ACCESSION NUMBER(S):  [id number]  ORDERING CLINICIAN:  LILIAN DE LA TORRE  TECHNIQUE:  CT of the abdomen and pelvis was performed.  Contiguous axial images  were obtained at 3 mm slice thickness through the abdomen and pelvis.   Coronal and sagittal reconstructions at 3 mm slice thickness were  performed. No intravenous contrast was administered.    Automated mA/kV exposure control was utilized and patient examination  was performed in strict accordance with principles of ALARA.  FINDINGS:  Please note that the evaluation of vessels, lymph nodes and organs is  limited without intravenous contrast.      LOWER CHEST:  No cardiomegaly.  No pericardial effusion.  Lung bases demonstrate  chronic changes with areas of parenchymal scarring in the bilateral  medial lower lobes which appears stable.     ABDOMEN:     LIVER:  No hepatomegaly.  Smooth surface contour.  There is fatty  liver  morphology.     BILE DUCTS:  No intrahepatic or extrahepatic biliary ductal dilatation.     GALLBLADDER:  The gallbladder is present with luminal gallstone.  STOMACH:  No abnormalities identified.     PANCREAS:  Mild prominence of the pancreatic tail is stable without definitive  mass.     SPLEEN:  No splenomegaly or focal splenic lesion.     ADRENAL GLANDS:  Prominence of the adrenal glands.     KIDNEYS AND URETERS:  Kidneys are normal in size and location.  No renal or ureteral  calculi.     PELVIS:     BLADDER:  There is prominence of the urinary bladder.     REPRODUCTIVE ORGANS:  No abnormalities demonstrated.     BOWEL:  There is mild descending and sigmoid colon diverticulosis without  evidence of diverticulitis.  There is postsurgical changes of the  small bowel in the right lower quadrant with mild associated wall  thickening.  There is no evidence of obstruction.  The appendix is  normal.     VESSELS:  No abnormalities identified.  Abdominal aorta is normal in caliber.      PERITONEUM/RETROPERITONEUM/LYMPH NODES:  No free fluid.  No pneumoperitoneum.  No lymphadenopathy.     ABDOMINAL WALL:  There are postoperative changes in the anterior abdominal wall with  mild stranding in the left anterior abdominal wall which appears  stable.  This appears to be related to fat-containing hernia.  SOFT TISSUES:   No abnormalities identified.     BONES:  No acute fracture or aggressive osseous lesion.  Impression: Postsurgical changes right lower quadrant within the small bowel with  mild wall thickening without evidence of obstruction.  Stranding in the left anterior abdominal wall the subcutaneous soft  tissues which appears stable.  This appears to be related to small  fat-containing hernia.  Scarring and parenchymal opacities in the bilateral medial and lower  lobes appear stable.  Cholelithiasis.  Signed by Armando Mcgarry DO      Physical Exam  Constitutional:       Appearance: Normal appearance.    Cardiovascular:      Rate and Rhythm: Regular rhythm. Tachycardia present.   Pulmonary:      Effort: Pulmonary effort is normal.      Breath sounds: Normal breath sounds.      Comments: Decreased BS b/l. No wheezing.   Abdominal:      General: Abdomen is flat.      Tenderness: There is abdominal tenderness. There is guarding.   Musculoskeletal:      Cervical back: Normal range of motion.   Skin:     General: Skin is warm.   Neurological:      General: No focal deficit present.      Mental Status: She is alert and oriented to person, place, and time. Mental status is at baseline.   Psychiatric:         Mood and Affect: Mood normal.         Behavior: Behavior normal.         Thought Content: Thought content normal.         Judgment: Judgment normal.         Relevant Results               Assessment/Plan          Assessment & Plan  Sepsis due to undetermined organism (Multi)    COPD exacerbation (Multi)    Shortness of breath    Cocaine abuse (Multi)    Generalized abdominal pain    Leukocytosis    Nausea & vomiting    Diarrhea    8/20:  Hx emergency surgery for perforated bowel 6/21/24, post-op complications with ileus and wound hematoma s/p wound vac, concern for anastomic infection, Hx GIB s/p EGD around 7/26/24, found to have friable gastric mucosa, Hx upper extremity DVT L brachial 6/27/24, R brachial 7/6/24 and has been on eliquis although reportedly not taking recently. Eliquis was not stopped following admission for GIB.     Now here with abdominal pain which is very tender with guarding and significant leukocytosis along with tachycardia... Imaging is unrevealing.... f/up surgery and ID recs, cont iv abx, f/up cultures, iv analgesia, ivf.     Diarrhea... check C diff.     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 going to hold steroids for now given concern for infection.     HTN urgency... improving with home cardizem.   Hx UE DVT... resume eliquis.     + cocaine  UA unremarkable  CTA without PNA  or PE.     Pt lives at home with her son, uses walker baseline.          Earl Fry MD

## 2024-08-20 NOTE — PROGRESS NOTES
Home with son & [facility]     08/20/24 0642   Current Planned Discharge Disposition   Current Planned Discharge Disposition Home Health

## 2024-08-20 NOTE — PROGRESS NOTES
Transitional Care Coordination Progress Note:  Plan per Medical/Surgical team: treatment of sepsis with IV ATB, inhalers, IV solumedrol, morphine, IV fluids, ID & surgery consults  Status: Inpatient   Payor source: [employer] medicaid  Discharge disposition: Home with son & [facility]  Potential Barriers: ?more surgery  ADOD: 8/22/2024   ИВАН Cardenas RN, BSN Transitional Care Coordinator ED# [phone]      08/20/24 0642   Discharge Planning   Living Arrangements Children   Support Systems Children   Assistance Needed ? more surgery, ATB plan per ID   Type of Residence Private residence   Do you have animals or pets at home? No   Home or Post Acute Services In home services   Type of Home Care Services Home nursing visits;Home OT;Home PT   Expected Discharge Disposition  Services   Does the patient need discharge transport arranged? No   Financial Resource Strain   How hard is it for you to pay for the very basics like food, housing, medical care, and heating? Not hard   Housing Stability   In the last 12 months, was there a time when you were not able to pay the mortgage or rent on time? N   In the past 12 months, how many times have you moved where you were living? 2   At any time in the past 12 months, were you homeless or living in a shelter (including now)? N   Transportation Needs   In the past 12 months, has lack of transportation kept you from medical appointments or from getting medications? no   In the past 12 months, has lack of transportation kept you from meetings, work, or from getting things needed for daily living? No

## 2024-08-20 NOTE — CARE PLAN
The patient's goals for the shift include   remain stable     The clinical goals for the shift include  manage pain control    Over the shift, the patient did make progress toward the following goals.

## 2024-08-20 NOTE — PROGRESS NOTES
Vancomycin Dosing by Pharmacy- FOLLOW UP    Fernando Cuevas is a 63 y.o. year old female who Pharmacy has been consulted for vancomycin dosing for other /intra-abdonimnal . Based on the patient's indication and renal status this patient is being dosed based on a goal AUC of 400-600.     Renal function is currently stable.    Current vancomycin dose: 1000 mg given every 12 hours    Estimated vancomycin AUC on current dose: 604 mg/L.hr     Visit Vitals  /69 (BP Location: Left arm, Patient Position: Lying)   Pulse (!) 121   Temp 36.3 °C (97.4 °F) (Temporal)   Resp 16        Lab Results   Component Value Date    CREATININE 0.67 2024    CREATININE 0.62 2024    CREATININE 0.87 2024    CREATININE 0.95 08/10/2024        Patient weight is as follows:   Vitals:    24 1851   Weight: 61.7 kg (136 lb)       Cultures:  No results found for the encounter in last 14 days.       I/O last 3 completed shifts:  In: 2298 (37.3 mL/kg) [I.V.:999 (16.2 mL/kg); IV Piggyback:1299]  Out: - (0 mL/kg)   Weight: 61.7 kg   I/O during current shift:  No intake/output data recorded.    Temp (24hrs), Av.7 °C (98 °F), Min:36.3 °C (97.4 °F), Max:37 °C (98.6 °F)      Assessment/Plan    Above goal AUC. Orders placed for new vancomcyin regimen of 750 every 12 hours to begin at 2100.    This dosing regimen is predicted by [employer]ight[employer] to result in the following pharmacokinetic parameters:  Exposure target: AUC24 (range)400-600 mg/L.hr   AUC24,ss: 456 mg/L.hr  Probability of AUC24 > 400: 71 %  Ctrough,ss: 14 mg/L  Probability of Ctrough,ss > 20: 13 %  Probability of nephrotoxicity (Lodise LEO ): 9 %  The next level will be obtained on  at 0500. May be obtained sooner if clinically indicated.   Will continue to monitor renal function daily while on vancomycin and order serum creatinine at least every 48 hours if not already ordered.  Follow for continued vancomycin needs, clinical response, and signs/symptoms of  toxicity.       Gladys Jiang, PharmD

## 2024-08-20 NOTE — PROGRESS NOTES
08/20/24 0642   [facility] Disability Status   Are you deaf or do you have serious difficulty hearing? N   Are you blind or do you have serious difficulty seeing, even when wearing glasses? N   Because of a physical, mental, or emotional condition, do you have serious difficulty concentrating, remembering, or making decisions? (5 years old or older) N   Do you have serious difficulty walking or climbing stairs? Y   Do you have serious difficulty dressing or bathing? N   Because of a physical, mental, or emotional condition, do you have serious difficulty doing errands alone such as visiting the doctor? Y

## 2024-08-21 ENCOUNTER — APPOINTMENT (OUTPATIENT)
Dept: RADIOLOGY | Facility: HOSPITAL | Age: 64
End: 2024-08-21
Payer: COMMERCIAL

## 2024-08-21 LAB
ALBUMIN SERPL BCP-MCNC: 3.2 G/DL (ref 3.4–5)
ALP SERPL-CCNC: 96 U/L (ref 33–136)
ALT SERPL W P-5'-P-CCNC: 32 U/L (ref 7–45)
ANION GAP SERPL CALC-SCNC: 14 MMOL/L (ref 10–20)
AST SERPL W P-5'-P-CCNC: 18 U/L (ref 9–39)
BACTERIA UR CULT: ABNORMAL
BILIRUB SERPL-MCNC: 0.4 MG/DL (ref 0–1.2)
BUN SERPL-MCNC: 15 MG/DL (ref 6–23)
CALCIUM SERPL-MCNC: 9.2 MG/DL (ref 8.6–10.3)
CHLORIDE SERPL-SCNC: 97 MMOL/L (ref 98–107)
CO2 SERPL-SCNC: 25 MMOL/L (ref 21–32)
CREAT SERPL-MCNC: 0.77 MG/DL (ref 0.5–1.05)
EGFRCR SERPLBLD CKD-EPI 2021: 87 ML/MIN/1.73M*2
ERYTHROCYTE [DISTWIDTH] IN BLOOD BY AUTOMATED COUNT: 20.5 % (ref 11.5–14.5)
GLUCOSE BLD MANUAL STRIP-MCNC: 79 MG/DL (ref 74–99)
GLUCOSE SERPL-MCNC: 69 MG/DL (ref 74–99)
HCT VFR BLD AUTO: 22.9 % (ref 36–46)
HGB BLD-MCNC: 7 G/DL (ref 12–16)
MCH RBC QN AUTO: 23.3 PG (ref 26–34)
MCHC RBC AUTO-ENTMCNC: 30.6 G/DL (ref 32–36)
MCV RBC AUTO: 76 FL (ref 80–100)
NRBC BLD-RTO: 0.3 /100 WBCS (ref 0–0)
PLATELET # BLD AUTO: 709 X10*3/UL (ref 150–450)
POTASSIUM SERPL-SCNC: 4.2 MMOL/L (ref 3.5–5.3)
PROT SERPL-MCNC: 6.4 G/DL (ref 6.4–8.2)
RBC # BLD AUTO: 3 X10*6/UL (ref 4–5.2)
SODIUM SERPL-SCNC: 132 MMOL/L (ref 136–145)
VANCOMYCIN SERPL-MCNC: 17.1 UG/ML (ref 5–20)
WBC # BLD AUTO: 33 X10*3/UL (ref 4.4–11.3)

## 2024-08-21 PROCEDURE — 2500000001 HC RX 250 WO HCPCS SELF ADMINISTERED DRUGS (ALT 637 FOR MEDICARE OP)

## 2024-08-21 PROCEDURE — 1200000002 HC GENERAL ROOM WITH TELEMETRY DAILY

## 2024-08-21 PROCEDURE — 80053 COMPREHEN METABOLIC PANEL: CPT | Performed by: INTERNAL MEDICINE

## 2024-08-21 PROCEDURE — 2500000004 HC RX 250 GENERAL PHARMACY W/ HCPCS (ALT 636 FOR OP/ED): Performed by: INTERNAL MEDICINE

## 2024-08-21 PROCEDURE — 2500000001 HC RX 250 WO HCPCS SELF ADMINISTERED DRUGS (ALT 637 FOR MEDICARE OP): Performed by: PHYSICIAN ASSISTANT

## 2024-08-21 PROCEDURE — 74018 RADEX ABDOMEN 1 VIEW: CPT | Performed by: RADIOLOGY

## 2024-08-21 PROCEDURE — 36415 COLL VENOUS BLD VENIPUNCTURE: CPT | Performed by: INTERNAL MEDICINE

## 2024-08-21 PROCEDURE — 99232 SBSQ HOSP IP/OBS MODERATE 35: CPT

## 2024-08-21 PROCEDURE — 2500000004 HC RX 250 GENERAL PHARMACY W/ HCPCS (ALT 636 FOR OP/ED): Performed by: PHYSICIAN ASSISTANT

## 2024-08-21 PROCEDURE — 2500000002 HC RX 250 W HCPCS SELF ADMINISTERED DRUGS (ALT 637 FOR MEDICARE OP, ALT 636 FOR OP/ED): Performed by: PHARMACIST

## 2024-08-21 PROCEDURE — 85027 COMPLETE CBC AUTOMATED: CPT | Performed by: PHYSICIAN ASSISTANT

## 2024-08-21 PROCEDURE — 2500000002 HC RX 250 W HCPCS SELF ADMINISTERED DRUGS (ALT 637 FOR MEDICARE OP, ALT 636 FOR OP/ED): Performed by: INTERNAL MEDICINE

## 2024-08-21 PROCEDURE — 99233 SBSQ HOSP IP/OBS HIGH 50: CPT | Performed by: INTERNAL MEDICINE

## 2024-08-21 PROCEDURE — 94640 AIRWAY INHALATION TREATMENT: CPT

## 2024-08-21 PROCEDURE — 74018 RADEX ABDOMEN 1 VIEW: CPT

## 2024-08-21 PROCEDURE — 82947 ASSAY GLUCOSE BLOOD QUANT: CPT

## 2024-08-21 PROCEDURE — 2500000001 HC RX 250 WO HCPCS SELF ADMINISTERED DRUGS (ALT 637 FOR MEDICARE OP): Performed by: INTERNAL MEDICINE

## 2024-08-21 PROCEDURE — 80202 ASSAY OF VANCOMYCIN: CPT | Performed by: PHARMACIST

## 2024-08-21 RX ORDER — VANCOMYCIN HYDROCHLORIDE 1 G/200ML
1000 INJECTION, SOLUTION INTRAVENOUS EVERY 24 HOURS
Status: DISCONTINUED | OUTPATIENT
Start: 2024-08-21 | End: 2024-08-21

## 2024-08-21 RX ORDER — DIPHENHYDRAMINE HCL 25 MG
25 CAPSULE ORAL ONCE
Status: COMPLETED | OUTPATIENT
Start: 2024-08-21 | End: 2024-08-21

## 2024-08-21 RX ORDER — BUSPIRONE HYDROCHLORIDE 5 MG/1
5 TABLET ORAL 2 TIMES DAILY
Status: DISCONTINUED | OUTPATIENT
Start: 2024-08-21 | End: 2024-09-01 | Stop reason: HOSPADM

## 2024-08-21 RX ORDER — MIRTAZAPINE 15 MG/1
15 TABLET, FILM COATED ORAL NIGHTLY
Status: DISCONTINUED | OUTPATIENT
Start: 2024-08-21 | End: 2024-09-01 | Stop reason: HOSPADM

## 2024-08-21 RX ORDER — PANTOPRAZOLE SODIUM 40 MG/1
40 TABLET, DELAYED RELEASE ORAL 2 TIMES DAILY
Status: DISCONTINUED | OUTPATIENT
Start: 2024-08-21 | End: 2024-08-26

## 2024-08-21 RX ORDER — DOCUSATE SODIUM 100 MG/1
100 CAPSULE, LIQUID FILLED ORAL 2 TIMES DAILY
Status: DISCONTINUED | OUTPATIENT
Start: 2024-08-21 | End: 2024-09-01 | Stop reason: HOSPADM

## 2024-08-21 RX ADMIN — MORPHINE SULFATE 4 MG: 4 INJECTION, SOLUTION INTRAMUSCULAR; INTRAVENOUS at 16:48

## 2024-08-21 RX ADMIN — DIPHENHYDRAMINE HYDROCHLORIDE 12.5 MG: 50 INJECTION, SOLUTION INTRAMUSCULAR; INTRAVENOUS at 10:23

## 2024-08-21 RX ADMIN — IPRATROPIUM BROMIDE AND ALBUTEROL SULFATE 3 ML: 2.5; .5 SOLUTION RESPIRATORY (INHALATION) at 18:58

## 2024-08-21 RX ADMIN — BUDESONIDE 0.5 MG: 0.5 INHALANT RESPIRATORY (INHALATION) at 18:58

## 2024-08-21 RX ADMIN — MIRTAZAPINE 15 MG: 15 TABLET, FILM COATED ORAL at 21:20

## 2024-08-21 RX ADMIN — DIPHENHYDRAMINE HYDROCHLORIDE 12.5 MG: 50 INJECTION, SOLUTION INTRAMUSCULAR; INTRAVENOUS at 03:58

## 2024-08-21 RX ADMIN — BUSPIRONE HYDROCHLORIDE 5 MG: 5 TABLET ORAL at 21:20

## 2024-08-21 RX ADMIN — PANTOPRAZOLE SODIUM 40 MG: 40 TABLET, DELAYED RELEASE ORAL at 21:20

## 2024-08-21 RX ADMIN — PANTOPRAZOLE SODIUM 40 MG: 40 TABLET, DELAYED RELEASE ORAL at 10:28

## 2024-08-21 RX ADMIN — DOCUSATE SODIUM 100 MG: 100 CAPSULE, LIQUID FILLED ORAL at 13:32

## 2024-08-21 RX ADMIN — MONTELUKAST 10 MG: 10 TABLET, FILM COATED ORAL at 10:28

## 2024-08-21 RX ADMIN — MORPHINE SULFATE 2 MG: 2 INJECTION, SOLUTION INTRAMUSCULAR; INTRAVENOUS at 01:40

## 2024-08-21 RX ADMIN — DIPHENHYDRAMINE HYDROCHLORIDE 25 MG: 25 CAPSULE ORAL at 15:30

## 2024-08-21 RX ADMIN — Medication 100 MG: at 10:28

## 2024-08-21 RX ADMIN — AMPICILLIN SODIUM AND SULBACTAM SODIUM 3 G: 2; 1 INJECTION, POWDER, FOR SOLUTION INTRAMUSCULAR; INTRAVENOUS at 10:50

## 2024-08-21 RX ADMIN — MORPHINE SULFATE 4 MG: 4 INJECTION, SOLUTION INTRAMUSCULAR; INTRAVENOUS at 13:26

## 2024-08-21 RX ADMIN — DOCUSATE SODIUM 100 MG: 100 CAPSULE, LIQUID FILLED ORAL at 21:20

## 2024-08-21 RX ADMIN — DIPHENHYDRAMINE HYDROCHLORIDE 12.5 MG: 50 INJECTION, SOLUTION INTRAMUSCULAR; INTRAVENOUS at 16:51

## 2024-08-21 RX ADMIN — IPRATROPIUM BROMIDE AND ALBUTEROL SULFATE 3 ML: 2.5; .5 SOLUTION RESPIRATORY (INHALATION) at 12:47

## 2024-08-21 RX ADMIN — MORPHINE SULFATE 4 MG: 4 INJECTION, SOLUTION INTRAMUSCULAR; INTRAVENOUS at 06:22

## 2024-08-21 RX ADMIN — APIXABAN 5 MG: 5 TABLET, FILM COATED ORAL at 10:28

## 2024-08-21 RX ADMIN — MORPHINE SULFATE 4 MG: 4 INJECTION, SOLUTION INTRAMUSCULAR; INTRAVENOUS at 10:17

## 2024-08-21 RX ADMIN — APIXABAN 5 MG: 5 TABLET, FILM COATED ORAL at 21:19

## 2024-08-21 RX ADMIN — MORPHINE SULFATE 2 MG: 2 INJECTION, SOLUTION INTRAMUSCULAR; INTRAVENOUS at 23:42

## 2024-08-21 RX ADMIN — DILTIAZEM HYDROCHLORIDE 240 MG: 120 CAPSULE, EXTENDED RELEASE ORAL at 10:28

## 2024-08-21 RX ADMIN — CEFEPIME 2 G: 2 INJECTION, POWDER, FOR SOLUTION INTRAVENOUS at 03:00

## 2024-08-21 RX ADMIN — AMPICILLIN SODIUM AND SULBACTAM SODIUM 3 G: 2; 1 INJECTION, POWDER, FOR SOLUTION INTRAMUSCULAR; INTRAVENOUS at 16:52

## 2024-08-21 ASSESSMENT — PAIN SCALES - GENERAL
PAINLEVEL_OUTOF10: 7
PAINLEVEL_OUTOF10: 7
PAINLEVEL_OUTOF10: 6
PAINLEVEL_OUTOF10: 6
PAINLEVEL_OUTOF10: 10 - WORST POSSIBLE PAIN
PAINLEVEL_OUTOF10: 7
PAINLEVEL_OUTOF10: 10 - WORST POSSIBLE PAIN
PAINLEVEL_OUTOF10: 9
PAINLEVEL_OUTOF10: 7
PAINLEVEL_OUTOF10: 10 - WORST POSSIBLE PAIN
PAINLEVEL_OUTOF10: 7

## 2024-08-21 ASSESSMENT — PAIN - FUNCTIONAL ASSESSMENT
PAIN_FUNCTIONAL_ASSESSMENT: 0-10

## 2024-08-21 ASSESSMENT — COGNITIVE AND FUNCTIONAL STATUS - GENERAL
DAILY ACTIVITIY SCORE: 22
MOVING TO AND FROM BED TO CHAIR: A LITTLE
STANDING UP FROM CHAIR USING ARMS: A LITTLE
PERSONAL GROOMING: A LITTLE
MOBILITY SCORE: 18
TOILETING: A LITTLE
CLIMB 3 TO 5 STEPS WITH RAILING: A LOT
TURNING FROM BACK TO SIDE WHILE IN FLAT BAD: A LITTLE
WALKING IN HOSPITAL ROOM: A LITTLE

## 2024-08-21 ASSESSMENT — PAIN DESCRIPTION - ORIENTATION
ORIENTATION: RIGHT

## 2024-08-21 ASSESSMENT — PAIN DESCRIPTION - LOCATION
LOCATION: ABDOMEN
LOCATION: BACK

## 2024-08-21 ASSESSMENT — PAIN SCALES - PAIN ASSESSMENT IN ADVANCED DEMENTIA (PAINAD)
TOTALSCORE: MEDICATION (SEE MAR)
TOTALSCORE: MEDICATION (SEE MAR)

## 2024-08-21 NOTE — PROGRESS NOTES
Fernando Cuevas is a 63 y.o. female on day 2 of admission presenting with Sepsis due to undetermined organism (Multi).    Subjective   Patient feels about the same. No flatus or BM.     Objective   PE:  Constitutional: A&Ox3, calm and cooperative, NAD  Eyes: PERRL, clear sclera   Head/Neck: Neck supple  Cardiovascular: Normal rate and regular rhythm.  Respiratory/Thorax: Good symmetric chest expansion. No labored breathing.  Gastrointestinal: Abdomen slightly distended, soft, nontender  Extremities: No peripheral edema  Neurological: A&Ox3, No focal deficits  Psychological: Appropriate mood and behavior  Skin: Warm and dry    Last Recorded Vitals  Blood pressure 129/66, pulse (!) 118, temperature 36.7 °C (98 °F), temperature source Oral, resp. rate 16, weight 61.7 kg (136 lb), SpO2 93%.  Intake/Output last 3 Shifts:  I/O last 3 completed shifts:  In: 2898 (47 mL/kg) [P.O.:600; I.V.:999 (16.2 mL/kg); IV Piggyback:1299]  Out: - (0 mL/kg)   Weight: 61.7 kg     Relevant Results  Scheduled medications  ampicillin-sulbactam, 3 g, intravenous, q6h  apixaban, 5 mg, oral, BID  budesonide, 0.5 mg, nebulization, BID  dilTIAZem ER, 240 mg, oral, Daily  docusate sodium, 100 mg, oral, BID  [Held by provider] formoterol, 20 mcg, nebulization, BID  ipratropium-albuteroL, 3 mL, nebulization, TID  montelukast, 10 mg, oral, Daily  oxygen, , inhalation, Continuous - Inhalation  pantoprazole, 40 mg, oral, BID  thiamine, 100 mg, oral, Daily  [Held by provider] tiotropium, 2 puff, inhalation, Daily      Continuous medications     PRN medications  PRN medications: acetaminophen **OR** acetaminophen **OR** acetaminophen, albuterol, benzonatate, diphenhydrAMINE, hydrALAZINE, melatonin, morphine, morphine, ondansetron ODT **OR** ondansetron, prochlorperazine **OR** prochlorperazine    Results for orders placed or performed during the hospital encounter of 08/19/24 (from the past 24 hour(s))   CBC   Result Value Ref Range    WBC 33.0 (H) 4.4 -  11.3 x10*3/uL    nRBC 0.3 (H) 0.0 - 0.0 /100 WBCs    RBC 3.00 (L) 4.00 - 5.20 x10*6/uL    Hemoglobin 7.0 (L) 12.0 - 16.0 g/dL    Hematocrit 22.9 (L) 36.0 - 46.0 %    MCV 76 (L) 80 - 100 fL    MCH 23.3 (L) 26.0 - 34.0 pg    MCHC 30.6 (L) 32.0 - 36.0 g/dL    RDW 20.5 (H) 11.5 - 14.5 %    Platelets 709 (H) 150 - 450 x10*3/uL   Comprehensive Metabolic Panel   Result Value Ref Range    Glucose 69 (L) 74 - 99 mg/dL    Sodium 132 (L) 136 - 145 mmol/L    Potassium 4.2 3.5 - 5.3 mmol/L    Chloride 97 (L) 98 - 107 mmol/L    Bicarbonate 25 21 - 32 mmol/L    Anion Gap 14 10 - 20 mmol/L    Urea Nitrogen 15 6 - 23 mg/dL    Creatinine 0.77 0.50 - 1.05 mg/dL    eGFR 87 >60 mL/min/1.73m*2    Calcium 9.2 8.6 - 10.3 mg/dL    Albumin 3.2 (L) 3.4 - 5.0 g/dL    Alkaline Phosphatase 96 33 - 136 U/L    Total Protein 6.4 6.4 - 8.2 g/dL    AST 18 9 - 39 U/L    Bilirubin, Total 0.4 0.0 - 1.2 mg/dL    ALT 32 7 - 45 U/L   Vancomycin   Result Value Ref Range    Vancomycin 17.1 5.0 - 20.0 ug/mL       Assessment/Plan     Patient being treated for abdominal distension, unclear underlying cause.    Plan:  - Continue conservative management for now  - Can introduce liquids to her diet  - KUB in AM  - OOB/ambulation as tolerated  - Trend lab work closely  - ID following    Discussed with Dr Bingham.    I spent 35 minutes in the professional and overall care of this patient.    Mahad Boateng PA-C

## 2024-08-21 NOTE — CARE PLAN
The patient's goals for the shift include      The clinical goals for the shift include Pt heart rate will be wnl of whats pt normal      Problem: Pain - Adult  Goal: Verbalizes/displays adequate comfort level or baseline comfort level  8/20/2024 2022 by Kristan Spencer RN  Outcome: Progressing  8/20/2024 2021 by Kristan Spencer RN  Outcome: Progressing     Problem: Safety - Adult  Goal: Free from fall injury  8/20/2024 2022 by Kristan Spencer RN  Outcome: Progressing  8/20/2024 2021 by Kristan Spencer RN  Outcome: Progressing     Problem: Discharge Planning  Goal: Discharge to home or other facility with appropriate resources  8/20/2024 2022 by Kristan Spencer RN  Outcome: Progressing  8/20/2024 2021 by Kristan Spencer RN  Outcome: Progressing     Problem: Chronic Conditions and Co-morbidities  Goal: Patient's chronic conditions and co-morbidity symptoms are monitored and maintained or improved  8/20/2024 2022 by Kristan Spencer RN  Outcome: Progressing  8/20/2024 2021 by Kristan Spencer RN  Outcome: Progressing

## 2024-08-21 NOTE — PROGRESS NOTES
Vancomycin Dosing by Pharmacy- FOLLOW UP    Fernando Cuevas is a 63 y.o. year old female who Pharmacy has been consulted for vancomycin dosing for other /intra-abdominal . Based on the patient's indication and renal status this patient is being dosed based on a goal AUC of 400-600.     Renal function is currently stable.    Current vancomycin dose: 750 mg given every 12 hours    Estimated vancomycin AUC on current dose: 611 mg/L.hr     Visit Vitals  /64 (BP Location: Left arm, Patient Position: Lying)   Pulse 105   Temp 36.7 °C (98 °F) (Oral)   Resp 16        Lab Results   Component Value Date    CREATININE 0.77 2024    CREATININE 0.67 2024    CREATININE 0.62 2024    CREATININE 0.87 2024        Patient weight is as follows:   Vitals:    24 1851   Weight: 61.7 kg (136 lb)       Cultures:  No results found for the encounter in last 14 days.       I/O last 3 completed shifts:  In: 2898 (47 mL/kg) [P.O.:600; I.V.:999 (16.2 mL/kg); IV Piggyback:1299]  Out: - (0 mL/kg)   Weight: 61.7 kg   I/O during current shift:  No intake/output data recorded.    Temp (24hrs), Av.6 °C (97.8 °F), Min:36.2 °C (97.2 °F), Max:36.8 °C (98.3 °F)      Assessment/Plan    Above goal AUC. Orders placed for new vancomcyin regimen of 1000 every 24 hours to begin at 2100.    This dosing regimen is predicted by [employer]ight[employer] to result in the following Exposure target: AUC24 (range)400-600 mg/L.hr   AUC24,ss: 423 mg/L.hr  Probability of AUC24 > 400: 64 %  Ctrough,ss: 12.1 mg/L  Probability of Ctrough,ss > 20: 2 %  Probability of nephrotoxicity (Lodise LEO ): 7 %  The next level will be obtained on  at 0500. May be obtained sooner if clinically indicated.   Will continue to monitor renal function daily while on vancomycin and order serum creatinine at least every 48 hours if not already ordered.  Follow for continued vancomycin needs, clinical response, and signs/symptoms of toxicity.       Gladys RYAN  Deidre, PharmD

## 2024-08-21 NOTE — CONSULTS
Vancomycin Dosing by Pharmacy- Cessation of Therapy    Consult to pharmacy for vancomycin dosing has been discontinued by the prescriber, pharmacy will sign off at this time.    Please call pharmacy if there are further questions or re-enter a consult if vancomycin is resumed.     Stephenie Montgomery formerly [facility]

## 2024-08-21 NOTE — PROGRESS NOTES
08/21/24 0902   Current Planned Discharge Disposition   Current Planned Discharge Disposition Home Heal     Per notes plan on discharge to home with son, and [facility], referral was sent to agency, patient on IV cefepime and vanco, general sx consult in regards to possible hernia/Cholelithiasis, will continue to monitor for discharge planning.

## 2024-08-21 NOTE — PROGRESS NOTES
Fernando Cuevas is a 63 y.o. female on day 2 of admission presenting with Sepsis due to undetermined organism (Multi).    Subjective   Patient states abdominal pain no nausea no vomiting no diarrhea white count up to 33,000 is not on oxygen at home.       Objective     Physical Exam  Vitals reviewed.   Constitutional:       Appearance: Normal appearance.   HENT:      Head: Normocephalic.      Right Ear: Tympanic membrane normal.      Nose: Nose normal.      Mouth/Throat:      Mouth: Mucous membranes are moist.   Cardiovascular:      Rate and Rhythm: Normal rate and regular rhythm.   Pulmonary:      Effort: Pulmonary effort is normal.   Abdominal:      General: Abdomen is flat. Bowel sounds are normal.      Palpations: Abdomen is soft.   Skin:     Capillary Refill: Capillary refill takes less than 2 seconds.   Neurological:      General: No focal deficit present.      Mental Status: She is alert.         Last Recorded Vitals  Blood pressure 129/66, pulse (!) 118, temperature 36.7 °C (98 °F), temperature source Oral, resp. rate 16, weight 61.7 kg (136 lb), SpO2 99%.  Intake/Output last 3 Shifts:  I/O last 3 completed shifts:  In: 2898 (47 mL/kg) [P.O.:600; I.V.:999 (16.2 mL/kg); IV Piggyback:1299]  Out: - (0 mL/kg)   Weight: 61.7 kg     Relevant Results  Results for orders placed or performed during the hospital encounter of 08/19/24 (from the past 24 hour(s))   CBC   Result Value Ref Range    WBC 33.0 (H) 4.4 - 11.3 x10*3/uL    nRBC 0.3 (H) 0.0 - 0.0 /100 WBCs    RBC 3.00 (L) 4.00 - 5.20 x10*6/uL    Hemoglobin 7.0 (L) 12.0 - 16.0 g/dL    Hematocrit 22.9 (L) 36.0 - 46.0 %    MCV 76 (L) 80 - 100 fL    MCH 23.3 (L) 26.0 - 34.0 pg    MCHC 30.6 (L) 32.0 - 36.0 g/dL    RDW 20.5 (H) 11.5 - 14.5 %    Platelets 709 (H) 150 - 450 x10*3/uL   Comprehensive Metabolic Panel   Result Value Ref Range    Glucose 69 (L) 74 - 99 mg/dL    Sodium 132 (L) 136 - 145 mmol/L    Potassium 4.2 3.5 - 5.3 mmol/L    Chloride 97 (L) 98 - 107 mmol/L     Bicarbonate 25 21 - 32 mmol/L    Anion Gap 14 10 - 20 mmol/L    Urea Nitrogen 15 6 - 23 mg/dL    Creatinine 0.77 0.50 - 1.05 mg/dL    eGFR 87 >60 mL/min/1.73m*2    Calcium 9.2 8.6 - 10.3 mg/dL    Albumin 3.2 (L) 3.4 - 5.0 g/dL    Alkaline Phosphatase 96 33 - 136 U/L    Total Protein 6.4 6.4 - 8.2 g/dL    AST 18 9 - 39 U/L    Bilirubin, Total 0.4 0.0 - 1.2 mg/dL    ALT 32 7 - 45 U/L   Vancomycin   Result Value Ref Range    Vancomycin 17.1 5.0 - 20.0 ug/mL       Imaging Results  Ct abd/pelvis:  IMPRESSION:  Postsurgical changes right lower quadrant within the small bowel with  mild wall thickening without evidence of obstruction.  Stranding in the left anterior abdominal wall the subcutaneous soft  tissues which appears stable.  This appears to be related to small  fat-containing hernia.  Scarring and parenchymal opacities in the bilateral medial and lower  lobes appear stable.  Cholelithiasis.    Abd x ray:  IMPRESSION:  Increased gaseous bowel dilatation since previous day's exam may  reflect worsening ileus or obstruction.      Probable distended urinary bladder.    Medications:  ampicillin-sulbactam, 3 g, intravenous, q6h  apixaban, 5 mg, oral, BID  budesonide, 0.5 mg, nebulization, BID  dilTIAZem ER, 240 mg, oral, Daily  docusate sodium, 100 mg, oral, BID  [Held by provider] formoterol, 20 mcg, nebulization, BID  ipratropium-albuteroL, 3 mL, nebulization, TID  montelukast, 10 mg, oral, Daily  oxygen, , inhalation, Continuous - Inhalation  pantoprazole, 40 mg, oral, BID  thiamine, 100 mg, oral, Daily  [Held by provider] tiotropium, 2 puff, inhalation, Daily       PRN medications: acetaminophen **OR** acetaminophen **OR** acetaminophen, albuterol, benzonatate, diphenhydrAMINE, hydrALAZINE, melatonin, morphine, morphine, ondansetron ODT **OR** ondansetron, prochlorperazine **OR** prochlorperazine     Assessment/Plan   8/21:  Abdomen still distended no diarrhea, white count up to 33,000, question reactive, leave  on empiric therapy for now per the patient is not on oxygen at home we will try to wean.  Follow-up surgical recommendations  8/20:  Hx emergency surgery for perforated bowel 6/21/24, post-op complications with ileus and wound hematoma s/p wound vac, concern for anastomic infection, Hx GIB s/p EGD around 7/26/24, found to have friable gastric mucosa, Hx upper extremity DVT L brachial 6/27/24, R brachial 7/6/24 and has been on eliquis although reportedly not taking recently. Eliquis was not stopped following admission for GIB.      Now here with abdominal pain which is very tender with guarding and significant leukocytosis along with tachycardia... Imaging is unrevealing.... f/up surgery and ID recs, cont iv abx, f/up cultures, iv analgesia, ivf.      Diarrhea... check C diff.      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 going to hold steroids for now given concern for infection.      HTN urgency... improving with home cardizem.   Hx UE DVT... resume eliquis.      + cocaine  UA unremarkable  CTA without PNA or PE.      Pt lives at home with her son, uses walker baseline.     DVT Prophylaxis:  Marcus Landon MD  [facility]

## 2024-08-21 NOTE — PROGRESS NOTES
INFECTIOUS DISEASE DAILY PROGRESS NOTE    SUBJECTIVE:    No new events. She is feeling better with less abd pain. No fevers. No diarrhea, actually no BM since being here now. X-ray abd with gaseious distension. She is passing flatus. WBC is up, 33 now.    OBJECTIVE:  VITALS (Last 24 Hours)  /64 (BP Location: Left arm, Patient Position: Lying)   Pulse 105   Temp 36.7 °C (98 °F) (Oral)   Resp 16   Wt 61.7 kg (136 lb)   SpO2 100%   BMI 24.87 kg/m²     PHYSICAL EXAM:  Gen - NAD  Abd - soft, distension present still, BS present, not as much ttp and can palpate with moderate pressure today  Skin - no rash    ABX: IV Vanc/Cefepime    LABS:  Lab Results   Component Value Date    WBC 33.0 (H) 08/21/2024    HGB 7.0 (L) 08/21/2024    HCT 22.9 (L) 08/21/2024    MCV 76 (L) 08/21/2024     (H) 08/21/2024     Lab Results   Component Value Date    GLUCOSE 69 (L) 08/21/2024    CALCIUM 9.2 08/21/2024     (L) 08/21/2024    K 4.2 08/21/2024    CO2 25 08/21/2024    CL 97 (L) 08/21/2024    BUN 15 08/21/2024    CREATININE 0.77 08/21/2024     Results from last 72 hours   Lab Units 08/21/24  0604   ALK PHOS U/L 96   BILIRUBIN TOTAL mg/dL 0.4   PROTEIN TOTAL g/dL 6.4   ALT U/L 32   AST U/L 18   ALBUMIN g/dL 3.2*     Estimated Creatinine Clearance: 64.6 mL/min (by C-G formula based on SCr of 0.77 mg/dL).    ASSESSMENT/PLAN:     Abd Pain/Distension - seems better  Cocaine Abuse  Leukocytosis - worsening although she overall seems clinically better    Changed to IV Unasyn 3g Q6H. Stopped Vanc/Cefepime.    No need to check for C. Diff if no BM.    Monitoring for adverse effects of abx such as rash/itching/diarrhea.    Will follow. Thanks! D/w Dr. Barbi Nelson MD  [facility]  Office #[phone]

## 2024-08-22 ENCOUNTER — APPOINTMENT (OUTPATIENT)
Dept: RADIOLOGY | Facility: HOSPITAL | Age: 64
End: 2024-08-22
Payer: COMMERCIAL

## 2024-08-22 LAB
ABO GROUP (TYPE) IN BLOOD: NORMAL
ANION GAP SERPL CALC-SCNC: 17 MMOL/L (ref 10–20)
ANTIBODY SCREEN: NORMAL
BUN SERPL-MCNC: 11 MG/DL (ref 6–23)
CALCIUM SERPL-MCNC: 8.1 MG/DL (ref 8.6–10.3)
CHLORIDE SERPL-SCNC: 97 MMOL/L (ref 98–107)
CO2 SERPL-SCNC: 25 MMOL/L (ref 21–32)
CREAT SERPL-MCNC: 0.67 MG/DL (ref 0.5–1.05)
EGFRCR SERPLBLD CKD-EPI 2021: >90 ML/MIN/1.73M*2
ERYTHROCYTE [DISTWIDTH] IN BLOOD BY AUTOMATED COUNT: 21.6 % (ref 11.5–14.5)
GLUCOSE SERPL-MCNC: 73 MG/DL (ref 74–99)
HCT VFR BLD AUTO: 23.7 % (ref 36–46)
HGB BLD-MCNC: 6.7 G/DL (ref 12–16)
MCH RBC QN AUTO: 22.7 PG (ref 26–34)
MCHC RBC AUTO-ENTMCNC: 28.3 G/DL (ref 32–36)
MCV RBC AUTO: 80 FL (ref 80–100)
NRBC BLD-RTO: 0.3 /100 WBCS (ref 0–0)
PLATELET # BLD AUTO: 612 X10*3/UL (ref 150–450)
POTASSIUM SERPL-SCNC: 3.7 MMOL/L (ref 3.5–5.3)
RBC # BLD AUTO: 2.95 X10*6/UL (ref 4–5.2)
RH FACTOR (ANTIGEN D): NORMAL
SODIUM SERPL-SCNC: 135 MMOL/L (ref 136–145)
WBC # BLD AUTO: 28 X10*3/UL (ref 4.4–11.3)

## 2024-08-22 PROCEDURE — 2500000002 HC RX 250 W HCPCS SELF ADMINISTERED DRUGS (ALT 637 FOR MEDICARE OP, ALT 636 FOR OP/ED): Performed by: INTERNAL MEDICINE

## 2024-08-22 PROCEDURE — 2500000001 HC RX 250 WO HCPCS SELF ADMINISTERED DRUGS (ALT 637 FOR MEDICARE OP): Performed by: INTERNAL MEDICINE

## 2024-08-22 PROCEDURE — 2500000004 HC RX 250 GENERAL PHARMACY W/ HCPCS (ALT 636 FOR OP/ED): Performed by: INTERNAL MEDICINE

## 2024-08-22 PROCEDURE — 99233 SBSQ HOSP IP/OBS HIGH 50: CPT | Performed by: INTERNAL MEDICINE

## 2024-08-22 PROCEDURE — 80048 BASIC METABOLIC PNL TOTAL CA: CPT

## 2024-08-22 PROCEDURE — 2500000004 HC RX 250 GENERAL PHARMACY W/ HCPCS (ALT 636 FOR OP/ED): Performed by: PHYSICIAN ASSISTANT

## 2024-08-22 PROCEDURE — 36415 COLL VENOUS BLD VENIPUNCTURE: CPT

## 2024-08-22 PROCEDURE — 1200000002 HC GENERAL ROOM WITH TELEMETRY DAILY

## 2024-08-22 PROCEDURE — 85027 COMPLETE CBC AUTOMATED: CPT

## 2024-08-22 PROCEDURE — 74018 RADEX ABDOMEN 1 VIEW: CPT | Performed by: RADIOLOGY

## 2024-08-22 PROCEDURE — 2500000005 HC RX 250 GENERAL PHARMACY W/O HCPCS: Performed by: PHYSICIAN ASSISTANT

## 2024-08-22 PROCEDURE — 36415 COLL VENOUS BLD VENIPUNCTURE: CPT | Performed by: INTERNAL MEDICINE

## 2024-08-22 PROCEDURE — 2500000001 HC RX 250 WO HCPCS SELF ADMINISTERED DRUGS (ALT 637 FOR MEDICARE OP): Performed by: PHYSICIAN ASSISTANT

## 2024-08-22 PROCEDURE — 2500000002 HC RX 250 W HCPCS SELF ADMINISTERED DRUGS (ALT 637 FOR MEDICARE OP, ALT 636 FOR OP/ED): Performed by: PHARMACIST

## 2024-08-22 PROCEDURE — 36430 TRANSFUSION BLD/BLD COMPNT: CPT

## 2024-08-22 PROCEDURE — 2500000001 HC RX 250 WO HCPCS SELF ADMINISTERED DRUGS (ALT 637 FOR MEDICARE OP)

## 2024-08-22 PROCEDURE — 86901 BLOOD TYPING SEROLOGIC RH(D): CPT | Performed by: INTERNAL MEDICINE

## 2024-08-22 PROCEDURE — P9016 RBC LEUKOCYTES REDUCED: HCPCS

## 2024-08-22 PROCEDURE — 74018 RADEX ABDOMEN 1 VIEW: CPT

## 2024-08-22 PROCEDURE — 94640 AIRWAY INHALATION TREATMENT: CPT

## 2024-08-22 PROCEDURE — 86923 COMPATIBILITY TEST ELECTRIC: CPT

## 2024-08-22 RX ORDER — HYDRALAZINE HYDROCHLORIDE 20 MG/ML
10 INJECTION INTRAMUSCULAR; INTRAVENOUS ONCE
Status: COMPLETED | OUTPATIENT
Start: 2024-08-22 | End: 2024-08-22

## 2024-08-22 RX ADMIN — PANTOPRAZOLE SODIUM 40 MG: 40 TABLET, DELAYED RELEASE ORAL at 09:10

## 2024-08-22 RX ADMIN — MORPHINE SULFATE 4 MG: 4 INJECTION, SOLUTION INTRAMUSCULAR; INTRAVENOUS at 15:23

## 2024-08-22 RX ADMIN — BUDESONIDE 0.5 MG: 0.5 INHALANT RESPIRATORY (INHALATION) at 07:28

## 2024-08-22 RX ADMIN — IPRATROPIUM BROMIDE AND ALBUTEROL SULFATE 3 ML: 2.5; .5 SOLUTION RESPIRATORY (INHALATION) at 13:40

## 2024-08-22 RX ADMIN — MORPHINE SULFATE 4 MG: 4 INJECTION, SOLUTION INTRAMUSCULAR; INTRAVENOUS at 09:10

## 2024-08-22 RX ADMIN — IPRATROPIUM BROMIDE AND ALBUTEROL SULFATE 3 ML: 2.5; .5 SOLUTION RESPIRATORY (INHALATION) at 20:30

## 2024-08-22 RX ADMIN — Medication 3 L/MIN: at 20:30

## 2024-08-22 RX ADMIN — IPRATROPIUM BROMIDE AND ALBUTEROL SULFATE 3 ML: 2.5; .5 SOLUTION RESPIRATORY (INHALATION) at 07:28

## 2024-08-22 RX ADMIN — DILTIAZEM HYDROCHLORIDE 240 MG: 120 CAPSULE, EXTENDED RELEASE ORAL at 09:10

## 2024-08-22 RX ADMIN — DIPHENHYDRAMINE HYDROCHLORIDE 12.5 MG: 50 INJECTION, SOLUTION INTRAMUSCULAR; INTRAVENOUS at 00:41

## 2024-08-22 RX ADMIN — AMPICILLIN SODIUM AND SULBACTAM SODIUM 3 G: 2; 1 INJECTION, POWDER, FOR SOLUTION INTRAMUSCULAR; INTRAVENOUS at 18:51

## 2024-08-22 RX ADMIN — PANTOPRAZOLE SODIUM 40 MG: 40 TABLET, DELAYED RELEASE ORAL at 21:13

## 2024-08-22 RX ADMIN — AMPICILLIN SODIUM AND SULBACTAM SODIUM 3 G: 2; 1 INJECTION, POWDER, FOR SOLUTION INTRAMUSCULAR; INTRAVENOUS at 06:56

## 2024-08-22 RX ADMIN — HYDRALAZINE HYDROCHLORIDE 10 MG: 20 INJECTION, SOLUTION INTRAMUSCULAR; INTRAVENOUS at 23:30

## 2024-08-22 RX ADMIN — AMPICILLIN SODIUM AND SULBACTAM SODIUM 3 G: 2; 1 INJECTION, POWDER, FOR SOLUTION INTRAMUSCULAR; INTRAVENOUS at 00:41

## 2024-08-22 RX ADMIN — BUSPIRONE HYDROCHLORIDE 5 MG: 5 TABLET ORAL at 21:13

## 2024-08-22 RX ADMIN — BUSPIRONE HYDROCHLORIDE 5 MG: 5 TABLET ORAL at 09:10

## 2024-08-22 RX ADMIN — MORPHINE SULFATE 4 MG: 4 INJECTION, SOLUTION INTRAMUSCULAR; INTRAVENOUS at 18:50

## 2024-08-22 RX ADMIN — DIPHENHYDRAMINE HYDROCHLORIDE 12.5 MG: 50 INJECTION, SOLUTION INTRAMUSCULAR; INTRAVENOUS at 14:34

## 2024-08-22 RX ADMIN — AMPICILLIN SODIUM AND SULBACTAM SODIUM 3 G: 2; 1 INJECTION, POWDER, FOR SOLUTION INTRAMUSCULAR; INTRAVENOUS at 12:15

## 2024-08-22 RX ADMIN — MIRTAZAPINE 15 MG: 15 TABLET, FILM COATED ORAL at 21:13

## 2024-08-22 RX ADMIN — APIXABAN 5 MG: 5 TABLET, FILM COATED ORAL at 21:13

## 2024-08-22 RX ADMIN — MORPHINE SULFATE 4 MG: 4 INJECTION, SOLUTION INTRAMUSCULAR; INTRAVENOUS at 05:24

## 2024-08-22 RX ADMIN — BUDESONIDE 0.5 MG: 0.5 INHALANT RESPIRATORY (INHALATION) at 20:30

## 2024-08-22 RX ADMIN — MORPHINE SULFATE 2 MG: 2 INJECTION, SOLUTION INTRAMUSCULAR; INTRAVENOUS at 12:15

## 2024-08-22 RX ADMIN — DOCUSATE SODIUM 100 MG: 100 CAPSULE, LIQUID FILLED ORAL at 09:10

## 2024-08-22 RX ADMIN — Medication 3 L/MIN: at 08:00

## 2024-08-22 RX ADMIN — MONTELUKAST 10 MG: 10 TABLET, FILM COATED ORAL at 09:10

## 2024-08-22 RX ADMIN — Medication 100 MG: at 09:10

## 2024-08-22 RX ADMIN — DIPHENHYDRAMINE HYDROCHLORIDE 12.5 MG: 50 INJECTION, SOLUTION INTRAMUSCULAR; INTRAVENOUS at 06:56

## 2024-08-22 RX ADMIN — DIPHENHYDRAMINE HYDROCHLORIDE 12.5 MG: 50 INJECTION, SOLUTION INTRAMUSCULAR; INTRAVENOUS at 21:20

## 2024-08-22 RX ADMIN — APIXABAN 5 MG: 5 TABLET, FILM COATED ORAL at 09:10

## 2024-08-22 RX ADMIN — DOCUSATE SODIUM 100 MG: 100 CAPSULE, LIQUID FILLED ORAL at 21:13

## 2024-08-22 RX ADMIN — MORPHINE SULFATE 4 MG: 4 INJECTION, SOLUTION INTRAMUSCULAR; INTRAVENOUS at 22:07

## 2024-08-22 ASSESSMENT — PAIN SCALES - GENERAL
PAINLEVEL_OUTOF10: 10 - WORST POSSIBLE PAIN
PAINLEVEL_OUTOF10: 9
PAINLEVEL_OUTOF10: 7
PAINLEVEL_OUTOF10: 5 - MODERATE PAIN
PAINLEVEL_OUTOF10: 10 - WORST POSSIBLE PAIN
PAINLEVEL_OUTOF10: 10 - WORST POSSIBLE PAIN

## 2024-08-22 ASSESSMENT — COGNITIVE AND FUNCTIONAL STATUS - GENERAL
TURNING FROM BACK TO SIDE WHILE IN FLAT BAD: A LITTLE
CLIMB 3 TO 5 STEPS WITH RAILING: A LOT
STANDING UP FROM CHAIR USING ARMS: A LITTLE
PERSONAL GROOMING: A LITTLE
TOILETING: A LITTLE
WALKING IN HOSPITAL ROOM: A LITTLE
DAILY ACTIVITIY SCORE: 22
MOVING TO AND FROM BED TO CHAIR: A LITTLE
MOBILITY SCORE: 18

## 2024-08-22 ASSESSMENT — PAIN - FUNCTIONAL ASSESSMENT
PAIN_FUNCTIONAL_ASSESSMENT: 0-10
PAIN_FUNCTIONAL_ASSESSMENT: 0-10

## 2024-08-22 ASSESSMENT — PAIN DESCRIPTION - ORIENTATION
ORIENTATION: RIGHT

## 2024-08-22 ASSESSMENT — PAIN DESCRIPTION - LOCATION
LOCATION: ABDOMEN

## 2024-08-22 NOTE — PROGRESS NOTES
Fernando Cuevas is a 63 y.o. female on day 3 of admission presenting with Sepsis due to undetermined organism (Multi).    Subjective   White count improved, states has more abdominal distention today and more abdominal pain but no nausea or vomiting.       Objective     Physical Exam  Vitals reviewed.   Constitutional:       Appearance: Normal appearance.   HENT:      Head: Normocephalic.      Right Ear: Tympanic membrane normal.      Nose: Nose normal.      Mouth/Throat:      Mouth: Mucous membranes are moist.   Cardiovascular:      Rate and Rhythm: Normal rate and regular rhythm.   Pulmonary:      Effort: Pulmonary effort is normal.   Abdominal:      General: Bowel sounds are normal.      Palpations: Abdomen is soft.      Comments: Distended     Skin:     Capillary Refill: Capillary refill takes less than 2 seconds.   Neurological:      General: No focal deficit present.      Mental Status: She is alert.         Last Recorded Vitals  Blood pressure (!) 125/49, pulse (!) 117, temperature 37.6 °C (99.7 °F), temperature source Oral, resp. rate 19, weight 61.7 kg (136 lb), SpO2 95%.  Intake/Output last 3 Shifts:  I/O last 3 completed shifts:  In: 840 (13.6 mL/kg) [P.O.:840]  Out: 1000 (16.2 mL/kg) [Urine:1000 (0.5 mL/kg/hr)]  Weight: 61.7 kg     Relevant Results  Results for orders placed or performed during the hospital encounter of 08/19/24 (from the past 24 hour(s))   POCT GLUCOSE   Result Value Ref Range    POCT Glucose 79 74 - 99 mg/dL   Basic metabolic panel   Result Value Ref Range    Glucose 73 (L) 74 - 99 mg/dL    Sodium 135 (L) 136 - 145 mmol/L    Potassium 3.7 3.5 - 5.3 mmol/L    Chloride 97 (L) 98 - 107 mmol/L    Bicarbonate 25 21 - 32 mmol/L    Anion Gap 17 10 - 20 mmol/L    Urea Nitrogen 11 6 - 23 mg/dL    Creatinine 0.67 0.50 - 1.05 mg/dL    eGFR >90 >60 mL/min/1.73m*2    Calcium 8.1 (L) 8.6 - 10.3 mg/dL   CBC   Result Value Ref Range    WBC 28.0 (H) 4.4 - 11.3 x10*3/uL    nRBC 0.3 (H) 0.0 - 0.0 /100  WBCs    RBC 2.95 (L) 4.00 - 5.20 x10*6/uL    Hemoglobin 6.7 (L) 12.0 - 16.0 g/dL    Hematocrit 23.7 (L) 36.0 - 46.0 %    MCV 80 80 - 100 fL    MCH 22.7 (L) 26.0 - 34.0 pg    MCHC 28.3 (L) 32.0 - 36.0 g/dL    RDW 21.6 (H) 11.5 - 14.5 %    Platelets 612 (H) 150 - 450 x10*3/uL       Imaging Results  Ct abd/pelvis:  IMPRESSION:  Postsurgical changes right lower quadrant within the small bowel with  mild wall thickening without evidence of obstruction.  Stranding in the left anterior abdominal wall the subcutaneous soft  tissues which appears stable.  This appears to be related to small  fat-containing hernia.  Scarring and parenchymal opacities in the bilateral medial and lower  lobes appear stable.  Cholelithiasis.    Abd x ray:  IMPRESSION:  Increased gaseous bowel dilatation since previous day's exam may  reflect worsening ileus or obstruction.      Probable distended urinary bladder.    Medications:  ampicillin-sulbactam, 3 g, intravenous, q6h  apixaban, 5 mg, oral, BID  budesonide, 0.5 mg, nebulization, BID  busPIRone, 5 mg, oral, BID  dilTIAZem ER, 240 mg, oral, Daily  docusate sodium, 100 mg, oral, BID  [Held by provider] formoterol, 20 mcg, nebulization, BID  ipratropium-albuteroL, 3 mL, nebulization, TID  mirtazapine, 15 mg, oral, Nightly  montelukast, 10 mg, oral, Daily  oxygen, , inhalation, Continuous - Inhalation  pantoprazole, 40 mg, oral, BID  thiamine, 100 mg, oral, Daily  [Held by provider] tiotropium, 2 puff, inhalation, Daily       PRN medications: acetaminophen **OR** acetaminophen **OR** acetaminophen, albuterol, benzonatate, diphenhydrAMINE, hydrALAZINE, melatonin, morphine, morphine, ondansetron ODT **OR** ondansetron, prochlorperazine **OR** prochlorperazine     Assessment/Plan     8/22: abd more distended; surgery ordering another xray wbc improving    8/21:  Abdomen still distended no diarrhea, white count up to 33,000, question reactive, leave on empiric therapy for now per the patient is not  on oxygen at home we will try to wean.  Follow-up surgical recommendations  8/20:  Hx emergency surgery for perforated bowel 6/21/24, post-op complications with ileus and wound hematoma s/p wound vac, concern for anastomic infection, Hx GIB s/p EGD around 7/26/24, found to have friable gastric mucosa, Hx upper extremity DVT L brachial 6/27/24, R brachial 7/6/24 and has been on eliquis although reportedly not taking recently. Eliquis was not stopped following admission for GIB.      Now here with abdominal pain which is very tender with guarding and significant leukocytosis along with tachycardia... Imaging is unrevealing.... f/up surgery and ID recs, cont iv abx, f/up cultures, iv analgesia, ivf.      Diarrhea... check C diff.      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 going to hold steroids for now given concern for infection.      HTN urgency... improving with home cardizem.   Hx UE DVT... resume eliquis.      + cocaine  UA unremarkable  CTA without PNA or PE.      Pt lives at home with her son, uses walker baseline.     DVT Prophylaxis:  Marcus Landon MD  [facility]

## 2024-08-22 NOTE — PROGRESS NOTES
INFECTIOUS DISEASE DAILY PROGRESS NOTE    SUBJECTIVE:    She feels like pain is worse today, some more distension. Denies having any BM in last few days now. Hgb is low, 6.7. WBC is coming down again.     OBJECTIVE:  VITALS (Last 24 Hours)  /72 (BP Location: Left arm, Patient Position: Lying)   Pulse (!) 114   Temp 36.9 °C (98.5 °F) (Oral)   Resp 19   Wt 61.7 kg (136 lb)   SpO2 100%   BMI 24.87 kg/m²     PHYSICAL EXAM:  Gen - NAD  Abd - more distension today and more tense, BS hyperactive, more tender today which is not distractible  Skin - no rash    ABX: IV Unasyn    LABS:  Lab Results   Component Value Date    WBC 28.0 (H) 08/22/2024    HGB 6.7 (L) 08/22/2024    HCT 23.7 (L) 08/22/2024    MCV 80 08/22/2024     (H) 08/22/2024     Lab Results   Component Value Date    GLUCOSE 73 (L) 08/22/2024    CALCIUM 8.1 (L) 08/22/2024     (L) 08/22/2024    K 3.7 08/22/2024    CO2 25 08/22/2024    CL 97 (L) 08/22/2024    BUN 11 08/22/2024    CREATININE 0.67 08/22/2024     Results from last 72 hours   Lab Units 08/21/24  0604   ALK PHOS U/L 96   BILIRUBIN TOTAL mg/dL 0.4   PROTEIN TOTAL g/dL 6.4   ALT U/L 32   AST U/L 18   ALBUMIN g/dL 3.2*     Estimated Creatinine Clearance: 74.2 mL/min (by C-G formula based on SCr of 0.67 mg/dL).    ASSESSMENT/PLAN:     Abd Pain/Distension - fluctuating day to day, worse today  Cocaine Abuse  Leukocytosis - improving again although today seems clinically worse    IV Unasyn 3g Q6H.    Monitoring for adverse effects of abx such as rash/itching/diarrhea.    Will follow. Thanks! D/w Dr. Barbi Nelson MD  [facility]  Office #[phone]

## 2024-08-22 NOTE — PROGRESS NOTES
Continues with abdominal pain and bloating    Tolerating full liquids, No bm    BP (!) 125/49 (BP Location: Left arm, Patient Position: Sitting)   Pulse (!) 117   Temp 37.6 °C (99.7 °F) (Oral)   Resp 19   Wt 61.7 kg (136 lb)   SpO2 100%   BMI 24.87 kg/m²    uncomfortable  Abdomen distended, mild diffuse tenderness     Wbc slightly down to 28     - Impression -  Persistent    ileus versus partial distal small bowel    The etiology of her  persistent pain and tenderness remains unclear.    If persists repeat ct tomorrow

## 2024-08-22 NOTE — PROGRESS NOTES
08/22/24 0956   Current Planned Discharge Disposition   Current Planned Discharge Disposition Home H     Once med ready plan would be to discharge home with [facility], patient lives with her son per notes, Hgb 6.7, pending Xray abd, ID following patient on IV unasyn , general sx following on full liq diet will continue to monitor for discharge planning.

## 2024-08-22 NOTE — CARE PLAN
The patient's goals for the shift include      The clinical goals for the shift include pt will remain free of injury this shift    Over the shift, the patient did make progress toward the following goals.     Problem: Pain - Adult  Goal: Verbalizes/displays adequate comfort level or baseline comfort level  8/22/2024 0252 by Tammy Hahn RN  Outcome: Progressing  8/22/2024 0252 by Tammy Hahn RN  Outcome: Progressing  8/22/2024 0252 by Tammy Hahn RN  Outcome: Progressing     Problem: Safety - Adult  Goal: Free from fall injury  8/22/2024 0252 by Tammy Hahn RN  Outcome: Progressing  8/22/2024 0252 by Tammy Hahn RN  Outcome: Progressing  8/22/2024 0252 by Tammy Hahn RN  Outcome: Progressing     Problem: Discharge Planning  Goal: Discharge to home or other facility with appropriate resources  8/22/2024 0252 by Tammy Hahn RN  Outcome: Progressing  8/22/2024 0252 by Tammy Hahn RN  Outcome: Progressing  8/22/2024 0252 by Tammy Hahn RN  Outcome: Progressing     Problem: Chronic Conditions and Co-morbidities  Goal: Patient's chronic conditions and co-morbidity symptoms are monitored and maintained or improved  8/22/2024 0252 by Tammy Hahn RN  Outcome: Progressing  8/22/2024 0252 by Tammy Hahn RN  Outcome: Progressing  8/22/2024 0252 by Tammy Hahn RN  Outcome: Progressing

## 2024-08-23 ENCOUNTER — APPOINTMENT (OUTPATIENT)
Dept: RADIOLOGY | Facility: HOSPITAL | Age: 64
End: 2024-08-23
Payer: COMMERCIAL

## 2024-08-23 LAB
ANION GAP SERPL CALC-SCNC: 17 MMOL/L (ref 10–20)
BLOOD EXPIRATION DATE: NORMAL
BUN SERPL-MCNC: 7 MG/DL (ref 6–23)
CALCIUM SERPL-MCNC: 8.9 MG/DL (ref 8.6–10.3)
CHLORIDE SERPL-SCNC: 97 MMOL/L (ref 98–107)
CO2 SERPL-SCNC: 25 MMOL/L (ref 21–32)
CREAT SERPL-MCNC: 0.59 MG/DL (ref 0.5–1.05)
DISPENSE STATUS: NORMAL
EGFRCR SERPLBLD CKD-EPI 2021: >90 ML/MIN/1.73M*2
ERYTHROCYTE [DISTWIDTH] IN BLOOD BY AUTOMATED COUNT: 19.9 % (ref 11.5–14.5)
GLUCOSE SERPL-MCNC: 104 MG/DL (ref 74–99)
HCT VFR BLD AUTO: 28.8 % (ref 36–46)
HGB BLD-MCNC: 8.7 G/DL (ref 12–16)
MCH RBC QN AUTO: 23.8 PG (ref 26–34)
MCHC RBC AUTO-ENTMCNC: 30.2 G/DL (ref 32–36)
MCV RBC AUTO: 79 FL (ref 80–100)
NRBC BLD-RTO: 0.6 /100 WBCS (ref 0–0)
PLATELET # BLD AUTO: 559 X10*3/UL (ref 150–450)
POTASSIUM SERPL-SCNC: 3.5 MMOL/L (ref 3.5–5.3)
PRODUCT BLOOD TYPE: 5100
PRODUCT CODE: NORMAL
RBC # BLD AUTO: 3.65 X10*6/UL (ref 4–5.2)
SODIUM SERPL-SCNC: 135 MMOL/L (ref 136–145)
UNIT ABO: NORMAL
UNIT NUMBER: NORMAL
UNIT RH: NORMAL
UNIT VOLUME: 284
WBC # BLD AUTO: 20.5 X10*3/UL (ref 4.4–11.3)
XM INTEP: NORMAL

## 2024-08-23 PROCEDURE — 99231 SBSQ HOSP IP/OBS SF/LOW 25: CPT | Performed by: SURGERY

## 2024-08-23 PROCEDURE — 2500000001 HC RX 250 WO HCPCS SELF ADMINISTERED DRUGS (ALT 637 FOR MEDICARE OP): Performed by: INTERNAL MEDICINE

## 2024-08-23 PROCEDURE — 1200000002 HC GENERAL ROOM WITH TELEMETRY DAILY

## 2024-08-23 PROCEDURE — 2500000001 HC RX 250 WO HCPCS SELF ADMINISTERED DRUGS (ALT 637 FOR MEDICARE OP): Performed by: PHYSICIAN ASSISTANT

## 2024-08-23 PROCEDURE — 2500000002 HC RX 250 W HCPCS SELF ADMINISTERED DRUGS (ALT 637 FOR MEDICARE OP, ALT 636 FOR OP/ED): Performed by: PHARMACIST

## 2024-08-23 PROCEDURE — 2500000004 HC RX 250 GENERAL PHARMACY W/ HCPCS (ALT 636 FOR OP/ED): Performed by: INTERNAL MEDICINE

## 2024-08-23 PROCEDURE — 99233 SBSQ HOSP IP/OBS HIGH 50: CPT | Performed by: INTERNAL MEDICINE

## 2024-08-23 PROCEDURE — 74177 CT ABD & PELVIS W/CONTRAST: CPT | Mod: FOREIGN READ | Performed by: RADIOLOGY

## 2024-08-23 PROCEDURE — 2500000002 HC RX 250 W HCPCS SELF ADMINISTERED DRUGS (ALT 637 FOR MEDICARE OP, ALT 636 FOR OP/ED): Performed by: INTERNAL MEDICINE

## 2024-08-23 PROCEDURE — 74176 CT ABD & PELVIS W/O CONTRAST: CPT

## 2024-08-23 PROCEDURE — 2500000001 HC RX 250 WO HCPCS SELF ADMINISTERED DRUGS (ALT 637 FOR MEDICARE OP)

## 2024-08-23 PROCEDURE — 2500000005 HC RX 250 GENERAL PHARMACY W/O HCPCS

## 2024-08-23 PROCEDURE — 36415 COLL VENOUS BLD VENIPUNCTURE: CPT

## 2024-08-23 PROCEDURE — 94640 AIRWAY INHALATION TREATMENT: CPT

## 2024-08-23 PROCEDURE — 2500000004 HC RX 250 GENERAL PHARMACY W/ HCPCS (ALT 636 FOR OP/ED): Performed by: PHYSICIAN ASSISTANT

## 2024-08-23 PROCEDURE — 82374 ASSAY BLOOD CARBON DIOXIDE: CPT

## 2024-08-23 PROCEDURE — 2500000005 HC RX 250 GENERAL PHARMACY W/O HCPCS: Performed by: PHYSICIAN ASSISTANT

## 2024-08-23 PROCEDURE — 85027 COMPLETE CBC AUTOMATED: CPT

## 2024-08-23 RX ORDER — HYDROXYZINE HYDROCHLORIDE 10 MG/1
10 TABLET, FILM COATED ORAL EVERY 6 HOURS PRN
Status: DISCONTINUED | OUTPATIENT
Start: 2024-08-23 | End: 2024-09-01 | Stop reason: HOSPADM

## 2024-08-23 RX ORDER — POLYETHYLENE GLYCOL 3350 17 G/17G
17 POWDER, FOR SOLUTION ORAL DAILY
Status: DISCONTINUED | OUTPATIENT
Start: 2024-08-23 | End: 2024-08-24

## 2024-08-23 RX ADMIN — DIPHENHYDRAMINE HYDROCHLORIDE 12.5 MG: 50 INJECTION, SOLUTION INTRAMUSCULAR; INTRAVENOUS at 23:22

## 2024-08-23 RX ADMIN — AMPICILLIN SODIUM AND SULBACTAM SODIUM 3 G: 2; 1 INJECTION, POWDER, FOR SOLUTION INTRAMUSCULAR; INTRAVENOUS at 18:04

## 2024-08-23 RX ADMIN — DIPHENHYDRAMINE HYDROCHLORIDE 12.5 MG: 50 INJECTION, SOLUTION INTRAMUSCULAR; INTRAVENOUS at 17:13

## 2024-08-23 RX ADMIN — IPRATROPIUM BROMIDE AND ALBUTEROL SULFATE 3 ML: 2.5; .5 SOLUTION RESPIRATORY (INHALATION) at 13:42

## 2024-08-23 RX ADMIN — DOCUSATE SODIUM 100 MG: 100 CAPSULE, LIQUID FILLED ORAL at 08:16

## 2024-08-23 RX ADMIN — MORPHINE SULFATE 4 MG: 4 INJECTION, SOLUTION INTRAMUSCULAR; INTRAVENOUS at 18:19

## 2024-08-23 RX ADMIN — BUSPIRONE HYDROCHLORIDE 5 MG: 5 TABLET ORAL at 08:16

## 2024-08-23 RX ADMIN — DIPHENHYDRAMINE HYDROCHLORIDE 12.5 MG: 50 INJECTION, SOLUTION INTRAMUSCULAR; INTRAVENOUS at 03:51

## 2024-08-23 RX ADMIN — MONTELUKAST 10 MG: 10 TABLET, FILM COATED ORAL at 08:16

## 2024-08-23 RX ADMIN — ONDANSETRON 4 MG: 2 INJECTION INTRAMUSCULAR; INTRAVENOUS at 17:58

## 2024-08-23 RX ADMIN — IPRATROPIUM BROMIDE AND ALBUTEROL SULFATE 3 ML: 2.5; .5 SOLUTION RESPIRATORY (INHALATION) at 19:21

## 2024-08-23 RX ADMIN — DIPHENHYDRAMINE HYDROCHLORIDE 12.5 MG: 50 INJECTION, SOLUTION INTRAMUSCULAR; INTRAVENOUS at 11:13

## 2024-08-23 RX ADMIN — MORPHINE SULFATE 4 MG: 4 INJECTION, SOLUTION INTRAMUSCULAR; INTRAVENOUS at 22:16

## 2024-08-23 RX ADMIN — BUDESONIDE 0.5 MG: 0.5 INHALANT RESPIRATORY (INHALATION) at 19:21

## 2024-08-23 RX ADMIN — PANTOPRAZOLE SODIUM 40 MG: 40 TABLET, DELAYED RELEASE ORAL at 08:16

## 2024-08-23 RX ADMIN — DOCUSATE SODIUM 100 MG: 100 CAPSULE, LIQUID FILLED ORAL at 22:32

## 2024-08-23 RX ADMIN — SODIUM PHOSPHATE 1 ENEMA: 7; 19 ENEMA RECTAL at 15:21

## 2024-08-23 RX ADMIN — MORPHINE SULFATE 4 MG: 4 INJECTION, SOLUTION INTRAMUSCULAR; INTRAVENOUS at 08:16

## 2024-08-23 RX ADMIN — MORPHINE SULFATE 4 MG: 4 INJECTION, SOLUTION INTRAMUSCULAR; INTRAVENOUS at 01:10

## 2024-08-23 RX ADMIN — AMPICILLIN SODIUM AND SULBACTAM SODIUM 3 G: 2; 1 INJECTION, POWDER, FOR SOLUTION INTRAMUSCULAR; INTRAVENOUS at 06:43

## 2024-08-23 RX ADMIN — Medication 2 L/MIN: at 20:00

## 2024-08-23 RX ADMIN — MORPHINE SULFATE 4 MG: 4 INJECTION, SOLUTION INTRAMUSCULAR; INTRAVENOUS at 11:50

## 2024-08-23 RX ADMIN — AMPICILLIN SODIUM AND SULBACTAM SODIUM 3 G: 2; 1 INJECTION, POWDER, FOR SOLUTION INTRAMUSCULAR; INTRAVENOUS at 01:10

## 2024-08-23 RX ADMIN — APIXABAN 5 MG: 5 TABLET, FILM COATED ORAL at 08:16

## 2024-08-23 RX ADMIN — DILTIAZEM HYDROCHLORIDE 240 MG: 120 CAPSULE, EXTENDED RELEASE ORAL at 08:15

## 2024-08-23 RX ADMIN — POLYETHYLENE GLYCOL 3350 17 G: 17 POWDER, FOR SOLUTION ORAL at 09:15

## 2024-08-23 RX ADMIN — MORPHINE SULFATE 4 MG: 4 INJECTION, SOLUTION INTRAMUSCULAR; INTRAVENOUS at 04:32

## 2024-08-23 RX ADMIN — MIRTAZAPINE 15 MG: 15 TABLET, FILM COATED ORAL at 22:32

## 2024-08-23 RX ADMIN — AMPICILLIN SODIUM AND SULBACTAM SODIUM 3 G: 2; 1 INJECTION, POWDER, FOR SOLUTION INTRAMUSCULAR; INTRAVENOUS at 11:39

## 2024-08-23 RX ADMIN — PANTOPRAZOLE SODIUM 40 MG: 40 TABLET, DELAYED RELEASE ORAL at 22:32

## 2024-08-23 RX ADMIN — MORPHINE SULFATE 4 MG: 4 INJECTION, SOLUTION INTRAMUSCULAR; INTRAVENOUS at 15:21

## 2024-08-23 RX ADMIN — BUSPIRONE HYDROCHLORIDE 5 MG: 5 TABLET ORAL at 22:32

## 2024-08-23 RX ADMIN — Medication 100 MG: at 08:16

## 2024-08-23 RX ADMIN — APIXABAN 5 MG: 5 TABLET, FILM COATED ORAL at 22:32

## 2024-08-23 SDOH — SOCIAL STABILITY: SOCIAL INSECURITY: DO YOU FEEL UNSAFE GOING BACK TO THE PLACE WHERE YOU ARE LIVING?: NO

## 2024-08-23 SDOH — SOCIAL STABILITY: SOCIAL INSECURITY: DO YOU FEEL ANYONE HAS EXPLOITED OR TAKEN ADVANTAGE OF YOU FINANCIALLY OR OF YOUR PERSONAL PROPERTY?: NO

## 2024-08-23 SDOH — SOCIAL STABILITY: SOCIAL INSECURITY: HAVE YOU HAD THOUGHTS OF HARMING ANYONE ELSE?: NO

## 2024-08-23 SDOH — SOCIAL STABILITY: SOCIAL INSECURITY: ABUSE: ADULT

## 2024-08-23 SDOH — SOCIAL STABILITY: SOCIAL INSECURITY: HAS ANYONE EVER THREATENED TO HURT YOUR FAMILY OR YOUR PETS?: NO

## 2024-08-23 SDOH — SOCIAL STABILITY: SOCIAL INSECURITY: WERE YOU ABLE TO COMPLETE ALL THE BEHAVIORAL HEALTH SCREENINGS?: YES

## 2024-08-23 SDOH — SOCIAL STABILITY: SOCIAL INSECURITY: ARE YOU OR HAVE YOU BEEN THREATENED OR ABUSED PHYSICALLY, EMOTIONALLY, OR SEXUALLY BY ANYONE?: NO

## 2024-08-23 SDOH — SOCIAL STABILITY: SOCIAL INSECURITY: DOES ANYONE TRY TO KEEP YOU FROM HAVING/CONTACTING OTHER FRIENDS OR DOING THINGS OUTSIDE YOUR HOME?: NO

## 2024-08-23 SDOH — SOCIAL STABILITY: SOCIAL INSECURITY: ARE THERE ANY APPARENT SIGNS OF INJURIES/BEHAVIORS THAT COULD BE RELATED TO ABUSE/NEGLECT?: NO

## 2024-08-23 ASSESSMENT — PAIN SCALES - GENERAL
PAINLEVEL_OUTOF10: 10 - WORST POSSIBLE PAIN
PAINLEVEL_OUTOF10: 9
PAINLEVEL_OUTOF10: 7
PAINLEVEL_OUTOF10: 10 - WORST POSSIBLE PAIN
PAINLEVEL_OUTOF10: 7
PAINLEVEL_OUTOF10: 9
PAINLEVEL_OUTOF10: 10 - WORST POSSIBLE PAIN
PAINLEVEL_OUTOF10: 10 - WORST POSSIBLE PAIN
PAINLEVEL_OUTOF10: 9
PAINLEVEL_OUTOF10: 7

## 2024-08-23 ASSESSMENT — PAIN DESCRIPTION - ORIENTATION
ORIENTATION: RIGHT

## 2024-08-23 ASSESSMENT — ACTIVITIES OF DAILY LIVING (ADL)
HEARING - LEFT EAR: FUNCTIONAL
ASSISTIVE_DEVICE: WALKER
BATHING: INDEPENDENT
GROOMING: INDEPENDENT
FEEDING YOURSELF: INDEPENDENT
ADEQUATE_TO_COMPLETE_ADL: YES
WALKS IN HOME: INDEPENDENT
PATIENT'S MEMORY ADEQUATE TO SAFELY COMPLETE DAILY ACTIVITIES?: YES
JUDGMENT_ADEQUATE_SAFELY_COMPLETE_DAILY_ACTIVITIES: YES
HEARING - RIGHT EAR: FUNCTIONAL
TOILETING: INDEPENDENT
DRESSING YOURSELF: INDEPENDENT

## 2024-08-23 ASSESSMENT — LIFESTYLE VARIABLES
HOW MANY STANDARD DRINKS CONTAINING ALCOHOL DO YOU HAVE ON A TYPICAL DAY: 1 OR 2
AUDIT-C TOTAL SCORE: 1
HOW OFTEN DO YOU HAVE A DRINK CONTAINING ALCOHOL: MONTHLY OR LESS
AUDIT-C TOTAL SCORE: 1
SKIP TO QUESTIONS 9-10: 1
HOW OFTEN DO YOU HAVE 6 OR MORE DRINKS ON ONE OCCASION: NEVER

## 2024-08-23 ASSESSMENT — COGNITIVE AND FUNCTIONAL STATUS - GENERAL
PATIENT BASELINE BEDBOUND: NO
DAILY ACTIVITIY SCORE: 22
TOILETING: A LITTLE
MOVING TO AND FROM BED TO CHAIR: A LITTLE
STANDING UP FROM CHAIR USING ARMS: A LITTLE
PERSONAL GROOMING: A LITTLE
MOBILITY SCORE: 18
TURNING FROM BACK TO SIDE WHILE IN FLAT BAD: A LITTLE
WALKING IN HOSPITAL ROOM: A LITTLE
CLIMB 3 TO 5 STEPS WITH RAILING: A LOT

## 2024-08-23 ASSESSMENT — PAIN DESCRIPTION - LOCATION
LOCATION: ABDOMEN
LOCATION: FOOT
LOCATION: FOOT
LOCATION: ABDOMEN

## 2024-08-23 ASSESSMENT — PAIN - FUNCTIONAL ASSESSMENT
PAIN_FUNCTIONAL_ASSESSMENT: 0-10

## 2024-08-23 ASSESSMENT — PAIN SCALES - WONG BAKER: WONGBAKER_NUMERICALRESPONSE: HURTS WORST

## 2024-08-23 NOTE — PROGRESS NOTES
Fernando Cuevas is a 63 y.o. female on day 4 of admission presenting with Sepsis due to undetermined organism (Multi).    Assessment/Plan   White count down to 20 but having intermittent bouts of worsening pain.  We have ordered a repeat CT scan to better evaluate    Subjective   Complains of abdominal pain.       Objective     Physical Exam  NAD  A&Ox3  Non icteric  CTA  RR  Abdomen soft distended with diffuse tenderness.  Very similar exam that she is had for the past several weeks  Extremities warm, well perfused     Last Recorded Vitals  Blood pressure 166/72, pulse 107, temperature 37 °C (98.6 °F), temperature source Temporal, resp. rate 17, weight 61.7 kg (136 lb), SpO2 100%.  Intake/Output last 3 Shifts:  I/O last 3 completed shifts:  In: 894.6 (14.5 mL/kg) [P.O.:480; Blood:414.6]  Out: 350 (5.7 mL/kg) [Urine:350 (0.2 mL/kg/hr)]  Weight: 61.7 kg     Relevant Results    Scheduled medications  ampicillin-sulbactam, 3 g, intravenous, q6h  apixaban, 5 mg, oral, BID  budesonide, 0.5 mg, nebulization, BID  busPIRone, 5 mg, oral, BID  dilTIAZem ER, 240 mg, oral, Daily  docusate sodium, 100 mg, oral, BID  [Held by provider] formoterol, 20 mcg, nebulization, BID  ipratropium-albuteroL, 3 mL, nebulization, TID  mirtazapine, 15 mg, oral, Nightly  montelukast, 10 mg, oral, Daily  oxygen, , inhalation, Continuous - Inhalation  pantoprazole, 40 mg, oral, BID  polyethylene glycol, 17 g, oral, Daily  thiamine, 100 mg, oral, Daily  [Held by provider] tiotropium, 2 puff, inhalation, Daily      Continuous medications     PRN medications  PRN medications: acetaminophen **OR** acetaminophen **OR** acetaminophen, albuterol, benzonatate, diphenhydrAMINE, hydrALAZINE, melatonin, morphine, morphine, ondansetron ODT **OR** ondansetron, prochlorperazine **OR** prochlorperazine    Results for orders placed or performed during the hospital encounter of 08/19/24 (from the past 24 hour(s))   Prepare RBC: 1 Units   Result Value Ref Range     PRODUCT CODE A5436U70     Unit Number [id number]     Unit ABO O     Unit RH POS     XM INTEP COMP     Dispense Status TR     Blood Expiration Date 9/19/2024 11:59:00 PM EDT     PRODUCT BLOOD TYPE 5100     UNIT VOLUME 284    Type and screen   Result Value Ref Range    ABO TYPE O     Rh TYPE POS     ANTIBODY SCREEN NEG    Basic metabolic panel   Result Value Ref Range    Glucose 104 (H) 74 - 99 mg/dL    Sodium 135 (L) 136 - 145 mmol/L    Potassium 3.5 3.5 - 5.3 mmol/L    Chloride 97 (L) 98 - 107 mmol/L    Bicarbonate 25 21 - 32 mmol/L    Anion Gap 17 10 - 20 mmol/L    Urea Nitrogen 7 6 - 23 mg/dL    Creatinine 0.59 0.50 - 1.05 mg/dL    eGFR >90 >60 mL/min/1.73m*2    Calcium 8.9 8.6 - 10.3 mg/dL   CBC   Result Value Ref Range    WBC 20.5 (H) 4.4 - 11.3 x10*3/uL    nRBC 0.6 (H) 0.0 - 0.0 /100 WBCs    RBC 3.65 (L) 4.00 - 5.20 x10*6/uL    Hemoglobin 8.7 (L) 12.0 - 16.0 g/dL    Hematocrit 28.8 (L) 36.0 - 46.0 %    MCV 79 (L) 80 - 100 fL    MCH 23.8 (L) 26.0 - 34.0 pg    MCHC 30.2 (L) 32.0 - 36.0 g/dL    RDW 19.9 (H) 11.5 - 14.5 %    Platelets 559 (H) 150 - 450 x10*3/uL           I spent 25 minutes in the professional and overall care of this patient.      Reid Bingham MD

## 2024-08-23 NOTE — CARE PLAN
The patient's goals for the shift include  pain control    The clinical goals for the shift include maintain comfort      Problem: Pain - Adult  Goal: Verbalizes/displays adequate comfort level or baseline comfort level  Outcome: Progressing     Problem: Safety - Adult  Goal: Free from fall injury  Outcome: Progressing     Problem: Discharge Planning  Goal: Discharge to home or other facility with appropriate resources  Outcome: Progressing     Problem: Chronic Conditions and Co-morbidities  Goal: Patient's chronic conditions and co-morbidity symptoms are monitored and maintained or improved  Outcome: Progressing

## 2024-08-23 NOTE — PROGRESS NOTES
Fernando Cuevas is a 63 y.o. female on day 4 of admission presenting with Sepsis due to undetermined organism (Multi).    Subjective   White count improved, states has more abdominal distention today and more abdominal pain but no nausea or vomiting.       Objective     Physical Exam  Vitals reviewed.   Constitutional:       Appearance: Normal appearance.   HENT:      Head: Normocephalic.      Right Ear: Tympanic membrane normal.      Nose: Nose normal.      Mouth/Throat:      Mouth: Mucous membranes are moist.   Cardiovascular:      Rate and Rhythm: Normal rate and regular rhythm.   Pulmonary:      Effort: Pulmonary effort is normal.   Abdominal:      General: Bowel sounds are normal.      Palpations: Abdomen is soft.      Comments: Distended     Skin:     Capillary Refill: Capillary refill takes less than 2 seconds.   Neurological:      General: No focal deficit present.      Mental Status: She is alert.         Last Recorded Vitals  Blood pressure 166/72, pulse 107, temperature 37 °C (98.6 °F), temperature source Temporal, resp. rate 17, weight 61.7 kg (136 lb), SpO2 100%.  Intake/Output last 3 Shifts:  I/O last 3 completed shifts:  In: 894.6 (14.5 mL/kg) [P.O.:480; Blood:414.6]  Out: 350 (5.7 mL/kg) [Urine:350 (0.2 mL/kg/hr)]  Weight: 61.7 kg     Relevant Results  Results for orders placed or performed during the hospital encounter of 08/19/24 (from the past 24 hour(s))   Prepare RBC: 1 Units   Result Value Ref Range    PRODUCT CODE W7005V43     Unit Number [id number]     Unit ABO O     Unit RH POS     XM INTEP COMP     Dispense Status TR     Blood Expiration Date 9/19/2024 11:59:00 PM EDT     PRODUCT BLOOD TYPE 5100     UNIT VOLUME 284    Type and screen   Result Value Ref Range    ABO TYPE O     Rh TYPE POS     ANTIBODY SCREEN NEG    Basic metabolic panel   Result Value Ref Range    Glucose 104 (H) 74 - 99 mg/dL    Sodium 135 (L) 136 - 145 mmol/L    Potassium 3.5 3.5 - 5.3 mmol/L    Chloride 97 (L) 98 -  107 mmol/L    Bicarbonate 25 21 - 32 mmol/L    Anion Gap 17 10 - 20 mmol/L    Urea Nitrogen 7 6 - 23 mg/dL    Creatinine 0.59 0.50 - 1.05 mg/dL    eGFR >90 >60 mL/min/1.73m*2    Calcium 8.9 8.6 - 10.3 mg/dL   CBC   Result Value Ref Range    WBC 20.5 (H) 4.4 - 11.3 x10*3/uL    nRBC 0.6 (H) 0.0 - 0.0 /100 WBCs    RBC 3.65 (L) 4.00 - 5.20 x10*6/uL    Hemoglobin 8.7 (L) 12.0 - 16.0 g/dL    Hematocrit 28.8 (L) 36.0 - 46.0 %    MCV 79 (L) 80 - 100 fL    MCH 23.8 (L) 26.0 - 34.0 pg    MCHC 30.2 (L) 32.0 - 36.0 g/dL    RDW 19.9 (H) 11.5 - 14.5 %    Platelets 559 (H) 150 - 450 x10*3/uL       Imaging Results  Ct abd/pelvis:  IMPRESSION:  Postsurgical changes right lower quadrant within the small bowel with  mild wall thickening without evidence of obstruction.  Stranding in the left anterior abdominal wall the subcutaneous soft  tissues which appears stable.  This appears to be related to small  fat-containing hernia.  Scarring and parenchymal opacities in the bilateral medial and lower  lobes appear stable.  Cholelithiasis.    Abd x ray:  IMPRESSION:  Increased gaseous bowel dilatation since previous day's exam may  reflect worsening ileus or obstruction.      Probable distended urinary bladder.    Medications:  ampicillin-sulbactam, 3 g, intravenous, q6h  apixaban, 5 mg, oral, BID  budesonide, 0.5 mg, nebulization, BID  busPIRone, 5 mg, oral, BID  dilTIAZem ER, 240 mg, oral, Daily  docusate sodium, 100 mg, oral, BID  [Held by provider] formoterol, 20 mcg, nebulization, BID  ipratropium-albuteroL, 3 mL, nebulization, TID  mirtazapine, 15 mg, oral, Nightly  montelukast, 10 mg, oral, Daily  oxygen, , inhalation, Continuous - Inhalation  pantoprazole, 40 mg, oral, BID  polyethylene glycol, 17 g, oral, Daily  thiamine, 100 mg, oral, Daily  [Held by provider] tiotropium, 2 puff, inhalation, Daily       PRN medications: acetaminophen **OR** acetaminophen **OR** acetaminophen, albuterol, benzonatate, diphenhydrAMINE, hydrALAZINE,  melatonin, morphine, morphine, ondansetron ODT **OR** ondansetron, prochlorperazine **OR** prochlorperazine     Assessment/Plan     8/23: Abd distended. WBC improving. CT abd/pel ordered per surgery team. Continue iv antibiotics    8/22: abd more distended; surgery ordering another xray wbc improving    8/21:  Abdomen still distended no diarrhea, white count up to 33,000, question reactive, leave on empiric therapy for now per the patient is not on oxygen at home we will try to wean.  Follow-up surgical recommendations  8/20:  Hx emergency surgery for perforated bowel 6/21/24, post-op complications with ileus and wound hematoma s/p wound vac, concern for anastomic infection, Hx GIB s/p EGD around 7/26/24, found to have friable gastric mucosa, Hx upper extremity DVT L brachial 6/27/24, R brachial 7/6/24 and has been on eliquis although reportedly not taking recently. Eliquis was not stopped following admission for GIB.      Now here with abdominal pain which is very tender with guarding and significant leukocytosis along with tachycardia... Imaging is unrevealing.... f/up surgery and ID recs, cont iv abx, f/up cultures, iv analgesia, ivf.      Diarrhea... check C diff.      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 going to hold steroids for now given concern for infection.      HTN urgency... improving with home cardizem.   Hx UE DVT... resume eliquis.      + cocaine  UA unremarkable  CTA without PNA or PE.      Pt lives at home with her son, uses walker baseline.     DVT Prophylaxis:  Marcus Khoury MD  [facility]

## 2024-08-23 NOTE — PROGRESS NOTES
08/23/24 0836   Current Planned Discharge Disposition   Current Planned Discharge Disposition Home [facility]al     Once med ready plan is to return home with [facility], General sx following per notes from xray results Ileus vs obstruction? Possible repeat CT today patient remains on full liq diet and IV Unasyn, will continue to monitor for discharge planning.

## 2024-08-23 NOTE — NURSING NOTE
Patient complains of no BM since 8/19. New order for miralax obtained. Miralax unsuccessful. New order for enema obtained. Enema also unsuccessful. MD made aware.    Per CT, patient to drink 1L of water due to allergy of the dye needed for scan. Patient given water and instructed to drink water. Patient states she always feels full and cannot tolerate the water being consumed  without having an emesis. Zofran given preventative with liter of water.

## 2024-08-24 ENCOUNTER — APPOINTMENT (OUTPATIENT)
Dept: RADIOLOGY | Facility: HOSPITAL | Age: 64
End: 2024-08-24
Payer: COMMERCIAL

## 2024-08-24 LAB
ANION GAP SERPL CALC-SCNC: 17 MMOL/L (ref 10–20)
BUN SERPL-MCNC: 4 MG/DL (ref 6–23)
CALCIUM SERPL-MCNC: 8.8 MG/DL (ref 8.6–10.3)
CHLORIDE SERPL-SCNC: 98 MMOL/L (ref 98–107)
CO2 SERPL-SCNC: 26 MMOL/L (ref 21–32)
CREAT SERPL-MCNC: 0.54 MG/DL (ref 0.5–1.05)
EGFRCR SERPLBLD CKD-EPI 2021: >90 ML/MIN/1.73M*2
ERYTHROCYTE [DISTWIDTH] IN BLOOD BY AUTOMATED COUNT: 19.6 % (ref 11.5–14.5)
GLUCOSE BLD MANUAL STRIP-MCNC: 110 MG/DL (ref 74–99)
GLUCOSE BLD MANUAL STRIP-MCNC: 114 MG/DL (ref 74–99)
GLUCOSE BLD MANUAL STRIP-MCNC: 123 MG/DL (ref 74–99)
GLUCOSE BLD MANUAL STRIP-MCNC: 96 MG/DL (ref 74–99)
GLUCOSE SERPL-MCNC: 122 MG/DL (ref 74–99)
HCT VFR BLD AUTO: 27.6 % (ref 36–46)
HGB BLD-MCNC: 8.8 G/DL (ref 12–16)
MCH RBC QN AUTO: 23.7 PG (ref 26–34)
MCHC RBC AUTO-ENTMCNC: 31.9 G/DL (ref 32–36)
MCV RBC AUTO: 74 FL (ref 80–100)
NRBC BLD-RTO: 0.3 /100 WBCS (ref 0–0)
PLATELET # BLD AUTO: 618 X10*3/UL (ref 150–450)
POTASSIUM SERPL-SCNC: 2.9 MMOL/L (ref 3.5–5.3)
RBC # BLD AUTO: 3.71 X10*6/UL (ref 4–5.2)
SODIUM SERPL-SCNC: 138 MMOL/L (ref 136–145)
WBC # BLD AUTO: 14.7 X10*3/UL (ref 4.4–11.3)

## 2024-08-24 PROCEDURE — 2500000001 HC RX 250 WO HCPCS SELF ADMINISTERED DRUGS (ALT 637 FOR MEDICARE OP): Performed by: INTERNAL MEDICINE

## 2024-08-24 PROCEDURE — 36415 COLL VENOUS BLD VENIPUNCTURE: CPT

## 2024-08-24 PROCEDURE — 2500000002 HC RX 250 W HCPCS SELF ADMINISTERED DRUGS (ALT 637 FOR MEDICARE OP, ALT 636 FOR OP/ED): Performed by: INTERNAL MEDICINE

## 2024-08-24 PROCEDURE — 99232 SBSQ HOSP IP/OBS MODERATE 35: CPT | Performed by: SURGERY

## 2024-08-24 PROCEDURE — 2500000002 HC RX 250 W HCPCS SELF ADMINISTERED DRUGS (ALT 637 FOR MEDICARE OP, ALT 636 FOR OP/ED): Performed by: PHARMACIST

## 2024-08-24 PROCEDURE — 80048 BASIC METABOLIC PNL TOTAL CA: CPT

## 2024-08-24 PROCEDURE — 2500000004 HC RX 250 GENERAL PHARMACY W/ HCPCS (ALT 636 FOR OP/ED): Performed by: INTERNAL MEDICINE

## 2024-08-24 PROCEDURE — 99233 SBSQ HOSP IP/OBS HIGH 50: CPT | Performed by: INTERNAL MEDICINE

## 2024-08-24 PROCEDURE — 1200000002 HC GENERAL ROOM WITH TELEMETRY DAILY

## 2024-08-24 PROCEDURE — 2500000005 HC RX 250 GENERAL PHARMACY W/O HCPCS: Performed by: PHYSICIAN ASSISTANT

## 2024-08-24 PROCEDURE — 73630 X-RAY EXAM OF FOOT: CPT | Mod: RT

## 2024-08-24 PROCEDURE — 2500000001 HC RX 250 WO HCPCS SELF ADMINISTERED DRUGS (ALT 637 FOR MEDICARE OP): Performed by: PHYSICIAN ASSISTANT

## 2024-08-24 PROCEDURE — 2500000004 HC RX 250 GENERAL PHARMACY W/ HCPCS (ALT 636 FOR OP/ED): Performed by: PHARMACIST

## 2024-08-24 PROCEDURE — 2500000001 HC RX 250 WO HCPCS SELF ADMINISTERED DRUGS (ALT 637 FOR MEDICARE OP)

## 2024-08-24 PROCEDURE — 82947 ASSAY GLUCOSE BLOOD QUANT: CPT

## 2024-08-24 PROCEDURE — 2500000004 HC RX 250 GENERAL PHARMACY W/ HCPCS (ALT 636 FOR OP/ED): Performed by: PHYSICIAN ASSISTANT

## 2024-08-24 PROCEDURE — 2500000004 HC RX 250 GENERAL PHARMACY W/ HCPCS (ALT 636 FOR OP/ED)

## 2024-08-24 PROCEDURE — 94640 AIRWAY INHALATION TREATMENT: CPT

## 2024-08-24 PROCEDURE — 73630 X-RAY EXAM OF FOOT: CPT | Mod: RIGHT SIDE | Performed by: RADIOLOGY

## 2024-08-24 PROCEDURE — 85027 COMPLETE CBC AUTOMATED: CPT

## 2024-08-24 RX ORDER — POLYETHYLENE GLYCOL 3350 17 G/17G
17 POWDER, FOR SOLUTION ORAL 2 TIMES DAILY
Status: DISCONTINUED | OUTPATIENT
Start: 2024-08-24 | End: 2024-09-01 | Stop reason: HOSPADM

## 2024-08-24 RX ORDER — LORAZEPAM 2 MG/ML
0.5 INJECTION INTRAMUSCULAR ONCE
Status: COMPLETED | OUTPATIENT
Start: 2024-08-24 | End: 2024-08-24

## 2024-08-24 RX ORDER — POTASSIUM CHLORIDE 14.9 MG/ML
20 INJECTION INTRAVENOUS ONCE
Status: COMPLETED | OUTPATIENT
Start: 2024-08-24 | End: 2024-08-24

## 2024-08-24 RX ORDER — POTASSIUM CHLORIDE 14.9 MG/ML
20 INJECTION INTRAVENOUS
Status: DISPENSED | OUTPATIENT
Start: 2024-08-24 | End: 2024-08-24

## 2024-08-24 RX ADMIN — DILTIAZEM HYDROCHLORIDE 240 MG: 120 CAPSULE, EXTENDED RELEASE ORAL at 11:18

## 2024-08-24 RX ADMIN — HYDROXYZINE HYDROCHLORIDE 10 MG: 10 TABLET ORAL at 11:42

## 2024-08-24 RX ADMIN — MORPHINE SULFATE 2 MG: 2 INJECTION, SOLUTION INTRAMUSCULAR; INTRAVENOUS at 06:39

## 2024-08-24 RX ADMIN — IPRATROPIUM BROMIDE AND ALBUTEROL SULFATE 3 ML: 2.5; .5 SOLUTION RESPIRATORY (INHALATION) at 19:12

## 2024-08-24 RX ADMIN — LORAZEPAM 0.5 MG: 2 INJECTION, SOLUTION INTRAMUSCULAR; INTRAVENOUS at 23:43

## 2024-08-24 RX ADMIN — HYDROXYZINE HYDROCHLORIDE 10 MG: 10 TABLET ORAL at 00:18

## 2024-08-24 RX ADMIN — HYDROXYZINE HYDROCHLORIDE 10 MG: 10 TABLET ORAL at 17:42

## 2024-08-24 RX ADMIN — MIRTAZAPINE 15 MG: 15 TABLET, FILM COATED ORAL at 22:25

## 2024-08-24 RX ADMIN — APIXABAN 5 MG: 5 TABLET, FILM COATED ORAL at 22:25

## 2024-08-24 RX ADMIN — IPRATROPIUM BROMIDE AND ALBUTEROL SULFATE 3 ML: 2.5; .5 SOLUTION RESPIRATORY (INHALATION) at 07:18

## 2024-08-24 RX ADMIN — ONDANSETRON 4 MG: 2 INJECTION INTRAMUSCULAR; INTRAVENOUS at 11:36

## 2024-08-24 RX ADMIN — BUDESONIDE 0.5 MG: 0.5 INHALANT RESPIRATORY (INHALATION) at 07:18

## 2024-08-24 RX ADMIN — BUSPIRONE HYDROCHLORIDE 5 MG: 5 TABLET ORAL at 22:25

## 2024-08-24 RX ADMIN — DOCUSATE SODIUM 100 MG: 100 CAPSULE, LIQUID FILLED ORAL at 11:17

## 2024-08-24 RX ADMIN — Medication 2 L/MIN: at 20:00

## 2024-08-24 RX ADMIN — AMPICILLIN SODIUM AND SULBACTAM SODIUM 3 G: 2; 1 INJECTION, POWDER, FOR SOLUTION INTRAMUSCULAR; INTRAVENOUS at 00:19

## 2024-08-24 RX ADMIN — AMPICILLIN SODIUM AND SULBACTAM SODIUM 3 G: 2; 1 INJECTION, POWDER, FOR SOLUTION INTRAMUSCULAR; INTRAVENOUS at 06:46

## 2024-08-24 RX ADMIN — PANTOPRAZOLE SODIUM 40 MG: 40 TABLET, DELAYED RELEASE ORAL at 11:17

## 2024-08-24 RX ADMIN — AMPICILLIN SODIUM AND SULBACTAM SODIUM 3 G: 2; 1 INJECTION, POWDER, FOR SOLUTION INTRAMUSCULAR; INTRAVENOUS at 18:08

## 2024-08-24 RX ADMIN — APIXABAN 5 MG: 5 TABLET, FILM COATED ORAL at 11:18

## 2024-08-24 RX ADMIN — DIPHENHYDRAMINE HYDROCHLORIDE 12.5 MG: 50 INJECTION, SOLUTION INTRAMUSCULAR; INTRAVENOUS at 19:43

## 2024-08-24 RX ADMIN — Medication 2 L/MIN: at 08:00

## 2024-08-24 RX ADMIN — BUDESONIDE 0.5 MG: 0.5 INHALANT RESPIRATORY (INHALATION) at 19:12

## 2024-08-24 RX ADMIN — MONTELUKAST 10 MG: 10 TABLET, FILM COATED ORAL at 11:18

## 2024-08-24 RX ADMIN — MORPHINE SULFATE 2 MG: 2 INJECTION, SOLUTION INTRAMUSCULAR; INTRAVENOUS at 11:31

## 2024-08-24 RX ADMIN — HYDROXYZINE HYDROCHLORIDE 10 MG: 10 TABLET ORAL at 22:27

## 2024-08-24 RX ADMIN — POTASSIUM CHLORIDE 20 MEQ: 14.9 INJECTION, SOLUTION INTRAVENOUS at 17:46

## 2024-08-24 RX ADMIN — DIPHENHYDRAMINE HYDROCHLORIDE 12.5 MG: 50 INJECTION, SOLUTION INTRAMUSCULAR; INTRAVENOUS at 13:07

## 2024-08-24 RX ADMIN — MORPHINE SULFATE 2 MG: 2 INJECTION, SOLUTION INTRAMUSCULAR; INTRAVENOUS at 01:24

## 2024-08-24 RX ADMIN — MORPHINE SULFATE 4 MG: 4 INJECTION, SOLUTION INTRAMUSCULAR; INTRAVENOUS at 14:28

## 2024-08-24 RX ADMIN — MORPHINE SULFATE 4 MG: 4 INJECTION, SOLUTION INTRAMUSCULAR; INTRAVENOUS at 17:42

## 2024-08-24 RX ADMIN — POTASSIUM CHLORIDE 20 MEQ: 14.9 INJECTION, SOLUTION INTRAVENOUS at 12:57

## 2024-08-24 RX ADMIN — MORPHINE SULFATE 4 MG: 4 INJECTION, SOLUTION INTRAMUSCULAR; INTRAVENOUS at 20:38

## 2024-08-24 RX ADMIN — Medication 100 MG: at 11:18

## 2024-08-24 RX ADMIN — IPRATROPIUM BROMIDE AND ALBUTEROL SULFATE 3 ML: 2.5; .5 SOLUTION RESPIRATORY (INHALATION) at 16:44

## 2024-08-24 RX ADMIN — BUSPIRONE HYDROCHLORIDE 5 MG: 5 TABLET ORAL at 11:18

## 2024-08-24 ASSESSMENT — COGNITIVE AND FUNCTIONAL STATUS - GENERAL
STANDING UP FROM CHAIR USING ARMS: A LITTLE
WALKING IN HOSPITAL ROOM: A LITTLE
TURNING FROM BACK TO SIDE WHILE IN FLAT BAD: A LITTLE
TOILETING: A LITTLE
MOBILITY SCORE: 18
CLIMB 3 TO 5 STEPS WITH RAILING: A LOT
DAILY ACTIVITIY SCORE: 22
MOVING TO AND FROM BED TO CHAIR: A LITTLE
PERSONAL GROOMING: A LITTLE

## 2024-08-24 ASSESSMENT — PAIN DESCRIPTION - LOCATION
LOCATION: ABDOMEN

## 2024-08-24 ASSESSMENT — PAIN SCALES - GENERAL
PAINLEVEL_OUTOF10: 5 - MODERATE PAIN
PAINLEVEL_OUTOF10: 10 - WORST POSSIBLE PAIN
PAINLEVEL_OUTOF10: 7
PAINLEVEL_OUTOF10: 10 - WORST POSSIBLE PAIN
PAINLEVEL_OUTOF10: 7
PAINLEVEL_OUTOF10: 5 - MODERATE PAIN
PAINLEVEL_OUTOF10: 6
PAINLEVEL_OUTOF10: 10 - WORST POSSIBLE PAIN
PAINLEVEL_OUTOF10: 6
PAINLEVEL_OUTOF10: 7
PAINLEVEL_OUTOF10: 10 - WORST POSSIBLE PAIN
PAINLEVEL_OUTOF10: 7

## 2024-08-24 ASSESSMENT — PAIN SCALES - PAIN ASSESSMENT IN ADVANCED DEMENTIA (PAINAD): TOTALSCORE: MEDICATION (SEE MAR)

## 2024-08-24 ASSESSMENT — PAIN DESCRIPTION - ORIENTATION
ORIENTATION: UPPER;LOWER
ORIENTATION: UPPER;LOWER
ORIENTATION: RIGHT

## 2024-08-24 ASSESSMENT — PAIN - FUNCTIONAL ASSESSMENT
PAIN_FUNCTIONAL_ASSESSMENT: 0-10

## 2024-08-24 NOTE — NURSING NOTE
Patient returned from CT requesting to eat rice soup her son brought her. Educated on full liquid diet. Patient continued to request food, stating stomach pain, medication not helping relieve it. Dr. Hernández aware of patient request to eat despite nursing education. Soup warmed up, patient eating soup at this time. Call light in reach.

## 2024-08-24 NOTE — PROGRESS NOTES
Abdominal pain slightly improved.    Complains of feet pain at this time    /77 (Patient Position: Lying)   Pulse 107   Temp 36.9 °C (98.4 °F) (Oral)   Resp 15   Wt 61.7 kg (136 lb)   SpO2 99%   BMI 24.87 kg/m²   Abdomen remains distended, mild to moderate tenderness    === 08/19/24 ===    CT ABDOMEN AND PELVIS W ORAL CONTRAST ONLY    - Impression -  Fluid-filled mild to moderately dilated small bowel in the abdomen and  pelvis with air-fluid levels with focal wall thickening and  inflammation of loops of small bowel right upper quadrant.  No  discrete transition point is identified, the small bowel appears  widely patent however the distal small bowel is decompressed.  Findings suggest an acute infectious or inflammatory enteritis causing  a moderate grade partial or intermittent small bowel obstruction,  possibly from dysmotility rather than a mechanical obstruction.  Moderate centrilobular emphysema.  9 mm right lower lobe pulmonary nodule, follow-up per [employer] guidelines.  Suspected systemic anemia.  Cholelithiasis and gallbladder sludge.     Lab Results   Component Value Date    WBC 14.7 (H) 08/24/2024    HGB 8.8 (L) 08/24/2024    HCT 27.6 (L) 08/24/2024    MCV 74 (L) 08/24/2024     (H) 08/24/2024     Impression; enteritis unclear etiology with element of ileus    Slight clinical improvement.      Ok to start back on sips of clear liquids

## 2024-08-24 NOTE — PROGRESS NOTES
Fernando Cuevas is a 63 y.o. female on day 5 of admission presenting with Sepsis due to undetermined organism (Multi).    Subjective   Still reporting pain. She's refusing ngt this morning. Afebrile. On 6L NC.        Objective     Physical Exam  Vitals reviewed.   Constitutional:       Appearance: Normal appearance.   HENT:      Head: Normocephalic.      Right Ear: Tympanic membrane normal.      Nose: Nose normal.      Mouth/Throat:      Mouth: Mucous membranes are moist.   Cardiovascular:      Rate and Rhythm: Normal rate and regular rhythm.   Pulmonary:      Effort: Pulmonary effort is normal.   Abdominal:      General: Bowel sounds are normal.      Palpations: Abdomen is soft.      Comments: Distended     Skin:     Capillary Refill: Capillary refill takes less than 2 seconds.   Neurological:      General: No focal deficit present.      Mental Status: She is alert.         Last Recorded Vitals  Blood pressure 156/79, pulse 98, temperature 36.6 °C (97.9 °F), temperature source Oral, resp. rate 16, weight 61.7 kg (136 lb), SpO2 100%.  Intake/Output last 3 Shifts:  I/O last 3 completed shifts:  In: 1994.6 (32.3 mL/kg) [P.O.:480; Blood:414.6; IV Piggyback:1100]  Out: 450 (7.3 mL/kg) [Urine:450 (0.2 mL/kg/hr)]  Weight: 61.7 kg     Relevant Results  Results for orders placed or performed during the hospital encounter of 08/19/24 (from the past 24 hour(s))   Basic metabolic panel   Result Value Ref Range    Glucose 122 (H) 74 - 99 mg/dL    Sodium 138 136 - 145 mmol/L    Potassium 2.9 (LL) 3.5 - 5.3 mmol/L    Chloride 98 98 - 107 mmol/L    Bicarbonate 26 21 - 32 mmol/L    Anion Gap 17 10 - 20 mmol/L    Urea Nitrogen 4 (L) 6 - 23 mg/dL    Creatinine 0.54 0.50 - 1.05 mg/dL    eGFR >90 >60 mL/min/1.73m*2    Calcium 8.8 8.6 - 10.3 mg/dL   CBC   Result Value Ref Range    WBC 14.7 (H) 4.4 - 11.3 x10*3/uL    nRBC 0.3 (H) 0.0 - 0.0 /100 WBCs    RBC 3.71 (L) 4.00 - 5.20 x10*6/uL    Hemoglobin 8.8 (L) 12.0 - 16.0 g/dL    Hematocrit  27.6 (L) 36.0 - 46.0 %    MCV 74 (L) 80 - 100 fL    MCH 23.7 (L) 26.0 - 34.0 pg    MCHC 31.9 (L) 32.0 - 36.0 g/dL    RDW 19.6 (H) 11.5 - 14.5 %    Platelets 618 (H) 150 - 450 x10*3/uL   POCT GLUCOSE   Result Value Ref Range    POCT Glucose 110 (H) 74 - 99 mg/dL       Imaging Results  Ct abd/pelvis:  IMPRESSION:  Postsurgical changes right lower quadrant within the small bowel with  mild wall thickening without evidence of obstruction.  Stranding in the left anterior abdominal wall the subcutaneous soft  tissues which appears stable.  This appears to be related to small  fat-containing hernia.  Scarring and parenchymal opacities in the bilateral medial and lower  lobes appear stable.  Cholelithiasis.    Abd x ray:  IMPRESSION:  Increased gaseous bowel dilatation since previous day's exam may  reflect worsening ileus or obstruction.      Probable distended urinary bladder.    Medications:  ampicillin-sulbactam, 3 g, intravenous, q6h  apixaban, 5 mg, oral, BID  budesonide, 0.5 mg, nebulization, BID  busPIRone, 5 mg, oral, BID  dilTIAZem ER, 240 mg, oral, Daily  docusate sodium, 100 mg, oral, BID  [Held by provider] formoterol, 20 mcg, nebulization, BID  ipratropium-albuteroL, 3 mL, nebulization, TID  mirtazapine, 15 mg, oral, Nightly  montelukast, 10 mg, oral, Daily  oxygen, , inhalation, Continuous - Inhalation  pantoprazole, 40 mg, oral, BID  polyethylene glycol, 17 g, oral, Daily  potassium chloride, 20 mEq, intravenous, q2h  thiamine, 100 mg, oral, Daily  [Held by provider] tiotropium, 2 puff, inhalation, Daily       PRN medications: acetaminophen **OR** acetaminophen **OR** acetaminophen, albuterol, benzonatate, diphenhydrAMINE, hydrALAZINE, hydrOXYzine HCL, melatonin, morphine, morphine, ondansetron ODT **OR** ondansetron, prochlorperazine **OR** prochlorperazine     Assessment/Plan       8/24:  Enteritis.... wbc improving... cont iv abx.   CT last night with sbo... f/up OR team. She's refusing ngt this morning.    HypoK... replace, monitor. Check Mg tomorrow.   Pt/ot      8/23: Abd distended. WBC improving. CT abd/pel ordered per surgery team. Continue iv antibiotics    8/22: abd more distended; surgery ordering another xray wbc improving    8/21:  Abdomen still distended no diarrhea, white count up to 33,000, question reactive, leave on empiric therapy for now per the patient is not on oxygen at home we will try to wean.  Follow-up surgical recommendations  8/20:  Hx emergency surgery for perforated bowel 6/21/24, post-op complications with ileus and wound hematoma s/p wound vac, concern for anastomic infection, Hx GIB s/p EGD around 7/26/24, found to have friable gastric mucosa, Hx upper extremity DVT L brachial 6/27/24, R brachial 7/6/24 and has been on eliquis although reportedly not taking recently. Eliquis was not stopped following admission for GIB.      Now here with abdominal pain which is very tender with guarding and significant leukocytosis along with tachycardia... Imaging is unrevealing.... f/up surgery and ID recs, cont iv abx, f/up cultures, iv analgesia, ivf.      Diarrhea... check C diff.      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 going to hold steroids for now given concern for infection.      HTN urgency... improving with home cardizem.   Hx UE DVT... resume eliquis.      + cocaine  UA unremarkable  CTA without PNA or PE.      Pt lives at home with her son, uses walker baseline.     DVT Prophylaxis:  Marcus Fry MD  [facility]

## 2024-08-24 NOTE — PROGRESS NOTES
08/24/24 1244   Current Planned Discharge Disposition   Current Planned Discharge Disposition Home H     Once med ready plan is to discharge home, per notes CT showing SBO, patient refused NG placement, General sx following, patient on clear liq diet, will continue to mod for discharge planning.

## 2024-08-25 ENCOUNTER — APPOINTMENT (OUTPATIENT)
Dept: RADIOLOGY | Facility: HOSPITAL | Age: 64
End: 2024-08-25
Payer: COMMERCIAL

## 2024-08-25 VITALS
HEART RATE: 110 BPM | OXYGEN SATURATION: 100 % | TEMPERATURE: 97.7 F | BODY MASS INDEX: 24.87 KG/M2 | RESPIRATION RATE: 22 BRPM | DIASTOLIC BLOOD PRESSURE: 81 MMHG | WEIGHT: 136 LBS | SYSTOLIC BLOOD PRESSURE: 173 MMHG

## 2024-08-25 LAB
ANION GAP SERPL CALC-SCNC: 14 MMOL/L (ref 10–20)
BUN SERPL-MCNC: 3 MG/DL (ref 6–23)
CALCIUM SERPL-MCNC: 8.6 MG/DL (ref 8.6–10.3)
CHLORIDE SERPL-SCNC: 101 MMOL/L (ref 98–107)
CO2 SERPL-SCNC: 28 MMOL/L (ref 21–32)
CREAT SERPL-MCNC: 0.49 MG/DL (ref 0.5–1.05)
EGFRCR SERPLBLD CKD-EPI 2021: >90 ML/MIN/1.73M*2
ERYTHROCYTE [DISTWIDTH] IN BLOOD BY AUTOMATED COUNT: 20.5 % (ref 11.5–14.5)
GLUCOSE SERPL-MCNC: 97 MG/DL (ref 74–99)
HCT VFR BLD AUTO: 27.3 % (ref 36–46)
HGB BLD-MCNC: 8.1 G/DL (ref 12–16)
HOLD SPECIMEN: NORMAL
MAGNESIUM SERPL-MCNC: 1.5 MG/DL (ref 1.6–2.4)
MCH RBC QN AUTO: 23.8 PG (ref 26–34)
MCHC RBC AUTO-ENTMCNC: 29.7 G/DL (ref 32–36)
MCV RBC AUTO: 80 FL (ref 80–100)
NRBC BLD-RTO: 0.2 /100 WBCS (ref 0–0)
PLATELET # BLD AUTO: 621 X10*3/UL (ref 150–450)
POTASSIUM SERPL-SCNC: 3 MMOL/L (ref 3.5–5.3)
RBC # BLD AUTO: 3.41 X10*6/UL (ref 4–5.2)
SODIUM SERPL-SCNC: 140 MMOL/L (ref 136–145)
WBC # BLD AUTO: 10.6 X10*3/UL (ref 4.4–11.3)

## 2024-08-25 PROCEDURE — 99233 SBSQ HOSP IP/OBS HIGH 50: CPT | Performed by: INTERNAL MEDICINE

## 2024-08-25 PROCEDURE — 85027 COMPLETE CBC AUTOMATED: CPT | Performed by: INTERNAL MEDICINE

## 2024-08-25 PROCEDURE — 2500000005 HC RX 250 GENERAL PHARMACY W/O HCPCS: Performed by: PHYSICIAN ASSISTANT

## 2024-08-25 PROCEDURE — 76937 US GUIDE VASCULAR ACCESS: CPT

## 2024-08-25 PROCEDURE — 83735 ASSAY OF MAGNESIUM: CPT | Performed by: INTERNAL MEDICINE

## 2024-08-25 PROCEDURE — 2500000004 HC RX 250 GENERAL PHARMACY W/ HCPCS (ALT 636 FOR OP/ED): Performed by: INTERNAL MEDICINE

## 2024-08-25 PROCEDURE — 80048 BASIC METABOLIC PNL TOTAL CA: CPT | Performed by: INTERNAL MEDICINE

## 2024-08-25 PROCEDURE — 1100000001 HC PRIVATE ROOM DAILY

## 2024-08-25 PROCEDURE — 94640 AIRWAY INHALATION TREATMENT: CPT

## 2024-08-25 PROCEDURE — 2500000001 HC RX 250 WO HCPCS SELF ADMINISTERED DRUGS (ALT 637 FOR MEDICARE OP): Performed by: INTERNAL MEDICINE

## 2024-08-25 PROCEDURE — 2500000001 HC RX 250 WO HCPCS SELF ADMINISTERED DRUGS (ALT 637 FOR MEDICARE OP)

## 2024-08-25 PROCEDURE — 2500000004 HC RX 250 GENERAL PHARMACY W/ HCPCS (ALT 636 FOR OP/ED): Performed by: PHYSICIAN ASSISTANT

## 2024-08-25 PROCEDURE — 2500000002 HC RX 250 W HCPCS SELF ADMINISTERED DRUGS (ALT 637 FOR MEDICARE OP, ALT 636 FOR OP/ED): Performed by: INTERNAL MEDICINE

## 2024-08-25 PROCEDURE — 2500000002 HC RX 250 W HCPCS SELF ADMINISTERED DRUGS (ALT 637 FOR MEDICARE OP, ALT 636 FOR OP/ED): Performed by: PHARMACIST

## 2024-08-25 PROCEDURE — 99232 SBSQ HOSP IP/OBS MODERATE 35: CPT | Performed by: SURGERY

## 2024-08-25 PROCEDURE — 36415 COLL VENOUS BLD VENIPUNCTURE: CPT | Performed by: INTERNAL MEDICINE

## 2024-08-25 PROCEDURE — 2500000004 HC RX 250 GENERAL PHARMACY W/ HCPCS (ALT 636 FOR OP/ED)

## 2024-08-25 PROCEDURE — 74018 RADEX ABDOMEN 1 VIEW: CPT | Performed by: RADIOLOGY

## 2024-08-25 PROCEDURE — 74018 RADEX ABDOMEN 1 VIEW: CPT

## 2024-08-25 PROCEDURE — 2500000001 HC RX 250 WO HCPCS SELF ADMINISTERED DRUGS (ALT 637 FOR MEDICARE OP): Performed by: PHYSICIAN ASSISTANT

## 2024-08-25 PROCEDURE — 0D9670Z DRAINAGE OF STOMACH WITH DRAINAGE DEVICE, VIA NATURAL OR ARTIFICIAL OPENING: ICD-10-PCS | Performed by: RADIOLOGY

## 2024-08-25 RX ORDER — MAGNESIUM SULFATE HEPTAHYDRATE 40 MG/ML
2 INJECTION, SOLUTION INTRAVENOUS ONCE
Status: COMPLETED | OUTPATIENT
Start: 2024-08-25 | End: 2024-08-25

## 2024-08-25 RX ORDER — POTASSIUM CHLORIDE 14.9 MG/ML
20 INJECTION INTRAVENOUS EVERY 4 HOURS
Status: DISPENSED | OUTPATIENT
Start: 2024-08-25 | End: 2024-08-25

## 2024-08-25 RX ADMIN — MIRTAZAPINE 15 MG: 15 TABLET, FILM COATED ORAL at 21:32

## 2024-08-25 RX ADMIN — Medication 100 MG: at 10:34

## 2024-08-25 RX ADMIN — PANTOPRAZOLE SODIUM 40 MG: 40 TABLET, DELAYED RELEASE ORAL at 10:34

## 2024-08-25 RX ADMIN — DOCUSATE SODIUM 100 MG: 100 CAPSULE, LIQUID FILLED ORAL at 21:32

## 2024-08-25 RX ADMIN — BUDESONIDE 0.5 MG: 0.5 INHALANT RESPIRATORY (INHALATION) at 19:15

## 2024-08-25 RX ADMIN — MORPHINE SULFATE 4 MG: 4 INJECTION, SOLUTION INTRAMUSCULAR; INTRAVENOUS at 10:40

## 2024-08-25 RX ADMIN — POTASSIUM CHLORIDE 20 MEQ: 14.9 INJECTION, SOLUTION INTRAVENOUS at 17:36

## 2024-08-25 RX ADMIN — MORPHINE SULFATE 4 MG: 4 INJECTION, SOLUTION INTRAMUSCULAR; INTRAVENOUS at 03:39

## 2024-08-25 RX ADMIN — MAGNESIUM SULFATE HEPTAHYDRATE 2 G: 40 INJECTION, SOLUTION INTRAVENOUS at 14:35

## 2024-08-25 RX ADMIN — HYDROXYZINE HYDROCHLORIDE 10 MG: 10 TABLET ORAL at 10:40

## 2024-08-25 RX ADMIN — POTASSIUM CHLORIDE 20 MEQ: 14.9 INJECTION, SOLUTION INTRAVENOUS at 14:35

## 2024-08-25 RX ADMIN — MONTELUKAST 10 MG: 10 TABLET, FILM COATED ORAL at 10:34

## 2024-08-25 RX ADMIN — MORPHINE SULFATE 4 MG: 4 INJECTION, SOLUTION INTRAMUSCULAR; INTRAVENOUS at 21:33

## 2024-08-25 RX ADMIN — BUSPIRONE HYDROCHLORIDE 5 MG: 5 TABLET ORAL at 21:32

## 2024-08-25 RX ADMIN — AMPICILLIN SODIUM AND SULBACTAM SODIUM 3 G: 2; 1 INJECTION, POWDER, FOR SOLUTION INTRAMUSCULAR; INTRAVENOUS at 00:07

## 2024-08-25 RX ADMIN — AMPICILLIN SODIUM AND SULBACTAM SODIUM 3 G: 2; 1 INJECTION, POWDER, FOR SOLUTION INTRAMUSCULAR; INTRAVENOUS at 21:33

## 2024-08-25 RX ADMIN — AMPICILLIN SODIUM AND SULBACTAM SODIUM 3 G: 2; 1 INJECTION, POWDER, FOR SOLUTION INTRAMUSCULAR; INTRAVENOUS at 07:25

## 2024-08-25 RX ADMIN — APIXABAN 5 MG: 5 TABLET, FILM COATED ORAL at 10:34

## 2024-08-25 RX ADMIN — APIXABAN 5 MG: 5 TABLET, FILM COATED ORAL at 21:32

## 2024-08-25 RX ADMIN — PANTOPRAZOLE SODIUM 40 MG: 40 TABLET, DELAYED RELEASE ORAL at 21:32

## 2024-08-25 RX ADMIN — BUSPIRONE HYDROCHLORIDE 5 MG: 5 TABLET ORAL at 10:34

## 2024-08-25 RX ADMIN — DIPHENHYDRAMINE HYDROCHLORIDE 12.5 MG: 50 INJECTION, SOLUTION INTRAMUSCULAR; INTRAVENOUS at 14:31

## 2024-08-25 RX ADMIN — DOCUSATE SODIUM 100 MG: 100 CAPSULE, LIQUID FILLED ORAL at 10:34

## 2024-08-25 RX ADMIN — HYDROXYZINE HYDROCHLORIDE 10 MG: 10 TABLET ORAL at 17:26

## 2024-08-25 RX ADMIN — DILTIAZEM HYDROCHLORIDE 240 MG: 120 CAPSULE, EXTENDED RELEASE ORAL at 10:34

## 2024-08-25 RX ADMIN — Medication 2 L/MIN: at 19:15

## 2024-08-25 RX ADMIN — AMPICILLIN SODIUM AND SULBACTAM SODIUM 3 G: 2; 1 INJECTION, POWDER, FOR SOLUTION INTRAMUSCULAR; INTRAVENOUS at 15:02

## 2024-08-25 RX ADMIN — IPRATROPIUM BROMIDE AND ALBUTEROL SULFATE 3 ML: 2.5; .5 SOLUTION RESPIRATORY (INHALATION) at 19:15

## 2024-08-25 RX ADMIN — POLYETHYLENE GLYCOL 3350 17 G: 17 POWDER, FOR SOLUTION ORAL at 21:33

## 2024-08-25 RX ADMIN — MORPHINE SULFATE 4 MG: 4 INJECTION, SOLUTION INTRAMUSCULAR; INTRAVENOUS at 14:27

## 2024-08-25 RX ADMIN — MORPHINE SULFATE 4 MG: 4 INJECTION, SOLUTION INTRAMUSCULAR; INTRAVENOUS at 17:26

## 2024-08-25 ASSESSMENT — COGNITIVE AND FUNCTIONAL STATUS - GENERAL
STANDING UP FROM CHAIR USING ARMS: A LITTLE
TURNING FROM BACK TO SIDE WHILE IN FLAT BAD: A LITTLE
CLIMB 3 TO 5 STEPS WITH RAILING: A LOT
PERSONAL GROOMING: A LITTLE
DAILY ACTIVITIY SCORE: 22
TOILETING: A LITTLE
MOBILITY SCORE: 18
WALKING IN HOSPITAL ROOM: A LITTLE
MOVING TO AND FROM BED TO CHAIR: A LITTLE

## 2024-08-25 ASSESSMENT — PAIN - FUNCTIONAL ASSESSMENT
PAIN_FUNCTIONAL_ASSESSMENT: 0-10

## 2024-08-25 ASSESSMENT — PAIN DESCRIPTION - LOCATION: LOCATION: ABDOMEN

## 2024-08-25 ASSESSMENT — PAIN SCALES - GENERAL
PAINLEVEL_OUTOF10: 0 - NO PAIN
PAINLEVEL_OUTOF10: 7
PAINLEVEL_OUTOF10: 7
PAINLEVEL_OUTOF10: 10 - WORST POSSIBLE PAIN
PAINLEVEL_OUTOF10: 3
PAINLEVEL_OUTOF10: 8

## 2024-08-25 ASSESSMENT — PAIN DESCRIPTION - DESCRIPTORS: DESCRIPTORS: JABBING

## 2024-08-25 NOTE — PROGRESS NOTES
Occupational Therapy                 Therapy Communication Note    Patient Name: Fernando Cuevas  MRN: [id number]  Today's Date: 8/25/2024     Discipline: Occupational Therapy    Missed Visit Reason: Missed Visit Reason: Patient refused    Missed Time: Attempt    Comment:OT consult received, performed chart review. Attempted OT eval, cleared by nursing, however pt reporting not feeling well, does not wish to participate. Increased encouragement and education about need for mobility to assist NG and bowels, pt continues to decline. Reports open to re-attempt by OT 8/26/27, nursing notified.

## 2024-08-25 NOTE — PROGRESS NOTES
Fernando Cuevas is a 63 y.o. female on day 6 of admission presenting with Sepsis due to undetermined organism (Multi).    Subjective   Pt seen this morning. Is s/p NGT overnight and feels a bit better. On room air. Afebrile.        Objective     Physical Exam  Vitals reviewed.   Constitutional:       Appearance: Normal appearance.   HENT:      Head: Normocephalic.      Right Ear: Tympanic membrane normal.      Nose: Nose normal.      Mouth/Throat:      Mouth: Mucous membranes are moist.   Cardiovascular:      Rate and Rhythm: Normal rate and regular rhythm.   Pulmonary:      Effort: Pulmonary effort is normal.   Abdominal:      General: Bowel sounds are normal.      Palpations: Abdomen is soft.      Comments: Distended     Skin:     Capillary Refill: Capillary refill takes less than 2 seconds.   Neurological:      General: No focal deficit present.      Mental Status: She is alert.         Last Recorded Vitals  Blood pressure (!) 171/100, pulse 109, temperature 36.6 °C (97.9 °F), temperature source Temporal, resp. rate 20, weight 61.7 kg (136 lb), SpO2 97%.  Intake/Output last 3 Shifts:  I/O last 3 completed shifts:  In: 1400 (22.7 mL/kg) [IV Piggyback:1400]  Out: 160 (2.6 mL/kg) [Emesis/NG output:160]  Weight: 61.7 kg     Relevant Results  Results for orders placed or performed during the hospital encounter of 08/19/24 (from the past 24 hour(s))   POCT GLUCOSE   Result Value Ref Range    POCT Glucose 123 (H) 74 - 99 mg/dL   POCT GLUCOSE   Result Value Ref Range    POCT Glucose 114 (H) 74 - 99 mg/dL   Basic Metabolic Panel   Result Value Ref Range    Glucose 97 74 - 99 mg/dL    Sodium 140 136 - 145 mmol/L    Potassium 3.0 (L) 3.5 - 5.3 mmol/L    Chloride 101 98 - 107 mmol/L    Bicarbonate 28 21 - 32 mmol/L    Anion Gap 14 10 - 20 mmol/L    Urea Nitrogen 3 (L) 6 - 23 mg/dL    Creatinine 0.49 (L) 0.50 - 1.05 mg/dL    eGFR >90 >60 mL/min/1.73m*2    Calcium 8.6 8.6 - 10.3 mg/dL   CBC   Result Value Ref Range    WBC 10.6  4.4 - 11.3 x10*3/uL    nRBC 0.2 (H) 0.0 - 0.0 /100 WBCs    RBC 3.41 (L) 4.00 - 5.20 x10*6/uL    Hemoglobin 8.1 (L) 12.0 - 16.0 g/dL    Hematocrit 27.3 (L) 36.0 - 46.0 %    MCV 80 80 - 100 fL    MCH 23.8 (L) 26.0 - 34.0 pg    MCHC 29.7 (L) 32.0 - 36.0 g/dL    RDW 20.5 (H) 11.5 - 14.5 %    Platelets 621 (H) 150 - 450 x10*3/uL   Magnesium   Result Value Ref Range    Magnesium 1.50 (L) 1.60 - 2.40 mg/dL   SST TOP   Result Value Ref Range    Extra Tube Hold for add-ons.        Imaging Results  Ct abd/pelvis:  IMPRESSION:  Postsurgical changes right lower quadrant within the small bowel with  mild wall thickening without evidence of obstruction.  Stranding in the left anterior abdominal wall the subcutaneous soft  tissues which appears stable.  This appears to be related to small  fat-containing hernia.  Scarring and parenchymal opacities in the bilateral medial and lower  lobes appear stable.  Cholelithiasis.    Abd x ray:  IMPRESSION:  Increased gaseous bowel dilatation since previous day's exam may  reflect worsening ileus or obstruction.      Probable distended urinary bladder.    Medications:  ampicillin-sulbactam, 3 g, intravenous, q6h  apixaban, 5 mg, oral, BID  budesonide, 0.5 mg, nebulization, BID  busPIRone, 5 mg, oral, BID  dilTIAZem ER, 240 mg, oral, Daily  docusate sodium, 100 mg, oral, BID  [Held by provider] formoterol, 20 mcg, nebulization, BID  ipratropium-albuteroL, 3 mL, nebulization, TID  magnesium sulfate, 2 g, intravenous, Once  mirtazapine, 15 mg, oral, Nightly  montelukast, 10 mg, oral, Daily  oxygen, , inhalation, Continuous - Inhalation  pantoprazole, 40 mg, oral, BID  polyethylene glycol, 17 g, oral, BID  potassium chloride, 20 mEq, intravenous, q4h  thiamine, 100 mg, oral, Daily  [Held by provider] tiotropium, 2 puff, inhalation, Daily       PRN medications: acetaminophen **OR** acetaminophen **OR** acetaminophen, albuterol, benzonatate, diphenhydrAMINE, hydrALAZINE, hydrOXYzine HCL, melatonin,  morphine, morphine, ondansetron ODT **OR** ondansetron, prochlorperazine **OR** prochlorperazine     Assessment/Plan       8/25:  Enteritis... leukocytosis resolved... cont iv abx.   SBO... s/p ngt... f/up OR recs.   HypoMg... replace  HypoK... replace        8/24:  Enteritis.... wbc improving... cont iv abx.   CT last night with sbo... f/up OR team. She's refusing ngt this morning.   HypoK... replace, monitor. Check Mg tomorrow.   Pt/ot      8/23: Abd distended. WBC improving. CT abd/pel ordered per surgery team. Continue iv antibiotics    8/22: abd more distended; surgery ordering another xray wbc improving    8/21:  Abdomen still distended no diarrhea, white count up to 33,000, question reactive, leave on empiric therapy for now per the patient is not on oxygen at home we will try to wean.  Follow-up surgical recommendations  8/20:  Hx emergency surgery for perforated bowel 6/21/24, post-op complications with ileus and wound hematoma s/p wound vac, concern for anastomic infection, Hx GIB s/p EGD around 7/26/24, found to have friable gastric mucosa, Hx upper extremity DVT L brachial 6/27/24, R brachial 7/6/24 and has been on eliquis although reportedly not taking recently. Eliquis was not stopped following admission for GIB.      Now here with abdominal pain which is very tender with guarding and significant leukocytosis along with tachycardia... Imaging is unrevealing.... f/up surgery and ID recs, cont iv abx, f/up cultures, iv analgesia, ivf.      Diarrhea... check C diff.      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 going to hold steroids for now given concern for infection.      HTN urgency... improving with home cardizem.   Hx UE DVT... resume eliquis.      + cocaine  UA unremarkable  CTA without PNA or PE.      Pt lives at home with her son, uses walker baseline.     DVT Prophylaxis:  Marcus Fry MD  [facility]

## 2024-08-25 NOTE — NURSING NOTE
Rapid Response Nurse note:     Two ultrasound guided IVs placed due to patient needing IV Potassium and Magnesium replacements along with antibiotics and patient only had a #24 gauge. An #18 and a #20 gauge were placed and the #24 removed due to it being painful when flushed. IV Potassium, Magnesium and Unasyn hung; patient also given PRN Morphine and Benadryl. I verified with Dr. rFy that total IV Potassium amount ordered is 60 MEq; Adrianne, ARNOLD Primary RN updated.

## 2024-08-25 NOTE — NURSING NOTE
16f NGT placed in R/nare at bedside following ativan administration. Yellow-green contents in tube with further advancement. XR obtained; ASHLEY Villeda notified. Patient tolerated well, no reports of pain, discomfort at this time. HOB @ 45 degrees. Call light within reach.

## 2024-08-25 NOTE — NURSING NOTE
NGT advanced per CNP d/t CXR results. Taped at 60cm in R/nare with 160mL output in collection chamber. Patient reports cramping, abdominal pain; relieved short term by morphine. HOB @ 45 degrees. Call light within reach.

## 2024-08-25 NOTE — PROGRESS NOTES
Pain slightly improved.  Rates it at 6/10 in severity.  Feels better having had nasogastric tube placed    No bowel movements, passing some flatus    Chief complaint remains that of some right foot pain    /84 (BP Location: Left arm, Patient Position: Lying)   Pulse 109   Temp 36.6 °C (97.9 °F) (Temporal)   Resp 20   Wt 61.7 kg (136 lb)   SpO2 97%   BMI 24.87 kg/m²    Looks more comfortable today  Abdomen remains markedly distended, soft, mild to moderate tenderness      Lab Results   Component Value Date    WBC 10.6 08/25/2024    HGB 8.1 (L) 08/25/2024    HCT 27.3 (L) 08/25/2024    MCV 80 08/25/2024     (H) 08/25/2024         Impression;  enteritis, ileus    Leucocytosis improved to normal limits     Continue NG decompression for now    Give suppository    Encourage ambulation

## 2024-08-25 NOTE — PROGRESS NOTES
Physical Therapy                 Therapy Communication Note    Patient Name: Fernando Cuevas  MRN: [id number]  Today's Date: 8/25/2024     Discipline: Physical Therapy    Missed Visit Reason: Missed Visit Reason: Patient refused    Missed Time: Attempt    Comment: PT consult received, performed chart review. Attempted PT eval, cleared by nursing, however pt reporting not feeling well, does not wish to participate. Increased encouragement and education about need for mobility to assist NG and bowels, pt continues to decline. Reports open to re-attempt by PT 8/26/27, nursing notified.

## 2024-08-26 ENCOUNTER — APPOINTMENT (OUTPATIENT)
Dept: VASCULAR MEDICINE | Facility: HOSPITAL | Age: 64
End: 2024-08-26
Payer: COMMERCIAL

## 2024-08-26 ENCOUNTER — APPOINTMENT (OUTPATIENT)
Dept: RADIOLOGY | Facility: HOSPITAL | Age: 64
End: 2024-08-26
Payer: COMMERCIAL

## 2024-08-26 LAB
ANION GAP SERPL CALC-SCNC: 13 MMOL/L (ref 10–20)
BUN SERPL-MCNC: 5 MG/DL (ref 6–23)
CALCIUM SERPL-MCNC: 8.2 MG/DL (ref 8.6–10.3)
CHLORIDE SERPL-SCNC: 102 MMOL/L (ref 98–107)
CO2 SERPL-SCNC: 27 MMOL/L (ref 21–32)
CREAT SERPL-MCNC: 0.47 MG/DL (ref 0.5–1.05)
EGFRCR SERPLBLD CKD-EPI 2021: >90 ML/MIN/1.73M*2
ERYTHROCYTE [DISTWIDTH] IN BLOOD BY AUTOMATED COUNT: 20.8 % (ref 11.5–14.5)
GLUCOSE SERPL-MCNC: 98 MG/DL (ref 74–99)
HCT VFR BLD AUTO: 28 % (ref 36–46)
HGB BLD-MCNC: 8.2 G/DL (ref 12–16)
MCH RBC QN AUTO: 23.6 PG (ref 26–34)
MCHC RBC AUTO-ENTMCNC: 29.3 G/DL (ref 32–36)
MCV RBC AUTO: 81 FL (ref 80–100)
NRBC BLD-RTO: 0.2 /100 WBCS (ref 0–0)
PLATELET # BLD AUTO: 671 X10*3/UL (ref 150–450)
POTASSIUM SERPL-SCNC: 3.4 MMOL/L (ref 3.5–5.3)
RBC # BLD AUTO: 3.48 X10*6/UL (ref 4–5.2)
SODIUM SERPL-SCNC: 139 MMOL/L (ref 136–145)
WBC # BLD AUTO: 11.2 X10*3/UL (ref 4.4–11.3)

## 2024-08-26 PROCEDURE — 85027 COMPLETE CBC AUTOMATED: CPT | Performed by: INTERNAL MEDICINE

## 2024-08-26 PROCEDURE — 2500000002 HC RX 250 W HCPCS SELF ADMINISTERED DRUGS (ALT 637 FOR MEDICARE OP, ALT 636 FOR OP/ED): Performed by: PHARMACIST

## 2024-08-26 PROCEDURE — 2500000004 HC RX 250 GENERAL PHARMACY W/ HCPCS (ALT 636 FOR OP/ED)

## 2024-08-26 PROCEDURE — 2500000004 HC RX 250 GENERAL PHARMACY W/ HCPCS (ALT 636 FOR OP/ED): Performed by: INTERNAL MEDICINE

## 2024-08-26 PROCEDURE — 99233 SBSQ HOSP IP/OBS HIGH 50: CPT | Performed by: INTERNAL MEDICINE

## 2024-08-26 PROCEDURE — 93922 UPR/L XTREMITY ART 2 LEVELS: CPT | Performed by: SURGERY

## 2024-08-26 PROCEDURE — 99232 SBSQ HOSP IP/OBS MODERATE 35: CPT | Performed by: SURGERY

## 2024-08-26 PROCEDURE — 1100000001 HC PRIVATE ROOM DAILY

## 2024-08-26 PROCEDURE — 97165 OT EVAL LOW COMPLEX 30 MIN: CPT | Mod: GO

## 2024-08-26 PROCEDURE — 74018 RADEX ABDOMEN 1 VIEW: CPT

## 2024-08-26 PROCEDURE — 2500000001 HC RX 250 WO HCPCS SELF ADMINISTERED DRUGS (ALT 637 FOR MEDICARE OP): Performed by: PHYSICIAN ASSISTANT

## 2024-08-26 PROCEDURE — 2500000004 HC RX 250 GENERAL PHARMACY W/ HCPCS (ALT 636 FOR OP/ED): Performed by: PHYSICIAN ASSISTANT

## 2024-08-26 PROCEDURE — 99232 SBSQ HOSP IP/OBS MODERATE 35: CPT

## 2024-08-26 PROCEDURE — 2500000005 HC RX 250 GENERAL PHARMACY W/O HCPCS: Performed by: NURSE PRACTITIONER

## 2024-08-26 PROCEDURE — 93922 UPR/L XTREMITY ART 2 LEVELS: CPT

## 2024-08-26 PROCEDURE — 36415 COLL VENOUS BLD VENIPUNCTURE: CPT | Performed by: INTERNAL MEDICINE

## 2024-08-26 PROCEDURE — 2500000001 HC RX 250 WO HCPCS SELF ADMINISTERED DRUGS (ALT 637 FOR MEDICARE OP): Performed by: INTERNAL MEDICINE

## 2024-08-26 PROCEDURE — 2500000002 HC RX 250 W HCPCS SELF ADMINISTERED DRUGS (ALT 637 FOR MEDICARE OP, ALT 636 FOR OP/ED): Performed by: INTERNAL MEDICINE

## 2024-08-26 PROCEDURE — 80048 BASIC METABOLIC PNL TOTAL CA: CPT | Performed by: INTERNAL MEDICINE

## 2024-08-26 PROCEDURE — 97161 PT EVAL LOW COMPLEX 20 MIN: CPT | Mod: GP

## 2024-08-26 PROCEDURE — 2500000005 HC RX 250 GENERAL PHARMACY W/O HCPCS: Performed by: PHYSICIAN ASSISTANT

## 2024-08-26 PROCEDURE — 94640 AIRWAY INHALATION TREATMENT: CPT

## 2024-08-26 PROCEDURE — 74018 RADEX ABDOMEN 1 VIEW: CPT | Performed by: RADIOLOGY

## 2024-08-26 PROCEDURE — 2500000001 HC RX 250 WO HCPCS SELF ADMINISTERED DRUGS (ALT 637 FOR MEDICARE OP)

## 2024-08-26 RX ORDER — PANTOPRAZOLE SODIUM 40 MG/10ML
40 INJECTION, POWDER, LYOPHILIZED, FOR SOLUTION INTRAVENOUS DAILY
Status: DISCONTINUED | OUTPATIENT
Start: 2024-08-26 | End: 2024-08-28

## 2024-08-26 RX ORDER — CALCIUM CARBONATE 200(500)MG
1000 TABLET,CHEWABLE ORAL 4 TIMES DAILY PRN
Status: DISCONTINUED | OUTPATIENT
Start: 2024-08-26 | End: 2024-09-01 | Stop reason: HOSPADM

## 2024-08-26 RX ORDER — HYDRALAZINE HYDROCHLORIDE 20 MG/ML
10 INJECTION INTRAMUSCULAR; INTRAVENOUS EVERY 6 HOURS PRN
Status: DISCONTINUED | OUTPATIENT
Start: 2024-08-26 | End: 2024-09-01 | Stop reason: HOSPADM

## 2024-08-26 RX ORDER — AZITHROMYCIN 250 MG/1
250 TABLET, FILM COATED ORAL
Status: ON HOLD | COMMUNITY

## 2024-08-26 RX ORDER — POTASSIUM CHLORIDE 1.5 G/1.58G
20 POWDER, FOR SOLUTION ORAL ONCE
Status: COMPLETED | OUTPATIENT
Start: 2024-08-26 | End: 2024-08-26

## 2024-08-26 RX ORDER — AZITHROMYCIN 250 MG/1
250 TABLET, FILM COATED ORAL
Status: DISCONTINUED | OUTPATIENT
Start: 2024-08-28 | End: 2024-09-01 | Stop reason: HOSPADM

## 2024-08-26 RX ADMIN — Medication 2 L/MIN: at 19:15

## 2024-08-26 RX ADMIN — PANTOPRAZOLE SODIUM 40 MG: 40 TABLET, DELAYED RELEASE ORAL at 09:36

## 2024-08-26 RX ADMIN — BUSPIRONE HYDROCHLORIDE 5 MG: 5 TABLET ORAL at 09:35

## 2024-08-26 RX ADMIN — DOCUSATE SODIUM 100 MG: 100 CAPSULE, LIQUID FILLED ORAL at 09:35

## 2024-08-26 RX ADMIN — MORPHINE SULFATE 4 MG: 4 INJECTION, SOLUTION INTRAMUSCULAR; INTRAVENOUS at 00:47

## 2024-08-26 RX ADMIN — SODIUM PHOSPHATE 1 ENEMA: 7; 19 ENEMA RECTAL at 17:30

## 2024-08-26 RX ADMIN — HYDROXYZINE HYDROCHLORIDE 10 MG: 10 TABLET ORAL at 09:44

## 2024-08-26 RX ADMIN — AMPICILLIN SODIUM AND SULBACTAM SODIUM 3 G: 2; 1 INJECTION, POWDER, FOR SOLUTION INTRAMUSCULAR; INTRAVENOUS at 09:36

## 2024-08-26 RX ADMIN — BUDESONIDE 0.5 MG: 0.5 INHALANT RESPIRATORY (INHALATION) at 07:18

## 2024-08-26 RX ADMIN — APIXABAN 5 MG: 5 TABLET, FILM COATED ORAL at 09:35

## 2024-08-26 RX ADMIN — MORPHINE SULFATE 4 MG: 4 INJECTION, SOLUTION INTRAMUSCULAR; INTRAVENOUS at 14:53

## 2024-08-26 RX ADMIN — CALCIUM CARBONATE (ANTACID) CHEW TAB 500 MG 1000 MG: 500 CHEW TAB at 14:49

## 2024-08-26 RX ADMIN — MONTELUKAST 10 MG: 10 TABLET, FILM COATED ORAL at 09:36

## 2024-08-26 RX ADMIN — POLYETHYLENE GLYCOL 3350 17 G: 17 POWDER, FOR SOLUTION ORAL at 09:34

## 2024-08-26 RX ADMIN — BUDESONIDE 0.5 MG: 0.5 INHALANT RESPIRATORY (INHALATION) at 19:15

## 2024-08-26 RX ADMIN — MORPHINE SULFATE 4 MG: 4 INJECTION, SOLUTION INTRAMUSCULAR; INTRAVENOUS at 11:36

## 2024-08-26 RX ADMIN — MORPHINE SULFATE 4 MG: 4 INJECTION, SOLUTION INTRAMUSCULAR; INTRAVENOUS at 04:32

## 2024-08-26 RX ADMIN — MIRTAZAPINE 15 MG: 15 TABLET, FILM COATED ORAL at 20:14

## 2024-08-26 RX ADMIN — BUSPIRONE HYDROCHLORIDE 5 MG: 5 TABLET ORAL at 20:14

## 2024-08-26 RX ADMIN — IPRATROPIUM BROMIDE AND ALBUTEROL SULFATE 3 ML: 2.5; .5 SOLUTION RESPIRATORY (INHALATION) at 07:18

## 2024-08-26 RX ADMIN — Medication 100 MG: at 09:36

## 2024-08-26 RX ADMIN — POLYETHYLENE GLYCOL 3350 17 G: 17 POWDER, FOR SOLUTION ORAL at 20:14

## 2024-08-26 RX ADMIN — APIXABAN 5 MG: 5 TABLET, FILM COATED ORAL at 20:14

## 2024-08-26 RX ADMIN — MORPHINE SULFATE 4 MG: 4 INJECTION, SOLUTION INTRAMUSCULAR; INTRAVENOUS at 08:05

## 2024-08-26 RX ADMIN — DILTIAZEM HYDROCHLORIDE 240 MG: 120 CAPSULE, EXTENDED RELEASE ORAL at 09:36

## 2024-08-26 RX ADMIN — DOCUSATE SODIUM 100 MG: 100 CAPSULE, LIQUID FILLED ORAL at 20:14

## 2024-08-26 RX ADMIN — IPRATROPIUM BROMIDE AND ALBUTEROL SULFATE 3 ML: 2.5; .5 SOLUTION RESPIRATORY (INHALATION) at 19:15

## 2024-08-26 RX ADMIN — PANTOPRAZOLE SODIUM 40 MG: 40 INJECTION, POWDER, FOR SOLUTION INTRAVENOUS at 15:33

## 2024-08-26 RX ADMIN — POTASSIUM CHLORIDE 20 MEQ: 1.5 POWDER, FOR SOLUTION ORAL at 03:30

## 2024-08-26 RX ADMIN — MORPHINE SULFATE 4 MG: 4 INJECTION, SOLUTION INTRAMUSCULAR; INTRAVENOUS at 22:22

## 2024-08-26 RX ADMIN — AMPICILLIN SODIUM AND SULBACTAM SODIUM 3 G: 2; 1 INJECTION, POWDER, FOR SOLUTION INTRAMUSCULAR; INTRAVENOUS at 03:00

## 2024-08-26 RX ADMIN — DIPHENHYDRAMINE HYDROCHLORIDE 12.5 MG: 50 INJECTION, SOLUTION INTRAMUSCULAR; INTRAVENOUS at 13:10

## 2024-08-26 RX ADMIN — DIPHENHYDRAMINE HYDROCHLORIDE 12.5 MG: 50 INJECTION, SOLUTION INTRAMUSCULAR; INTRAVENOUS at 04:54

## 2024-08-26 RX ADMIN — MORPHINE SULFATE 4 MG: 4 INJECTION, SOLUTION INTRAMUSCULAR; INTRAVENOUS at 18:42

## 2024-08-26 RX ADMIN — DIPHENHYDRAMINE HYDROCHLORIDE 12.5 MG: 50 INJECTION, SOLUTION INTRAMUSCULAR; INTRAVENOUS at 20:14

## 2024-08-26 ASSESSMENT — COGNITIVE AND FUNCTIONAL STATUS - GENERAL
DAILY ACTIVITIY SCORE: 19
DRESSING REGULAR UPPER BODY CLOTHING: A LITTLE
MOBILITY SCORE: 17
STANDING UP FROM CHAIR USING ARMS: A LITTLE
TOILETING: A LITTLE
TOILETING: A LITTLE
MOVING TO AND FROM BED TO CHAIR: A LITTLE
CLIMB 3 TO 5 STEPS WITH RAILING: A LOT
DRESSING REGULAR LOWER BODY CLOTHING: A LITTLE
TURNING FROM BACK TO SIDE WHILE IN FLAT BAD: A LITTLE
STANDING UP FROM CHAIR USING ARMS: A LITTLE
CLIMB 3 TO 5 STEPS WITH RAILING: A LOT
MOVING TO AND FROM BED TO CHAIR: A LITTLE
TURNING FROM BACK TO SIDE WHILE IN FLAT BAD: A LITTLE
PERSONAL GROOMING: A LITTLE
DRESSING REGULAR LOWER BODY CLOTHING: A LITTLE
HELP NEEDED FOR BATHING: A LITTLE
STANDING UP FROM CHAIR USING ARMS: A LITTLE
DRESSING REGULAR UPPER BODY CLOTHING: A LITTLE
TURNING FROM BACK TO SIDE WHILE IN FLAT BAD: A LITTLE
MOVING FROM LYING ON BACK TO SITTING ON SIDE OF FLAT BED WITH BEDRAILS: A LITTLE
WALKING IN HOSPITAL ROOM: A LITTLE
STANDING UP FROM CHAIR USING ARMS: A LITTLE
DAILY ACTIVITIY SCORE: 22
TOILETING: A LITTLE
TOILETING: A LITTLE
WALKING IN HOSPITAL ROOM: A LITTLE
PERSONAL GROOMING: A LITTLE
WALKING IN HOSPITAL ROOM: A LITTLE
HELP NEEDED FOR BATHING: A LITTLE
MOBILITY SCORE: 18
MOBILITY SCORE: 18
PERSONAL GROOMING: A LITTLE
MOVING TO AND FROM BED TO CHAIR: A LITTLE
MOBILITY SCORE: 18
CLIMB 3 TO 5 STEPS WITH RAILING: A LOT
CLIMB 3 TO 5 STEPS WITH RAILING: A LOT
TURNING FROM BACK TO SIDE WHILE IN FLAT BAD: A LITTLE
DAILY ACTIVITIY SCORE: 19
PERSONAL GROOMING: A LITTLE
DAILY ACTIVITIY SCORE: 22
MOVING TO AND FROM BED TO CHAIR: A LITTLE
WALKING IN HOSPITAL ROOM: A LITTLE

## 2024-08-26 ASSESSMENT — PAIN DESCRIPTION - ORIENTATION
ORIENTATION: RIGHT

## 2024-08-26 ASSESSMENT — PAIN - FUNCTIONAL ASSESSMENT
PAIN_FUNCTIONAL_ASSESSMENT: 0-10

## 2024-08-26 ASSESSMENT — PAIN SCALES - GENERAL
PAINLEVEL_OUTOF10: 10 - WORST POSSIBLE PAIN
PAINLEVEL_OUTOF10: 0 - NO PAIN
PAINLEVEL_OUTOF10: 2
PAINLEVEL_OUTOF10: 10 - WORST POSSIBLE PAIN
PAINLEVEL_OUTOF10: 0 - NO PAIN
PAINLEVEL_OUTOF10: 3
PAINLEVEL_OUTOF10: 4
PAINLEVEL_OUTOF10: 9
PAINLEVEL_OUTOF10: 4
PAINLEVEL_OUTOF10: 10 - WORST POSSIBLE PAIN
PAINLEVEL_OUTOF10: 0 - NO PAIN
PAINLEVEL_OUTOF10: 8

## 2024-08-26 ASSESSMENT — PAIN DESCRIPTION - LOCATION
LOCATION: FOOT
LOCATION: FOOT
LOCATION: ARM
LOCATION: FOOT
LOCATION: FOOT
LOCATION: ABDOMEN
LOCATION: ABDOMEN

## 2024-08-26 ASSESSMENT — ACTIVITIES OF DAILY LIVING (ADL)
ADL_ASSISTANCE: INDEPENDENT
ADL_ASSISTANCE: INDEPENDENT

## 2024-08-26 ASSESSMENT — PAIN SCALES - WONG BAKER
WONGBAKER_NUMERICALRESPONSE: HURTS WORST
WONGBAKER_NUMERICALRESPONSE: HURTS WORST

## 2024-08-26 NOTE — PROGRESS NOTES
Fernando Cuevas is a 63 y.o. female on day 7 of admission presenting with Sepsis due to undetermined organism (Multi).    Assessment/Plan   Very difficult situation to get a read on.  She has had several CAT scans that were not exactly diagnostic.  Her white blood cell count is down to 11.  Films today suggest nonspecific ileus involving small bowel and colon.  We can try some enemas to try to stimulate from below.  May need to repeat CAT scan tomorrow if no better    Subjective   Complains of abdominal pain.  Has not had a bowel movement all week and       Objective     Physical Exam  NAD  A&Ox3  Non icteric  CTA  RR  Abdomen quite distended with diffuse tenderness.  Extremities warm, well perfused     Last Recorded Vitals  Blood pressure 139/66, pulse (!) 114, temperature 36.4 °C (97.6 °F), resp. rate 18, weight 61.7 kg (136 lb), SpO2 96%.  Intake/Output last 3 Shifts:  I/O last 3 completed shifts:  In: 420 (6.8 mL/kg) [P.O.:120; IV Piggyback:300]  Out: 1260 (20.4 mL/kg) [Urine:650 (0.3 mL/kg/hr); Emesis/NG output:610]  Weight: 61.7 kg     Relevant Results    Scheduled medications  apixaban, 5 mg, oral, BID  [START ON 8/28/2024] azithromycin, 250 mg, oral, Once per day on Monday Wednesday Friday  budesonide, 0.5 mg, nebulization, BID  busPIRone, 5 mg, oral, BID  dilTIAZem ER, 240 mg, oral, Daily  docusate sodium, 100 mg, oral, BID  [Held by provider] formoterol, 20 mcg, nebulization, BID  ipratropium-albuteroL, 3 mL, nebulization, TID  mirtazapine, 15 mg, oral, Nightly  montelukast, 10 mg, oral, Daily  oxygen, , inhalation, Continuous - Inhalation  pantoprazole, 40 mg, intravenous, Daily  polyethylene glycol, 17 g, oral, BID  thiamine, 100 mg, oral, Daily  [Held by provider] tiotropium, 2 puff, inhalation, Daily      Continuous medications     PRN medications  PRN medications: acetaminophen **OR** acetaminophen **OR** acetaminophen, albuterol, benzonatate, calcium carbonate, diphenhydrAMINE, hydrALAZINE,  hydrOXYzine HCL, melatonin, morphine, morphine, ondansetron ODT **OR** ondansetron, prochlorperazine **OR** prochlorperazine    Results for orders placed or performed during the hospital encounter of 08/19/24 (from the past 24 hour(s))   Basic Metabolic Panel   Result Value Ref Range    Glucose 98 74 - 99 mg/dL    Sodium 139 136 - 145 mmol/L    Potassium 3.4 (L) 3.5 - 5.3 mmol/L    Chloride 102 98 - 107 mmol/L    Bicarbonate 27 21 - 32 mmol/L    Anion Gap 13 10 - 20 mmol/L    Urea Nitrogen 5 (L) 6 - 23 mg/dL    Creatinine 0.47 (L) 0.50 - 1.05 mg/dL    eGFR >90 >60 mL/min/1.73m*2    Calcium 8.2 (L) 8.6 - 10.3 mg/dL   CBC   Result Value Ref Range    WBC 11.2 4.4 - 11.3 x10*3/uL    nRBC 0.2 (H) 0.0 - 0.0 /100 WBCs    RBC 3.48 (L) 4.00 - 5.20 x10*6/uL    Hemoglobin 8.2 (L) 12.0 - 16.0 g/dL    Hematocrit 28.0 (L) 36.0 - 46.0 %    MCV 81 80 - 100 fL    MCH 23.6 (L) 26.0 - 34.0 pg    MCHC 29.3 (L) 32.0 - 36.0 g/dL    RDW 20.8 (H) 11.5 - 14.5 %    Platelets 671 (H) 150 - 450 x10*3/uL           I spent 25 minutes in the professional and overall care of this patient.      Reid Bingham MD

## 2024-08-26 NOTE — CONSULTS
PODIATRY CONSULT NOTE    SERVICE DATE: 2024   SERVICE TIME:  1515    REASON FOR CONSULT: Right foot pain  REQUESTING PHYSICIAN: Earl Fry MD   PRIMARY CARE PHYSICIAN: Ann Muñoz MD    Subjective   HPI:  Ms. Cuevas is a 63 y.o. female who presents for sepsis, abdominal pain, leukocytosis, cocaine abuse, tachycardia.    Podiatry was consulted for right forefoot pain. Patient reports significant pain in her right forefoot and toes. This pain is worse with palpation or movement of the toes, and seems to be worse on the great toe. Heat seems to help with the pain but does not completely relieve it. Patient reports the toes sometimes feel very cold. She notes that the pain feels like it is starting in her left foot as well. She denies claudication or cramping in her thigh or calf. She denies any numbness or tingling. She denies trauma or injury. No other pedal complaints.    ROS: 10-point review of systems was performed and is otherwise negative except as noted in HPI.  PMH: Reviewed/documented below.  PSH:  Noncontributory except per HPI   FH: Reviewed and noncontributory   SOCIAL:  Reviewed/documented below.  ALLERGIES: Reviewed/documented below.  MEDS: Reviewed/documented below.  VS: Reviewed/documented below.    Past Medical History:   Diagnosis Date    COPD (chronic obstructive pulmonary disease) (Multi)     Depression     DVT (deep venous thrombosis) (Multi)     bilateral upper extremities    HTN (hypertension)     Lung cancer (Multi)     Migraines     Panic disorder      Past Surgical History:   Procedure Laterality Date     SECTION, LOW TRANSVERSE      COLONOSCOPY      CT ABDOMEN PELVIS ANGIOGRAM W AND/OR WO IV CONTRAST  2016    CT ABDOMEN PELVIS ANGIOGRAM W AND/OR WO IV CONTRAST 3/22/2016 [facility] [place] [facility]Y LEGAC[facility]    CT ANGIO CORONARY ART WITH HEARTFLOW IF SCORE >30%  2023    CT ANGIO CORONARY ART WITH HEARTFLOW IF SCORE >30% 2023    CYSTOSCOPY      botox injection     ESOPHAGOGASTRODUODENOSCOPY      EXPLORATORY LAPAROTOMY W/ BOWEL RESECTION  06/21/2024    SBR for perforation    MR NECK ANGIO W IV CONTRAST  04/19/2021    MR NECK ANGIO W IV CONTRAST 4/19/2021     No family history on file.  Social History     Tobacco Use    Smoking status: Some Days     Types: Cigarettes     Passive exposure: Current (3 cigarettes a day)    Smokeless tobacco: Never   Vaping Use    Vaping status: Never Used   Substance Use Topics    Alcohol use: Not Currently     Comment: history of daily use    Drug use: Not Currently     Types: Cocaine      Medications Prior to Admission   Medication Sig Dispense Refill Last Dose    acetaminophen (Tylenol Extra Strength) 500 mg tablet Take 2 tablets (1,000 mg) by mouth every 8 hours.       albuterol 90 mcg/actuation inhaler Inhale 2 puffs every 4 hours if needed for wheezing or shortness of breath.       ammonium lactate (Lac-Hydrin) 12 % lotion Apply topically twice a day.       apixaban (Eliquis) 5 mg tablet Take 1 tablet (5 mg) by mouth 2 times a day. 60 tablet 1     azithromycin (Zithromax) 250 mg tablet Take 1 tablet (250 mg) by mouth once a day on Monday, Wednesday, and Friday.       benzonatate (Tessalon) 100 mg capsule Take 1 capsule (100 mg) by mouth 3 times a day as needed for cough. Do not crush or chew. 20 capsule 0     busPIRone (Buspar) 5 mg tablet Take 1 tablet (5 mg) by mouth twice a day. Supposed to take. Needs Rx       calcium carbonate (Oscal) 500 mg calcium (1,250 mg) tablet Take 1 tablet (1,250 mg) by mouth 2 times daily (morning and late afternoon).       calcium carbonate (Tums) 250 mg ([place] 100 mg) chewable split tablet Take 2 half tablet (500 mg) by mouth every 12 hours.       clotrimazole (Lotrimin) 1 % cream Apply topically 2 times a day.       dilTIAZem ER (Tiazac) 240 mg 24 hr capsule Take 1 capsule (240 mg) by mouth once daily.   8/18/2024    fluocinonide 0.05 % cream APPLY THIN LAYER TO AFFECTED AREA TWICE A DAY AS NEEDED        albuterol 2.5 mg /3 mL (0.083 %) nebulizer solution Take 3 mL by nebulization every 6 hours if needed for wheezing or shortness of breath. 300 mL 0     Lidocaine Pain Relief 4 % patch Place 1 patch on the skin every 24 (twenty four) hours if needed for mild pain (1 - 3).       mirtazapine (Remeron) 15 mg tablet Take 1 tablet (15 mg) by mouth once daily at bedtime. 30 tablet 0     mometasone-formoterol (Dulera 100) 100-5 mcg/actuation inhaler Inhale 200 mcg twice a day.   8/18/2024    montelukast (Singulair) 10 mg tablet Take 1 tablet (10 mg) by mouth once daily.   8/18/2024    oxybutynin XL (Ditropan-XL) 5 mg 24 hr tablet Take 1 tablet (5 mg) by mouth once daily. Does not take everyday       pantoprazole (ProtoNix) 40 mg EC tablet Take 1 tablet (40 mg) by mouth twice a day.       polyethylene glycol (Glycolax, Miralax) 17 gram/dose powder Take 17 g by mouth once daily. Do not fill before July 28, 2024. 238 g 0     sennosides-docusate sodium (Sparkle-Colace) 8.6-50 mg tablet Take 1 tablet by mouth 2 times a day. 60 tablet 0     sucralfate (Carafate) 1 gram tablet Take 1 tablet (1 g) by mouth 2 times a day. Crush and mix in luke warm water to make slurry and drink the slurry. 60 tablet 0     thiamine 100 mg tablet Take 1 tablet (100 mg) by mouth once daily.       tiotropium (Spiriva Respimat) 2.5 mcg/actuation inhaler Inhale 2 puffs once daily. 8 g 11 8/18/2024        Medications:  Scheduled Meds: apixaban, 5 mg, oral, BID  [START ON 8/28/2024] azithromycin, 250 mg, oral, Once per day on Monday Wednesday Friday  budesonide, 0.5 mg, nebulization, BID  busPIRone, 5 mg, oral, BID  dilTIAZem ER, 240 mg, oral, Daily  docusate sodium, 100 mg, oral, BID  [Held by provider] formoterol, 20 mcg, nebulization, BID  ipratropium-albuteroL, 3 mL, nebulization, TID  mirtazapine, 15 mg, oral, Nightly  montelukast, 10 mg, oral, Daily  oxygen, , inhalation, Continuous - Inhalation  pantoprazole, 40 mg, intravenous, Daily  polyethylene  glycol, 17 g, oral, BID  thiamine, 100 mg, oral, Daily  [Held by provider] tiotropium, 2 puff, inhalation, Daily      Continuous Infusions:    PRN Meds: PRN medications: acetaminophen **OR** acetaminophen **OR** acetaminophen, albuterol, benzonatate, calcium carbonate, diphenhydrAMINE, hydrALAZINE, hydrOXYzine HCL, melatonin, morphine, morphine, ondansetron ODT **OR** ondansetron, prochlorperazine **OR** prochlorperazine    Allergies as of 08/19/2024 - Reviewed 08/19/2024   Allergen Reaction Noted    Dilaudid [hydromorphone] Itching 08/03/2024    Iodinated contrast media Hives 04/14/2013    Iodine Other and Unknown 07/24/2024    Adhesive tape-silicones Rash 07/04/2024            Objective   PHYSICAL EXAM:  Physical Exam Performed:  Vitals:    08/26/24 1503   BP: 139/66   Pulse:    Resp: 18   Temp: 36.4 °C (97.6 °F)   SpO2: 96%     Body mass index is 24.87 kg/m².    Patient is AOx3 and in no acute distress. Patient is alert and cooperative. NG tube in place.     Vascular: Faintly palpable DP/PT pulses B/L. Moderate non-pitting edema noted B/L. Hair growth diminished B/L. CFT<5 to B/L hallux. Temperature is warm to cool from tibial tuberosity to distal digits B/L.     Musculoskeletal: Right dorsal MTPJs and toe sulci are tender to palpation. Gross active and passive ROM diminished to age and activity level.     Neurological: Intact light touch sensation B/L. Pain stimuli intact B/L. Denies any numbness, burning or tingling.    Dermatologic: No open wounds. Nails 1-5 are within normal limits for thickness and length B/L. Skin appears well hydrated B/L. Web spaces 1-4 B/L are clean, dry and intact. No rashes or nodules noted B/L. No hyperkeratotic tissue noted B/L.         LABS:   Results for orders placed or performed during the hospital encounter of 08/19/24 (from the past 24 hour(s))   Basic Metabolic Panel   Result Value Ref Range    Glucose 98 74 - 99 mg/dL    Sodium 139 136 - 145 mmol/L    Potassium 3.4 (L) 3.5 -  5.3 mmol/L    Chloride 102 98 - 107 mmol/L    Bicarbonate 27 21 - 32 mmol/L    Anion Gap 13 10 - 20 mmol/L    Urea Nitrogen 5 (L) 6 - 23 mg/dL    Creatinine 0.47 (L) 0.50 - 1.05 mg/dL    eGFR >90 >60 mL/min/1.73m*2    Calcium 8.2 (L) 8.6 - 10.3 mg/dL   CBC   Result Value Ref Range    WBC 11.2 4.4 - 11.3 x10*3/uL    nRBC 0.2 (H) 0.0 - 0.0 /100 WBCs    RBC 3.48 (L) 4.00 - 5.20 x10*6/uL    Hemoglobin 8.2 (L) 12.0 - 16.0 g/dL    Hematocrit 28.0 (L) 36.0 - 46.0 %    MCV 81 80 - 100 fL    MCH 23.6 (L) 26.0 - 34.0 pg    MCHC 29.3 (L) 32.0 - 36.0 g/dL    RDW 20.8 (H) 11.5 - 14.5 %    Platelets 671 (H) 150 - 450 x10*3/uL      Lab Results   Component Value Date    HGBA1C 5.2 04/13/2022      Lab Results   Component Value Date    CRP 0.57 06/16/2024      Lab Results   Component Value Date    SEDRATE 35 (H) 06/16/2024        Results from last 7 days   Lab Units 08/26/24  0708   WBC AUTO x10*3/uL 11.2   RBC AUTO x10*6/uL 3.48*   HEMOGLOBIN g/dL 8.2*   HEMATOCRIT % 28.0*     Results from last 7 days   Lab Units 08/26/24  0708 08/25/24  0515 08/22/24  0711 08/21/24  0604   SODIUM mmol/L 139 140   < > 132*   POTASSIUM mmol/L 3.4* 3.0*   < > 4.2   CHLORIDE mmol/L 102 101   < > 97*   CO2 mmol/L 27 28   < > 25   BUN mg/dL 5* 3*   < > 15   CREATININE mg/dL 0.47* 0.49*   < > 0.77   CALCIUM mg/dL 8.2* 8.6   < > 9.2   MAGNESIUM mg/dL  --  1.50*  --   --    BILIRUBIN TOTAL mg/dL  --   --   --  0.4   ALT U/L  --   --   --  32   AST U/L  --   --   --  18    < > = values in this interval not displayed.           IMAGING REVIEW:  Vascular US ankle brachial index (AUSTYN) without exercise    Result Date: 8/26/2024  Preliminary Cardiology Report            [place] [address]   Tel [phone] and Fax [phone]            Preliminary Vascular Lab Report  [facility] US ANKLE BRACHIAL INDEX (AUSTYN) WITHOUT EXERCISE  Patient Name:      DINA GREENE Monett Physician:  [id number] Zachary Vazquez MD Study Date:         8/26/2024      Ordering Physician: [id number] MARGOTH CASTANO MRN/PID:           [id number]       Technologist:       Kacey Tee T Accession#:        [id number]   Technologist 2:     Mamie Karimi RVT Date of Birth/Age: 1960      Encounter#:         [id number] Gender:            F Admission Status:  Inpatient      Location Performed: [facility]  Diagnosis/ICD: Pain in right foot-M79.671; Pain in right toe(s)-M79.674 Procedure/CPT: 48215 Peripheral artery AUSTYN Only  PRELIMINARY CONCLUSIONS: Right Lower PVR: No evidence of arterial occlusive disease in the right lower extremity at rest. Multiphasic flow is noted in the right common femoral artery, right posterior tibial artery and right dorsalis pedis artery. Due to pain tolerance, unable to obtain first digit (great toe) PPG and pressure. However a PPG was documented on the second digit. Left Lower PVR: No evidence of arterial occlusive disease in the left lower extremity at rest. Decreased digital perfusion noted. Multiphasic flow is noted in the left common femoral artery, left posterior tibial artery and left dorsalis pedis artery. Unable to obtain brachial pressure due to IV placement.  Imaging & Doppler Findings:  RIGHT Lower PVR                Pressures Ratios Right Posterior Tibial (Ankle) 158 mmHg  1.06 Right Dorsalis Pedis (Ankle)   165 mmHg  1.11   LEFT Lower PVR                Pressures Ratios Left Posterior Tibial (Ankle) 161 mmHg  1.08 Left Dorsalis Pedis (Ankle)   163 mmHg  1.09 Left Digit (Great Toe)        46 mmHg   0.31                      Right Brachial Pressure 149 mmHg   VASCULAR PRELIMINARY REPORT completed by Kacey Tee [facility] on 8/26/2024 at 4:29:27 PM  ** Final **     XR abdomen 1 view    Result Date: 8/26/2024  Interpreted By:  Gabe Gage, STUDY: XR ABDOMEN 1 VIEW;  8/26/2024 11:23 am   INDICATION: Signs/Symptoms:confirm NGT placemnent.   COMPARISON: KUB from 08/22/2024. CT scan from 08/23/2024. Plain film of the  chest and upper abdomen from 08/25/2024.   ACCESSION NUMBER(S): [id number]   ORDERING CLINICIAN: MARVA NAVA   TECHNIQUE: AP portable supine view of the lower chest through the upper pelvis was obtained.   FINDINGS: There is an NG tube in place with distal tip in the proximal stomach. There are dilated gas-filled loops of large and small bowel, progressed. There is some stool in the region of the right colon and hepatic flexure. The lower pelvis was excluded. No gross organomegaly. No destructive bone lesion. The lung bases were clear.       Nonspecific distal colonic obstruction versus diffuse ileus, progressed.   NG tube in place.   MACRO: None   Signed by: Gabe Gage 8/26/2024 11:51 AM Dictation workstation:   [id number]    XR foot right 3+ views    Result Date: 8/25/2024  Interpreted By:  Gabe Gage, STUDY: XR FOOT RIGHT 3+ VIEWS;  8/24/2024 11:24 pm   INDICATION: Signs/Symptoms:Right foot pain.   COMPARISON: None.   ACCESSION NUMBER(S): [id number]   ORDERING CLINICIAN: DIANA NAVARRO   TECHNIQUE: 3 views  of the  right foot were obtained.   FINDINGS: Slight spur formation in the 1st MTP joint. No lytic or blastic destructive bone lesion. No acute fracture or dislocation. No opaque soft tissue foreign body. No periosteal reaction or erosion.       Slight DJD in the 1st MTP joint.  Remainder of the exam was negative.   MACRO: None   Signed by: Gabe Gage 8/25/2024 12:09 PM Dictation workstation:   [id number]    XR abdomen 1 view    Result Date: 8/25/2024  Interpreted By:  Paulie Delarosa, STUDY: XR ABDOMEN 1 VIEW;  8/25/2024 12:27 am   INDICATION: Signs/Symptoms:NG tube placement.   COMPARISON: 8/22/2024   ACCESSION NUMBER(S): [id number]   ORDERING CLINICIAN: THUY CIFUENTES   FINDINGS: Nasogastric tube tip terminates in the left upper abdomen at level the proximal stomach. There is limited evaluation of the abdomen with only upper and mid abdomen image. There is redemonstration of marked gaseous  dilatation of bowel in the imaged upper abdomen.       Nasogastric tube tip terminates in the left upper abdomen at the level of the proximal stomach; advancement is recommended.   Redemonstration of gaseous dilatation of bowel in the imaged upper abdomen which may be secondary to ileus or obstruction.     MACRO: None   Signed by: Paulie Delarosa 8/25/2024 2:36 AM Dictation workstation:   [id number]    CT abdomen and pelvis w oral contrast only    Result Date: 8/24/2024  STUDY: CT Abdomen and Pelvis with IV Contrast; 08/23/2024 11:52 PM INDICATION: Persistent abdominal pain. COMPARISON: CT abdomen and pelvis 08/19/2024, 08/02/2024, and 07/24/2024. ACCESSION NUMBER(S): [id number] ORDERING CLINICIAN: KELSI CHAVEZ TECHNIQUE: CT of the abdomen and pelvis was performed.  Contiguous axial images were obtained at 3 mm slice thickness through the abdomen and pelvis. Coronal and sagittal reconstructions at 3 mm slice thickness were performed.  FINDINGS: LOWER CHEST: Moderate centrilobular emphysema is seen in the bilateral lung bases. There is bandlike atelectasis in the right lower lobe.  9 mm nodule is seen in the medial aspect of the right lower lobe on axial image 90 of series 601.  Minimal atelectasis is seen in the left lung base.  Small fat-containing left posterior diaphragmatic hernia is noted.  Cardiac size is normal.  No pericardial effusion.  Cardiac blood pool is slightly low in attenuation, suggesting systemic anemia.  ABDOMEN:  LIVER: No hepatomegaly.  Smooth surface contour.  Normal attenuation.  BILE DUCTS: No intrahepatic or extrahepatic biliary ductal dilatation.  GALLBLADDER: Gallbladder contains sludge and small stones but is otherwise unremarkable.  STOMACH: No abnormalities identified.  PANCREAS: No masses or ductal dilatation.  SPLEEN: No splenomegaly or focal splenic lesion.  ADRENAL GLANDS: No thickening or nodules.  KIDNEYS AND URETERS: Right renal cortical thinning is noted.  Mild perinephric  stranding is seen bilaterally.  No renal or ureteral calculi.  PELVIS:  BLADDER: No abnormalities identified.  REPRODUCTIVE ORGANS: No acute uterine or adnexal pathology is identified.  The uterus is anteverted.  BOWEL: Fluid filled mildly prominent loops of small bowel are seen in the abdomen and pelvis.  Moderately dilated fluid and air-filled small bowel seen in the right upper quadrant of the abdomen with wall thickening and surrounding mesenteric edema.  No discrete transition point is definitively identified.  Enteric anastomosis is seen in the right lower quadrant of the abdomen.  Terminal ileum is unremarkable. Appendix appears normal.  Mild gaseous distention of the colon is noted.   VESSELS: No abnormalities identified.   Mild calcified plaque is present in the abdominal aorta and iliac arteries.  Abdominal aorta is normal in caliber.  PERITONEUM/RETROPERITONEUM/LYMPH NODES: No free fluid.  No pneumoperitoneum. No lymphadenopathy.  ABDOMINAL WALL: No abnormalities identified. SOFT TISSUES: No abnormalities identified.  BONES: No acute fracture or aggressive osseous lesion.    Fluid-filled mild to moderately dilated small bowel in the abdomen and pelvis with air-fluid levels with focal wall thickening and inflammation of loops of small bowel right upper quadrant.  No discrete transition point is identified, the small bowel appears widely patent however the distal small bowel is decompressed. Findings suggest an acute infectious or inflammatory enteritis causing a moderate grade partial or intermittent small bowel obstruction, possibly from dysmotility rather than a mechanical obstruction. Moderate centrilobular emphysema. 9 mm right lower lobe pulmonary nodule, follow-up per Fleischner Society guidelines. Suspected systemic anemia. Cholelithiasis and gallbladder sludge. NOTIFICATION:  The critical results of the study were discussed with, and acknowledged by Dr. Sheth by telephone on 8/24/2024 at 1:04 AM.  Signed by Darshan David    XR abdomen 1 view    Result Date: 8/22/2024  Interpreted By:  Gabe Gage, STUDY: XR ABDOMEN 1 VIEW;  8/22/2024 9:27 am   INDICATION: Signs/Symptoms:abdominal distension.   COMPARISON: CT scan from 08/19/2024. KUB from 08/21/2024.   ACCESSION NUMBER(S): [id number]   ORDERING CLINICIAN: MINERVA AVILEZ   TECHNIQUE: Single supine view of the abdomen and pelvis was obtained.     FINDINGS: Mild gas in the stomach. Moderate gas throughout the small bowel. Moderate gas and stool in the cecum and proximal right colon. The degree of gaseous distention is similar to 08/21/2024 exam. There was no gross organomegaly. There were no abnormal calcifications. No destructive bone lesion. The extreme lung bases were clear.       Persistent findings ongoing ileus versus partial distal small bowel obstruction findings most likely representing ongoing ileus with little change from yesterday's exam.   MACRO: None   Signed by: Gabe Gage 8/22/2024 9:56 AM Dictation workstation:   [id number]    XR abdomen 1 view    Result Date: 8/21/2024  Interpreted By:  Karina Ventura, STUDY: XR ABDOMEN 1 VIEW;  8/21/2024 7:28 am   INDICATION: Signs/Symptoms:Abdominal distension.   COMPARISON: 08/20/2024   ACCESSION NUMBER(S): [id number]   ORDERING CLINICIAN: MINERVA AVILEZ   FINDINGS: 2 portable supine views of the abdomen obtained.   Multiple dilated gas-filled loops of large and small bowel increased from the previous exam. Rounded density in the central pelvis, likely distended bladder, again seen. Can not accurately assess for free air on supine radiographs. Included extreme lung bases are clear.       Increased gaseous bowel dilatation since previous day's exam may reflect worsening ileus or obstruction.   Probable distended urinary bladder.   MACRO: None.   Signed by: Karina Ventura 8/21/2024 8:22 AM Dictation workstation:   [id number]    XR abdomen 1 view    Result Date: 8/20/2024  Interpreted By:  Joslyn Camara,  STUDY: XR ABDOMEN 1 VIEW;  8/20/2024 8:55 am   INDICATION: Signs/Symptoms:abdominal distension.   COMPARISON: None.   ACCESSION NUMBER(S): [id number]   ORDERING CLINICIAN: MINERVA AVILEZ   FINDINGS: The stomach appears to be distended. There is also probable distention of the bladder. Several mildly distended small bowel loops are seen. Gas and stool is seen in the colon.       Distended stomach.   Probable distention of the bladder.   Several mildly distended small bowel loops which may be related to an ileus continued follow-up is suggested     MACRO: None   Signed by: Joslyn Camara 8/20/2024 10:40 AM Dictation workstation:   [id number]    CT abdomen pelvis wo IV contrast    Result Date: 8/19/2024  STUDY: CT Abdomen and Pelvis without IV Contrast; 08/19/2024, 1:59 PM. INDICATION: Recent bowel resection. Recurrent diffuse abdominal pain. COMPARISON: XR chest/abdomen: 08/03/24; CT AP: 08/02/24, 07/24/24, 07/03/24, 06/26/24, 06/20/24. ACCESSION NUMBER(S): [id number] ORDERING CLINICIAN: LILIAN DE LA TORRE TECHNIQUE: CT of the abdomen and pelvis was performed.  Contiguous axial images were obtained at 3 mm slice thickness through the abdomen and pelvis. Coronal and sagittal reconstructions at 3 mm slice thickness were performed. No intravenous contrast was administered.  Automated mA/kV exposure control was utilized and patient examination was performed in strict accordance with principles of ALARA. FINDINGS: Please note that the evaluation of vessels, lymph nodes and organs is limited without intravenous contrast.  LOWER CHEST: No cardiomegaly.  No pericardial effusion.  Lung bases demonstrate chronic changes with areas of parenchymal scarring in the bilateral medial lower lobes which appears stable.  ABDOMEN:  LIVER: No hepatomegaly.  Smooth surface contour.  There is fatty liver morphology.  BILE DUCTS: No intrahepatic or extrahepatic biliary ductal dilatation.  GALLBLADDER: The gallbladder is present with luminal  gallstone. STOMACH: No abnormalities identified.  PANCREAS: Mild prominence of the pancreatic tail is stable without definitive mass.  SPLEEN: No splenomegaly or focal splenic lesion.  ADRENAL GLANDS: Prominence of the adrenal glands.  KIDNEYS AND URETERS: Kidneys are normal in size and location.  No renal or ureteral calculi.  PELVIS:  BLADDER: There is prominence of the urinary bladder.  REPRODUCTIVE ORGANS: No abnormalities demonstrated.  BOWEL: There is mild descending and sigmoid colon diverticulosis without evidence of diverticulitis.  There is postsurgical changes of the small bowel in the right lower quadrant with mild associated wall thickening.  There is no evidence of obstruction.  The appendix is normal.  VESSELS: No abnormalities identified.  Abdominal aorta is normal in caliber.  PERITONEUM/RETROPERITONEUM/LYMPH NODES: No free fluid.  No pneumoperitoneum. No lymphadenopathy.  ABDOMINAL WALL: There are postoperative changes in the anterior abdominal wall with mild stranding in the left anterior abdominal wall which appears stable.  This appears to be related to fat-containing hernia. SOFT TISSUES: No abnormalities identified.  BONES: No acute fracture or aggressive osseous lesion.    Postsurgical changes right lower quadrant within the small bowel with mild wall thickening without evidence of obstruction. Stranding in the left anterior abdominal wall the subcutaneous soft tissues which appears stable.  This appears to be related to small fat-containing hernia. Scarring and parenchymal opacities in the bilateral medial and lower lobes appear stable. Cholelithiasis. Signed by Armando Mcgarry, DO    CT angio chest for pulmonary embolism    Result Date: 8/11/2024  Interpreted By:  Gayla Eduardo, STUDY: CT ANGIO CHEST FOR PULMONARY EMBOLISM;  8/11/2024 1:15 am   INDICATION: Signs/Symptoms:r/o PE   COMPARISON: CT angiogram chest, abdomen, pelvis 07/25/2024 . CT angiogram abdomen and pelvis 08/06/2024. CT  angiogram chest 06/28/2024.   ACCESSION NUMBER(S): [id number]   ORDERING CLINICIAN: MARCOS REECE   TECHNIQUE: Helical data acquisition of the chest was obtained following the uneventful administration of intravenous contrast material. Images were reformatted in axial, coronal, and sagittal planes. MIP images were created and reviewed.  Dual energy reconstructions were also performed including low energy virtual mono-energetic images and iodine density maps.   FINDINGS: POTENTIAL LIMITATIONS OF THE STUDY: Motion artifact.   HEART AND VESSELS: No discrete filling defects within the main pulmonary artery or its branches.   No thoracic aortic aneurysm or acute dissection. Mild atherosclerotic calcifications of the thoracic aorta.   The heart is not enlarged.    No evidence of pericardial effusion.   MEDIASTINUM AND ALESSANDRO, LOWER NECK AND AXILLA: The visualized thyroid gland is within normal limits.   No evidence of thoracic lymphadenopathy by CT criteria.   LUNGS AND AIRWAYS: The trachea and central airways are patent. There is bronchial wall thickening.   There are severe bilateral emphysematous changes. No consolidation, pleural effusion or pneumothorax.  Mild atelectasis at the right lower lobe..   UPPER ABDOMEN: There is cholelithiasis. Redemonstration of 1.3 cm bilobed hyperdense area at the pancreatic tail, not significantly changed in the interval.   CHEST WALL AND OSSEOUS STRUCTURES: No acute osseous changes.       1. No evidence of pulmonary emboli. 2. Severe emphysema. Bronchial wall thickening, may be seen with bronchitis. 3. Mild atelectasis at the right lung base. 4. Cholelithiasis. 5. Redemonstration of 1.3 cm bilobed hyperdense area at the pancreatic tail, not significantly changed in the interval, previously favored to relate to a small hematoma. Attention on follow-up.     MACRO: None.   Signed by: Gayla Eduardo 8/11/2024 2:26 AM Dictation workstation:   [id number]    CT angio abdomen pelvis w and  or wo IV IV contrast    Result Date: 8/9/2024  Interpreted By:  Randy Sung  and Juan Antonio Cruz STUDY: CT ANGIO ABDOMEN PELVIS W AND/OR WO IV IV CONTRAST;  8/6/2024 12:19 am   INDICATION: Signs/Symptoms:r/o ischemic bowel.   COMPARISON: CT abdomen pelvis 07/24/2024. 07/25/2024, 03/22/2016.   ACCESSION NUMBER(S): [id number]   ORDERING CLINICIAN: YOSELYN HILL   TECHNIQUE: Axial non-contrast images of the  abdomen, and pelvis with coronal and sagittal reformatted images. Axial CT images of the abdomen and pelvis after the intravenous administration of 90 mL of Omnipaque 350 using angiographic technique with coronal and sagittal reformatted images. MIP images were provided and reviewed. 3D reconstructions were created on a separate independent workstation and reviewed.   FINDINGS: VASCULATURE:   ABDOMINAL AORTA:   No evidence of acute aortic pathology on noncontrast and contrasted imaging.   No abdominal aortic aneurysm .   Mild-to-moderate abdominal aortic atherosclerosis.   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 There is retrograde opacification of the celiac artery branches via the SMA collateralization.   Superior mesenteric artery: Widely patent.   Renal arteries: Mild atherosclerotic calcification otherwise widely patent.   Inferior mesenteric artery: Patent.   Iliac arteries: Bilateral common iliac arteries demonstrate scattered eccentric atherosclerotic calcification with no hemodynamically significant vessel stenosis. Bilateral external iliac arteries are widely patent.   Femoral arteries: Widely patent.   PORTAL - VENOUS: No significant abnormality of the portal venous system.     NON-VASCULAR   LOWER CHEST: The visualized lung base demonstrates bibasilar atelectasis. There are severe emphysematous changes. The heart is normal in size without pericardial effusion. No pleural effusion is present. Visualized distal esophagus appears  normal.   ABDOMEN:   LIVER: The liver is normal in size. There are few scattered subcentimeter focal liver lesions again noted which are felt to represent. There is re-demonstration of a hypodense focal lesion in hepatic segment 4A by the falciform ligament which measures 1.4 cm in largest diameter which remains unchanged dating back to imaging from 03/22/2016 most likely of benign etiology. No additional liver lesions.   BILE DUCTS: The intrahepatic and extrahepatic ducts are not dilated.   GALLBLADDER: No calcified stones. No wall thickening.   PANCREAS: The pancreas appears unremarkable without evidence of ductal dilatation or masses.   SPLEEN: There are new multiple wedge-shaped focal areas of hypodensities throughout the splenic parenchyma compatible with infarcts. No splenic lesions.   ADRENAL GLANDS: Within normal limits.   KIDNEYS AND URETERS: The kidneys are normal in size and enhance symmetrically. No hydroureteronephrosis or nephroureterolithiasis is identified.   PELVIS:   BLADDER: Within normal limits.   REPRODUCTIVE ORGANS: No pelvic masses.   BOWEL: The stomach is unremarkable. Re-demonstration of prior surgical changes from bowel resection. There is a focal dilatation of the site of anastomosis. Otherwise the small and large bowel are within normal limits in caliber and demonstrates no wall thickening. There is a severe amount retained stool throughout the colon.. The appendix is not definitely visualized. There is however no pericecal stranding or fluid.   PERITONEUM/RETROPERITONEUM/LYMPH NODES: There is no free or loculated fluid collection, no free intraperitoneal air. The retroperitoneum appears normal. No enlarged mesenteric lymph nodes.   BONES AND ABDOMINAL WALL: No suspicious osseous lesions are identified. Degenerative discogenic disease is noted in the lower thoracic and lumbar spine. There is a small left anterior abdominal wall defect with herniated fat which demonstrates stranding. No  herniated loops of bowel. Remaining portions of the abdominal wall soft tissues are within normal limits.       1. Small fat containing left anterior abdominal wall hernia with stranding of the herniated mesenteric fat suggesting incarceration of the herniated fat with inflammatory changes. 2. 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 The SMA and inferior mesenteric artery are also widely patent. 3. Severe emphysematous changes of the partially visualized lower lung lobes. 4. Additional incidental findings are as described above.     I personally reviewed the images/study and I agree with the findings as stated by Resident Anthony Romano MD.   MACRO: None.   Signed by: Randy Sung 8/9/2024 6:00 AM Dictation workstation:   [id number]    NM Lung perfusion with spect/ct    Result Date: 8/8/2024  Interpreted By:  Albert Shaw and Elsamaloty Mazzin STUDY: NM LUNG PERFUSION WITH SPECT/CT;  8/8/2024 1:55 pm   INDICATION: Signs/Symptoms:dyspnea.   COMPARISON: None   ACCESSION NUMBER(S): [id number]   ORDERING CLINICIAN: MARCOS REECE   TECHNIQUE: DIVISION OF NUCLEAR MEDICINE PERFUSION LUNG SCANS   Multiple perfusion images of the lungs were acquired after the intravenous administration of 4.4 mCi of Tc-99m macroaggregated albumin (MAA). In addition, SPECT/CT of the chest was performed.   FINDINGS: Perfusion images show multiple segmental and subsegmental defects in the right lung as well as a subsegmental defect in the left posterior lung.       There are two or more segmental perfusion defects as described above suggestive of acute pulmonary embolism (high probability).   The interpretation above is based on modified PIOPED II and PISAPED criteria.   I personally reviewed the images/study and I agree with the findings as stated by Christopher Florez MD (resident). This study was interpreted at  [facility], [place], [place].   MACRO: Christopher Florez discussed the significance and urgency of this critical finding by telephone with  MARCOS REECE on 8/8/2024 at 2:17 pm.  (**-RCF-**) Findings:  See findings.     Signed by: Albert Shaw 8/8/2024 2:23 PM Dictation workstation:   [id number]    XR abdomen 1 view    Result Date: 8/7/2024  Interpreted By:  Gabe Gage, STUDY: XR ABDOMEN 1 VIEW;  8/7/2024 11:25 am   INDICATION: Signs/Symptoms:abdominal distension.   COMPARISON: CT scan abdomen and pelvis from 08/06/2024. KUB from 08/04/2024.   ACCESSION NUMBER(S): [id number]   ORDERING CLINICIAN: MINERVA AVILEZ   TECHNIQUE: Single supine view of the abdomen and pelvis was obtained.     FINDINGS: Previous NG tube has been removed. There was   a moderate amount of retained colonic stool and gas.  . There are several loops of gas-filled nondilated small bowel in the central abdomen. Mild gas in the stomach.. There was no gross organomegaly. There were no abnormal calcifications. No destructive bone lesion. The extreme lung bases were clear.       NG tube is been removed.   Moderate stool throughout the colon and rectum.     MACRO: None   Signed by: Gabe Gage 8/7/2024 11:50 AM Dictation workstation:   [id number]    Electrocardiogram, 12-lead PRN ACS symptoms    Result Date: 8/5/2024  Sinus tachycardia Otherwise normal ECG When compared with ECG of 08-JUL-2024 04:51, No significant change was found See ED provider note for full interpretation and clinical correlation Confirmed by Suzette De Luna ([id number]) on 8/5/2024 2:42:48 PM    XR abdomen 1 view    Result Date: 8/5/2024  Interpreted By:  Gabe Gage, STUDY: XR ABDOMEN 1 VIEW;  8/4/2024 8:09 am   INDICATION: Signs/Symptoms:SBO.   COMPARISON: Plain films from 08/03/2024. CT scan abdomen and pelvis from 08/02/2024.   ACCESSION NUMBER(S): [id number]   ORDERING CLINICIAN: ANITRA APARICIO   TECHNIQUE: Single supine view of  the abdomen and pelvis was obtained.     FINDINGS: There was   an NG tube in place with distal tip in the mid stomach. Mild-to-moderate diffuse retained colonic stool with scattered colonic gas. No suspicious small bowel dilatation..   There was no gross organomegaly. There were no abnormal calcifications. No destructive bone lesion. The extreme lung bases were clear.       NG tube in place as described.   Retained colonic stool and gas.   MACRO: None   Signed by: Gabe Gage 8/5/2024 8:44 AM Dictation workstation:   [id number]    XR chest abdomen for OG NG placement    Result Date: 8/3/2024  STUDY: Single View Chest and Abdomen Radiographs;  8/3/2024 3:39 AM INDICATION: NG tube placement. COMPARISON: 6/20/2024 CT Abdomen, 6/18/2024 XR Chest one view. ACCESSION NUMBER(S): [id number] ORDERING CLINICIAN: TECHNIQUE:  Single view of the chest and one view(s) of the abdomen. FINDINGS:  CARDIOMEDIASTINAL SILHOUETTE: Cardiomediastinal silhouette is normal in size and configuration. Enteric tube extends into the gastric fundus.  LUNGS: Lungs are clear.  ABDOMEN: Bowel gas pattern is normal without obstruction or ileus.  There are no convincing calculi or abnormal calcifications.  BONES: No acute osseous changes    No acute cardiopulmonary disease. Signed by Darshan David    CT abdomen pelvis wo IV contrast    Result Date: 8/2/2024  Interpreted By:  Paulie Delarosa, STUDY: CT ABDOMEN PELVIS WO IV CONTRAST;  8/2/2024 10:02 pm   INDICATION: Signs/Symptoms:Abdominal distention, diffuse abdominal pain, history of GI bleed.   COMPARISON: 7/25/2024   ACCESSION NUMBER(S): [id number]   ORDERING CLINICIAN: DONOVAN ALBA   TECHNIQUE: Contiguous axial images of the abdomen and pelvis were obtained without intravenous contrast. Coronal and sagittal reformatted images were obtained from the axial images.   FINDINGS: There is limited evaluation of the lung bases. There is pulmonary emphysema. There is bronchial wall thickening and  opacity right lower lobe which measure 1.4 cm. There is also a 1.2 cm opacity in the medial left lower lobe.   Evaluation of the abdomen and pelvis is limited the secondary to lack of intravenous contrast. Limited evaluation for liver mass on noncontrast examination. There is cholelithiasis.   No splenomegaly.   Mild nodular thickening left adrenal gland. The right adrenal gland appears unremarkable.   There is stable appearance of previously described 11 mm density adjacent to the pancreatic tail as described on prior examination.   No evidence of renal calculus or hydronephrosis.   Atherosclerotic calcification of the abdominal aorta and iliac arteries.   There is stable edema in the subcutaneous fat in the left medial anterior abdominal wall.   There is redemonstration of postsurgical change of prior partial bowel resection. There is evidence of thickening of the small bowel loop in the right lower quadrant at site of anastomosis. There is mild hyperdensity in the bowel lumen at this level and hemorrhage is not excluded. No evidence of bowel obstruction or acute appendicitis. There is colonic diverticulosis without evidence of acute diverticulitis.   Urinary bladder appears unremarkable.   Limited evaluation of the uterus and adnexa.   No significant free abdominal or pelvic fluid.       Postsurgical change of prior partial small bowel resection. There is evidence of wall thickening of small bowel loop in the right lower quadrant at site of anastomosis which may be infectious or inflammatory in etiology. There is also mild hyperdensity in the bowel lumen and mild hemorrhage within the bowel is not excluded.   No evidence of bowel obstruction or acute appendicitis. Colonic diverticulosis without evidence of acute diverticulitis.   Stable appearance of previously described 11 mm density adjacent pancreatic tail, and attention on progress imaging recommended.   Pulmonary emphysema. Bronchial wall thickening and  opacities in the lower lungs which has somewhat nodular appearance measuring 13 mm in the right lower lobe and 12 mm in the left lower lobe and may relate to infectious/inflammatory process, however attention on progress imaging recommended to assure resolution and exclude other pathology.   MACRO: None   Signed by: Paulie Delarosa 8/2/2024 11:09 PM Dictation workstation:   [id number]            Assessment/Plan   ASSESSMENT & PLAN:    # Right foot pain  # Suspected peripheral arterial disease      - Patient was seen and evaluated; all findings were discussed and all questions were answered to patient's satisfaction.  - Charts, labs, vitals and imaging all reviewed.   - Imaging: Right foot XR without osseous abnormality.  - Labs: WBC 11.2, HGB 8.2  - PVRs: Ordered and pending    Plan:  - Assessed with unclear etiology for pain. PVRs ordered and pending.  - Further management pending PVR results.  - Podiatry will continue to follow while in house.  - Seen and discussed with Dr. Davies.    Weightbearing: WBAT BLE    Case to be discussed with attending, A&P above reflects a tentative plan. Please await for the final signature from the attending physician on service.    This patient will be followed by the Podiatry service. Please Epic Chat the corresponding residents below with questions or concerns.      Vincent Sunshine DPM PGY-3  Podiatric Medicine and Surgery   Epic Secure Chat            SIGNATURE: Vincent Sunshine DPM PATIENT NAME: Fernando Cuevas   DATE: August 26, 2024 MRN: [id number]   TIME: 4:40 PM CONTACT: Haiku nini

## 2024-08-26 NOTE — PROGRESS NOTES
Fernando Cuevas is a 63 y.o. female on day 7 of admission presenting with Sepsis due to undetermined organism (Multi).    Subjective   Doing about the same. She said she is having some flatus. Abdominal distension persists.    Objective   PE:  Constitutional: A&Ox3, calm and cooperative, NAD  Cardiovascular: Tachycardia.  Respiratory/Thorax: Good symmetric chest expansion. No labored breathing.  Gastrointestinal: Abdomen moderately distended, soft, tender, no peritoneal signs  Extremities: No peripheral edema  Neurological: A&Ox3, No focal deficits  Psychological: Appropriate mood and behavior  Skin: Warm and dry    Last Recorded Vitals  Blood pressure 150/84, pulse (!) 114, temperature 35.8 °C (96.4 °F), resp. rate 18, weight 61.7 kg (136 lb), SpO2 100%.  Intake/Output last 3 Shifts:  I/O last 3 completed shifts:  In: 420 (6.8 mL/kg) [P.O.:120; IV Piggyback:300]  Out: 1260 (20.4 mL/kg) [Urine:650 (0.3 mL/kg/hr); Emesis/NG output:610]  Weight: 61.7 kg     Relevant Results  Scheduled medications  apixaban, 5 mg, oral, BID  [START ON 8/28/2024] azithromycin, 250 mg, oral, Once per day on Monday Wednesday Friday  budesonide, 0.5 mg, nebulization, BID  busPIRone, 5 mg, oral, BID  dilTIAZem ER, 240 mg, oral, Daily  docusate sodium, 100 mg, oral, BID  [Held by provider] formoterol, 20 mcg, nebulization, BID  ipratropium-albuteroL, 3 mL, nebulization, TID  mirtazapine, 15 mg, oral, Nightly  montelukast, 10 mg, oral, Daily  oxygen, , inhalation, Continuous - Inhalation  pantoprazole, 40 mg, intravenous, Daily  polyethylene glycol, 17 g, oral, BID  thiamine, 100 mg, oral, Daily  [Held by provider] tiotropium, 2 puff, inhalation, Daily      Continuous medications     PRN medications  PRN medications: acetaminophen **OR** acetaminophen **OR** acetaminophen, albuterol, benzonatate, calcium carbonate, diphenhydrAMINE, hydrALAZINE, hydrOXYzine HCL, melatonin, morphine, morphine, ondansetron ODT **OR** ondansetron, prochlorperazine  **OR** prochlorperazine    Results for orders placed or performed during the hospital encounter of 08/19/24 (from the past 24 hour(s))   Basic Metabolic Panel   Result Value Ref Range    Glucose 98 74 - 99 mg/dL    Sodium 139 136 - 145 mmol/L    Potassium 3.4 (L) 3.5 - 5.3 mmol/L    Chloride 102 98 - 107 mmol/L    Bicarbonate 27 21 - 32 mmol/L    Anion Gap 13 10 - 20 mmol/L    Urea Nitrogen 5 (L) 6 - 23 mg/dL    Creatinine 0.47 (L) 0.50 - 1.05 mg/dL    eGFR >90 >60 mL/min/1.73m*2    Calcium 8.2 (L) 8.6 - 10.3 mg/dL   CBC   Result Value Ref Range    WBC 11.2 4.4 - 11.3 x10*3/uL    nRBC 0.2 (H) 0.0 - 0.0 /100 WBCs    RBC 3.48 (L) 4.00 - 5.20 x10*6/uL    Hemoglobin 8.2 (L) 12.0 - 16.0 g/dL    Hematocrit 28.0 (L) 36.0 - 46.0 %    MCV 81 80 - 100 fL    MCH 23.6 (L) 26.0 - 34.0 pg    MCHC 29.3 (L) 32.0 - 36.0 g/dL    RDW 20.8 (H) 11.5 - 14.5 %    Platelets 671 (H) 150 - 450 x10*3/uL       Assessment/Plan     Patient being managed conservatively for SBO. Her abdominal exam remains about the same as days prior. She is having some flatus.     Plan:  - Continue conservative management  - NG LIWS  - CLD for comfort  - OOB/ambulation  - KUB in AM  - Trend lab work    Discussed with Dr Bingham.    I spent 35 minutes in the professional and overall care of this patient.    Mahad Boateng PA-C

## 2024-08-26 NOTE — PROGRESS NOTES
INFECTIOUS DISEASE DAILY PROGRESS NOTE    SUBJECTIVE:    Leukocytosis has resolved. Abx stopped today which I agree with. No fevers. Abd remains distended and she has an NG tube with bilious drainage new from when I last saw her.    OBJECTIVE:  VITALS (Last 24 Hours)  /84   Pulse (!) 114   Temp 35.8 °C (96.4 °F)   Resp 18   Wt 61.7 kg (136 lb)   SpO2 100%   BMI 24.87 kg/m²     PHYSICAL EXAM:  Gen - NAD  ENT - NG tube with bilious output  Abd - similarly distended, mild ttp today mostly left side  Skin - no rash    ABX: IV Unasyn    LABS:  Lab Results   Component Value Date    WBC 11.2 08/26/2024    HGB 8.2 (L) 08/26/2024    HCT 28.0 (L) 08/26/2024    MCV 81 08/26/2024     (H) 08/26/2024     Lab Results   Component Value Date    GLUCOSE 98 08/26/2024    CALCIUM 8.2 (L) 08/26/2024     08/26/2024    K 3.4 (L) 08/26/2024    CO2 27 08/26/2024     08/26/2024    BUN 5 (L) 08/26/2024    CREATININE 0.47 (L) 08/26/2024           Estimated Creatinine Clearance: 105.8 mL/min (A) (by C-G formula based on SCr of 0.47 mg/dL (L)).    ASSESSMENT/PLAN:     Abd Pain/Distension - ongoing  Cocaine Abuse  Leukocytosis - resolved    OK to remain off abx. WBC is normal now. Will defer ileus/SBO management to primary and surgery teams.    Will sign off. Please call back with questions. Thanks!     Tim Nelson MD  [facility]  Office #[phone]

## 2024-08-26 NOTE — PROGRESS NOTES
08/26/24 0908   Current Planned Discharge Disposition   Current Planned Discharge Disposition Home Health     Once med ready plan is to discharge home with [facility], patient has NG for SBO, per notes refused PT/OT 8/25, general sx following on IV Unasyn, will continue to monitor for discharge planning.

## 2024-08-26 NOTE — PROGRESS NOTES
Occupational Therapy    Evaluation    Patient Name: Fernando Cuevas  MRN: [id number]  Today's Date: 8/26/2024  Time Calculation  Start Time: 1040  Stop Time: 1052  Time Calculation (min): 12 min        Assessment:  OT Assessment:  (Pt below baseline for ADLs and functional mobility. Pt requiring min A for LE dressing, reports dizziness once sitting EOB quick to resolve with rest break. At this time pt would benefit from continued therapy to return to baseline, rec low intensity.)  Prognosis: Good  Barriers to Discharge: None  Evaluation/Treatment Tolerance: Patient tolerated treatment well  Medical Staff Made Aware: Yes  End of Session Communication: Bedside nurse  End of Session Patient Position: Bed, 3 rail up, Alarm on  OT Assessment Results: Decreased ADL status, Decreased functional mobility, Decreased IADLs  Prognosis: Good  Barriers to Discharge: None  Evaluation/Treatment Tolerance: Patient tolerated treatment well  Medical Staff Made Aware: Yes  Strengths: Rehab experience  Barriers to Participation: Comorbidities  Plan:  Treatment Interventions: ADL retraining, Functional transfer training, Endurance training, Equipment evaluation/education  OT Frequency: 2 times per week  OT Discharge Recommendations: Low intensity level of continued care  OT - OK to Discharge: Yes  Treatment Interventions: ADL retraining, Functional transfer training, Endurance training, Equipment evaluation/education    Subjective   Current Problem:  1. Sepsis due to undetermined organism (Multi)        2. Abdominal pain, unspecified abdominal location          General:  General  Reason for Referral: sepsis; SBO  Referred By: Ang  Past Medical History Relevant to Rehab:   Past Medical History:   Diagnosis Date    COPD (chronic obstructive pulmonary disease) (Multi)     Depression     DVT (deep venous thrombosis) (Multi)     bilateral upper extremities    HTN (hypertension)     Lung cancer (Multi)     Migraines     Panic disorder         Missed Visit: Yes  Missed Visit Reason: Patient refused  Co-Treatment: PT  Co-Treatment Reason: for maximal pt safety and participation  Prior to Session Communication: Bedside nurse  Patient Position Received: Bed, 3 rail up, Alarm on  General Comment: pt supine in bed upon entry, agreeable to therapy  Precautions:  Medical Precautions: Abdominal precautions, Fall precautions  Post-Surgical Precautions: Abdominal surgery precautions       Pain:  Pain Assessment  Pain Assessment: 0-10  0-10 (Numeric) Pain Score: 10 - Worst possible pain  Pain Location: Foot  Pain Orientation: Right    Objective   Cognition:  Overall Cognitive Status: Within Functional Limits  Orientation Level: Oriented X4           Home Living:  Type of Home: House  Lives With:  (family)  Home Adaptive Equipment: Walker rolling or standard  Home Layout: One level  Home Access: Stairs to enter with rails  Entrance Stairs-Rails: Right  Entrance Stairs-Number of Steps: 2  Bathroom Shower/Tub: Tub/shower unit  Bathroom Toilet: Standard  Bathroom Equipment: None  Prior Function:  Level of [place]: Independent with ADLs and functional transfers, Independent with homemaking with ambulation  Receives Help From: Family  ADL Assistance: Independent  Homemaking Assistance: Independent  Ambulatory Assistance: Independent       ADL:  LE Dressing Assistance: Minimal  LE Dressing Deficit: Thread RLE into underwear, Thread LLE into underwear  ADL Comments: assist to don depends while supine in bed with HOB elevated, completed once standing with SBA and use of FWW.  Activity Tolerance:  Endurance: Tolerates 10 - 20 min exercise with multiple rests  Bed Mobility/Transfers: Bed Mobility  Bed Mobility: Yes (min A for supine to sit, hand held assist. pt reports dizziness once sitting EOB)    Transfers  Transfer: Yes  Transfer 1  Technique 1: Sit to stand, Stand to sit  Transfer Device 1: Walker  Transfer Level of Assistance 1: Contact guard  Trials/Comments  1: from EOB      Functional Mobility:     Sitting Balance:  Static Sitting Balance  Static Sitting-Balance Support: Feet supported  Static Sitting-Level of Assistance: Close supervision  Static Sitting-Comment/Number of Minutes: pt reporting dizziness once sitting EOB  Dynamic Sitting Balance  Dynamic Sitting-Balance Support: Feet supported  Dynamic Sitting-Level of Assistance: Close supervision  Standing Balance:  Static Standing Balance  Static Standing-Balance Support: Bilateral upper extremity supported  Static Standing-Level of Assistance: Contact guard  Dynamic Standing Balance  Dynamic Standing-Balance Support: No upper extremity supported  Dynamic Standing-Level of Assistance: Contact guard  Dynamic Standing-Balance:  (LB dressing pulling depends up around waist)        Vision:Vision - Basic Assessment  Patient Visual Report:  (pt reports she feels as though her vision is getting worse)       Strength:  Strength Comments:  (grossly WFL)       Coordination:  Movements are Fluid and Coordinated: Yes   Hand Function:  Gross Grasp: Functional  Coordination: Functional  Extremities: RUE   RUE : Within Functional Limits and LUE   LUE: Within Functional Limits        Outcome Measures:[facility] Daily Activity  Putting on and taking off regular lower body clothing: A little  Bathing (including washing, rinsing, drying): A little  Putting on and taking off regular upper body clothing: A little  Toileting, which includes using toilet, bedpan or urinal: A little  Taking care of personal grooming such as brushing teeth: A little  Eating Meals: None  Daily Activity - Total Score: 19        Education Documentation  Body Mechanics, taught by Ilana Yates OT at 8/26/2024 12:52 PM.  Learner: Patient  Readiness: Acceptance  Method: Explanation  Response: Verbalizes Understanding    ADL Training, taught by Ilana Yates OT at 8/26/2024 12:52 PM.  Learner: Patient  Readiness: Acceptance  Method: Explanation  Response:  Verbalizes Understanding    Education Comments  No comments found.        OP EDUCATION:       Goals:  Encounter Problems       Encounter Problems (Active)       ADLs       Patient will perform UB and LB bathing with modified independent level of assistance and shower chair.       Start:  08/26/24    Expected End:  09/09/24            Patient with complete lower body dressing with modified independent level of assistance donning and doffing all LE clothes  with PRN adaptive equipment while edge of bed        Start:  08/26/24    Expected End:  09/09/24            Patient will complete daily grooming tasks brushing teeth and washing face/hair with modified independent level of assistance and PRN adaptive equipment while standing.       Start:  08/26/24    Expected End:  09/09/24            Patient will complete toileting including hygiene clothing management/hygiene with modified independent level of assistance and grab bars.       Start:  08/26/24    Expected End:  09/09/24               MOBILITY       Patient will perform Functional mobility min Household distances/Community Distances with modified independent level of assistance and least restrictive device in order to improve safety and functional mobility.       Start:  08/26/24    Expected End:  09/09/24               TRANSFERS       Patient will complete sit to stand transfer with modified independent level of assistance and least restrictive device in order to improve safety and prepare for out of bed mobility.       Start:  08/26/24    Expected End:  09/09/24

## 2024-08-26 NOTE — CARE PLAN
The patient's goals for the shift include  pain management    The clinical goals for the shift include Controlled pain, maintained safety      Problem: Pain - Adult  Goal: Verbalizes/displays adequate comfort level or baseline comfort level  Outcome: Progressing     Problem: Safety - Adult  Goal: Free from fall injury  Outcome: Progressing     Problem: Chronic Conditions and Co-morbidities  Goal: Patient's chronic conditions and co-morbidity symptoms are monitored and maintained or improved  Outcome: Progressing

## 2024-08-26 NOTE — PROGRESS NOTES
Physical Therapy    Physical Therapy Evaluation    Patient Name: Fernando Cuevas  MRN: [id number]  Today's Date: 8/26/2024   Time Calculation  Start Time: 1039  Stop Time: 1051  Time Calculation (min): 12 min    Assessment/Plan   PT Assessment  PT Assessment Results: Decreased strength, Decreased endurance, Impaired balance, Decreased mobility  Rehab Prognosis: Good  Evaluation/Treatment Tolerance: Patient tolerated treatment well  Medical Staff Made Aware: Yes  Strengths: Premorbid level of function, Rehab experience, Support of extended family/friends, Living arrangement secure  Barriers to Participation: Comorbidities  End of Session Communication: Bedside nurse  Assessment Comment: Pt demonstrating deficits in generalized strength, endurance and balance as well as increased pain resulting in impaired functional mobility, gait and self care. Due to the impairments listed above, pt would benefit from skilled physical therapy services in acute care setting as well as additional therapy in LOW intensity setting with 24/7 supervision  End of Session Patient Position: Alarm on, Bed, 3 rail up  IP OR SWING BED PT PLAN  Inpatient or Swing Bed: Inpatient  PT Plan  Treatment/Interventions: Bed mobility, Transfer training, Gait training, Stair training, Balance training, Neuromuscular re-education, Strengthening, Endurance training, Therapeutic exercise, Therapeutic activity, Home exercise program  PT Plan: Ongoing PT  PT Frequency: 3 times per week  PT Discharge Recommendations: Low intensity level of continued care  Equipment Recommended upon Discharge:  (TBD)  PT Recommended Transfer Status: Assist x1  PT - OK to Discharge: Yes (PT POC initiated this date)      Subjective   General Visit Information:  General  Reason for Referral: Pt is a 64 yo female presenting with sepsis; SBO s/p NG tube placement  Referred By: Ang  Past Medical History Relevant to Rehab:   Past Medical History:   Diagnosis Date    COPD (chronic  obstructive pulmonary disease) (Multi)     Depression     DVT (deep venous thrombosis) (Multi)     bilateral upper extremities    HTN (hypertension)     Lung cancer (Multi)     Migraines     Panic disorder        Co-Treatment: OT  Co-Treatment Reason: for maximal pt safety and participation  Prior to Session Communication: Bedside nurse  Patient Position Received: Bed, 3 rail up, Alarm on  General Comment: Pt initially hesitant however agreeable to PT/OT evaluation. Pt reporting limited due to R foot pain  Home Living:  Home Living  Type of Home: House  Lives With: Adult children (family)  Home Adaptive Equipment: Walker rolling or standard (rollator)  Home Layout: One level  Home Access: Stairs to enter with rails  Entrance Stairs-Rails: Right  Entrance Stairs-Number of Steps: 2  Bathroom Shower/Tub: Tub/shower unit  Bathroom Toilet: Standard  Bathroom Equipment: None  Prior Level of Function:  Prior Function Per Pt/Caregiver Report  Level of [place]: Independent with ADLs and functional transfers, Independent with homemaking with ambulation  Receives Help From: Family  ADL Assistance: Independent  Homemaking Assistance: Independent  Ambulatory Assistance: Independent (rollator)  Prior Function Comments: (-) falls  Precautions:  Precautions  Medical Precautions: Fall precautions  Vital Signs:       Objective   Pain:  Pain Assessment  Pain Assessment: 0-10  0-10 (Numeric) Pain Score: 10 - Worst possible pain  Pain Type: Acute pain  Pain Location: Foot  Pain Orientation: Right  Cognition:  Cognition  Overall Cognitive Status: Within Functional Limits  Orientation Level: Oriented X4    General Assessments:  Activity Tolerance  Endurance: Tolerates 10 - 20 min exercise with multiple rests         Coordination  Movements are Fluid and Coordinated: Yes    Static Sitting Balance  Static Sitting-Balance Support: No upper extremity supported, Feet supported  Static Sitting-Level of Assistance: Close supervision  Static  Sitting-Comment/Number of Minutes: 4 min (increased time in sitting for lightheadedness to reduce prior to standing)    Static Standing Balance  Static Standing-Balance Support: Bilateral upper extremity supported (FWW)  Static Standing-Level of Assistance: Contact guard, Close supervision  Static Standing-Comment/Number of Minutes: 2 min  Functional Assessments:  Bed Mobility  Bed Mobility: Yes  Bed Mobility 1  Bed Mobility 1: Supine to sitting  Level of Assistance 1: Minimum assistance  Bed Mobility Comments 1: Min A to elevate trunk with HHA per pt request    Transfer 1  Technique 1: Stand to sit, Sit to stand  Transfer Device 1: Walker  Transfer Level of Assistance 1: Contact guard  Trials/Comments 1: from EOB    Ambulation/Gait Training  Ambulation/Gait Training Performed: Yes  Ambulation/Gait Training 1  Surface 1: Level tile  Device 1: Rolling walker  Gait Support Devices: Gait belt  Assistance 1: Contact guard  Quality of Gait 1: Decreased step length (reduced gait geroniom)  Comments/Distance (ft) 1: 6ft forwards and 6ft retro  Extremity/Trunk Assessments:  RLE   RLE : Within Functional Limits  LLE   LLE : Within Functional Limits  Outcome Measures:  [facility] Basic Mobility  Turning from your back to your side while in a flat bed without using bedrails: A little  Moving from lying on your back to sitting on the side of a flat bed without using bedrails: A little  Moving to and from bed to chair (including a wheelchair): A little  Standing up from a chair using your arms (e.g. wheelchair or bedside chair): A little  To walk in hospital room: A little  Climbing 3-5 steps with railing: A lot  Basic Mobility - Total Score: 17    Encounter Problems       Encounter Problems (Active)       Mobility       LTG - Patient will navigate 3 steps with rails/device       Start:  08/26/24    Expected End:  09/09/24       SUP using HR          STG - Patient will ambulate       Start:  08/26/24    Expected End:  09/09/24        Mod Ind using FWW >100ft            PT Transfers       STG - Patient will perform bed mobility       Start:  08/26/24    Expected End:  09/09/24       Mod Ind         STG - Patient will transfer sit to and from stand       Start:  08/26/24    Expected End:  09/09/24       Mod Ind            Pain - Adult              Education Documentation  Precautions, taught by Zuhair Munroe, PT at 8/26/2024  1:06 PM.  Learner: Patient  Readiness: Acceptance  Method: Explanation  Response: Verbalizes Understanding    Body Mechanics, taught by Zuhair Munroe, PT at 8/26/2024  1:06 PM.  Learner: Patient  Readiness: Acceptance  Method: Explanation  Response: Verbalizes Understanding    Mobility Training, taught by Zuhair Munroe, PT at 8/26/2024  1:06 PM.  Learner: Patient  Readiness: Acceptance  Method: Explanation  Response: Verbalizes Understanding    Education Comments  No comments found.

## 2024-08-26 NOTE — CARE PLAN
The patient's goals for the shift include safety maintained.     The clinical goals for the shift include improved abdominal pain by end of shift

## 2024-08-26 NOTE — PROGRESS NOTES
Fernando Cuevas is a 63 y.o. female on day 7 of admission presenting with Sepsis due to undetermined organism (Multi).    Subjective   On room air. Still no BM or flatus. Still with similar abdominal pain but mostly complaining of R 1st toe pain. No n/v. Afebrile. Still with ngt to lws. No overnight events reported.        Objective     Physical Exam  Vitals reviewed.   Constitutional:       Appearance: Normal appearance.   HENT:      Head: Normocephalic.      Right Ear: Tympanic membrane normal.      Nose: Nose normal.      Mouth/Throat:      Mouth: Mucous membranes are moist.   Cardiovascular:      Rate and Rhythm: Normal rate and regular rhythm.   Pulmonary:      Effort: Pulmonary effort is normal.   Abdominal:      General: Bowel sounds are normal.      Palpations: Abdomen is soft.      Comments: Distended     Skin:     Capillary Refill: Capillary refill takes less than 2 seconds.   Neurological:      General: No focal deficit present.      Mental Status: She is alert.         Last Recorded Vitals  Blood pressure 150/84, pulse (!) 114, temperature 35.8 °C (96.4 °F), resp. rate 18, weight 61.7 kg (136 lb), SpO2 100%.  Intake/Output last 3 Shifts:  I/O last 3 completed shifts:  In: 420 (6.8 mL/kg) [P.O.:120; IV Piggyback:300]  Out: 1260 (20.4 mL/kg) [Urine:650 (0.3 mL/kg/hr); Emesis/NG output:610]  Weight: 61.7 kg     Relevant Results  Results for orders placed or performed during the hospital encounter of 08/19/24 (from the past 24 hour(s))   Basic Metabolic Panel   Result Value Ref Range    Glucose 98 74 - 99 mg/dL    Sodium 139 136 - 145 mmol/L    Potassium 3.4 (L) 3.5 - 5.3 mmol/L    Chloride 102 98 - 107 mmol/L    Bicarbonate 27 21 - 32 mmol/L    Anion Gap 13 10 - 20 mmol/L    Urea Nitrogen 5 (L) 6 - 23 mg/dL    Creatinine 0.47 (L) 0.50 - 1.05 mg/dL    eGFR >90 >60 mL/min/1.73m*2    Calcium 8.2 (L) 8.6 - 10.3 mg/dL   CBC   Result Value Ref Range    WBC 11.2 4.4 - 11.3 x10*3/uL    nRBC 0.2 (H) 0.0 - 0.0 /100  WBCs    RBC 3.48 (L) 4.00 - 5.20 x10*6/uL    Hemoglobin 8.2 (L) 12.0 - 16.0 g/dL    Hematocrit 28.0 (L) 36.0 - 46.0 %    MCV 81 80 - 100 fL    MCH 23.6 (L) 26.0 - 34.0 pg    MCHC 29.3 (L) 32.0 - 36.0 g/dL    RDW 20.8 (H) 11.5 - 14.5 %    Platelets 671 (H) 150 - 450 x10*3/uL       Imaging Results  Ct abd/pelvis:  IMPRESSION:  Postsurgical changes right lower quadrant within the small bowel with  mild wall thickening without evidence of obstruction.  Stranding in the left anterior abdominal wall the subcutaneous soft  tissues which appears stable.  This appears to be related to small  fat-containing hernia.  Scarring and parenchymal opacities in the bilateral medial and lower  lobes appear stable.  Cholelithiasis.    Abd x ray:  IMPRESSION:  Increased gaseous bowel dilatation since previous day's exam may  reflect worsening ileus or obstruction.      Probable distended urinary bladder.    Medications:  ampicillin-sulbactam, 3 g, intravenous, q6h  apixaban, 5 mg, oral, BID  budesonide, 0.5 mg, nebulization, BID  busPIRone, 5 mg, oral, BID  dilTIAZem ER, 240 mg, oral, Daily  docusate sodium, 100 mg, oral, BID  [Held by provider] formoterol, 20 mcg, nebulization, BID  ipratropium-albuteroL, 3 mL, nebulization, TID  mirtazapine, 15 mg, oral, Nightly  montelukast, 10 mg, oral, Daily  oxygen, , inhalation, Continuous - Inhalation  pantoprazole, 40 mg, oral, BID  polyethylene glycol, 17 g, oral, BID  thiamine, 100 mg, oral, Daily  [Held by provider] tiotropium, 2 puff, inhalation, Daily       PRN medications: acetaminophen **OR** acetaminophen **OR** acetaminophen, albuterol, benzonatate, diphenhydrAMINE, hydrALAZINE, hydrOXYzine HCL, melatonin, morphine, morphine, ondansetron ODT **OR** ondansetron, prochlorperazine **OR** prochlorperazine     Assessment/Plan       8/26:  Has completed a good course of iv abx... can stop for now.   Sbo.. persists, with ngt lws... still no bm/flatus.... f/up OR.   Replace K   R 1st toe  pain... xray with spur formation, nothing on exam, no hx of gout... f/up PODs consult.   Uncontrolled HTN... will add iv hydralazine PRN.       8/25:  Enteritis... leukocytosis resolved... cont iv abx.   SBO... s/p ngt... f/up OR recs.   HypoMg... replace  HypoK... replace        8/24:  Enteritis.... wbc improving... cont iv abx.   CT last night with sbo... f/up OR team. She's refusing ngt this morning.   HypoK... replace, monitor. Check Mg tomorrow.   Pt/ot      8/23: Abd distended. WBC improving. CT abd/pel ordered per surgery team. Continue iv antibiotics    8/22: abd more distended; surgery ordering another xray wbc improving    8/21:  Abdomen still distended no diarrhea, white count up to 33,000, question reactive, leave on empiric therapy for now per the patient is not on oxygen at home we will try to wean.  Follow-up surgical recommendations  8/20:  Hx emergency surgery for perforated bowel 6/21/24, post-op complications with ileus and wound hematoma s/p wound vac, concern for anastomic infection, Hx GIB s/p EGD around 7/26/24, found to have friable gastric mucosa, Hx upper extremity DVT L brachial 6/27/24, R brachial 7/6/24 and has been on eliquis although reportedly not taking recently. Eliquis was not stopped following admission for GIB.      Now here with abdominal pain which is very tender with guarding and significant leukocytosis along with tachycardia... Imaging is unrevealing.... f/up surgery and ID recs, cont iv abx, f/up cultures, iv analgesia, ivf.      Diarrhea... check C diff.      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 going to hold steroids for now given concern for infection.      HTN urgency... improving with home cardizem.   Hx UE DVT... resume eliquis.      + cocaine  UA unremarkable  CTA without PNA or PE.      Pt lives at home with her son, uses walker baseline.     DVT Prophylaxis:  Marcus Fry MD  [facility]

## 2024-08-27 ENCOUNTER — APPOINTMENT (OUTPATIENT)
Dept: RADIOLOGY | Facility: HOSPITAL | Age: 64
End: 2024-08-27
Payer: COMMERCIAL

## 2024-08-27 LAB
ANION GAP SERPL CALC-SCNC: 16 MMOL/L (ref 10–20)
BUN SERPL-MCNC: 7 MG/DL (ref 6–23)
CALCIUM SERPL-MCNC: 8.2 MG/DL (ref 8.6–10.3)
CHLORIDE SERPL-SCNC: 102 MMOL/L (ref 98–107)
CO2 SERPL-SCNC: 26 MMOL/L (ref 21–32)
CREAT SERPL-MCNC: 0.55 MG/DL (ref 0.5–1.05)
EGFRCR SERPLBLD CKD-EPI 2021: >90 ML/MIN/1.73M*2
ERYTHROCYTE [DISTWIDTH] IN BLOOD BY AUTOMATED COUNT: 20.4 % (ref 11.5–14.5)
GLUCOSE SERPL-MCNC: 85 MG/DL (ref 74–99)
HCT VFR BLD AUTO: 25.8 % (ref 36–46)
HGB BLD-MCNC: 8.2 G/DL (ref 12–16)
MAGNESIUM SERPL-MCNC: 1.9 MG/DL (ref 1.6–2.4)
MCH RBC QN AUTO: 23.9 PG (ref 26–34)
MCHC RBC AUTO-ENTMCNC: 31.8 G/DL (ref 32–36)
MCV RBC AUTO: 75 FL (ref 80–100)
NRBC BLD-RTO: 0 /100 WBCS (ref 0–0)
PLATELET # BLD AUTO: 700 X10*3/UL (ref 150–450)
POTASSIUM SERPL-SCNC: 3.7 MMOL/L (ref 3.5–5.3)
RBC # BLD AUTO: 3.43 X10*6/UL (ref 4–5.2)
SODIUM SERPL-SCNC: 140 MMOL/L (ref 136–145)
WBC # BLD AUTO: 9.3 X10*3/UL (ref 4.4–11.3)

## 2024-08-27 PROCEDURE — 2500000001 HC RX 250 WO HCPCS SELF ADMINISTERED DRUGS (ALT 637 FOR MEDICARE OP): Performed by: INTERNAL MEDICINE

## 2024-08-27 PROCEDURE — 2500000005 HC RX 250 GENERAL PHARMACY W/O HCPCS: Performed by: PHYSICIAN ASSISTANT

## 2024-08-27 PROCEDURE — 2500000004 HC RX 250 GENERAL PHARMACY W/ HCPCS (ALT 636 FOR OP/ED): Performed by: PHYSICIAN ASSISTANT

## 2024-08-27 PROCEDURE — 85027 COMPLETE CBC AUTOMATED: CPT | Performed by: INTERNAL MEDICINE

## 2024-08-27 PROCEDURE — 2500000001 HC RX 250 WO HCPCS SELF ADMINISTERED DRUGS (ALT 637 FOR MEDICARE OP): Performed by: PHYSICIAN ASSISTANT

## 2024-08-27 PROCEDURE — 2500000002 HC RX 250 W HCPCS SELF ADMINISTERED DRUGS (ALT 637 FOR MEDICARE OP, ALT 636 FOR OP/ED): Performed by: PHARMACIST

## 2024-08-27 PROCEDURE — 2500000005 HC RX 250 GENERAL PHARMACY W/O HCPCS

## 2024-08-27 PROCEDURE — 2500000002 HC RX 250 W HCPCS SELF ADMINISTERED DRUGS (ALT 637 FOR MEDICARE OP, ALT 636 FOR OP/ED): Performed by: INTERNAL MEDICINE

## 2024-08-27 PROCEDURE — 74018 RADEX ABDOMEN 1 VIEW: CPT | Performed by: RADIOLOGY

## 2024-08-27 PROCEDURE — 99232 SBSQ HOSP IP/OBS MODERATE 35: CPT

## 2024-08-27 PROCEDURE — 99233 SBSQ HOSP IP/OBS HIGH 50: CPT | Performed by: INTERNAL MEDICINE

## 2024-08-27 PROCEDURE — 1100000001 HC PRIVATE ROOM DAILY

## 2024-08-27 PROCEDURE — 36415 COLL VENOUS BLD VENIPUNCTURE: CPT | Performed by: INTERNAL MEDICINE

## 2024-08-27 PROCEDURE — 94640 AIRWAY INHALATION TREATMENT: CPT

## 2024-08-27 PROCEDURE — 83735 ASSAY OF MAGNESIUM: CPT | Performed by: INTERNAL MEDICINE

## 2024-08-27 PROCEDURE — 80048 BASIC METABOLIC PNL TOTAL CA: CPT | Performed by: INTERNAL MEDICINE

## 2024-08-27 PROCEDURE — 2500000004 HC RX 250 GENERAL PHARMACY W/ HCPCS (ALT 636 FOR OP/ED): Performed by: INTERNAL MEDICINE

## 2024-08-27 PROCEDURE — 74018 RADEX ABDOMEN 1 VIEW: CPT

## 2024-08-27 PROCEDURE — 2500000004 HC RX 250 GENERAL PHARMACY W/ HCPCS (ALT 636 FOR OP/ED)

## 2024-08-27 PROCEDURE — 2500000001 HC RX 250 WO HCPCS SELF ADMINISTERED DRUGS (ALT 637 FOR MEDICARE OP): Performed by: HOSPITALIST

## 2024-08-27 RX ORDER — OXYCODONE HYDROCHLORIDE 5 MG/1
2.5 TABLET ORAL EVERY 6 HOURS PRN
Status: DISCONTINUED | OUTPATIENT
Start: 2024-08-27 | End: 2024-08-28

## 2024-08-27 RX ORDER — OXYCODONE HYDROCHLORIDE 5 MG/1
5 TABLET ORAL EVERY 6 HOURS PRN
Status: DISCONTINUED | OUTPATIENT
Start: 2024-08-27 | End: 2024-08-28

## 2024-08-27 RX ORDER — LISINOPRIL 5 MG/1
5 TABLET ORAL DAILY
Status: DISCONTINUED | OUTPATIENT
Start: 2024-08-27 | End: 2024-09-01 | Stop reason: HOSPADM

## 2024-08-27 RX ORDER — ACETAMINOPHEN 325 MG/1
975 TABLET ORAL EVERY 8 HOURS SCHEDULED
Status: DISCONTINUED | OUTPATIENT
Start: 2024-08-27 | End: 2024-08-28

## 2024-08-27 RX ORDER — DIPHENHYDRAMINE HCL 25 MG
25 CAPSULE ORAL ONCE
Status: COMPLETED | OUTPATIENT
Start: 2024-08-27 | End: 2024-08-27

## 2024-08-27 RX ADMIN — DILTIAZEM HYDROCHLORIDE 240 MG: 120 CAPSULE, EXTENDED RELEASE ORAL at 08:11

## 2024-08-27 RX ADMIN — BUSPIRONE HYDROCHLORIDE 5 MG: 5 TABLET ORAL at 08:11

## 2024-08-27 RX ADMIN — BUDESONIDE 0.5 MG: 0.5 INHALANT RESPIRATORY (INHALATION) at 19:14

## 2024-08-27 RX ADMIN — DIPHENHYDRAMINE HYDROCHLORIDE 12.5 MG: 50 INJECTION, SOLUTION INTRAMUSCULAR; INTRAVENOUS at 07:09

## 2024-08-27 RX ADMIN — Medication 1 L/MIN: at 19:14

## 2024-08-27 RX ADMIN — HYDROXYZINE HYDROCHLORIDE 10 MG: 10 TABLET ORAL at 07:53

## 2024-08-27 RX ADMIN — IPRATROPIUM BROMIDE AND ALBUTEROL SULFATE 3 ML: 2.5; .5 SOLUTION RESPIRATORY (INHALATION) at 07:22

## 2024-08-27 RX ADMIN — DOCUSATE SODIUM 100 MG: 100 CAPSULE, LIQUID FILLED ORAL at 08:11

## 2024-08-27 RX ADMIN — MORPHINE SULFATE 4 MG: 4 INJECTION, SOLUTION INTRAMUSCULAR; INTRAVENOUS at 08:16

## 2024-08-27 RX ADMIN — DOCUSATE SODIUM 100 MG: 100 CAPSULE, LIQUID FILLED ORAL at 20:11

## 2024-08-27 RX ADMIN — IPRATROPIUM BROMIDE AND ALBUTEROL SULFATE 3 ML: 2.5; .5 SOLUTION RESPIRATORY (INHALATION) at 19:14

## 2024-08-27 RX ADMIN — MORPHINE SULFATE 4 MG: 4 INJECTION, SOLUTION INTRAMUSCULAR; INTRAVENOUS at 14:42

## 2024-08-27 RX ADMIN — HYDROXYZINE HYDROCHLORIDE 10 MG: 10 TABLET ORAL at 16:14

## 2024-08-27 RX ADMIN — APIXABAN 5 MG: 5 TABLET, FILM COATED ORAL at 20:12

## 2024-08-27 RX ADMIN — MONTELUKAST 10 MG: 10 TABLET, FILM COATED ORAL at 08:11

## 2024-08-27 RX ADMIN — METHYLNALTREXONE BROMIDE 12 MG: 12 INJECTION, SOLUTION SUBCUTANEOUS at 16:12

## 2024-08-27 RX ADMIN — BUDESONIDE 0.5 MG: 0.5 INHALANT RESPIRATORY (INHALATION) at 07:22

## 2024-08-27 RX ADMIN — MORPHINE SULFATE 4 MG: 4 INJECTION, SOLUTION INTRAMUSCULAR; INTRAVENOUS at 04:57

## 2024-08-27 RX ADMIN — Medication 100 MG: at 08:11

## 2024-08-27 RX ADMIN — LISINOPRIL 5 MG: 5 TABLET ORAL at 10:40

## 2024-08-27 RX ADMIN — PANTOPRAZOLE SODIUM 40 MG: 40 INJECTION, POWDER, FOR SOLUTION INTRAVENOUS at 08:11

## 2024-08-27 RX ADMIN — OXYCODONE HYDROCHLORIDE 5 MG: 5 TABLET ORAL at 20:11

## 2024-08-27 RX ADMIN — MIRTAZAPINE 15 MG: 15 TABLET, FILM COATED ORAL at 20:11

## 2024-08-27 RX ADMIN — POLYETHYLENE GLYCOL 3350 17 G: 17 POWDER, FOR SOLUTION ORAL at 08:10

## 2024-08-27 RX ADMIN — Medication 2 L/MIN: at 07:22

## 2024-08-27 RX ADMIN — POLYETHYLENE GLYCOL 3350 17 G: 17 POWDER, FOR SOLUTION ORAL at 20:11

## 2024-08-27 RX ADMIN — APIXABAN 5 MG: 5 TABLET, FILM COATED ORAL at 08:11

## 2024-08-27 RX ADMIN — ACETAMINOPHEN 975 MG: 325 TABLET ORAL at 22:12

## 2024-08-27 RX ADMIN — BUSPIRONE HYDROCHLORIDE 5 MG: 5 TABLET ORAL at 20:11

## 2024-08-27 RX ADMIN — ACETAMINOPHEN 975 MG: 325 TABLET ORAL at 15:52

## 2024-08-27 RX ADMIN — DIPHENHYDRAMINE HYDROCHLORIDE 12.5 MG: 50 INJECTION, SOLUTION INTRAMUSCULAR; INTRAVENOUS at 13:08

## 2024-08-27 RX ADMIN — DIPHENHYDRAMINE HYDROCHLORIDE 25 MG: 25 CAPSULE ORAL at 20:34

## 2024-08-27 RX ADMIN — SODIUM PHOSPHATE 1 ENEMA: 7; 19 ENEMA RECTAL at 12:49

## 2024-08-27 ASSESSMENT — PAIN SCALES - GENERAL
PAINLEVEL_OUTOF10: 8
PAINLEVEL_OUTOF10: 2
PAINLEVEL_OUTOF10: 7
PAINLEVEL_OUTOF10: 8
PAINLEVEL_OUTOF10: 7
PAINLEVEL_OUTOF10: 7
PAINLEVEL_OUTOF10: 8

## 2024-08-27 ASSESSMENT — PAIN DESCRIPTION - LOCATION
LOCATION: ABDOMEN

## 2024-08-27 ASSESSMENT — COGNITIVE AND FUNCTIONAL STATUS - GENERAL
DAILY ACTIVITIY SCORE: 19
TOILETING: A LITTLE
DRESSING REGULAR UPPER BODY CLOTHING: A LITTLE
CLIMB 3 TO 5 STEPS WITH RAILING: A LOT
STANDING UP FROM CHAIR USING ARMS: A LITTLE
PERSONAL GROOMING: A LITTLE
DRESSING REGULAR LOWER BODY CLOTHING: A LITTLE
TURNING FROM BACK TO SIDE WHILE IN FLAT BAD: A LITTLE
MOVING TO AND FROM BED TO CHAIR: A LITTLE
HELP NEEDED FOR BATHING: A LITTLE
WALKING IN HOSPITAL ROOM: A LITTLE
MOBILITY SCORE: 18

## 2024-08-27 ASSESSMENT — PAIN - FUNCTIONAL ASSESSMENT
PAIN_FUNCTIONAL_ASSESSMENT: 0-10

## 2024-08-27 ASSESSMENT — PAIN SCALES - WONG BAKER
WONGBAKER_NUMERICALRESPONSE: HURTS EVEN MORE
WONGBAKER_NUMERICALRESPONSE: HURTS EVEN MORE

## 2024-08-27 ASSESSMENT — PAIN DESCRIPTION - ORIENTATION: ORIENTATION: RIGHT

## 2024-08-27 NOTE — PROGRESS NOTES
Music Therapy Note      Therapy Session  Referral Type: New referral this admission  Visit Type: Follow-up visit  Session Start Time: 1335  Session End Time: 1336  Intervention Delivery: In-person  Conflict of Service: None     Pre-assessment  Mood/Affect: Calm         Treatment/Interventions  Music Therapy Interventions: Assessment    Post-assessment  Total Session Time (min): 1 minutes    Narrative  Assessment Detail: Pt is known to this MT from previous admission. Pt was sitting at the edge of the bed, alert and displaying fatigued affect. Upon MT's re-introduction pt shared that she was feeling ill and requested MT return another time. Pt thanked the MT for the visit.  Follow-up: MT staff will reattempt as able.    Education Documentation  No documentation found.

## 2024-08-27 NOTE — PROGRESS NOTES
08/27/24 0743   Current Planned Discharge Disposition   Current Planned Discharge Disposition Home Health     Once med ready plan is to discharge home with HHC, which is [facility], PT/OT both rec'd Low on 8/27 , General sx following for Ileus vs SBO, NG and Enemas pending KUB, will continue to monitor for discharge planning.

## 2024-08-27 NOTE — PROGRESS NOTES
Fernando Cuevas is a 63 y.o. female on day 8 of admission presenting with Sepsis due to undetermined organism (Multi). Patient being managed conservatively with bowel rest and NGT for ileus vs pSBO. NGT has been clamped since yesterday. KUB 8/26 continues to show ileus vs pSBO. KUB today shows some mild improvement.     Subjective   Patient complaining of pain from the NGT, starting to feel better, remains bloated but denies abdominal pain, tolerating liquid without N/V. States she is having some BM's but looks like mucous.        Objective   PE:  Constitutional: A&Ox3, calm and cooperative  Eyes: clear sclera   ENMT: Moist mucous membranes  Head/Neck: Neck supple  Cardiovascular: intermittent tachycardia   Respiratory/Thorax Good symmetric chest expansion.   Gastrointestinal: Abdomen distended, soft, non-tender    Genitourinary: Voiding independently   Musculoskeletal: ROM intact  Extremities: No peripheral edema  Neurological: A&Ox3   Psychological: Appropriate mood and behavior  Skin: Warm and dry    Last Recorded Vitals  Blood pressure (!) 154/92, pulse 101, temperature 35.8 °C (96.4 °F), resp. rate 18, weight 61.7 kg (136 lb), SpO2 100%.  Intake/Output last 3 Shifts:  I/O last 3 completed shifts:  In: 360 (5.8 mL/kg) [P.O.:360]  Out: 1550 (25.1 mL/kg) [Urine:850 (0.4 mL/kg/hr); Emesis/NG output:700]  Weight: 61.7 kg     Relevant Results  Results for orders placed or performed during the hospital encounter of 08/19/24 (from the past 24 hour(s))   Basic Metabolic Panel   Result Value Ref Range    Glucose 85 74 - 99 mg/dL    Sodium 140 136 - 145 mmol/L    Potassium 3.7 3.5 - 5.3 mmol/L    Chloride 102 98 - 107 mmol/L    Bicarbonate 26 21 - 32 mmol/L    Anion Gap 16 10 - 20 mmol/L    Urea Nitrogen 7 6 - 23 mg/dL    Creatinine 0.55 0.50 - 1.05 mg/dL    eGFR >90 >60 mL/min/1.73m*2    Calcium 8.2 (L) 8.6 - 10.3 mg/dL   CBC   Result Value Ref Range    WBC 9.3 4.4 - 11.3 x10*3/uL    nRBC 0.0 0.0 - 0.0 /100 WBCs    RBC  3.43 (L) 4.00 - 5.20 x10*6/uL    Hemoglobin 8.2 (L) 12.0 - 16.0 g/dL    Hematocrit 25.8 (L) 36.0 - 46.0 %    MCV 75 (L) 80 - 100 fL    MCH 23.9 (L) 26.0 - 34.0 pg    MCHC 31.8 (L) 32.0 - 36.0 g/dL    RDW 20.4 (H) 11.5 - 14.5 %    Platelets 700 (H) 150 - 450 x10*3/uL   Magnesium   Result Value Ref Range    Magnesium 1.90 1.60 - 2.40 mg/dL     XR abdomen 1 view   Final Result   Distention of small bowel loops which appears to have slightly   improved with gas and stool throughout the colon. This may be related   to partial small bowel obstruction or ileus.             MACRO:   None        Signed by: Joslyn Camara 8/27/2024 8:49 AM   Dictation workstation:   [id number]      Vascular US ankle brachial index (AUSTYN) without exercise   Final Result      XR abdomen 1 view   Final Result   Nonspecific distal colonic obstruction versus diffuse ileus,   progressed.        NG tube in place.        MACRO:   None        Signed by: Gabe Gage 8/26/2024 11:51 AM   Dictation workstation:   [id number]      XR abdomen 1 view   Final Result   Nasogastric tube tip terminates in the left upper abdomen at the   level of the proximal stomach; advancement is recommended.        Redemonstration of gaseous dilatation of bowel in the imaged upper   abdomen which may be secondary to ileus or obstruction.             MACRO:   None        Signed by: Paulie Delarosa 8/25/2024 2:36 AM   Dictation workstation:   [id number]      XR foot right 3+ views   Final Result   Slight DJD in the 1st MTP joint.  Remainder of the exam was negative.        MACRO:   None        Signed by: Gabe Gage 8/25/2024 12:09 PM   Dictation workstation:   [id number]      CT abdomen and pelvis w oral contrast only   Final Result   Fluid-filled mild to moderately dilated small bowel in the abdomen and   pelvis with air-fluid levels with focal wall thickening and   inflammation of loops of small bowel right upper quadrant.  No   discrete transition point is identified,  the small bowel appears   widely patent however the distal small bowel is decompressed.    Findings suggest an acute infectious or inflammatory enteritis causing   a moderate grade partial or intermittent small bowel obstruction,   possibly from dysmotility rather than a mechanical obstruction.   Moderate centrilobular emphysema.   9 mm right lower lobe pulmonary nodule, follow-up per [employer] guidelines.   Suspected systemic anemia.   Cholelithiasis and gallbladder sludge.   NOTIFICATION:  The critical results of the study were discussed with,   and acknowledged by Dr. Sheth by telephone on 8/24/2024 at 1:04 AM.   Signed by Darshan David      XR abdomen 1 view   Final Result   Persistent findings ongoing ileus versus partial distal small bowel   obstruction findings most likely representing ongoing ileus with   little change from yesterday's exam.        MACRO:   None        Signed by: Gabe Gage 8/22/2024 9:56 AM   Dictation workstation:   [id number]      XR abdomen 1 view   Final Result   Increased gaseous bowel dilatation since previous day's exam may   reflect worsening ileus or obstruction.        Probable distended urinary bladder.        MACRO:   None.        Signed by: Karina Ventura 8/21/2024 8:22 AM   Dictation workstation:   [id number]      XR abdomen 1 view   Final Result   Distended stomach.        Probable distention of the bladder.        Several mildly distended small bowel loops which may be related to an   ileus continued follow-up is suggested             MACRO:   None        Signed by: Joslyn Camara 8/20/2024 10:40 AM   Dictation workstation:   [id number]      CT abdomen pelvis wo IV contrast   Final Result   Postsurgical changes right lower quadrant within the small bowel with   mild wall thickening without evidence of obstruction.   Stranding in the left anterior abdominal wall the subcutaneous soft   tissues which appears stable.  This appears to be related to small    fat-containing hernia.   Scarring and parenchymal opacities in the bilateral medial and lower   lobes appear stable.   Cholelithiasis.   Signed by Armando Mcgarry, DO             Assessment/Plan   Assessment & Plan  Generalized abdominal pain    Fernando Cuevas is a 63 y.o. female on day 8 of admission presenting with Sepsis due to undetermined organism (Multi). Patient being managed conservatively with bowel rest and NGT for ileus vs pSBO. NGT has been clamped since yesterday. KUB 8/26 continues to show ileus vs pSBO. KUB today shows some mild improvement.     Plan:  - Will remove NGT today but continue to go slow with increasing PO intake  - CLD --> will start  advancing tomorrow if starts having BM's  - bowel regimen: stool softeners/miralax BID  - enemas daily  - daily PPI  - DVT proph: SCD's/Eliquis  - PRN pain medications  - PRN antiemetics     Dispo: Bowel regimen, liquids, OOB as much as possible.    I spent 35 minutes in the professional and overall care of this patient.      Helen Leon, APRN-CNP

## 2024-08-27 NOTE — PROGRESS NOTES
Fernando Cuevas is a 63 y.o. female on day 8 of admission presenting with Sepsis due to undetermined organism (Multi).    Subjective   On 2L. She feels better today. Passed flatus but no BM yet. Foot pain is ok. Is hungry. Afebrile. No overnight events reported.        Objective     Physical Exam  Vitals reviewed.   Constitutional:       Appearance: Normal appearance.   HENT:      Head: Normocephalic.      Right Ear: Tympanic membrane normal.      Nose: Nose normal.      Mouth/Throat:      Mouth: Mucous membranes are moist.   Cardiovascular:      Rate and Rhythm: Normal rate and regular rhythm.   Pulmonary:      Effort: Pulmonary effort is normal.   Abdominal:      General: Bowel sounds are normal.      Palpations: Abdomen is soft.      Comments: Distended     Skin:     Capillary Refill: Capillary refill takes less than 2 seconds.   Neurological:      General: No focal deficit present.      Mental Status: She is alert.         Last Recorded Vitals  Blood pressure (!) 154/92, pulse 101, temperature 35.8 °C (96.4 °F), resp. rate 18, weight 61.7 kg (136 lb), SpO2 100%.  Intake/Output last 3 Shifts:  I/O last 3 completed shifts:  In: 360 (5.8 mL/kg) [P.O.:360]  Out: 1550 (25.1 mL/kg) [Urine:850 (0.4 mL/kg/hr); Emesis/NG output:700]  Weight: 61.7 kg     Relevant Results  Results for orders placed or performed during the hospital encounter of 08/19/24 (from the past 24 hour(s))   Basic Metabolic Panel   Result Value Ref Range    Glucose 85 74 - 99 mg/dL    Sodium 140 136 - 145 mmol/L    Potassium 3.7 3.5 - 5.3 mmol/L    Chloride 102 98 - 107 mmol/L    Bicarbonate 26 21 - 32 mmol/L    Anion Gap 16 10 - 20 mmol/L    Urea Nitrogen 7 6 - 23 mg/dL    Creatinine 0.55 0.50 - 1.05 mg/dL    eGFR >90 >60 mL/min/1.73m*2    Calcium 8.2 (L) 8.6 - 10.3 mg/dL   CBC   Result Value Ref Range    WBC 9.3 4.4 - 11.3 x10*3/uL    nRBC 0.0 0.0 - 0.0 /100 WBCs    RBC 3.43 (L) 4.00 - 5.20 x10*6/uL    Hemoglobin 8.2 (L) 12.0 - 16.0 g/dL     Hematocrit 25.8 (L) 36.0 - 46.0 %    MCV 75 (L) 80 - 100 fL    MCH 23.9 (L) 26.0 - 34.0 pg    MCHC 31.8 (L) 32.0 - 36.0 g/dL    RDW 20.4 (H) 11.5 - 14.5 %    Platelets 700 (H) 150 - 450 x10*3/uL   Magnesium   Result Value Ref Range    Magnesium 1.90 1.60 - 2.40 mg/dL       Imaging Results  Ct abd/pelvis:  IMPRESSION:  Postsurgical changes right lower quadrant within the small bowel with  mild wall thickening without evidence of obstruction.  Stranding in the left anterior abdominal wall the subcutaneous soft  tissues which appears stable.  This appears to be related to small  fat-containing hernia.  Scarring and parenchymal opacities in the bilateral medial and lower  lobes appear stable.  Cholelithiasis.    Abd x ray:  IMPRESSION:  Increased gaseous bowel dilatation since previous day's exam may  reflect worsening ileus or obstruction.      Probable distended urinary bladder.    Medications:  acetaminophen, 975 mg, oral, q8h LISET  apixaban, 5 mg, oral, BID  [START ON 8/28/2024] azithromycin, 250 mg, oral, Once per day on Monday Wednesday Friday  budesonide, 0.5 mg, nebulization, BID  busPIRone, 5 mg, oral, BID  dilTIAZem ER, 240 mg, oral, Daily  docusate sodium, 100 mg, oral, BID  [Held by provider] formoterol, 20 mcg, nebulization, BID  ipratropium-albuteroL, 3 mL, nebulization, TID  lisinopril, 5 mg, oral, Daily  methynaltrexone, 12 mg, subcutaneous, Once  mirtazapine, 15 mg, oral, Nightly  montelukast, 10 mg, oral, Daily  oxygen, , inhalation, Continuous - Inhalation  pantoprazole, 40 mg, intravenous, Daily  polyethylene glycol, 17 g, oral, BID  thiamine, 100 mg, oral, Daily  [Held by provider] tiotropium, 2 puff, inhalation, Daily       PRN medications: albuterol, benzonatate, calcium carbonate, [Held by provider] diphenhydrAMINE, hydrALAZINE, hydrOXYzine HCL, melatonin, ondansetron ODT **OR** ondansetron, oxyCODONE, oxyCODONE, prochlorperazine **OR** prochlorperazine     Assessment/Plan       8/27:  NGT out,  cont clear diet and enemas per OR team. Will add relistor for dysmotility. Can try reglan if that doesn't help.     R foot workup per PODs.   HTN... add lisinopril today, titrate.     Pt/ot, encouraged mobility.       8/26:  Has completed a good course of iv abx... can stop for now.   Sbo.. persists, with ngt lws... still no bm/flatus.... f/up OR.   Replace K   R 1st toe pain... xray with spur formation, nothing on exam, no hx of gout... f/up PODs consult.   Uncontrolled HTN... will add iv hydralazine PRN.       8/25:  Enteritis... leukocytosis resolved... cont iv abx.   SBO... s/p ngt... f/up OR recs.   HypoMg... replace  HypoK... replace        8/24:  Enteritis.... wbc improving... cont iv abx.   CT last night with sbo... f/up OR team. She's refusing ngt this morning.   HypoK... replace, monitor. Check Mg tomorrow.   Pt/ot      8/23: Abd distended. WBC improving. CT abd/pel ordered per surgery team. Continue iv antibiotics    8/22: abd more distended; surgery ordering another xray wbc improving    8/21:  Abdomen still distended no diarrhea, white count up to 33,000, question reactive, leave on empiric therapy for now per the patient is not on oxygen at home we will try to wean.  Follow-up surgical recommendations  8/20:  Hx emergency surgery for perforated bowel 6/21/24, post-op complications with ileus and wound hematoma s/p wound vac, concern for anastomic infection, Hx GIB s/p EGD around 7/26/24, found to have friable gastric mucosa, Hx upper extremity DVT L brachial 6/27/24, R brachial 7/6/24 and has been on eliquis although reportedly not taking recently. Eliquis was not stopped following admission for GIB.      Now here with abdominal pain which is very tender with guarding and significant leukocytosis along with tachycardia... Imaging is unrevealing.... f/up surgery and ID recs, cont iv abx, f/up cultures, iv analgesia, ivf.      Diarrhea... check C diff.      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 going to hold steroids for now given concern for infection.      HTN urgency... improving with home cardizem.   Hx UE DVT... resume eliquis.      + cocaine  UA unremarkable  CTA without PNA or PE.      Pt lives at home with her son, uses walker baseline.     DVT Prophylaxis:  Marcus Fry MD  [facility]

## 2024-08-27 NOTE — CARE PLAN
The patient's goals for the shift include  pain management    The clinical goals for the shift include pain controlled, safety maintained      Problem: Pain - Adult  Goal: Verbalizes/displays adequate comfort level or baseline comfort level  Outcome: Progressing     Problem: Safety - Adult  Goal: Free from fall injury  Outcome: Progressing     Problem: Discharge Planning  Goal: Discharge to home or other facility with appropriate resources  Outcome: Progressing     Problem: Chronic Conditions and Co-morbidities  Goal: Patient's chronic conditions and co-morbidity symptoms are monitored and maintained or improved  Outcome: Progressing     Problem: Pain  Goal: Takes deep breaths with improved pain control throughout the shift  Outcome: Progressing

## 2024-08-28 LAB
ANION GAP SERPL CALC-SCNC: 17 MMOL/L (ref 10–20)
BUN SERPL-MCNC: 7 MG/DL (ref 6–23)
CALCIUM SERPL-MCNC: 8.2 MG/DL (ref 8.6–10.3)
CHLORIDE SERPL-SCNC: 101 MMOL/L (ref 98–107)
CO2 SERPL-SCNC: 25 MMOL/L (ref 21–32)
CREAT SERPL-MCNC: 0.56 MG/DL (ref 0.5–1.05)
EGFRCR SERPLBLD CKD-EPI 2021: >90 ML/MIN/1.73M*2
ERYTHROCYTE [DISTWIDTH] IN BLOOD BY AUTOMATED COUNT: 20.7 % (ref 11.5–14.5)
GLUCOSE SERPL-MCNC: 88 MG/DL (ref 74–99)
HCT VFR BLD AUTO: 27.9 % (ref 36–46)
HGB BLD-MCNC: 8.6 G/DL (ref 12–16)
MAGNESIUM SERPL-MCNC: 1.7 MG/DL (ref 1.6–2.4)
MCH RBC QN AUTO: 23.7 PG (ref 26–34)
MCHC RBC AUTO-ENTMCNC: 30.8 G/DL (ref 32–36)
MCV RBC AUTO: 77 FL (ref 80–100)
NRBC BLD-RTO: 0 /100 WBCS (ref 0–0)
PLATELET # BLD AUTO: 807 X10*3/UL (ref 150–450)
POTASSIUM SERPL-SCNC: 2.8 MMOL/L (ref 3.5–5.3)
RBC # BLD AUTO: 3.63 X10*6/UL (ref 4–5.2)
SODIUM SERPL-SCNC: 140 MMOL/L (ref 136–145)
WBC # BLD AUTO: 6.2 X10*3/UL (ref 4.4–11.3)

## 2024-08-28 PROCEDURE — 94640 AIRWAY INHALATION TREATMENT: CPT

## 2024-08-28 PROCEDURE — 2500000001 HC RX 250 WO HCPCS SELF ADMINISTERED DRUGS (ALT 637 FOR MEDICARE OP): Performed by: INTERNAL MEDICINE

## 2024-08-28 PROCEDURE — 99233 SBSQ HOSP IP/OBS HIGH 50: CPT | Performed by: INTERNAL MEDICINE

## 2024-08-28 PROCEDURE — 2500000002 HC RX 250 W HCPCS SELF ADMINISTERED DRUGS (ALT 637 FOR MEDICARE OP, ALT 636 FOR OP/ED): Performed by: INTERNAL MEDICINE

## 2024-08-28 PROCEDURE — 1100000001 HC PRIVATE ROOM DAILY

## 2024-08-28 PROCEDURE — 2500000004 HC RX 250 GENERAL PHARMACY W/ HCPCS (ALT 636 FOR OP/ED)

## 2024-08-28 PROCEDURE — 80048 BASIC METABOLIC PNL TOTAL CA: CPT | Performed by: INTERNAL MEDICINE

## 2024-08-28 PROCEDURE — 85027 COMPLETE CBC AUTOMATED: CPT | Performed by: INTERNAL MEDICINE

## 2024-08-28 PROCEDURE — 83735 ASSAY OF MAGNESIUM: CPT | Performed by: INTERNAL MEDICINE

## 2024-08-28 PROCEDURE — 99024 POSTOP FOLLOW-UP VISIT: CPT | Performed by: SURGERY

## 2024-08-28 PROCEDURE — 2500000002 HC RX 250 W HCPCS SELF ADMINISTERED DRUGS (ALT 637 FOR MEDICARE OP, ALT 636 FOR OP/ED): Performed by: PHARMACIST

## 2024-08-28 PROCEDURE — 2500000004 HC RX 250 GENERAL PHARMACY W/ HCPCS (ALT 636 FOR OP/ED): Performed by: INTERNAL MEDICINE

## 2024-08-28 PROCEDURE — 2500000004 HC RX 250 GENERAL PHARMACY W/ HCPCS (ALT 636 FOR OP/ED): Performed by: PHYSICIAN ASSISTANT

## 2024-08-28 PROCEDURE — 2500000005 HC RX 250 GENERAL PHARMACY W/O HCPCS: Performed by: PHYSICIAN ASSISTANT

## 2024-08-28 PROCEDURE — 2500000001 HC RX 250 WO HCPCS SELF ADMINISTERED DRUGS (ALT 637 FOR MEDICARE OP): Performed by: PHYSICIAN ASSISTANT

## 2024-08-28 PROCEDURE — 36415 COLL VENOUS BLD VENIPUNCTURE: CPT | Performed by: INTERNAL MEDICINE

## 2024-08-28 RX ORDER — POTASSIUM CHLORIDE 14.9 MG/ML
20 INJECTION INTRAVENOUS
Status: DISPENSED | OUTPATIENT
Start: 2024-08-28 | End: 2024-08-28

## 2024-08-28 RX ORDER — MAGNESIUM SULFATE HEPTAHYDRATE 40 MG/ML
2 INJECTION, SOLUTION INTRAVENOUS ONCE
Status: COMPLETED | OUTPATIENT
Start: 2024-08-28 | End: 2024-08-28

## 2024-08-28 RX ORDER — ACETAMINOPHEN 325 MG/1
325 TABLET ORAL EVERY 6 HOURS PRN
Status: DISCONTINUED | OUTPATIENT
Start: 2024-08-28 | End: 2024-09-01 | Stop reason: HOSPADM

## 2024-08-28 RX ORDER — OXYCODONE HYDROCHLORIDE 5 MG/1
2.5 TABLET ORAL EVERY 4 HOURS PRN
Status: DISCONTINUED | OUTPATIENT
Start: 2024-08-28 | End: 2024-09-01 | Stop reason: HOSPADM

## 2024-08-28 RX ORDER — PANTOPRAZOLE SODIUM 40 MG/1
40 TABLET, DELAYED RELEASE ORAL DAILY
Status: DISCONTINUED | OUTPATIENT
Start: 2024-08-29 | End: 2024-09-01 | Stop reason: HOSPADM

## 2024-08-28 RX ORDER — OXYCODONE AND ACETAMINOPHEN 5; 325 MG/1; MG/1
1 TABLET ORAL EVERY 6 HOURS PRN
Status: DISCONTINUED | OUTPATIENT
Start: 2024-08-28 | End: 2024-08-30

## 2024-08-28 RX ORDER — POTASSIUM CHLORIDE 20 MEQ/1
40 TABLET, EXTENDED RELEASE ORAL EVERY 4 HOURS
Status: COMPLETED | OUTPATIENT
Start: 2024-08-28 | End: 2024-08-28

## 2024-08-28 RX ADMIN — MAGNESIUM SULFATE HEPTAHYDRATE 2 G: 40 INJECTION, SOLUTION INTRAVENOUS at 11:08

## 2024-08-28 RX ADMIN — AZITHROMYCIN 250 MG: 250 TABLET, FILM COATED ORAL at 09:18

## 2024-08-28 RX ADMIN — OXYCODONE HYDROCHLORIDE AND ACETAMINOPHEN 1 TABLET: 5; 325 TABLET ORAL at 17:36

## 2024-08-28 RX ADMIN — POTASSIUM CHLORIDE 40 MEQ: 1500 TABLET, EXTENDED RELEASE ORAL at 15:05

## 2024-08-28 RX ADMIN — ACETAMINOPHEN 975 MG: 325 TABLET ORAL at 05:38

## 2024-08-28 RX ADMIN — DILTIAZEM HYDROCHLORIDE 240 MG: 120 CAPSULE, EXTENDED RELEASE ORAL at 09:18

## 2024-08-28 RX ADMIN — ONDANSETRON 4 MG: 2 INJECTION INTRAMUSCULAR; INTRAVENOUS at 21:01

## 2024-08-28 RX ADMIN — POTASSIUM CHLORIDE 40 MEQ: 1500 TABLET, EXTENDED RELEASE ORAL at 11:06

## 2024-08-28 RX ADMIN — IPRATROPIUM BROMIDE AND ALBUTEROL SULFATE 3 ML: 2.5; .5 SOLUTION RESPIRATORY (INHALATION) at 20:15

## 2024-08-28 RX ADMIN — HYDROXYZINE HYDROCHLORIDE 10 MG: 10 TABLET ORAL at 09:29

## 2024-08-28 RX ADMIN — HYDROXYZINE HYDROCHLORIDE 10 MG: 10 TABLET ORAL at 20:56

## 2024-08-28 RX ADMIN — PROCHLORPERAZINE EDISYLATE 10 MG: 5 INJECTION INTRAMUSCULAR; INTRAVENOUS at 22:06

## 2024-08-28 RX ADMIN — OXYCODONE HYDROCHLORIDE AND ACETAMINOPHEN 1 TABLET: 5; 325 TABLET ORAL at 11:32

## 2024-08-28 RX ADMIN — MIRTAZAPINE 15 MG: 15 TABLET, FILM COATED ORAL at 23:06

## 2024-08-28 RX ADMIN — APIXABAN 5 MG: 5 TABLET, FILM COATED ORAL at 23:06

## 2024-08-28 RX ADMIN — BUDESONIDE 0.5 MG: 0.5 INHALANT RESPIRATORY (INHALATION) at 20:15

## 2024-08-28 RX ADMIN — POTASSIUM CHLORIDE 20 MEQ: 14.9 INJECTION, SOLUTION INTRAVENOUS at 11:08

## 2024-08-28 RX ADMIN — MONTELUKAST 10 MG: 10 TABLET, FILM COATED ORAL at 09:18

## 2024-08-28 RX ADMIN — ALBUTEROL SULFATE 2.5 MG: 2.5 SOLUTION RESPIRATORY (INHALATION) at 11:43

## 2024-08-28 RX ADMIN — Medication 2 L/MIN: at 21:02

## 2024-08-28 RX ADMIN — Medication 100 MG: at 09:18

## 2024-08-28 RX ADMIN — BUSPIRONE HYDROCHLORIDE 5 MG: 5 TABLET ORAL at 23:06

## 2024-08-28 RX ADMIN — APIXABAN 5 MG: 5 TABLET, FILM COATED ORAL at 09:18

## 2024-08-28 RX ADMIN — POLYETHYLENE GLYCOL 3350 17 G: 17 POWDER, FOR SOLUTION ORAL at 09:18

## 2024-08-28 RX ADMIN — LISINOPRIL 5 MG: 5 TABLET ORAL at 09:18

## 2024-08-28 RX ADMIN — OXYCODONE HYDROCHLORIDE 5 MG: 5 TABLET ORAL at 05:38

## 2024-08-28 RX ADMIN — BUSPIRONE HYDROCHLORIDE 5 MG: 5 TABLET ORAL at 09:18

## 2024-08-28 RX ADMIN — DOCUSATE SODIUM 100 MG: 100 CAPSULE, LIQUID FILLED ORAL at 09:18

## 2024-08-28 RX ADMIN — PANTOPRAZOLE SODIUM 40 MG: 40 INJECTION, POWDER, FOR SOLUTION INTRAVENOUS at 09:19

## 2024-08-28 ASSESSMENT — COGNITIVE AND FUNCTIONAL STATUS - GENERAL
HELP NEEDED FOR BATHING: A LITTLE
MOVING FROM LYING ON BACK TO SITTING ON SIDE OF FLAT BED WITH BEDRAILS: A LITTLE
EATING MEALS: A LITTLE
MOVING FROM LYING ON BACK TO SITTING ON SIDE OF FLAT BED WITH BEDRAILS: A LITTLE
MOBILITY SCORE: 18
WALKING IN HOSPITAL ROOM: A LITTLE
DRESSING REGULAR LOWER BODY CLOTHING: A LITTLE
DAILY ACTIVITIY SCORE: 18
HELP NEEDED FOR BATHING: A LITTLE
CLIMB 3 TO 5 STEPS WITH RAILING: A LITTLE
DRESSING REGULAR LOWER BODY CLOTHING: A LITTLE
DRESSING REGULAR UPPER BODY CLOTHING: A LITTLE
TURNING FROM BACK TO SIDE WHILE IN FLAT BAD: A LITTLE
CLIMB 3 TO 5 STEPS WITH RAILING: A LITTLE
TURNING FROM BACK TO SIDE WHILE IN FLAT BAD: A LITTLE
PERSONAL GROOMING: A LITTLE
TOILETING: A LITTLE
EATING MEALS: A LITTLE
STANDING UP FROM CHAIR USING ARMS: A LITTLE
MOVING TO AND FROM BED TO CHAIR: A LITTLE
TOILETING: A LITTLE
PERSONAL GROOMING: A LITTLE
STANDING UP FROM CHAIR USING ARMS: A LITTLE
DAILY ACTIVITIY SCORE: 18
MOVING TO AND FROM BED TO CHAIR: A LITTLE
MOBILITY SCORE: 18
WALKING IN HOSPITAL ROOM: A LITTLE
DRESSING REGULAR UPPER BODY CLOTHING: A LITTLE

## 2024-08-28 ASSESSMENT — PAIN - FUNCTIONAL ASSESSMENT
PAIN_FUNCTIONAL_ASSESSMENT: 0-10
PAIN_FUNCTIONAL_ASSESSMENT: 0-10

## 2024-08-28 ASSESSMENT — PAIN SCALES - GENERAL
PAINLEVEL_OUTOF10: 5 - MODERATE PAIN
PAINLEVEL_OUTOF10: 8
PAINLEVEL_OUTOF10: 10 - WORST POSSIBLE PAIN
PAINLEVEL_OUTOF10: 10 - WORST POSSIBLE PAIN
PAINLEVEL_OUTOF10: 5 - MODERATE PAIN
PAINLEVEL_OUTOF10: 8

## 2024-08-28 ASSESSMENT — PAIN DESCRIPTION - LOCATION
LOCATION: ABDOMEN

## 2024-08-28 ASSESSMENT — PAIN DESCRIPTION - ORIENTATION
ORIENTATION: RIGHT

## 2024-08-28 ASSESSMENT — PAIN SCALES - PAIN ASSESSMENT IN ADVANCED DEMENTIA (PAINAD)
TOTALSCORE: MEDICATION (SEE MAR)
TOTALSCORE: MEDICATION (SEE MAR)

## 2024-08-28 NOTE — CARE PLAN
The patient's goals for the shift include      The clinical goals for the shift include Patient will remain free of all pain and discomforts until the end of the shift

## 2024-08-28 NOTE — PROGRESS NOTES
Fernando Cuevas is a 63 y.o. female on day 9 of admission presenting with Sepsis due to undetermined organism (Multi).    Subjective   On room air. NGT out. She feels much better. Had soft BM overnight. Passing flatus. Tolerating PO intake. No overnight events reported.        Objective     Physical Exam  Vitals reviewed.   Constitutional:       Appearance: Normal appearance.   HENT:      Head: Normocephalic.      Right Ear: Tympanic membrane normal.      Nose: Nose normal.      Mouth/Throat:      Mouth: Mucous membranes are moist.   Cardiovascular:      Rate and Rhythm: Normal rate and regular rhythm.   Pulmonary:      Effort: Pulmonary effort is normal.   Abdominal:      General: Bowel sounds are normal.      Palpations: Abdomen is soft.      Comments: Distended     Skin:     Capillary Refill: Capillary refill takes less than 2 seconds.   Neurological:      General: No focal deficit present.      Mental Status: She is alert.         Last Recorded Vitals  Blood pressure 144/72, pulse 97, temperature 36.1 °C (97 °F), resp. rate 18, weight 61.7 kg (136 lb), SpO2 96%.  Intake/Output last 3 Shifts:  I/O last 3 completed shifts:  In: 1320 (21.4 mL/kg) [P.O.:1320]  Out: 1200 (19.5 mL/kg) [Urine:1200 (0.5 mL/kg/hr)]  Weight: 61.7 kg     Relevant Results  Results for orders placed or performed during the hospital encounter of 08/19/24 (from the past 24 hour(s))   Basic Metabolic Panel   Result Value Ref Range    Glucose 88 74 - 99 mg/dL    Sodium 140 136 - 145 mmol/L    Potassium 2.8 (LL) 3.5 - 5.3 mmol/L    Chloride 101 98 - 107 mmol/L    Bicarbonate 25 21 - 32 mmol/L    Anion Gap 17 10 - 20 mmol/L    Urea Nitrogen 7 6 - 23 mg/dL    Creatinine 0.56 0.50 - 1.05 mg/dL    eGFR >90 >60 mL/min/1.73m*2    Calcium 8.2 (L) 8.6 - 10.3 mg/dL   CBC   Result Value Ref Range    WBC 6.2 4.4 - 11.3 x10*3/uL    nRBC 0.0 0.0 - 0.0 /100 WBCs    RBC 3.63 (L) 4.00 - 5.20 x10*6/uL    Hemoglobin 8.6 (L) 12.0 - 16.0 g/dL    Hematocrit 27.9 (L)  36.0 - 46.0 %    MCV 77 (L) 80 - 100 fL    MCH 23.7 (L) 26.0 - 34.0 pg    MCHC 30.8 (L) 32.0 - 36.0 g/dL    RDW 20.7 (H) 11.5 - 14.5 %    Platelets 807 (H) 150 - 450 x10*3/uL   Magnesium   Result Value Ref Range    Magnesium 1.70 1.60 - 2.40 mg/dL       Imaging Results  Ct abd/pelvis:  IMPRESSION:  Postsurgical changes right lower quadrant within the small bowel with  mild wall thickening without evidence of obstruction.  Stranding in the left anterior abdominal wall the subcutaneous soft  tissues which appears stable.  This appears to be related to small  fat-containing hernia.  Scarring and parenchymal opacities in the bilateral medial and lower  lobes appear stable.  Cholelithiasis.    Abd x ray:  IMPRESSION:  Increased gaseous bowel dilatation since previous day's exam may  reflect worsening ileus or obstruction.      Probable distended urinary bladder.    Medications:  acetaminophen, 975 mg, oral, q8h LISET  apixaban, 5 mg, oral, BID  azithromycin, 250 mg, oral, Once per day on Monday Wednesday Friday  budesonide, 0.5 mg, nebulization, BID  busPIRone, 5 mg, oral, BID  dilTIAZem ER, 240 mg, oral, Daily  docusate sodium, 100 mg, oral, BID  [Held by provider] formoterol, 20 mcg, nebulization, BID  ipratropium-albuteroL, 3 mL, nebulization, TID  lisinopril, 5 mg, oral, Daily  magnesium sulfate, 2 g, intravenous, Once  mirtazapine, 15 mg, oral, Nightly  montelukast, 10 mg, oral, Daily  oxygen, , inhalation, Continuous - Inhalation  pantoprazole, 40 mg, intravenous, Daily  polyethylene glycol, 17 g, oral, BID  potassium chloride, 20 mEq, intravenous, q2h  potassium chloride CR, 40 mEq, oral, q4h  thiamine, 100 mg, oral, Daily  [Held by provider] tiotropium, 2 puff, inhalation, Daily       PRN medications: albuterol, benzonatate, calcium carbonate, [Held by provider] diphenhydrAMINE, hydrALAZINE, hydrOXYzine HCL, melatonin, ondansetron ODT **OR** ondansetron, oxyCODONE, oxyCODONE, prochlorperazine **OR**  prochlorperazine     Assessment/Plan       8/28:  Clinically improving... diet advanced to soft.   S/p relistor last night.   Will titrate down opiates further today.   F/up PODs for any further workup. She still has R foot pain but seems controlled.   HypoK, hypoMg... replace and monitor.   HTN... much better controlled with new lisinopril.   Pt/ot    Dispo home hopefully tomorrow if cleared by OR team and pt/ot.           8/27:  NGT out, cont clear diet and enemas per OR team. Will add relistor for dysmotility. Can try reglan if that doesn't help.     R foot workup per PODs.   HTN... add lisinopril today, titrate.     Pt/ot, encouraged mobility.       8/26:  Has completed a good course of iv abx... can stop for now.   Sbo.. persists, with ngt lws... still no bm/flatus.... f/up OR.   Replace K   R 1st toe pain... xray with spur formation, nothing on exam, no hx of gout... f/up PODs consult.   Uncontrolled HTN... will add iv hydralazine PRN.       8/25:  Enteritis... leukocytosis resolved... cont iv abx.   SBO... s/p ngt... f/up OR recs.   HypoMg... replace  HypoK... replace        8/24:  Enteritis.... wbc improving... cont iv abx.   CT last night with sbo... f/up OR team. She's refusing ngt this morning.   HypoK... replace, monitor. Check Mg tomorrow.   Pt/ot      8/23: Abd distended. WBC improving. CT abd/pel ordered per surgery team. Continue iv antibiotics    8/22: abd more distended; surgery ordering another xray wbc improving    8/21:  Abdomen still distended no diarrhea, white count up to 33,000, question reactive, leave on empiric therapy for now per the patient is not on oxygen at home we will try to wean.  Follow-up surgical recommendations  8/20:  Hx emergency surgery for perforated bowel 6/21/24, post-op complications with ileus and wound hematoma s/p wound vac, concern for anastomic infection, Hx GIB s/p EGD around 7/26/24, found to have friable gastric mucosa, Hx upper extremity DVT L brachial 6/27/24,  R brachial 7/6/24 and has been on eliquis although reportedly not taking recently. Eliquis was not stopped following admission for GIB.      Now here with abdominal pain which is very tender with guarding and significant leukocytosis along with tachycardia... Imaging is unrevealing.... f/up surgery and ID recs, cont iv abx, f/up cultures, iv analgesia, ivf.      Diarrhea... check C diff.      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 going to hold steroids for now given concern for infection.      HTN urgency... improving with home cardizem.   Hx UE DVT... resume eliquis.      + cocaine  UA unremarkable  CTA without PNA or PE.      Pt lives at home with her son, uses walker baseline.     DVT Prophylaxis:  Marcus Fry MD  [facility]

## 2024-08-28 NOTE — PROGRESS NOTES
"Music Therapy Note    Therapy Session  Referral Type: New referral this admission  Visit Type: Follow-up visit  Session Start Time: 1342  Session End Time: 1352  Intervention Delivery: In-person  Conflict of Service: None  Family Present for Session: None     Pre-assessment  Unable to Assess Reason: Outcomes not assessed  Mood/Affect: Appropriate  Verbalized Emotional State: Acceptance         Treatment/Interventions  Areas of Focus: Relaxation, Normalization  Music Therapy Interventions: Live music listening    Post-assessment  Unable to Assess Reason: Outcomes not evaluated  Mood/Affect: Appropriate  Verbalized Emotional State: Gratitude  Continue Visiting: Yes  Total Session Time (min): 10 minutes    Narrative  Assessment Detail: Upon arrival, MT/MTI found pt awake in bed, watching TV. Upon assesssment, pt expressed feeling \"better\" since yesterday, which was further apparent through her brightened affect compared to yesterday (smiling, eye contact, relaxed body posture). Pt expressed still feeling tired and \"ready to take a nap.\" MTI offered empathic support and introduced services to support relaxation. Pt was agreeable to services.  Plan: MT/MTI engaged pt in live music listening to support relaxation.  Intervention: Pt participated by lying back and listening to pt preferred music facilitated by MTI using live guitar and voice. MTI adapted musical elements such as key, complexity, tone, volume, and tempo to match the energy level of pt and promote relaxation.  Evaluation: Pt responded to intervention by demonstrating signs of increased relaxation (fixed eye gaze, dozing, steadied breathing) throughout intervention. Following session, pt thanked MTI and expressed interest in f/u.  Follow-up: MT/MTI will follow up as able.    Education Documentation  Relaxation, taught by Cordelia Keys at 8/28/2024  2:43 PM.  Learner: Patient  Readiness: Acceptance  Method: Explanation  Response: Verbalizes " Understanding    Pain Management, taught by Cordelia Keys at 8/28/2024  2:43 PM.  Learner: Patient  Readiness: Acceptance  Method: Explanation  Response: Verbalizes Understanding    Focal Points, taught by Cordelia Keys at 8/28/2024  2:43 PM.  Learner: Patient  Readiness: Acceptance  Method: Explanation  Response: Verbalizes Understanding

## 2024-08-28 NOTE — PROGRESS NOTES
Patient given relistor and feels much better    Nasogastric tube is out.  Tolerating clear liquids    She has had a bowel movement    On exam comfortable    Abdomen much less distended    === 08/27/24 ===    XR ABDOMEN 1 VIEW    - Impression -  Distention of small bowel loops which appears to have slightly  improved with gas and stool throughout the colon. This may be related  to partial small bowel obstruction or ileus.         Encourage ambulation    Advance diet as tolerated

## 2024-08-28 NOTE — PROGRESS NOTES
Physical Therapy                 Therapy Communication Note    Patient Name: Fernando Cuevas  MRN: [id number]  Today's Date: 8/28/2024     Discipline: Physical Therapy    Missed Visit Reason: Missed Visit Reason:  (pt using bathroom at this time)    Missed Time: Attempt    Comment:

## 2024-08-28 NOTE — PROGRESS NOTES
08/28/24 0848   Current Planned Discharge Disposition   Current Planned Discharge Disposition Home Health     Once med ready will discharge home with [facility], updates have been sent, KUB results per notes showing some improvement, NG clamped patient on clr liquid, will continue to monitor for discharge planning ADOD 48-72hrs.   show

## 2024-08-28 NOTE — PROGRESS NOTES
Physical Therapy                 Therapy Communication Note    Patient Name: Fernando Cuevas  MRN: [id number]  Today's Date: 8/28/2024     Discipline: Physical Therapy    Missed Visit Reason: Missed Visit Reason:  (pt with music therapy at this time)    Missed Time: Attempt    Comment:

## 2024-08-29 LAB
ANION GAP SERPL CALC-SCNC: 12 MMOL/L (ref 10–20)
BUN SERPL-MCNC: 6 MG/DL (ref 6–23)
CALCIUM SERPL-MCNC: 7.8 MG/DL (ref 8.6–10.3)
CHLORIDE SERPL-SCNC: 107 MMOL/L (ref 98–107)
CO2 SERPL-SCNC: 23 MMOL/L (ref 21–32)
CREAT SERPL-MCNC: 0.62 MG/DL (ref 0.5–1.05)
EGFRCR SERPLBLD CKD-EPI 2021: >90 ML/MIN/1.73M*2
ERYTHROCYTE [DISTWIDTH] IN BLOOD BY AUTOMATED COUNT: 21.3 % (ref 11.5–14.5)
GLUCOSE BLD MANUAL STRIP-MCNC: 86 MG/DL (ref 74–99)
GLUCOSE SERPL-MCNC: 104 MG/DL (ref 74–99)
HCT VFR BLD AUTO: 27.7 % (ref 36–46)
HGB BLD-MCNC: 8.1 G/DL (ref 12–16)
MAGNESIUM SERPL-MCNC: 1.9 MG/DL (ref 1.6–2.4)
MCH RBC QN AUTO: 23.7 PG (ref 26–34)
MCHC RBC AUTO-ENTMCNC: 29.2 G/DL (ref 32–36)
MCV RBC AUTO: 81 FL (ref 80–100)
NRBC BLD-RTO: 0 /100 WBCS (ref 0–0)
PLATELET # BLD AUTO: 863 X10*3/UL (ref 150–450)
POTASSIUM SERPL-SCNC: 4.2 MMOL/L (ref 3.5–5.3)
RBC # BLD AUTO: 3.42 X10*6/UL (ref 4–5.2)
SODIUM SERPL-SCNC: 138 MMOL/L (ref 136–145)
WBC # BLD AUTO: 13.4 X10*3/UL (ref 4.4–11.3)

## 2024-08-29 PROCEDURE — 2500000002 HC RX 250 W HCPCS SELF ADMINISTERED DRUGS (ALT 637 FOR MEDICARE OP, ALT 636 FOR OP/ED): Performed by: INTERNAL MEDICINE

## 2024-08-29 PROCEDURE — 97116 GAIT TRAINING THERAPY: CPT | Mod: GP,CQ

## 2024-08-29 PROCEDURE — 36415 COLL VENOUS BLD VENIPUNCTURE: CPT | Performed by: INTERNAL MEDICINE

## 2024-08-29 PROCEDURE — 2500000002 HC RX 250 W HCPCS SELF ADMINISTERED DRUGS (ALT 637 FOR MEDICARE OP, ALT 636 FOR OP/ED): Performed by: PHARMACIST

## 2024-08-29 PROCEDURE — 2500000001 HC RX 250 WO HCPCS SELF ADMINISTERED DRUGS (ALT 637 FOR MEDICARE OP): Performed by: INTERNAL MEDICINE

## 2024-08-29 PROCEDURE — 97530 THERAPEUTIC ACTIVITIES: CPT | Mod: GO,CO

## 2024-08-29 PROCEDURE — 94640 AIRWAY INHALATION TREATMENT: CPT

## 2024-08-29 PROCEDURE — 99232 SBSQ HOSP IP/OBS MODERATE 35: CPT | Performed by: SURGERY

## 2024-08-29 PROCEDURE — 2500000001 HC RX 250 WO HCPCS SELF ADMINISTERED DRUGS (ALT 637 FOR MEDICARE OP)

## 2024-08-29 PROCEDURE — 2500000004 HC RX 250 GENERAL PHARMACY W/ HCPCS (ALT 636 FOR OP/ED): Performed by: PHYSICIAN ASSISTANT

## 2024-08-29 PROCEDURE — 2500000001 HC RX 250 WO HCPCS SELF ADMINISTERED DRUGS (ALT 637 FOR MEDICARE OP): Performed by: PHYSICIAN ASSISTANT

## 2024-08-29 PROCEDURE — 83735 ASSAY OF MAGNESIUM: CPT | Performed by: STUDENT IN AN ORGANIZED HEALTH CARE EDUCATION/TRAINING PROGRAM

## 2024-08-29 PROCEDURE — 85027 COMPLETE CBC AUTOMATED: CPT | Performed by: INTERNAL MEDICINE

## 2024-08-29 PROCEDURE — 99232 SBSQ HOSP IP/OBS MODERATE 35: CPT | Performed by: STUDENT IN AN ORGANIZED HEALTH CARE EDUCATION/TRAINING PROGRAM

## 2024-08-29 PROCEDURE — 2500000004 HC RX 250 GENERAL PHARMACY W/ HCPCS (ALT 636 FOR OP/ED)

## 2024-08-29 PROCEDURE — 82947 ASSAY GLUCOSE BLOOD QUANT: CPT

## 2024-08-29 PROCEDURE — 80048 BASIC METABOLIC PNL TOTAL CA: CPT | Performed by: INTERNAL MEDICINE

## 2024-08-29 PROCEDURE — 1100000001 HC PRIVATE ROOM DAILY

## 2024-08-29 PROCEDURE — 94664 DEMO&/EVAL PT USE INHALER: CPT

## 2024-08-29 RX ADMIN — PROCHLORPERAZINE MALEATE 10 MG: 10 TABLET ORAL at 13:19

## 2024-08-29 RX ADMIN — BUSPIRONE HYDROCHLORIDE 5 MG: 5 TABLET ORAL at 09:43

## 2024-08-29 RX ADMIN — APIXABAN 5 MG: 5 TABLET, FILM COATED ORAL at 21:24

## 2024-08-29 RX ADMIN — BUDESONIDE 0.5 MG: 0.5 INHALANT RESPIRATORY (INHALATION) at 07:09

## 2024-08-29 RX ADMIN — MIRTAZAPINE 15 MG: 15 TABLET, FILM COATED ORAL at 21:25

## 2024-08-29 RX ADMIN — POLYETHYLENE GLYCOL 3350 17 G: 17 POWDER, FOR SOLUTION ORAL at 09:43

## 2024-08-29 RX ADMIN — MONTELUKAST 10 MG: 10 TABLET, FILM COATED ORAL at 09:43

## 2024-08-29 RX ADMIN — Medication 3 MG: at 21:25

## 2024-08-29 RX ADMIN — OXYCODONE HYDROCHLORIDE AND ACETAMINOPHEN 1 TABLET: 5; 325 TABLET ORAL at 06:40

## 2024-08-29 RX ADMIN — HYDROXYZINE HYDROCHLORIDE 10 MG: 10 TABLET ORAL at 06:40

## 2024-08-29 RX ADMIN — BUSPIRONE HYDROCHLORIDE 5 MG: 5 TABLET ORAL at 21:24

## 2024-08-29 RX ADMIN — OXYCODONE HYDROCHLORIDE AND ACETAMINOPHEN 1 TABLET: 5; 325 TABLET ORAL at 21:34

## 2024-08-29 RX ADMIN — IPRATROPIUM BROMIDE AND ALBUTEROL SULFATE 3 ML: 2.5; .5 SOLUTION RESPIRATORY (INHALATION) at 13:37

## 2024-08-29 RX ADMIN — IPRATROPIUM BROMIDE AND ALBUTEROL SULFATE 3 ML: 2.5; .5 SOLUTION RESPIRATORY (INHALATION) at 07:06

## 2024-08-29 RX ADMIN — HYDROXYZINE HYDROCHLORIDE 10 MG: 10 TABLET ORAL at 18:06

## 2024-08-29 RX ADMIN — BUDESONIDE 0.5 MG: 0.5 INHALANT RESPIRATORY (INHALATION) at 19:28

## 2024-08-29 RX ADMIN — APIXABAN 5 MG: 5 TABLET, FILM COATED ORAL at 09:43

## 2024-08-29 RX ADMIN — OXYCODONE HYDROCHLORIDE AND ACETAMINOPHEN 1 TABLET: 5; 325 TABLET ORAL at 13:14

## 2024-08-29 RX ADMIN — LISINOPRIL 5 MG: 5 TABLET ORAL at 09:43

## 2024-08-29 RX ADMIN — DOCUSATE SODIUM 100 MG: 100 CAPSULE, LIQUID FILLED ORAL at 09:43

## 2024-08-29 RX ADMIN — IPRATROPIUM BROMIDE AND ALBUTEROL SULFATE 3 ML: 2.5; .5 SOLUTION RESPIRATORY (INHALATION) at 19:28

## 2024-08-29 RX ADMIN — PANTOPRAZOLE SODIUM 40 MG: 40 TABLET, DELAYED RELEASE ORAL at 09:43

## 2024-08-29 RX ADMIN — Medication 100 MG: at 09:43

## 2024-08-29 RX ADMIN — ONDANSETRON 4 MG: 2 INJECTION INTRAMUSCULAR; INTRAVENOUS at 06:41

## 2024-08-29 RX ADMIN — DILTIAZEM HYDROCHLORIDE 240 MG: 120 CAPSULE, EXTENDED RELEASE ORAL at 09:43

## 2024-08-29 ASSESSMENT — COGNITIVE AND FUNCTIONAL STATUS - GENERAL
CLIMB 3 TO 5 STEPS WITH RAILING: A LITTLE
WALKING IN HOSPITAL ROOM: A LITTLE
DAILY ACTIVITIY SCORE: 20
HELP NEEDED FOR BATHING: A LITTLE
HELP NEEDED FOR BATHING: A LITTLE
STANDING UP FROM CHAIR USING ARMS: A LITTLE
HELP NEEDED FOR BATHING: A LITTLE
DRESSING REGULAR UPPER BODY CLOTHING: A LITTLE
WALKING IN HOSPITAL ROOM: A LITTLE
DAILY ACTIVITIY SCORE: 20
TURNING FROM BACK TO SIDE WHILE IN FLAT BAD: A LITTLE
DAILY ACTIVITIY SCORE: 20
DRESSING REGULAR LOWER BODY CLOTHING: A LITTLE
STANDING UP FROM CHAIR USING ARMS: A LITTLE
TOILETING: A LITTLE
DRESSING REGULAR UPPER BODY CLOTHING: A LITTLE
CLIMB 3 TO 5 STEPS WITH RAILING: A LITTLE
TOILETING: A LITTLE
MOVING TO AND FROM BED TO CHAIR: A LITTLE
TOILETING: A LITTLE
MOVING FROM LYING ON BACK TO SITTING ON SIDE OF FLAT BED WITH BEDRAILS: A LITTLE
CLIMB 3 TO 5 STEPS WITH RAILING: A LITTLE
MOBILITY SCORE: 20
MOVING TO AND FROM BED TO CHAIR: A LITTLE
DRESSING REGULAR LOWER BODY CLOTHING: A LITTLE
MOBILITY SCORE: 18
MOBILITY SCORE: 20
DRESSING REGULAR LOWER BODY CLOTHING: A LITTLE
STANDING UP FROM CHAIR USING ARMS: A LITTLE
DRESSING REGULAR UPPER BODY CLOTHING: A LITTLE
MOVING TO AND FROM BED TO CHAIR: A LITTLE
WALKING IN HOSPITAL ROOM: A LITTLE

## 2024-08-29 ASSESSMENT — PAIN DESCRIPTION - LOCATION
LOCATION: ABDOMEN
LOCATION: FOOT

## 2024-08-29 ASSESSMENT — PAIN SCALES - GENERAL
PAINLEVEL_OUTOF10: 10 - WORST POSSIBLE PAIN
PAINLEVEL_OUTOF10: 0 - NO PAIN
PAINLEVEL_OUTOF10: 5 - MODERATE PAIN
PAINLEVEL_OUTOF10: 4
PAINLEVEL_OUTOF10: 10 - WORST POSSIBLE PAIN
PAINLEVEL_OUTOF10: 10 - WORST POSSIBLE PAIN
PAINLEVEL_OUTOF10: 8
PAINLEVEL_OUTOF10: 10 - WORST POSSIBLE PAIN
PAINLEVEL_OUTOF10: 5 - MODERATE PAIN

## 2024-08-29 ASSESSMENT — ACTIVITIES OF DAILY LIVING (ADL): HOME_MANAGEMENT_TIME_ENTRY: 2

## 2024-08-29 ASSESSMENT — PAIN - FUNCTIONAL ASSESSMENT
PAIN_FUNCTIONAL_ASSESSMENT: 0-10

## 2024-08-29 ASSESSMENT — PAIN DESCRIPTION - ORIENTATION: ORIENTATION: RIGHT

## 2024-08-29 NOTE — CARE PLAN
The patient's goals for the shift include      The clinical goals for the shift include Pt will remain HDS throughout shift

## 2024-08-29 NOTE — SIGNIFICANT EVENT
Patient seen for COPD education. COPD booklet reviewed and given to patient.  Current breathing medications reviewed and verbalizes good understanding of use.  Per patient she uses a mucous release med (does not know name) that she is not receiving while in hospital.  Advised that I would reach out to provider regarding med. Patient has spacer at home and able to demonstrate use and understanding of importance. Patient states she has been to pulmonary rehab years ago and refuses referral at this time.  Reviewed with patient and flyer given for [facility] and advised that referral has been made.  Patient agreeable.  Patient states she has pulmonologist and PCP through [facility] but cannot remember their names at this time.  Note to Kristine Carreon MD that referral for [facility] made and patient medication request.   
Pt refused aerosol tx.   
yes

## 2024-08-29 NOTE — PROGRESS NOTES
"Music Therapy Note    Therapy Session  Referral Type: New referral this admission  Visit Type: Follow-up visit  Session Start Time: 1118  Session End Time: 1120  Intervention Delivery: In-person  Family Present for Session: None     Pre-assessment  Unable to Assess Reason: Outcomes not assessed  Mood/Affect: Tired/lethargic  Verbalized Emotional State:  (\"feeling unwell\")         Treatment/Interventions  Music Therapy Interventions: Assessment    Post-assessment  Unable to Assess Reason: Did not provide expressive therapy intervention  Continue Visiting: Yes  Total Session Time (min): 2 minutes    Narrative  Assessment Detail: Upon arrival, MT/MTI found pt curled up in bed, on her side. Upon assessment, pt shared that she was not feeling as well today and just wanted to sleep. MT/MTI offered empathic support and offered to f/u later in the afternoon. Pt agreed as long as she wasn't sleeping.  Follow-up: MT/MTI will follow up as able.    Education Documentation  No documentation found.        Expressive Therapies Note  "

## 2024-08-29 NOTE — PROGRESS NOTES
Not feeling great today    Has some nausea with potassium    Otherwise she has been tolerating a diet    Did have a bowel movement    Abdomen soft, slightly less distended

## 2024-08-29 NOTE — CARE PLAN
The patient's goals for the shift include nausea free and pain control     The clinical goals for the shift include VS, pain control, wean oxygen as tolerated, Nausea care, remain safe and free of falls      Problem: Pain - Adult  Goal: Verbalizes/displays adequate comfort level or baseline comfort level  Outcome: Progressing     Problem: Safety - Adult  Goal: Free from fall injury  Outcome: Progressing     Problem: Discharge Planning  Goal: Discharge to home or other facility with appropriate resources  Outcome: Progressing     Problem: Chronic Conditions and Co-morbidities  Goal: Patient's chronic conditions and co-morbidity symptoms are monitored and maintained or improved  Outcome: Progressing     Problem: Pain  Goal: Takes deep breaths with improved pain control throughout the shift  Outcome: Progressing  Goal: Turns in bed with improved pain control throughout the shift  Outcome: Progressing  Goal: Performs ADL's with improved pain control throughout shift  Outcome: Progressing  Goal: Participates in PT with improved pain control throughout the shift  Outcome: Progressing  Goal: Free from opioid side effects throughout the shift  Outcome: Progressing  Goal: Free from acute confusion related to pain meds throughout the shift  Outcome: Progressing

## 2024-08-29 NOTE — PROGRESS NOTES
Physical Therapy    Physical Therapy Treatment    Patient Name: Fernando Cuevas  MRN: [id number]  Today's Date: 8/29/2024  Time Calculation  Start Time: 1412  Stop Time: 1430  Time Calculation (min): 18 min         Assessment/Plan   PT Assessment  End of Session Communication: Bedside nurse  Assessment Comment: pt completed stair training  End of Session Patient Position: Alarm on, Bed, 3 rail up  PT Plan  Inpatient/Swing Bed or Outpatient: Inpatient  PT Plan  Treatment/Interventions: Transfer training, Gait training, Stair training  PT Plan: Ongoing PT  PT Frequency: 3 times per week  PT Discharge Recommendations: Low intensity level of continued care  Equipment Recommended upon Discharge:  (TBD)  PT Recommended Transfer Status: Assist x1  PT - OK to Discharge: Yes (per POC)      General Visit Information:   PT  Visit  PT Received On: 08/29/24  General  Reason for Referral: Pt is a 64 yo female presenting with sepsis; SBO s/p NG tube placement  Referred By: Ang  Prior to Session Communication: Bedside nurse  Patient Position Received: Alarm off, caregiver present (with OT in hallway at start of tx)  Preferred Learning Style: auditory, kinesthetic, verbal  General Comment: agreeable to tx, increased time 2/2 fatigue and SOB    Subjective   Precautions:  Precautions  Medical Precautions: Fall precautions  Post-Surgical Precautions: Abdominal surgery precautions    Vital Signs (Past 2hrs)        Date/Time Vitals Session Patient Position Pulse Resp SpO2 BP MAP (mmHg)    08/29/24 1337 --  --  --  --  97 %  --  --     08/29/24 1403 --  --  120  --  97 %  --  --                         Objective   Pain:  Pain Assessment  0-10 (Numeric) Pain Score: 10 - Worst possible pain  Pain Location: Foot  Pain Orientation: Right  Pain Interventions: Medication (See MAR)  Cognition:  Cognition  Overall Cognitive Status: Within Functional Limits  Coordination:     Postural Control:     Extremity/Trunk Assessments:    Activity  Tolerance:     Treatments:       Bed Mobility  Bed Mobility: Yes  Bed Mobility 1  Bed Mobility 1: Sitting to supine  Level of Assistance 1: Modified independent  Bed Mobility Comments 1: HOB elevated    Ambulation/Gait Training  Ambulation/Gait Training Performed: Yes  Ambulation/Gait Training 1  Surface 1: Level tile  Device 1: Rolling walker  Gait Support Devices: Gait belt  Assistance 1: Close supervision  Comments/Distance (ft) 1: 40x3, 15x1 (pt able to perform, with slow step through gait pattern.  seated rest breaks x3 2/2 fatigue and SOBOE)  Transfer 1  Technique 1: Sit to stand, Stand to sit  Transfer Device 1: Walker  Transfer Level of Assistance 1: Close supervision  Trials/Comments 1: vc/tc for hand placment on solid sitting surface and not RW. pt performed x4    Stairs  Stairs: Yes (pt performed 3 steps using 2 rails, CGA. step to gait pattern.  no overt LOB)    Outcome Measures:       Education Documentation  Mobility Training, taught by John Martinez PTA at 8/29/2024  2:57 PM.  Learner: Patient  Readiness: Acceptance  Method: Explanation  Response: Verbalizes Understanding    Education Comments  No comments found.        OP EDUCATION:       Encounter Problems       Encounter Problems (Active)       Mobility       LTG - Patient will navigate 3 steps with rails/device (Progressing)       Start:  08/26/24    Expected End:  09/09/24       SUP using HR          STG - Patient will ambulate (Progressing)       Start:  08/26/24    Expected End:  09/09/24       Mod Ind using FWW >100ft            PT Transfers       STG - Patient will perform bed mobility (Met)       Start:  08/26/24    Expected End:  09/09/24    Resolved:  08/29/24    Mod Ind         STG - Patient will transfer sit to and from stand (Progressing)       Start:  08/26/24    Expected End:  09/09/24       Mod Ind            Pain - Adult

## 2024-08-29 NOTE — PROGRESS NOTES
Physical Therapy                 Therapy Communication Note    Patient Name: Fernando Cuevas  MRN: [id number]  Today's Date: 8/29/2024     Discipline: Physical Therapy    Missed Visit Reason: Missed Visit Reason:  (pt with OT at this time)    Missed Time: Attempt    Comment:

## 2024-08-29 NOTE — PROGRESS NOTES
Care Coordinator Note:    Plan: Patient in with Ileus- NG out and now resolved. Patient not feeling well- c/o nausea, had BM x1 yesterday. Remains on soft diet.  Patient ml/o pain and coldness in R foot. Podiatry consulted.    Status: Inpatient  Payor: [facility]   Disposition: Home with son and [facility] PT OT.  Barrier:Surgery clearance. Tolerating diet without nausea  ADOD: Tomorrow.     Jaqueline Busch [facility]      08/29/24 1119   Discharge Planning   Expected Discharge Disposition  Services   Patient Choice   Provider Choice list and CMS website (https://medicare.gov/care-compare#search) for post-acute Quality and Resource Measure Data were provided and reviewed with: Patient   Patient / Family choosing to utilize agency / facility established prior to hospitalization Yes

## 2024-08-29 NOTE — PROGRESS NOTES
Fernando Cuevas is a 63 y.o. female on day 10 of admission presenting with Sepsis due to undetermined organism (Multi).      Subjective   NAEO. States she is doing worse today than yesterday, specifically states that she was feeling nauseous with the potassium infusion in addition to the burning sensation with potassium. Eating well. Continues to report pain in 2cm [place] in mid dorsal R foot as well as lateral corner of toe. Pt states that the pain started when she was sitting in bed, denies trauma. All questions answered at bedside.       Objective     Last Recorded Vitals  /63   Pulse 81   Temp 36.5 °C (97.7 °F)   Resp 18   Wt 61.7 kg (136 lb 0.4 oz)   SpO2 97%   Intake/Output last 3 Shifts:    Intake/Output Summary (Last 24 hours) at 8/29/2024 1645  Last data filed at 8/29/2024 0640  Gross per 24 hour   Intake 280 ml   Output 200 ml   Net 80 ml       Admission Weight  Weight: 61.7 kg (136 lb) (08/19/24 1851)    Daily Weight  08/29/24 : 61.7 kg (136 lb 0.4 oz)    Image Results  XR abdomen 1 view  Narrative: Interpreted By:  Joslyn Camara,   STUDY:  XR ABDOMEN 1 VIEW;  8/27/2024 7:17 am      INDICATION:  Signs/Symptoms:SBO.      COMPARISON:  08/26/2024      ACCESSION NUMBER(S):  [id number]      ORDERING CLINICIAN:  MINERVA AVILEZ      FINDINGS:  Nasogastric tube is seen with the distal tip projecting over the left  upper quadrant. Several distended small bowel loops are seen. Gas and  stool is noted throughout the colon. The previously noted distended  small bowel loops appear to be distended to a lesser extent.      Impression: Distention of small bowel loops which appears to have slightly  improved with gas and stool throughout the colon. This may be related  to partial small bowel obstruction or ileus.          MACRO:  None      Signed by: Joslyn Camara 8/27/2024 8:49 AM  Dictation workstation:   [id number]    Physical Exam  Vitals reviewed.  Constitutional:       Appearance: Normal appearance.    HENT:      Head: Normocephalic.      Right Ear: Tympanic membrane normal.      Nose: Nose normal.      Mouth/Throat:      Mouth: Mucous membranes are moist.   Cardiovascular:      Rate and Rhythm: Normal rate and regular rhythm.   Pulmonary:      Effort: Pulmonary effort is normal.   Abdominal:      General: Bowel sounds are normal.      Palpations: Abdomen is soft.      Comments: Distended with tenderness in LLQ on palpation but no tenderness upon examination with stethoscope     Skin:     Capillary Refill: Capillary refill takes less than 2 seconds.   Mild erythema in 2cm [place] on dorsal R foot, will monitor.  Neurological:      General: No focal deficit present.      Mental Status: She is alert.       Assessment/Plan      8/29:  -New leukocytosis 6->13 today. Suspect inflammatory and will track tomorrow. No signs of overt infection such as HDUS, afebrile, changes in symptoms or oxygenation  -Tolerating regular diet  -Having BM  -C/w current pain regimen (titrated down previous day) to oxy 2.5mg q4 PRN, percocet q6 PRN  -Pending recs from pods given continued R foot pain. Slight erythema at site of pain, will monitor  -Lytes wnl, continue to monitor  -c/w new lisinopril  -PT/OT    Dispo pending surgery recs. They continue to follow. Appreciate    8/28:  Clinically improving... diet advanced to soft.   S/p relistor last night.   Will titrate down opiates further today.   F/up PODs for any further workup. She still has R foot pain but seems controlled.   HypoK, hypoMg... replace and monitor.   HTN... much better controlled with new lisinopril.   Pt/ot     Dispo home hopefully tomorrow if cleared by OR team and pt/ot.      8/27:  NGT out, cont clear diet and enemas per OR team. Will add relistor for dysmotility. Can try reglan if that doesn't help.      R foot workup per PODs.   HTN... add lisinopril today, titrate.      Pt/ot, encouraged mobility.      8/26:  Has completed a good course of iv abx... can stop for  now.   Sbo.. persists, with ngt lws... still no bm/flatus.... f/up OR.   Replace K   R 1st toe pain... xray with spur formation, nothing on exam, no hx of gout... f/up PODs consult.   Uncontrolled HTN... will add iv hydralazine PRN.      8/25:  Enteritis... leukocytosis resolved... cont iv abx.   SBO... s/p ngt... f/up OR recs.   HypoMg... replace  HypoK... replace     8/24:  Enteritis.... wbc improving... cont iv abx.   CT last night with sbo... f/up OR team. She's refusing ngt this morning.   HypoK... replace, monitor. Check Mg tomorrow.   Pt/ot     8/23: Abd distended. WBC improving. CT abd/pel ordered per surgery team. Continue iv antibiotics     8/22: abd more distended; surgery ordering another xray wbc improving     8/21:  Abdomen still distended no diarrhea, white count up to 33,000, question reactive, leave on empiric therapy for now per the patient is not on oxygen at home we will try to wean.  Follow-up surgical recommendations  8/20:  Hx emergency surgery for perforated bowel 6/21/24, post-op complications with ileus and wound hematoma s/p wound vac, concern for anastomic infection, Hx GIB s/p EGD around 7/26/24, found to have friable gastric mucosa, Hx upper extremity DVT L brachial 6/27/24, R brachial 7/6/24 and has been on eliquis although reportedly not taking recently. Eliquis was not stopped following admission for GIB.      Now here with abdominal pain which is very tender with guarding and significant leukocytosis along with tachycardia... Imaging is unrevealing.... f/up surgery and ID recs, cont iv abx, f/up cultures, iv analgesia, ivf.      Diarrhea... check C diff.      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 going to hold steroids for now given concern for infection.      HTN urgency... improving with home cardizem.   Hx UE DVT... resume eliquis.      + cocaine  UA unremarkable  CTA without PNA or PE.      Pt lives at home with her son, uses walker baseline.      DVT  Prophylaxis:  Marcus Carreon MD

## 2024-08-29 NOTE — PROGRESS NOTES
Occupational Therapy    Occupational Therapy Treatment    Name: Fernando Cuevas  MRN: [id number]  : 1960  Date: 24  Time Calculation  Start Time: 1403  Stop Time: 1413  Time Calculation (min): 10 min    Assessment:  Medical Staff Made Aware: Yes  End of Session Communication:  (PTA)  End of Session Patient Position:  (pt left with PTA this day)  Plan:  Treatment Interventions: ADL retraining, Functional transfer training, Endurance training, Equipment evaluation/education  OT Frequency: 2 times per week  OT Discharge Recommendations: Low intensity level of continued care  OT Recommended Transfer Status: Stand by assist  OT - OK to Discharge: Yes (per POC)    Subjective   Previous Visit Info:  OT Last Visit  OT Received On: 24  General:  General  Reason for Referral: Pt is a 64 yo female presenting with sepsis; SBO s/p NG tube placement  Prior to Session Communication: Bedside nurse  Patient Position Received: Bed, 3 rail up, Alarm off, caregiver present  Preferred Learning Style: auditory, kinesthetic, verbal  General Comment: Pt required VC for encouragement to participate, cooperative and compliant.  Precautions:  Medical Precautions: Fall precautions    Vital Signs (Past 2hrs)        Date/Time Vitals Session Patient Position Pulse Resp SpO2 BP MAP (mmHg)    24 1337 --  --  --  --  97 %  --  --     24 1403 --  --  120  --  97 %  --  --                   Pain Assessment:  Pain Assessment  Pain Assessment: 0-10  0-10 (Numeric) Pain Score: 10 - Worst possible pain  Pain Type: Acute pain  Pain Location: Foot  Pain Orientation: Right     Objective   Cognition:  Orientation Level: Oriented X4  Activities of Daily Living: LE Dressing  LE Dressing: Yes  Shoe Level of Assistance: Close supervision, Setup  LE Dressing Where Assessed: Edge of bed  LE Dressing Comments: pt donned slip on shoes with figuree four dressing technique    Functional Standing Tolerance:     Bed Mobility/Transfers: Bed  Mobility  Bed Mobility: Yes  Bed Mobility 1  Bed Mobility 1: Supine to sitting, Log roll  Level of Assistance 1: Distant supervision  Bed Mobility Comments 1: pt completed with use of bed rail, HOB min elevated    Transfers  Transfer: Yes  Transfer 1  Transfer From 1: Bed to  Transfer to 1: Chair with arms  Technique 1: Sit to stand, Stand to sit  Transfer Device 1:  (rollator)  Transfer Level of Assistance 1: Close supervision  Trials/Comments 1: VC provided for rollator safety    Functional Mobility:  Functional Mobility  Functional Mobility Performed: Yes  Functional Mobility 1  Surface 1: Level tile  Device 1: Rollator  Functional Mobility Support Devices: Gait belt  Assistance 1: Close supervision  Comments 1: Pt completed functional mobility with rollator a household distance at [facility]. Pt requires seated rest break d/t fatigue.    Outcome Measures:  [facility] Daily Activity  Putting on and taking off regular lower body clothing: A little  Bathing (including washing, rinsing, drying): A little  Putting on and taking off regular upper body clothing: A little  Toileting, which includes using toilet, bedpan or urinal: A little  Taking care of personal grooming such as brushing teeth: None  Eating Meals: None  Daily Activity - Total Score: 20      Education Documentation  Body Mechanics, taught by SIN Alexis at 8/29/2024  2:35 PM.  Learner: Patient  Readiness: Acceptance  Method: Explanation  Response: Verbalizes Understanding    ADL Training, taught by SIN Alexis at 8/29/2024  2:35 PM.  Learner: Patient  Readiness: Acceptance  Method: Explanation  Response: Verbalizes Understanding    Education Comments  No comments found.      Goals:  Encounter Problems       Encounter Problems (Active)       ADLs       Patient will perform UB and LB bathing with modified independent level of assistance and shower chair. (Not Progressing)       Start:  08/26/24    Expected End:  09/09/24            Patient with  complete lower body dressing with modified independent level of assistance donning and doffing all LE clothes  with PRN adaptive equipment while edge of bed  (Progressing)       Start:  08/26/24    Expected End:  09/09/24            Patient will complete daily grooming tasks brushing teeth and washing face/hair with modified independent level of assistance and PRN adaptive equipment while standing. (Not Progressing)       Start:  08/26/24    Expected End:  09/09/24            Patient will complete toileting including hygiene clothing management/hygiene with modified independent level of assistance and grab bars. (Not Progressing)       Start:  08/26/24    Expected End:  09/09/24               MOBILITY       Patient will perform Functional mobility min Household distances/Community Distances with modified independent level of assistance and least restrictive device in order to improve safety and functional mobility. (Progressing)       Start:  08/26/24    Expected End:  09/09/24               TRANSFERS       Patient will complete sit to stand transfer with modified independent level of assistance and least restrictive device in order to improve safety and prepare for out of bed mobility. (Progressing)       Start:  08/26/24    Expected End:  09/09/24

## 2024-08-30 LAB
ALBUMIN SERPL BCP-MCNC: 2.4 G/DL (ref 3.4–5)
ANION GAP SERPL CALC-SCNC: 12 MMOL/L (ref 10–20)
BASOPHILS # BLD AUTO: 0.03 X10*3/UL (ref 0–0.1)
BASOPHILS NFR BLD AUTO: 0.3 %
BITE CELLS BLD QL SMEAR: PRESENT
BUN SERPL-MCNC: 8 MG/DL (ref 6–23)
CALCIUM SERPL-MCNC: 8 MG/DL (ref 8.6–10.3)
CHLORIDE SERPL-SCNC: 105 MMOL/L (ref 98–107)
CO2 SERPL-SCNC: 23 MMOL/L (ref 21–32)
CREAT SERPL-MCNC: 0.72 MG/DL (ref 0.5–1.05)
EGFRCR SERPLBLD CKD-EPI 2021: >90 ML/MIN/1.73M*2
EOSINOPHIL # BLD AUTO: 0.02 X10*3/UL (ref 0–0.7)
EOSINOPHIL NFR BLD AUTO: 0.2 %
ERYTHROCYTE [DISTWIDTH] IN BLOOD BY AUTOMATED COUNT: 21.4 % (ref 11.5–14.5)
GLUCOSE BLD MANUAL STRIP-MCNC: 105 MG/DL (ref 74–99)
GLUCOSE BLD MANUAL STRIP-MCNC: 97 MG/DL (ref 74–99)
GLUCOSE BLD MANUAL STRIP-MCNC: 99 MG/DL (ref 74–99)
GLUCOSE SERPL-MCNC: 94 MG/DL (ref 74–99)
HCT VFR BLD AUTO: 26.9 % (ref 36–46)
HGB BLD-MCNC: 7.9 G/DL (ref 12–16)
IMM GRANULOCYTES # BLD AUTO: 0.11 X10*3/UL (ref 0–0.7)
IMM GRANULOCYTES NFR BLD AUTO: 0.9 % (ref 0–0.9)
LYMPHOCYTES # BLD AUTO: 2.3 X10*3/UL (ref 1.2–4.8)
LYMPHOCYTES NFR BLD AUTO: 19.7 %
MAGNESIUM SERPL-MCNC: 1.6 MG/DL (ref 1.6–2.4)
MCH RBC QN AUTO: 23.6 PG (ref 26–34)
MCHC RBC AUTO-ENTMCNC: 29.4 G/DL (ref 32–36)
MCV RBC AUTO: 80 FL (ref 80–100)
MONOCYTES # BLD AUTO: 1.34 X10*3/UL (ref 0.1–1)
MONOCYTES NFR BLD AUTO: 11.5 %
NEUTROPHILS # BLD AUTO: 7.85 X10*3/UL (ref 1.2–7.7)
NEUTROPHILS NFR BLD AUTO: 67.4 %
NRBC BLD-RTO: 0 /100 WBCS (ref 0–0)
PHOSPHATE SERPL-MCNC: 2.9 MG/DL (ref 2.5–4.9)
PLATELET # BLD AUTO: 903 X10*3/UL (ref 150–450)
POTASSIUM SERPL-SCNC: 4.2 MMOL/L (ref 3.5–5.3)
RBC # BLD AUTO: 3.35 X10*6/UL (ref 4–5.2)
RBC MORPH BLD: NORMAL
SCHISTOCYTES BLD QL SMEAR: NORMAL
SODIUM SERPL-SCNC: 136 MMOL/L (ref 136–145)
TARGETS BLD QL SMEAR: NORMAL
WBC # BLD AUTO: 11.7 X10*3/UL (ref 4.4–11.3)

## 2024-08-30 PROCEDURE — 82947 ASSAY GLUCOSE BLOOD QUANT: CPT

## 2024-08-30 PROCEDURE — 36415 COLL VENOUS BLD VENIPUNCTURE: CPT | Performed by: STUDENT IN AN ORGANIZED HEALTH CARE EDUCATION/TRAINING PROGRAM

## 2024-08-30 PROCEDURE — 83735 ASSAY OF MAGNESIUM: CPT | Performed by: STUDENT IN AN ORGANIZED HEALTH CARE EDUCATION/TRAINING PROGRAM

## 2024-08-30 PROCEDURE — 2500000005 HC RX 250 GENERAL PHARMACY W/O HCPCS: Performed by: PHYSICIAN ASSISTANT

## 2024-08-30 PROCEDURE — 2500000001 HC RX 250 WO HCPCS SELF ADMINISTERED DRUGS (ALT 637 FOR MEDICARE OP): Performed by: INTERNAL MEDICINE

## 2024-08-30 PROCEDURE — 99232 SBSQ HOSP IP/OBS MODERATE 35: CPT | Performed by: STUDENT IN AN ORGANIZED HEALTH CARE EDUCATION/TRAINING PROGRAM

## 2024-08-30 PROCEDURE — 2500000001 HC RX 250 WO HCPCS SELF ADMINISTERED DRUGS (ALT 637 FOR MEDICARE OP): Performed by: PHYSICIAN ASSISTANT

## 2024-08-30 PROCEDURE — 1100000001 HC PRIVATE ROOM DAILY

## 2024-08-30 PROCEDURE — 2500000002 HC RX 250 W HCPCS SELF ADMINISTERED DRUGS (ALT 637 FOR MEDICARE OP, ALT 636 FOR OP/ED): Performed by: INTERNAL MEDICINE

## 2024-08-30 PROCEDURE — 97116 GAIT TRAINING THERAPY: CPT | Mod: GP

## 2024-08-30 PROCEDURE — 85025 COMPLETE CBC W/AUTO DIFF WBC: CPT | Performed by: STUDENT IN AN ORGANIZED HEALTH CARE EDUCATION/TRAINING PROGRAM

## 2024-08-30 PROCEDURE — 94640 AIRWAY INHALATION TREATMENT: CPT

## 2024-08-30 PROCEDURE — 80069 RENAL FUNCTION PANEL: CPT | Performed by: STUDENT IN AN ORGANIZED HEALTH CARE EDUCATION/TRAINING PROGRAM

## 2024-08-30 PROCEDURE — 2500000001 HC RX 250 WO HCPCS SELF ADMINISTERED DRUGS (ALT 637 FOR MEDICARE OP)

## 2024-08-30 PROCEDURE — 2500000001 HC RX 250 WO HCPCS SELF ADMINISTERED DRUGS (ALT 637 FOR MEDICARE OP): Performed by: STUDENT IN AN ORGANIZED HEALTH CARE EDUCATION/TRAINING PROGRAM

## 2024-08-30 PROCEDURE — 2500000002 HC RX 250 W HCPCS SELF ADMINISTERED DRUGS (ALT 637 FOR MEDICARE OP, ALT 636 FOR OP/ED): Performed by: PHARMACIST

## 2024-08-30 RX ORDER — OXYCODONE AND ACETAMINOPHEN 5; 325 MG/1; MG/1
1 TABLET ORAL EVERY 8 HOURS PRN
Status: DISCONTINUED | OUTPATIENT
Start: 2024-08-30 | End: 2024-09-01 | Stop reason: HOSPADM

## 2024-08-30 RX ADMIN — DILTIAZEM HYDROCHLORIDE 240 MG: 120 CAPSULE, EXTENDED RELEASE ORAL at 09:47

## 2024-08-30 RX ADMIN — BUSPIRONE HYDROCHLORIDE 5 MG: 5 TABLET ORAL at 20:39

## 2024-08-30 RX ADMIN — Medication 100 MG: at 09:47

## 2024-08-30 RX ADMIN — BUDESONIDE 0.5 MG: 0.5 INHALANT RESPIRATORY (INHALATION) at 19:57

## 2024-08-30 RX ADMIN — OXYCODONE HYDROCHLORIDE AND ACETAMINOPHEN 1 TABLET: 5; 325 TABLET ORAL at 13:16

## 2024-08-30 RX ADMIN — DOCUSATE SODIUM 100 MG: 100 CAPSULE, LIQUID FILLED ORAL at 09:48

## 2024-08-30 RX ADMIN — HYDROXYZINE HYDROCHLORIDE 10 MG: 10 TABLET ORAL at 09:51

## 2024-08-30 RX ADMIN — OXYCODONE HYDROCHLORIDE AND ACETAMINOPHEN 1 TABLET: 5; 325 TABLET ORAL at 21:20

## 2024-08-30 RX ADMIN — IPRATROPIUM BROMIDE AND ALBUTEROL SULFATE 3 ML: 2.5; .5 SOLUTION RESPIRATORY (INHALATION) at 19:57

## 2024-08-30 RX ADMIN — APIXABAN 5 MG: 5 TABLET, FILM COATED ORAL at 09:48

## 2024-08-30 RX ADMIN — LISINOPRIL 5 MG: 5 TABLET ORAL at 09:48

## 2024-08-30 RX ADMIN — Medication 2 L/MIN: at 09:56

## 2024-08-30 RX ADMIN — HYDROXYZINE HYDROCHLORIDE 10 MG: 10 TABLET ORAL at 20:39

## 2024-08-30 RX ADMIN — AZITHROMYCIN 250 MG: 250 TABLET, FILM COATED ORAL at 09:48

## 2024-08-30 RX ADMIN — BUSPIRONE HYDROCHLORIDE 5 MG: 5 TABLET ORAL at 09:48

## 2024-08-30 RX ADMIN — MIRTAZAPINE 15 MG: 15 TABLET, FILM COATED ORAL at 20:39

## 2024-08-30 RX ADMIN — HYDROXYZINE HYDROCHLORIDE 10 MG: 10 TABLET ORAL at 15:24

## 2024-08-30 RX ADMIN — ALBUTEROL SULFATE 2.5 MG: 2.5 SOLUTION RESPIRATORY (INHALATION) at 09:56

## 2024-08-30 RX ADMIN — ALBUTEROL SULFATE 2.5 MG: 2.5 SOLUTION RESPIRATORY (INHALATION) at 18:09

## 2024-08-30 RX ADMIN — OXYCODONE HYDROCHLORIDE 2.5 MG: 5 TABLET ORAL at 19:27

## 2024-08-30 RX ADMIN — APIXABAN 5 MG: 5 TABLET, FILM COATED ORAL at 20:39

## 2024-08-30 RX ADMIN — DOCUSATE SODIUM 100 MG: 100 CAPSULE, LIQUID FILLED ORAL at 20:39

## 2024-08-30 RX ADMIN — PANTOPRAZOLE SODIUM 40 MG: 40 TABLET, DELAYED RELEASE ORAL at 09:46

## 2024-08-30 RX ADMIN — OXYCODONE HYDROCHLORIDE AND ACETAMINOPHEN 1 TABLET: 5; 325 TABLET ORAL at 06:19

## 2024-08-30 RX ADMIN — MONTELUKAST 10 MG: 10 TABLET, FILM COATED ORAL at 09:48

## 2024-08-30 ASSESSMENT — COGNITIVE AND FUNCTIONAL STATUS - GENERAL
TOILETING: A LITTLE
CLIMB 3 TO 5 STEPS WITH RAILING: A LITTLE
HELP NEEDED FOR BATHING: A LITTLE
WALKING IN HOSPITAL ROOM: A LITTLE
DAILY ACTIVITIY SCORE: 20
CLIMB 3 TO 5 STEPS WITH RAILING: A LITTLE
DRESSING REGULAR LOWER BODY CLOTHING: A LITTLE
HELP NEEDED FOR BATHING: A LITTLE
WALKING IN HOSPITAL ROOM: A LITTLE
MOBILITY SCORE: 22
DRESSING REGULAR LOWER BODY CLOTHING: A LITTLE
DRESSING REGULAR UPPER BODY CLOTHING: A LITTLE
MOBILITY SCORE: 21
WALKING IN HOSPITAL ROOM: A LITTLE
MOBILITY SCORE: 22
TOILETING: A LITTLE
MOVING TO AND FROM BED TO CHAIR: A LITTLE
CLIMB 3 TO 5 STEPS WITH RAILING: A LITTLE
DRESSING REGULAR UPPER BODY CLOTHING: A LITTLE
DAILY ACTIVITIY SCORE: 20

## 2024-08-30 ASSESSMENT — PAIN SCALES - GENERAL
PAINLEVEL_OUTOF10: 10 - WORST POSSIBLE PAIN
PAINLEVEL_OUTOF10: 10 - WORST POSSIBLE PAIN
PAINLEVEL_OUTOF10: 8
PAINLEVEL_OUTOF10: 10 - WORST POSSIBLE PAIN
PAINLEVEL_OUTOF10: 10 - WORST POSSIBLE PAIN
PAINLEVEL_OUTOF10: 6

## 2024-08-30 ASSESSMENT — PAIN - FUNCTIONAL ASSESSMENT
PAIN_FUNCTIONAL_ASSESSMENT: 0-10

## 2024-08-30 ASSESSMENT — PAIN DESCRIPTION - ORIENTATION: ORIENTATION: RIGHT

## 2024-08-30 ASSESSMENT — PAIN DESCRIPTION - LOCATION: LOCATION: FOOT

## 2024-08-30 NOTE — PROGRESS NOTES
08/30/24 0745   Current Planned Discharge Disposition   Current Planned Discharge Disposition [facility]     Per notes once med ready plan on discharge is home with [facility], updates have been sent to agency in regards to possible discharge, patient has had BM, diet has been advanced, PT/OT rec'd low on 8/29. Podiatry consult placed, possible discharge today will continue to monitor for discharge planning.

## 2024-08-30 NOTE — PROGRESS NOTES
"Physical Therapy    Physical Therapy Treatment    Patient Name: Fernando Cuevas  MRN: [id number]  Today's Date: 8/30/2024  Time Calculation  Start Time: 1453  Stop Time: 1503  Time Calculation (min): 10 min         Assessment/Plan   PT Assessment  Rehab Prognosis: Good  Evaluation/Treatment Tolerance: Patient tolerated treatment well  Medical Staff Made Aware: Yes  End of Session Communication: Bedside nurse  Assessment Comment: PT session focused on gait training using rollator. Pt progressed to SUP (emerging Mod Ind) for all mobility. Limited primarily due to R foot pain (localized at great toe). Continue to recommend LOW intensity therapy at DC  End of Session Patient Position: Bed, 3 rail up, Alarm off, not on at start of session  PT Plan  Inpatient/Swing Bed or Outpatient: Inpatient  PT Plan  Treatment/Interventions: Transfer training, Gait training, Stair training  PT Plan: Ongoing PT  PT Frequency: 3 times per week  PT Discharge Recommendations: Low intensity level of continued care  Equipment Recommended upon Discharge:  (no needs anticipated)  PT Recommended Transfer Status: Assist x1  PT - OK to Discharge: Yes (Per POC)      General Visit Information:   PT  Visit  PT Received On: 08/30/24  General  Reason for Referral: Pt is a 62 yo female presenting with sepsis; SBO s/p NG tube placement  Referred By: Ang  Past Medical History Relevant to Rehab:   Past Medical History:   Diagnosis Date    COPD (chronic obstructive pulmonary disease) (Multi)     Depression     DVT (deep venous thrombosis) (Multi)     bilateral upper extremities    HTN (hypertension)     Lung cancer (Multi)     Migraines     Panic disorder        Missed Visit: Yes  Missed Visit Reason: Other (Comment) (RN cleared pt for therapy session. Upon arrival, pt stating \"I want to get washed up first. Try me after 11am\")  Prior to Session Communication: Bedside nurse  Patient Position Received: Alarm off, not on at start of session, Bed, 3 rail " up  General Comment: Pt pleasant and agreeable to PT treatment session    Subjective   Precautions:  Precautions  Medical Precautions: Fall precautions  Post-Surgical Precautions: Abdominal surgery precautions    Vital Signs (Past 2hrs)                 Objective   Pain:  Pain Assessment  Pain Assessment: 0-10  0-10 (Numeric) Pain Score: 10 - Worst possible pain  Pain Type: Acute pain  Pain Location: Foot  Pain Orientation: Right  Pain Interventions: Ambulation/increased activity, Repositioned  Cognition:  Cognition  Overall Cognitive Status: Within Functional Limits  Orientation Level: Oriented X4  Coordination:     Postural Control:  Static Sitting Balance  Static Sitting-Balance Support: No upper extremity supported, Feet supported  Static Sitting-Level of Assistance: Independent  Static Standing Balance  Static Standing-Balance Support: Bilateral upper extremity supported (rollator)  Static Standing-Level of Assistance: Distant supervision    Activity Tolerance:  Activity Tolerance  Endurance: Tolerates 10 - 20 min exercise with multiple rests  Treatments:  Bed Mobility  Bed Mobility: Yes  Bed Mobility 1  Bed Mobility 1: Supine to sitting  Level of Assistance 1: Modified independent    Ambulation/Gait Training  Ambulation/Gait Training Performed: Yes  Ambulation/Gait Training 1  Surface 1: Level tile  Device 1:  (rollator)  Assistance 1: Distant supervision  Quality of Gait 1:  (R antalgic gait pattern with reduced geronimo)  Comments/Distance (ft) 1: 120ft and 150ft  Transfers  Transfer: Yes  Transfer 1  Transfer From 1: Sit to  Transfer to 1: Stand  Technique 1: Sit to stand, Stand to sit  Transfer Level of Assistance 1: Modified independent    Outcome Measures:  [facility] Basic Mobility  Turning from your back to your side while in a flat bed without using bedrails: None  Moving from lying on your back to sitting on the side of a flat bed without using bedrails: None  Moving to and from bed to chair (including a  wheelchair): None  Standing up from a chair using your arms (e.g. wheelchair or bedside chair): None  To walk in hospital room: A little  Climbing 3-5 steps with railing: A little  Basic Mobility - Total Score: 22    Education Documentation  Precautions, taught by Zuhair Munroe, PT at 8/30/2024  3:27 PM.  Learner: Patient  Readiness: Acceptance  Method: Explanation  Response: Verbalizes Understanding    Body Mechanics, taught by Zuhair Munroe, PT at 8/30/2024  3:27 PM.  Learner: Patient  Readiness: Acceptance  Method: Explanation  Response: Verbalizes Understanding    Mobility Training, taught by Zuhair Munroe, PT at 8/30/2024  3:27 PM.  Learner: Patient  Readiness: Acceptance  Method: Explanation  Response: Verbalizes Understanding    Education Comments  No comments found.        OP EDUCATION:       Encounter Problems       Encounter Problems (Active)       Mobility       LTG - Patient will navigate 3 steps with rails/device (Progressing)       Start:  08/26/24    Expected End:  09/09/24       SUP using HR          STG - Patient will ambulate (Progressing)       Start:  08/26/24    Expected End:  09/09/24       Mod Ind using FWW >100ft            Pain - Adult             Encounter Problems (Resolved)       PT Transfers       STG - Patient will perform bed mobility (Met)       Start:  08/26/24    Expected End:  09/09/24    Resolved:  08/29/24    Mod Ind         STG - Patient will transfer sit to and from stand (Met)       Start:  08/26/24    Expected End:  09/09/24    Resolved:  08/30/24    Mod Ind

## 2024-08-30 NOTE — CARE PLAN
Problem: Pain - Adult  Goal: Verbalizes/displays adequate comfort level or baseline comfort level  Outcome: Progressing     Problem: Safety - Adult  Goal: Free from fall injury  Outcome: Progressing     Problem: Discharge Planning  Goal: Discharge to home or other facility with appropriate resources  Outcome: Progressing     Problem: Chronic Conditions and Co-morbidities  Goal: Patient's chronic conditions and co-morbidity symptoms are monitored and maintained or improved  Outcome: Progressing     Problem: Pain  Goal: Takes deep breaths with improved pain control throughout the shift  Outcome: Progressing  Goal: Turns in bed with improved pain control throughout the shift  Outcome: Progressing  Goal: Performs ADL's with improved pain control throughout shift  Outcome: Progressing  Goal: Participates in PT with improved pain control throughout the shift  Outcome: Progressing  Goal: Free from opioid side effects throughout the shift  Outcome: Progressing  Goal: Free from acute confusion related to pain meds throughout the shift  Outcome: Progressing   The patient's goals for the shift include      The clinical goals for the shift include Pt safety will be maintained in duration of the shift.    Over the shift, the patient did make progress toward the following goals.

## 2024-08-30 NOTE — CARE PLAN
Problem: Safety - Adult  Goal: Free from fall injury  8/29/2024 2308 by Nidia Hoang RN  Outcome: Progressing  8/29/2024 2305 by Nidia Hoang RN  Outcome: Progressing  8/29/2024 2305 by Nidia Hoang RN  Outcome: Progressing     Problem: Discharge Planning  Goal: Discharge to home or other facility with appropriate resources  8/29/2024 2308 by Nidia Hoang RN  Outcome: Progressing  8/29/2024 2305 by Nidia Hoang RN  Outcome: Progressing  8/29/2024 2305 by Nidia Hoang RN  Outcome: Progressing     Problem: Chronic Conditions and Co-morbidities  Goal: Patient's chronic conditions and co-morbidity symptoms are monitored and maintained or improved  8/29/2024 2308 by Nidia Hoang RN  Outcome: Progressing  8/29/2024 2305 by Nidia Hoang RN  Outcome: Progressing  8/29/2024 2305 by Nidia Hoang RN  Outcome: Progressing   The patient's goals for the shift include      The clinical goals for the shift include Pt safety will be maintained in duration of the shift.    Over the shift, the patient did make progress toward the following goals.

## 2024-08-30 NOTE — PROGRESS NOTES
PODIATRY SERVICE CONSULT PROGRESS NOTE    SERVICE DATE: 8/30/2024   SERVICE TIME:  1103    Subjective   INTERVAL HPI:   Patient was seen at bedside.  Pain controlled with warm compress.  Patient denies any constitutional symptoms.   No other pedal complaints.     Medications:  Scheduled Meds: apixaban, 5 mg, oral, BID  azithromycin, 250 mg, oral, Once per day on Monday Wednesday Friday  budesonide, 0.5 mg, nebulization, BID  busPIRone, 5 mg, oral, BID  dilTIAZem ER, 240 mg, oral, Daily  docusate sodium, 100 mg, oral, BID  [Held by provider] formoterol, 20 mcg, nebulization, BID  ipratropium-albuteroL, 3 mL, nebulization, TID  lisinopril, 5 mg, oral, Daily  mirtazapine, 15 mg, oral, Nightly  montelukast, 10 mg, oral, Daily  oxygen, , inhalation, Continuous - Inhalation  pantoprazole, 40 mg, oral, Daily  polyethylene glycol, 17 g, oral, BID  thiamine, 100 mg, oral, Daily  [Held by provider] tiotropium, 2 puff, inhalation, Daily      Continuous Infusions:    PRN Meds: PRN medications: acetaminophen, albuterol, benzonatate, calcium carbonate, [Held by provider] diphenhydrAMINE, hydrALAZINE, hydrOXYzine HCL, melatonin, ondansetron ODT **OR** ondansetron, oxyCODONE, oxyCODONE-acetaminophen, prochlorperazine **OR** prochlorperazine         Objective   PHYSICAL EXAM:  Physical Exam Performed:  Vitals:    08/30/24 0956   BP:    Pulse:    Resp:    Temp:    SpO2: 100%     Body mass index is 24.97 kg/m².    Patient is AOx3 and in no acute distress. Patient is alert and cooperative. On O2 via NC. Heating pad in place over right dorsal foot.     Vascular: Faintly palpable DP/PT pulses B/L. Moderate non-pitting edema noted B/L. Hair growth diminished B/L. CFT<5 to B/L hallux. Temperature is warm to cool from tibial tuberosity to distal digits B/L.      Musculoskeletal: Right dorsal midfoot tender to palpation. Gross active and passive ROM intact to age and activity level.      Neurological: Intact light touch sensation B/L. Pain  stimuli intact B/L. Denies any numbness, burning or tingling.     Dermatologic: No open wounds. Nails 1-5 are within normal limits for thickness and length B/L. Skin appears well hydrated B/L. Web spaces 1-4 B/L are clean, dry and intact. No nodules noted B/L. No hyperkeratotic tissue noted B/L.       LABS:   Results for orders placed or performed during the hospital encounter of 08/19/24 (from the past 24 hour(s))   POCT GLUCOSE   Result Value Ref Range    POCT Glucose 86 74 - 99 mg/dL   CBC and Auto Differential   Result Value Ref Range    WBC 11.7 (H) 4.4 - 11.3 x10*3/uL    nRBC 0.0 0.0 - 0.0 /100 WBCs    RBC 3.35 (L) 4.00 - 5.20 x10*6/uL    Hemoglobin 7.9 (L) 12.0 - 16.0 g/dL    Hematocrit 26.9 (L) 36.0 - 46.0 %    MCV 80 80 - 100 fL    MCH 23.6 (L) 26.0 - 34.0 pg    MCHC 29.4 (L) 32.0 - 36.0 g/dL    RDW 21.4 (H) 11.5 - 14.5 %    Platelets 903 (H) 150 - 450 x10*3/uL    Neutrophils % 67.4 40.0 - 80.0 %    Immature Granulocytes %, Automated 0.9 0.0 - 0.9 %    Lymphocytes % 19.7 13.0 - 44.0 %    Monocytes % 11.5 2.0 - 10.0 %    Eosinophils % 0.2 0.0 - 6.0 %    Basophils % 0.3 0.0 - 2.0 %    Neutrophils Absolute 7.85 (H) 1.20 - 7.70 x10*3/uL    Immature Granulocytes Absolute, Automated 0.11 0.00 - 0.70 x10*3/uL    Lymphocytes Absolute 2.30 1.20 - 4.80 x10*3/uL    Monocytes Absolute 1.34 (H) 0.10 - 1.00 x10*3/uL    Eosinophils Absolute 0.02 0.00 - 0.70 x10*3/uL    Basophils Absolute 0.03 0.00 - 0.10 x10*3/uL   Renal function panel   Result Value Ref Range    Glucose 94 74 - 99 mg/dL    Sodium 136 136 - 145 mmol/L    Potassium 4.2 3.5 - 5.3 mmol/L    Chloride 105 98 - 107 mmol/L    Bicarbonate 23 21 - 32 mmol/L    Anion Gap 12 10 - 20 mmol/L    Urea Nitrogen 8 6 - 23 mg/dL    Creatinine 0.72 0.50 - 1.05 mg/dL    eGFR >90 >60 mL/min/1.73m*2    Calcium 8.0 (L) 8.6 - 10.3 mg/dL    Phosphorus 2.9 2.5 - 4.9 mg/dL    Albumin 2.4 (L) 3.4 - 5.0 g/dL   Magnesium   Result Value Ref Range    Magnesium 1.60 1.60 - 2.40 mg/dL    Morphology   Result Value Ref Range    RBC Morphology See Below     RBC Fragments Few     Target Cells Few     Bite Cells Present       Lab Results   Component Value Date    HGBA1C 5.2 04/13/2022      Lab Results   Component Value Date    CRP 0.57 06/16/2024      Lab Results   Component Value Date    SEDRATE 35 (H) 06/16/2024        Results from last 7 days   Lab Units 08/30/24  0652   WBC AUTO x10*3/uL 11.7*   RBC AUTO x10*6/uL 3.35*   HEMOGLOBIN g/dL 7.9*   HEMATOCRIT % 26.9*     Results from last 7 days   Lab Units 08/30/24  0652   SODIUM mmol/L 136   POTASSIUM mmol/L 4.2   CHLORIDE mmol/L 105   CO2 mmol/L 23   BUN mg/dL 8   CREATININE mg/dL 0.72   CALCIUM mg/dL 8.0*   PHOSPHORUS mg/dL 2.9   MAGNESIUM mg/dL 1.60           IMAGING REVIEW:  XR abdomen 1 view    Result Date: 8/27/2024  Interpreted By:  Joslyn Camara, STUDY: XR ABDOMEN 1 VIEW;  8/27/2024 7:17 am   INDICATION: Signs/Symptoms:SBO.   COMPARISON: 08/26/2024   ACCESSION NUMBER(S): [id number]   ORDERING CLINICIAN: MINERVA AVILEZ   FINDINGS: Nasogastric tube is seen with the distal tip projecting over the left upper quadrant. Several distended small bowel loops are seen. Gas and stool is noted throughout the colon. The previously noted distended small bowel loops appear to be distended to a lesser extent.       Distention of small bowel loops which appears to have slightly improved with gas and stool throughout the colon. This may be related to partial small bowel obstruction or ileus.     MACRO: None   Signed by: Joslyn Camara 8/27/2024 8:49 AM Dictation workstation:   [id number]    Vascular US ankle brachial index (AUSTYN) without exercise    Result Date: 8/26/2024             [place] [address]   Tel [phone] and Fax [phone]  Vascular Lab Report [facility] US ANKLE BRACHIAL INDEX (AUSTYN) WITHOUT EXERCISE  Patient Name:     DINA GREENE      Reading           [id number] Zachary Vazquez                                        Physician:        MD Study Date:       8/26/2024           Ordering          [id number] MARGOTH CASTANO                                       Physician: MRN/PID:          [id number]            Technologist:     Kacey Tee RVT Accession#:       [id number]        Technologist 2:   Mamie Karimi RVT Date of           1960 / [birth year]      Encounter#:       [id number] Birth/Age:        years Gender:           F Admission Status: Inpatient           Location          [facility]                                       Performed:  Diagnosis/ICD: Pain in right foot-M79.671; Pain in right toe(s)-M79.674 CPT Codes:     34668 Peripheral artery AUSTYN Only  CONCLUSIONS: Right Lower PVR: No evidence of arterial occlusive disease in the right lower extremity at rest. Multiphasic flow is noted in the right common femoral artery, right posterior tibial artery and right dorsalis pedis artery. Due to pain tolerance, unable to obtain first digit (great toe) PPG and pressure. However a PPG was documented on the second digit. Left Lower PVR: No evidence of arterial occlusive disease in the left lower extremity at rest. Decreased digital perfusion noted. Multiphasic flow is noted in the left common femoral artery, left posterior tibial artery and left dorsalis pedis artery. Unable to obtain brachial pressure due to IV placement.  Imaging & Doppler Findings:  RIGHT Lower PVR                Pressures Ratios Right Posterior Tibial (Ankle) 158 mmHg  1.06 Right Dorsalis Pedis (Ankle)   165 mmHg  1.11   LEFT Lower PVR                Pressures Ratios Left Posterior Tibial (Ankle) 161 mmHg  1.08 Left Dorsalis Pedis (Ankle)   163 mmHg  1.09 Left Digit (Great Toe)        46 mmHg   0.31                     Right Brachial Pressure 149 mmHg   [id number] Zachary Vazquez MD Electronically signed by [id number] Zachary Vazquez MD on 8/26/2024 at 5:03:03 PM  ** Final **     XR abdomen 1 view    Result Date: 8/26/2024  Interpreted By:  Too  Gabe, STUDY: XR ABDOMEN 1 VIEW;  8/26/2024 11:23 am   INDICATION: Signs/Symptoms:confirm NGT placemnent.   COMPARISON: KUB from 08/22/2024. CT scan from 08/23/2024. Plain film of the chest and upper abdomen from 08/25/2024.   ACCESSION NUMBER(S): [id number]   ORDERING CLINICIAN: MARVA NAVA   TECHNIQUE: AP portable supine view of the lower chest through the upper pelvis was obtained.   FINDINGS: There is an NG tube in place with distal tip in the proximal stomach. There are dilated gas-filled loops of large and small bowel, progressed. There is some stool in the region of the right colon and hepatic flexure. The lower pelvis was excluded. No gross organomegaly. No destructive bone lesion. The lung bases were clear.       Nonspecific distal colonic obstruction versus diffuse ileus, progressed.   NG tube in place.   MACRO: None   Signed by: Gabe Gage 8/26/2024 11:51 AM Dictation workstation:   [id number]    XR foot right 3+ views    Result Date: 8/25/2024  Interpreted By:  Gabe Gage, STUDY: XR FOOT RIGHT 3+ VIEWS;  8/24/2024 11:24 pm   INDICATION: Signs/Symptoms:Right foot pain.   COMPARISON: None.   ACCESSION NUMBER(S): [id number]   ORDERING CLINICIAN: DIANA NAVARRO   TECHNIQUE: 3 views  of the  right foot were obtained.   FINDINGS: Slight spur formation in the 1st MTP joint. No lytic or blastic destructive bone lesion. No acute fracture or dislocation. No opaque soft tissue foreign body. No periosteal reaction or erosion.       Slight DJD in the 1st MTP joint.  Remainder of the exam was negative.   MACRO: None   Signed by: Gabe Gage 8/25/2024 12:09 PM Dictation workstation:   [id number]    XR abdomen 1 view    Result Date: 8/25/2024  Interpreted By:  Paulie Delarosa, STUDY: XR ABDOMEN 1 VIEW;  8/25/2024 12:27 am   INDICATION: Signs/Symptoms:NG tube placement.   COMPARISON: 8/22/2024   ACCESSION NUMBER(S): [id number]   ORDERING CLINICIAN: THUY CIFUENTES   FINDINGS: Nasogastric tube tip terminates in  the left upper abdomen at level the proximal stomach. There is limited evaluation of the abdomen with only upper and mid abdomen image. There is redemonstration of marked gaseous dilatation of bowel in the imaged upper abdomen.       Nasogastric tube tip terminates in the left upper abdomen at the level of the proximal stomach; advancement is recommended.   Redemonstration of gaseous dilatation of bowel in the imaged upper abdomen which may be secondary to ileus or obstruction.     MACRO: None   Signed by: Paulie Delarosa 8/25/2024 2:36 AM Dictation workstation:   [id number]    CT abdomen and pelvis w oral contrast only    Result Date: 8/24/2024  STUDY: CT Abdomen and Pelvis with IV Contrast; 08/23/2024 11:52 PM INDICATION: Persistent abdominal pain. COMPARISON: CT abdomen and pelvis 08/19/2024, 08/02/2024, and 07/24/2024. ACCESSION NUMBER(S): [id number] ORDERING CLINICIAN: KELSI CHAVEZ TECHNIQUE: CT of the abdomen and pelvis was performed.  Contiguous axial images were obtained at 3 mm slice thickness through the abdomen and pelvis. Coronal and sagittal reconstructions at 3 mm slice thickness were performed.  FINDINGS: LOWER CHEST: Moderate centrilobular emphysema is seen in the bilateral lung bases. There is bandlike atelectasis in the right lower lobe.  9 mm nodule is seen in the medial aspect of the right lower lobe on axial image 90 of series 601.  Minimal atelectasis is seen in the left lung base.  Small fat-containing left posterior diaphragmatic hernia is noted.  Cardiac size is normal.  No pericardial effusion.  Cardiac blood pool is slightly low in attenuation, suggesting systemic anemia.  ABDOMEN:  LIVER: No hepatomegaly.  Smooth surface contour.  Normal attenuation.  BILE DUCTS: No intrahepatic or extrahepatic biliary ductal dilatation.  GALLBLADDER: Gallbladder contains sludge and small stones but is otherwise unremarkable.  STOMACH: No abnormalities identified.  PANCREAS: No masses or ductal  dilatation.  SPLEEN: No splenomegaly or focal splenic lesion.  ADRENAL GLANDS: No thickening or nodules.  KIDNEYS AND URETERS: Right renal cortical thinning is noted.  Mild perinephric stranding is seen bilaterally.  No renal or ureteral calculi.  PELVIS:  BLADDER: No abnormalities identified.  REPRODUCTIVE ORGANS: No acute uterine or adnexal pathology is identified.  The uterus is anteverted.  BOWEL: Fluid filled mildly prominent loops of small bowel are seen in the abdomen and pelvis.  Moderately dilated fluid and air-filled small bowel seen in the right upper quadrant of the abdomen with wall thickening and surrounding mesenteric edema.  No discrete transition point is definitively identified.  Enteric anastomosis is seen in the right lower quadrant of the abdomen.  Terminal ileum is unremarkable. Appendix appears normal.  Mild gaseous distention of the colon is noted.   VESSELS: No abnormalities identified.   Mild calcified plaque is present in the abdominal aorta and iliac arteries.  Abdominal aorta is normal in caliber.  PERITONEUM/RETROPERITONEUM/LYMPH NODES: No free fluid.  No pneumoperitoneum. No lymphadenopathy.  ABDOMINAL WALL: No abnormalities identified. SOFT TISSUES: No abnormalities identified.  BONES: No acute fracture or aggressive osseous lesion.    Fluid-filled mild to moderately dilated small bowel in the abdomen and pelvis with air-fluid levels with focal wall thickening and inflammation of loops of small bowel right upper quadrant.  No discrete transition point is identified, the small bowel appears widely patent however the distal small bowel is decompressed. Findings suggest an acute infectious or inflammatory enteritis causing a moderate grade partial or intermittent small bowel obstruction, possibly from dysmotility rather than a mechanical obstruction. Moderate centrilobular emphysema. 9 mm right lower lobe pulmonary nodule, follow-up per Fleischner Society guidelines. Suspected systemic  anemia. Cholelithiasis and gallbladder sludge. NOTIFICATION:  The critical results of the study were discussed with, and acknowledged by Dr. Sheth by telephone on 8/24/2024 at 1:04 AM. Signed by Darshan David    XR abdomen 1 view    Result Date: 8/22/2024  Interpreted By:  Gabe Gage, STUDY: XR ABDOMEN 1 VIEW;  8/22/2024 9:27 am   INDICATION: Signs/Symptoms:abdominal distension.   COMPARISON: CT scan from 08/19/2024. KUB from 08/21/2024.   ACCESSION NUMBER(S): [id number]   ORDERING CLINICIAN: MINERVA AVILEZ   TECHNIQUE: Single supine view of the abdomen and pelvis was obtained.     FINDINGS: Mild gas in the stomach. Moderate gas throughout the small bowel. Moderate gas and stool in the cecum and proximal right colon. The degree of gaseous distention is similar to 08/21/2024 exam. There was no gross organomegaly. There were no abnormal calcifications. No destructive bone lesion. The extreme lung bases were clear.       Persistent findings ongoing ileus versus partial distal small bowel obstruction findings most likely representing ongoing ileus with little change from yesterday's exam.   MACRO: None   Signed by: Gabe Gage 8/22/2024 9:56 AM Dictation workstation:   [id number]    XR abdomen 1 view    Result Date: 8/21/2024  Interpreted By:  Karina Ventura, STUDY: XR ABDOMEN 1 VIEW;  8/21/2024 7:28 am   INDICATION: Signs/Symptoms:Abdominal distension.   COMPARISON: 08/20/2024   ACCESSION NUMBER(S): [id number]   ORDERING CLINICIAN: MINERVA AVILEZ   FINDINGS: 2 portable supine views of the abdomen obtained.   Multiple dilated gas-filled loops of large and small bowel increased from the previous exam. Rounded density in the central pelvis, likely distended bladder, again seen. Can not accurately assess for free air on supine radiographs. Included extreme lung bases are clear.       Increased gaseous bowel dilatation since previous day's exam may reflect worsening ileus or obstruction.   Probable distended urinary  bladder.   MACRO: None.   Signed by: Karina Ventura 8/21/2024 8:22 AM Dictation workstation:   [id number]    XR abdomen 1 view    Result Date: 8/20/2024  Interpreted By:  Joslyn Camara, STUDY: XR ABDOMEN 1 VIEW;  8/20/2024 8:55 am   INDICATION: Signs/Symptoms:abdominal distension.   COMPARISON: None.   ACCESSION NUMBER(S): [id number]   ORDERING CLINICIAN: MINERVA AVILEZ   FINDINGS: The stomach appears to be distended. There is also probable distention of the bladder. Several mildly distended small bowel loops are seen. Gas and stool is seen in the colon.       Distended stomach.   Probable distention of the bladder.   Several mildly distended small bowel loops which may be related to an ileus continued follow-up is suggested     MACRO: None   Signed by: Joslyn Camara 8/20/2024 10:40 AM Dictation workstation:   [id number]    CT abdomen pelvis wo IV contrast    Result Date: 8/19/2024  STUDY: CT Abdomen and Pelvis without IV Contrast; 08/19/2024, 1:59 PM. INDICATION: Recent bowel resection. Recurrent diffuse abdominal pain. COMPARISON: XR chest/abdomen: 08/03/24; CT AP: 08/02/24, 07/24/24, 07/03/24, 06/26/24, 06/20/24. ACCESSION NUMBER(S): [id number] ORDERING CLINICIAN: LILIAN DE LA TORRE TECHNIQUE: CT of the abdomen and pelvis was performed.  Contiguous axial images were obtained at 3 mm slice thickness through the abdomen and pelvis. Coronal and sagittal reconstructions at 3 mm slice thickness were performed. No intravenous contrast was administered.  Automated mA/kV exposure control was utilized and patient examination was performed in strict accordance with principles of ALARA. FINDINGS: Please note that the evaluation of vessels, lymph nodes and organs is limited without intravenous contrast.  LOWER CHEST: No cardiomegaly.  No pericardial effusion.  Lung bases demonstrate chronic changes with areas of parenchymal scarring in the bilateral medial lower lobes which appears stable.  ABDOMEN:  LIVER: No hepatomegaly.   Smooth surface contour.  There is fatty liver morphology.  BILE DUCTS: No intrahepatic or extrahepatic biliary ductal dilatation.  GALLBLADDER: The gallbladder is present with luminal gallstone. STOMACH: No abnormalities identified.  PANCREAS: Mild prominence of the pancreatic tail is stable without definitive mass.  SPLEEN: No splenomegaly or focal splenic lesion.  ADRENAL GLANDS: Prominence of the adrenal glands.  KIDNEYS AND URETERS: Kidneys are normal in size and location.  No renal or ureteral calculi.  PELVIS:  BLADDER: There is prominence of the urinary bladder.  REPRODUCTIVE ORGANS: No abnormalities demonstrated.  BOWEL: There is mild descending and sigmoid colon diverticulosis without evidence of diverticulitis.  There is postsurgical changes of the small bowel in the right lower quadrant with mild associated wall thickening.  There is no evidence of obstruction.  The appendix is normal.  VESSELS: No abnormalities identified.  Abdominal aorta is normal in caliber.  PERITONEUM/RETROPERITONEUM/LYMPH NODES: No free fluid.  No pneumoperitoneum. No lymphadenopathy.  ABDOMINAL WALL: There are postoperative changes in the anterior abdominal wall with mild stranding in the left anterior abdominal wall which appears stable.  This appears to be related to fat-containing hernia. SOFT TISSUES: No abnormalities identified.  BONES: No acute fracture or aggressive osseous lesion.    Postsurgical changes right lower quadrant within the small bowel with mild wall thickening without evidence of obstruction. Stranding in the left anterior abdominal wall the subcutaneous soft tissues which appears stable.  This appears to be related to small fat-containing hernia. Scarring and parenchymal opacities in the bilateral medial and lower lobes appear stable. Cholelithiasis. Signed by Armando Mcgarry, DO    CT angio chest for pulmonary embolism    Result Date: 8/11/2024  Interpreted By:  Gayla Eduardo, STUDY: CT ANGIO CHEST FOR  PULMONARY EMBOLISM;  8/11/2024 1:15 am   INDICATION: Signs/Symptoms:r/o PE   COMPARISON: CT angiogram chest, abdomen, pelvis 07/25/2024 . CT angiogram abdomen and pelvis 08/06/2024. CT angiogram chest 06/28/2024.   ACCESSION NUMBER(S): [id number]   ORDERING CLINICIAN: MARCOS REECE   TECHNIQUE: Helical data acquisition of the chest was obtained following the uneventful administration of intravenous contrast material. Images were reformatted in axial, coronal, and sagittal planes. MIP images were created and reviewed.  Dual energy reconstructions were also performed including low energy virtual mono-energetic images and iodine density maps.   FINDINGS: POTENTIAL LIMITATIONS OF THE STUDY: Motion artifact.   HEART AND VESSELS: No discrete filling defects within the main pulmonary artery or its branches.   No thoracic aortic aneurysm or acute dissection. Mild atherosclerotic calcifications of the thoracic aorta.   The heart is not enlarged.    No evidence of pericardial effusion.   MEDIASTINUM AND ALESSANDRO, LOWER NECK AND AXILLA: The visualized thyroid gland is within normal limits.   No evidence of thoracic lymphadenopathy by CT criteria.   LUNGS AND AIRWAYS: The trachea and central airways are patent. There is bronchial wall thickening.   There are severe bilateral emphysematous changes. No consolidation, pleural effusion or pneumothorax.  Mild atelectasis at the right lower lobe..   UPPER ABDOMEN: There is cholelithiasis. Redemonstration of 1.3 cm bilobed hyperdense area at the pancreatic tail, not significantly changed in the interval.   CHEST WALL AND OSSEOUS STRUCTURES: No acute osseous changes.       1. No evidence of pulmonary emboli. 2. Severe emphysema. Bronchial wall thickening, may be seen with bronchitis. 3. Mild atelectasis at the right lung base. 4. Cholelithiasis. 5. Redemonstration of 1.3 cm bilobed hyperdense area at the pancreatic tail, not significantly changed in the interval, previously favored to  relate to a small hematoma. Attention on follow-up.     MACRO: None.   Signed by: Gayla Eduardo 8/11/2024 2:26 AM Dictation workstation:   [id number]    CT angio abdomen pelvis w and or wo IV IV contrast    Result Date: 8/9/2024  Interpreted By:  Randy Sung and Dervishi Mario STUDY: CT ANGIO ABDOMEN PELVIS W AND/OR WO IV IV CONTRAST;  8/6/2024 12:19 am   INDICATION: Signs/Symptoms:r/o ischemic bowel.   COMPARISON: CT abdomen pelvis 07/24/2024. 07/25/2024, 03/22/2016.   ACCESSION NUMBER(S): [id number]   ORDERING CLINICIAN: YOSELYN HILL   TECHNIQUE: Axial non-contrast images of the  abdomen, and pelvis with coronal and sagittal reformatted images. Axial CT images of the abdomen and pelvis after the intravenous administration of 90 mL of Omnipaque 350 using angiographic technique with coronal and sagittal reformatted images. MIP images were provided and reviewed. 3D reconstructions were created on a separate independent workstation and reviewed.   FINDINGS: VASCULATURE:   ABDOMINAL AORTA:   No evidence of acute aortic pathology on noncontrast and contrasted imaging.   No abdominal aortic aneurysm .   Mild-to-moderate abdominal aortic atherosclerosis.   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 There is retrograde opacification of the celiac artery branches via the SMA collateralization.   Superior mesenteric artery: Widely patent.   Renal arteries: Mild atherosclerotic calcification otherwise widely patent.   Inferior mesenteric artery: Patent.   Iliac arteries: Bilateral common iliac arteries demonstrate scattered eccentric atherosclerotic calcification with no hemodynamically significant vessel stenosis. Bilateral external iliac arteries are widely patent.   Femoral arteries: Widely patent.   PORTAL - VENOUS: No significant abnormality of the portal venous system.     NON-VASCULAR   LOWER CHEST: The visualized lung base demonstrates  bibasilar atelectasis. There are severe emphysematous changes. The heart is normal in size without pericardial effusion. No pleural effusion is present. Visualized distal esophagus appears normal.   ABDOMEN:   LIVER: The liver is normal in size. There are few scattered subcentimeter focal liver lesions again noted which are felt to represent. There is re-demonstration of a hypodense focal lesion in hepatic segment 4A by the falciform ligament which measures 1.4 cm in largest diameter which remains unchanged dating back to imaging from 03/22/2016 most likely of benign etiology. No additional liver lesions.   BILE DUCTS: The intrahepatic and extrahepatic ducts are not dilated.   GALLBLADDER: No calcified stones. No wall thickening.   PANCREAS: The pancreas appears unremarkable without evidence of ductal dilatation or masses.   SPLEEN: There are new multiple wedge-shaped focal areas of hypodensities throughout the splenic parenchyma compatible with infarcts. No splenic lesions.   ADRENAL GLANDS: Within normal limits.   KIDNEYS AND URETERS: The kidneys are normal in size and enhance symmetrically. No hydroureteronephrosis or nephroureterolithiasis is identified.   PELVIS:   BLADDER: Within normal limits.   REPRODUCTIVE ORGANS: No pelvic masses.   BOWEL: The stomach is unremarkable. Re-demonstration of prior surgical changes from bowel resection. There is a focal dilatation of the site of anastomosis. Otherwise the small and large bowel are within normal limits in caliber and demonstrates no wall thickening. There is a severe amount retained stool throughout the colon.. The appendix is not definitely visualized. There is however no pericecal stranding or fluid.   PERITONEUM/RETROPERITONEUM/LYMPH NODES: There is no free or loculated fluid collection, no free intraperitoneal air. The retroperitoneum appears normal. No enlarged mesenteric lymph nodes.   BONES AND ABDOMINAL WALL: No suspicious osseous lesions are identified.  Degenerative discogenic disease is noted in the lower thoracic and lumbar spine. There is a small left anterior abdominal wall defect with herniated fat which demonstrates stranding. No herniated loops of bowel. Remaining portions of the abdominal wall soft tissues are within normal limits.       1. Small fat containing left anterior abdominal wall hernia with stranding of the herniated mesenteric fat suggesting incarceration of the herniated fat with inflammatory changes. 2. 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 The SMA and inferior mesenteric artery are also widely patent. 3. Severe emphysematous changes of the partially visualized lower lung lobes. 4. Additional incidental findings are as described above.     I personally reviewed the images/study and I agree with the findings as stated by Resident Anthony Romano MD.   MACRO: None.   Signed by: Randy Sung 8/9/2024 6:00 AM Dictation workstation:   [id number]    NM Lung perfusion with spect/ct    Result Date: 8/8/2024  Interpreted By:  Albert Shaw and Elsamaloty Mazzin STUDY: NM LUNG PERFUSION WITH SPECT/CT;  8/8/2024 1:55 pm   INDICATION: Signs/Symptoms:dyspnea.   COMPARISON: None   ACCESSION NUMBER(S): [id number]   ORDERING CLINICIAN: MARCOS REECE   TECHNIQUE: DIVISION OF NUCLEAR MEDICINE PERFUSION LUNG SCANS   Multiple perfusion images of the lungs were acquired after the intravenous administration of 4.4 mCi of Tc-99m macroaggregated albumin (MAA). In addition, SPECT/CT of the chest was performed.   FINDINGS: Perfusion images show multiple segmental and subsegmental defects in the right lung as well as a subsegmental defect in the left posterior lung.       There are two or more segmental perfusion defects as described above suggestive of acute pulmonary embolism (high probability).   The interpretation above is based on modified  PIOPED II and PISAPED criteria.   I personally reviewed the images/study and I agree with the findings as stated by Christopher Florez MD (resident). This study was interpreted at [place], [place].   MACRO: Christopher Florez discussed the significance and urgency of this critical finding by telephone with  MARCOS REECE on 8/8/2024 at 2:17 pm.  (**-RCF-**) Findings:  See findings.     Signed by: Albert Shaw 8/8/2024 2:23 PM Dictation workstation:   [id number]    XR abdomen 1 view    Result Date: 8/7/2024  Interpreted By:  Gabe Gage, STUDY: XR ABDOMEN 1 VIEW;  8/7/2024 11:25 am   INDICATION: Signs/Symptoms:abdominal distension.   COMPARISON: CT scan abdomen and pelvis from 08/06/2024. KUB from 08/04/2024.   ACCESSION NUMBER(S): [id number]   ORDERING CLINICIAN: MINERVA AVILEZ   TECHNIQUE: Single supine view of the abdomen and pelvis was obtained.     FINDINGS: Previous NG tube has been removed. There was   a moderate amount of retained colonic stool and gas.  . There are several loops of gas-filled nondilated small bowel in the central abdomen. Mild gas in the stomach.. There was no gross organomegaly. There were no abnormal calcifications. No destructive bone lesion. The extreme lung bases were clear.       NG tube is been removed.   Moderate stool throughout the colon and rectum.     MACRO: None   Signed by: Gabe Gage 8/7/2024 11:50 AM Dictation workstation:   [id number]    Electrocardiogram, 12-lead PRN ACS symptoms    Result Date: 8/5/2024  Sinus tachycardia Otherwise normal ECG When compared with ECG of 08-JUL-2024 04:51, No significant change was found See ED provider note for full interpretation and clinical correlation Confirmed by Suzette De Luna ([id number]) on 8/5/2024 2:42:48 PM    XR abdomen 1 view    Result Date: 8/5/2024  Interpreted By:  Gabe Gage, STUDY: XR ABDOMEN 1 VIEW;  8/4/2024 8:09 am   INDICATION: Signs/Symptoms:SBO.   COMPARISON:  Plain films from 08/03/2024. CT scan abdomen and pelvis from 08/02/2024.   ACCESSION NUMBER(S): [id number]   ORDERING CLINICIAN: ANITRA APARICIO   TECHNIQUE: Single supine view of the abdomen and pelvis was obtained.     FINDINGS: There was   an NG tube in place with distal tip in the mid stomach. Mild-to-moderate diffuse retained colonic stool with scattered colonic gas. No suspicious small bowel dilatation..   There was no gross organomegaly. There were no abnormal calcifications. No destructive bone lesion. The extreme lung bases were clear.       NG tube in place as described.   Retained colonic stool and gas.   MACRO: None   Signed by: Gabe Gage 8/5/2024 8:44 AM Dictation workstation:   [id number]    XR chest abdomen for OG NG placement    Result Date: 8/3/2024  STUDY: Single View Chest and Abdomen Radiographs;  8/3/2024 3:39 AM INDICATION: NG tube placement. COMPARISON: 6/20/2024 CT Abdomen, 6/18/2024 XR Chest one view. ACCESSION NUMBER(S): [id number] ORDERING CLINICIAN: TECHNIQUE:  Single view of the chest and one view(s) of the abdomen. FINDINGS:  CARDIOMEDIASTINAL SILHOUETTE: Cardiomediastinal silhouette is normal in size and configuration. Enteric tube extends into the gastric fundus.  LUNGS: Lungs are clear.  ABDOMEN: Bowel gas pattern is normal without obstruction or ileus.  There are no convincing calculi or abnormal calcifications.  BONES: No acute osseous changes    No acute cardiopulmonary disease. Signed by Darshan David    CT abdomen pelvis wo IV contrast    Result Date: 8/2/2024  Interpreted By:  Paulie Delarosa, STUDY: CT ABDOMEN PELVIS WO IV CONTRAST;  8/2/2024 10:02 pm   INDICATION: Signs/Symptoms:Abdominal distention, diffuse abdominal pain, history of GI bleed.   COMPARISON: 7/25/2024   ACCESSION NUMBER(S): [id number]   ORDERING CLINICIAN: DONOVAN ALBA   TECHNIQUE: Contiguous axial images of the abdomen and pelvis were obtained without intravenous contrast. Coronal and sagittal  reformatted images were obtained from the axial images.   FINDINGS: There is limited evaluation of the lung bases. There is pulmonary emphysema. There is bronchial wall thickening and opacity right lower lobe which measure 1.4 cm. There is also a 1.2 cm opacity in the medial left lower lobe.   Evaluation of the abdomen and pelvis is limited the secondary to lack of intravenous contrast. Limited evaluation for liver mass on noncontrast examination. There is cholelithiasis.   No splenomegaly.   Mild nodular thickening left adrenal gland. The right adrenal gland appears unremarkable.   There is stable appearance of previously described 11 mm density adjacent to the pancreatic tail as described on prior examination.   No evidence of renal calculus or hydronephrosis.   Atherosclerotic calcification of the abdominal aorta and iliac arteries.   There is stable edema in the subcutaneous fat in the left medial anterior abdominal wall.   There is redemonstration of postsurgical change of prior partial bowel resection. There is evidence of thickening of the small bowel loop in the right lower quadrant at site of anastomosis. There is mild hyperdensity in the bowel lumen at this level and hemorrhage is not excluded. No evidence of bowel obstruction or acute appendicitis. There is colonic diverticulosis without evidence of acute diverticulitis.   Urinary bladder appears unremarkable.   Limited evaluation of the uterus and adnexa.   No significant free abdominal or pelvic fluid.       Postsurgical change of prior partial small bowel resection. There is evidence of wall thickening of small bowel loop in the right lower quadrant at site of anastomosis which may be infectious or inflammatory in etiology. There is also mild hyperdensity in the bowel lumen and mild hemorrhage within the bowel is not excluded.   No evidence of bowel obstruction or acute appendicitis. Colonic diverticulosis without evidence of acute diverticulitis.    Stable appearance of previously described 11 mm density adjacent pancreatic tail, and attention on progress imaging recommended.   Pulmonary emphysema. Bronchial wall thickening and opacities in the lower lungs which has somewhat nodular appearance measuring 13 mm in the right lower lobe and 12 mm in the left lower lobe and may relate to infectious/inflammatory process, however attention on progress imaging recommended to assure resolution and exclude other pathology.   MACRO: None   Signed by: Paulie Delarosa 8/2/2024 11:09 PM Dictation workstation:   [id number]            Assessment/Plan   ASSESSMENT & PLAN:    # Right foot pain  # Suspected microvessel disease of right foot        - Patient was seen and evaluated; all findings were discussed and all questions were answered to patient's satisfaction.  - Charts, labs, vitals and imaging all reviewed.   - Imaging: Right foot XR without osseous abnormality.  - Labs: WBC 11.27 HGB 7.9  - PVRs: (08/26/24): RPT 1.06, RDP 1.11, RTBI not tolerated on hallux, but decreased digital perfusion noted based on waveforms of right 2nd toe. LPT 1.08, LDP 1.09, LTBI 0.31.     Plan:  - Assessed with probable microvessel disease related to hx of cocaine abuse.   - Recommend warm compresses to the dorsal foot and toes.     - Podiatry will sign off at this time. Patient is stable for discharge with outpatient follow-up. Discharge instructions updated.     Weightbearing: WBAT BLE     Case to be discussed with attending, A&P above reflects a tentative plan. Please await for the final signature from the attending physician on service.     This patient will be followed by the Podiatry service. Please Epic Chat the corresponding residents below with questions or concerns.      Vincent Sunshine DPM PGY-3  Podiatric Medicine and Surgery   [facility] Secure Chat            SIGNATURE: Vincent Sunshine DPM PATIENT NAME: Fernando Cuevas   DATE: August 30, 2024 MRN: [id number]   TIME: 11:19 AM CONTACT:  [facility] Message

## 2024-08-30 NOTE — PROGRESS NOTES
Music Therapy Note      Therapy Session  Referral Type: Referral from previous admission  Visit Type: Follow-up visit  Session Start Time: 1127  Session End Time: 1130  Intervention Delivery: In-person  Conflict of Service: None  Number of family members present: 0  Number of staff members present: 0     Pre-assessment  Unable to Assess Reason: Patient declined to answer  Mood/Affect: Avoidant  Verbalized Emotional State: Frustration         Treatment/Interventions  Music Therapy Interventions: Assessment    Post-assessment  Unable to Assess Reason: Did not provide expressive therapy intervention  Total Session Time (min): 3 minutes    Narrative  Assessment Detail: Upon assessment pt reported being frustrated because she wanted to go home. MT provided a supprotive presence and validation for pt. Pt declined session at this time but thanked MT for visit.  Follow-up: MT will follow up with pt as able    Education Documentation  No documentation found.

## 2024-08-30 NOTE — PROGRESS NOTES
"Physical Therapy                 Therapy Communication Note    Patient Name: Fernando Cuevas  MRN: [id number]  Today's Date: 8/30/2024     Discipline: Physical Therapy    Missed Visit Reason: Missed Visit Reason: Other (Comment) (RN cleared pt for therapy session. Upon arrival, pt stating \"I want to get washed up first. Try me after 11am\")    Missed Time: Attempt  "

## 2024-08-30 NOTE — PROGRESS NOTES
Fernadno Cuevas is a 63 y.o. female on day 11 of admission presenting with Sepsis due to undetermined organism (Multi).      Subjective   NAEO. Feeling better today than previous day. States that pain is relatively controlled. Got emotional thinking about how long she's been in the hospital. Still reports that she is having pain in her foot but is improved with warm compresses. All questions answered at bedside.       Objective     Last Recorded Vitals  /64 (Patient Position: Lying)   Pulse 92   Temp 36.1 °C (97 °F) (Temporal)   Resp 16   Wt 61.7 kg (136 lb 0.4 oz)   SpO2 97%   Intake/Output last 3 Shifts:    Intake/Output Summary (Last 24 hours) at 8/30/2024 1813  Last data filed at 8/30/2024 1045  Gross per 24 hour   Intake 400 ml   Output --   Net 400 ml       Admission Weight  Weight: 61.7 kg (136 lb) (08/19/24 1851)    Daily Weight  08/29/24 : 61.7 kg (136 lb 0.4 oz)    Image Results  XR abdomen 1 view  Narrative: Interpreted By:  Joslyn Camara,   STUDY:  XR ABDOMEN 1 VIEW;  8/27/2024 7:17 am      INDICATION:  Signs/Symptoms:SBO.      COMPARISON:  08/26/2024      ACCESSION NUMBER(S):  [id number]      ORDERING CLINICIAN:  MINERVA AVILEZ      FINDINGS:  Nasogastric tube is seen with the distal tip projecting over the left  upper quadrant. Several distended small bowel loops are seen. Gas and  stool is noted throughout the colon. The previously noted distended  small bowel loops appear to be distended to a lesser extent.      Impression: Distention of small bowel loops which appears to have slightly  improved with gas and stool throughout the colon. This may be related  to partial small bowel obstruction or ileus.          MACRO:  None      Signed by: Joslyn Camara 8/27/2024 8:49 AM  Dictation workstation:   [id number]    Physical Exam  Vitals reviewed.  Constitutional:       Appearance: Normal appearance.   HENT:      Head: Normocephalic.      Right Ear: Tympanic membrane normal.      Nose: Nose  normal.      Mouth/Throat:      Mouth: Mucous membranes are moist.   Cardiovascular:      Rate and Rhythm: Normal rate and regular rhythm.   Pulmonary:      Effort: Pulmonary effort is normal.   Abdominal:      General: Bowel sounds are normal.      Palpations: Abdomen is soft.      Comments: Similarly distended to previous day, some tenderness to palpation in RUQ, again no signs of pain when I pressed with my stethoscope  Skin:     Capillary Refill: Capillary refill takes less than 2 seconds.   Warm compress covering dorsal foot today  Neurological:      General: No focal deficit present.      Mental Status: She is alert.     Relevant Results  Scheduled medications  apixaban, 5 mg, oral, BID  azithromycin, 250 mg, oral, Once per day on Monday Wednesday Friday  budesonide, 0.5 mg, nebulization, BID  busPIRone, 5 mg, oral, BID  dilTIAZem ER, 240 mg, oral, Daily  docusate sodium, 100 mg, oral, BID  [Held by provider] formoterol, 20 mcg, nebulization, BID  ipratropium-albuteroL, 3 mL, nebulization, TID  lisinopril, 5 mg, oral, Daily  mirtazapine, 15 mg, oral, Nightly  montelukast, 10 mg, oral, Daily  oxygen, , inhalation, Continuous - Inhalation  pantoprazole, 40 mg, oral, Daily  polyethylene glycol, 17 g, oral, BID  thiamine, 100 mg, oral, Daily  [Held by provider] tiotropium, 2 puff, inhalation, Daily      Continuous medications     PRN medications  PRN medications: acetaminophen, albuterol, benzonatate, calcium carbonate, [Held by provider] diphenhydrAMINE, hydrALAZINE, hydrOXYzine HCL, melatonin, ondansetron ODT **OR** ondansetron, oxyCODONE, oxyCODONE-acetaminophen, prochlorperazine **OR** prochlorperazine    Results for orders placed or performed during the hospital encounter of 08/19/24 (from the past 24 hour(s))   POCT GLUCOSE   Result Value Ref Range    POCT Glucose 86 74 - 99 mg/dL   CBC and Auto Differential   Result Value Ref Range    WBC 11.7 (H) 4.4 - 11.3 x10*3/uL    nRBC 0.0 0.0 - 0.0 /100 WBCs    RBC  3.35 (L) 4.00 - 5.20 x10*6/uL    Hemoglobin 7.9 (L) 12.0 - 16.0 g/dL    Hematocrit 26.9 (L) 36.0 - 46.0 %    MCV 80 80 - 100 fL    MCH 23.6 (L) 26.0 - 34.0 pg    MCHC 29.4 (L) 32.0 - 36.0 g/dL    RDW 21.4 (H) 11.5 - 14.5 %    Platelets 903 (H) 150 - 450 x10*3/uL    Neutrophils % 67.4 40.0 - 80.0 %    Immature Granulocytes %, Automated 0.9 0.0 - 0.9 %    Lymphocytes % 19.7 13.0 - 44.0 %    Monocytes % 11.5 2.0 - 10.0 %    Eosinophils % 0.2 0.0 - 6.0 %    Basophils % 0.3 0.0 - 2.0 %    Neutrophils Absolute 7.85 (H) 1.20 - 7.70 x10*3/uL    Immature Granulocytes Absolute, Automated 0.11 0.00 - 0.70 x10*3/uL    Lymphocytes Absolute 2.30 1.20 - 4.80 x10*3/uL    Monocytes Absolute 1.34 (H) 0.10 - 1.00 x10*3/uL    Eosinophils Absolute 0.02 0.00 - 0.70 x10*3/uL    Basophils Absolute 0.03 0.00 - 0.10 x10*3/uL   Renal function panel   Result Value Ref Range    Glucose 94 74 - 99 mg/dL    Sodium 136 136 - 145 mmol/L    Potassium 4.2 3.5 - 5.3 mmol/L    Chloride 105 98 - 107 mmol/L    Bicarbonate 23 21 - 32 mmol/L    Anion Gap 12 10 - 20 mmol/L    Urea Nitrogen 8 6 - 23 mg/dL    Creatinine 0.72 0.50 - 1.05 mg/dL    eGFR >90 >60 mL/min/1.73m*2    Calcium 8.0 (L) 8.6 - 10.3 mg/dL    Phosphorus 2.9 2.5 - 4.9 mg/dL    Albumin 2.4 (L) 3.4 - 5.0 g/dL   Magnesium   Result Value Ref Range    Magnesium 1.60 1.60 - 2.40 mg/dL   Morphology   Result Value Ref Range    RBC Morphology See Below     RBC Fragments Few     Target Cells Few     Bite Cells Present    POCT GLUCOSE   Result Value Ref Range    POCT Glucose 97 74 - 99 mg/dL   POCT GLUCOSE   Result Value Ref Range    POCT Glucose 105 (H) 74 - 99 mg/dL   POCT GLUCOSE   Result Value Ref Range    POCT Glucose 99 74 - 99 mg/dL            Assessment/Plan      8/30:  -WBC improved from previous day to 11. Continues to have no signs of overt infection  -Tolerating regular diet and having BM  -Weaned percocet to q8hr PRN  -Podiatry saw pt today, appreciate recs  >>Probable microvessel disease  related to hx of cocaine abuse and recommended warm compresses  >>signed off  -Surgery signed off  -PT/OT  -Pt not needing oxygen at baseline    Plan to dc patient tomorrow morning pending clinical stability  No AM labs    8/29:  -New leukocytosis 6->13 today. Suspect inflammatory and will track tomorrow. No signs of overt infection such as HDUS, afebrile, changes in symptoms or oxygenation  -Tolerating regular diet  -Having BM  -C/w current pain regimen (titrated down previous day) to oxy 2.5mg q4 PRN, percocet q6 PRN  -Pending recs from pods given continued R foot pain. Slight erythema at site of pain, will monitor  -Lytes wnl, continue to monitor  -c/w new lisinopril  -PT/OT     Dispo pending surgery recs. They continue to follow. Appreciate     8/28:  Clinically improving... diet advanced to soft.   S/p relistor last night.   Will titrate down opiates further today.   F/up PODs for any further workup. She still has R foot pain but seems controlled.   HypoK, hypoMg... replace and monitor.   HTN... much better controlled with new lisinopril.   Pt/ot     Dispo home hopefully tomorrow if cleared by OR team and pt/ot.      8/27:  NGT out, cont clear diet and enemas per OR team. Will add relistor for dysmotility. Can try reglan if that doesn't help.      R foot workup per PODs.   HTN... add lisinopril today, titrate.      Pt/ot, encouraged mobility.      8/26:  Has completed a good course of iv abx... can stop for now.   Sbo.. persists, with ngt lws... still no bm/flatus.... f/up OR.   Replace K   R 1st toe pain... xray with spur formation, nothing on exam, no hx of gout... f/up PODs consult.   Uncontrolled HTN... will add iv hydralazine PRN.      8/25:  Enteritis... leukocytosis resolved... cont iv abx.   SBO... s/p ngt... f/up OR recs.   HypoMg... replace  HypoK... replace     8/24:  Enteritis.... wbc improving... cont iv abx.   CT last night with sbo... f/up OR team. She's refusing ngt this morning.   HypoK...  replace, monitor. Check Mg tomorrow.   Pt/ot     8/23: Abd distended. WBC improving. CT abd/pel ordered per surgery team. Continue iv antibiotics     8/22: abd more distended; surgery ordering another xray wbc improving     8/21:  Abdomen still distended no diarrhea, white count up to 33,000, question reactive, leave on empiric therapy for now per the patient is not on oxygen at home we will try to wean.  Follow-up surgical recommendations  8/20:  Hx emergency surgery for perforated bowel 6/21/24, post-op complications with ileus and wound hematoma s/p wound vac, concern for anastomic infection, Hx GIB s/p EGD around 7/26/24, found to have friable gastric mucosa, Hx upper extremity DVT L brachial 6/27/24, R brachial 7/6/24 and has been on eliquis although reportedly not taking recently. Eliquis was not stopped following admission for GIB.      Now here with abdominal pain which is very tender with guarding and significant leukocytosis along with tachycardia... Imaging is unrevealing.... f/up surgery and ID recs, cont iv abx, f/up cultures, iv analgesia, ivf.      Diarrhea... check C diff.      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 going to hold steroids for now given concern for infection.      HTN urgency... improving with home cardizem.   Hx UE DVT... resume eliquis.      + cocaine  UA unremarkable  CTA without PNA or PE.      Pt lives at home with her son, uses walker baseline.      DVT Prophylaxis:  Marcus Carreon MD

## 2024-08-30 NOTE — CARE PLAN
Problem: Pain - Adult  Goal: Verbalizes/displays adequate comfort level or baseline comfort level  8/29/2024 2305 by Nidia Hoang RN  Outcome: Progressing  8/29/2024 2305 by Nidia Hoang RN  Outcome: Progressing     Problem: Safety - Adult  Goal: Free from fall injury  8/29/2024 2305 by Nidia Hoang RN  Outcome: Progressing  8/29/2024 2305 by Nidia Hoang RN  Outcome: Progressing     Problem: Chronic Conditions and Co-morbidities  Goal: Patient's chronic conditions and co-morbidity symptoms are monitored and maintained or improved  8/29/2024 2305 by Nidia Hoang RN  Outcome: Progressing  8/29/2024 2305 by Nidia Hoang RN  Outcome: Progressing     Problem: Pain  Goal: Turns in bed with improved pain control throughout the shift  8/29/2024 2305 by Nidia Hoang RN  Outcome: Progressing  8/29/2024 2305 by Nidia Hoang RN  Outcome: Progressing     Problem: Pain  Goal: Free from opioid side effects throughout the shift  8/29/2024 2305 by Nidia Hoang RN  Outcome: Progressing  8/29/2024 2305 by Nidia Hoang RN  Outcome: Progressing   The patient's goals for the shift include      The clinical goals for the shift include Pt safety will be maintained in duration of the shift.    Over the shift, the patient did make progress toward the following goals.

## 2024-08-30 NOTE — DISCHARGE INSTRUCTIONS
Please follow up with your primary care for blood pressure management.  Please get labwork done in one week to check your platelets.  Finish your prednisone (steroids) for COPD exacerbation.    PODIATRY DISCHARGE INSTRUCTIONS  Please call [phone] to schedule appointment with Dr. Davies for post-hospital follow-up in 2 weeks after discharge or sooner if any problems or questions arise.  Bear weight as tolerated in most comfortable shoe.  Apply warm compresses and keep warm socks covering right foot.   Should any problems, questions, or concerns arise, please call the clinic office.

## 2024-08-30 NOTE — PROGRESS NOTES
"Fernando Cuevas is a 63 y.o. female on day 11 of admission presenting with Sepsis due to undetermined organism (Multi).    Subjective   Patient is awake in bed this afternoon. She reports feeling better. Reports mild nausea. She reports pain much improved. She is tolerating diet, passing gas having bowel movements.       Objective     Physical Exam  Constitutional:       Appearance: Normal appearance.   Cardiovascular:      Rate and Rhythm: Tachycardia present.   Pulmonary:      Effort: Pulmonary effort is normal.      Comments: Non labored breathing on RA  Abdominal:      General: There is distension.      Palpations: Abdomen is soft.      Tenderness: There is no abdominal tenderness.      Comments: Old midline incsion C/D/I, edges well approximated, covered in Dermabond.   Skin:     General: Skin is warm and dry.   Neurological:      General: No focal deficit present.      Mental Status: She is alert and oriented to person, place, and time. Mental status is at baseline.   Psychiatric:         Attention and Perception: Attention normal.         Mood and Affect: Mood normal.         Speech: Speech normal.         Behavior: Behavior normal.         Cognition and Memory: Cognition normal.         Last Recorded Vitals  Blood pressure 143/87, pulse (!) 113, temperature 36.5 °C (97.7 °F), temperature source Temporal, resp. rate 17, height 1.572 m (5' 1.89\"), weight 61.7 kg (136 lb 0.4 oz), SpO2 100%.  Intake/Output last 3 Shifts:  I/O last 3 completed shifts:  In: 180 (2.9 mL/kg) [P.O.:180]  Out: 200 (3.2 mL/kg) [Urine:200 (0.1 mL/kg/hr)]  Weight: 61.7 kg     Relevant Results  Scheduled medications  apixaban, 5 mg, oral, BID  azithromycin, 250 mg, oral, Once per day on Monday Wednesday Friday  budesonide, 0.5 mg, nebulization, BID  busPIRone, 5 mg, oral, BID  dilTIAZem ER, 240 mg, oral, Daily  docusate sodium, 100 mg, oral, BID  [Held by provider] formoterol, 20 mcg, nebulization, BID  ipratropium-albuteroL, 3 mL, " nebulization, TID  lisinopril, 5 mg, oral, Daily  mirtazapine, 15 mg, oral, Nightly  montelukast, 10 mg, oral, Daily  oxygen, , inhalation, Continuous - Inhalation  pantoprazole, 40 mg, oral, Daily  polyethylene glycol, 17 g, oral, BID  thiamine, 100 mg, oral, Daily  [Held by provider] tiotropium, 2 puff, inhalation, Daily      Continuous medications     PRN medications  PRN medications: acetaminophen, albuterol, benzonatate, calcium carbonate, [Held by provider] diphenhydrAMINE, hydrALAZINE, hydrOXYzine HCL, melatonin, ondansetron ODT **OR** ondansetron, oxyCODONE, oxyCODONE-acetaminophen, prochlorperazine **OR** prochlorperazine   Results for orders placed or performed during the hospital encounter of 08/19/24 (from the past 24 hour(s))   POCT GLUCOSE   Result Value Ref Range    POCT Glucose 86 74 - 99 mg/dL   CBC and Auto Differential   Result Value Ref Range    WBC 11.7 (H) 4.4 - 11.3 x10*3/uL    nRBC 0.0 0.0 - 0.0 /100 WBCs    RBC 3.35 (L) 4.00 - 5.20 x10*6/uL    Hemoglobin 7.9 (L) 12.0 - 16.0 g/dL    Hematocrit 26.9 (L) 36.0 - 46.0 %    MCV 80 80 - 100 fL    MCH 23.6 (L) 26.0 - 34.0 pg    MCHC 29.4 (L) 32.0 - 36.0 g/dL    RDW 21.4 (H) 11.5 - 14.5 %    Platelets 903 (H) 150 - 450 x10*3/uL    Neutrophils % 67.4 40.0 - 80.0 %    Immature Granulocytes %, Automated 0.9 0.0 - 0.9 %    Lymphocytes % 19.7 13.0 - 44.0 %    Monocytes % 11.5 2.0 - 10.0 %    Eosinophils % 0.2 0.0 - 6.0 %    Basophils % 0.3 0.0 - 2.0 %    Neutrophils Absolute 7.85 (H) 1.20 - 7.70 x10*3/uL    Immature Granulocytes Absolute, Automated 0.11 0.00 - 0.70 x10*3/uL    Lymphocytes Absolute 2.30 1.20 - 4.80 x10*3/uL    Monocytes Absolute 1.34 (H) 0.10 - 1.00 x10*3/uL    Eosinophils Absolute 0.02 0.00 - 0.70 x10*3/uL    Basophils Absolute 0.03 0.00 - 0.10 x10*3/uL   Renal function panel   Result Value Ref Range    Glucose 94 74 - 99 mg/dL    Sodium 136 136 - 145 mmol/L    Potassium 4.2 3.5 - 5.3 mmol/L    Chloride 105 98 - 107 mmol/L    Bicarbonate  23 21 - 32 mmol/L    Anion Gap 12 10 - 20 mmol/L    Urea Nitrogen 8 6 - 23 mg/dL    Creatinine 0.72 0.50 - 1.05 mg/dL    eGFR >90 >60 mL/min/1.73m*2    Calcium 8.0 (L) 8.6 - 10.3 mg/dL    Phosphorus 2.9 2.5 - 4.9 mg/dL    Albumin 2.4 (L) 3.4 - 5.0 g/dL   Magnesium   Result Value Ref Range    Magnesium 1.60 1.60 - 2.40 mg/dL   Morphology   Result Value Ref Range    RBC Morphology See Below     RBC Fragments Few     Target Cells Few     Bite Cells Present    POCT GLUCOSE   Result Value Ref Range    POCT Glucose 97 74 - 99 mg/dL   POCT GLUCOSE   Result Value Ref Range    POCT Glucose 105 (H) 74 - 99 mg/dL     XR abdomen 1 view   Final Result   Distention of small bowel loops which appears to have slightly   improved with gas and stool throughout the colon. This may be related   to partial small bowel obstruction or ileus.             MACRO:   None        Signed by: Joslyn Camara 8/27/2024 8:49 AM   Dictation workstation:   [id number]      Vascular US ankle brachial index (AUSTYN) without exercise   Final Result      XR abdomen 1 view   Final Result   Nonspecific distal colonic obstruction versus diffuse ileus,   progressed.        NG tube in place.        MACRO:   None        Signed by: Gabe Gage 8/26/2024 11:51 AM   Dictation workstation:   [id number]      XR abdomen 1 view   Final Result   Nasogastric tube tip terminates in the left upper abdomen at the   level of the proximal stomach; advancement is recommended.        Redemonstration of gaseous dilatation of bowel in the imaged upper   abdomen which may be secondary to ileus or obstruction.             MACRO:   None        Signed by: Paulie Delarosa 8/25/2024 2:36 AM   Dictation workstation:   [id number]      XR foot right 3+ views   Final Result   Slight DJD in the 1st MTP joint.  Remainder of the exam was negative.        MACRO:   None        Signed by: Gabe Gage 8/25/2024 12:09 PM   Dictation workstation:   [id number]      CT abdomen and pelvis w oral  contrast only   Final Result   Fluid-filled mild to moderately dilated small bowel in the abdomen and   pelvis with air-fluid levels with focal wall thickening and   inflammation of loops of small bowel right upper quadrant.  No   discrete transition point is identified, the small bowel appears   widely patent however the distal small bowel is decompressed.    Findings suggest an acute infectious or inflammatory enteritis causing   a moderate grade partial or intermittent small bowel obstruction,   possibly from dysmotility rather than a mechanical obstruction.   Moderate centrilobular emphysema.   9 mm right lower lobe pulmonary nodule, follow-up per [employer]   [employer] guidelines.   Suspected systemic anemia.   Cholelithiasis and gallbladder sludge.   NOTIFICATION:  The critical results of the study were discussed with,   and acknowledged by Dr. Sheth by telephone on 8/24/2024 at 1:04 AM.   Signed by Darshan David      XR abdomen 1 view   Final Result   Persistent findings ongoing ileus versus partial distal small bowel   obstruction findings most likely representing ongoing ileus with   little change from yesterday's exam.        MACRO:   None        Signed by: Gabe Gage 8/22/2024 9:56 AM   Dictation workstation:   [id number]      XR abdomen 1 view   Final Result   Increased gaseous bowel dilatation since previous day's exam may   reflect worsening ileus or obstruction.        Probable distended urinary bladder.        MACRO:   None.        Signed by: Karina Ventura 8/21/2024 8:22 AM   Dictation workstation:   [id number]      XR abdomen 1 view   Final Result   Distended stomach.        Probable distention of the bladder.        Several mildly distended small bowel loops which may be related to an   ileus continued follow-up is suggested             MACRO:   None        Signed by: Joslyn Camara 8/20/2024 10:40 AM   Dictation workstation:   [id number]      CT abdomen pelvis wo IV contrast   Final Result    Postsurgical changes right lower quadrant within the small bowel with   mild wall thickening without evidence of obstruction.   Stranding in the left anterior abdominal wall the subcutaneous soft   tissues which appears stable.  This appears to be related to small   fat-containing hernia.   Scarring and parenchymal opacities in the bilateral medial and lower   lobes appear stable.   Cholelithiasis.   Signed by Armando Mcgarry, DO                      Assessment/Plan   Assessment & Plan  Generalized abdominal pain      Plan: SBO, resolved  - Regular diet  - Continue bowel regimen as needed  - PRN antiemetic  - PRN pain control, limit narcotics  - Encourage OOB  - Encourage IS    Dispo: Discussed with Dr. Bingham. General surgery to sign off. Please reconsult if needed.        I spent 35 minutes in the professional and overall care of this patient.      Manav Hunter PA-C

## 2024-08-31 ENCOUNTER — APPOINTMENT (OUTPATIENT)
Dept: RADIOLOGY | Facility: HOSPITAL | Age: 64
End: 2024-08-31
Payer: COMMERCIAL

## 2024-08-31 ENCOUNTER — HOSPITAL ENCOUNTER (OUTPATIENT)
Dept: CARDIOLOGY | Facility: HOSPITAL | Age: 64
Discharge: HOME | End: 2024-08-31
Payer: COMMERCIAL

## 2024-08-31 PROCEDURE — 2500000004 HC RX 250 GENERAL PHARMACY W/ HCPCS (ALT 636 FOR OP/ED): Performed by: STUDENT IN AN ORGANIZED HEALTH CARE EDUCATION/TRAINING PROGRAM

## 2024-08-31 PROCEDURE — 71045 X-RAY EXAM CHEST 1 VIEW: CPT

## 2024-08-31 PROCEDURE — 93010 ELECTROCARDIOGRAM REPORT: CPT | Performed by: INTERNAL MEDICINE

## 2024-08-31 PROCEDURE — 2500000002 HC RX 250 W HCPCS SELF ADMINISTERED DRUGS (ALT 637 FOR MEDICARE OP, ALT 636 FOR OP/ED): Performed by: PHARMACIST

## 2024-08-31 PROCEDURE — 2500000001 HC RX 250 WO HCPCS SELF ADMINISTERED DRUGS (ALT 637 FOR MEDICARE OP)

## 2024-08-31 PROCEDURE — 99232 SBSQ HOSP IP/OBS MODERATE 35: CPT | Performed by: STUDENT IN AN ORGANIZED HEALTH CARE EDUCATION/TRAINING PROGRAM

## 2024-08-31 PROCEDURE — 2500000001 HC RX 250 WO HCPCS SELF ADMINISTERED DRUGS (ALT 637 FOR MEDICARE OP): Performed by: PHYSICIAN ASSISTANT

## 2024-08-31 PROCEDURE — 76937 US GUIDE VASCULAR ACCESS: CPT

## 2024-08-31 PROCEDURE — 2500000002 HC RX 250 W HCPCS SELF ADMINISTERED DRUGS (ALT 637 FOR MEDICARE OP, ALT 636 FOR OP/ED): Performed by: INTERNAL MEDICINE

## 2024-08-31 PROCEDURE — 1200000002 HC GENERAL ROOM WITH TELEMETRY DAILY

## 2024-08-31 PROCEDURE — 2500000001 HC RX 250 WO HCPCS SELF ADMINISTERED DRUGS (ALT 637 FOR MEDICARE OP): Performed by: INTERNAL MEDICINE

## 2024-08-31 PROCEDURE — 93005 ELECTROCARDIOGRAM TRACING: CPT

## 2024-08-31 PROCEDURE — 71045 X-RAY EXAM CHEST 1 VIEW: CPT | Performed by: RADIOLOGY

## 2024-08-31 PROCEDURE — 94640 AIRWAY INHALATION TREATMENT: CPT

## 2024-08-31 PROCEDURE — 2500000001 HC RX 250 WO HCPCS SELF ADMINISTERED DRUGS (ALT 637 FOR MEDICARE OP): Performed by: STUDENT IN AN ORGANIZED HEALTH CARE EDUCATION/TRAINING PROGRAM

## 2024-08-31 RX ORDER — GUAIFENESIN 600 MG/1
600 TABLET, EXTENDED RELEASE ORAL 2 TIMES DAILY
Status: DISCONTINUED | OUTPATIENT
Start: 2024-08-31 | End: 2024-09-01 | Stop reason: HOSPADM

## 2024-08-31 RX ORDER — PREDNISONE 20 MG/1
40 TABLET ORAL DAILY
Status: DISCONTINUED | OUTPATIENT
Start: 2024-08-31 | End: 2024-09-01 | Stop reason: HOSPADM

## 2024-08-31 RX ADMIN — HYDROXYZINE HYDROCHLORIDE 10 MG: 10 TABLET ORAL at 05:20

## 2024-08-31 RX ADMIN — BUSPIRONE HYDROCHLORIDE 5 MG: 5 TABLET ORAL at 21:58

## 2024-08-31 RX ADMIN — PREDNISONE 40 MG: 20 TABLET ORAL at 12:33

## 2024-08-31 RX ADMIN — GUAIFENESIN 600 MG: 600 TABLET ORAL at 09:24

## 2024-08-31 RX ADMIN — APIXABAN 5 MG: 5 TABLET, FILM COATED ORAL at 08:36

## 2024-08-31 RX ADMIN — IPRATROPIUM BROMIDE AND ALBUTEROL SULFATE 3 ML: 2.5; .5 SOLUTION RESPIRATORY (INHALATION) at 19:11

## 2024-08-31 RX ADMIN — MONTELUKAST 10 MG: 10 TABLET, FILM COATED ORAL at 08:36

## 2024-08-31 RX ADMIN — MIRTAZAPINE 15 MG: 15 TABLET, FILM COATED ORAL at 21:57

## 2024-08-31 RX ADMIN — IPRATROPIUM BROMIDE AND ALBUTEROL SULFATE 3 ML: 2.5; .5 SOLUTION RESPIRATORY (INHALATION) at 08:06

## 2024-08-31 RX ADMIN — HYDROXYZINE HYDROCHLORIDE 10 MG: 10 TABLET ORAL at 21:58

## 2024-08-31 RX ADMIN — OXYCODONE HYDROCHLORIDE 2.5 MG: 5 TABLET ORAL at 11:26

## 2024-08-31 RX ADMIN — OXYCODONE HYDROCHLORIDE 2.5 MG: 5 TABLET ORAL at 15:48

## 2024-08-31 RX ADMIN — OXYCODONE HYDROCHLORIDE 2.5 MG: 5 TABLET ORAL at 03:33

## 2024-08-31 RX ADMIN — IPRATROPIUM BROMIDE AND ALBUTEROL SULFATE 3 ML: 2.5; .5 SOLUTION RESPIRATORY (INHALATION) at 14:11

## 2024-08-31 RX ADMIN — OXYCODONE HYDROCHLORIDE AND ACETAMINOPHEN 1 TABLET: 5; 325 TABLET ORAL at 05:20

## 2024-08-31 RX ADMIN — BUDESONIDE 0.5 MG: 0.5 INHALANT RESPIRATORY (INHALATION) at 08:06

## 2024-08-31 RX ADMIN — APIXABAN 5 MG: 5 TABLET, FILM COATED ORAL at 21:58

## 2024-08-31 RX ADMIN — DILTIAZEM HYDROCHLORIDE 240 MG: 120 CAPSULE, EXTENDED RELEASE ORAL at 08:36

## 2024-08-31 RX ADMIN — GUAIFENESIN 600 MG: 600 TABLET ORAL at 21:57

## 2024-08-31 RX ADMIN — LISINOPRIL 5 MG: 5 TABLET ORAL at 08:36

## 2024-08-31 RX ADMIN — Medication 3 MG: at 21:57

## 2024-08-31 RX ADMIN — BUSPIRONE HYDROCHLORIDE 5 MG: 5 TABLET ORAL at 08:36

## 2024-08-31 RX ADMIN — Medication 100 MG: at 08:36

## 2024-08-31 RX ADMIN — OXYCODONE HYDROCHLORIDE AND ACETAMINOPHEN 1 TABLET: 5; 325 TABLET ORAL at 21:56

## 2024-08-31 RX ADMIN — PANTOPRAZOLE SODIUM 40 MG: 40 TABLET, DELAYED RELEASE ORAL at 08:36

## 2024-08-31 ASSESSMENT — COGNITIVE AND FUNCTIONAL STATUS - GENERAL
DRESSING REGULAR UPPER BODY CLOTHING: A LITTLE
DAILY ACTIVITIY SCORE: 20
MOVING TO AND FROM BED TO CHAIR: A LITTLE
CLIMB 3 TO 5 STEPS WITH RAILING: A LITTLE
DRESSING REGULAR LOWER BODY CLOTHING: A LITTLE
MOBILITY SCORE: 21
HELP NEEDED FOR BATHING: A LITTLE
WALKING IN HOSPITAL ROOM: A LITTLE
TOILETING: A LITTLE

## 2024-08-31 ASSESSMENT — PAIN SCALES - GENERAL
PAINLEVEL_OUTOF10: 10 - WORST POSSIBLE PAIN
PAINLEVEL_OUTOF10: 8

## 2024-08-31 ASSESSMENT — PAIN - FUNCTIONAL ASSESSMENT
PAIN_FUNCTIONAL_ASSESSMENT: 0-10

## 2024-08-31 ASSESSMENT — PAIN DESCRIPTION - LOCATION: LOCATION: FOOT

## 2024-08-31 ASSESSMENT — PAIN DESCRIPTION - ORIENTATION: ORIENTATION: RIGHT

## 2024-08-31 NOTE — PROGRESS NOTES
"Fernando Cuevas is a 63 y.o. female on day 12 of admission presenting with Sepsis due to undetermined organism (Multi).      Subjective   NAEO but HR in the 110s overnight. This morning, pt states that she had trouble breathing when she was going to use the restroom which she feels is why her HR is fast because she was exerting herself. She is otherwise asymptomatic. States that she has phelgm \"color is not right\" and is coughing more. Says that this is how she feels when she has a COPD exacerbation. She had a breathing treatment this morning. States that her son has taken time off work this weekend and would be able to help her at home but she is not wanting to return home if she's \"going to come right back here because I can't breathe.\" Denies fever, chest pain, headache, chills. Abd pain stable. She is urinating well and having BM. All questions answered at bedside.        Objective     Last Recorded Vitals  /85 (BP Location: Left arm, Patient Position: Lying)   Pulse (!) 119   Temp 36.8 °C (98.2 °F) (Temporal)   Resp 17   Wt 61.7 kg (136 lb 0.4 oz)   SpO2 100%   Intake/Output last 3 Shifts:    Intake/Output Summary (Last 24 hours) at 8/31/2024 1327  Last data filed at 8/31/2024 0521  Gross per 24 hour   Intake 360 ml   Output 50 ml   Net 310 ml       Admission Weight  Weight: 61.7 kg (136 lb) (08/19/24 1851)    Daily Weight  08/29/24 : 61.7 kg (136 lb 0.4 oz)    Image Results  XR abdomen 1 view  Narrative: Interpreted By:  Joslyn Camara,   STUDY:  XR ABDOMEN 1 VIEW;  8/27/2024 7:17 am      INDICATION:  Signs/Symptoms:SBO.      COMPARISON:  08/26/2024      ACCESSION NUMBER(S):  [id number]      ORDERING CLINICIAN:  MINERVA AVILEZ      FINDINGS:  Nasogastric tube is seen with the distal tip projecting over the left  upper quadrant. Several distended small bowel loops are seen. Gas and  stool is noted throughout the colon. The previously noted distended  small bowel loops appear to be distended to a lesser " extent.      Impression: Distention of small bowel loops which appears to have slightly  improved with gas and stool throughout the colon. This may be related  to partial small bowel obstruction or ileus.          MACRO:  None      Signed by: Joslyn Camara 8/27/2024 8:49 AM  Dictation workstation:   [id number]      Physical Exam  Constitutional:       General: She is not in acute distress.     Appearance: Normal appearance. She is not ill-appearing, toxic-appearing or diaphoretic.   HENT:      Mouth/Throat:      Mouth: Mucous membranes are moist.   Eyes:      Extraocular Movements: Extraocular movements intact.      Pupils: Pupils are equal, round, and reactive to light.   Cardiovascular:      Rate and Rhythm: Regular rhythm. Tachycardia present.      Heart sounds: No murmur heard.     No friction rub. No gallop.   Pulmonary:      Effort: Pulmonary effort is normal. No respiratory distress.      Breath sounds: No wheezing.      Comments: Not on O2. No wheezing heard bilaterally (pt had just recently received breathing treatment) but crackles bilaterally with decreased breath sounds at bases bilaterally  Abdominal:      General: Bowel sounds are normal. There is no distension.      Palpations: Abdomen is soft. There is no mass.      Tenderness: There is no abdominal tenderness. There is no guarding or rebound.   Musculoskeletal:         General: No swelling, tenderness or deformity.      Cervical back: Normal range of motion and neck supple.   Skin:     General: Skin is warm and dry.      Findings: No bruising or rash.   Neurological:      General: No focal deficit present.      Mental Status: She is alert and oriented to person, place, and time. Mental status is at baseline.      Motor: No weakness.   Psychiatric:         Mood and Affect: Mood normal.         Behavior: Behavior normal.         Thought Content: Thought content normal.         Relevant Results  Scheduled medications  apixaban, 5 mg, oral,  BID  azithromycin, 250 mg, oral, Once per day on Monday Wednesday Friday  [Held by provider] budesonide, 0.5 mg, nebulization, BID  busPIRone, 5 mg, oral, BID  dilTIAZem ER, 240 mg, oral, Daily  docusate sodium, 100 mg, oral, BID  [Held by provider] formoterol, 20 mcg, nebulization, BID  guaiFENesin, 600 mg, oral, BID  ipratropium-albuteroL, 3 mL, nebulization, TID  lisinopril, 5 mg, oral, Daily  mirtazapine, 15 mg, oral, Nightly  montelukast, 10 mg, oral, Daily  oxygen, , inhalation, Continuous - Inhalation  pantoprazole, 40 mg, oral, Daily  polyethylene glycol, 17 g, oral, BID  predniSONE, 40 mg, oral, Daily  thiamine, 100 mg, oral, Daily  [Held by provider] tiotropium, 2 puff, inhalation, Daily      Continuous medications     PRN medications  PRN medications: acetaminophen, albuterol, benzonatate, calcium carbonate, [Held by provider] diphenhydrAMINE, hydrALAZINE, hydrOXYzine HCL, melatonin, ondansetron ODT **OR** ondansetron, oxyCODONE, oxyCODONE-acetaminophen, prochlorperazine **OR** prochlorperazine    Results for orders placed or performed during the hospital encounter of 08/19/24 (from the past 24 hour(s))   POCT GLUCOSE   Result Value Ref Range    POCT Glucose 99 74 - 99 mg/dL         Assessment/Plan      8/31:  -AM labs not obtained after anticipating pt discharge today; however, in the midst of apparent COPD exacerbation and tachycardia, will plan to keep pt here today  -Pt was also on azithromycin. Continue duonebs scheduled with albuterol PRN. Prednisone 40mg x 5 days  -Anticipate that WBC will increase with initiation of steroids  -Tele ordered  -Pending CXR to evaluate for possible PNA  -EKG obtained showing sinus tachycardia  -Etiology of tachycardia likely due to COPD exacerbation. Also encouraged that pt drink fluids, as she currently does not have an IV  -Foot and abdominal pain is otherwise stable. Continue analgesics and warm compresses for pain    8/30:  -WBC improved from previous day to 11.  Continues to have no signs of overt infection  -Tolerating regular diet and having BM  -Weaned percocet to q8hr PRN  -Podiatry saw pt today, appreciate recs  >>Probable microvessel disease related to hx of cocaine abuse and recommended warm compresses  >>signed off  -Surgery signed off  -PT/OT  -Pt not needing oxygen at baseline     Plan to dc patient tomorrow morning pending clinical stability  No AM labs     8/29:  -New leukocytosis 6->13 today. Suspect inflammatory and will track tomorrow. No signs of overt infection such as HDUS, afebrile, changes in symptoms or oxygenation  -Tolerating regular diet  -Having BM  -C/w current pain regimen (titrated down previous day) to oxy 2.5mg q4 PRN, percocet q6 PRN  -Pending recs from pods given continued R foot pain. Slight erythema at site of pain, will monitor  -Lytes wnl, continue to monitor  -c/w new lisinopril  -PT/OT     Dispo pending surgery recs. They continue to follow. Appreciate     8/28:  Clinically improving... diet advanced to soft.   S/p relistor last night.   Will titrate down opiates further today.   F/up PODs for any further workup. She still has R foot pain but seems controlled.   HypoK, hypoMg... replace and monitor.   HTN... much better controlled with new lisinopril.   Pt/ot     Dispo home hopefully tomorrow if cleared by OR team and pt/ot.      8/27:  NGT out, cont clear diet and enemas per OR team. Will add relistor for dysmotility. Can try reglan if that doesn't help.      R foot workup per PODs.   HTN... add lisinopril today, titrate.      Pt/ot, encouraged mobility.      8/26:  Has completed a good course of iv abx... can stop for now.   Sbo.. persists, with ngt lws... still no bm/flatus.... f/up OR.   Replace K   R 1st toe pain... xray with spur formation, nothing on exam, no hx of gout... f/up PODs consult.   Uncontrolled HTN... will add iv hydralazine PRN.      8/25:  Enteritis... leukocytosis resolved... cont iv abx.   SBO... s/p ngt... f/up  OR recs.   HypoMg... replace  HypoK... replace     8/24:  Enteritis.... wbc improving... cont iv abx.   CT last night with sbo... f/up OR team. She's refusing ngt this morning.   HypoK... replace, monitor. Check Mg tomorrow.   Pt/ot     8/23: Abd distended. WBC improving. CT abd/pel ordered per surgery team. Continue iv antibiotics     8/22: abd more distended; surgery ordering another xray wbc improving     8/21:  Abdomen still distended no diarrhea, white count up to 33,000, question reactive, leave on empiric therapy for now per the patient is not on oxygen at home we will try to wean.  Follow-up surgical recommendations  8/20:  Hx emergency surgery for perforated bowel 6/21/24, post-op complications with ileus and wound hematoma s/p wound vac, concern for anastomic infection, Hx GIB s/p EGD around 7/26/24, found to have friable gastric mucosa, Hx upper extremity DVT L brachial 6/27/24, R brachial 7/6/24 and has been on eliquis although reportedly not taking recently. Eliquis was not stopped following admission for GIB.      Now here with abdominal pain which is very tender with guarding and significant leukocytosis along with tachycardia... Imaging is unrevealing.... f/up surgery and ID recs, cont iv abx, f/up cultures, iv analgesia, ivf.      Diarrhea... check C diff.      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 going to hold steroids for now given concern for infection.      HTN urgency... improving with home cardizem.   Hx UE DVT... resume eliquis.      + cocaine  UA unremarkable  CTA without PNA or PE.      Pt lives at home with her son, uses walker baseline.      DVT Prophylaxis:  Marcus Carreon MD

## 2024-08-31 NOTE — CARE PLAN
The patient's goals for the shift include      The clinical goals for the shift include Pain control, VS, Nausea care, remain safe and free of falls.    Over the shift, the patient did not make progress toward the following goals.   Problem: Pain - Adult  Goal: Verbalizes/displays adequate comfort level or baseline comfort level  Outcome: Progressing     Problem: Safety - Adult  Goal: Free from fall injury  Outcome: Progressing     Problem: Discharge Planning  Goal: Discharge to home or other facility with appropriate resources  Outcome: Progressing     Problem: Chronic Conditions and Co-morbidities  Goal: Patient's chronic conditions and co-morbidity symptoms are monitored and maintained or improved  Outcome: Progressing     Problem: Pain  Goal: Takes deep breaths with improved pain control throughout the shift  Outcome: Progressing  Goal: Turns in bed with improved pain control throughout the shift  Outcome: Progressing  Goal: Performs ADL's with improved pain control throughout shift  Outcome: Progressing  Goal: Participates in PT with improved pain control throughout the shift  Outcome: Progressing  Goal: Free from opioid side effects throughout the shift  Outcome: Progressing  Goal: Free from acute confusion related to pain meds throughout the shift  Outcome: Progressing

## 2024-09-01 VITALS
HEART RATE: 116 BPM | WEIGHT: 136.02 LBS | OXYGEN SATURATION: 97 % | HEIGHT: 62 IN | RESPIRATION RATE: 17 BRPM | BODY MASS INDEX: 25.03 KG/M2 | SYSTOLIC BLOOD PRESSURE: 116 MMHG | DIASTOLIC BLOOD PRESSURE: 73 MMHG | TEMPERATURE: 98.3 F

## 2024-09-01 LAB
ALBUMIN SERPL BCP-MCNC: 2.4 G/DL (ref 3.4–5)
ANION GAP SERPL CALC-SCNC: 15 MMOL/L (ref 10–20)
BASOPHILS # BLD AUTO: 0.02 X10*3/UL (ref 0–0.1)
BASOPHILS NFR BLD AUTO: 0.1 %
BUN SERPL-MCNC: 10 MG/DL (ref 6–23)
CALCIUM SERPL-MCNC: 8 MG/DL (ref 8.6–10.3)
CHLORIDE SERPL-SCNC: 101 MMOL/L (ref 98–107)
CO2 SERPL-SCNC: 24 MMOL/L (ref 21–32)
CREAT SERPL-MCNC: 0.6 MG/DL (ref 0.5–1.05)
EGFRCR SERPLBLD CKD-EPI 2021: >90 ML/MIN/1.73M*2
EOSINOPHIL # BLD AUTO: 0 X10*3/UL (ref 0–0.7)
EOSINOPHIL NFR BLD AUTO: 0 %
ERYTHROCYTE [DISTWIDTH] IN BLOOD BY AUTOMATED COUNT: 21.8 % (ref 11.5–14.5)
GLUCOSE SERPL-MCNC: 100 MG/DL (ref 74–99)
HCT VFR BLD AUTO: 24.6 % (ref 36–46)
HGB BLD-MCNC: 7.3 G/DL (ref 12–16)
IMM GRANULOCYTES # BLD AUTO: 0.1 X10*3/UL (ref 0–0.7)
IMM GRANULOCYTES NFR BLD AUTO: 0.7 % (ref 0–0.9)
LYMPHOCYTES # BLD AUTO: 2.29 X10*3/UL (ref 1.2–4.8)
LYMPHOCYTES NFR BLD AUTO: 15.4 %
MAGNESIUM SERPL-MCNC: 1.6 MG/DL (ref 1.6–2.4)
MCH RBC QN AUTO: 23.1 PG (ref 26–34)
MCHC RBC AUTO-ENTMCNC: 29.7 G/DL (ref 32–36)
MCV RBC AUTO: 78 FL (ref 80–100)
MONOCYTES # BLD AUTO: 1.44 X10*3/UL (ref 0.1–1)
MONOCYTES NFR BLD AUTO: 9.7 %
NEUTROPHILS # BLD AUTO: 11.04 X10*3/UL (ref 1.2–7.7)
NEUTROPHILS NFR BLD AUTO: 74.1 %
NRBC BLD-RTO: 0 /100 WBCS (ref 0–0)
OVALOCYTES BLD QL SMEAR: NORMAL
PHOSPHATE SERPL-MCNC: 4.2 MG/DL (ref 2.5–4.9)
PLATELET # BLD AUTO: 1004 X10*3/UL (ref 150–450)
POLYCHROMASIA BLD QL SMEAR: NORMAL
POTASSIUM SERPL-SCNC: 4.5 MMOL/L (ref 3.5–5.3)
RBC # BLD AUTO: 3.16 X10*6/UL (ref 4–5.2)
RBC MORPH BLD: NORMAL
SCHISTOCYTES BLD QL SMEAR: NORMAL
SODIUM SERPL-SCNC: 135 MMOL/L (ref 136–145)
TARGETS BLD QL SMEAR: NORMAL
WBC # BLD AUTO: 14.9 X10*3/UL (ref 4.4–11.3)

## 2024-09-01 PROCEDURE — 2500000005 HC RX 250 GENERAL PHARMACY W/O HCPCS: Performed by: PHYSICIAN ASSISTANT

## 2024-09-01 PROCEDURE — 2500000001 HC RX 250 WO HCPCS SELF ADMINISTERED DRUGS (ALT 637 FOR MEDICARE OP): Performed by: PHYSICIAN ASSISTANT

## 2024-09-01 PROCEDURE — 83735 ASSAY OF MAGNESIUM: CPT | Performed by: STUDENT IN AN ORGANIZED HEALTH CARE EDUCATION/TRAINING PROGRAM

## 2024-09-01 PROCEDURE — 80069 RENAL FUNCTION PANEL: CPT | Performed by: STUDENT IN AN ORGANIZED HEALTH CARE EDUCATION/TRAINING PROGRAM

## 2024-09-01 PROCEDURE — 2500000001 HC RX 250 WO HCPCS SELF ADMINISTERED DRUGS (ALT 637 FOR MEDICARE OP)

## 2024-09-01 PROCEDURE — 94640 AIRWAY INHALATION TREATMENT: CPT

## 2024-09-01 PROCEDURE — 2500000004 HC RX 250 GENERAL PHARMACY W/ HCPCS (ALT 636 FOR OP/ED): Performed by: STUDENT IN AN ORGANIZED HEALTH CARE EDUCATION/TRAINING PROGRAM

## 2024-09-01 PROCEDURE — 2500000001 HC RX 250 WO HCPCS SELF ADMINISTERED DRUGS (ALT 637 FOR MEDICARE OP): Performed by: STUDENT IN AN ORGANIZED HEALTH CARE EDUCATION/TRAINING PROGRAM

## 2024-09-01 PROCEDURE — 2500000001 HC RX 250 WO HCPCS SELF ADMINISTERED DRUGS (ALT 637 FOR MEDICARE OP): Performed by: INTERNAL MEDICINE

## 2024-09-01 PROCEDURE — 85025 COMPLETE CBC W/AUTO DIFF WBC: CPT | Performed by: STUDENT IN AN ORGANIZED HEALTH CARE EDUCATION/TRAINING PROGRAM

## 2024-09-01 PROCEDURE — 2500000002 HC RX 250 W HCPCS SELF ADMINISTERED DRUGS (ALT 637 FOR MEDICARE OP, ALT 636 FOR OP/ED): Performed by: INTERNAL MEDICINE

## 2024-09-01 PROCEDURE — 36415 COLL VENOUS BLD VENIPUNCTURE: CPT | Performed by: STUDENT IN AN ORGANIZED HEALTH CARE EDUCATION/TRAINING PROGRAM

## 2024-09-01 RX ORDER — TIOTROPIUM BROMIDE INHALATION SPRAY 3.12 UG/1
2 SPRAY, METERED RESPIRATORY (INHALATION) DAILY
Status: ON HOLD
Start: 2024-09-02

## 2024-09-01 RX ORDER — PREDNISONE 20 MG/1
40 TABLET ORAL DAILY
Qty: 8 TABLET | Refills: 0 | Status: ON HOLD | OUTPATIENT
Start: 2024-09-02 | End: 2024-09-06

## 2024-09-01 RX ADMIN — OXYCODONE HYDROCHLORIDE 2.5 MG: 5 TABLET ORAL at 03:51

## 2024-09-01 RX ADMIN — PREDNISONE 40 MG: 20 TABLET ORAL at 08:17

## 2024-09-01 RX ADMIN — DILTIAZEM HYDROCHLORIDE 240 MG: 120 CAPSULE, EXTENDED RELEASE ORAL at 08:12

## 2024-09-01 RX ADMIN — APIXABAN 5 MG: 5 TABLET, FILM COATED ORAL at 08:13

## 2024-09-01 RX ADMIN — ONDANSETRON 4 MG: 4 TABLET, ORALLY DISINTEGRATING ORAL at 09:42

## 2024-09-01 RX ADMIN — OXYCODONE HYDROCHLORIDE 2.5 MG: 5 TABLET ORAL at 13:55

## 2024-09-01 RX ADMIN — LISINOPRIL 5 MG: 5 TABLET ORAL at 08:13

## 2024-09-01 RX ADMIN — BUSPIRONE HYDROCHLORIDE 5 MG: 5 TABLET ORAL at 08:13

## 2024-09-01 RX ADMIN — IPRATROPIUM BROMIDE AND ALBUTEROL SULFATE 3 ML: 2.5; .5 SOLUTION RESPIRATORY (INHALATION) at 09:00

## 2024-09-01 RX ADMIN — HYDROXYZINE HYDROCHLORIDE 10 MG: 10 TABLET ORAL at 03:02

## 2024-09-01 RX ADMIN — OXYCODONE HYDROCHLORIDE AND ACETAMINOPHEN 1 TABLET: 5; 325 TABLET ORAL at 09:42

## 2024-09-01 RX ADMIN — MONTELUKAST 10 MG: 10 TABLET, FILM COATED ORAL at 08:13

## 2024-09-01 RX ADMIN — GUAIFENESIN 600 MG: 600 TABLET ORAL at 08:13

## 2024-09-01 RX ADMIN — IPRATROPIUM BROMIDE AND ALBUTEROL SULFATE 3 ML: 2.5; .5 SOLUTION RESPIRATORY (INHALATION) at 13:23

## 2024-09-01 RX ADMIN — PANTOPRAZOLE SODIUM 40 MG: 40 TABLET, DELAYED RELEASE ORAL at 08:12

## 2024-09-01 RX ADMIN — Medication 100 MG: at 08:12

## 2024-09-01 ASSESSMENT — PAIN SCALES - GENERAL
PAINLEVEL_OUTOF10: 7
PAINLEVEL_OUTOF10: 9
PAINLEVEL_OUTOF10: 8

## 2024-09-01 ASSESSMENT — PAIN DESCRIPTION - ORIENTATION
ORIENTATION: RIGHT
ORIENTATION: RIGHT

## 2024-09-01 ASSESSMENT — PAIN DESCRIPTION - LOCATION
LOCATION: FOOT
LOCATION: FOOT

## 2024-09-01 NOTE — PROGRESS NOTES
09/01/24 0857   Current Planned Discharge Disposition   Current Planned Discharge Disposition Home Health     Once med ready plan is to discharge home with [facility], first choice, pending CXR for Pna patient on po abx ADOD 24-48hrs.

## 2024-09-01 NOTE — DISCHARGE SUMMARY
Discharge Diagnosis  Sepsis due to undetermined organism (Multi)    Issues Requiring Follow-Up  HTN at primary care  COPD exacerbation  CBC to recheck platelets    Discharge Meds     Your medication list        START taking these medications        Instructions Last Dose Given Next Dose Due   predniSONE 20 mg tablet  Commonly known as: Deltasone  Start taking on: September 2, 2024      Take 2 tablets (40 mg) by mouth once daily for 4 days.              CHANGE how you take these medications        Instructions Last Dose Given Next Dose Due   Spiriva Respimat 2.5 mcg/actuation inhaler  Generic drug: tiotropium  What changed: Another medication with the same name was added. Make sure you understand how and when to take each.      Inhale 2 puffs once daily.       Spiriva Respimat 2.5 mcg/actuation inhaler  Generic drug: tiotropium  Start taking on: September 2, 2024  What changed: You were already taking a medication with the same name, and this prescription was added. Make sure you understand how and when to take each.      Inhale 2 puffs once daily.              CONTINUE taking these medications        Instructions Last Dose Given Next Dose Due   albuterol 90 mcg/actuation inhaler           albuterol 2.5 mg /3 mL (0.083 %) nebulizer solution      Take 3 mL by nebulization every 6 hours if needed for wheezing or shortness of breath.       ammonium lactate 12 % lotion  Commonly known as: Lac-Hydrin           apixaban 5 mg tablet  Commonly known as: Eliquis      Take 1 tablet (5 mg) by mouth 2 times a day.       azithromycin 250 mg tablet  Commonly known as: Zithromax           benzonatate 100 mg capsule  Commonly known as: Tessalon      Take 1 capsule (100 mg) by mouth 3 times a day as needed for cough. Do not crush or chew.       busPIRone 5 mg tablet  Commonly known as: Buspar           calcium carbonate 500 mg calcium (1,250 mg) tablet  Commonly known as: Oscal           calcium carbonate 250 mg ([place] 100 mg)  chewable split tablet  Commonly known as: Tums           clotrimazole 1 % cream  Commonly known as: Lotrimin           dilTIAZem  mg 24 hr capsule  Commonly known as: Tiazac           fluocinonide 0.05 % cream  Commonly known as: Lidex           Lidocaine Pain Relief 4 % patch  Generic drug: lidocaine           mirtazapine 15 mg tablet  Commonly known as: Remeron      Take 1 tablet (15 mg) by mouth once daily at bedtime.       mometasone-formoterol 100-5 mcg/actuation inhaler  Commonly known as: Dulera 100           montelukast 10 mg tablet  Commonly known as: Singulair           oxybutynin XL 5 mg 24 hr tablet  Commonly known as: Ditropan-XL           pantoprazole 40 mg EC tablet  Commonly known as: ProtoNix           polyethylene glycol 17 gram/dose powder  Commonly known as: Glycolax, Miralax      Take 17 g by mouth once daily. Do not fill before July 28, 2024.       sucralfate 1 gram tablet  Commonly known as: Carafate      Take 1 tablet (1 g) by mouth 2 times a day. Crush and mix in luke warm water to make slurry and drink the slurry.       thiamine 100 mg tablet  Commonly known as: Vitamin B-1           Tylenol Extra Strength 500 mg tablet  Generic drug: acetaminophen                  STOP taking these medications      Stimulant Laxative Plus 8.6-50 mg tablet  Generic drug: sennosides-docusate sodium                  Where to Get Your Medications        These medications were sent to [facility]/pharmacy #[id number] [place] [id number] [facility] AT CORNER OF [address] [address]      Phone: [phone]   apixaban 5 mg tablet  predniSONE 20 mg tablet       Information about where to get these medications is not yet available    Ask your nurse or doctor about these medications  Spiriva Respimat 2.5 mcg/actuation inhaler         Test Results Pending At Discharge  Pending Labs       Order Current Status    Extra Urine Gray Tube Collected (08/19/24 6293)    Pathologist  "Review-CBC Differential In process    Urinalysis with Reflex Culture and Microscopic In process            Hospital Course   As per admission H&P, \"Fernando Cuevas is a 63 y.o. female presenting with sepsis, abdominal pain, leukocytosis, cocaine abuse, tachycardia.  Patient presents for abdominal pain that she states started yesterday morning. Abdominal pain which is very tender with guarding and significant leukocytosis along with tachycardia... Imaging is unrevealing \"    On 8/21, abdomen was still distended with WBC to 33k and pt was started on empiric antibiotic therapy. Surgery on board and CT a/p ordered which showed enteritis. WBC continued to improve with IV abx. However, on 8/24, pt was given GT for SBO; it was placed on LWS with concern that pt was having no BM or flatus. She also started complaining of R 1st toe pain and XR showed spur formation; podiatry consulted who believes toe pain is from microvessel disease related to cocaine abuse. NGT out on 8/27 with CLD and enemas per surgery. Relistor was added for dysmotility. Pt was eventually tolerating regular diet. Day before discharge, pt started having symptoms of COPD exacerbation and was treated appropriately; no evidence of PNA on CXR. After 1 day of treatment, pt reported that she felt much improved and at baseline. Pt was tachycardic throughout admission which continued until day of discharge as well. On chart review, she was noted to have baseline HR on the higher end which also can happen during her COPD exacerbations. Also of note, throughout hospital course, pt had thrombocytosis, which has been on labs since at least 3/2023. I suspect they continued to rise during hospital day as an inflammatory reaction. Due to the fact that pt is on eliquis chronically, I felt comfortable with discharge and for pt to fu for CBC in a week after getting home. Pt was amenable to this plan and to finishing prednisone course at home (she is on azithromycin " chronically M/W/F). All questions were answered, and pt was safely discharged home on 9/1.    Pertinent Physical Exam At Time of Discharge  Constitutional:       General: She is not in acute distress.     Appearance: Normal appearance. She is not ill-appearing, toxic-appearing or diaphoretic.   HENT:      Mouth/Throat:      Mouth: Mucous membranes are moist.   Eyes:      Extraocular Movements: Extraocular movements intact.      Pupils: Pupils are equal, round, and reactive to light.   Cardiovascular:      Rate and Rhythm: Regular rhythm. Tachycardia present.      Heart sounds: No murmur heard.     No friction rub. No gallop.   Pulmonary:      Effort: Pulmonary effort is normal. No respiratory distress.      Breath sounds: No wheezing.      Comments: Not on O2.   Abdominal:      General: Bowel sounds are normal. There is no distension.      Palpations: Abdomen is soft. There is no mass.      Tenderness: There is no abdominal tenderness. There is no guarding or rebound.   Musculoskeletal:         General: No swelling, tenderness or deformity.      Cervical back: Normal range of motion and neck supple.   Skin:     General: Skin is warm and dry.      Findings: No bruising or rash.   Neurological:      General: No focal deficit present.      Mental Status: She is alert and oriented to person, place, and time. Mental status is at baseline.      Motor: No weakness.   Psychiatric:         Mood and Affect: Mood normal.         Behavior: Behavior normal.         Thought Content: Thought content normal.     Outpatient Follow-Up  Future Appointments   Date Time Provider Department Center   9/23/2024 10:30 AM Wilson Liang MD [id number] [facility]         Kristine Carreon MD    I spent >30 minutes in the discharge planning and overall care for this patient.

## 2024-09-03 ENCOUNTER — PATIENT OUTREACH (OUTPATIENT)
Dept: HOME HEALTH SERVICES | Age: 64
End: 2024-09-03
Payer: COMMERCIAL

## 2024-09-03 LAB — PATH REVIEW-CBC DIFFERENTIAL: NORMAL

## 2024-09-05 ENCOUNTER — HOSPITAL ENCOUNTER (INPATIENT)
Facility: HOSPITAL | Age: 64
End: 2024-09-05
Attending: EMERGENCY MEDICINE | Admitting: SURGERY
Payer: COMMERCIAL

## 2024-09-05 DIAGNOSIS — I73.9 PERIPHERAL ARTERIAL DISEASE (CMS-HCC): ICD-10-CM

## 2024-09-05 DIAGNOSIS — I82.622 ACUTE EMBOLISM AND THROMBOSIS OF DEEP VEINS OF LEFT UPPER EXTREMITY (MULTI): ICD-10-CM

## 2024-09-05 DIAGNOSIS — M79.89 OTHER SPECIFIED SOFT TISSUE DISORDERS: ICD-10-CM

## 2024-09-05 DIAGNOSIS — R10.13 EPIGASTRIC PAIN: ICD-10-CM

## 2024-09-05 DIAGNOSIS — K56.609 SBO (SMALL BOWEL OBSTRUCTION) (MULTI): Primary | ICD-10-CM

## 2024-09-05 DIAGNOSIS — I99.8 ACUTE LOWER LIMB ISCHEMIA: ICD-10-CM

## 2024-09-05 LAB
ALBUMIN SERPL BCP-MCNC: 3.6 G/DL (ref 3.4–5)
ALP SERPL-CCNC: 129 U/L (ref 33–136)
ALT SERPL W P-5'-P-CCNC: 69 U/L (ref 7–45)
ANION GAP SERPL CALC-SCNC: 17 MMOL/L (ref 10–20)
APPEARANCE UR: CLEAR
AST SERPL W P-5'-P-CCNC: 54 U/L (ref 9–39)
ATRIAL RATE: 111 BPM
ATRIAL RATE: 117 BPM
ATRIAL RATE: 117 BPM
BASO STIPL BLD QL SMEAR: PRESENT
BASOPHILS # BLD MANUAL: 0 X10*3/UL (ref 0–0.1)
BASOPHILS NFR BLD MANUAL: 0 %
BILIRUB SERPL-MCNC: 0.3 MG/DL (ref 0–1.2)
BILIRUB UR STRIP.AUTO-MCNC: NEGATIVE MG/DL
BUN SERPL-MCNC: 14 MG/DL (ref 6–23)
C COLI+JEJ+UPSA DNA STL QL NAA+PROBE: NOT DETECTED
C DIF TOX TCDA+TCDB STL QL NAA+PROBE: NOT DETECTED
CALCIUM SERPL-MCNC: 8.8 MG/DL (ref 8.6–10.6)
CARDIAC TROPONIN I PNL SERPL HS: 7 NG/L (ref 0–34)
CHLORIDE SERPL-SCNC: 102 MMOL/L (ref 98–107)
CO2 SERPL-SCNC: 22 MMOL/L (ref 21–32)
COLOR UR: COLORLESS
CREAT SERPL-MCNC: 0.74 MG/DL (ref 0.5–1.05)
EC STX1 GENE STL QL NAA+PROBE: NOT DETECTED
EC STX2 GENE STL QL NAA+PROBE: NOT DETECTED
EGFRCR SERPLBLD CKD-EPI 2021: >90 ML/MIN/1.73M*2
EOSINOPHIL # BLD MANUAL: 0 X10*3/UL (ref 0–0.7)
EOSINOPHIL NFR BLD MANUAL: 0 %
ERYTHROCYTE [DISTWIDTH] IN BLOOD BY AUTOMATED COUNT: 21.1 % (ref 11.5–14.5)
GLUCOSE SERPL-MCNC: 104 MG/DL (ref 74–99)
GLUCOSE UR STRIP.AUTO-MCNC: NORMAL MG/DL
HCT VFR BLD AUTO: 29.7 % (ref 36–46)
HGB BLD-MCNC: 9.5 G/DL (ref 12–16)
HOLD SPECIMEN: NORMAL
HYPOCHROMIA BLD QL SMEAR: ABNORMAL
IMM GRANULOCYTES # BLD AUTO: 0.48 X10*3/UL (ref 0–0.7)
IMM GRANULOCYTES NFR BLD AUTO: 1.2 % (ref 0–0.9)
KETONES UR STRIP.AUTO-MCNC: NEGATIVE MG/DL
LACTATE SERPL-SCNC: 2.9 MMOL/L (ref 0.4–2)
LACTATE SERPL-SCNC: 3.7 MMOL/L (ref 0.4–2)
LEUKOCYTE ESTERASE UR QL STRIP.AUTO: NEGATIVE
LIPASE SERPL-CCNC: 32 U/L (ref 9–82)
LYMPHOCYTES # BLD MANUAL: 0.69 X10*3/UL (ref 1.2–4.8)
LYMPHOCYTES NFR BLD MANUAL: 1.7 %
MCH RBC QN AUTO: 23.5 PG (ref 26–34)
MCHC RBC AUTO-ENTMCNC: 32 G/DL (ref 32–36)
MCV RBC AUTO: 74 FL (ref 80–100)
MONOCYTES # BLD MANUAL: 1.06 X10*3/UL (ref 0.1–1)
MONOCYTES NFR BLD MANUAL: 2.6 %
NEUTS SEG # BLD MANUAL: 38.85 X10*3/UL (ref 1.2–7)
NEUTS SEG NFR BLD MANUAL: 95.7 %
NITRITE UR QL STRIP.AUTO: NEGATIVE
NOROVIRUS GI + GII RNA STL NAA+PROBE: NOT DETECTED
NRBC BLD-RTO: 1 /100 WBCS (ref 0–0)
P AXIS: 67 DEGREES
P AXIS: 81 DEGREES
P AXIS: 83 DEGREES
P OFFSET: 206 MS
P OFFSET: 207 MS
P OFFSET: 209 MS
P ONSET: 164 MS
P ONSET: 169 MS
P ONSET: 170 MS
PH UR STRIP.AUTO: 5.5 [PH]
PLATELET # BLD AUTO: 846 X10*3/UL (ref 150–450)
POLYCHROMASIA BLD QL SMEAR: ABNORMAL
POTASSIUM SERPL-SCNC: 3.9 MMOL/L (ref 3.5–5.3)
PR INTERVAL: 114 MS
PR INTERVAL: 116 MS
PR INTERVAL: 120 MS
PROT SERPL-MCNC: 6.9 G/DL (ref 6.4–8.2)
PROT UR STRIP.AUTO-MCNC: NEGATIVE MG/DL
Q ONSET: 224 MS
Q ONSET: 227 MS
Q ONSET: 227 MS
QRS COUNT: 18 BEATS
QRS COUNT: 19 BEATS
QRS COUNT: 19 BEATS
QRS DURATION: 62 MS
QRS DURATION: 70 MS
QRS DURATION: 76 MS
QT INTERVAL: 322 MS
QT INTERVAL: 326 MS
QT INTERVAL: 336 MS
QTC CALCULATION(BAZETT): 449 MS
QTC CALCULATION(BAZETT): 454 MS
QTC CALCULATION(BAZETT): 456 MS
QTC FREDERICIA: 402 MS
QTC FREDERICIA: 407 MS
QTC FREDERICIA: 412 MS
R AXIS: 61 DEGREES
R AXIS: 62 DEGREES
R AXIS: 79 DEGREES
RBC # BLD AUTO: 4.04 X10*6/UL (ref 4–5.2)
RBC # UR STRIP.AUTO: NEGATIVE /UL
RBC MORPH BLD: ABNORMAL
RV RNA STL NAA+PROBE: NOT DETECTED
SALMONELLA DNA STL QL NAA+PROBE: NOT DETECTED
SCHISTOCYTES BLD QL SMEAR: ABNORMAL
SHIGELLA DNA SPEC QL NAA+PROBE: NOT DETECTED
SODIUM SERPL-SCNC: 137 MMOL/L (ref 136–145)
SP GR UR STRIP.AUTO: 1.01
T AXIS: 58 DEGREES
T AXIS: 66 DEGREES
T AXIS: 77 DEGREES
T OFFSET: 388 MS
T OFFSET: 390 MS
T OFFSET: 392 MS
TARGETS BLD QL SMEAR: ABNORMAL
TOTAL CELLS COUNTED BLD: 115
UROBILINOGEN UR STRIP.AUTO-MCNC: NORMAL MG/DL
V CHOLERAE DNA STL QL NAA+PROBE: NOT DETECTED
VENTRICULAR RATE: 111 BPM
VENTRICULAR RATE: 117 BPM
VENTRICULAR RATE: 117 BPM
WBC # BLD AUTO: 40.6 X10*3/UL (ref 4.4–11.3)
Y ENTEROCOL DNA STL QL NAA+PROBE: NOT DETECTED

## 2024-09-05 PROCEDURE — 87493 C DIFF AMPLIFIED PROBE: CPT

## 2024-09-05 PROCEDURE — 2550000001 HC RX 255 CONTRASTS: Mod: SE | Performed by: EMERGENCY MEDICINE

## 2024-09-05 PROCEDURE — 2500000004 HC RX 250 GENERAL PHARMACY W/ HCPCS (ALT 636 FOR OP/ED)

## 2024-09-05 PROCEDURE — 85007 BL SMEAR W/DIFF WBC COUNT: CPT

## 2024-09-05 PROCEDURE — 2550000001 HC RX 255 CONTRASTS: Mod: SE

## 2024-09-05 PROCEDURE — 2500000004 HC RX 250 GENERAL PHARMACY W/ HCPCS (ALT 636 FOR OP/ED): Mod: SE

## 2024-09-05 PROCEDURE — 1100000001 HC PRIVATE ROOM DAILY

## 2024-09-05 PROCEDURE — 85027 COMPLETE CBC AUTOMATED: CPT

## 2024-09-05 PROCEDURE — 2500000004 HC RX 250 GENERAL PHARMACY W/ HCPCS (ALT 636 FOR OP/ED): Mod: SE | Performed by: EMERGENCY MEDICINE

## 2024-09-05 PROCEDURE — 0D9670Z DRAINAGE OF STOMACH WITH DRAINAGE DEVICE, VIA NATURAL OR ARTIFICIAL OPENING: ICD-10-PCS

## 2024-09-05 PROCEDURE — 36415 COLL VENOUS BLD VENIPUNCTURE: CPT

## 2024-09-05 PROCEDURE — 84484 ASSAY OF TROPONIN QUANT: CPT

## 2024-09-05 PROCEDURE — 36415 COLL VENOUS BLD VENIPUNCTURE: CPT | Performed by: EMERGENCY MEDICINE

## 2024-09-05 PROCEDURE — 74018 RADEX ABDOMEN 1 VIEW: CPT | Mod: FOREIGN READ | Performed by: RADIOLOGY

## 2024-09-05 PROCEDURE — 2500000005 HC RX 250 GENERAL PHARMACY W/O HCPCS: Mod: SE

## 2024-09-05 PROCEDURE — 83605 ASSAY OF LACTIC ACID: CPT

## 2024-09-05 PROCEDURE — 96365 THER/PROPH/DIAG IV INF INIT: CPT

## 2024-09-05 PROCEDURE — 96361 HYDRATE IV INFUSION ADD-ON: CPT

## 2024-09-05 PROCEDURE — 96376 TX/PRO/DX INJ SAME DRUG ADON: CPT

## 2024-09-05 PROCEDURE — 83690 ASSAY OF LIPASE: CPT | Performed by: EMERGENCY MEDICINE

## 2024-09-05 PROCEDURE — 87040 BLOOD CULTURE FOR BACTERIA: CPT

## 2024-09-05 PROCEDURE — 87506 IADNA-DNA/RNA PROBE TQ 6-11: CPT

## 2024-09-05 PROCEDURE — 99254 IP/OBS CNSLTJ NEW/EST MOD 60: CPT | Performed by: SURGERY

## 2024-09-05 PROCEDURE — 93010 ELECTROCARDIOGRAM REPORT: CPT | Performed by: EMERGENCY MEDICINE

## 2024-09-05 PROCEDURE — 2500000001 HC RX 250 WO HCPCS SELF ADMINISTERED DRUGS (ALT 637 FOR MEDICARE OP): Mod: SE

## 2024-09-05 PROCEDURE — 99291 CRITICAL CARE FIRST HOUR: CPT | Performed by: EMERGENCY MEDICINE

## 2024-09-05 PROCEDURE — 96375 TX/PRO/DX INJ NEW DRUG ADDON: CPT

## 2024-09-05 PROCEDURE — 80053 COMPREHEN METABOLIC PANEL: CPT

## 2024-09-05 PROCEDURE — 81003 URINALYSIS AUTO W/O SCOPE: CPT

## 2024-09-05 RX ORDER — DIPHENHYDRAMINE HYDROCHLORIDE 50 MG/ML
50 INJECTION INTRAMUSCULAR; INTRAVENOUS ONCE
Status: COMPLETED | OUTPATIENT
Start: 2024-09-05 | End: 2024-09-05

## 2024-09-05 RX ORDER — MIRTAZAPINE 15 MG/1
15 TABLET, FILM COATED ORAL NIGHTLY
Status: DISPENSED | OUTPATIENT
Start: 2024-09-05

## 2024-09-05 RX ORDER — POLYETHYLENE GLYCOL 3350 17 G/17G
17 POWDER, FOR SOLUTION ORAL DAILY
Status: DISCONTINUED | OUTPATIENT
Start: 2024-09-06 | End: 2024-09-06

## 2024-09-05 RX ORDER — ACETAMINOPHEN 10 MG/ML
1000 INJECTION, SOLUTION INTRAVENOUS EVERY 6 HOURS SCHEDULED
Status: DISCONTINUED | OUTPATIENT
Start: 2024-09-05 | End: 2024-09-07

## 2024-09-05 RX ORDER — PANTOPRAZOLE SODIUM 40 MG/10ML
40 INJECTION, POWDER, LYOPHILIZED, FOR SOLUTION INTRAVENOUS
Status: DISCONTINUED | OUTPATIENT
Start: 2024-09-06 | End: 2024-09-06

## 2024-09-05 RX ORDER — KETOROLAC TROMETHAMINE 30 MG/ML
30 INJECTION, SOLUTION INTRAMUSCULAR; INTRAVENOUS EVERY 6 HOURS
Status: DISCONTINUED | OUTPATIENT
Start: 2024-09-05 | End: 2024-09-06

## 2024-09-05 RX ORDER — LANOLIN ALCOHOL/MO/W.PET/CERES
100 CREAM (GRAM) TOPICAL DAILY
Status: DISPENSED | OUTPATIENT
Start: 2024-09-06

## 2024-09-05 RX ORDER — ONDANSETRON HYDROCHLORIDE 2 MG/ML
4 INJECTION, SOLUTION INTRAVENOUS ONCE
Status: COMPLETED | OUTPATIENT
Start: 2024-09-05 | End: 2024-09-05

## 2024-09-05 RX ORDER — PANTOPRAZOLE SODIUM 40 MG/1
40 TABLET, DELAYED RELEASE ORAL ONCE
Status: COMPLETED | OUTPATIENT
Start: 2024-09-05 | End: 2024-09-05

## 2024-09-05 RX ORDER — HYDROMORPHONE HYDROCHLORIDE 1 MG/ML
0.2 INJECTION, SOLUTION INTRAMUSCULAR; INTRAVENOUS; SUBCUTANEOUS ONCE
Status: COMPLETED | OUTPATIENT
Start: 2024-09-05 | End: 2024-09-05

## 2024-09-05 RX ORDER — BUSPIRONE HYDROCHLORIDE 10 MG/1
5 TABLET ORAL 2 TIMES DAILY
Status: DISPENSED | OUTPATIENT
Start: 2024-09-05

## 2024-09-05 RX ORDER — MORPHINE SULFATE 4 MG/ML
4 INJECTION INTRAVENOUS ONCE
Status: COMPLETED | OUTPATIENT
Start: 2024-09-05 | End: 2024-09-05

## 2024-09-05 RX ORDER — OXYBUTYNIN CHLORIDE 5 MG/1
2.5 TABLET ORAL 2 TIMES DAILY
Status: ACTIVE | OUTPATIENT
Start: 2024-09-05

## 2024-09-05 RX ORDER — HEPARIN SODIUM 5000 [USP'U]/ML
5000 INJECTION, SOLUTION INTRAVENOUS; SUBCUTANEOUS EVERY 8 HOURS
Status: DISCONTINUED | OUTPATIENT
Start: 2024-09-05 | End: 2024-09-06

## 2024-09-05 RX ORDER — PREDNISONE 20 MG/1
40 TABLET ORAL DAILY
Status: DISCONTINUED | OUTPATIENT
Start: 2024-09-06 | End: 2024-09-07

## 2024-09-05 RX ORDER — MONTELUKAST SODIUM 10 MG/1
10 TABLET ORAL DAILY
Status: DISPENSED | OUTPATIENT
Start: 2024-09-06

## 2024-09-05 RX ORDER — DIPHENHYDRAMINE HYDROCHLORIDE 50 MG/ML
25 INJECTION INTRAMUSCULAR; INTRAVENOUS ONCE
Status: COMPLETED | OUTPATIENT
Start: 2024-09-05 | End: 2024-09-05

## 2024-09-05 RX ORDER — AZITHROMYCIN 250 MG/1
250 TABLET, FILM COATED ORAL
Status: ACTIVE | OUTPATIENT
Start: 2024-09-06

## 2024-09-05 RX ORDER — SODIUM CHLORIDE, SODIUM LACTATE, POTASSIUM CHLORIDE, CALCIUM CHLORIDE 600; 310; 30; 20 MG/100ML; MG/100ML; MG/100ML; MG/100ML
100 INJECTION, SOLUTION INTRAVENOUS CONTINUOUS
Status: DISCONTINUED | OUTPATIENT
Start: 2024-09-05 | End: 2024-09-08

## 2024-09-05 RX ORDER — DILTIAZEM HYDROCHLORIDE 240 MG/1
240 CAPSULE, COATED, EXTENDED RELEASE ORAL DAILY
Status: DISCONTINUED | OUTPATIENT
Start: 2024-09-06 | End: 2024-09-06

## 2024-09-05 RX ORDER — SUCRALFATE 1 G/1
1 TABLET ORAL 2 TIMES DAILY
Status: ACTIVE | OUTPATIENT
Start: 2024-09-05

## 2024-09-05 RX ORDER — ALBUTEROL SULFATE 0.83 MG/ML
3 SOLUTION RESPIRATORY (INHALATION) EVERY 6 HOURS PRN
Status: DISPENSED | OUTPATIENT
Start: 2024-09-05

## 2024-09-05 RX ORDER — ONDANSETRON HYDROCHLORIDE 2 MG/ML
INJECTION, SOLUTION INTRAVENOUS
Status: COMPLETED
Start: 2024-09-05 | End: 2024-09-05

## 2024-09-05 RX ORDER — PANTOPRAZOLE SODIUM 40 MG/1
40 TABLET, DELAYED RELEASE ORAL
Status: DISCONTINUED | OUTPATIENT
Start: 2024-09-06 | End: 2024-09-05

## 2024-09-05 RX ORDER — ALBUTEROL SULFATE 90 UG/1
2 INHALANT RESPIRATORY (INHALATION) EVERY 4 HOURS PRN
Status: DISPENSED | OUTPATIENT
Start: 2024-09-05

## 2024-09-05 RX ORDER — ONDANSETRON HYDROCHLORIDE 2 MG/ML
4 INJECTION, SOLUTION INTRAVENOUS EVERY 6 HOURS PRN
Status: DISPENSED | OUTPATIENT
Start: 2024-09-05

## 2024-09-05 RX ADMIN — PANTOPRAZOLE SODIUM 40 MG: 40 TABLET, DELAYED RELEASE ORAL at 10:40

## 2024-09-05 RX ADMIN — DIPHENHYDRAMINE HYDROCHLORIDE 50 MG: 50 INJECTION INTRAMUSCULAR; INTRAVENOUS at 15:38

## 2024-09-05 RX ADMIN — ONDANSETRON 4 MG: 2 INJECTION INTRAMUSCULAR; INTRAVENOUS at 23:05

## 2024-09-05 RX ADMIN — HEPARIN SODIUM 5000 UNITS: 5000 INJECTION, SOLUTION INTRAVENOUS; SUBCUTANEOUS at 22:00

## 2024-09-05 RX ADMIN — ONDANSETRON HYDROCHLORIDE 4 MG: 2 INJECTION, SOLUTION INTRAVENOUS at 12:47

## 2024-09-05 RX ADMIN — IOHEXOL 75 ML: 350 INJECTION, SOLUTION INTRAVENOUS at 16:46

## 2024-09-05 RX ADMIN — PIPERACILLIN SODIUM AND TAZOBACTAM SODIUM 4.5 G: 4; .5 INJECTION, SOLUTION INTRAVENOUS at 12:38

## 2024-09-05 RX ADMIN — SODIUM CHLORIDE, POTASSIUM CHLORIDE, SODIUM LACTATE AND CALCIUM CHLORIDE 1000 ML: 600; 310; 30; 20 INJECTION, SOLUTION INTRAVENOUS at 10:16

## 2024-09-05 RX ADMIN — PIPERACILLIN SODIUM AND TAZOBACTAM SODIUM 3.38 G: 3; .375 INJECTION, SOLUTION INTRAVENOUS at 18:02

## 2024-09-05 RX ADMIN — DIPHENHYDRAMINE HYDROCHLORIDE AND LIDOCAINE HYDROCHLORIDE AND ALUMINUM HYDROXIDE AND MAGNESIUM HYDRO 10 ML: KIT at 12:38

## 2024-09-05 RX ADMIN — SODIUM CHLORIDE, POTASSIUM CHLORIDE, SODIUM LACTATE AND CALCIUM CHLORIDE 1000 ML: 600; 310; 30; 20 INJECTION, SOLUTION INTRAVENOUS at 12:38

## 2024-09-05 RX ADMIN — ACETAMINOPHEN 1000 MG: 1000 INJECTION INTRAVENOUS at 23:07

## 2024-09-05 RX ADMIN — HYDROMORPHONE HYDROCHLORIDE 0.2 MG: 1 INJECTION, SOLUTION INTRAMUSCULAR; INTRAVENOUS; SUBCUTANEOUS at 18:22

## 2024-09-05 RX ADMIN — MORPHINE SULFATE 4 MG: 4 INJECTION, SOLUTION INTRAMUSCULAR; INTRAVENOUS at 13:17

## 2024-09-05 RX ADMIN — SODIUM CHLORIDE, POTASSIUM CHLORIDE, SODIUM LACTATE AND CALCIUM CHLORIDE 100 ML/HR: 600; 310; 30; 20 INJECTION, SOLUTION INTRAVENOUS at 23:07

## 2024-09-05 RX ADMIN — ONDANSETRON 4 MG: 2 INJECTION INTRAMUSCULAR; INTRAVENOUS at 12:47

## 2024-09-05 RX ADMIN — DIPHENHYDRAMINE HYDROCHLORIDE 25 MG: 50 INJECTION INTRAMUSCULAR; INTRAVENOUS at 18:22

## 2024-09-05 RX ADMIN — DIPHENHYDRAMINE HYDROCHLORIDE AND LIDOCAINE HYDROCHLORIDE AND ALUMINUM HYDROXIDE AND MAGNESIUM HYDRO 10 ML: KIT at 10:41

## 2024-09-05 RX ADMIN — MORPHINE SULFATE 4 MG: 4 INJECTION, SOLUTION INTRAMUSCULAR; INTRAVENOUS at 11:36

## 2024-09-05 RX ADMIN — METHYLPREDNISOLONE SODIUM SUCCINATE 40 MG: 40 INJECTION, POWDER, FOR SOLUTION INTRAMUSCULAR; INTRAVENOUS at 15:38

## 2024-09-05 RX ADMIN — DIATRIZOATE MEGLUMINE AND DIATRIZOATE SODIUM 60 ML: 660; 100 LIQUID ORAL; RECTAL at 16:05

## 2024-09-05 RX ADMIN — KETOROLAC TROMETHAMINE 30 MG: 30 INJECTION, SOLUTION INTRAMUSCULAR; INTRAVENOUS at 21:59

## 2024-09-05 RX ADMIN — ONDANSETRON 4 MG: 2 INJECTION INTRAMUSCULAR; INTRAVENOUS at 15:38

## 2024-09-05 RX ADMIN — METHYLPREDNISOLONE SODIUM SUCCINATE 40 MG: 40 INJECTION, POWDER, FOR SOLUTION INTRAMUSCULAR; INTRAVENOUS at 11:36

## 2024-09-05 ASSESSMENT — PAIN SCALES - GENERAL
PAINLEVEL_OUTOF10: 10 - WORST POSSIBLE PAIN
PAINLEVEL_OUTOF10: 6
PAINLEVEL_OUTOF10: 10 - WORST POSSIBLE PAIN
PAINLEVEL_OUTOF10: 7

## 2024-09-05 ASSESSMENT — PAIN SCALES - PAIN ASSESSMENT IN ADVANCED DEMENTIA (PAINAD): TOTALSCORE: MEDICATION (SEE MAR)

## 2024-09-05 ASSESSMENT — PAIN DESCRIPTION - LOCATION
LOCATION: ABDOMEN
LOCATION: ABDOMEN

## 2024-09-05 ASSESSMENT — PAIN - FUNCTIONAL ASSESSMENT: PAIN_FUNCTIONAL_ASSESSMENT: 0-10

## 2024-09-05 NOTE — PROGRESS NOTES
Emergency Department Transition of Care Note       Signout   I received Fernando Cuevas in signout from Dr. Meehan.  Please see the ED Provider Note for all HPI, PE and MDM up to the time of signout at 1500.  This is in addition to the primary record.    In brief Fernando Cuevas is an 63 y.o. female presenting for burning abdominal pain that started this morning.  CBC showing leukocytosis of 40.    At the time of signout we were awaiting:  - CT abdomen pelvis  -Contrast prophylaxis    ED Course & Medical Decision Making   Medical Decision Making:  Under my care, patient was found to have bowel obstruction and potential autosplenectomy per radiologist.  ACS was consulted.  NG tube was placed with significant output.  Patient was admitted to ACS in stable condition.    ED Course:  ED Course as of 09/05/24 2034   Thu Sep 05, 2024   1130 CBC and Auto Differential(!) [SL]   1603 WBC(!): 40.6 [WJ]   1732 CT abdomen pelvis w IV contrast  Independently reviewed and noted that there is a small bowel obstruction.  Radiology read agreed. ACS consulted [WJ]   1746 Spoke to the radiologist - concern for autosplenectomy.  [WJ]   1833 NG placed, KUB ordered [WJ]      ED Course User Index  [SL] Leslee Meehan MD  [WJ] Brendan Munson DO         Diagnoses as of 09/05/24 2034   Epigastric pain   SBO (small bowel obstruction) (Multi)       Disposition   As a result of their workup, the patient will require admission to the hospital.  The patient was informed of her diagnosis.  The patient was given the opportunity to ask questions and I answered them. The patient agreed to be admitted to the hospital.    Procedures   Procedures    This was a shared visit with an ED attending.  The patient was seen and discussed with the ED attending    Earline Porras DO  Emergency Medicine

## 2024-09-05 NOTE — ED TRIAGE NOTES
Pt presents to ED via EMS for abd pan. Pt has hx of GERD. Pt drank alcohol and had symptoms similar to a flare up. Pt requested O2 support from EMS though is stating 100% on RA. Pt is moaning during triage and does not answer all questions asked by this RN. A&Ox4. ABD rounded on exam with surgical scar noted. Pt is noted to be tachy at 110. No fever noted. No evidence of respiratory distress observed. Bed locked in low position.

## 2024-09-05 NOTE — ED PROVIDER NOTES
Emergency Department Provider Note        History of Present Illness     History provided by: Patient  Limitations to History: None    HPI:  Fernando Cuevas is a 63 y.o. female history of COPD, DVT on Eliquis, HTN, small bowel perforation s/p SBR with primary anastomosis on 24 complicated by wound infection and prolonged ileus, recently hospitalized for sepsis and was discharged on  presenting with burning abdominal pain that started at 3 AM this morning.  Patient also endorsing diarrhea and chills that started today.  Patient is unsure when she last passed flatus or had a bowel movement.  Patient states she thinks she may have passed flatus yesterday.  She denies any fevers, nausea, vomiting, dysuria, chest pain, shortness of breath.  She denies any melena, hematochezia.    Physical Exam   Triage vitals:  T 36.5 °C (97.7 °F)  HR (!) 110  /68  RR 19  O2 100 % None (Room air)    General: Awake, alert, in no acute distress  Eyes: Gaze conjugate.  No scleral icterus or injection  HENT: Normo-cephalic, atraumatic. No stridor  CV: Regular rate, regular rhythm. Radial pulses 2+ bilaterally  Resp: Breathing non-labored, speaking in full sentences.   GI: Soft, distended, non-tender. No rebound or guarding.  : Deferred  MSK/Extremities: No gross bony deformities. Moving all extremities.    Skin: Warm. Appropriate color  Neuro: Alert. Oriented. Face symmetric. Speech is fluent.  Gross strength and sensation intact in b/l UE and LEs  Psych: Appropriate mood and affect    Medical Decision Making & ED Course   Medical Decision Makin y.o. female presentation at arrival most suggestive of GERD. Other differentials, such as ACS, pancreatitis, SBO, C. difficile, UTI, anastomotic leak were considered. Labs ordered: CBC, CMP, lactate, UA, C. difficile, stool pathogen, troponin, lactate, lipase, blood cultures. Imaging ordered: CT abdomen pelvis with p.o. and IV contrast.  Patient has an iodine allergy and gets  hives therefore contrast prophylaxis was given.  Treatments including 2 L LR, morphine for pain, Zofran for nausea, Zosyn, BMX for heartburn were administered. Labs reviewed and notable for leukocytosis of 40, lactate of 2.9.  Patient was signed out pending completion of contrast prophylaxis and CT abdomen pelvis.  ----    Differential diagnoses considered include but are not limited to: ACS, pancreatitis, SBO, C. difficile, UTI, anastomotic leak     Social Determinants of Health which Significantly Impact Care: None identified     EKG Independent Interpretation: The EKG obtained at 1048 was independently interpreted by myself. It demonstrates sinus tachycardia rhythm with a ventricular rate of 111.  Normal axis. Intervals AZ, QRS, QT all within normal limits. ST segments showed no ST segment elevation or depressions.  Similar compared to prior EKG from August 31    Independent Result Review and Interpretation: Relevant laboratory and radiographic results were reviewed and independently interpreted by myself.  As necessary, they are commented on in the ED Course.    Chronic conditions affecting the patient's care: As documented above in [facility]    The patient was discussed with the following consultants/services: None    Care Considerations: As documented above in [facility]    ED Course:  ED Course as of 09/05/24 1604   Thu Sep 05, 2024   1130 CBC and Auto Differential(!) [SL]   1603 WBC(!): 40.6 [WJ]      ED Course User Index  [SL] Leslee Meehan MD  [WJ] Brendan Munson,      Disposition   Patient was signed out to incoming provider at 3 PM pending completion of their work-up.  Please see the next provider's transition of care note for the remainder of the patient's care.     Procedures   Procedures    Patient seen and discussed with ED attending physician.    Leslee Meehan MD  Emergency Medicine     Leslee Meehan MD  Resident  09/05/24 3927

## 2024-09-06 ENCOUNTER — ANESTHESIA EVENT (OUTPATIENT)
Dept: OPERATING ROOM | Facility: HOSPITAL | Age: 64
End: 2024-09-06
Payer: COMMERCIAL

## 2024-09-06 ENCOUNTER — ANESTHESIA (OUTPATIENT)
Dept: OPERATING ROOM | Facility: HOSPITAL | Age: 64
End: 2024-09-06
Payer: COMMERCIAL

## 2024-09-06 PROBLEM — I99.8 ACUTE LOWER LIMB ISCHEMIA: Status: ACTIVE | Noted: 2024-09-05

## 2024-09-06 LAB
ABO GROUP (TYPE) IN BLOOD: NORMAL
AMPHETAMINES UR QL SCN: ABNORMAL
ANION GAP SERPL CALC-SCNC: 11 MMOL/L (ref 10–20)
ANTIBODY SCREEN: NORMAL
BARBITURATES UR QL SCN: ABNORMAL
BENZODIAZ UR QL SCN: ABNORMAL
BUN SERPL-MCNC: 17 MG/DL (ref 6–23)
BZE UR QL SCN: ABNORMAL
CALCIUM SERPL-MCNC: 8 MG/DL (ref 8.6–10.6)
CANNABINOIDS UR QL SCN: ABNORMAL
CHLORIDE SERPL-SCNC: 103 MMOL/L (ref 98–107)
CO2 SERPL-SCNC: 30 MMOL/L (ref 21–32)
CREAT SERPL-MCNC: 0.81 MG/DL (ref 0.5–1.05)
EGFRCR SERPLBLD CKD-EPI 2021: 82 ML/MIN/1.73M*2
ERYTHROCYTE [DISTWIDTH] IN BLOOD BY AUTOMATED COUNT: 21.5 % (ref 11.5–14.5)
FENTANYL+NORFENTANYL UR QL SCN: ABNORMAL
GLUCOSE SERPL-MCNC: 97 MG/DL (ref 74–99)
HCT VFR BLD AUTO: 28.9 % (ref 36–46)
HGB BLD-MCNC: 8.7 G/DL (ref 12–16)
HOLD SPECIMEN: NORMAL
LACTATE SERPL-SCNC: 1.4 MMOL/L (ref 0.4–2)
LACTATE SERPL-SCNC: 1.7 MMOL/L (ref 0.4–2)
MAGNESIUM SERPL-MCNC: 2.63 MG/DL (ref 1.6–2.4)
MCH RBC QN AUTO: 23.2 PG (ref 26–34)
MCHC RBC AUTO-ENTMCNC: 30.1 G/DL (ref 32–36)
MCV RBC AUTO: 77 FL (ref 80–100)
METHADONE UR QL SCN: ABNORMAL
NRBC BLD-RTO: 2.5 /100 WBCS (ref 0–0)
OPIATES UR QL SCN: ABNORMAL
OXYCODONE+OXYMORPHONE UR QL SCN: ABNORMAL
PCP UR QL SCN: ABNORMAL
PLATELET # BLD AUTO: 758 X10*3/UL (ref 150–450)
POTASSIUM SERPL-SCNC: 4.1 MMOL/L (ref 3.5–5.3)
RBC # BLD AUTO: 3.75 X10*6/UL (ref 4–5.2)
RH FACTOR (ANTIGEN D): NORMAL
SODIUM SERPL-SCNC: 140 MMOL/L (ref 136–145)
WBC # BLD AUTO: 26.6 X10*3/UL (ref 4.4–11.3)

## 2024-09-06 PROCEDURE — 86923 COMPATIBILITY TEST ELECTRIC: CPT

## 2024-09-06 PROCEDURE — 2500000004 HC RX 250 GENERAL PHARMACY W/ HCPCS (ALT 636 FOR OP/ED)

## 2024-09-06 PROCEDURE — 83735 ASSAY OF MAGNESIUM: CPT

## 2024-09-06 PROCEDURE — 1200000002 HC GENERAL ROOM WITH TELEMETRY DAILY

## 2024-09-06 PROCEDURE — 36415 COLL VENOUS BLD VENIPUNCTURE: CPT

## 2024-09-06 PROCEDURE — 99254 IP/OBS CNSLTJ NEW/EST MOD 60: CPT | Performed by: STUDENT IN AN ORGANIZED HEALTH CARE EDUCATION/TRAINING PROGRAM

## 2024-09-06 PROCEDURE — 71045 X-RAY EXAM CHEST 1 VIEW: CPT | Performed by: RADIOLOGY

## 2024-09-06 PROCEDURE — 83605 ASSAY OF LACTIC ACID: CPT

## 2024-09-06 PROCEDURE — 2500000004 HC RX 250 GENERAL PHARMACY W/ HCPCS (ALT 636 FOR OP/ED): Mod: JZ

## 2024-09-06 PROCEDURE — 2500000004 HC RX 250 GENERAL PHARMACY W/ HCPCS (ALT 636 FOR OP/ED): Performed by: STUDENT IN AN ORGANIZED HEALTH CARE EDUCATION/TRAINING PROGRAM

## 2024-09-06 PROCEDURE — 2500000002 HC RX 250 W HCPCS SELF ADMINISTERED DRUGS (ALT 637 FOR MEDICARE OP, ALT 636 FOR OP/ED)

## 2024-09-06 PROCEDURE — 99233 SBSQ HOSP IP/OBS HIGH 50: CPT | Performed by: SURGERY

## 2024-09-06 PROCEDURE — 74018 RADEX ABDOMEN 1 VIEW: CPT | Performed by: RADIOLOGY

## 2024-09-06 PROCEDURE — 94640 AIRWAY INHALATION TREATMENT: CPT

## 2024-09-06 PROCEDURE — 80307 DRUG TEST PRSMV CHEM ANLYZR: CPT

## 2024-09-06 PROCEDURE — 85027 COMPLETE CBC AUTOMATED: CPT

## 2024-09-06 PROCEDURE — 2500000004 HC RX 250 GENERAL PHARMACY W/ HCPCS (ALT 636 FOR OP/ED): Performed by: SURGERY

## 2024-09-06 PROCEDURE — 99223 1ST HOSP IP/OBS HIGH 75: CPT | Performed by: SURGERY

## 2024-09-06 PROCEDURE — 86901 BLOOD TYPING SEROLOGIC RH(D): CPT

## 2024-09-06 PROCEDURE — 80048 BASIC METABOLIC PNL TOTAL CA: CPT

## 2024-09-06 RX ORDER — DILTIAZEM HYDROCHLORIDE 120 MG/1
240 CAPSULE, COATED, EXTENDED RELEASE ORAL DAILY
Status: DISPENSED | OUTPATIENT
Start: 2024-09-06

## 2024-09-06 RX ORDER — KETOROLAC TROMETHAMINE 15 MG/ML
15 INJECTION, SOLUTION INTRAMUSCULAR; INTRAVENOUS EVERY 6 HOURS
Status: DISCONTINUED | OUTPATIENT
Start: 2024-09-06 | End: 2024-09-07

## 2024-09-06 RX ORDER — MORPHINE SULFATE 4 MG/ML
1 INJECTION INTRAVENOUS ONCE
Status: COMPLETED | OUTPATIENT
Start: 2024-09-06 | End: 2024-09-06

## 2024-09-06 RX ORDER — HEPARIN SODIUM 10000 [USP'U]/100ML
0-4000 INJECTION, SOLUTION INTRAVENOUS CONTINUOUS
Status: DISCONTINUED | OUTPATIENT
Start: 2024-09-06 | End: 2024-09-06

## 2024-09-06 RX ORDER — METOPROLOL TARTRATE 1 MG/ML
5 INJECTION, SOLUTION INTRAVENOUS EVERY 6 HOURS
Status: DISCONTINUED | OUTPATIENT
Start: 2024-09-06 | End: 2024-09-06

## 2024-09-06 RX ORDER — PANTOPRAZOLE SODIUM 40 MG/10ML
40 INJECTION, POWDER, LYOPHILIZED, FOR SOLUTION INTRAVENOUS 2 TIMES DAILY
Status: DISPENSED | OUTPATIENT
Start: 2024-09-06

## 2024-09-06 RX ORDER — HYDROMORPHONE HYDROCHLORIDE 1 MG/ML
0.4 INJECTION, SOLUTION INTRAMUSCULAR; INTRAVENOUS; SUBCUTANEOUS ONCE
Status: COMPLETED | OUTPATIENT
Start: 2024-09-06 | End: 2024-09-06

## 2024-09-06 RX ORDER — DIPHENHYDRAMINE HYDROCHLORIDE 50 MG/ML
50 INJECTION INTRAMUSCULAR; INTRAVENOUS ONCE
Status: COMPLETED | OUTPATIENT
Start: 2024-09-06 | End: 2024-09-06

## 2024-09-06 RX ORDER — LEVALBUTEROL INHALATION SOLUTION 0.31 MG/3ML
0.31 SOLUTION RESPIRATORY (INHALATION)
Status: DISCONTINUED | OUTPATIENT
Start: 2024-09-06 | End: 2024-09-08

## 2024-09-06 RX ORDER — DILTIAZEM HYDROCHLORIDE 240 MG/1
240 CAPSULE, COATED, EXTENDED RELEASE ORAL DAILY
Status: CANCELLED | OUTPATIENT
Start: 2024-09-07

## 2024-09-06 RX ORDER — FAMOTIDINE 10 MG/ML
20 INJECTION INTRAVENOUS ONCE
Status: DISCONTINUED | OUTPATIENT
Start: 2024-09-06 | End: 2024-09-07

## 2024-09-06 RX ORDER — OXYCODONE HYDROCHLORIDE 5 MG/1
10 TABLET ORAL ONCE
Status: DISCONTINUED | OUTPATIENT
Start: 2024-09-06 | End: 2024-09-06

## 2024-09-06 RX ORDER — HEPARIN SODIUM 10000 [USP'U]/100ML
0-4000 INJECTION, SOLUTION INTRAVENOUS CONTINUOUS
Status: DISCONTINUED | OUTPATIENT
Start: 2024-09-06 | End: 2024-09-08

## 2024-09-06 RX ADMIN — PANTOPRAZOLE SODIUM 40 MG: 40 INJECTION, POWDER, FOR SOLUTION INTRAVENOUS at 20:09

## 2024-09-06 RX ADMIN — HEPARIN SODIUM 5000 UNITS: 5000 INJECTION, SOLUTION INTRAVENOUS; SUBCUTANEOUS at 06:34

## 2024-09-06 RX ADMIN — ACETAMINOPHEN 1000 MG: 1000 INJECTION INTRAVENOUS at 06:29

## 2024-09-06 RX ADMIN — KETOROLAC TROMETHAMINE 30 MG: 30 INJECTION, SOLUTION INTRAMUSCULAR; INTRAVENOUS at 03:39

## 2024-09-06 RX ADMIN — HYDROMORPHONE HYDROCHLORIDE 0.4 MG: 1 INJECTION, SOLUTION INTRAMUSCULAR; INTRAVENOUS; SUBCUTANEOUS at 18:52

## 2024-09-06 RX ADMIN — KETOROLAC TROMETHAMINE 30 MG: 30 INJECTION, SOLUTION INTRAMUSCULAR; INTRAVENOUS at 15:46

## 2024-09-06 RX ADMIN — SODIUM CHLORIDE, POTASSIUM CHLORIDE, SODIUM LACTATE AND CALCIUM CHLORIDE 500 ML: 600; 310; 30; 20 INJECTION, SOLUTION INTRAVENOUS at 20:01

## 2024-09-06 RX ADMIN — PANTOPRAZOLE SODIUM 40 MG: 40 INJECTION, POWDER, FOR SOLUTION INTRAVENOUS at 06:29

## 2024-09-06 RX ADMIN — ACETAMINOPHEN 1000 MG: 1000 INJECTION INTRAVENOUS at 13:20

## 2024-09-06 RX ADMIN — MORPHINE SULFATE 1 MG: 4 INJECTION INTRAVENOUS at 23:16

## 2024-09-06 RX ADMIN — HEPARIN SODIUM 5000 UNITS: 5000 INJECTION, SOLUTION INTRAVENOUS; SUBCUTANEOUS at 13:20

## 2024-09-06 RX ADMIN — ALBUTEROL SULFATE 3 ML: 2.5 SOLUTION RESPIRATORY (INHALATION) at 16:08

## 2024-09-06 RX ADMIN — KETOROLAC TROMETHAMINE 30 MG: 30 INJECTION, SOLUTION INTRAMUSCULAR; INTRAVENOUS at 10:34

## 2024-09-06 RX ADMIN — DIPHENHYDRAMINE HYDROCHLORIDE 50 MG: 50 INJECTION INTRAMUSCULAR; INTRAVENOUS at 18:40

## 2024-09-06 RX ADMIN — SODIUM CHLORIDE, SODIUM LACTATE, POTASSIUM CHLORIDE, AND CALCIUM CHLORIDE 1000 ML: 600; 310; 30; 20 INJECTION, SOLUTION INTRAVENOUS at 11:34

## 2024-09-06 RX ADMIN — ALBUTEROL SULFATE 3 ML: 2.5 SOLUTION RESPIRATORY (INHALATION) at 03:25

## 2024-09-06 RX ADMIN — ACETAMINOPHEN 1000 MG: 1000 INJECTION INTRAVENOUS at 17:43

## 2024-09-06 RX ADMIN — HEPARIN SODIUM 700 UNITS/HR: 10000 INJECTION, SOLUTION INTRAVENOUS at 18:40

## 2024-09-06 RX ADMIN — KETOROLAC TROMETHAMINE 15 MG: 15 INJECTION, SOLUTION INTRAMUSCULAR; INTRAVENOUS at 22:24

## 2024-09-06 RX ADMIN — SODIUM CHLORIDE, POTASSIUM CHLORIDE, SODIUM LACTATE AND CALCIUM CHLORIDE 100 ML/HR: 600; 310; 30; 20 INJECTION, SOLUTION INTRAVENOUS at 10:39

## 2024-09-06 RX ADMIN — METHYLPREDNISOLONE SODIUM SUCCINATE 40 MG: 125 INJECTION, POWDER, FOR SOLUTION INTRAMUSCULAR; INTRAVENOUS at 20:10

## 2024-09-06 SDOH — SOCIAL STABILITY: SOCIAL INSECURITY: ARE YOU OR HAVE YOU BEEN THREATENED OR ABUSED PHYSICALLY, EMOTIONALLY, OR SEXUALLY BY ANYONE?: NO

## 2024-09-06 SDOH — SOCIAL STABILITY: SOCIAL INSECURITY: HAVE YOU HAD ANY THOUGHTS OF HARMING ANYONE ELSE?: NO

## 2024-09-06 SDOH — SOCIAL STABILITY: SOCIAL INSECURITY: ARE THERE ANY APPARENT SIGNS OF INJURIES/BEHAVIORS THAT COULD BE RELATED TO ABUSE/NEGLECT?: NO

## 2024-09-06 SDOH — ECONOMIC STABILITY: INCOME INSECURITY: IN THE LAST 12 MONTHS, WAS THERE A TIME WHEN YOU WERE NOT ABLE TO PAY THE MORTGAGE OR RENT ON TIME?: PATIENT DECLINED

## 2024-09-06 SDOH — SOCIAL STABILITY: SOCIAL INSECURITY: DO YOU FEEL ANYONE HAS EXPLOITED OR TAKEN ADVANTAGE OF YOU FINANCIALLY OR OF YOUR PERSONAL PROPERTY?: NO

## 2024-09-06 SDOH — SOCIAL STABILITY: SOCIAL INSECURITY: ABUSE: ADULT

## 2024-09-06 SDOH — SOCIAL STABILITY: SOCIAL INSECURITY: HAVE YOU HAD THOUGHTS OF HARMING ANYONE ELSE?: NO

## 2024-09-06 SDOH — SOCIAL STABILITY: SOCIAL INSECURITY: DO YOU FEEL UNSAFE GOING BACK TO THE PLACE WHERE YOU ARE LIVING?: NO

## 2024-09-06 SDOH — SOCIAL STABILITY: SOCIAL INSECURITY: DOES ANYONE TRY TO KEEP YOU FROM HAVING/CONTACTING OTHER FRIENDS OR DOING THINGS OUTSIDE YOUR HOME?: NO

## 2024-09-06 SDOH — SOCIAL STABILITY: SOCIAL INSECURITY: HAS ANYONE EVER THREATENED TO HURT YOUR FAMILY OR YOUR PETS?: NO

## 2024-09-06 SDOH — ECONOMIC STABILITY: INCOME INSECURITY: HOW HARD IS IT FOR YOU TO PAY FOR THE VERY BASICS LIKE FOOD, HOUSING, MEDICAL CARE, AND HEATING?: PATIENT DECLINED

## 2024-09-06 SDOH — ECONOMIC STABILITY: HOUSING INSECURITY: AT ANY TIME IN THE PAST 12 MONTHS, WERE YOU HOMELESS OR LIVING IN A SHELTER (INCLUDING NOW)?: PATIENT DECLINED

## 2024-09-06 ASSESSMENT — PAIN SCALES - PAIN ASSESSMENT IN ADVANCED DEMENTIA (PAINAD)
FACIALEXPRESSION: SMILING OR INEXPRESSIVE
BREATHING: NORMAL
BODYLANGUAGE: RELAXED
TOTALSCORE: 0
CONSOLABILITY: NO NEED TO CONSOLE
CONSOLABILITY: NO NEED TO CONSOLE
BODYLANGUAGE: RELAXED
BREATHING: NORMAL
FACIALEXPRESSION: SMILING OR INEXPRESSIVE
TOTALSCORE: 0

## 2024-09-06 ASSESSMENT — ACTIVITIES OF DAILY LIVING (ADL)
ADEQUATE_TO_COMPLETE_ADL: YES
TOILETING: INDEPENDENT
BATHING: INDEPENDENT
DRESSING YOURSELF: INDEPENDENT
JUDGMENT_ADEQUATE_SAFELY_COMPLETE_DAILY_ACTIVITIES: YES
ADEQUATE_TO_COMPLETE_ADL: YES
DRESSING YOURSELF: INDEPENDENT
FEEDING YOURSELF: INDEPENDENT
TOILETING: INDEPENDENT
HEARING - LEFT EAR: FUNCTIONAL
HEARING - RIGHT EAR: FUNCTIONAL
HEARING - RIGHT EAR: FUNCTIONAL
BATHING: INDEPENDENT
PATIENT'S MEMORY ADEQUATE TO SAFELY COMPLETE DAILY ACTIVITIES?: YES
HEARING - LEFT EAR: FUNCTIONAL
WALKS IN HOME: INDEPENDENT
JUDGMENT_ADEQUATE_SAFELY_COMPLETE_DAILY_ACTIVITIES: YES
PATIENT'S MEMORY ADEQUATE TO SAFELY COMPLETE DAILY ACTIVITIES?: YES
GROOMING: INDEPENDENT
WALKS IN HOME: INDEPENDENT
GROOMING: INDEPENDENT
FEEDING YOURSELF: INDEPENDENT

## 2024-09-06 ASSESSMENT — PAIN DESCRIPTION - LOCATION
LOCATION: FOOT
LOCATION: FOOT

## 2024-09-06 ASSESSMENT — COGNITIVE AND FUNCTIONAL STATUS - GENERAL
MOBILITY SCORE: 24
MOBILITY SCORE: 24
DAILY ACTIVITIY SCORE: 24
MOBILITY SCORE: 24
PATIENT BASELINE BEDBOUND: NO

## 2024-09-06 ASSESSMENT — COLUMBIA-SUICIDE SEVERITY RATING SCALE - C-SSRS
6. HAVE YOU EVER DONE ANYTHING, STARTED TO DO ANYTHING, OR PREPARED TO DO ANYTHING TO END YOUR LIFE?: NO
1. IN THE PAST MONTH, HAVE YOU WISHED YOU WERE DEAD OR WISHED YOU COULD GO TO SLEEP AND NOT WAKE UP?: NO
2. HAVE YOU ACTUALLY HAD ANY THOUGHTS OF KILLING YOURSELF?: NO

## 2024-09-06 ASSESSMENT — LIFESTYLE VARIABLES
SUBSTANCE_ABUSE_PAST_12_MONTHS: YES
HOW OFTEN DO YOU HAVE 6 OR MORE DRINKS ON ONE OCCASION: PATIENT DECLINED
AUDIT-C TOTAL SCORE: 4
HOW OFTEN DO YOU HAVE A DRINK CONTAINING ALCOHOL: 2-3 TIMES A WEEK
SKIP TO QUESTIONS 9-10: 0
AUDIT-C TOTAL SCORE: -1
PRESCIPTION_ABUSE_PAST_12_MONTHS: NO
HOW MANY STANDARD DRINKS CONTAINING ALCOHOL DO YOU HAVE ON A TYPICAL DAY: 3 OR 4
HOW MANY STANDARD DRINKS CONTAINING ALCOHOL DO YOU HAVE ON A TYPICAL DAY: 1 OR 2
SKIP TO QUESTIONS 9-10: 0
PRESCIPTION_ABUSE_PAST_12_MONTHS: NO
SUBSTANCE_ABUSE_PAST_12_MONTHS: YES
HOW OFTEN DO YOU HAVE 6 OR MORE DRINKS ON ONE OCCASION: LESS THAN MONTHLY
AUDIT-C TOTAL SCORE: 4
HOW OFTEN DO YOU HAVE A DRINK CONTAINING ALCOHOL: 2-3 TIMES A WEEK
AUDIT-C TOTAL SCORE: -1

## 2024-09-06 ASSESSMENT — PATIENT HEALTH QUESTIONNAIRE - PHQ9
2. FEELING DOWN, DEPRESSED OR HOPELESS: SEVERAL DAYS
1. LITTLE INTEREST OR PLEASURE IN DOING THINGS: NOT AT ALL
1. LITTLE INTEREST OR PLEASURE IN DOING THINGS: NOT AT ALL
2. FEELING DOWN, DEPRESSED OR HOPELESS: NOT AT ALL
SUM OF ALL RESPONSES TO PHQ9 QUESTIONS 1 & 2: 0
SUM OF ALL RESPONSES TO PHQ9 QUESTIONS 1 & 2: 1

## 2024-09-06 ASSESSMENT — PAIN SCALES - GENERAL
PAINLEVEL_OUTOF10: 10 - WORST POSSIBLE PAIN

## 2024-09-06 ASSESSMENT — PAIN - FUNCTIONAL ASSESSMENT: PAIN_FUNCTIONAL_ASSESSMENT: 0-10

## 2024-09-06 ASSESSMENT — PAIN SCALES - WONG BAKER
WONGBAKER_NUMERICALRESPONSE: HURTS LITTLE MORE
WONGBAKER_NUMERICALRESPONSE: HURTS LITTLE MORE

## 2024-09-06 ASSESSMENT — PAIN DESCRIPTION - ORIENTATION
ORIENTATION: LEFT;RIGHT
ORIENTATION: RIGHT;LEFT

## 2024-09-06 NOTE — SIGNIFICANT EVENT
CLINICAL UPDATE  Acute Care Surgery    9/6/2024, 09:45  Patient's nurse alerted team of concern for cold, pulseless, and purple right foot. I subsequently evaluated patient at bedside.  Skin: BLE symmetrically cool, dry. Small area of purple discoloration on R dorsal foot, though a hot pack had been sitting in this area prior to exam (see images in Media tab).  Pulses: Non-palpable bilateral pedal pulses.  Signals:     Left DP/PT: multiphasic     Right AT/PT: multiphasic     Right DP: unable to locate    Will continue to monitor patient.    Olga Cruz MD  PGY1 Acute Care Surgery  Pager [phone]  Available via secure chat

## 2024-09-06 NOTE — CONSULTS
"Nutrition Initial Assessment:   Nutrition Assessment    Reason for Assessment: Admission nursing screening    Patient is a 63 y.o. female presenting with SBO. NG tube was placed in ED with 1100 bilious output. Admitted to LT6 from ED for further management.     PMHx COPD, DVT on eliquis, HTN, cocaine abuse, alcohol abuse, lung Ca hx, chronic constipation, depression, thrombocytosis, panic disorder, multiple SBOs and exlap/JOSIAH/partial SBR for SBO in 6/2024.      Nutrition History:  Food and Nutrient History: Unalbe to meet with patient today - GI symptoms obtained from ED notes.  Food Allergies/Intolerances:  None  GI Symptoms: Diarrhea, Nausea, Vomiting, and Abdominal pain  Oral Problems:        Anthropometrics:  Height: 157.2 cm (5' 1.89\")   Weight: 61.5 kg (135 lb 9.3 oz)   BMI (Calculated): 24.89  IBW/kg (Dietitian Calculated): 50 kg  Percent of IBW: 123 %       Weight History:   Wt Readings from Last 10 Encounters:   09/06/24 61.5 kg (135 lb 9.3 oz)   08/29/24 61.7 kg (136 lb 0.4 oz)   08/02/24 61.7 kg (136 lb)   07/26/24 61.8 kg (136 lb 3.2 oz)   07/05/24 63.5 kg (139 lb 14.4 oz)   06/11/24 64 kg (141 lb)   05/10/24 64.6 kg (142 lb 8 oz)   05/05/24 64.6 kg (142 lb 8 oz)      Weight Change %:  Significant Weight Loss: No    Nutrition Significant Labs:  CBC Trend:   Results from last 7 days   Lab Units 09/06/24  0932 09/05/24  1016 09/01/24  0537   WBC AUTO x10*3/uL 26.6* 40.6* 14.9*   RBC AUTO x10*6/uL 3.75* 4.04 3.16*   HEMOGLOBIN g/dL 8.7* 9.5* 7.3*   HEMATOCRIT % 28.9* 29.7* 24.6*   MCV fL 77* 74* 78*   PLATELETS AUTO x10*3/uL 758* 846* 1,004*    , A1C:  Lab Results   Component Value Date    HGBA1C 5.2 04/13/2022   , BG POCT trend:   Results from last 7 days   Lab Units 08/30/24  1654   POCT GLUCOSE mg/dL 99    , Renal Lab Trend:   Results from last 7 days   Lab Units 09/06/24  0932 09/05/24  1016 09/01/24  0537   POTASSIUM mmol/L 4.1 3.9 4.5   PHOSPHORUS mg/dL  --   --  4.2   SODIUM mmol/L 140 137 135* " "  MAGNESIUM mg/dL 2.63*  --  1.60   EGFR mL/min/1.73m*2 82 >90 >90   BUN mg/dL 17 14 10   CREATININE mg/dL 0.81 0.74 0.60    , Vit D: No results found for: \"VITD25\"     Nutrition Specific Medications:  Scheduled medications  acetaminophen, 1,000 mg, intravenous, q6h LISET  [Held by provider] apixaban, 5 mg, oral, BID  [Held by provider] azithromycin, 250 mg, oral, Every Mon/Wed/Fri  [Held by provider] busPIRone, 5 mg, oral, BID  [Held by provider] dilTIAZem CD, 240 mg, oral, Daily  heparin (porcine), 5,000 Units, subcutaneous, q8h  ketorolac, 30 mg, intravenous, q6h  metoprolol, 5 mg, intravenous, q6h  [Held by provider] mirtazapine, 15 mg, oral, Nightly  montelukast, 10 mg, oral, Daily  [Held by provider] oxybutynin, 2.5 mg, oral, BID  pantoprazole, 40 mg, intravenous, BID  [Held by provider] polyethylene glycol, 17 g, oral, Daily  [Held by provider] predniSONE, 40 mg, oral, Daily  [Held by provider] sucralfate, 1 g, oral, BID  [Held by provider] thiamine, 100 mg, oral, Daily  tiotropium, 2 puff, inhalation, Daily      Continuous medications  lactated Ringer's, 100 mL/hr, Last Rate: 100 mL/hr (09/06/24 1039)      PRN medications  PRN medications: albuterol, albuterol, ondansetron     I/O:   Last BM Date: 09/05/24;      Dietary Orders (From admission, onward)       Start     Ordered    09/05/24 2020  NPO Diet; Effective now  Diet effective now         09/05/24 2023                     Estimated Needs:   Total Energy Estimated Needs (kCal):  (0558-1051)  Method for Estimating Needs: ~25-30 kcals/kg ideal BW  Total Protein Estimated Needs (g):  (60-75)  Method for Estimating Needs: ~1.2-1.5 gm/kg ideal BW  Total Fluid Estimated Needs (mL):  (1ml/kcal or per team)           Nutrition Diagnosis   Malnutrition Diagnosis  Patient has Malnutrition Diagnosis: No (previously severly malnourished r/t acute illness however suspect has resolved - that being said pt remains at increased nutrition risk, low threshold for " declines)    Nutrition Diagnosis  Patient has Nutrition Diagnosis: No  Additional Nutrition Diagnosis: Diagnosis 2  Nutrition Diagnosis 2: Predicted inadequate energy intake  Related to (2): SBO  As Evidenced by (2): current NPO status with NG to LIWS       Nutrition Interventions/Recommendations         Nutrition Prescription:  Individualized Nutrition Prescription Provided for : TBD        Nutrition Interventions:   Additional Interventions: route of nutrition TBD pending pt's clinical course > if anticipate prolonged NPO status (>5-7 days), consider parenteral nutrition support. Can start with peripherial and transition to central IV access if feasible. PN orders must be in by 1400 per Pharmacy protocol.           Nutrition Monitoring and Evaluation   Food/Nutrient Related History Monitoring  Additional Plans: route of nutrition pending                        Time Spent (min): 30 minutes

## 2024-09-06 NOTE — PROGRESS NOTES
62 yo female with abdominal pain and imaging consistent with SBO.   Care discussed with Dr. Tovar at sign out.   Discussed her recent admissions and discharges with her.  Extensive history of crack use.   Also with R>L foot pain.   Reports burning sensation in abdomen.   Reviewed bowel resection in June 2024; appears to be a spontaneous perforation with pathology showing tissue ulceration.   On exam, chronically ill appearing. Abd is soft, ND and with moderate tenderness. Midline scar. Bilateral feet with chronic ischemic changes.   WBCs improved to 26.  Lactate now WNLs.  UDS positive for cocaine.  Given her history, worsening of pain with crack use, chronic changes to feet, recent spontaneous perforation, I am concerned that the patient has microvascular disease and vasoconstriction related to her long term crack use. For now, trial supportive care with IVFs, NGT decompression, home meds, vascular surgery consult, pulmonary consult for COPD, heparin subcutaneous, monitor exam, place Snell catheter.   HPI:  21,   Time: 1:15 AM EDT       Nadja Gray is a 58 y.o. female presenting to the ED for Shortness of breath, beginning 1 week ago. The complaint has been persistent, moderate in severity, and worsened by nothing. The patient was diagnosed with COVID-19 on . Since then she has had increasing shortness of breath. Per the patient's daughter she has been sleeping a lot having decreased appetite and running intermittent fevers. Today they went to urgent care to get another test to see if she was able to go back to work but she was having increasing shortness of breath and on her oxygen saturations was noted to be 88% therefore she was sent to our facility for further evaluation. Denies any chest pain. No nausea vomiting diarrhea. No abdominal pain. Review of Systems:   Pertinent positives and negatives are stated within HPI, all other systems reviewed and are negative.          --------------------------------------------- PAST HISTORY ---------------------------------------------  Past Medical History:  has a past medical history of Arthritis of back, Hand pain, Knee pain, right, and Pneumonia. Past Surgical History:  has a past surgical history that includes  section. Social History:  reports that she has never smoked. She has never used smokeless tobacco. She reports that she does not drink alcohol or use drugs. Family History: family history is not on file. The patients home medications have been reviewed. Allergies: Patient has no known allergies.         ---------------------------------------------------PHYSICAL EXAM--------------------------------------    Constitutional/General: Alert and oriented x3, appears ill   head: Normocephalic and atraumatic  Eyes: PERRL, EOMI, conjunctive normal, sclera non icteric  Mouth: Oropharynx clear, handling secretions, no trismus, no asymmetry of the posterior oropharynx or uvular edema  Neck: Supple, full ROM, non tender to palpation in the midline, no stridor, no crepitus, no meningeal signs  Respiratory: Lungs clear to auscultation bilaterally, no wheezes, rales, or rhonchi. Not in respiratory distress  Cardiovascular:  Regular rate. Regular rhythm. No murmurs, gallops, or rubs. 2+ distal pulses  GI:  Abdomen Soft, Non tender, Non distended. +BS. No organomegaly, no palpable masses,  No rebound, guarding, or rigidity. Musculoskeletal: Moves all extremities x 4. Warm and well perfused, no clubbing, cyanosis, or edema. Capillary refill <3 seconds  Integument: skin warm and dry. No rashes. Neurologic: GCS 15, no focal deficits, symmetric strength 5/5 in the upper and lower extremities bilaterally  Psychiatric: Normal Affect    -------------------------------------------------- RESULTS -------------------------------------------------  I have personally reviewed all laboratory and imaging results for this patient. Results are listed below.      LABS:  Results for orders placed or performed during the hospital encounter of 05/13/21   COVID-19, Rapid    Specimen: Nasopharyngeal Swab   Result Value Ref Range    SARS-CoV-2, NAAT DETECTED (A) Not Detected   CBC Auto Differential   Result Value Ref Range    WBC 9.0 4.5 - 11.5 E9/L    RBC 4.47 3.50 - 5.50 E12/L    Hemoglobin 13.4 11.5 - 15.5 g/dL    Hematocrit 42.0 34.0 - 48.0 %    MCV 94.0 80.0 - 99.9 fL    MCH 30.0 26.0 - 35.0 pg    MCHC 31.9 (L) 32.0 - 34.5 %    RDW 13.2 11.5 - 15.0 fL    Platelets 760 268 - 233 E9/L    MPV 10.1 7.0 - 12.0 fL    Neutrophils % 76.3 43.0 - 80.0 %    Immature Granulocytes % 0.8 0.0 - 5.0 %    Lymphocytes % 16.4 (L) 20.0 - 42.0 %    Monocytes % 6.2 2.0 - 12.0 %    Eosinophils % 0.2 0.0 - 6.0 %    Basophils % 0.1 0.0 - 2.0 %    Neutrophils Absolute 6.86 1.80 - 7.30 E9/L    Immature Granulocytes # 0.07 E9/L    Lymphocytes Absolute 1.48 (L) 1.50 - 4.00 E9/L    Monocytes Absolute 0.56 0.10 - 0.95 E9/L    Eosinophils Absolute 0.02 (L) 0.05 - 0.50 E9/L Basophils Absolute 0.01 0.00 - 0.20 E9/L   Comprehensive Metabolic Panel   Result Value Ref Range    Sodium 137 132 - 146 mmol/L    Potassium 3.8 3.5 - 5.0 mmol/L    Chloride 101 98 - 107 mmol/L    CO2 22 22 - 29 mmol/L    Anion Gap 14 7 - 16 mmol/L    Glucose 116 (H) 74 - 99 mg/dL    BUN 19 6 - 23 mg/dL    CREATININE 0.9 0.5 - 1.0 mg/dL    GFR Non-African American >60 >=60 mL/min/1.73    GFR African American >60     Calcium 8.7 8.6 - 10.2 mg/dL    Total Protein 8.0 6.4 - 8.3 g/dL    Albumin 2.8 (L) 3.5 - 5.2 g/dL    Total Bilirubin 0.4 0.0 - 1.2 mg/dL    Alkaline Phosphatase 53 35 - 104 U/L    ALT 55 (H) 0 - 32 U/L    AST 60 (H) 0 - 31 U/L   Troponin   Result Value Ref Range    Troponin <0.01 0.00 - 0.03 ng/mL   Brain Natriuretic Peptide   Result Value Ref Range    Pro-BNP 49 0 - 125 pg/mL   Protime-INR   Result Value Ref Range    Protime 14.7 (H) 9.3 - 12.4 sec    INR 1.3    D-Dimer, Quantitative   Result Value Ref Range    D-Dimer, Quant 913 ng/mL DDU       RADIOLOGY:  Interpreted by Radiologist.  CTA PULMONARY W CONTRAST   Final Result   No evidence of pulmonary embolism. Extensive scattered areas of patchy and ground-glass opacities are noted   throughout the bilateral lungs. These are nonspecific but could indicate an   infectious/inflammatory process. XR CHEST PORTABLE   Final Result   Bilateral patchy infiltration in mid to lower lungs. EKG:  This EKG is signed and interpreted by the EP. EKG shows normal sinus rhythm 80 bpm.  Normal axis. Normal QRS. No STEMI.    ------------------------- NURSING NOTES AND VITALS REVIEWED ---------------------------   The nursing notes within the ED encounter and vital signs as below have been reviewed by myself.   /64   Pulse 80   Temp 100.3 °F (37.9 °C) (Oral)   Resp 16   Ht 5' 8\" (1.727 m)   Wt 212 lb (96.2 kg)   SpO2 95%   BMI 32.23 kg/m²   Oxygen Saturation Interpretation: Abnormal, improved after oxygen    The patients provider has spoken with the patient and discussed todays results, in addition to providing specific details for the plan of care and counseling regarding the diagnosis and prognosis. Questions are answered at this time and they are agreeable with the plan.       --------------------------------- IMPRESSION AND DISPOSITION ---------------------------------    IMPRESSION  1. Acute respiratory failure with hypoxia (HCC)    2. COVID-19        DISPOSITION  Disposition: Admit to telemetry  Patient condition is stable    NOTE: This report was transcribed using voice recognition software.  Every effort was made to ensure accuracy; however, inadvertent computerized transcription errors may be present        Arron Ty DO  05/13/21 [id number]

## 2024-09-06 NOTE — PROGRESS NOTES
I met with Fernando at the bedside regarding discharge planning and home going needs. Patient states that she lives home with her son where she is independent with ADL's she does have a walker in the home. I will continue to follow with a safe discharge plan.

## 2024-09-06 NOTE — ED PROCEDURE NOTE
Procedure  Critical Care    Performed by: Brendan Munson DO  Authorized by: Brendan Munson DO    Critical care provider statement:     Critical care time (minutes):  37    Critical care time was exclusive of:  Separately billable procedures and treating other patients and teaching time    Critical care was time spent personally by me on the following activities:  Blood draw for specimens, development of treatment plan with patient or surrogate, evaluation of patient's response to treatment, examination of patient, ordering and review of laboratory studies, ordering and performing treatments and interventions, ordering and review of radiographic studies, re-evaluation of patient's condition, review of old charts and discussions with consultants    Care discussed with: admitting provider                 Brendan Munson DO  09/05/24 [id number]

## 2024-09-06 NOTE — CARE PLAN
The patient's goals for the shift include      The clinical goals for the shift include maintain pt comfort and safety      Problem: Pain - Adult  Goal: Verbalizes/displays adequate comfort level or baseline comfort level  Outcome: Progressing     Problem: Safety - Adult  Goal: Free from fall injury  Outcome: Progressing     Problem: Discharge Planning  Goal: Discharge to home or other facility with appropriate resources  Outcome: Progressing     Problem: Chronic Conditions and Co-morbidities  Goal: Patient's chronic conditions and co-morbidity symptoms are monitored and maintained or improved  Outcome: Progressing

## 2024-09-06 NOTE — SIGNIFICANT EVENT
CLINICAL UPDATE: Rapid Response  Acute Care Surgery    Informed that a rapid response was called for pt's subjective reporting of inability to breath. Saturating appropriately on 2LNC which she has been on for the day. HR elevated to 120-130s still. BP normal. Distractable. Denies pain. RT at bedside.    - CXR  - EKG  - change albuterol nebulizer to levalbuterol to minimize worsening tachycardia  - maintain NGT    Dixon Murray MD  General Surgery PGY5  ACS [id number]

## 2024-09-06 NOTE — SIGNIFICANT EVENT
"Rapid Response Note     09/06/24 1601   Onset Documentation   Rapid Response Initiated By RN   [place] [facility] [place]ager Time [phone]   Arrival Time 1608   Event End Time 1703   Level II Called No   Primary Reason for Call Staff concern  (patient with increased work of breathing/SOB)     Rapid Response paged for patient with increased work of breathing and shortness of breath.  Upon arrival to bedside, patient receiving nebulizer.  Patient states, \"It feels like something is stuck in my throat\" and \"I can't breathe\".  Patient tearful.  Patient with productive cough and able to clear secretions with encouragement.  Patient verbalized, \"I am feeling better\".  Attempted PIV x 2 without success as patient states, \"My IVs burn when they put stuff into them\".  DACR Dr. ELISABETH Maddox present throughout Rapid Response.  Primary Team to see patient.  At end of Rapid Response, patient verbalized improvement in congestion and states, \"I feel like I can breathe better\".  Plan for patient to remain on division.  Pox 99%.  RN encouraged to page Rapid Response for further concern in patient condition.  "

## 2024-09-06 NOTE — CONSULTS
[facility]  ACUTE CARE SURGERY - HISTORY AND PHYSICAL / CONSULT    Patient Name: Fernando Cuevas  MRN: [id number]  Admit Date: 905  : 1960  AGE: 63 y.o.   GENDER: female  ==============================================================================  TODAY'S ASSESSMENT AND PLAN OF CARE:  64 yo F with hx of multiple SBOs and exlap/JOSIAH/partial SBR for SBO in 2024. Patient is now presenting to ED with repeat SBO. NG tube was placed in ED with 1100 bilious output. Given enteric decompression with NGT, patient is HDS, and non peritonitic, , will trial medical management of SBO at this time.    Plan:  -admit to [facility]  - NPO + mIVF  - NGT to [facility]WS  - will review timing for eliquis/ AC plan in AM  -AM labs including T&S    D/w attending Dr. Tovar.    Diana Rodriguez MD  PGY1 Acute Care Surgery  Pager [phone]  Available via secure chat      ==============================================================================  CHIEF COMPLAINT/REASON FOR CONSULT:  Recurrent SBO    64 yo F with hx of COPD, DVT on eliquis, HTN, cocaine abuse, alcohol abuse, lung Ca hx, chronic constipation, depression, thrombocytosis, panic disorder,  multiple SBOs and exlap/JOSIAH/partial SBR for SBO in 2024.     She states that for the past 1 day since about 3 PM yesterday she has had abdominal pain, increasing bloating, time 1 episode of emesis, continuous nausea.  She has not been passing gas, had 1 episode of diarrhea but otherwise had has no bowel movements.  She has not been able to tolerate a diet since yesterday.  She was most recently admitted to  outside hospital with SBO and discharged on 2024..  Per chart review she has been admitted several times over the last 3 months with similar presentation.  She underwent operative intervention for SBO in , otherwise her SBO's have been medically managed.    NG tube placed in ED and had immediate return of 1100cc bilious output. Confirmed placement with  KUB.    ROS  Endorses nausea and vomiting.  Does endorse some pain in bilateral lower extremities and feels that they are cold.  No headaches, lightheadedness, dizziness, chest pain, shortness of breath, weakness.      PAST MEDICAL HISTORY:   PMH:   Past Medical History:   Diagnosis Date    COPD (chronic obstructive pulmonary disease) (Multi)     Depression     DVT (deep venous thrombosis) (Multi)     bilateral upper extremities    HTN (hypertension)     Lung cancer (Multi)     Migraines     Panic disorder        PSH:   Past Surgical History:   Procedure Laterality Date     SECTION, LOW TRANSVERSE      COLONOSCOPY      CT ABDOMEN PELVIS ANGIOGRAM W AND/OR WO IV CONTRAST  2016    CT ABDOMEN PELVIS ANGIOGRAM W AND/OR WO IV CONTRAST 3/22/2016 [facility] [place] [facility]GENCY LEGAC[facility]    CT ANGIO CORONARY ART WITH HEARTFLOW IF SCORE >30%  2023    CT ANGIO CORONARY ART WITH HEARTFLOW IF SCORE >30% 2023    CYSTOSCOPY      botox injection    ESOPHAGOGASTRODUODENOSCOPY      EXPLORATORY LAPAROTOMY W/ BOWEL RESECTION  2024    SBR for perforation    MR NECK ANGIO W IV CONTRAST  2021    MR NECK ANGIO W IV CONTRAST 2021     FH:   No family history on file.    SOCIAL HISTORY:    Smoking:    Social History     Tobacco Use   Smoking Status Some Days    Types: Cigarettes    Passive exposure: Current (3 cigarettes a day)   Smokeless Tobacco Never         Drug use: cocaine use, alcohol overuse    MEDICATIONS:   Prior to Admission medications    Medication Sig Start Date End Date Taking? Authorizing Provider   acetaminophen (Tylenol Extra Strength) 500 mg tablet Take 2 tablets (1,000 mg) by mouth every 8 hours. 24   Historical Provider, MD   albuterol 90 mcg/actuation inhaler Inhale 2 puffs every 4 hours if needed for wheezing or shortness of breath. 24   Historical Provider, MD   ammonium lactate (Lac-Hydrin) 12 % lotion Apply topically twice a day. 24   Historical Provider, MD   apixaban (Eliquis) 5  mg tablet Take 1 tablet (5 mg) by mouth 2 times a day. 9/1/24   Kristine Carreon MD   azithromycin (Zithromax) 250 mg tablet Take 1 tablet (250 mg) by mouth once a day on Monday, Wednesday, and Friday.    Historical Provider, MD   benzonatate (Tessalon) 100 mg capsule Take 1 capsule (100 mg) by mouth 3 times a day as needed for cough. Do not crush or chew. 5/14/24   Tonio Galvan,    busPIRone (Buspar) 5 mg tablet Take 1 tablet (5 mg) by mouth twice a day. Supposed to take. Needs Rx 10/11/23   Historical Provider, MD   calcium carbonate (Oscal) 500 mg calcium (1,250 mg) tablet Take 1 tablet (1,250 mg) by mouth 2 times daily (morning and late afternoon). 7/18/24   Historical Provider, MD   calcium carbonate (Tums) 250 mg ([place] 100 mg) chewable split tablet Take 2 half tablet (500 mg) by mouth every 12 hours. 7/18/24   Historical Provider, MD   clotrimazole (Lotrimin) 1 % cream Apply topically 2 times a day. 5/12/24   Historical Provider, MD   dilTIAZem ER (Tiazac) 240 mg 24 hr capsule Take 1 capsule (240 mg) by mouth once daily.    Historical Provider, MD   fluocinonide 0.05 % cream APPLY THIN LAYER TO AFFECTED AREA TWICE A DAY AS NEEDED    Historical Provider, MD   albuterol 2.5 mg /3 mL (0.083 %) nebulizer solution Take 3 mL by nebulization every 6 hours if needed for wheezing or shortness of breath. 7/27/24   Deep Kapoor, DO   Lidocaine Pain Relief 4 % patch Place 1 patch on the skin every 24 (twenty four) hours if needed for mild pain (1 - 3).    Historical Provider, MD   mirtazapine (Remeron) 15 mg tablet Take 1 tablet (15 mg) by mouth once daily at bedtime. 7/15/24 8/14/24  Rupa Khoury MD   mometasone-formoterol (Dulera 100) 100-5 mcg/actuation inhaler Inhale 200 mcg twice a day. 6/7/24   Historical Provider, MD   montelukast (Singulair) 10 mg tablet Take 1 tablet (10 mg) by mouth once daily.    Historical Provider, MD   oxybutynin XL (Ditropan-XL) 5 mg 24 hr tablet Take 1 tablet (5 mg) by mouth  once daily. Does not take everyday    Historical Provider, MD   pantoprazole (ProtoNix) 40 mg EC tablet Take 1 tablet (40 mg) by mouth twice a day. 1/29/24   Historical Provider, MD   polyethylene glycol (Glycolax, Miralax) 17 gram/dose powder Take 17 g by mouth once daily. Do not fill before July 28, 2024. 7/28/24   Deep Kapoor, DO   predniSONE (Deltasone) 20 mg tablet Take 2 tablets (40 mg) by mouth once daily for 4 days. 9/2/24 9/6/24  Kristine Carreon MD   sucralfate (Carafate) 1 gram tablet Take 1 tablet (1 g) by mouth 2 times a day. Crush and mix in luke warm water to make slurry and drink the slurry. 7/27/24   Deep Kapoor, DO   thiamine 100 mg tablet Take 1 tablet (100 mg) by mouth once daily.    Historical Provider, MD   tiotropium (Spiriva Respimat) 2.5 mcg/actuation inhaler Inhale 2 puffs once daily. 5/9/24   Rupa Khoury MD   tiotropium (Spiriva Respimat) 2.5 mcg/actuation inhaler Inhale 2 puffs once daily. 9/2/24   Kristine Carreon MD   apixaban (Eliquis) 5 mg tablet Take 1 tablet (5 mg) by mouth 2 times a day. 7/15/24 9/1/24  Rupa Khoury MD   apixaban (Eliquis) 5 mg tablet Take 1 tablet (5 mg) by mouth 2 times a day. 9/1/24 9/1/24  Kristine Carreon MD   sennosides-docusate sodium (Sparkle-Colace) 8.6-50 mg tablet Take 1 tablet by mouth 2 times a day. 7/27/24 9/1/24  Rei Kapoor,      ALLERGIES:   Allergies   Allergen Reactions    Dilaudid [Hydromorphone] Itching    Iodinated Contrast Media Hives     Hives to faces and neck with itching. Resolved with 50 mg benadryl, 20 mg pepcid & 60 mg prednisone.    Hives to faces and neck with itching. Resolved with 50 mg benadryl, 20 mg pepcid & 60 mg prednisone.   Hives to faces and neck with itching. Resolved with 50 mg benadryl, 20 mg pepcid & 60 mg prednisone.    Iodine Other and Unknown    Adhesive Tape-Silicones Rash             9/1/2024     3:40 AM 9/1/2024     8:15 AM 9/1/2024    11:00 AM 9/5/2024     9:13 AM 9/5/2024     1:09 PM 9/5/2024      4:30 PM 9/5/2024     6:45 PM   Vitals   Systolic 133 121 116 157 127 144 135   Diastolic 74 81 73 68 95 100 82   Heart Rate 108 116  110 112 115 90   Temp 36.6 °C (97.9 °F) 36.6 °C (97.8 °F) 36.8 °C (98.3 °F) 36.5 °C (97.7 °F)      Resp  17  19 17 18 26       PHYSICAL EXAM:  General:  Aox3, in acute distress, laying on her left side because she states that it helps with her pain  HEENT:  no scleral icterus, PERRL, iEOM, MMM  Cardiovascular: tachy to 90s, lower extremities are cool to touch to mid calf, capillary refill is still< 3 sec but unable to doppler signals in feet (motor and sensory intact)  Pulmonary: breathing on room air, labored breahting but states its due to her abdomen feeling full  Abdomen: soft, generalized tenderness to palpation, greatly distended, nonperitonitic, prior well healed midline incisional scar  Skin: a little bit of duskiness over bilateral feet and lower extremiiteis to mid calf  Neuro: A/O x3, no focal deficits      IMAGING SUMMARY:      CT AP w IV contrast 9/5/24:  IMPRESSION:  1. There is a small bowel obstruction. The transition point appears to  be in the lower midline abdomen. I suspect this may be due to an  adhesion.  2. Multiple gallstones, but no evidence for cholecystitis or biliary  obstruction.  3. The spleen is atrophic and hypoenhancing, likely representing  scarring. This may represent a autosplenectomy.    LABS:  Results from last 7 days   Lab Units 09/05/24  1016 09/01/24  0537 08/30/24  0652   WBC AUTO x10*3/uL 40.6* 14.9* 11.7*   HEMOGLOBIN g/dL 9.5* 7.3* 7.9*   HEMATOCRIT % 29.7* 24.6* 26.9*   PLATELETS AUTO x10*3/uL 846* 1,004* 903*   NEUTROS PCT AUTO %  --  74.1 67.4   LYMPHO PCT MAN % 1.7  --   --    LYMPHS PCT AUTO %  --  15.4 19.7   MONO PCT MAN % 2.6  --   --    MONOS PCT AUTO %  --  9.7 11.5   EOSINO PCT MAN % 0.0  --   --    EOS PCT AUTO %  --  0.0 0.2         Results from last 7 days   Lab Units 09/05/24  1016 09/01/24  0537 08/30/24  0652   SODIUM mmol/L  137 135* 136   POTASSIUM mmol/L 3.9 4.5 4.2   CHLORIDE mmol/L 102 101 105   CO2 mmol/L 22 24 23   BUN mg/dL 14 10 8   CREATININE mg/dL 0.74 0.60 0.72   CALCIUM mg/dL 8.8 8.0* 8.0*   PROTEIN TOTAL g/dL 6.9  --   --    BILIRUBIN TOTAL mg/dL 0.3  --   --    ALK PHOS U/L 129  --   --    ALT U/L 69*  --   --    AST U/L 54*  --   --    GLUCOSE mg/dL 104* 100* 94     Results from last 7 days   Lab Units 09/05/24  1016   BILIRUBIN TOTAL mg/dL 0.3             I have reviewed all laboratory and imaging results ordered/pertinent for this encounter.

## 2024-09-06 NOTE — INDIVIDUALIZED OVERALL PLAN OF CARE NOTE
Patient seen and examined. She continues to have severe pain in her right lower extremity at foot. Her foot is cold. She has loss of sensation up to her ankle. She cannot move her toes and has no DP or PT signals. This is a change in exam from earlier today.     Initially planned for CTA with runoff. History of contrast allergy in past noted. Patient did receive IV contrast yesterday for CT scan with contrast prep. Discussed with general surgery team (Primary). Risks and benefits discussed in setting of subacute-chronic course of symptoms described by the patient. Appreciate general surgery assistance. After further discussion with patient and family, will plan for lower extremity angiogram this evening.     Adolfo Arroyo MD  Vascular Surgery Fellow  Team Pager [phone]  09/06/24  6:43 PM

## 2024-09-06 NOTE — CONSULTS
VASCULAR SURGERY CONSULT NOTE    Assessment/Plan   Fernando Cuevas is 63 y.o. female history of COPD, HTN, DVT on eliquis, lung cancer s/p radiation ~1 year ago, alcohol abuse, cocaine abuse, history of small bowel perforation s/p resection, admitted with SBO, found to have a cool right foot. Patient reports her symptoms have been present for at least 4 weeks without any changes. Currently with decreased sensation and motor in the toes but with full sensation and motor at the ankle.     Plan:  Follow up ABIs, right lower extremity arterial duplex - ordered  Heparin drip  Neurovascular checks    D/w attending, Dr. Tyra Liu  Vascular Surgery PGY6    Subjective   HPI:  Fernando Cuevas is 63 y.o. female with history of COPD, HTN, DVT on eliquis, lung cancer s/p radiation ~1 year ago, alcohol abuse, cocaine abuse, history of small bowel perforation s/p resection, who presented to the hospital on 9/5/24 with abdominal pain, admitted to ACS service of small bowel obstruction. Of note, patient was recently admitted at [facility] from 8/19-9/1 for enteritis/SBO. During that time she complaints of right foot pain. Vascular studies on 8/26/24 with right AUSTYN 1.11, left AUSTYN 1.09. XR showed spur formation; podiatry consulted who believed toe pain is from microvessel disease related to cocaine abuse. Patient was ultimately discharged on 9/1/24.  Patient now admitted for SBO. Patient reports pain everywhere from head to toes, only excluding her arms. She specifically reports right foot pain and was noted to have a cool discolored right foot. Patient reports decreased sensation and motor in her right toes but that it has been like this for at least 4 weeks. She denies any changes in her foot pain, numbness, weakness since her last hospital admission. Numbness is only in her forefoot, says she has full sensation at her ankle. Denies pain and weakness at her ankle.  Patient reports she has been in the hospital for months,  although she was just admitted here yesterday. She denies recent cocaine use. Denies alcohol use. She is on Eliquis but does not know why (on it for DVT diagnosed last hospital admission)    Vascular History:  Denies    PMH: COPD, HTN, DVT on eliquis, lung cancer s/p radiation ~1 year ago, alcohol abuse, cocaine abuse, history of small bowel perforation s/p resection    PSH: Small bowel resection, multiple abdominal surgeries    Home Meds:  No current facility-administered medications on file prior to encounter.     Current Outpatient Medications on File Prior to Encounter   Medication Sig Dispense Refill    acetaminophen (Tylenol Extra Strength) 500 mg tablet Take 2 tablets (1,000 mg) by mouth every 8 hours.      albuterol 90 mcg/actuation inhaler Inhale 2 puffs every 4 hours if needed for wheezing or shortness of breath.      ammonium lactate (Lac-Hydrin) 12 % lotion Apply topically twice a day.      apixaban (Eliquis) 5 mg tablet Take 1 tablet (5 mg) by mouth 2 times a day. 30 tablet 0    azithromycin (Zithromax) 250 mg tablet Take 1 tablet (250 mg) by mouth once a day on Monday, Wednesday, and Friday.      benzonatate (Tessalon) 100 mg capsule Take 1 capsule (100 mg) by mouth 3 times a day as needed for cough. Do not crush or chew. 20 capsule 0    busPIRone (Buspar) 5 mg tablet Take 1 tablet (5 mg) by mouth twice a day. Supposed to take. Needs Rx      calcium carbonate (Oscal) 500 mg calcium (1,250 mg) tablet Take 1 tablet (1,250 mg) by mouth 2 times daily (morning and late afternoon).      calcium carbonate (Tums) 250 mg ([place] 100 mg) chewable split tablet Take 2 half tablet (500 mg) by mouth every 12 hours.      clotrimazole (Lotrimin) 1 % cream Apply topically 2 times a day.      dilTIAZem ER (Tiazac) 240 mg 24 hr capsule Take 1 capsule (240 mg) by mouth once daily.      fluocinonide 0.05 % cream APPLY THIN LAYER TO AFFECTED AREA TWICE A DAY AS NEEDED      albuterol 2.5 mg /3 mL (0.083 %) nebulizer solution  Take 3 mL by nebulization every 6 hours if needed for wheezing or shortness of breath. 300 mL 0    Lidocaine Pain Relief 4 % patch Place 1 patch on the skin every 24 (twenty four) hours if needed for mild pain (1 - 3).      mirtazapine (Remeron) 15 mg tablet Take 1 tablet (15 mg) by mouth once daily at bedtime. 30 tablet 0    mometasone-formoterol (Dulera 100) 100-5 mcg/actuation inhaler Inhale 200 mcg twice a day.      montelukast (Singulair) 10 mg tablet Take 1 tablet (10 mg) by mouth once daily.      oxybutynin XL (Ditropan-XL) 5 mg 24 hr tablet Take 1 tablet (5 mg) by mouth once daily. Does not take everyday      pantoprazole (ProtoNix) 40 mg EC tablet Take 1 tablet (40 mg) by mouth twice a day.      polyethylene glycol (Glycolax, Miralax) 17 gram/dose powder Take 17 g by mouth once daily. Do not fill before July 28, 2024. 238 g 0    predniSONE (Deltasone) 20 mg tablet Take 2 tablets (40 mg) by mouth once daily for 4 days. 8 tablet 0    sucralfate (Carafate) 1 gram tablet Take 1 tablet (1 g) by mouth 2 times a day. Crush and mix in luke warm water to make slurry and drink the slurry. 60 tablet 0    thiamine 100 mg tablet Take 1 tablet (100 mg) by mouth once daily.      tiotropium (Spiriva Respimat) 2.5 mcg/actuation inhaler Inhale 2 puffs once daily. 8 g 11    tiotropium (Spiriva Respimat) 2.5 mcg/actuation inhaler Inhale 2 puffs once daily.      [DISCONTINUED] apixaban (Eliquis) 5 mg tablet Take 1 tablet (5 mg) by mouth 2 times a day. 60 tablet 1    [DISCONTINUED] apixaban (Eliquis) 5 mg tablet Take 1 tablet (5 mg) by mouth 2 times a day. 60 tablet 0    [DISCONTINUED] sennosides-docusate sodium (Sparkle-Colace) 8.6-50 mg tablet Take 1 tablet by mouth 2 times a day. 60 tablet 0        Allergies:  Allergies   Allergen Reactions    Dilaudid [Hydromorphone] Itching    Iodinated Contrast Media Hives     Hives to faces and neck with itching. Resolved with 50 mg benadryl, 20 mg pepcid & 60 mg prednisone.    Hives to  "faces and neck with itching. Resolved with 50 mg benadryl, 20 mg pepcid & 60 mg prednisone.   Hives to faces and neck with itching. Resolved with 50 mg benadryl, 20 mg pepcid & 60 mg prednisone.    Iodine Other and Unknown    Adhesive Tape-Silicones Rash       SH/FH: history of crack cocaine use, tobacco use, alcohol use    ROS: 12 system negative except HPI    Objective   Vitals:  Heart Rate:  []   Temp:  [36.1 °C (97 °F)-36.7 °C (98.1 °F)]   Resp:  [13-26]   BP: (127-164)/()   Height:  [157.2 cm (5' 1.89\")]   Weight:  [61.5 kg (135 lb 9.3 oz)]   SpO2:  [97 %-100 %]     Exam:  Constitutional: No acute distress, resting comfortably  Neuro:  AOx3, grossly intact  ENMT: moist mucous membranes  Head/neck: atraumatic  CV: no tachycardia  Pulm: non-labored on room air  GI: soft, distended, diffuse tenderness to palpation  Skin: warm and dry  Musculoskeletal: moving all extremities  Vascular: Palpable femoral pulses b/l; right PT signal; left DP and PT signal; right forefoot cool with decreased motor and sensation, right ankle with full motor and sensation; full motor and sensation of left lower extremity    Labs:  Results from last 7 days   Lab Units 09/06/24  0932 09/05/24  1016 09/01/24  0537   WBC AUTO x10*3/uL 26.6* 40.6* 14.9*   HEMOGLOBIN g/dL 8.7* 9.5* 7.3*   PLATELETS AUTO x10*3/uL 758* 846* 1,004*      Results from last 7 days   Lab Units 09/06/24  0932 09/05/24  1016 09/01/24  0537   SODIUM mmol/L 140 137 135*   POTASSIUM mmol/L 4.1 3.9 4.5   CHLORIDE mmol/L 103 102 101   CO2 mmol/L 30 22 24   BUN mg/dL 17 14 10   CREATININE mg/dL 0.81 0.74 0.60   GLUCOSE mg/dL 97 104* 100*   MAGNESIUM mg/dL 2.63*  --  1.60   PHOSPHORUS mg/dL  --   --  4.2                    "

## 2024-09-07 LAB
ABO GROUP (TYPE) IN BLOOD: NORMAL
ALBUMIN SERPL BCP-MCNC: 2.3 G/DL (ref 3.4–5)
ANION GAP BLDA CALCULATED.4IONS-SCNC: 10 MMO/L (ref 10–25)
ANION GAP BLDA CALCULATED.4IONS-SCNC: 8 MMO/L (ref 10–25)
ANION GAP SERPL CALC-SCNC: 15 MMOL/L (ref 10–20)
BASE EXCESS BLDA CALC-SCNC: 0.1 MMOL/L (ref -2–3)
BASE EXCESS BLDA CALC-SCNC: 3.5 MMOL/L (ref -2–3)
BASOPHILS # BLD MANUAL: 0 X10*3/UL (ref 0–0.1)
BASOPHILS NFR BLD MANUAL: 0 %
BLOOD EXPIRATION DATE: NORMAL
BLOOD EXPIRATION DATE: NORMAL
BODY TEMPERATURE: 37 DEGREES CELSIUS
BODY TEMPERATURE: 37 DEGREES CELSIUS
BUN SERPL-MCNC: 15 MG/DL (ref 6–23)
CA-I BLDA-SCNC: 1.14 MMOL/L (ref 1.1–1.33)
CA-I BLDA-SCNC: 1.17 MMOL/L (ref 1.1–1.33)
CALCIUM SERPL-MCNC: 7.9 MG/DL (ref 8.6–10.6)
CHLORIDE BLDA-SCNC: 104 MMOL/L (ref 98–107)
CHLORIDE BLDA-SCNC: 104 MMOL/L (ref 98–107)
CHLORIDE SERPL-SCNC: 102 MMOL/L (ref 98–107)
CO2 SERPL-SCNC: 26 MMOL/L (ref 21–32)
CREAT SERPL-MCNC: 0.64 MG/DL (ref 0.5–1.05)
DISPENSE STATUS: NORMAL
DISPENSE STATUS: NORMAL
EGFRCR SERPLBLD CKD-EPI 2021: >90 ML/MIN/1.73M*2
EOSINOPHIL # BLD MANUAL: 0 X10*3/UL (ref 0–0.7)
EOSINOPHIL NFR BLD MANUAL: 0 %
ERYTHROCYTE [DISTWIDTH] IN BLOOD BY AUTOMATED COUNT: 19.7 % (ref 11.5–14.5)
ERYTHROCYTE [DISTWIDTH] IN BLOOD BY AUTOMATED COUNT: 20 % (ref 11.5–14.5)
ERYTHROCYTE [DISTWIDTH] IN BLOOD BY AUTOMATED COUNT: 21.8 % (ref 11.5–14.5)
GLUCOSE BLD MANUAL STRIP-MCNC: 84 MG/DL (ref 74–99)
GLUCOSE BLD MANUAL STRIP-MCNC: 85 MG/DL (ref 74–99)
GLUCOSE BLD MANUAL STRIP-MCNC: 88 MG/DL (ref 74–99)
GLUCOSE BLDA-MCNC: 164 MG/DL (ref 74–99)
GLUCOSE BLDA-MCNC: 85 MG/DL (ref 74–99)
GLUCOSE SERPL-MCNC: 79 MG/DL (ref 74–99)
HCO3 BLDA-SCNC: 24.7 MMOL/L (ref 22–26)
HCO3 BLDA-SCNC: 27.8 MMOL/L (ref 22–26)
HCT VFR BLD AUTO: 21.9 % (ref 36–46)
HCT VFR BLD AUTO: 32.2 % (ref 36–46)
HCT VFR BLD AUTO: 32.3 % (ref 36–46)
HCT VFR BLD EST: 18 % (ref 36–46)
HCT VFR BLD EST: 23 % (ref 36–46)
HGB BLD-MCNC: 10.2 G/DL (ref 12–16)
HGB BLD-MCNC: 10.6 G/DL (ref 12–16)
HGB BLD-MCNC: 6.5 G/DL (ref 12–16)
HGB BLDA-MCNC: 6 G/DL (ref 12–16)
HGB BLDA-MCNC: 7.5 G/DL (ref 12–16)
HYPOCHROMIA BLD QL SMEAR: ABNORMAL
IMM GRANULOCYTES # BLD AUTO: 0.14 X10*3/UL (ref 0–0.7)
IMM GRANULOCYTES NFR BLD AUTO: 0.6 % (ref 0–0.9)
INHALED O2 CONCENTRATION: 28 %
INHALED O2 CONCENTRATION: 30 %
LACTATE BLDA-SCNC: 1.1 MMOL/L (ref 0.4–2)
LACTATE BLDA-SCNC: 1.6 MMOL/L (ref 0.4–2)
LYMPHOCYTES # BLD MANUAL: 0.39 X10*3/UL (ref 1.2–4.8)
LYMPHOCYTES NFR BLD MANUAL: 1.6 %
MAGNESIUM SERPL-MCNC: 2.16 MG/DL (ref 1.6–2.4)
MCH RBC QN AUTO: 22.6 PG (ref 26–34)
MCH RBC QN AUTO: 25.2 PG (ref 26–34)
MCH RBC QN AUTO: 26 PG (ref 26–34)
MCHC RBC AUTO-ENTMCNC: 29.7 G/DL (ref 32–36)
MCHC RBC AUTO-ENTMCNC: 31.6 G/DL (ref 32–36)
MCHC RBC AUTO-ENTMCNC: 32.9 G/DL (ref 32–36)
MCV RBC AUTO: 76 FL (ref 80–100)
MCV RBC AUTO: 79 FL (ref 80–100)
MCV RBC AUTO: 80 FL (ref 80–100)
METAMYELOCYTES # BLD MANUAL: 0.39 X10*3/UL
METAMYELOCYTES NFR BLD MANUAL: 1.6 %
MONOCYTES # BLD MANUAL: 0.2 X10*3/UL (ref 0.1–1)
MONOCYTES NFR BLD MANUAL: 0.8 %
NEUTROPHILS # BLD MANUAL: 23.42 X10*3/UL (ref 1.2–7.7)
NEUTS BAND # BLD MANUAL: 9.83 X10*3/UL (ref 0–0.7)
NEUTS BAND NFR BLD MANUAL: 40.3 %
NEUTS SEG # BLD MANUAL: 13.59 X10*3/UL (ref 1.2–7)
NEUTS SEG NFR BLD MANUAL: 55.7 %
NRBC BLD-RTO: 1.8 /100 WBCS (ref 0–0)
NRBC BLD-RTO: 1.9 /100 WBCS (ref 0–0)
NRBC BLD-RTO: 2.3 /100 WBCS (ref 0–0)
OXYHGB MFR BLDA: 97.4 % (ref 94–98)
OXYHGB MFR BLDA: 97.7 % (ref 94–98)
PCO2 BLDA: 39 MM HG (ref 38–42)
PCO2 BLDA: 40 MM HG (ref 38–42)
PH BLDA: 7.41 PH (ref 7.38–7.42)
PH BLDA: 7.45 PH (ref 7.38–7.42)
PHOSPHATE SERPL-MCNC: 3 MG/DL (ref 2.5–4.9)
PLATELET # BLD AUTO: 518 X10*3/UL (ref 150–450)
PLATELET # BLD AUTO: 534 X10*3/UL (ref 150–450)
PLATELET # BLD AUTO: 585 X10*3/UL (ref 150–450)
PO2 BLDA: 110 MM HG (ref 85–95)
PO2 BLDA: 121 MM HG (ref 85–95)
POLYCHROMASIA BLD QL SMEAR: ABNORMAL
POTASSIUM BLDA-SCNC: 4.6 MMOL/L (ref 3.5–5.3)
POTASSIUM BLDA-SCNC: 4.7 MMOL/L (ref 3.5–5.3)
POTASSIUM SERPL-SCNC: 4.7 MMOL/L (ref 3.5–5.3)
PRODUCT BLOOD TYPE: 5100
PRODUCT BLOOD TYPE: 5100
PRODUCT CODE: NORMAL
PRODUCT CODE: NORMAL
RBC # BLD AUTO: 2.88 X10*6/UL (ref 4–5.2)
RBC # BLD AUTO: 4.05 X10*6/UL (ref 4–5.2)
RBC # BLD AUTO: 4.08 X10*6/UL (ref 4–5.2)
RBC MORPH BLD: ABNORMAL
RH FACTOR (ANTIGEN D): NORMAL
SAO2 % BLDA: 99 % (ref 94–100)
SAO2 % BLDA: 99 % (ref 94–100)
SCHISTOCYTES BLD QL SMEAR: ABNORMAL
SODIUM BLDA-SCNC: 134 MMOL/L (ref 136–145)
SODIUM BLDA-SCNC: 135 MMOL/L (ref 136–145)
SODIUM SERPL-SCNC: 138 MMOL/L (ref 136–145)
SPECIMEN DRAWN FROM PATIENT: ABNORMAL
TARGETS BLD QL SMEAR: ABNORMAL
TOTAL CELLS COUNTED BLD: 124
UFH PPP CHRO-ACNC: <0.1 IU/ML
UFH PPP CHRO-ACNC: <0.1 IU/ML
UNIT ABO: NORMAL
UNIT ABO: NORMAL
UNIT NUMBER: NORMAL
UNIT NUMBER: NORMAL
UNIT RH: NORMAL
UNIT RH: NORMAL
UNIT VOLUME: 275
UNIT VOLUME: 350
VENTILATOR MODE: ABNORMAL
WBC # BLD AUTO: 23.7 X10*3/UL (ref 4.4–11.3)
WBC # BLD AUTO: 24.4 X10*3/UL (ref 4.4–11.3)
WBC # BLD AUTO: 24.7 X10*3/UL (ref 4.4–11.3)
XM INTEP: NORMAL
XM INTEP: NORMAL

## 2024-09-07 PROCEDURE — 3600000003 HC OR TIME - INITIAL BASE CHARGE - PROCEDURE LEVEL THREE: Performed by: SURGERY

## 2024-09-07 PROCEDURE — P9016 RBC LEUKOCYTES REDUCED: HCPCS

## 2024-09-07 PROCEDURE — 75710 ARTERY X-RAYS ARM/LEG: CPT | Mod: RT | Performed by: SURGERY

## 2024-09-07 PROCEDURE — 85027 COMPLETE CBC AUTOMATED: CPT

## 2024-09-07 PROCEDURE — 75630 X-RAY AORTA LEG ARTERIES: CPT | Performed by: SURGERY

## 2024-09-07 PROCEDURE — 85027 COMPLETE CBC AUTOMATED: CPT | Performed by: PHYSICIAN ASSISTANT

## 2024-09-07 PROCEDURE — 3700000001 HC GENERAL ANESTHESIA TIME - INITIAL BASE CHARGE: Performed by: SURGERY

## 2024-09-07 PROCEDURE — 2500000001 HC RX 250 WO HCPCS SELF ADMINISTERED DRUGS (ALT 637 FOR MEDICARE OP): Performed by: PHYSICIAN ASSISTANT

## 2024-09-07 PROCEDURE — 04CR3ZZ EXTIRPATION OF MATTER FROM RIGHT POSTERIOR TIBIAL ARTERY, PERCUTANEOUS APPROACH: ICD-10-PCS | Performed by: SURGERY

## 2024-09-07 PROCEDURE — 7100000002 HC RECOVERY ROOM TIME - EACH INCREMENTAL 1 MINUTE: Performed by: SURGERY

## 2024-09-07 PROCEDURE — 82435 ASSAY OF BLOOD CHLORIDE: CPT

## 2024-09-07 PROCEDURE — 37799 UNLISTED PX VASCULAR SURGERY: CPT

## 2024-09-07 PROCEDURE — 94640 AIRWAY INHALATION TREATMENT: CPT

## 2024-09-07 PROCEDURE — 2500000001 HC RX 250 WO HCPCS SELF ADMINISTERED DRUGS (ALT 637 FOR MEDICARE OP)

## 2024-09-07 PROCEDURE — 2500000004 HC RX 250 GENERAL PHARMACY W/ HCPCS (ALT 636 FOR OP/ED)

## 2024-09-07 PROCEDURE — 2500000004 HC RX 250 GENERAL PHARMACY W/ HCPCS (ALT 636 FOR OP/ED): Performed by: SURGERY

## 2024-09-07 PROCEDURE — 2500000005 HC RX 250 GENERAL PHARMACY W/O HCPCS: Performed by: SURGERY

## 2024-09-07 PROCEDURE — 99024 POSTOP FOLLOW-UP VISIT: CPT | Performed by: SURGERY

## 2024-09-07 PROCEDURE — 36430 TRANSFUSION BLD/BLD COMPNT: CPT

## 2024-09-07 PROCEDURE — 7100000001 HC RECOVERY ROOM TIME - INITIAL BASE CHARGE: Performed by: SURGERY

## 2024-09-07 PROCEDURE — 83735 ASSAY OF MAGNESIUM: CPT

## 2024-09-07 PROCEDURE — 85027 COMPLETE CBC AUTOMATED: CPT | Performed by: STUDENT IN AN ORGANIZED HEALTH CARE EDUCATION/TRAINING PROGRAM

## 2024-09-07 PROCEDURE — 80069 RENAL FUNCTION PANEL: CPT | Performed by: STUDENT IN AN ORGANIZED HEALTH CARE EDUCATION/TRAINING PROGRAM

## 2024-09-07 PROCEDURE — 88304 TISSUE EXAM BY PATHOLOGIST: CPT | Performed by: PATHOLOGY

## 2024-09-07 PROCEDURE — 94002 VENT MGMT INPAT INIT DAY: CPT

## 2024-09-07 PROCEDURE — C1757 CATH, THROMBECTOMY/EMBOLECT: HCPCS | Performed by: SURGERY

## 2024-09-07 PROCEDURE — 2550000001 HC RX 255 CONTRASTS: Performed by: SURGERY

## 2024-09-07 PROCEDURE — 71045 X-RAY EXAM CHEST 1 VIEW: CPT | Performed by: RADIOLOGY

## 2024-09-07 PROCEDURE — 82947 ASSAY GLUCOSE BLOOD QUANT: CPT

## 2024-09-07 PROCEDURE — 85007 BL SMEAR W/DIFF WBC COUNT: CPT | Performed by: STUDENT IN AN ORGANIZED HEALTH CARE EDUCATION/TRAINING PROGRAM

## 2024-09-07 PROCEDURE — 99232 SBSQ HOSP IP/OBS MODERATE 35: CPT | Performed by: SURGERY

## 2024-09-07 PROCEDURE — 84132 ASSAY OF SERUM POTASSIUM: CPT

## 2024-09-07 PROCEDURE — 0752T DGTZ GLS MCRSCP SLD LVL III: CPT | Mod: TC,SUR | Performed by: SURGERY

## 2024-09-07 PROCEDURE — 85520 HEPARIN ASSAY: CPT

## 2024-09-07 PROCEDURE — 99291 CRITICAL CARE FIRST HOUR: CPT | Performed by: SURGERY

## 2024-09-07 PROCEDURE — 3700000002 HC GENERAL ANESTHESIA TIME - EACH INCREMENTAL 1 MINUTE: Performed by: SURGERY

## 2024-09-07 PROCEDURE — 84132 ASSAY OF SERUM POTASSIUM: CPT | Performed by: STUDENT IN AN ORGANIZED HEALTH CARE EDUCATION/TRAINING PROGRAM

## 2024-09-07 PROCEDURE — C1769 GUIDE WIRE: HCPCS | Performed by: SURGERY

## 2024-09-07 PROCEDURE — 2500000002 HC RX 250 W HCPCS SELF ADMINISTERED DRUGS (ALT 637 FOR MEDICARE OP, ALT 636 FOR OP/ED): Performed by: STUDENT IN AN ORGANIZED HEALTH CARE EDUCATION/TRAINING PROGRAM

## 2024-09-07 PROCEDURE — 3600000008 HC OR TIME - EACH INCREMENTAL 1 MINUTE - PROCEDURE LEVEL THREE: Performed by: SURGERY

## 2024-09-07 PROCEDURE — 99254 IP/OBS CNSLTJ NEW/EST MOD 60: CPT | Performed by: STUDENT IN AN ORGANIZED HEALTH CARE EDUCATION/TRAINING PROGRAM

## 2024-09-07 PROCEDURE — 2720000007 HC OR 272 NO HCPCS: Performed by: SURGERY

## 2024-09-07 PROCEDURE — 2020000001 HC ICU ROOM DAILY

## 2024-09-07 PROCEDURE — 2500000005 HC RX 250 GENERAL PHARMACY W/O HCPCS

## 2024-09-07 PROCEDURE — 34203 REMOVAL OF LEG ARTERY CLOT: CPT | Performed by: SURGERY

## 2024-09-07 RX ORDER — HYDROMORPHONE HYDROCHLORIDE 0.2 MG/ML
0.2 INJECTION INTRAMUSCULAR; INTRAVENOUS; SUBCUTANEOUS ONCE
Status: DISCONTINUED | OUTPATIENT
Start: 2024-09-07 | End: 2024-09-07

## 2024-09-07 RX ORDER — METOCLOPRAMIDE HYDROCHLORIDE 5 MG/ML
10 INJECTION INTRAMUSCULAR; INTRAVENOUS ONCE AS NEEDED
Status: DISCONTINUED | OUTPATIENT
Start: 2024-09-07 | End: 2024-09-07 | Stop reason: HOSPADM

## 2024-09-07 RX ORDER — ACETAMINOPHEN 325 MG/1
650 TABLET ORAL EVERY 4 HOURS PRN
Status: DISCONTINUED | OUTPATIENT
Start: 2024-09-07 | End: 2024-09-07 | Stop reason: HOSPADM

## 2024-09-07 RX ORDER — ACETAMINOPHEN 10 MG/ML
1000 INJECTION, SOLUTION INTRAVENOUS EVERY 6 HOURS SCHEDULED
Status: COMPLETED | OUTPATIENT
Start: 2024-09-07 | End: 2024-09-08

## 2024-09-07 RX ORDER — SUCCINYLCHOLINE CHLORIDE 100 MG/5ML
SYRINGE (ML) INTRAVENOUS AS NEEDED
Status: DISCONTINUED | OUTPATIENT
Start: 2024-09-07 | End: 2024-09-07

## 2024-09-07 RX ORDER — PROPOFOL 10 MG/ML
INJECTION, EMULSION INTRAVENOUS AS NEEDED
Status: DISCONTINUED | OUTPATIENT
Start: 2024-09-07 | End: 2024-09-07

## 2024-09-07 RX ORDER — HYDROMORPHONE HYDROCHLORIDE 1 MG/ML
0.4 INJECTION, SOLUTION INTRAMUSCULAR; INTRAVENOUS; SUBCUTANEOUS ONCE
Status: COMPLETED | OUTPATIENT
Start: 2024-09-07 | End: 2024-09-07

## 2024-09-07 RX ORDER — FENTANYL CITRATE 50 UG/ML
INJECTION, SOLUTION INTRAMUSCULAR; INTRAVENOUS AS NEEDED
Status: DISCONTINUED | OUTPATIENT
Start: 2024-09-07 | End: 2024-09-07

## 2024-09-07 RX ORDER — SODIUM CHLORIDE, SODIUM LACTATE, POTASSIUM CHLORIDE, CALCIUM CHLORIDE 600; 310; 30; 20 MG/100ML; MG/100ML; MG/100ML; MG/100ML
500 INJECTION, SOLUTION INTRAVENOUS ONCE
Status: COMPLETED | OUTPATIENT
Start: 2024-09-07 | End: 2024-09-07

## 2024-09-07 RX ORDER — LABETALOL HYDROCHLORIDE 5 MG/ML
INJECTION, SOLUTION INTRAVENOUS AS NEEDED
Status: DISCONTINUED | OUTPATIENT
Start: 2024-09-07 | End: 2024-09-07

## 2024-09-07 RX ORDER — HYDROMORPHONE HYDROCHLORIDE 1 MG/ML
0.2 INJECTION, SOLUTION INTRAMUSCULAR; INTRAVENOUS; SUBCUTANEOUS EVERY 5 MIN PRN
Status: DISCONTINUED | OUTPATIENT
Start: 2024-09-07 | End: 2024-09-07 | Stop reason: HOSPADM

## 2024-09-07 RX ORDER — DEXMEDETOMIDINE HYDROCHLORIDE 4 UG/ML
INJECTION, SOLUTION INTRAVENOUS CONTINUOUS PRN
Status: DISCONTINUED | OUTPATIENT
Start: 2024-09-07 | End: 2024-09-07

## 2024-09-07 RX ORDER — LIDOCAINE HYDROCHLORIDE 10 MG/ML
INJECTION INFILTRATION; PERINEURAL AS NEEDED
Status: DISCONTINUED | OUTPATIENT
Start: 2024-09-07 | End: 2024-09-07

## 2024-09-07 RX ORDER — SODIUM CHLORIDE, SODIUM LACTATE, POTASSIUM CHLORIDE, CALCIUM CHLORIDE 600; 310; 30; 20 MG/100ML; MG/100ML; MG/100ML; MG/100ML
100 INJECTION, SOLUTION INTRAVENOUS CONTINUOUS
Status: DISCONTINUED | OUTPATIENT
Start: 2024-09-07 | End: 2024-09-07 | Stop reason: HOSPADM

## 2024-09-07 RX ORDER — ONDANSETRON HYDROCHLORIDE 2 MG/ML
4 INJECTION, SOLUTION INTRAVENOUS ONCE AS NEEDED
Status: DISCONTINUED | OUTPATIENT
Start: 2024-09-07 | End: 2024-09-07 | Stop reason: HOSPADM

## 2024-09-07 RX ORDER — TALC
3 POWDER (GRAM) TOPICAL NIGHTLY
Status: DISPENSED | OUTPATIENT
Start: 2024-09-07

## 2024-09-07 RX ORDER — IODIXANOL 320 MG/ML
INJECTION, SOLUTION INTRAVASCULAR AS NEEDED
Status: DISCONTINUED | OUTPATIENT
Start: 2024-09-07 | End: 2024-09-07 | Stop reason: HOSPADM

## 2024-09-07 RX ORDER — INSULIN LISPRO 100 [IU]/ML
0-10 INJECTION, SOLUTION INTRAVENOUS; SUBCUTANEOUS
Status: DISPENSED | OUTPATIENT
Start: 2024-09-07

## 2024-09-07 RX ORDER — IPRATROPIUM BROMIDE AND ALBUTEROL SULFATE 2.5; .5 MG/3ML; MG/3ML
3 SOLUTION RESPIRATORY (INHALATION) EVERY 6 HOURS PRN
Status: ACTIVE | OUTPATIENT
Start: 2024-09-07

## 2024-09-07 RX ORDER — MIDAZOLAM HYDROCHLORIDE 1 MG/ML
INJECTION INTRAMUSCULAR; INTRAVENOUS AS NEEDED
Status: DISCONTINUED | OUTPATIENT
Start: 2024-09-07 | End: 2024-09-07

## 2024-09-07 RX ORDER — HYDROMORPHONE HYDROCHLORIDE 1 MG/ML
0.4 INJECTION, SOLUTION INTRAMUSCULAR; INTRAVENOUS; SUBCUTANEOUS EVERY 5 MIN PRN
Status: DISCONTINUED | OUTPATIENT
Start: 2024-09-07 | End: 2024-09-07 | Stop reason: HOSPADM

## 2024-09-07 RX ORDER — KETOROLAC TROMETHAMINE 15 MG/ML
15 INJECTION, SOLUTION INTRAMUSCULAR; INTRAVENOUS EVERY 6 HOURS
Status: COMPLETED | OUTPATIENT
Start: 2024-09-07 | End: 2024-09-08

## 2024-09-07 RX ORDER — SODIUM CHLORIDE 0.9 G/100ML
IRRIGANT IRRIGATION AS NEEDED
Status: DISCONTINUED | OUTPATIENT
Start: 2024-09-07 | End: 2024-09-07 | Stop reason: HOSPADM

## 2024-09-07 RX ORDER — ONDANSETRON HYDROCHLORIDE 2 MG/ML
INJECTION, SOLUTION INTRAVENOUS AS NEEDED
Status: DISCONTINUED | OUTPATIENT
Start: 2024-09-07 | End: 2024-09-07

## 2024-09-07 RX ORDER — HEPARIN SODIUM 10000 [USP'U]/100ML
0-4000 INJECTION, SOLUTION INTRAVENOUS CONTINUOUS
Status: DISPENSED | OUTPATIENT
Start: 2024-09-07

## 2024-09-07 RX ORDER — DEXMEDETOMIDINE HYDROCHLORIDE 4 UG/ML
0-1.5 INJECTION, SOLUTION INTRAVENOUS CONTINUOUS
Status: DISCONTINUED | OUTPATIENT
Start: 2024-09-07 | End: 2024-09-07 | Stop reason: HOSPADM

## 2024-09-07 RX ORDER — HYDROMORPHONE HYDROCHLORIDE 0.2 MG/ML
0.2 INJECTION INTRAMUSCULAR; INTRAVENOUS; SUBCUTANEOUS EVERY 2 HOUR PRN
Status: DISCONTINUED | OUTPATIENT
Start: 2024-09-07 | End: 2024-09-08

## 2024-09-07 RX ORDER — HEPARIN SODIUM 1000 [USP'U]/ML
INJECTION, SOLUTION INTRAVENOUS; SUBCUTANEOUS AS NEEDED
Status: DISCONTINUED | OUTPATIENT
Start: 2024-09-07 | End: 2024-09-07

## 2024-09-07 RX ORDER — CEFAZOLIN 1 G/1
INJECTION, POWDER, FOR SOLUTION INTRAVENOUS AS NEEDED
Status: DISCONTINUED | OUTPATIENT
Start: 2024-09-07 | End: 2024-09-07

## 2024-09-07 RX ORDER — ACETAMINOPHEN 500 MG
5 TABLET ORAL NIGHTLY PRN
Status: DISPENSED | OUTPATIENT
Start: 2024-09-07

## 2024-09-07 RX ADMIN — HEPARIN SODIUM 738 UNITS/HR: 10000 INJECTION, SOLUTION INTRAVENOUS at 22:13

## 2024-09-07 RX ADMIN — MELATONIN 3 MG: 3 TAB ORAL at 20:15

## 2024-09-07 RX ADMIN — KETOROLAC TROMETHAMINE 15 MG: 15 INJECTION, SOLUTION INTRAMUSCULAR; INTRAVENOUS at 21:22

## 2024-09-07 RX ADMIN — BUSPIRONE HYDROCHLORIDE 5 MG: 10 TABLET ORAL at 20:15

## 2024-09-07 RX ADMIN — MIRTAZAPINE 15 MG: 15 TABLET, FILM COATED ORAL at 20:15

## 2024-09-07 RX ADMIN — HYDROMORPHONE HYDROCHLORIDE 0.2 MG: 0.2 INJECTION, SOLUTION INTRAMUSCULAR; INTRAVENOUS; SUBCUTANEOUS at 16:43

## 2024-09-07 RX ADMIN — ACETAMINOPHEN 1000 MG: 10 INJECTION INTRAVENOUS at 18:23

## 2024-09-07 RX ADMIN — IPRATROPIUM BROMIDE AND ALBUTEROL SULFATE 3 ML: .5; 3 SOLUTION RESPIRATORY (INHALATION) at 13:32

## 2024-09-07 RX ADMIN — HYDROMORPHONE HYDROCHLORIDE 0.4 MG: 1 INJECTION, SOLUTION INTRAMUSCULAR; INTRAVENOUS; SUBCUTANEOUS at 12:48

## 2024-09-07 RX ADMIN — ACETAMINOPHEN 1000 MG: 10 INJECTION INTRAVENOUS at 23:26

## 2024-09-07 RX ADMIN — HYDROMORPHONE HYDROCHLORIDE 0.2 MG: 0.2 INJECTION, SOLUTION INTRAMUSCULAR; INTRAVENOUS; SUBCUTANEOUS at 23:37

## 2024-09-07 RX ADMIN — KETOROLAC TROMETHAMINE 15 MG: 15 INJECTION, SOLUTION INTRAMUSCULAR; INTRAVENOUS at 16:43

## 2024-09-07 RX ADMIN — HYDROMORPHONE HYDROCHLORIDE 0.2 MG: 0.2 INJECTION, SOLUTION INTRAMUSCULAR; INTRAVENOUS; SUBCUTANEOUS at 19:57

## 2024-09-07 RX ADMIN — METHYLPREDNISOLONE SODIUM SUCCINATE 40 MG: 125 INJECTION, POWDER, FOR SOLUTION INTRAMUSCULAR; INTRAVENOUS at 01:09

## 2024-09-07 RX ADMIN — HYDROMORPHONE HYDROCHLORIDE 0.4 MG: 1 INJECTION, SOLUTION INTRAMUSCULAR; INTRAVENOUS; SUBCUTANEOUS at 18:06

## 2024-09-07 RX ADMIN — HYDROMORPHONE HYDROCHLORIDE 0.4 MG: 1 INJECTION, SOLUTION INTRAMUSCULAR; INTRAVENOUS; SUBCUTANEOUS at 14:24

## 2024-09-07 RX ADMIN — Medication 5 MG: at 20:16

## 2024-09-07 RX ADMIN — PANTOPRAZOLE SODIUM 40 MG: 40 INJECTION, POWDER, FOR SOLUTION INTRAVENOUS at 20:15

## 2024-09-07 RX ADMIN — SODIUM CHLORIDE, POTASSIUM CHLORIDE, SODIUM LACTATE AND CALCIUM CHLORIDE 500 ML: 600; 310; 30; 20 INJECTION, SOLUTION INTRAVENOUS at 19:09

## 2024-09-07 ASSESSMENT — PAIN - FUNCTIONAL ASSESSMENT
PAIN_FUNCTIONAL_ASSESSMENT: UNABLE TO SELF-REPORT
PAIN_FUNCTIONAL_ASSESSMENT: 0-10
PAIN_FUNCTIONAL_ASSESSMENT: UNABLE TO SELF-REPORT
PAIN_FUNCTIONAL_ASSESSMENT: UNABLE TO SELF-REPORT
PAIN_FUNCTIONAL_ASSESSMENT: 0-10
PAIN_FUNCTIONAL_ASSESSMENT: UNABLE TO SELF-REPORT
PAIN_FUNCTIONAL_ASSESSMENT: 0-10
PAIN_FUNCTIONAL_ASSESSMENT: UNABLE TO SELF-REPORT
PAIN_FUNCTIONAL_ASSESSMENT: 0-10
PAIN_FUNCTIONAL_ASSESSMENT: UNABLE TO SELF-REPORT
PAIN_FUNCTIONAL_ASSESSMENT: 0-10
PAIN_FUNCTIONAL_ASSESSMENT: UNABLE TO SELF-REPORT
PAIN_FUNCTIONAL_ASSESSMENT: 0-10
PAIN_FUNCTIONAL_ASSESSMENT: UNABLE TO SELF-REPORT
PAIN_FUNCTIONAL_ASSESSMENT: 0-10
PAIN_FUNCTIONAL_ASSESSMENT: 0-10
PAIN_FUNCTIONAL_ASSESSMENT: UNABLE TO SELF-REPORT

## 2024-09-07 ASSESSMENT — PAIN SCALES - GENERAL
PAINLEVEL_OUTOF10: 4
PAINLEVEL_OUTOF10: 9
PAINLEVEL_OUTOF10: 4
PAINLEVEL_OUTOF10: 7
PAIN_LEVEL: 0
PAINLEVEL_OUTOF10: 4
PAINLEVEL_OUTOF10: 4
PAINLEVEL_OUTOF10: 10 - WORST POSSIBLE PAIN
PAINLEVEL_OUTOF10: 8
PAINLEVEL_OUTOF10: 4
PAINLEVEL_OUTOF10: 9
PAINLEVEL_OUTOF10: 8
PAINLEVEL_OUTOF10: 4

## 2024-09-07 NOTE — SIGNIFICANT EVENT
Vascular Surgery Plan of Care    63 year old female admitted with SOB. Vascular surgery consulted earlier in day for right foot pain. I personally evaluated the patient at approximately 8pm. Continues to have worsening right foot pain with evidence of ischemic skin change extending to ankle level (apparent on dorsum of the foot). No audible PT/DP doppler signals at the ankle. [place] PT doppler signal present at distal calf level. Palpable popliteal and femoral pulse. Right forefoot symptoms ongoing for several weeks now. Likely acute worsening on chronic pedal disease (as evidenced by AUSTYN/TBI 2 weeks ago).     Discussed with patient and family (son and daughter) at bedside that there is high risk of limb loss requiring proximal amputation given worsening of right foot ischemia on evidence. Unable to successfully obtain STAT CTA run-off d/t documented contrast allergy requiring prep. We can plan for right lower extremity angiogram, however, unclear if foot can be saved d/t thrombosis of outflow. Discussed the risk and benefits of the procedure and they agreed to proceed with procedure for attempt at limb salvage.    Prior CT A/P were negative for mural thrombose or aneurysm of abdominal aorta or iliac system. Chronic celiac occlusion with extensive collateralization. CTPE from 8/11/24 negative for ascending or arch mural thrombose or aneurysm. No recent echo to r/o cardioembolic source. Limb ischemia possibly related microvascular disease from cocaine abuse.     Plan:  - plan for OR tonight for right leg angiogram (currently OR is not available d/t to other emergent procedures)  - keep NPO  - pain control per primary team  - continue heparin gtt  - continue expedited contrast prep (solumedrol 40mg q4 hours and benadryl 50mg 1 hours prior to procedure)  - can obtain CTA A/P with run-off or lower extremity arterial duplex to r/o popliteal aneurysm while pending OR once adequately prep for contrast allergy    D/w  attending, Dr. Tyra Kapoor MD  PGY-5 Vascular Surgery Resident

## 2024-09-07 NOTE — ANESTHESIA PROCEDURE NOTES
Central Venous Line:    Date/Time: 9/7/2024 2:25 AM    A central venous line was placed in the OR for the following indication(s): central venous access and CVP monitoring.  Staffing  Performed: resident   Authorized by: Armando Foster DO    Performed by: Michel Jimenez DO    Sterility preparation included the following: provider hand hygiene performed prior to central venous catheter insertion, all 5 sterile barriers used (gloves, gown, cap, mask, large sterile drape) during central venous catheter insertion, antiseptic used during central venous catheter insertion and skin prep agent completely dried prior to procedure.  The patient was placed in Trendelenburg position.       The site was prepped with Chlorhexidine.  Size: 7 Fr   Catheter length (cm): 16 cm.  Number of Lumens: triple lumen      During the procedure, the following specific steps were taken: target vein identified, needle advanced into vein and blood aspirated and guidewire advanced into vein.  Seldinger technique used.  Procedure performed using ultrasound guidance.  Sterile gel and probe cover used in ultrasound-guided central venous catheter insertion.    Intravenous verification was obtained by ultrasound, venous blood return and manometry.      Post insertion care included: all ports aspirated, all ports flushed easily, guidewire removed intact, Biopatch applied, line sutured in place and dressing applied.    During the procedure the patient experienced: patient tolerated procedure well with no complications.           images stored in chart

## 2024-09-07 NOTE — ANESTHESIA PREPROCEDURE EVALUATION
Patient: Fernando Cuevas    Procedure Information       Anesthesia Start Date/Time: 09/07/24 0135    Procedure: Right leg angiogram, possible thrombectomy, possible thrombolysis (Right)    Location: [facility] OR 27 / Virtual [facility] OR    Surgeons: Brendan Mary MD PhD            Relevant Problems   Anesthesia (within normal limits)      Cardiac   (+) Acute lower limb ischemia   (+) Essential hypertension   (+) Mixed hyperlipidemia      Pulmonary   (+) Acute exacerbation of chronic obstructive pulmonary disease (Multi)   (+) COPD (chronic obstructive pulmonary disease) (Multi)   (+) COPD exacerbation (Multi)   (+) Malignant neoplasm of lower lobe of right lung (Multi)   (+) Panlobular emphysema (Multi)      Neuro   (+) Anxiety   (+) Dysthymic disorder   (+) JASMINE (generalized anxiety disorder)   (+) Panic disorder      GI   (+) Gastroesophageal reflux disease without esophagitis      /Renal   (+) Acute cystitis without hematuria      Hematology   (+) Acute embolism and thrombosis of deep veins of left upper extremity (Multi)   (+) Anemia   (+) Anemia, unspecified type       Clinical information reviewed:   Tobacco  Allergies    Med Hx  Surg Hx  OB Status  Fam Hx  Soc Hx        NPO Detail:  No data recorded     Physical Exam    Airway  Mallampati: I  TM distance: >3 FB  Neck ROM: full     Cardiovascular   Rhythm: regular  Rate: abnormal     Dental   Comments: Poor dentition   Pulmonary - normal exam     Abdominal - normal exam             Anesthesia Plan    History of general anesthesia?: yes  History of complications of general anesthesia?: no    ASA 3 - emergent     general     intravenous induction   Postoperative administration of opioids is intended.  Trial extubation is planned.  Anesthetic plan and risks discussed with patient.  Use of blood products discussed with patient who consented to blood products.    Plan discussed with resident.

## 2024-09-07 NOTE — PROGRESS NOTES
Assumed care of patient at start of shift, patient arrived back to unit with MD at bedside requesting STAT PIV placement for CTA. This RN to bedside to place PIV. 20 G Right AC placed and MD aware. CTA cancelled. Patient booked for STAT Angiogram - Vascular to bedside to discuss with patient and family. NGT to LIWS per order. Heparin gtt continued per Vascular request. MD notified of inability to give Pepcid due to it not being available. Report given to ARNOLD Nguyen on [facility]. Patient stable upon transport.

## 2024-09-07 NOTE — ANESTHESIA POSTPROCEDURE EVALUATION
Patient: Fernando Cuevas    Procedure Summary       Date: 09/07/24 Room / Location: [facility] OR 27 / [facility] OR    Anesthesia Start: 0135 Anesthesia Stop: 0506    Procedure: Right leg angiogram; Right Leg Cutdown; Posterior Tibial Artery Thrombectomy (Right: Leg Lower) Diagnosis:       Acute lower limb ischemia      (Acute lower limb ischemia [I99.8])    Surgeons: Brendan Mary MD PhD Responsible Provider: Armando Foster DO    Anesthesia Type: general ASA Status: 3 - Emergent            Anesthesia Type: general    Vitals Value Taken Time   /74 09/07/24 1200   Temp 36 °C (96.8 °F) 09/07/24 1000   Pulse 118 09/07/24 1204   Resp 18 09/07/24 1204   SpO2 96 % 09/07/24 1204   Vitals shown include unfiled device data.    Anesthesia Post Evaluation    Patient location during evaluation: PACU  Patient participation: complete - patient participated  Level of consciousness: awake and alert  Pain management: adequate  Airway patency: patent  Cardiovascular status: acceptable  Respiratory status: acceptable  Hydration status: acceptable  Postoperative Nausea and Vomiting: none  Comments: Patient was extubated in pacu after weaning parameters met   Received duoneb treatment post extubation   No issues HD stable  Sent verify for second abo in order to start blood transfusion  Around 1100 am acs decided to admit patient to the icu waiting on bed          Encounter Notable Events   Notable Event Outcome Phase Comment   Unable to extubate Ongoing Treatment Intraprocedure Unable to extubate secondary to weakness. 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 Transferred to PACU, remains on a ventilator

## 2024-09-07 NOTE — PROGRESS NOTES
VASCULAR SURGERY PROGRESS NOTE  Assessment/Plan   Fernando Cuevas is 63 y.o. female with history of COPD, hypertension, DVT on Eliquis, lung cancer s/p radiation, alcohol abuse, cocaine abuse, small bowel perforation is s/p resection who was admitted with small bowel obstruction and found to have right foot pain.  Vascular surgery consulted.  Patient is now s/p right leg angiogram and right PT thromboembolectomy.      Mild to moderate improvement in right foot mottling with multiphasic right PT Doppler signal.  Patient continues to remain high risk for limb loss given poor runoff into the foot in setting of inability to start anticoagulation.     Recommendations:  -Concerns for GIB given intraoperatively patient had a large bloody bowel movement with H&H drop from 8.7 to 6.5 postoperatively and minimal intraoperative loss  -Holding anticoagulation given concerns for GI bleed  -Continue every 4 hours neurovascular check  -Assess for pseudocholinesterase deficiency as per anesthesia given concerns for significant muscle weakness    Discussed with attending, Dr. Tyra Kapoor MD  PGY-5 Vascular Surgery Resident    Subjective   The patient was extubated to nasal cannula.  Reported some improvement in right foot pain.  No new episodes of bloody bowel movement since OR per nursing.    Objective   Vitals:  Heart Rate:  [105-129]   Temp:  [36 °C (96.8 °F)-37.2 °C (99 °F)]   Resp:  [13-20]   BP: (100-194)/()   SpO2:  [96 %-100 %]     Exam:  Constitutional: No acute distress, resting comfortably  Neuro:  AOx3, grossly intact  ENMT: NGT in place with bilious output  CV: Sinus tachycardic on monitor  Pulm: non-labored on nasal cannula  GI: soft, moderately distended, nontender, NGT with bilious output, healed midline incision  Skin: Ischemic mottling of right foot (with moderate improvement compared to preop), ischemic mottling of left toes limited to forefoot, right ankle incision covered with  dressing  Musculoskeletal: moving all extremities  Extremities: Sensory and motor intact, small but stable left groin hematoma  Pulse exam: Multiphasic left PT Doppler signal, multiphasic right PT Doppler signal    Labs:  Results from last 7 days   Lab Units 09/07/24  0632 09/06/24  0932 09/05/24  1016   WBC AUTO x10*3/uL 24.4* 26.6* 40.6*   HEMOGLOBIN g/dL 6.5* 8.7* 9.5*   PLATELETS AUTO x10*3/uL 585* 758* 846*      Results from last 7 days   Lab Units 09/06/24  0932 09/05/24  1016 09/01/24  0537   SODIUM mmol/L 140 137 135*   POTASSIUM mmol/L 4.1 3.9 4.5   CHLORIDE mmol/L 103 102 101   CO2 mmol/L 30 22 24   BUN mg/dL 17 14 10   CREATININE mg/dL 0.81 0.74 0.60   GLUCOSE mg/dL 97 104* 100*   MAGNESIUM mg/dL 2.63*  --  1.60   PHOSPHORUS mg/dL  --   --  4.2

## 2024-09-07 NOTE — CONSULTS
Reason For Consult  COPD management    History Of Present Illness  Fernando Cuevas is a 63 y.o. female with pmhx significant for COPD, DVT on eliquis, HTN, cocaine abuse, alcohol abuse, lung Ca hx, chronic constipation, depression, thrombocytosis, panic disorder,  multiple SBOs and exlap/JOSIAH/partial SBR for SBO in 2024. She is currently admitted for evaluation and management of SBO to the surgery service. Pulmonary consulted in the setting of COPD and dyspnea for recommendations inpatient.    She reports that she has worsening dyspnea without hypoxia, worsened since in the hospital. She did take a course of steroids recently that she sometimes has used when she has exacerbations (last week). She denies cough or significant sputum production. Does have increased work of breathing, and feels her chest is very tight. Reports ongoing tobacco/cigarette use.      Past Medical History  She has a past medical history of COPD (chronic obstructive pulmonary disease) (Multi), Depression, DVT (deep venous thrombosis) (Multi), HTN (hypertension), Lung cancer (Multi), Migraines, and Panic disorder.    Surgical History  She has a past surgical history that includes CT angio abdomen pelvis w and or wo IV IV contrast (2016); MR angio neck w IV contrast (2021); CT angio coronary art with heartflow if score >30% (2023);  section, low transverse; Esophagogastroduodenoscopy; Colonoscopy; Exploratory laparotomy w/ bowel resection (2024); and Cystoscopy.     Social History  She reports that she has been smoking cigarettes. She has been exposed to tobacco smoke. She has never used smokeless tobacco. She reports current alcohol use. She reports current drug use. Drugs: Cocaine and Marijuana.    Family History  No family history on file.     Allergies  Dilaudid [hydromorphone], Iodinated contrast media, Iodine, and Adhesive tape-silicones    Physical Exam  Constitutional: alert oriented not in acute  "distress  HEENT: normocephalic atraumatic MMM, ng tube in place to suction  Cardiovascular: normal rate and rhythm  Respiratory: wheezes throughout especially on expiration  Extremities: warm and well perfused     Last Recorded Vitals  Blood pressure 135/86, pulse (!) 119, temperature 36 °C (96.8 °F), resp. rate 13, height 1.572 m (5' 1.89\"), weight 61.5 kg (135 lb 9.3 oz), SpO2 100%.      Current Facility-Administered Medications:     [Transfer Hold] acetaminophen (Ofirmev) injection 1,000 mg, 1,000 mg, intravenous, q6h LISET, Diana Rodriguez MD, Stopped at 09/06/24 1809    acetaminophen (Tylenol) tablet 650 mg, 650 mg, oral, q4h PRN, Michel Jimenez DO    [Transfer Hold] albuterol 2.5 mg /3 mL (0.083 %) nebulizer solution 3 mL, 3 mL, nebulization, q6h PRN, Diana Rodriguez MD, 3 mL at 09/06/24 1608    [Transfer Hold] albuterol 90 mcg/actuation inhaler 2 puff, 2 puff, inhalation, q4h PRN, Diana Rodriguez MD    [Held by provider] azithromycin (Zithromax) tablet 250 mg, 250 mg, oral, Every Mon/Wed/Fri, Diana Rodriguez MD    [Transfer Hold] busPIRone (Buspar) tablet 5 mg, 5 mg, oral, BID, Dixon Murray MD    dexmedeTOMIDine (Precedex) 400 mcg in 100 mL (4 mcg/mL) sodium chloride 0.9% infusion, 0-1.5 mcg/kg/hr, intravenous, Continuous, Michel Jimenez DO, Last Rate: 10.76 mL/hr at 09/07/24 0651, 0.7 mcg/kg/hr at 09/07/24 0651    [Transfer Hold] dilTIAZem CD (Cardizem CD) 24 hr capsule 240 mg, 240 mg, oral, Daily, Dixon Murray MD    [Transfer Hold] famotidine PF (Pepcid) injection 20 mg, 20 mg, intravenous, Once, Adolfo Arroyo MD    heparin 25,000 Units in dextrose 5% 250 mL (100 Units/mL) infusion (premix), 0-4,000 Units/hr, intravenous, Continuous, Dixon Murray MD, Stopped at 09/07/24 0248    HYDROmorphone (Dilaudid) injection 0.2 mg, 0.2 mg, intravenous, q5 min PRN, Michel Jimenez DO    HYDROmorphone (Dilaudid) injection 0.4 mg, 0.4 mg, intravenous, q5 min PRN, Michel Jimenez DO    " ipratropium-albuteroL (Duo-Neb) 0.5-2.5 mg/3 mL nebulizer solution 3 mL, 3 mL, nebulization, q6h PRN, Deeplai Terrell MD    [Transfer Hold] ketorolac (Toradol) injection 15 mg, 15 mg, intravenous, q6h, Olga Cruz MD, 15 mg at 09/06/24 2224    lactated Ringer's infusion, 100 mL/hr, intravenous, Continuous, Diana Rodriguez MD, Last Rate: 100 mL/hr at 09/07/24 0135, Restarted at 09/07/24 0151    lactated Ringer's infusion, 100 mL/hr, intravenous, Continuous, Michel Jimenez DO    [Transfer Hold] levalbuterol (Xopenex) 0.31 mg/3 mL nebulizer solution 0.31 mg, 0.31 mg, nebulization, TID, Dixon Murray MD    [Transfer Hold] methylPREDNISolone sod succinate (SOLU-Medrol) injection 40 mg, 40 mg, intravenous, q4h, Cathryn aBrone MD, 40 mg at 09/07/24 0109    metoclopramide (Reglan) injection 10 mg, 10 mg, intravenous, Once PRN, Michel Jimenez DO    [Transfer Hold] mirtazapine (Remeron) tablet 15 mg, 15 mg, oral, Nightly, Dixon Murray MD    [Transfer Hold] montelukast (Singulair) tablet 10 mg, 10 mg, oral, Daily, Dixon Murray MD    [Transfer Hold] ondansetron (Zofran) injection 4 mg, 4 mg, intravenous, q6h PRN, Leslee Meehan MD, 4 mg at 09/05/24 2305    ondansetron (Zofran) injection 4 mg, 4 mg, intravenous, Once PRN, Michel Jimenez DO    [Held by provider] oxybutynin (Ditropan) tablet 2.5 mg, 2.5 mg, oral, BID, Diana Rodriguez MD    oxygen (O2) therapy, , inhalation, Continuous PRN - O2/gases, Michel Jimenez DO    [Transfer Hold] pantoprazole (ProtoNix) injection 40 mg, 40 mg, intravenous, BID, Joe Elias MD, 40 mg at 09/06/24 2009    [Held by provider] predniSONE (Deltasone) tablet 40 mg, 40 mg, oral, Daily, Diana Rodriguez MD    [Held by provider] sucralfate (Carafate) tablet 1 g, 1 g, oral, BID, Diana Rodriguez MD    [Held by provider] thiamine (Vitamin B-1) tablet 100 mg, 100 mg, oral, Daily, Diana Rodriguez MD    [Transfer Hold] tiotropium (Spiriva Respimat) 2.5 mcg/actuation  inhaler 2 puff, 2 puff, inhalation, Daily, Dixon Murray MD      Relevant Results  PFTs 2021  Severe obstructive defect  Gas trapping  Reduced DLCO    CT chest 7/2024  Advanced centrilobular and paraseptal emphysema.  There are bibasilar  airspace opacities slightly more consolidative in the right lung  base. This may relate to atelectasis although superimposed infection  not excluded.     Assessment/Plan   Fernando Cuevas is a 63 y.o. female with pmhx significant for COPD, DVT on eliquis, HTN, cocaine abuse, alcohol abuse, lung Ca hx, chronic constipation, depression, thrombocytosis, panic disorder,  multiple SBOs and exlap/JOSIAH/partial SBR for SBO in 6/2024. She is currently admitted for evaluation and management of SBO to the surgery service. Pulmonary consulted in the setting of COPD and dyspnea for recommendations inpatient. COPD from previous data is consistent with severe obstructive disease with gas trapping per pfts and emphysema is seen on her imaging. Current smoker (stopped while been inpatient).    - recommend a burst of IV steroids (methylpred 125 daily) for about five days  - agree with continuing home inhalers including Spiriva and Dulera, would also use nebulized albuterol scheduled q6hr or as needed once improved  - she should have consistent and scheduled pulmonary hygiene perioperatively and incentive spirometer/acapella use similarly    Patient seen evaluated and discussed with attending physician, Dr. Williamson.    Margarita Gonzales MD

## 2024-09-07 NOTE — ANESTHESIA POSTPROCEDURE EVALUATION
Patient: Fernando Cuevas    Procedure Summary       Date: 09/07/24 Room / Location: [facility] OR 27 / Virtual [facility] OR    Anesthesia Start: 0135 Anesthesia Stop: 0506    Procedure: Right leg angiogram; Right Leg Cutdown; Posterior Tibial Artery Thrombectomy (Right: Leg Lower) Diagnosis:       Acute lower limb ischemia      (Acute lower limb ischemia [I99.8])    Surgeons: Brendan Mary MD PhD Responsible Provider: Armando Foster DO    Anesthesia Type: general ASA Status: 3 - Emergent            Anesthesia Type: general    Vitals Value Taken Time   /104 09/07/24 0516   Temp 36 09/07/24 0519   Pulse 106 09/07/24 0517   Resp 16 09/07/24 0517   SpO2 99 % 09/07/24 0517   Vitals shown include unfiled device data.    Anesthesia Post Evaluation    Patient location during evaluation: PACU  Patient participation: complete - patient cannot participate  Level of consciousness: sedated  Pain score: 0  Pain management: adequate  Multimodal analgesia pain management approach  Airway patency: patent  Cardiovascular status: acceptable and hemodynamically stable  Respiratory status: intubated and ventilator  Hydration status: acceptable  Postoperative Nausea and Vomiting: none  Comments: The patient had significant muscle weakness that looked consistent to residual NMB agent effect.  The only muscle relaxant that the patient had was succinylcholine.  My suspicion is that this patient has a psuedocholinesterase deficiency.  She will  go to the PACU for ventilatory support until she meets extubation criteria.        Encounter Notable Events   Notable Event Outcome Phase Comment   Unable to extubate Ongoing Treatment Intraprocedure Unable to extubate secondary to weakness. 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 Transferred to PACU, remains on a ventilator

## 2024-09-07 NOTE — SIGNIFICANT EVENT
RT at BS. Patient extubated to 2L NC without complications. PRN breathing tx given with Ezpap, No other RT interventions needed at this time.

## 2024-09-07 NOTE — OP NOTE
Date of Service: 9/7/2024    Preoperative diagnosis: Acute on chronic limb ischemia, right lower extremity.     Postoperative diagnosis: Same    Procedures:  1.  Ultrasound guided left common femoral artery retrograde access and closure with Mynx.     2.  Diagnostic aortoiliac angiogram.     3.  Diagnostic right lower extremity angiogram; second order catheterization.     4.  Right posterior tibial artery mechanical thrombectomy with Arpan balloon catheter.     5.  Radiographic supervision and interpretation.       Surgeon:  Brendan Mary MD, PhD.     Assistant:  Karin Kapoor MD.     Complications: None    Radiographic interpretation:  Patent abdominal aorta and bilateral iliac arteries.   Patent right common, deep and superficial femoral arteries without flow limiting stenosis.   Patent right popliteal artery without flow limiting stenosis.   Mid to distal occlusions of the anterior tibial, peroneal, and posterior tibial arteries without distal reconstitution.       Indications for procedure:   This is a 63 year old female who is admitted under the general surgery service with small bowel obstruction and history of cocaine use, who had several week (2-4 week) history of the right foot pain and numbness with motor/sensory loss. Patient was awaiting further work up with imaging studies but then later reported worsening of symptom with loss of the right PT doppler signal. Thus, we recommended immediate right leg angiogram with possibility of the right leg thrombectomy.   We discussed given the chronicity of the symptoms, that she is at a high risk of limb loss.   I discussed the plan for right lower extermity arteriography, and the possibility of thromboembolectomy for treatment of arterial occlusive disease. The risks of arterial access, including vessel or nerve injury, bleeding, infection, limb ischemia/limb loss, embolization, reaction to medication, or need for operation, were discussed. The risks of contrast  administration, including renal failure or allergic type reaction, were discussed. We also discussed the additional risks of intervention, including vessel injury, infection, residual or recurrent ischemia, need for operation or additional intervention, failure to resolve ischemia, limb loss, or other serious complications. The patient indicated understanding of the procedure, plan, alternatives, and risks, and voiced consent to proceed.       Description of procedure:  The patient was placed in a supine position. The procedure was performed under general anesthesia. The bilateral groins and right leg were prepped and draped in the usual sterile fashion. The time out was completed verifying correct patient, site, procedure and positioning.     The left common femoral artery was visualized with ultrasound and retrograde left common femoral artery access was performed with microaccess system under ultrasound guidance. Over the wire the 5 Fr sheath was placed. The sheath was then flushed with heparinized solution.     Under the fluoroscopic guidance, soft 0.035 glidewire was advance to the abdominal aorta under the fluoroscopic guidance. Omniflush catheter was advanced over the wire and positioned at infra-renal aorta. The catheter was connected to power injector and the system was free of air bubble by flushing out system. Aortogram was performed revealing patent abdominal aorta and bilateral iliac arteries.   Right common iliac artery was selected with soft 0.035 glidewire and with omniflush catheter.   Right external iliac artery was selected with soft 0.035 glidewire and with omniflush catheter.   Right common femoral artery was selected with soft 0.035 glidewire and with omniflush catheter.   Right lower extremity angiogram was performed revealing above findings under radiographic interpretation.     Systemic anticoagulation was achieved with IV heparin, 100 u/kg keeping ACT >200 throughout the case.     At the  right ankle level, longitudinal incision was made over the posterior tibial artery. Tissue was dissected down to the posterior tibial artery; proximal and distal control was obtained with vessel loops. Transverse arteriotomy was made with scalpel.   Posterior tibial artery thromboembolectomy was performed distally first with Arpan catheter; thrombo-embolic material was evacuated from the right foot until the clean pass.   Posterior tibial artery thromboembolectomy was performed proximally with Arpan catheter; thrombo-embolic material was evacuated until the clean pass and strong pulsatile flow was established.  The right posterior tibial artery arteriotomy was closed with 7-0 Prolene in an interrupted fashion.   Hemostasis was obtained. The incision was closed with interrupted 3-0 Prolene.      Left femoral sheath was removed and percutaneous closed with Mynx.   Manual pressure was held for 5 minutes. Hemostasis was confirmed.     Patient now had right posterior tibial artery doppler signal. The right foot viability is still questionable given the chronicity of the symptom.   The patient tolerated the procedure well and was transferred to PACU in a stable condition.     I was physically present for the entire length of the case and scrubbed for the entire portions.     Brendan Mary MD, PhD.   Clinical   [facility] [employer]  Co-Director, Aortic Center  [facility] [place]

## 2024-09-07 NOTE — SIGNIFICANT EVENT
Vascular Surgery Post-Operative Plan    S/p right leg angiogram, PT thromboembolectomy    Pre-op Vascular Exam: Absent right foot pedal dopplers, multiphasic left PT doppler  Post-op Vascular Exam: [place] right PT distal to incision, multiphasic left PT    Plan:  4 hours PACU stay for NV checks  pain control  Restart heparin gtt in morning  24 hours Ancef for periop antibiotics  PT tomorrow morning  Wound: Right ankle incision with sutures in place    Patient remained intubated and transferred to PACU, with concerns for Pseudocholinesterase deficiency per anesthesia team    Karin Kapoor MD  PGY-5 Vascular Surgery Resident

## 2024-09-07 NOTE — CONSULTS
[facility]   [facility]  INITIAL CONSULT NOTE       Reason For Consult: GI bleeding    SUBJECTIVE     History Of Present Illness  Fernando Cuevas is a 63 y.o. female with a past medical history of polysubstance use, PAD, recurrent SBO with recent small bowel resection (June 2024) admitted on 9/5/2024 with SBO. Her hospitalization was complicated with concern for acute limb ischemia and she is now s/p RLE angiogram and thrombectomy with vascular surgery. She had a large bloody bowel movement in the OR with an associated 2 point drop in Hb for which GI is consulted.     Prior to surgery, patient was on heparin gtt. She received anticoagulation in the OR. Patient is now in the TSICU s/p 2u blood transfusion. Post transfusion CBC in >10. She does have chronic anemia and her baseline is in the 8s. She had a large brown bowel movement in the TSICU. She denies abdominal pain, epigastric pain. She denies nausea/ vomiting though she has an NGT in place. She denies melena, hematochezia, hematemesis prior to admission.    Review of Systems: negative except as per HPI     Past Medical History:    Past Medical History:   Diagnosis Date    COPD (chronic obstructive pulmonary disease) (Multi)     Depression     DVT (deep venous thrombosis) (Multi)     bilateral upper extremities    HTN (hypertension)     Lung cancer (Multi)     Migraines     Panic disorder        Home Medications  Medications Prior to Admission   Medication Sig Dispense Refill Last Dose    acetaminophen (Tylenol Extra Strength) 500 mg tablet Take 2 tablets (1,000 mg) by mouth every 8 hours.       albuterol 90 mcg/actuation inhaler Inhale 2 puffs every 4 hours if needed for wheezing or shortness of breath.       ammonium lactate (Lac-Hydrin) 12 % lotion Apply topically twice a day.       apixaban (Eliquis) 5 mg tablet Take 1 tablet (5 mg) by mouth 2 times a day. 30 tablet 0     azithromycin (Zithromax) 250 mg  tablet Take 1 tablet (250 mg) by mouth once a day on Monday, Wednesday, and Friday.       benzonatate (Tessalon) 100 mg capsule Take 1 capsule (100 mg) by mouth 3 times a day as needed for cough. Do not crush or chew. 20 capsule 0     busPIRone (Buspar) 5 mg tablet Take 1 tablet (5 mg) by mouth twice a day. Supposed to take. Needs Rx       calcium carbonate (Oscal) 500 mg calcium (1,250 mg) tablet Take 1 tablet (1,250 mg) by mouth 2 times daily (morning and late afternoon).       calcium carbonate (Tums) 250 mg (Sycuan 100 mg) chewable split tablet Take 2 half tablet (500 mg) by mouth every 12 hours.       clotrimazole (Lotrimin) 1 % cream Apply topically 2 times a day.       dilTIAZem ER (Tiazac) 240 mg 24 hr capsule Take 1 capsule (240 mg) by mouth once daily.       fluocinonide 0.05 % cream APPLY THIN LAYER TO AFFECTED AREA TWICE A DAY AS NEEDED       albuterol 2.5 mg /3 mL (0.083 %) nebulizer solution Take 3 mL by nebulization every 6 hours if needed for wheezing or shortness of breath. 300 mL 0     Lidocaine Pain Relief 4 % patch Place 1 patch on the skin every 24 (twenty four) hours if needed for mild pain (1 - 3).       mirtazapine (Remeron) 15 mg tablet Take 1 tablet (15 mg) by mouth once daily at bedtime. 30 tablet 0     mometasone-formoterol (Dulera 100) 100-5 mcg/actuation inhaler Inhale 200 mcg twice a day.       montelukast (Singulair) 10 mg tablet Take 1 tablet (10 mg) by mouth once daily.       oxybutynin XL (Ditropan-XL) 5 mg 24 hr tablet Take 1 tablet (5 mg) by mouth once daily. Does not take everyday       pantoprazole (ProtoNix) 40 mg EC tablet Take 1 tablet (40 mg) by mouth twice a day.       polyethylene glycol (Glycolax, Miralax) 17 gram/dose powder Take 17 g by mouth once daily. Do not fill before 2024. 238 g 0     [] predniSONE (Deltasone) 20 mg tablet Take 2 tablets (40 mg) by mouth once daily for 4 days. 8 tablet 0     sucralfate (Carafate) 1 gram tablet Take 1 tablet (1 g) by  mouth 2 times a day. Crush and mix in luke warm water to make slurry and drink the slurry. 60 tablet 0     thiamine 100 mg tablet Take 1 tablet (100 mg) by mouth once daily.       tiotropium (Spiriva Respimat) 2.5 mcg/actuation inhaler Inhale 2 puffs once daily. 8 g 11     tiotropium (Spiriva Respimat) 2.5 mcg/actuation inhaler Inhale 2 puffs once daily.          Surgical History:    Past Surgical History:   Procedure Laterality Date     SECTION, LOW TRANSVERSE      COLONOSCOPY      CT ABDOMEN PELVIS ANGIOGRAM W AND/OR WO IV CONTRAST  2016    CT ABDOMEN PELVIS ANGIOGRAM W AND/OR WO IV CONTRAST 3/22/2016 [facility] [place] [facility]    CT ANGIO CORONARY ART WITH HEARTFLOW IF SCORE >30%  2023    CT ANGIO CORONARY ART WITH HEARTFLOW IF SCORE >30% 2023    CYSTOSCOPY      botox injection    ESOPHAGOGASTRODUODENOSCOPY      EXPLORATORY LAPAROTOMY W/ BOWEL RESECTION  2024    SBR for perforation    MR NECK ANGIO W IV CONTRAST  2021    MR NECK ANGIO W IV CONTRAST 2021       Allergies:    Allergies   Allergen Reactions    Dilaudid [Hydromorphone] Itching    Iodinated Contrast Media Hives     Hives to faces and neck with itching. Resolved with 50 mg benadryl, 20 mg pepcid & 60 mg prednisone.    Hives to faces and neck with itching. Resolved with 50 mg benadryl, 20 mg pepcid & 60 mg prednisone.   Hives to faces and neck with itching. Resolved with 50 mg benadryl, 20 mg pepcid & 60 mg prednisone.    Iodine Other and Unknown    Adhesive Tape-Silicones Rash       Social History:    Social History     Socioeconomic History    Marital status: Single     Spouse name: Not on file    Number of children: Not on file    Years of education: Not on file    Highest education level: Not on file   Occupational History    Not on file   Tobacco Use    Smoking status: Some Days     Types: Cigarettes     Passive exposure: Current (3 cigarettes a day)    Smokeless tobacco: Never   Vaping Use    Vaping status: Never  Used   Substance and Sexual Activity    Alcohol use: Yes     Comment: per pt weekly use    Drug use: Yes     Types: Cocaine, Marijuana     Comment: cocain monthly, marijuana few times per week    Sexual activity: Defer   Other Topics Concern    Not on file   Social History Narrative    Not on file     Social Determinants of Health     Financial Resource Strain: Patient Declined (9/6/2024)    Overall Financial Resource Strain (CARDIA)     Difficulty of Paying Living Expenses: Patient declined   Food Insecurity: No Food Insecurity (7/30/2024)    Hunger Vital Sign     Worried About Running Out of Food in the Last Year: Never true     Ran Out of Food in the Last Year: Never true   Transportation Needs: Patient Declined (9/6/2024)    PRAPARE - Transportation     Lack of Transportation (Medical): Patient declined     Lack of Transportation (Non-Medical): Patient declined   Physical Activity: Inactive (7/30/2024)    Exercise Vital Sign     Days of Exercise per Week: 0 days     Minutes of Exercise per Session: 0 min   Stress: No Stress Concern Present (7/30/2024)    [employer] - Occupational Stress Questionnaire     Feeling of Stress : Not at all   Social Connections: Unknown (7/30/2024)    Social Connection and Isolation Panel [NHANES]     Frequency of Communication with Friends and Family: More than three times a week     Frequency of Social Gatherings with Friends and Family: More than three times a week     Attends Worship Services: Never     Active Member of Clubs or Organizations: No     Attends Club or Organization Meetings: Never     Marital Status: Patient declined   Intimate Partner Violence: Not At Risk (7/30/2024)    Humiliation, Afraid, Rape, and Kick questionnaire     Fear of Current or Ex-Partner: No     Emotionally Abused: No     Physically Abused: No     Sexually Abused: No   Housing Stability: Patient Declined (9/6/2024)    Housing Stability Vital Sign     Unable to Pay for  Housing in the Last Year: Patient declined     Number of Times Moved in the Last Year: 2     Homeless in the Last Year: Patient declined       Family History:  No family history on file.    EXAM     Vitals:    Vitals:    09/07/24 1251 09/07/24 1300 09/07/24 1315 09/07/24 1330   BP: 124/69 131/78 136/76 137/81   BP Location:       Patient Position:       Pulse: (!) 120 (!) 121 (!) 122 (!) 125   Resp: 20 (!) 31 20 18   Temp: 36.4 °C (97.5 °F)      TempSrc: Temporal      SpO2: 97%      Weight:       Height:         Failed to redirect to the Timeline version of the [employer] SmartLink.    Intake/Output Summary (Last 24 hours) at 9/7/2024 1343  Last data filed at 9/7/2024 1000  Gross per 24 hour   Intake 1020 ml   Output 710 ml   Net 310 ml         Physical Exam  General: well-nourished, no acute distress  HEENT: EOM intact, no scleral icterus, moist MM, NG in place with mild biliary drainage  Respiratory: nonlabored breathing on room air  Cardiovascular: regular rate  Abdomen: Soft, nontender, nondistended, bowel sounds present. No masses palpated  Extremities: no edema, no asterixis  Neuro: alert and oriented, CNII-XII grossly intact, moves all 4 extremities with no focal deficits  Psych: calm and cooperative    OBJECTIVE                                                                              Medications       Current Facility-Administered Medications:     [Transfer Hold] acetaminophen (Ofirmev) injection 1,000 mg, 1,000 mg, intravenous, q6h LISET, Diana Rodriguez MD, Stopped at 09/06/24 1809    acetaminophen (Tylenol) tablet 650 mg, 650 mg, oral, q4h PRN, Michel Jimenez, DO    [Transfer Hold] albuterol 2.5 mg /3 mL (0.083 %) nebulizer solution 3 mL, 3 mL, nebulization, q6h PRN, Diana Rodriguez MD, 3 mL at 09/06/24 1608    [Transfer Hold] albuterol 90 mcg/actuation inhaler 2 puff, 2 puff, inhalation, q4h PRN, Diana Rodriguez MD    [Held by provider] azithromycin (Zithromax) tablet 250 mg, 250 mg, oral, Every  Mon/Wed/Fri, Diana Rodriguez MD    [Transfer Hold] busPIRone (Buspar) tablet 5 mg, 5 mg, oral, BID, Dixon Murray MD    dexmedeTOMIDine (Precedex) 400 mcg in 100 mL (4 mcg/mL) sodium chloride 0.9% infusion, 0-1.5 mcg/kg/hr, intravenous, Continuous, Michel Jimenez DO, Stopped at 09/07/24 0800    [Transfer Hold] dilTIAZem CD (Cardizem CD) 24 hr capsule 240 mg, 240 mg, oral, Daily, Dixon Murray MD    [Transfer Hold] famotidine PF (Pepcid) injection 20 mg, 20 mg, intravenous, Once, Adolfo Arroyo MD    [Held by provider] heparin 25,000 Units in dextrose 5% 250 mL (100 Units/mL) infusion (premix), 0-4,000 Units/hr, intravenous, Continuous, Dixon Murray MD, Stopped at 09/07/24 0248    HYDROmorphone (Dilaudid) injection 0.2 mg, 0.2 mg, intravenous, q5 min PRN, Michel Jimenez DO    HYDROmorphone (Dilaudid) injection 0.4 mg, 0.4 mg, intravenous, q5 min PRN, Michel Jimenez DO, 0.4 mg at 09/07/24 1248    ipratropium-albuteroL (Duo-Neb) 0.5-2.5 mg/3 mL nebulizer solution 3 mL, 3 mL, nebulization, q6h PRN, Deepali Terrell MD, 3 mL at 09/07/24 1332    [Transfer Hold] ketorolac (Toradol) injection 15 mg, 15 mg, intravenous, q6h, lOga Cruz MD, 15 mg at 09/06/24 2224    lactated Ringer's infusion, 100 mL/hr, intravenous, Continuous, Diana Rodriguez MD, Last Rate: 100 mL/hr at 09/07/24 0135, Restarted at 09/07/24 0151    lactated Ringer's infusion, 100 mL/hr, intravenous, Continuous, Michel Jimenez DO    [Transfer Hold] levalbuterol (Xopenex) 0.31 mg/3 mL nebulizer solution 0.31 mg, 0.31 mg, nebulization, TID, Dixon Murray MD    metoclopramide (Reglan) injection 10 mg, 10 mg, intravenous, Once PRN, Michel Jimenez DO    [Transfer Hold] mirtazapine (Remeron) tablet 15 mg, 15 mg, oral, Nightly, Dixon Murray MD    [Transfer Hold] montelukast (Singulair) tablet 10 mg, 10 mg, oral, Daily, Dixon Murray MD    [Transfer Hold] ondansetron (Zofran) injection 4 mg, 4 mg, intravenous, q6h PRN, Leslee  MD Shefali, 4 mg at 09/05/24 2305    ondansetron (Zofran) injection 4 mg, 4 mg, intravenous, Once PRN, Michel Jimenez DO    [Held by provider] oxybutynin (Ditropan) tablet 2.5 mg, 2.5 mg, oral, BID, Diana Rodriguez MD    oxygen (O2) therapy, , inhalation, Continuous PRN - O2/gases, Michel Jimenez DO    [Transfer Hold] pantoprazole (ProtoNix) injection 40 mg, 40 mg, intravenous, BID, Joe Elias MD, 40 mg at 09/06/24 2009    [Held by provider] predniSONE (Deltasone) tablet 40 mg, 40 mg, oral, Daily, Diana Rodriguez MD    [Held by provider] sucralfate (Carafate) tablet 1 g, 1 g, oral, BID, Diana Rodriguez MD    [Held by provider] thiamine (Vitamin B-1) tablet 100 mg, 100 mg, oral, Daily, Diana Rodriguez MD    [Transfer Hold] tiotropium (Spiriva Respimat) 2.5 mcg/actuation inhaler 2 puff, 2 puff, inhalation, Daily, Dixon Murray MD                                                                            Labs     Results for orders placed or performed during the hospital encounter of 09/05/24 (from the past 24 hour(s))   Lactate   Result Value Ref Range    Lactate 1.4 0.4 - 2.0 mmol/L   CBC and Auto Differential   Result Value Ref Range    WBC 24.4 (H) 4.4 - 11.3 x10*3/uL    nRBC 1.8 (H) 0.0 - 0.0 /100 WBCs    RBC 2.88 (L) 4.00 - 5.20 x10*6/uL    Hemoglobin 6.5 (LL) 12.0 - 16.0 g/dL    Hematocrit 21.9 (L) 36.0 - 46.0 %    MCV 76 (L) 80 - 100 fL    MCH 22.6 (L) 26.0 - 34.0 pg    MCHC 29.7 (L) 32.0 - 36.0 g/dL    RDW 21.8 (H) 11.5 - 14.5 %    Platelets 585 (H) 150 - 450 x10*3/uL    Immature Granulocytes %, Automated 0.6 0.0 - 0.9 %    Immature Granulocytes Absolute, Automated 0.14 0.00 - 0.70 x10*3/uL   Manual Differential   Result Value Ref Range    Neutrophils %, Manual 55.7 40.0 - 80.0 %    Bands %, Manual 40.3 0.0 - 5.0 %    Lymphocytes %, Manual 1.6 13.0 - 44.0 %    Monocytes %, Manual 0.8 2.0 - 10.0 %    Eosinophils %, Manual 0.0 0.0 - 6.0 %    Basophils %, Manual 0.0 0.0 - 2.0 %    Metamyelocytes %,  Manual 1.6 0.0 - 0.0 %    Seg Neutrophils Absolute, Manual 13.59 (H) 1.20 - 7.00 x10*3/uL    Bands Absolute, Manual 9.83 (H) 0.00 - 0.70 x10*3/uL    Lymphocytes Absolute, Manual 0.39 (L) 1.20 - 4.80 x10*3/uL    Monocytes Absolute, Manual 0.20 0.10 - 1.00 x10*3/uL    Eosinophils Absolute, Manual 0.00 0.00 - 0.70 x10*3/uL    Basophils Absolute, Manual 0.00 0.00 - 0.10 x10*3/uL    Metamyelocytes Absolute, Manual 0.39 0.00 - 0.00 x10*3/uL    Total Cells Counted 124     Neutrophils Absolute, Manual 23.42 (H) 1.20 - 7.70 x10*3/uL    RBC Morphology See Below     Polychromasia Mild     Hypochromia Mild     RBC Fragments Few     Target Cells Few    BLOOD GAS ARTERIAL FULL PANEL   Result Value Ref Range    POCT pH, Arterial 7.41 7.38 - 7.42 pH    POCT pCO2, Arterial 39 38 - 42 mm Hg    POCT pO2, Arterial 110 (H) 85 - 95 mm Hg    POCT SO2, Arterial 99 94 - 100 %    POCT Oxy Hemoglobin, Arterial 97.4 94.0 - 98.0 %    POCT Hematocrit Calculated, Arterial 23.0 (L) 36.0 - 46.0 %    POCT Sodium, Arterial 134 (L) 136 - 145 mmol/L    POCT Potassium, Arterial 4.6 3.5 - 5.3 mmol/L    POCT Chloride, Arterial 104 98 - 107 mmol/L    POCT Ionized Calcium, Arterial 1.14 1.10 - 1.33 mmol/L    POCT Glucose, Arterial 164 (H) 74 - 99 mg/dL    POCT Lactate, Arterial 1.6 0.4 - 2.0 mmol/L    POCT Base Excess, Arterial 0.1 -2.0 - 3.0 mmol/L    POCT HCO3 Calculated, Arterial 24.7 22.0 - 26.0 mmol/L    POCT Hemoglobin, Arterial 7.5 (L) 12.0 - 16.0 g/dL    POCT Anion Gap, Arterial 10 10 - 25 mmo/L    Patient Temperature 37.0 degrees Celsius    FiO2 30 %    Ventilator Mode CPAP     Site of Arterial Puncture Radial Right    Prepare RBC: 2 Units   Result Value Ref Range    PRODUCT CODE V2069U50     Unit Number [id number]     Unit ABO O     Unit RH POS     XM INTEP COMP     Dispense Status IS     Blood Expiration Date 10/3/2024 11:59:00 PM EDT     PRODUCT BLOOD TYPE 5100     UNIT VOLUME 350     PRODUCT CODE Z8941T84     Unit Number [id number]      Unit ABO O     Unit RH POS     XM INTEP COMP     Dispense Status IS     Blood Expiration Date 10/2/2024 11:59:00 PM EDT     PRODUCT BLOOD TYPE 5100     UNIT VOLUME 275    BLOOD GAS ARTERIAL FULL PANEL   Result Value Ref Range    POCT pH, Arterial 7.45 (H) 7.38 - 7.42 pH    POCT pCO2, Arterial 40 38 - 42 mm Hg    POCT pO2, Arterial 121 (H) 85 - 95 mm Hg    POCT SO2, Arterial 99 94 - 100 %    POCT Oxy Hemoglobin, Arterial 97.7 94.0 - 98.0 %    POCT Hematocrit Calculated, Arterial 18.0 (L) 36.0 - 46.0 %    POCT Sodium, Arterial 135 (L) 136 - 145 mmol/L    POCT Potassium, Arterial 4.7 3.5 - 5.3 mmol/L    POCT Chloride, Arterial 104 98 - 107 mmol/L    POCT Ionized Calcium, Arterial 1.17 1.10 - 1.33 mmol/L    POCT Glucose, Arterial 85 74 - 99 mg/dL    POCT Lactate, Arterial 1.1 0.4 - 2.0 mmol/L    POCT Base Excess, Arterial 3.5 (H) -2.0 - 3.0 mmol/L    POCT HCO3 Calculated, Arterial 27.8 (H) 22.0 - 26.0 mmol/L    POCT Hemoglobin, Arterial 6.0 (LL) 12.0 - 16.0 g/dL    POCT Anion Gap, Arterial 8 (L) 10 - 25 mmo/L    Patient Temperature 37.0 degrees Celsius    FiO2 28 %   VERIFY ABO/Rh Group Test   Result Value Ref Range    ABO TYPE O     Rh TYPE POS                                                                               Imaging   CT abdomen and pelvis with IV contrast 9/5/24  IMPRESSION:  1. There is a small bowel obstruction. The transition point appears to be in the lower midline abdomen. I suspect this may be due to an  adhesion.  2. Multiple gallstones, but no evidence for cholecystitis or biliary obstruction.  3. The spleen is atrophic and hypoenhancing, likely representing scarring. This may represent a autosplenectomy.                                                                           GI Procedures   EGD 7/26/24  Impression  Erythematous, friable mucosa with contact bleeding, consistent with gastritis in the stomach; performed cold forceps biopsy to evaluate for H. pylori infection.  The esophagus  appeared normal.  The duodenal bulb, 2nd part of the duodenum and 3rd part of the duodenum appeared normal.  No ulcers or AVMs were seen.    Small bowel resection 20 cm removed June 2024    EGD and colonoscopy Feb 2024  DUODENUM: bulb and descending portion appeared normal.    STOMACH: pyloric channel, antrum, body, fundus and cardia, including retroflexed views appear normal.     ESOPHAGUS: Diaphragmatic hiatus was 39 cm from incisors and GE junction (upper margin of gastric folds) was at 39 cm from incisors. Squamocolumnar junction was at 39 cm from incisors. Mucosa appeared normal.    Patient was turned for colonoscopy.    Patient examined. Anus and digital rectal exam were Normal.    While monitoring the patient with EKG, pulse oximetry and BP, colonoscope passed into cecum, which was identified by ileo-cecal valve, appendical orifice and transillumination of right lower quadrant. Prep was Yoder Bowel Prep Right Colon: Minor amount of residual staining, small fragments of stool and/or opaque liquid, Yoder Bowel Prep Transverse Colon: Minor amount of residual staining, small fragments of stool and/or opaque liquid, Yoder Bowel Prep Left Colon:Minor amount of residual staining, small fragments of stool and/or opaque liquid or Good (Large volume of clear liquid covering 5% to 25% of the surface but greater than 90% of surface seen).    FINDINGS:  Cecum: Normal.  Ascending Colon: Normal.  Hepatic Flexure: Normal.  Transverse Colon: Normal.  Splenic Flexure: Normal.  Descending Colon: Normal.  Sigmoid Colon: Normal  Rectum: Abnormal. 2 mm polyp removed with biopsy forceps [bottle A]  Retroflexed Views: Rectum showed internal hemorrhoids.      EGD 2023  Findings:       The hypopharynx was normal.       A single small bleb was found in the proximal esophagus, 15 cm from the        incisors.       Segmental moderate mucosal changes characterized by congestion,        inflammation and altered texture were found in the  lower third of the        esophagus. Biopsies were taken with a cold forceps for histology.        Verification of patient identification for the specimen was done.        Estimated blood loss was minimal.       Diffuse mild mucosal changes characterized by congestion, erythema,        altered texture and a decreased vascular pattern were found in the upper        third of the esophagus and in the middle third of the esophagus.        Biopsies were taken with a cold forceps for histology. Verification of        patient identification for the specimen was done.       The exam of the esophagus was otherwise normal.       There is no endoscopic evidence of Vaz's esophagus, bleeding,        stenosis, ulcerations or varices in the entire esophagus.       Esophagogastric landmarks were identified: the Z-line was found at 40 cm        from the incisors.       A 2 cm hiatal hernia was present.       Patchy mildly erythematous mucosa without bleeding was found in the        gastric fundus, in the gastric body, in the gastric antrum and in the        prepyloric region of the stomach. Biopsies were taken with a cold        forceps for Helicobacter pylori testing. The pathology specimen was        placed into Bottle Number 1.       The examined duodenum was normal.    FINAL DIAGNOSIS   A.  STOMACH, BIOPSIES:   --EROSIVE GASTROPATHY, NO HELICOBACTER IDENTIFIED.     B.  LOWER ESOPHAGUS, BIOPSY AT 35 CM:   --ULCERATED SQUAMOCOLUMNAR JUNCTIONAL MUCOSA, NO MICROORGANISMS, METAPLASIA, OR   MALIGNANCY IDENTIFIED.     C.  MID ESOPHAGUS, COLD BIOPSY:   --ULCERATED SQUAMOUS MUCOSA, SEE NOTE.     Note: Immunohistochemical stains for herpes simplex virus and cytomegalovirus   are negative.         ASSESSMENT / PLAN                  ASSESSMENT/PLAN:  Fernando Cuevas is a 63 y.o. female admitted on 9/5/2024 with SBO and chronic limb ischemia. GI is consulted for intra-op bloody bowel movement during RLE angiogram. Ddx is ischemic colitis  vs anastamotic ulcer bleeding with recent small bowel resection. Ms. Cuevas has undergone multiple endoscopies over the past few years without evidence of active GI bleeding or associated pathology. She responded appropriately to transfusion and remains hemodynamically stable. Will defer endoscopic evaluation at this time.    Recommendations:  -Consider PPI for ulcer ppx  -Continue to monitor Hb and transfuse as needed  -SBO management per surgical team.    Patient was discussed with Dr. Villa. Seen with Dr. Pelayo.    Recommendations communicated with the primary team via secure chat.  Thank you for the consultation.  GI will sign off.  Please do not hesitate to contact us again on Epic Chat or by paging the consultation team at [phone] between the weekday hours of 7 AM - 5 PM.  If there is an urgent concern during the weekend, after-hours, or holidays; then please [place] [phone]. Thank you.      Melva King MD  Gastroenterology and Hepatology Fellow  [facility]

## 2024-09-07 NOTE — PROGRESS NOTES
[facility]  TRAUMA ICU - PROGRESS NOTE    Patient Name: Fernando Cuevas  MRN: [id number]  Admit Date: 905  : 1960  AGE: 63 y.o.   GENDER: female    ==============================================================================  TODAY'S ASSESSMENT AND PLAN OF CARE:  Fernando Cuevas is a 63 y.o. female in the ICU due to: acute hemoglobin drop, possible GI bleed post vascular procedure and close monitoring.     NEURO/PAIN/SEDATION:   #Ischemic and reperfusion pain, chronic pain  - IV tylenol  - IV toradol  - IV dilaudid 0.2mg q2h  - lidocaine patches    RESPIRATORY:   Hx of COPD, malignant RLL neoplasm, emphysema  - seen by pulmonary - recommend IV steroid burst (methylpred 125 daily) x5 days  - continuous O2, maintain >92%  - montelukast 10mg daily  - tiotropium inhalers daily  - Albuterol PRN  - Duo nebs PRN  - pulse ox  - IS, pulmonary hygiene    CARDIOVASC:   Hx: cocaine, DVT LUE DVT on eliquis  - q1h vitals  - diltiazem 24hr capsule 240mg via NGT  - difficult peripheral IV access requiring central line  - q2h NV checks  - TTE for embolic work up tomorrow (with bubble)    GI:   Hx of chronic constipation, SBR due to gastric per (2024), intermittent small amounts of melena, EGD/colonoscopy negative 2024, presented with possible SBO vs 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 Received 2u, incremented to 10.   - GI consulted, suspicious for ischemic colitis vs anastomotic ulcer. No contraindications for therapeutic AC from their team  - pantoprazole 40mg IV    :   - easton in place  - strict Is and Os  - replete electrolytes per ICU protocol     FEN:   - 100ml/hr LR  - NPO, NGT in place    HEMATOLOGIC:   Hx: on eliquis for recent LUE DVT  - holding home eliquis  - will discuss with vascular regarding necessity of heparin gtt in  setting of possible GI bleed  - currently subtherapeutic  - holding hep gtt  - Hgb supraincremented from 6.5 to 10 with 2u    ENDOCRINE:   - SSI #2 per ICU protocol while NPO  - q4h accuchecks    MUSCULOSKELETAL/SKIN:   - heel protectors  - no other acute needs  - no activity restrictions    INFECTIOUS DISEASE:   - daily CBC  - monitor for clinical signs of infection    GI PROPHYLAXIS: pantoprazole IV 40  DVT PROPHYLAXIS: holding for GI bleed.    DISPOSITION: not ready for transfer    Discussed with Dr. Valencia.  Yumiko Lee MD  PGY-2 VS Resident  Trauma Surgery Service  Floor: [id number]  TSICU: [id number]   ==============================================================================  HPI:   Fernando Cuevas is 4yo F with hx of COPD, HTN, DVT LUE on eliquis (approx 6 weeks ago), lung cancer s/p radiation, alcohol abuse, cocaine abuse (Extensive), small bowel/gastric perf s/p resection admitted with SBO found to have right foot pain of unknown chronicity. 9/6 PM concern for RLE ALI on chronic ischemia, R2B with motor deficits. S/p RLE angiogram and R PT thromboembolectomy with return of multiphasic R PT doppler signal. Complicated by on table blood bowel movement with subsequent Hgb 6. Given 2u with increment to 10. Brown bowel movements afterwards. Patient denies overt history of GI bleed.    Pain is slightly improved from pre op, still 8/10. No abdominal pain, no fevers, no shortness of breath, no chest pain. Both feet are painful at rest.     MEDICAL HISTORY / ROS:  Admission history and ROS reviewed. Pertinent changes as follows:  NA    PHYSICAL EXAM:  Heart Rate:  [105-129]   Temp:  [36 °C (96.8 °F)-37.2 °C (99 °F)]   Resp:  [12-31]   BP: (100-194)/()   SpO2:  [93 %-100 %]   Constitutional: uncomfortable due to pain, able to converse, appears stated age  Neuro: A/O x4, CN2-12 intact, m/s intact BUE. Some weakness with ankle flexion and extension (4+/5). Able to wiggle toes bilaterally minimally. Sensation diminished  bilateral feet. Rest pain +/  Psych: normal affect  HEENT: No deformities, no scleral icterus   Cardiac: tachycardic on monitor  Pulmonary: moderate shortness of breath, mildly diminished breath sounds bilaterally, on NC  Abdomen: soft, distended, no tenderness to deep palpation. Well healed midline incision.   Skin: warm lower extremities but cool/cold feet bilaterally. Dry.   Extremities: no swelling noted. Surgical incision to right medial ankle. Small groin access site at left groin without hematoma. Right forefoot mottled but more ruborous/purple.   MSK: moving all four  Vasc: palpable fem bilaterally. Multiphasic popliteral bilaterally. R PT triphasic, no DP. L PT biphasic, no DP.     IMAGING SUMMARY:  (summary of new imaging findings, not a copy of dictation)  NA    LABS:  Results from last 7 days   Lab Units 09/07/24  1555 09/07/24  0632 09/06/24  0932 09/05/24  1016 09/01/24  0537   WBC AUTO x10*3/uL 24.7* 24.4* 26.6* 40.6* 14.9*   HEMOGLOBIN g/dL 10.2* 6.5* 8.7* 9.5* 7.3*   HEMATOCRIT % 32.3* 21.9* 28.9* 29.7* 24.6*   PLATELETS AUTO x10*3/uL 534* 585* 758* 846* 1,004*   NEUTROS PCT AUTO %  --   --   --   --  74.1   LYMPHO PCT MAN %  --  1.6  --  1.7  --    LYMPHS PCT AUTO %  --   --   --   --  15.4   MONO PCT MAN %  --  0.8  --  2.6  --    MONOS PCT AUTO %  --   --   --   --  9.7   EOSINO PCT MAN %  --  0.0  --  0.0  --    EOS PCT AUTO %  --   --   --   --  0.0         Results from last 7 days   Lab Units 09/06/24  0932 09/05/24  1016 09/01/24  0537   SODIUM mmol/L 140 137 135*   POTASSIUM mmol/L 4.1 3.9 4.5   CHLORIDE mmol/L 103 102 101   CO2 mmol/L 30 22 24   BUN mg/dL 17 14 10   CREATININE mg/dL 0.81 0.74 0.60   CALCIUM mg/dL 8.0* 8.8 8.0*   PROTEIN TOTAL g/dL  --  6.9  --    BILIRUBIN TOTAL mg/dL  --  0.3  --    ALK PHOS U/L  --  129  --    ALT U/L  --  69*  --    AST U/L  --  54*  --    GLUCOSE mg/dL 97 104* 100*     Results from last 7 days   Lab Units 09/05/24  1016   BILIRUBIN TOTAL mg/dL 0.3      Results from last 7 days   Lab Units 09/07/24  1025 09/07/24  0742   POCT PH, ARTERIAL pH 7.45* 7.41   POCT PCO2, ARTERIAL mm Hg 40 39   POCT PO2, ARTERIAL mm Hg 121* 110*   POCT HCO3 CALCULATED, ARTERIAL mmol/L 27.8* 24.7   POCT BASE EXCESS, ARTERIAL mmol/L 3.5* 0.1     I have reviewed all medications, laboratory results, and imaging pertinent for today's encounter.      REASON FOR ADMISSION    TODAY'S EVENTS    Seen discussed on round no additional changes to physical exam as noted  no apparent GI bleed await GI input balance between substance induced small vessel spasm, constriction need for Heparin and melanotic stools. In the past she has presented with same at OSH will observe    This critically ill patient continues  to be at-risk for clinically significant deterioration / failure due to the above mentioned dysfunctional, unstable organ systems.  I have personally identified and managed all complex critical care issues to prevent aforementioned clinical deterioration.  Critical care time is spent at bedside and/or the immediate area and has included, but is not limited to, the review of diagnostic tests, labs, radiographs, serial assessments of hemodynamics, respiratory status, ventilatory management, and family updates.  Time spent in procedures and teaching are reported separately.     CRITICAL CARE TIME:  35minutes      Reji Valencia MD

## 2024-09-07 NOTE — PROGRESS NOTES
62 yo female with abdominal pain and imaging consistent with SBO and now s/p RLE thrombectomy for ischemia.   Care discussed with Dr. Valencia.   Seen in the PACU.   Report of bloody BM in the OR.   Hgb 6.   Transfer to ICU given possible GI bleed and recent vascular surgery requiring anticoagulation. PPI. GI consult. Will discuss balance of heparin with any further concerns for GI bleeding.  Transfuse to maintain hgb >7.  Continue NPO, NGT.

## 2024-09-07 NOTE — ANESTHESIA PROCEDURE NOTES
Airway  Date/Time: 9/7/2024 1:54 AM  Urgency: elective    Airway not difficult    Staffing  Performed: resident   Authorized by: Michel Jimenez DO    Performed by: Michel Jimenez DO  Patient location during procedure: OR    Indications and Patient Condition  Indications for airway management: anesthesia  Spontaneous Ventilation: absent  Sedation level: deep  Preoxygenated: yes  Patient position: sniffing  Mask difficulty assessment: 1 - vent by mask  Planned trial extubation    Final Airway Details  Final airway type: endotracheal airway      Successful airway: ETT  Cuffed: yes   Successful intubation technique: direct laryngoscopy  Facilitating devices/methods: intubating stylet  Endotracheal tube insertion site: oral  Blade: Juan J  Blade size: #3  ETT size (mm): 7.0  Cormack-Lehane Classification: grade IIa - partial view of glottis  Placement verified by: chest auscultation and capnometry   Inital cuff pressure (cm H2O): 5  Measured from: lips  ETT to lips (cm): 22  Number of attempts at approach: 1

## 2024-09-08 VITALS
RESPIRATION RATE: 20 BRPM | WEIGHT: 135.58 LBS | OXYGEN SATURATION: 94 % | SYSTOLIC BLOOD PRESSURE: 125 MMHG | DIASTOLIC BLOOD PRESSURE: 83 MMHG | TEMPERATURE: 97.3 F | HEIGHT: 62 IN | HEART RATE: 135 BPM | BODY MASS INDEX: 24.95 KG/M2

## 2024-09-08 LAB
ALBUMIN SERPL BCP-MCNC: 2.4 G/DL (ref 3.4–5)
ANION GAP SERPL CALC-SCNC: 17 MMOL/L (ref 10–20)
BACTERIA BLD CULT: NORMAL
BACTERIA BLD CULT: NORMAL
BUN SERPL-MCNC: 14 MG/DL (ref 6–23)
CA-I BLD-SCNC: 1.11 MMOL/L (ref 1.1–1.33)
CALCIUM SERPL-MCNC: 7.9 MG/DL (ref 8.6–10.6)
CHLORIDE SERPL-SCNC: 99 MMOL/L (ref 98–107)
CO2 SERPL-SCNC: 26 MMOL/L (ref 21–32)
CREAT SERPL-MCNC: 0.65 MG/DL (ref 0.5–1.05)
EGFRCR SERPLBLD CKD-EPI 2021: >90 ML/MIN/1.73M*2
ERYTHROCYTE [DISTWIDTH] IN BLOOD BY AUTOMATED COUNT: 19.9 % (ref 11.5–14.5)
GLUCOSE BLD MANUAL STRIP-MCNC: 106 MG/DL (ref 74–99)
GLUCOSE BLD MANUAL STRIP-MCNC: 123 MG/DL (ref 74–99)
GLUCOSE BLD MANUAL STRIP-MCNC: 74 MG/DL (ref 74–99)
GLUCOSE BLD MANUAL STRIP-MCNC: 80 MG/DL (ref 74–99)
GLUCOSE SERPL-MCNC: 83 MG/DL (ref 74–99)
HCT VFR BLD AUTO: 32.8 % (ref 36–46)
HGB BLD-MCNC: 10.5 G/DL (ref 12–16)
MAGNESIUM SERPL-MCNC: 2.05 MG/DL (ref 1.6–2.4)
MCH RBC QN AUTO: 25.2 PG (ref 26–34)
MCHC RBC AUTO-ENTMCNC: 32 G/DL (ref 32–36)
MCV RBC AUTO: 79 FL (ref 80–100)
NRBC BLD-RTO: 3.3 /100 WBCS (ref 0–0)
PHOSPHATE SERPL-MCNC: 2.9 MG/DL (ref 2.5–4.9)
PLATELET # BLD AUTO: 514 X10*3/UL (ref 150–450)
POTASSIUM SERPL-SCNC: 4.8 MMOL/L (ref 3.5–5.3)
RBC # BLD AUTO: 4.17 X10*6/UL (ref 4–5.2)
SODIUM SERPL-SCNC: 137 MMOL/L (ref 136–145)
UFH PPP CHRO-ACNC: 0.3 IU/ML
UFH PPP CHRO-ACNC: 0.3 IU/ML
WBC # BLD AUTO: 22 X10*3/UL (ref 4.4–11.3)

## 2024-09-08 PROCEDURE — 2500000005 HC RX 250 GENERAL PHARMACY W/O HCPCS

## 2024-09-08 PROCEDURE — 80069 RENAL FUNCTION PANEL: CPT

## 2024-09-08 PROCEDURE — 85027 COMPLETE CBC AUTOMATED: CPT

## 2024-09-08 PROCEDURE — 2500000001 HC RX 250 WO HCPCS SELF ADMINISTERED DRUGS (ALT 637 FOR MEDICARE OP)

## 2024-09-08 PROCEDURE — 83735 ASSAY OF MAGNESIUM: CPT

## 2024-09-08 PROCEDURE — 37799 UNLISTED PX VASCULAR SURGERY: CPT

## 2024-09-08 PROCEDURE — 99232 SBSQ HOSP IP/OBS MODERATE 35: CPT | Performed by: SURGERY

## 2024-09-08 PROCEDURE — 2020000001 HC ICU ROOM DAILY

## 2024-09-08 PROCEDURE — 99024 POSTOP FOLLOW-UP VISIT: CPT | Performed by: SURGERY

## 2024-09-08 PROCEDURE — 2500000004 HC RX 250 GENERAL PHARMACY W/ HCPCS (ALT 636 FOR OP/ED)

## 2024-09-08 PROCEDURE — 74018 RADEX ABDOMEN 1 VIEW: CPT | Performed by: RADIOLOGY

## 2024-09-08 PROCEDURE — 2550000001 HC RX 255 CONTRASTS

## 2024-09-08 PROCEDURE — 99291 CRITICAL CARE FIRST HOUR: CPT | Performed by: SURGERY

## 2024-09-08 PROCEDURE — 82947 ASSAY GLUCOSE BLOOD QUANT: CPT

## 2024-09-08 PROCEDURE — 85520 HEPARIN ASSAY: CPT

## 2024-09-08 PROCEDURE — 2500000004 HC RX 250 GENERAL PHARMACY W/ HCPCS (ALT 636 FOR OP/ED): Performed by: PHYSICIAN ASSISTANT

## 2024-09-08 PROCEDURE — 82330 ASSAY OF CALCIUM: CPT

## 2024-09-08 RX ORDER — MORPHINE SULFATE 4 MG/ML
4 INJECTION INTRAVENOUS ONCE
Status: COMPLETED | OUTPATIENT
Start: 2024-09-08 | End: 2024-09-08

## 2024-09-08 RX ORDER — HYDROMORPHONE HCL/0.9% NACL/PF 15 MG/30ML
PATIENT CONTROLLED ANALGESIA SYRINGE INTRAVENOUS CONTINUOUS
Status: DISPENSED | OUTPATIENT
Start: 2024-09-08

## 2024-09-08 RX ORDER — DEXTROSE MONOHYDRATE AND SODIUM CHLORIDE 5; .45 G/100ML; G/100ML
100 INJECTION, SOLUTION INTRAVENOUS CONTINUOUS
Status: ACTIVE | OUTPATIENT
Start: 2024-09-08

## 2024-09-08 RX ORDER — NALOXONE HYDROCHLORIDE 0.4 MG/ML
0.2 INJECTION, SOLUTION INTRAMUSCULAR; INTRAVENOUS; SUBCUTANEOUS AS NEEDED
Status: ACTIVE | OUTPATIENT
Start: 2024-09-08

## 2024-09-08 RX ORDER — LIDOCAINE 560 MG/1
1 PATCH PERCUTANEOUS; TOPICAL; TRANSDERMAL DAILY
Status: DISPENSED | OUTPATIENT
Start: 2024-09-08

## 2024-09-08 RX ORDER — DEXTROSE 50 % IN WATER (D50W) INTRAVENOUS SYRINGE
12.5
Status: DISPENSED | OUTPATIENT
Start: 2024-09-08

## 2024-09-08 RX ORDER — MORPHINE SULFATE 4 MG/ML
INJECTION INTRAVENOUS
Status: COMPLETED
Start: 2024-09-08 | End: 2024-09-08

## 2024-09-08 RX ORDER — LEVALBUTEROL INHALATION SOLUTION 0.63 MG/3ML
0.63 SOLUTION RESPIRATORY (INHALATION)
Status: DISPENSED | OUTPATIENT
Start: 2024-09-08

## 2024-09-08 RX ORDER — GABAPENTIN 300 MG/1
300 CAPSULE ORAL 3 TIMES DAILY
Status: DISPENSED | OUTPATIENT
Start: 2024-09-08

## 2024-09-08 RX ORDER — DEXTROSE 50 % IN WATER (D50W) INTRAVENOUS SYRINGE
25
Status: ACTIVE | OUTPATIENT
Start: 2024-09-08

## 2024-09-08 RX ORDER — HYDROMORPHONE HYDROCHLORIDE 0.2 MG/ML
0.2 INJECTION INTRAMUSCULAR; INTRAVENOUS; SUBCUTANEOUS ONCE
Status: DISCONTINUED | OUTPATIENT
Start: 2024-09-08 | End: 2024-09-08

## 2024-09-08 RX ADMIN — KETOROLAC TROMETHAMINE 15 MG: 15 INJECTION, SOLUTION INTRAMUSCULAR; INTRAVENOUS at 09:51

## 2024-09-08 RX ADMIN — HYDROMORPHONE HYDROCHLORIDE 0.2 MG: 0.2 INJECTION, SOLUTION INTRAMUSCULAR; INTRAVENOUS; SUBCUTANEOUS at 02:19

## 2024-09-08 RX ADMIN — TIOTROPIUM BROMIDE INHALATION SPRAY 2 PUFF: 3.12 SPRAY, METERED RESPIRATORY (INHALATION) at 09:09

## 2024-09-08 RX ADMIN — BUSPIRONE HYDROCHLORIDE 5 MG: 10 TABLET ORAL at 08:35

## 2024-09-08 RX ADMIN — MORPHINE SULFATE 4 MG: 4 INJECTION INTRAVENOUS at 01:09

## 2024-09-08 RX ADMIN — DEXTROSE AND SODIUM CHLORIDE 100 ML/HR: 5; 450 INJECTION, SOLUTION INTRAVENOUS at 22:16

## 2024-09-08 RX ADMIN — HYDROMORPHONE HYDROCHLORIDE 0.2 MG: 0.2 INJECTION, SOLUTION INTRAMUSCULAR; INTRAVENOUS; SUBCUTANEOUS at 04:58

## 2024-09-08 RX ADMIN — KETOROLAC TROMETHAMINE 15 MG: 15 INJECTION, SOLUTION INTRAMUSCULAR; INTRAVENOUS at 04:44

## 2024-09-08 RX ADMIN — GABAPENTIN 300 MG: 300 CAPSULE ORAL at 13:32

## 2024-09-08 RX ADMIN — DILTIAZEM HYDROCHLORIDE 240 MG: 240 CAPSULE, EXTENDED RELEASE ORAL at 09:52

## 2024-09-08 RX ADMIN — HYDROMORPHONE HYDROCHLORIDE 0.2 MG: 0.2 INJECTION, SOLUTION INTRAMUSCULAR; INTRAVENOUS; SUBCUTANEOUS at 00:35

## 2024-09-08 RX ADMIN — ACETAMINOPHEN 1000 MG: 10 INJECTION INTRAVENOUS at 11:22

## 2024-09-08 RX ADMIN — GABAPENTIN 300 MG: 300 CAPSULE ORAL at 21:29

## 2024-09-08 RX ADMIN — DIATRIZOATE MEGLUMINE AND DIATRIZOATE SODIUM 60 ML: 660; 100 LIQUID ORAL; RECTAL at 10:25

## 2024-09-08 RX ADMIN — Medication: at 11:25

## 2024-09-08 RX ADMIN — DEXTROSE AND SODIUM CHLORIDE 100 ML/HR: 5; 450 INJECTION, SOLUTION INTRAVENOUS at 12:00

## 2024-09-08 RX ADMIN — DEXTROSE MONOHYDRATE 12.5 G: 25 INJECTION, SOLUTION INTRAVENOUS at 11:52

## 2024-09-08 RX ADMIN — MIRTAZAPINE 15 MG: 15 TABLET, FILM COATED ORAL at 21:29

## 2024-09-08 RX ADMIN — MELATONIN 3 MG: 3 TAB ORAL at 21:29

## 2024-09-08 RX ADMIN — ACETAMINOPHEN 1000 MG: 10 INJECTION INTRAVENOUS at 05:03

## 2024-09-08 RX ADMIN — METHYLPREDNISOLONE SODIUM SUCCINATE 125 MG: 125 INJECTION, POWDER, FOR SOLUTION INTRAMUSCULAR; INTRAVENOUS at 08:34

## 2024-09-08 RX ADMIN — MONTELUKAST 10 MG: 10 TABLET, FILM COATED ORAL at 08:34

## 2024-09-08 RX ADMIN — MORPHINE SULFATE 4 MG: 4 INJECTION, SOLUTION INTRAMUSCULAR; INTRAVENOUS at 01:09

## 2024-09-08 RX ADMIN — SODIUM CHLORIDE, POTASSIUM CHLORIDE, SODIUM LACTATE AND CALCIUM CHLORIDE 500 ML: 600; 310; 30; 20 INJECTION, SOLUTION INTRAVENOUS at 22:16

## 2024-09-08 RX ADMIN — BUSPIRONE HYDROCHLORIDE 5 MG: 10 TABLET ORAL at 21:30

## 2024-09-08 RX ADMIN — PANTOPRAZOLE SODIUM 40 MG: 40 INJECTION, POWDER, FOR SOLUTION INTRAVENOUS at 08:35

## 2024-09-08 RX ADMIN — PANTOPRAZOLE SODIUM 40 MG: 40 INJECTION, POWDER, FOR SOLUTION INTRAVENOUS at 21:39

## 2024-09-08 ASSESSMENT — PAIN - FUNCTIONAL ASSESSMENT
PAIN_FUNCTIONAL_ASSESSMENT: 0-10

## 2024-09-08 ASSESSMENT — PAIN SCALES - GENERAL
PAINLEVEL_OUTOF10: 3
PAINLEVEL_OUTOF10: 7
PAINLEVEL_OUTOF10: 10 - WORST POSSIBLE PAIN
PAINLEVEL_OUTOF10: 10 - WORST POSSIBLE PAIN
PAINLEVEL_OUTOF10: 7
PAINLEVEL_OUTOF10: 7
PAINLEVEL_OUTOF10: 0 - NO PAIN
PAINLEVEL_OUTOF10: 10 - WORST POSSIBLE PAIN
PAINLEVEL_OUTOF10: 0 - NO PAIN
PAINLEVEL_OUTOF10: 0 - NO PAIN
PAINLEVEL_OUTOF10: 4

## 2024-09-08 NOTE — PROGRESS NOTES
VASCULAR SURGERY PROGRESS NOTE  Assessment/Plan   Fernando Cuevas is 63 y.o. female with history of COPD, hypertension, DVT on Eliquis, lung cancer s/p radiation, alcohol abuse, cocaine abuse, small bowel perforation is s/p resection who was admitted with small bowel obstruction and found to have right foot pain.  Vascular surgery consulted.  Patient is now s/p right leg angiogram and right PT thromboembolectomy.      Continues to have worsening pain in bilateral feet (right worse than left).  Bilateral following up right foot below the ankle.  Minimal movement of right toes and ankle.  No movement left toe with movement of left ankle.  Reports unable to feed her bilaterally monophasic bilateral PT Doppler signals present    Given the extent of the right foot mottling, right foot is not salvageable.  And will require below-knee amputation.  We offered patient right below-the-knee amputation today to improve pain control however patient need more time to consider amputation    Left foot with tibial pedal disease similar to right foot with no revascularization options and will allow for expectant demarcation determine level of amputation    Recommendations:  Pain control per primary team  Continue low intensity heparin drip, monitor for signs of bleeding  Will tentatively plan for right below-knee guillotine amputation on Tuesday.  If patient is amenable  Intra-operative right leg angiogram with evidence of beads on string appearance of right popliteal artery, recommend vascular medicine medicine consult    Discussed with attending, Dr. Tyra Kapoor MD  PGY-5 Vascular Surgery Resident    Subjective   Continues to have worsening bilateral feet pain. Unable to feel bilateral toes.    Objective   Vitals:  Heart Rate:  [111-137]   Temp:  [36 °C (96.8 °F)-36.5 °C (97.7 °F)]   Resp:  [12-31]   BP: (109-166)/()   SpO2:  [66 %-100 %]     Exam:  Constitutional: No acute distress, resting comfortably  Neuro:   AOx3, grossly intact  ENMT: NGT in place with bilious output  CV: Sinus tachycardic on monitor  Pulm: non-labored on nasal cannula  GI: soft, moderately distended, nontender, NGT with bilious output, healed midline incision  Skin: Ischemic mottling of right foot to ankle level, ischemic mottling of left forefoot  Musculoskeletal: moving all extremities  Extremities: Insensate to left toe and forefoot, insensate to right foot (upto ankle level),   Pulse exam: Monophasic left PT Doppler signal, monophasic right PT Doppler signal    Labs:  Results from last 7 days   Lab Units 09/08/24  0205 09/07/24 2122 09/07/24  1555   WBC AUTO x10*3/uL 22.0* 23.7* 24.7*   HEMOGLOBIN g/dL 10.5* 10.6* 10.2*   PLATELETS AUTO x10*3/uL 514* 518* 534*      Results from last 7 days   Lab Units 09/08/24  0205 09/07/24 2122 09/06/24  0932 09/06/24  0932   SODIUM mmol/L 137 138  --  140   POTASSIUM mmol/L 4.8 4.7  --  4.1   CHLORIDE mmol/L 99 102  --  103   CO2 mmol/L 26 26  --  30   BUN mg/dL 14 15  --  17   CREATININE mg/dL 0.65 0.64  --  0.81   GLUCOSE mg/dL 83 79  --  97   MAGNESIUM mg/dL 2.05 2.16  --  2.63*   PHOSPHORUS mg/dL 2.9 3.0   < >  --     < > = values in this interval not displayed.

## 2024-09-08 NOTE — CARE PLAN
The patient's goals for the shift include    Problem: Pain - Adult  Goal: Verbalizes/displays adequate comfort level or baseline comfort level  Outcome: Progressing     Problem: Safety - Adult  Goal: Free from fall injury  Outcome: Progressing  Problem: Discharge Planning  Goal: Discharge to home or other facility with appropriate resources  9/7/2024 2251 by Emma Gunter RN  Outcome: Progressing  9/7/2024 2251 by Emma Gunter RN  Outcome: Progressing     Problem: Chronic Conditions and Co-morbidities  Goal: Patient's chronic conditions and co-morbidity symptoms are monitored and maintained or improved  9/7/2024 2251 by Emma Gunter RN  Outcome: Progressing  9/7/2024 2251 by Emma Gunter RN  Outcome: Progressing     Problem: Skin  Goal: Decreased wound size/increased tissue granulation at next dressing change  9/7/2024 2251 by Emma Gunter RN  Outcome: Progressing  Flowsheets (Taken 9/7/2024 2251)  Decreased wound size/increased tissue granulation at next dressing change: Protective dressings over bony prominences  9/7/2024 2251 by Emma Gunter RN  Outcome: Progressing  Flowsheets (Taken 9/7/2024 2251)  Decreased wound size/increased tissue granulation at next dressing change: Protective dressings over bony prominences  Goal: Participates in plan/prevention/treatment measures  9/7/2024 2251 by Emma Gunter RN  Outcome: Progressing  Flowsheets (Taken 9/7/2024 2251)  Participates in plan/prevention/treatment measures: Elevate heels  9/7/2024 2251 by Emma Gunter RN  Outcome: Progressing  Flowsheets (Taken 9/7/2024 2251)  Participates in plan/prevention/treatment measures: Elevate heels  Goal: Prevent/manage excess moisture  9/7/2024 2251 by Emma Gunter RN  Outcome: Progressing  9/7/2024 2251 by Emma Gunter RN  Outcome: Progressing  Goal: Prevent/minimize sheer/friction injuries  9/7/2024 2251 by Emma Gunter RN  Outcome: Progressing  Flowsheets (Taken 9/7/2024 2251)  Prevent/minimize sheer/friction injuries: HOB 30  degrees or less  9/7/2024 2251 by Emma Gunter RN  Outcome: Progressing  Flowsheets (Taken 9/7/2024 2251)  Prevent/minimize sheer/friction injuries: HOB 30 degrees or less  Goal: Promote/optimize nutrition  9/7/2024 2251 by Emma Gunter RN  Outcome: Progressing  9/7/2024 2251 by Emma Gunter RN  Outcome: Progressing  Goal: Promote skin healing  9/7/2024 2251 by Emma Gunter RN  Outcome: Progressing  Flowsheets (Taken 9/7/2024 2251)  Promote skin healing: Protective dressings over bony prominences  9/7/2024 2251 by Emma Gunter RN  Outcome: Progressing  Flowsheets (Taken 9/7/2024 2251)  Promote skin healing: Protective dressings over bony prominences     Problem: Knowledge Deficit  Goal: Patient/family/caregiver demonstrates understanding of disease process, treatment plan, medications, and discharge instructions  9/7/2024 2251 by Emma Gunter RN  Outcome: Progressing  9/7/2024 2251 by Emma Gunter RN  Outcome: Progressing     Problem: Pain  Goal: Takes deep breaths with improved pain control throughout the shift  9/7/2024 2251 by Emma Gunter RN  Outcome: Progressing  9/7/2024 2251 by Emma Gunter RN  Outcome: Progressing  Goal: Turns in bed with improved pain control throughout the shift  9/7/2024 2251 by Emma Gunter RN  Outcome: Progressing  9/7/2024 2251 by Emma Gunter RN  Outcome: Progressing  Goal: Walks with improved pain control throughout the shift  9/7/2024 2251 by Emma Gunter RN  Outcome: Progressing  9/7/2024 2251 by Emma Gunter RN  Outcome: Progressing  Goal: Performs ADL's with improved pain control throughout shift  9/7/2024 2251 by Emma Gunter RN  Outcome: Progressing  9/7/2024 2251 by Emma Gunter RN  Outcome: Progressing  Goal: Participates in PT with improved pain control throughout the shift  9/7/2024 2251 by Emma Gunter RN  Outcome: Progressing  9/7/2024 2251 by Emma Gunter RN  Outcome: Progressing  Goal: Free from opioid side effects throughout the shift  9/7/2024 2251 by Emma Gunter,  RN  Outcome: Progressing  9/7/2024 2251 by Emma Gunter RN  Outcome: Progressing  Goal: Free from acute confusion related to pain meds throughout the shift  9/7/2024 2251 by Emma Gunter RN  Outcome: Progressing  9/7/2024 2251 by Emma Gunter RN  Outcome: Progressing          The clinical goals for the shift include pain control     Over the shift, the patient did not make progress toward the following goals. Barriers to progression include 12/10 pain in feet per patient statement with no relief from dilaudid. Recommendations to address these barriers include scheduled toradol and tylenol

## 2024-09-08 NOTE — PROGRESS NOTES
62 yo female with abdominal pain and imaging consistent with SBO and now s/p RLE thrombectomy for ischemia.   Care discussed with Dr. Valencia.   No further bloody Bms. No abdominal pain.   On exam, chronically ill appearing. Right foot is warmer.  Left foot cool and more painful. Abd is soft, NT, ND. NGT in place with gastric output.   Hgb 10   Plan for gastrograffin challenge, appreciate vascular surgery assessment of BLE, NPO, NGT, PPI, appreciate GI input,  heparin gtt as tolerated, continued ICU monitoring.

## 2024-09-08 NOTE — PROGRESS NOTES
[facility]  TRAUMA ICU - PROGRESS NOTE    Patient Name: Fernando Cuevas  MRN: [id number]  Admit Date: 905  : 1960  AGE: 63 y.o.   GENDER: female    ==============================================================================  TODAY'S ASSESSMENT AND PLAN OF CARE:  Fernando Cuevas is a 63 y.o. female in the ICU due to: acute hemoglobin drop, possible GI bleed post vascular procedure and close monitoring. Stable hemoglobin with repeat. No clinical signs of active bleeding. Plan for better pain control, continue heparin drip, and gastrograffin challenge.     NEURO/PAIN/SEDATION:   #Ischemic and reperfusion pain, chronic pain  - IV tylenol  - IV toradol  - dilaudid PCA  - chronic pain consult  - lidocaine patches    RESPIRATORY:   Hx of COPD, malignant RLL neoplasm, emphysema  - seen by pulmonary - recommend IV steroid burst (methylpred 125 daily) x5 days  - continuous O2, maintain >92%  - montelukast 10mg daily  - tiotropium inhalers daily  - Albuterol PRN  - Duo nebs PRN  - pulse ox  - IS, pulmonary hygiene    CARDIOVASC:   Hx: cocaine, DVT LUE DVT on eliquis  - q1h vitals  - diltiazem 24hr capsule 240mg via NGT  - difficult peripheral IV access requiring central line  - q2h NV checks  - TTE for embolic work up tomorrow (with bubble)    GI:   Hx of chronic constipation, SBR due to gastric per (2024), intermittent small amounts of melena, EGD/colonoscopy negative 2024, presented with possible SBO vs 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 Received 2u, incremented to 10.   - GI consulted, suspicious for ischemic colitis vs anastomotic ulcer. No contraindications for therapeutic AC from their team  - pantoprazole 40mg IV    :   - easton in place - removed and TOV  - strict Is and Os  - replete  electrolytes per ICU protocol     FEN:   - 100ml/hr D5  - NPO, NGT in place    HEMATOLOGIC:   Hx: on eliquis for recent LUE DVT  - holding home eliquis  - per vascular, recommend heparin gtt  - restarted hep gtt overnight, last assay 0.3  - hemoglobin stable, no longer having bloody bowel movements    ENDOCRINE:   - SSI #2 per ICU protocol while NPO  - q4h accuchecks while NPO    MUSCULOSKELETAL/SKIN:   - heel protectors  - no other acute needs  - no activity restrictions    INFECTIOUS DISEASE:   - daily CBC  - monitor for clinical signs of infection    GI PROPHYLAXIS: pantoprazole IV 40  DVT PROPHYLAXIS: hep gtt    DISPOSITION: remain in ICU    Discussed with Dr. Valencia.  Yumiko Lee MD  PGY-2 VS Resident  Trauma Surgery Service  Floor: [id number]  TSICU: [id number]   ==============================================================================  OVERNIGHT EVENTS:  Continues to have bilateral foot pain, now left worse than right. Given morphine OVN with improvement. No abdominal symptoms, had a bowel movement yesterday night and evening. No blood in either. No chest pain. Breathing comfortably. Passing flatus.     MEDICAL HISTORY / ROS:  Admission history and ROS reviewed. Pertinent changes as follows:  NA    PHYSICAL EXAM:  Heart Rate:  [110-137]   Temp:  [36 °C (96.8 °F)-36.5 °C (97.7 °F)]   Resp:  [12-31]   BP: (101-166)/()   SpO2:  [66 %-100 %]   Constitutional: uncomfortable due to pain, able to converse, appears stated age  Neuro: A/O x4, CN2-12 intact, m/s intact BUE. Some weakness with ankle flexion and extension (4+/5). Able to wiggle toes bilaterally minimally. Sensation diminished bilateral feet. Rest pain +/  Psych: normal affect  HEENT: No deformities, no scleral icterus. NGT in place, bilious output.   Cardiac: tachycardic on monitor  Pulmonary: moderate shortness of breath on NC  Abdomen: soft, distended, no tenderness to deep palpation. Well healed midline incision.   Skin: warm lower extremities but  cool/cold feet bilaterally. Dry.   Extremities: no swelling noted. Surgical incision to right medial ankle. Small groin access site at left groin without hematoma. Bilateral mottling to both feet.   MSK: moving all four  Vasc: palpable fem bilaterally. Multiphasic popliteral bilaterally. R PT biphasic, no DP. L PT biphasic, no DP.     IMAGING SUMMARY:  (summary of new imaging findings, not a copy of dictation)  NA    LABS:  Results from last 7 days   Lab Units 09/08/24 0205 09/07/24 2122 09/07/24  1555 09/07/24  0632 09/06/24  0932 09/05/24  1016   WBC AUTO x10*3/uL 22.0* 23.7* 24.7* 24.4*   < > 40.6*   HEMOGLOBIN g/dL 10.5* 10.6* 10.2* 6.5*   < > 9.5*   HEMATOCRIT % 32.8* 32.2* 32.3* 21.9*   < > 29.7*   PLATELETS AUTO x10*3/uL 514* 518* 534* 585*   < > 846*   LYMPHO PCT MAN %  --   --   --  1.6  --  1.7   MONO PCT MAN %  --   --   --  0.8  --  2.6   EOSINO PCT MAN %  --   --   --  0.0  --  0.0    < > = values in this interval not displayed.         Results from last 7 days   Lab Units 09/08/24 0205 09/07/24 2122 09/06/24  0932 09/05/24  1016   SODIUM mmol/L 137 138 140 137   POTASSIUM mmol/L 4.8 4.7 4.1 3.9   CHLORIDE mmol/L 99 102 103 102   CO2 mmol/L 26 26 30 22   BUN mg/dL 14 15 17 14   CREATININE mg/dL 0.65 0.64 0.81 0.74   CALCIUM mg/dL 7.9* 7.9* 8.0* 8.8   PROTEIN TOTAL g/dL  --   --   --  6.9   BILIRUBIN TOTAL mg/dL  --   --   --  0.3   ALK PHOS U/L  --   --   --  129   ALT U/L  --   --   --  69*   AST U/L  --   --   --  54*   GLUCOSE mg/dL 83 79 97 104*     Results from last 7 days   Lab Units 09/05/24  1016   BILIRUBIN TOTAL mg/dL 0.3     Results from last 7 days   Lab Units 09/07/24  1025 09/07/24  0742   POCT PH, ARTERIAL pH 7.45* 7.41   POCT PCO2, ARTERIAL mm Hg 40 39   POCT PO2, ARTERIAL mm Hg 121* 110*   POCT HCO3 CALCULATED, ARTERIAL mmol/L 27.8* 24.7   POCT BASE EXCESS, ARTERIAL mmol/L 3.5* 0.1       REASON FOR ADMISSION    TODAY'S EVENTS  HD # 3 for this 64 YO female admitted to ICU with  concerns for LGI bleed Hgb drop Concerns at present ongoing Chronic LE vascular insufficiency . MMP including COPD, hypertension, DVT on Eliquis, lung cancer s/p radiation therapy, concurrent alcohol and  cocaine abuse, previous small bowel perforation s/p resection at [facility] July originally admitted with abdominal pain suspected small bowel obstruction  chronic right foot pain Ischemic resulted in  Vascular surgery consult and right leg angiogram with right PT thromboembolectomy. Foot painful cool liquid blood in veins mottled painful for amputation this coming week she is on a dilaudid PCA in light of GI Bleed unable to heparinize Non acute ICU issues TR to RNF        This critically ill patient no longer continues  to be at-risk for clinically significant deterioration / failure due to the above mentioned dysfunctional, unstable organ systems.  I have personally identified and managed all complex critical care issues to prevent aforementioned clinical deterioration.  Critical care time is spent at bedside and/or the immediate area and has included, but is not limited to, the review of diagnostic tests, labs, radiographs, serial assessments of hemodynamics, respiratory status, ventilatory management, and family updates.  Time spent in procedures and teaching are reported separately.     CRITICAL CARE TIME:  35 minutes    Reji Valencia MD

## 2024-09-08 NOTE — PROGRESS NOTES
Physical Therapy                 Therapy Communication Note    Patient Name: Fernando Cuevas  MRN: [id number]  Today's Date: 9/8/2024     Discipline: Physical Therapy    Missed Visit Reason: Missed Visit Reason:  (Chart review performed, per vascular's note plan for OR for ampuation 9/10. Will hold PT at this time.)    Missed Time: Attempt    Nanda Boateng, PT, DPT

## 2024-09-09 LAB
ALBUMIN SERPL BCP-MCNC: 2.3 G/DL (ref 3.4–5)
ANION GAP SERPL CALC-SCNC: 17 MMOL/L (ref 10–20)
AORTIC VALVE PEAK VELOCITY: 0.94 M/S
APTT PPP: 28 SECONDS (ref 27–38)
AV PEAK GRADIENT: 3.6 MMHG
AVA (PEAK VEL): 2.3 CM2
BACTERIA BLD CULT: NORMAL
BACTERIA BLD CULT: NORMAL
BODY SURFACE AREA: 1.64 M2
BUN SERPL-MCNC: 10 MG/DL (ref 6–23)
CALCIUM SERPL-MCNC: 7.8 MG/DL (ref 8.6–10.6)
CHLORIDE SERPL-SCNC: 98 MMOL/L (ref 98–107)
CHOLEST SERPL-MCNC: 144 MG/DL (ref 0–199)
CHOLESTEROL/HDL RATIO: 4.1
CO2 SERPL-SCNC: 24 MMOL/L (ref 21–32)
CREAT SERPL-MCNC: 0.52 MG/DL (ref 0.5–1.05)
EGFRCR SERPLBLD CKD-EPI 2021: >90 ML/MIN/1.73M*2
EJECTION FRACTION: 33 %
ERYTHROCYTE [DISTWIDTH] IN BLOOD BY AUTOMATED COUNT: 20.6 % (ref 11.5–14.5)
EST. AVERAGE GLUCOSE BLD GHB EST-MCNC: 114 MG/DL
GLUCOSE BLD MANUAL STRIP-MCNC: 165 MG/DL (ref 74–99)
GLUCOSE BLD MANUAL STRIP-MCNC: 167 MG/DL (ref 74–99)
GLUCOSE BLD MANUAL STRIP-MCNC: 203 MG/DL (ref 74–99)
GLUCOSE BLD MANUAL STRIP-MCNC: 90 MG/DL (ref 74–99)
GLUCOSE SERPL-MCNC: 109 MG/DL (ref 74–99)
HBA1C MFR BLD: 5.6 %
HCT VFR BLD AUTO: 35.3 % (ref 36–46)
HDLC SERPL-MCNC: 35.2 MG/DL
HGB BLD-MCNC: 11.4 G/DL (ref 12–16)
LDLC SERPL CALC-MCNC: 68 MG/DL
LEFT VENTRICLE INTERNAL DIMENSION DIASTOLE: 3.12 CM (ref 3.5–6)
LEFT VENTRICULAR OUTFLOW TRACT DIAMETER: 1.94 CM
MAGNESIUM SERPL-MCNC: 1.79 MG/DL (ref 1.6–2.4)
MCH RBC QN AUTO: 25.9 PG (ref 26–34)
MCHC RBC AUTO-ENTMCNC: 32.3 G/DL (ref 32–36)
MCV RBC AUTO: 80 FL (ref 80–100)
NON HDL CHOLESTEROL: 109 MG/DL (ref 0–149)
NRBC BLD-RTO: 4.6 /100 WBCS (ref 0–0)
PHOSPHATE SERPL-MCNC: 2.6 MG/DL (ref 2.5–4.9)
PLATELET # BLD AUTO: 481 X10*3/UL (ref 150–450)
POTASSIUM SERPL-SCNC: 3.9 MMOL/L (ref 3.5–5.3)
PROT UR-ACNC: 96 MG/DL (ref 5–25)
RBC # BLD AUTO: 4.41 X10*6/UL (ref 4–5.2)
RIGHT VENTRICLE FREE WALL PEAK S': 11 CM/S
RIGHT VENTRICLE PEAK SYSTOLIC PRESSURE: 29.8 MMHG
SODIUM SERPL-SCNC: 135 MMOL/L (ref 136–145)
TRIGL SERPL-MCNC: 206 MG/DL (ref 0–149)
UFH PPP CHRO-ACNC: 0.2 IU/ML
UFH PPP CHRO-ACNC: 0.3 IU/ML
UFH PPP CHRO-ACNC: 0.3 IU/ML
VLDL: 41 MG/DL (ref 0–40)
WBC # BLD AUTO: 34.1 X10*3/UL (ref 4.4–11.3)

## 2024-09-09 PROCEDURE — 99232 SBSQ HOSP IP/OBS MODERATE 35: CPT | Performed by: SURGERY

## 2024-09-09 PROCEDURE — 85520 HEPARIN ASSAY: CPT

## 2024-09-09 PROCEDURE — 51701 INSERT BLADDER CATHETER: CPT

## 2024-09-09 PROCEDURE — 85730 THROMBOPLASTIN TIME PARTIAL: CPT

## 2024-09-09 PROCEDURE — 85027 COMPLETE CBC AUTOMATED: CPT

## 2024-09-09 PROCEDURE — 83735 ASSAY OF MAGNESIUM: CPT

## 2024-09-09 PROCEDURE — 2500000004 HC RX 250 GENERAL PHARMACY W/ HCPCS (ALT 636 FOR OP/ED): Performed by: STUDENT IN AN ORGANIZED HEALTH CARE EDUCATION/TRAINING PROGRAM

## 2024-09-09 PROCEDURE — 2500000001 HC RX 250 WO HCPCS SELF ADMINISTERED DRUGS (ALT 637 FOR MEDICARE OP)

## 2024-09-09 PROCEDURE — 74018 RADEX ABDOMEN 1 VIEW: CPT | Performed by: RADIOLOGY

## 2024-09-09 PROCEDURE — 2500000002 HC RX 250 W HCPCS SELF ADMINISTERED DRUGS (ALT 637 FOR MEDICARE OP, ALT 636 FOR OP/ED)

## 2024-09-09 PROCEDURE — 2500000004 HC RX 250 GENERAL PHARMACY W/ HCPCS (ALT 636 FOR OP/ED)

## 2024-09-09 PROCEDURE — 83036 HEMOGLOBIN GLYCOSYLATED A1C: CPT

## 2024-09-09 PROCEDURE — 37799 UNLISTED PX VASCULAR SURGERY: CPT

## 2024-09-09 PROCEDURE — 84156 ASSAY OF PROTEIN URINE: CPT

## 2024-09-09 PROCEDURE — 82947 ASSAY GLUCOSE BLOOD QUANT: CPT

## 2024-09-09 PROCEDURE — 84166 PROTEIN E-PHORESIS/URINE/CSF: CPT

## 2024-09-09 PROCEDURE — 99232 SBSQ HOSP IP/OBS MODERATE 35: CPT | Performed by: STUDENT IN AN ORGANIZED HEALTH CARE EDUCATION/TRAINING PROGRAM

## 2024-09-09 PROCEDURE — 80069 RENAL FUNCTION PANEL: CPT

## 2024-09-09 PROCEDURE — 80061 LIPID PANEL: CPT

## 2024-09-09 PROCEDURE — 2500000001 HC RX 250 WO HCPCS SELF ADMINISTERED DRUGS (ALT 637 FOR MEDICARE OP): Performed by: STUDENT IN AN ORGANIZED HEALTH CARE EDUCATION/TRAINING PROGRAM

## 2024-09-09 PROCEDURE — 99255 IP/OBS CONSLTJ NEW/EST HI 80: CPT | Performed by: INTERNAL MEDICINE

## 2024-09-09 PROCEDURE — 1200000002 HC GENERAL ROOM WITH TELEMETRY DAILY

## 2024-09-09 RX ORDER — POTASSIUM CHLORIDE 14.9 MG/ML
20 INJECTION INTRAVENOUS ONCE
Status: COMPLETED | OUTPATIENT
Start: 2024-09-09 | End: 2024-09-09

## 2024-09-09 RX ORDER — SODIUM CHLORIDE, SODIUM LACTATE, POTASSIUM CHLORIDE, CALCIUM CHLORIDE 600; 310; 30; 20 MG/100ML; MG/100ML; MG/100ML; MG/100ML
500 INJECTION, SOLUTION INTRAVENOUS ONCE
Status: COMPLETED | OUTPATIENT
Start: 2024-09-09 | End: 2024-09-09

## 2024-09-09 RX ORDER — CLONIDINE HYDROCHLORIDE 0.1 MG/1
0.1 TABLET ORAL EVERY 8 HOURS SCHEDULED
Status: DISCONTINUED | OUTPATIENT
Start: 2024-09-09 | End: 2024-09-14

## 2024-09-09 RX ORDER — ATORVASTATIN CALCIUM 40 MG/1
40 TABLET, FILM COATED ORAL NIGHTLY
Status: DISCONTINUED | OUTPATIENT
Start: 2024-09-09 | End: 2024-09-14

## 2024-09-09 RX ORDER — MAGNESIUM SULFATE HEPTAHYDRATE 40 MG/ML
2 INJECTION, SOLUTION INTRAVENOUS ONCE
Status: COMPLETED | OUTPATIENT
Start: 2024-09-09 | End: 2024-09-09

## 2024-09-09 RX ORDER — ASPIRIN 81 MG/1
81 TABLET ORAL DAILY
Status: DISCONTINUED | OUTPATIENT
Start: 2024-09-09 | End: 2024-09-14

## 2024-09-09 RX ADMIN — DEXTROSE AND SODIUM CHLORIDE 100 ML/HR: 5; 450 INJECTION, SOLUTION INTRAVENOUS at 08:57

## 2024-09-09 RX ADMIN — DEXTROSE AND SODIUM CHLORIDE 100 ML/HR: 5; 450 INJECTION, SOLUTION INTRAVENOUS at 19:32

## 2024-09-09 RX ADMIN — GABAPENTIN 300 MG: 300 CAPSULE ORAL at 09:04

## 2024-09-09 RX ADMIN — INSULIN LISPRO 4 UNITS: 100 INJECTION, SOLUTION INTRAVENOUS; SUBCUTANEOUS at 09:03

## 2024-09-09 RX ADMIN — SODIUM CHLORIDE, POTASSIUM CHLORIDE, SODIUM LACTATE AND CALCIUM CHLORIDE 500 ML: 600; 310; 30; 20 INJECTION, SOLUTION INTRAVENOUS at 02:09

## 2024-09-09 RX ADMIN — MONTELUKAST 10 MG: 10 TABLET, FILM COATED ORAL at 09:04

## 2024-09-09 RX ADMIN — PERFLUTREN 10 ML OF DILUTION: 6.52 INJECTION, SUSPENSION INTRAVENOUS at 08:22

## 2024-09-09 RX ADMIN — CLONIDINE HYDROCHLORIDE 0.1 MG: 0.1 TABLET ORAL at 22:10

## 2024-09-09 RX ADMIN — INSULIN LISPRO 2 UNITS: 100 INJECTION, SOLUTION INTRAVENOUS; SUBCUTANEOUS at 18:42

## 2024-09-09 RX ADMIN — MAGNESIUM SULFATE HEPTAHYDRATE 2 G: 40 INJECTION, SOLUTION INTRAVENOUS at 05:03

## 2024-09-09 RX ADMIN — ATORVASTATIN CALCIUM 40 MG: 40 TABLET, FILM COATED ORAL at 22:10

## 2024-09-09 RX ADMIN — ASPIRIN 81 MG: 81 TABLET, COATED ORAL at 18:42

## 2024-09-09 RX ADMIN — BUSPIRONE HYDROCHLORIDE 5 MG: 10 TABLET ORAL at 22:10

## 2024-09-09 RX ADMIN — LEVALBUTEROL HYDROCHLORIDE 0.63 MG: 0.63 SOLUTION RESPIRATORY (INHALATION) at 20:28

## 2024-09-09 RX ADMIN — TIOTROPIUM BROMIDE INHALATION SPRAY 2 PUFF: 3.12 SPRAY, METERED RESPIRATORY (INHALATION) at 09:56

## 2024-09-09 RX ADMIN — GABAPENTIN 300 MG: 300 CAPSULE ORAL at 22:10

## 2024-09-09 RX ADMIN — METHYLPREDNISOLONE SODIUM SUCCINATE 125 MG: 125 INJECTION, POWDER, FOR SOLUTION INTRAMUSCULAR; INTRAVENOUS at 09:03

## 2024-09-09 RX ADMIN — HEPARIN SODIUM 900 UNITS/HR: 10000 INJECTION, SOLUTION INTRAVENOUS at 04:08

## 2024-09-09 RX ADMIN — MELATONIN 3 MG: 3 TAB ORAL at 22:11

## 2024-09-09 RX ADMIN — POTASSIUM CHLORIDE 20 MEQ: 14.9 INJECTION, SOLUTION INTRAVENOUS at 05:03

## 2024-09-09 RX ADMIN — OXYBUTYNIN CHLORIDE 2.5 MG: 5 TABLET ORAL at 22:12

## 2024-09-09 RX ADMIN — MIRTAZAPINE 15 MG: 15 TABLET, FILM COATED ORAL at 22:10

## 2024-09-09 RX ADMIN — ONDANSETRON 4 MG: 2 INJECTION INTRAMUSCULAR; INTRAVENOUS at 22:09

## 2024-09-09 RX ADMIN — BUSPIRONE HYDROCHLORIDE 5 MG: 10 TABLET ORAL at 09:04

## 2024-09-09 RX ADMIN — DILTIAZEM HYDROCHLORIDE 240 MG: 240 CAPSULE, EXTENDED RELEASE ORAL at 09:04

## 2024-09-09 RX ADMIN — PANTOPRAZOLE SODIUM 40 MG: 40 INJECTION, POWDER, FOR SOLUTION INTRAVENOUS at 09:04

## 2024-09-09 RX ADMIN — Medication: at 12:51

## 2024-09-09 RX ADMIN — GABAPENTIN 300 MG: 300 CAPSULE ORAL at 14:19

## 2024-09-09 RX ADMIN — PANTOPRAZOLE SODIUM 40 MG: 40 INJECTION, POWDER, FOR SOLUTION INTRAVENOUS at 22:09

## 2024-09-09 RX ADMIN — SODIUM CHLORIDE, POTASSIUM CHLORIDE, SODIUM LACTATE AND CALCIUM CHLORIDE 1000 ML: 600; 310; 30; 20 INJECTION, SOLUTION INTRAVENOUS at 10:32

## 2024-09-09 ASSESSMENT — PAIN - FUNCTIONAL ASSESSMENT
PAIN_FUNCTIONAL_ASSESSMENT: 0-10
PAIN_FUNCTIONAL_ASSESSMENT: WONG-BAKER FACES

## 2024-09-09 ASSESSMENT — ACTIVITIES OF DAILY LIVING (ADL): LACK_OF_TRANSPORTATION: PATIENT DECLINED

## 2024-09-09 ASSESSMENT — PAIN SCALES - GENERAL
PAINLEVEL_OUTOF10: 0 - NO PAIN
PAINLEVEL_OUTOF10: 8
PAINLEVEL_OUTOF10: 0 - NO PAIN
PAINLEVEL_OUTOF10: 7
PAINLEVEL_OUTOF10: 0 - NO PAIN

## 2024-09-09 ASSESSMENT — PAIN DESCRIPTION - ORIENTATION: ORIENTATION: RIGHT

## 2024-09-09 ASSESSMENT — COGNITIVE AND FUNCTIONAL STATUS - GENERAL
PERSONAL GROOMING: A LITTLE
TURNING FROM BACK TO SIDE WHILE IN FLAT BAD: A LOT
STANDING UP FROM CHAIR USING ARMS: TOTAL
DAILY ACTIVITIY SCORE: 16
DRESSING REGULAR LOWER BODY CLOTHING: A LOT
MOVING FROM LYING ON BACK TO SITTING ON SIDE OF FLAT BED WITH BEDRAILS: A LITTLE
HELP NEEDED FOR BATHING: A LITTLE
DRESSING REGULAR UPPER BODY CLOTHING: A LITTLE
MOBILITY SCORE: 9
MOVING TO AND FROM BED TO CHAIR: TOTAL
EATING MEALS: A LITTLE
WALKING IN HOSPITAL ROOM: TOTAL
CLIMB 3 TO 5 STEPS WITH RAILING: TOTAL
TOILETING: A LOT

## 2024-09-09 ASSESSMENT — PAIN DESCRIPTION - LOCATION: LOCATION: FOOT

## 2024-09-09 NOTE — PROGRESS NOTES
VASCULAR SURGERY PROGRESS NOTE  Assessment/Plan   Fernando Cuevas is 63 y.o. female with history of COPD, hypertension, DVT on Eliquis, lung cancer s/p radiation, alcohol abuse, cocaine abuse, small bowel perforation is s/p resection who was admitted with small bowel obstruction and found to have right foot pain.  Vascular surgery consulted.  Patient is now s/p right leg angiogram and right PT thromboembolectomy.      Continues to have worsening pain in bilateral feet (right worse than left).  Bilateral following up right foot below the ankle.  Minimal movement of right toes and ankle.  No movement left toe with movement of left ankle.  Reports unable to feed her bilaterally monophasic bilateral PT Doppler signals present    Given the extent of the right foot mottling, right foot is not salvageable.  And will require below-knee amputation.  We offered patient right below-the-knee amputation today to improve pain control however patient need more time to consider amputation    Left foot with tibial pedal disease similar to right foot with no revascularization options and will allow for expectant demarcation determine level of amputation    Recommendations:  Pain control per primary team  Continue low intensity heparin drip, monitor for signs of bleeding  Will tentatively plan for right below-knee guillotine amputation on Tuesday.  If patient is amenable  Intra-operative right leg angiogram with evidence of beads on string appearance of right popliteal artery, recommend vascular medicine medicine consult  Consult vascular medicine for strings on a bead appearence found during angiogram  Consult podiatry prior to amputation for left fore foot ischemic signs/sxs    Discussed with attending, Dr. Tyra Cho md/cristi [email or web address]    Subjective   Continues to have worsening bilateral feet pain. Unable to feel bilateral toes. NAEON    Objective   Vitals:  Heart Rate:  [124-138]   Temp:  [36.2 °C (97.1 °F)-36.4 °C (97.6 °F)]    Resp:  [3-26]   BP: ()/()   SpO2:  [67 %-99 %]     Exam:  Constitutional: No acute distress, resting comfortably  Neuro:  AOx3, grossly intact  ENMT: NGT in place with bilious output  CV: Sinus tachycardic on monitor  Pulm: non-labored on nasal cannula  GI: soft, moderately distended, nontender, NGT with bilious output, healed midline incision  Skin: Ischemic mottling of right foot to ankle level, ischemic mottling of left forefoot  Musculoskeletal: moving all extremities  Extremities: Insensate to left toe and forefoot, insensate to right foot (upto ankle level),   Pulse exam: Monophasic left PT Doppler signal, monophasic right PT Doppler signal    Labs:  Results from last 7 days   Lab Units 09/09/24  0145 09/08/24  0205 09/07/24 2122   WBC AUTO x10*3/uL 34.1* 22.0* 23.7*   HEMOGLOBIN g/dL 11.4* 10.5* 10.6*   PLATELETS AUTO x10*3/uL 481* 514* 518*      Results from last 7 days   Lab Units 09/09/24  0145 09/08/24  0205 09/07/24 2122   SODIUM mmol/L 135* 137 138   POTASSIUM mmol/L 3.9 4.8 4.7   CHLORIDE mmol/L 98 99 102   CO2 mmol/L 24 26 26   BUN mg/dL 10 14 15   CREATININE mg/dL 0.52 0.65 0.64   GLUCOSE mg/dL 109* 83 79   MAGNESIUM mg/dL 1.79 2.05 2.16   PHOSPHORUS mg/dL 2.6 2.9 3.0

## 2024-09-09 NOTE — PROGRESS NOTES
ICU Attending Daily Progress Note    Fernando Cuevas is a 63 y.o. female, admitted 9/5/2024 with history of COPD, hypertension, DVT on Eliquis, lung cancer s/p radiation, alcohol abuse, cocaine abuse, small bowel perforation is s/p resection who was admitted with small bowel obstruction and found to have right foot critical limb ischemia.    Neuro: GCS 15. Multimodal pain control with Tylenol and Dilaudid PCA, lidocaine. Acute pain team following. Pain is related to ischemia/reperfusion in lower extremities.   Pulm: Hx COPD, lung neoplasm. On steroid burst per pulm for COPD exacerbation. Scheduled and prn Duonebs. On room air.   CV: Hx HTN, on cardizem here. Persistently tachycardic. Will initiate 0.1 clonidine given HTN/tachycardia and anxiety   GI/Nutrition: SBO. Gastrografin given yesterday and NGT removed after contrast seen in colon. Having Bms. Diet advancement per ACS. Was noted to have hematochezia over weekend, has not recurred, H/H Stable.   Renal: Voiding spontaneously, needs strict I/O  Skin/Musculoskeletal: Vascular following- Needs right foot amputation. Critical limb ischemia, on ep gtt.   Endocrine: ISS. Goal euglycemia.   Heme: Home Eliquis for LUE DVT. Now on Hep gtt. To be seen by vascular medicine given concerns for vasculitis vs vasospasm.   Infectious: Significant leukocytosis (34), present since admission, in setting of limb ischemia. No evidence of active infection. C diff negative. Continue to monitor.   PPx: Hep gtt  Code status: Full Code    On transfer to floor today.     Case discussed with primary team (ACS).    I personally spent a total of 35 minutes during this patient encounter, with over 50% of the time devoted to counseling the patient (or family) and coordination of care. This time does not include any time spent on bedside procedures.  Please see resident/fellow/MARGIE note for further details.

## 2024-09-09 NOTE — CONSULTS
Inpatient consult to Vascular Medicine  Consult performed by: Karin Kapoor MD  Consult ordered by: Sophia Mancilla MD  Reason for consult: anticoagulation recommendations, hypercoagulable workup and concern about FMD.      Abigail Cuevas is a 63 y.o. female on day 4 of admission presenting with SBO and acute limb ischemia. Vascular medicine is consulted for anticoagulation recommendations, hypercoagulable workup and concern about FMD.    63 year old female with history of HTN, COPD, DVT (on Eliquis), lung cancer s/p radiation, cocaine abuse disorder, small bowel perforation s/p resection,smoker, who presented with abdominal pain. She was found to have small bowel obstruction. On admission, she also complained of bilateral lower extremity pain. Vascular surgery consulted for right leg acute limb ischemia. She underwent right lower extremity angiogram and thromboembolectomy with Dr. Mary on . Intra-operatively she was found to have concerns for 'bead of string' appearance of right SFA/popliteal artery. Of note, she has normal AUSTYN on 2024. She was found to have right upper extremity DVT (non-occlusive brachial vein DVT) on 2024. She was discharged on Eliquis. Unclear if patient was taking her Eliquis at home. Denied any personal or family history of clotting disorder. She had one episode of GIB intra-operatively. However, H&H now stable.     Review of Systems   As noted above   Migraine  Depression   No other complaints today     Past Medical History:   Diagnosis Date    COPD (chronic obstructive pulmonary disease) (Multi)     Depression     DVT (deep venous thrombosis) (Multi)     bilateral upper extremities    HTN (hypertension)     Lung cancer (Multi)     Migraines     Panic disorder      Past Surgical History:   Procedure Laterality Date     SECTION, LOW TRANSVERSE      COLONOSCOPY      CT ABDOMEN PELVIS ANGIOGRAM W AND/OR WO IV CONTRAST  2016    CT ABDOMEN PELVIS ANGIOGRAM  W AND/OR WO IV CONTRAST 3/22/2016 [facility] [place] [facility]    CT ANGIO CORONARY ART WITH HEARTFLOW IF SCORE >30%  01/05/2023    CT ANGIO CORONARY ART WITH HEARTFLOW IF SCORE >30% 1/5/2023    CYSTOSCOPY      botox injection    ESOPHAGOGASTRODUODENOSCOPY      EXPLORATORY LAPAROTOMY W/ BOWEL RESECTION  06/21/2024    SBR for perforation    MR NECK ANGIO W IV CONTRAST  04/19/2021    MR NECK ANGIO W IV CONTRAST 4/19/2021     Social History     Socioeconomic History    Marital status: Single     Spouse name: Not on file    Number of children: Not on file    Years of education: Not on file    Highest education level: Not on file   Occupational History    Not on file   Tobacco Use    Smoking status: Some Days     Types: Cigarettes     Passive exposure: Current (3 cigarettes a day)    Smokeless tobacco: Never   Vaping Use    Vaping status: Never Used   Substance and Sexual Activity    Alcohol use: Yes     Comment: per pt weekly use    Drug use: Yes     Types: Cocaine, Marijuana     Comment: cocain monthly, marijuana few times per week    Sexual activity: Defer   Other Topics Concern    Not on file   Social History Narrative    Not on file     Social Determinants of Health     Financial Resource Strain: Patient Declined (9/9/2024)    Overall Financial Resource Strain (CARDIA)     Difficulty of Paying Living Expenses: Patient declined   Food Insecurity: No Food Insecurity (7/30/2024)    Hunger Vital Sign     Worried About Running Out of Food in the Last Year: Never true     Ran Out of Food in the Last Year: Never true   Transportation Needs: Patient Declined (9/9/2024)    PRAPARE - Transportation     Lack of Transportation (Medical): Patient declined     Lack of Transportation (Non-Medical): Patient declined   Physical Activity: Inactive (7/30/2024)    Exercise Vital Sign     Days of Exercise per Week: 0 days     Minutes of Exercise per Session: 0 min   Stress: No Stress Concern Present (7/30/2024)    [employer] - Occupational Stress Questionnaire     Feeling of Stress : Not at all   Social Connections: Unknown (7/30/2024)    Social Connection and Isolation Panel [NHANES]     Frequency of Communication with Friends and Family: More than three times a week     Frequency of Social Gatherings with Friends and Family: More than three times a week     Attends Restorationist Services: Never     Active Member of Clubs or Organizations: No     Attends Club or Organization Meetings: Never     Marital Status: Patient declined   Intimate Partner Violence: Not At Risk (7/30/2024)    Humiliation, Afraid, Rape, and Kick questionnaire     Fear of Current or Ex-Partner: No     Emotionally Abused: No     Physically Abused: No     Sexually Abused: No   Housing Stability: Patient Declined (9/9/2024)    Housing Stability Vital Sign     Unable to Pay for Housing in the Last Year: Patient declined     Number of Times Moved in the Last Year: 1     Homeless in the Last Year: Patient declined     No family history on file.   Allergies   Allergen Reactions    Iodinated Contrast Media Hives     Hives to faces and neck with itching. Resolved with 50 mg benadryl, 20 mg pepcid & 60 mg prednisone.    Hives to faces and neck with itching. Resolved with 50 mg benadryl, 20 mg pepcid & 60 mg prednisone.   Hives to faces and neck with itching. Resolved with 50 mg benadryl, 20 mg pepcid & 60 mg prednisone.    Iodine Other and Unknown    Adhesive Tape-Silicones Rash     Objective   Physical Exam  Vitals:    09/09/24 1000 09/09/24 1100 09/09/24 1152 09/09/24 1159   BP: 95/83 100/78  102/65   BP Location:    Left arm   Patient Position:    Lying   Pulse: (!) 128 (!) 126  (!) 124   Resp: 15 22     Temp:       TempSrc:       SpO2: 99% 100% 97% 100%   Weight:       Height:          General: In no acute distress  Neuro: alert and oriented x3 but she is lethargic   CV:  RRR  Lungs: CTA bilaterally (limited anterior)  Abd:  Soft, distended   Psych:   Appropriate affect  Upper extremities: No swelling, +2 radial   Lower extremities: No edema.  Non palpable bilateral PT or DP  Skin: Ischemic mottling of bilateral lower feet    Medications   Scheduled medications  [Held by provider] azithromycin, 250 mg, oral, Every Mon/Wed/Fri  busPIRone, 5 mg, oral, BID  cloNIDine, 0.1 mg, oral, q8h LISET  dilTIAZem CD, 240 mg, oral, Daily  gabapentin, 300 mg, oral, TID  insulin lispro, 0-10 Units, subcutaneous, TID  levalbuterol, 0.63 mg, nebulization, TID  lidocaine, 1 patch, transdermal, Daily  melatonin, 3 mg, oral, Nightly  methylPREDNISolone sodium succinate (PF), 125 mg, intravenous, q24h  mirtazapine, 15 mg, oral, Nightly  montelukast, 10 mg, oral, Daily  oxybutynin, 2.5 mg, oral, BID  pantoprazole, 40 mg, intravenous, BID  perflutren lipid microspheres, 2 mL of dilution, intravenous, Once in imaging  perflutren protein A microsphere, 0.5 mL, intravenous, Once in imaging  [Held by provider] sucralfate, 1 g, oral, BID  sulfur hexafluoride microsphr, 2 mL, intravenous, Once in imaging  [Held by provider] thiamine, 100 mg, oral, Daily  tiotropium, 2 puff, inhalation, Daily    Continuous medications  dextrose 5%-0.45 % sodium chloride, 100 mL/hr, Last Rate: 100 mL/hr (09/09/24 0857)  heparin, 0-4,000 Units/hr, Last Rate: 900 Units/hr (09/09/24 1416)  HYDROmorphone,     PRN medications  PRN medications: albuterol, albuterol, dextrose, dextrose, glucagon, glucagon, heparin, ipratropium-albuteroL, melatonin, naloxone, ondansetron     Lab Review   Recent Labs     09/09/24  0145 09/08/24  0205 09/07/24  2122 09/06/24  0932 09/05/24  1016 09/01/24  0537 08/30/24  0652 08/29/24  0635 08/28/24  0500   * 137 138 140 137 135* 136 138 140   K 3.9 4.8 4.7 4.1 3.9 4.5 4.2 4.2 2.8*   CL 98 99 102 103 102 101 105 107 101   CO2 24 26 26 30 22 24 23 23 25   ANIONGAP 17 17 15 11 17 15 12 12 17   BUN 10 14 15 17 14 10 8 6 7   CREATININE 0.52 0.65 0.64 0.81 0.74 0.60 0.72 0.62 0.56   EGFR  >90 >90 >90 82 >90 >90 >90 >90 >90   MG 1.79 2.05 2.16 2.63*  --  1.60 1.60 1.90 1.70     Recent Labs     09/09/24 0145 09/08/24 0205 09/07/24 2122 09/05/24  1016 09/01/24  0537 08/30/24  0652 08/21/24  0604 08/19/24  1225 08/02/24 2154 07/26/24  0603 07/25/24  0942 07/24/24 2133 06/21/24 0704 06/20/24 2144   ALBUMIN 2.3* 2.4* 2.3* 3.6 2.4*   < > 3.2* 3.7 3.9   < > 2.6* 2.5*   < > 3.1*   ALKPHOS  --   --   --  129  --   --  96 110 97  --  119 133   < > 70   ALT  --   --   --  69*  --   --  32 56* 50*  --  31 34   < > 24   AST  --   --   --  54*  --   --  18 37 30  --  26 28   < > 14   BILITOT  --   --   --  0.3  --   --  0.4 0.4 0.3  --  0.2 0.2   < > 0.2   LIPASE  --   --   --  32  --   --   --  43  --   --   --  163*  --  37    < > = values in this interval not displayed.     Recent Labs     09/09/24 0145 09/08/24 0205 09/07/24 2122 09/07/24  1555 09/07/24  0632 09/06/24  0932 09/05/24  1016 09/01/24  0537   WBC 34.1* 22.0* 23.7* 24.7* 24.4* 26.6* 40.6* 14.9*   HGB 11.4* 10.5* 10.6* 10.2* 6.5* 8.7* 9.5* 7.3*   HCT 35.3* 32.8* 32.2* 32.3* 21.9* 28.9* 29.7* 24.6*   * 514* 518* 534* 585* 758* 846* 1,004*   MCV 80 79* 79* 80 76* 77* 74* 78*     Recent Labs     09/09/24  1350 09/09/24  0855 09/09/24  0145 09/08/24  0609 09/08/24  0205 09/07/24  2123 09/07/24  1555 08/02/24  2154 07/24/24  2133 06/20/24  2144 06/11/24  1539 07/24/23  1111 07/24/23  0507 05/03/23  1759 05/03/23  1559   INR  --   --   --   --   --   --   --  1.0 1.4* 1.0 1.0  --  0.9 CANCELED 1.0   HAUF 0.3 0.3 0.2 0.3 0.3 <0.1 <0.1  --   --   --   --   --   --   --   --    DDIMERVTE  --   --   --   --   --   --   --   --   --   --   --  387  --   --   --      PTT - 9/9/2024:  8:55 AM  Estimated Creatinine Clearance: 95.3 mL/min (by C-G formula based on SCr of 0.52 mg/dL).    Recent Labs     07/24/23  0507   CHOL 190   LDLF 72   HDL 91.6   TRIG 131     Lab Results   Component Value Date    HGBA1C 5.2 04/13/2022     Lab Results   Component  Value Date    TSH 1.26 07/23/2023     Imaging  CT A/P (9/5/2024)  1. There is a small bowel obstruction. The transition point appears to be in the lower midline abdomen. I suspect this may be due to an adhesion.  2. Multiple gallstones, but no evidence for cholecystitis or biliary obstruction.  3. The spleen is atrophic and hypoenhancing, likely representing scarring. This may represent a autosplenectomy.    AUSTYN/PVR (8/26/2024)  Right Lower PVR: No evidence of arterial occlusive disease in the right lower extremity at rest. Multiphasic flow is noted in the right common femoral artery, right posterior tibial artery and right dorsalis pedis artery. Due to pain tolerance, unable to obtain first digit (great toe) PPG and pressure. However a PPG was documented on the second digit.  Left Lower PVR: No evidence of arterial occlusive disease in the left lower extremity at rest. Decreased digital perfusion noted. Multiphasic flow is noted in the left common femoral artery, left posterior tibial artery and left dorsalis pedis artery. Unable to obtain brachial pressure due to IV placement.    RUE Venous Duplex (7/6/2024)  Nonocclusive thrombosis involving the right brachial vein    Assessment/Plan   Fernando Cuevas is a 63 y.o. female with history of HTN, COPD, right brachial vein DVT (7/26/24 on Eliquis, ?she was taking it), lung cancer s/p radiation, cocaine abuse disorder, small bowel perforation s/p resection, smoker. Who was admitted with small bowel obstruction and bilateral lower extremity acute limb ischemia    _ ALI   S/p right leg angiogram and PT thrombectomy (9/7) Dr. Mary. Right SFA/pop with possible 'beads on string appearance'  Normal AUSTYN with severe pedal disease on 8/26/2024  Echo positive for PFO  Acute limb ischemia of bilateral lower extremities possibly related to embolism, unclear source  _ Leucocytosis   _ Thrombocytosis   _ Anemia     Recommendations:  - continue heparin infusion with normogram hep Xa  0.3-0.7   - start aspirin 81mg daily and high intensity atorvastatin  - obtain homocysteine, Lp(a), APLA (lupus anticoagulant, beta2 glycoprotein and cardiolipin antibiodies) to r/o hypercoag disorder  SPEP, UPEP   Jordy-2 especially with thrombocytosis   - She does have multiple reasons for leucocytosis/thrombocytosis and current use of steroids might be worsening the picture, but they have been elevated prior, please further discuss with hematology   - obtain bilateral vascular lab lower and upper extremity venous duplex to r/o acute DVT insetting of prior DVT and patent PFO, now with ALI   - obtain bilateral vascular lab lower extremity arterial duplex to r/o popliteal artery aneurysms  - will need outpatient Holter monitor at discharge  - CTA chest   - obtain vascular lab carotid duplex to r/o carotid FMD  - obtain vascular lab renal duplex to r/o renal FMD  - will eventually need FMD protocol CTA from head/Neck and Ab/pelvis (can be done as outpatient)   - Atherosclerosis risk factors control, check HBA1c and lipid panel  - Needs follow up with PCP/oncologist for UTD cancer screening especially with h/o  lung cancer   - appreciate vascular surgery recommendations  - likely anticoagulation at discharge will be warfarin with heparin bridge, goal INR 2-3, she does reports no issues with transport/appointments     D/w attending, Dr. Edward Kapoor MD  PGY-5 Vascular Surgery Resident  Rotating Vascular Medicine Resident    I saw and evaluated the patient. I personally obtained the key and critical portions of the history and physical exam or was physically present for key and critical portions performed by the resident/fellow. I reviewed the resident/fellow's documentation and discussed the patient with the resident/fellow. I agree with the resident/fellow's medical decision making as documented in the note. Personally edited by myself    Priya Leon MD

## 2024-09-09 NOTE — PROGRESS NOTES
09/09/24 1149   Discharge Planning   Living Arrangements Family members   Support Systems Family members   Assistance Needed family assist as needed, patient report IP with all adl's   Type of Residence Private residence   Number of Stairs Within Residence 0   Who is requesting discharge planning? Provider   Home or Post Acute Services In home services   Expected Discharge Disposition  Services   Does the patient need discharge transport arranged? Yes   RoundTrip coordination needed? Yes   Financial Resource Strain   How hard is it for you to pay for the very basics like food, housing, medical care, and heating? Pt Declined   Housing Stability   In the last 12 months, was there a time when you were not able to pay the mortgage or rent on time? Pt Declined   In the past 12 months, how many times have you moved where you were living? 1   At any time in the past 12 months, were you homeless or living in a shelter (including now)? Pt Declined   Transportation Needs   In the past 12 months, has lack of transportation kept you from medical appointments or from getting medications? Pt Declined   In the past 12 months, has lack of transportation kept you from meetings, work, or from getting things needed for daily living? Pt Declined     ICU NEED: ok for RNF     PAYOR: Juan     DISCHARGE NEED: TBD- would benefit from HHC or healthy at home program     BARRIER: none     SOCIAL: Nick Cuevas (son) [phone]

## 2024-09-09 NOTE — PROGRESS NOTES
Occupational Therapy                 Therapy Communication Note    Patient Name: Fernando Cuevas  MRN: [id number]  Today's Date: 9/9/2024     Discipline: Occupational Therapy    Missed Visit Reason: Missed Visit Reason: Patient placed on medical hold (plans for OR 9/10 for amputation will hold until post op) 852    Missed Time: Attempt    Comment:

## 2024-09-09 NOTE — PROGRESS NOTES
[facility]  TRAUMA ICU - PROGRESS NOTE    Patient Name: Fernando Cuevas  MRN: [id number]  Admit Date: 905  : 1960  AGE: 63 y.o.   GENDER: female    ==============================================================================  TODAY'S ASSESSMENT AND PLAN OF CARE:  Fernando Cuevas is a 63 y.o. female in the ICU due to: acute hemoglobin drop, possible GI bleed post vascular procedure and close monitoring. Stable hemoglobin with repeat. No clinical signs of active bleeding. No further ICU needs, okay for transfer to floor.     NEURO/PAIN/SEDATION:   #Ischemic and reperfusion pain, chronic pain  - IV tylenol  - dilaudid PCA  - chronic pain consulted  - gabapentin added yesterday evening  - lidocaine patches    RESPIRATORY:   Hx of COPD, malignant RLL neoplasm, emphysema  - seen by pulmonary - recommend IV steroid burst (methylpred 125 daily) x5 days  - continuous O2, maintain >92%  - montelukast 10mg daily  - tiotropium inhalers daily  - Albuterol PRN  - Duo nebs PRN  - pulse ox  - IS, pulmonary hygiene    CARDIOVASC:   Hx: cocaine, DVT LUE DVT on eliquis  Echo performed showing PFO, EF 30-35%, decreased LV systolic function  - q4h vitals  - diltiazem 24hr capsule 240mg via NGT  - difficult peripheral IV access requiring central line  - q4h NV checks per vascular surg  - TTE for embolic work up completed today (with bubble) - consistent with PFO, EF 30%    GI:   Hx of chronic constipation, SBR due to gastric per (2024), intermittent small amounts of melena, EGD/colonoscopy negative 2024, presented with possible SBO vs 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 Received 2u, incremented to 10.   - GI consulted, suspicious for ischemic colitis vs anastomotic ulcer. No  "contraindications for therapeutic AC from their team  - pantoprazole 40mg IV    :   - easton in place -removed yesterday with unsuccessful TOV. Bladder scans <200cc overnight, given 1L LR bolus this morning  - strict Is and Os  - replete electrolytes per ICU protocol     FEN:   - 100ml/hr D5  - NGT removed this morning, diet advancement per ACS.     HEMATOLOGIC:   Hx: on eliquis for recent LUE DVT  - holding home eliquis  - per vascular, recommend heparin gtt  - therapeutic on hep gtt, low intensity  - hemoglobin stable, no longer having bloody bowel movements  - vascular medicine team consulted for findings of \"beads on string\" appearance of popliteal artery on angiogram. 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 Vascular surgery consulted, now s/p R thromboembolectomy of PT. Plan for BKA. Podiatry consulted for possible TMA however, discussed with them and is nonsalvageable.   - vascular medicine team consulted for findings of \"beads on string\" appearance of popliteal artery on angiogram. 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 Consider splenic vaccines and peripheral smear    GI PROPHYLAXIS: pantoprazole IV 40  DVT PROPHYLAXIS: hep gtt    DISPOSITION: okay for floor transfer    Discussed with Dr. Mayers.    Yumiko Lee MD  PGY-2 VS Resident  Trauma Surgery Service  Floor: [id number]  TSICU: [id number] " "  ==============================================================================  OVERNIGHT EVENTS:  Pain is moderately improved from yesterday on \"pain pump\" but still keeps her awake. No abdominal pain. Had multiple BM overnight. All brown. No hiccups, no nausea, no emesis. Feeling hungry. No fevers or chills.     MEDICAL HISTORY / ROS:  Admission history and ROS reviewed. Pertinent changes as follows:  NA    PHYSICAL EXAM:  Heart Rate:  [124-136]   Temp:  [36.2 °C (97.2 °F)-36.4 °C (97.5 °F)]   Resp:  [3-22]   BP: ()/()   SpO2:  [67 %-100 %]   Constitutional: uncomfortable due to pain, able to converse, appears stated age  Neuro: A/O x4, CN2-12 intact, m/s intact BUE. Some weakness with ankle flexion and extension (4+/5). Unable to wiggle toes bilaterally. Sensation minimal bilateral feet. Rest pain +  Psych: normal affect  HEENT: No deformities, no scleral icterus. NGT in place, bilious output.   Cardiac: tachycardic on monitor  Pulmonary: no shortness of breath, equal chest rise, on RA  Abdomen: soft, distended, no tenderness to deep palpation. Well healed midline incision.   Skin: warm lower extremities but cool/cold feet bilaterally. Dry.   Extremities: no swelling noted. Surgical incision to right medial ankle. Small groin access site at left groin without hematoma. Bilateral mottling to both feet more demarcated than yesterday at ankle.   MSK: moving all four  Vasc: palpable fem bilaterally. Multiphasic popliteral bilaterally. R PT monophasic, no DP. L PT absent signal, no DP.     IMAGING SUMMARY:  (summary of new imaging findings, not a copy of dictation)  NA    LABS:  Results from last 7 days   Lab Units 09/09/24  0145 09/08/24  0205 09/07/24  2122 09/07/24  1555 09/07/24  0632 09/06/24  0932 09/05/24  1016   WBC AUTO x10*3/uL 34.1* 22.0* 23.7*   < > 24.4*   < > 40.6*   HEMOGLOBIN g/dL 11.4* 10.5* 10.6*   < > 6.5*   < > 9.5*   HEMATOCRIT % 35.3* 32.8* 32.2*   < > 21.9*   < > 29.7*   PLATELETS " AUTO x10*3/uL 481* 514* 518*   < > 585*   < > 846*   LYMPHO PCT MAN %  --   --   --   --  1.6  --  1.7   MONO PCT MAN %  --   --   --   --  0.8  --  2.6   EOSINO PCT MAN %  --   --   --   --  0.0  --  0.0    < > = values in this interval not displayed.     Results from last 7 days   Lab Units 09/09/24  0855   APTT seconds 28     Results from last 7 days   Lab Units 09/09/24  0145 09/08/24  0205 09/07/24  2122 09/06/24  0932 09/05/24  1016   SODIUM mmol/L 135* 137 138   < > 137   POTASSIUM mmol/L 3.9 4.8 4.7   < > 3.9   CHLORIDE mmol/L 98 99 102   < > 102   CO2 mmol/L 24 26 26   < > 22   BUN mg/dL 10 14 15   < > 14   CREATININE mg/dL 0.52 0.65 0.64   < > 0.74   CALCIUM mg/dL 7.8* 7.9* 7.9*   < > 8.8   PROTEIN TOTAL g/dL  --   --   --   --  6.9   BILIRUBIN TOTAL mg/dL  --   --   --   --  0.3   ALK PHOS U/L  --   --   --   --  129   ALT U/L  --   --   --   --  69*   AST U/L  --   --   --   --  54*   GLUCOSE mg/dL 109* 83 79   < > 104*    < > = values in this interval not displayed.     Results from last 7 days   Lab Units 09/05/24  1016   BILIRUBIN TOTAL mg/dL 0.3     Results from last 7 days   Lab Units 09/07/24  1025 09/07/24  0742   POCT PH, ARTERIAL pH 7.45* 7.41   POCT PCO2, ARTERIAL mm Hg 40 39   POCT PO2, ARTERIAL mm Hg 121* 110*   POCT HCO3 CALCULATED, ARTERIAL mmol/L 27.8* 24.7   POCT BASE EXCESS, ARTERIAL mmol/L 3.5* 0.1       REASON FOR ADMISSION    TODAY'S EVENTS  HD # 3 for this 64 YO female admitted to ICU with concerns for LGI bleed Hgb drop Concerns at present ongoing Chronic LE vascular insufficiency . MMP including COPD, hypertension, DVT on Eliquis, lung cancer s/p radiation therapy, concurrent alcohol and  cocaine abuse, previous small bowel perforation s/p resection at [facility] July originally admitted with abdominal pain suspected small bowel obstruction  chronic right foot pain Ischemic resulted in  Vascular surgery consult and right leg angiogram with right PT thromboembolectomy. Foot painful cool  liquid blood in veins mottled painful for amputation this coming week she is on a dilaudid PCA in light of GI Bleed unable to heparinize Non acute ICU issues TR to RNF        This critically ill patient no longer continues  to be at-risk for clinically significant deterioration / failure due to the above mentioned dysfunctional, unstable organ systems.  I have personally identified and managed all complex critical care issues to prevent aforementioned clinical deterioration.  Critical care time is spent at bedside and/or the immediate area and has included, but is not limited to, the review of diagnostic tests, labs, radiographs, serial assessments of hemodynamics, respiratory status, ventilatory management, and family updates.  Time spent in procedures and teaching are reported separately.     CRITICAL CARE TIME:  35 minutes    Yumiko Lee MD

## 2024-09-09 NOTE — SIGNIFICANT EVENT
Rapid Response RN Note    Rapid response RN at bedside for RADAR score 9 due to the following VS: T 36.4 °C; ; RR 22; /78; SPO2 86% (TSICU VS).     Reviewed above VS with bedside RN.  VS check on admission to floor: HR: 124 BP: 102/65 SpO2: 100%. Previous values appear to be inaccurate d/t nail polish on fingers. No interventions by rapid response team indicated at this time.      Staff to page rapid response for any concerns or acute change in condition/VS. Duane Ch RN.

## 2024-09-09 NOTE — ANESTHESIA PREPROCEDURE EVALUATION
Patient: Fernando Cuevas    Procedure Information       Date/Time: 09/10/24 0910    Procedure: Right below knee gulliotine amputation (Right)    Location: [facility] OR 27 / [facility] OR    Surgeons: Brendan Mary MD PhD     64 yo F presenting with RLE ischemia s/p PT thrombectomy. Now presents to OR for b/l BKA given nonsalvagable limb.    Of note, s/p thrombectomy  complicated by prolonged intubation requiring mechanical ventilation in PACU suspected 2/2 pseudocholinesterase deficiency. Patient was extubated in PACU at that time after parameters were met.      MEDICAL HISTORY:  Past Medical History:   Diagnosis Date    COPD (chronic obstructive pulmonary disease) (Multi)     Depression     DVT (deep venous thrombosis) (Multi)     bilateral upper extremities    HTN (hypertension)     Lung cancer (Multi)     Migraines     Panic disorder         Relevant Problems   Anesthesia   (+) Pseudocholinesterase deficiency      Cardiac   (+) Acute lower limb ischemia   (+) Essential hypertension   (+) Mixed hyperlipidemia      Pulmonary   (+) COPD (chronic obstructive pulmonary disease) (Multi)   (+) Malignant neoplasm of lower lobe of right lung (Multi)      Neuro   (+) Dysthymic disorder   (+) JASMINE (generalized anxiety disorder)   (+) Panic disorder      GI   (+) Gastroesophageal reflux disease without esophagitis      Hematology   (+) Anemia        SURGICAL HISTORY:  Past Surgical History:   Procedure Laterality Date     SECTION, LOW TRANSVERSE      COLONOSCOPY      CT ABDOMEN PELVIS ANGIOGRAM W AND/OR WO IV CONTRAST  2016    CT ABDOMEN PELVIS ANGIOGRAM W AND/OR WO IV CONTRAST 3/22/2016 [facility] [place]    CT ANGIO CORONARY ART WITH HEARTFLOW IF SCORE >30%  2023    CT ANGIO CORONARY ART WITH HEARTFLOW IF SCORE >30% 2023    CYSTOSCOPY      botox injection    ESOPHAGOGASTRODUODENOSCOPY      EXPLORATORY LAPAROTOMY W/ BOWEL RESECTION  2024    SBR for perforation    MR NECK ANGIO W IV  CONTRAST  04/19/2021    MR NECK ANGIO W IV CONTRAST 4/19/2021        MEDICATIONS:  Current Outpatient Medications   Medication Instructions    acetaminophen (Tylenol Extra Strength) 500 mg tablet 2 tablets, oral, Every 8 hours    albuterol 2.5 mg /3 mL (0.083 %) nebulizer solution 3 mL, nebulization, Every 6 hours PRN    albuterol 90 mcg/actuation inhaler 2 puffs, inhalation, Every 4 hours PRN    ammonium lactate (Lac-Hydrin) 12 % lotion Topical, 2 times daily    apixaban (ELIQUIS) 5 mg, oral, 2 times daily    azithromycin (ZITHROMAX) 250 mg, oral, Every Mon/Wed/Fri    benzonatate (TESSALON) 100 mg, oral, 3 times daily PRN, Do not crush or chew.    busPIRone (BUSPAR) 5 mg, oral, 2 times daily, Supposed to take. Needs Rx    calcium carbonate (Oscal) 500 mg calcium (1,250 mg) tablet 1 tablet, oral, 2 times daily (morning and late afternoon)    calcium carbonate (TUMS) 500 mg, oral, Every 12 hours    clotrimazole (Lotrimin) 1 % cream Topical, 2 times daily    dilTIAZem ER (TIAZAC) 240 mg, oral, Daily    fluocinonide 0.05 % cream APPLY THIN LAYER TO AFFECTED AREA TWICE A DAY AS NEEDED    Lidocaine Pain Relief 4 % patch 1 patch, transdermal, Every 24 hours PRN    mirtazapine (REMERON) 15 mg, oral, Nightly    mometasone-formoterol (Dulera 100) 100-5 mcg/actuation inhaler 200 mcg, inhalation, 2 times daily    montelukast (SINGULAIR) 10 mg, oral, Daily    oxybutynin XL (DITROPAN-XL) 5 mg, oral, Daily, Does not take everyday    pantoprazole (PROTONIX) 40 mg, oral, 2 times daily    polyethylene glycol (Glycolax, Miralax) 17 gram/dose powder Take 17 g by mouth once daily. Do not fill before July 28, 2024.    sucralfate (CARAFATE) 1 g, oral, 2 times daily, Crush and mix in luke warm water to make slurry and drink the slurry.    thiamine (VITAMIN B-1) 100 mg, oral, Daily    tiotropium (Spiriva Respimat) 2.5 mcg/actuation inhaler 2 puffs, inhalation, Daily    tiotropium (Spiriva Respimat) 2.5 mcg/actuation inhaler 2 puffs,  inhalation, Daily      ALLERGIES:  Allergies   Allergen Reactions    Iodinated Contrast Media Hives     Hives to faces and neck with itching. Resolved with 50 mg benadryl, 20 mg pepcid & 60 mg prednisone.    Hives to faces and neck with itching. Resolved with 50 mg benadryl, 20 mg pepcid & 60 mg prednisone.   Hives to faces and neck with itching. Resolved with 50 mg benadryl, 20 mg pepcid & 60 mg prednisone.    Iodine Other and Unknown    Adhesive Tape-Silicones Rash         VITALS:      9/9/2024    11:59 AM 9/9/2024    11:00 AM 9/9/2024    10:00 AM   Vitals   Systolic 102 100 95   Diastolic 65 78 83   Heart Rate 124 126 128   Resp  22 15       LABS:   BMP   Lab Results   Component Value Date    GLUCOSE 109 (H) 09/09/2024    CALCIUM 7.8 (L) 09/09/2024     (L) 09/09/2024    K 3.9 09/09/2024    CO2 24 09/09/2024    CL 98 09/09/2024    BUN 10 09/09/2024    CREATININE 0.52 09/09/2024   , LFT   Lab Results   Component Value Date    ALT 69 (H) 09/05/2024    AST 54 (H) 09/05/2024    ALKPHOS 129 09/05/2024    BILITOT 0.3 09/05/2024   , CBC  Lab Results   Component Value Date    WBC 34.1 (H) 09/09/2024    HGB 11.4 (L) 09/09/2024    HCT 35.3 (L) 09/09/2024    MCV 80 09/09/2024     (H) 09/09/2024          , Coags   Lab Results   Component Value Date/Time    PROTIME 11.8 08/02/2024 2154    INR 1.0 08/02/2024 2154    APTT 28 09/09/2024 0855      , A1C   Lab Results   Component Value Date    HGBA1C 5.2 04/13/2022       IMAGES:  EKG          Encounter Date: 09/05/24   ECG 12 lead   Result Value    Ventricular Rate 111    Atrial Rate 111    MN Interval 120    QRS Duration 76    QT Interval 336    QTC Calculation(Bazett) 456    P Axis 81    R Axis 79    T Axis 58    QRS Count 18    Q Onset 224    P Onset 164    P Offset 207    T Offset 392    QTC Fredericia 412    Narrative    Sinus tachycardia  Possible Anterior infarct , age undetermined  Abnormal ECG  When compared with ECG of 31-AUG-2024 13:07,  No significant  change was found    See ED provider note for full interpretation and clinical correlation  Confirmed by Tracee Weller ([id number]) on 9/5/2024 9:14:17 PM      , ECHO         Transthoracic Echo (TTE) Complete     Narrative  [facility], [address] [place] [address]  Tel [phone] and Fax [phone]    TRANSTHORACIC ECHOCARDIOGRAM REPORT      Patient Name:      DINA GREENE       Reading Physician:    [id number] Yazan Spencer MD  Study Date:        9/9/2024             Ordering Provider:    [id number] ANNIE REEDER  MRN/PID:           [id number]             Fellow:  Accession#:        [id number]         Nurse:  Date of Birth/Age: 1960 / 63 years Sonographer:          SANTIAGO Munson RDCS  Gender:            F                    Additional Staff:  Height:            157.48 cm            Admit Date:           9/5/2024  Weight:            61.24 kg             Admission Status:     Inpatient -  Critical/Stat  (within 1-3 hours)  BSA / BMI:         1.62 m2 / 24.69      Encounter#:           [id number]  kg/m2  Blood Pressure:    118/78 mmHg          Department Location:  [facility]    Study Type:    TRANSTHORACIC ECHO (TTE) COMPLETE  Diagnosis/ICD: Acute embolism and thrombosis of deep veins of left up  extrem-I82.622  Indication:    LUE DVT  CPT Code:      Echo Complete w Full Doppler-[id number]  Study Detail: The following Echo studies were performed: 2D, M-Mode, Doppler and  color flow. Technically challenging study due to patient lying in  supine position, prominent lung artifact and poor acoustic  windows. Agitated saline used as a contrast agent for intraseptal  flow evaluation and Definity used as a contrast agent for  endocardial border definition. Total contrast used for this  procedure was 1.5 mL via IV push. Patient's heart rhythm is A-fib  RVR.      PHYSICIAN INTERPRETATION:  Left Ventricle: The left ventricular systolic function is moderately decreased, with a visually  estimated ejection fraction of 30-35%. There is global hypokinesis of the left ventricle with minor regional variations. The left ventricular cavity size is mildly dilated. Left ventricular diastolic filling was indeterminate. There is no definite left ventricular thrombus visualized. There appears to be preservatin of the apical myocardial segments on mostly off axis views using Definity contrast enhancement.  Left Atrium: The left atrium was not well visualized. A bubble study using agitated saline was performed. Bubble study is positive. Bubbles present in LV on 2nd cardiac cycle after RA opacification consistent with PFO.  Right Ventricle: The right ventricle is normal in size. There is normal right ventricular global systolic function.  Right Atrium: The right atrium was not well visualized.  Aortic Valve: The aortic valve is trileaflet. There is no evidence of aortic valve regurgitation. The peak instantaneous gradient of the aortic valve is 3.6 mmHg.  Mitral Valve: The mitral valve is normal in structure. There is no evidence of mitral valve regurgitation.  Tricuspid Valve: The tricuspid valve is structurally normal. There is trace tricuspid regurgitation.  Pulmonic Valve: The pulmonic valve is not well visualized. There is physiologic pulmonic valve regurgitation.  Pericardium: There is a trivial pericardial effusion.  Aorta: The aortic root was not well visualized.  Pulmonary Artery: The tricuspid regurgitant velocity is 2.59 m/s, and with an estimated right atrial pressure of 3 mmHg, the estimated pulmonary artery pressure is normal with the RVSP at 29.8 mmHg.  Systemic Veins: The inferior vena cava size appears small. There is IVC inspiratory collapse greater than 50%.  In comparison to the previous echocardiogram(s): There are no prior studies on this patient for comparison purposes.      CONCLUSIONS:  1. The left ventricular systolic function is moderately decreased, with a visually estimated ejection  fraction of 30-35%.  2. There is global hypokinesis of the left ventricle with minor regional variations.  3. Left ventricular diastolic filling was indeterminate.  4. Left ventricular cavity size is mildly dilated.  5. No left ventricular thrombus visualized.  6. There is normal right ventricular global systolic function.  7. Bubbles present in LV on 2nd cardiac cycle after RA opacification consistent with PFO.  8. A bubble study using agitated saline was performed. Bubble study is positive.    QUANTITATIVE DATA SUMMARY:  2D MEASUREMENTS:  Normal Ranges:  IVSd:          0.83 cm (0.6-1.1cm)  LVPWd:         0.78 cm (0.6-1.1cm)  LVIDd:         3.12 cm (3.9-5.9cm)  LVIDs:         2.48 cm  LV Mass Index: 39 g/m2  LV % FS        20.5 %    LV SYSTOLIC FUNCTION BY 2D PLANIMETRY (MOD):  Normal Ranges:  EF-Visual:      33 %  LV EF Reported: 33 %    AORTIC VALVE:  Normal Ranges:  AoV Vmax:      0.94 m/s (<=1.7m/s)  AoV Peak PG:   3.6 mmHg (<20mmHg)  LVOT Max Kevin:  0.74 m/s (<=1.1m/s)  LVOT VTI:      9.15 cm  LVOT Diameter: 1.94 cm  (1.8-2.4cm)  AoV Area,Vmax: 2.30 cm2 (2.5-4.5cm2)      RIGHT VENTRICLE:  RV s' 0.11 m/s    TRICUSPID VALVE/RVSP:  Normal Ranges:  Peak TR Velocity: 2.59 m/s  RV Syst Pressure: 29.8 mmHg (< 30mmHg)  IVC Diam:         0.90 cm    PULMONIC VALVE:  Normal Ranges:  PV Max Kevin: 0.8 m/s  (0.6-0.9m/s)  PV Max P.4 mmHg  PV Mean P.2 mmHg  PV VTI:     10.20 cm      [id number] Yazan Spencer MD  Electronically signed on 2024 at 11:57:13 AM        ** Final **    , CARDIAC CATH        CT angio coronary art with heartflow if score >30% 2023    Narrative  EXAMINATION: CTA CARDIAC CORONARY W/ CONTRAST 2023 03:45 PM  CLINICAL HISTORY: Reason for Exam: Angina suspected  ASSOCIATED DIAGNOSIS: Chest pain, unspecified type  ORDERING PROVIDER: GIUSEPPE HU  TECHNOLOGISTS NOTE:  Pt given 3 doses of IV metoprolol and 1 dose of sublingual nitroglycerin prior to CT. HR at time fo scan approx  81bpm.  COMPARISON: CT CHEST W/O CONTRAST 11/11/2022, 11:04 AM  TECHNIQUE: Prospectively gated axial images were obtained from above the level of the heart through the base of the heart after administration of intravenous contrast at 6 cc per second followed by a saline bolus.  2D sagittal and coronal reconstructions were obtained from the axial data. Additional post-processing was performed on a separate workstation, with images archived in PACS. Before infusion of intravenous contrast, radiology personnel investigated the possibility of an allergic history and of any history of reaction to iodinated contrast material. Contrast Protocol: Omnipaque 350 [>or =100lb] 75 ml [<100 lb] 1 ml per 1 lb.  INTRA-PROCEDURE MEDS: iohexol (OMNIPAQUE) 350 MG/ML injection 75 mL Route: Intravenous Push    QUALITY: Good, with minor artifact but good diagnostic quality.    CALCIUM SCORE: 0 Agatston units    Stenosis grading is reported using the following scheme, unless otherwise specified:  Normal:   No stenosis  Minimal: <25% stenosis  Mild: 25-49% stenosis  Moderate: 50-69% stenosis  Severe:   70-99% stenosis  Occluded    FINDINGS:  CORONARY DOMINANCE: Right coronary artery dominance.    CORONARY ANOMALIES: The circumflex coronary artery arises from the right cusp separate from the right coronary artery with a retroaortic course. This is a benign variant.    LEFT MAIN: No significant stenosis or plaques. Normal branching pattern.    LEFT ANTERIOR DESCENDING: No significant stenosis or plaque. Large wraparound LAD.    LEFT CIRCUMFLEX: No significant stenosis or plaque.    RIGHT CORONARY: No significant stenosis or plaque. The PDA is small in caliber, likely related to the wraparound LAD.    INTERMEDIATE RAMUS: Not present.    HEART MORPHOLOGY: Unremarkable LV size and shape. Unremarkable RV size and shape.    PERICARDIUM: No pericardial effusion or pericardial thickening.    NONCARDIAC FINDINGS: This exam is not optimized for  evaluation of noncardiac structures. Within these limitations, no pulmonary embolus or aortic dissection is seen. No infiltrate, effusion, or pneumothorax is seen. Centrilobular emphysema is present. Bilateral bronchial wall thickening and scattered mucus plugging. The known biopsy-proven lung cancer in the right lower lobe is similar in size the prior exam. No mediastinal adenopathy is identified.    IMPRESSION:  1.  No coronary artery calcification or stenosis.  2.  The circumflex coronary artery arises from the right cusp separate from the right coronary artery with a retroaortic course. This is a benign variant.  3.  Centrilobular emphysema is present. Bilateral bronchial wall thickening and scattered mucus plugging. The known biopsy-proven lung cancer in the right lower lobe is similar in size the prior exam.    MACRO: None   , CXR       XR chest 1 view 09/07/2024    Narrative  Interpreted By:  Ananth Mckinney and Meyers Emily  STUDY:  XR CHEST 1 VIEW;  9/7/2024 6:51 am    INDICATION:  Signs/Symptoms:central line placement.      COMPARISON:  Chest radiograph 09/06/2024    ACCESSION NUMBER(S):  [id number]    ORDERING CLINICIAN:  AURELIO MANCERA    FINDINGS:  AP radiograph of the chest was provided.    Endotracheal tube with its tip terminating approximately 5.6 cm above  the level of the lizzie. Right IJ approach central venous catheter  with its tip overlying the expected location of the distal superior  vena cava. Endotracheal tube courses beneath the diaphragm with its  tip beyond the field of view.    CARDIOMEDIASTINAL SILHOUETTE:  Cardiomediastinal silhouette is normal in size and configuration.  Prominent left-sided para-aortic/paraspinal fat.    LUNGS:  Mild left-greater-than-right bibasilar atelectasis. No focal  consolidation, pleural effusion, or pneumothorax.    ABDOMEN:  No remarkable upper abdominal findings.    BONES:  No acute osseous changes.    Impression  1. Mild  left-greater-than-right bibasilar atelectasis without other  acute cardiopulmonary process.  2. Right IJ approach central venous catheter with its tip overlying  the expected location of the distal superior vena cava.  3. Additional medical lines and devices as detailed above.    I personally reviewed the images/study, and I agree with the findings  as stated above. This study was interpreted at [facility], [place], [place].    MACRO:  None    Signed by: Ananth Mckinney 9/7/2024 12:51 PM  Dictation workstation:   [id number]    , CT Head/Neck       CT head wo IV contrast 06/12/2024    Narrative  Interpreted By:  Sheri Lakhani,  STUDY:  CT HEAD WO IV CONTRAST;  6/12/2024 12:53 am    INDICATION:  Signs/Symptoms:head injury earlier today, headache.    COMPARISON:  CT head dated 07/24/2023.    ACCESSION NUMBER(S):  [id number]    ORDERING CLINICIAN:  SARA RUTLEDGE    TECHNIQUE:  Noncontrast axial CT scan of head was performed. Angled reformats in  brain and bone windows were generated. The images were reviewed in  bone, brain, blood and soft tissue windows.    FINDINGS:  No hyperdense intracranial hemorrhage is identified. There is no mass  effect or midline shift.    Gray-white differentiation is intact, without evidence of CT apparent  transcortical infarct. Subtle attenuation changes are present in the  periventricular and subcortical white matter of bilateral cerebral  hemispheres, nonspecific findings favored to represent sequela of  microvascular disease.    No ventricular dilatation is present. Basal cisterns are patent. No  extra-axial fluid collections are identified.    Scalp soft tissues do not demonstrate any acute abnormality.  Calvarium is unremarkable in appearance. Mastoid air cells and middle  ear cavities are clear.    Visualized paranasal sinuses are unremarkable in appearance.    Impression  1. No evidence of hemorrhage, skull fracture, or other  acute  intracranial trauma/abnormality.  2. Patchy attenuation changes are present in the periventricular and  subcortical white matter of bilateral cerebral hemispheres,  nonspecific findings favored to represent sequela of microvascular  disease.    MACRO:  None    Signed by: Sheri Lakhani 6/12/2024 1:07 AM  Dictation workstation:   [id number]    , CT Chest        CT angio chest abdomen pelvis 07/25/2024    Narrative  Interpreted By:  Abhishek Noble,  STUDY:  CT ANGIO CHEST ABDOMEN PELVIS;  7/25/2024 3:11 am    INDICATION:  Signs/Symptoms:abd pain, radiating to  back and up to chest,  post-operative hematoma on eliquis.    COMPARISON:  CT scan of the abdomen and pelvis 07/24/2024.    ACCESSION NUMBER(S):  [id number]    ORDERING CLINICIAN:  YASMEEN THOMAS    TECHNIQUE:  Axial CT images of the chest, abdomen and pelvis before and after  intravenous administration of 100 mL of Omnipaque 350 using CT  angiographic technique. Postcontrast imaging was performed in the  arterial and delayed phases. Coronal and sagittal images are  reconstructed.  3D reconstructions were obtained at a separate  workstation.    FINDINGS:  VASCULAR:    AORTA: Unenhanced images demonstrate no evidence for intramural  hematoma. No thoracic aortic aneurysm or dissection. Calcific  atherosclerosis of the aorta. 2 vessel arch anatomy, a normal  variant. Mild calcified plaque at origin of the arch vessels without  hemodynamically significant stenosis.    Abdominal aorta is normal in caliber and course. No evidence for  dissection or aneurysmal dilatation. There is scattered calcified and  noncalcified plaque in the abdominal aorta. 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 The superior and inferior mesenteric arteries are patent.  Single right renal artery demonstrates moderate narrowing proximally  secondary to calcified and noncalcified plaque. Single left renal  artery is  widely patent.    There is scattered calcified and noncalcified plaque in the common  and internal iliac arteries without hemodynamically significant  stenosis. The external iliac and proximal femoral arteries are patent.    No acute pulmonary embolism to the proximal segmental arterial level.    The iliofemoral vessels and IVC are patent. Splenic, superior  mesenteric, portal and hepatic veins are patent.    CHEST:    HEART: Normal size. No pericardial effusion.  MEDIASTINUM AND ALESSANDRO: Prominent subcarinal lymph node measures 9 mm  in short axis. No thoracic adenopathy. LUNG, PLEURA, LARGE AIRWAYS:  Advanced centrilobular and paraseptal emphysema. Left Bochdalek's  hernia contains fat. There are bibasilar airspace opacities slightly  more consolidative in the right lung base. This may relate to  atelectasis although superimposed infection not excluded. No effusion  or pneumothorax. CHEST WALL AND LOWER NECK: Within normal limits.  BONES: No acute osseous abnormality.    ABDOMEN: Arterial phase of imaging limits evaluation of the solid  viscera.    LIVER: Normal morphology. A 5 mm hypervascular focus in the left  hepatic lobe may relate to perfusion shunts versus small flash  filling hemangioma. Focal area of low attenuation adjacent to the  fissure for ligamentum teres may relate focal fatty infiltration.  BILE DUCTS: Normal caliber. GALLBLADDER: Layering high density in the  gallbladder neck likely relates to small calculi. No gallbladder wall  thickening. PANCREAS: Homogeneous enhancement of the pancreas without  evidence for ductal dilatation or peripancreatic inflammatory  changes. There is a bilobed hyperdense lesion abutting the pancreatic  tail with the larger component measuring up to 1.3 cm. This is stable  from CT scan of 07/03/2024 but new when compared to CT angiogram  03/22/2016. This measures approximately 48 Hounsfield units on the  unenhanced CT, 49 Hounsfield units on the arterial phase and  47  Hounsfield units on the delayed imaging. No definite enhancement.  SPLEEN: Within normal limits. ADRENALS:  Nodular thickening of the  adrenal glands may relate to hyperplasia or adenomatous change.  KIDNEYS: Right kidney is slightly atrophic when compared to the left.  No hydronephrosis or perinephric fluid collection. URETERS: No  hydroureter.    PELVIS:    REPRODUCTIVE ORGANS: Uterus is present. No adnexal mass.  BLADDER: Within normal limits.    RETROPERITONEUM: Within normal limits.  BOWEL: Stomach is partially distended. Postsurgical changes of  small-bowel anastomosis in the right lower quadrant is  redemonstrated. Moderate to large stool burden. No pneumatosis or  portal venous gas. Normal appendix. PERITONEUM: Mild subcutaneous  soft tissue stranding in the mid abdomen at level of the umbilicus.  No free air. Interval evolution with decreased size of previously  noted rectus sheath hematoma on CT scan of 07/03/2024. No new fluid  collection is seen.    ABDOMINAL WALL: There is subcutaneous soft tissue stranding in the  mid abdomen similar to recent CT likely postsurgical. BONES:  Multilevel degenerative changes of the spine.    Impression  No aortic aneurysm or dissection.    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    Single right renal artery demonstrates moderate narrowing proximally  secondary to calcified and noncalcified plaque. Right kidney is  slightly atrophic when compared to the left, likely chronic.    Advanced centrilobular and paraseptal emphysema.  There are bibasilar  airspace opacities slightly more consolidative in the right lung  base. This may relate to atelectasis although superimposed infection  not excluded. Correlate clinically.    Postsurgical changes of partial small-bowel resection with  anastomosis in the right lower quadrant. No evidence for bowel  obstruction.    There is a bilobed hyperdense lesion abutting the  pancreatic tail  with the larger component measuring up to 1.3 cm. This is stable from  CT scan of 07/03/2024 but new when compared to CT angiogram  03/22/2016. This measures approximately 48 Hounsfield units on the  unenhanced CT without significant enhancement on the arterial and  delayed phases. Findings are favored to relate to small hematoma.  Attention on continued short-term follow-up is advised.    Mild subcutaneous soft tissue stranding in the mid abdomen at level  of the umbilicus.  Interval resolution of previously noted rectus  sheath hematoma. No new fluid collection is seen.    Additional findings as noted above.    MACRO:  None    Signed by: Abhishek Noble 7/25/2024 4:00 AM  Dictation workstation:   [id number]   , CT Abdomin       CT abdomen pelvis w IV contrast 09/05/2024    Addendum 9/5/2024  5:52 PM  This finding was discussed with and acknowledged by Dr. Munson on  9/5/2024 at 5:44 PM  Signed by Brendan Aviles MD    Narrative  STUDY:  CT Abdomen and Pelvis with IV Contrast; 09/05/2024 4:55 PM  INDICATION:  Abdominal pain.  Evaluate for intra-abdominal pathology.  COMPARISON:  XR abdomen 08/27/2024.  CT abdomen/pelvis 08/23/2024, 08/11/2024.  ACCESSION NUMBER(S):  [id number]  ORDERING CLINICIAN:  SUZETTE CABAN  TECHNIQUE:  CT of the abdomen and pelvis was performed.  Contiguous axial images  were obtained at 3 mm slice thickness through the abdomen and pelvis.  Coronal and sagittal reconstructions at 3 mm slice thickness were  performed.  Omnipaque 350:75 mL was administered intravenously.  FINDINGS:  LOWER CHEST:  No cardiomegaly.  No pericardial effusion.  Lung bases are clear.    ABDOMEN:    LIVER:  No hepatomegaly.  Smooth surface contour.  Normal attenuation.    BILE DUCTS:  No intrahepatic or extrahepatic biliary ductal dilatation.    GALLBLADDER:  There are multiple gallstones, but no evidence for cholecystitis or  biliary obstruction.  STOMACH:  The stomach is distended.    PANCREAS:  No  masses or ductal dilatation.    SPLEEN:  The spleen is atrophic and hypoenhancing, likely representing  scarring. The spleen has shrunken since the previous examination done  approximately 2 weeks ago.    ADRENAL GLANDS:  No thickening or nodules.    KIDNEYS AND URETERS:  Kidneys are normal in size and location.  No renal or ureteral  calculi.    PELVIS:    BLADDER:  No abnormalities identified.    REPRODUCTIVE ORGANS:  No abnormalities identified.    BOWEL:  There are multiple dilated loops of small bowel disproportionate to  the large bowel. This is consistent with a small bowel obstruction.  The transition point appears to be in the lower midline abdomen.  The appendix is identified and is unremarkable.    VESSELS:  No abnormalities identified.  Abdominal aorta is normal in caliber.    PERITONEUM/RETROPERITONEUM/LYMPH NODES:  No free fluid.  No pneumoperitoneum.  No lymphadenopathy.    ABDOMINAL WALL:  No abnormalities identified.  SOFT TISSUES:  No abnormalities identified.    BONES:  No acute fracture or aggressive osseous lesion.    Impression  1. There is a small bowel obstruction. The transition point appears to  be in the lower midline abdomen. I suspect this may be due to an  adhesion.  2. Multiple gallstones, but no evidence for cholecystitis or biliary  obstruction.  3. The spleen is atrophic and hypoenhancing, likely representing  scarring. This may represent a autosplenectomy.  Signed by Brendan Aviles MD    SOCIAL:  Social History     Tobacco Use   Smoking Status Some Days    Types: Cigarettes    Passive exposure: Current (3 cigarettes a day)   Smokeless Tobacco Never      Social History     Substance and Sexual Activity   Alcohol Use Yes    Comment: per pt weekly use      Social History     Substance and Sexual Activity   Drug Use Yes    Types: Cocaine, Marijuana    Comment: cocain monthly, marijuana few times per week        NPO STATUS:  No data recorded    Clinical Areas Reviewed:   Tobacco   Allergies   Problems  Med Hx  Surg Hx  OB Status  Fam Hx    Soc Hx        Anesthesia Assessment:    Physical Exam    Airway  Mallampati: III  TM distance: >3 FB  Neck ROM: full     Cardiovascular   Rhythm: regular  Rate: normal     Dental        Pulmonary   Breath sounds clear to auscultation     Abdominal            Anesthesia Plan    History of general anesthesia?: yes  History of complications of general anesthesia?: no    ASA 3     general     The patient is not a current smoker.  Patient did not smoke on day of procedure.    intravenous induction   Postoperative administration of opioids is intended.  Trial extubation is planned.  Anesthetic plan and risks discussed with patient.  Use of blood products discussed with patient who consented to blood products.    Plan discussed with resident.

## 2024-09-09 NOTE — H&P (VIEW-ONLY)
Inpatient consult to Vascular Medicine  Consult performed by: Karin Kapoor MD  Consult ordered by: Sophia Mancilla MD  Reason for consult: anticoagulation recommendations, hypercoagulable workup and concern about FMD.      Abigail Cuevas is a 63 y.o. female on day 4 of admission presenting with SBO and acute limb ischemia. Vascular medicine is consulted for anticoagulation recommendations, hypercoagulable workup and concern about FMD.    63 year old female with history of HTN, COPD, DVT (on Eliquis), lung cancer s/p radiation, cocaine abuse disorder, small bowel perforation s/p resection,smoker, who presented with abdominal pain. She was found to have small bowel obstruction. On admission, she also complained of bilateral lower extremity pain. Vascular surgery consulted for right leg acute limb ischemia. She underwent right lower extremity angiogram and thromboembolectomy with Dr. Mary on . Intra-operatively she was found to have concerns for 'bead of string' appearance of right SFA/popliteal artery. Of note, she has normal AUSTYN on 2024. She was found to have right upper extremity DVT (non-occlusive brachial vein DVT) on 2024. She was discharged on Eliquis. Unclear if patient was taking her Eliquis at home. Denied any personal or family history of clotting disorder. She had one episode of GIB intra-operatively. However, H&H now stable.     Review of Systems   As noted above   Migraine  Depression   No other complaints today     Past Medical History:   Diagnosis Date    COPD (chronic obstructive pulmonary disease) (Multi)     Depression     DVT (deep venous thrombosis) (Multi)     bilateral upper extremities    HTN (hypertension)     Lung cancer (Multi)     Migraines     Panic disorder      Past Surgical History:   Procedure Laterality Date     SECTION, LOW TRANSVERSE      COLONOSCOPY      CT ABDOMEN PELVIS ANGIOGRAM W AND/OR WO IV CONTRAST  2016    CT ABDOMEN PELVIS ANGIOGRAM  W AND/OR WO IV CONTRAST [place]/22/[place] [facility]    CT ANGIO CORONARY ART WITH HEARTFLOW IF SCORE >30%  01/05/2023    CT ANGIO CORONARY ART WITH HEARTFLOW IF SCORE >30% 1/5/2023    CYSTOSCOPY      botox injection    ESOPHAGOGASTRODUODENOSCOPY      EXPLORATORY LAPAROTOMY W/ BOWEL RESECTION  06/21/2024    SBR for perforation    MR NECK ANGIO W IV CONTRAST  04/19/2021    MR NECK ANGIO W IV CONTRAST 4/19/2021     Social History     Socioeconomic History    Marital status: Single     Spouse name: Not on file    Number of children: Not on file    Years of education: Not on file    Highest education level: Not on file   Occupational History    Not on file   Tobacco Use    Smoking status: Some Days     Types: Cigarettes     Passive exposure: Current (3 cigarettes a day)    Smokeless tobacco: Never   Vaping Use    Vaping status: Never Used   Substance and Sexual Activity    Alcohol use: Yes     Comment: per pt weekly use    Drug use: Yes     Types: Cocaine, Marijuana     Comment: cocain monthly, marijuana few times per week    Sexual activity: Defer   Other Topics Concern    Not on file   Social History Narrative    Not on file     Social Determinants of Health     Financial Resource Strain: Patient Declined (9/9/2024)    Overall Financial Resource Strain (CARDIA)     Difficulty of Paying Living Expenses: Patient declined   Food Insecurity: No Food Insecurity (7/30/2024)    Hunger Vital Sign     Worried About Running Out of Food in the Last Year: Never true     Ran Out of Food in the Last Year: Never true   Transportation Needs: Patient Declined (9/9/2024)    PRAPARE - Transportation     Lack of Transportation (Medical): Patient declined     Lack of Transportation (Non-Medical): Patient declined   Physical Activity: Inactive (7/30/2024)    Exercise Vital Sign     Days of Exercise per Week: 0 days     Minutes of Exercise per Session: 0 min   Stress: No Stress Concern Present (7/30/2024)    [employer] [employer]  Occupational Health - Occupational Stress Questionnaire     Feeling of Stress : Not at all   Social Connections: Unknown (7/30/2024)    Social Connection and Isolation Panel [NHANES]     Frequency of Communication with Friends and Family: More than three times a week     Frequency of Social Gatherings with Friends and Family: More than three times a week     Attends Hinduism Services: Never     Active Member of Clubs or Organizations: No     Attends Club or Organization Meetings: Never     Marital Status: Patient declined   Intimate Partner Violence: Not At Risk (7/30/2024)    Humiliation, Afraid, Rape, and Kick questionnaire     Fear of Current or Ex-Partner: No     Emotionally Abused: No     Physically Abused: No     Sexually Abused: No   Housing Stability: Patient Declined (9/9/2024)    Housing Stability Vital Sign     Unable to Pay for Housing in the Last Year: Patient declined     Number of Times Moved in the Last Year: 1     Homeless in the Last Year: Patient declined     No family history on file.   Allergies   Allergen Reactions    Iodinated Contrast Media Hives     Hives to faces and neck with itching. Resolved with 50 mg benadryl, 20 mg pepcid & 60 mg prednisone.    Hives to faces and neck with itching. Resolved with 50 mg benadryl, 20 mg pepcid & 60 mg prednisone.   Hives to faces and neck with itching. Resolved with 50 mg benadryl, 20 mg pepcid & 60 mg prednisone.    Iodine Other and Unknown    Adhesive Tape-Silicones Rash     Objective   Physical Exam  Vitals:    09/09/24 1000 09/09/24 1100 09/09/24 1152 09/09/24 1159   BP: 95/83 100/78  102/65   BP Location:    Left arm   Patient Position:    Lying   Pulse: (!) 128 (!) 126  (!) 124   Resp: 15 22     Temp:       TempSrc:       SpO2: 99% 100% 97% 100%   Weight:       Height:          General: In no acute distress  Neuro: alert and oriented x3 but she is lethargic   CV:  RRR  Lungs: CTA bilaterally (limited anterior)  Abd:  Soft, distended   Psych:   Appropriate affect  Upper extremities: No swelling, +2 radial   Lower extremities: No edema.  Non palpable bilateral PT or DP  Skin: Ischemic mottling of bilateral lower feet    Medications   Scheduled medications  [Held by provider] azithromycin, 250 mg, oral, Every Mon/Wed/Fri  busPIRone, 5 mg, oral, BID  cloNIDine, 0.1 mg, oral, q8h LISET  dilTIAZem CD, 240 mg, oral, Daily  gabapentin, 300 mg, oral, TID  insulin lispro, 0-10 Units, subcutaneous, TID  levalbuterol, 0.63 mg, nebulization, TID  lidocaine, 1 patch, transdermal, Daily  melatonin, 3 mg, oral, Nightly  methylPREDNISolone sodium succinate (PF), 125 mg, intravenous, q24h  mirtazapine, 15 mg, oral, Nightly  montelukast, 10 mg, oral, Daily  oxybutynin, 2.5 mg, oral, BID  pantoprazole, 40 mg, intravenous, BID  perflutren lipid microspheres, 2 mL of dilution, intravenous, Once in imaging  perflutren protein A microsphere, 0.5 mL, intravenous, Once in imaging  [Held by provider] sucralfate, 1 g, oral, BID  sulfur hexafluoride microsphr, 2 mL, intravenous, Once in imaging  [Held by provider] thiamine, 100 mg, oral, Daily  tiotropium, 2 puff, inhalation, Daily    Continuous medications  dextrose 5%-0.45 % sodium chloride, 100 mL/hr, Last Rate: 100 mL/hr (09/09/24 0857)  heparin, 0-4,000 Units/hr, Last Rate: 900 Units/hr (09/09/24 1416)  HYDROmorphone,     PRN medications  PRN medications: albuterol, albuterol, dextrose, dextrose, glucagon, glucagon, heparin, ipratropium-albuteroL, melatonin, naloxone, ondansetron     Lab Review   Recent Labs     09/09/24  0145 09/08/24  0205 09/07/24  2122 09/06/24  0932 09/05/24  1016 09/01/24  0537 08/30/24  0652 08/29/24  0635 08/28/24  0500   * 137 138 140 137 135* 136 138 140   K 3.9 4.8 4.7 4.1 3.9 4.5 4.2 4.2 2.8*   CL 98 99 102 103 102 101 105 107 101   CO2 24 26 26 30 22 24 23 23 25   ANIONGAP 17 17 15 11 17 15 12 12 17   BUN 10 14 15 17 14 10 8 6 7   CREATININE 0.52 0.65 0.64 0.81 0.74 0.60 0.72 0.62 0.56   EGFR  >90 >90 >90 82 >90 >90 >90 >90 >90   MG 1.79 2.05 2.16 2.63*  --  1.60 1.60 1.90 1.70     Recent Labs     09/09/24 0145 09/08/24 0205 09/07/24 2122 09/05/24  1016 09/01/24  0537 08/30/24  0652 08/21/24  0604 08/19/24  1225 08/02/24 2154 07/26/24  0603 07/25/24  0942 07/24/24 2133 06/21/24 0704 06/20/24 2144   ALBUMIN 2.3* 2.4* 2.3* 3.6 2.4*   < > 3.2* 3.7 3.9   < > 2.6* 2.5*   < > 3.1*   ALKPHOS  --   --   --  129  --   --  96 110 97  --  119 133   < > 70   ALT  --   --   --  69*  --   --  32 56* 50*  --  31 34   < > 24   AST  --   --   --  54*  --   --  18 37 30  --  26 28   < > 14   BILITOT  --   --   --  0.3  --   --  0.4 0.4 0.3  --  0.2 0.2   < > 0.2   LIPASE  --   --   --  32  --   --   --  43  --   --   --  163*  --  37    < > = values in this interval not displayed.     Recent Labs     09/09/24 0145 09/08/24 0205 09/07/24 2122 09/07/24  1555 09/07/24  0632 09/06/24  0932 09/05/24  1016 09/01/24  0537   WBC 34.1* 22.0* 23.7* 24.7* 24.4* 26.6* 40.6* 14.9*   HGB 11.4* 10.5* 10.6* 10.2* 6.5* 8.7* 9.5* 7.3*   HCT 35.3* 32.8* 32.2* 32.3* 21.9* 28.9* 29.7* 24.6*   * 514* 518* 534* 585* 758* 846* 1,004*   MCV 80 79* 79* 80 76* 77* 74* 78*     Recent Labs     09/09/24  1350 09/09/24  0855 09/09/24  0145 09/08/24  0609 09/08/24  0205 09/07/24  2123 09/07/24  1555 08/02/24  2154 07/24/24  2133 06/20/24  2144 06/11/24  1539 07/24/23  1111 07/24/23  0507 05/03/23  1759 05/03/23  1559   INR  --   --   --   --   --   --   --  1.0 1.4* 1.0 1.0  --  0.9 CANCELED 1.0   HAUF 0.3 0.3 0.2 0.3 0.3 <0.1 <0.1  --   --   --   --   --   --   --   --    DDIMERVTE  --   --   --   --   --   --   --   --   --   --   --  387  --   --   --      PTT - 9/9/2024:  8:55 AM  Estimated Creatinine Clearance: 95.3 mL/min (by C-G formula based on SCr of 0.52 mg/dL).    Recent Labs     07/24/23  0507   CHOL 190   LDLF 72   HDL 91.6   TRIG 131     Lab Results   Component Value Date    HGBA1C 5.2 04/13/2022     Lab Results   Component  Value Date    TSH 1.26 07/23/2023     Imaging  CT A/P (9/5/2024)  1. There is a small bowel obstruction. The transition point appears to be in the lower midline abdomen. I suspect this may be due to an adhesion.  2. Multiple gallstones, but no evidence for cholecystitis or biliary obstruction.  3. The spleen is atrophic and hypoenhancing, likely representing scarring. This may represent a autosplenectomy.    AUSTYN/PVR (8/26/2024)  Right Lower PVR: No evidence of arterial occlusive disease in the right lower extremity at rest. Multiphasic flow is noted in the right common femoral artery, right posterior tibial artery and right dorsalis pedis artery. Due to pain tolerance, unable to obtain first digit (great toe) PPG and pressure. However a PPG was documented on the second digit.  Left Lower PVR: No evidence of arterial occlusive disease in the left lower extremity at rest. Decreased digital perfusion noted. Multiphasic flow is noted in the left common femoral artery, left posterior tibial artery and left dorsalis pedis artery. Unable to obtain brachial pressure due to IV placement.    RUE Venous Duplex (7/6/2024)  Nonocclusive thrombosis involving the right brachial vein    Assessment/Plan   Fernando Cuevas is a 63 y.o. female with history of HTN, COPD, right brachial vein DVT (7/26/24 on Eliquis, ?she was taking it), lung cancer s/p radiation, cocaine abuse disorder, small bowel perforation s/p resection, smoker. Who was admitted with small bowel obstruction and bilateral lower extremity acute limb ischemia    _ ALI   S/p right leg angiogram and PT thrombectomy (9/7) Dr. Mary. Right SFA/pop with possible 'beads on string appearance'  Normal AUSTYN with severe pedal disease on 8/26/2024  Echo positive for PFO  Acute limb ischemia of bilateral lower extremities possibly related to embolism, unclear source  _ Leucocytosis   _ Thrombocytosis   _ Anemia     Recommendations:  - continue heparin infusion with normogram hep Xa  0.3-0.7   - start aspirin 81mg daily and high intensity atorvastatin  - obtain homocysteine, Lp(a), APLA (lupus anticoagulant, beta2 glycoprotein and cardiolipin antibiodies) to r/o hypercoag disorder  SPEP, UPEP   Jordy-2 especially with thrombocytosis   - She does have multiple reasons for leucocytosis/thrombocytosis and current use of steroids might be worsening the picture, but they have been elevated prior, please further discuss with hematology   - obtain bilateral vascular lab lower and upper extremity venous duplex to r/o acute DVT insetting of prior DVT and patent PFO, now with ALI   - obtain bilateral vascular lab lower extremity arterial duplex to r/o popliteal artery aneurysms  - will need outpatient Holter monitor at discharge  - CTA chest   - obtain vascular lab carotid duplex to r/o carotid FMD  - obtain vascular lab renal duplex to r/o renal FMD  - will eventually need FMD protocol CTA from head/Neck and Ab/pelvis (can be done as outpatient)   - Atherosclerosis risk factors control, check HBA1c and lipid panel  - Needs follow up with PCP/oncologist for UTD cancer screening especially with h/o  lung cancer   - appreciate vascular surgery recommendations  - likely anticoagulation at discharge will be warfarin with heparin bridge, goal INR 2-3, she does reports no issues with transport/appointments     D/w attending, Dr. Edward Kapoor MD  PGY-5 Vascular Surgery Resident  Rotating Vascular Medicine Resident    I saw and evaluated the patient. I personally obtained the key and critical portions of the history and physical exam or was physically present for key and critical portions performed by the resident/fellow. I reviewed the resident/fellow's documentation and discussed the patient with the resident/fellow. I agree with the resident/fellow's medical decision making as documented in the note. Personally edited by myself    Priya Leon MD

## 2024-09-09 NOTE — PROGRESS NOTES
[facility]  ACUTE CARE SURGERY - PROGRESS NOTE    Patient Name: Fernando Cuevas  MRN: [id number]  Admit Date: 905  : 1960  AGE: 63 y.o.   GENDER: female  ==============================================================================  TODAY'S ASSESSMENT AND PLAN OF CARE:  63 year old female with history of polysubstance use, PAD, COPD, HTN, DVT on Eliquis, lung cancer s/p radiation, recurrent SBO with recent ex lap / JOSIAH / small bowel resection (2024) who was admitted on 24 with recurrent SBO. SBO was managed conservatively with NGT and she passed a gastrograffin challenge on  and subsequently had NGT removed. Her hospital course has been complicated by acute versus chronic CTLI now s/p RLE angiogram with PT thromboembolectomy.     Plan:  Neuro:   - PCA  - Buspar  - Gabapentin   - Lidocaine patch   - Mirtazapine   - Melatonin    Resp:   - Supplemental O2 as needed   - Encourage IS   #COPD  - Appreciate pulmonology recommendations     - Burst steroids: 125mg solumedrol q24    - Singulair, tiotropium     CV:   #Tachycardia   - Persistently tachycardic since admission       - Clonidine 0.1 q8h   - Echocardiogram with LVEF 30-35%, PFO on bubble study    GI:  #SBO, resolved  - GGC : contrast reached colon   - Clear liquid diet   - PRN Zofran   #GI bleed, resolved  - Appreciate GI recommendations   - Hgb stable   - BID PPI     /FEN:  - D51/2NS @ 100  - Home oxybutynin     Heme:   #Prior LUE DVT   - Holding home Eliquis   #Acute vs chronic CLTI  - Heparin gtt  - Appreciate vascular medicine recommendations given beads on string appearance on intra-operative angiogram          - Start ASA and statin         - Obtain the following labs: A1c, lipid panel, homocysteine, Lp(a), APLA (lupus anticoagulant, beta2 glycoprotein and cardiolipin antibiodies, SPEP, UPEP         - Obtain the following vascular labs: LE arterial duplex, LE and UE venous duplex, carotid duplex,  renal duplex         - CTA with FMD protocol as an outpatient         - Likely Holter monitor at discharge          - Will likely bridge to warfarin at discharge with goal INR 2-3     ID:   # Leukocytosis  - Persistent since admission; possibly 2/2 autosplenectomy   - No indication for antibiotics     MSK:   #Bilateral acute on chronic CLTI s/p R PT thromboembolectomy  - Appreciate vascular surgery recommendations       - Tentative plan for R BKA on Tues (NPO at midnight)  - PT/OT    Endocrine:   - SSI #2 ACHS     Dvt ppx: Heparin gtt   Dispo: [facility]     Patient discussed with attending Dr. Albarran.     Patrica Harry  PGY2 General Surgery   ACS [id number]     ==============================================================================  CHIEF COMPLAINT / EVENTS LAST 24HRS / HPI:  NGT removed overnight. Reporting pain in bilateral LE. No nausea, vomiting.     MEDICAL HISTORY / ROS:   Admission history and ROS reviewed. Pertinent changes as follows:  None     PHYSICAL EXAM:  Heart Rate:  [124-136]   Temp:  [36.2 °C (97.2 °F)-36.4 °C (97.5 °F)]   Resp:  [3-22]   BP: ()/()   SpO2:  [67 %-100 %]   Gen: NAD, thin, ill appearing   Resp: ORA   CV: tachycardic on monitor  Abd: soft, nontender, nondistended  Skin: Feet cool bilaterally   Ext: R ankle incision well approximated with sutures in place   MSK: SANTA   Vascular:   R - monophasic PT, no DP  L - no PT, no DP    IMAGING SUMMARY:    CT A/P (9/5/2024)  1. There is a small bowel obstruction. The transition point appears to be in the lower midline abdomen. I suspect this may be due to an adhesion.  2. Multiple gallstones, but no evidence for cholecystitis or biliary obstruction.  3. The spleen is atrophic and hypoenhancing, likely representing scarring. This may represent a autosplenectomy.       LABS:  Results from last 7 days   Lab Units 09/09/24  0145 09/08/24  0205 09/07/24  2122 09/07/24  1555 09/07/24  0632 09/06/24  0932 09/05/24  1016   WBC AUTO  x10*3/uL 34.1* 22.0* 23.7*   < > 24.4*   < > 40.6*   HEMOGLOBIN g/dL 11.4* 10.5* 10.6*   < > 6.5*   < > 9.5*   HEMATOCRIT % 35.3* 32.8* 32.2*   < > 21.9*   < > 29.7*   PLATELETS AUTO x10*3/uL 481* 514* 518*   < > 585*   < > 846*   LYMPHO PCT MAN %  --   --   --   --  1.6  --  1.7   MONO PCT MAN %  --   --   --   --  0.8  --  2.6   EOSINO PCT MAN %  --   --   --   --  0.0  --  0.0    < > = values in this interval not displayed.     Results from last 7 days   Lab Units 09/09/24  0855   APTT seconds 28     Results from last 7 days   Lab Units 09/09/24  0145 09/08/24  0205 09/07/24  2122 09/06/24  0932 09/05/24  1016   SODIUM mmol/L 135* 137 138   < > 137   POTASSIUM mmol/L 3.9 4.8 4.7   < > 3.9   CHLORIDE mmol/L 98 99 102   < > 102   CO2 mmol/L 24 26 26   < > 22   BUN mg/dL 10 14 15   < > 14   CREATININE mg/dL 0.52 0.65 0.64   < > 0.74   CALCIUM mg/dL 7.8* 7.9* 7.9*   < > 8.8   PROTEIN TOTAL g/dL  --   --   --   --  6.9   BILIRUBIN TOTAL mg/dL  --   --   --   --  0.3   ALK PHOS U/L  --   --   --   --  129   ALT U/L  --   --   --   --  69*   AST U/L  --   --   --   --  54*   GLUCOSE mg/dL 109* 83 79   < > 104*    < > = values in this interval not displayed.     Results from last 7 days   Lab Units 09/05/24  1016   BILIRUBIN TOTAL mg/dL 0.3     Results from last 7 days   Lab Units 09/07/24  1025 09/07/24  0742   POCT PH, ARTERIAL pH 7.45* 7.41   POCT PCO2, ARTERIAL mm Hg 40 39   POCT PO2, ARTERIAL mm Hg 121* 110*   POCT HCO3 CALCULATED, ARTERIAL mmol/L 27.8* 24.7   POCT BASE EXCESS, ARTERIAL mmol/L 3.5* 0.1       I have reviewed all medications, laboratory results, and imaging pertinent for today's encounter.

## 2024-09-09 NOTE — CONSULTS
PODIATRIC MEDICINE & SURGERY - CONSULT NOTE    HPI    Patient is a 63 y.o. female with PMHx of COPD, HTN, DVT on Eliquis, lung cancer s/p radiation, alcohol abuse, cocaine abuse, s/p right leg angiogram and right PT thromboembolectomy, small bowel perforation s/p resection. She was admitted for SBO and during admission was found to have right foot pain.     Podiatry consulted to evaluate patient for possible transmetatarsal amputation of either foot. Patient is familiar to us from her recent admission at [facility]. Patient remained asleep during podiatric consultation today, and was unable to provide her history.      ROS: Deferred as patient is somnolent.     Medications and Allergies reviewed.      Past Medical History:   Diagnosis    COPD (chronic obstructive pulmonary disease) (Multi)    Depression    DVT (deep venous thrombosis) (Multi)    bilateral upper extremities    HTN (hypertension)    Lung cancer (Multi)    Migraines    Panic disorder         PHYSICAL EXAM    Vitals:    09/09/24 1159   BP: 102/65   Pulse: (!) 124   Resp:    Temp:    SpO2: 100%       General: Somnolent. NAD. Laying in bed comfortably.     Vasc: DP and PT pulses are non-palpable b/l. Right PT pulse was very faint on doppler. Right DP and left foot DP/PT were not heard on doppler. Skin temperature is cold from the digits to the distal lower leg bilaterally. Significant mottling of bilateral feet, R>L -- see images in Media tab.     Neuro: Does not react when examining the feet. Pressure was applied to the feet with the doppler probe, and this did not arouse patient.     MSK: Deferred as patient is somnolent.     Derm: Mottling of the feet, R>L. Nails 1-5 b/l intact. All webspaces clean, dry, intact.       RESULTS    CBC  Lab Results   Component Value Date    WBC 34.1 (H) 09/09/2024    HGB 11.4 (L) 09/09/2024    HCT 35.3 (L) 09/09/2024    MCV 80 09/09/2024     (H) 09/09/2024       ESR  Lab Results   Component Value Date    SEDRATE 35  (H) 06/16/2024       CRP  Lab Results   Component Value Date    CRP 0.57 06/16/2024       HgbA1c  Lab Results   Component Value Date    HGBA1C 5.2 04/13/2022       Cultures  Susceptibility data from last 90 days.  Collected Specimen Info Organism Amoxicillin/Clavulanate Ampicillin Ampicillin/Sulbactam Cefazolin Cefazolin (uncomplicated UTIs only) Ceftriaxone Ciprofloxacin Fluconazole Gentamicin Micafungin Nitrofurantoin Penicillin   07/24/24 Urine from Clean Catch/Voided Klebsiella pneumoniae/variicola  R  R  R  R  R  S  R   S   R    06/21/24 Fluid from PERITONEAL FLUID RIGHT UPPER QUADRANT Candida albicans         S   S     06/21/24 Fluid from PERITONEAL FLUID RIGHT UPPER QUADRANT Candida albicans                 Mixed Gram-Positive Bacteria                06/11/24 Urine from Clean Catch/Voided Streptococcus bovis/gallolyticus group       S       S     Collected Specimen Info Organism Piperacillin/Tazobactam Trimethoprim/Sulfamethoxazole Vancomycin   07/24/24 Urine from Clean Catch/Voided Klebsiella pneumoniae/variicola  R  R    06/21/24 Fluid from PERITONEAL FLUID RIGHT UPPER QUADRANT Candida albicans      06/21/24 Fluid from PERITONEAL FLUID RIGHT UPPER QUADRANT Candida albicans        Mixed Gram-Positive Bacteria       06/11/24 Urine from Clean Catch/Voided Streptococcus bovis/gallolyticus group    S           PVR 8/26/24    CONCLUSIONS:    Right Lower PVR: No evidence of arterial occlusive disease in the right lower extremity at rest. Multiphasic flow is noted in the right common femoral artery, right posterior tibial artery and right dorsalis pedis artery. Due to pain tolerance, unable to obtain first digit (great toe) PPG and pressure. However a PPG was documented on the second digit.    Left Lower PVR: No evidence of arterial occlusive disease in the left lower extremity at rest. Decreased digital perfusion noted. Multiphasic flow is noted in the left common femoral artery, left posterior tibial artery and  left dorsalis pedis artery. Unable to obtain brachial pressure due to IV placement.     Imaging & Doppler Findings:     RIGHT Lower PVR                   Pressures Ratios  Right Posterior Tibial (Ankle)    158 mmHg  1.06  Right Dorsalis Pedis (Ankle)      165 mmHg  1.11         LEFT Lower PVR                  Pressures Ratios  Left Posterior Tibial (Ankle)   161 mmHg  1.08  Left Dorsalis Pedis (Ankle)    163 mmHg  1.09  Left Digit (Great Toe)             46 mmHg   0.31                                        Right  Brachial Pressure   149 mmHg       ASSESSMENT AND PLAN  Acute lower limb ischemia, BLE  Ischemic pain of both feet  Nicotine dependence  History of cocaine use  History of alcohol abuse    Charts and results reviewed. Podiatric exam done. No pedal pulses could be palpated this morning. Only the right foot PT was very faintly heard on doppler. RN was at bedside during our examination.     Patient is not a candidate for transmetatarsal amputation of either foot. Both feet are non-salvageable and non-functional. Level of tissue necrosis is above the level of TMA. This was concluded after performing a thorough podiatric exam, reviewing her work-up, and considering the rapid progression of ischemic appearance to both feet. We recommend bilateral below-knee amputation by the appropriate surgical team. A proximal amputation will allow the patient a higher chance of independent mobility as compared to patient's current condition, and will reduce the chances of future hospitalizations due to lower extremity infections. The level of proximal amputation is to be determined by the appropriate non-podiatric surgical team.      This patient was evaluated with the attending, Dr. Krupa Yuen DPM.  Savannah Morales DPM PGY2  Podiatric Medicine & Surgery

## 2024-09-10 PROBLEM — E88.09 PSEUDOCHOLINESTERASE DEFICIENCY: Status: ACTIVE | Noted: 2024-01-01

## 2024-09-10 LAB
ABO GROUP (TYPE) IN BLOOD: NORMAL
ALBUMIN SERPL BCP-MCNC: 2.3 G/DL (ref 3.4–5)
ANION GAP SERPL CALC-SCNC: 11 MMOL/L (ref 10–20)
ANTIBODY SCREEN: NORMAL
B2 GLYCOPROT1 IGA SER-ACNC: <0.6 U/ML
B2 GLYCOPROT1 IGG SER-ACNC: <1.4 U/ML
B2 GLYCOPROT1 IGM SER-ACNC: 1 U/ML
BUN SERPL-MCNC: 9 MG/DL (ref 6–23)
CALCIUM SERPL-MCNC: 7.8 MG/DL (ref 8.6–10.6)
CARDIOLIPIN IGA SERPL-ACNC: <0.5 APL U/ML
CARDIOLIPIN IGG SER IA-ACNC: <1.6 GPL U/ML
CARDIOLIPIN IGM SER IA-ACNC: 1.2 MPL U/ML
CHLORIDE SERPL-SCNC: 98 MMOL/L (ref 98–107)
CO2 SERPL-SCNC: 26 MMOL/L (ref 21–32)
CREAT SERPL-MCNC: 0.5 MG/DL (ref 0.5–1.05)
EGFRCR SERPLBLD CKD-EPI 2021: >90 ML/MIN/1.73M*2
ERYTHROCYTE [DISTWIDTH] IN BLOOD BY AUTOMATED COUNT: 20.5 % (ref 11.5–14.5)
ERYTHROCYTE [DISTWIDTH] IN BLOOD BY AUTOMATED COUNT: 20.8 % (ref 11.5–14.5)
GLUCOSE BLD MANUAL STRIP-MCNC: 127 MG/DL (ref 74–99)
GLUCOSE BLD MANUAL STRIP-MCNC: 138 MG/DL (ref 74–99)
GLUCOSE BLD MANUAL STRIP-MCNC: 153 MG/DL (ref 74–99)
GLUCOSE BLD MANUAL STRIP-MCNC: 211 MG/DL (ref 74–99)
GLUCOSE SERPL-MCNC: 135 MG/DL (ref 74–99)
HCT VFR BLD AUTO: 29.1 % (ref 36–46)
HCT VFR BLD AUTO: 29.9 % (ref 36–46)
HCYS SERPL-SCNC: 7.28 UMOL/L (ref 5–13.9)
HGB BLD-MCNC: 9.3 G/DL (ref 12–16)
HGB BLD-MCNC: 9.5 G/DL (ref 12–16)
MAGNESIUM SERPL-MCNC: 1.85 MG/DL (ref 1.6–2.4)
MCH RBC QN AUTO: 25.3 PG (ref 26–34)
MCH RBC QN AUTO: 25.4 PG (ref 26–34)
MCHC RBC AUTO-ENTMCNC: 31.1 G/DL (ref 32–36)
MCHC RBC AUTO-ENTMCNC: 32.6 G/DL (ref 32–36)
MCV RBC AUTO: 78 FL (ref 80–100)
MCV RBC AUTO: 82 FL (ref 80–100)
NRBC BLD-RTO: 0.3 /100 WBCS (ref 0–0)
NRBC BLD-RTO: 0.7 /100 WBCS (ref 0–0)
PHOSPHATE SERPL-MCNC: 2.8 MG/DL (ref 2.5–4.9)
PLATELET # BLD AUTO: 373 X10*3/UL (ref 150–450)
PLATELET # BLD AUTO: 373 X10*3/UL (ref 150–450)
POTASSIUM SERPL-SCNC: 4.4 MMOL/L (ref 3.5–5.3)
PROT SERPL-MCNC: 4.9 G/DL (ref 6.4–8.2)
RBC # BLD AUTO: 3.67 X10*6/UL (ref 4–5.2)
RBC # BLD AUTO: 3.74 X10*6/UL (ref 4–5.2)
RH FACTOR (ANTIGEN D): NORMAL
SODIUM SERPL-SCNC: 131 MMOL/L (ref 136–145)
UFH PPP CHRO-ACNC: 0.1 IU/ML
UFH PPP CHRO-ACNC: 0.2 IU/ML
WBC # BLD AUTO: 24.6 X10*3/UL (ref 4.4–11.3)
WBC # BLD AUTO: 30.8 X10*3/UL (ref 4.4–11.3)

## 2024-09-10 PROCEDURE — 99232 SBSQ HOSP IP/OBS MODERATE 35: CPT | Performed by: SURGERY

## 2024-09-10 PROCEDURE — 0Y6J0Z3 DETACHMENT AT LEFT LOWER LEG, LOW, OPEN APPROACH: ICD-10-PCS | Performed by: SURGERY

## 2024-09-10 PROCEDURE — 3600000003 HC OR TIME - INITIAL BASE CHARGE - PROCEDURE LEVEL THREE: Performed by: SURGERY

## 2024-09-10 PROCEDURE — 2500000004 HC RX 250 GENERAL PHARMACY W/ HCPCS (ALT 636 FOR OP/ED)

## 2024-09-10 PROCEDURE — 88305 TISSUE EXAM BY PATHOLOGIST: CPT | Mod: TC,SUR | Performed by: SURGERY

## 2024-09-10 PROCEDURE — 86320 SERUM IMMUNOELECTROPHORESIS: CPT

## 2024-09-10 PROCEDURE — 88311 DECALCIFY TISSUE: CPT | Performed by: STUDENT IN AN ORGANIZED HEALTH CARE EDUCATION/TRAINING PROGRAM

## 2024-09-10 PROCEDURE — 2500000001 HC RX 250 WO HCPCS SELF ADMINISTERED DRUGS (ALT 637 FOR MEDICARE OP)

## 2024-09-10 PROCEDURE — 7100000001 HC RECOVERY ROOM TIME - INITIAL BASE CHARGE: Performed by: SURGERY

## 2024-09-10 PROCEDURE — 27882 AMPUTATION OF LOWER LEG: CPT | Performed by: SURGERY

## 2024-09-10 PROCEDURE — 86901 BLOOD TYPING SEROLOGIC RH(D): CPT

## 2024-09-10 PROCEDURE — 0Y6H0Z3 DETACHMENT AT RIGHT LOWER LEG, LOW, OPEN APPROACH: ICD-10-PCS | Performed by: SURGERY

## 2024-09-10 PROCEDURE — 3700000001 HC GENERAL ANESTHESIA TIME - INITIAL BASE CHARGE: Performed by: SURGERY

## 2024-09-10 PROCEDURE — 85520 HEPARIN ASSAY: CPT

## 2024-09-10 PROCEDURE — 83735 ASSAY OF MAGNESIUM: CPT

## 2024-09-10 PROCEDURE — 2500000004 HC RX 250 GENERAL PHARMACY W/ HCPCS (ALT 636 FOR OP/ED): Performed by: STUDENT IN AN ORGANIZED HEALTH CARE EDUCATION/TRAINING PROGRAM

## 2024-09-10 PROCEDURE — 7100000002 HC RECOVERY ROOM TIME - EACH INCREMENTAL 1 MINUTE: Performed by: SURGERY

## 2024-09-10 PROCEDURE — 84155 ASSAY OF PROTEIN SERUM: CPT

## 2024-09-10 PROCEDURE — 86334 IMMUNOFIX E-PHORESIS SERUM: CPT

## 2024-09-10 PROCEDURE — 1200000002 HC GENERAL ROOM WITH TELEMETRY DAILY

## 2024-09-10 PROCEDURE — 2500000002 HC RX 250 W HCPCS SELF ADMINISTERED DRUGS (ALT 637 FOR MEDICARE OP, ALT 636 FOR OP/ED)

## 2024-09-10 PROCEDURE — 84165 PROTEIN E-PHORESIS SERUM: CPT

## 2024-09-10 PROCEDURE — 3600000008 HC OR TIME - EACH INCREMENTAL 1 MINUTE - PROCEDURE LEVEL THREE: Performed by: SURGERY

## 2024-09-10 PROCEDURE — 86147 CARDIOLIPIN ANTIBODY EA IG: CPT

## 2024-09-10 PROCEDURE — 86146 BETA-2 GLYCOPROTEIN ANTIBODY: CPT

## 2024-09-10 PROCEDURE — 2780000003 HC OR 278 NO HCPCS: Performed by: SURGERY

## 2024-09-10 PROCEDURE — 2500000004 HC RX 250 GENERAL PHARMACY W/ HCPCS (ALT 636 FOR OP/ED): Performed by: SURGERY

## 2024-09-10 PROCEDURE — 85027 COMPLETE CBC AUTOMATED: CPT

## 2024-09-10 PROCEDURE — 85613 RUSSELL VIPER VENOM DILUTED: CPT

## 2024-09-10 PROCEDURE — 80069 RENAL FUNCTION PANEL: CPT

## 2024-09-10 PROCEDURE — 82947 ASSAY GLUCOSE BLOOD QUANT: CPT

## 2024-09-10 PROCEDURE — 3700000002 HC GENERAL ANESTHESIA TIME - EACH INCREMENTAL 1 MINUTE: Performed by: SURGERY

## 2024-09-10 PROCEDURE — 2720000007 HC OR 272 NO HCPCS: Performed by: SURGERY

## 2024-09-10 PROCEDURE — 81270 JAK2 GENE: CPT

## 2024-09-10 PROCEDURE — 88305 TISSUE EXAM BY PATHOLOGIST: CPT | Performed by: STUDENT IN AN ORGANIZED HEALTH CARE EDUCATION/TRAINING PROGRAM

## 2024-09-10 PROCEDURE — 83090 ASSAY OF HOMOCYSTEINE: CPT

## 2024-09-10 PROCEDURE — G0452 MOLECULAR PATHOLOGY INTERPR: HCPCS | Performed by: PATHOLOGY

## 2024-09-10 RX ORDER — HYDROMORPHONE HYDROCHLORIDE 1 MG/ML
0.2 INJECTION, SOLUTION INTRAMUSCULAR; INTRAVENOUS; SUBCUTANEOUS EVERY 5 MIN PRN
Status: DISCONTINUED | OUTPATIENT
Start: 2024-09-10 | End: 2024-09-10 | Stop reason: HOSPADM

## 2024-09-10 RX ORDER — VANCOMYCIN HYDROCHLORIDE 1 G/20ML
INJECTION, POWDER, LYOPHILIZED, FOR SOLUTION INTRAVENOUS AS NEEDED
Status: DISCONTINUED | OUTPATIENT
Start: 2024-09-10 | End: 2024-09-10 | Stop reason: HOSPADM

## 2024-09-10 RX ORDER — ONDANSETRON HYDROCHLORIDE 2 MG/ML
4 INJECTION, SOLUTION INTRAVENOUS ONCE AS NEEDED
Status: COMPLETED | OUTPATIENT
Start: 2024-09-10 | End: 2024-09-10

## 2024-09-10 RX ORDER — SODIUM CHLORIDE, SODIUM LACTATE, POTASSIUM CHLORIDE, CALCIUM CHLORIDE 600; 310; 30; 20 MG/100ML; MG/100ML; MG/100ML; MG/100ML
100 INJECTION, SOLUTION INTRAVENOUS CONTINUOUS
Status: DISCONTINUED | OUTPATIENT
Start: 2024-09-10 | End: 2024-09-10 | Stop reason: HOSPADM

## 2024-09-10 RX ORDER — INSULIN LISPRO 100 [IU]/ML
0-10 INJECTION, SOLUTION INTRAVENOUS; SUBCUTANEOUS
Status: DISCONTINUED | OUTPATIENT
Start: 2024-09-10 | End: 2024-09-14

## 2024-09-10 RX ORDER — HYDROMORPHONE HYDROCHLORIDE 1 MG/ML
0.5 INJECTION, SOLUTION INTRAMUSCULAR; INTRAVENOUS; SUBCUTANEOUS EVERY 5 MIN PRN
Status: DISCONTINUED | OUTPATIENT
Start: 2024-09-10 | End: 2024-09-10 | Stop reason: HOSPADM

## 2024-09-10 RX ORDER — HEPARIN SODIUM 10000 [USP'U]/100ML
0-4000 INJECTION, SOLUTION INTRAVENOUS CONTINUOUS
Status: DISCONTINUED | OUTPATIENT
Start: 2024-09-10 | End: 2024-09-14

## 2024-09-10 RX ORDER — HEPARIN SODIUM 10000 [USP'U]/100ML
0-4000 INJECTION, SOLUTION INTRAVENOUS CONTINUOUS
Status: DISCONTINUED | OUTPATIENT
Start: 2024-09-10 | End: 2024-09-10

## 2024-09-10 RX ORDER — LIDOCAINE HYDROCHLORIDE 10 MG/ML
0.1 INJECTION, SOLUTION INFILTRATION; PERINEURAL ONCE
Status: DISCONTINUED | OUTPATIENT
Start: 2024-09-10 | End: 2024-09-10 | Stop reason: HOSPADM

## 2024-09-10 RX ORDER — OXYCODONE HYDROCHLORIDE 5 MG/1
10 TABLET ORAL EVERY 4 HOURS PRN
Status: DISCONTINUED | OUTPATIENT
Start: 2024-09-10 | End: 2024-09-10 | Stop reason: HOSPADM

## 2024-09-10 RX ADMIN — BUSPIRONE HYDROCHLORIDE 5 MG: 10 TABLET ORAL at 17:18

## 2024-09-10 RX ADMIN — DILTIAZEM HYDROCHLORIDE 240 MG: 240 CAPSULE, EXTENDED RELEASE ORAL at 14:00

## 2024-09-10 RX ADMIN — METHYLPREDNISOLONE SODIUM SUCCINATE 125 MG: 125 INJECTION INTRAMUSCULAR; INTRAVENOUS at 14:00

## 2024-09-10 RX ADMIN — GABAPENTIN 300 MG: 300 CAPSULE ORAL at 21:45

## 2024-09-10 RX ADMIN — GABAPENTIN 300 MG: 300 CAPSULE ORAL at 13:47

## 2024-09-10 RX ADMIN — HEPARIN SODIUM 900 UNITS/HR: 10000 INJECTION, SOLUTION INTRAVENOUS at 08:48

## 2024-09-10 RX ADMIN — MELATONIN 3 MG: 3 TAB ORAL at 21:45

## 2024-09-10 RX ADMIN — CLONIDINE HYDROCHLORIDE 0.1 MG: 0.1 TABLET ORAL at 06:59

## 2024-09-10 RX ADMIN — MIRTAZAPINE 15 MG: 15 TABLET, FILM COATED ORAL at 21:45

## 2024-09-10 RX ADMIN — ASPIRIN 81 MG: 81 TABLET, COATED ORAL at 14:04

## 2024-09-10 RX ADMIN — DEXTROSE AND SODIUM CHLORIDE 100 ML/HR: 5; 450 INJECTION, SOLUTION INTRAVENOUS at 04:25

## 2024-09-10 RX ADMIN — BUSPIRONE HYDROCHLORIDE 5 MG: 10 TABLET ORAL at 21:45

## 2024-09-10 RX ADMIN — SUCRALFATE 1 G: 1 TABLET ORAL at 21:45

## 2024-09-10 RX ADMIN — HEPARIN SODIUM 700 UNITS/HR: 10000 INJECTION, SOLUTION INTRAVENOUS at 18:27

## 2024-09-10 RX ADMIN — OXYBUTYNIN CHLORIDE 2.5 MG: 5 TABLET ORAL at 21:49

## 2024-09-10 RX ADMIN — CLONIDINE HYDROCHLORIDE 0.1 MG: 0.1 TABLET ORAL at 21:45

## 2024-09-10 RX ADMIN — OXYBUTYNIN CHLORIDE 2.5 MG: 5 TABLET ORAL at 14:00

## 2024-09-10 RX ADMIN — THIAMINE HCL TAB 100 MG 100 MG: 100 TAB at 13:47

## 2024-09-10 RX ADMIN — PANTOPRAZOLE SODIUM 40 MG: 40 INJECTION, POWDER, FOR SOLUTION INTRAVENOUS at 13:48

## 2024-09-10 RX ADMIN — ONDANSETRON 4 MG: 2 INJECTION INTRAMUSCULAR; INTRAVENOUS at 11:32

## 2024-09-10 RX ADMIN — INSULIN LISPRO 4 UNITS: 100 INJECTION, SOLUTION INTRAVENOUS; SUBCUTANEOUS at 21:48

## 2024-09-10 RX ADMIN — ATORVASTATIN CALCIUM 40 MG: 40 TABLET, FILM COATED ORAL at 21:45

## 2024-09-10 RX ADMIN — MONTELUKAST 10 MG: 10 TABLET, FILM COATED ORAL at 15:22

## 2024-09-10 RX ADMIN — CLONIDINE HYDROCHLORIDE 0.1 MG: 0.1 TABLET ORAL at 13:42

## 2024-09-10 RX ADMIN — PANTOPRAZOLE SODIUM 40 MG: 40 INJECTION, POWDER, FOR SOLUTION INTRAVENOUS at 21:45

## 2024-09-10 RX ADMIN — INSULIN LISPRO 2 UNITS: 100 INJECTION, SOLUTION INTRAVENOUS; SUBCUTANEOUS at 17:15

## 2024-09-10 ASSESSMENT — PAIN - FUNCTIONAL ASSESSMENT
PAIN_FUNCTIONAL_ASSESSMENT: 0-10
PAIN_FUNCTIONAL_ASSESSMENT: UNABLE TO SELF-REPORT
PAIN_FUNCTIONAL_ASSESSMENT: 0-10

## 2024-09-10 ASSESSMENT — COGNITIVE AND FUNCTIONAL STATUS - GENERAL
CLIMB 3 TO 5 STEPS WITH RAILING: TOTAL
PERSONAL GROOMING: A LITTLE
MOVING TO AND FROM BED TO CHAIR: TOTAL
MOVING FROM LYING ON BACK TO SITTING ON SIDE OF FLAT BED WITH BEDRAILS: A LITTLE
DRESSING REGULAR LOWER BODY CLOTHING: A LITTLE
TURNING FROM BACK TO SIDE WHILE IN FLAT BAD: A LITTLE
DRESSING REGULAR UPPER BODY CLOTHING: A LOT
WALKING IN HOSPITAL ROOM: TOTAL
TOILETING: A LITTLE
STANDING UP FROM CHAIR USING ARMS: TOTAL
DAILY ACTIVITIY SCORE: 18
HELP NEEDED FOR BATHING: A LITTLE
MOBILITY SCORE: 10

## 2024-09-10 ASSESSMENT — PAIN SCALES - GENERAL
PAINLEVEL_OUTOF10: 0 - NO PAIN
PAINLEVEL_OUTOF10: 0 - NO PAIN
PAINLEVEL_OUTOF10: 3
PAINLEVEL_OUTOF10: 0 - NO PAIN
PAINLEVEL_OUTOF10: 0 - NO PAIN

## 2024-09-10 NOTE — BRIEF OP NOTE
Date: 2024 - 9/10/2024  OR Location: [facility] OR    Name: Fernando Cuevas, : 1960, Age: 63 y.o., MRN: [id number], Sex: female    Diagnosis  Pre-op Diagnosis      * Acute lower limb ischemia [I99.8] Post-op Diagnosis     * Acute lower limb ischemia [I99.8]     Procedures  Bilateral below knee gulliotine amputation  55662 - MN AMPUTATION LEG THRU TIBIA&FIBULA OPEN CIRCULAR      Surgeons      * Brendan Mary - Primary    Resident/Fellow/Other Assistant:  Surgeons and Role:     * Adolfo Arroyo MD - Assisting    Procedure Summary  Anesthesia: General  ASA: III  Anesthesia Staff: Anesthesiologist: Melva Amaro MD  Anesthesia Resident: Jessie Joseph MD  Estimated Blood Loss: 20 mL  Intra-op Medications:   Administrations occurring from 0910 to 1140 on 09/10/24:   Medication Name Total Dose   vancomycin (Vancocin) vial for injection 1 g   heparin 25,000 Units in dextrose 5% 250 mL (100 Units/mL) infusion (premix) 525 Units              Anesthesia Record               Intraprocedure I/O Totals          Intake    Heparin Drip 0.00 mL    The total shown is the total volume documented since Anesthesia Start was filed.    Total Intake 0 mL          Specimen:   ID Type Source Tests Collected by Time   1 : RIGHT BELOW KNEE AMPUTATION Tissue LEG AMPUTATION BELOW THE KNEE RIGHT SURGICAL PATHOLOGY EXAM Brendan Mary MD PhD 9/10/2024 0953   2 : LEFT BELOW KNEE AMPUTATION Tissue LEG AMPUTATION BELOW THE KNEE LEFT SURGICAL PATHOLOGY EXAM Brendan Mary MD PhD 9/10/2024 0953        Staff:   Mickey: Briana  Circulator: Nay  Scrub Person: Graciela  Scrub Person: Myrtle          Findings: successful guillotine amputation bilaterally    Complications:  None; patient tolerated the procedure well.     Disposition: PACU - hemodynamically stable.  Condition: stable  Specimens Collected:   ID Type Source Tests Collected by Time   1 : RIGHT BELOW KNEE AMPUTATION Tissue LEG AMPUTATION BELOW THE KNEE RIGHT SURGICAL PATHOLOGY  EXAM Brendan Mary MD PhD 9/10/2024 0953   2 : LEFT BELOW KNEE AMPUTATION Tissue LEG AMPUTATION BELOW THE KNEE LEFT SURGICAL PATHOLOGY EXAM Brendan Mary MD PhD 9/10/2024 0953

## 2024-09-10 NOTE — PROGRESS NOTES
Physical Therapy                 Therapy Communication Note    Patient Name: Fernando Cuevas  MRN: [id number]  Today's Date: 9/10/2024     Discipline: Physical Therapy    Missed Visit Reason: Missed Visit Reason:  (Pt to undergo Right below knee gulliotine amputation (Right) today. Will  follow up as appropriate.)    Missed Time: Attempt

## 2024-09-10 NOTE — OP NOTE
DATE OF SERVICE: 9/10/2024     PREOPERATIVE DIAGNOSIS:  Bilateral feet ischemia.      POSTOPERATIVE DIAGNOSIS:  Same.     PROCEDURES:   1. Bilateral below knee guillotine amputation.       SURGEON:  Brendan Mary MD, PhD.      ASSISTANT(S):  Adolfo Arroyo MD.      ANESTHESIA:  General     INDICATIONS:   This is a 63 year old female with acute on chronic bilateral lower extremity ischemia likely from embolic events. Patient's bilateral feet were deemed non-salvageable from the podiatry. After lengthy discussion with the patient and her family, they requested to proceed with bilateral leg amputation in the setting of rising leukocytosis.    I discussed the plan for bilateral below knee guillotine amputations with plan for the second stage bilateral below knee formalizations. The risks were discussed, including bleeding, infection, wound complications, chronic pain, limb swelling (edema), reaction to medication, or the need for reoperation (including revision to a higher level of amputation), were discussed. We also discussed the additional risks of myocardial infarction, anesthetic complications, or other serious complications. The plan for post-operative care and rehabilitation were addressed. The patient indicated understanding of the procedure, plan, alternatives, and risks, and voiced consent to proceed.         DESCRIPTION OF PROCEDURE:    The patient was placed in the supine position. General endotracheal anesthesia was achieved; the bilateral lower extremity was prepped and draped in the usual sterile fashion.   A time out was completed verifying correct patient, site, procedure and positioning.   Circumferential skin incisions were marked on the skin above the right ankle joint.   The incision was extended medially and laterally.   The skin and subcutaneous tissues were incised down to the fascia then the muscles. The tibia and fibula were divided by using the power saw approximately 2 cm proximal to the level  of the ankle joint. By using an amputation knife, remaining muscle and the limb was amputated. The specimen was sent to pathology. The anterior tibial artery, posterior tibial artery and peroneal vessels were ligated. Further hemostasis was achieved by using sutures. The wound was irrigated. Hemostasis was achieved.   The tissue and muscle appeared viable without any purulent drainage.     Circumferential skin incisions were marked on the skin above the left ankle joint.   The incision was extended medially and laterally.   The skin and subcutaneous tissues were incised down to the fascia then the muscles. The tibia and fibula were divided by using the power saw approximately 2 cm proximal to the level of the ankle joint. By using an amputation knife, remaining muscle and the limb was amputated. The specimen was sent to pathology. The anterior tibial artery, posterior tibial artery and peroneal vessels were ligated. Further hemostasis was achieved by using sutures. The wound was irrigated. Hemostasis was achieved.   The tissue and muscle appeared viable without any purulent drainage.     Sterile dressing was applied to both extremities.   The patient tolerated the procedure well and was transferred to the postanesthesia care unit in stable condition.   I was physically present for the entire length of the case and scrubbed for the entire portions.      Brendan Mary MD, PhD.   Clinical   [facility] [employer]  Co-Director, Aortic Center  [facility] [place]

## 2024-09-10 NOTE — PROGRESS NOTES
[facility]  ACUTE CARE SURGERY - PROGRESS NOTE    Patient Name: Fernando Cuevas  MRN: [id number]  Admit Date: 905  : 1960  AGE: 63 y.o.   GENDER: female  ==============================================================================  TODAY'S ASSESSMENT AND PLAN OF CARE:  63 year old female with history of polysubstance use, PAD, COPD, HTN, DVT on Eliquis, lung cancer s/p radiation, recurrent SBO with recent ex lap / JOSIAH / small bowel resection (2024) who was admitted on 24 with recurrent SBO. SBO was managed conservatively with NGT and she passed a gastrograffin challenge on  and subsequently had NGT removed. Her hospital course has been complicated by acute versus chronic CTLI now s/p RLE angiogram with PT thromboembolectomy.     Plan:  Neuro:   - Dilaudid PCA  - Continue home Buspar, gabapentin, mirtazapine   - Melatonin    Resp:   - Supplemental O2 as needed   - Encourage IS   #COPD  - Appreciate pulmonology recommendations     - Burst steroids: 125mg solumedrol q24 x5 days (-)  - Continue home Singulair + inhalers    CV:   #Tachycardia   - Persistently tachycardic since admission       - Clonidine 0.1 q8h       - Continue home Cardizem  - Echocardiogram with LVEF 30-35%, PFO on bubble study    GI:  #SBO, resolved  - GGC : contrast reached colon   - NPO for OR; will advance to regular diet post-op  - PRN Zofran   #GI bleed, resolved  - Appreciate GI recommendations   - Hgb stable, continuing to monitor  - Continue home PPI BID, sucralfate    /FEN:  - D5 1/2NS @ 100 while NPO   - Home oxybutynin     Heme:   #Prior LUE DVT   - Holding home Eliquis   #Acute vs chronic CLTI  - Heparin gtt until OR  - Appreciate vascular medicine recommendations given beads on string appearance on intra-operative angiogram          - Start ASA and statin         - Obtain the following labs: A1c, lipid panel, homocysteine, Lp(a), APLA (lupus anticoagulant, beta2  glycoprotein and cardiolipin antibiodies, SPEP, UPEP         - Obtain the following vascular labs: LE arterial duplex, LE and UE venous duplex, carotid duplex, renal duplex         - CTA with FMD protocol as an outpatient         - Likely Holter monitor at discharge          - Will likely bridge to warfarin at discharge with goal INR 2-3     ID:   #Leukocytosis  - Persistent since admission; possibly 2/2 autosplenectomy   - Will complete periop Ancef Q8H x48h    MSK:   #Bilateral acute on chronic CLTI s/p R PT thromboembolectomy  - Appreciate vascular surgery recommendations       - Bilateral BKA today  - Post-op guillotine BKA wound care: fluff gauze, ABD, Kerlex wrap, ACE bandage   - Restart PT/OT tomorrow morning s/p bilateral BKAs    Endocrine:   - POCT glucose + SSI #2 AC/HS  - Hypoglycemia protocol    Dvt ppx: start Lovenox 40mg subcutaneous daily tonight  Dispo: Continue care on RNF      Discussed with attending Dr. Albarran.    Olga Cruz MD  PGY1 Acute Care Surgery  Pager [phone]  Available via secure chat  ==============================================================================  CHIEF COMPLAINT / EVENTS LAST 24HRS / HPI:  No acute events overnight. Vascular is planning for bilateral BKA this morning. Patient endorses adequate pain control with PCA.    MEDICAL HISTORY / ROS:   Admission history and ROS reviewed. Pertinent changes as follows: None.    PHYSICAL EXAM:  Heart Rate:  []   Temp:  [36 °C (96.8 °F)-36.9 °C (98.4 °F)]   Resp:  [12-20]   BP: (118-137)/(66-85)   SpO2:  [94 %-100 %]   Gen: Lying in bed in NAD, thin, ill appearing   Resp: Normal respiratory effort on RA   CV: Regular rate and rhythm on tele  Abd: Soft, nontender, nondistended  Skin: Feet and lower legs cool bilaterally; distal half of bilateral feet are dusky, R>L  Ext: R ankle incision well approximated with sutures in place   MSK: PRATT, moves toes    IMAGING SUMMARY:    9/9 KUB: Gastrografin in large bowel, air-fluid levels  consistent with ileus.    LABS:  Results from last 7 days   Lab Units 09/10/24  0616 09/09/24  0145 09/08/24  0205 09/07/24  1555 09/07/24  0632 09/06/24  0932 09/05/24  1016   WBC AUTO x10*3/uL 30.8* 34.1* 22.0*   < > 24.4*   < > 40.6*   HEMOGLOBIN g/dL 9.5* 11.4* 10.5*   < > 6.5*   < > 9.5*   HEMATOCRIT % 29.1* 35.3* 32.8*   < > 21.9*   < > 29.7*   PLATELETS AUTO x10*3/uL 373 481* 514*   < > 585*   < > 846*   LYMPHO PCT MAN %  --   --   --   --  1.6  --  1.7   MONO PCT MAN %  --   --   --   --  0.8  --  2.6   EOSINO PCT MAN %  --   --   --   --  0.0  --  0.0    < > = values in this interval not displayed.     Results from last 7 days   Lab Units 09/09/24  0855   APTT seconds 28     Results from last 7 days   Lab Units 09/10/24  0616 09/09/24  0145 09/08/24 0205 09/06/24 0932 09/05/24  1016   SODIUM mmol/L 131* 135* 137   < > 137   POTASSIUM mmol/L 4.4 3.9 4.8   < > 3.9   CHLORIDE mmol/L 98 98 99   < > 102   CO2 mmol/L 26 24 26   < > 22   BUN mg/dL 9 10 14   < > 14   CREATININE mg/dL 0.50 0.52 0.65   < > 0.74   CALCIUM mg/dL 7.8* 7.8* 7.9*   < > 8.8   PROTEIN TOTAL g/dL 4.9*  --   --   --  6.9   BILIRUBIN TOTAL mg/dL  --   --   --   --  0.3   ALK PHOS U/L  --   --   --   --  129   ALT U/L  --   --   --   --  69*   AST U/L  --   --   --   --  54*   GLUCOSE mg/dL 135* 109* 83   < > 104*    < > = values in this interval not displayed.     Results from last 7 days   Lab Units 09/05/24  1016   BILIRUBIN TOTAL mg/dL 0.3     Results from last 7 days   Lab Units 09/07/24  1025 09/07/24  0742   POCT PH, ARTERIAL pH 7.45* 7.41   POCT PCO2, ARTERIAL mm Hg 40 39   POCT PO2, ARTERIAL mm Hg 121* 110*   POCT HCO3 CALCULATED, ARTERIAL mmol/L 27.8* 24.7   POCT BASE EXCESS, ARTERIAL mmol/L 3.5* 0.1       I have reviewed all medications, laboratory results, and imaging pertinent for today's encounter.

## 2024-09-10 NOTE — ANESTHESIA POSTPROCEDURE EVALUATION
Patient: Fernando Cuevas    Procedure Summary       Date: 09/10/24 Room / Location: [facility] OR 27 / [facility] OR    Anesthesia Start: 1002 Anesthesia Stop:     Procedure: Bilateral below knee gulliotine amputation (Bilateral) Diagnosis:       Acute lower limb ischemia      (Acute lower limb ischemia [I99.8])    Surgeons: Brendan Mary MD PhD Responsible Provider: Melva Amaro MD    Anesthesia Type: general ASA Status: 3            Anesthesia Type: general    Vitals Value Taken Time   /77 09/10/24 1112   Temp 36 °C (96.8 °F) 09/10/24 1110   Pulse 99 09/10/24 1127   Resp 13 09/10/24 1127   SpO2 100 % 09/10/24 1127   Vitals shown include unfiled device data.    Anesthesia Post Evaluation    Patient location during evaluation: PACU  Patient participation: complete - patient participated  Level of consciousness: awake  Pain score: 3  Pain management: adequate  Airway patency: patent  Cardiovascular status: acceptable  Respiratory status: acceptable  Hydration status: acceptable  Postoperative Nausea and Vomiting: none        There were no known notable events for this encounter.

## 2024-09-10 NOTE — PROGRESS NOTES
[facility]  ACUTE CARE SURGERY - PROGRESS NOTE    Patient Name: Fernando Cuevas  MRN: [id number]  Admit Date: 905  : 1960  AGE: 63 y.o.   GENDER: female  ==============================================================================  TODAY'S ASSESSMENT AND PLAN OF CARE:  63 year old female with history of polysubstance use, PAD, COPD, HTN, DVT on Eliquis, lung cancer s/p radiation, recurrent SBO with recent ex lap / JOSIAH / small bowel resection (2024) who was admitted on 24 with recurrent SBO. SBO was managed conservatively with NGT and she passed a gastrograffin challenge on  and subsequently had NGT removed. Her hospital course has been complicated by acute versus chronic CTLI. She is s/p RLE angiogram with PT thromboembolectomy () and s/p bilateral below knee amputations (9/10).     Plan:  Neuro:   - Multimodal pain control  - Buspar  - Gabapentin   - Lidocaine patch   - Mirtazapine   - Melatonin    Resp:   - Supplemental O2 as needed   - Encourage IS   #COPD  - Appreciate pulmonology recommendations     - Burst steroids: 125mg solumedrol q24    - Singulair, tiotropium     CV:   #Tachycardia   - Persistently tachycardic since admission, now stable ()      - Clonidine 0.1 q8h   - Echocardiogram with LVEF 30-35%, PFO on bubble study    GI:  #SBO, resolved  - GGC : contrast reached colon   - Transition to regular diet  - PRN Zofran   #GI bleed, resolved  - Appreciate GI recommendations   - Hgb stable   - BID PPI     /FEN:  - D51/2NS @ 100  - Home oxybutynin     Heme:   #Prior LUE DVT   - Holding home Eliquis   #Acute vs chronic CLTI  - Discontinue Heparin   - Appreciate vascular medicine recommendations given beads on string appearance on intra-operative angiogram          - Continue ASA and statin         - A1c, lipid panel, homocysteine, Lp(a), APLA (lupus anticoagulant, beta2 glycoprotein and cardiolipin antibiodies, SPEP, UPEP in process          - LE arterial duplex, LE and UE venous duplex, carotid duplex, renal duplex pending         - CTA with FMD protocol as an outpatient         - Likely Holter monitor at discharge          ID:   # S/p bilateral below knee amputations  - Initiate cefazolin prophylaxis   # Leukocytosis  - Persistent since admission; possibly 2/2 autosplenectomy   - No indication for antibiotics     MSK:   #Bilateral acute on chronic CLTI s/p R PT thromboembolectomy  #S/p bilateral below knee amputations  - Daily dressing changes  - PT/OT  - Consult Orthotics regarding prosthesis  - Place knee immobilizers to avoid contracture of BKA stumps    Endocrine:   - SSI #2 ACHS     Dispo: Transfer to [facility]     Patient discussed with attending Dr. Albarran.     Fabiola Kulkarni   MS3 General Surgery   ACS     ==============================================================================  CHIEF COMPLAINT / EVENTS LAST 24HRS / HPI:  No acute events. Reporting pain in lower abdomen. No nausea, vomiting.     MEDICAL HISTORY / ROS:   Admission history and ROS reviewed. Pertinent changes as follows:  None     PHYSICAL EXAM:  Heart Rate:  []   Temp:  [36 °C (96.8 °F)-36.9 °C (98.4 °F)]   Resp:  [14-20]   BP: (102-137)/(65-85)   SpO2:  [94 %-100 %]   Gen: NAD, thin, ill appearing   Resp: Stable on RA   Abd: Distended, tympanic; lower abdominal pain on light palpation  Skin: Feet cool bilaterally   Ext: R ankle incision well approximated with sutures in place   MSK: PRATT     IMAGING SUMMARY:    CT A/P (9/5/2024)  1. There is a small bowel obstruction. The transition point appears to be in the lower midline abdomen. I suspect this may be due to an adhesion.  2. Multiple gallstones, but no evidence for cholecystitis or biliary obstruction.  3. The spleen is atrophic and hypoenhancing, likely representing scarring. This may represent a autosplenectomy.       LABS:  Results from last 7 days   Lab Units 09/10/24  0616 09/09/24  0145 09/08/24  0205 09/07/24  1555  09/07/24  0632 09/06/24  0932 09/05/24  1016   WBC AUTO x10*3/uL 30.8* 34.1* 22.0*   < > 24.4*   < > 40.6*   HEMOGLOBIN g/dL 9.5* 11.4* 10.5*   < > 6.5*   < > 9.5*   HEMATOCRIT % 29.1* 35.3* 32.8*   < > 21.9*   < > 29.7*   PLATELETS AUTO x10*3/uL 373 481* 514*   < > 585*   < > 846*   LYMPHO PCT MAN %  --   --   --   --  1.6  --  1.7   MONO PCT MAN %  --   --   --   --  0.8  --  2.6   EOSINO PCT MAN %  --   --   --   --  0.0  --  0.0    < > = values in this interval not displayed.     Results from last 7 days   Lab Units 09/09/24  0855   APTT seconds 28     Results from last 7 days   Lab Units 09/10/24  0616 09/09/24  0145 09/08/24  0205 09/06/24  0932 09/05/24  1016   SODIUM mmol/L 131* 135* 137   < > 137   POTASSIUM mmol/L 4.4 3.9 4.8   < > 3.9   CHLORIDE mmol/L 98 98 99   < > 102   CO2 mmol/L 26 24 26   < > 22   BUN mg/dL 9 10 14   < > 14   CREATININE mg/dL 0.50 0.52 0.65   < > 0.74   CALCIUM mg/dL 7.8* 7.8* 7.9*   < > 8.8   PROTEIN TOTAL g/dL 4.9*  --   --   --  6.9   BILIRUBIN TOTAL mg/dL  --   --   --   --  0.3   ALK PHOS U/L  --   --   --   --  129   ALT U/L  --   --   --   --  69*   AST U/L  --   --   --   --  54*   GLUCOSE mg/dL 135* 109* 83   < > 104*    < > = values in this interval not displayed.     Results from last 7 days   Lab Units 09/05/24  1016   BILIRUBIN TOTAL mg/dL 0.3     Results from last 7 days   Lab Units 09/07/24  1025 09/07/24  0742   POCT PH, ARTERIAL pH 7.45* 7.41   POCT PCO2, ARTERIAL mm Hg 40 39   POCT PO2, ARTERIAL mm Hg 121* 110*   POCT HCO3 CALCULATED, ARTERIAL mmol/L 27.8* 24.7   POCT BASE EXCESS, ARTERIAL mmol/L 3.5* 0.1       I have reviewed all medications, laboratory results, and imaging pertinent for today's encounter.

## 2024-09-10 NOTE — SIGNIFICANT EVENT
VASCULAR SURGERY SERVICE  Post Operative Evaluation   Today's Date: 09/10/24                  Patient: Fernando Cuevas  Admitted: 2024   : 1960 Length of Stay: Day 5   MRN: [id number] Attending: Dr. Mary     Procedure   Procedure: Procedure(s):  Bilateral below knee gulliotine amputation with Dr. Mary  Date of procedure: 09/10/24    Subjective:      No acute complaints. Patient is doing well, currently with postoperative pain controlled on current regimen. Patient is conversing and behaving appropriately. Denies nausea, vomiting, chest pain, shortness of breath.     Objective     Last Recorded Vitals  Heart Rate:  []   Temp:  [36 °C (96.8 °F)-36.9 °C (98.4 °F)]   Resp:  [12-20]   BP: (118-149)/(66-85)   SpO2:  [94 %-100 %]     Intake/Output last 3 Shifts:  I/O last 3 completed shifts:  In: 3412.6 (55.5 mL/kg) [I.V.:2812.6 (45.7 mL/kg); IV Piggyback:600]  Out: 450 (7.3 mL/kg) [Urine:450 (0.2 mL/kg/hr)]  Weight: 61.5 kg     Physical Exam:  Vascular Physical Exam  Constitutional: No acute distress, resting comfortably  Neuro:  AOx3, grossly intact  CV: regular rhythm  Pulm: non-labored on RA  Wound: bilateral below knee guillotine amputations; fluff gauze, ABD pad, kerlex wrap, ACE bandage. no strike through,     Active Medications  Scheduled medications  aspirin, 81 mg, oral, Daily  atorvastatin, 40 mg, oral, Nightly  [Held by provider] azithromycin, 250 mg, oral, Every Mon/Wed/Fri  busPIRone, 5 mg, oral, BID  cloNIDine, 0.1 mg, oral, q8h LISET  dilTIAZem CD, 240 mg, oral, Daily  gabapentin, 300 mg, oral, TID  insulin lispro, 0-10 Units, subcutaneous, Before meals & nightly  levalbuterol, 0.63 mg, nebulization, TID  melatonin, 3 mg, oral, Nightly  methylPREDNISolone sodium succinate (PF), 125 mg, intravenous, q24h  mirtazapine, 15 mg, oral, Nightly  montelukast, 10 mg, oral, Daily  oxybutynin, 2.5 mg, oral, BID  pantoprazole, 40 mg, intravenous, BID  [Held by provider] sucralfate, 1 g, oral,  BID  thiamine, 100 mg, oral, Daily  tiotropium, 2 puff, inhalation, Daily      Continuous medications  dextrose 5%-0.45 % sodium chloride, 100 mL/hr, Last Rate: 100 mL/hr (09/10/24 1441)  HYDROmorphone,       PRN medications  PRN medications: albuterol, albuterol, dextrose, dextrose, glucagon, glucagon, ipratropium-albuteroL, naloxone, ondansetron    Assessment     Fernando Cuevas is a 63 y.o. year old female now POD#0 s/p bilateral below knee guillotine amputations. Patient is doing well at this time postoperatively with stable VS and adequate pain control.     Plan     Multimodal pain control  No need for anticoagulation from vascular surgery perspective; okay to resume at 5pm   on regular diet   48 hours Ancef Q8H for periop antibiotics    Gail Astudillo M.D.  [facility]  [facility]  Vascular Surgery Resident  PGY-1  1:53 PM 09/10/24  Available via Epic Secure Chat  Service Pager: [phone]

## 2024-09-10 NOTE — ANESTHESIA PROCEDURE NOTES
Airway  Date/Time: 9/10/2024 10:18 AM  Urgency: elective    Airway not difficult    Staffing  Performed: resident   Authorized by: Melva Amaro MD    Performed by: Jessie Joseph MD  Patient location during procedure: OR    Indications and Patient Condition  Indications for airway management: anesthesia  Preoxygenated: yes  Patient position: sniffing  Mask difficulty assessment: 1 - vent by mask  Planned trial extubation    Final Airway Details  Final airway type: supraglottic airway      Successful airway: Size 3     Ventilation between attempts: BVM    Additional Comments  Initial attempt with LMA size 4 - too large; LMA size 3 placed easily

## 2024-09-10 NOTE — CARE PLAN
The patient's goals for the shift include patient will remain comfortable throughout shift     The clinical goals for the shift include patient pain will be managed throughout shift

## 2024-09-10 NOTE — NURSING NOTE
4 Eyes Skin Assessment    Reason for assessment? Weekly skin assessment  Does the patient have a wound? no  Wound RN consult placed? No patient refused to let nurses check her bottom.  Preventative foam dressing applied? No      Four eyes skin check performed with Rd Guerrero RN.

## 2024-09-10 NOTE — CARE PLAN
Problem: Pain - Adult  Goal: Verbalizes/displays adequate comfort level or baseline comfort level  9/9/2024 2011 by Marisela Mueller RN  Outcome: Progressing  9/9/2024 2006 by Marisela Mueller RN  Outcome: Progressing     Problem: Safety - Adult  Goal: Free from fall injury  9/9/2024 2011 by Marisela Mueller RN  Outcome: Progressing  9/9/2024 2006 by Marisela Mueller RN  Outcome: Progressing     Problem: Discharge Planning  Goal: Discharge to home or other facility with appropriate resources  9/9/2024 2011 by Marisela Mueller RN  Outcome: Progressing  9/9/2024 2006 by Marisela Mueller RN  Outcome: Progressing     Problem: Chronic Conditions and Co-morbidities  Goal: Patient's chronic conditions and co-morbidity symptoms are monitored and maintained or improved  9/9/2024 2011 by Marisela Mueller RN  Outcome: Progressing  9/9/2024 2006 by Marisela Mueller RN  Outcome: Progressing     Problem: Skin  Goal: Decreased wound size/increased tissue granulation at next dressing change  9/9/2024 2011 by Marisela Mueller RN  Outcome: Progressing  9/9/2024 2008 by Marisela Mueller RN  Flowsheets (Taken 9/9/2024 2006)  Decreased wound size/increased tissue granulation at next dressing change:   Promote sleep for wound healing   Protective dressings over bony prominences  9/9/2024 2006 by Marisela Mueller RN  Outcome: Progressing  Flowsheets (Taken 9/9/2024 2006)  Decreased wound size/increased tissue granulation at next dressing change:   Promote sleep for wound healing   Protective dressings over bony prominences  Goal: Participates in plan/prevention/treatment measures  9/9/2024 2011 by Marisela Mueller RN  Outcome: Progressing  9/9/2024 2006 by Marisela Mueller RN  Outcome: Progressing  Flowsheets (Taken 9/9/2024 2006)  Participates in plan/prevention/treatment measures: Elevate heels  Goal: Prevent/manage excess moisture  9/9/2024 2011 by Marisela Mueller RN  Outcome: Progressing  9/9/2024 2008 by Marisela  ARNOLD Mueller  Flowsheets (Taken 9/9/2024 2006)  Prevent/manage excess moisture:   Cleanse incontinence/protect with barrier cream   Moisturize dry skin   Monitor for/manage infection if present  9/9/2024 2006 by Marisela Mueller RN  Outcome: Progressing  Flowsheets (Taken 9/9/2024 2006)  Prevent/manage excess moisture:   Cleanse incontinence/protect with barrier cream   Moisturize dry skin   Monitor for/manage infection if present  Goal: Prevent/minimize sheer/friction injuries  9/9/2024 2011 by Marisela Mueller RN  Outcome: Progressing  9/9/2024 2008 by Marisela Mueller RN  Flowsheets (Taken 9/9/2024 2006)  Prevent/minimize sheer/friction injuries:   HOB 30 degrees or less   Turn/reposition every 2 hours/use positioning/transfer devices   Use pull sheet   Complete micro-shifts as needed if patient unable. Adjust patient position to relieve pressure points, not a full turn  9/9/2024 2006 by Marisela Mueller RN  Outcome: Progressing  Flowsheets (Taken 9/9/2024 2006)  Prevent/minimize sheer/friction injuries:   HOB 30 degrees or less   Turn/reposition every 2 hours/use positioning/transfer devices   Use pull sheet   Complete micro-shifts as needed if patient unable. Adjust patient position to relieve pressure points, not a full turn  Goal: Promote/optimize nutrition  9/9/2024 2011 by Marisela Mueller RN  Outcome: Progressing  9/9/2024 2006 by Marisela Mueller RN  Outcome: Progressing  Flowsheets (Taken 9/9/2024 2006)  Promote/optimize nutrition: Monitor/record intake including meals  Goal: Promote skin healing  9/9/2024 2011 by Marisela Mueller RN  Outcome: Progressing  9/9/2024 2006 by Marisela Mueller RN  Outcome: Progressing  Flowsheets (Taken 9/9/2024 2006)  Promote skin healing:   Assess skin/pad under line(s)/device(s)   Protective dressings over bony prominences   Turn/reposition every 2 hours/use positioning/transfer devices     Problem: Knowledge Deficit  Goal: Patient/family/caregiver demonstrates  understanding of disease process, treatment plan, medications, and discharge instructions  9/9/2024 2011 by Marisela Mueller RN  Outcome: Progressing  9/9/2024 2006 by Marisela Mueller RN  Outcome: Progressing     Problem: Pain  Goal: Takes deep breaths with improved pain control throughout the shift  9/9/2024 2011 by Marisela Mueller RN  Outcome: Progressing  9/9/2024 2006 by Marisela Mueller RN  Outcome: Progressing  Goal: Turns in bed with improved pain control throughout the shift  9/9/2024 2011 by Marisela Mueller RN  Outcome: Progressing  9/9/2024 2006 by Marisela Mueller RN  Outcome: Progressing  Goal: Walks with improved pain control throughout the shift  9/9/2024 2011 by Marisela Mueller RN  Outcome: Progressing  9/9/2024 2006 by Marisela Mueller RN  Outcome: Progressing  Goal: Performs ADL's with improved pain control throughout shift  9/9/2024 2011 by Marisela Mueller RN  Outcome: Progressing  9/9/2024 2006 by Marisela Mueller RN  Outcome: Progressing  Goal: Participates in PT with improved pain control throughout the shift  9/9/2024 2011 by Marisela Mueller RN  Outcome: Progressing  9/9/2024 2006 by Marisela Mueller RN  Outcome: Progressing  Goal: Free from opioid side effects throughout the shift  9/9/2024 2011 by Marisela Mueller RN  Outcome: Progressing  9/9/2024 2006 by Marisela Mueller RN  Outcome: Progressing  Goal: Free from acute confusion related to pain meds throughout the shift  9/9/2024 2011 by Marisela Mueller RN  Outcome: Progressing  9/9/2024 2006 by Marisela Mueller RN  Outcome: Progressing   The patient's goals for the shift include  pain management    The clinical goals for the shift include pain managment and remain safe    Over the shift, the patient did  make progress toward the following goals.

## 2024-09-10 NOTE — SIGNIFICANT EVENT
Vascular Surgery Post-Operative Plan    S/p bilateral guillotine amputation (below knee)    Pre-op Vascular Exam: monophasic PT signal  Post-op Vascular Exam: s/p bilateral below knee guillotine amputations    Plan:  4 hours PACU stay for NV checks  Multimodal pain control  No need for anticoagulation from vascular surgery perspective; okay to resume at 5pm   diet as tolerated after 4 hours  48 hours Ancef Q8H for periop antibiotics  PT tomorrow morning  Wound: bilateral below knee guillotine amputations; fluff gauze, ABD pad, kerlex wrap, ACE bandage    Adolfo Arroyo MD  Vascular Surgery Fellow  Team Pager [phone]  09/10/24  10:58 AM

## 2024-09-11 LAB
ALBUMIN MFR UR ELPH: 24.3 %
ALBUMIN SERPL BCP-MCNC: 2.4 G/DL (ref 3.4–5)
ALPHA1 GLOB MFR UR ELPH: 24.4 %
ALPHA2 GLOB MFR UR ELPH: 28.9 %
ANION GAP SERPL CALC-SCNC: 13 MMOL/L (ref 10–20)
B-GLOBULIN MFR UR ELPH: 10 %
BUN SERPL-MCNC: 11 MG/DL (ref 6–23)
CALCIUM SERPL-MCNC: 7.9 MG/DL (ref 8.6–10.6)
CHLORIDE SERPL-SCNC: 97 MMOL/L (ref 98–107)
CO2 SERPL-SCNC: 25 MMOL/L (ref 21–32)
CREAT SERPL-MCNC: 0.52 MG/DL (ref 0.5–1.05)
EGFRCR SERPLBLD CKD-EPI 2021: >90 ML/MIN/1.73M*2
ERYTHROCYTE [DISTWIDTH] IN BLOOD BY AUTOMATED COUNT: 20.9 % (ref 11.5–14.5)
GAMMA GLOB MFR UR ELPH: 12.4 %
GLUCOSE BLD MANUAL STRIP-MCNC: 103 MG/DL (ref 74–99)
GLUCOSE BLD MANUAL STRIP-MCNC: 103 MG/DL (ref 74–99)
GLUCOSE BLD MANUAL STRIP-MCNC: 120 MG/DL (ref 74–99)
GLUCOSE BLD MANUAL STRIP-MCNC: 132 MG/DL (ref 74–99)
GLUCOSE BLD MANUAL STRIP-MCNC: 137 MG/DL (ref 74–99)
GLUCOSE BLD MANUAL STRIP-MCNC: 153 MG/DL (ref 74–99)
GLUCOSE SERPL-MCNC: 120 MG/DL (ref 74–99)
HCT VFR BLD AUTO: 29.8 % (ref 36–46)
HGB BLD-MCNC: 9.2 G/DL (ref 12–16)
MAGNESIUM SERPL-MCNC: 1.89 MG/DL (ref 1.6–2.4)
MCH RBC QN AUTO: 25.3 PG (ref 26–34)
MCHC RBC AUTO-ENTMCNC: 30.9 G/DL (ref 32–36)
MCV RBC AUTO: 82 FL (ref 80–100)
NRBC BLD-RTO: 1 /100 WBCS (ref 0–0)
PATH REVIEW-URINE PROTEIN ELECTROPHORESIS: NORMAL
PHOSPHATE SERPL-MCNC: 3 MG/DL (ref 2.5–4.9)
PLATELET # BLD AUTO: 376 X10*3/UL (ref 150–450)
POTASSIUM SERPL-SCNC: 4.4 MMOL/L (ref 3.5–5.3)
RBC # BLD AUTO: 3.64 X10*6/UL (ref 4–5.2)
SODIUM SERPL-SCNC: 131 MMOL/L (ref 136–145)
UFH PPP CHRO-ACNC: 0.3 IU/ML
UFH PPP CHRO-ACNC: 0.3 IU/ML
URINE ELECTROPHORESIS COMMENT: NORMAL
WBC # BLD AUTO: 27.8 X10*3/UL (ref 4.4–11.3)

## 2024-09-11 PROCEDURE — 2500000004 HC RX 250 GENERAL PHARMACY W/ HCPCS (ALT 636 FOR OP/ED)

## 2024-09-11 PROCEDURE — 80069 RENAL FUNCTION PANEL: CPT

## 2024-09-11 PROCEDURE — 99233 SBSQ HOSP IP/OBS HIGH 50: CPT | Performed by: INTERNAL MEDICINE

## 2024-09-11 PROCEDURE — 94640 AIRWAY INHALATION TREATMENT: CPT

## 2024-09-11 PROCEDURE — 1200000002 HC GENERAL ROOM WITH TELEMETRY DAILY

## 2024-09-11 PROCEDURE — 2500000004 HC RX 250 GENERAL PHARMACY W/ HCPCS (ALT 636 FOR OP/ED): Mod: JZ

## 2024-09-11 PROCEDURE — 2500000001 HC RX 250 WO HCPCS SELF ADMINISTERED DRUGS (ALT 637 FOR MEDICARE OP)

## 2024-09-11 PROCEDURE — 2500000002 HC RX 250 W HCPCS SELF ADMINISTERED DRUGS (ALT 637 FOR MEDICARE OP, ALT 636 FOR OP/ED)

## 2024-09-11 PROCEDURE — 82947 ASSAY GLUCOSE BLOOD QUANT: CPT

## 2024-09-11 PROCEDURE — 97530 THERAPEUTIC ACTIVITIES: CPT | Mod: GO

## 2024-09-11 PROCEDURE — 85027 COMPLETE CBC AUTOMATED: CPT

## 2024-09-11 PROCEDURE — 97161 PT EVAL LOW COMPLEX 20 MIN: CPT | Mod: GP

## 2024-09-11 PROCEDURE — 97530 THERAPEUTIC ACTIVITIES: CPT | Mod: GP

## 2024-09-11 PROCEDURE — 97166 OT EVAL MOD COMPLEX 45 MIN: CPT | Mod: GO

## 2024-09-11 PROCEDURE — 85520 HEPARIN ASSAY: CPT

## 2024-09-11 PROCEDURE — 83735 ASSAY OF MAGNESIUM: CPT

## 2024-09-11 RX ORDER — PREDNISONE 10 MG/1
40 TABLET ORAL DAILY
Status: COMPLETED | OUTPATIENT
Start: 2024-09-11 | End: 2024-09-12

## 2024-09-11 RX ORDER — ACETAMINOPHEN 325 MG/1
650 TABLET ORAL EVERY 6 HOURS
Status: DISCONTINUED | OUTPATIENT
Start: 2024-09-11 | End: 2024-09-12

## 2024-09-11 RX ORDER — CEFAZOLIN SODIUM 1 G/50ML
1 SOLUTION INTRAVENOUS EVERY 8 HOURS
Status: COMPLETED | OUTPATIENT
Start: 2024-09-11 | End: 2024-09-11

## 2024-09-11 RX ORDER — PANTOPRAZOLE SODIUM 40 MG/1
40 TABLET, DELAYED RELEASE ORAL
Status: DISCONTINUED | OUTPATIENT
Start: 2024-09-11 | End: 2024-09-14

## 2024-09-11 RX ORDER — MAGNESIUM SULFATE HEPTAHYDRATE 40 MG/ML
2 INJECTION, SOLUTION INTRAVENOUS ONCE
Status: COMPLETED | OUTPATIENT
Start: 2024-09-11 | End: 2024-09-11

## 2024-09-11 RX ADMIN — HEPARIN SODIUM 8 UNITS/HR: 10000 INJECTION, SOLUTION INTRAVENOUS at 21:54

## 2024-09-11 RX ADMIN — PREDNISONE 40 MG: 10 TABLET ORAL at 15:36

## 2024-09-11 RX ADMIN — BUSPIRONE HYDROCHLORIDE 5 MG: 10 TABLET ORAL at 21:55

## 2024-09-11 RX ADMIN — BUSPIRONE HYDROCHLORIDE 5 MG: 10 TABLET ORAL at 08:16

## 2024-09-11 RX ADMIN — THIAMINE HCL TAB 100 MG 100 MG: 100 TAB at 08:16

## 2024-09-11 RX ADMIN — INSULIN LISPRO 2 UNITS: 100 INJECTION, SOLUTION INTRAVENOUS; SUBCUTANEOUS at 21:55

## 2024-09-11 RX ADMIN — GABAPENTIN 300 MG: 300 CAPSULE ORAL at 08:16

## 2024-09-11 RX ADMIN — CLONIDINE HYDROCHLORIDE 0.1 MG: 0.1 TABLET ORAL at 15:42

## 2024-09-11 RX ADMIN — PANTOPRAZOLE SODIUM 40 MG: 40 TABLET, DELAYED RELEASE ORAL at 15:43

## 2024-09-11 RX ADMIN — SUCRALFATE 1 G: 1 TABLET ORAL at 21:51

## 2024-09-11 RX ADMIN — ACETAMINOPHEN 650 MG: 325 TABLET ORAL at 21:51

## 2024-09-11 RX ADMIN — Medication: at 06:49

## 2024-09-11 RX ADMIN — ATORVASTATIN CALCIUM 40 MG: 40 TABLET, FILM COATED ORAL at 21:51

## 2024-09-11 RX ADMIN — CEFAZOLIN SODIUM 1 G: 1 INJECTION, SOLUTION INTRAVENOUS at 08:16

## 2024-09-11 RX ADMIN — CLONIDINE HYDROCHLORIDE 0.1 MG: 0.1 TABLET ORAL at 21:51

## 2024-09-11 RX ADMIN — CEFAZOLIN SODIUM 1 G: 1 INJECTION, SOLUTION INTRAVENOUS at 21:52

## 2024-09-11 RX ADMIN — GABAPENTIN 300 MG: 300 CAPSULE ORAL at 21:51

## 2024-09-11 RX ADMIN — LEVALBUTEROL HYDROCHLORIDE 0.63 MG: 0.63 SOLUTION RESPIRATORY (INHALATION) at 17:17

## 2024-09-11 RX ADMIN — MIRTAZAPINE 15 MG: 15 TABLET, FILM COATED ORAL at 21:52

## 2024-09-11 RX ADMIN — CLONIDINE HYDROCHLORIDE 0.1 MG: 0.1 TABLET ORAL at 05:36

## 2024-09-11 RX ADMIN — ASPIRIN 81 MG: 81 TABLET, COATED ORAL at 08:16

## 2024-09-11 RX ADMIN — OXYBUTYNIN CHLORIDE 2.5 MG: 5 TABLET ORAL at 21:52

## 2024-09-11 RX ADMIN — LEVALBUTEROL HYDROCHLORIDE 0.63 MG: 0.63 SOLUTION RESPIRATORY (INHALATION) at 20:25

## 2024-09-11 RX ADMIN — SUCRALFATE 1 G: 1 TABLET ORAL at 08:16

## 2024-09-11 RX ADMIN — MONTELUKAST 10 MG: 10 TABLET, FILM COATED ORAL at 08:17

## 2024-09-11 RX ADMIN — GABAPENTIN 300 MG: 300 CAPSULE ORAL at 15:36

## 2024-09-11 RX ADMIN — MELATONIN 3 MG: 3 TAB ORAL at 21:51

## 2024-09-11 RX ADMIN — PANTOPRAZOLE SODIUM 40 MG: 40 INJECTION, POWDER, FOR SOLUTION INTRAVENOUS at 08:16

## 2024-09-11 RX ADMIN — DILTIAZEM HYDROCHLORIDE 240 MG: 240 CAPSULE, EXTENDED RELEASE ORAL at 08:17

## 2024-09-11 RX ADMIN — CEFAZOLIN SODIUM 1 G: 1 INJECTION, SOLUTION INTRAVENOUS at 15:36

## 2024-09-11 RX ADMIN — OXYBUTYNIN CHLORIDE 2.5 MG: 5 TABLET ORAL at 08:17

## 2024-09-11 RX ADMIN — MAGNESIUM SULFATE HEPTAHYDRATE 2 G: 40 INJECTION, SOLUTION INTRAVENOUS at 15:36

## 2024-09-11 ASSESSMENT — PAIN SCALES - GENERAL
PAINLEVEL_OUTOF10: 6
PAINLEVEL_OUTOF10: 8
PAINLEVEL_OUTOF10: 6
PAINLEVEL_OUTOF10: 10 - WORST POSSIBLE PAIN
PAINLEVEL_OUTOF10: 6
PAINLEVEL_OUTOF10: 0 - NO PAIN
PAINLEVEL_OUTOF10: 5 - MODERATE PAIN
PAINLEVEL_OUTOF10: 10 - WORST POSSIBLE PAIN

## 2024-09-11 ASSESSMENT — PAIN - FUNCTIONAL ASSESSMENT
PAIN_FUNCTIONAL_ASSESSMENT: 0-10

## 2024-09-11 ASSESSMENT — COGNITIVE AND FUNCTIONAL STATUS - GENERAL
MOVING TO AND FROM BED TO CHAIR: TOTAL
STANDING UP FROM CHAIR USING ARMS: TOTAL
WALKING IN HOSPITAL ROOM: TOTAL
TOILETING: A LITTLE
PERSONAL GROOMING: A LITTLE
WALKING IN HOSPITAL ROOM: TOTAL
MOVING FROM LYING ON BACK TO SITTING ON SIDE OF FLAT BED WITH BEDRAILS: A LITTLE
TOILETING: TOTAL
DRESSING REGULAR UPPER BODY CLOTHING: A LOT
CLIMB 3 TO 5 STEPS WITH RAILING: TOTAL
PERSONAL GROOMING: A LITTLE
TURNING FROM BACK TO SIDE WHILE IN FLAT BAD: A LITTLE
MOBILITY SCORE: 10
STANDING UP FROM CHAIR USING ARMS: TOTAL
HELP NEEDED FOR BATHING: A LOT
DAILY ACTIVITIY SCORE: 15
DRESSING REGULAR LOWER BODY CLOTHING: A LITTLE
MOVING FROM LYING ON BACK TO SITTING ON SIDE OF FLAT BED WITH BEDRAILS: A LITTLE
TURNING FROM BACK TO SIDE WHILE IN FLAT BAD: A LITTLE
DRESSING REGULAR LOWER BODY CLOTHING: A LOT
HELP NEEDED FOR BATHING: A LITTLE
DRESSING REGULAR UPPER BODY CLOTHING: A LITTLE
MOVING TO AND FROM BED TO CHAIR: TOTAL
CLIMB 3 TO 5 STEPS WITH RAILING: TOTAL

## 2024-09-11 ASSESSMENT — ACTIVITIES OF DAILY LIVING (ADL)
ADL_ASSISTANCE: NEEDS ASSISTANCE
BATHING_ASSISTANCE: MAXIMAL

## 2024-09-11 ASSESSMENT — PAIN DESCRIPTION - DESCRIPTORS
DESCRIPTORS: BURNING
DESCRIPTORS: BURNING

## 2024-09-11 NOTE — PROGRESS NOTES
Occupational Therapy    Evaluation/Treatment    Patient Name: Fernando Cuevas  MRN: [id number]  Department: [facility]  Room: [facility]056  Today's Date: 09/11/24  Time Calculation  Start Time: 1039  Stop Time: 1121  Time Calculation (min): 42 min       Assessment:  OT Assessment: Pt demonstrated deficits in cognition, functinal activity, functional trasnfers, ADLs, and IADLs. It is reccomended that Pt be seen at a moderate level 3 times a week for continuation of stay and after discharge.  Prognosis: Fair  Evaluation/Treatment Tolerance: Patient tolerated treatment well  Medical Staff Made Aware: Yes  End of Session Communication: Bedside nurse  End of Session Patient Position: Bed, 3 rail up, Alarm off, not on at start of session  OT Assessment Results: Decreased ADL status, Decreased safe judgment during ADL, Decreased cognition, Decreased endurance, Decreased functional mobility, Decreased IADLs  Prognosis: Fair  Evaluation/Treatment Tolerance: Patient tolerated treatment well  Medical Staff Made Aware: Yes  Strengths: Support of Caregivers  Barriers to Participation: Ability to acquire knowledge  Plan:  Treatment Interventions: ADL retraining, Functional transfer training, UE strengthening/ROM, Endurance training, Neuromuscular reeducation  OT Frequency: 3 times per week  OT Discharge Recommendations: Moderate intensity level of continued care  Equipment Recommended upon Discharge: Slide board, Wheelchair  OT Recommended Transfer Status: Moderate assist, Assist of 1  OT - OK to Discharge: Yes  Treatment Interventions: ADL retraining, Functional transfer training, UE strengthening/ROM, Endurance training, Neuromuscular reeducation    Subjective   Current Problem:  1. SBO (small bowel obstruction) (Multi)        2. Epigastric pain        3. Peripheral arterial disease (CMS-HCC)  Lower extremity venous duplex bilateral    Vascular US upper extremity venous duplex bilateral    Lower extremity venous duplex bilateral     Vascular US upper extremity venous duplex bilateral    Vascular US Lower Extremity Arterial Duplex Bilateral    Vascular US Lower Extremity Arterial Duplex Bilateral    CANCELED: Vascular US ankle brachial index (AUSTYN) without exercise    CANCELED: Vascular US lower extremity arterial duplex right    CANCELED: Vascular US ankle brachial index (AUSTYN) without exercise    CANCELED: Vascular US lower extremity arterial duplex right    CANCELED: Vascular US lower extremity arterial duplex bilateral with AUSTYN    CANCELED: Vascular US carotid artery duplex bilateral    CANCELED: Vascular US renal artery duplex complete    CANCELED: Vascular US lower extremity arterial duplex bilateral with AUSTYN    CANCELED: Vascular US carotid artery duplex bilateral    CANCELED: Vascular US renal artery duplex complete      4. Acute lower limb ischemia  Case Request Operating Room: Right leg angiogram, possible thrombectomy, possible thrombolysis    Case Request Operating Room: Right leg angiogram, possible thrombectomy, possible thrombolysis    Surgical Pathology Exam    Surgical Pathology Exam    Case Request Operating Room: Right below knee gulliotine amputation    Case Request Operating Room: Right below knee gulliotine amputation    Lower extremity venous duplex bilateral    Vascular US upper extremity venous duplex bilateral    Lower extremity venous duplex bilateral    Vascular US upper extremity venous duplex bilateral    Case Request Operating Room: Leg Amputation Below Knee    Case Request Operating Room: Leg Amputation Below Knee    Surgical Pathology Exam    Surgical Pathology Exam    Vascular US Lower Extremity Arterial Duplex Bilateral    Vascular US Lower Extremity Arterial Duplex Bilateral    CANCELED: Vascular US lower extremity arterial duplex bilateral with AUSTYN    CANCELED: Vascular US carotid artery duplex bilateral    CANCELED: Vascular US renal artery duplex complete    CANCELED: Vascular US lower extremity arterial  duplex bilateral with AUSTYN    CANCELED: Vascular US carotid artery duplex bilateral    CANCELED: Vascular US renal artery duplex complete      5. Acute embolism and thrombosis of deep veins of left upper extremity (Multi)  Transthoracic Echo (TTE) Complete    Transthoracic Echo (TTE) Complete    Lower extremity venous duplex bilateral    Vascular US upper extremity venous duplex bilateral    Lower extremity venous duplex bilateral    Vascular US upper extremity venous duplex bilateral    Vascular US Lower Extremity Arterial Duplex Bilateral    Vascular US Lower Extremity Arterial Duplex Bilateral    CANCELED: Transthoracic Echo (TTE) Complete    CANCELED: Transthoracic Echo (TTE) Complete    CANCELED: Vascular US lower extremity arterial duplex bilateral with AUSTYN    CANCELED: Vascular US carotid artery duplex bilateral    CANCELED: Vascular US renal artery duplex complete    CANCELED: Vascular US lower extremity arterial duplex bilateral with AUSTYN    CANCELED: Vascular US carotid artery duplex bilateral    CANCELED: Vascular US renal artery duplex complete      6. Other specified soft tissue disorders  Lower extremity venous duplex bilateral        General:   OT Received On: 09/11/24  General  Reason for Referral: Recurrent SBO and  bilateral below knee guillotine amputations from bilateral lower extremity ischemia.  Past Medical History Relevant to Rehab: 63 year old female with history of polysubstance use, PAD, COPD, HTN, DVT on Eliquis, lung cancer s/p radiation, recurrent SBO with recent ex lap / JOSIAH / small bowel resection  Family/Caregiver Present: No  Co-Treatment: PT  Co-Treatment Reason: Pt with high pain levels. Co-treat to optimize pt function and maximize safety.  Prior to Session Communication: Bedside nurse  Patient Position Received: Bed, 3 rail up, Alarm off, not on at start of session  Preferred Learning Style: auditory, verbal, visual  General Comment: Pt was awake in bed and eating breakfast upon  "entry to the room. Pt was willing to participate in therapy. Pt was lethargic and fatigued during inital questioning and was disorientated to month and date. Pt lines; hydromorphone PCA 0.5 mg/mL  Precautions:  LE Weight Bearing Status: Other (Comment) (BKA non weight bearing.)  Medical Precautions: Fall precautions  Post-Surgical Precautions:  (NWB)  Braces Applied: Stump wrap  Precautions Comment: B BKAs    Vital Sign (Past 2hrs)        Date/Time Vitals Session Patient Position Pulse Resp SpO2 BP MAP (mmHg)    09/11/24 1500 --  --  108  18  99 %  136/86  102                         Pain:  Pain Assessment  Pain Assessment: 0-10  0-10 (Numeric) Pain Score: 10 - Worst possible pain  Pain Type: Phantom pain, Surgical pain  Pain Location: Leg (BLE)  Pain Orientation: Left, Right  Pain Descriptors: Burning  Pain Interventions: Repositioned, Medication (See MAR)    Objective   Cognition:  Overall Cognitive Status: Within Functional Limits  Orientation Level: Disoriented to time (disorientated to date.)  Attention: Exceptions to WFL  Sustained Attention: Impaired (Pt required frequent redirection)  Insight: Moderate  Impulsive: Moderately  Cognition Test Scores  Cognition Tests: Cognition Test Performed  SBT Test Score: 18/28 \"impairment consistant with dementia. Errors with Item Count backwards from 20 (Pt got to 19, 2 errors), say the months in reverse order (Pt got to Decemeber 2 errors), and recall the sentence read (5 errors).        Home Living:  Type of Home: House  Lives With: Adult children  Home Adaptive Equipment: Other (Comment) (rollator)  Home Layout: One level, Laundry in basement  Home Access: Stairs to enter with rails  Entrance Stairs-Rails: Right  Entrance Stairs-Number of Steps: 5  Bathroom Shower/Tub: Walk-in shower  Bathroom Equipment: Bedside commode  Prior Function:  Level of [place]: Needs assistance with ADLs, Needs assistance with homemaking  Receives Help From: Family  ADL Assistance: " Needs assistance  Homemaking Assistance: Needs assistance  Ambulatory Assistance: Needs assistance  Vocational: Retired  Leisure: word puzzles  Prior Function Comments: Pt needed assistance with ADLs and IADLs at baseline.  IADL History:     ADL:  Eating Assistance: Independent  Grooming Assistance: Stand by (Anticipated)  Bathing Assistance: Maximal (Anticipated)  UE Dressing Assistance: Minimal (Anticipated)  LE Dressing Assistance: Moderate (Anticipated)  Toileting Assistance with Device: Moderate (Anticipated)  Activity Tolerance:  Endurance: Tolerates 10 - 20 min exercise with multiple rests  Early Mobility/Exercise Safety Screen: Proceed with mobilization - No exclusion criteria met       Bed Mobility/Transfers: Bed Mobility  Bed Mobility: Yes  Bed Mobility 1  Bed Mobility 1: Supine to sitting  Level of Assistance 1: Minimum assistance, Moderate verbal cues  Bed Mobility Comments 1: Pt completed supine to sit transfer with MIN A and MOD verbal cues for hand placement.  Bed Mobility 2  Bed Mobility  2: Sitting to supine  Level of Assistance 2: Moderate assistance, Moderate verbal cues  Bed Mobility Comments 2: Pt completed sit to supine with MOD A for positioning and MOD A for verbal cues for hand placement.         Sitting Balance:  Static Sitting Balance  Static Sitting-Balance Support: Bilateral upper extremity supported  Static Sitting-Level of Assistance: Contact guard  Static Sitting-Comment/Number of Minutes: Pt was MIN A at the beginning of sitting EOB and progressed to CGA         Therapy/Activity: Therapeutic Activity  Therapeutic Activity Performed: Yes  Therapeutic Activity 1: Pt sat EOB for ~ 20 minutes.  Therapeutic Activity 2: Increased time for activity education and modification.       Vision:Vision - Basic Assessment  Current Vision: Wears glasses only for reading  Sensation:  Light Touch: No apparent deficits  Strength:  Strength Comments: 4/5 UE strength based on observation of functional  activity.       Coordination:  Movements are Fluid and Coordinated: Yes       Outcome Measures: [facility] Daily Activity  Putting on and taking off regular lower body clothing: A lot  Bathing (including washing, rinsing, drying): A lot  Putting on and taking off regular upper body clothing: A little  Toileting, which includes using toilet, bedpan or urinal: Total  Taking care of personal grooming such as brushing teeth: A little  Eating Meals: None  Daily Activity - Total Score: 15        ,    , and OT Adult Other Outcome Measures  Short Blessed Test (SBT): 18    Education Documentation  Precautions, taught by DARLENE Trejo at 9/11/2024  1:26 PM.  Learner: Patient  Readiness: Acceptance  Method: Explanation  Response: Needs Reinforcement    Body Mechanics, taught by DARLENE Trejo at 9/11/2024  1:26 PM.  Learner: Patient  Readiness: Acceptance  Method: Explanation  Response: Needs Reinforcement    ADL Training, taught by DARLENE Trejo at 9/11/2024  1:26 PM.  Learner: Patient  Readiness: Acceptance  Method: Explanation  Response: Needs Reinforcement    Education Comments  No comments found.        OP EDUCATION:       Goals:  Encounter Problems       Encounter Problems (Active)       ADLs       Patient will perform UB and LB bathing with independent level of assistance.       Start:  09/11/24    Expected End:  10/02/24            Patient will complete daily grooming tasks brushing teeth and washing face/hair with independent level of assistance and PRN adaptive equipment while edge of bed .       Start:  09/11/24    Expected End:  10/02/24            Patient will complete toileting including hygiene clothing management/hygiene with independent level of assistance.       Start:  09/11/24    Expected End:  10/02/24               COGNITION/SAFETY       Pt will follow Multistep 100% of the time during OT tx session.       Start:  09/11/24    Expected End:  10/02/24            Patient will demonstrated  orientation x 4 with verbal cues.       Start:  09/11/24    Expected End:  10/02/24       ORIENTATION            TRANSFERS       Patient will perform bed mobility independent level of assistance in order to improve safety and independence with mobility       Start:  09/11/24    Expected End:  10/02/24            Patient will complete functional transfer to chair with manual wheelchair with independent level of assistance.       Start:  09/11/24    Expected End:  10/02/24                     09/11/24 at 4:21 PM - JACINDA PLEITEZOT

## 2024-09-11 NOTE — PROGRESS NOTES
Physical Therapy    Physical Therapy Evaluation & Treatment    Patient Name: Fernando Cuevas  MRN: [id number]  Department: [place] [address]  Room: [address] [place] [address]  Today's Date: 9/11/2024   Time Calculation  Start Time: 1040  Stop Time: 1122  Time Calculation (min): 42 min    Assessment/Plan   PT Assessment  PT Assessment Results: Decreased strength, Decreased range of motion, Decreased endurance, Impaired balance, Decreased mobility, Decreased safety awareness, Pain, Orthopedic restrictions, Decreased cognition  Rehab Prognosis: Good  Barriers to Discharge: Functional mobility  Evaluation/Treatment Tolerance: Patient tolerated treatment well  Medical Staff Made Aware: Yes  Strengths: Support of Caregivers  End of Session Communication: Bedside nurse  Assessment Comment: 63 y.o. female s/p bilateral BKAs. Pt able to sit EOB for 20 mins this session. Pt limited by cognition, pain, and orthopedic restrictions. Pt is appropriate for Moderate Intensity Rehab after DC.  End of Session Patient Position: Bed, 3 rail up, Alarm off, not on at start of session   IP OR SWING BED PT PLAN  Inpatient or Swing Bed: Inpatient  PT Plan  Treatment/Interventions: Bed mobility, Transfer training, Gait training, Stair training, Endurance training, Strengthening, Range of motion, Therapeutic exercise, Therapeutic activity, Wheelchair management  PT Plan: Ongoing PT  PT Frequency: 4 times per week  PT Discharge Recommendations: Moderate intensity level of continued care  Equipment Recommended upon Discharge: Wheelchair, Slide board  PT Recommended Transfer Status: Assist x1, Assistive device  PT - OK to Discharge: Yes      Subjective     General Visit Information:  General  Reason for Referral: MARYCRUZ MANUEL on 9/10  Past Medical History Relevant to Rehab: History of polysubstance use, PAD, COPD, HTN, DVT on Eliquis, lung cancer s/p radiation, recurrent SBO with recent ex lap / JOSIAH / small bowel resection (June 2024)  Family/Caregiver Present: No  Co-Treatment:  OT  Co-Treatment Reason: Pt with high pain levels. Co-treat to optimize pt function and maximize safety.  Prior to Session Communication: Bedside nurse  Patient Position Received: Bed, 3 rail up, Alarm off, not on at start of session  Preferred Learning Style: auditory, verbal, visual  General Comment: Pt demonstrating confusion throughout session but agreeable to therapy services.  Home Living:  Home Living  Type of Home: House  Lives With: Adult children (Son and daughter)  Home Adaptive Equipment:  (Rollator)  Home Layout: One level, Laundry in basement  Home Access: Stairs to enter with rails  Entrance Stairs-Rails: Right  Entrance Stairs-Number of Steps: 5  Bathroom Shower/Tub: Walk-in shower  Bathroom Equipment: Bedside commode  Prior Level of Function:  Prior Function Per Pt/Caregiver Report  Level of [place]: Needs assistance with homemaking, Needs assistance with ADLs  Receives Help From: Family  Ambulatory Assistance: Independent (With rollator)  Precautions:  Precautions  Hearing/Visual Limitations: Glasses for reading  LE Weight Bearing Status: Right Non-Weight Bearing, Left Non-Weight Bearing (Through residual limb)  Medical Precautions: Fall precautions  Precautions Comment: B BKAs    Objective   Pain:  Pain Assessment  Pain Assessment: 0-10  0-10 (Numeric) Pain Score: 10 - Worst possible pain  Pain Type: Phantom pain, Surgical pain  Pain Location: Leg  Pain Orientation: Right, Left  Pain Interventions: Repositioned  Cognition:  Cognition  Overall Cognitive Status: Impaired (Unsure pts cognition at baseline)  Orientation Level: Disoriented to time  Insight: Moderate  Impulsive: Moderately    General Assessments:    Activity Tolerance  Endurance: Tolerates 10 - 20 min exercise with multiple rests  Early Mobility/Exercise Safety Screen: Proceed with mobilization - No exclusion criteria met    Sensation  Light Touch: No apparent deficits    Coordination  Movements are Fluid and Coordinated:  Yes    Postural Control  Postural Control: Impaired (Requiring cueing to right trunk)    Static Sitting Balance  Static Sitting-Balance Support: Bilateral upper extremity supported, Feet unsupported  Static Sitting-Level of Assistance: Minimum assistance, Contact guard  Static Sitting-Comment/Number of Minutes: 20 mins     Functional Assessments:     Bed Mobility  Bed Mobility: Yes  Bed Mobility 1  Bed Mobility 1: Supine to sitting  Level of Assistance 1: Minimum assistance, Moderate verbal cues  Bed Mobility Comments 1: HOB elevated  Bed Mobility 2  Bed Mobility  2: Sitting to supine  Level of Assistance 2: Moderate assistance, Minimal verbal cues    Transfers  Transfer: No    Extremity/Trunk Assessments:    RLE   RLE : Exceptions to WFL  Strength RLE  RLE Overall Strength: Greater than or equal to 3/5 as evidenced by functional mobility  LLE   LLE : Exceptions to WFL  Strength LLE  LLE Overall Strength: Greater than or equal to 3/5 as evidenced by functional mobility  Treatments:  Treatment for increased time with therapeutic activity 2/2 B BKA, pain levels, and decreased cognition.     Outcome Measures:  [facility] Basic Mobility  Turning from your back to your side while in a flat bed without using bedrails: A little  Moving from lying on your back to sitting on the side of a flat bed without using bedrails: A little  Moving to and from bed to chair (including a wheelchair): Total  Standing up from a chair using your arms (e.g. wheelchair or bedside chair): Total  To walk in hospital room: Total  Climbing 3-5 steps with railing: Total  Basic Mobility - Total Score: 10    Encounter Problems       Encounter Problems (Active)       Balance       STG - Maintains static sitting balance without upper extremity support (Progressing)       Start:  09/11/24    Expected End:  09/25/24       INTERVENTIONS:  1. Practice sitting on the edge of a bed/mat with minimal support.  2. Educate patient about maintining total hip  precautions while maintaining balance.  3. Educate patient about pressure relief.  4. Educate patient about use of assistive device.            Mobility       LTG - Patient will propel wheelchair household distances indep  (Progressing)       Start:  09/11/24    Expected End:  09/25/24               PT Transfers       STG - Transfer from bed to chair with Min A  (Progressing)       Start:  09/11/24    Expected End:  09/25/24               PT Transfers       STG - Patient will perform bed mobility indep (Progressing)       Start:  09/11/24    Expected End:  09/25/24            STG - Patient will roll indep (Progressing)       Start:  09/11/24    Expected End:  09/25/24               Pain - Adult              Education Documentation  Precautions, taught by Jo-Ann Gordon PT at 9/11/2024 12:47 PM.  Learner: Patient  Readiness: Acceptance  Method: Explanation  Response: Verbalizes Understanding    Body Mechanics, taught by Jo-Ann Gordon PT at 9/11/2024 12:47 PM.  Learner: Patient  Readiness: Acceptance  Method: Explanation  Response: Verbalizes Understanding    Mobility Training, taught by Jo-Ann Gordon PT at 9/11/2024 12:47 PM.  Learner: Patient  Readiness: Acceptance  Method: Explanation  Response: Verbalizes Understanding    Education Comments  No comments found.

## 2024-09-11 NOTE — PROGRESS NOTES
Subjective   Fernando Cuevas is a 63 y.o. female on day 6 of admission presenting with SBO and acute limb ischemia. Vascular medicine is consulted for anticoagulation recommendations, hypercoagulable workup and concern about FMD.     S/p quan BKA. Patient continues to report pain.  No evidence of bleeding.  Discussed bridging to Coumadin for anticoagulation. Patient declined taking Coumadin given she is unclear if she will have the ability to get to frequent INR checks    Past Medical History:   Diagnosis Date    COPD (chronic obstructive pulmonary disease) (Multi)     Depression     DVT (deep venous thrombosis) (Multi)     bilateral upper extremities    HTN (hypertension)     Lung cancer (Multi)     Migraines     Panic disorder      Past Surgical History:   Procedure Laterality Date     SECTION, LOW TRANSVERSE      COLONOSCOPY      CT ABDOMEN PELVIS ANGIOGRAM W AND/OR WO IV CONTRAST  2016    CT ABDOMEN PELVIS ANGIOGRAM W AND/OR WO IV CONTRAST 3/22/2016 [facility] EMERGENCY LEGACY    CT ANGIO CORONARY ART WITH HEARTFLOW IF SCORE >30%  2023    CT ANGIO CORONARY ART WITH HEARTFLOW IF SCORE >30% 2023    CYSTOSCOPY      botox injection    ESOPHAGOGASTRODUODENOSCOPY      EXPLORATORY LAPAROTOMY W/ BOWEL RESECTION  2024    SBR for perforation    MR NECK ANGIO W IV CONTRAST  2021    MR NECK ANGIO W IV CONTRAST 2021     Social History     Socioeconomic History    Marital status: Single     Spouse name: Not on file    Number of children: Not on file    Years of education: Not on file    Highest education level: Not on file   Occupational History    Not on file   Tobacco Use    Smoking status: Some Days     Types: Cigarettes     Passive exposure: Current (3 cigarettes a day)    Smokeless tobacco: Never   Vaping Use    Vaping status: Never Used   Substance and Sexual Activity    Alcohol use: Yes     Comment: per pt weekly use    Drug use: Yes     Types: Cocaine, Marijuana     Comment: cocain  monthly, marijuana few times per week    Sexual activity: Defer   Other Topics Concern    Not on file   Social History Narrative    Not on file     Social Determinants of Health     Financial Resource Strain: Patient Declined (9/9/2024)    Overall Financial Resource Strain (CARDIA)     Difficulty of Paying Living Expenses: Patient declined   Food Insecurity: No Food Insecurity (7/30/2024)    Hunger Vital Sign     Worried About Running Out of Food in the Last Year: Never true     Ran Out of Food in the Last Year: Never true   Transportation Needs: Patient Declined (9/9/2024)    PRAPARE - Transportation     Lack of Transportation (Medical): Patient declined     Lack of Transportation (Non-Medical): Patient declined   Physical Activity: Inactive (7/30/2024)    Exercise Vital Sign     Days of Exercise per Week: 0 days     Minutes of Exercise per Session: 0 min   Stress: No Stress Concern Present (7/30/2024)    [employer] [place] [employer] - Occupational Stress Questionnaire     Feeling of Stress : Not at all   Social Connections: Unknown (7/30/2024)    Social Connection and Isolation Panel [NHANES]     Frequency of Communication with Friends and Family: More than three times a week     Frequency of Social Gatherings with Friends and Family: More than three times a week     Attends Scientologist Services: Never     Active Member of Clubs or Organizations: No     Attends Club or Organization Meetings: Never     Marital Status: Patient declined   Intimate Partner Violence: Not At Risk (7/30/2024)    Humiliation, Afraid, Rape, and Kick questionnaire     Fear of Current or Ex-Partner: No     Emotionally Abused: No     Physically Abused: No     Sexually Abused: No   Housing Stability: Patient Declined (9/9/2024)    Housing Stability Vital Sign     Unable to Pay for Housing in the Last Year: Patient declined     Number of Times Moved in the Last Year: 1     Homeless in the Last Year: Patient declined     No family  history on file.   Allergies   Allergen Reactions    Iodinated Contrast Media Hives     Hives to faces and neck with itching. Resolved with 50 mg benadryl, 20 mg pepcid & 60 mg prednisone.    Hives to faces and neck with itching. Resolved with 50 mg benadryl, 20 mg pepcid & 60 mg prednisone.   Hives to faces and neck with itching. Resolved with 50 mg benadryl, 20 mg pepcid & 60 mg prednisone.    Iodine Other and Unknown    Adhesive Tape-Silicones Rash       Objective   Physical Exam  Vitals:    09/10/24 2143 09/11/24 0053 09/11/24 0441 09/11/24 0725   BP: 121/76 144/87 137/88 128/83   BP Location: Left leg      Patient Position: Lying      Pulse: 105 98 103 102   Resp: 18 18 18 18   Temp: 35.9 °C (96.7 °F) 36.3 °C (97.3 °F) 36.3 °C (97.3 °F) 36.3 °C (97.3 °F)   TempSrc: Temporal Temporal Temporal Temporal   SpO2: 94% 97% 98% 94%   Weight:       Height:          General: In no acute distress, sleepy  Neuro: alert and oriented x3  CV:  R tachycardia   Lungs: CTA bilaterally  Abd:  Soft, non-tender   Upper extremities: No swelling, +2 radial   Lower extremities: B/L BKA covered with dressing    Medications   Scheduled medications  aspirin, 81 mg, oral, Daily  atorvastatin, 40 mg, oral, Nightly  [Held by provider] azithromycin, 250 mg, oral, Every Mon/Wed/Fri  busPIRone, 5 mg, oral, BID  ceFAZolin, 1 g, intravenous, q8h  cloNIDine, 0.1 mg, oral, q8h LISET  dilTIAZem CD, 240 mg, oral, Daily  gabapentin, 300 mg, oral, TID  insulin lispro, 0-10 Units, subcutaneous, Before meals & nightly  levalbuterol, 0.63 mg, nebulization, TID  melatonin, 3 mg, oral, Nightly  methylPREDNISolone sodium succinate (PF), 125 mg, intravenous, q24h  mirtazapine, 15 mg, oral, Nightly  montelukast, 10 mg, oral, Daily  oxybutynin, 2.5 mg, oral, BID  pantoprazole, 40 mg, intravenous, BID  sucralfate, 1 g, oral, BID  thiamine, 100 mg, oral, Daily  tiotropium, 2 puff, inhalation, Daily    Continuous medications  heparin, 0-4,000 Units/hr, Last Rate: 8  Units/hr (09/11/24 0423)  HYDROmorphone,     PRN medications  PRN medications: albuterol, albuterol, dextrose, dextrose, glucagon, glucagon, heparin, ipratropium-albuteroL, naloxone, ondansetron     Lab Review   Recent Labs     09/11/24  0535 09/10/24  0616 09/09/24  0145 09/08/24  0205 09/07/24 2122 09/06/24  0932 09/05/24  1016 09/01/24  0537 08/30/24  0652   * 131* 135* 137 138 140 137 135* 136   K 4.4 4.4 3.9 4.8 4.7 4.1 3.9 4.5 4.2   CL 97* 98 98 99 102 103 102 101 105   CO2 25 26 24 26 26 30 22 24 23   ANIONGAP 13 11 17 17 15 11 17 15 12   BUN 11 9 10 14 15 17 14 10 8   CREATININE 0.52 0.50 0.52 0.65 0.64 0.81 0.74 0.60 0.72   EGFR >90 >90 >90 >90 >90 82 >90 >90 >90   MG 1.89 1.85 1.79 2.05 2.16 2.63*  --  1.60 1.60     Recent Labs     09/11/24  0535 09/10/24  0616 09/09/24  0145 09/08/24  0205 09/07/24 2122 09/05/24  1016 08/30/24  0652 08/21/24  0604 08/19/24  1225 08/02/24  2154 07/26/24  0603 07/25/24  0942 07/24/24  2133 06/21/24  0704 06/20/24  2144   ALBUMIN 2.4* 2.3* 2.3* 2.4* 2.3* 3.6   < > 3.2* 3.7 3.9   < > 2.6* 2.5*   < > 3.1*   ALKPHOS  --   --   --   --   --  129  --  96 110 97  --  119 133   < > 70   ALT  --   --   --   --   --  69*  --  32 56* 50*  --  31 34   < > 24   AST  --   --   --   --   --  54*  --  18 37 30  --  26 28   < > 14   BILITOT  --   --   --   --   --  0.3  --  0.4 0.4 0.3  --  0.2 0.2   < > 0.2   LIPASE  --   --   --   --   --  32  --   --  43  --   --   --  163*  --  37    < > = values in this interval not displayed.     Recent Labs     09/11/24  0535 09/10/24  2230 09/10/24  0616 09/09/24  0145 09/08/24  0205 09/07/24  2122 09/07/24  1555 09/07/24  0632   WBC 27.8* 24.6* 30.8* 34.1* 22.0* 23.7* 24.7* 24.4*   HGB 9.2* 9.3* 9.5* 11.4* 10.5* 10.6* 10.2* 6.5*   HCT 29.8* 29.9* 29.1* 35.3* 32.8* 32.2* 32.3* 21.9*    373 373 481* 514* 518* 534* 585*   MCV 82 82 78* 80 79* 79* 80 76*     Recent Labs     09/11/24  0332 09/10/24  2230 09/10/24  0803 09/09/24  1350  09/09/24  0855 09/09/24  0145 09/08/24  0609 09/08/24  0205 09/07/24  1555 08/02/24  2154 07/24/24  2133 06/20/24  2144 06/11/24  1539 07/24/23  1111 07/24/23  0507 05/03/23  1759 05/03/23  1559   INR  --   --   --   --   --   --   --   --   --  1.0 1.4* 1.0 1.0  --  0.9 CANCELED 1.0   HAUF 0.3 0.2 0.1 0.3 0.3 0.2 0.3 0.3   < >  --   --   --   --   --   --   --   --    DDIMERVTE  --   --   --   --   --   --   --   --   --   --   --   --   --  387  --   --   --     < > = values in this interval not displayed.     PTT - 9/9/2024:  8:55 AM  Estimated Creatinine Clearance: 95.3 mL/min (by C-G formula based on SCr of 0.52 mg/dL).    Recent Labs     09/09/24  0145 07/24/23  0507   CHOL 144 190   LDLF  --  72   HDL 35.2 91.6   TRIG 206* 131     Lab Results   Component Value Date    HGBA1C 5.6 09/09/2024     Lab Results   Component Value Date    TSH 1.26 07/23/2023     Imaging  CT A/P (9/5/2024)  1. There is a small bowel obstruction. The transition point appears to be in the lower midline abdomen. I suspect this may be due to an adhesion.  2. Multiple gallstones, but no evidence for cholecystitis or biliary obstruction.  3. The spleen is atrophic and hypoenhancing, likely representing scarring. This may represent a autosplenectomy.     AUSTYN/PVR (8/26/2024)  Right Lower PVR: No evidence of arterial occlusive disease in the right lower extremity at rest. Multiphasic flow is noted in the right common femoral artery, right posterior tibial artery and right dorsalis pedis artery. Due to pain tolerance, unable to obtain first digit (great toe) PPG and pressure. However a PPG was documented on the second digit.  Left Lower PVR: No evidence of arterial occlusive disease in the left lower extremity at rest. Decreased digital perfusion noted. Multiphasic flow is noted in the left common femoral artery, left posterior tibial artery and left dorsalis pedis artery. Unable to obtain brachial pressure due to IV placement.     RUE Venous  Duplex (7/6/2024)  Nonocclusive thrombosis involving the right brachial vein    Assessment/Plan   Fernando Cuevas is a 63 y.o. female HTN, COPD, right brachial vein DVT (7/26/24 on Eliquis, ?she was taking it), lung cancer s/p radiation, cocaine abuse disorder, small bowel perforation s/p resection, smoker. Who was admitted with small bowel obstruction and bilateral lower extremity acute limb ischemia     _ ALI   S/p right leg angiogram and PT thrombectomy (9/7) Dr. Mary. Right SFA/pop with possible 'beads on string appearance'  Normal AUSTYN with severe pedal disease on 8/26/2024  Echo positive for PFO  Acute limb ischemia of bilateral lower extremities possibly related to embolism, unclear source  _ Leucocytosis   _ Thrombocytosis   _ Anemia     NATALIE negative, homocysteine WNL, UPEP   Patient does not want to use Coumadin, as she is unclear if she will have the ability to get to frequent INR checks    Recommendations:   continue therapeutic heparin infusion, monitor for bleeding   She declined coumadin/Lovenox, likely DOAC at discharge will need loading dose, although I am concerned about possible DOAC malabsorption in setting of recurrent SBO and SB resection  Xarelto -> 15mg BID x21 days then 20mg every day (with diet) assuming kidney/liver function/Hb/PLTs are stable and no bleeding   continue aspirin and high intensity statin  follow-up SPEP/UPEP/JAK2, LAC, Lpa   follow-up vascular lab studies (BLE UE and LE venous duplex given patent PFO)  CTA chest   Needs to follow-up with PCP for UTD cancer screening  carotid and renal duplex for FMD and further imaging can be done as outpatient  Needs Holter monitor at discharge  Lpa, Atherosclerosis risk factors control     Thank you for allowing us to participate in the patient's care.  Vascular medicine will sign off.  Please call us back with questions/concerns, when the patient is ready for home-going anticoagulation    Discussed with attending, Dr. Edward Frazier  MD Antwon  PGY-5 Vascular Surgery Resident  Rotating vascular medicine resident      I saw and evaluated the patient. I personally obtained the key and critical portions of the history and physical exam or was physically present for key and critical portions performed by the resident/fellow. I reviewed the resident/fellow's documentation and discussed the patient with the resident/fellow. I agree with the resident/fellow's medical decision making as documented in the note.    Priya Leon MD

## 2024-09-11 NOTE — PROGRESS NOTES
[facility]  ACUTE CARE SURGERY - PROGRESS NOTE    Patient Name: Fernando Cuevas  MRN: [id number]  Admit Date: 905  : 1960  AGE: 63 y.o.   GENDER: female  ==============================================================================  TODAY'S ASSESSMENT AND PLAN OF CARE:  63 year old female with history of polysubstance use, PAD, COPD, HTN, DVT on Eliquis, lung cancer s/p radiation, recurrent SBO with recent ex lap / JOSIAH / small bowel resection (2024) who was admitted on 24 with recurrent SBO. SBO was managed conservatively with NGT and she passed a gastrograffin challenge on  and subsequently had NGT removed. Her hospital course has been complicated by acute versus chronic CTLI now s/p RLE angiogram with PT thromboembolectomy and subsequent bilateral guillotine BKAs.    Plan:  Neuro:   - Dilaudid PCA  - Continue home Buspar, gabapentin, mirtazapine   - Melatonin    Resp:   - Supplemental O2 as needed   - Encourage IS   #COPD  - Appreciate pulmonology recommendations     - Burst steroids x5 days (-); will switch from IV Solumedrol to PO prednisone 40  - Continue home Singulair + inhalers    CV:   #Tachycardia   - Persistently tachycardic since admission       - Clonidine 0.1 q8h       - Continue home Cardizem  - Echocardiogram with LVEF 30-35%, PFO on bubble study    GI:  #SBO, resolved  - GGC : contrast reached colon   - Regular diet  - PRN Zofran   #GI bleed, resolved  - Appreciate GI recommendations   - Hgb stable, continuing to monitor  - Continue home PPI BID, sucralfate    /FEN:  - Home oxybutynin     Heme:   #Prior LUE DVT   #Acute vs chronic CLTI  - Holding home Eliquis  - Heparin gtt  - Appreciate vascular medicine recommendations given beads on string appearance on intra-operative angiogram          - Continue ASA and statin         - Obtain the following labs: A1c, lipid panel, homocysteine, Lp(a), APLA (lupus anticoagulant, beta2  "glycoprotein and cardiolipin antibiodies, SPEP, UPEP         - Obtain the following vascular labs: LE arterial duplex, LE and UE venous duplex, carotid duplex, renal duplex         - CTA with FMD protocol as an outpatient         - Likely Holter monitor at discharge          - Will likely bridge to warfarin at discharge with goal INR 2-3     ID:   #Leukocytosis  - Persistent since admission; possibly 2/2 autosplenectomy   - Will complete periop Ancef Q8H x48h    MSK:   #Bilateral acute on chronic CLTI s/p R PT thromboembolectomy (9/7) and bilateral guillotine BKA (9/10)  - Appreciate vascular surgery recommendations  - Post-op guillotine BKA wound care: BID dressing changes--fluff gauze, ABD, Kerlex wrap, ACE bandage   - PT/OT    Endocrine:   - POCT glucose + SSI #2 AC/HS  - Hypoglycemia protocol    Dvt ppx: therapeutic heparin gtt  Dispo: Continue care on RNF, will eventually discharge to SNF      Discussed with attending Dr. Albarran.    Olga Cruz MD  PGY1 Acute Care Surgery  Pager [phone]  Available via secure chat  ==============================================================================  CHIEF COMPLAINT / EVENTS LAST 24HRS / HPI:  No acute events overnight. Patient passing flatus, reports general \"gas pain\" in abdomen; denies nausea, vomiting, or bowel movements. Reports pain in bilateral legs.     MEDICAL HISTORY / ROS:   Admission history and ROS reviewed. Pertinent changes as follows: None.    PHYSICAL EXAM:  Heart Rate:  []   Temp:  [35.9 °C (96.7 °F)-36.3 °C (97.3 °F)]   Resp:  [12-18]   BP: (108-149)/(67-88)   SpO2:  [94 %-98 %]   Gen: Lying in bed in NAD, thin, ill appearing   Resp: Normal respiratory effort on RA   CV: Regular rate and rhythm on tele  Abd: Soft, nontender, nondistended  Skin: Legs symmetrically cool  Ext: Bilateral BKA stumps wrapped with ACE bandages, no strikethrough.  MSK: SANTA    IMAGING SUMMARY:  No new imaging.    LABS:  Results from last 7 days   Lab Units 09/11/24  0535 " 09/10/24  2230 09/10/24  0616 09/07/24  1555 09/07/24  0632 09/06/24  0932 09/05/24  1016   WBC AUTO x10*3/uL 27.8* 24.6* 30.8*   < > 24.4*   < > 40.6*   HEMOGLOBIN g/dL 9.2* 9.3* 9.5*   < > 6.5*   < > 9.5*   HEMATOCRIT % 29.8* 29.9* 29.1*   < > 21.9*   < > 29.7*   PLATELETS AUTO x10*3/uL 376 373 373   < > 585*   < > 846*   LYMPHO PCT MAN %  --   --   --   --  1.6  --  1.7   MONO PCT MAN %  --   --   --   --  0.8  --  2.6   EOSINO PCT MAN %  --   --   --   --  0.0  --  0.0    < > = values in this interval not displayed.     Results from last 7 days   Lab Units 09/09/24  0855   APTT seconds 28     Results from last 7 days   Lab Units 09/11/24  0535 09/10/24  0616 09/09/24  0145 09/06/24  0932 09/05/24  1016   SODIUM mmol/L 131* 131* 135*   < > 137   POTASSIUM mmol/L 4.4 4.4 3.9   < > 3.9   CHLORIDE mmol/L 97* 98 98   < > 102   CO2 mmol/L 25 26 24   < > 22   BUN mg/dL 11 9 10   < > 14   CREATININE mg/dL 0.52 0.50 0.52   < > 0.74   CALCIUM mg/dL 7.9* 7.8* 7.8*   < > 8.8   PROTEIN TOTAL g/dL  --  4.9*  --   --  6.9   BILIRUBIN TOTAL mg/dL  --   --   --   --  0.3   ALK PHOS U/L  --   --   --   --  129   ALT U/L  --   --   --   --  69*   AST U/L  --   --   --   --  54*   GLUCOSE mg/dL 120* 135* 109*   < > 104*    < > = values in this interval not displayed.     Results from last 7 days   Lab Units 09/05/24  1016   BILIRUBIN TOTAL mg/dL 0.3     Results from last 7 days   Lab Units 09/07/24  1025 09/07/24  0742   POCT PH, ARTERIAL pH 7.45* 7.41   POCT PCO2, ARTERIAL mm Hg 40 39   POCT PO2, ARTERIAL mm Hg 121* 110*   POCT HCO3 CALCULATED, ARTERIAL mmol/L 27.8* 24.7   POCT BASE EXCESS, ARTERIAL mmol/L 3.5* 0.1       I have reviewed all medications, laboratory results, and imaging pertinent for today's encounter.

## 2024-09-11 NOTE — CARE PLAN
Fernando Cuevas is a 63 y.o. female with pmhx significant for COPD, DVT on eliquis, HTN, cocaine abuse, alcohol abuse, lung Ca hx, chronic constipation, depression, thrombocytosis, panic disorder,  multiple SBOs and exlap/JOSIAH/partial SBR for SBO in 6/2024. She is currently admitted for evaluation and management of SBO to the surgery service. Pulmonary consulted in the setting of COPD and dyspnea for recommendations inpatient. Patient has underwent thrombectomy and bilateral BKA for acute ischemic limb.    - Breathing has been stable and improving with steroids    Recommendations:  - Please switch to 40 mg Prednisone 9/11 and 9/12 and then can stop steroids  - Continue with tripple therapy inhalers (ICS/LABA and LAMA)    Pulmonary will sign off, please reach back with any questions or concerns.    Case discussed with Dr. Bustamante.    Abraham Villasenor MD  PGY-5 Pulmonary and Critical Care

## 2024-09-12 LAB
ALBUMIN SERPL BCP-MCNC: 2.4 G/DL (ref 3.4–5)
ANION GAP SERPL CALC-SCNC: 11 MMOL/L (ref 10–20)
BUN SERPL-MCNC: 11 MG/DL (ref 6–23)
CALCIUM SERPL-MCNC: 7.4 MG/DL (ref 8.6–10.6)
CHLORIDE SERPL-SCNC: 99 MMOL/L (ref 98–107)
CO2 SERPL-SCNC: 28 MMOL/L (ref 21–32)
CREAT SERPL-MCNC: 0.42 MG/DL (ref 0.5–1.05)
DRVVT SCREEN TO CONFIRM RATIO: 1.06 RATIO
DRVVT/DRVVT CFM NRMLZD PPP-RTO: 0.94 RATIO
DRVVT/DRVVT CFM P DOAC NEUT NORM PPP-RTO: 1.13 RATIO
EGFRCR SERPLBLD CKD-EPI 2021: >90 ML/MIN/1.73M*2
ERYTHROCYTE [DISTWIDTH] IN BLOOD BY AUTOMATED COUNT: 21.4 % (ref 11.5–14.5)
GLUCOSE BLD MANUAL STRIP-MCNC: 112 MG/DL (ref 74–99)
GLUCOSE BLD MANUAL STRIP-MCNC: 121 MG/DL (ref 74–99)
GLUCOSE BLD MANUAL STRIP-MCNC: 121 MG/DL (ref 74–99)
GLUCOSE BLD MANUAL STRIP-MCNC: 124 MG/DL (ref 74–99)
GLUCOSE SERPL-MCNC: 114 MG/DL (ref 74–99)
HCT VFR BLD AUTO: 29.2 % (ref 36–46)
HGB BLD-MCNC: 9.1 G/DL (ref 12–16)
LA 2 SCREEN W REFLEX-IMP: NORMAL
MAGNESIUM SERPL-MCNC: 2 MG/DL (ref 1.6–2.4)
MCH RBC QN AUTO: 25.1 PG (ref 26–34)
MCHC RBC AUTO-ENTMCNC: 31.2 G/DL (ref 32–36)
MCV RBC AUTO: 81 FL (ref 80–100)
NORMALIZED SCT PPP-RTO: 0.81 RATIO
NRBC BLD-RTO: 2.1 /100 WBCS (ref 0–0)
PHOSPHATE SERPL-MCNC: 3.2 MG/DL (ref 2.5–4.9)
PLATELET # BLD AUTO: 395 X10*3/UL (ref 150–450)
POTASSIUM SERPL-SCNC: 4.3 MMOL/L (ref 3.5–5.3)
RBC # BLD AUTO: 3.62 X10*6/UL (ref 4–5.2)
SILICA CLOTTING TIME CONFIRMATION: 0.88 RATIO
SILICA CLOTTING TIME SCREEN: 0.71 RATIO
SODIUM SERPL-SCNC: 134 MMOL/L (ref 136–145)
UFH PPP CHRO-ACNC: 0.1 IU/ML
UFH PPP CHRO-ACNC: 0.2 IU/ML
UFH PPP CHRO-ACNC: 0.3 IU/ML
WBC # BLD AUTO: 39.9 X10*3/UL (ref 4.4–11.3)

## 2024-09-12 PROCEDURE — 83735 ASSAY OF MAGNESIUM: CPT

## 2024-09-12 PROCEDURE — 82947 ASSAY GLUCOSE BLOOD QUANT: CPT

## 2024-09-12 PROCEDURE — 2500000001 HC RX 250 WO HCPCS SELF ADMINISTERED DRUGS (ALT 637 FOR MEDICARE OP)

## 2024-09-12 PROCEDURE — 85027 COMPLETE CBC AUTOMATED: CPT

## 2024-09-12 PROCEDURE — 2500000004 HC RX 250 GENERAL PHARMACY W/ HCPCS (ALT 636 FOR OP/ED)

## 2024-09-12 PROCEDURE — 85520 HEPARIN ASSAY: CPT

## 2024-09-12 PROCEDURE — 99232 SBSQ HOSP IP/OBS MODERATE 35: CPT | Performed by: SURGERY

## 2024-09-12 PROCEDURE — 1210000001 HC SEMI-PRIVATE ROOM DAILY

## 2024-09-12 PROCEDURE — 2500000002 HC RX 250 W HCPCS SELF ADMINISTERED DRUGS (ALT 637 FOR MEDICARE OP, ALT 636 FOR OP/ED)

## 2024-09-12 PROCEDURE — 80069 RENAL FUNCTION PANEL: CPT

## 2024-09-12 PROCEDURE — 99252 IP/OBS CONSLTJ NEW/EST SF 35: CPT

## 2024-09-12 RX ORDER — OXYCODONE HYDROCHLORIDE 5 MG/1
10 TABLET ORAL EVERY 6 HOURS PRN
Status: DISCONTINUED | OUTPATIENT
Start: 2024-09-12 | End: 2024-09-12

## 2024-09-12 RX ORDER — ACETAMINOPHEN 325 MG/1
1000 TABLET ORAL EVERY 8 HOURS
Status: DISCONTINUED | OUTPATIENT
Start: 2024-09-12 | End: 2024-09-14

## 2024-09-12 RX ORDER — OXYCODONE HYDROCHLORIDE 5 MG/1
5 TABLET ORAL EVERY 6 HOURS PRN
Status: DISCONTINUED | OUTPATIENT
Start: 2024-09-12 | End: 2024-09-12

## 2024-09-12 RX ORDER — GABAPENTIN 300 MG/1
600 CAPSULE ORAL 3 TIMES DAILY
Status: DISCONTINUED | OUTPATIENT
Start: 2024-09-12 | End: 2024-09-14

## 2024-09-12 RX ORDER — HYDROMORPHONE HYDROCHLORIDE 1 MG/ML
0.2 INJECTION, SOLUTION INTRAMUSCULAR; INTRAVENOUS; SUBCUTANEOUS EVERY 2 HOUR PRN
Status: DISCONTINUED | OUTPATIENT
Start: 2024-09-12 | End: 2024-09-15 | Stop reason: HOSPADM

## 2024-09-12 RX ORDER — OXYCODONE HYDROCHLORIDE 5 MG/1
5 TABLET ORAL EVERY 4 HOURS PRN
Status: DISCONTINUED | OUTPATIENT
Start: 2024-09-12 | End: 2024-09-15 | Stop reason: HOSPADM

## 2024-09-12 RX ORDER — OXYCODONE HYDROCHLORIDE 5 MG/1
10 TABLET ORAL EVERY 4 HOURS PRN
Status: DISCONTINUED | OUTPATIENT
Start: 2024-09-12 | End: 2024-09-15 | Stop reason: HOSPADM

## 2024-09-12 RX ORDER — CEFAZOLIN SODIUM 1 G/50ML
1 SOLUTION INTRAVENOUS EVERY 8 HOURS
Status: COMPLETED | OUTPATIENT
Start: 2024-09-12 | End: 2024-09-13

## 2024-09-12 RX ADMIN — HYDROMORPHONE HYDROCHLORIDE 0.2 MG: 1 INJECTION, SOLUTION INTRAMUSCULAR; INTRAVENOUS; SUBCUTANEOUS at 22:40

## 2024-09-12 RX ADMIN — MONTELUKAST 10 MG: 10 TABLET, FILM COATED ORAL at 09:02

## 2024-09-12 RX ADMIN — PANTOPRAZOLE SODIUM 40 MG: 40 TABLET, DELAYED RELEASE ORAL at 06:25

## 2024-09-12 RX ADMIN — SUCRALFATE 1 G: 1 TABLET ORAL at 23:01

## 2024-09-12 RX ADMIN — ATORVASTATIN CALCIUM 40 MG: 40 TABLET, FILM COATED ORAL at 21:33

## 2024-09-12 RX ADMIN — PREDNISONE 40 MG: 10 TABLET ORAL at 09:02

## 2024-09-12 RX ADMIN — CLONIDINE HYDROCHLORIDE 0.1 MG: 0.1 TABLET ORAL at 21:33

## 2024-09-12 RX ADMIN — GABAPENTIN 600 MG: 300 CAPSULE ORAL at 21:33

## 2024-09-12 RX ADMIN — ACETAMINOPHEN 975 MG: 325 TABLET ORAL at 13:59

## 2024-09-12 RX ADMIN — HYDROMORPHONE HYDROCHLORIDE 0.2 MG: 1 INJECTION, SOLUTION INTRAMUSCULAR; INTRAVENOUS; SUBCUTANEOUS at 20:00

## 2024-09-12 RX ADMIN — ASPIRIN 81 MG: 81 TABLET, COATED ORAL at 09:02

## 2024-09-12 RX ADMIN — CLONIDINE HYDROCHLORIDE 0.1 MG: 0.1 TABLET ORAL at 14:00

## 2024-09-12 RX ADMIN — BUSPIRONE HYDROCHLORIDE 5 MG: 10 TABLET ORAL at 09:02

## 2024-09-12 RX ADMIN — CLONIDINE HYDROCHLORIDE 0.1 MG: 0.1 TABLET ORAL at 05:48

## 2024-09-12 RX ADMIN — MELATONIN 3 MG: 3 TAB ORAL at 21:33

## 2024-09-12 RX ADMIN — BUSPIRONE HYDROCHLORIDE 5 MG: 10 TABLET ORAL at 23:01

## 2024-09-12 RX ADMIN — ACETAMINOPHEN 975 MG: 325 TABLET ORAL at 21:32

## 2024-09-12 RX ADMIN — HYDROMORPHONE HYDROCHLORIDE 0.2 MG: 1 INJECTION, SOLUTION INTRAMUSCULAR; INTRAVENOUS; SUBCUTANEOUS at 10:30

## 2024-09-12 RX ADMIN — PANTOPRAZOLE SODIUM 40 MG: 40 TABLET, DELAYED RELEASE ORAL at 16:48

## 2024-09-12 RX ADMIN — THIAMINE HCL TAB 100 MG 100 MG: 100 TAB at 09:02

## 2024-09-12 RX ADMIN — OXYBUTYNIN CHLORIDE 2.5 MG: 5 TABLET ORAL at 21:33

## 2024-09-12 RX ADMIN — OXYCODONE HYDROCHLORIDE 10 MG: 5 TABLET ORAL at 09:21

## 2024-09-12 RX ADMIN — HYDROMORPHONE HYDROCHLORIDE 0.2 MG: 1 INJECTION, SOLUTION INTRAMUSCULAR; INTRAVENOUS; SUBCUTANEOUS at 12:33

## 2024-09-12 RX ADMIN — GABAPENTIN 600 MG: 300 CAPSULE ORAL at 16:48

## 2024-09-12 RX ADMIN — OXYBUTYNIN CHLORIDE 2.5 MG: 5 TABLET ORAL at 09:02

## 2024-09-12 RX ADMIN — OXYCODONE HYDROCHLORIDE 10 MG: 5 TABLET ORAL at 13:59

## 2024-09-12 RX ADMIN — CEFAZOLIN SODIUM 1 G: 1 INJECTION, SOLUTION INTRAVENOUS at 13:58

## 2024-09-12 RX ADMIN — OXYCODONE HYDROCHLORIDE 10 MG: 5 TABLET ORAL at 23:24

## 2024-09-12 RX ADMIN — OXYCODONE HYDROCHLORIDE 10 MG: 5 TABLET ORAL at 18:52

## 2024-09-12 RX ADMIN — HEPARIN SODIUM 1000 UNITS/HR: 10000 INJECTION, SOLUTION INTRAVENOUS at 16:57

## 2024-09-12 RX ADMIN — ACETAMINOPHEN 650 MG: 325 TABLET ORAL at 05:48

## 2024-09-12 RX ADMIN — MIRTAZAPINE 15 MG: 15 TABLET, FILM COATED ORAL at 21:33

## 2024-09-12 RX ADMIN — DILTIAZEM HYDROCHLORIDE 240 MG: 240 CAPSULE, EXTENDED RELEASE ORAL at 09:02

## 2024-09-12 RX ADMIN — GABAPENTIN 600 MG: 300 CAPSULE ORAL at 09:02

## 2024-09-12 RX ADMIN — CEFAZOLIN SODIUM 1 G: 1 INJECTION, SOLUTION INTRAVENOUS at 20:01

## 2024-09-12 RX ADMIN — SUCRALFATE 1 G: 1 TABLET ORAL at 09:01

## 2024-09-12 ASSESSMENT — PAIN DESCRIPTION - LOCATION
LOCATION: LEG
LOCATION: LEG

## 2024-09-12 ASSESSMENT — PAIN DESCRIPTION - ORIENTATION
ORIENTATION: RIGHT;LEFT
ORIENTATION: LOWER;LEFT;RIGHT

## 2024-09-12 ASSESSMENT — PAIN SCALES - GENERAL
PAINLEVEL_OUTOF10: 9
PAINLEVEL_OUTOF10: 10 - WORST POSSIBLE PAIN
PAINLEVEL_OUTOF10: 6
PAINLEVEL_OUTOF10: 10 - WORST POSSIBLE PAIN
PAINLEVEL_OUTOF10: 8
PAINLEVEL_OUTOF10: 10 - WORST POSSIBLE PAIN

## 2024-09-12 ASSESSMENT — ENCOUNTER SYMPTOMS
WHEEZING: 1
PALPITATIONS: 0
CHEST TIGHTNESS: 0
CONSTIPATION: 0
ABDOMINAL DISTENTION: 0
ABDOMINAL PAIN: 0
APPETITE CHANGE: 1
SHORTNESS OF BREATH: 0
COUGH: 0

## 2024-09-12 ASSESSMENT — COGNITIVE AND FUNCTIONAL STATUS - GENERAL
TURNING FROM BACK TO SIDE WHILE IN FLAT BAD: A LITTLE
MOBILITY SCORE: 11
WALKING IN HOSPITAL ROOM: TOTAL
CLIMB 3 TO 5 STEPS WITH RAILING: TOTAL
TOILETING: A LOT
MOVING TO AND FROM BED TO CHAIR: A LOT
MOVING FROM LYING ON BACK TO SITTING ON SIDE OF FLAT BED WITH BEDRAILS: A LOT
HELP NEEDED FOR BATHING: A LITTLE
PERSONAL GROOMING: A LOT
DRESSING REGULAR UPPER BODY CLOTHING: A LOT
DRESSING REGULAR LOWER BODY CLOTHING: A LITTLE
DAILY ACTIVITIY SCORE: 16
STANDING UP FROM CHAIR USING ARMS: A LOT

## 2024-09-12 ASSESSMENT — PAIN DESCRIPTION - DESCRIPTORS: DESCRIPTORS: BURNING

## 2024-09-12 ASSESSMENT — PAIN - FUNCTIONAL ASSESSMENT
PAIN_FUNCTIONAL_ASSESSMENT: 0-10

## 2024-09-12 NOTE — CARE PLAN
The patient's goals for the shift include  pain control    The clinical goals for the shift include Safety measures

## 2024-09-12 NOTE — PROGRESS NOTES
[facility]  ACUTE CARE SURGERY - PROGRESS NOTE    Patient Name: Fernando Cuevas  MRN: [id number]  Admit Date: 905  : 1960  AGE: 63 y.o.   GENDER: female  ==============================================================================  TODAY'S ASSESSMENT AND PLAN OF CARE:  63 year old female with history of polysubstance use, PAD, COPD, HTN, DVT on Eliquis, lung cancer s/p radiation, recurrent SBO with recent ex lap / JOSIAH / small bowel resection (2024) who was admitted on 24 with recurrent SBO. SBO was managed conservatively with NGT and she passed a gastrograffin challenge on  and subsequently had NGT removed. Her hospital course has been complicated by acute versus chronic CTLI now s/p RLE angiogram with PT thromboembolectomy and subsequent bilateral guillotine BKAs.    Will consult medicine for possible transfer to medicine primary given patient has no active surgical needs.    Plan:  Neuro:   - Discontinue PCA  - Start oxycodone 5 mg PO Q4H for moderate pain + oxycodone 10 mg PO Q4H for severe pain + Dilaudid 0.2  mg IV Q2H for breakthrough pain  - Increase Tylenol to 1 g PO Q8H  - Increase gabapentin to 600 mg PO TID (home dose: 300 TID)  - Continue home Buspar, mirtazapine   - Melatonin    Resp:   - Supplemental O2 as needed   - Encourage IS   #COPD  - Appreciate pulmonology recommendations     - Burst steroids x5 days (-)  - Continue home Singulair + inhalers    CV:   #Tachycardia   - Persistently tachycardic since admission       - Clonidine 0.1 q8h       - Continue home Cardizem  - Echocardiogram with LVEF 30-35%, PFO on bubble study    GI:  #SBO, resolved  - GGC : contrast reached colon   - Regular diet  - PRN Zofran   #GI bleed, resolved  - Appreciate GI recommendations   - Hgb stable, continuing to monitor  - Continue home PPI BID, sucralfate    /FEN:  - Home oxybutynin     Heme:   #Prior LUE DVT   #Acute vs chronic CLTI  - Holding home  "Eliquis  - Heparin gtt  - Appreciate vascular medicine recommendations given beads on string appearance on intra-operative angiogram          - Continue ASA and statin         - Obtain the following labs: A1c, lipid panel, homocysteine, Lp(a), APLA (lupus anticoagulant, beta2 glycoprotein and cardiolipin antibiodies, SPEP, UPEP         - Obtain the following vascular labs: LE arterial duplex, LE and UE venous duplex, carotid duplex, renal duplex         - CTA with FMD protocol as an outpatient         - Likely Holter monitor at discharge          - Will likely bridge to warfarin at discharge with goal INR 2-3     ID:   #Leukocytosis  - Persistent since admission; possibly 2/2 autosplenectomy   - Periop Ancef Q8H x48h completed, will give for additional 24h in the setting of rising WBC  - Magic mouthwash    MSK:   #Bilateral acute on chronic CLTI s/p R PT thromboembolectomy (9/7) and bilateral guillotine BKA (9/10)  - Appreciate vascular surgery recommendations  - Post-op guillotine BKA wound care: BID dressing changes--fluff gauze, ABD, Kerlex wrap, ACE bandage   - PT/OT    Endocrine:   - POCT glucose + SSI #2 AC/HS  - Hypoglycemia protocol    Dvt ppx: therapeutic heparin gtt  Dispo: Continue care on RNF, will eventually discharge to SNF      Discussed with attending Dr. Albarran.    Olga Cruz MD  PGY1 Acute Care Surgery  Pager [phone]  Available via secure chat  ==============================================================================  CHIEF COMPLAINT / EVENTS LAST 24HRS / HPI:  No acute events overnight. Patient reports burning sensation in both \"feet.\" States she is doing \"terrible,\" pain is poorly controlled. No abdominal pain, no nausea or vomiting.     MEDICAL HISTORY / ROS:   Admission history and ROS reviewed. Pertinent changes as follows: None.    PHYSICAL EXAM:  Heart Rate:  []   Temp:  [36.2 °C (97.2 °F)-36.9 °C (98.4 °F)]   Resp:  [18]   BP: (110-139)/(72-86)   SpO2:  [96 %-100 %]   Gen: Lying " in bed, tearful, thin, ill appearing   Resp: Normal respiratory effort on 2L NC  CV: Regular rate and rhythm  Abd: Soft, nontender, nondistended  Skin: Legs symmetrically cool  Ext: Bilateral BKA stumps wrapped with Kerlix and ACE bandages, no strikethrough.  MSK: PRATT    IMAGING SUMMARY:  No new imaging.    LABS:  Results from last 7 days   Lab Units 09/12/24  0558 09/11/24  0535 09/10/24  2230 09/07/24  1555 09/07/24  0632   WBC AUTO x10*3/uL 39.9* 27.8* 24.6*   < > 24.4*   HEMOGLOBIN g/dL 9.1* 9.2* 9.3*   < > 6.5*   HEMATOCRIT % 29.2* 29.8* 29.9*   < > 21.9*   PLATELETS AUTO x10*3/uL 395 376 373   < > 585*   LYMPHO PCT MAN %  --   --   --   --  1.6   MONO PCT MAN %  --   --   --   --  0.8   EOSINO PCT MAN %  --   --   --   --  0.0    < > = values in this interval not displayed.     Results from last 7 days   Lab Units 09/09/24  0855   APTT seconds 28     Results from last 7 days   Lab Units 09/12/24  0558 09/11/24  0535 09/10/24  0616   SODIUM mmol/L 134* 131* 131*   POTASSIUM mmol/L 4.3 4.4 4.4   CHLORIDE mmol/L 99 97* 98   CO2 mmol/L 28 25 26   BUN mg/dL 11 11 9   CREATININE mg/dL 0.42* 0.52 0.50   CALCIUM mg/dL 7.4* 7.9* 7.8*   PROTEIN TOTAL g/dL  --   --  4.9*   GLUCOSE mg/dL 114* 120* 135*           Results from last 7 days   Lab Units 09/07/24  1025 09/07/24  0742   POCT PH, ARTERIAL pH 7.45* 7.41   POCT PCO2, ARTERIAL mm Hg 40 39   POCT PO2, ARTERIAL mm Hg 121* 110*   POCT HCO3 CALCULATED, ARTERIAL mmol/L 27.8* 24.7   POCT BASE EXCESS, ARTERIAL mmol/L 3.5* 0.1       I have reviewed all medications, laboratory results, and imaging pertinent for today's encounter.

## 2024-09-12 NOTE — PROGRESS NOTES
Occupational Therapy                 Therapy Communication Note    Patient Name: Fernando Cuevas  MRN: [id number]  Department: [facility]  Room: 9056/9056-A  Today's Date: 9/12/2024     Discipline: Occupational Therapy    Missed Visit Reason: Missed Visit Reason: Patient refused (Pt reports increased pain following dressing change. Asked if therapy could re-attempt later, pt stated no therapy for today and wanted therapy to check back tomorrow.)    Missed Time: Attempt

## 2024-09-12 NOTE — PROGRESS NOTES
Physical Therapy Attempt                 Therapy Communication Note    Patient Name: Fernando Cuevas  MRN: [id number]  Department: [place] [address]  Room: 90/9056-A  Today's Date: 9/12/2024     Discipline: Physical Therapy    Missed Visit Reason: Missed Visit Reason: Patient refused (Pt. reports 10/10 pain, just completed a dressing change. Requesting PT reattempt tomorrow. Refused despite gentle encouragement. Will continue to follow.)    Missed Time: Attempt

## 2024-09-12 NOTE — CONSULTS
Inpatient consult to Medicine  Consult performed by: Jaleesa Tan DO  Consult ordered by: Gabe Albarran MD  Reason for consult: transfer to medicine          Reason For Consult  Transfer to medicine service    History Of Present Illness  Fernando Cuevas is a 63 y.o. female presenting with SBO on  that has since resolved with decompression via NG tube.  Hospital course has been complicated by acute on chronic lower extremity limb ischemia and patient is s/p bilateral guillotine BKA on 9/10 in need of formalization.      Hospital course also complicated by bloody bowel movement in the OR, for which patient received 2 units RBC.      Hospital course also complicated by leukocytosis of 40 on admission that has remained elevated, 39.9 on .  C. difficile, stool pathogen panel, blood cultures from  negative.  No other source of infection identified.  Of note, patient also experienced COPD exacerbation in hospital and has had 5-day course of IV steroids  through .      The vascular surgery team would like WBC to normalize before returning to OR for formalization of BKA.     Past Medical History  She has a past medical history of COPD (chronic obstructive pulmonary disease) (Multi), Depression, DVT (deep venous thrombosis) (Multi), HTN (hypertension), Lung cancer (Multi), Migraines, and Panic disorder.    COPD, emphysema, RLL lung cancer, PAD, HF, DVT on Eliquis, HTN, cocaine and polysubstance use, multiple SBO's s/p partial SBR in     Surgical History  She has a past surgical history that includes CT angio abdomen pelvis w and or wo IV IV contrast (2016); MR angio neck w IV contrast (2021); CT angio coronary art with heartflow if score >30% (2023);  section, low transverse; Esophagogastroduodenoscopy; Colonoscopy; Exploratory laparotomy w/ bowel resection (2024); and Cystoscopy.     Social History  She reports that she has been smoking cigarettes. She has been exposed to  tobacco smoke. She has never used smokeless tobacco. She reports current alcohol use. She reports current drug use. Drugs: Cocaine and Marijuana.    Family History  No family history on file.     Allergies  Iodinated contrast media, Iodine, and Adhesive tape-silicones    Review of Systems  Review of Systems   Constitutional:  Positive for appetite change.   Respiratory:  Positive for wheezing. Negative for cough, chest tightness and shortness of breath.    Cardiovascular:  Negative for chest pain, palpitations and leg swelling.   Gastrointestinal:  Negative for abdominal distention, abdominal pain and constipation.   Pain       Physical Exam  In no acute distress  Cardiac: HRRR, systolic murmur  Pulm: Diffuse wheezing  Extremities: Bilateral BKA with intact, dry dressings in place bilaterally.  No swelling  Abdomen: Diminished bowel sounds globally, soft, nontender    Last Recorded Vitals  /72 (BP Location: Left arm, Patient Position: Lying)   Pulse 98   Temp 36.9 °C (98.4 °F) (Temporal)   Resp 18   Wt 61.5 kg (135 lb 9.3 oz)   SpO2 97%     Relevant Results  Scheduled medications  acetaminophen, 975 mg, oral, q8h  aspirin, 81 mg, oral, Daily  atorvastatin, 40 mg, oral, Nightly  [Held by provider] azithromycin, 250 mg, oral, Every Mon/Wed/Fri  busPIRone, 5 mg, oral, BID  ceFAZolin, 1 g, intravenous, q8h  cloNIDine, 0.1 mg, oral, q8h LISET  dilTIAZem CD, 240 mg, oral, Daily  gabapentin, 600 mg, oral, TID  insulin lispro, 0-10 Units, subcutaneous, Before meals & nightly  levalbuterol, 0.63 mg, nebulization, TID  melatonin, 3 mg, oral, Nightly  mirtazapine, 15 mg, oral, Nightly  montelukast, 10 mg, oral, Daily  oxybutynin, 2.5 mg, oral, BID  pantoprazole, 40 mg, oral, BID AC  sucralfate, 1 g, oral, BID  thiamine, 100 mg, oral, Daily  tiotropium, 2 puff, inhalation, Daily      Continuous medications  heparin, 0-4,000 Units/hr, Last Rate: 900 Units/hr (09/12/24 1133)      PRN medications  PRN medications:  albuterol, albuterol, dextrose, dextrose, glucagon, glucagon, heparin, HYDROmorphone, ipratropium-albuteroL, lidocaine-diphenhydraMINE-Maalox 1:1:1, naloxone, ondansetron, oxyCODONE, oxyCODONE'     Assessment/Plan     This is a 63-year-old female with extensive medical history who presented on 9/5 for SBO which resolved with decompression, hospital course C/B bloody bowel movement s/p 2 units RBC with GIB resolving and Hb stable, C/B bilateral lower extremity limb ischemia s/p bilateral guillotine BKA on 9/10, C/B COPD exacerbation s/p 5-day course of Solu-Medrol.  COPD exacerbation is resolving, patient without SOB now, only minimal wheezing.  Leukocytosis since admission, no source of infection identified.  Vascular surgery on hold for BKA formalization until WBC normalized.  Medicine consulted for transfer of service.    Due to patient's multiple active medical problems, she is an appropriate transfer to medicine service for workup of leukocytosis and continued treatment of COPD exacerbation.    Patient was seen by and staffed with Dr. Loredo.  Recommendations are not final until attending signs note.    Jaleesa Tan,   Anesthesiology, PGY-1

## 2024-09-12 NOTE — PROGRESS NOTES
24 1306   Discharge Planning   Living Arrangements Family members   Support Systems Family members   Assistance Needed None   Type of Residence Private residence   Do you have animals or pets at home? No   Home or Post Acute Services Post acute facilities (Rehab/SNF/etc)   Type of Post Acute Facility Services Skilled nursing   Expected Discharge Disposition SNF   Does the patient need discharge transport arranged? Yes   RoundTrip coordination needed? Yes   Has discharge transport been arranged? No       DC Plannin/12:   Went in and met with the pt, confirmed demographics.   PT/OT rec'd Mod for SNF.  Spoke with pt and family in room.   SNF list given. Awaiting choices.      Home Care choice for home going needs, [facility]. Just in case.     PCP: Ann Muñoz    Dispo Plan: SNF    Barriers: None    ADOD:     This TCC will continue to follow for home going needs and safe DC plan.

## 2024-09-12 NOTE — TRANSFER OF CARE
"TRANSFER OF CARE NOTE    [id number]  Fernando Cuevas  1960    Admitting Service: General Medicine  Admitting Attending: Dr. Loredo    Hospital Course:  Patient is a 63 year old female with history of polysubstance use, PAD, COPD, HTN, DVT on Eliquis, lung cancer s/p radiation, recurrent SBO with recent ex lap / JOSIAH / small bowel resection (June 2024) who was admitted on 9/5/24 with recurrent SBO. SBO was managed conservatively with NGT and she passed a gastrograffin challenge on 9/8 and subsequently had NGT removed. Her hospital course has been complicated by acute on chronic CTLI now s/p RLE angiogram with PT thromboembolectomy and subsequent bilateral guillotine BKAs. She has been maintained on a heparin gtt for both her eliquis use hx and vascular pathologies. patient's vasculature appeared to have a \"string on bead\" appearance thus vascular medicine was consulted for recommendations of work-up for FMD. patient has several lab and imaging studies for this work-up (renal and carotid duplexes planned as an outpatient). She presented in a COPD exacerbation as well for which pulmonolgy provided treatment recommendations. her home medications for COPD were continued and she was initiated on a burst steroid treatment 9/8-9/11 and switched to PO prednisone 9/11. she continues to have a leukocytosis ( possibly due to steroids) and will complete 72 hours of abx 9/12 PM. vascular surgery waiting for leukocytosis to resolve prior to formalization of BKA stumps.     Management plans during this admission:  Neuro:   - Discontinue PCA 9/12  - Start oxycodone 5 mg PO Q4H for moderate pain + oxycodone 10 mg PO Q4H for severe pain + Dilaudid 0.2  mg IV Q2H for breakthrough pain   - Increase Tylenol to 1 g PO Q8H  - Increase gabapentin to 600 mg PO TID (home dose: 300 TID)  - Continue home Buspar, mirtazapine   - Melatonin     Resp:   - Supplemental O2 as needed   - Encourage IS   #COPD  - Appreciate pulmonology recommendations "     - Burst steroids x5 days (9/8-9/11), transition to prednisone 40 9/11 and 9/12   - Continue with tripple therapy inhalers (ICS/LABA and LAMA)   - Continue home Singulair + inhalers     CV:   #Tachycardia   - Persistently tachycardic since admission    - Clonidine 0.1 q8h   - Continue home Cardizem  - Echocardiogram with LVEF 30-35%, PFO on bubble study   -vasc UE duplexes showed partial chronic right brachial DVT   -CONCLUSIONS:  Right Pop Aneurysm: There is no evidence of aneurysm noted in the right lower extremity.  Left Pop Aneurysm: There is no evidence of aneurysm noted in the left lower extremity.  GI:  #SBO, resolved  - GGC 9/8: contrast reached colon   - Regular diet  - PRN Zofran   #GI bleed , resolved  - Appreciate GI recommendations   - Hgb stable, continuing to monitor  - Continue home PPI BID, sucralfate     /FEN:  - Home oxybutynin      Heme:   #Prior LUE DVT   #Acute vs chronic CLTI  - Holding home Eliquis  - Heparin gtt  - Appreciate vascular medicine recommendations given beads on string appearance on intra-operative angiogram           - Continue ASA and statin   - Obtain the following labs: A1c, lipid panel, homocysteine, Lp(a), APLA (lupus anticoagulant, beta2 glycoprotein and cardiolipin antibiodies, SPEP, UPEP          - Obtain the following vascular labs: LE arterial duplex, LE and UE venous duplex  - carotid duplex, renal duplex can be done outpatient          - CTA with FMD protocol as an outpatient          - Likely Holter monitor at discharge           - Will likely bridge to warfarin at discharge with goal INR 2-3      ID:   #Leukocytosis  -Persistent since admission; possibly 2/2 autosplenectomy   - Periop Ancef Q8H x48h completed, will give for additional 24h in the setting of rising WBC today 9/12  - Magic mouthwash for oral irritation     MSK:   #Bilateral acute on chronic CLTI s/p R PT thromboembolectomy (9/7) and bilateral guillotine BKA (9/10)  - Appreciate vascular surgery  recommendations  - Post-op gricelda MANUEL wound care: BID dressing changes--fluff gauze, ABD, Kerlex wrap, ACE bandage   - PT/OT     Endocrine:   - POCT glucose + SSI #2 AC/HS  - Hypoglycemia protocol     Dvt ppx: therapeutic heparin gtt  Dispo: Continue care on RNF, will eventually discharge to SNF      Acute care  surgery will transfer this patient's care to general medicine at this time. Please do not hesitate to reach out with any questions, concerns, or changes in patient's clinical condition.     D/w attending .    Diana Rodriguez MD  PGY1 Acute Care Surgery  Pager [phone]  Available via secure chat

## 2024-09-12 NOTE — PROGRESS NOTES
VASCULAR SURGERY PROGRESS NOTE  Assessment/Plan   Fernando Cuevas is 63 y.o. female with history of COPD, hypertension, DVT on Eliquis, lung cancer s/p radiation, alcohol abuse, cocaine abuse, small bowel perforation is s/p resection who was admitted with small bowel obstruction and found to have right foot pain.  Vascular surgery consulted.      S/p bilateral BKA at level above ankle    Recommendations:  Multimodal pain control - Can consider transitioning to oral pain meds  Can continue ancef in setting of leukocytosis  Plan for amp formalization pending WBC normalization  Daily dressing changes  Wound: bilateral below knee guillotine amputations; fluff gauze, ABD pad, kerlex wrap, ACE bandage    Roge Cho md/cristi pgy1    Subjective   Pain not well controlled this am    Objective   Vitals:  Heart Rate:  []   Temp:  [36.2 °C (97.2 °F)-36.9 °C (98.4 °F)]   Resp:  [18]   BP: (110-139)/(72-86)   SpO2:  [96 %-100 %]     Exam:  CVascular Physical Exam  Constitutional: No acute distress, resting comfortably  Neuro:  AOx3, grossly intact  CV: regular rhythm  Pulm: non-labored on RA  Wound: bilateral below knee guillotine amputations; fluff gauze, ABD pad, kerlex wrap, ACE bandage. no strike through,     Labs:  Results from last 7 days   Lab Units 09/12/24  0558 09/11/24  0535 09/10/24  2230   WBC AUTO x10*3/uL 39.9* 27.8* 24.6*   HEMOGLOBIN g/dL 9.1* 9.2* 9.3*   PLATELETS AUTO x10*3/uL 395 376 373      Results from last 7 days   Lab Units 09/12/24  0558 09/11/24  0535 09/10/24  0616   SODIUM mmol/L 134* 131* 131*   POTASSIUM mmol/L 4.3 4.4 4.4   CHLORIDE mmol/L 99 97* 98   CO2 mmol/L 28 25 26   BUN mg/dL 11 11 9   CREATININE mg/dL 0.42* 0.52 0.50   GLUCOSE mg/dL 114* 120* 135*   MAGNESIUM mg/dL 2.00 1.89 1.85   PHOSPHORUS mg/dL 3.2 3.0 2.8

## 2024-09-12 NOTE — NURSING NOTE
Patient continues to scream in pain,  states pain is worst that 10/10, stating that her amputated areas are burning. MD ([id number]) was called  and notified that family would like to speak with him re: the pain.

## 2024-09-13 LAB
ALBUMIN SERPL BCP-MCNC: 2.1 G/DL (ref 3.4–5)
ANION GAP SERPL CALC-SCNC: 13 MMOL/L (ref 10–20)
BASOPHILS # BLD MANUAL: 0 X10*3/UL (ref 0–0.1)
BASOPHILS NFR BLD MANUAL: 0 %
BUN SERPL-MCNC: 13 MG/DL (ref 6–23)
BURR CELLS BLD QL SMEAR: ABNORMAL
CALCIUM SERPL-MCNC: 7.4 MG/DL (ref 8.6–10.6)
CHLORIDE SERPL-SCNC: 99 MMOL/L (ref 98–107)
CO2 SERPL-SCNC: 28 MMOL/L (ref 21–32)
CREAT SERPL-MCNC: 0.44 MG/DL (ref 0.5–1.05)
EGFRCR SERPLBLD CKD-EPI 2021: >90 ML/MIN/1.73M*2
EOSINOPHIL # BLD MANUAL: 0 X10*3/UL (ref 0–0.7)
EOSINOPHIL NFR BLD MANUAL: 0 %
ERYTHROCYTE [DISTWIDTH] IN BLOOD BY AUTOMATED COUNT: 21.4 % (ref 11.5–14.5)
ERYTHROCYTE [DISTWIDTH] IN BLOOD BY AUTOMATED COUNT: 21.4 % (ref 11.5–14.5)
GLUCOSE BLD MANUAL STRIP-MCNC: 117 MG/DL (ref 74–99)
GLUCOSE BLD MANUAL STRIP-MCNC: 126 MG/DL (ref 74–99)
GLUCOSE BLD MANUAL STRIP-MCNC: 129 MG/DL (ref 74–99)
GLUCOSE BLD MANUAL STRIP-MCNC: 97 MG/DL (ref 74–99)
GLUCOSE SERPL-MCNC: 123 MG/DL (ref 74–99)
HCT VFR BLD AUTO: 26.9 % (ref 36–46)
HCT VFR BLD AUTO: 26.9 % (ref 36–46)
HGB BLD-MCNC: 8.5 G/DL (ref 12–16)
HGB BLD-MCNC: 8.5 G/DL (ref 12–16)
HYPOCHROMIA BLD QL SMEAR: ABNORMAL
IMM GRANULOCYTES # BLD AUTO: 0.41 X10*3/UL (ref 0–0.7)
IMM GRANULOCYTES NFR BLD AUTO: 1.2 % (ref 0–0.9)
LABORATORY COMMENT REPORT: NORMAL
LYMPHOCYTES # BLD MANUAL: 0.56 X10*3/UL (ref 1.2–4.8)
LYMPHOCYTES NFR BLD MANUAL: 1.6 %
MAGNESIUM SERPL-MCNC: 1.88 MG/DL (ref 1.6–2.4)
MCH RBC QN AUTO: 25.6 PG (ref 26–34)
MCH RBC QN AUTO: 25.6 PG (ref 26–34)
MCHC RBC AUTO-ENTMCNC: 31.6 G/DL (ref 32–36)
MCHC RBC AUTO-ENTMCNC: 31.6 G/DL (ref 32–36)
MCV RBC AUTO: 81 FL (ref 80–100)
MCV RBC AUTO: 81 FL (ref 80–100)
METAMYELOCYTES # BLD MANUAL: 0.56 X10*3/UL
METAMYELOCYTES NFR BLD MANUAL: 1.6 %
MONOCYTES # BLD MANUAL: 0.28 X10*3/UL (ref 0.1–1)
MONOCYTES NFR BLD MANUAL: 0.8 %
MYELOCYTES # BLD MANUAL: 0.84 X10*3/UL
MYELOCYTES NFR BLD MANUAL: 2.4 %
NEUTROPHILS # BLD MANUAL: 32.85 X10*3/UL (ref 1.2–7.7)
NEUTS BAND # BLD MANUAL: 1.12 X10*3/UL (ref 0–0.7)
NEUTS BAND NFR BLD MANUAL: 3.2 %
NEUTS SEG # BLD MANUAL: 31.73 X10*3/UL (ref 1.2–7)
NEUTS SEG NFR BLD MANUAL: 90.4 %
NRBC BLD-RTO: 2.3 /100 WBCS (ref 0–0)
NRBC BLD-RTO: 2.3 /100 WBCS (ref 0–0)
PATH REPORT.FINAL DX SPEC: NORMAL
PATH REPORT.GROSS SPEC: NORMAL
PATH REPORT.MICROSCOPIC SPEC OTHER STN: NORMAL
PATH REPORT.RELEVANT HX SPEC: NORMAL
PATH REPORT.TOTAL CANCER: NORMAL
PHOSPHATE SERPL-MCNC: 3.1 MG/DL (ref 2.5–4.9)
PLATELET # BLD AUTO: 347 X10*3/UL (ref 150–450)
PLATELET # BLD AUTO: 347 X10*3/UL (ref 150–450)
POLYCHROMASIA BLD QL SMEAR: ABNORMAL
POTASSIUM SERPL-SCNC: 4.3 MMOL/L (ref 3.5–5.3)
RBC # BLD AUTO: 3.32 X10*6/UL (ref 4–5.2)
RBC # BLD AUTO: 3.32 X10*6/UL (ref 4–5.2)
RBC MORPH BLD: ABNORMAL
SODIUM SERPL-SCNC: 136 MMOL/L (ref 136–145)
TOTAL CELLS COUNTED BLD: 125
UFH PPP CHRO-ACNC: 0.2 IU/ML
UFH PPP CHRO-ACNC: 0.3 IU/ML
UFH PPP CHRO-ACNC: 0.6 IU/ML
WBC # BLD AUTO: 35.1 X10*3/UL (ref 4.4–11.3)
WBC # BLD AUTO: 35.1 X10*3/UL (ref 4.4–11.3)

## 2024-09-13 PROCEDURE — 2500000001 HC RX 250 WO HCPCS SELF ADMINISTERED DRUGS (ALT 637 FOR MEDICARE OP)

## 2024-09-13 PROCEDURE — 85520 HEPARIN ASSAY: CPT

## 2024-09-13 PROCEDURE — 71046 X-RAY EXAM CHEST 2 VIEWS: CPT | Performed by: RADIOLOGY

## 2024-09-13 PROCEDURE — 99254 IP/OBS CNSLTJ NEW/EST MOD 60: CPT | Performed by: INTERNAL MEDICINE

## 2024-09-13 PROCEDURE — 2500000004 HC RX 250 GENERAL PHARMACY W/ HCPCS (ALT 636 FOR OP/ED)

## 2024-09-13 PROCEDURE — 85007 BL SMEAR W/DIFF WBC COUNT: CPT

## 2024-09-13 PROCEDURE — 1210000001 HC SEMI-PRIVATE ROOM DAILY

## 2024-09-13 PROCEDURE — 99233 SBSQ HOSP IP/OBS HIGH 50: CPT

## 2024-09-13 PROCEDURE — 2500000004 HC RX 250 GENERAL PHARMACY W/ HCPCS (ALT 636 FOR OP/ED): Mod: JZ

## 2024-09-13 PROCEDURE — 85027 COMPLETE CBC AUTOMATED: CPT

## 2024-09-13 PROCEDURE — 94640 AIRWAY INHALATION TREATMENT: CPT

## 2024-09-13 PROCEDURE — 80069 RENAL FUNCTION PANEL: CPT

## 2024-09-13 PROCEDURE — 82947 ASSAY GLUCOSE BLOOD QUANT: CPT

## 2024-09-13 PROCEDURE — 2500000002 HC RX 250 W HCPCS SELF ADMINISTERED DRUGS (ALT 637 FOR MEDICARE OP, ALT 636 FOR OP/ED)

## 2024-09-13 PROCEDURE — 83735 ASSAY OF MAGNESIUM: CPT

## 2024-09-13 RX ADMIN — CEFAZOLIN SODIUM 1 G: 1 INJECTION, SOLUTION INTRAVENOUS at 05:29

## 2024-09-13 RX ADMIN — OXYBUTYNIN CHLORIDE 2.5 MG: 5 TABLET ORAL at 08:47

## 2024-09-13 RX ADMIN — PANTOPRAZOLE SODIUM 40 MG: 40 TABLET, DELAYED RELEASE ORAL at 16:11

## 2024-09-13 RX ADMIN — PIPERACILLIN SODIUM AND TAZOBACTAM SODIUM 3.38 G: 3; .375 INJECTION, SOLUTION INTRAVENOUS at 16:11

## 2024-09-13 RX ADMIN — ACETAMINOPHEN 975 MG: 325 TABLET ORAL at 05:37

## 2024-09-13 RX ADMIN — OXYCODONE HYDROCHLORIDE 10 MG: 5 TABLET ORAL at 20:01

## 2024-09-13 RX ADMIN — MIRTAZAPINE 15 MG: 15 TABLET, FILM COATED ORAL at 20:04

## 2024-09-13 RX ADMIN — SUCRALFATE 1 G: 1 TABLET ORAL at 08:48

## 2024-09-13 RX ADMIN — OXYCODONE HYDROCHLORIDE 10 MG: 5 TABLET ORAL at 14:08

## 2024-09-13 RX ADMIN — GABAPENTIN 600 MG: 300 CAPSULE ORAL at 08:47

## 2024-09-13 RX ADMIN — OXYCODONE HYDROCHLORIDE 10 MG: 5 TABLET ORAL at 08:46

## 2024-09-13 RX ADMIN — PIPERACILLIN SODIUM AND TAZOBACTAM SODIUM 3.38 G: 3; .375 INJECTION, SOLUTION INTRAVENOUS at 20:43

## 2024-09-13 RX ADMIN — LEVALBUTEROL HYDROCHLORIDE 0.63 MG: 0.63 SOLUTION RESPIRATORY (INHALATION) at 22:43

## 2024-09-13 RX ADMIN — OXYCODONE HYDROCHLORIDE 10 MG: 5 TABLET ORAL at 03:10

## 2024-09-13 RX ADMIN — MELATONIN 3 MG: 3 TAB ORAL at 20:03

## 2024-09-13 RX ADMIN — ASPIRIN 81 MG: 81 TABLET, COATED ORAL at 08:48

## 2024-09-13 RX ADMIN — ATORVASTATIN CALCIUM 40 MG: 40 TABLET, FILM COATED ORAL at 20:03

## 2024-09-13 RX ADMIN — HYDROMORPHONE HYDROCHLORIDE 0.2 MG: 1 INJECTION, SOLUTION INTRAMUSCULAR; INTRAVENOUS; SUBCUTANEOUS at 01:27

## 2024-09-13 RX ADMIN — ACETAMINOPHEN 975 MG: 325 TABLET ORAL at 22:48

## 2024-09-13 RX ADMIN — HYDROMORPHONE HYDROCHLORIDE 0.2 MG: 1 INJECTION, SOLUTION INTRAMUSCULAR; INTRAVENOUS; SUBCUTANEOUS at 18:12

## 2024-09-13 RX ADMIN — DILTIAZEM HYDROCHLORIDE 240 MG: 240 CAPSULE, EXTENDED RELEASE ORAL at 08:46

## 2024-09-13 RX ADMIN — HEPARIN SODIUM 1100 UNITS/HR: 10000 INJECTION, SOLUTION INTRAVENOUS at 13:44

## 2024-09-13 RX ADMIN — LEVALBUTEROL HYDROCHLORIDE 0.63 MG: 0.63 SOLUTION RESPIRATORY (INHALATION) at 14:57

## 2024-09-13 RX ADMIN — THIAMINE HCL TAB 100 MG 100 MG: 100 TAB at 08:59

## 2024-09-13 RX ADMIN — BUSPIRONE HYDROCHLORIDE 5 MG: 10 TABLET ORAL at 20:09

## 2024-09-13 RX ADMIN — CLONIDINE HYDROCHLORIDE 0.1 MG: 0.1 TABLET ORAL at 05:37

## 2024-09-13 RX ADMIN — PANTOPRAZOLE SODIUM 40 MG: 40 TABLET, DELAYED RELEASE ORAL at 08:45

## 2024-09-13 RX ADMIN — GABAPENTIN 600 MG: 300 CAPSULE ORAL at 14:08

## 2024-09-13 RX ADMIN — GABAPENTIN 600 MG: 300 CAPSULE ORAL at 20:02

## 2024-09-13 RX ADMIN — HYDROMORPHONE HYDROCHLORIDE 0.2 MG: 1 INJECTION, SOLUTION INTRAMUSCULAR; INTRAVENOUS; SUBCUTANEOUS at 22:44

## 2024-09-13 RX ADMIN — CLONIDINE HYDROCHLORIDE 0.1 MG: 0.1 TABLET ORAL at 14:08

## 2024-09-13 RX ADMIN — BUSPIRONE HYDROCHLORIDE 5 MG: 10 TABLET ORAL at 08:48

## 2024-09-13 RX ADMIN — SUCRALFATE 1 G: 1 TABLET ORAL at 20:08

## 2024-09-13 RX ADMIN — HYDROMORPHONE HYDROCHLORIDE 0.2 MG: 1 INJECTION, SOLUTION INTRAMUSCULAR; INTRAVENOUS; SUBCUTANEOUS at 11:21

## 2024-09-13 RX ADMIN — HEPARIN SODIUM 1000 UNITS/HR: 10000 INJECTION, SOLUTION INTRAVENOUS at 03:04

## 2024-09-13 RX ADMIN — ACETAMINOPHEN 975 MG: 325 TABLET ORAL at 14:08

## 2024-09-13 RX ADMIN — MONTELUKAST 10 MG: 10 TABLET, FILM COATED ORAL at 08:47

## 2024-09-13 RX ADMIN — CLONIDINE HYDROCHLORIDE 0.1 MG: 0.1 TABLET ORAL at 22:48

## 2024-09-13 RX ADMIN — OXYBUTYNIN CHLORIDE 2.5 MG: 5 TABLET ORAL at 20:02

## 2024-09-13 ASSESSMENT — ENCOUNTER SYMPTOMS
FLANK PAIN: 0
VOMITING: 0
ABDOMINAL DISTENTION: 1
ABDOMINAL PAIN: 1
DIARRHEA: 0
FEVER: 0
BLOOD IN STOOL: 0
DIFFICULTY URINATING: 0

## 2024-09-13 ASSESSMENT — COGNITIVE AND FUNCTIONAL STATUS - GENERAL
MOVING TO AND FROM BED TO CHAIR: TOTAL
CLIMB 3 TO 5 STEPS WITH RAILING: TOTAL
MOBILITY SCORE: 10
STANDING UP FROM CHAIR USING ARMS: TOTAL
TOILETING: TOTAL
DRESSING REGULAR UPPER BODY CLOTHING: A LOT
WALKING IN HOSPITAL ROOM: TOTAL
EATING MEALS: A LITTLE
TURNING FROM BACK TO SIDE WHILE IN FLAT BAD: A LITTLE
DRESSING REGULAR LOWER BODY CLOTHING: TOTAL
MOVING FROM LYING ON BACK TO SITTING ON SIDE OF FLAT BED WITH BEDRAILS: A LITTLE
HELP NEEDED FOR BATHING: A LOT
DAILY ACTIVITIY SCORE: 11
PERSONAL GROOMING: A LOT

## 2024-09-13 ASSESSMENT — PAIN SCALES - GENERAL
PAINLEVEL_OUTOF10: 10 - WORST POSSIBLE PAIN
PAINLEVEL_OUTOF10: 8
PAINLEVEL_OUTOF10: 9
PAINLEVEL_OUTOF10: 9

## 2024-09-13 ASSESSMENT — PAIN DESCRIPTION - LOCATION
LOCATION: LEG
LOCATION: LEG

## 2024-09-13 ASSESSMENT — PAIN DESCRIPTION - ORIENTATION: ORIENTATION: RIGHT;LEFT

## 2024-09-13 NOTE — PROGRESS NOTES
9/13/24 1600 Transitional Care Coordinator Notes:    Met with patient and received her list of facility choices. Referrals placed to [facility], and [facility]. Will monitor for any accepting facilities.               Assessment/Plan   Assessment & Plan  SBO (small bowel obstruction) (Multi)    Pseudocholinesterase deficiency    Acute lower limb ischemia               Yanely Nur RN

## 2024-09-13 NOTE — PROGRESS NOTES
VASCULAR SURGERY PROGRESS NOTE  Assessment/Plan   Fernando Cuevas is 63 y.o. female with history of COPD, hypertension, DVT on Eliquis, lung cancer s/p radiation, alcohol abuse, cocaine abuse, small bowel perforation is s/p resection who was admitted with small bowel obstruction and found to have right foot pain.  Vascular surgery consulted.      S/p bilateral BKA at level above ankle    Recommendations:  Multimodal pain control - Can consider transitioning to oral pain meds  Consult ID in setting of increasing leukocytosis  Consider UA, CXR for infx workup; as well as a CBC w/diff  Plan for amp formalization pending WBC normalization  Daily dressing changes - clean and dry without signs of infection upon dressing change today; pictures in chart  Wound: bilateral below knee guillotine amputations; fluff gauze, kerlex wrap, ACE bandage    Roge Cho md/cristi pgy1    Subjective   Pain not well controlled this am    Objective   Vitals:  Heart Rate:  []   Temp:  [36 °C (96.8 °F)-37.1 °C (98.8 °F)]   Resp:  [18-19]   BP: (108-132)/(62-76)   SpO2:  [95 %-100 %]     Exam:  CVascular Physical Exam  Constitutional: No acute distress, resting comfortably  Neuro:  AOx3, grossly intact  CV: regular rhythm  Pulm: non-labored on RA  Wound: bilateral below knee guillotine amputations; fluff gauze, kerlex wrap, ACE bandage. no strike through,     Labs:  Results from last 7 days   Lab Units 09/13/24  0547 09/12/24  0558 09/11/24  0535   WBC AUTO x10*3/uL 35.1* 39.9* 27.8*   HEMOGLOBIN g/dL 8.5* 9.1* 9.2*   PLATELETS AUTO x10*3/uL 347 395 376      Results from last 7 days   Lab Units 09/13/24  0547 09/12/24  0558 09/11/24  0535   SODIUM mmol/L 136 134* 131*   POTASSIUM mmol/L 4.3 4.3 4.4   CHLORIDE mmol/L 99 99 97*   CO2 mmol/L 28 28 25   BUN mg/dL 13 11 11   CREATININE mg/dL 0.44* 0.42* 0.52   GLUCOSE mg/dL 123* 114* 120*   MAGNESIUM mg/dL 1.88 2.00 1.89   PHOSPHORUS mg/dL 3.1 3.2 3.0

## 2024-09-13 NOTE — CARE PLAN
The patient's goals for the shift include        Problem: Pain - Adult  Goal: Verbalizes/displays adequate comfort level or baseline comfort level  Outcome: Progressing     Problem: Safety - Adult  Goal: Free from fall injury  Outcome: Progressing     Problem: Discharge Planning  Goal: Discharge to home or other facility with appropriate resources  Outcome: Progressing     Problem: Chronic Conditions and Co-morbidities  Goal: Patient's chronic conditions and co-morbidity symptoms are monitored and maintained or improved  Outcome: Progressing     Problem: Skin  Goal: Decreased wound size/increased tissue granulation at next dressing change  Outcome: Progressing  Goal: Participates in plan/prevention/treatment measures  Outcome: Progressing  Goal: Prevent/manage excess moisture  Outcome: Progressing  Goal: Prevent/minimize sheer/friction injuries  Outcome: Progressing  Goal: Promote/optimize nutrition  Outcome: Progressing  Goal: Promote skin healing  Outcome: Progressing     Problem: Skin  Goal: Decreased wound size/increased tissue granulation at next dressing change  Outcome: Progressing  Goal: Participates in plan/prevention/treatment measures  Outcome: Progressing  Goal: Prevent/manage excess moisture  Outcome: Progressing  Goal: Prevent/minimize sheer/friction injuries  Outcome: Progressing  Goal: Promote/optimize nutrition  Outcome: Progressing  Goal: Promote skin healing  Outcome: Progressing     Problem: Knowledge Deficit  Goal: Patient/family/caregiver demonstrates understanding of disease process, treatment plan, medications, and discharge instructions  Outcome: Progressing     Problem: Pain  Goal: Takes deep breaths with improved pain control throughout the shift  Outcome: Progressing  Goal: Turns in bed with improved pain control throughout the shift  Outcome: Progressing  Goal: Walks with improved pain control throughout the shift  Outcome: Progressing  Goal: Performs ADL's with improved pain control  throughout shift  Outcome: Progressing  Goal: Participates in PT with improved pain control throughout the shift  Outcome: Progressing  Goal: Free from opioid side effects throughout the shift  Outcome: Progressing  Goal: Free from acute confusion related to pain meds throughout the shift  Outcome: Progressing

## 2024-09-13 NOTE — PROGRESS NOTES
Physical Therapy                 Therapy Communication Note    Patient Name: Fernando Cuevas  MRN: [id number]  Department: [facility]  Room: 6021/6021-A  Today's Date: 9/13/2024     Discipline: Physical Therapy    Missed Visit Reason: Missed Visit Reason: Other (Comment) (Pt off the floor. Will re-attempt as schedule permits.)    Missed Time: 10:16 AM

## 2024-09-13 NOTE — NURSING NOTE
Patient transferred to [place] [address] from [place] [address] this evening. Her family arrived with her. They packed and brought all her belongings. She was oriented to the call light and placed on the tele and continuous pulse ox monitors.

## 2024-09-13 NOTE — CONSULTS
Inpatient consult to Infectious Diseases  Consult performed by: Rick Delong MD  Consult ordered by: Cornelius Ortiz MD      Primary MD: Ann Muñoz MD    Reason For Consult  Persistent leukocytosis s/p bilateral below-knee amputation.    History Of Present Illness  Fernando Cuevas is a 63 y.o. 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     Pt presented to ED with burning abdominal pain along with diarrhea and chills, and WBC of 40 (baseline between 5-15). In ED, tests for C diff, stool pathogen test, and Bcx all came back negative. Given 2 doses of Zosyn while in ED. CT AP then diagnosed small bowel obstruction with potential for autosplenectomy. Treated with NGT which produced output, admitted with ACS. On 9/6, pt noted to have cold, pulseless, bilateral extremities. RLE arteriography performed on 09/07, vascular surgery determined a high risk for limb loss and planned for bilateral below-knee amputation on 09/10. During RLE arteriography, pt experienced bloody bowel movement, and afterwards experienced muscle weakness consistent with pseudocholinesterase deficiency, requiring ICU admission. Pt also experienced exacerbation of COPD on 09/06, was started on 5 day course of steroids. Pt underwent BKA on 09/10 as planned, was started on ancef perioperatively and no purulence or other complications were noted during surgery. Patient's WBC has not normalized during this admission (between 30 and 40 since 09/11), ID was consulted today since vascular surgery is looking to have this normalized prior to formalization of wounds.    Recent course:  - In June of this year, pt presented to ED w abdominal pain, which was later found to be peritonitis, small bowel resection performed during admission, cultures of peritoneal fluid grew candida albicans and unspecified  GPC  - Treated with 10-day course of fluconazole, bacterial coverage with pre-existing azithromycin treatment (ppx for COPD + bronchitis)  - On 2024, hospitalized for sepsis due to undetermined organism, presenting initially with guarding and leukocytosis. During this admission, was treated w IV abx (first vanc/cefepime, then switched to unasyn), which were d/sanjiv after resolution of leukocytosis     Past Medical History  She has a past medical history of COPD (chronic obstructive pulmonary disease) (Multi), Depression, DVT (deep venous thrombosis) (Multi), HTN (hypertension), Lung cancer (Multi), Migraines, and Panic disorder.    Surgical History  She has a past surgical history that includes CT angio abdomen pelvis w and or wo IV IV contrast (2016); MR angio neck w IV contrast (2021); CT angio coronary art with heartflow if score >30% (2023);  section, low transverse; Esophagogastroduodenoscopy; Colonoscopy; Exploratory laparotomy w/ bowel resection (2024); and Cystoscopy.     Social History     Occupational History    Not on file   Tobacco Use    Smoking status: Some Days     Types: Cigarettes     Passive exposure: Current (3 cigarettes a day)    Smokeless tobacco: Never   Vaping Use    Vaping status: Never Used   Substance and Sexual Activity    Alcohol use: Yes     Comment: per pt weekly use    Drug use: Yes     Types: Cocaine, Marijuana     Comment: cocain monthly, marijuana few times per week    Sexual activity: Defer     Travel History   Travel since 24    No documented travel since 24            Family History  No family history on file.  Allergies  Iodinated contrast media, Iodine, and Adhesive tape-silicones     Immunization History   Administered Date(s) Administered    Moderna SARS-CoV-2 Vaccination 2021, 2021    Pfizer COVID-19 vaccine, 12 years and older, (30mcg/0.3mL) (Spikevax) 2023    Pfizer COVID-19 vaccine, bivalent, age 12 years  and older (30 mcg/0.3 mL) 02/08/2023    Pfizer Gray Cap SARS-CoV-2 04/29/2022, 08/24/2022     Medications  Home medications:  Medications Prior to Admission   Medication Sig Dispense Refill Last Dose    acetaminophen (Tylenol Extra Strength) 500 mg tablet Take 2 tablets (1,000 mg) by mouth every 8 hours.       albuterol 90 mcg/actuation inhaler Inhale 2 puffs every 4 hours if needed for wheezing or shortness of breath.       ammonium lactate (Lac-Hydrin) 12 % lotion Apply topically twice a day.       apixaban (Eliquis) 5 mg tablet Take 1 tablet (5 mg) by mouth 2 times a day. 30 tablet 0     azithromycin (Zithromax) 250 mg tablet Take 1 tablet (250 mg) by mouth once a day on Monday, Wednesday, and Friday.       benzonatate (Tessalon) 100 mg capsule Take 1 capsule (100 mg) by mouth 3 times a day as needed for cough. Do not crush or chew. 20 capsule 0     busPIRone (Buspar) 5 mg tablet Take 1 tablet (5 mg) by mouth twice a day. Supposed to take. Needs Rx       calcium carbonate (Oscal) 500 mg calcium (1,250 mg) tablet Take 1 tablet (1,250 mg) by mouth 2 times daily (morning and late afternoon).       calcium carbonate (Tums) 250 mg ([place] 100 mg) chewable split tablet Take 2 half tablet (500 mg) by mouth every 12 hours.       clotrimazole (Lotrimin) 1 % cream Apply topically 2 times a day.       dilTIAZem ER (Tiazac) 240 mg 24 hr capsule Take 1 capsule (240 mg) by mouth once daily.       fluocinonide 0.05 % cream APPLY THIN LAYER TO AFFECTED AREA TWICE A DAY AS NEEDED       albuterol 2.5 mg /3 mL (0.083 %) nebulizer solution Take 3 mL by nebulization every 6 hours if needed for wheezing or shortness of breath. 300 mL 0     Lidocaine Pain Relief 4 % patch Place 1 patch on the skin every 24 (twenty four) hours if needed for mild pain (1 - 3).       mirtazapine (Remeron) 15 mg tablet Take 1 tablet (15 mg) by mouth once daily at bedtime. 30 tablet 0     mometasone-formoterol (Dulera 100) 100-5 mcg/actuation inhaler Inhale  200 mcg twice a day.       montelukast (Singulair) 10 mg tablet Take 1 tablet (10 mg) by mouth once daily.       oxybutynin XL (Ditropan-XL) 5 mg 24 hr tablet Take 1 tablet (5 mg) by mouth once daily. Does not take everyday       pantoprazole (ProtoNix) 40 mg EC tablet Take 1 tablet (40 mg) by mouth twice a day.       polyethylene glycol (Glycolax, Miralax) 17 gram/dose powder Take 17 g by mouth once daily. Do not fill before 2024. 238 g 0     [] predniSONE (Deltasone) 20 mg tablet Take 2 tablets (40 mg) by mouth once daily for 4 days. 8 tablet 0     sucralfate (Carafate) 1 gram tablet Take 1 tablet (1 g) by mouth 2 times a day. Crush and mix in luke warm water to make slurry and drink the slurry. 60 tablet 0     thiamine 100 mg tablet Take 1 tablet (100 mg) by mouth once daily.       tiotropium (Spiriva Respimat) 2.5 mcg/actuation inhaler Inhale 2 puffs once daily. 8 g 11     tiotropium (Spiriva Respimat) 2.5 mcg/actuation inhaler Inhale 2 puffs once daily.        Current medications:  Scheduled medications  acetaminophen, 975 mg, oral, q8h  aspirin, 81 mg, oral, Daily  atorvastatin, 40 mg, oral, Nightly  [Held by provider] azithromycin, 250 mg, oral, Every Mon/Wed/Fri  busPIRone, 5 mg, oral, BID  cloNIDine, 0.1 mg, oral, q8h LISET  dilTIAZem CD, 240 mg, oral, Daily  gabapentin, 600 mg, oral, TID  insulin lispro, 0-10 Units, subcutaneous, Before meals & nightly  levalbuterol, 0.63 mg, nebulization, TID  melatonin, 3 mg, oral, Nightly  mirtazapine, 15 mg, oral, Nightly  montelukast, 10 mg, oral, Daily  oxybutynin, 2.5 mg, oral, BID  pantoprazole, 40 mg, oral, BID AC  piperacillin-tazobactam, 3.375 g, intravenous, q6h  sucralfate, 1 g, oral, BID  thiamine, 100 mg, oral, Daily  tiotropium, 2 puff, inhalation, Daily      Continuous medications  heparin, 0-4,000 Units/hr, Last Rate: 1,100 Units/hr (24 7496)      PRN medications  PRN medications: albuterol, albuterol, dextrose, dextrose, glucagon,  glucagon, heparin, HYDROmorphone, ipratropium-albuteroL, lidocaine-diphenhydraMINE-Maalox 1:1:1, naloxone, ondansetron, oxyCODONE, oxyCODONE    Review of Systems   Constitutional:  Negative for fever.   HENT:  Negative for dental problem and mouth sores.    Gastrointestinal:  Positive for abdominal distention and abdominal pain. Negative for blood in stool, diarrhea and vomiting.   Genitourinary:  Negative for difficulty urinating and flank pain.        Objective  Range of Vitals (last 24 hours)  Heart Rate:  []   Temp:  [36 °C (96.8 °F)-37.1 °C (98.8 °F)]   Resp:  [16-19]   BP: (106-127)/(62-77)   SpO2:  [95 %-100 %]   Daily Weight  09/06/24 : 61.5 kg (135 lb 9.3 oz)    Body mass index is 24.89 kg/m².     Physical Exam  HENT:      Head: Normocephalic and atraumatic.   Neck:      Comments: Right internal jugular line site appears non-erythematous and non-swollen, has been in place since 09/07   Cardiovascular:      Rate and Rhythm: Normal rate and regular rhythm.      Heart sounds: Normal heart sounds.   Pulmonary:      Effort: Pulmonary effort is normal.      Breath sounds: Normal breath sounds.   Abdominal:      General: Bowel sounds are normal. There is distension.      Comments: Pt appeared to have rigidity and tenderness to palpation over the right upper quadrant.  Incision from prior abdominal surgery appeared to be healing well, no noted erythema, swelling, or drainage.   Musculoskeletal:      Comments: Bilateral BKA-stump sites were unwrapped and evaluated, no purulent drainage or signs of infection noted.    Bruising noted bilaterally on upper extremities, patient states they were sites of previous IV lines   Neurological:      Mental Status: She is alert.          Relevant Results  Outside Hospital Results  Labs  Results from last 72 hours   Lab Units 09/13/24  0547 09/12/24  0558 09/11/24  0535   WBC AUTO x10*3/uL 35.1* 39.9* 27.8*   HEMOGLOBIN g/dL 8.5* 9.1* 9.2*   HEMATOCRIT % 26.9* 29.2* 29.8*  "  PLATELETS AUTO x10*3/uL 347 395 376     Results from last 72 hours   Lab Units 09/13/24  0547 09/12/24  0558 09/11/24  0535   SODIUM mmol/L 136 134* 131*   POTASSIUM mmol/L 4.3 4.3 4.4   CHLORIDE mmol/L 99 99 97*   CO2 mmol/L 28 28 25   BUN mg/dL 13 11 11   CREATININE mg/dL 0.44* 0.42* 0.52   GLUCOSE mg/dL 123* 114* 120*   CALCIUM mg/dL 7.4* 7.4* 7.9*   ANION GAP mmol/L 13 11 13   EGFR mL/min/1.73m*2 >90 >90 >90   PHOSPHORUS mg/dL 3.1 3.2 3.0     Results from last 72 hours   Lab Units 09/13/24  0547 09/12/24  0558 09/11/24  0535   ALBUMIN g/dL 2.1* 2.4* 2.4*     Estimated Creatinine Clearance: 112.6 mL/min (A) (by C-G formula based on SCr of 0.44 mg/dL (L)).  C-Reactive Protein   Date Value Ref Range Status   06/16/2024 0.57 <1.00 mg/dL Final     Sedimentation Rate   Date Value Ref Range Status   06/16/2024 35 (H) 0 - 30 mm/h Final     HIV 1 and 2 Screen   Date Value Ref Range Status   03/21/2023 NONREACTIVE NONREACTIVE Final     Comment:      HIV Ag/Ab screen is performed using the Siemens [employer]   HIV Ag/Ab Combo assay which detects the presence of HIV    p24 antigen as well as antibodies to HIV-1   (Group M and O) and HIV-2.  .  No laboratory evidence of HIV infection. If acute HIV infection is   suspected, consider testing for HIV RNA by PCR (viral load).       No results found for: \"HEPCABINIT\", \"HEPCAB\", \"HCVPCRQUANT\"  Microbiology  Susceptibility data from last 90 days.  Collected Specimen Info Organism Amoxicillin/Clavulanate Ampicillin Ampicillin/Sulbactam Cefazolin Cefazolin (uncomplicated UTIs only) Ceftriaxone Ciprofloxacin Fluconazole Gentamicin Micafungin Nitrofurantoin   07/24/24 Urine from Clean Catch/Voided Klebsiella pneumoniae/variicola  R  R  R  R  R  S  R   S   R   06/21/24 Fluid from PERITONEAL FLUID RIGHT UPPER QUADRANT Candida albicans         S   S    06/21/24 Fluid from PERITONEAL FLUID RIGHT UPPER QUADRANT Candida albicans                Mixed Gram-Positive Bacteria             "     Collected Specimen Info Organism Piperacillin/Tazobactam Trimethoprim/Sulfamethoxazole   07/24/24 Urine from Clean Catch/Voided Klebsiella pneumoniae/variicola  R  R   06/21/24 Fluid from PERITONEAL FLUID RIGHT UPPER QUADRANT Candida albicans     06/21/24 Fluid from PERITONEAL FLUID RIGHT UPPER QUADRANT Candida albicans       Mixed Gram-Positive Bacteria            Assessment/Plan     Fernando Cuevas is a 63 y.o. 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 ID was consulted to evaluate persistent leukocytosis, as vascular surgery is waiting for normalization before formalization of amputation.     We believe that there are a multitude of causes for this patient's leukocytosis, the most likely/significant being their post-op status and currently healing amputation wounds. In terms of working up an infectious cause for this, we agree with all of the workup being currently done (UA, CXR, CBC w/diff), and will follow those results. Additionally, we would recommend switching the patient from ancef back to zosyn, to provide better empiric coverage. Otherwise, we currently have a very low suspicion for an infectious process, given that the patient is afebrile and not exhibiting any signs or symptoms of infection.    Also, uncertain significance of right upper quadrant tenderness.  Normal bilirubin and near normal LFTs (minimal elevation in ALT and AST).  Not certain if patient could have intra-abdominal process or if this is residual from SBO and some abdominal distention.  Nonetheless, would still pursue CT imaging of the abdomen, given recent history of intra-abdominal abscess (treated).    # Leukocytosis  # Abdominal pain  # 9/10/2024 status post bilateral BKA  # s/p SBO    Recommendations:  - Switch ancef to Zosyn (3.375 g q6h)  - Follow-up on UA culture  -Obtain CXR, and  CT AP      This patient was seen and discussed with attending physician Dr. Rick Delong MD.  MAU LÓPEZ, MS3  I saw and evaluated the patient. I personally obtained the key and critical portions of the history and physical exam or was physically present for key and critical portions performed by the resident/fellow. I reviewed the resident/fellow's documentation and discussed the patient with the resident/fellow. I agree with the resident/fellow's medical decision making as documented in the note.    Rick Delong MD

## 2024-09-13 NOTE — PROGRESS NOTES
"Hospitalist Progress Note        Name: Fernando Cuevas  :  1960(63 y.o.)  MRN:  [id number]    Date: 24     SUBJECTIVE     HPI / Hospital Course:  Patient is a 63 year old female with history of polysubstance use, PAD, COPD, HTN, DVT on Eliquis, lung cancer s/p radiation, recurrent SBO with recent ex lap / JOSIAH / small bowel resection (2024) who was admitted on 24 with recurrent SBO. SBO was managed conservatively with NGT and she passed a gastrograffin challenge on  and subsequently had NGT removed. Her hospital course has been complicated by acute on chronic CTLI now s/p RLE angiogram with PT thromboembolectomy and subsequent bilateral guillotine BKAs. She has been maintained on a heparin gtt for both her eliquis use hx and vascular pathologies. patient's vasculature appeared to have a \"string on bead\" appearance thus vascular medicine was consulted for recommendations of work-up for FMD. patient has several lab and imaging studies for this work-up (renal and carotid duplexes planned as an outpatient). She presented in a COPD exacerbation as well for which pulmonolgy provided treatment recommendations. her home medications for COPD were continued and she was initiated on a burst steroid treatment - and switched to PO prednisone . she continues to have a leukocytosis ( possibly due to steroids) and will complete 72 hours of abx 9/12 PM. vascular surgery waiting for leukocytosis to resolve prior to formalization of BKA stumps.     Interval History:   Vitals and chart notes from overnight reviewed. No acute issues overnight. Patient seen and evaluated at bedside. Patient reports she felt fatigued and was tired of repeating herself to care teams. Patient had dysphoric affect. Denied any acute questions or concerns. A conversation with patient about home care and if she would be amenable to warfarin and healthy at home on discharge was attempted, but patient reports she is too tired at " this time to think about it at this time. Will revisit tomorrow.      Review of Systems:   Other than patient's chronic conditions and those complaints in the history above, the rest of the 10 systems review were done and were negative.     Current medications:  Scheduled Meds:acetaminophen, 975 mg, oral, q8h  aspirin, 81 mg, oral, Daily  atorvastatin, 40 mg, oral, Nightly  [Held by provider] azithromycin, 250 mg, oral, Every Mon/Wed/Fri  busPIRone, 5 mg, oral, BID  cloNIDine, 0.1 mg, oral, q8h LISET  dilTIAZem CD, 240 mg, oral, Daily  gabapentin, 600 mg, oral, TID  insulin lispro, 0-10 Units, subcutaneous, Before meals & nightly  levalbuterol, 0.63 mg, nebulization, TID  melatonin, 3 mg, oral, Nightly  mirtazapine, 15 mg, oral, Nightly  montelukast, 10 mg, oral, Daily  oxybutynin, 2.5 mg, oral, BID  pantoprazole, 40 mg, oral, BID AC  piperacillin-tazobactam, 3.375 g, intravenous, q6h  sucralfate, 1 g, oral, BID  thiamine, 100 mg, oral, Daily  tiotropium, 2 puff, inhalation, Daily      Continuous Infusions:heparin, 0-4,000 Units/hr, Last Rate: 1,100 Units/hr (09/13/24 1656)      PRN Meds:PRN medications: albuterol, albuterol, dextrose, dextrose, glucagon, glucagon, heparin, HYDROmorphone, ipratropium-albuteroL, lidocaine-diphenhydraMINE-Maalox 1:1:1, naloxone, ondansetron, oxyCODONE, oxyCODONE      OBJECTIVE     Vitals:    09/13/24 0054 09/13/24 0439 09/13/24 0844 09/13/24 1606   BP: 108/62 108/65 106/71 126/77   Pulse: 97 95 91 99   Resp:   16 16   Temp: 36.3 °C (97.3 °F) 36.1 °C (97 °F) 36.6 °C (97.9 °F) 36.9 °C (98.4 °F)   TempSrc:       SpO2: 96% 96% 98% 97%   Weight:       Height:            Physical Exam  Constitutional:       Appearance: Normal appearance.   HENT:      Head: Normocephalic and atraumatic.   Eyes:      Extraocular Movements: Extraocular movements intact.   Cardiovascular:      Rate and Rhythm: Normal rate and regular rhythm.   Pulmonary:      Effort: Pulmonary effort is normal. No respiratory  distress.      Breath sounds: Wheezing present.   Musculoskeletal:      Right lower leg: No edema.      Left lower leg: No edema.      Comments: Distal ends of lower extremities wrapped s/p below knee amputation    Skin:     General: Skin is warm and dry.      Findings: No erythema.   Neurological:      General: No focal deficit present.      Mental Status: She is alert and oriented to person, place, and time.   Psychiatric:         Behavior: Behavior normal.      Comments: Appropriately dysphoric          Labs:   Lab Results   Component Value Date     09/13/2024    K 4.3 09/13/2024    CL 99 09/13/2024    CO2 28 09/13/2024    BUN 13 09/13/2024    CREATININE 0.44 (L) 09/13/2024    GLUCOSE 123 (H) 09/13/2024    CALCIUM 7.4 (L) 09/13/2024    PROT 4.9 (L) 09/10/2024    BILITOT 0.3 09/05/2024    ALKPHOS 129 09/05/2024    AST 54 (H) 09/05/2024    ALT 69 (H) 09/05/2024       Lab Results   Component Value Date    WBC 35.1 (H) 09/13/2024    HGB 8.5 (L) 09/13/2024    HCT 26.9 (L) 09/13/2024    MCV 81 09/13/2024     09/13/2024       Imaging:   No results found.      ASSESSMENT & PLAN     Lower extremity ischemia s/p bilateral guillotine BKA on 09/10  Thrombectomy   Hx DVT on Eliquis   - vasc surg following - amp formalization pending WBC normalization   - Continue wound care w/ daily dressing changes  - oxycodone 5 mg PO Q4H for moderate pain + oxycodone 10 mg PO Q4H for severe pain + Dilaudid 0.2  mg IV Q2H for breakthrough pain  - Tylenol to 1 g PO Q8H  - gabapentin to 600 mg PO TID (home dose: 300 TID)  - Ideally bridge to warfarin at discharge with goal INR 2-3 if patient amenable. Patient mentioned she was hesitant about warfarin but did not wish to discuss further today  - Possible Xarelto -> 15mg BID x21 days then 20mg every day if patient does not want warfarin   - continue Asprin and high intensity statin   - following [facility] medicine recs   - carotid and renal duplex for FMD and further imaging can be done  as outpatient  - CTA with FMD protocol as an outpatient  - Likely Holter monitor at discharge    Leukocytosis   - Patient with persistent increased WBC:   - Post op WBC rise vs infectious etiology   -  UA, CXR, CBC diff ordered; CT abd pelvis order per ID   - ID consulted: low suspicion for infectious process, suggest zosyn for empiric coverage.     COPD  - Appreciate pulmonology recommendations     - Burst steroids x5 days (9/8-9/12)  - Continue home Singulair + inhalers    Tachycardia   - Persistently tachycardic since admission       - Clonidine 0.1 q8h       - Continue home Cardizem  - Echocardiogram with LVEF 30-35%, PFO on bubble study     SBO (resolved)  - GGC 9/8: contrast reached colon   - Regular diet  - PRN Zofran     GI bleed (resolved)  - Hgb stable, continuing to monitor  - Continue home PPI BID, sucralfate    Diabetes  - SSI #2  - hypoglycemia protocol     MDD  - Continue home Buspar, mirtazapine           DVT Prophylaxis: Heparin ggt     Code status: Full Code  Diet: Adult diet Regular     Disposition: discussing home care, if patient amenable to warfarin / needing warfarin bridge, elevated WBC work up    Electronically signed by Lito Bernal MD on 09/13/24 at 5:47 PM      Lito Bernal MD

## 2024-09-14 LAB
ABO GROUP (TYPE) IN BLOOD: NORMAL
ALBUMIN SERPL BCP-MCNC: 2 G/DL (ref 3.4–5)
ALBUMIN SERPL BCP-MCNC: 2 G/DL (ref 3.4–5)
ALBUMIN SERPL BCP-MCNC: <1.5 G/DL (ref 3.4–5)
ALBUMIN: 1.8 G/DL (ref 3.4–5)
ALP SERPL-CCNC: 109 U/L (ref 33–136)
ALP SERPL-CCNC: 74 U/L (ref 33–136)
ALPHA 1 GLOBULIN: 0.9 G/DL (ref 0.2–0.6)
ALPHA 2 GLOBULIN: 1.1 G/DL (ref 0.4–1.1)
ALT SERPL W P-5'-P-CCNC: 8 U/L (ref 7–45)
ALT SERPL W P-5'-P-CCNC: <3 U/L (ref 7–45)
ANION GAP BLDA CALCULATED.4IONS-SCNC: 21 MMO/L (ref 10–25)
ANION GAP BLDV CALCULATED.4IONS-SCNC: 17 MMOL/L (ref 10–25)
ANION GAP BLDV CALCULATED.4IONS-SCNC: 18 MMOL/L (ref 10–25)
ANION GAP SERPL CALC-SCNC: 12 MMOL/L (ref 10–20)
ANION GAP SERPL CALC-SCNC: 25 MMOL/L (ref 10–20)
ANTIBODY SCREEN: NORMAL
APPEARANCE UR: ABNORMAL
APTT PPP: 41 SECONDS (ref 27–38)
APTT PPP: >200 SECONDS (ref 27–38)
AST SERPL W P-5'-P-CCNC: 111 U/L (ref 9–39)
AST SERPL W P-5'-P-CCNC: 25 U/L (ref 9–39)
BACTERIA #/AREA URNS AUTO: ABNORMAL /HPF
BASE EXCESS BLDA CALC-SCNC: -13.6 MMOL/L (ref -2–3)
BASE EXCESS BLDV CALC-SCNC: -6.9 MMOL/L (ref -2–3)
BASE EXCESS BLDV CALC-SCNC: -8.7 MMOL/L (ref -2–3)
BASOPHILS # BLD MANUAL: 0 X10*3/UL (ref 0–0.1)
BASOPHILS NFR BLD MANUAL: 0 %
BETA GLOBULIN: 0.8 G/DL (ref 0.5–1.2)
BILIRUB DIRECT SERPL-MCNC: 0.2 MG/DL (ref 0–0.3)
BILIRUB SERPL-MCNC: 0.3 MG/DL (ref 0–1.2)
BILIRUB SERPL-MCNC: 0.3 MG/DL (ref 0–1.2)
BILIRUB UR STRIP.AUTO-MCNC: NEGATIVE MG/DL
BNP SERPL-MCNC: 429 PG/ML (ref 0–99)
BODY TEMPERATURE: 37 DEGREES CELSIUS
BUN SERPL-MCNC: 17 MG/DL (ref 6–23)
BUN SERPL-MCNC: 22 MG/DL (ref 6–23)
BURR CELLS BLD QL SMEAR: ABNORMAL
CA-I BLDA-SCNC: 1.04 MMOL/L (ref 1.1–1.33)
CA-I BLDV-SCNC: 1.22 MMOL/L (ref 1.1–1.33)
CA-I BLDV-SCNC: 1.28 MMOL/L (ref 1.1–1.33)
CALCIUM SERPL-MCNC: 6.5 MG/DL (ref 8.6–10.6)
CALCIUM SERPL-MCNC: 7.3 MG/DL (ref 8.6–10.6)
CHLORIDE BLDA-SCNC: 96 MMOL/L (ref 98–107)
CHLORIDE BLDV-SCNC: 98 MMOL/L (ref 98–107)
CHLORIDE BLDV-SCNC: 99 MMOL/L (ref 98–107)
CHLORIDE SERPL-SCNC: 95 MMOL/L (ref 98–107)
CHLORIDE SERPL-SCNC: 97 MMOL/L (ref 98–107)
CO2 SERPL-SCNC: 16 MMOL/L (ref 21–32)
CO2 SERPL-SCNC: 30 MMOL/L (ref 21–32)
COLOR UR: ABNORMAL
CREAT SERPL-MCNC: 0.52 MG/DL (ref 0.5–1.05)
CREAT SERPL-MCNC: 1.22 MG/DL (ref 0.5–1.05)
EGFRCR SERPLBLD CKD-EPI 2021: 50 ML/MIN/1.73M*2
EGFRCR SERPLBLD CKD-EPI 2021: >90 ML/MIN/1.73M*2
EOSINOPHIL # BLD MANUAL: 0 X10*3/UL (ref 0–0.7)
EOSINOPHIL NFR BLD MANUAL: 0 %
ERYTHROCYTE [DISTWIDTH] IN BLOOD BY AUTOMATED COUNT: 21.7 % (ref 11.5–14.5)
ERYTHROCYTE [DISTWIDTH] IN BLOOD BY AUTOMATED COUNT: 21.9 % (ref 11.5–14.5)
GAMMA GLOBULIN: 0.4 G/DL (ref 0.5–1.4)
GLUCOSE BLD MANUAL STRIP-MCNC: 102 MG/DL (ref 74–99)
GLUCOSE BLD MANUAL STRIP-MCNC: 129 MG/DL (ref 74–99)
GLUCOSE BLD MANUAL STRIP-MCNC: 16 MG/DL (ref 74–99)
GLUCOSE BLD MANUAL STRIP-MCNC: 206 MG/DL (ref 74–99)
GLUCOSE BLD MANUAL STRIP-MCNC: 277 MG/DL (ref 74–99)
GLUCOSE BLD MANUAL STRIP-MCNC: 83 MG/DL (ref 74–99)
GLUCOSE BLD MANUAL STRIP-MCNC: 92 MG/DL (ref 74–99)
GLUCOSE BLD MANUAL STRIP-MCNC: 92 MG/DL (ref 74–99)
GLUCOSE BLD MANUAL STRIP-MCNC: 99 MG/DL (ref 74–99)
GLUCOSE BLDA-MCNC: 307 MG/DL (ref 74–99)
GLUCOSE BLDV-MCNC: 183 MG/DL (ref 74–99)
GLUCOSE BLDV-MCNC: 82 MG/DL (ref 74–99)
GLUCOSE SERPL-MCNC: 298 MG/DL (ref 74–99)
GLUCOSE SERPL-MCNC: 91 MG/DL (ref 74–99)
GLUCOSE UR STRIP.AUTO-MCNC: NORMAL MG/DL
HCO3 BLDA-SCNC: 15.2 MMOL/L (ref 22–26)
HCO3 BLDV-SCNC: 18 MMOL/L (ref 22–26)
HCO3 BLDV-SCNC: 19.3 MMOL/L (ref 22–26)
HCT VFR BLD AUTO: 24.1 % (ref 36–46)
HCT VFR BLD AUTO: 27 % (ref 36–46)
HCT VFR BLD EST: 22 % (ref 36–46)
HCT VFR BLD EST: 23 % (ref 36–46)
HCT VFR BLD EST: 24 % (ref 36–46)
HGB BLD-MCNC: 7.1 G/DL (ref 12–16)
HGB BLD-MCNC: 8.5 G/DL (ref 12–16)
HGB BLDA-MCNC: 7.6 G/DL (ref 12–16)
HGB BLDV-MCNC: 7.3 G/DL (ref 12–16)
HGB BLDV-MCNC: 8.1 G/DL (ref 12–16)
HOLD SPECIMEN: NORMAL
HYPOCHROMIA BLD QL SMEAR: ABNORMAL
IMM GRANULOCYTES # BLD AUTO: 0.12 X10*3/UL (ref 0–0.7)
IMM GRANULOCYTES NFR BLD AUTO: 1.1 % (ref 0–0.9)
IMMUNOFIXATION COMMENT: ABNORMAL
INHALED O2 CONCENTRATION: 100 %
INR PPP: 1.6 (ref 0.9–1.1)
INR PPP: 3 (ref 0.9–1.1)
KETONES UR STRIP.AUTO-MCNC: ABNORMAL MG/DL
LACTATE BLDA-SCNC: 13 MMOL/L (ref 0.4–2)
LACTATE BLDV-SCNC: 11.4 MMOL/L (ref 0.4–2)
LACTATE BLDV-SCNC: 9.9 MMOL/L (ref 0.4–2)
LACTATE SERPL-SCNC: 13.2 MMOL/L (ref 0.4–2)
LACTATE SERPL-SCNC: 14 MMOL/L (ref 0.4–2)
LEUKOCYTE ESTERASE UR QL STRIP.AUTO: ABNORMAL
LIPASE SERPL-CCNC: 43 U/L (ref 9–82)
LYMPHOCYTES # BLD MANUAL: 1.09 X10*3/UL (ref 1.2–4.8)
LYMPHOCYTES NFR BLD MANUAL: 9.9 %
MAGNESIUM SERPL-MCNC: 1.75 MG/DL (ref 1.6–2.4)
MCH RBC QN AUTO: 24.6 PG (ref 26–34)
MCH RBC QN AUTO: 24.6 PG (ref 26–34)
MCHC RBC AUTO-ENTMCNC: 29.5 G/DL (ref 32–36)
MCHC RBC AUTO-ENTMCNC: 31.5 G/DL (ref 32–36)
MCV RBC AUTO: 78 FL (ref 80–100)
MCV RBC AUTO: 83 FL (ref 80–100)
METAMYELOCYTES # BLD MANUAL: 0.23 X10*3/UL
METAMYELOCYTES NFR BLD MANUAL: 2.1 %
MONOCYTES # BLD MANUAL: 0 X10*3/UL (ref 0.1–1)
MONOCYTES NFR BLD MANUAL: 0 %
MUCOUS THREADS #/AREA URNS AUTO: ABNORMAL /LPF
NEUTROPHILS # BLD MANUAL: 9.53 X10*3/UL (ref 1.2–7.7)
NEUTS BAND # BLD MANUAL: 2.04 X10*3/UL (ref 0–0.7)
NEUTS BAND NFR BLD MANUAL: 18.5 %
NEUTS SEG # BLD MANUAL: 7.49 X10*3/UL (ref 1.2–7)
NEUTS SEG NFR BLD MANUAL: 68.1 %
NITRITE UR QL STRIP.AUTO: NEGATIVE
NRBC BLD-RTO: 2.8 /100 WBCS (ref 0–0)
NRBC BLD-RTO: 9.9 /100 WBCS (ref 0–0)
OXYHGB MFR BLDA: 81.3 % (ref 94–98)
OXYHGB MFR BLDV: 45.4 % (ref 45–75)
OXYHGB MFR BLDV: 54.7 % (ref 45–75)
PATH REVIEW - SERUM IMMUNOFIXATION: ABNORMAL
PATH REVIEW-SERUM PROTEIN ELECTROPHORESIS: ABNORMAL
PCO2 BLDA: 49 MM HG (ref 38–42)
PCO2 BLDV: 41 MM HG (ref 41–51)
PCO2 BLDV: 42 MM HG (ref 41–51)
PH BLDA: 7.1 PH (ref 7.38–7.42)
PH BLDV: 7.24 PH (ref 7.33–7.43)
PH BLDV: 7.28 PH (ref 7.33–7.43)
PH UR STRIP.AUTO: 6 [PH]
PHOSPHATE SERPL-MCNC: 2.6 MG/DL (ref 2.5–4.9)
PLATELET # BLD AUTO: 235 X10*3/UL (ref 150–450)
PLATELET # BLD AUTO: 362 X10*3/UL (ref 150–450)
PLATELET CLUMP BLD QL SMEAR: PRESENT
PO2 BLDA: 65 MM HG (ref 85–95)
PO2 BLDV: 35 MM HG (ref 35–45)
PO2 BLDV: 39 MM HG (ref 35–45)
POTASSIUM BLDA-SCNC: 5.8 MMOL/L (ref 3.5–5.3)
POTASSIUM BLDV-SCNC: 4.3 MMOL/L (ref 3.5–5.3)
POTASSIUM BLDV-SCNC: 4.6 MMOL/L (ref 3.5–5.3)
POTASSIUM SERPL-SCNC: 3.8 MMOL/L (ref 3.5–5.3)
POTASSIUM SERPL-SCNC: 5.7 MMOL/L (ref 3.5–5.3)
PROT SERPL-MCNC: 4.6 G/DL (ref 6.4–8.2)
PROT SERPL-MCNC: <3 G/DL (ref 6.4–8.2)
PROT UR STRIP.AUTO-MCNC: ABNORMAL MG/DL
PROTEIN ELECTROPHORESIS COMMENT: ABNORMAL
PROTHROMBIN TIME: 17.9 SECONDS (ref 9.8–12.8)
PROTHROMBIN TIME: 33.7 SECONDS (ref 9.8–12.8)
RBC # BLD AUTO: 2.89 X10*6/UL (ref 4–5.2)
RBC # BLD AUTO: 3.45 X10*6/UL (ref 4–5.2)
RBC # UR STRIP.AUTO: ABNORMAL /UL
RBC #/AREA URNS AUTO: >20 /HPF
RBC MORPH BLD: ABNORMAL
RH FACTOR (ANTIGEN D): NORMAL
SAO2 % BLDA: 83 % (ref 94–100)
SAO2 % BLDV: 46 % (ref 45–75)
SAO2 % BLDV: 56 % (ref 45–75)
SCHISTOCYTES BLD QL SMEAR: ABNORMAL
SODIUM BLDA-SCNC: 126 MMOL/L (ref 136–145)
SODIUM BLDV-SCNC: 130 MMOL/L (ref 136–145)
SODIUM BLDV-SCNC: 131 MMOL/L (ref 136–145)
SODIUM SERPL-SCNC: 130 MMOL/L (ref 136–145)
SODIUM SERPL-SCNC: 135 MMOL/L (ref 136–145)
SP GR UR STRIP.AUTO: 1.02
TOTAL CELLS COUNTED BLD: 141
UFH PPP CHRO-ACNC: 0.4 IU/ML
UFH PPP CHRO-ACNC: 0.4 IU/ML
UROBILINOGEN UR STRIP.AUTO-MCNC: NORMAL MG/DL
VARIANT LYMPHS # BLD MANUAL: 0.15 X10*3/UL (ref 0–0.5)
VARIANT LYMPHS NFR BLD: 1.4 %
WBC # BLD AUTO: 11 X10*3/UL (ref 4.4–11.3)
WBC # BLD AUTO: 28 X10*3/UL (ref 4.4–11.3)
WBC #/AREA URNS AUTO: >50 /HPF

## 2024-09-14 PROCEDURE — 37799 UNLISTED PX VASCULAR SURGERY: CPT

## 2024-09-14 PROCEDURE — 85610 PROTHROMBIN TIME: CPT

## 2024-09-14 PROCEDURE — 83690 ASSAY OF LIPASE: CPT | Performed by: STUDENT IN AN ORGANIZED HEALTH CARE EDUCATION/TRAINING PROGRAM

## 2024-09-14 PROCEDURE — 2500000004 HC RX 250 GENERAL PHARMACY W/ HCPCS (ALT 636 FOR OP/ED)

## 2024-09-14 PROCEDURE — 85520 HEPARIN ASSAY: CPT

## 2024-09-14 PROCEDURE — 84132 ASSAY OF SERUM POTASSIUM: CPT

## 2024-09-14 PROCEDURE — 2020000001 HC ICU ROOM DAILY

## 2024-09-14 PROCEDURE — 93010 ELECTROCARDIOGRAM REPORT: CPT | Performed by: INTERNAL MEDICINE

## 2024-09-14 PROCEDURE — 71045 X-RAY EXAM CHEST 1 VIEW: CPT | Performed by: STUDENT IN AN ORGANIZED HEALTH CARE EDUCATION/TRAINING PROGRAM

## 2024-09-14 PROCEDURE — 2500000001 HC RX 250 WO HCPCS SELF ADMINISTERED DRUGS (ALT 637 FOR MEDICARE OP)

## 2024-09-14 PROCEDURE — 3E033XZ INTRODUCTION OF VASOPRESSOR INTO PERIPHERAL VEIN, PERCUTANEOUS APPROACH: ICD-10-PCS | Performed by: STUDENT IN AN ORGANIZED HEALTH CARE EDUCATION/TRAINING PROGRAM

## 2024-09-14 PROCEDURE — 81001 URINALYSIS AUTO W/SCOPE: CPT

## 2024-09-14 PROCEDURE — 83735 ASSAY OF MAGNESIUM: CPT

## 2024-09-14 PROCEDURE — 2500000005 HC RX 250 GENERAL PHARMACY W/O HCPCS

## 2024-09-14 PROCEDURE — 74018 RADEX ABDOMEN 1 VIEW: CPT | Performed by: STUDENT IN AN ORGANIZED HEALTH CARE EDUCATION/TRAINING PROGRAM

## 2024-09-14 PROCEDURE — 87086 URINE CULTURE/COLONY COUNT: CPT

## 2024-09-14 PROCEDURE — 51701 INSERT BLADDER CATHETER: CPT

## 2024-09-14 PROCEDURE — 99232 SBSQ HOSP IP/OBS MODERATE 35: CPT | Performed by: SURGERY

## 2024-09-14 PROCEDURE — 86901 BLOOD TYPING SEROLOGIC RH(D): CPT

## 2024-09-14 PROCEDURE — 82947 ASSAY GLUCOSE BLOOD QUANT: CPT

## 2024-09-14 PROCEDURE — 99233 SBSQ HOSP IP/OBS HIGH 50: CPT | Performed by: STUDENT IN AN ORGANIZED HEALTH CARE EDUCATION/TRAINING PROGRAM

## 2024-09-14 PROCEDURE — 85027 COMPLETE CBC AUTOMATED: CPT

## 2024-09-14 PROCEDURE — 83605 ASSAY OF LACTIC ACID: CPT

## 2024-09-14 PROCEDURE — 2500000002 HC RX 250 W HCPCS SELF ADMINISTERED DRUGS (ALT 637 FOR MEDICARE OP, ALT 636 FOR OP/ED)

## 2024-09-14 PROCEDURE — 85007 BL SMEAR W/DIFF WBC COUNT: CPT

## 2024-09-14 PROCEDURE — 99291 CRITICAL CARE FIRST HOUR: CPT

## 2024-09-14 PROCEDURE — 84100 ASSAY OF PHOSPHORUS: CPT

## 2024-09-14 PROCEDURE — 99292 CRITICAL CARE ADDL 30 MIN: CPT

## 2024-09-14 PROCEDURE — 82040 ASSAY OF SERUM ALBUMIN: CPT

## 2024-09-14 PROCEDURE — 83880 ASSAY OF NATRIURETIC PEPTIDE: CPT

## 2024-09-14 RX ORDER — INDOMETHACIN 25 MG/1
50 CAPSULE ORAL ONCE
Status: DISCONTINUED | OUTPATIENT
Start: 2024-09-14 | End: 2024-09-14

## 2024-09-14 RX ORDER — MORPHINE SULFATE 10 MG/ML
INJECTION, SOLUTION INTRAMUSCULAR; INTRAVENOUS
Status: COMPLETED
Start: 2024-09-14 | End: 2024-09-14

## 2024-09-14 RX ORDER — LORAZEPAM 2 MG/ML
0.5 INJECTION INTRAMUSCULAR EVERY 4 HOURS PRN
Status: DISCONTINUED | OUTPATIENT
Start: 2024-09-14 | End: 2024-09-15 | Stop reason: HOSPADM

## 2024-09-14 RX ORDER — DEXTROSE MONOHYDRATE 100 MG/ML
50 INJECTION, SOLUTION INTRAVENOUS CONTINUOUS
Status: DISCONTINUED | OUTPATIENT
Start: 2024-09-14 | End: 2024-09-15 | Stop reason: HOSPADM

## 2024-09-14 RX ORDER — HYDROXYZINE HYDROCHLORIDE 25 MG/1
25 TABLET, FILM COATED ORAL EVERY 6 HOURS PRN
Status: DISCONTINUED | OUTPATIENT
Start: 2024-09-14 | End: 2024-09-15 | Stop reason: HOSPADM

## 2024-09-14 RX ORDER — MORPHINE SULFATE 4 MG/ML
2 INJECTION INTRAVENOUS
Status: DISCONTINUED | OUTPATIENT
Start: 2024-09-14 | End: 2024-09-15 | Stop reason: HOSPADM

## 2024-09-14 RX ORDER — NOREPINEPHRINE BITARTRATE/D5W 8 MG/250ML
0-.5 PLASTIC BAG, INJECTION (ML) INTRAVENOUS CONTINUOUS
Status: DISCONTINUED | OUTPATIENT
Start: 2024-09-14 | End: 2024-09-14

## 2024-09-14 RX ORDER — NOREPINEPHRINE BITARTRATE/D5W 8 MG/250ML
PLASTIC BAG, INJECTION (ML) INTRAVENOUS
Status: COMPLETED
Start: 2024-09-14 | End: 2024-09-14

## 2024-09-14 RX ORDER — GLYCOPYRROLATE 0.2 MG/ML
0.2 INJECTION INTRAMUSCULAR; INTRAVENOUS EVERY 4 HOURS PRN
Status: DISCONTINUED | OUTPATIENT
Start: 2024-09-14 | End: 2024-09-15 | Stop reason: HOSPADM

## 2024-09-14 RX ORDER — PHENYLEPHRINE HCL IN 0.9% NACL 0.4MG/10ML
SYRINGE (ML) INTRAVENOUS
Status: DISPENSED
Start: 2024-09-14 | End: 2024-09-15

## 2024-09-14 RX ORDER — MORPHINE SULFATE 4 MG/ML
4 INJECTION INTRAVENOUS
Status: DISCONTINUED | OUTPATIENT
Start: 2024-09-14 | End: 2024-09-15 | Stop reason: HOSPADM

## 2024-09-14 RX ORDER — HALOPERIDOL 5 MG/ML
1 INJECTION INTRAMUSCULAR EVERY 4 HOURS PRN
Status: DISCONTINUED | OUTPATIENT
Start: 2024-09-14 | End: 2024-09-15 | Stop reason: HOSPADM

## 2024-09-14 RX ORDER — VANCOMYCIN HYDROCHLORIDE 1 G/20ML
INJECTION, POWDER, LYOPHILIZED, FOR SOLUTION INTRAVENOUS DAILY PRN
Status: DISCONTINUED | OUTPATIENT
Start: 2024-09-14 | End: 2024-09-14 | Stop reason: ALTCHOICE

## 2024-09-14 RX ADMIN — MORPHINE SULFATE 1 MG: 10 INJECTION INTRAVENOUS at 20:59

## 2024-09-14 RX ADMIN — LEVALBUTEROL HYDROCHLORIDE 0.63 MG: 0.63 SOLUTION RESPIRATORY (INHALATION) at 09:06

## 2024-09-14 RX ADMIN — SUCRALFATE 1 G: 1 TABLET ORAL at 09:05

## 2024-09-14 RX ADMIN — LEVALBUTEROL HYDROCHLORIDE 0.63 MG: 0.63 SOLUTION RESPIRATORY (INHALATION) at 15:08

## 2024-09-14 RX ADMIN — PIPERACILLIN SODIUM AND TAZOBACTAM SODIUM 3.38 G: 3; .375 INJECTION, SOLUTION INTRAVENOUS at 17:38

## 2024-09-14 RX ADMIN — MONTELUKAST 10 MG: 10 TABLET, FILM COATED ORAL at 09:05

## 2024-09-14 RX ADMIN — OXYCODONE HYDROCHLORIDE 10 MG: 5 TABLET ORAL at 09:09

## 2024-09-14 RX ADMIN — GABAPENTIN 600 MG: 300 CAPSULE ORAL at 09:05

## 2024-09-14 RX ADMIN — LORAZEPAM 0.5 MG: 2 INJECTION, SOLUTION INTRAMUSCULAR; INTRAVENOUS at 20:59

## 2024-09-14 RX ADMIN — BUSPIRONE HYDROCHLORIDE 5 MG: 10 TABLET ORAL at 09:05

## 2024-09-14 RX ADMIN — PANTOPRAZOLE SODIUM 40 MG: 40 TABLET, DELAYED RELEASE ORAL at 07:05

## 2024-09-14 RX ADMIN — PIPERACILLIN SODIUM AND TAZOBACTAM SODIUM 3.38 G: 3; .375 INJECTION, SOLUTION INTRAVENOUS at 11:09

## 2024-09-14 RX ADMIN — OXYCODONE HYDROCHLORIDE 10 MG: 5 TABLET ORAL at 04:27

## 2024-09-14 RX ADMIN — MORPHINE SULFATE 2 MG: 4 INJECTION INTRAVENOUS at 21:19

## 2024-09-14 RX ADMIN — HEPARIN SODIUM 1100 UNITS/HR: 10000 INJECTION, SOLUTION INTRAVENOUS at 02:02

## 2024-09-14 RX ADMIN — OXYCODONE HYDROCHLORIDE 10 MG: 5 TABLET ORAL at 00:40

## 2024-09-14 RX ADMIN — THIAMINE HCL TAB 100 MG 100 MG: 100 TAB at 09:05

## 2024-09-14 RX ADMIN — PIPERACILLIN SODIUM AND TAZOBACTAM SODIUM 3.38 G: 3; .375 INJECTION, SOLUTION INTRAVENOUS at 04:30

## 2024-09-14 RX ADMIN — OXYBUTYNIN CHLORIDE 2.5 MG: 5 TABLET ORAL at 09:05

## 2024-09-14 RX ADMIN — NOREPINEPHRINE BITARTRATE 8000 MCG: 8 INJECTION, SOLUTION INTRAVENOUS at 18:45

## 2024-09-14 RX ADMIN — MORPHINE SULFATE 2 MG/HR: 10 INJECTION INTRAVENOUS at 22:28

## 2024-09-14 RX ADMIN — DILTIAZEM HYDROCHLORIDE 240 MG: 240 CAPSULE, EXTENDED RELEASE ORAL at 09:05

## 2024-09-14 RX ADMIN — DEXTROSE MONOHYDRATE 25 G: 25 INJECTION, SOLUTION INTRAVENOUS at 17:51

## 2024-09-14 RX ADMIN — CLONIDINE HYDROCHLORIDE 0.1 MG: 0.1 TABLET ORAL at 06:06

## 2024-09-14 RX ADMIN — ASPIRIN 81 MG: 81 TABLET, COATED ORAL at 09:05

## 2024-09-14 RX ADMIN — SODIUM CHLORIDE, POTASSIUM CHLORIDE, SODIUM LACTATE AND CALCIUM CHLORIDE 500 ML: 600; 310; 30; 20 INJECTION, SOLUTION INTRAVENOUS at 17:57

## 2024-09-14 RX ADMIN — VASOPRESSIN 20 UNITS: 0.2 INJECTION INTRAVENOUS at 18:46

## 2024-09-14 RX ADMIN — ACETAMINOPHEN 975 MG: 325 TABLET ORAL at 07:04

## 2024-09-14 ASSESSMENT — RESPIRATORY DISTRESS OBSERVATION SCALE (RDOS)
RDOS TOTAL SCORE: 11
RESPIRATORY RATE PER MINUTE: 2 - >30 BREATHS
LOOK OF FEAR: 0 - NONE
LOOK OF FEAR: 0 - NONE
RESPIRATORY RATE PER MINUTE: 1 - 19-30 BREATHS
INVOLUNTARY NASAL FLARING: 0 - NONE
RESTLESS NONPURPOSEFUL MOVEMENTS: 2 - FREQUENT MOVEMENTS
RESPIRATORY RATE PER MINUTE: 1 - 19-30 BREATHS
RDOS TOTAL SCORE: 3
ACCESSORY MUSCLE RISE IN CLAVICLE DURING INSPIRATION: 1 - SLIGHT RISE
HEART RATE PER MINUTE: 1 - 90-109 BEATS
INVOLUNTARY NASAL FLARING: 0 - NONE
RESTLESS NONPURPOSEFUL MOVEMENTS: 1 - OCCASIONAL, SLIGHT MOVEMENTS
GRUNTING AT END OF EXPIRATION: 2 - PRESENT
PARADOXICAL BREATHING PATTERN: 0 - NONE
HEART RATE PER MINUTE: 0 - <90 BEATS
HEART RATE PER MINUTE: 1 - 90-109 BEATS
ACCESSORY MUSCLE RISE IN CLAVICLE DURING INSPIRATION: 2 - PRONOUNCED RISE
PARADOXICAL BREATHING PATTERN: 2 - PRESENT
GRUNTING AT END OF EXPIRATION: 0 - NONE
INVOLUNTARY NASAL FLARING: 0 - NONE
LOOK OF FEAR: 0 - NONE
GRUNTING AT END OF EXPIRATION: 0 - NONE
RDOS TOTAL SCORE: 7
RESTLESS NONPURPOSEFUL MOVEMENTS: 1 - OCCASIONAL, SLIGHT MOVEMENTS
PARADOXICAL BREATHING PATTERN: 2 - PRESENT
ACCESSORY MUSCLE RISE IN CLAVICLE DURING INSPIRATION: 2 - PRONOUNCED RISE

## 2024-09-14 ASSESSMENT — COGNITIVE AND FUNCTIONAL STATUS - GENERAL
TOILETING: A LITTLE
HELP NEEDED FOR BATHING: A LITTLE
MOVING FROM LYING ON BACK TO SITTING ON SIDE OF FLAT BED WITH BEDRAILS: A LITTLE
MOBILITY SCORE: 18
MOVING TO AND FROM BED TO CHAIR: A LITTLE
DAILY ACTIVITIY SCORE: 18
WALKING IN HOSPITAL ROOM: A LITTLE
STANDING UP FROM CHAIR USING ARMS: A LITTLE
DRESSING REGULAR LOWER BODY CLOTHING: A LITTLE
DRESSING REGULAR UPPER BODY CLOTHING: A LITTLE
PERSONAL GROOMING: A LITTLE
CLIMB 3 TO 5 STEPS WITH RAILING: A LITTLE
TURNING FROM BACK TO SIDE WHILE IN FLAT BAD: A LITTLE
EATING MEALS: A LITTLE

## 2024-09-14 ASSESSMENT — PAIN - FUNCTIONAL ASSESSMENT
PAIN_FUNCTIONAL_ASSESSMENT: 0-10
PAIN_FUNCTIONAL_ASSESSMENT: 0-10

## 2024-09-14 ASSESSMENT — PAIN DESCRIPTION - ORIENTATION: ORIENTATION: MID

## 2024-09-14 ASSESSMENT — PAIN DESCRIPTION - LOCATION: LOCATION: BACK

## 2024-09-14 ASSESSMENT — PAIN SCALES - GENERAL
PAINLEVEL_OUTOF10: 9
PAINLEVEL_OUTOF10: 4

## 2024-09-14 NOTE — SIGNIFICANT EVENT
Patient had increased work of breathing this afternoon. Patient was reportedly grabbing chest and appeared to be in respiratory distress per nursing. Patient had altered mental status with decreased responsiveness. ABG was ordered, which showed pH of 7.1 . Level II called on patient. At bedside non-rebreather mask was placed. Patient was hypotensive with BP 70s/40s with HR in 50s. Lactate of 14. Patient was given 500 mL of fluids. Patient was severely hypoglycemic with glucose of 16. Patient was given 1 am of D50. Patient blood glucose was corrected to 206. Patient BP responded to interventions. CBC, CMP, Coag Screen, Manual Diff were ordered at bedside. Patient mental status remained altered, but improved with interventions. BP rebounded to above 100 systolic. Due to continued signs of respiratory distress, elevated lactate, abdominal pain and altered mental status patient transferred to medical icu. ACS aware.

## 2024-09-14 NOTE — NURSING NOTE
Family educated on three separate occasion regarding patient not able to have anything by mouth. On two separate occasions bedside nurse found a cup of ice water and ice chip sitting next to patient. Explained to family the importance of keeping patient NPO and the chance patient will aspirate. Family verbalized understanding at the beside.

## 2024-09-14 NOTE — CONSULTS
Vancomycin Dosing by Pharmacy- INITIAL    Fernando Cuevas is a 64 y.o. year old female who Pharmacy has been consulted for vancomycin dosing for pneumonia. Based on the patient's indication and renal status this patient will be dosed based on a goal AUC of 500-600.     Renal function is currently stable.    Visit Vitals  /75   Pulse (!) 133   Temp 36.5 °C (97.7 °F)   Resp 22        Lab Results   Component Value Date    CREATININE 0.52 2024    CREATININE 0.44 (L) 2024    CREATININE 0.42 (L) 2024    CREATININE 0.52 2024        Patient weight is as follows:   Vitals:    24 0056   Weight: 61.5 kg (135 lb 9.3 oz)       Cultures:  No results found for the encounter in last 14 days.        I/O last 3 completed shifts:  In: 720 (11.7 mL/kg) [P.O.:720]  Out: 1110 (18 mL/kg) [Urine:900 (0.4 mL/kg/hr); Emesis/NG output:210]  Weight: 61.5 kg   I/O during current shift:  I/O this shift:  In: 100 [IV Piggyback:100]  Out: -     Temp (24hrs), Av.5 °C (95.9 °F), Min:30 °C (86 °F), Max:36.6 °C (97.9 °F)         Assessment/Plan     Patient will be given a loading dose of 1500 mg.  Will initiate vancomycin maintenance,  1250 mg every 12 hours.for predicted AUC = 509    This dosing regimen is predicted by [employer] to result in the following pharmacokinetic parameters:  Loading dose: 1500 mg at 17:00 2024.  Regimen: 1250 mg IV every 12 hours.  Start time: 05:00 on 09/15/2024  Exposure target: AUC24 (range)400-600 mg/L.hr   CXQ26-23: 474 mg/L.hr  AUC24,ss: 509 mg/L.hr  Probability of AUC24 > 400: 78 %  Ctrough,ss: 11.5 mg/L  Probability of Ctrough,ss > 20: 12 %      Follow-up level will be ordered on 9/15 at am lab draw unless clinically indicated sooner.  Will continue to monitor renal function daily while on vancomycin and order serum creatinine at least every 48 hours if not already ordered.  Follow for continued vancomycin needs, clinical response, and signs/symptoms of toxicity.        Drew Dunn, PharmD

## 2024-09-14 NOTE — NURSING NOTE
1500 received report pt awake and alert with visitors at bedside. No s/s of distress noted.    1600 changed and reposition pt to comfort.     1700 pt c/o pain at this time, meds given and tolerated well. Care is ongoing

## 2024-09-14 NOTE — PROGRESS NOTES
"[facility]  ACUTE CARE SURGERY - PROGRESS NOTE    Patient Name: Fernando Cuevas  MRN: [id number]  Admit Date: 905  : 1960  AGE: 64 y.o.   GENDER: female  ==============================================================================  TODAY'S ASSESSMENT AND PLAN OF CARE:  This is a 63-year-old female with history of polysubstance use, PAD, COPD, HTN, DVT on Eliquis, lung cancer s/p radiation, recurrent SBO with recent ex lap / JOSIAH / small bowel resection (2024) who was admitted on 24 with recurrent SBO. SBO was managed conservatively with NGT and she passed a gastrograffin challenge on  and subsequently had NGT removed. Her hospital course has been complicated by acute versus chronic CTLI now s/p RLE angiogram with PT thromboembolectomy and subsequent bilateral guillotine BKAs. Patient was transferred to Medicine on , and ACS was re-engaged today for concern of recurrent SBO symptoms.    Recommendations:  - Agree with NGT for gastric decompression  - Obtain CT abdomen/pelvis with IV contrast  - Consider obtaining serum lactate  - ACS will follow      Discussed with attending Dr. Albarran.    Olga Cruz MD  PGY1 Acute Care Surgery  Pager [phone]  Available via secure chat  ==============================================================================  CHIEF COMPLAINT / EVENTS LAST 24HRS / HPI:  Primary team re-engaged ACS for concern of new abdominal distension and bilious emesis.    Patient reports feeling \"terrible.\" Endorses nausea and diffuse abdominal pain. Last episode of emesis was this AM. Passing gas, having formed bowel movements.    MEDICAL HISTORY / ROS:   Admission history and ROS reviewed. Pertinent changes as follows: None    PHYSICAL EXAM:  Heart Rate:  []   Temp:  [30 °C (86 °F)-36.9 °C (98.4 °F)]   Resp:  [16-20]   BP: (104-133)/(63-79)   SpO2:  [91 %-100 %]   Physical Exam  Gen- Lying in bed in NAD  HEENT- Atraumatic, NC in nares  Resp- " Normal respiratory effort on NC  CV- Acyanotic, skin is warm and well-perfused  Abd- Distended but compressible; diffusely tender to touch    IMAGING SUMMARY:   9/14 KUB: distended loops of bowel    LABS:  Results from last 7 days   Lab Units 09/14/24  0505 09/13/24  0547 09/12/24  0558   WBC AUTO x10*3/uL 28.0* 35.1*  35.1* 39.9*   HEMOGLOBIN g/dL 8.5* 8.5*  8.5* 9.1*   HEMATOCRIT % 27.0* 26.9*  26.9* 29.2*   PLATELETS AUTO x10*3/uL 362 347  347 395   LYMPHO PCT MAN %  --  1.6  --    MONO PCT MAN %  --  0.8  --    EOSINO PCT MAN %  --  0.0  --      Results from last 7 days   Lab Units 09/14/24  0505 09/09/24  0855   APTT seconds 41* 28   INR  1.6*  --      Results from last 7 days   Lab Units 09/14/24  0505 09/13/24  0547 09/12/24  0558 09/11/24  0535 09/10/24  0616   SODIUM mmol/L 135* 136 134*   < > 131*   POTASSIUM mmol/L 3.8 4.3 4.3   < > 4.4   CHLORIDE mmol/L 97* 99 99   < > 98   CO2 mmol/L 30 28 28   < > 26   BUN mg/dL 17 13 11   < > 9   CREATININE mg/dL 0.52 0.44* 0.42*   < > 0.50   CALCIUM mg/dL 7.3* 7.4* 7.4*   < > 7.8*   PROTEIN TOTAL g/dL 4.6*  --   --   --  4.9*   BILIRUBIN TOTAL mg/dL 0.3  --   --   --   --    ALK PHOS U/L 109  --   --   --   --    ALT U/L <3*  --   --   --   --    AST U/L 25  --   --   --   --    GLUCOSE mg/dL 91 123* 114*   < > 135*    < > = values in this interval not displayed.     Results from last 7 days   Lab Units 09/14/24  0505   BILIRUBIN TOTAL mg/dL 0.3   BILIRUBIN DIRECT mg/dL 0.2           I have reviewed all medications, laboratory results, and imaging pertinent for today's encounter.

## 2024-09-14 NOTE — NURSING NOTE
Code white on patient after sustaining a change in mental status and the inability to obtain pulse ox due to patient finger tips being so cold. Heating pack placed on quan hands. Non re breather placed on patient.Tried to obtain on ear still unable to. EKG completed shown NSR. Vital signs 114/75 recycled showing 70's/40's. Respiratory attemptewd ABG unsuccessful x2, another respiratory therapist attempted with doppler still unsuccessful. Heparin gtt stopped. VBG obtained. Blood glucose specimen obtained from CVC line measurement of 16. 1 amp D50 given rechecked blood glucose CAPILLARY  indicated 206 patient became more responsive. Rechecked blood glucose again capillary again 277. Bolus 500 initiated. Repeat CXR and KUB completed. Patient transferred to MICU for further management of care. Family at bedside updated by Dr. Carter

## 2024-09-14 NOTE — SIGNIFICANT EVENT
"Rapid Response Note     09/14/24 1558   Onset Documentation   Rapid Response Initiated By RN   Location/Room [facility] [place]   Pager Time [phone]   Arrival Time 1604   Event End Time 1700   Level II Called Yes  (1606)   Primary Reason for Call Change in mental status;SBP less than or equal to 90 mmHg;FiO2 greater than or equal to 50%     Rapid Response paged by Bedside RN JARED Hoang for patient's change in mentation with hypotension.  Upon arrival of Rapid Response Team RN to bedside, patient noted to be hypoventilating, minimally responsive, ashen in appearance with difficulty attaining blood pressure.  HR on monitor in the 50s.  Level II paged.  DACR Dr. JARED Marks and Dr. Carter to bedside.  Patient placed on defibrillator.  Blood glucose checked and noted to be 16.  Patient given 1 amp of D50 with improvement in blood glucose levels and respiratory effort.  Labs/VBG drawn from triple lumen.  LR bolus infusing W.O.  Patient's family present in room.  States, \"Do everything\" when asked about patient's code status.  Patient with continuous bilious output from NG tube, stomach distended, firm and tender.  With interventions, patient with improvement in BP and ABG able to be attained.  MICU Fellow updated on patient's condition and ICU bed prepared.  CXR/KUB completed.  ACS to bedside; OR to be prepared.  Patient transferred to MICU #15.  "

## 2024-09-14 NOTE — SIGNIFICANT EVENT
09/14/24 0750   Onset Documentation   Rapid Response Initiated By Radar auto page   Location/Room [facility]  (LK [id number])   Pager Time [phone]   Arrival Time 0750   Event End Time 0755   Level II Called No   Primary Reason for Call Radar auto page     Rapid Response Note    Radar auto-page received for a radar score of 9 with the following vital signs:  30.0, 117, 16, 104/67, 91%.  Upon examination at the bedside, patient awake, alert, and in no acute distress.  Temperature retaken for 36.4, SpO2 for 93%.  Patient is at her current baseline and RN has no immediate concerns.  There are no indications for interventions by Rapid Response at this time.  RN to contact Rapid Response with any future concerns or signs of clinical decompensation.

## 2024-09-14 NOTE — SIGNIFICANT EVENT
ACS Clinical Event Note    Patient's SBO now resolved. Appreciate medical management and vascular surgery recommendations. ACS available for any future questions/concerns.     Patient discussed with attending Dr. Albarran.     Patrica Harry  PGY2 General Surgery   ACS [id number]

## 2024-09-14 NOTE — H&P
"Medical Intensive Care - History and Physical   Subjective    Fernando Cuevas is a 64 y.o. year old female patient admitted on 9/5/2024 with following ICU needs: lactic acidosis, shock requiring vasopressor support.    HPI:  64 year old female with PMHx of COPD, HTN, DVT on eliquis, RLL adenocarcinoma s/p radiation, SWATHI, SBO s/p resection who was initially admitted with N/V and found to have SBO. During her stay, was found to have acute limb ischemia for which Vascular Surgery was consulted and took the patient to the OR 9/10 for b/l BKA at the level above the ankle. Rapid response called 9/14 ~1800 for increased work of breathing.    Per rapid response note earlier this evening:  \"Patient had increased work of breathing this afternoon. Patient was reportedly grabbing chest and appeared to be in respiratory distress per nursing. Patient had altered mental status with decreased responsiveness. ABG was ordered, which showed pH of 7.1 . Level II called on patient. At bedside non-rebreather mask was placed. Patient was hypotensive with BP 70s/40s with HR in 50s. Lactate of 14. Patient was given 500 mL of fluids. Patient was severely hypoglycemic with glucose of 16. Patient was given 1 am of D50. Patient blood glucose was corrected to 206. Patient BP responded to interventions. CBC, CMP, Coag Screen, Manual Diff were ordered at bedside. Patient mental status remained altered, but improved with interventions. BP rebounded to above 100 systolic. Due to continued signs of respiratory distress, elevated lactate, abdominal pain and altered mental status patient transferred to medical icu. ACS aware. \"    In the MICU, history difficult to fully ascertain given tenuous respiratory status on nonrebreather. Was able to endorse worsening abdominal pain.      Review of Systems:         Meds    Home medications:  Current Outpatient Medications   Medication Instructions   • acetaminophen (Tylenol Extra Strength) 500 mg tablet 2 " tablets, oral, Every 8 hours   • albuterol 2.5 mg /3 mL (0.083 %) nebulizer solution 3 mL, nebulization, Every 6 hours PRN   • albuterol 90 mcg/actuation inhaler 2 puffs, inhalation, Every 4 hours PRN   • ammonium lactate (Lac-Hydrin) 12 % lotion Topical, 2 times daily   • apixaban (ELIQUIS) 5 mg, oral, 2 times daily   • azithromycin (ZITHROMAX) 250 mg, oral, Every Mon/Wed/Fri   • benzonatate (TESSALON) 100 mg, oral, 3 times daily PRN, Do not crush or chew.   • busPIRone (BUSPAR) 5 mg, oral, 2 times daily, Supposed to take. Needs Rx   • calcium carbonate (Oscal) 500 mg calcium (1,250 mg) tablet 1 tablet, oral, 2 times daily (morning and late afternoon)   • calcium carbonate (TUMS) 500 mg, oral, Every 12 hours   • clotrimazole (Lotrimin) 1 % cream Topical, 2 times daily   • dilTIAZem ER (TIAZAC) 240 mg, oral, Daily   • fluocinonide 0.05 % cream APPLY THIN LAYER TO AFFECTED AREA TWICE A DAY AS NEEDED   • Lidocaine Pain Relief 4 % patch 1 patch, transdermal, Every 24 hours PRN   • mirtazapine (REMERON) 15 mg, oral, Nightly   • mometasone-formoterol (Dulera 100) 100-5 mcg/actuation inhaler 200 mcg, inhalation, 2 times daily   • montelukast (SINGULAIR) 10 mg, oral, Daily   • oxybutynin XL (DITROPAN-XL) 5 mg, oral, Daily, Does not take everyday   • pantoprazole (PROTONIX) 40 mg, oral, 2 times daily   • polyethylene glycol (Glycolax, Miralax) 17 gram/dose powder Take 17 g by mouth once daily. Do not fill before July 28, 2024.   • sucralfate (CARAFATE) 1 g, oral, 2 times daily, Crush and mix in luke warm water to make slurry and drink the slurry.   • thiamine (VITAMIN B-1) 100 mg, oral, Daily   • tiotropium (Spiriva Respimat) 2.5 mcg/actuation inhaler 2 puffs, inhalation, Daily   • tiotropium (Spiriva Respimat) 2.5 mcg/actuation inhaler 2 puffs, inhalation, Daily        Inpatient medications:  Scheduled medications  acetaminophen, 975 mg, oral, q8h  aspirin, 81 mg, oral, Daily  atorvastatin, 40 mg, oral, Nightly  busPIRone,  "5 mg, oral, BID  calcium chloride, 1 g, intravenous, Once  [Held by provider] cloNIDine, 0.1 mg, oral, q8h LISET  [Held by provider] dilTIAZem CD, 240 mg, oral, Daily  [Held by provider] gabapentin, 600 mg, oral, TID  insulin lispro, 0-10 Units, subcutaneous, Before meals & nightly  lactated Ringer's, 1,000 mL, intravenous, Once  levalbuterol, 0.63 mg, nebulization, TID  [Held by provider] melatonin, 3 mg, oral, Nightly  [Held by provider] mirtazapine, 15 mg, oral, Nightly  montelukast, 10 mg, oral, Daily  oxybutynin, 2.5 mg, oral, BID  pantoprazole, 40 mg, oral, BID AC  phenylephrine HCl in 0.9% NaCl, , ,   piperacillin-tazobactam, 3.375 g, intravenous, q6h  sodium bicarbonate, 50 mEq, intravenous, Once  sucralfate, 1 g, oral, BID  thiamine, 100 mg, oral, Daily  tiotropium, 2 puff, inhalation, Daily  vancomycin, 1,500 mg, intravenous, Once  [START ON 9/15/2024] vancomycin, 1,250 mg, intravenous, q12h      Continuous medications  dextrose 10 % in water (D10W), 50 mL/hr  [Held by provider] heparin, 0-4,000 Units/hr, Last Rate: Stopped (09/14/24 1724)      PRN medications  PRN medications: albuterol, albuterol, dextrose, dextrose, glucagon, glucagon, heparin, HYDROmorphone, hydrOXYzine HCL, ipratropium-albuteroL, lidocaine-diphenhydraMINE-Maalox 1:1:1, naloxone, ondansetron, oxyCODONE, oxyCODONE, phenylephrine HCl in 0.9% NaCl, vancomycin     Objective    Blood pressure (!) 169/133, pulse 96, temperature 36.5 °C (97.7 °F), resp. rate 24, height 1.572 m (5' 1.89\"), weight 61.5 kg (135 lb 9.3 oz), SpO2 (!) 77%.     Physical Exam  Vitals reviewed.   Cardiovascular:      Rate and Rhythm: Tachycardia present.   Pulmonary:      Comments: tachypneic  Abdominal:      Tenderness: There is guarding.   Neurological:      Mental Status: She is alert and oriented to person, place, and time.            Intake/Output Summary (Last 24 hours) at 9/14/2024 1907  Last data filed at 9/14/2024 1700  Gross per 24 hour   Intake 188 ml "   Output 1060 ml   Net -872 ml     Labs:   Results from last 72 hours   Lab Units 09/14/24  1626 09/14/24  0505 09/13/24  0547 09/12/24  0558   SODIUM mmol/L 130* 135* 136 134*   POTASSIUM mmol/L 5.7* 3.8 4.3 4.3   CHLORIDE mmol/L 95* 97* 99 99   CO2 mmol/L 16* 30 28 28   BUN mg/dL 22 17 13 11   CREATININE mg/dL 1.22* 0.52 0.44* 0.42*   GLUCOSE mg/dL 298* 91 123* 114*   CALCIUM mg/dL 6.5* 7.3* 7.4* 7.4*   ANION GAP mmol/L 25* 12 13 11   EGFR mL/min/1.73m*2 50* >90 >90 >90   PHOSPHORUS mg/dL  --  2.6 3.1 3.2      Results from last 72 hours   Lab Units 09/14/24  1626 09/14/24  0505 09/13/24  0547   WBC AUTO x10*3/uL 11.0 28.0* 35.1*  35.1*   HEMOGLOBIN g/dL 7.1* 8.5* 8.5*  8.5*   HEMATOCRIT % 24.1* 27.0* 26.9*  26.9*   PLATELETS AUTO x10*3/uL 235 362 347  347   LYMPHO PCT MAN % 9.9  --  1.6   MONO PCT MAN % 0.0  --  0.8   EOSINO PCT MAN % 0.0  --  0.0      Results from last 72 hours   Lab Units 09/14/24  1623   POCT PH, ARTERIAL pH 7.10*   POCT PCO2, ARTERIAL mm Hg 49*   POCT PO2, ARTERIAL mm Hg 65*   POCT SO2, ARTERIAL % 83*     Results from last 72 hours   Lab Units 09/14/24  1833 09/14/24  1711   POCT PH, VENOUS pH 7.28* 7.24*   POCT PCO2, VENOUS mm Hg 41 42   POCT PO2, VENOUS mm Hg 35 39        Micro/ID:     Lab Results   Component Value Date    URINECULTURE (A) 08/19/2024     Multiple organisms present, probable contamination. Repeat culture if clinically indicated.    BLOODCULT No growth at 4 days -  FINAL REPORT 09/05/2024           Assessment and Plan     Assessment:  Fernando Cuevas is a 64 y.o. year old female patient with PMHx notable for HTN, COPD (FEV1 55% of predicted), DVT on eliquis, RLL adenocarcinoma 2022 s/p radiation, polysubtancce use disorder (tobaco, alochol, cocaine), PAD, and hx of SBO most recently 6/2024 s/p partial SBR admitted to the MICU on 09/14/24 for medical management of SBO. Patient initially presented for N/V, found to have SBO with hospital course complicated by c/f acute limb  ischemia s/p b/l BKA 9/10 and rapid response for hypotension and lactic acidosis 9/14 c/f SBO.    After goals of care discussion this evening, patient and her family opted to transition focus of care to comfort measures. Code status has been updated and comfort measures initiated.    Mechanical Ventilation: none  Sedation/Analgesia:  none  Restraints: no    Plan:  NEUROLOGY/PSYCH:  Dx:  ANNA  Management:  None    CARDIOVASCULAR:  Dx:  Shock  Hypotensive 70s/40s dring rapid response with lactate of 14 with pH 7.1 on ABG. Given 500cc IVF with improvement in BP  and lactate to 9.9 prior to transfer to MICU. Received 1L LR in MICU and was started on norepi 0.06 titrated up to 0.15 after MAPs remained in 50s  Most likely etiology of profound lactic acidosis and hypovolemia shock is SBO however without further imaging/intervention, unable to confirm etiology. Significant bowel ischemia without obstruction not ruled out  Management:  Cw levophed, fluid resuscitation  Trend lactate  FU GOC, pending discussion may benefit from CT A/P to discern SBO vs bowel ischemia. Currently patient and family both have indicated they would not pursue surgical intervention if offered/indicated    PULMONARY:  Dx:  Tachypnea  Likely compensatory response to increasing lactic acidosis  Placed on nonrebreather during rapid response  COPD Grade 2  FEV1 55%  Management:  Monitor O2 requirements    RENAL/GENITOURINARY:  Dx:  Hyperkalemia  K 5.7 on RFP prior to MICU admission, 4.6 on most recent VBG  JOSEFINA  Cr 1.22 from 0.52 earlier this morning  Likely prerenal   HAGMA  AG 19 on last RFP, likely 2/2 lactic acidosis with lactate 13.2, no delta gap  Management:  Monitor volume status, likely would benefit from further fluids  If CT with contrast indicated, will need to start IVF in anticipation of contrast load iso Josefina    GASTROENTEROLOGY:  Dx:  SBO  Recurrent SBO with recent resection 6/2024  Currently with high c/f SBO given abdominal pain, N/V,  lactic acidosis, and sx c/w previous presentations  Cannot rule out bowel ischemia at this time  ACS following, though no plan for operative intervention per patient's wishes  Management:  GOC discussion  Can consider further imaging       ENDOCRINOLOGY:  Dx:  Hypoglycemia- resolved  Hypoglycemic to 16, resolved to 200 s/p D50 x1   Management:  CTM    HEMATOLOGY:  Dx:  Anemia  Hgb 7.1 from 8.5 yesterday and 9.2 day prior. ?Component of dilution given all cell lines decreased however leukocytosis resolved iso cessation of steroids  No signs of bleed  Management:  CTM  Transfuse Hgb goal >7 pending GOC    SKIN:  Dx:  Skin Failure No    Management:  None    MUSCULOSKELETAL:  Dx:  No active issues  Management:  None    INFECTIOUS DISEASE:  Dx:  Leukocytosis  Seen by ID for persistent leukocytosis, patient also receiving steroids. Thought to be component of post op status, some c/f RUQ tenderness but with normal LFTs, intraabdominal process not ruled out especially given hx of intra-abdominal abscess  Stool pathogen, C Diff neg, Blood cx from 9/5 with no growth, Urine Cx pending   Cefazolin 9/10-9/13, Zosyn 9/13-, Vanc 9/14-  Management:  Cw Vanc/Zosyn    ICU Check List     FEN  Fluids: PRN  Electrolytes: Monitor hyperkalemia  Nutrition: NPO  Prophylaxis:  DVT ppx: Held pending GOC discussion  GI ppx: PPI  Bowel care: NGT to suction for comfort  Hardware:                CVC 09/07/24 Triple lumen Right Internal jugular (Active)   Placement Date/Time: 09/07/24 (c) 0191   Hand Hygiene Performed Prior to CVC Insertion: Yes  Site Prep: Chlorhexidine   Site Prep Agent has Completely Dried Before Insertion: Yes  All 5 Sterile Barriers Used (Gloves, Gown, Cap, Mask, Large Sterile Lyndsey...   Number of days: 7       NG/OG/Feeding Tube Right nostril (Active)   Placement Date: 09/14/24   Tube Location: Right nostril   Number of days: 0       External Urinary Catheter Female (Active)   Placement Date/Time: 09/12/24 0044   External  Catheter Type: Female   Number of days: 2       Social:  Code: DNR- Comfort Care  HPOA: Landa [phone] . All 3 children are involved in decision making process  Disposition: RENUKA Lance MD   09/14/24 at 7:07 PM     Disclaimer: Documentation completed with the information available at the time of input. The times in the chart may not be reflective of actual patient care times, interventions, or procedures. Documentation occurs after the physical care of the patient.

## 2024-09-14 NOTE — SIGNIFICANT EVENT
ACS Plan of Care    Patient was re-examined this afternoon and was peritonitic on exam. She had a lactate of 13 and was on a non-rebreather. She was transferred to MICU. At that time a discussion was held with the family regarding potential options moving forward, specifically, whether or not the patient and the family would be interested in operative intervention. Patient and family ultimately decided against pursing operative intervention.    Patient seen and discussed with attending Dr. Elias.     Patrica Harry  PGY2 General Surgery   ACS [id number]

## 2024-09-15 VITALS
WEIGHT: 135.58 LBS | DIASTOLIC BLOOD PRESSURE: 133 MMHG | BODY MASS INDEX: 24.95 KG/M2 | OXYGEN SATURATION: 76 % | TEMPERATURE: 98.6 F | HEIGHT: 62 IN | SYSTOLIC BLOOD PRESSURE: 169 MMHG

## 2024-09-15 LAB
BACTERIA UR CULT: ABNORMAL
BACTERIA UR CULT: ABNORMAL
HOLD SPECIMEN: NORMAL

## 2024-09-15 ASSESSMENT — RESPIRATORY DISTRESS OBSERVATION SCALE (RDOS)
RESPIRATORY RATE PER MINUTE: 0 - <19 BREATHS
RESPIRATORY RATE PER MINUTE: 0 - <19 BREATHS
RDOS TOTAL SCORE: 1
HEART RATE PER MINUTE: 0 - <90 BEATS
PARADOXICAL BREATHING PATTERN: 0 - NONE
INVOLUNTARY NASAL FLARING: 0 - NONE
ACCESSORY MUSCLE RISE IN CLAVICLE DURING INSPIRATION: 0 - NONE
GRUNTING AT END OF EXPIRATION: 0 - NONE
RESTLESS NONPURPOSEFUL MOVEMENTS: 0 - NONE
INVOLUNTARY NASAL FLARING: 0 - NONE
RESTLESS NONPURPOSEFUL MOVEMENTS: 0 - NONE
GRUNTING AT END OF EXPIRATION: 0 - NONE
PARADOXICAL BREATHING PATTERN: 0 - NONE
ACCESSORY MUSCLE RISE IN CLAVICLE DURING INSPIRATION: 1 - SLIGHT RISE
LOOK OF FEAR: 0 - NONE
HEART RATE PER MINUTE: 0 - <90 BEATS
RDOS TOTAL SCORE: 0
LOOK OF FEAR: 0 - NONE

## 2024-09-15 NOTE — DISCHARGE SUMMARY
Discharge Diagnosis  Shock, multi-system organ failure     Issues Requiring Follow-Up  None    Test Results Pending At Discharge  None    Hospital Course  Fernando Cuevas is a 64 year old female with history of polysubstance use, PAD, COPD, HTN, DVT on Eliquis, lung cancer s/p radiation, recurrent SBO with recent ex lap / JOSIAH / small bowel resection (June 2024) who was admitted on 9/5/24 with recurrent SBO. SBO was managed conservatively with NGT and she passed a gastrograffin challenge on 9/8 and subsequently had NGT removed. Her hospital course has been complicated by acute on chronic CTLI now s/p RLE angiogram with PT thromboembolectomy and subsequent bilateral guillotine BKAs.     Rapid response called on 9/14 for tachypnea and hypotension. ABG showed pH 7.1 with lactate 14. She was given 500cc IVF and transferred to the MICU on levophed with concern of repeat SBO. Shortly after admission to the MICU, GOC discussion with patient and her family ultimately resulted in transition to comfort care. Comfort measures began with patient surrounded by her family. She ultimately passed 0144 9/15.    Physical Exam  Pupils fixed and dilated. Absent corneal reflexes.  Absent cardiopulmonary sounds, auscultated for >60 seconds.  Absent pulses, palpated for >60 seconds.    Home Medications     Medication List      ASK your doctor about these medications     * albuterol 90 mcg/actuation inhaler   * albuterol 2.5 mg /3 mL (0.083 %) nebulizer solution; Take 3 mL by   nebulization every 6 hours if needed for wheezing or shortness of breath.   ammonium lactate 12 % lotion; Commonly known as: Lac-Hydrin   apixaban 5 mg tablet; Commonly known as: Eliquis; Take 1 tablet (5 mg)   by mouth 2 times a day.   azithromycin 250 mg tablet; Commonly known as: Zithromax   benzonatate 100 mg capsule; Commonly known as: Tessalon; Take 1 capsule   (100 mg) by mouth 3 times a day as needed for cough. Do not crush or chew.   busPIRone 5 mg tablet;  Commonly known as: Buspar   * calcium carbonate 500 mg calcium (1,250 mg) tablet; Commonly known as:   Oscal   * calcium carbonate 250 mg ([place] 100 mg) chewable split tablet; Commonly   known as: Tums   clotrimazole 1 % cream; Commonly known as: Lotrimin   dilTIAZem  mg 24 hr capsule; Commonly known as: Tiazac   fluocinonide 0.05 % cream; Commonly known as: Lidex   Lidocaine Pain Relief 4 % patch; Generic drug: lidocaine   mirtazapine 15 mg tablet; Commonly known as: Remeron; Take 1 tablet (15   mg) by mouth once daily at bedtime.   mometasone-formoterol 100-5 mcg/actuation inhaler; Commonly known as:   Dulera 100   montelukast 10 mg tablet; Commonly known as: Singulair   oxybutynin XL 5 mg 24 hr tablet; Commonly known as: Ditropan-XL   pantoprazole 40 mg EC tablet; Commonly known as: ProtoNix   polyethylene glycol 17 gram/dose powder; Commonly known as: Glycolax,   Miralax; Take 17 g by mouth once daily. Do not fill before July 28, 2024.   predniSONE 20 mg tablet; Commonly known as: Deltasone; Take 2 tablets   (40 mg) by mouth once daily for 4 days.; Ask about: Should I take this   medication?   * Spiriva Respimat 2.5 mcg/actuation inhaler; Generic drug: tiotropium;   Inhale 2 puffs once daily.   * Spiriva Respimat 2.5 mcg/actuation inhaler; Generic drug: tiotropium;   Inhale 2 puffs once daily.   sucralfate 1 gram tablet; Commonly known as: Carafate; Take 1 tablet (1   g) by mouth 2 times a day. Crush and mix in luke warm water to make slurry   and drink the slurry.   thiamine 100 mg tablet; Commonly known as: Vitamin B-1   Tylenol Extra Strength 500 mg tablet; Generic drug: acetaminophen  * This list has 6 medication(s) that are the same as other medications   prescribed for you. Read the directions carefully, and ask your doctor or   other care provider to review them with you.       Outpatient Follow-Up  Future Appointments   Date Time Provider Department Center   9/23/2024 10:30 AM Wilson Liang MD  [id number]       Edward Lance MD

## 2024-09-15 NOTE — HOSPITAL COURSE
Fernando Cuevas is a 64 year old female with history of polysubstance use, PAD, COPD, HTN, DVT on Eliquis, lung cancer s/p radiation, recurrent SBO with recent ex lap / JOSIAH / small bowel resection (June 2024) who was admitted on 9/5/24 with recurrent SBO. SBO was managed conservatively with NGT and she passed a gastrograffin challenge on 9/8 and subsequently had NGT removed. Her hospital course has been complicated by acute on chronic CTLI now s/p RLE angiogram with PT thromboembolectomy and subsequent bilateral guillotine BKAs.     Rapid response called on 9/14 for tachypnea and hypotension. ABG showed pH 7.1 with lactate 14. She was given 500cc IVF and transferred to the MICU on levophed with concern of repeat SBO. Shortly after admission to the MICU, GOC discussion with patient and her family ultimately resulted in transition to comfort care. Comfort measures began with patient surrounded by her family. She ultimately passed 0144 9/15.

## 2024-09-15 NOTE — ACP (ADVANCE CARE PLANNING)
Patient transferred to MICU 9/14/24 hypotensive requiring pressors, high concern for bowel perforation, can see medical ICU H&P.    There are several family members present however all are in agreement than her three children Katelin Romero and anisa are the surrogate decision makers. The patient was able to communicate effectively in conversation with ICU team and children at bedside. She communicated that she did not want any further surgery. Many family members including her children also stated that she had been adamant on no more surgeries after her recent BKA. Therefore, no surgical option was pursued.     In further discussion with patient and children, it was apparent that she does not wish to be mechanically ventilated. She had stated this in the past. Understanding that CPR would be largely futile given her current state, it was determined that a DNR/DNI status is what aligned with her wishes.     On further discussion, patient had been experiencing pain in her abdomen and was requesting that we focus on her pain. Her children were also in agreement that focusing on her pain and keeping her comfortable was priority. The patient and children understood that by focusing on controlling her pain we would no longer pursue workup and treatment for her active medical problems. The decision was made to transition to comfort care only.     It was discussed with bedside RN that plan would be for patient's three children to be bedside when pressor support is turned off.

## 2024-09-15 NOTE — PROGRESS NOTES
Vancomycin Dosing by Pharmacy- Cessation of Therapy    Consult to pharmacy for vancomycin dosing has been discontinued by the prescriber, pharmacy will sign off at this time.    Please call pharmacy if there are further questions or re-enter a consult if vancomycin is resumed.     Alondra Holland, PharmD

## 2024-09-15 NOTE — NURSING NOTE
NG placed at bedside patient tolereated well. Inserted at 55cm. Taped to nose and cheek. Immediately 2OO ml thick green bilious output with large sediment. NG placed on low intermit suction.

## 2024-09-15 NOTE — SIGNIFICANT EVENT
Death Note    Date of Death:  09/15/24   Time of Death:  0144   Preliminary Cause of Death:  Multi-system organ failure     Notified by RN that patient was found without a pulse.   On physical exam pt not responsive to stimuli.  Pupils fixed and dilated. Absent corneal reflexes.  Absent cardiopulmonary sounds, auscultated for >60 seconds.  Absent pulses, palpated for >60 seconds.    Patients next of kin present in room and condolences were provided. Attending physician notified of patients status.         Edward Lance MD  Internal Medicine, PGY-2

## 2024-09-15 NOTE — PROGRESS NOTES
"Hospitalist Progress Note        Name: Fernando Cuevas  :  1960(64 y.o.)  MRN:  [id number]    Date: 24     SUBJECTIVE     HPI / Hospital Course:  Patient is a 63 year old female with history of polysubstance use, PAD, COPD, HTN, DVT on Eliquis, lung cancer s/p radiation, recurrent SBO with recent ex lap / JOSIAH / small bowel resection (2024) who was admitted on 24 with recurrent SBO. SBO was managed conservatively with NGT and she passed a gastrograffin challenge on  and subsequently had NGT removed. Her hospital course has been complicated by acute on chronic CTLI now s/p RLE angiogram with PT thromboembolectomy and subsequent bilateral guillotine BKAs. She has been maintained on a heparin gtt for both her eliquis use hx and vascular pathologies. patient's vasculature appeared to have a \"string on bead\" appearance thus vascular medicine was consulted for recommendations of work-up for FMD. patient has several lab and imaging studies for this work-up (renal and carotid duplexes planned as an outpatient). She presented in a COPD exacerbation as well for which pulmonolgy provided treatment recommendations. her home medications for COPD were continued and she was initiated on a burst steroid treatment - and switched to PO prednisone . she continues to have a leukocytosis ( possibly due to steroids) and will complete 72 hours of abx 9/12 PM. vascular surgery waiting for leukocytosis to resolve prior to formalization of BKA stumps.     Interval History:   Vitals and chart notes from overnight reviewed. At bedside patient expressed concern due to increased abdominal pain and bilious emesis overnight. Patient reported she had not had bowel movement in several days. Additionally patient endorsed shortness of breath. Family was at bedside and did not have any acute questions or concerns at this time.     Review of Systems:   Other than patient's chronic conditions and those complaints in the " history above, the rest of the 10 systems review were done and were negative.     Current medications:  Scheduled Meds:phenylephrine HCl in 0.9% NaCl, , ,       Continuous Infusions:dextrose 10 % in water (D10W), 50 mL/hr  [Held by provider] heparin, 0-4,000 Units/hr, Last Rate: Stopped (09/14/24 1724)  morphine, 0-10 mg/hr, Last Rate: 2 mg/hr (09/14/24 2228)  norepinephrine, 0-0.5 mcg/kg/min, Last Rate: 0.15 mcg/kg/min (09/14/24 1846)  vasopressin, 0.03 Units/min, Last Rate: Stopped (09/14/24 2000)      PRN Meds:PRN medications: albuterol, albuterol, dextrose, dextrose, glucagon, glucagon, glycopyrrolate, haloperidol lactate, HYDROmorphone, hydrOXYzine HCL, ipratropium-albuteroL, lidocaine-diphenhydraMINE-Maalox 1:1:1, LORazepam, morphine, morphine, morphine, morphine, naloxone, ondansetron, oxyCODONE, oxyCODONE, phenylephrine HCl in 0.9% NaCl      OBJECTIVE     Vitals:    09/14/24 1900 09/14/24 2000 09/14/24 2002 09/14/24 2100   BP:       Pulse: 95 99  102   Resp: 19 20  (!) 36   Temp:   37 °C (98.6 °F)    TempSrc:       SpO2: (!) 69%   (!) 76%   Weight:       Height:            Physical Exam  Constitutional:       Appearance: Normal appearance.   HENT:      Head: Normocephalic and atraumatic.   Eyes:      Extraocular Movements: Extraocular movements intact.   Cardiovascular:      Rate and Rhythm: Normal rate and regular rhythm.   Pulmonary:      Effort: Pulmonary effort is normal. No respiratory distress.      Breath sounds: Wheezing present.      Comments: Prominent lung sounds, worse compared to yesterday   Abdominal:      General: There is distension.      Tenderness: There is abdominal tenderness.      Comments: Patient had bilious emesis. Seen at bedside.    Musculoskeletal:      Right lower leg: No edema.      Left lower leg: No edema.      Comments: Distal ends of lower extremities wrapped s/p below knee amputation    Skin:     General: Skin is warm and dry.      Findings: No erythema.   Neurological:       General: No focal deficit present.      Mental Status: She is alert and oriented to person, place, and time.   Psychiatric:         Behavior: Behavior normal.      Comments: Appropriately dysphoric          Labs:   Lab Results   Component Value Date     (L) 09/14/2024    K 5.7 (H) 09/14/2024    CL 95 (L) 09/14/2024    CO2 16 (L) 09/14/2024    BUN 22 09/14/2024    CREATININE 1.22 (H) 09/14/2024    GLUCOSE 298 (H) 09/14/2024    CALCIUM 6.5 (L) 09/14/2024    PROT <3.0 (L) 09/14/2024    BILITOT 0.3 09/14/2024    ALKPHOS 74 09/14/2024     (H) 09/14/2024    ALT 8 09/14/2024       Lab Results   Component Value Date    WBC 11.0 09/14/2024    HGB 7.1 (L) 09/14/2024    HCT 24.1 (L) 09/14/2024    MCV 83 09/14/2024     09/14/2024       Imaging:   XR chest 1 view    Result Date: 9/14/2024  Interpreted By:  Kiran David, STUDY: XR CHEST 1 VIEW; XR ABDOMEN 1 VIEW;  9/14/2024 4:57 pm   INDICATION: Signs/Symptoms:dyspnea; Signs/Symptoms:sbo.   COMPARISON: Chest radiograph and abdominal radiographs from earlier same day; and abdominopelvic CT scan 09/05/2024   ACCESSION NUMBER(S): [id number]; [id number]   ORDERING CLINICIAN: GE ROBLEDO   FINDINGS: Single AP radiograph of the chest and single AP radiograph of abdomen, are available for interpretation. Enteric tube is now visualized coursing below diaphragm and tip projecting over expected location of gastric body and side hole overlying GE junction region; slight advancement is recommended. Right IJ approach central venous catheter with tip projecting over mid SVC, unchanged.   CARDIOMEDIASTINAL SILHOUETTE: The cardiomediastinal silhouette is stable in size and configuration.   LUNGS: Persistent patchy bilateral infrahilar and basilar airspace opacities, with slight improvement on the left as compared to prior radiograph from earlier same day. No pleural effusion or pneumothorax.   ABDOMEN: There is persistent distention of the stomach and prominence of  multiple bowel loops all the abdomen, unchanged from prior radiograph from earlier same day. Mild-to-moderate distal colonic and rectal stool burden..Limited evaluation of pneumoperitoneum on supine imaging, however no gross evidence of free air is noted.   BONES: No acute osseous abnormality.       1. Enteric tube tip projecting over expected location of gastric body and side hole overlying GE junction region; slight advancement is recommended. 2. Persistent patchy bilateral infrahilar and basilar airspace opacities, with slight improvement on the left as compared to prior radiograph from earlier same day. Again, correlate with concern for infection/aspiration. 3. Similar distention of the stomach and prominence of multiple bowel loops all the abdomen, when compared to radiograph from earlier same day. Findings may correlate with ileus, although partial mechanical obstruction can not be excluded. Attention on follow-up imaging is recommended.   Signed by: Kiran David 9/14/2024 5:05 PM Dictation workstation:   [id number]    XR abdomen 1 view    Result Date: 9/14/2024  Interpreted By:  Kiran David, STUDY: XR CHEST 1 VIEW; XR ABDOMEN 1 VIEW;  9/14/2024 4:57 pm   INDICATION: Signs/Symptoms:dyspnea; Signs/Symptoms:sbo.   COMPARISON: Chest radiograph and abdominal radiographs from earlier same day; and abdominopelvic CT scan 09/05/2024   ACCESSION NUMBER(S): [id number]; [id number]   ORDERING CLINICIAN: GE ROBLEDO   FINDINGS: Single AP radiograph of the chest and single AP radiograph of abdomen, are available for interpretation. Enteric tube is now visualized coursing below diaphragm and tip projecting over expected location of gastric body and side hole overlying GE junction region; slight advancement is recommended. Right IJ approach central venous catheter with tip projecting over mid SVC, unchanged.   CARDIOMEDIASTINAL SILHOUETTE: The cardiomediastinal silhouette is stable in size and configuration.   LUNGS:  Persistent patchy bilateral infrahilar and basilar airspace opacities, with slight improvement on the left as compared to prior radiograph from earlier same day. No pleural effusion or pneumothorax.   ABDOMEN: There is persistent distention of the stomach and prominence of multiple bowel loops all the abdomen, unchanged from prior radiograph from earlier same day. Mild-to-moderate distal colonic and rectal stool burden..Limited evaluation of pneumoperitoneum on supine imaging, however no gross evidence of free air is noted.   BONES: No acute osseous abnormality.       1. Enteric tube tip projecting over expected location of gastric body and side hole overlying GE junction region; slight advancement is recommended. 2. Persistent patchy bilateral infrahilar and basilar airspace opacities, with slight improvement on the left as compared to prior radiograph from earlier same day. Again, correlate with concern for infection/aspiration. 3. Similar distention of the stomach and prominence of multiple bowel loops all the abdomen, when compared to radiograph from earlier same day. Findings may correlate with ileus, although partial mechanical obstruction can not be excluded. Attention on follow-up imaging is recommended.   Signed by: Kiran David 9/14/2024 5:05 PM Dictation workstation:   [id number]    XR chest 1 view    Result Date: 9/14/2024  Interpreted By:  Kiran David, STUDY: XR CHEST 1 VIEW;  9/14/2024 11:51 am   INDICATION: Signs/Symptoms:dyspnea.   COMPARISON: Chest radiograph 09/13/2024 and abdominopelvic CT scan 09/05/2024   ACCESSION NUMBER(S): [id number]   ORDERING CLINICIAN: GE ROBLEDO   FINDINGS: AP radiograph of the chest. Right IJ approach central venous catheter with tip projecting over mid SVC, unchanged.   CARDIOMEDIASTINAL SILHOUETTE: The cardiomediastinal silhouette is stable in size and configuration.   LUNGS: There are new patchy bilateral infrahilar and basilar airspace opacities. No pleural  effusion or pneumothorax.   ABDOMEN: No remarkable upper abdominal findings.   BONES: No acute osseous abnormality.       1. New patchy bilateral infrahilar and basilar airspace opacities and correlate with concern for infection/aspiration.   MACRO: Critical Finding:  See findings. Notification was initiated on 9/14/2024 at 12:28 pm by  Kiran David.  (**-YCF-**) Instructions:   Signed by: Kiran David 9/14/2024 12:28 PM Dictation workstation:   [id number]    XR abdomen 1 view    Result Date: 9/14/2024  Interpreted By:  Kiran David, STUDY: XR ABDOMEN 1 VIEW;  9/14/2024 11:51 am   INDICATION: Signs/Symptoms:abdominal distension and tenderness.   COMPARISON: Abdominal radiographs 09/09/2024 and abdominopelvic CT scan 09/05/2024   ACCESSION NUMBER(S): [id number]   ORDERING CLINICIAN: GE ROBLEDO   FINDINGS: AP radiographs of abdomen available for interpretation.   There is persistent distention of the stomach and prominence of multiple bowel loops all the abdomen overall appearance is unchanged from prior radiographs from 09/09/2024. The previously noted positive contrast material has cleared. Mild-to-moderate distal colonic and rectal stool burden..Limited evaluation of pneumoperitoneum on supine imaging, however no gross evidence of free air is noted.   Osseous structures demonstrate no acute bony changes.       1. Persistent distention of the stomach and prominence of multiple bowel loops all the abdomen overall appearance is unchanged from prior radiographs from 09/09/2024. Findings may correlate with ileus, although partial mechanical obstruction can not be excluded. Attention on follow-up imaging is recommended.   Signed by: Kiran David 9/14/2024 12:23 PM Dictation workstation:   [id number]    XR chest 2 views    Result Date: 9/14/2024  Interpreted By:  Alex Odom, STUDY: XR CHEST 2 VIEWS;  9/13/2024 10:30 am   INDICATION: Signs/Symptoms:leukocytosis, c/f infection.     COMPARISON: 09/07/2024.    ACCESSION NUMBER(S): [id number]   ORDERING CLINICIAN: GE ROBLEDO   FINDINGS: Right IJ central venous line with its tip over the SVC   CARDIOMEDIASTINAL SILHOUETTE: Cardiomediastinal silhouette is normal in size and configuration.   LUNGS: The lungs are hyperinflated. There is no consolidation. There is no effusion but is no edema   ABDOMEN: No remarkable upper abdominal findings.   BONES: No acute osseous changes.       Emphysematous changes in the lungs. No evidence of acute cardiopulmonary process.     MACRO: None   Signed by: Alex Odom 9/14/2024 10:02 AM Dictation workstation:   [id number]       ASSESSMENT & PLAN     Patient is a 63 year old female with history of polysubstance use, PAD, COPD, HTN, DVT on Eliquis, lung cancer s/p radiation, recurrent SBO with recent ex lap / JOSIAH / small bowel resection (June 2024) who was admitted on 9/5/24 with recurrent SBO. Her hospital course has been complicated by COPD exacerbation, acute on chronic CTLI now s/p RLE angiogram with PT thromboembolectomy and subsequent bilateral guillotine BKAs Patient continues to have a leukocytosis ( possibly due to steroids) and will complete 72 hours of abx 9/12 PM. Vascular surgery waiting for leukocytosis to resolve prior to formalization of BKA stumps. This morning patient had abd distension, bilious emesis concerning for recurrence of SBO. ACS notified and KUB ordered along with Chest X Ray. Will NG tube with intermittent suction and make patient NPO at this time.       Lower extremity ischemia s/p bilateral guillotine BKA on 09/10  Thrombectomy   Hx DVT on Eliquis   - vasc surg following - amp formalization pending WBC normalization   - Continue wound care w/ daily dressing changes  - oxycodone 5 mg PO Q4H for moderate pain + oxycodone 10 mg PO Q4H for severe pain + Dilaudid 0.2  mg IV Q2H for breakthrough pain  - Tylenol to 1 g PO Q8H  - gabapentin to 600 mg PO TID (home dose: 300 TID)  - Ideally bridge to warfarin at  discharge with goal INR 2-3 if patient amenable. Patient mentioned she was hesitant about warfarin but did not wish to discuss further today  - Possible Xarelto -> 15mg BID x21 days then 20mg every day if patient does not want warfarin   - continue Asprin and high intensity statin   - following vas medicine recs   - carotid and renal duplex for FMD and further imaging can be done as outpatient  - CTA with FMD protocol as an outpatient  - Likely Holter monitor at discharge    SBO - concern for recurrence   - On admission patient initially admitted for SBO  - GGC 9/8: contrast reached colon   -  At bedside today patient had abd distension and bilious emesis concerning for recurrence of SBO.   - KUB was ordered, ACS was contacted in regards to current presentation   - NG tube w/ intermittent suction, NPO started after speaking w/ ACS   - PRN Zofran     Leukocytosis   - Patient with persistent increased WBC:   - Post op WBC rise vs infectious etiology   - WBC 35 (9/13) -> 28 (9/14)  -  UA, CXR, CBC diff ordered; CT abd pelvis order per ID   - ID consulted; will continue zosyn for empiric coverage.     COPD   Worsening SOB today - COPD vs aspiration pneumonia    - Burst steroids x5 days (9/8-9/12)  - Continue home Singulair + inhalers  - Patient reports increased SOB today  - repeat Chest X Ray ordered for today     Tachycardia   - Persistently tachycardic since admission       - Clonidine 0.1 q8h       - Continue home Cardizem  - Echocardiogram with LVEF 30-35%, PFO on bubble study       GI bleed (resolved)  - Hgb stable, continuing to monitor  - Continue home PPI BID, sucralfate    Diabetes  - SSI #2  - hypoglycemia protocol     MDD  - Continue home Buspar, mirtazapine       DVT Prophylaxis: Heparin ggt     Code status: full code   Diet: NPO    Disposition: Concern for recurrence of SBO - ACS contacted this morning     Electronically signed by Lito Bernal MD on 09/14/24 at 10:55 PM      Lito Bernal MD    I saw and  evaluated the patient. I personally obtained the key and critical portions of the history and physical exam or was physically present for key and critical portions performed by the resident/fellow. I reviewed the resident/fellow's documentation and discussed the patient with the resident/fellow. I agree with the resident/fellow's medical decision making as documented in the note.    Lipase and LFTs added to AM lab draw in setting of bilious emesis and abdominal pain. Contacted ACS. Type and screen.    Cornelius Ortiz MD

## 2024-09-16 LAB
ELECTRONICALLY SIGNED BY: NORMAL
JAK2 V617F INTERPRETATION: NORMAL
JAK2 V617F RESULT: NOT DETECTED

## 2024-09-17 LAB
ATRIAL RATE: 85 BPM
BACTERIA UR CULT: ABNORMAL
BACTERIA UR CULT: ABNORMAL
P AXIS: 90 DEGREES
P OFFSET: 209 MS
P ONSET: 164 MS
PR INTERVAL: 126 MS
Q ONSET: 227 MS
QRS COUNT: 14 BEATS
QRS DURATION: 66 MS
QT INTERVAL: 344 MS
QTC CALCULATION(BAZETT): 409 MS
QTC FREDERICIA: 386 MS
R AXIS: 48 DEGREES
T AXIS: 79 DEGREES
T OFFSET: 399 MS
VENTRICULAR RATE: 85 BPM

## 2024-09-19 LAB
LABORATORY COMMENT REPORT: NORMAL
PATH REPORT.FINAL DX SPEC: NORMAL
PATH REPORT.GROSS SPEC: NORMAL
PATH REPORT.RELEVANT HX SPEC: NORMAL
PATH REPORT.TOTAL CANCER: NORMAL

## 2024-09-23 ENCOUNTER — APPOINTMENT (OUTPATIENT)
Dept: SURGERY | Facility: CLINIC | Age: 64
End: 2024-09-23
Payer: COMMERCIAL
